# Patient Record
Sex: MALE | Race: WHITE | NOT HISPANIC OR LATINO | Employment: OTHER | URBAN - METROPOLITAN AREA
[De-identification: names, ages, dates, MRNs, and addresses within clinical notes are randomized per-mention and may not be internally consistent; named-entity substitution may affect disease eponyms.]

---

## 2020-08-28 ENCOUNTER — HOSPITAL ENCOUNTER (INPATIENT)
Facility: HOSPITAL | Age: 85
LOS: 1 days | Discharge: HOME | DRG: 917 | End: 2020-08-29
Attending: EMERGENCY MEDICINE | Admitting: INTERNAL MEDICINE
Payer: COMMERCIAL

## 2020-08-28 DIAGNOSIS — T50.901A ACCIDENTAL DRUG INGESTION, INITIAL ENCOUNTER: ICD-10-CM

## 2020-08-28 DIAGNOSIS — I95.9 HYPOTENSION: Primary | ICD-10-CM

## 2020-08-28 PROBLEM — Z86.79 HISTORY OF HYPERTENSION: Status: ACTIVE | Noted: 2020-08-28

## 2020-08-28 PROBLEM — M06.9 RHEUMATOID ARTHRITIS (HCC): Status: ACTIVE | Noted: 2020-08-28

## 2020-08-28 LAB
ALBUMIN SERPL BCP-MCNC: 2.3 G/DL (ref 3.5–5)
ALP SERPL-CCNC: 73 U/L (ref 46–116)
ALT SERPL W P-5'-P-CCNC: 18 U/L (ref 12–78)
ANION GAP SERPL CALCULATED.3IONS-SCNC: 6 MMOL/L (ref 4–13)
AST SERPL W P-5'-P-CCNC: 42 U/L (ref 5–45)
ATRIAL RATE: 80 BPM
BASOPHILS # BLD AUTO: 0.04 THOUSANDS/ΜL (ref 0–0.1)
BASOPHILS NFR BLD AUTO: 0 % (ref 0–1)
BILIRUB SERPL-MCNC: 0.5 MG/DL (ref 0.2–1)
BUN SERPL-MCNC: 13 MG/DL (ref 5–25)
CALCIUM SERPL-MCNC: 7.7 MG/DL (ref 8.3–10.1)
CHLORIDE SERPL-SCNC: 103 MMOL/L (ref 100–108)
CO2 SERPL-SCNC: 30 MMOL/L (ref 21–32)
CREAT SERPL-MCNC: 1.01 MG/DL (ref 0.6–1.3)
EOSINOPHIL # BLD AUTO: 0.67 THOUSAND/ΜL (ref 0–0.61)
EOSINOPHIL NFR BLD AUTO: 7 % (ref 0–6)
ERYTHROCYTE [DISTWIDTH] IN BLOOD BY AUTOMATED COUNT: 14.6 % (ref 11.6–15.1)
GFR SERPL CREATININE-BSD FRML MDRD: 68 ML/MIN/1.73SQ M
GLUCOSE SERPL-MCNC: 146 MG/DL (ref 65–140)
HCT VFR BLD AUTO: 34.8 % (ref 36.5–49.3)
HGB BLD-MCNC: 11.2 G/DL (ref 12–17)
IMM GRANULOCYTES # BLD AUTO: 0.01 THOUSAND/UL (ref 0–0.2)
IMM GRANULOCYTES NFR BLD AUTO: 0 % (ref 0–2)
LYMPHOCYTES # BLD AUTO: 3.03 THOUSANDS/ΜL (ref 0.6–4.47)
LYMPHOCYTES NFR BLD AUTO: 30 % (ref 14–44)
MCH RBC QN AUTO: 28.7 PG (ref 26.8–34.3)
MCHC RBC AUTO-ENTMCNC: 32.2 G/DL (ref 31.4–37.4)
MCV RBC AUTO: 89 FL (ref 82–98)
MONOCYTES # BLD AUTO: 0.97 THOUSAND/ΜL (ref 0.17–1.22)
MONOCYTES NFR BLD AUTO: 10 % (ref 4–12)
NEUTROPHILS # BLD AUTO: 5.49 THOUSANDS/ΜL (ref 1.85–7.62)
NEUTS SEG NFR BLD AUTO: 53 % (ref 43–75)
P AXIS: 70 DEGREES
PLATELET # BLD AUTO: 224 THOUSANDS/UL (ref 149–390)
PMV BLD AUTO: 11.5 FL (ref 8.9–12.7)
POTASSIUM SERPL-SCNC: 4.3 MMOL/L (ref 3.5–5.3)
PR INTERVAL: 158 MS
PROT SERPL-MCNC: 6 G/DL (ref 6.4–8.2)
QRS AXIS: -46 DEGREES
QRSD INTERVAL: 90 MS
QT INTERVAL: 412 MS
QTC INTERVAL: 475 MS
RBC # BLD AUTO: 3.9 MILLION/UL (ref 3.88–5.62)
SODIUM SERPL-SCNC: 139 MMOL/L (ref 136–145)
T WAVE AXIS: 62 DEGREES
TROPONIN I SERPL-MCNC: <0.02 NG/ML
VENTRICULAR RATE: 80 BPM
WBC # BLD AUTO: 10.21 THOUSAND/UL (ref 4.31–10.16)

## 2020-08-28 PROCEDURE — 36415 COLL VENOUS BLD VENIPUNCTURE: CPT | Performed by: EMERGENCY MEDICINE

## 2020-08-28 PROCEDURE — 99291 CRITICAL CARE FIRST HOUR: CPT | Performed by: PHYSICIAN ASSISTANT

## 2020-08-28 PROCEDURE — 99291 CRITICAL CARE FIRST HOUR: CPT | Performed by: EMERGENCY MEDICINE

## 2020-08-28 PROCEDURE — 99285 EMERGENCY DEPT VISIT HI MDM: CPT

## 2020-08-28 PROCEDURE — 85025 COMPLETE CBC W/AUTO DIFF WBC: CPT | Performed by: EMERGENCY MEDICINE

## 2020-08-28 PROCEDURE — 87081 CULTURE SCREEN ONLY: CPT | Performed by: PHYSICIAN ASSISTANT

## 2020-08-28 PROCEDURE — 84484 ASSAY OF TROPONIN QUANT: CPT | Performed by: EMERGENCY MEDICINE

## 2020-08-28 PROCEDURE — 93005 ELECTROCARDIOGRAM TRACING: CPT

## 2020-08-28 PROCEDURE — 80053 COMPREHEN METABOLIC PANEL: CPT | Performed by: EMERGENCY MEDICINE

## 2020-08-28 PROCEDURE — 93010 ELECTROCARDIOGRAM REPORT: CPT | Performed by: INTERNAL MEDICINE

## 2020-08-28 PROCEDURE — 96360 HYDRATION IV INFUSION INIT: CPT

## 2020-08-28 RX ORDER — EZETIMIBE 10 MG/1
10 TABLET ORAL DAILY
COMMUNITY

## 2020-08-28 RX ORDER — GABAPENTIN 100 MG/1
100 CAPSULE ORAL DAILY
Status: DISCONTINUED | OUTPATIENT
Start: 2020-08-28 | End: 2020-08-29 | Stop reason: HOSPADM

## 2020-08-28 RX ORDER — HYDROXYCHLOROQUINE SULFATE 200 MG/1
200 TABLET, FILM COATED ORAL 2 TIMES DAILY WITH MEALS
Status: DISCONTINUED | OUTPATIENT
Start: 2020-08-28 | End: 2020-08-29 | Stop reason: HOSPADM

## 2020-08-28 RX ORDER — GABAPENTIN 100 MG/1
100 CAPSULE ORAL DAILY
COMMUNITY

## 2020-08-28 RX ORDER — CALCIUM CARBONATE 200(500)MG
500 TABLET,CHEWABLE ORAL DAILY PRN
Status: DISCONTINUED | OUTPATIENT
Start: 2020-08-28 | End: 2020-08-29 | Stop reason: HOSPADM

## 2020-08-28 RX ORDER — PANTOPRAZOLE SODIUM 20 MG/1
20 TABLET, DELAYED RELEASE ORAL
Status: DISCONTINUED | OUTPATIENT
Start: 2020-08-29 | End: 2020-08-29 | Stop reason: HOSPADM

## 2020-08-28 RX ORDER — PREDNISONE 1 MG/1
TABLET ORAL DAILY
COMMUNITY

## 2020-08-28 RX ORDER — ASCORBIC ACID 500 MG
500 TABLET ORAL DAILY
COMMUNITY

## 2020-08-28 RX ORDER — HYDROXYCHLOROQUINE SULFATE 200 MG/1
200 TABLET, FILM COATED ORAL 2 TIMES DAILY WITH MEALS
COMMUNITY

## 2020-08-28 RX ORDER — ASPIRIN 81 MG/1
81 TABLET, CHEWABLE ORAL DAILY
COMMUNITY

## 2020-08-28 RX ORDER — OMEPRAZOLE 10 MG/1
10 CAPSULE, DELAYED RELEASE ORAL DAILY
COMMUNITY

## 2020-08-28 RX ORDER — ACETAMINOPHEN 500 MG
500 TABLET ORAL EVERY 6 HOURS PRN
COMMUNITY
End: 2020-08-29 | Stop reason: HOSPADM

## 2020-08-28 RX ORDER — LOPERAMIDE HYDROCHLORIDE 2 MG/1
2 CAPSULE ORAL AS NEEDED
COMMUNITY

## 2020-08-28 RX ORDER — CETIRIZINE HYDROCHLORIDE 10 MG/1
10 TABLET ORAL DAILY
COMMUNITY

## 2020-08-28 RX ORDER — EZETIMIBE 10 MG/1
10 TABLET ORAL DAILY
Status: DISCONTINUED | OUTPATIENT
Start: 2020-08-28 | End: 2020-08-29 | Stop reason: HOSPADM

## 2020-08-28 RX ORDER — ASCORBIC ACID 500 MG
500 TABLET ORAL DAILY
Status: DISCONTINUED | OUTPATIENT
Start: 2020-08-28 | End: 2020-08-29 | Stop reason: HOSPADM

## 2020-08-28 RX ORDER — ACETAMINOPHEN 325 MG/1
975 TABLET ORAL EVERY 8 HOURS PRN
Status: DISCONTINUED | OUTPATIENT
Start: 2020-08-28 | End: 2020-08-29 | Stop reason: HOSPADM

## 2020-08-28 RX ORDER — PREDNISONE 1 MG/1
5 TABLET ORAL DAILY
Status: DISCONTINUED | OUTPATIENT
Start: 2020-08-28 | End: 2020-08-29 | Stop reason: HOSPADM

## 2020-08-28 RX ORDER — ASPIRIN 81 MG/1
81 TABLET, CHEWABLE ORAL DAILY
Status: DISCONTINUED | OUTPATIENT
Start: 2020-08-28 | End: 2020-08-29 | Stop reason: HOSPADM

## 2020-08-28 RX ORDER — MULTIVITAMIN
1 TABLET ORAL DAILY
COMMUNITY

## 2020-08-28 RX ORDER — SOLIFENACIN SUCCINATE 5 MG/1
5 TABLET, FILM COATED ORAL DAILY
COMMUNITY

## 2020-08-28 RX ADMIN — OXYCODONE HYDROCHLORIDE AND ACETAMINOPHEN 500 MG: 500 TABLET ORAL at 13:31

## 2020-08-28 RX ADMIN — ASPIRIN 81 MG 81 MG: 81 TABLET ORAL at 13:30

## 2020-08-28 RX ADMIN — HYDROXYCHLOROQUINE SULFATE 200 MG: 200 TABLET, FILM COATED ORAL at 16:06

## 2020-08-28 RX ADMIN — GABAPENTIN 100 MG: 100 CAPSULE ORAL at 13:31

## 2020-08-28 RX ADMIN — EZETIMIBE 10 MG: 10 TABLET ORAL at 13:31

## 2020-08-28 RX ADMIN — PREDNISONE 5 MG: 5 TABLET ORAL at 13:30

## 2020-08-28 RX ADMIN — SODIUM CHLORIDE 1000 ML: 0.9 INJECTION, SOLUTION INTRAVENOUS at 11:00

## 2020-08-28 RX ADMIN — ENOXAPARIN SODIUM 40 MG: 100 INJECTION SUBCUTANEOUS at 13:31

## 2020-08-28 RX ADMIN — ACETAMINOPHEN 975 MG: 325 TABLET ORAL at 16:41

## 2020-08-28 RX ADMIN — CALCIUM CARBONATE (ANTACID) CHEW TAB 500 MG 500 MG: 500 CHEW TAB at 16:39

## 2020-08-28 RX ADMIN — NOREPINEPHRINE BITARTRATE 1 MCG/MIN: 1 INJECTION, SOLUTION, CONCENTRATE INTRAVENOUS at 12:30

## 2020-08-28 RX ADMIN — ACETAMINOPHEN 975 MG: 325 TABLET ORAL at 12:13

## 2020-08-28 NOTE — ASSESSMENT & PLAN NOTE
· Due to accidental overdose of wife's medications (as listed above)  · Continue Levophed PRN for map >65  · Titrate for maps  · Continue neuro checks Q4H

## 2020-08-28 NOTE — ASSESSMENT & PLAN NOTE
· See above  · Hypotensive in setting of ingesting wifes medications which were accidentally provided to him  · Accidentally took 300mg Labetalol, 10mg Amlodipine, 40mg Lasix, 10mg Lisinopril, and 10mg isosorbide  · Continue to monitor hemodynamics, not noted to be bradycardic  · Maintain goal MAP >65mm/Hg  · 2L IV fluid administered in ED  · Ok to initiate peripheral levophed to maintain goal MAP

## 2020-08-28 NOTE — H&P
H&P- Sridhar Dhaliwal 1934, 80 y o  male MRN: [id number]    Unit/Bed#: -01 Encounter: [id number]    Primary Care Provider: Pernell Rubinstein, MD   Date and time admitted to hospital: 8/28/2020 10:38 AM        * Accidental overdose  Assessment & Plan  · Patient is an 80year old male whom presented from his Assisted Living facility via EMS  · He has an extensive PMH of Hypertension, RA on chronic 5mg prednisone daily, and Gout  · This am having breakfast with his wife he was handed a blister packet of medications and took such as he normally would  It was then discovered that he was administered his wife's blister pack of medications instead    He was not provided any of his normal medications  · In review of Wifes medications he was administered 300mg Labetalol, 10mg Amlodipine, 40mg Lasix, 10mg Lisinopril, and 10mg isosorbide  · He reported a short time after receiving the medications, he became dizzy, feeling as though he was going to pass out  · Staff laid him on the floor and called EMS  · Patient presented hypotensive with systolic BP 16-47'U, in ED he was administered a small amount of push dose epi for which BP/HR was transiently responsive  · Additionally administered 2L bolus of NSS in ED  · Continue to monitor mental status  · Continue to monitor Hemodynamics maintaining goal MAP >65mm/Hg  · Closely monitor  · Initial 50ml/h of Isolyte  · Initiate Levophed if necessary to maintain goal MAP  · Hold patients Lopressor  · Ok to resume most of his other medications including his Prednisone 5mg for his RA    Hypotension  Assessment & Plan  · See above  · Hypotensive in setting of ingesting wifes medications which were accidentally provided to him  · Accidentally took 300mg Labetalol, 10mg Amlodipine, 40mg Lasix, 10mg Lisinopril, and 10mg isosorbide  · Continue to monitor hemodynamics, not noted to be bradycardic  · Maintain goal MAP >65mm/Hg  · 2L IV fluid administered in ED  · Ok to initiate peripheral levophed to maintain goal MAP    History of hypertension  Assessment & Plan  · On home metoprolol  · In setting of accidental ingestion of wrong medications, will hold and continue to monitor    Rheumatoid arthritis ([address] )  Assessment & Plan  · On chronic prednisone 5mg, will resume reports not taking this am        -------------------------------------------------------------------------------------------------------------  Chief Complaint: "I was given the wrong meds"    History of Present Illness   Kiesha Pastor is a 80 y o  male who presented to [address] [place] [address] from his assisted living facility via EMS  Patient has an extensive PMH of HTN, RA on chronic 5mg Prednisone daily, as well as gout  This am having breakfast with his wife he was handed a blister packet of medications and took such as he normally would  It was then discovered that Cyrilla Ivania was provided and took his wife's blister pack of medications instead of his own normal medications  He reported a short time later feeling dizzy and as though he was going to pass out  He was laid on the floor and EMS was summoned  Medications which he took were 300mg Labetalol, 10mg Amlodipine, 40mg Lasix, 10mg Lisinopril, and 10mg isosorbide  Upon arrival in the ER he was noted to be hypotensive with a systolic BP 69H-96  ED administered small push dose epi with transient response to BP and Heart rate  Patient additionally administered 2L bolus of IV fluids  Hereports feeling okay at this time  He denies headache, dizziness, chest pain, shortness of breath or additional complaints at this time  BP however still hypotensive,  Labwork obtained and reviewed which is essentially normal       History obtained from the patient   -------------------------------------------------------------------------------------------------------------  Dispo:  Admit to Critical Care     Code Status: Level 1 - Full Code  --------------------------------------------------------------------------------------------------------------  Review of Systems   Constitutional: Negative for activity change, appetite change, chills, diaphoresis, fatigue and fever  HENT: Negative  Eyes: Negative for visual disturbance  Respiratory: Negative for cough and shortness of breath  Cardiovascular: Negative for chest pain and palpitations  Gastrointestinal: Negative for abdominal pain, nausea and vomiting  Endocrine: Negative  Genitourinary: Negative for dysuria, frequency and urgency  Musculoskeletal: Negative for back pain and myalgias  Skin: Negative  Allergic/Immunologic: Negative  Neurological: Positive for weakness and light-headedness  Negative for dizziness, syncope, facial asymmetry, speech difficulty and headaches  Hematological: Negative  Physical Exam     General:  80year old male appears hard of hearing, awake, alert, and oriented  HEENT: Normocephalic, atraumatic   PERR, Sclera anicteric, conjunctiva pink, oropharynx patent, membranes moist, tolerating secretions  Neck: supple, trachea midline  Heart: RRR without murmur, rub, or gallop  Lungs: clear and equal all fields bilaterally, no wheezing, rales, or rhonchi  Abd: soft, non-tender, no guarding, rebound or peritoneal signs, active BS noted  Ext: FROM of upper and lower extremities, distal pulses intact  Skin: warm and dry  Neuro: GCS 15, conscious, alert, and oriented, non-focal exam  --------------------------------------------------------------------------------------------------------------  Vitals:   Vitals:    08/28/20 1230 08/28/20 1253 08/28/20 1300 08/28/20 1400   BP: (!) 83/51 94/57 90/60 95/54   BP Location:  Right arm     Pulse: 80 80 80 80   Resp: 20 20 20 18   Temp:  97 8 °F (36 6 °C)     TempSrc:  Axillary     SpO2: 96% 98% 94% 94%   Weight:  106 kg (233 lb 0 4 oz)     Height:  5' 8" (1 727 m)       Temp  Min: 97 8 °F (36 6 °C) Max: 98 2 °F (36 8 °C)  IBW: 68 4 kg  Height: 5' 8" (172 7 cm)  Body mass index is 35 43 kg/m²  Laboratory and Diagnostics:  Results from last 7 days   Lab Units 20  1059   WBC Thousand/uL 10 21*   HEMOGLOBIN g/dL 11 2*   HEMATOCRIT % 34 8*   PLATELETS Thousands/uL 224   NEUTROS PCT % 53   MONOS PCT % 10     Results from last 7 days   Lab Units 20  1059   SODIUM mmol/L 139   POTASSIUM mmol/L 4 3   CHLORIDE mmol/L 103   CO2 mmol/L 30   ANION GAP mmol/L 6   BUN mg/dL 13   CREATININE mg/dL 1 01   CALCIUM mg/dL 7 7*   GLUCOSE RANDOM mg/dL 146*   ALT U/L 18   AST U/L 42   ALK PHOS U/L 73   ALBUMIN g/dL 2 3*   TOTAL BILIRUBIN mg/dL 0 50               Results from last 7 days   Lab Units 20  1059   TROPONIN I ng/mL <0 02       Micro:  None    EK Lead EKG obtained and reviewed noting NSR rate 74 no ectopy  Imaging: I have personally reviewed pertinent reports  Historical Information   Past Medical History:   Diagnosis Date    Gout     Hypertension     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[facility] [address] )     Spinal stenosis      History reviewed  No pertinent surgical history  Social History   Social History     Substance and Sexual Activity   Alcohol Use Not Currently     Social History     Substance and Sexual Activity   Drug Use Never     Social History     Tobacco Use   Smoking Status Never Smoker   Smokeless Tobacco Never Used     Exercise History: Walks frequently  Family History: EMR reviewed  History reviewed  No pertinent family history        Medications:  Current Facility-Administered Medications   Medication Dose Route Frequency    acetaminophen (TYLENOL) tablet 975 mg  975 mg Oral Q8H PRN    ascorbic acid (VITAMIN C) tablet 500 mg  500 mg Oral Daily    aspirin chewable tablet 81 mg  81 mg Oral Daily    enoxaparin (LOVENOX) subcutaneous injection 40 mg  40 mg Subcutaneous Daily    ezetimibe (ZETIA) tablet 10 mg  10 mg Oral Daily    gabapentin (NEURONTIN) capsule 100 mg  100 mg Oral Daily    hydroxychloroquine (PLAQUENIL) tablet 200 mg  200 mg Oral BID With Meals    norepinephrine (LEVOPHED) 4 mg (STANDARD CONCENTRATION) IV in sodium chloride 0 9% 250 mL  1-30 mcg/min Intravenous Titrated    [START ON 8/29/2020] pantoprazole (PROTONIX) EC tablet 20 mg  20 mg Oral Early Morning    predniSONE tablet 5 mg  5 mg Oral Daily     Home medications:  Prior to Admission Medications   Prescriptions Last Dose Informant Patient Reported? Taking? Multiple Vitamin (multivitamin) tablet   Yes Yes   Sig: Take 1 tablet by mouth daily   acetaminophen (TYLENOL) 500 mg tablet   Yes Yes   Sig: Take 500 mg by mouth every 6 (six) hours as needed for mild pain   ascorbic acid (VITAMIN C) 500 MG tablet   Yes Yes   Sig: Take 500 mg by mouth daily   aspirin 81 mg chewable tablet   Yes Yes   Sig: Chew 81 mg daily   cetirizine (ZyrTEC) 10 mg tablet   Yes Yes   Sig: Take 10 mg by mouth daily   ezetimibe (ZETIA) 10 mg tablet   Yes Yes   Sig: Take 10 mg by mouth daily   gabapentin (NEURONTIN) 100 mg capsule   Yes Yes   Sig: Take 100 mg by mouth daily At bedtime   hydroxychloroquine (PLAQUENIL) 200 mg tablet   Yes Yes   Sig: Take 200 mg by mouth 2 (two) times a day with meals   loperamide (IMODIUM) 2 mg capsule   Yes Yes   Sig: Take 2 mg by mouth as needed for diarrhea   metoprolol tartrate (LOPRESSOR) 25 mg tablet   Yes Yes   Sig: Take 25 mg by mouth every 12 (twelve) hours   omeprazole (PriLOSEC) 10 mg delayed release capsule   Yes Yes   Sig: Take 10 mg by mouth daily   predniSONE 5 mg tablet   Yes Yes   Sig: Take by mouth daily   solifenacin (VESICARE) 5 mg tablet   Yes Yes   Sig: Take 5 mg by mouth daily      Facility-Administered Medications: None     Allergies:   Allergies   Allergen Reactions    Statins        ------------------------------------------------------------------------------------------------------------  Advance Directive and Living Will:      Power of :    POLST: ------------------------------------------------------------------------------------------------------------  Anticipated Length of Stay is > 2 midnights    Care Time Delivered:   Upon my evaluation, this patient had a high probability of imminent or life-threatening deterioration due to Hypotension secondary to accidental ingestion, which required my direct attention, intervention, and personal management  I have personally provided 30 minutes (1150 to 1220) of critical care time, exclusive of procedures, teaching, family meetings, and any prior time recorded by providers other than myself  Patricia Pizarro PA-C        Portions of the record may have been created with voice recognition software  Occasional wrong word or "sound a like" substitutions may have occurred due to the inherent limitations of voice recognition software    Read the chart carefully and recognize, using context, where substitutions have occurred

## 2020-08-28 NOTE — PLAN OF CARE
Problem: Potential for Falls  Goal: Patient will remain free of falls  Description: INTERVENTIONS:  - Assess patient frequently for physical needs  -  Identify cognitive and physical deficits and behaviors that affect risk of falls    -  [place] fall precautions as indicated by assessment   - Educate patient/family on patient safety including physical limitations  - Instruct patient to call for assistance with activity based on assessment  - Modify environment to reduce risk of injury  - Consider OT/PT consult to assist with strengthening/mobility  Outcome: Progressing     Problem: PAIN - ADULT  Goal: Verbalizes/displays adequate comfort level or baseline comfort level  Description: Interventions:  - Encourage patient to monitor pain and request assistance  - Assess pain using appropriate pain scale  - Administer analgesics based on type and severity of pain and evaluate response  - Implement non-pharmacological measures as appropriate and evaluate response  - Consider cultural and social influences on pain and pain management  - Notify physician/advanced practitioner if interventions unsuccessful or patient reports new pain  Outcome: Progressing     Problem: DISCHARGE PLANNING  Goal: Discharge to home or other facility with appropriate resources  Description: INTERVENTIONS:  - Identify barriers to discharge w/patient and caregiver  - Arrange for needed discharge resources and transportation as appropriate  - Identify discharge learning needs (meds, wound care, etc )  - Arrange for interpretive services to assist at discharge as needed  - Refer to Case Management Department for coordinating discharge planning if the patient needs post-hospital services based on physician/advanced practitioner order or complex needs related to functional status, cognitive ability, or social support system  Outcome: Progressing     Problem: Knowledge Deficit  Goal: Patient/family/caregiver demonstrates understanding of disease process, treatment plan, medications, and discharge instructions  Description: Complete learning assessment and assess knowledge base    Interventions:  - Provide teaching at level of understanding  - Provide teaching via preferred learning methods  Outcome: Progressing     Problem: CARDIOVASCULAR - ADULT  Goal: Maintains optimal cardiac output and hemodynamic stability  Description: INTERVENTIONS:  - Monitor I/O, vital signs and rhythm  - Monitor for S/S and trends of decreased cardiac output  - Administer and titrate ordered vasoactive medications to optimize hemodynamic stability  - Assess quality of pulses, skin color and temperature  - Assess for signs of decreased coronary artery perfusion  - Instruct patient to report change in severity of symptoms  Outcome: Progressing  Goal: Absence of cardiac dysrhythmias or at baseline rhythm  Description: INTERVENTIONS:  - Continuous cardiac monitoring, vital signs, obtain 12 lead EKG if ordered  - Administer antiarrhythmic and heart rate control medications as ordered  - Monitor electrolytes and administer replacement therapy as ordered  Outcome: Progressing     Problem: METABOLIC, FLUID AND ELECTROLYTES - ADULT  Goal: Fluid balance maintained  Description: INTERVENTIONS:  - Monitor labs   - Monitor I/O and WT  - Instruct patient on fluid and nutrition as appropriate  - Assess for signs & symptoms of volume excess or deficit  Outcome: Progressing

## 2020-08-28 NOTE — ASSESSMENT & PLAN NOTE
· On home metoprolol  · In setting of accidental ingestion of wrong medications, will hold and continue to monitor

## 2020-08-28 NOTE — ASSESSMENT & PLAN NOTE
· Pt received wife's medications including 300mg Labetalol, 10mg Amlodipine, 40mg Lasix, 10mg Lisinopril, and 10mg isosorbide  · Pt became dizzy shortly after and laid on floor    Pt presented hypotensive with systolic's of 43-14'H  · Pt received push dose epi in the ER and 2L IVF  · Neuro checks Q4H  · Levophed PRN for map >65  · Continue to hold home lopressor  · Can continue all other home medications including prednisone

## 2020-08-28 NOTE — ED PROVIDER NOTES
History  Chief Complaint   Patient presents with    Hypotension     pt was given wrong medications at his facilty, including mutiple doses of various blood pressure medicine  pt complaint of dizziness and a postive LOC at home  pt was lowered to floor and denies any head strike     59-year-old male presents for evaluation of accidental ingestion  Patient was given his wife's medications this morning by accident which include isosorbide dinitrate, labetalol, amlodipine, furosemide and lisinopril  Patient reports feeling lightheaded but is otherwise asymptomatic  Patient arrives via EMS who state that patient's blood pressure was in the 70s  Patient is on metoprolol 25 mg daily  Prior to Admission Medications   Prescriptions Last Dose Informant Patient Reported? Taking?    Multiple Vitamin (multivitamin) tablet   Yes Yes   Sig: Take 1 tablet by mouth daily   acetaminophen (TYLENOL) 500 mg tablet   Yes Yes   Sig: Take 500 mg by mouth every 6 (six) hours as needed for mild pain   ascorbic acid (VITAMIN C) 500 MG tablet   Yes Yes   Sig: Take 500 mg by mouth daily   aspirin 81 mg chewable tablet   Yes Yes   Sig: Chew 81 mg daily   cetirizine (ZyrTEC) 10 mg tablet   Yes Yes   Sig: Take 10 mg by mouth daily   ezetimibe (ZETIA) 10 mg tablet   Yes Yes   Sig: Take 10 mg by mouth daily   gabapentin (NEURONTIN) 100 mg capsule   Yes Yes   Sig: Take 100 mg by mouth daily At bedtime   hydroxychloroquine (PLAQUENIL) 200 mg tablet   Yes Yes   Sig: Take 200 mg by mouth 2 (two) times a day with meals   loperamide (IMODIUM) 2 mg capsule   Yes Yes   Sig: Take 2 mg by mouth as needed for diarrhea   metoprolol tartrate (LOPRESSOR) 25 mg tablet   Yes Yes   Sig: Take 25 mg by mouth every 12 (twelve) hours   omeprazole (PriLOSEC) 10 mg delayed release capsule   Yes Yes   Sig: Take 10 mg by mouth daily   predniSONE 5 mg tablet   Yes Yes   Sig: Take by mouth daily   solifenacin (VESICARE) 5 mg tablet   Yes Yes   Sig: Take 5 mg by mouth daily      Facility-Administered Medications: None       Past Medical History:   Diagnosis Date    Gout     Hypertension     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[facility] [address] )     Spinal stenosis        History reviewed  No pertinent surgical history  History reviewed  No pertinent family history  I have reviewed and agree with the history as documented  E-Cigarette/Vaping    E-Cigarette Use Never User      E-Cigarette/Vaping Substances     Social History     Tobacco Use    Smoking status: Never Smoker    Smokeless tobacco: Never Used   Substance Use Topics    Alcohol use: Not Currently    Drug use: Never       Review of Systems   Constitutional: Negative for chills, diaphoresis and fever  HENT: Negative for congestion and rhinorrhea  Eyes: Negative for pain and visual disturbance  Respiratory: Negative for cough, shortness of breath and wheezing  Cardiovascular: Negative for chest pain and leg swelling  Gastrointestinal: Negative for abdominal pain, diarrhea, nausea and vomiting  Genitourinary: Negative for difficulty urinating, dysuria, frequency and urgency  Musculoskeletal: Negative for back pain and neck pain  Skin: Negative for color change and rash  Neurological: Positive for light-headedness  Negative for syncope, numbness and headaches  All other systems reviewed and are negative  Physical Exam  Physical Exam  Vitals signs and nursing note reviewed  Constitutional:       Appearance: He is well-developed  HENT:      Head: Normocephalic and atraumatic  Eyes:      Conjunctiva/sclera: Conjunctivae normal    Neck:      Musculoskeletal: Normal range of motion and neck supple  Cardiovascular:      Rate and Rhythm: Normal rate and regular rhythm  Pulmonary:      Effort: Pulmonary effort is normal  No respiratory distress  Abdominal:      Palpations: Abdomen is soft  Tenderness: There is no abdominal tenderness  Musculoskeletal: Normal range of motion           General: No tenderness  Skin:     General: Skin is warm  Findings: No erythema  Neurological:      Mental Status: He is alert and oriented to person, place, and time     Psychiatric:         Behavior: Behavior normal          Vital Signs  ED Triage Vitals   Temperature Pulse Respirations Blood Pressure SpO2   08/28/20 1040 08/28/20 1040 08/28/20 1040 08/28/20 1040 08/28/20 1040   98 2 °F (36 8 °C) 79 18 (!) 76/48 91 %      Temp Source Heart Rate Source Patient Position - Orthostatic VS BP Location FiO2 (%)   08/28/20 1040 08/28/20 1040 08/28/20 1253 08/28/20 1253 --   Temporal Monitor Lying Right arm       Pain Score       08/28/20 1213       8           Vitals:    08/28/20 1253 08/28/20 1300 08/28/20 1400 08/28/20 1439   BP: 94/57 90/60 95/54 117/56   Pulse: 80 80 80 78   Patient Position - Orthostatic VS: Lying            Visual Acuity  Visual Acuity      Most Recent Value   L Pupil Size (mm)  3   R Pupil Size (mm)  3          ED Medications  Medications   norepinephrine (LEVOPHED) 4 mg (STANDARD CONCENTRATION) IV in sodium chloride 0 9% 250 mL (0 mcg/min Intravenous Stopped 8/28/20 1448)   acetaminophen (TYLENOL) tablet 975 mg (975 mg Oral Given 8/28/20 1213)   ascorbic acid (VITAMIN C) tablet 500 mg (500 mg Oral Given 8/28/20 1331)   aspirin chewable tablet 81 mg (81 mg Oral Given 8/28/20 1330)   ezetimibe (ZETIA) tablet 10 mg (10 mg Oral Given 8/28/20 1331)   gabapentin (NEURONTIN) capsule 100 mg (100 mg Oral Given 8/28/20 1331)   hydroxychloroquine (PLAQUENIL) tablet 200 mg (has no administration in time range)   pantoprazole (PROTONIX) EC tablet 20 mg (has no administration in time range)   enoxaparin (LOVENOX) subcutaneous injection 40 mg (40 mg Subcutaneous Given 8/28/20 1331)   predniSONE tablet 5 mg (5 mg Oral Given 8/28/20 1330)   sodium chloride 0 9 % bolus 1,000 mL (0 mL Intravenous Stopped 8/28/20 1147)       Diagnostic Studies  Results Reviewed     Procedure Component Value Units Date/Time    Troponin I [[id number]]  (Normal) Collected:  08/28/20 1059    Lab Status:  Final result Specimen:  Blood from Arm, Right Updated:  08/28/20 1126     Troponin I <0 02 ng/mL     Comprehensive metabolic panel [[id number]]  (Abnormal) Collected:  08/28/20 1059    Lab Status:  Final result Specimen:  Blood from Arm, Right Updated:  08/28/20 1125     Sodium 139 mmol/L      Potassium 4 3 mmol/L      Chloride 103 mmol/L      CO2 30 mmol/L      ANION GAP 6 mmol/L      BUN 13 mg/dL      Creatinine 1 01 mg/dL      Glucose 146 mg/dL      Calcium 7 7 mg/dL      AST 42 U/L      ALT 18 U/L      Alkaline Phosphatase 73 U/L      Total Protein 6 0 g/dL      Albumin 2 3 g/dL      Total Bilirubin 0 50 mg/dL      eGFR 68 ml/min/1 73sq m     Narrative:       [facility] guidelines for Chronic Kidney Disease (CKD):     Stage 1 with normal or high GFR (GFR > 90 mL/min/1 73 square meters)    Stage 2 Mild CKD (GFR = 60-89 mL/min/1 73 square meters)    Stage 3A Moderate CKD (GFR = 45-59 mL/min/1 73 square meters)    Stage 3B Moderate CKD (GFR = 30-44 mL/min/1 73 square meters)    Stage 4 Severe CKD (GFR = 15-29 mL/min/1 73 square meters)    Stage 5 End Stage CKD (GFR <15 mL/min/1 73 square meters)  Note: GFR calculation is accurate only with a steady state creatinine    CBC and differential [[id number]]  (Abnormal) Collected:  08/28/20 1059    Lab Status:  Final result Specimen:  Blood from Arm, Right Updated:  08/28/20 1107     WBC 10 21 Thousand/uL      RBC 3 90 Million/uL      Hemoglobin 11 2 g/dL      Hematocrit 34 8 %      MCV 89 fL      MCH 28 7 pg      MCHC 32 2 g/dL      RDW 14 6 %      MPV 11 5 fL      Platelets 064 Thousands/uL      Neutrophils Relative 53 %      Immat GRANS % 0 %      Lymphocytes Relative 30 %      Monocytes Relative 10 %      Eosinophils Relative 7 %      Basophils Relative 0 %      Neutrophils Absolute 5 49 Thousands/µL      Immature Grans Absolute 0 01 Thousand/uL      Lymphocytes Absolute 3 03 Thousands/µL      Monocytes Absolute 0 97 Thousand/µL      Eosinophils Absolute 0 67 Thousand/µL      Basophils Absolute 0 04 Thousands/µL                  No orders to display              Procedures  CriticalCare Time  Performed by: Carine Solis DO  Authorized by: Carine Solis DO     Critical care provider statement:     Critical care time (minutes):  35    Critical care time was exclusive of:  Separately billable procedures and treating other patients and teaching time    Critical care was necessary to treat or prevent imminent or life-threatening deterioration of the following conditions:  Circulatory failure    Critical care was time spent personally by me on the following activities:  Obtaining history from patient or surrogate, blood draw for specimens, development of treatment plan with patient or surrogate, discussions with consultants, discussions with primary provider, evaluation of patient's response to treatment, examination of patient, interpretation of cardiac output measurements, ordering and performing treatments and interventions, ordering and review of radiographic studies, re-evaluation of patient's condition and review of old charts             ED Course       US AUDIT      Most Recent Value   Initial Alcohol Screen: US AUDIT-C    1  How often do you have a drink containing alcohol?  0 Filed at: 08/28/2020 1041   2  How many drinks containing alcohol do you have on a typical day you are drinking? 0 Filed at: 08/28/2020 1041   3a  Male UNDER 65: How often do you have five or more drinks on one occasion? 0 Filed at: 08/28/2020 1041   3b  FEMALE Any Age, or MALE 65+: How often do you have 4 or more drinks on one occassion? 0 Filed at: 08/28/2020 1041   Audit-C Score  0 Filed at: 08/28/2020 1041                  ARNOLDO/DAST-10      Most Recent Value   How many times in the past year have you    Used an illegal drug or used a prescription medication for non-medical reasons?   Never Filed at: 08/28/2020 1041                                [facility]  Number of Diagnoses or Management Options  Accidental drug ingestion, initial encounter:   Hypotension:   Diagnosis management comments: 49-year-old male presenting with accidental ingestion of multiple antihypertensives  Obtain labs, EKG  Administer 2 L fluid bolus  Patient responding well to fluids and Trendelenburg but continues to become hypotensive  10 mg of epinephrine administered with good response but again continued to become hypotensive  Will admit to critical care for close monitoring and blood pressure support as needed  Disposition  Final diagnoses:   Hypotension   Accidental drug ingestion, initial encounter     Time reflects when diagnosis was documented in both MDM as applicable and the Disposition within this note     Time User Action Codes Description Comment    8/28/2020 11:42 AM Delwyn Half Add [I95 9] Hypotension     8/28/2020 11:42 AM Delwyn Half Add [T50 901A] Accidental drug ingestion, initial encounter       ED Disposition     ED Disposition Condition Date/Time Comment    Admit Stable Fri Aug 28, 2020 11:42 AM Case was discussed with ICU and the patient's admission status was agreed to be Admission Status: inpatient status to the service of Dr Celestino Lobato           Follow-up Information    None         Current Discharge Medication List      CONTINUE these medications which have NOT CHANGED    Details   acetaminophen (TYLENOL) 500 mg tablet Take 500 mg by mouth every 6 (six) hours as needed for mild pain      ascorbic acid (VITAMIN C) 500 MG tablet Take 500 mg by mouth daily      aspirin 81 mg chewable tablet Chew 81 mg daily      cetirizine (ZyrTEC) 10 mg tablet Take 10 mg by mouth daily      ezetimibe (ZETIA) 10 mg tablet Take 10 mg by mouth daily      gabapentin (NEURONTIN) 100 mg capsule Take 100 mg by mouth daily At bedtime      hydroxychloroquine (PLAQUENIL) 200 mg tablet Take 200 mg by mouth 2 (two) times a day with meals loperamide (IMODIUM) 2 mg capsule Take 2 mg by mouth as needed for diarrhea      metoprolol tartrate (LOPRESSOR) 25 mg tablet Take 25 mg by mouth every 12 (twelve) hours      Multiple Vitamin (multivitamin) tablet Take 1 tablet by mouth daily      omeprazole (PriLOSEC) 10 mg delayed release capsule Take 10 mg by mouth daily      predniSONE 5 mg tablet Take by mouth daily      solifenacin (VESICARE) 5 mg tablet Take 5 mg by mouth daily           No discharge procedures on file      PDMP Review     None          ED Provider  Electronically Signed by           Tanisha Alberto DO  08/28/20 2033

## 2020-08-28 NOTE — ASSESSMENT & PLAN NOTE
· Patient is an 80year old male whom presented from his Assisted Living facility via EMS  · He has an extensive PMH of Hypertension, RA on chronic 5mg prednisone daily, and Gout  · This am having breakfast with his wife he was handed a blister packet of medications and took such as he normally would  It was then discovered that he was administered his wife's blister pack of medications instead    He was not provided any of his normal medications  · In review of Wifes medications he was administered 300mg Labetalol, 10mg Amlodipine, 40mg Lasix, 10mg Lisinopril, and 10mg isosorbide  · He reported a short time after receiving the medications, he became dizzy, feeling as though he was going to pass out  · Staff laid him on the floor and called EMS  · Patient presented hypotensive with systolic BP 97-39'J, in ED he was administered a small amount of push dose epi for which BP/HR was transiently responsive  · Additionally administered 2L bolus of NSS in ED  · Continue to monitor mental status  · Continue to monitor Hemodynamics maintaining goal MAP >65mm/Hg  · Closely monitor  · Initial 50ml/h of Isolyte  · Initiate Levophed if necessary to maintain goal MAP  · Hold patients Lopressor  · Ok to resume most of his other medications including his Prednisone 5mg for his RA

## 2020-08-29 VITALS
HEART RATE: 80 BPM | RESPIRATION RATE: 20 BRPM | TEMPERATURE: 97.9 F | BODY MASS INDEX: 32.51 KG/M2 | OXYGEN SATURATION: 94 % | WEIGHT: 214.51 LBS | SYSTOLIC BLOOD PRESSURE: 122 MMHG | HEIGHT: 68 IN | DIASTOLIC BLOOD PRESSURE: 60 MMHG

## 2020-08-29 PROBLEM — I95.9 HYPOTENSION: Status: RESOLVED | Noted: 2020-08-28 | Resolved: 2020-08-29

## 2020-08-29 LAB
ANION GAP SERPL CALCULATED.3IONS-SCNC: 7 MMOL/L (ref 4–13)
BASOPHILS # BLD AUTO: 0.04 THOUSANDS/ΜL (ref 0–0.1)
BASOPHILS NFR BLD AUTO: 1 % (ref 0–1)
BUN SERPL-MCNC: 14 MG/DL (ref 5–25)
CALCIUM SERPL-MCNC: 8.4 MG/DL (ref 8.3–10.1)
CHLORIDE SERPL-SCNC: 103 MMOL/L (ref 100–108)
CO2 SERPL-SCNC: 29 MMOL/L (ref 21–32)
CREAT SERPL-MCNC: 0.94 MG/DL (ref 0.6–1.3)
EOSINOPHIL # BLD AUTO: 0.38 THOUSAND/ΜL (ref 0–0.61)
EOSINOPHIL NFR BLD AUTO: 5 % (ref 0–6)
ERYTHROCYTE [DISTWIDTH] IN BLOOD BY AUTOMATED COUNT: 14.5 % (ref 11.6–15.1)
GFR SERPL CREATININE-BSD FRML MDRD: 74 ML/MIN/1.73SQ M
GLUCOSE SERPL-MCNC: 110 MG/DL (ref 65–140)
HCT VFR BLD AUTO: 37.6 % (ref 36.5–49.3)
HGB BLD-MCNC: 11.9 G/DL (ref 12–17)
IMM GRANULOCYTES # BLD AUTO: 0.03 THOUSAND/UL (ref 0–0.2)
IMM GRANULOCYTES NFR BLD AUTO: 0 % (ref 0–2)
LYMPHOCYTES # BLD AUTO: 1.9 THOUSANDS/ΜL (ref 0.6–4.47)
LYMPHOCYTES NFR BLD AUTO: 24 % (ref 14–44)
MCH RBC QN AUTO: 29.1 PG (ref 26.8–34.3)
MCHC RBC AUTO-ENTMCNC: 31.6 G/DL (ref 31.4–37.4)
MCV RBC AUTO: 92 FL (ref 82–98)
MONOCYTES # BLD AUTO: 0.78 THOUSAND/ΜL (ref 0.17–1.22)
MONOCYTES NFR BLD AUTO: 10 % (ref 4–12)
NEUTROPHILS # BLD AUTO: 4.9 THOUSANDS/ΜL (ref 1.85–7.62)
NEUTS SEG NFR BLD AUTO: 60 % (ref 43–75)
NRBC BLD AUTO-RTO: 0 /100 WBCS
PLATELET # BLD AUTO: 198 THOUSANDS/UL (ref 149–390)
PMV BLD AUTO: 11.6 FL (ref 8.9–12.7)
POTASSIUM SERPL-SCNC: 4.1 MMOL/L (ref 3.5–5.3)
RBC # BLD AUTO: 4.09 MILLION/UL (ref 3.88–5.62)
SODIUM SERPL-SCNC: 139 MMOL/L (ref 136–145)
WBC # BLD AUTO: 8.03 THOUSAND/UL (ref 4.31–10.16)

## 2020-08-29 PROCEDURE — 99291 CRITICAL CARE FIRST HOUR: CPT | Performed by: INTERNAL MEDICINE

## 2020-08-29 PROCEDURE — NC001 PR NO CHARGE: Performed by: PHYSICIAN ASSISTANT

## 2020-08-29 PROCEDURE — 80048 BASIC METABOLIC PNL TOTAL CA: CPT | Performed by: NURSE PRACTITIONER

## 2020-08-29 PROCEDURE — 85025 COMPLETE CBC W/AUTO DIFF WBC: CPT | Performed by: NURSE PRACTITIONER

## 2020-08-29 RX ORDER — MULTIVITAMIN/IRON/FOLIC ACID 18MG-0.4MG
1 TABLET ORAL DAILY
Status: DISCONTINUED | OUTPATIENT
Start: 2020-08-29 | End: 2020-08-29 | Stop reason: HOSPADM

## 2020-08-29 RX ORDER — ALLOPURINOL 100 MG/1
400 TABLET ORAL DAILY
COMMUNITY

## 2020-08-29 RX ADMIN — ENOXAPARIN SODIUM 40 MG: 100 INJECTION SUBCUTANEOUS at 09:09

## 2020-08-29 RX ADMIN — ASPIRIN 81 MG 81 MG: 81 TABLET ORAL at 09:13

## 2020-08-29 RX ADMIN — PREDNISONE 5 MG: 5 TABLET ORAL at 09:09

## 2020-08-29 RX ADMIN — EZETIMIBE 10 MG: 10 TABLET ORAL at 09:09

## 2020-08-29 RX ADMIN — ACETAMINOPHEN 975 MG: 325 TABLET ORAL at 06:41

## 2020-08-29 RX ADMIN — HYDROXYCHLOROQUINE SULFATE 200 MG: 200 TABLET, FILM COATED ORAL at 09:18

## 2020-08-29 RX ADMIN — METOPROLOL TARTRATE 25 MG: 25 TABLET, FILM COATED ORAL at 09:11

## 2020-08-29 RX ADMIN — Medication 1 TABLET: at 09:09

## 2020-08-29 RX ADMIN — PANTOPRAZOLE SODIUM 20 MG: 20 TABLET, DELAYED RELEASE ORAL at 06:41

## 2020-08-29 RX ADMIN — GABAPENTIN 100 MG: 100 CAPSULE ORAL at 09:13

## 2020-08-29 RX ADMIN — OXYCODONE HYDROCHLORIDE AND ACETAMINOPHEN 500 MG: 500 TABLET ORAL at 09:09

## 2020-08-29 NOTE — ASSESSMENT & PLAN NOTE
· Due to accidental overdose of wife's medications (as listed above)  · Required levophed for approximately 8 hours  · 1/2 Life of meds taken reviewed   · Hypotension resolved

## 2020-08-29 NOTE — UTILIZATION REVIEW
Initial Clinical Review    Admission: Date/Time/Statement:   Admission Orders (From admission, onward)     Ordered        08/28/20 1143  Inpatient Admission  Once                   Orders Placed This Encounter   Procedures    Inpatient Admission     Standing Status:   Standing     Number of Occurrences:   1     Order Specific Question:   Admitting Physician     Answer:   Nehemias Ambrosio [[id number]]     Order Specific Question:   Level of Care     Answer:   Critical Care [15]     Order Specific Question:   Estimated length of stay     Answer:   More than 2 Midnights     Order Specific Question:   Certification     Answer:   I certify that inpatient services are medically necessary for this patient for a duration of greater than two midnights  See H&P and MD Progress Notes for additional information about the patient's course of treatment  ED Arrival Information     Expected Arrival Acuity Means of Arrival Escorted By Service Admission Type    - 8/28/2020 10:37 [facility]) General Medicine Emergency    Arrival Complaint    syncope        Chief Complaint   Patient presents with    Hypotension     pt was given wrong medications at his facilty, including mutiple doses of various blood pressure medicine  pt complaint of dizziness and a postive LOC at home  pt was lowered to floor and denies any head strike     Assessment/Plan: 80year old male to the ED from assisted living facility via EMS after being given wrong medications and becoming dizzy, hypotensive  Admitted to critical care ICU for accidental overdose, hypotension  He was accidentally given his wife's medications which included 300mg Labetalol, 10mg Amlodipine, 40mg Lasix, 10mg Lisinopril, and 10mg isosorbide and subsequently became lightheaded  Upon EMS arrival, his SBP was in the 70s  ON arrival to ED, BP 76/48  Given IV bolus fluids with transient improvement of bp    Then he was given a dose of IV epinephrine with transient bp improvement GCS=15   He has some lightheadedness  Goal MAP >65  Closely monitor  Hold lopressor  Gentle IV fluids  Started on LEvophed  8/29 Update: Off LEvophed  HR NSR  Neuro checks every 4 hours  Continue to monitor bp closely     ED Triage Vitals   Temperature Pulse Respirations Blood Pressure SpO2   08/28/20 1040 08/28/20 1040 08/28/20 1040 08/28/20 1040 08/28/20 1040   98 2 °F (36 8 °C) 79 18 (!) 76/48 91 %      Temp Source Heart Rate Source Patient Position - Orthostatic VS BP Location FiO2 (%)   08/28/20 1040 08/28/20 1040 08/28/20 1253 08/28/20 1253 --   Temporal Monitor Lying Right arm       Pain Score       08/28/20 1213       8          Wt Readings from Last 1 Encounters:   08/29/20 97 3 kg (214 lb 8 1 oz)     Additional Vital Signs:   /Time  Temp  Pulse  Resp   BP   MAP (mmHg)   SpO2  Calculated FIO2 (%) - Nasal Cannula   Nasal Cannula O2 Flow Rate (L/min)   O2 Device  Patient Position - Orthostatic VS    08/29/20 0909    79     111/49Abnormal                      08/29/20 0713  97 9 °F (36 6 °C)  83  13   142/75   101   94 %        None (Room air)  Lying    08/29/20 0637         124/65   89   94 %          Standing - Orthostatic VS    08/29/20 0634    89  18   119/68   87   90 %          Sitting - Orthostatic VS    08/29/20 0630  97 7 °F (36 5 °C)  83  18   135/69   95   94 %        Nasal cannula  Lying - Orthostatic VS    08/29/20 0600    80  15   121/64   87   94 %              08/29/20 0530    86  30Abnormal     132/78   99   94 %              08/29/20 0400  98 °F (36 7 °C)  83  17   129/74   94   98 %  28   2 L/min   Nasal cannula      08/29/20 0100    74  19   117/68   86   97 %              08/29/20 0050    75  16   113/68   85   97 %              08/28/20 2340    74  22   111/61   80   96 %              08/28/20 2330    79  31Abnormal     114/56   78   96 %              08/28/20 2320    74  40Abnormal     110/70   86   98 %             08/28/20 2310  97 9 °F (36 6 °C)  75  48Abnormal     108/63   79   98 %        Nasal cannula  Lying    08/28/20 2300    76  25Abnormal     111/65   84   97 %              08/28/20 2250    77  26Abnormal     116/74   90   97 %              08/28/20 2240    76  30Abnormal     112/74   89   97 %              08/28/20 2030    80  38Abnormal     104/64   77   94 %              08/28/20 2020    81  37Abnormal     96/56   70   95 %              08/28/20 1910    82  35Abnormal     106/63   79   95 %              08/28/20 1900  97 4 °F (36 3 °C)Abnormal    79  23Abnormal     102/61   76   95 %        Nasal cannula  Lying    08/28/20 1850    81  22   98/60   73   96 %              08/28/20 1844    79  20   99/62   75   96 %              08/28/20 1751    74  20   90/51   64   94 %              08/28/20 1253  97 8 °F (36 6 °C)  80  20   94/57   71   98 %  28   2 L/min   Nasal cannula  Lying    08/28/20 1230    80  20   83/51Abnormal     59   96 %              08/28/20 1215    80  18   87/51Abnormal     64   95 %              08/28/20 1200    79  20   92/54   66   96 %              08/28/20 1145    79  20   99/54   70   95 %              08/28/20 1130    79  20   72/43Abnormal     53   95 %              08/28/20 1115    78  20   80/46Abnormal     57   96 %  28   2 L/min   Nasal cannula      08/28/20 1100    79  20   90/54   70   94 %              08/28/20 1050         109/61                    08/28/20 1045    81  20   58/37Abnormal     43   91 %              08/28/20 1040  98 2 °F (36 8 °C)  79  18   76/48Abnormal        91 %           Pertinent Labs/Diagnostic Test Results:   8/28 EKG: Sinus rhythm with occasional Premature ventricular complexes  Left axis deviation  Abnormal ECG  No previous ECGs available      Results from last 7 days   Lab Units 08/29/20  0524 08/28/20  1059   WBC Thousand/uL 8 03 10 21*   HEMOGLOBIN g/dL 11 9* 11 2*   HEMATOCRIT % 37 6 34 8*   PLATELETS Thousands/uL 198 224   NEUTROS ABS Thousands/µL 4 90 5 49         Results from last 7 days   Lab Units 08/29/20  0524 08/28/20  1059   SODIUM mmol/L 139 139   POTASSIUM mmol/L 4 1 4 3   CHLORIDE mmol/L 103 103   CO2 mmol/L 29 30   ANION GAP mmol/L 7 6   BUN mg/dL 14 13   CREATININE mg/dL 0 94 1 01   EGFR ml/min/1 73sq m 74 68   CALCIUM mg/dL 8 4 7 7*     Results from last 7 days   Lab Units 08/28/20  1059   AST U/L 42   ALT U/L 18   ALK PHOS U/L 73   TOTAL PROTEIN g/dL 6 0*   ALBUMIN g/dL 2 3*   TOTAL BILIRUBIN mg/dL 0 50         Results from last 7 days   Lab Units 08/29/20  0524 08/28/20  1059   GLUCOSE RANDOM mg/dL 110 146*     Results from last 7 days   Lab Units 08/28/20  1059   TROPONIN I ng/mL <0 02   ED Treatment:   Medication Administration from 08/28/2020 1037 to 08/28/2020 1250       Date/Time Order Dose Route Action     08/28/2020 1100 sodium chloride 0 9 % bolus 1,000 mL 1,000 mL Intravenous New Bag     08/28/2020 1230 norepinephrine (LEVOPHED) 4 mg (STANDARD CONCENTRATION) IV in sodium chloride 0 9% 250 mL 1 mcg/min Intravenous New Bag     08/28/2020 1213 acetaminophen (TYLENOL) tablet 975 mg 975 mg Oral Given        Past Medical History:   Diagnosis Date    Gout     Hypertension     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[facility] [address] )     Spinal stenosis        Admitting Diagnosis: Syncope [R55]  Hypotension [I95 9]  Accidental drug ingestion, initial encounter [T50 901A]  Age/Sex: 80 y o  male  Admission Orders:  Vitals every 4 hours  I/O  Neuro checks every shift  MRSA    Scheduled Medications:  ascorbic acid, 500 mg, Oral, Daily  aspirin, 81 mg, Oral, Daily  enoxaparin, 40 mg, Subcutaneous, Daily  ezetimibe, 10 mg, Oral, Daily  gabapentin, 100 mg, Oral, Daily  hydroxychloroquine, 200 mg, Oral, BID With Meals  metoprolol tartrate, 25 mg, Oral, Q12H [address]  multivitamin-minerals, 1 tablet, Oral, Daily  pantoprazole, 20 mg, Oral, Early Morning  predniSONE, 5 mg, Oral, Daily      Continuous IV Infusions:  norepinephrine, 1-30 mcg/min, Intravenous, Titrated      PRN Meds:  acetaminophen, 975 mg, Oral, Q8H PRN  calcium carbonate, 500 mg, Oral, Daily PRN        IP CONSULT TO CASE MANAGEMENT    Network Utilization Review Department  [email or web address]  ATTENTION: Please call with any questions or concerns to [phone] and carefully listen to the prompts so that you are directed to the right person  All voicemails are confidential   John Elaine all requests for admission clinical reviews, approved or denied determinations and any other requests to dedicated fax number below belonging to the campus where the patient is receiving treatment   List of dedicated fax numbers for the Facilities:  [address] DENIALS (Administrative/Medical Necessity) [phone]   [address] (Maternity/NICU/Pediatrics) [phone]   Kevin Carter [phone]   Brian Lubin [phone]   Marii Velez [phone]   Sharron Clarke [phone]   [facility] [address] [phone]   [facility]  [phone]   [address] [facility] [address]  [address] [facility] [address] [facility] [phone]

## 2020-08-29 NOTE — ASSESSMENT & PLAN NOTE
· Pt received wife's medications including 300mg Labetalol, 10mg Amlodipine, 40mg Lasix, 10mg Lisinopril, and 10mg isosorbide  · Pt became dizzy shortly after and laid on floor    Pt presented hypotensive with systolic's of 13-94'U  · Pt received push dose epi in the ER and 2L IVF  · Mental status remained stable  · Required low dose Levophed for approximately 8 hours to maintain goal MAP  · Resumed home medications this am, ambulated and without complaint or issue requiring additional hospital stay  · Appropriate for d/c 8/29 afternoon

## 2020-08-29 NOTE — PLAN OF CARE
Problem: Potential for Falls  Goal: Patient will remain free of falls  Description: INTERVENTIONS:  - Assess patient frequently for physical needs  -  Identify cognitive and physical deficits and behaviors that affect risk of falls    -  [place] fall precautions as indicated by assessment   - Educate patient/family on patient safety including physical limitations  - Instruct patient to call for assistance with activity based on assessment  - Modify environment to reduce risk of injury  - Consider OT/PT consult to assist with strengthening/mobility  Outcome: Adequate for Discharge     Problem: PAIN - ADULT  Goal: Verbalizes/displays adequate comfort level or baseline comfort level  Description: Interventions:  - Encourage patient to monitor pain and request assistance  - Assess pain using appropriate pain scale  - Administer analgesics based on type and severity of pain and evaluate response  - Implement non-pharmacological measures as appropriate and evaluate response  - Consider cultural and social influences on pain and pain management  - Notify physician/advanced practitioner if interventions unsuccessful or patient reports new pain  Outcome: Adequate for Discharge     Problem: DISCHARGE PLANNING  Goal: Discharge to home or other facility with appropriate resources  Description: INTERVENTIONS:  - Identify barriers to discharge w/patient and caregiver  - Arrange for needed discharge resources and transportation as appropriate  - Identify discharge learning needs (meds, wound care, etc )  - Arrange for interpretive services to assist at discharge as needed  - Refer to Case Management Department for coordinating discharge planning if the patient needs post-hospital services based on physician/advanced practitioner order or complex needs related to functional status, cognitive ability, or social support system  Outcome: Adequate for Discharge     Problem: Knowledge Deficit  Goal: Patient/family/caregiver demonstrates understanding of disease process, treatment plan, medications, and discharge instructions  Description: Complete learning assessment and assess knowledge base    Interventions:  - Provide teaching at level of understanding  - Provide teaching via preferred learning methods  Outcome: Adequate for Discharge     Problem: CARDIOVASCULAR - ADULT  Goal: Maintains optimal cardiac output and hemodynamic stability  Description: INTERVENTIONS:  - Monitor I/O, vital signs and rhythm  - Monitor for S/S and trends of decreased cardiac output  - Administer and titrate ordered vasoactive medications to optimize hemodynamic stability  - Assess quality of pulses, skin color and temperature  - Assess for signs of decreased coronary artery perfusion  - Instruct patient to report change in severity of symptoms  Outcome: Adequate for Discharge  Goal: Absence of cardiac dysrhythmias or at baseline rhythm  Description: INTERVENTIONS:  - Continuous cardiac monitoring, vital signs, obtain 12 lead EKG if ordered  - Administer antiarrhythmic and heart rate control medications as ordered  - Monitor electrolytes and administer replacement therapy as ordered  Outcome: Adequate for Discharge     Problem: METABOLIC, FLUID AND ELECTROLYTES - ADULT  Goal: Fluid balance maintained  Description: INTERVENTIONS:  - Monitor labs   - Monitor I/O and WT  - Instruct patient on fluid and nutrition as appropriate  - Assess for signs & symptoms of volume excess or deficit  Outcome: Adequate for Discharge

## 2020-08-29 NOTE — CASE MANAGEMENT
Patient for discharge to return to his [facility] with [facility] and his  son Adrianna Babinski at [phone] and was informed staff from the [facility] will transport patient  Spoke with Amol Rojas at [facility] at [phone] and she needed time to arrange transport  She will call back with transport time

## 2020-08-29 NOTE — PROGRESS NOTES
Progress Note Brisa Peer 1934, 80 y o  male MRN: [id number]    Unit/Bed#: -01 Encounter: [id number]    Primary Care Provider: Troy De La Cruz MD   Date and time admitted to hospital: 8/28/2020 10:38 AM        * Accidental overdose  Assessment & Plan  · Pt received wife's medications including 300mg Labetalol, 10mg Amlodipine, 40mg Lasix, 10mg Lisinopril, and 10mg isosorbide  · Pt became dizzy shortly after and laid on floor  Pt presented hypotensive with systolic's of 74-57'F  · Pt received push dose epi in the ER and 2L IVF  · Neuro checks Q4H  · Levophed PRN for map >65  · Continue to hold home lopressor  · Can continue all other home medications including prednisone    Hypotension  Assessment & Plan  · Due to accidental overdose of wife's medications (as listed above)  · Continue Levophed PRN for map >65  · Titrate for maps  · Continue neuro checks Q4H    History of hypertension  Assessment & Plan  · On home metoprolol  · Continue to hold home metoprolol in setting of accidental overdose  Rheumatoid arthritis ([address] )  Assessment & Plan  · On chronic prednisone 5mg, continue inpatient       ----------------------------------------------------------------------------------------  HPI/24hr events:  Patient rested well overnight  He denies any chest pain, shortness of breath, N/V, dizziness or headache  He feels much better than when he came      Disposition: Transfer to Med-Surg   Code Status: Level 1 - Full Code  ---------------------------------------------------------------------------------------  SUBJECTIVE  "I feel pretty good"    Review of Systems  Review of systems was reviewed and negative unless stated above in HPI/24-hour events   ---------------------------------------------------------------------------------------  OBJECTIVE    Vitals   Vitals:    08/29/20 0050 08/29/20 0100 08/29/20 0130 08/29/20 0200   BP: 113/68 117/68 118/72 134/69   Pulse: 75 74 74 74   Resp: 16 19 18 17   Temp:       TempSrc:       SpO2: 97% 97% 98% 97%   Weight:       Height:         Temp (24hrs), Av 8 °F (36 6 °C), Min:97 4 °F (36 3 °C), Max:98 2 °F (36 8 °C)  Current: Temperature: 97 9 °F (36 6 °C)          Respiratory:  SpO2: SpO2: 97 %, SpO2 Activity: SpO2 Activity: At Rest  Nasal Cannula O2 Flow Rate (L/min): 2 L/min    Invasive/non-invasive ventilation settings   Respiratory    Lab Data (Last 4 hours)    None         O2/Vent Data (Last 4 hours)    None                Physical Exam  HENT:      Head: Normocephalic  Eyes:      Pupils: Pupils are equal, round, and reactive to light  Neck:      Musculoskeletal: Normal range of motion  Cardiovascular:      Rate and Rhythm: Normal rate and regular rhythm  Pulmonary:      Effort: Pulmonary effort is normal       Breath sounds: Normal breath sounds  Abdominal:      General: Bowel sounds are normal    Musculoskeletal: Normal range of motion  Skin:     General: Skin is warm  Capillary Refill: Capillary refill takes less than 2 seconds  Neurological:      Mental Status: He is alert and oriented to person, place, and time  Psychiatric:         Mood and Affect: Mood normal          Laboratory and Diagnostics:  Results from last 7 days   Lab Units 20  1059   WBC Thousand/uL 10 21*   HEMOGLOBIN g/dL 11 2*   HEMATOCRIT % 34 8*   PLATELETS Thousands/uL 224   NEUTROS PCT % 53   MONOS PCT % 10     Results from last 7 days   Lab Units 20  1059   SODIUM mmol/L 139   POTASSIUM mmol/L 4 3   CHLORIDE mmol/L 103   CO2 mmol/L 30   ANION GAP mmol/L 6   BUN mg/dL 13   CREATININE mg/dL 1 01   CALCIUM mg/dL 7 7*   GLUCOSE RANDOM mg/dL 146*   ALT U/L 18   AST U/L 42   ALK PHOS U/L 73   ALBUMIN g/dL 2 3*   TOTAL BILIRUBIN mg/dL 0 50               Results from last 7 days   Lab Units 20  1059   TROPONIN I ng/mL <0 02         ABG:    VBG:          Micro        EKG: SR on tele  Imaging: I have personally reviewed pertinent reports     and I have personally reviewed pertinent films in PACS    Intake and Output  I/O       08/27 0701 - 08/28 0700 08/28 0701 - 08/29 0700    P  O   210    I V  (mL/kg)  36 4 (0 3)    IV Piggyback  1000    Total Intake(mL/kg)  1246 4 (11 8)    Urine (mL/kg/hr)  950    Total Output  950    Net  +296 4                Height and Weights   Height: 5' 8" (172 7 cm)  IBW: 68 4 kg  Body mass index is 35 43 kg/m²  Weight (last 2 days)     Date/Time   Weight    08/28/20 1253   106 (233 03)    08/28/20 1040   97 5 (215)                Nutrition       Diet Orders   (From admission, onward)             Start     Ordered    08/28/20 1317  Diet Larry/CHO Controlled; Consistent Carbohydrate Diet Level 2 (5 carb servings/75 grams CHO/meal)  Diet effective now     Question Answer Comment   Diet Type Larry/CHO Controlled    Larry/CHO Controlled Consistent Carbohydrate Diet Level 2 (5 carb servings/75 grams CHO/meal)    RD to adjust diet per protocol?  Yes        08/28/20 1316                  Active Medications  Scheduled Meds:  Current Facility-Administered Medications   Medication Dose Route Frequency Provider Last Rate    acetaminophen  975 mg Oral Q8H PRN Anise Lie, PA-C      ascorbic acid  500 mg Oral Daily Anise Lie, PA-C      aspirin  81 mg Oral Daily Anise Lie, PA-C      calcium carbonate  500 mg Oral Daily PRN Anise Lie, PA-C      enoxaparin  40 mg Subcutaneous Daily Anise Lie, PA-C      ezetimibe  10 mg Oral Daily Anise Lie, PA-C      gabapentin  100 mg Oral Daily Anise Lie, PA-C      hydroxychloroquine  200 mg Oral BID With Meals Anise Lie, PA-C      norepinephrine  1-30 mcg/min Intravenous Titrated Anise Lie, PA-C Stopped (08/28/20 2038)    pantoprazole  20 mg Oral Early Morning Anise Lie, PA-C      predniSONE  5 mg Oral Daily Anise Lie, PA-C       Continuous Infusions:  norepinephrine, 1-30 mcg/min, Last Rate: Stopped (08/28/20 2038)      PRN Meds: acetaminophen, 975 mg, Q8H PRN  calcium carbonate, 500 mg, Daily PRN        Invasive Devices Review  Invasive Devices     Peripheral Intravenous Line            Peripheral IV 08/28/20 Right Antecubital less than 1 day                Rationale for remaining devices: none  ---------------------------------------------------------------------------------------  Advance Directive and Living Will:      Power of :    POLST:    ---------------------------------------------------------------------------------------  Care Time Delivered:   No Critical Care time spent       [facility] ESTEFANIA FARIAS      Portions of the record may have been created with voice recognition software  Occasional wrong word or "sound a like" substitutions may have occurred due to the inherent limitations of voice recognition software    Read the chart carefully and recognize, using context, where substitutions have occurred

## 2020-08-29 NOTE — DISCHARGE INSTRUCTIONS
Mr Toni Vyas was admitted on 8/28 after a medication error occurred at your Assisted Living facility  As a result of receiving the wrong medications your blood pressure was quite low and required a blood pressure support medication supporting you for several hours  Over the past 16 hours your blood pressure has normalized and your initial presenting complaint of dizziness has resolved  You were provided your normal medications this am  Continue your normal medication regimen upon discharge  Adult Accidental Medication Overdose / Error  WHAT YOU NEED TO KNOW:   An overdose occurs when you take more medicine than is safe to take  An overdose may be mild, or it may be a life-threatening emergency  You may feel drowsy, dizzy, or nauseated, depending on what medicine you took  No specific harm was found to your body as a result of your overdose  Your symptoms have decreased over the last 6 to 12 hours  DISCHARGE INSTRUCTIONS:   Call 911 if you or someone close to you has any of the following symptoms:   · Your face is very pale and clammy to the touch  · Your body is limp or you are unable to speak  · You cannot be awakened  · Your breathing is slower or faster than usual      · Your heart is beating slower than usual     · You feel confused or more tired than usual, or you are sweating more than normal     · Your speech is slurred  · Your fingernails or lips are blue or purple  Return to the emergency department if:   · You have severe nausea and vomiting  · You cannot have a bowel movement or urinate  · Your skin and the whites of your eyes turn yellow  Contact your healthcare provider if:   · You think your medicine is not working  · You have nausea, vomiting, diarrhea, or abdominal cramps  · You have questions or concerns about your medicine  Take your medicine as directed:  Contact your healthcare provider if you think your medicine is not helping or if you have side effects   Do not take more medicine that is prescribed  Keep your medicines in the original containers  Keep a list of the medicines, vitamins, and herbs you take  Include the amounts, and when and why you take them  Do not share your medicine with others  Prevent another overdose:   · Read labels carefully  Read the labels of all the medicines that you take  Never take more than the label says to take  If you have questions, ask your pharmacist or healthcare provider  · Do not drink alcohol  Alcohol increases your risk for another overdose  Alcohol can also hide important symptoms that you need to call your healthcare provider for  · Do not drive or operate machinery  until your healthcare provider says it is okay  These activities may be dangerous after an overdose  · Use caution if you take more than one medicine at a time  Mixing medicines or taking more than one medicine at a time can be dangerous  · Tell your family or friends what medicines you are taking  Talk with them about what to do if you have an overdose  Follow up with your healthcare provider as directed: You may need to see a counselor or psychiatrist  Write down your questions so you remember to ask them during your visits  © 2017 [address] [employer] Information is for End User's use only and may not be sold, redistributed or otherwise used for commercial purposes  All illustrations and images included in CareNotes® are the copyrighted property of A [employer] M [employer]  or [employer]  The above information is an  only  It is not intended as medical advice for individual conditions or treatments  Talk to your doctor, nurse or pharmacist before following any medical regimen to see if it is safe and effective for you  Hypotension   WHAT YOU NEED TO KNOW:   Hypotension is a condition that causes your blood pressure (BP) to drop lower than it should be   Hypotension may be mild, serious, or life-threatening  DISCHARGE INSTRUCTIONS:   Medicines:   · Alpha-adrenoreceptor agonists: These medicines may increase your BP and decrease your symptoms  Take these medicines as directed  · Steroids: This medicine helps prevent salt loss from your body  Steroids may also help increase the amount of fluid in your body and raise your BP  · Vasopressors: These medicines help constrict (make smaller) your blood vessels and increase your BP  Vasopressor medicines may increase the blood flow to your brain and help decrease your symptoms  · Antidiuretic hormone: This medicine helps control your BP and helps decrease your need to urinate during the night  · Antiparkinson medicine: This medicine may help increase your standing BP and decrease your symptoms  · Take your medicine as directed  Contact your healthcare provider if you think your medicine is not helping or if you have side effects  Tell him of her if you are allergic to any medicine  Keep a list of the medicines, vitamins, and herbs you take  Include the amounts, and when and why you take them  Bring the list or the pill bottles to follow-up visits  Carry your medicine list with you in case of an emergency  Follow up with your healthcare provider or specialist as directed:  Write down your questions so you remember to ask them during your visits  Check your blood pressure: You may need to check your BP at home  Record the results and bring them with you to follow-up visits  Ask when and how often to check your BP  You may need to wear a BP monitor for up to 24 hours  This will record your blood pressure during your normal daily activities  The monitor will take your BP every 15 to 30 minutes  Try to keep still while your BP is taken  Avoid heavy activity, such as exercise, while you are wearing the monitor         Manage your symptoms:   · Change positions slowly:  When you get out of bed, sit up first, then slowly move your legs to the side of the bed  If you are not having any symptoms, slowly stand up  If you have symptoms, sit down right away  · Exercise and do physical counter maneuvers:  Ask your healthcare provider or specialist about the best exercise plan for you  Physical counter maneuvers may help to increase your BP and increase blood flow to your heart  They include crossing your legs, squatting, and bending at the waist  You can also rise up on your toes while you are standing, and tighten your thigh muscles  · Drink liquids as directed:  Ask your healthcare provider or specialist how much liquid to drink each day and which liquids are best for you  Drink 500 milliliters (½ liter) of liquid quickly in the morning or before meals to help increase your BP  Your healthcare provider may tell you to drink 2 cups of coffee with, or after, breakfast and lunch  The caffeine in the coffee can help prevent a drop in your BP  Your healthcare provider may also give you caffeine pills  · Change how you eat meals: If your BP drops after eating large meals, try to eat smaller meals more often  Eat foods low in carbohydrates and cholesterol to help prevent BP drops after you eat  Ask if you need to increase the amount of sodium (salt) you eat each day  · Raise the head of your bed:  Raise the head of your bed 4 to 8 inches  This may help prevent morning BP drops and decrease the need to urinate during the night  Avoid things that make your hypotension worse:   · Do not drink alcohol:  Alcohol can make your symptoms worse  Ask for information if you need help quitting  · Avoid straining:  Activities and movements that cause you to strain can cause a drop in your BP  Activities to avoid include lifting, coughing, and other movements that increase the feeling of pressure in your chest     · Avoid the heat: This can cause a decrease in your BP  Stay inside during very hot days, or limit the amount of time you are outside   Do not take hot baths  Contact your healthcare provider or specialist if:   · You vomit several times or have diarrhea, and you cannot drink liquid  · You have a fever  · You have new or increased symptoms, such as dizziness, weakness, or fainting  · Your legs, ankles, and feet are swollen, or you gain weight for no known reason  · You have questions or concerns about your condition or care  Return to the emergency department if:   · You become confused or cannot speak  · You urinate very little or not at all  · You have a seizure  · You have chest pain or trouble breathing  · You have changes in vision or cannot see  © 2017 [address] [employer]  Information is for End User's use only and may not be sold, redistributed or otherwise used for commercial purposes  All illustrations and images included in CareNotes® are the copyrighted property of A [employer] M [employer] Inc  or [employer]  The above information is an  only  It is not intended as medical advice for individual conditions or treatments  Talk to your doctor, nurse or pharmacist before following any medical regimen to see if it is safe and effective for you

## 2020-08-29 NOTE — CASE MANAGEMENT
Received call from [address] [place] [address] at [facility] and was informed patient's son Eliza Castro will transport patient back to his apartment since he will be dropping off clothes  Notified Lori Ellis patient's nurse  that Eliza Castro will be here 1:30 -2pm for pick

## 2020-08-29 NOTE — ASSESSMENT & PLAN NOTE
· On home metoprolol  · Resumed this am  · According to med rec noted Metoprolol BID; however patient reporting only taking nightly  · Advised he was given dose on the am of 8/29 and need to verify prior to taking additional doses

## 2020-08-29 NOTE — DISCHARGE SUMMARY
Discharge- Minh Perdomo 1934, 80 y o  male MRN: [id number]    Unit/Bed#: -01 Encounter: [id number]    Primary Care Provider: Christian Holm MD   Date and time admitted to hospital: 8/28/2020 10:38 AM        * Accidental overdose  Assessment & Plan  · Pt received wife's medications including 300mg Labetalol, 10mg Amlodipine, 40mg Lasix, 10mg Lisinopril, and 10mg isosorbide  · Pt became dizzy shortly after and laid on floor  Pt presented hypotensive with systolic's of 92-94'O  · Pt received push dose epi in the ER and 2L IVF  · Mental status remained stable  · Required low dose Levophed for approximately 8 hours to maintain goal MAP  · Resumed home medications this am, ambulated and without complaint or issue requiring additional hospital stay  · Appropriate for d/c 8/29 afternoon    Hypotensionresolved as of 8/29/2020  Assessment & Plan  · Due to accidental overdose of wife's medications (as listed above)  · Required levophed for approximately 8 hours  · 1/2 Life of meds taken reviewed   · Hypotension resolved    History of hypertension  Assessment & Plan  · On home metoprolol  · Resumed this am  · According to med rec noted Metoprolol BID; however patient reporting only taking nightly  · Advised he was given dose on the am of 8/29 and need to verify prior to taking additional doses     Rheumatoid arthritis ([facility] [address] )  Assessment & Plan  · On chronic prednisone 5mg, continue          Resolved Problems  Date Reviewed: 8/29/2020          Resolved    Hypotension 8/29/2020     Resolved by  Óscar Lynn PA-C          Admission Date:   Admission Orders (From admission, onward)     Ordered        08/28/20 1143  Inpatient Admission  Once                     Admitting Diagnosis:   Accidental Medication ingestion / Overdose  Hypotension    HPI:   Minh Perdomo is a 80 y o  male who presented to [address] [place] [address] from his assisted living facility via EMS    Patient has an extensive PMH of HTN, RA on chronic 5mg Prednisone daily, as well as gout  This am having breakfast with his wife he was handed a blister packet of medications and took such as he normally would  It was then discovered that Prabhu Pang was provided and took his wife's blister pack of medications instead of his own normal medications  He reported a short time later feeling dizzy and as though he was going to pass out  He was laid on the floor and EMS was summoned  Medications which he took were 300mg Labetalol, 10mg Amlodipine, 40mg Lasix, 10mg Lisinopril, and 10mg isosorbide      Upon arrival in the ER he was noted to be hypotensive with a systolic BP 95Y-22  ED administered small push dose epi with transient response to BP and Heart rate  Patient additionally administered 2L bolus of IV fluids  Hereports feeling okay at this time  He denies headache, dizziness, chest pain, shortness of breath or additional complaints at this time  BP however still hypotensive,  Labwork obtained and reviewed which is essentially normal       Procedures Performed:   Orders Placed This Encounter   Procedures    Critical Care       Summary of Hospital Course:   Patient at time of admission requiring initiation of peripheral low dose Levophed  Patient remained on 1-2 mcg of Levophed for approximately 8 hours at which time his blood pressure improved and we were able to wean off Levophed  Overnight he remained hemodynamically stable and without complaint except that he had interrupted sleep  Home medications resumed this am, provided meals, and was ambulated with walker and assistance without dizziness or additional complaints  Patient appropriate for d/c home, son [place] to  and transport to back to assisted living      Significant Findings, Care, Treatment and Services Provided:   8/28 Low dose peripheral Levophed necessary to maintain goal MAP for approximately 8 hours    Complications:   None    Condition at Discharge: good       Discharge instructions/Information to patient and family:   See after visit summary for information provided to patient and family  Provisions for Follow-Up Care:  See after visit summary for information related to follow-up care and any pertinent home health orders  PCP: Andrew Ricardo MD    Disposition: See After Visit Summary for discharge disposition information  Planned Readmission: No    Discharge Statement   I spent 20 minutes discharging the patient  This time was spent on the day of discharge  I had direct contact with the patient on the day of discharge  Additional documentation is required if more than 30 minutes were spent on discharge  Discharge Medications:  See after visit summary for reconciled discharge medications provided to patient and family

## 2020-08-30 LAB — MRSA NOSE QL CULT: NORMAL

## 2020-08-31 NOTE — UTILIZATION REVIEW
Notification of Discharge  This is a Notification of Discharge from our facility [address]  Please be advised that this patient has been discharge from our facility  Below you will find the admission and discharge date and time including the patients disposition  PRESENTATION DATE: 8/28/2020 10:38 AM  OBS ADMISSION DATE:   IP ADMISSION DATE: 8/28/20 1143   DISCHARGE DATE: 8/29/2020  3:14 PM  DISPOSITION: Home/Self Care Home/Self Care   Admission Orders listed below:  Admission Orders (From admission, onward)     Ordered        08/28/20 1143  Inpatient Admission  Once                   Please contact the UR Department if additional information is required to close this patient's authorization/case  [employer] Utilization Review Department  Main: [phone] x carefully listen to the prompts  All voicemails are confidential   [email or web address]  Send all requests for admission clinical reviews, approved or denied determinations and any other requests to dedicated fax number below belonging to the campus where the patient is receiving treatment   List of dedicated fax numbers:  [address] (Administrative/Medical Necessity) [phone]   [address] (Maternity/NICU/Pediatrics) [phone]   [facility] [phone]   Horacio Hinojosa [phone]   [address] [place] [address] [phone]   145 [facility]  [phone]   [address] [phone]   [facility]  [phone]   [address] [place] [address]  [phone]   [address] [place] [address] [phone]     Initial Clinical Review    Admission: Date/Time/Statement:   Admission Orders (From admission, onward)     Ordered        08/28/20 1143  Inpatient Admission  Once                   Orders Placed This Encounter   Procedures    Inpatient Admission     Standing Status:   Standing     Number of Occurrences:   1     Order Specific Question:   Admitting Physician     Answer:   Alberto Tony     Order Specific Question:   Level of Care     Answer:   Critical Care [15]     Order Specific Question:   Estimated length of stay     Answer:   More than 2 Midnights     Order Specific Question:   Certification     Answer:   I certify that inpatient services are medically necessary for this patient for a duration of greater than two midnights  See H&P and MD Progress Notes for additional information about the patient's course of treatment  ED Arrival Information     Expected Arrival Acuity Means of Arrival Escorted By Service Admission Type    - 8/28/2020 10:37 [facility]) General Medicine Emergency    Arrival Complaint    syncope        Chief Complaint   Patient presents with    Hypotension     pt was given wrong medications at his facilty, including mutiple doses of various blood pressure medicine  pt complaint of dizziness and a postive LOC at home  pt was lowered to floor and denies any head strike     Assessment/Plan: 80year old male to the ED from assisted living facility via EMS after being given wrong medications and becoming dizzy, hypotensive  Admitted to critical care ICU for accidental overdose, hypotension  He was accidentally given his wife's medications which included 300mg Labetalol, 10mg Amlodipine, 40mg Lasix, 10mg Lisinopril, and 10mg isosorbide and subsequently became lightheaded  Upon EMS arrival, his SBP was in the 70s  ON arrival to ED, BP 76/48  Given IV bolus fluids with transient improvement of bp  Then he was given a dose of IV epinephrine with transient bp improvement GCS=15   He has some lightheadedness  Goal MAP >65  Closely monitor  Hold lopressor  Gentle IV fluids  Started on LEvophed  8/29 Update: Off LEvophed  HR NSR    Neuro checks every 4 hours  Continue to monitor bp closely     ED Triage Vitals   Temperature Pulse Respirations Blood Pressure SpO2   08/28/20 1040 08/28/20 1040 08/28/20 1040 08/28/20 1040 08/28/20 1040   98 2 °F (36 8 °C) 79 18 (!) 76/48 91 %      Temp Source Heart Rate Source Patient Position - Orthostatic VS BP Location FiO2 (%)   08/28/20 1040 08/28/20 1040 08/28/20 1253 08/28/20 1253 --   Temporal Monitor Lying Right arm       Pain Score       08/28/20 1213       8          Wt Readings from Last 1 Encounters:   08/29/20 97 3 kg (214 lb 8 1 oz)     Additional Vital Signs:   /Time  Temp  Pulse  Resp   BP   MAP (mmHg)   SpO2  Calculated FIO2 (%) - Nasal Cannula   Nasal Cannula O2 Flow Rate (L/min)   O2 Device  Patient Position - Orthostatic VS    08/29/20 0909    79     111/49Abnormal                      08/29/20 0713  97 9 °F (36 6 °C)  83  13   142/75   101   94 %        None (Room air)  Lying    08/29/20 0637         124/65   89   94 %          Standing - Orthostatic VS    08/29/20 0634    89  18   119/68   87   90 %          Sitting - Orthostatic VS    08/29/20 0630  97 7 °F (36 5 °C)  83  18   135/69   95   94 %        Nasal cannula  Lying - Orthostatic VS    08/29/20 0600    80  15   121/64   87   94 %              08/29/20 0530    86  30Abnormal     132/78   99   94 %              08/29/20 0400  98 °F (36 7 °C)  83  17   129/74   94   98 %  28   2 L/min   Nasal cannula      08/29/20 0100    74  19   117/68   86   97 %              08/29/20 0050    75  16   113/68   85   97 %              08/28/20 2340    74  22   111/61   80   96 %              08/28/20 2330    79  31Abnormal     114/56   78   96 %              08/28/20 2320    74  40Abnormal     110/70   86   98 %              08/28/20 2310  97 9 °F (36 6 °C)  75  48Abnormal     108/63   79   98 %        Nasal cannula  Lying    08/28/20 2300    76  25Abnormal     111/65   84   97 %           08/28/20 2250    77  26Abnormal     116/74   90   97 %              08/28/20 2240    76  30Abnormal     112/74   89   97 %              08/28/20 2030    80  38Abnormal     104/64   77   94 %              08/28/20 2020    81  37Abnormal     96/56   70   95 %              08/28/20 1910    82  35Abnormal     106/63   79   95 %              08/28/20 1900  97 4 °F (36 3 °C)Abnormal    79  23Abnormal     102/61   76   95 %        Nasal cannula  Lying    08/28/20 1850    81  22   98/60   73   96 %              08/28/20 1844    79  20   99/62   75   96 %              08/28/20 1751    74  20   90/51   64   94 %              08/28/20 1253  97 8 °F (36 6 °C)  80  20   94/57   71   98 %  28   2 L/min   Nasal cannula  Lying    08/28/20 1230    80  20   83/51Abnormal     59   96 %              08/28/20 1215    80  18   87/51Abnormal     64   95 %              08/28/20 1200    79  20   92/54   66   96 %              08/28/20 1145    79  20   99/54   70   95 %              08/28/20 1130    79  20   72/43Abnormal     53   95 %              08/28/20 1115    78  20   80/46Abnormal     57   96 %  28   2 L/min   Nasal cannula      08/28/20 1100    79  20   90/54   70   94 %              08/28/20 1050         109/61                    08/28/20 1045    81  20   58/37Abnormal     43   91 %              08/28/20 1040  98 2 °F (36 8 °C)  79  18   76/48Abnormal        91 %           Pertinent Labs/Diagnostic Test Results:   8/28 EKG: Sinus rhythm with occasional Premature ventricular complexes  Left axis deviation  Abnormal ECG  No previous ECGs available      Results from last 7 days   Lab Units 08/29/20  0524 08/28/20  1059   WBC Thousand/uL 8 03 10 21*   HEMOGLOBIN g/dL 11 9* 11 2*   HEMATOCRIT % 37 6 34 8*   PLATELETS Thousands/uL 198 224   NEUTROS ABS Thousands/µL 4 90 5 49         Results from last 7 days   Lab Units 08/29/20  0524 08/28/20  1059 SODIUM mmol/L 139 139   POTASSIUM mmol/L 4 1 4 3   CHLORIDE mmol/L 103 103   CO2 mmol/L 29 30   ANION GAP mmol/L 7 6   BUN mg/dL 14 13   CREATININE mg/dL 0 94 1 01   EGFR ml/min/1 73sq m 74 68   CALCIUM mg/dL 8 4 7 7*     Results from last 7 days   Lab Units 08/28/20  1059   AST U/L 42   ALT U/L 18   ALK PHOS U/L 73   TOTAL PROTEIN g/dL 6 0*   ALBUMIN g/dL 2 3*   TOTAL BILIRUBIN mg/dL 0 50         Results from last 7 days   Lab Units 08/29/20  0524 08/28/20  1059   GLUCOSE RANDOM mg/dL 110 146*     Results from last 7 days   Lab Units 08/28/20  1059   TROPONIN I ng/mL <0 02   ED Treatment:   Medication Administration from 08/28/2020 1037 to 08/28/2020 1250       Date/Time Order Dose Route Action     08/28/2020 1100 sodium chloride 0 9 % bolus 1,000 mL 1,000 mL Intravenous New Bag     08/28/2020 1230 norepinephrine (LEVOPHED) 4 mg (STANDARD CONCENTRATION) IV in sodium chloride 0 9% 250 mL 1 mcg/min Intravenous New Bag     08/28/2020 1213 acetaminophen (TYLENOL) tablet 975 mg 975 mg Oral Given        Past Medical History:   Diagnosis Date    Gout     Hypertension     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[facility] [address] )     Spinal stenosis        Admitting Diagnosis: Syncope [R55]  Hypotension [I95 9]  Accidental drug ingestion, initial encounter [T50 901A]  Age/Sex: 80 y o  male  Admission Orders:  Vitals every 4 hours  I/O  Neuro checks every shift  MRSA    Scheduled Medications:  No current facility-administered medications for this encounter  Continuous IV Infusions:  No current facility-administered medications for this encounter  PRN Meds:  No current facility-administered medications for this encounter  None    Network Utilization Review Department  [email or web address]  ATTENTION: Please call with any questions or concerns to [phone] and carefully listen to the prompts so that you are directed to the right person   All voicemails are confidential   Ngozi Skipper all requests for admission clinical reviews, approved or denied determinations and any other requests to dedicated fax number below belonging to the campus where the patient is receiving treatment   List of dedicated fax numbers for the Facilities:  [address] [place] [address] DENIALS (Administrative/Medical Necessity) [phone]   [address]  (Maternity/NICU/Pediatrics) [phone]   Michael Pretty [phone]   [facility] [phone]   [address] [place] [address] [phone]   [address] [place] [address]  [phone]   [address] [place] [address] [phone]   [facility] [phone]   [address] [facility]  2401 [facility] 1000 [address] [facility] [phone]

## 2020-11-13 PROCEDURE — U0003 INFECTIOUS AGENT DETECTION BY NUCLEIC ACID (DNA OR RNA); SEVERE ACUTE RESPIRATORY SYNDROME CORONAVIRUS 2 (SARS-COV-2) (CORONAVIRUS DISEASE [COVID-19]), AMPLIFIED PROBE TECHNIQUE, MAKING USE OF HIGH THROUGHPUT TECHNOLOGIES AS DESCRIBED BY CMS-2020-01-R: HCPCS | Performed by: INTERNAL MEDICINE

## 2020-11-14 ENCOUNTER — LAB REQUISITION (OUTPATIENT)
Dept: LAB | Facility: HOSPITAL | Age: 85
End: 2020-11-14
Payer: COMMERCIAL

## 2020-11-14 DIAGNOSIS — Z03.818 ENCOUNTER FOR OBSERVATION FOR SUSPECTED EXPOSURE TO OTHER BIOLOGICAL AGENTS RULED OUT: ICD-10-CM

## 2020-11-14 DIAGNOSIS — Z11.59 ENCOUNTER FOR SCREENING FOR OTHER VIRAL DISEASES: ICD-10-CM

## 2020-11-15 ENCOUNTER — TELEPHONE (OUTPATIENT)
Dept: UROLOGY | Facility: CLINIC | Age: 85
End: 2020-11-15

## 2020-11-15 LAB — SARS-COV-2 RNA SPEC QL NAA+PROBE: NOT DETECTED

## 2020-11-18 PROCEDURE — U0003 INFECTIOUS AGENT DETECTION BY NUCLEIC ACID (DNA OR RNA); SEVERE ACUTE RESPIRATORY SYNDROME CORONAVIRUS 2 (SARS-COV-2) (CORONAVIRUS DISEASE [COVID-19]), AMPLIFIED PROBE TECHNIQUE, MAKING USE OF HIGH THROUGHPUT TECHNOLOGIES AS DESCRIBED BY CMS-2020-01-R: HCPCS | Performed by: INTERNAL MEDICINE

## 2020-11-19 ENCOUNTER — LAB REQUISITION (OUTPATIENT)
Dept: LAB | Facility: HOSPITAL | Age: 85
End: 2020-11-19
Payer: COMMERCIAL

## 2020-11-19 DIAGNOSIS — Z20.828 CONTACT WITH AND (SUSPECTED) EXPOSURE TO OTHER VIRAL COMMUNICABLE DISEASES: ICD-10-CM

## 2020-11-20 LAB — SARS-COV-2 RNA SPEC QL NAA+PROBE: NOT DETECTED

## 2020-11-25 PROCEDURE — U0003 INFECTIOUS AGENT DETECTION BY NUCLEIC ACID (DNA OR RNA); SEVERE ACUTE RESPIRATORY SYNDROME CORONAVIRUS 2 (SARS-COV-2) (CORONAVIRUS DISEASE [COVID-19]), AMPLIFIED PROBE TECHNIQUE, MAKING USE OF HIGH THROUGHPUT TECHNOLOGIES AS DESCRIBED BY CMS-2020-01-R: HCPCS | Performed by: INTERNAL MEDICINE

## 2020-11-27 ENCOUNTER — LAB REQUISITION (OUTPATIENT)
Dept: LAB | Facility: HOSPITAL | Age: 85
End: 2020-11-27
Payer: COMMERCIAL

## 2020-11-27 DIAGNOSIS — Z20.828 CONTACT WITH AND (SUSPECTED) EXPOSURE TO OTHER VIRAL COMMUNICABLE DISEASES: ICD-10-CM

## 2020-11-28 LAB — SARS-COV-2 RNA SPEC QL NAA+PROBE: NOT DETECTED

## 2020-12-15 ENCOUNTER — LAB REQUISITION (OUTPATIENT)
Dept: LAB | Facility: HOSPITAL | Age: 85
End: 2020-12-15
Payer: COMMERCIAL

## 2020-12-15 DIAGNOSIS — Z20.828 CONTACT WITH AND (SUSPECTED) EXPOSURE TO OTHER VIRAL COMMUNICABLE DISEASES: ICD-10-CM

## 2020-12-15 PROCEDURE — U0003 INFECTIOUS AGENT DETECTION BY NUCLEIC ACID (DNA OR RNA); SEVERE ACUTE RESPIRATORY SYNDROME CORONAVIRUS 2 (SARS-COV-2) (CORONAVIRUS DISEASE [COVID-19]), AMPLIFIED PROBE TECHNIQUE, MAKING USE OF HIGH THROUGHPUT TECHNOLOGIES AS DESCRIBED BY CMS-2020-01-R: HCPCS | Performed by: INTERNAL MEDICINE

## 2020-12-17 LAB — SARS-COV-2 RNA SPEC QL NAA+PROBE: NOT DETECTED

## 2020-12-21 PROCEDURE — U0003 INFECTIOUS AGENT DETECTION BY NUCLEIC ACID (DNA OR RNA); SEVERE ACUTE RESPIRATORY SYNDROME CORONAVIRUS 2 (SARS-COV-2) (CORONAVIRUS DISEASE [COVID-19]), AMPLIFIED PROBE TECHNIQUE, MAKING USE OF HIGH THROUGHPUT TECHNOLOGIES AS DESCRIBED BY CMS-2020-01-R: HCPCS | Performed by: INTERNAL MEDICINE

## 2020-12-22 ENCOUNTER — LAB REQUISITION (OUTPATIENT)
Dept: LAB | Facility: HOSPITAL | Age: 85
End: 2020-12-22
Payer: COMMERCIAL

## 2020-12-22 DIAGNOSIS — Z20.828 CONTACT WITH AND (SUSPECTED) EXPOSURE TO OTHER VIRAL COMMUNICABLE DISEASES: ICD-10-CM

## 2020-12-23 LAB — SARS-COV-2 RNA SPEC QL NAA+PROBE: NOT DETECTED

## 2021-01-25 PROCEDURE — 87635 SARS-COV-2 COVID-19 AMP PRB: CPT | Performed by: NURSE PRACTITIONER

## 2021-01-26 ENCOUNTER — LAB REQUISITION (OUTPATIENT)
Dept: LAB | Facility: HOSPITAL | Age: 86
End: 2021-01-26
Payer: COMMERCIAL

## 2021-01-26 DIAGNOSIS — Z20.828 CONTACT WITH AND (SUSPECTED) EXPOSURE TO OTHER VIRAL COMMUNICABLE DISEASES: ICD-10-CM

## 2021-01-27 LAB — SARS-COV-2 RNA RESP QL NAA+PROBE: NEGATIVE

## 2021-02-12 DIAGNOSIS — Z23 ENCOUNTER FOR IMMUNIZATION: ICD-10-CM

## 2021-08-04 PROCEDURE — U0005 INFEC AGEN DETEC AMPLI PROBE: HCPCS | Performed by: INTERNAL MEDICINE

## 2021-08-04 PROCEDURE — U0003 INFECTIOUS AGENT DETECTION BY NUCLEIC ACID (DNA OR RNA); SEVERE ACUTE RESPIRATORY SYNDROME CORONAVIRUS 2 (SARS-COV-2) (CORONAVIRUS DISEASE [COVID-19]), AMPLIFIED PROBE TECHNIQUE, MAKING USE OF HIGH THROUGHPUT TECHNOLOGIES AS DESCRIBED BY CMS-2020-01-R: HCPCS | Performed by: INTERNAL MEDICINE

## 2021-08-05 ENCOUNTER — LAB REQUISITION (OUTPATIENT)
Dept: LAB | Facility: HOSPITAL | Age: 86
End: 2021-08-05
Payer: COMMERCIAL

## 2021-08-05 DIAGNOSIS — Z20.828 CONTACT WITH AND (SUSPECTED) EXPOSURE TO OTHER VIRAL COMMUNICABLE DISEASES: ICD-10-CM

## 2021-08-05 LAB — SARS-COV-2 RNA RESP QL NAA+PROBE: NEGATIVE

## 2021-08-11 PROCEDURE — U0005 INFEC AGEN DETEC AMPLI PROBE: HCPCS | Performed by: INTERNAL MEDICINE

## 2021-08-11 PROCEDURE — U0003 INFECTIOUS AGENT DETECTION BY NUCLEIC ACID (DNA OR RNA); SEVERE ACUTE RESPIRATORY SYNDROME CORONAVIRUS 2 (SARS-COV-2) (CORONAVIRUS DISEASE [COVID-19]), AMPLIFIED PROBE TECHNIQUE, MAKING USE OF HIGH THROUGHPUT TECHNOLOGIES AS DESCRIBED BY CMS-2020-01-R: HCPCS | Performed by: INTERNAL MEDICINE

## 2021-08-12 ENCOUNTER — LAB REQUISITION (OUTPATIENT)
Dept: LAB | Facility: HOSPITAL | Age: 86
End: 2021-08-12
Payer: COMMERCIAL

## 2021-08-12 DIAGNOSIS — Z20.828 CONTACT WITH AND (SUSPECTED) EXPOSURE TO OTHER VIRAL COMMUNICABLE DISEASES: ICD-10-CM

## 2021-08-12 LAB — SARS-COV-2 RNA RESP QL NAA+PROBE: NEGATIVE

## 2021-10-21 ENCOUNTER — NEW PATIENT (OUTPATIENT)
Dept: URBAN - METROPOLITAN AREA CLINIC 79 | Facility: CLINIC | Age: 86
End: 2021-10-21

## 2021-10-21 DIAGNOSIS — H52.13: ICD-10-CM

## 2021-10-21 DIAGNOSIS — Z96.1: ICD-10-CM

## 2021-10-21 PROCEDURE — 92015 DETERMINE REFRACTIVE STATE: CPT | Mod: GY

## 2021-10-21 PROCEDURE — 99204 OFFICE O/P NEW MOD 45 MIN: CPT

## 2021-10-21 ASSESSMENT — VISUAL ACUITY
OS_SC: 20/20-1
OS_CC: J1+
OD_SC: 20/20-1
OS_CC: 20/20
OD_CC: J1+
OD_CC: 20/20

## 2021-10-21 ASSESSMENT — TONOMETRY
OD_IOP_MMHG: 14
OS_IOP_MMHG: 12

## 2021-12-13 PROCEDURE — U0003 INFECTIOUS AGENT DETECTION BY NUCLEIC ACID (DNA OR RNA); SEVERE ACUTE RESPIRATORY SYNDROME CORONAVIRUS 2 (SARS-COV-2) (CORONAVIRUS DISEASE [COVID-19]), AMPLIFIED PROBE TECHNIQUE, MAKING USE OF HIGH THROUGHPUT TECHNOLOGIES AS DESCRIBED BY CMS-2020-01-R: HCPCS | Performed by: INTERNAL MEDICINE

## 2021-12-13 PROCEDURE — U0005 INFEC AGEN DETEC AMPLI PROBE: HCPCS | Performed by: INTERNAL MEDICINE

## 2021-12-14 ENCOUNTER — LAB REQUISITION (OUTPATIENT)
Dept: LAB | Facility: HOSPITAL | Age: 86
End: 2021-12-14
Payer: COMMERCIAL

## 2021-12-14 DIAGNOSIS — Z11.59 ENCOUNTER FOR SCREENING FOR OTHER VIRAL DISEASES: ICD-10-CM

## 2021-12-14 DIAGNOSIS — Z03.818 ENCOUNTER FOR OBSERVATION FOR SUSPECTED EXPOSURE TO OTHER BIOLOGICAL AGENTS RULED OUT: ICD-10-CM

## 2021-12-14 DIAGNOSIS — Z20.828 CONTACT WITH AND (SUSPECTED) EXPOSURE TO OTHER VIRAL COMMUNICABLE DISEASES: ICD-10-CM

## 2021-12-14 LAB — SARS-COV-2 RNA RESP QL NAA+PROBE: NEGATIVE

## 2021-12-21 ENCOUNTER — OFFICE VISIT (OUTPATIENT)
Dept: GASTROENTEROLOGY | Facility: CLINIC | Age: 86
End: 2021-12-21
Payer: COMMERCIAL

## 2021-12-21 VITALS
BODY MASS INDEX: 30.78 KG/M2 | WEIGHT: 215 LBS | HEIGHT: 70 IN | DIASTOLIC BLOOD PRESSURE: 72 MMHG | SYSTOLIC BLOOD PRESSURE: 118 MMHG

## 2021-12-21 DIAGNOSIS — R19.8 ALTERNATING CONSTIPATION AND DIARRHEA: Primary | ICD-10-CM

## 2021-12-21 DIAGNOSIS — K21.9 GASTROESOPHAGEAL REFLUX DISEASE, UNSPECIFIED WHETHER ESOPHAGITIS PRESENT: ICD-10-CM

## 2021-12-21 PROCEDURE — 99203 OFFICE O/P NEW LOW 30 MIN: CPT | Performed by: REGISTERED NURSE

## 2021-12-21 RX ORDER — POLYETHYLENE GLYCOL 3350 17 G/17G
17 POWDER, FOR SOLUTION ORAL EVERY OTHER DAY
Qty: 30 EACH | Refills: 2 | Status: SHIPPED | OUTPATIENT
Start: 2021-12-21

## 2021-12-22 PROCEDURE — U0005 INFEC AGEN DETEC AMPLI PROBE: HCPCS | Performed by: INTERNAL MEDICINE

## 2021-12-22 PROCEDURE — U0003 INFECTIOUS AGENT DETECTION BY NUCLEIC ACID (DNA OR RNA); SEVERE ACUTE RESPIRATORY SYNDROME CORONAVIRUS 2 (SARS-COV-2) (CORONAVIRUS DISEASE [COVID-19]), AMPLIFIED PROBE TECHNIQUE, MAKING USE OF HIGH THROUGHPUT TECHNOLOGIES AS DESCRIBED BY CMS-2020-01-R: HCPCS | Performed by: INTERNAL MEDICINE

## 2021-12-23 ENCOUNTER — LAB REQUISITION (OUTPATIENT)
Dept: LAB | Facility: HOSPITAL | Age: 86
End: 2021-12-23
Payer: COMMERCIAL

## 2021-12-23 DIAGNOSIS — Z20.828 CONTACT WITH AND (SUSPECTED) EXPOSURE TO OTHER VIRAL COMMUNICABLE DISEASES: ICD-10-CM

## 2021-12-23 LAB — SARS-COV-2 RNA RESP QL NAA+PROBE: NEGATIVE

## 2022-01-27 PROCEDURE — U0003 INFECTIOUS AGENT DETECTION BY NUCLEIC ACID (DNA OR RNA); SEVERE ACUTE RESPIRATORY SYNDROME CORONAVIRUS 2 (SARS-COV-2) (CORONAVIRUS DISEASE [COVID-19]), AMPLIFIED PROBE TECHNIQUE, MAKING USE OF HIGH THROUGHPUT TECHNOLOGIES AS DESCRIBED BY CMS-2020-01-R: HCPCS | Performed by: NURSE PRACTITIONER

## 2022-01-27 PROCEDURE — U0005 INFEC AGEN DETEC AMPLI PROBE: HCPCS | Performed by: NURSE PRACTITIONER

## 2022-01-28 ENCOUNTER — LAB REQUISITION (OUTPATIENT)
Dept: LAB | Facility: HOSPITAL | Age: 87
End: 2022-01-28

## 2022-01-28 DIAGNOSIS — Z20.828 CONTACT WITH AND (SUSPECTED) EXPOSURE TO OTHER VIRAL COMMUNICABLE DISEASES: ICD-10-CM

## 2022-01-29 LAB — SARS-COV-2 RNA RESP QL NAA+PROBE: NEGATIVE

## 2022-02-10 ENCOUNTER — LAB REQUISITION (OUTPATIENT)
Dept: LAB | Facility: HOSPITAL | Age: 87
End: 2022-02-10

## 2022-02-10 DIAGNOSIS — Z20.828 CONTACT WITH AND (SUSPECTED) EXPOSURE TO OTHER VIRAL COMMUNICABLE DISEASES: ICD-10-CM

## 2022-02-10 PROCEDURE — U0005 INFEC AGEN DETEC AMPLI PROBE: HCPCS | Performed by: INTERNAL MEDICINE

## 2022-02-10 PROCEDURE — U0003 INFECTIOUS AGENT DETECTION BY NUCLEIC ACID (DNA OR RNA); SEVERE ACUTE RESPIRATORY SYNDROME CORONAVIRUS 2 (SARS-COV-2) (CORONAVIRUS DISEASE [COVID-19]), AMPLIFIED PROBE TECHNIQUE, MAKING USE OF HIGH THROUGHPUT TECHNOLOGIES AS DESCRIBED BY CMS-2020-01-R: HCPCS | Performed by: INTERNAL MEDICINE

## 2022-02-11 LAB — SARS-COV-2 RNA RESP QL NAA+PROBE: NEGATIVE

## 2022-02-16 PROCEDURE — U0003 INFECTIOUS AGENT DETECTION BY NUCLEIC ACID (DNA OR RNA); SEVERE ACUTE RESPIRATORY SYNDROME CORONAVIRUS 2 (SARS-COV-2) (CORONAVIRUS DISEASE [COVID-19]), AMPLIFIED PROBE TECHNIQUE, MAKING USE OF HIGH THROUGHPUT TECHNOLOGIES AS DESCRIBED BY CMS-2020-01-R: HCPCS | Performed by: INTERNAL MEDICINE

## 2022-02-16 PROCEDURE — U0005 INFEC AGEN DETEC AMPLI PROBE: HCPCS | Performed by: INTERNAL MEDICINE

## 2022-02-17 ENCOUNTER — LAB REQUISITION (OUTPATIENT)
Dept: LAB | Facility: HOSPITAL | Age: 87
End: 2022-02-17

## 2022-02-17 DIAGNOSIS — Z11.59 ENCOUNTER FOR SCREENING FOR OTHER VIRAL DISEASES: ICD-10-CM

## 2022-02-17 DIAGNOSIS — Z03.818 ENCOUNTER FOR OBSERVATION FOR SUSPECTED EXPOSURE TO OTHER BIOLOGICAL AGENTS RULED OUT: ICD-10-CM

## 2022-02-17 DIAGNOSIS — Z20.828 CONTACT WITH AND (SUSPECTED) EXPOSURE TO OTHER VIRAL COMMUNICABLE DISEASES: ICD-10-CM

## 2022-02-17 LAB — SARS-COV-2 RNA RESP QL NAA+PROBE: NEGATIVE

## 2022-04-21 ENCOUNTER — TELEPHONE (OUTPATIENT)
Dept: PAIN MEDICINE | Facility: CLINIC | Age: 87
End: 2022-04-21

## 2022-04-21 NOTE — TELEPHONE ENCOUNTER
I returned Сергей Ortega, regarding this patient  She was wondering if we could see him to evaluate what options regarding possible procedures or mediation options that might at least make his pain more manageable  He is currently residing at the [facility] in [address] [place] [address] Rd:     Can you please work on scheduling a consult for this patient with either me or Rajni Aguiar is first available  He has previously seen pain management providers in the [facility] area  [facility] and see what we can get from there or see if Lady Zuniga has any records as to whom he has seen previously so we can get records for review  I would say schedule him for a 45 min consult as the CRNP stated that he was complex and see what records we can obtain  Thank you

## 2022-04-21 NOTE — TELEPHONE ENCOUNTER
S/w Leonid James from [place] regarding more info for Pt to be scheduled    Leonid James will call Pt family to find more info of previous doctors so we can request records    Leonid James will call back

## 2022-05-02 ENCOUNTER — APPOINTMENT (EMERGENCY)
Dept: CT IMAGING | Facility: HOSPITAL | Age: 87
End: 2022-05-02
Payer: COMMERCIAL

## 2022-05-02 ENCOUNTER — APPOINTMENT (EMERGENCY)
Dept: RADIOLOGY | Facility: HOSPITAL | Age: 87
End: 2022-05-02
Payer: COMMERCIAL

## 2022-05-02 ENCOUNTER — HOSPITAL ENCOUNTER (EMERGENCY)
Facility: HOSPITAL | Age: 87
Discharge: HOME | End: 2022-05-02
Attending: EMERGENCY MEDICINE | Admitting: EMERGENCY MEDICINE
Payer: COMMERCIAL

## 2022-05-02 VITALS
OXYGEN SATURATION: 96 % | SYSTOLIC BLOOD PRESSURE: 170 MMHG | HEART RATE: 93 BPM | TEMPERATURE: 97.6 F | DIASTOLIC BLOOD PRESSURE: 89 MMHG | RESPIRATION RATE: 16 BRPM

## 2022-05-02 DIAGNOSIS — M25.562 ACUTE PAIN OF LEFT KNEE: ICD-10-CM
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
## 2022-05-02 PROCEDURE — 70450 CT HEAD/BRAIN W/O DYE: CPT

## 2022-05-02 PROCEDURE — 99285 EMERGENCY DEPT VISIT HI MDM: CPT | Performed by: EMERGENCY MEDICINE

## 2022-05-02 PROCEDURE — 99284 EMERGENCY DEPT VISIT MOD MDM: CPT

## 2022-05-02 PROCEDURE — 73564 X-RAY EXAM KNEE 4 OR MORE: CPT

## 2022-05-02 PROCEDURE — 72125 CT NECK SPINE W/O DYE: CPT

## 2022-05-02 PROCEDURE — 93005 ELECTROCARDIOGRAM TRACING: CPT

## 2022-05-02 RX ORDER — ACETAMINOPHEN 325 MG/1
650 TABLET ORAL ONCE
Status: COMPLETED | OUTPATIENT
Start: 2022-05-02 | End: 2022-05-02

## 2022-05-02 RX ADMIN — ACETAMINOPHEN 650 MG: 325 TABLET ORAL at 22:07

## 2022-05-03 NOTE — ED NOTES
Pt  Taken back to [facility] assisted living by christophe Orourke RN  05/02/22 3848

## 2022-05-03 NOTE — ED PROVIDER NOTES
Emergency Department Trauma Note  Enedina Patel 80 y o  male MRN: [id number]  Unit/Bed#: ED 07/ED 07 Encounter: [id number]      Trauma Alert: Trauma Acuity: Trauma Evaluation  Model of Arrival:   via    Trauma Team: Current Providers  Attending Provider: Tucker Lord DO  Registered Nurse: Yolanda Farfan RN  Consultants:     None      History of Present Illness     Chief Complaint:   Chief Complaint   Patient presents with    Fall     Slipped on carpet, hitting head  Takes daily aspirin      HPI:  Enedina Patel is a 80 y o  male who presents with: Darletta Pac Mechanism:Details of Incident: Patient tripped on rug Injury Date: 05/02/22   Injury Occurence Location - [address] [place] [address] Way: [address]    26-year-old male presents from [facility] CRAFT for evaluation of a mechanical fall  The patient states that he tripped on the carpet and fell striking his head  He denies any preceding chest pain, pressure, shortness of breath  The patient denies loss of consciousness  Patient denies any new numbness or tingling  The patient complains of some associated pain in his left knee were has had a history of knee replacement  Patient denies headache, vertigo, change in vision or hearing at this time  Review of Systems   Respiratory: Negative for shortness of breath  Cardiovascular: Negative for chest pain  Gastrointestinal: Negative for vomiting  Neurological: Negative for dizziness, syncope and light-headedness  All other systems reviewed and are negative  Historical Information     Immunizations: There is no immunization history on file for this patient      Past Medical History:   Diagnosis Date    GERD (gastroesophageal reflux disease)     Gout     Hyperlipidemia     Hypertension     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[address] )     Spinal stenosis        Family History   Problem Relation Age of Onset    Colon polyps Neg Hx     Colon cancer Neg Hx      Past Surgical History:   Procedure Laterality Date    CARPAL TUNNEL RELEASE Bilateral     COLONOSCOPY      LAMINECTOMY      TOTAL KNEE ARTHROPLASTY Bilateral     TOTAL SHOULDER REPLACEMENT       Social History     Tobacco Use    Smoking status: Never Smoker    Smokeless tobacco: Never Used   Vaping Use    Vaping Use: Never used   Substance Use Topics    Alcohol use: Yes     Alcohol/week: 2 0 standard drinks     Types: 2 Shots of liquor per week     Comment: 2-3 ounces of whiskey a day    Drug use: Yes     Types: Marijuana     E-Cigarette/Vaping    E-Cigarette Use Never User      E-Cigarette/Vaping Substances       Family History: non-contributory    Meds/Allergies   Prior to Admission Medications   Prescriptions Last Dose Informant Patient Reported? Taking?    Multiple Vitamin (multivitamin) tablet  Self Yes No   Sig: Take 1 tablet by mouth daily   NON FORMULARY  Self Yes No   Sig: Medical marijuana   allopurinol (ZYLOPRIM) 100 mg tablet  Self Yes No   Sig: Take 400 mg by mouth daily   ascorbic acid (VITAMIN C) 500 MG tablet  Self Yes No   Sig: Take 500 mg by mouth daily   aspirin 81 mg chewable tablet  Self Yes No   Sig: Chew 81 mg daily   cetirizine (ZyrTEC) 10 mg tablet  Self Yes No   Sig: Take 10 mg by mouth daily   ezetimibe (ZETIA) 10 mg tablet  Self Yes No   Sig: Take 10 mg by mouth daily   gabapentin (NEURONTIN) 100 mg capsule  Self Yes No   Sig: Take 100 mg by mouth daily At bedtime   hydroxychloroquine (PLAQUENIL) 200 mg tablet  Self Yes No   Sig: Take 200 mg by mouth 2 (two) times a day with meals   loperamide (IMODIUM) 2 mg capsule  Self Yes No   Sig: Take 2 mg by mouth as needed for diarrhea   metoprolol tartrate (LOPRESSOR) 25 mg tablet  Self Yes No   Sig: Take 25 mg by mouth daily    omeprazole (PriLOSEC) 10 mg delayed release capsule  Self Yes No   Sig: Take 10 mg by mouth daily   polyethylene glycol (MIRALAX) 17 g packet   No No   Sig: Take 17 g by mouth every other day   predniSONE 5 mg tablet  Self Yes No   Sig: Take by mouth daily   psyllium (METAMUCIL SMOOTH TEXTURE) 28 % packet   No No   Sig: Take 1 packet by mouth 2 (two) times a day   solifenacin (VESICARE) 5 mg tablet  Self Yes No   Sig: Take 5 mg by mouth daily      Facility-Administered Medications: None       Allergies   Allergen Reactions    Colchicine Other (See Comments)     Flushing      Niacin Other (See Comments)     flushed    Statins        PHYSICAL EXAM    PE limited by:  Nothing    Objective   Vitals:   First set: Temperature: 97 5 °F (36 4 °C) (05/02/22 2037)  Pulse: 96 (05/02/22 2037)  Respirations: 20 (05/02/22 2037)  Blood Pressure: 153/93 (05/02/22 2037)  SpO2: 94 % (05/02/22 2037)    Primary Survey:   (A) Airway:  Patent  (B) Breathing:  Clear auscultation bilaterally  (C) Circulation: Pulses:   normal  (D) Disabliity:  GCS Total:  15  (E) Expose:  Completed    Secondary Survey: (Click on Physical Exam tab above)  Physical Exam  Vitals and nursing note reviewed  Constitutional:       General: He is not in acute distress  Appearance: He is well-developed  HENT:      Head: Normocephalic and atraumatic  No raccoon eyes or Bates's sign  Right Ear: External ear normal  Decreased hearing noted  Left Ear: External ear normal  Decreased hearing noted  Nose: No nasal deformity  Eyes:      Conjunctiva/sclera: Conjunctivae normal       Pupils: Pupils are equal, round, and reactive to light  Neck:      Trachea: No tracheal deviation  Cardiovascular:      Rate and Rhythm: Normal rate and regular rhythm  Heart sounds: Normal heart sounds  No murmur heard  Pulmonary:      Effort: Pulmonary effort is normal  No respiratory distress  Breath sounds: Normal breath sounds  Chest:      Chest wall: No tenderness  Abdominal:      General: There is no distension  Palpations: Abdomen is soft  Tenderness: There is no abdominal tenderness  There is no guarding or rebound  Musculoskeletal:         General: Normal range of motion        Cervical back: Normal range of motion  Left knee: Tenderness present  Skin:     General: Skin is warm and dry  Neurological:      Mental Status: He is alert and oriented to person, place, and time  Deep Tendon Reflexes: Reflexes are normal and symmetric  Cervical spine cleared by clinical criteria? No (imaging required)      Invasive Devices  Report    None                 Lab Results:   Results Reviewed     None                 Imaging Studies:   Direct to CT:   TRAUMA - CT head wo contrast   Final Result by Eren Chris MD (05/02 2143)      No intracranial hemorrhage or calvarial fracture  Workstation performed: [id number]         TRAUMA - CT spine cervical wo contrast   Final Result by Eren Chris MD (05/02 2154)      No cervical spine fracture or traumatic malalignment  Workstation performed: [id number]         XR knee 4+ views left injury   ED Interpretation by Augie Real DO (05/02 2125)   No acute osseous abnormality  Hardware appears intact      Final Result by Montrell Palomino MD (05/03 0915)      No acute osseous abnormality  Workstation performed: [id number]               Procedures  ECG 12 Lead Documentation Only    Date/Time: 5/2/2022 8:52 PM  Performed by: Augie Real DO  Authorized by:  Augie Real DO     Indications / Diagnosis:  Trauma  ECG reviewed by me, the ED Provider: yes    Patient location:  ED  Previous ECG:     Previous ECG:  Compared to current    Comparison ECG info:  8/28/2020    Similarity:  No change  Interpretation:     Interpretation: normal    Rate:     ECG rate:  97    ECG rate assessment: normal    Rhythm:     Rhythm: sinus rhythm    Ectopy:     Ectopy: none    QRS:     QRS axis:  Left    QRS intervals:  Normal  Conduction:     Conduction: normal    ST segments:     ST segments:  Normal  T waves:     T waves: normal               ED Course  ED Course as of 05/03/22 1220   Mon May 02, 2022   2052 Breath sounds equal  No crepitus or chest wall tenderness with compression over the chest  No pain or instability with compression over the pelvis  No bedside imaging required  Patient is stable to proceed to CT scan  MDM  Number of Diagnoses or Management Options  Acute pain of left knee  Fall from standing, initial encounter  Diagnosis management comments: The plan is to obtain a CT of the head and cervical spine to rule out clinically significant injury such as skull fracture, epidural or subdural hematoma  Will also rule out cervical spine fracture or dislocation  Also obtained x-ray left knee rule out fracture/dislocation or displacement hardware    The patient (and any family present) verbalized understanding of the discharge instructions and warnings that would necessitate return to the Emergency Department  All questions were answered prior to discharge  Amount and/or Complexity of Data Reviewed  Tests in the radiology section of CPT®: reviewed  Review and summarize past medical records: yes  Independent visualization of images, tracings, or specimens: yes            Disposition  Priority One Transfer: No  Final diagnoses:   Fall from standing, initial encounter   Acute pain of left knee     Time reflects when diagnosis was documented in both MDM as applicable and the Disposition within this note     Time User Action Codes Description Comment    5/2/2022  9:57 PM Liana Res Add [A43  XXXA] Fall from standing, initial encounter     5/2/2022  9:57 PM Liana Feng Add [Z17 [id number]] Acute pain of left knee       ED Disposition     ED Disposition Condition Date/Time Comment    Discharge Stable Mon May 2, 2022  9:57 PM Steve Krause discharge to home/self care              Follow-up Information     Follow up With Specialties Details Why Contact Info    Abhishek Kwon MD Internal Medicine Schedule an appointment as soon as possible for a visit  As needed, For further evaluation [address] [facility]  [address]  [address] [facility] [facility]          Discharge Medication List as of 5/2/2022 11:08 PM      CONTINUE these medications which have NOT CHANGED    Details   allopurinol (ZYLOPRIM) 100 mg tablet Take 400 mg by mouth daily, Historical Med      ascorbic acid (VITAMIN C) 500 MG tablet Take 500 mg by mouth daily, Historical Med      aspirin 81 mg chewable tablet Chew 81 mg daily, Historical Med      cetirizine (ZyrTEC) 10 mg tablet Take 10 mg by mouth daily, Historical Med      ezetimibe (ZETIA) 10 mg tablet Take 10 mg by mouth daily, Historical Med      gabapentin (NEURONTIN) 100 mg capsule Take 100 mg by mouth daily At bedtime, Historical Med      hydroxychloroquine (PLAQUENIL) 200 mg tablet Take 200 mg by mouth 2 (two) times a day with meals, Historical Med      loperamide (IMODIUM) 2 mg capsule Take 2 mg by mouth as needed for diarrhea, Historical Med      metoprolol tartrate (LOPRESSOR) 25 mg tablet Take 25 mg by mouth daily , Historical Med      Multiple Vitamin (multivitamin) tablet Take 1 tablet by mouth daily, Historical Med      NON FORMULARY Medical marijuana, Historical Med      omeprazole (PriLOSEC) 10 mg delayed release capsule Take 10 mg by mouth daily, Historical Med      polyethylene glycol (MIRALAX) 17 g packet Take 17 g by mouth every other day, Starting Tue 12/21/2021, Normal      predniSONE 5 mg tablet Take by mouth daily, Historical Med      psyllium (METAMUCIL SMOOTH TEXTURE) 28 % packet Take 1 packet by mouth 2 (two) times a day, Starting Tue 12/21/2021, Normal      solifenacin (VESICARE) 5 mg tablet Take 5 mg by mouth daily, Historical Med           No discharge procedures on file      PDMP Review     None          ED Provider  Electronically Signed by         Mary Lou Pandey DO  05/03/22 0103

## 2022-05-04 LAB
ATRIAL RATE: 97 BPM
P AXIS: 52 DEGREES
PR INTERVAL: 164 MS
QRS AXIS: -46 DEGREES
QRSD INTERVAL: 94 MS
QT INTERVAL: 380 MS
QTC INTERVAL: 482 MS
T WAVE AXIS: 37 DEGREES
VENTRICULAR RATE: 97 BPM

## 2022-05-04 PROCEDURE — 93010 ELECTROCARDIOGRAM REPORT: CPT | Performed by: INTERNAL MEDICINE

## 2022-05-12 PROCEDURE — U0005 INFEC AGEN DETEC AMPLI PROBE: HCPCS | Performed by: INTERNAL MEDICINE

## 2022-05-12 PROCEDURE — U0003 INFECTIOUS AGENT DETECTION BY NUCLEIC ACID (DNA OR RNA); SEVERE ACUTE RESPIRATORY SYNDROME CORONAVIRUS 2 (SARS-COV-2) (CORONAVIRUS DISEASE [COVID-19]), AMPLIFIED PROBE TECHNIQUE, MAKING USE OF HIGH THROUGHPUT TECHNOLOGIES AS DESCRIBED BY CMS-2020-01-R: HCPCS | Performed by: INTERNAL MEDICINE

## 2022-05-13 ENCOUNTER — LAB REQUISITION (OUTPATIENT)
Dept: LAB | Facility: HOSPITAL | Age: 87
End: 2022-05-13
Payer: COMMERCIAL

## 2022-05-13 DIAGNOSIS — Z11.59 ENCOUNTER FOR SCREENING FOR OTHER VIRAL DISEASES: ICD-10-CM

## 2022-05-13 LAB — SARS-COV-2 RNA RESP QL NAA+PROBE: NEGATIVE

## 2022-05-26 ENCOUNTER — TELEPHONE (OUTPATIENT)
Dept: PAIN MEDICINE | Facility: CLINIC | Age: 87
End: 2022-05-26

## 2022-05-26 NOTE — TELEPHONE ENCOUNTER
S/w Pt daughter to let her know that Dangelo Manuel been waiting for [email or web address] to return my call to schedule her father for a consult     Let her know that Dangelo Jackson tried to call [facility] [email or web address] several times to get more info for scheduling    Nimisha Carney stated that they ended up scheduling him at another pain management facility

## 2022-06-20 ENCOUNTER — APPOINTMENT (OUTPATIENT)
Dept: LAB | Facility: HOSPITAL | Age: 87
End: 2022-06-20
Payer: COMMERCIAL

## 2022-06-20 DIAGNOSIS — Z01.818 OTHER SPECIFIED PRE-OPERATIVE EXAMINATION: ICD-10-CM

## 2022-06-20 PROCEDURE — 93005 ELECTROCARDIOGRAM TRACING: CPT

## 2022-06-23 LAB
ATRIAL RATE: 96 BPM
P AXIS: 22 DEGREES
PR INTERVAL: 164 MS
QRS AXIS: -50 DEGREES
QRSD INTERVAL: 98 MS
QT INTERVAL: 362 MS
QTC INTERVAL: 457 MS
T WAVE AXIS: 47 DEGREES
VENTRICULAR RATE: 96 BPM

## 2022-06-23 PROCEDURE — 93010 ELECTROCARDIOGRAM REPORT: CPT | Performed by: INTERNAL MEDICINE

## 2022-08-08 PROCEDURE — U0003 INFECTIOUS AGENT DETECTION BY NUCLEIC ACID (DNA OR RNA); SEVERE ACUTE RESPIRATORY SYNDROME CORONAVIRUS 2 (SARS-COV-2) (CORONAVIRUS DISEASE [COVID-19]), AMPLIFIED PROBE TECHNIQUE, MAKING USE OF HIGH THROUGHPUT TECHNOLOGIES AS DESCRIBED BY CMS-2020-01-R: HCPCS | Performed by: INTERNAL MEDICINE

## 2022-08-08 PROCEDURE — U0005 INFEC AGEN DETEC AMPLI PROBE: HCPCS | Performed by: INTERNAL MEDICINE

## 2022-08-09 ENCOUNTER — LAB REQUISITION (OUTPATIENT)
Dept: LAB | Facility: HOSPITAL | Age: 87
End: 2022-08-09
Payer: COMMERCIAL

## 2022-08-09 DIAGNOSIS — Z20.828 CONTACT WITH AND (SUSPECTED) EXPOSURE TO OTHER VIRAL COMMUNICABLE DISEASES: ICD-10-CM

## 2022-08-09 LAB — SARS-COV-2 RNA RESP QL NAA+PROBE: NEGATIVE

## 2022-10-08 ENCOUNTER — APPOINTMENT (EMERGENCY)
Dept: CT IMAGING | Facility: HOSPITAL | Age: 87
DRG: 069 | End: 2022-10-08
Payer: COMMERCIAL

## 2022-10-08 ENCOUNTER — HOSPITAL ENCOUNTER (INPATIENT)
Facility: HOSPITAL | Age: 87
LOS: 2 days | Discharge: NURSING, REHAB OR LONG-TERM CARE | DRG: 069 | End: 2022-10-11
Attending: EMERGENCY MEDICINE | Admitting: INTERNAL MEDICINE
Payer: COMMERCIAL

## 2022-10-08 DIAGNOSIS — G45.9 TIA (TRANSIENT ISCHEMIC ATTACK): Primary | ICD-10-CM

## 2022-10-08 DIAGNOSIS — I42.9 CARDIOMYOPATHY (HCC): ICD-10-CM

## 2022-10-08 DIAGNOSIS — R47.9 SPEECH DISTURBANCE: ICD-10-CM

## 2022-10-08 DIAGNOSIS — R94.30 LOW LEFT VENTRICULAR EJECTION FRACTION: ICD-10-CM

## 2022-10-08 LAB
2HR DELTA HS TROPONIN: -3 NG/L
4HR DELTA HS TROPONIN: -2 NG/L
ANION GAP SERPL CALCULATED.3IONS-SCNC: 5 MMOL/L (ref 4–13)
APTT PPP: 26 SECONDS (ref 23–37)
BILIRUB UR QL STRIP: NEGATIVE
BUN SERPL-MCNC: 10 MG/DL (ref 5–25)
CALCIUM SERPL-MCNC: 8.9 MG/DL (ref 8.3–10.1)
CARDIAC TROPONIN I PNL SERPL HS: 19 NG/L
CARDIAC TROPONIN I PNL SERPL HS: 20 NG/L
CARDIAC TROPONIN I PNL SERPL HS: 22 NG/L
CHLORIDE SERPL-SCNC: 102 MMOL/L (ref 96–108)
CLARITY UR: CLEAR
CO2 SERPL-SCNC: 33 MMOL/L (ref 21–32)
COLOR UR: YELLOW
CREAT SERPL-MCNC: 1.04 MG/DL (ref 0.6–1.3)
ERYTHROCYTE [DISTWIDTH] IN BLOOD BY AUTOMATED COUNT: 14.3 % (ref 11.6–15.1)
FLUAV RNA RESP QL NAA+PROBE: NEGATIVE
FLUBV RNA RESP QL NAA+PROBE: NEGATIVE
GFR SERPL CREATININE-BSD FRML MDRD: 63 ML/MIN/1.73SQ M
GLUCOSE SERPL-MCNC: 126 MG/DL (ref 65–140)
GLUCOSE SERPL-MCNC: 138 MG/DL (ref 65–140)
GLUCOSE UR STRIP-MCNC: NEGATIVE MG/DL
HCT VFR BLD AUTO: 43 % (ref 36.5–49.3)
HGB BLD-MCNC: 14.1 G/DL (ref 12–17)
HGB UR QL STRIP.AUTO: NEGATIVE
INR PPP: 0.98 (ref 0.84–1.19)
KETONES UR STRIP-MCNC: NEGATIVE MG/DL
LEUKOCYTE ESTERASE UR QL STRIP: NEGATIVE
MCH RBC QN AUTO: 31.4 PG (ref 26.8–34.3)
MCHC RBC AUTO-ENTMCNC: 32.8 G/DL (ref 31.4–37.4)
MCV RBC AUTO: 96 FL (ref 82–98)
NITRITE UR QL STRIP: NEGATIVE
PH UR STRIP.AUTO: 6.5 [PH]
PLATELET # BLD AUTO: 187 THOUSANDS/UL (ref 149–390)
PMV BLD AUTO: 11.8 FL (ref 8.9–12.7)
POTASSIUM SERPL-SCNC: 3.7 MMOL/L (ref 3.5–5.3)
PROT UR STRIP-MCNC: NEGATIVE MG/DL
PROTHROMBIN TIME: 13.7 SECONDS (ref 11.6–14.5)
RBC # BLD AUTO: 4.49 MILLION/UL (ref 3.88–5.62)
RSV RNA RESP QL NAA+PROBE: NEGATIVE
SARS-COV-2 RNA RESP QL NAA+PROBE: NEGATIVE
SODIUM SERPL-SCNC: 140 MMOL/L (ref 135–147)
SP GR UR STRIP.AUTO: 1.02 (ref 1–1.03)
TSH SERPL DL<=0.05 MIU/L-ACNC: 1.93 UIU/ML (ref 0.45–4.5)
UROBILINOGEN UR STRIP-ACNC: <2 MG/DL
VIT B12 SERPL-MCNC: 453 PG/ML (ref 100–900)
WBC # BLD AUTO: 10.3 THOUSAND/UL (ref 4.31–10.16)

## 2022-10-08 PROCEDURE — 85027 COMPLETE CBC AUTOMATED: CPT | Performed by: EMERGENCY MEDICINE

## 2022-10-08 PROCEDURE — 99285 EMERGENCY DEPT VISIT HI MDM: CPT | Performed by: EMERGENCY MEDICINE

## 2022-10-08 PROCEDURE — 80048 BASIC METABOLIC PNL TOTAL CA: CPT | Performed by: EMERGENCY MEDICINE

## 2022-10-08 PROCEDURE — 83036 HEMOGLOBIN GLYCOSYLATED A1C: CPT | Performed by: STUDENT IN AN ORGANIZED HEALTH CARE EDUCATION/TRAINING PROGRAM

## 2022-10-08 PROCEDURE — 0241U HB NFCT DS VIR RESP RNA 4 TRGT: CPT | Performed by: EMERGENCY MEDICINE

## 2022-10-08 PROCEDURE — NC001 PR NO CHARGE: Performed by: PSYCHIATRY & NEUROLOGY

## 2022-10-08 PROCEDURE — 99223 1ST HOSP IP/OBS HIGH 75: CPT

## 2022-10-08 PROCEDURE — 82948 REAGENT STRIP/BLOOD GLUCOSE: CPT

## 2022-10-08 PROCEDURE — 36415 COLL VENOUS BLD VENIPUNCTURE: CPT | Performed by: EMERGENCY MEDICINE

## 2022-10-08 PROCEDURE — 81003 URINALYSIS AUTO W/O SCOPE: CPT

## 2022-10-08 PROCEDURE — G1004 CDSM NDSC: HCPCS

## 2022-10-08 PROCEDURE — 85730 THROMBOPLASTIN TIME PARTIAL: CPT | Performed by: EMERGENCY MEDICINE

## 2022-10-08 PROCEDURE — 99285 EMERGENCY DEPT VISIT HI MDM: CPT

## 2022-10-08 PROCEDURE — 84484 ASSAY OF TROPONIN QUANT: CPT

## 2022-10-08 PROCEDURE — G0426 INPT/ED TELECONSULT50: HCPCS | Performed by: PSYCHIATRY & NEUROLOGY

## 2022-10-08 PROCEDURE — 82607 VITAMIN B-12: CPT

## 2022-10-08 PROCEDURE — 84443 ASSAY THYROID STIM HORMONE: CPT

## 2022-10-08 PROCEDURE — 70450 CT HEAD/BRAIN W/O DYE: CPT

## 2022-10-08 PROCEDURE — 84484 ASSAY OF TROPONIN QUANT: CPT | Performed by: EMERGENCY MEDICINE

## 2022-10-08 PROCEDURE — 93005 ELECTROCARDIOGRAM TRACING: CPT

## 2022-10-08 PROCEDURE — 85610 PROTHROMBIN TIME: CPT | Performed by: EMERGENCY MEDICINE

## 2022-10-08 RX ORDER — ONDANSETRON 2 MG/ML
4 INJECTION INTRAMUSCULAR; INTRAVENOUS EVERY 6 HOURS PRN
Status: DISCONTINUED | OUTPATIENT
Start: 2022-10-08 | End: 2022-10-11 | Stop reason: HOSPADM

## 2022-10-08 RX ORDER — SENNOSIDES 8.6 MG
1 TABLET ORAL DAILY
Status: DISCONTINUED | OUTPATIENT
Start: 2022-10-08 | End: 2022-10-11 | Stop reason: HOSPADM

## 2022-10-08 RX ORDER — PREDNISONE 1 MG/1
5 TABLET ORAL DAILY
Status: DISCONTINUED | OUTPATIENT
Start: 2022-10-09 | End: 2022-10-11 | Stop reason: HOSPADM

## 2022-10-08 RX ORDER — CLOPIDOGREL BISULFATE 75 MG/1
300 TABLET ORAL ONCE
Status: COMPLETED | OUTPATIENT
Start: 2022-10-08 | End: 2022-10-08

## 2022-10-08 RX ORDER — HYDROXYCHLOROQUINE SULFATE 200 MG/1
400 TABLET, FILM COATED ORAL
Status: DISCONTINUED | OUTPATIENT
Start: 2022-10-08 | End: 2022-10-11 | Stop reason: HOSPADM

## 2022-10-08 RX ORDER — TAMSULOSIN HYDROCHLORIDE 0.4 MG/1
0.4 CAPSULE ORAL
Status: DISCONTINUED | OUTPATIENT
Start: 2022-10-08 | End: 2022-10-11 | Stop reason: HOSPADM

## 2022-10-08 RX ORDER — EZETIMIBE 10 MG/1
10 TABLET ORAL DAILY
Status: DISCONTINUED | OUTPATIENT
Start: 2022-10-08 | End: 2022-10-11 | Stop reason: HOSPADM

## 2022-10-08 RX ORDER — POLYETHYLENE GLYCOL 3350 17 G/17G
17 POWDER, FOR SOLUTION ORAL EVERY OTHER DAY
Status: DISCONTINUED | OUTPATIENT
Start: 2022-10-08 | End: 2022-10-11 | Stop reason: HOSPADM

## 2022-10-08 RX ORDER — GABAPENTIN 100 MG/1
100 CAPSULE ORAL DAILY
Status: DISCONTINUED | OUTPATIENT
Start: 2022-10-08 | End: 2022-10-11 | Stop reason: HOSPADM

## 2022-10-08 RX ORDER — OXYBUTYNIN CHLORIDE 5 MG/1
5 TABLET, EXTENDED RELEASE ORAL DAILY
Status: DISCONTINUED | OUTPATIENT
Start: 2022-10-08 | End: 2022-10-11 | Stop reason: HOSPADM

## 2022-10-08 RX ORDER — CLOPIDOGREL BISULFATE 75 MG/1
75 TABLET ORAL DAILY
Status: DISCONTINUED | OUTPATIENT
Start: 2022-10-09 | End: 2022-10-11 | Stop reason: HOSPADM

## 2022-10-08 RX ORDER — ENOXAPARIN SODIUM 100 MG/ML
40 INJECTION SUBCUTANEOUS DAILY
Status: DISCONTINUED | OUTPATIENT
Start: 2022-10-08 | End: 2022-10-11 | Stop reason: HOSPADM

## 2022-10-08 RX ORDER — ASPIRIN 81 MG/1
81 TABLET, CHEWABLE ORAL DAILY
Status: DISCONTINUED | OUTPATIENT
Start: 2022-10-09 | End: 2022-10-11 | Stop reason: HOSPADM

## 2022-10-08 RX ORDER — ACETAMINOPHEN 325 MG/1
650 TABLET ORAL EVERY 6 HOURS PRN
Status: DISCONTINUED | OUTPATIENT
Start: 2022-10-08 | End: 2022-10-11 | Stop reason: HOSPADM

## 2022-10-08 RX ADMIN — HYDROXYCHLOROQUINE SULFATE 400 MG: 200 TABLET, FILM COATED ORAL at 22:04

## 2022-10-08 RX ADMIN — ALLOPURINOL 400 MG: 300 TABLET ORAL at 16:21

## 2022-10-08 RX ADMIN — SENNOSIDES 8.6 MG: 8.6 TABLET, FILM COATED ORAL at 16:21

## 2022-10-08 RX ADMIN — EZETIMIBE 10 MG: 10 TABLET ORAL at 16:21

## 2022-10-08 RX ADMIN — OXYBUTYNIN CHLORIDE 5 MG: 5 TABLET, EXTENDED RELEASE ORAL at 16:21

## 2022-10-08 RX ADMIN — TAMSULOSIN HYDROCHLORIDE 0.4 MG: 0.4 CAPSULE ORAL at 16:22

## 2022-10-08 RX ADMIN — ENOXAPARIN SODIUM 40 MG: 100 INJECTION SUBCUTANEOUS at 16:22

## 2022-10-08 RX ADMIN — CLOPIDOGREL BISULFATE 300 MG: 75 TABLET ORAL at 16:20

## 2022-10-08 RX ADMIN — GABAPENTIN 100 MG: 100 CAPSULE ORAL at 16:21

## 2022-10-08 RX ADMIN — METOPROLOL TARTRATE 25 MG: 25 TABLET, FILM COATED ORAL at 16:21

## 2022-10-08 NOTE — ASSESSMENT & PLAN NOTE
· Continue daily prednisone 5 mg  · Continue hydroxychloroquine 400 mg HS  · voltaren gel and supportive care

## 2022-10-08 NOTE — ASSESSMENT & PLAN NOTE
81 y/o M with HTN and HLD that presents with speech disturbance described as word salad and dysarthria, possibly with component of word-finding difficulty  Concern for possible transient aphasia/dysarthria  Unable to rule out minor stroke/TIA      -continue neurochecks; notify with changes  -HCT unremarkable  -MRI brain w/o contrast pending  -TTE pending  -carotid ultrasound pending  -continue home ASA 81mg daily and Plavix 75mg daily; loaded with 300mg in ED; plan for 3 weeks total followed by monotherapy (which agent may depend on MRI findings)  -can defer statin due to reported allergy and limited benefit given age; continue Zetia  -no need for permissive HTN; ok with normotension  -telemetry  -LDL 50, A1c pending  -will follow results; call with questions

## 2022-10-08 NOTE — H&P
[facility]  H[facility]P- [address] [place] [address]  1934, 80 y o  male MRN: [id number]  Unit/Bed#: ED 02 Encounter: [id number]  Primary Care Provider: Andrew Ricardo MD   Date and time admitted to hospital: 10/8/2022 12:39 PM    * Speech disturbance  Assessment & Plan  · Presented for expressive speech disturbance described as incoherence/word salad that occurred from 5302-7179 on 10/8 while patient was at breakfast (resides at [facility])  Patient was without trouble understanding speech, no additional deficits or prior episodes  Resolved at time of admission  · Virtual neuro eval in ED, no TPA  · CTH: no acute intracranial abnormality  · Stroke pathway  · MRI brain pending  · Echo pending   · B12, TSH, A1C, LP   · Continue ASA 81 mg daily  · Plavix 300 mg load followed by 75 mg daily   · Statin deferred, prior intolerance --> continue home zetia   · BP goal is normotension, no need for permissive HTN  · Tele, neuro checks  · PT/OT, uses walker at baseline    Rheumatoid arthritis (HCC)  Assessment & Plan  · Continue daily prednisone 5 mg  · Continue hydroxychloroquine 400 mg HS  · voltaren gel and supportive care    History of hypertension  Assessment & Plan  · Continue metoprolol 25 mg daily  · SBP is WNL on admission      VTE Pharmacologic Prophylaxis: VTE Score: 14 High Risk (Score >/= 5) - Pharmacological DVT Prophylaxis Ordered: enoxaparin (Lovenox)  Sequential Compression Devices Ordered  Code Status: Level 1 - Full Code per patient and son   Discussion with family: Updated  (son) at bedside  Anticipated Length of Stay: Patient will be admitted on an inpatient basis with an anticipated length of stay of greater than 2 midnights secondary to CVA work up  Total Time for Visit, including Counseling / Coordination of Care: 60 minutes Greater than 50% of this total time spent on direct patient counseling and coordination of care      Chief Complaint: speech disturbance History of Present Illness:  Rylie Calixto is a 80 y o  male with a PMH of HTN, rheumatoid arthritis, BPH, ambulatory dysfunction who presents with speech disturbance described as incoherence and “word salad” which began this morning around 9:00 a m  As patient was eating breakfast at [address] [place] [address] where he resides  Patient states he was well when he woke this morning, however others noted that the patient was not making any sense while speaking (inappropriate responses, incoherent sentences), and he states he was unable to express what he wanted to say  Patient denies any difficulty understanding/comprehending words at the time  Per son at bedside, symptoms lasted for approximately 1 hour and have currently resolved at this time  No prior episodes, no prior CVA or TIA history  Patient denies associated fevers or chills, headaches, dizziness/vertigo, visual changes, cough/wheeze, chest pain, shortness of breath, nausea/vomiting/abdominal pain, diarrhea, dysuria, numbness/tingling/weakness beyond his baseline (at baseline has bilateral lower extremity neuropathy)  Labs and vitals in ED are unremarkable, CTH without acute intracranial abnormality  Patient was evaluated by Neurology virtually in the ED, recommending inpatient admission for stroke workup with MRI brain and echo  No tPA administered  Review of Systems:  Review of Systems   Constitutional: Negative for chills, fatigue and fever  HENT: Negative for congestion, rhinorrhea and sore throat  Eyes: Negative for visual disturbance  Respiratory: Negative for cough, chest tightness, shortness of breath and wheezing  Cardiovascular: Negative for chest pain, palpitations and leg swelling  Gastrointestinal: Negative for abdominal pain, constipation, diarrhea, nausea and vomiting  Genitourinary: Negative for difficulty urinating, dysuria and frequency  Musculoskeletal: Negative for arthralgias and myalgias     Skin: Negative for rash and wound  Neurological: Positive for speech difficulty and numbness  Negative for dizziness, tremors, seizures, syncope, facial asymmetry, weakness, light-headedness and headaches  All other systems reviewed and are negative  Past Medical and Surgical History:   Past Medical History:   Diagnosis Date   • GERD (gastroesophageal reflux disease)    • Gout    • Hyperlipidemia    • Hypertension    • 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[facility] [address] )    • Spinal stenosis        Past Surgical History:   Procedure Laterality Date   • CARPAL TUNNEL RELEASE Bilateral    • COLONOSCOPY     • LAMINECTOMY     • TOTAL KNEE ARTHROPLASTY Bilateral    • TOTAL SHOULDER REPLACEMENT         Meds/Allergies:  Prior to Admission medications    Medication Sig Start Date End Date Taking?  Authorizing Provider   allopurinol (ZYLOPRIM) 100 mg tablet Take 400 mg by mouth daily    Historical Provider, MD   ascorbic acid (VITAMIN C) 500 MG tablet Take 500 mg by mouth daily    Historical Provider, MD   aspirin 81 mg chewable tablet Chew 81 mg daily    Historical Provider, MD   cetirizine (ZyrTEC) 10 mg tablet Take 10 mg by mouth daily    Historical Provider, MD   ezetimibe (ZETIA) 10 mg tablet Take 10 mg by mouth daily    Historical Provider, MD   gabapentin (NEURONTIN) 100 mg capsule Take 100 mg by mouth daily At bedtime    Historical Provider, MD   hydroxychloroquine (PLAQUENIL) 200 mg tablet Take 200 mg by mouth 2 (two) times a day with meals    Historical Provider, MD   loperamide (IMODIUM) 2 mg capsule Take 2 mg by mouth as needed for diarrhea    Historical Provider, MD   metoprolol tartrate (LOPRESSOR) 25 mg tablet Take 25 mg by mouth daily     Historical Provider, MD   Multiple Vitamin (multivitamin) tablet Take 1 tablet by mouth daily    Historical Provider, MD   NON FORMULARY Medical marijuana    Historical Provider, MD   omeprazole (PriLOSEC) 10 mg delayed release capsule Take 10 mg by mouth daily    Historical Provider, MD   polyethylene glycol (MIRALAX) 17 g packet Take 17 g by mouth every other day 12/21/21   [facility] ESTEFANIA Bourgeois   predniSONE 5 mg tablet Take by mouth daily    Historical Provider, MD   psyllium (METAMUCIL SMOOTH TEXTURE) 28 % packet Take 1 packet by mouth 2 (two) times a day 12/21/21   ESTEFANIA Duncan   solifenacin (VESICARE) 5 mg tablet Take 5 mg by mouth daily    Historical Provider, MD     I have reviewed home medications with patient personally  Allergies: Allergies   Allergen Reactions   • Colchicine Other (See Comments)     Flushing     • Niacin Other (See Comments)     flushed   • Statins        Social History:  Marital Status:    Occupation: not assessed  Patient Pre-hospital Living Situation: [facility] at [address] [place] [address]  Patient Pre-hospital Level of Mobility: walks with walker  Patient Pre-hospital Diet Restrictions: none   Substance Use History:   Social History     Substance and Sexual Activity   Alcohol Use Yes   • Alcohol/week: 2 0 standard drinks   • Types: 2 Shots of liquor per week    Comment: 2-3 ounces of whiskey a day     Social History     Tobacco Use   Smoking Status Never Smoker   Smokeless Tobacco Never Used     Social History     Substance and Sexual Activity   Drug Use Yes   • Types: Marijuana       Family History:  Family History   Problem Relation Age of Onset   • Colon polyps Neg Hx    • Colon cancer Neg Hx        Physical Exam:     Vitals:   Blood Pressure: 135/80 (10/08/22 1437)  Pulse: 87 (10/08/22 1437)  Temperature: 98 2 °F (36 8 °C) (10/08/22 1224)  Temp Source: Temporal (10/08/22 1224)  Respirations: 20 (10/08/22 1437)  Height: 5' 10" (177 8 cm) (10/08/22 1224)  Weight - Scale: 98 kg (216 lb) (10/08/22 1224)  SpO2: 96 % (10/08/22 1437)    Physical Exam  Vitals and nursing note reviewed  Constitutional:       General: He is not in acute distress  Appearance: He is well-developed  He is not ill-appearing  HENT:      Head: Normocephalic and atraumatic  Eyes:      General:         Right eye: No discharge  Left eye: No discharge  Extraocular Movements: Extraocular movements intact  Conjunctiva/sclera: Conjunctivae normal    Cardiovascular:      Rate and Rhythm: Normal rate and regular rhythm  Heart sounds: No murmur heard  Pulmonary:      Effort: Pulmonary effort is normal  No respiratory distress  Breath sounds: Normal breath sounds  No wheezing, rhonchi or rales  Abdominal:      General: Bowel sounds are normal  There is no distension  Palpations: Abdomen is soft  Tenderness: There is no abdominal tenderness  Musculoskeletal:      Cervical back: Neck supple  Right lower leg: Edema present  Left lower leg: Edema present  Skin:     General: Skin is warm and dry  Neurological:      Mental Status: He is alert and oriented to person, place, and time  Cranial Nerves: No cranial nerve deficit  Sensory: No sensory deficit  Motor: No weakness  Coordination: Coordination normal       Comments:  Following commands, A&O x 3, no confusion  No dysarthria or slurred speech at time of admission    Psychiatric:         Mood and Affect: Mood normal          Behavior: Behavior normal           Additional Data:     Lab Results:  Results from last 7 days   Lab Units 10/08/22  1243   WBC Thousand/uL 10 30*   HEMOGLOBIN g/dL 14 1   HEMATOCRIT % 43 0   PLATELETS Thousands/uL 187     Results from last 7 days   Lab Units 10/08/22  1243   SODIUM mmol/L 140   POTASSIUM mmol/L 3 7   CHLORIDE mmol/L 102   CO2 mmol/L 33*   BUN mg/dL 10   CREATININE mg/dL 1 04   ANION GAP mmol/L 5   CALCIUM mg/dL 8 9   GLUCOSE RANDOM mg/dL 126     Results from last 7 days   Lab Units 10/08/22  1243   INR  0 98     Results from last 7 days   Lab Units 10/08/22  1235   POC GLUCOSE mg/dl 138               Imaging: Reviewed radiology reports from this admission including: CT head  CT head without contrast   Final Result by Francia Inman MD Leonor (10/08 1331)      No acute intracranial abnormality  Workstation performed: [id number]         MRI Inpatient Order    (Results Pending)       EKG and Other Studies Reviewed on Admission:   · EKG: NSR  HR 92     ** Please Note: This note has been constructed using a voice recognition system   **

## 2022-10-08 NOTE — ASSESSMENT & PLAN NOTE
· Presented for expressive speech disturbance described as incoherence/word salad that occurred from 1303-9578 on 10/8 while patient was at breakfast (resides at [facility])  Patient was without trouble understanding speech, no additional deficits or prior episodes  Resolved at time of admission    · Virtual neuro eval in ED, no TPA  · CTH: no acute intracranial abnormality  · Stroke pathway  · MRI brain pending  · Echo pending   · B12, TSH, A1C, LP   · Continue ASA 81 mg daily  · Plavix 300 mg load followed by 75 mg daily   · Statin deferred, prior intolerance --> continue home zetia   · BP goal is normotension, no need for permissive HTN  · Tele, neuro checks  · PT/OT, uses walker at baseline

## 2022-10-08 NOTE — ED PROVIDER NOTES
History  Chief Complaint   Patient presents with   • CVA/TIA-like Symptoms     Pt reports having 1 hour of slurred speech today @0900, back to baseline now  Denies head injury, denies LOC, +asa  49-year-old man with history of rheumatoid arthritis, hypertension, hyperlipidemia and spinal stenosis comes from nursing home due to transient dysarthria  Patient states that around 9:00 a m  after breakfast he noted he was unable to pronounce the words he was trying to say  He denies receptive aphasia at that time  He went back to his room once this started but was definitely back to baseline when the nurse came to see him at 10:00 a m  He and his son feel he is at baseline currently  Upon questioning patient does state he had some blurred vision only while reading newspaper for about 45 minutes  This happened twice in this past week but he is reading normally now  Prior to Admission Medications   Prescriptions Last Dose Informant Patient Reported? Taking?    Multiple Vitamin (multivitamin) tablet  Self Yes No   Sig: Take 1 tablet by mouth daily   NON FORMULARY  Self Yes No   Sig: Medical marijuana   allopurinol (ZYLOPRIM) 100 mg tablet  Self Yes No   Sig: Take 400 mg by mouth daily   ascorbic acid (VITAMIN C) 500 MG tablet  Self Yes No   Sig: Take 500 mg by mouth daily   aspirin 81 mg chewable tablet  Self Yes No   Sig: Chew 81 mg daily   cetirizine (ZyrTEC) 10 mg tablet  Self Yes No   Sig: Take 10 mg by mouth daily   ezetimibe (ZETIA) 10 mg tablet  Self Yes No   Sig: Take 10 mg by mouth daily   gabapentin (NEURONTIN) 100 mg capsule  Self Yes No   Sig: Take 100 mg by mouth daily At bedtime   hydroxychloroquine (PLAQUENIL) 200 mg tablet  Self Yes No   Sig: Take 200 mg by mouth 2 (two) times a day with meals   loperamide (IMODIUM) 2 mg capsule  Self Yes No   Sig: Take 2 mg by mouth as needed for diarrhea   metoprolol tartrate (LOPRESSOR) 25 mg tablet  Self Yes No   Sig: Take 25 mg by mouth daily omeprazole (PriLOSEC) 10 mg delayed release capsule  Self Yes No   Sig: Take 10 mg by mouth daily   polyethylene glycol (MIRALAX) 17 g packet   No No   Sig: Take 17 g by mouth every other day   predniSONE 5 mg tablet  Self Yes No   Sig: Take by mouth daily   psyllium (METAMUCIL SMOOTH TEXTURE) 28 % packet   No No   Sig: Take 1 packet by mouth 2 (two) times a day   solifenacin (VESICARE) 5 mg tablet  Self Yes No   Sig: Take 5 mg by mouth daily      Facility-Administered Medications: None       Past Medical History:   Diagnosis Date   • GERD (gastroesophageal reflux disease)    • Gout    • Hyperlipidemia    • Hypertension    • 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HCC)    • Spinal stenosis        Past Surgical History:   Procedure Laterality Date   • CARPAL TUNNEL RELEASE Bilateral    • COLONOSCOPY     • LAMINECTOMY     • TOTAL KNEE ARTHROPLASTY Bilateral    • TOTAL SHOULDER REPLACEMENT         Family History   Problem Relation Age of Onset   • Colon polyps Neg Hx    • Colon cancer Neg Hx      I have reviewed and agree with the history as documented  E-Cigarette/Vaping   • E-Cigarette Use Never User      E-Cigarette/Vaping Substances     Social History     Tobacco Use   • Smoking status: Never Smoker   • Smokeless tobacco: Never Used   Vaping Use   • Vaping Use: Never used   Substance Use Topics   • Alcohol use: Yes     Alcohol/week: 2 0 standard drinks     Types: 2 Shots of liquor per week     Comment: 2-3 ounces of whiskey a day   • Drug use: Yes     Types: Marijuana       Review of Systems   Constitutional: Negative for activity change, appetite change, fatigue and fever  HENT: Negative for congestion and rhinorrhea  Eyes: Positive for visual disturbance  Respiratory: Negative for cough and shortness of breath  Cardiovascular: Negative for chest pain, palpitations and leg swelling  Gastrointestinal: Negative for abdominal pain, diarrhea and vomiting  Genitourinary: Negative for dysuria and flank pain   Enuresis: chronic lower extremity neuropathy  Musculoskeletal: Positive for arthralgias, back pain and gait problem  Skin: Negative for rash  Neurological: Positive for speech difficulty (transiently today) and numbness  Negative for dizziness, tremors, seizures, syncope, facial asymmetry, weakness, light-headedness and headaches  Hematological: Does not bruise/bleed easily  All other systems reviewed and are negative  Physical Exam  Physical Exam  Vitals and nursing note reviewed  Constitutional:       General: He is not in acute distress  Appearance: Normal appearance  He is well-developed and normal weight  He is not ill-appearing or diaphoretic  HENT:      Head: Normocephalic and atraumatic  Right Ear: External ear normal       Left Ear: External ear normal       Nose: Nose normal    Eyes:      General: No scleral icterus  Conjunctiva/sclera: Conjunctivae normal       Pupils: Pupils are equal, round, and reactive to light  Neck:      Vascular: No JVD  Cardiovascular:      Rate and Rhythm: Normal rate and regular rhythm  Pulses: Normal pulses  Heart sounds: Normal heart sounds  No murmur heard  Pulmonary:      Effort: Pulmonary effort is normal       Breath sounds: Normal breath sounds  Abdominal:      General: Bowel sounds are normal       Palpations: Abdomen is soft  There is no mass  Tenderness: There is no abdominal tenderness  There is no guarding or rebound  Musculoskeletal:         General: No tenderness or signs of injury  Normal range of motion  Cervical back: Normal range of motion and neck supple  Right lower leg: No edema  Left lower leg: No edema  Lymphadenopathy:      Cervical: No cervical adenopathy  Skin:     General: Skin is warm and dry  Capillary Refill: Capillary refill takes less than 2 seconds  Findings: No rash  Neurological:      General: No focal deficit present        Mental Status: He is alert and oriented to person, place, and time  Mental status is at baseline  Cranial Nerves: No cranial nerve deficit  Sensory: Sensory deficit (  Decreased sensation of both legs) present  Motor: No weakness  Coordination: Coordination normal       Gait: Gait abnormal       Deep Tendon Reflexes: Reflexes are normal and symmetric     Psychiatric:         Mood and Affect: Mood normal          Behavior: Behavior normal          Vital Signs  ED Triage Vitals [10/08/22 1224]   Temperature Pulse Respirations Blood Pressure SpO2   98 2 °F (36 8 °C) 96 20 130/85 97 %      Temp Source Heart Rate Source Patient Position - Orthostatic VS BP Location FiO2 (%)   Temporal Monitor Sitting Left arm --      Pain Score       No Pain           Vitals:    10/08/22 1730 10/08/22 1800 10/08/22 1830 10/08/22 1932   BP: (!) 169/109 (!) 175/82 137/91 (!) 161/101   Pulse: 92 87 90 90   Patient Position - Orthostatic VS:   Sitting          Visual Acuity      ED Medications  Medications   allopurinol (ZYLOPRIM) tablet 400 mg (400 mg Oral Given 10/8/22 1621)   aspirin chewable tablet 81 mg (has no administration in time range)   ezetimibe (ZETIA) tablet 10 mg (10 mg Oral Given 10/8/22 1621)   gabapentin (NEURONTIN) capsule 100 mg (100 mg Oral Given 10/8/22 1621)   hydroxychloroquine (PLAQUENIL) tablet 400 mg (has no administration in time range)   metoprolol tartrate (LOPRESSOR) tablet 25 mg (25 mg Oral Given 10/8/22 1621)   polyethylene glycol (MIRALAX) packet 17 g (17 g Oral Not Given 10/8/22 1541)   predniSONE tablet 5 mg (has no administration in time range)   oxybutynin (DITROPAN-XL) 24 hr tablet 5 mg (5 mg Oral Given 10/8/22 1621)   tamsulosin (FLOMAX) capsule 0 4 mg (0 4 mg Oral Given 10/8/22 1622)   clopidogrel (PLAVIX) tablet 75 mg (has no administration in time range)   enoxaparin (LOVENOX) subcutaneous injection 40 mg (40 mg Subcutaneous Given 10/8/22 1622)   ondansetron (ZOFRAN) injection 4 mg (has no administration in time range) acetaminophen (TYLENOL) tablet 650 mg (has no administration in time range)   senna (SENOKOT) tablet 8 6 mg (8 6 mg Oral Given 10/8/22 1621)   Diclofenac Sodium (VOLTAREN) 1 % topical gel 2 g (2 g Topical Not Given 10/8/22 1953)   clopidogrel (PLAVIX) tablet 300 mg (300 mg Oral Given 10/8/22 1620)       Diagnostic Studies  Results Reviewed     Procedure Component Value Units Date/Time    HS Troponin I 4hr [[id number]]  (Normal) Collected: 10/08/22 1630    Lab Status: Final result Specimen: Blood from Arm, Right Updated: 10/08/22 1700     hs TnI 4hr 20 ng/L      Delta 4hr hsTnI -2 ng/L     UA w Reflex to Microscopic w Reflex to Culture [[id number]] Collected: 10/08/22 1627    Lab Status: Final result Specimen: Urine, Clean Catch Updated: 10/08/22 1647     Color, UA Yellow     Clarity, UA Clear     Specific Elizabeth, UA 1 020     pH, UA 6 5     Leukocytes, UA Negative     Nitrite, UA Negative     Protein, UA Negative mg/dl      Glucose, UA Negative mg/dl      Ketones, UA Negative mg/dl      Urobilinogen, UA <2 0 mg/dl      Bilirubin, UA Negative     Occult Blood, UA Negative    TSH, 3rd generation [[id number]]  (Normal) Collected: 10/08/22 1545    Lab Status: Final result Specimen: Blood from Arm, Right Updated: 10/08/22 1620     TSH 3RD GENERATON 1 926 uIU/mL     Narrative:      Patients undergoing fluorescein dye angiography may retain small amounts of fluorescein in the body for 48-72 hours post procedure  Samples containing fluorescein can produce falsely depressed TSH values  If the patient had this procedure,a specimen should be resubmitted post fluorescein clearance  Vitamin B12 [[id number]] Collected: 10/08/22 1243    Lab Status:  In process Specimen: Blood from Arm, Right Updated: 10/08/22 1531    HS Troponin I 2hr [[id number]]  (Normal) Collected: 10/08/22 1436    Lab Status: Final result Specimen: Blood from Arm, Right Updated: 10/08/22 1507     hs TnI 2hr 19 ng/L      Delta 2hr hsTnI -3 ng/L FLU/RSV/COVID - if FLU/RSV clinically relevant [[id number]]  (Normal) Collected: 10/08/22 1243    Lab Status: Final result Specimen: Nares from Nose Updated: 10/08/22 1349     SARS-CoV-2 Negative     INFLUENZA A PCR Negative     INFLUENZA B PCR Negative     RSV PCR Negative    Narrative:      FOR PEDIATRIC PATIENTS - copy/paste COVID Guidelines URL to browser: [email or web address]  [email or web address]    SARS-CoV-2 assay is a Nucleic Acid Amplification assay intended for the  qualitative detection of nucleic acid from SARS-CoV-2 in nasopharyngeal  swabs  Results are for the presumptive identification of SARS-CoV-2 RNA  Positive results are indicative of infection with SARS-CoV-2, the virus  causing COVID-19, but do not rule out bacterial infection or co-infection  with other viruses  Laboratories within the [place] and its  territories are required to report all positive results to the appropriate  public health authorities  Negative results do not preclude SARS-CoV-2  infection and should not be used as the sole basis for treatment or other  patient management decisions  Negative results must be combined with  clinical observations, patient history, and epidemiological information  This test has not been FDA cleared or approved  This test has been authorized by FDA under an Emergency Use Authorization  (EUA)  This test is only authorized for the duration of time the  declaration that circumstances exist justifying the authorization of the  emergency use of an in vitro diagnostic tests for detection of SARS-CoV-2  virus and/or diagnosis of COVID-19 infection under section 564(b)(1) of  the Act, 21 U  S C  219CSE-7(D)(6), unless the authorization is terminated  or revoked sooner  The test has been validated but independent review by FDA  and CLIA is pending  Test performed using [employer]: This RT-PCR assay targets N2,  a region unique to SARS-CoV-2   A conserved region in the E-gene was chosen  for pan-Sarbecovirus detection which includes SARS-CoV-2  According to CMS-2020-01-R, this platform meets the definition of high-throughput technology      HS Troponin 0hr (reflex protocol) [[id number]]  (Normal) Collected: 10/08/22 1243    Lab Status: Final result Specimen: Blood from Arm, Right Updated: 10/08/22 1336     hs TnI 0hr 22 ng/L     Protime-INR [[id number]]  (Normal) Collected: 10/08/22 1243    Lab Status: Final result Specimen: Blood from Arm, Right Updated: 10/08/22 1330     Protime 13 7 seconds      INR 0 98    APTT [[id number]]  (Normal) Collected: 10/08/22 1243    Lab Status: Final result Specimen: Blood from Arm, Right Updated: 10/08/22 1330     PTT 26 seconds     Basic metabolic panel [[id number]]  (Abnormal) Collected: 10/08/22 1243    Lab Status: Final result Specimen: Blood from Arm, Right Updated: 10/08/22 1323     Sodium 140 mmol/L      Potassium 3 7 mmol/L      Chloride 102 mmol/L      CO2 33 mmol/L      ANION GAP 5 mmol/L      BUN 10 mg/dL      Creatinine 1 04 mg/dL      Glucose 126 mg/dL      Calcium 8 9 mg/dL      eGFR 63 ml/min/1 73sq m     Narrative:      [place] guidelines for Chronic Kidney Disease (CKD):   •  Stage 1 with normal or high GFR (GFR > 90 mL/min/1 73 square meters)  •  Stage 2 Mild CKD (GFR = 60-89 mL/min/1 73 square meters)  •  Stage 3A Moderate CKD (GFR = 45-59 mL/min/1 73 square meters)  •  Stage 3B Moderate CKD (GFR = 30-44 mL/min/1 73 square meters)  •  Stage 4 Severe CKD (GFR = 15-29 mL/min/1 73 square meters)  •  Stage 5 End Stage CKD (GFR <15 mL/min/1 73 square meters)  Note: GFR calculation is accurate only with a steady state creatinine    CBC and Platelet [[id number]]  (Abnormal) Collected: 10/08/22 1243    Lab Status: Final result Specimen: Blood from Arm, Right Updated: 10/08/22 1312     WBC 10 30 Thousand/uL      RBC 4 49 Million/uL      Hemoglobin 14 1 g/dL      Hematocrit 43 0 %      MCV 96 fL MCH 31 4 pg      MCHC 32 8 g/dL      RDW 14 3 %      Platelets 554 Thousands/uL      MPV 11 8 fL     Fingerstick Glucose (POCT) [[id number]]  (Normal) Collected: 10/08/22 1235    Lab Status: Final result Updated: 10/08/22 1243     POC Glucose 138 mg/dl                  CT head without contrast   Final Result by Anju Jansen MD (10/08 1331)      No acute intracranial abnormality  Workstation performed: [id number]         MRI Inpatient Order    (Results Pending)              Procedures  ECG 12 Lead Documentation Only    Date/Time: 10/8/2022 1:07 PM  Performed by: Chanell Maddox DO  Authorized by: Chanell Maddox DO     ECG reviewed by me, the ED Provider: yes    Patient location:  ED  Previous ECG:     Previous ECG:  Compared to current    Similarity:  No change    Comparison to cardiac monitor: Yes    Rate:     ECG rate assessment: normal    Rhythm:     Rhythm: sinus rhythm    Ectopy:     Ectopy: none    QRS:     QRS axis:  Left    QRS intervals:  Normal  Conduction:     Conduction: normal    ST segments:     ST segments:  Normal  T waves:     T waves: normal    Other findings:     Other findings comment:  Possible anterolateral infarct, age undetermined  ED Course                  Stroke Assessment     Row Name 10/08/22 1317             NIH Stroke Scale    Interval Baseline      Level of Consciousness (1a ) 0      LOC Questions (1b ) 0      LOC Commands (1c ) 0      Best Gaze (2 ) 0      Visual (3 ) 0      Facial Palsy (4 ) 0      Motor Arm, Left (5a ) 0      Motor Arm, Right (5b ) 0      Motor Leg, Left (6a ) 0      Motor Leg, Right (6b ) 0      Limb Ataxia (7 ) 0      Sensory (8 ) 0      Best Language (9 ) 0      Dysarthria (10 ) 0      Extinction and Inattention (11 ) (Formerly Neglect) 0      Total 0              Flowsheet Row Most Recent Value   Thrombolytic Decision Options    Thrombolytic Decision Patient not a candidate     Patient is not a candidate options Symptoms resolved/clearly non disabling  SBIRT 22yo+    Flowsheet Row Most Recent Value   SBIRT (23 yo +)    In order to provide better care to our patients, we are screening all of our patients for alcohol and drug use  Would it be okay to ask you these screening questions? Yes Filed at: 10/08/2022 1237   Initial Alcohol Screen: US AUDIT-C     1  How often do you have a drink containing alcohol? 0 Filed at: 10/08/2022 1237   2  How many drinks containing alcohol do you have on a typical day you are drinking? 0 Filed at: 10/08/2022 1237   3a  Male UNDER 65: How often do you have five or more drinks on one occasion? 0 Filed at: 10/08/2022 1237   3b  FEMALE Any Age, or MALE 65+: How often do you have 4 or more drinks on one occassion? 0 Filed at: 10/08/2022 1237   Audit-C Score 0 Filed at: 10/08/2022 1237   ARNOLDO: How many times in the past year have you    Used an illegal drug or used a prescription medication for non-medical reasons? Never Filed at: 10/08/2022 1237                    MDM  Number of Diagnoses or Management Options  TIA (transient ischemic attack): new and requires workup  Diagnosis management comments:   Very elderly male with transient expressive aphasia / dysarthria  Back to baseline now with a normal NIH stroke score  CT of head shows no acute changes  Discussed with Neurology  Recommend admission,   Not a candidate for thrombolysis         Amount and/or Complexity of Data Reviewed  Clinical lab tests: ordered and reviewed  Tests in the radiology section of CPT®: ordered and reviewed  Review and summarize past medical records: yes  Discuss the patient with other providers: yes  Independent visualization of images, tracings, or specimens: yes        Disposition  Final diagnoses:   TIA (transient ischemic attack)     Time reflects when diagnosis was documented in both MDM as applicable and the Disposition within this note     Time User Action Codes Description Comment    10/8/2022 1:19 PM Lovedenilson Donohuejenni Add [G45 9] TIA (transient ischemic attack)     10/8/2022  3:08 PM Jimmy Castanon Add [R47 9] Speech disturbance       ED Disposition     ED Disposition   Admit    Condition   Stable    Date/Time   Sat Oct 8, 2022  2:36 PM    Comment   Case was discussed with Dr Talya Mullins and the patient's admission status was agreed to be Admission Status: observation status to the service of Dr Talya Mullins   Follow-up Information    None         Current Discharge Medication List      CONTINUE these medications which have NOT CHANGED    Details   allopurinol (ZYLOPRIM) 100 mg tablet Take 400 mg by mouth daily      ascorbic acid (VITAMIN C) 500 MG tablet Take 500 mg by mouth daily      aspirin 81 mg chewable tablet Chew 81 mg daily      cetirizine (ZyrTEC) 10 mg tablet Take 10 mg by mouth daily      ezetimibe (ZETIA) 10 mg tablet Take 10 mg by mouth daily      gabapentin (NEURONTIN) 100 mg capsule Take 100 mg by mouth daily At bedtime      hydroxychloroquine (PLAQUENIL) 200 mg tablet Take 200 mg by mouth 2 (two) times a day with meals      loperamide (IMODIUM) 2 mg capsule Take 2 mg by mouth as needed for diarrhea      metoprolol tartrate (LOPRESSOR) 25 mg tablet Take 25 mg by mouth daily       Multiple Vitamin (multivitamin) tablet Take 1 tablet by mouth daily      NON FORMULARY Medical marijuana      omeprazole (PriLOSEC) 10 mg delayed release capsule Take 10 mg by mouth daily      polyethylene glycol (MIRALAX) 17 g packet Take 17 g by mouth every other day  Qty: 30 each, Refills: 2    Associated Diagnoses: Alternating constipation and diarrhea      predniSONE 5 mg tablet Take by mouth daily      psyllium (METAMUCIL SMOOTH TEXTURE) 28 % packet Take 1 packet by mouth 2 (two) times a day  Qty: 30 packet, Refills: 2    Associated Diagnoses: Alternating constipation and diarrhea      solifenacin (VESICARE) 5 mg tablet Take 5 mg by mouth daily             No discharge procedures on file      PDMP Review     None          ED Provider  Electronically Signed by           Prabha Quiroz DO  10/08/22 7249

## 2022-10-08 NOTE — TELEMEDICINE
TeleConsultation - Neurology   Ruthine Councilman 80 y o  male MRN: [id number]  Unit/Bed#: ED 02 Encounter: [id number]    REQUIRED DOCUMENTATION:     1  This service was provided via Telemedicine  2  Provider located at [address]  3  TeleMed provider: Ki Steinberg DO   4  Identify all parties in room with patient during tele consult:  Son  5  Patient was then informed that this was a Telemedicine visit and that the exam was being conducted confidentially over secure lines  My office door was closed  No one else was in the room  Patient acknowledged consent and understanding of privacy and security of the Telemedicine visit, and gave us permission to have the assistant stay in the room in order to assist with the history and to conduct the exam   I informed the patient that I have reviewed their record in Epic and presented the opportunity for them to ask any questions regarding the visit today  The patient agreed to participate  Assessment/Plan     * Speech disturbance  Assessment & Plan  79 y/o M with HTN and HLD that presents with speech disturbance described as word salad and dysarthria, possibly with component of word-finding difficulty  Concern for possible transient aphasia/dysarthria  Unable to rule out minor stroke/TIA  -continue neurochecks; notify with changes  -HCT unremarkable  -obtain MRI brain w/o contrast  -obtain TTE  -obtain carotid ultrasound  -continue home ASA 81mg daily; load with 300mg Plavix now then continue on 75mg daily as well  -can defer statin due to reported allergy and limited benefit given age  -no need for permissive HTN given pt is now back to baseline; ok with normotension  -telemetry  -check lipid panel/A1c  -will follow results; call with questions    Recommendations for outpatient neurological follow up have yet to be determined        Reason for Consult / Principal Problem: speech disturbance  Hx and PE limited by: N/A    HPI: Ruthine Councilman is a 80 y o  male with HTN and HLD who presents with transient speech disturbance  Pt was in his usual state of health until this morning at 9am when he noticed that he was having difficulty with his speech  He describes this as saying the wrong words when he meant to say something else and those words sounding slurred  This lasted for less than an hour before resolving on its own  It was not associated with any other symptoms or deficits including weakness, numbness, dizziness/vertigo, or visual loss/diplopia  He has never had this before  Has been feeling well otherwise  In the ED, he received a head CT that was unremarkable  Consult to Neurology  Consult performed by: Angelo Crespo DO  Consult ordered by: Leeanne Slade DO         Review of Systems   Neurological: Positive for speech difficulty  All other systems reviewed and are negative  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[facility] [address] )    • Spinal stenosis      Past Surgical History:   Procedure Laterality Date   • CARPAL TUNNEL RELEASE Bilateral    • COLONOSCOPY     • LAMINECTOMY     • TOTAL KNEE ARTHROPLASTY Bilateral    • TOTAL SHOULDER REPLACEMENT       Social History   Social History     Substance and Sexual Activity   Alcohol Use Yes   • Alcohol/week: 2 0 standard drinks   • Types: 2 Shots of liquor per week    Comment: 2-3 ounces of whiskey a day     Social History     Substance and Sexual Activity   Drug Use Yes   • Types: Marijuana     E-Cigarette/Vaping   • E-Cigarette Use Never User      E-Cigarette/Vaping Substances     Social History     Tobacco Use   Smoking Status Never Smoker   Smokeless Tobacco Never Used     Family History:   Family History   Problem Relation Age of Onset   • Colon polyps Neg Hx    • Colon cancer Neg Hx        Review of previous medical records was completed      Meds/Allergies   all current active meds have been reviewed, current meds:   No current facility-administered medications for this encounter  and PTA meds:   Prior to Admission Medications   Prescriptions Last Dose Informant Patient Reported? Taking? Multiple Vitamin (multivitamin) tablet  Self Yes No   Sig: Take 1 tablet by mouth daily   NON FORMULARY  Self Yes No   Sig: Medical marijuana   allopurinol (ZYLOPRIM) 100 mg tablet  Self Yes No   Sig: Take 400 mg by mouth daily   ascorbic acid (VITAMIN C) 500 MG tablet  Self Yes No   Sig: Take 500 mg by mouth daily   aspirin 81 mg chewable tablet  Self Yes No   Sig: Chew 81 mg daily   cetirizine (ZyrTEC) 10 mg tablet  Self Yes No   Sig: Take 10 mg by mouth daily   ezetimibe (ZETIA) 10 mg tablet  Self Yes No   Sig: Take 10 mg by mouth daily   gabapentin (NEURONTIN) 100 mg capsule  Self Yes No   Sig: Take 100 mg by mouth daily At bedtime   hydroxychloroquine (PLAQUENIL) 200 mg tablet  Self Yes No   Sig: Take 200 mg by mouth 2 (two) times a day with meals   loperamide (IMODIUM) 2 mg capsule  Self Yes No   Sig: Take 2 mg by mouth as needed for diarrhea   metoprolol tartrate (LOPRESSOR) 25 mg tablet  Self Yes No   Sig: Take 25 mg by mouth daily    omeprazole (PriLOSEC) 10 mg delayed release capsule  Self Yes No   Sig: Take 10 mg by mouth daily   polyethylene glycol (MIRALAX) 17 g packet   No No   Sig: Take 17 g by mouth every other day   predniSONE 5 mg tablet  Self Yes No   Sig: Take by mouth daily   psyllium (METAMUCIL SMOOTH TEXTURE) 28 % packet   No No   Sig: Take 1 packet by mouth 2 (two) times a day   solifenacin (VESICARE) 5 mg tablet  Self Yes No   Sig: Take 5 mg by mouth daily      Facility-Administered Medications: None       Allergies   Allergen Reactions   • Colchicine Other (See Comments)     Flushing     • Niacin Other (See Comments)     flushed   • Statins        Objective   Vitals:Blood pressure 130/85, pulse 96, temperature 98 2 °F (36 8 °C), temperature source Temporal, resp   rate 20, height 5' 10" (1 778 m), weight 98 kg (216 lb), SpO2 97 %  ,Body mass index is 30 99 kg/m²  No intake or output data in the 24 hours ending 10/08/22 1413    Invasive Devices: Invasive Devices  Report    Peripheral Intravenous Line  Duration           Peripheral IV 10/08/22 Right Antecubital <1 day                Physical Exam  Constitutional:       Appearance: He is well-developed  HENT:      Head: Normocephalic and atraumatic  Eyes:      Extraocular Movements: EOM normal       Pupils: Pupils are equal, round, and reactive to light  Cardiovascular:      Rate and Rhythm: Normal rate and regular rhythm  Heart sounds: Normal heart sounds  Pulmonary:      Effort: Pulmonary effort is normal       Breath sounds: Normal breath sounds  Abdominal:      General: Bowel sounds are normal       Palpations: Abdomen is soft  Musculoskeletal:      Cervical back: Normal range of motion and neck supple  Neurological:      Mental Status: He is alert and oriented to person, place, and time  Coordination: Finger-Nose-Finger Test normal    Psychiatric:         Speech: Speech normal        Neurologic Exam     Mental Status   Oriented to person, place, and time  Attention: normal  Concentration: normal    Speech: speech is normal   Level of consciousness: alert  Knowledge: good  Able to name object  Able to repeat  Normal comprehension  Cranial Nerves     CN III, IV, VI   Pupils are equal, round, and reactive to light  Extraocular motions are normal      CN V   Facial sensation intact  CN VII   Facial expression full, symmetric  CN VIII   Hearing: impaired    CN IX, X   Palate: symmetric    CN XI   Right sternocleidomastoid strength: normal  Left sternocleidomastoid strength: normal  Right trapezius strength: normal  Left trapezius strength: normal    CN XII   Tongue deviation: none    Motor Exam     Strength   Strength 5/5 except as noted   Some drift in both lowers, no asymmetry     Sensory Exam   Reports baseline diminished sensation to LT from knees down in both lowers     Gait, Coordination, and Reflexes     Coordination   Finger to nose coordination: normal    Reflexes   Reflexes 2+ except as noted  Lab Results:   CBC:   Results from last 7 days   Lab Units 10/08/22  1243   WBC Thousand/uL 10 30*   RBC Million/uL 4 49   HEMOGLOBIN g/dL 14 1   HEMATOCRIT % 43 0   MCV fL 96   PLATELETS Thousands/uL 187   , BMP/CMP:   Results from last 7 days   Lab Units 10/08/22  1243   SODIUM mmol/L 140   POTASSIUM mmol/L 3 7   CHLORIDE mmol/L 102   CO2 mmol/L 33*   BUN mg/dL 10   CREATININE mg/dL 1 04   CALCIUM mg/dL 8 9   EGFR ml/min/1 73sq m 63   , HgBA1C:   , Coagulation:   Results from last 7 days   Lab Units 10/08/22  1243   INR  0 98   , Lipid Profile:     Imaging Studies: I have personally reviewed pertinent films in PACS  EKG, Pathology, and Other Studies: I have personally reviewed pertinent reports      VTE Prophylaxis: Sequential compression device (Venodyne)

## 2022-10-09 ENCOUNTER — APPOINTMENT (OUTPATIENT)
Dept: MRI IMAGING | Facility: HOSPITAL | Age: 87
DRG: 069 | End: 2022-10-09
Payer: COMMERCIAL

## 2022-10-09 LAB
ALBUMIN SERPL BCP-MCNC: 2.8 G/DL (ref 3.5–5)
ALP SERPL-CCNC: 78 U/L (ref 46–116)
ALT SERPL W P-5'-P-CCNC: 24 U/L (ref 12–78)
ANION GAP SERPL CALCULATED.3IONS-SCNC: 6 MMOL/L (ref 4–13)
AST SERPL W P-5'-P-CCNC: 22 U/L (ref 5–45)
ATRIAL RATE: 92 BPM
BASOPHILS # BLD AUTO: 0.04 THOUSANDS/ΜL (ref 0–0.1)
BASOPHILS NFR BLD AUTO: 1 % (ref 0–1)
BILIRUB SERPL-MCNC: 0.7 MG/DL (ref 0.2–1)
BUN SERPL-MCNC: 8 MG/DL (ref 5–25)
CALCIUM ALBUM COR SERPL-MCNC: 10 MG/DL (ref 8.3–10.1)
CALCIUM SERPL-MCNC: 9 MG/DL (ref 8.3–10.1)
CHLORIDE SERPL-SCNC: 102 MMOL/L (ref 96–108)
CHOLEST SERPL-MCNC: 148 MG/DL
CO2 SERPL-SCNC: 31 MMOL/L (ref 21–32)
CREAT SERPL-MCNC: 0.9 MG/DL (ref 0.6–1.3)
EOSINOPHIL # BLD AUTO: 0.36 THOUSAND/ΜL (ref 0–0.61)
EOSINOPHIL NFR BLD AUTO: 5 % (ref 0–6)
ERYTHROCYTE [DISTWIDTH] IN BLOOD BY AUTOMATED COUNT: 14 % (ref 11.6–15.1)
EST. AVERAGE GLUCOSE BLD GHB EST-MCNC: 120 MG/DL
GFR SERPL CREATININE-BSD FRML MDRD: 75 ML/MIN/1.73SQ M
GLUCOSE P FAST SERPL-MCNC: 118 MG/DL (ref 65–99)
GLUCOSE SERPL-MCNC: 118 MG/DL (ref 65–140)
HBA1C MFR BLD: 5.8 %
HCT VFR BLD AUTO: 41.3 % (ref 36.5–49.3)
HDLC SERPL-MCNC: 70 MG/DL
HGB BLD-MCNC: 13.7 G/DL (ref 12–17)
IMM GRANULOCYTES # BLD AUTO: 0.01 THOUSAND/UL (ref 0–0.2)
IMM GRANULOCYTES NFR BLD AUTO: 0 % (ref 0–2)
LDLC SERPL CALC-MCNC: 50 MG/DL (ref 0–100)
LYMPHOCYTES # BLD AUTO: 2.59 THOUSANDS/ΜL (ref 0.6–4.47)
LYMPHOCYTES NFR BLD AUTO: 36 % (ref 14–44)
MCH RBC QN AUTO: 31.4 PG (ref 26.8–34.3)
MCHC RBC AUTO-ENTMCNC: 33.2 G/DL (ref 31.4–37.4)
MCV RBC AUTO: 95 FL (ref 82–98)
MONOCYTES # BLD AUTO: 0.59 THOUSAND/ΜL (ref 0.17–1.22)
MONOCYTES NFR BLD AUTO: 8 % (ref 4–12)
NEUTROPHILS # BLD AUTO: 3.61 THOUSANDS/ΜL (ref 1.85–7.62)
NEUTS SEG NFR BLD AUTO: 50 % (ref 43–75)
NRBC BLD AUTO-RTO: 0 /100 WBCS
P AXIS: 44 DEGREES
PLATELET # BLD AUTO: 150 THOUSANDS/UL (ref 149–390)
PMV BLD AUTO: 11.7 FL (ref 8.9–12.7)
POTASSIUM SERPL-SCNC: 3.6 MMOL/L (ref 3.5–5.3)
PR INTERVAL: 160 MS
PROT SERPL-MCNC: 6.1 G/DL (ref 6.4–8.4)
QRS AXIS: -56 DEGREES
QRSD INTERVAL: 96 MS
QT INTERVAL: 382 MS
QTC INTERVAL: 472 MS
RBC # BLD AUTO: 4.36 MILLION/UL (ref 3.88–5.62)
SODIUM SERPL-SCNC: 139 MMOL/L (ref 135–147)
T WAVE AXIS: 44 DEGREES
TRIGL SERPL-MCNC: 138 MG/DL
VENTRICULAR RATE: 92 BPM
WBC # BLD AUTO: 7.2 THOUSAND/UL (ref 4.31–10.16)

## 2022-10-09 PROCEDURE — 99232 SBSQ HOSP IP/OBS MODERATE 35: CPT | Performed by: INTERNAL MEDICINE

## 2022-10-09 PROCEDURE — 80061 LIPID PANEL: CPT | Performed by: STUDENT IN AN ORGANIZED HEALTH CARE EDUCATION/TRAINING PROGRAM

## 2022-10-09 PROCEDURE — 85025 COMPLETE CBC W/AUTO DIFF WBC: CPT | Performed by: STUDENT IN AN ORGANIZED HEALTH CARE EDUCATION/TRAINING PROGRAM

## 2022-10-09 PROCEDURE — 70551 MRI BRAIN STEM W/O DYE: CPT

## 2022-10-09 PROCEDURE — G1004 CDSM NDSC: HCPCS

## 2022-10-09 PROCEDURE — 93010 ELECTROCARDIOGRAM REPORT: CPT | Performed by: INTERNAL MEDICINE

## 2022-10-09 PROCEDURE — 80053 COMPREHEN METABOLIC PANEL: CPT | Performed by: STUDENT IN AN ORGANIZED HEALTH CARE EDUCATION/TRAINING PROGRAM

## 2022-10-09 PROCEDURE — 99213 OFFICE O/P EST LOW 20 MIN: CPT | Performed by: PSYCHIATRY & NEUROLOGY

## 2022-10-09 RX ORDER — LANOLIN ALCOHOL/MO/W.PET/CERES
6 CREAM (GRAM) TOPICAL
Status: DISCONTINUED | OUTPATIENT
Start: 2022-10-09 | End: 2022-10-11 | Stop reason: HOSPADM

## 2022-10-09 RX ADMIN — DICLOFENAC SODIUM 2 G: 10 GEL TOPICAL at 11:08

## 2022-10-09 RX ADMIN — HYDROXYCHLOROQUINE SULFATE 400 MG: 200 TABLET, FILM COATED ORAL at 23:01

## 2022-10-09 RX ADMIN — SENNOSIDES 8.6 MG: 8.6 TABLET, FILM COATED ORAL at 09:31

## 2022-10-09 RX ADMIN — TAMSULOSIN HYDROCHLORIDE 0.4 MG: 0.4 CAPSULE ORAL at 17:25

## 2022-10-09 RX ADMIN — GABAPENTIN 100 MG: 100 CAPSULE ORAL at 09:31

## 2022-10-09 RX ADMIN — OXYBUTYNIN CHLORIDE 5 MG: 5 TABLET, EXTENDED RELEASE ORAL at 09:31

## 2022-10-09 RX ADMIN — ENOXAPARIN SODIUM 40 MG: 100 INJECTION SUBCUTANEOUS at 09:31

## 2022-10-09 RX ADMIN — CLOPIDOGREL BISULFATE 75 MG: 75 TABLET ORAL at 09:30

## 2022-10-09 RX ADMIN — Medication 6 MG: at 23:49

## 2022-10-09 RX ADMIN — EZETIMIBE 10 MG: 10 TABLET ORAL at 09:31

## 2022-10-09 RX ADMIN — ASPIRIN 81 MG CHEWABLE TABLET 81 MG: 81 TABLET CHEWABLE at 09:31

## 2022-10-09 RX ADMIN — PREDNISONE 5 MG: 5 TABLET ORAL at 09:31

## 2022-10-09 RX ADMIN — METOPROLOL TARTRATE 25 MG: 25 TABLET, FILM COATED ORAL at 09:31

## 2022-10-09 RX ADMIN — DICLOFENAC SODIUM 2 G: 10 GEL TOPICAL at 17:24

## 2022-10-09 RX ADMIN — ALLOPURINOL 400 MG: 300 TABLET ORAL at 09:31

## 2022-10-09 NOTE — PROGRESS NOTES
Progress Note - Neurology   Sridhar Dhaliwal 80 y o  male MRN: [id number]  Unit/Bed#: -Jamie Encounter: [id number]      Assessment/Plan     * Speech disturbance  Assessment & Plan  81 y/o M with HTN and HLD that presents with speech disturbance described as word salad and dysarthria, possibly with component of word-finding difficulty  Concern for possible transient aphasia/dysarthria  Unable to rule out minor stroke/TIA  -continue neurochecks; notify with changes  -HCT unremarkable  -MRI brain w/o contrast pending  -TTE pending  -carotid ultrasound pending  -continue home ASA 81mg daily and Plavix 75mg daily; loaded with 300mg in ED; plan for 3 weeks total followed by monotherapy (which agent may depend on MRI findings)  -can defer statin due to reported allergy and limited benefit given age; continue Zetia  -no need for permissive HTN; ok with normotension  -telemetry  -LDL 50, A1c pending  -will follow results; call with questions    Recommendations for outpatient neurological follow up have yet to be determined  Subjective:   No new complaints this morning  No recurrent of speech disturbances and no new symptoms/deficits  Feels well  Awaiting studies  ROS:  Negative per review of all systems  Vitals: Blood pressure 153/99, pulse 87, temperature (!) 97 2 °F (36 2 °C), resp  rate 16, height 5' 10" (1 778 m), weight 97 1 kg (214 lb 1 6 oz), SpO2 96 %  ,Body mass index is 30 72 kg/m²  Physical Exam: General appearance: alert and oriented, in no acute distress  Head: Normocephalic, without obvious abnormality, atraumatic  Eyes: conjunctivae/corneas clear  PERRL, EOM's intact  Fundi benign    Neck: no adenopathy, no carotid bruit, no JVD, supple, symmetrical, trachea midline and thyroid not enlarged, symmetric, no tenderness/mass/nodules  Lungs: clear to auscultation bilaterally  Heart: regular rate and rhythm, S1, S2 normal, no murmur, click, rub or gallop  Abdomen: soft, non-tender; bowel sounds normal; no masses,  no organomegaly  Extremities: extremities normal, warm and well-perfused; no cyanosis, clubbing, or edema    Neurologic exam:  Awake and alert with appropriate mental status and language function  No visual, EOM, or facial deficit  Motor testing reveals 5/5 strength in the bilateral upper and lower extremities with exception for 4+/5 deltoid in LUE  Sensation is intact to light touch in the bilateral upper and lower extremities with exception of below knees bilaterally (chronic)  Coordination testing is unremarkable      Lab, Imaging and other studies:   CBC:   Results from last 7 days   Lab Units 10/09/22  0409 10/08/22  1243   WBC Thousand/uL 7 20 10 30*   RBC Million/uL 4 36 4 49   HEMOGLOBIN g/dL 13 7 14 1   HEMATOCRIT % 41 3 43 0   MCV fL 95 96   PLATELETS Thousands/uL 150 187   , BMP/CMP:   Results from last 7 days   Lab Units 10/09/22  0409 10/08/22  1243   SODIUM mmol/L 139 140   POTASSIUM mmol/L 3 6 3 7   CHLORIDE mmol/L 102 102   CO2 mmol/L 31 33*   BUN mg/dL 8 10   CREATININE mg/dL 0 90 1 04   CALCIUM mg/dL 9 0 8 9   AST U/L 22  --    ALT U/L 24  --    ALK PHOS U/L 78  --    EGFR ml/min/1 73sq m 75 63   , HgBA1C:   , Coagulation:   Results from last 7 days   Lab Units 10/08/22  1243   INR  0 98   , Lipid Profile:   Results from last 7 days   Lab Units 10/09/22  0409   HDL mg/dL 70   LDL CALC mg/dL 50   TRIGLYCERIDES mg/dL 138     VTE Prophylaxis: Enoxaparin (Lovenox)

## 2022-10-09 NOTE — PLAN OF CARE
Problem: Potential for Falls  Goal: Patient will remain free of falls  Description: INTERVENTIONS:  - Educate patient/family on patient safety including physical limitations  - Instruct patient to call for assistance with activity   - Consult OT/PT to assist with strengthening/mobility   - Keep Call bell within reach  - Keep bed low and locked with side rails adjusted as appropriate  - Keep care items and personal belongings within reach  - Initiate and maintain comfort rounds  - Make Fall Risk Sign visible to staff  - Offer Toileting every 3 Hours, in advance of need  - Initiate/Maintain bedalarm  - Obtain necessary fall risk management equipment:   - Apply yellow socks and bracelet for high fall risk patients  - Consider moving patient to room near nurses station  Outcome: Progressing     Problem: MOBILITY - ADULT  Goal: Maintain or return to baseline ADL function  Description: INTERVENTIONS:  -  Assess patient's ability to carry out ADLs; assess patient's baseline for ADL function and identify physical deficits which impact ability to perform ADLs (bathing, care of mouth/teeth, toileting, grooming, dressing, etc )  - Assess/evaluate cause of self-care deficits   - Assess range of motion  - Assess patient's mobility; develop plan if impaired  - Assess patient's need for assistive devices and provide as appropriate  - Encourage maximum independence but intervene and supervise when necessary  - Involve family in performance of ADLs  - Assess for home care needs following discharge   - Consider OT consult to assist with ADL evaluation and planning for discharge  - Provide patient education as appropriate  Outcome: Progressing  Goal: Maintains/Returns to pre admission functional level  Description: INTERVENTIONS:  - Perform BMAT or MOVE assessment daily    - Set and communicate daily mobility goal to care team and patient/family/caregiver     - Collaborate with rehabilitation services on mobility goals if consulted  - Perform Range of Motion 3 times a day  - Reposition patient every 3 hours    - Dangle patient 3 times a day  - Stand patient 3 times a day  - Ambulate patient 3 times a day  - Out of bed to chair 3 times a day   - Out of bed for meals 3 times a day  - Out of bed for toileting  - Record patient progress and toleration of activity level   Outcome: Progressing

## 2022-10-09 NOTE — SPEECH THERAPY NOTE
Speech Language/Pathology  Speech-Language Pathology Screen      Patient Name: Karey KATHLEEN Date: 10/9/2022     Problem List  Principal Problem:    Speech disturbance  Active Problems:    History of hypertension    Rheumatoid arthritis ([facility] [address] )      Past Medical History  Past Medical History:   Diagnosis Date   • GERD (gastroesophageal reflux disease)    • Gout    • Hyperlipidemia    • Hypertension    • 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[facility] [address] )    • Spinal stenosis        Past Surgical History  Past Surgical History:   Procedure Laterality Date   • CARPAL TUNNEL RELEASE Bilateral    • COLONOSCOPY     • LAMINECTOMY     • TOTAL KNEE ARTHROPLASTY Bilateral    • TOTAL SHOULDER REPLACEMENT         Summary   Order received chart reviewed  Pt passed nsg screen, NIH zero & MRI (-)  Speech is clear though he does appear mildly SOB  Able to tolerate all meals today & meds w/o any dysphagia   No formal assessment at this time  Re consult if needed during stay  Current Medical Status  Pt is a 80 y o  male who presented to [facility] with transient dysarthria  Special Studies:  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

## 2022-10-09 NOTE — UTILIZATION REVIEW
Initial Clinical Review    Admission: Date/Time/Statement:   Admission Orders (From admission, onward)     Ordered        10/08/22 1437  Place in Observation  Once                      10/09/22 1713  Inpatient Admission  Once        Transfer Service: Hospitalist       Question Answer Comment   Level of Care Med Surg    Estimated length of stay More than 2 Midnights    Certification I certify that inpatient services are medically necessary for this patient for a duration of greater than two midnights  See H&P and MD Progress Notes for additional information about the patient's course of treatment  10/09/22 1712   OBSERVATION  10/8 @   1437 CHANGED TO IP ADMISSION  10/9 @  1713  The patient will continue to require additional inpatient hospital stay due to Echo pending    ED Arrival Information     Expected   -    Arrival   10/8/2022 12:24    Acuity   Urgent            Means of arrival   Wheelchair    Escorted by   Family Member    Service   Hospitalist    Admission type   Emergency            Arrival complaint   slurred speech            Chief Complaint   Patient presents with   • CVA/TIA-like Symptoms     Pt reports having 1 hour of slurred speech today @0900, back to baseline now  Denies head injury, denies LOC, +asa  Initial Presentation: 80 y o  male [facility] ED from [facility]  With speech disturbance which started the morning of admission  Patient states he was well when he woke this morning, however others noted that the patient was not making any sense while speaking (inappropriate responses, incoherent sentences), and he states he was unable to express what he wanted to say  Patient denies any difficulty understanding/comprehending words at the time  Per son at bedside, symptoms lasted for approximately 1 hour and have currently resolved at this time  No prior episodes, no prior CVA or TIA history  Labs  Unremarkable  Ct head  Normal   No indication for  TPA   PMH  Is  HTN, RA and ambulatory  Dysfunction  Admit  Observation with Speech distrubance and plan is  Neuro checks, PT/OT, tele, 2 DE, neuro consult, plavix and MRI  Brain  Neuro consult ( 10/8   Tele med)   79 y/o M with HTN and HLD that presents with speech disturbance described as word salad and dysarthria, possibly with component of word-finding difficulty  Concern for possible transient aphasia/dysarthria  Unable to rule out minor stroke/TIA  Wait additional diagnostics  10/9     IP admission  MRI  Brain unremarkable  Continue neuro checks, tele  Needs PT/OT  Denies  Pain  No respiratory distress noted        ED Triage Vitals [10/08/22 1224]   Temperature Pulse Respirations Blood Pressure SpO2   98 2 °F (36 8 °C) 96 20 130/85 97 %      Temp Source Heart Rate Source Patient Position - Orthostatic VS BP Location FiO2 (%)   Temporal Monitor Sitting Left arm --      Pain Score       No Pain          Wt Readings from Last 1 Encounters:   10/09/22 97 1 kg (214 lb 1 6 oz)     Additional Vital Signs:   97 2 °F (36 2 °C) Abnormal  87 16 153/99 117 96 % -- --    10/09/22 02:21:51 -- 90 -- 155/102 Abnormal  120 94 % -- --   10/08/22 23:55:05 -- 92 -- 157/103 Abnormal  121 94 % -- --   10/08/22 22:02:07 97 4 °F (36 3 °C) Abnormal  89 -- 159/101 Abnormal  120 93 % -- --   10/08/22 21:26:12 -- 88 -- 160/100 120 95 % -- --   10/08/22 19:32:26 97 2 °F (36 2 °C) Abnormal  90 -- 161/101 Abnormal  121 95 % -- --   10/08/22 1830 97 3 °F (36 3 °C) Abnormal  90 18 137/91 -- 95 % None (Room air) Sitting   10/08/22 1800 -- 87 22 175/82 Abnormal  118 95 % -- --   10/08/22 1730 -- 92 22 169/109 Abnormal  130 95 % -- --   10/08/22 1630 -- 89 22 168/103 Abnormal  130 96 % -- --   10/08/22 1600 -- 89 22 159/100 125 95 % -- --   10/08/22 1530 -- 90 22 153/91 117 95 % -- --   10/08/22 1437 -- 87 20 135/80 -- 96 % None (Room air) --   10/08/22 1224 98 2 °F (36 8 °C) 96 20 130/85 -- 97 % None (Room air) Sitting       Pertinent Labs/Diagnostic Test Results:   EKG     NSR      Possible anterolateral infarct, age undetermined    MRI  Brain  ( 10/9)    White matter changes suggestive of chronic microangiopathy   No acute intracranial pathology  CT head without contrast   Final Result by Lorne Swan MD (10/08 1331)      No acute intracranial abnormality                    Workstation performed: [id number]         MRI brain wo contrast    (Results Pending)     Results from last 7 days   Lab Units 10/08/22  1243   SARS-COV-2  Negative     Results from last 7 days   Lab Units 10/09/22  0409 10/08/22  1243   WBC Thousand/uL 7 20 10 30*   HEMOGLOBIN g/dL 13 7 14 1   HEMATOCRIT % 41 3 43 0   PLATELETS Thousands/uL 150 187   NEUTROS ABS Thousands/µL 3 61  --          Results from last 7 days   Lab Units 10/09/22  0409 10/08/22  1243   SODIUM mmol/L 139 140   POTASSIUM mmol/L 3 6 3 7   CHLORIDE mmol/L 102 102   CO2 mmol/L 31 33*   ANION GAP mmol/L 6 5   BUN mg/dL 8 10   CREATININE mg/dL 0 90 1 04   EGFR ml/min/1 73sq m 75 63   CALCIUM mg/dL 9 0 8 9     Results from last 7 days   Lab Units 10/09/22  0409   AST U/L 22   ALT U/L 24   ALK PHOS U/L 78   TOTAL PROTEIN g/dL 6 1*   ALBUMIN g/dL 2 8*   TOTAL BILIRUBIN mg/dL 0 70     Results from last 7 days   Lab Units 10/08/22  1235   POC GLUCOSE mg/dl 138     Results from last 7 days   Lab Units 10/09/22  0409 10/08/22  1243   GLUCOSE RANDOM mg/dL 118 126           Results from last 7 days   Lab Units 10/08/22  1630 10/08/22  1436 10/08/22  1243   HS TNI 0HR ng/L  --   --  22   HS TNI 2HR ng/L  --  19  --    HSTNI D2 ng/L  --  -3  --    HS TNI 4HR ng/L 20  --   --    HSTNI D4 ng/L -2  --   --          Results from last 7 days   Lab Units 10/08/22  1243   PROTIME seconds 13 7   INR  0 98   PTT seconds 26     Results from last 7 days   Lab Units 10/08/22  1545   TSH 3RD GENERATON uIU/mL 1 926               Results from last 7 days   Lab Units 10/08/22  1627   CLARITY UA  Clear   COLOR UA  Yellow   SPEC GRAV UA  1 020   PH UA  6 5   GLUCOSE UA mg/dl Negative   KETONES UA mg/dl Negative   BLOOD UA  Negative   PROTEIN UA mg/dl Negative   NITRITE UA  Negative   BILIRUBIN UA  Negative   UROBILINOGEN UA (BE) mg/dl <2 0   LEUKOCYTES UA  Negative     Results from last 7 days   Lab Units 10/08/22  1243   INFLUENZA A PCR  Negative   INFLUENZA B PCR  Negative   RSV PCR  Negative         ED Treatment:   Medication Administration from 10/08/2022 1223 to 10/08/2022 1829       Date/Time Order Dose Route Action Comments     10/08/2022 1621 allopurinol (ZYLOPRIM) tablet 400 mg 400 mg Oral Given      10/08/2022 1621 ezetimibe (ZETIA) tablet 10 mg 10 mg Oral Given      10/08/2022 1621 gabapentin (NEURONTIN) capsule 100 mg 100 mg Oral Given      10/08/2022 1621 metoprolol tartrate (LOPRESSOR) tablet 25 mg 25 mg Oral Given      10/08/2022 1541 polyethylene glycol (MIRALAX) packet 17 g 17 g Oral Not Given      10/08/2022 1621 oxybutynin (DITROPAN-XL) 24 hr tablet 5 mg 5 mg Oral Given      10/08/2022 1622 tamsulosin (FLOMAX) capsule 0 4 mg 0 4 mg Oral Given      10/08/2022 1622 enoxaparin (LOVENOX) subcutaneous injection 40 mg 40 mg Subcutaneous Given      10/08/2022 1621 senna (SENOKOT) tablet 8 6 mg 8 6 mg Oral Given      10/08/2022 1620 clopidogrel (PLAVIX) tablet 300 mg 300 mg Oral Given         Present on Admission:  • Speech disturbance  • Rheumatoid arthritis ([facility] [[email or web address] 75 )      Admitting Diagnosis: TIA (transient ischemic attack) [G45 9]  Speech disturbance [R47 9]  CVA (cerebral vascular accident) ([facility] [[email or web address]] 75 ) [I63 9]  Age/Sex: 80 y o  male  Admission Orders:  Scheduled Medications:  allopurinol, 400 mg, Oral, Daily  aspirin, 81 mg, Oral, Daily  clopidogrel, 75 mg, Oral, Daily  Diclofenac Sodium, 2 g, Topical, 4x Daily  enoxaparin, 40 mg, Subcutaneous, Daily  ezetimibe, 10 mg, Oral, Daily  gabapentin, 100 mg, Oral, Daily  hydroxychloroquine, 400 mg, Oral, HS  metoprolol tartrate, 25 mg, Oral, Daily  oxybutynin, 5 mg, Oral, Daily  polyethylene glycol, 17 g, Oral, Every Other Day  predniSONE, 5 mg, Oral, Daily  senna, 1 tablet, Oral, Daily  tamsulosin, 0 4 mg, Oral, Daily With Dinner      Continuous IV Infusions:     PRN Meds:  acetaminophen, 650 mg, Oral, Q6H PRN  ondansetron, 4 mg, Intravenous, Q6H PRN    Neuro checks  Q 15 min  X 3,  Q 30 min  X 2, Q 1 hr  X 4  Then Q 4 hrs  Aspiration/fall precautions  48 hr tele  Dysphagia eval  Stroke teaching    IP CONSULT TO NEUROLOGY  IP CONSULT TO CASE MANAGEMENT  IP CONSULT TO NUTRITION SERVICES  IP CONSULT TO NEUROLOGY    Network Utilization Review Department  ATTENTION: Please call with any questions or concerns to [phone] and carefully listen to the prompts so that you are directed to the right person  All voicemails are confidential   Rupal Anthony all requests for admission clinical reviews, approved or denied determinations and any other requests to dedicated fax number below belonging to the campus where the patient is receiving treatment   List of dedicated fax numbers for the Facilities:  [address] DENIALS (Administrative/Medical Necessity) [phone]   [address] (Maternity/NICU/Pediatrics) [phone]   918 Yulissa [address]ton [phone]   [facility] [address] [phone]   1306 [facility] [address] [place] [address] [address] [address] [place] 28 [phone]   [address] [place] [facility]UN[facility] [place] [address] 815 [facility] [phone]

## 2022-10-09 NOTE — CONSULTS
Pt reports good meal completion/appetite/po intake  Pt states the food in hospital is very good, an improvement to meals served at [facility]  No further dietary issues at this time       MRI pending to r/o TIA/CVA per neurology note of 10/09

## 2022-10-09 NOTE — PLAN OF CARE
Problem: Potential for Falls  Goal: Patient will remain free of falls  Description: INTERVENTIONS:  - Educate patient/family on patient safety including physical limitations  - Instruct patient to call for assistance with activity   - Consult OT/PT to assist with strengthening/mobility   - Keep Call bell within reach  - Keep bed low and locked with side rails adjusted as appropriate  - Keep care items and personal belongings within reach  - Initiate and maintain comfort rounds  - Make Fall Risk Sign visible to staff  - Offer Toileting every 2 Hours, in advance of need  - Initiate/Maintain alarm  - Obtain necessary fall risk management equipment  - Apply yellow socks and bracelet for high fall risk patients  - Consider moving patient to room near nurses station  Outcome: Progressing     Problem: MOBILITY - ADULT  Goal: Maintain or return to baseline ADL function  Description: INTERVENTIONS:  -  Assess patient's ability to carry out ADLs; assess patient's baseline for ADL function and identify physical deficits which impact ability to perform ADLs (bathing, care of mouth/teeth, toileting, grooming, dressing, etc )  - Assess/evaluate cause of self-care deficits   - Assess range of motion  - Assess patient's mobility; develop plan if impaired  - Assess patient's need for assistive devices and provide as appropriate  - Encourage maximum independence but intervene and supervise when necessary  - Involve family in performance of ADLs  - Assess for home care needs following discharge   - Consider OT consult to assist with ADL evaluation and planning for discharge  - Provide patient education as appropriate  Outcome: Progressing  Goal: Maintains/Returns to pre admission functional level  Description: INTERVENTIONS:  - Perform BMAT or MOVE assessment daily    - Set and communicate daily mobility goal to care team and patient/family/caregiver     - Collaborate with rehabilitation services on mobility goals if consulted  - Perform Range of Motion 3 times a day  - Reposition patient every 3 hours  - Dangle patient 3 times a day  - Stand patient 3 times a day  - Ambulate patient 3 times a day  - Out of bed to chair 3 times a day   - Out of bed for meals 3 times a day  - Out of bed for toileting  - Record patient progress and toleration of activity level   Outcome: Progressing     Problem: Neurological Deficit  Goal: Neurological status is stable or improving  Description: Interventions:  - Monitor and assess patient's level of consciousness, motor function, sensory function, and level of assistance needed for ADLs  - Monitor and report changes from baseline  Collaborate with interdisciplinary team to initiate plan and implement interventions as ordered  - Provide and maintain a safe environment  - Consider seizure precautions  - Consider fall precautions  - Consider aspiration precautions  - Consider bleeding precautions  Outcome: Progressing     Problem: Activity Intolerance/Impaired Mobility  Goal: Mobility/activity is maintained at optimum level for patient  Description: Interventions:  - Assess and monitor patient  barriers to mobility and need for assistive/adaptive devices  - Assess patient's emotional response to limitations  - Collaborate with interdisciplinary team and initiate plans and interventions as ordered  - Encourage independent activity per ability   - Maintain proper body alignment  - Perform active/passive rom as tolerated/ordered  - Plan activities to conserve energy   - Turn patient as appropriate  Outcome: Progressing     Problem: Communication Impairment  Goal: Ability to express needs and understand communication  Description: Assess patient's communication skills and ability to understand information  Patient will demonstrate use of effective communication techniques, alternative methods of communication and understanding even if not able to speak       - Encourage communication and provide alternate methods of communication as needed  - Collaborate with case management/ for discharge needs  - Include patient/family/caregiver in decisions related to communication  Outcome: Progressing     Problem: Potential for Aspiration  Goal: Non-ventilated patient's risk of aspiration is minimized  Description: Assess and monitor vital signs, respiratory status, and labs (WBC)  Monitor for signs of aspiration (tachypnea, cough, rales, wheezing, cyanosis, fever)  - Assess and monitor patient's ability to swallow  - Place patient up in chair to eat if possible  - HOB up at 90 degrees to eat if unable to get patient up into chair   - Supervise patient during oral intake  - Instruct patient/ family to take small bites  - Instruct patient/ family to take small single sips when taking liquids  - Follow patient-specific strategies generated by speech pathologist   Outcome: Progressing     Problem: Nutrition  Goal: Nutrition/Hydration status is improving  Description: Monitor and assess patient's nutrition/hydration status for malnutrition (ex- brittle hair, bruises, dry skin, pale skin and conjunctiva, muscle wasting, smooth red tongue, and disorientation)  Collaborate with interdisciplinary team and initiate plan and interventions as ordered  Monitor patient's weight and dietary intake as ordered or per policy  Utilize nutrition screening tool and intervene per policy  Determine patient's food preferences and provide high-protein, high-caloric foods as appropriate  - Assist patient with eating   - Allow adequate time for meals   - Encourage patient to take dietary supplement as ordered  - Collaborate with clinical nutritionist   - Include patient/family/caregiver in decisions related to nutrition    Outcome: Progressing     Problem: NEUROSENSORY - ADULT  Goal: Achieves stable or improved neurological status  Description: INTERVENTIONS  - Monitor and report changes in neurological status  - Monitor vital signs such as temperature, blood pressure, glucose, and any other labs ordered   - Initiate measures to prevent increased intracranial pressure  - Monitor for seizure activity and implement precautions if appropriate      Outcome: Progressing  Goal: Achieves maximal functionality and self care  Description: INTERVENTIONS  - Monitor swallowing and airway patency with patient fatigue and changes in neurological status  - Encourage and assist patient to increase activity and self care     - Encourage visually impaired, hearing impaired and aphasic patients to use assistive/communication devices  Outcome: Progressing     Problem: HEMATOLOGIC - ADULT  Goal: Maintains hematologic stability  Description: INTERVENTIONS  - Assess for signs and symptoms of bleeding or hemorrhage  - Monitor labs  - Administer supportive blood products/factors as ordered and appropriate  Outcome: Progressing     Problem: MUSCULOSKELETAL - ADULT  Goal: Maintain or return mobility to safest level of function  Description: INTERVENTIONS:  - Assess patient's ability to carry out ADLs; assess patient's baseline for ADL function and identify physical deficits which impact ability to perform ADLs (bathing, care of mouth/teeth, toileting, grooming, dressing, etc )  - Assess/evaluate cause of self-care deficits   - Assess range of motion  - Assess patient's mobility  - Assess patient's need for assistive devices and provide as appropriate  - Encourage maximum independence but intervene and supervise when necessary  - Involve family in performance of ADLs  - Assess for home care needs following discharge   - Consider OT consult to assist with ADL evaluation and planning for discharge  - Provide patient education as appropriate  Outcome: Progressing  Goal: Maintain proper alignment of affected body part  Description: INTERVENTIONS:  - Support, maintain and protect limb and body alignment  - Provide patient/ family with appropriate education  Outcome: Progressing     Problem: PAIN - ADULT  Goal: Verbalizes/displays adequate comfort level or baseline comfort level  Description: Interventions:  - Encourage patient to monitor pain and request assistance  - Assess pain using appropriate pain scale  - Administer analgesics based on type and severity of pain and evaluate response  - Implement non-pharmacological measures as appropriate and evaluate response  - Consider cultural and social influences on pain and pain management  - Notify physician/advanced practitioner if interventions unsuccessful or patient reports new pain  Outcome: Progressing     Problem: INFECTION - ADULT  Goal: Absence or prevention of progression during hospitalization  Description: INTERVENTIONS:  - Assess and monitor for signs and symptoms of infection  - Monitor lab/diagnostic results  - Monitor all insertion sites, i e  indwelling lines, tubes, and drains  - Monitor endotracheal if appropriate and nasal secretions for changes in amount and color  - [place] appropriate cooling/warming therapies per order  - Administer medications as ordered  - Instruct and encourage patient and family to use good hand hygiene technique  - Identify and instruct in appropriate isolation precautions for identified infection/condition  Outcome: Progressing     Problem: DISCHARGE PLANNING  Goal: Discharge to home or other facility with appropriate resources  Description: INTERVENTIONS:  - Identify barriers to discharge w/patient and caregiver  - Arrange for needed discharge resources and transportation as appropriate  - Identify discharge learning needs (meds, wound care, etc )  - Arrange for interpretive services to assist at discharge as needed  - Refer to Case Management Department for coordinating discharge planning if the patient needs post-hospital services based on physician/advanced practitioner order or complex needs related to functional status, cognitive ability, or social support system  Outcome: Progressing     Problem: Knowledge Deficit  Goal: Patient/family/caregiver demonstrates understanding of disease process, treatment plan, medications, and discharge instructions  Description: Complete learning assessment and assess knowledge base    Interventions:  - Provide teaching at level of understanding  - Provide teaching via preferred learning methods  Outcome: Progressing

## 2022-10-09 NOTE — PROGRESS NOTES
[place]  Progress Note - [address] [place] [address]  1934, 80 y o  male MRN: [id number]  Unit/Bed#: -Jamie Encounter: [id number]  Primary Care Provider: Katey Waters MD   Date and time admitted to hospital: 10/8/2022 12:39 PM    * Speech disturbance  Assessment & Plan  · Presented for expressive speech disturbance described as incoherence/word salad that occurred from 0143-9265 on 10/8 while patient was at breakfast (resides at [facility])  Patient was without trouble understanding speech, no additional deficits or prior episodes  Resolved at time of admission  · Virtual neuro eval in ED, no TPA  · CTH: no acute intracranial abnormality  · Stroke pathway  · MRI brain done 10/9/22 showed white matter changes suggestive of chronic microangiopathy  No acute intracranial pathology  · Echo pending   · B12, TSH, A1C, LP   · Continue ASA 81 mg daily  · Plavix 300 mg load followed by 75 mg daily   · Statin deferred, prior intolerance --> continue home zetia   · BP goal is normotension, no need for permissive HTN  · Tele, neuro checks  · PT/OT, uses walker at baseline  · Neurology on board    Rheumatoid arthritis ([facility] [address] )  Assessment & Plan  · Continue daily prednisone 5 mg  · Continue hydroxychloroquine 400 mg HS  · voltaren gel and supportive care    History of hypertension  Assessment & Plan  · Continue metoprolol 25 mg daily  · SBP is WNL on admission      VTE Pharmacologic Prophylaxis: VTE Score: 14 Moderate Risk (Score 3-4) - Pharmacological DVT Prophylaxis Ordered: enoxaparin (Lovenox)  Patient Centered Rounds: Done  Discussions with Specialists or Other Care Team Provider: Neurology    Time Spent for Care: 45 minutes  More than 50% of total time spent on counseling and coordination of care as described above      Current Length of Stay: 0 day(s)  Current Patient Status: Inpatient   Certification Statement: The patient will continue to require additional inpatient hospital stay due to Echo pending  Discharge Plan: Anticipate discharge tomorrow to home  Code Status: Level 1 - Full Code    Subjective:   Patient was seen at the bedside  He was complaining of shortness of breath  He denied any chest pain  He feels tired  Objective:   Patient seen at the bedside  He is not in obvious painful or respiratory distress  MRI done did not show any acute infarct just chronic changes  Echo still pending  To continue aspirin and plavix  Vitals:   Temp (24hrs), Av 3 °F (36 3 °C), Min:97 2 °F (36 2 °C), Max:97 4 °F (36 3 °C)    Temp:  [97 2 °F (36 2 °C)-97 4 °F (36 3 °C)] 97 4 °F (36 3 °C)  HR:  [87-96] 96  Resp:  [16-22] 16  BP: (137-175)/() 143/99  SpO2:  [92 %-96 %] 92 %  Body mass index is 30 72 kg/m²  Input and Output Summary (last 24 hours): Intake/Output Summary (Last 24 hours) at 10/9/2022 1751  Last data filed at 10/9/2022 1409  Gross per 24 hour   Intake 180 ml   Output 1025 ml   Net -845 ml       Physical Exam:    Constitutional:       General: He is not in acute distress  Appearance: He is well-developed  He is not ill-appearing  HENT:      Head: Normocephalic and atraumatic  Eyes:      General:         Right eye: No discharge  Left eye: No discharge  Extraocular Movements: Extraocular movements intact  Conjunctiva/sclera: Conjunctivae normal    Cardiovascular:      Rate and Rhythm: Normal rate and regular rhythm  Heart sounds: No murmur heard  Pulmonary:      Effort: Pulmonary effort is normal  No respiratory distress  Breath sounds: Normal breath sounds  No wheezing, rhonchi or rales  Abdominal:      General: Bowel sounds are normal  There is no distension  Palpations: Abdomen is soft  Tenderness: There is no abdominal tenderness  Musculoskeletal:      Cervical back: Neck supple  Right lower leg: Edema present  Left lower leg: Edema present  Skin:     General: Skin is warm and dry     Neurological:      Mental Status: He is alert and oriented to person, place, and time  Cranial Nerves: No cranial nerve deficit  Sensory: No sensory deficit  Motor: No weakness  Coordination: Coordination normal       Comments: Following commands and answering questions appropriately  There is no slurring of speech  Psychiatric:         Mood and Affect: Mood normal          Behavior: Behavior normal                 Additional Data:     Labs:  Results from last 7 days   Lab Units 10/09/22  0409   WBC Thousand/uL 7 20   HEMOGLOBIN g/dL 13 7   HEMATOCRIT % 41 3   PLATELETS Thousands/uL 150   NEUTROS PCT % 50   LYMPHS PCT % 36   MONOS PCT % 8   EOS PCT % 5     Results from last 7 days   Lab Units 10/09/22  0409   SODIUM mmol/L 139   POTASSIUM mmol/L 3 6   CHLORIDE mmol/L 102   CO2 mmol/L 31   BUN mg/dL 8   CREATININE mg/dL 0 90   ANION GAP mmol/L 6   CALCIUM mg/dL 9 0   ALBUMIN g/dL 2 8*   TOTAL BILIRUBIN mg/dL 0 70   ALK PHOS U/L 78   ALT U/L 24   AST U/L 22   GLUCOSE RANDOM mg/dL 118     Results from last 7 days   Lab Units 10/08/22  1243   INR  0 98     Results from last 7 days   Lab Units 10/08/22  1235   POC GLUCOSE mg/dl 138     Results from last 7 days   Lab Units 10/08/22  1243   HEMOGLOBIN A1C % 5 8*           Lines/Drains:  Invasive Devices  Report    Peripheral Intravenous Line  Duration           Peripheral IV 10/08/22 Right Antecubital 1 day                  Telemetry:  Telemetry Orders (From admission, onward)             48 Hour Telemetry Monitoring  (ED Bridging Orders Panel)  Continuous x 48 hours        References:    Telemetry Guidelines   Question:  Reason for 48 Hour Telemetry  Answer:  Acute CVA (<24 hrs old, hemispheric strokes, selected brainstem strokes, cardiac arrhythmias)                          Imaging: No pertinent imaging reviewed      Recent Cultures (last 7 days):         Last 24 Hours Medication List:   Current Facility-Administered Medications   Medication Dose Route Frequency Provider Last Rate   • acetaminophen  650 mg Oral Q6H PRN Igor Varner PA-C     • allopurinol  400 mg Oral Daily Igor Bautista [place], [place]     • aspirin  81 mg Oral Daily Igorcris Bautista [place], [place]     • clopidogrel  75 mg Oral Daily [facility] [place]     • Diclofenac Sodium  2 g Topical 4x Daily Igor Tubbs PA-C     • enoxaparin  40 mg Subcutaneous Daily Igorcris Bautista [place], [place]     • ezetimibe  10 mg Oral Daily Igor Bautista [place], [place]     • gabapentin  100 mg Oral Daily Igorcris Bautista [place], [place]     • hydroxychloroquine  400 mg Oral HS Igor Tubbs PA-C     • metoprolol tartrate  25 mg Oral Daily Igor Tubbs PA-C     • ondansetron  4 mg Intravenous Q6H PRN Igor Varner PA-C     • oxybutynin  5 mg Oral Daily Igor Tubbs PA-C     • polyethylene glycol  17 g Oral Every Other Day Igor Varner PA-C     • predniSONE  5 mg Oral Daily Igor Tubbs PA-C     • senna  1 tablet Oral Daily Igor Bautista [place], [place]     • tamsulosin  0 4 mg Oral Daily With Dinner Ralph Molina PA-C          Today, Patient Was Seen By: Adrianna Fontana    Please Note: This note may have been constructed using a voice recognition system

## 2022-10-09 NOTE — ASSESSMENT & PLAN NOTE
· Presented for expressive speech disturbance described as incoherence/word salad that occurred from 8519-8487 on 10/8 while patient was at breakfast (resides at [facility])  Patient was without trouble understanding speech, no additional deficits or prior episodes  Resolved at time of admission  · Virtual neuro eval in ED, no TPA  · CTH: no acute intracranial abnormality  · Stroke pathway  · MRI brain done 10/9/22 showed white matter changes suggestive of chronic microangiopathy  No acute intracranial pathology    · Echo pending   · B12, TSH, A1C, LP   · Continue ASA 81 mg daily  · Plavix 300 mg load followed by 75 mg daily   · Statin deferred, prior intolerance --> continue home zetia   · BP goal is normotension, no need for permissive HTN  · Tele, neuro checks  · PT/OT, uses walker at baseline  · Neurology on board

## 2022-10-10 ENCOUNTER — APPOINTMENT (INPATIENT)
Dept: NON INVASIVE DIAGNOSTICS | Facility: HOSPITAL | Age: 87
DRG: 069 | End: 2022-10-10
Payer: COMMERCIAL

## 2022-10-10 PROBLEM — G45.9 TIA (TRANSIENT ISCHEMIC ATTACK): Status: ACTIVE | Noted: 2022-10-10

## 2022-10-10 LAB
AORTIC ROOT: 3.3 CM
APICAL FOUR CHAMBER EJECTION FRACTION: 23 %
ASCENDING AORTA: 4.1 CM
FRACTIONAL SHORTENING: 22 % (ref 28–44)
INTERVENTRICULAR SEPTUM IN DIASTOLE (PARASTERNAL SHORT AXIS VIEW): 0.8 CM
INTERVENTRICULAR SEPTUM: 0.8 CM (ref 0.6–1.1)
LAAS-AP2: 17.9 CM2
LAAS-AP4: 16.1 CM2
LEFT ATRIUM AREA SYSTOLE SINGLE PLANE A4C: 16.5 CM2
LEFT ATRIUM SIZE: 4.6 CM
LEFT INTERNAL DIMENSION IN SYSTOLE: 4.9 CM (ref 2.1–4)
LEFT VENTRICLE DIASTOLIC VOLUME (MOD BIPLANE): 207 ML
LEFT VENTRICLE SYSTOLIC VOLUME (MOD BIPLANE): 140 ML
LEFT VENTRICULAR INTERNAL DIMENSION IN DIASTOLE: 6.3 CM (ref 3.5–6)
LEFT VENTRICULAR POSTERIOR WALL IN END DIASTOLE: 0.9 CM
LEFT VENTRICULAR STROKE VOLUME: 85 ML
LV EF: 32 %
LVSV (TEICH): 85 ML
RA PRESSURE ESTIMATED: 8 MMHG
RIGHT ATRIUM AREA SYSTOLE A4C: 16 CM2
RIGHT VENTRICLE ID DIMENSION: 3.4 CM
RV PSP: 35 MMHG
SARS-COV-2 AG UPPER RESP QL IA: NORMAL
SL CV LEFT ATRIUM LENGTH A2C: 4.8 CM
SL CV LV EF: 20
SL CV PED ECHO LEFT VENTRICLE DIASTOLIC VOLUME (MOD BIPLANE) 2D: 199 ML
SL CV PED ECHO LEFT VENTRICLE SYSTOLIC VOLUME (MOD BIPLANE) 2D: 114 ML
TR MAX PG: 27 MMHG
TR PEAK VELOCITY: 2.6 M/S
TRICUSPID VALVE PEAK REGURGITATION VELOCITY: 2.61 M/S

## 2022-10-10 PROCEDURE — 93880 EXTRACRANIAL BILAT STUDY: CPT | Performed by: SURGERY

## 2022-10-10 PROCEDURE — 93880 EXTRACRANIAL BILAT STUDY: CPT

## 2022-10-10 PROCEDURE — 93306 TTE W/DOPPLER COMPLETE: CPT

## 2022-10-10 PROCEDURE — 99232 SBSQ HOSP IP/OBS MODERATE 35: CPT | Performed by: PSYCHIATRY & NEUROLOGY

## 2022-10-10 PROCEDURE — 97116 GAIT TRAINING THERAPY: CPT

## 2022-10-10 PROCEDURE — 93306 TTE W/DOPPLER COMPLETE: CPT | Performed by: INTERNAL MEDICINE

## 2022-10-10 PROCEDURE — 97167 OT EVAL HIGH COMPLEX 60 MIN: CPT

## 2022-10-10 PROCEDURE — 87636 SARSCOV2 & INF A&B AMP PRB: CPT | Performed by: INTERNAL MEDICINE

## 2022-10-10 PROCEDURE — 87811 SARS-COV-2 COVID19 W/OPTIC: CPT | Performed by: INTERNAL MEDICINE

## 2022-10-10 PROCEDURE — 97163 PT EVAL HIGH COMPLEX 45 MIN: CPT

## 2022-10-10 RX ORDER — CLOPIDOGREL BISULFATE 75 MG/1
75 TABLET ORAL DAILY
Qty: 30 TABLET | Refills: 0 | Status: SHIPPED | OUTPATIENT
Start: 2022-10-11 | End: 2022-11-10

## 2022-10-10 RX ADMIN — METOPROLOL TARTRATE 25 MG: 25 TABLET, FILM COATED ORAL at 09:59

## 2022-10-10 RX ADMIN — PREDNISONE 5 MG: 5 TABLET ORAL at 09:59

## 2022-10-10 RX ADMIN — OXYBUTYNIN CHLORIDE 5 MG: 5 TABLET, EXTENDED RELEASE ORAL at 09:59

## 2022-10-10 RX ADMIN — SENNOSIDES 8.6 MG: 8.6 TABLET, FILM COATED ORAL at 09:59

## 2022-10-10 RX ADMIN — GABAPENTIN 100 MG: 100 CAPSULE ORAL at 09:59

## 2022-10-10 RX ADMIN — DICLOFENAC SODIUM 2 G: 10 GEL TOPICAL at 17:05

## 2022-10-10 RX ADMIN — EZETIMIBE 10 MG: 10 TABLET ORAL at 09:59

## 2022-10-10 RX ADMIN — CLOPIDOGREL BISULFATE 75 MG: 75 TABLET ORAL at 09:59

## 2022-10-10 RX ADMIN — ASPIRIN 81 MG CHEWABLE TABLET 81 MG: 81 TABLET CHEWABLE at 09:58

## 2022-10-10 RX ADMIN — POLYETHYLENE GLYCOL 3350 17 G: 17 POWDER, FOR SOLUTION ORAL at 09:58

## 2022-10-10 RX ADMIN — DICLOFENAC SODIUM 2 G: 10 GEL TOPICAL at 12:45

## 2022-10-10 RX ADMIN — ENOXAPARIN SODIUM 40 MG: 100 INJECTION SUBCUTANEOUS at 09:58

## 2022-10-10 RX ADMIN — TAMSULOSIN HYDROCHLORIDE 0.4 MG: 0.4 CAPSULE ORAL at 17:04

## 2022-10-10 RX ADMIN — DICLOFENAC SODIUM 2 G: 10 GEL TOPICAL at 21:07

## 2022-10-10 RX ADMIN — Medication 6 MG: at 21:07

## 2022-10-10 RX ADMIN — HYDROXYCHLOROQUINE SULFATE 400 MG: 200 TABLET, FILM COATED ORAL at 22:49

## 2022-10-10 RX ADMIN — ALLOPURINOL 400 MG: 300 TABLET ORAL at 09:59

## 2022-10-10 RX ADMIN — DICLOFENAC SODIUM 2 G: 10 GEL TOPICAL at 09:59

## 2022-10-10 NOTE — PROGRESS NOTES
Progress Note - Neurology   Mariola Yu 80 y o  male MRN: [id number]  Unit/Bed#: -01 Encounter: [id number]      Assessment/Plan   * Speech disturbance  Assessment & Plan  79 y/o male with history of HTN and HLD that presents with speech disturbance described as word salad and dysarthria, possibly with component of word-finding difficulty  Concern for possible transient aphasia/dysarthria  MRI brain yesterday negative for acute intracranial patology/infarct; will treat as TIA  Plan:  -stroke workup completed:  -MRI brain negative for acute infarct  -carotid dopplers performed, negative for any significant carotid stenosis  -2D echo pending; no need to obtain from neurology standpoint if it is holding up discharge  -continue DAPT for 3 weeks; after transition to plavix 75 mg daily   -can defer statin due to reported allergy and limited benefit given age; continue Zetia  -no need for permissive HTN; ok with normotension  -discussed with attending; would recommend outpatient cardiac referral for arrhythmia monitoring discussion (loop monitor or Ziopatch) as outapt  -telemetry monitoring  -LDL 50, A1c of 5 8  -neuro cecks  -stroke education provided  -therapies following    No further inpatient neurologic workup, ok from neurologic standpoint for discharge  Discussed plan of care with attending neurologist      Mariola Yu will need follow up in in 6 weeks with neurovascular attending or advance practitioner  He will not require outpatient neurologic testing  Subjective:   Patient resting in bedside chair, family at bedside  Doing "ok" today, notes not fully back to feeling 100%, speech overall has remain improved  No new/interim neurologic symptoms nor deficits otherwise  Vitals: Blood pressure 132/88, pulse 93, temperature 97 8 °F (36 6 °C), resp  rate 17, height 5' 10" (1 778 m), weight 97 1 kg (214 lb 1 6 oz), SpO2 93 %  ,Body mass index is 30 72 kg/m²      Physical Exam:  Physical Exam  Constitutional:       Appearance: Normal appearance  HENT:      Head: Normocephalic and atraumatic  Eyes:      Extraocular Movements: Extraocular movements intact and EOM normal       Conjunctiva/sclera: Conjunctivae normal       Pupils: Pupils are equal, round, and reactive to light  Cardiovascular:      Rate and Rhythm: Normal rate  Pulmonary:      Effort: Pulmonary effort is normal    Abdominal:      General: There is no distension  Musculoskeletal:         General: Normal range of motion  Skin:     General: Skin is warm and dry  Neurological:      Mental Status: He is alert and oriented to person, place, and time  Coordination: Finger-Nose-Finger Test normal       Deep Tendon Reflexes: Strength normal    Psychiatric:         Speech: Speech normal         Neurologic Exam     Mental Status   Oriented to person, place, and time  Attention: normal  Concentration: normal    Speech: speech is normal   Normal comprehension  No aphasia/dysarthria with conversation, naming objects, repeating phrases  Following all commands  Cranial Nerves     CN II   Visual fields full to confrontation  CN III, IV, VI   Pupils are equal, round, and reactive to light  Extraocular motions are normal      CN VII   Facial expression full, symmetric  CN VIII   CN VIII normal      CN IX, X   CN IX normal    CN X normal      CN XI   CN XI normal      CN XII   CN XII normal      Motor Exam   Muscle bulk: normal  Overall muscle tone: normal  Right arm pronator drift: absent  Left arm pronator drift: absent    Strength   Strength 5/5 throughout  Sensory Exam   Light touch normal      Chronic diminished sensation in the distal LE bilaterally       Gait, Coordination, and Reflexes     Coordination   Finger to nose coordination: normal    Tremor   Resting tremor: absent  Intention tremor: absent  Gait deferred as patient just walked with PT       Lab, Imaging and other studies:   VAS carotid complete study   Final Result by Jhoana Torres MD (10/10 [phone])      MRI brain wo contrast   Final Result by Servando Cramer DO (10/09 1304)      White matter changes suggestive of chronic microangiopathy  No acute intracranial pathology  Workstation performed: [id number]         CT head without contrast   Final Result by Selma Blake MD (10/08 1331)      No acute intracranial abnormality  Workstation performed: [id number]             CBC:   Results from last 7 days   Lab Units 10/09/22  0409 10/08/22  1243   WBC Thousand/uL 7 20 10 30*   RBC Million/uL 4 36 4 49   HEMOGLOBIN g/dL 13 7 14 1   HEMATOCRIT % 41 3 43 0   MCV fL 95 96   PLATELETS Thousands/uL 150 187   , BMP/CMP:   Results from last 7 days   Lab Units 10/09/22  0409 10/08/22  1243   SODIUM mmol/L 139 140   POTASSIUM mmol/L 3 6 3 7   CHLORIDE mmol/L 102 102   CO2 mmol/L 31 33*   BUN mg/dL 8 10   CREATININE mg/dL 0 90 1 04   CALCIUM mg/dL 9 0 8 9   AST U/L 22  --    ALT U/L 24  --    ALK PHOS U/L 78  --    EGFR ml/min/1 73sq m 75 63   , Vitamin B12:   Results from last 7 days   Lab Units 10/08/22  1243   VITAMIN B 12 pg/mL 453   , HgBA1C:   Results from last 7 days   Lab Units 10/08/22  1243   HEMOGLOBIN A1C % 5 8*   , TSH:   Results from last 7 days   Lab Units 10/08/22  1545   TSH 3RD GENERATON uIU/mL 1 926   , Coagulation:   Results from last 7 days   Lab Units 10/08/22  1243   INR  0 98   , Lipid Profile:   Results from last 7 days   Lab Units 10/09/22  0409   HDL mg/dL 70   LDL CALC mg/dL 50   TRIGLYCERIDES mg/dL 138     VTE Prophylaxis: Sequential compression device (Venodyne)  and Enoxaparin (Lovenox)    Total time spent today 15 minutes  Discussed plan of care with patient/family and primary team: reviewed negative MRI brain, will treat as TIA, DAPT for 3 weeks, stroke clinic follow up

## 2022-10-10 NOTE — PLAN OF CARE
Problem: Potential for Falls  Goal: Patient will remain free of falls  Description: INTERVENTIONS:  - Educate patient/family on patient safety including physical limitations  - Instruct patient to call for assistance with activity   - Consult OT/PT to assist with strengthening/mobility   - Keep Call bell within reach  - Keep bed low and locked with side rails adjusted as appropriate  - Keep care items and personal belongings within reach  - Initiate and maintain comfort rounds  - Make Fall Risk Sign visible to staff  - Offer Toileting every 2 Hours, in advance of need  - Initiate/Maintain bedalarm  - Obtain necessary fall risk management equipment:   - Apply yellow socks and bracelet for high fall risk patients  - Consider moving patient to room near nurses station  Outcome: Progressing     Problem: MOBILITY - ADULT  Goal: Maintain or return to baseline ADL function  Description: INTERVENTIONS:  -  Assess patient's ability to carry out ADLs; assess patient's baseline for ADL function and identify physical deficits which impact ability to perform ADLs (bathing, care of mouth/teeth, toileting, grooming, dressing, etc )  - Assess/evaluate cause of self-care deficits   - Assess range of motion  - Assess patient's mobility; develop plan if impaired  - Assess patient's need for assistive devices and provide as appropriate  - Encourage maximum independence but intervene and supervise when necessary  - Involve family in performance of ADLs  - Assess for home care needs following discharge   - Consider OT consult to assist with ADL evaluation and planning for discharge  - Provide patient education as appropriate  Outcome: Progressing  Goal: Maintains/Returns to pre admission functional level  Description: INTERVENTIONS:  - Perform BMAT or MOVE assessment daily    - Set and communicate daily mobility goal to care team and patient/family/caregiver     - Collaborate with rehabilitation services on mobility goals if consulted  - Perform Range of Motion 3 times a day  - Reposition patient every 3 hours  - Dangle patient 3 times a day  - Stand patient 3 times a day  - Ambulate patient 3 times a day  - Out of bed to chair 3 times a day   - Out of bed for meals 3 times a day  - Out of bed for toileting  - Record patient progress and toleration of activity level   Outcome: Progressing     Problem: Neurological Deficit  Goal: Neurological status is stable or improving  Description: Interventions:  - Monitor and assess patient's level of consciousness, motor function, sensory function, and level of assistance needed for ADLs  - Monitor and report changes from baseline  Collaborate with interdisciplinary team to initiate plan and implement interventions as ordered  - Provide and maintain a safe environment  - Consider seizure precautions  - Consider fall precautions  - Consider aspiration precautions  - Consider bleeding precautions  Outcome: Progressing     Problem: Activity Intolerance/Impaired Mobility  Goal: Mobility/activity is maintained at optimum level for patient  Description: Interventions:  - Assess and monitor patient  barriers to mobility and need for assistive/adaptive devices  - Assess patient's emotional response to limitations  - Collaborate with interdisciplinary team and initiate plans and interventions as ordered  - Encourage independent activity per ability   - Maintain proper body alignment  - Perform active/passive rom as tolerated/ordered    - Plan activities to conserve energy   - Turn patient as appropriate  Outcome: Progressing     Problem: NEUROSENSORY - ADULT  Goal: Achieves stable or improved neurological status  Description: INTERVENTIONS  - Monitor and report changes in neurological status  - Monitor vital signs such as temperature, blood pressure, glucose, and any other labs ordered   - Initiate measures to prevent increased intracranial pressure  - Monitor for seizure activity and implement precautions if appropriate      Outcome: Progressing  Goal: Achieves maximal functionality and self care  Description: INTERVENTIONS  - Monitor swallowing and airway patency with patient fatigue and changes in neurological status  - Encourage and assist patient to increase activity and self care     - Encourage visually impaired, hearing impaired and aphasic patients to use assistive/communication devices  Outcome: Progressing     Problem: MUSCULOSKELETAL - ADULT  Goal: Maintain or return mobility to safest level of function  Description: INTERVENTIONS:  - Assess patient's ability to carry out ADLs; assess patient's baseline for ADL function and identify physical deficits which impact ability to perform ADLs (bathing, care of mouth/teeth, toileting, grooming, dressing, etc )  - Assess/evaluate cause of self-care deficits   - Assess range of motion  - Assess patient's mobility  - Assess patient's need for assistive devices and provide as appropriate  - Encourage maximum independence but intervene and supervise when necessary  - Involve family in performance of ADLs  - Assess for home care needs following discharge   - Consider OT consult to assist with ADL evaluation and planning for discharge  - Provide patient education as appropriate  Outcome: Progressing  Goal: Maintain proper alignment of affected body part  Description: INTERVENTIONS:  - Support, maintain and protect limb and body alignment  - Provide patient/ family with appropriate education  Outcome: Progressing

## 2022-10-10 NOTE — TREATMENT PLAN
Patient has been recommended by Neurology to undergo loop recorder implantation  I sent a message to our scheduling department to help facilitate this as an outpatient  They will reach out to   Jimi for scheduling  If loop recorder is denied by insurance I will order Zio monitor 1st with follow-up in the office thereafter  If Zio monitor is unremarkable and Neurology would still like loop recorder can then arrange at that point

## 2022-10-10 NOTE — DISCHARGE INSTR - AVS FIRST PAGE
Dear Mr Aleks Villafuerte came to the hospital due to concern for stroke  Fortunately from all the testing done you did not have stroke at this time  You had possibly a TIA which is called a mini-stroke because your symptoms resolved within 24 hours  You had a CT scan and MRI done which did not show any signs of stroke  You also had an ultrasound of the vessels in your neck which was negative  You were seen by the neurologist who recommended that you should be discharged home on aspirin and Plavix  You will take the aspirin and Plavix every day for three weeks and then continue subsequently with only Plavix  Since your echocardiogram(ultrasound of your heart) done showed that your heart function was at 20%, you were seen by Cardiology and they recommended a stress test as outpatient  Please reach out to your primary care physician within one week of discharge to discuss your recent hospitalization  Please also follow-up with your primary care physician to ensure that you have a loop recorder ordered  This is being arranged by cardiology  Please ensure you follow-up with the neurologist as outpatient  New medication:  - Stat taking Plavix 75 mg daily for 3 weeks  This will be used in addition to aspirin 81 mg daily ( which you were on before) for 3 weeks, and then subsequently continue with only Plavix 75 mg daily   - Start taking lisinopril 5 mg daily     Continue taking your medications as prescribed  It was a pleasure taking care of you   We wish you the very best

## 2022-10-10 NOTE — PLAN OF CARE
Problem: PHYSICAL THERAPY ADULT  Goal: Performs mobility at highest level of function for planned discharge setting  See evaluation for individualized goals  Description: Treatment/Interventions: Functional transfer training, LE strengthening/ROM, Therapeutic exercise, Endurance training, Cognitive reorientation, Equipment eval/education, Bed mobility, Gait training, Spoke to nursing, OT          See flowsheet documentation for full assessment, interventions and recommendations  Note: Prognosis: Good  Problem List: Decreased strength, Decreased endurance, Impaired balance, Decreased mobility, Decreased cognition, Impaired hearing, Obesity, Pain  Assessment: Patient is an 87y/o M who presented to the ED with speech disturbance  CT and MRI negative  Patient resides at the [facility] at [address] [place] [address]  At baseline, he can perform bed mobility, transfers at a mod I level  He has mostly been using a w/c for mobility  Current medical status includes bed/chair alarm, decreased cognition, pain, fall risk, obesity, decreased sensation, dizziness, decreased strength, balance, endurance and mobility  Patient required assistance for bed mobility, transfers and ambulation  He only tolerated a short ambulation distance before becoming fatigued  Extremely flexed posture in stance  Initially, rehab was recommended but family and patient prefer to go back to the [facility]  They report that he can have assistance with mobility at all times  Recommending he return with assist and home P T  Use of RW or w/c at all times  The patient's AM-PAC Basic Mobility Inpatient Short Form Raw Score is 16  A Raw score of less than or equal to 17 suggests the patient may benefit from discharge to post-acute rehabilitation services  Please also refer to the recommendation of the Physical Therapist for safe discharge planning  Barriers to Discharge: None  Barriers to Discharge Comments: Patient  can have supervision/assist for mobility if needed    PT Discharge Recommendation: Home with home health rehabilitation    See flowsheet documentation for full assessment

## 2022-10-10 NOTE — OCCUPATIONAL THERAPY NOTE
Occupational Therapy Evaluation      Bandar Hicks    10/10/2022    Principal Problem:    Speech disturbance  Active Problems:    History of hypertension    Rheumatoid arthritis ([facility] [address] )      Past Medical History:   Diagnosis Date    GERD (gastroesophageal reflux disease)     Gout     Hyperlipidemia     Hypertension     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[facility] [address] )     Spinal stenosis        Past Surgical History:   Procedure Laterality Date    CARPAL TUNNEL RELEASE Bilateral     COLONOSCOPY      LAMINECTOMY      TOTAL KNEE ARTHROPLASTY Bilateral     TOTAL SHOULDER REPLACEMENT          10/10/22 0946   OT Last Visit   OT Visit Date 10/10/22   Note Type   Note type Evaluation   Pain Assessment   Pain Assessment Tool 0-10   Pain Score No Pain  (No pain at rest but reports R thigh pain with ambulation )   Restrictions/Precautions   Other Precautions Hard of hearing; Fall Risk;Bed Alarm; Chair Alarm;Cognitive   Home Living   Type of Home Assisted living  ([facility])   Home Layout One level   Bathroom Shower/Tub Walk-in shower   [address] bars in shower;Built-in [address]; Wheelchair-manual  (recliner lift chair, adjustable bed, life alert)   Additional Comments Primarily using w/c  Propels with LE's and UE's  Can use life alert for supervision for mobility and ADL's   Prior Function   Level of [place] Independent with ADLs; Needs assistance with ADLs   Lives With Facility staff   IADLs   (facility does IADLs)   Falls in the last 6 months 1 to 4   ADL   Eating Assistance 7  Independent   Grooming Assistance 5  Supervision/Setup   UB Bathing Assistance 5  Supervision/Setup   LB Bathing Assistance 4  Minimal Assistance   UB Dressing Assistance 5  Supervision/Setup   LB Dressing Assistance 4  Minimal Assistance   Toileting Assistance  4  Minimal Assistance   Bed Mobility   Supine to Sit 4  Minimal assistance   Additional items Assist x 1;HOB elevated; Bedrails; Increased time required;Verbal cues Additional Comments Sat edge of bed supervision level  Slight lateral lean towards the L  BP stable in seated position  Transfers   Sit to Stand 4  Minimal assistance   Additional items Assist x 1   Stand to Sit 4  Minimal assistance   Additional items Assist x 1   Stand pivot 4  Minimal assistance   Additional items Assist x 1  (RW)   Functional Mobility   Functional Mobility 4  Minimal assistance   Additional Comments x1   Additional items Rolling walker   Activity Tolerance   Activity Tolerance Patient limited by fatigue;Patient limited by pain   Medical Staff Made Aware [place] with PT, Johanna   Nurse Made Aware SOMMER Daugherty   RUE Assessment   RUE Assessment WFL   LUE Assessment   LUE Assessment WFL   Hand Function   Gross Motor Coordination Functional   Fine Motor Coordination Functional   Sensation   Light Touch No apparent deficits   Vision-Basic Assessment   Current Vision Wears glasses all the time   Psychosocial   Psychosocial (WDL) WDL   Cognition   Overall Cognitive Status WFL   Arousal/Participation Alert; Cooperative   Attention Within functional limits   Orientation Level Oriented to person;Oriented to time;Oriented to place   Memory Within functional limits   Following Commands Follows one step commands without difficulty   Comments [place] (donned B/L hearing aides during session); has been having nighttime delirium in hospital which is not baseline   Assessment   Limitation Decreased ADL status; Decreased cognition;Decreased endurance;Decreased self-care trans   Prognosis Good   Assessment Pt is a 80 y o  male seen for OT evaluation at [facility], admitted 10/8/2022 w/ Speech disturbance  OT completed extensive review of pt's medical and social history  Comorbidities affecting pt's functional performance at time of assessment include: HTN, RA  Personal factors affecting pt at time of IE include:difficulty performing ADLS, limited insight into deficits and health management    Prior to admission, pt was living at [facility] and was independent with ADL, assisted with IADL  Upon evaluation, pt presents to OT below baseline due to the following performance deficits: weakness, decreased strength, decreased balance and decreased tolerance  Pt to benefit from continued skilled OT tx while in the hospital to address deficits as defined above and maximize level of functional independence w ADL's and functional mobility  Occupational Performance areas to address include: grooming, bathing/shower, toilet hygiene, dressing, functional mobility and functional transfers, bed mobility  The patient's raw score on the AM-PAC Daily Activity inpatient short form is 18, standardized score is 38 66, less than 39 4  Patients at this level are likely to benefit from DC to post-acute rehabilitation services  Family present and states they prefer pt to not go to rehab facility due to prior poor experiences  States they want pt to go back to his apartment with increased staff support and home OT/PT  Based on findings, pt is of high complexity, due to medical complexity  At this time, OT recommendations at time of discharge are STR or home with increased support and home OT (see above)  Plan   Treatment Interventions ADL retraining;Functional transfer training;UE strengthening/ROM; Endurance training;Patient/family training;Cognitive reorientation;Equipment evaluation/education; Compensatory technique education;Continued evaluation; Energy conservation   Goal Expiration Date 10/20/22   OT Frequency 3-5x/wk   Recommendation   OT Discharge Recommendation Post acute rehabilitation services  (Family present and appears to be resistant to STR due to past experience   Pt and family report he can have increased supervision/assist at facility with the in-house PT )   AM-PAC Daily Activity Inpatient   Lower Body Dressing 2   Bathing 3   Toileting 3   Upper Body Dressing 3   Grooming 3   Eating 4   Daily Activity Raw Score 18   Daily Activity Standardized Score (Calc for Raw Score >=11) 38 66   AM-PAC Applied Cognition Inpatient   Following a Speech/Presentation 4   Understanding Ordinary Conversation 4   Taking Medications 4   Remembering Where Things Are Placed or Put Away 4   Remembering List of 4-5 Errands 4   Taking Care of Complicated Tasks 4   Applied Cognition Raw Score 24   Applied Cognition Standardized Score 62 21     Pt will achieve the following goals within 10 days  *Pt will complete grooming with independence  *Pt will complete UB bathing and dressing with independence  *Pt will complete LB bathing and dressing with  independence  *Pt will complete toileting (hygiene and clothing management) with independence    *Pt will complete bed mobility with independence, with bed flat and no side rail to prep for purposeful tasks    *Pt will perform functional transfers with independence in order to complete ADL routine  *Pt will increase standing tolerance to 2-3 minutes in order to complete ADL routine  *Pt will complete item retrieval with modified independence while demonstrating good safety  *Pt will demonstrate increased activity tolerance in order to complete ADL routine  *Pt will participate in cognitive assessment to determine level of safety for returning home    *Pt will participate in UE therapeutic exercise in order to maximize strength for ADL transfers  *Pt will sit on EOB for 10+ minutes for increased safety with seated activity tolerance during ADL tasks  *Pt will identify 3-5 fall risks to ensure safety upon discharge      [employer], MS, OTR/L

## 2022-10-10 NOTE — ASSESSMENT & PLAN NOTE
· Presented for expressive speech disturbance described as incoherence/word salad that occurred from 5270-7752 on 10/8 while patient was at breakfast (resides at [facility])  Patient was without trouble understanding speech, no additional deficits or prior episodes  Resolved at time of admission  · Virtual neuro eval in ED, no TPA  · CTH: no acute intracranial abnormality  · Patient was put on the Stroke pathway  · MRI brain done 10/9/22 showed white matter changes suggestive of chronic microangiopathy  No acute intracranial pathology  · Echo was done revealing EF of 20%  Cardiology consult placed  · Continue ASA 81 mg daily  · Patient was loaded with Plavix 300 mg load followed by 75 mg daily   · Statin deferred, prior intolerance --> continue home zetia   · BP goal is normotension, no need for permissive HTN  · Neurology reviewed the patient and recommended him to be discharged home on aspirin and Plavix as dual antiplatelet therapy for 3 weeks and subsequently continue with aspirin    To follow up with Neurology as outpatient  · A loop recorder was also advised

## 2022-10-10 NOTE — CASE MANAGEMENT
Case Management Progress Note    Patient name Kevin Greco  Location /-90 MRN [id number]  : 1934 Date 10/10/2022       LOS (days): 1  Geometric Mean LOS (GMLOS) (days):   Days to GMLOS:        OBJECTIVE:    Current admission status: Inpatient  Preferred Pharmacy:   [facility], [address] [place] [address] [address] [address]  Phone: [phone] Fax: [phone]    Primary Care Provider: Michael Pressley MD    Primary Insurance: [facility]  Secondary Insurance:     PROGRESS NOTE:  Call placed to [facility] [place] [employer], spoke with Denice([phone]), informed Shadia Gunn of Pt's tentative discharge for today back to facility with home care services  Shadia Gunn informed CM that can have their therapist work with Pt at [facility]  Shadia Gunn requested COVID test  Faxed discharge instructions to [phone]  Met with Pt's son and dtr-in-law at bedside  Pt's son reports that he will transport Pt back to [facility] [employer] once COVID test results

## 2022-10-10 NOTE — PLAN OF CARE
Problem: OCCUPATIONAL THERAPY ADULT  Goal: Performs self-care activities at highest level of function for planned discharge setting  See evaluation for individualized goals  Description:   Outcome: Progressing  Note: Limitation: Decreased ADL status, Decreased cognition, Decreased endurance, Decreased self-care trans  Prognosis: Good  Assessment: Pt is a 80 y o  male seen for OT evaluation at [facility], admitted 10/8/2022 w/ Speech disturbance  OT completed extensive review of pt's medical and social history  Comorbidities affecting pt's functional performance at time of assessment include: HTN, RA  Personal factors affecting pt at time of IE include:difficulty performing ADLS, limited insight into deficits and health management   Prior to admission, pt was living at [facility] and was independent with ADL, assisted with IADL  Upon evaluation, pt presents to OT below baseline due to the following performance deficits: weakness, decreased strength, decreased balance and decreased tolerance  Pt to benefit from continued skilled OT tx while in the hospital to address deficits as defined above and maximize level of functional independence w ADL's and functional mobility  Occupational Performance areas to address include: grooming, bathing/shower, toilet hygiene, dressing, functional mobility and functional transfers, bed mobility  The patient's raw score on the AM-PAC Daily Activity inpatient short form is 18, standardized score is 38 66, less than 39 4  Patients at this level are likely to benefit from DC to post-acute rehabilitation services  Family present and states they prefer pt to not go to rehab facility due to prior poor experiences  States they want pt to go back to his apartment with increased staff support and home OT/PT  Based on findings, pt is of high complexity, due to medical complexity   At this time, OT recommendations at time of discharge are STR or home with increased support and home OT (see above)  OT Discharge Recommendation: Post acute rehabilitation services (Family present and appears to be resistant to STR due to past experience   Pt and family report he can have increased supervision/assist at facility with the in-house PT )

## 2022-10-10 NOTE — PROGRESS NOTES
[place]  Progress Note - [address] [place] [address]  1934, 80 y o  male MRN: [id number]  Unit/Bed#: -Jamie Encounter: [id number]  Primary Care Provider: Rodolfo Pathak MD   Date and time admitted to hospital: 10/8/2022 12:39 PM    * Speech disturbance  Assessment & Plan  · Presented for expressive speech disturbance described as incoherence/word salad that occurred from 7881-8710 on 10/8 while patient was at breakfast (resides at [facility])  Patient was without trouble understanding speech, no additional deficits or prior episodes  Resolved at time of admission  · Virtual neuro eval in ED, no TPA  · CTH: no acute intracranial abnormality  · Patient was put on the Stroke pathway  · MRI brain done 10/9/22 showed white matter changes suggestive of chronic microangiopathy  No acute intracranial pathology  · Echo was done revealing EF of 20%  Cardiology consult placed  · Continue ASA 81 mg daily  · Patient was loaded with Plavix 300 mg load followed by 75 mg daily   · Statin deferred, prior intolerance --> continue home zetia   · BP goal is normotension, no need for permissive HTN  · Neurology reviewed the patient and recommended him to be discharged home on aspirin and Plavix as dual antiplatelet therapy for 3 weeks and subsequently continue with aspirin  To follow up with Neurology as outpatient  · A loop recorder was also advised    Rheumatoid arthritis ([facility] [address] )  Assessment & Plan  · Continue daily prednisone 5 mg  · Continue hydroxychloroquine 400 mg HS  · voltaren gel and supportive care    History of hypertension  Assessment & Plan  · Continue metoprolol 25 mg daily  · SBP is WNL on admission        VTE Pharmacologic Prophylaxis: VTE Score: 14 Moderate Risk (Score 3-4) - Pharmacological DVT Prophylaxis Ordered: enoxaparin (Lovenox)  Time Spent for Care: 45 minutes  More than 50% of total time spent on counseling and coordination of care as described above      Current Length of Stay: 1 day(s)  Current Patient Status: Inpatient   Certification Statement: The patient will continue to require additional inpatient hospital stay due to Cardiomyopathy  Discharge Plan: Anticipate discharge in 24-48 hrs to home  Code Status: Level 1 - Full Code    Subjective:   No fresh complaints    Objective:   Patient looks well  Vitals:   Temp (24hrs), Av 6 °F (36 4 °C), Min:97 3 °F (36 3 °C), Max:97 8 °F (36 6 °C)    Temp:  [97 3 °F (36 3 °C)-97 8 °F (36 6 °C)] 97 7 °F (36 5 °C)  HR:  [] 85  Resp:  [17] 17  BP: (130-134)/(80-92) 130/80  SpO2:  [93 %-96 %] 94 %  Body mass index is 30 71 kg/m²  Input and Output Summary (last 24 hours): Intake/Output Summary (Last 24 hours) at 10/10/2022 1636  Last data filed at 10/10/2022 1201  Gross per 24 hour   Intake 210 ml   Output 875 ml   Net -665 ml       Physical Exam:   Constitutional:       General: He is not in acute distress      Appearance: He is well-developed  He is not ill-appearing  HENT:      Head: Normocephalic and atraumatic  Eyes:      General:         Right eye: No discharge          Left eye: No discharge       Extraocular Movements: Extraocular movements intact       Conjunctiva/sclera: Conjunctivae normal    Cardiovascular:      Rate and Rhythm: Normal rate and regular rhythm       Heart sounds: No murmur heard  Pulmonary:      Effort: Pulmonary effort is normal  No respiratory distress       Breath sounds: Normal breath sounds  No wheezing, rhonchi or rales  Abdominal:      General: Bowel sounds are normal  There is no distension       Palpations: Abdomen is soft       Tenderness: There is no abdominal tenderness  Musculoskeletal:      Cervical back: Neck supple       Right lower leg: Edema present       Left lower leg: Edema present  Skin:     General: Skin is warm and dry  Neurological:      Mental Status: He is alert and oriented to person, place, and time       Cranial Nerves: No cranial nerve deficit     Sensory: No sensory deficit       Motor: No weakness       Coordination: Coordination normal       Comments: Following commands and answering questions appropriately  There is no slurring of speech    Psychiatric:         Mood and Affect: Mood normal          Behavior: Behavior normal                  Additional Data:     Labs:  Results from last 7 days   Lab Units 10/09/22  0409   WBC Thousand/uL 7 20   HEMOGLOBIN g/dL 13 7   HEMATOCRIT % 41 3   PLATELETS Thousands/uL 150   NEUTROS PCT % 50   LYMPHS PCT % 36   MONOS PCT % 8   EOS PCT % 5     Results from last 7 days   Lab Units 10/09/22  0409   SODIUM mmol/L 139   POTASSIUM mmol/L 3 6   CHLORIDE mmol/L 102   CO2 mmol/L 31   BUN mg/dL 8   CREATININE mg/dL 0 90   ANION GAP mmol/L 6   CALCIUM mg/dL 9 0   ALBUMIN g/dL 2 8*   TOTAL BILIRUBIN mg/dL 0 70   ALK PHOS U/L 78   ALT U/L 24   AST U/L 22   GLUCOSE RANDOM mg/dL 118     Results from last 7 days   Lab Units 10/08/22  1243   INR  0 98     Results from last 7 days   Lab Units 10/08/22  1235   POC GLUCOSE mg/dl 138     Results from last 7 days   Lab Units 10/08/22  1243   HEMOGLOBIN A1C % 5 8*           Lines/Drains:  Invasive Devices  Report    None                   Telemetry:  Telemetry Orders (From admission, onward)             48 Hour Telemetry Monitoring  (ED Bridging Orders Panel)  Continuous x 48 hours        Expiring   References:    Telemetry Guidelines   Question:  Reason for 48 Hour Telemetry  Answer:  Acute CVA (<24 hrs old, hemispheric strokes, selected brainstem strokes, cardiac arrhythmias)                            Recent Cultures (last 7 days):         Last 24 Hours Medication List:   Current Facility-Administered Medications   Medication Dose Route Frequency Provider Last Rate   • acetaminophen  650 mg Oral Q6H PRN Malorie Varner PA-C     • allopurinol  400 mg Oral Daily Malorie Tubbs PA-C     • aspirin  81 mg Oral Daily Malorie Tubbs PA-C     • clopidogrel  75 mg Oral Daily Ta Tubbs PA-C     • Diclofenac Sodium  2 g Topical 4x Daily Ta Tubbs PA-C     • enoxaparin  40 mg Subcutaneous Daily Ta Tubbs, [place]     • ezetimibe  10 mg Oral Daily [place] Tubbs, [place]     • gabapentin  100 mg Oral Daily Ta Tubbs, [place]     • hydroxychloroquine  400 mg Oral HS Ta Tubbs PA-C     • melatonin  6 mg Oral HS Sravani Quintana PA-C     • metoprolol tartrate  25 mg Oral Daily Ta Tubbs PA-C     • ondansetron  4 mg Intravenous Q6H PRN Ta Varner PA-C     • oxybutynin  5 mg Oral Daily Ta Tubbs PA-C     • polyethylene glycol  17 g Oral Every Other Day Ta Varner PA-C     • predniSONE  5 mg Oral Daily Ta Tubbs PA-C     • senna  1 tablet Oral Daily Ta Tubbs, [place]     • tamsulosin  0 4 mg Oral Daily With Dinner Fredis Zhong PA-C          Today, Patient Was Seen By: Marcel Burch

## 2022-10-10 NOTE — DISCHARGE SUMMARY
[place]  Discharge- [address] [place] [address]  1934, 80 y o  male MRN: [id number]  Unit/Bed#: -01 Encounter: [id number]  Primary Care Provider: Kyle Crowe MD   Date and time admitted to hospital: 10/8/2022 12:39 PM    No new Assessment & Plan notes have been filed under this hospital service since the last note was generated  Service: Hospitalist    Medical Problems             Resolved Problems  Date Reviewed: 10/8/2022   None               Discharging Physician / Practitioner: Stacy Campos  PCP: Kyle Crowe MD  Admission Date:   Admission Orders (From admission, onward)     Ordered        10/09/22 1712  Inpatient Admission  Once            10/08/22 1437  Place in Observation  Once                      Discharge Date: 10/11/22    Consultations During Hospital Stay:  · Cardiology, Neurology    Procedures Performed  None    Significant Findings / Test Results:   · Echo done revealed EF was 20%    Incidental Findings:   · None    Test Results Pending at Discharge (will require follow up): · None     Outpatient Tests Requested:  · Possible stress test per cardiology    Complications:  None    Reason for Admission:  Patient was admitted for speech disturbance with concern for TIA    Hospital Course:     [address] [place] [address] Dr is a 80 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  male patient with past medical history of hypertension, rheumatoid arthritis, BPH, ambulatory dysfunction, who originally presented to the hospital on 10/8/2022 due to speech disturbance described as incoherece with word salad  Patient was evaluated by Neurology  CT scan and MRI done were negative for any acute infarct or hemorrhage  Neurology recommended dual antiplatelet therapy with Plavix and aspirin for 3 weeks and then to continue with the Plavix subsequently  Patient could not be started on statin because she has allergy to statins  Cardiology also recommended patient needs to have a loop recorder   These will be arranged by Cardiology  However, echo done revealed the patient had a low EF of 20%  Cardiology was consulted and recommended patient may need outpatient stress test  Patient will need to follow up as outpatient with cardiology  Please see above list of diagnoses and related plan for additional information  Condition at Discharge: stable    Discharge Day Visit / Exam:    Subjective:  Patient had no fresh complaints    Vitals: Blood Pressure: 144/80 (10/11/22 0739)  Pulse: 97 (10/11/22 0739)  Temperature: 97 5 °F (36 4 °C) (10/11/22 0732)  Temp Source: Oral (10/08/22 1830)  Respirations: 18 (10/11/22 0400)  Height: 5' 10" (177 8 cm) (10/10/22 1105)  Weight - Scale: 96 5 kg (212 lb 12 8 oz) (standing scale) (10/11/22 0619)  SpO2: 95 % (10/11/22 0739)     Exam:   Physical Exam   Constitutional:       General: He is not in acute distress      Appearance: He is well-developed  He is not ill-appearing  HENT:      Head: Normocephalic and atraumatic  Eyes:      General:         Right eye: No discharge          Left eye: No discharge       Extraocular Movements: Extraocular movements intact       Conjunctiva/sclera: Conjunctivae normal    Cardiovascular:      Rate and Rhythm: Normal rate and regular rhythm       Heart sounds: No murmur heard  Pulmonary:      Effort: Pulmonary effort is normal  No respiratory distress       Breath sounds: Normal breath sounds  No wheezing, rhonchi or rales  Abdominal:      General: Bowel sounds are normal  There is no distension       Palpations: Abdomen is soft       Tenderness: There is no abdominal tenderness  Musculoskeletal:      Cervical back: Neck supple       Right lower leg: Edema present       Left lower leg: Edema present  Skin:     General: Skin is warm and dry  Neurological:      Mental Status: He is alert and oriented to person, place, and time       Cranial Nerves: No cranial nerve deficit       Sensory: No sensory deficit       Motor: No weakness     Coordination: Coordination normal       Comments: Following commands and answering questions appropriately  Verta Diego is no slurring of speech  Psychiatric: Larence Hamming and Affect: Mood normal          Behavior: Behavior normal      Discussion with Family: Updated  (son) at bedside  Discharge instructions/Information to patient and family:   See after visit summary for information provided to patient and family  Provisions for Follow-Up Care:  See after visit summary for information related to follow-up care and any pertinent home health orders  Disposition:   Assisted Living Facility at [facility]     Discharge Statement:  I spent 40 minutes discharging the patient  This time was spent on the day of discharge  I had direct contact with the patient on the day of discharge  Greater than 50% of the total time was spent examining patient, answering all patient questions, arranging and discussing plan of care with patient as well as directly providing post-discharge instructions  Additional time then spent on discharge activities  Discharge Medications:  See after visit summary for reconciled discharge medications provided to patient and/or family        Please Note: This note may have been constructed using a voice recognition system

## 2022-10-10 NOTE — PHYSICAL THERAPY NOTE
PHYSICAL THERAPY EVAL/TX  Physical Therapy Evaluation    Performed at least 2 patient identifiers during session:  Patient Active Problem List   Diagnosis    Accidental overdose    History of hypertension    Rheumatoid arthritis ([facility] [address] )    Speech disturbance       Past Medical History:   Diagnosis Date    GERD (gastroesophageal reflux disease)     Gout     Hyperlipidemia     Hypertension     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[address] )     Spinal stenosis        Past Surgical History:   Procedure Laterality Date    CARPAL TUNNEL RELEASE Bilateral     COLONOSCOPY      LAMINECTOMY      TOTAL KNEE ARTHROPLASTY Bilateral     TOTAL SHOULDER REPLACEMENT              10/10/22 6327   PT Last Visit   PT Visit Date 10/10/22   Note Type   Note type Evaluation   Pain Assessment   Pain Assessment Tool 0-10   Pain Score No Pain  (No pain at rest but reports R thigh pain with ambulation )   Restrictions/Precautions   Other Precautions Hard of hearing; Fall Risk;Bed Alarm; Chair Alarm;Cognitive   Home Living   Type of Home Assisted living  ([facility])   Home Layout One level   Bathroom Shower/Tub Walk-in shower   [address] shower seat;Grab bars in [facility] [address]; Wheelchair-manual  (recliner cair lift  Adjustable bed)   Additional Comments Primarily using w/c  Propels wit LE's and UE's   Prior Function   Level of [place] Independent with ADLs and functional mobility; Needs assistance with IADLs   Lives With Facility staff   Falls in the last 6 months 1 to 4   Comments life alert- can call for supervision for mobility and ADL's/   General   Additional Pertinent History Per chart and RN, patient has had hospital delirium at night   Family/Caregiver Present Yes   Cognition   Overall Cognitive Status WFL   Arousal/Participation Alert   Orientation Level Oriented to person;Oriented to time;Oriented to place   Memory Within functional limits   Following Commands Follows one step commands without difficulty   Comments hard of hearing  Has been confused at night   Subjective   Subjective "My hips feel tight "   RLE Assessment   RLE Assessment WFL   LLE Assessment   LLE Assessment WFL   Coordination   Movements are Fluid and Coordinated 1   Sensation WFL   Heel to Shin Intact   Light Touch   RLE Light Touch Grossly intact  (reports that he cannot feel pin pricks)   LLE Light Touch Grossly intact  (Reports that he cannot feel pinpricks)   Bed Mobility   Supine to Sit 4  Minimal assistance   Additional items Assist x 1;HOB elevated; Bedrails; Increased time required;Verbal cues   Additional Comments Sat edge of bed supervision level  Slight lateral lean towards the L  BP stable in seated position  Patient with complaint of initial dizziness  Transfers   Sit to Stand 4  Minimal assistance   Additional items Assist x 1; Armrests; Increased time required;Verbal cues   Stand to Sit 4  Minimal assistance   Additional items Assist x 1; Armrests; Increased time required;Verbal cues   Ambulation/Elevation   Gait pattern Forward Flexion;Decreased foot clearance; Wide SRAVAN   Gait Assistance 4  Minimal assist   Additional items Assist x 1;Verbal cues; Tactile cues   Assistive Device Rolling walker   Distance 5ft   Ambulation/Elevation Additional Comments Refer to treatment session for additional ambulation   Balance   Static Sitting Fair +   Dynamic Sitting Fair   Static Standing Fair   Dynamic Standing Fair   Ambulatory Fair   Endurance Deficit   Endurance Deficit Yes   Endurance Deficit Description Easily fatigued   Activity Tolerance   Activity Tolerance Patient limited by fatigue;Patient limited by pain   Medical Staff Made Aware Co-treat with Marlen UNDERWOOD due to medical complexity   Nurse Made Aware Foster NOVOA   Assessment   Prognosis Good   Problem List Decreased strength;Decreased endurance; Impaired balance;Decreased mobility; Decreased cognition; Impaired hearing;Obesity;Pain   Assessment Patient is an 87y/o M who presented to the ED with speech disturbance  CT and MRI negative  Patient resides at the [facility] at [address] [place] [address]  At baseline, he can perform bed mobility, transfers at a mod I level  He has mostly been using a w/c for mobility  Current medical status includes bed/chair alarm, decreased cognition, pain, fall risk, obesity, decreased sensation, dizziness, decreased strength, balance, endurance and mobility  Patient required assistance for bed mobility, transfers and ambulation  He only tolerated a short ambulation distance before becoming fatigued  Extremely flexed posture in stance  Initially, rehab was recommended but family and patient prefer to go back to the [facility]  They report that he can have assistance with mobility at all times  Recommending he return with assist and home P T  Use of RW or w/c at all times  The patient's AM-PAC Basic Mobility Inpatient Short Form Raw Score is 16  A Raw score of less than or equal to 17 suggests the patient may benefit from discharge to post-acute rehabilitation services  Please also refer to the recommendation of the Physical Therapist for safe discharge planning   Barriers to Discharge None   Barriers to Discharge Comments Patient  can have supervision/assist for mobility if needed  Goals   Patient Goals To get better and go home   STG Expiration Date 10/24/22   Short Term Goal #1 1  Perform supine<>sit with HOB elevated at a ind level 2  Perform sit<>stand transfers mod I 3  Ambulate 15ft with RW at a mod I level 4  W/C mobility 200ft with use of UE's and LE's mod I level   PT Treatment Day 1   Plan   Treatment/Interventions Functional transfer training;LE strengthening/ROM; Therapeutic exercise; Endurance training;Cognitive reorientation;Equipment eval/education; Bed mobility;Gait training;Spoke to nursing;OT   PT Frequency 3-5x/wk   Recommendation   PT Discharge Recommendation Home with home health rehabilitation Additional Comments Initially recommended rehab  Family and patient decline stating that patient can have increased assistance for mobility at [facility]  He has a lifealert that he can use whenever he is going to get up  AM-PAC Basic Mobility Inpatient   Turning in Bed Without Bedrails 3   Lying on Back to Sitting on Edge of Flat Bed 3   Moving Bed to Chair 3   Standing Up From Chair 3   Walk in Room 3   Climb 3-5 Stairs 1   Basic Mobility Inpatient Raw Score 16   Basic Mobility Standardized Score 38 32   Highest Level Of Mobility   -[facility] Goal 5: Stand one or more mins   -HL Achieved 6: Walk 10 steps or more   Additional Treatment Session   Start Time 0935   End Time 0945   Treatment Assessment Ambulated additional 10ft with RW and min A x 1 with w/c follow  Flexed posture, decreased step length and a wide base of support  Equipment Use RW   End of Consult   Patient Position at End of Consult Bedside chair;Bed/Chair alarm activated; All needs within reach     Amy Bradshaw             Patient Name: Toy Llanosjabier PATTERSON Date: 10/10/2022

## 2022-10-10 NOTE — PLAN OF CARE
Problem: Potential for Falls  Goal: Patient will remain free of falls  Description: INTERVENTIONS:  - Educate patient/family on patient safety including physical limitations  - Instruct patient to call for assistance with activity   - Consult OT/PT to assist with strengthening/mobility   - Keep Call bell within reach  - Keep bed low and locked with side rails adjusted as appropriate  - Keep care items and personal belongings within reach  - Initiate and maintain comfort rounds  - Make Fall Risk Sign visible to staff  - Offer Toileting every 2 Hours, in advance of need  - Initiate/Maintain alarm  - Obtain necessary fall risk management equipment  - Apply yellow socks and bracelet for high fall risk patients  - Consider moving patient to room near nurses station  Outcome: Progressing     Problem: MOBILITY - ADULT  Goal: Maintain or return to baseline ADL function  Description: INTERVENTIONS:  -  Assess patient's ability to carry out ADLs; assess patient's baseline for ADL function and identify physical deficits which impact ability to perform ADLs (bathing, care of mouth/teeth, toileting, grooming, dressing, etc )  - Assess/evaluate cause of self-care deficits   - Assess range of motion  - Assess patient's mobility; develop plan if impaired  - Assess patient's need for assistive devices and provide as appropriate  - Encourage maximum independence but intervene and supervise when necessary  - Involve family in performance of ADLs  - Assess for home care needs following discharge   - Consider OT consult to assist with ADL evaluation and planning for discharge  - Provide patient education as appropriate  Outcome: Progressing  Goal: Maintains/Returns to pre admission functional level  Description: INTERVENTIONS:  - Perform BMAT or MOVE assessment daily    - Set and communicate daily mobility goal to care team and patient/family/caregiver     - Collaborate with rehabilitation services on mobility goals if consulted  - Perform Range of Motion 3 times a day  - Reposition patient every 3 hours  - Dangle patient 3 times a day  - Stand patient 3 times a day  - Ambulate patient 3 times a day  - Out of bed to chair 3 times a day   - Out of bed for meals 3 times a day  - Out of bed for toileting  - Record patient progress and toleration of activity level   Outcome: Progressing     Problem: Neurological Deficit  Goal: Neurological status is stable or improving  Description: Interventions:  - Monitor and assess patient's level of consciousness, motor function, sensory function, and level of assistance needed for ADLs  - Monitor and report changes from baseline  Collaborate with interdisciplinary team to initiate plan and implement interventions as ordered  - Provide and maintain a safe environment  - Consider seizure precautions  - Consider fall precautions  - Consider aspiration precautions  - Consider bleeding precautions  Outcome: Progressing     Problem: Activity Intolerance/Impaired Mobility  Goal: Mobility/activity is maintained at optimum level for patient  Description: Interventions:  - Assess and monitor patient  barriers to mobility and need for assistive/adaptive devices  - Assess patient's emotional response to limitations  - Collaborate with interdisciplinary team and initiate plans and interventions as ordered  - Encourage independent activity per ability   - Maintain proper body alignment  - Perform active/passive rom as tolerated/ordered  - Plan activities to conserve energy   - Turn patient as appropriate  Outcome: Progressing     Problem: Communication Impairment  Goal: Ability to express needs and understand communication  Description: Assess patient's communication skills and ability to understand information  Patient will demonstrate use of effective communication techniques, alternative methods of communication and understanding even if not able to speak       - Encourage communication and provide alternate methods of communication as needed  - Collaborate with case management/ for discharge needs  - Include patient/family/caregiver in decisions related to communication  Outcome: Progressing     Problem: Potential for Aspiration  Goal: Non-ventilated patient's risk of aspiration is minimized  Description: Assess and monitor vital signs, respiratory status, and labs (WBC)  Monitor for signs of aspiration (tachypnea, cough, rales, wheezing, cyanosis, fever)  - Assess and monitor patient's ability to swallow  - Place patient up in chair to eat if possible  - HOB up at 90 degrees to eat if unable to get patient up into chair   - Supervise patient during oral intake  - Instruct patient/ family to take small bites  - Instruct patient/ family to take small single sips when taking liquids  - Follow patient-specific strategies generated by speech pathologist   Outcome: Progressing     Problem: Nutrition  Goal: Nutrition/Hydration status is improving  Description: Monitor and assess patient's nutrition/hydration status for malnutrition (ex- brittle hair, bruises, dry skin, pale skin and conjunctiva, muscle wasting, smooth red tongue, and disorientation)  Collaborate with interdisciplinary team and initiate plan and interventions as ordered  Monitor patient's weight and dietary intake as ordered or per policy  Utilize nutrition screening tool and intervene per policy  Determine patient's food preferences and provide high-protein, high-caloric foods as appropriate  - Assist patient with eating   - Allow adequate time for meals   - Encourage patient to take dietary supplement as ordered  - Collaborate with clinical nutritionist   - Include patient/family/caregiver in decisions related to nutrition    Outcome: Progressing     Problem: NEUROSENSORY - ADULT  Goal: Achieves stable or improved neurological status  Description: INTERVENTIONS  - Monitor and report changes in neurological status  - Monitor vital signs such as temperature, blood pressure, glucose, and any other labs ordered   - Initiate measures to prevent increased intracranial pressure  - Monitor for seizure activity and implement precautions if appropriate      Outcome: Progressing  Goal: Achieves maximal functionality and self care  Description: INTERVENTIONS  - Monitor swallowing and airway patency with patient fatigue and changes in neurological status  - Encourage and assist patient to increase activity and self care     - Encourage visually impaired, hearing impaired and aphasic patients to use assistive/communication devices  Outcome: Progressing     Problem: HEMATOLOGIC - ADULT  Goal: Maintains hematologic stability  Description: INTERVENTIONS  - Assess for signs and symptoms of bleeding or hemorrhage  - Monitor labs  - Administer supportive blood products/factors as ordered and appropriate  Outcome: Progressing     Problem: MUSCULOSKELETAL - ADULT  Goal: Maintain or return mobility to safest level of function  Description: INTERVENTIONS:  - Assess patient's ability to carry out ADLs; assess patient's baseline for ADL function and identify physical deficits which impact ability to perform ADLs (bathing, care of mouth/teeth, toileting, grooming, dressing, etc )  - Assess/evaluate cause of self-care deficits   - Assess range of motion  - Assess patient's mobility  - Assess patient's need for assistive devices and provide as appropriate  - Encourage maximum independence but intervene and supervise when necessary  - Involve family in performance of ADLs  - Assess for home care needs following discharge   - Consider OT consult to assist with ADL evaluation and planning for discharge  - Provide patient education as appropriate  Outcome: Progressing  Goal: Maintain proper alignment of affected body part  Description: INTERVENTIONS:  - Support, maintain and protect limb and body alignment  - Provide patient/ family with appropriate education  Outcome: Progressing     Problem: PAIN - ADULT  Goal: Verbalizes/displays adequate comfort level or baseline comfort level  Description: Interventions:  - Encourage patient to monitor pain and request assistance  - Assess pain using appropriate pain scale  - Administer analgesics based on type and severity of pain and evaluate response  - Implement non-pharmacological measures as appropriate and evaluate response  - Consider cultural and social influences on pain and pain management  - Notify physician/advanced practitioner if interventions unsuccessful or patient reports new pain  Outcome: Progressing     Problem: INFECTION - ADULT  Goal: Absence or prevention of progression during hospitalization  Description: INTERVENTIONS:  - Assess and monitor for signs and symptoms of infection  - Monitor lab/diagnostic results  - Monitor all insertion sites, i e  indwelling lines, tubes, and drains  - Monitor endotracheal if appropriate and nasal secretions for changes in amount and color  - Center appropriate cooling/warming therapies per order  - Administer medications as ordered  - Instruct and encourage patient and family to use good hand hygiene technique  - Identify and instruct in appropriate isolation precautions for identified infection/condition  Outcome: Progressing     Problem: DISCHARGE PLANNING  Goal: Discharge to home or other facility with appropriate resources  Description: INTERVENTIONS:  - Identify barriers to discharge w/patient and caregiver  - Arrange for needed discharge resources and transportation as appropriate  - Identify discharge learning needs (meds, wound care, etc )  - Arrange for interpretive services to assist at discharge as needed  - Refer to Case Management Department for coordinating discharge planning if the patient needs post-hospital services based on physician/advanced practitioner order or complex needs related to functional status, cognitive ability, or social support system  Outcome: Progressing     Problem: Knowledge Deficit  Goal: Patient/family/caregiver demonstrates understanding of disease process, treatment plan, medications, and discharge instructions  Description: Complete learning assessment and assess knowledge base    Interventions:  - Provide teaching at level of understanding  - Provide teaching via preferred learning methods  Outcome: Progressing

## 2022-10-11 VITALS
RESPIRATION RATE: 18 BRPM | DIASTOLIC BLOOD PRESSURE: 80 MMHG | WEIGHT: 212.8 LBS | SYSTOLIC BLOOD PRESSURE: 144 MMHG | HEART RATE: 97 BPM | BODY MASS INDEX: 30.46 KG/M2 | OXYGEN SATURATION: 95 % | TEMPERATURE: 97.5 F | HEIGHT: 70 IN

## 2022-10-11 PROBLEM — R94.30 LOW LEFT VENTRICULAR EJECTION FRACTION: Status: ACTIVE | Noted: 2022-10-11

## 2022-10-11 LAB
ALBUMIN SERPL BCP-MCNC: 3 G/DL (ref 3.5–5)
ALP SERPL-CCNC: 81 U/L (ref 46–116)
ALT SERPL W P-5'-P-CCNC: 29 U/L (ref 12–78)
ANION GAP SERPL CALCULATED.3IONS-SCNC: 6 MMOL/L (ref 4–13)
AST SERPL W P-5'-P-CCNC: 44 U/L (ref 5–45)
BASOPHILS # BLD AUTO: 0.04 THOUSANDS/ΜL (ref 0–0.1)
BASOPHILS NFR BLD AUTO: 0 % (ref 0–1)
BILIRUB SERPL-MCNC: 1 MG/DL (ref 0.2–1)
BUN SERPL-MCNC: 13 MG/DL (ref 5–25)
CALCIUM ALBUM COR SERPL-MCNC: 10 MG/DL (ref 8.3–10.1)
CALCIUM SERPL-MCNC: 9.2 MG/DL (ref 8.3–10.1)
CHLORIDE SERPL-SCNC: 101 MMOL/L (ref 96–108)
CO2 SERPL-SCNC: 29 MMOL/L (ref 21–32)
CREAT SERPL-MCNC: 0.98 MG/DL (ref 0.6–1.3)
EOSINOPHIL # BLD AUTO: 0.54 THOUSAND/ΜL (ref 0–0.61)
EOSINOPHIL NFR BLD AUTO: 6 % (ref 0–6)
ERYTHROCYTE [DISTWIDTH] IN BLOOD BY AUTOMATED COUNT: 14.3 % (ref 11.6–15.1)
FLUAV RNA RESP QL NAA+PROBE: NEGATIVE
FLUBV RNA RESP QL NAA+PROBE: NEGATIVE
GFR SERPL CREATININE-BSD FRML MDRD: 68 ML/MIN/1.73SQ M
GLUCOSE SERPL-MCNC: 133 MG/DL (ref 65–140)
HCT VFR BLD AUTO: 43.9 % (ref 36.5–49.3)
HGB BLD-MCNC: 14.5 G/DL (ref 12–17)
IMM GRANULOCYTES # BLD AUTO: 0.02 THOUSAND/UL (ref 0–0.2)
IMM GRANULOCYTES NFR BLD AUTO: 0 % (ref 0–2)
LYMPHOCYTES # BLD AUTO: 1.83 THOUSANDS/ΜL (ref 0.6–4.47)
LYMPHOCYTES NFR BLD AUTO: 20 % (ref 14–44)
MCH RBC QN AUTO: 31.7 PG (ref 26.8–34.3)
MCHC RBC AUTO-ENTMCNC: 33 G/DL (ref 31.4–37.4)
MCV RBC AUTO: 96 FL (ref 82–98)
MONOCYTES # BLD AUTO: 0.7 THOUSAND/ΜL (ref 0.17–1.22)
MONOCYTES NFR BLD AUTO: 8 % (ref 4–12)
NEUTROPHILS # BLD AUTO: 5.95 THOUSANDS/ΜL (ref 1.85–7.62)
NEUTS SEG NFR BLD AUTO: 66 % (ref 43–75)
NRBC BLD AUTO-RTO: 0 /100 WBCS
PLATELET # BLD AUTO: 185 THOUSANDS/UL (ref 149–390)
PMV BLD AUTO: 12.1 FL (ref 8.9–12.7)
POTASSIUM SERPL-SCNC: 4.5 MMOL/L (ref 3.5–5.3)
PROT SERPL-MCNC: 6.8 G/DL (ref 6.4–8.4)
RBC # BLD AUTO: 4.58 MILLION/UL (ref 3.88–5.62)
SARS-COV-2 RNA RESP QL NAA+PROBE: NEGATIVE
SODIUM SERPL-SCNC: 136 MMOL/L (ref 135–147)
WBC # BLD AUTO: 9.08 THOUSAND/UL (ref 4.31–10.16)

## 2022-10-11 PROCEDURE — 85025 COMPLETE CBC W/AUTO DIFF WBC: CPT | Performed by: INTERNAL MEDICINE

## 2022-10-11 PROCEDURE — 99232 SBSQ HOSP IP/OBS MODERATE 35: CPT | Performed by: INTERNAL MEDICINE

## 2022-10-11 PROCEDURE — 99222 1ST HOSP IP/OBS MODERATE 55: CPT | Performed by: PHYSICIAN ASSISTANT

## 2022-10-11 PROCEDURE — 80053 COMPREHEN METABOLIC PANEL: CPT | Performed by: INTERNAL MEDICINE

## 2022-10-11 PROCEDURE — 99239 HOSP IP/OBS DSCHRG MGMT >30: CPT | Performed by: INTERNAL MEDICINE

## 2022-10-11 RX ORDER — LISINOPRIL 5 MG/1
5 TABLET ORAL DAILY
Qty: 30 TABLET | Refills: 0 | Status: SHIPPED | OUTPATIENT
Start: 2022-10-12 | End: 2022-11-11

## 2022-10-11 RX ORDER — METOPROLOL SUCCINATE 25 MG/1
25 TABLET, EXTENDED RELEASE ORAL DAILY
Qty: 30 TABLET | Refills: 0 | Status: SHIPPED | OUTPATIENT
Start: 2022-10-12 | End: 2022-11-11

## 2022-10-11 RX ORDER — METOPROLOL SUCCINATE 25 MG/1
25 TABLET, EXTENDED RELEASE ORAL DAILY
Status: DISCONTINUED | OUTPATIENT
Start: 2022-10-12 | End: 2022-10-11 | Stop reason: HOSPADM

## 2022-10-11 RX ORDER — LOPERAMIDE HYDROCHLORIDE 2 MG/1
2 CAPSULE ORAL ONCE
Status: COMPLETED | OUTPATIENT
Start: 2022-10-11 | End: 2022-10-11

## 2022-10-11 RX ORDER — LISINOPRIL 5 MG/1
5 TABLET ORAL DAILY
Status: DISCONTINUED | OUTPATIENT
Start: 2022-10-12 | End: 2022-10-11 | Stop reason: HOSPADM

## 2022-10-11 RX ADMIN — OXYBUTYNIN CHLORIDE 5 MG: 5 TABLET, EXTENDED RELEASE ORAL at 08:37

## 2022-10-11 RX ADMIN — ALLOPURINOL 400 MG: 300 TABLET ORAL at 08:36

## 2022-10-11 RX ADMIN — METOPROLOL TARTRATE 25 MG: 25 TABLET, FILM COATED ORAL at 08:36

## 2022-10-11 RX ADMIN — LOPERAMIDE HYDROCHLORIDE 2 MG: 2 CAPSULE ORAL at 15:28

## 2022-10-11 RX ADMIN — DICLOFENAC SODIUM 2 G: 10 GEL TOPICAL at 08:39

## 2022-10-11 RX ADMIN — GABAPENTIN 100 MG: 100 CAPSULE ORAL at 08:37

## 2022-10-11 RX ADMIN — DICLOFENAC SODIUM 2 G: 10 GEL TOPICAL at 12:21

## 2022-10-11 RX ADMIN — ENOXAPARIN SODIUM 40 MG: 100 INJECTION SUBCUTANEOUS at 08:37

## 2022-10-11 RX ADMIN — CLOPIDOGREL BISULFATE 75 MG: 75 TABLET ORAL at 08:36

## 2022-10-11 RX ADMIN — EZETIMIBE 10 MG: 10 TABLET ORAL at 08:36

## 2022-10-11 RX ADMIN — ACETAMINOPHEN 650 MG: 325 TABLET ORAL at 06:20

## 2022-10-11 RX ADMIN — ASPIRIN 81 MG CHEWABLE TABLET 81 MG: 81 TABLET CHEWABLE at 08:36

## 2022-10-11 RX ADMIN — PREDNISONE 5 MG: 5 TABLET ORAL at 08:37

## 2022-10-11 NOTE — ASSESSMENT & PLAN NOTE
· Presented for expressive speech disturbance described as incoherence/word salad that occurred from 3750-4531 on 10/8 while patient was at breakfast (resides at [address] [facility])  Patient was without trouble understanding speech, no additional deficits or prior episodes  Resolved at time of admission  · Virtual neuro eval in ED, no TPA  · CTH: no acute intracranial abnormality  · Patient was put on the Stroke pathway  · MRI brain done 10/9/22 showed white matter changes suggestive of chronic microangiopathy  No acute intracranial pathology  · Echo was done revealing EF of 20%  Cardiology reviewed and recommended goal-directed medical therapy and follow-up as outpatient  Patient will benefit from stress test   · Continue ASA 81 mg daily  · Patient was loaded with Plavix 300 mg load followed by 75 mg daily   · Statin deferred, prior intolerance --> continue home zetia   · BP goal is normotension, no need for permissive HTN  · Neurology reviewed the patient and recommended him to be discharged home on aspirin and Plavix as dual antiplatelet therapy for 3 weeks and subsequently continue with aspirin  To follow up with Neurology as outpatient  · A loop recorder was also advised    Cardiology will help to have this set up

## 2022-10-11 NOTE — PLAN OF CARE
Problem: Potential for Falls  Goal: Patient will remain free of falls  Description: INTERVENTIONS:  - Educate patient/family on patient safety including physical limitations  - Instruct patient to call for assistance with activity   - Consult OT/PT to assist with strengthening/mobility   - Keep Call bell within reach  - Keep bed low and locked with side rails adjusted as appropriate  - Keep care items and personal belongings within reach  - Initiate and maintain comfort rounds  - Make Fall Risk Sign visible to staff  - Offer Toileting every x Hours, in advance of need  - Initiate/Maintain xalarm  - Obtain necessary fall risk management equipment: x  - Apply yellow socks and bracelet for high fall risk patients  - Consider moving patient to room near nurses station  Outcome: Progressing     Problem: MOBILITY - ADULT  Goal: Maintain or return to baseline ADL function  Description: INTERVENTIONS:  -  Assess patient's ability to carry out ADLs; assess patient's baseline for ADL function and identify physical deficits which impact ability to perform ADLs (bathing, care of mouth/teeth, toileting, grooming, dressing, etc )  - Assess/evaluate cause of self-care deficits   - Assess range of motion  - Assess patient's mobility; develop plan if impaired  - Assess patient's need for assistive devices and provide as appropriate  - Encourage maximum independence but intervene and supervise when necessary  - Involve family in performance of ADLs  - Assess for home care needs following discharge   - Consider OT consult to assist with ADL evaluation and planning for discharge  - Provide patient education as appropriate  Outcome: Progressing  Goal: Maintains/Returns to pre admission functional level  Description: INTERVENTIONS:  - Perform BMAT or MOVE assessment daily    - Set and communicate daily mobility goal to care team and patient/family/caregiver     - Collaborate with rehabilitation services on mobility goals if consulted  - Perform Range of Motion x times a day  - Reposition patient every x hours  - Dangle patient x times a day  - Stand patient x times a day  - Ambulate patient x times a day  - Out of bed to chair x times a day   - Out of bed for meals x times a day  - Out of bed for toileting  - Record patient progress and toleration of activity level   Outcome: Progressing     Problem: Neurological Deficit  Goal: Neurological status is stable or improving  Description: Interventions:  - Monitor and assess patient's level of consciousness, motor function, sensory function, and level of assistance needed for ADLs  - Monitor and report changes from baseline  Collaborate with interdisciplinary team to initiate plan and implement interventions as ordered  - Provide and maintain a safe environment  - Consider seizure precautions  - Consider fall precautions  - Consider aspiration precautions  - Consider bleeding precautions  Outcome: Progressing     Problem: Activity Intolerance/Impaired Mobility  Goal: Mobility/activity is maintained at optimum level for patient  Description: Interventions:  - Assess and monitor patient  barriers to mobility and need for assistive/adaptive devices  - Assess patient's emotional response to limitations  - Collaborate with interdisciplinary team and initiate plans and interventions as ordered  - Encourage independent activity per ability   - Maintain proper body alignment  - Perform active/passive rom as tolerated/ordered  - Plan activities to conserve energy   - Turn patient as appropriate  Outcome: Progressing     Problem: Communication Impairment  Goal: Ability to express needs and understand communication  Description: Assess patient's communication skills and ability to understand information  Patient will demonstrate use of effective communication techniques, alternative methods of communication and understanding even if not able to speak       - Encourage communication and provide alternate methods of communication as needed  - Collaborate with case management/ for discharge needs  - Include patient/family/caregiver in decisions related to communication  Outcome: Progressing     Problem: Potential for Aspiration  Goal: Non-ventilated patient's risk of aspiration is minimized  Description: Assess and monitor vital signs, respiratory status, and labs (WBC)  Monitor for signs of aspiration (tachypnea, cough, rales, wheezing, cyanosis, fever)  - Assess and monitor patient's ability to swallow  - Place patient up in chair to eat if possible  - HOB up at 90 degrees to eat if unable to get patient up into chair   - Supervise patient during oral intake  - Instruct patient/ family to take small bites  - Instruct patient/ family to take small single sips when taking liquids  - Follow patient-specific strategies generated by speech pathologist   Outcome: Progressing     Problem: Nutrition  Goal: Nutrition/Hydration status is improving  Description: Monitor and assess patient's nutrition/hydration status for malnutrition (ex- brittle hair, bruises, dry skin, pale skin and conjunctiva, muscle wasting, smooth red tongue, and disorientation)  Collaborate with interdisciplinary team and initiate plan and interventions as ordered  Monitor patient's weight and dietary intake as ordered or per policy  Utilize nutrition screening tool and intervene per policy  Determine patient's food preferences and provide high-protein, high-caloric foods as appropriate  - Assist patient with eating   - Allow adequate time for meals   - Encourage patient to take dietary supplement as ordered  - Collaborate with clinical nutritionist   - Include patient/family/caregiver in decisions related to nutrition    Outcome: Progressing     Problem: NEUROSENSORY - ADULT  Goal: Achieves stable or improved neurological status  Description: INTERVENTIONS  - Monitor and report changes in neurological status  - Monitor vital signs such as temperature, blood pressure, glucose, and any other labs ordered   - Initiate measures to prevent increased intracranial pressure  - Monitor for seizure activity and implement precautions if appropriate      Outcome: Progressing  Goal: Achieves maximal functionality and self care  Description: INTERVENTIONS  - Monitor swallowing and airway patency with patient fatigue and changes in neurological status  - Encourage and assist patient to increase activity and self care     - Encourage visually impaired, hearing impaired and aphasic patients to use assistive/communication devices  Outcome: Progressing     Problem: HEMATOLOGIC - ADULT  Goal: Maintains hematologic stability  Description: INTERVENTIONS  - Assess for signs and symptoms of bleeding or hemorrhage  - Monitor labs  - Administer supportive blood products/factors as ordered and appropriate  Outcome: Progressing     Problem: MUSCULOSKELETAL - ADULT  Goal: Maintain or return mobility to safest level of function  Description: INTERVENTIONS:  - Assess patient's ability to carry out ADLs; assess patient's baseline for ADL function and identify physical deficits which impact ability to perform ADLs (bathing, care of mouth/teeth, toileting, grooming, dressing, etc )  - Assess/evaluate cause of self-care deficits   - Assess range of motion  - Assess patient's mobility  - Assess patient's need for assistive devices and provide as appropriate  - Encourage maximum independence but intervene and supervise when necessary  - Involve family in performance of ADLs  - Assess for home care needs following discharge   - Consider OT consult to assist with ADL evaluation and planning for discharge  - Provide patient education as appropriate  Outcome: Progressing  Goal: Maintain proper alignment of affected body part  Description: INTERVENTIONS:  - Support, maintain and protect limb and body alignment  - Provide patient/ family with appropriate education  Outcome: Progressing     Problem: PAIN - ADULT  Goal: Verbalizes/displays adequate comfort level or baseline comfort level  Description: Interventions:  - Encourage patient to monitor pain and request assistance  - Assess pain using appropriate pain scale  - Administer analgesics based on type and severity of pain and evaluate response  - Implement non-pharmacological measures as appropriate and evaluate response  - Consider cultural and social influences on pain and pain management  - Notify physician/advanced practitioner if interventions unsuccessful or patient reports new pain  Outcome: Progressing     Problem: INFECTION - ADULT  Goal: Absence or prevention of progression during hospitalization  Description: INTERVENTIONS:  - Assess and monitor for signs and symptoms of infection  - Monitor lab/diagnostic results  - Monitor all insertion sites, i e  indwelling lines, tubes, and drains  - Monitor endotracheal if appropriate and nasal secretions for changes in amount and color  - [place] appropriate cooling/warming therapies per order  - Administer medications as ordered  - Instruct and encourage patient and family to use good hand hygiene technique  - Identify and instruct in appropriate isolation precautions for identified infection/condition  Outcome: Progressing     Problem: SAFETY ADULT  Goal: Patient will remain free of falls  Description: INTERVENTIONS:  - Educate patient/family on patient safety including physical limitations  - Instruct patient to call for assistance with activity   - Consult OT/PT to assist with strengthening/mobility   - Keep Call bell within reach  - Keep bed low and locked with side rails adjusted as appropriate  - Keep care items and personal belongings within reach  - Initiate and maintain comfort rounds  - Make Fall Risk Sign visible to staff  - Offer Toileting every x Hours, in advance of need  - Initiate/Maintain xalarm  - Obtain necessary fall risk management equipment: x  - Apply yellow socks and bracelet for high fall risk patients  - Consider moving patient to room near nurses station  Outcome: Progressing     Problem: DISCHARGE PLANNING  Goal: Discharge to home or other facility with appropriate resources  Description: INTERVENTIONS:  - Identify barriers to discharge w/patient and caregiver  - Arrange for needed discharge resources and transportation as appropriate  - Identify discharge learning needs (meds, wound care, etc )  - Arrange for interpretive services to assist at discharge as needed  - Refer to Case Management Department for coordinating discharge planning if the patient needs post-hospital services based on physician/advanced practitioner order or complex needs related to functional status, cognitive ability, or social support system  Outcome: Progressing     Problem: Knowledge Deficit  Goal: Patient/family/caregiver demonstrates understanding of disease process, treatment plan, medications, and discharge instructions  Description: Complete learning assessment and assess knowledge base    Interventions:  - Provide teaching at level of understanding  - Provide teaching via preferred learning methods  Outcome: Progressing     Problem: Prexisting or High Potential for Compromised Skin Integrity  Goal: Skin integrity is maintained or improved  Description: INTERVENTIONS:  - Identify patients at risk for skin breakdown  - Assess and monitor skin integrity  - Assess and monitor nutrition and hydration status  - Monitor labs   - Assess for incontinence   - Turn and reposition patient  - Assist with mobility/ambulation  - Relieve pressure over bony prominences  - Avoid friction and shearing  - Provide appropriate hygiene as needed including keeping skin clean and dry  - Evaluate need for skin moisturizer/barrier cream  - Collaborate with interdisciplinary team   - Patient/family teaching  - Consider wound care consult   Outcome: Progressing     Problem: Nutrition/Hydration-ADULT  Goal: Nutrient/Hydration intake appropriate for improving, restoring or maintaining nutritional needs  Description: Monitor and assess patient's nutrition/hydration status for malnutrition  Collaborate with interdisciplinary team and initiate plan and interventions as ordered  Monitor patient's weight and dietary intake as ordered or per policy  Utilize nutrition screening tool and intervene as necessary  Determine patient's food preferences and provide high-protein, high-caloric foods as appropriate       INTERVENTIONS:  - Monitor oral intake, urinary output, labs, and treatment plans  - Assess nutrition and hydration status and recommend course of action  - Evaluate amount of meals eaten  - Assist patient with eating if necessary   - Allow adequate time for meals  - Recommend/ encourage appropriate diets, oral nutritional supplements, and vitamin/mineral supplements  - Order, calculate, and assess calorie counts as needed  - Recommend, monitor, and adjust tube feedings and TPN/PPN based on assessed needs  - Assess need for intravenous fluids  - Provide specific nutrition/hydration education as appropriate  - Include patient/family/caregiver in decisions related to nutrition  Outcome: Progressing     Problem: CARDIOVASCULAR - ADULT  Goal: Maintains optimal cardiac output and hemodynamic stability  Description: INTERVENTIONS:  - Monitor I/O, vital signs and rhythm  - Monitor for S/S and trends of decreased cardiac output  - Administer and titrate ordered vasoactive medications to optimize hemodynamic stability  - Assess quality of pulses, skin color and temperature  - Assess for signs of decreased coronary artery perfusion  - Instruct patient to report change in severity of symptoms  Outcome: Progressing     Problem: METABOLIC, FLUID AND ELECTROLYTES - ADULT  Goal: Electrolytes maintained within normal limits  Description: INTERVENTIONS:  - Monitor labs and assess patient for signs and symptoms of electrolyte imbalances  - Administer electrolyte replacement as ordered  - Monitor response to electrolyte replacements, including repeat lab results as appropriate  - Instruct patient on fluid and nutrition as appropriate  Outcome: Progressing  Goal: Fluid balance maintained  Description: INTERVENTIONS:  - Monitor labs   - Monitor I/O and WT  - Instruct patient on fluid and nutrition as appropriate  - Assess for signs & symptoms of volume excess or deficit  Outcome: Progressing     Problem: SKIN/TISSUE INTEGRITY - ADULT  Goal: Skin Integrity remains intact(Skin Breakdown Prevention)  Description: Assess:  -Perform Micha assessment every x  -Clean and moisturize skin every x  -Inspect skin when repositioning, toileting, and assisting with ADLS  -Assess under medical devices such as x every x  -Assess extremities for adequate circulation and sensation     Bed Management:  -Have minimal linens on bed & keep smooth, unwrinkled  -Change linens as needed when moist or perspiring  -Avoid sitting or lying in one position for more than x hours while in bed  -Keep HOB at [facility]     Toileting:  -Offer bedside commode  -Assess for incontinence every x  -Use incontinent care products after each incontinent episode such as x    Activity:  -Mobilize patient x times a day  -Encourage activity and walks on unit  -Encourage or provide ROM exercises   -Turn and reposition patient every x Hours  -Use appropriate equipment to lift or move patient in bed  -Instruct/ Assist with weight shifting every xxxxxx when out of bed in chair  -Consider limitation of chair time x hour intervals    Skin Care:  -Avoid use of baby powder, tape, friction and shearing, hot water or constrictive clothing  -Relieve pressure over bony prominences using x  -Do not massage red bony areas    Next Steps:  -Teach patient strategies to minimize risks such as x   -Consider consults to  interdisciplinary teams such as x  Outcome: Progressing

## 2022-10-11 NOTE — DISCHARGE INSTRUCTIONS
Dear Mr Maci Preciado came to the hospital due to concern for stroke  Fortunately from all the testing done you did not have stroke at this time  You had possibly a TIA which is called a mini-stroke because your symptoms resolved within 24 hours  You had a CT scan and MRI done which did not show any signs of stroke  You also had an ultrasound of the vessels in your neck which was negative  You were seen by the neurologist who recommended that you should be discharged home on aspirin and Plavix  You will take the aspirin and Plavix every day for three weeks and then continue subsequently with only Plavix  Since your echocardiogram(ultrasound of your heart) done showed that your heart function was at 20%, you were seen by Cardiology and they recommended a stress test as outpatient  Please reach out to your primary care physician within one week of discharge to discuss your recent hospitalization  Please also follow-up with your primary care physician to ensure that you have a loop recorder ordered  This is being arranged by cardiology  Please ensure you follow-up with the neurologist as outpatient  New medication:  - Stat taking Plavix 75 mg daily for 3 weeks  This will be used in addition to aspirin 81 mg daily ( which you were on before) for 3 weeks, and then subsequently continue with only Plavix 75 mg daily   - Start taking lisinopril 5 mg daily     Continue taking your medications as prescribed  It was a pleasure taking care of you   We wish you the very best

## 2022-10-11 NOTE — CONSULTS
Consult - Cardiology   Joelyn Duane 80 y o  male MRN: [id number]  Unit/Bed#: -01 Encounter: [id number]        Reason For Consult:   Cardiomyopathy  Outpatient Cardiologist: No routine prior               ASSESSMENT:  1  Unspecified cardiomyopathy  a  TTE 10/10/2022:  LVEF 20%, global hypokinesis with regional variations, mild AR moderate MR  2  Dysarthria, acute TIA (reason for hospitalization)  3  Hypertension  4  Rheumatoid arthritis    PLAN/ DISCUSSION:     • Etiology of reduced LVEF is unclear  He has no symptoms of angina, though exertion is limited by spinal stenosis  His troponin levels have been negative  His EKG has no obvious ischemia  • Decreased LVEF may be related to acute TIA  • Will begin low-dose ACE-inhibitor and continue Toprol-XL  • Will discuss modality and timing of ischemic testing with attending later today  • Currently patient examines euvolemic and is not on diuretic therapy  • Patient recommended outpatient extended monitor for his TIA  Will try to arrange loop recorder but if this is denied by insurance he will need Zio patch first and if unremarkable then can get a loop recorder  Message sent yesterday to scheduling department     History Of Present Illness:  Pleasant 61-year-old gentleman without any routine care from a cardiologist   He tells me that he had previously seen a cardiologist in [place] but has been several years and this was for a preoperative evaluation for spine surgery  This gentleman has past medical history of hypertension and rheumatoid arthritis  He resides at a local nursing home however is relatively independent  His mobility is limited by spinal stenosis and back pain  Presently he is hospitalized for dysarthria  He came to the hospital on 10/08/2022 with a chief complaint of slurred speech for approximately 1 hour  He was a stroke alert on arrival   He did not require thrombolytics therapy    He was admitted to the hospital and seen by Neurology  MRI did not reveal any acute stroke  He was diagnosed with a TIA  As part of his workup an echocardiogram was performed which showed an EF of 20% for which reason we have been asked see in consultation  This morning he relates to me that he feels well  He has no physical complaints  He is eager to get discharged hopefully later today but understands that he may need to stay for further testing on his heart  Past Medical History:        Past Medical History:   Diagnosis Date   • GERD (gastroesophageal reflux disease)    • Gout    • Hyperlipidemia    • Hypertension    • 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[facility] [address] )    • Spinal stenosis       Past Surgical History:   Procedure Laterality Date   • CARPAL TUNNEL RELEASE Bilateral    • COLONOSCOPY     • LAMINECTOMY     • TOTAL KNEE ARTHROPLASTY Bilateral    • TOTAL SHOULDER REPLACEMENT          Allergy:        Allergies   Allergen Reactions   • Colchicine Other (See Comments)     Flushing     • Niacin Other (See Comments)     flushed   • Statins        Medications:       Prior to Admission medications    Medication Sig Start Date End Date Taking?  Authorizing Provider   clopidogrel (PLAVIX) 75 mg tablet Take 1 tablet (75 mg total) by mouth daily Do not start before October 11, 2022  10/11/22 11/10/22 Yes Aure Fuchs   allopurinol (ZYLOPRIM) 100 mg tablet Take 400 mg by mouth daily    Historical Provider, MD   ascorbic acid (VITAMIN C) 500 MG tablet Take 500 mg by mouth daily    Historical Provider, MD   aspirin 81 mg chewable tablet Chew 81 mg daily    Historical Provider, MD   cetirizine (ZyrTEC) 10 mg tablet Take 10 mg by mouth daily    Historical Provider, MD   ezetimibe (ZETIA) 10 mg tablet Take 10 mg by mouth daily    Historical Provider, MD   gabapentin (NEURONTIN) 100 mg capsule Take 100 mg by mouth daily At bedtime    Historical Provider, MD   hydroxychloroquine (PLAQUENIL) 200 mg tablet Take 200 mg by mouth 2 (two) times a day with meals Historical Provider, MD   loperamide (IMODIUM) 2 mg capsule Take 2 mg by mouth as needed for diarrhea    Historical Provider, MD   metoprolol tartrate (LOPRESSOR) 25 mg tablet Take 25 mg by mouth daily     Historical Provider, MD   Multiple Vitamin (multivitamin) tablet Take 1 tablet by mouth daily    Historical Provider, MD   NON FORMULARY Medical marijuana    Historical Provider, MD   omeprazole (PriLOSEC) 10 mg delayed release capsule Take 10 mg by mouth daily    Historical Provider, MD   polyethylene glycol (MIRALAX) 17 g packet Take 17 g by mouth every other day 12/21/21   ESTEFANIA Warren   predniSONE 5 mg tablet Take by mouth daily    Historical Provider, MD   psyllium (METAMUCIL SMOOTH TEXTURE) 28 % packet Take 1 packet by mouth 2 (two) times a day 12/21/21   ESTEFANIA Duncan   solifenacin (VESICARE) 5 mg tablet Take 5 mg by mouth daily    Historical Provider, MD       Family History:     Family History   Problem Relation Age of Onset   • Colon polyps Neg Hx    • Colon cancer Neg Hx         Social History:       Social History     Socioeconomic History   • Marital status:      Spouse name: None   • Number of children: None   • Years of education: None   • Highest education level: None   Occupational History   • None   Tobacco Use   • Smoking status: Never Smoker   • Smokeless tobacco: Never Used   Vaping Use   • Vaping Use: Never used   Substance and Sexual Activity   • Alcohol use:  Yes     Alcohol/week: 2 0 standard drinks     Types: 2 Shots of liquor per week     Comment: 2-3 ounces of whiskey a day   • Drug use: Yes     Types: Marijuana   • Sexual activity: Not Currently   Other Topics Concern   • None   Social History Narrative   • None     Social Determinants of Health     Financial Resource Strain: Not on file   Food Insecurity: Not on file   Transportation Needs: Not on file   Physical Activity: Not on file   Stress: Not on file   Social Connections: Not on file   Intimate Partner Violence: Not on file   Housing Stability: Not on file       ROS:  14 point ROS negative except as outlined above  Remainder review of systems is negative    Exam:  General:  alert, oriented and in no distress, cooperative  Head: Normocephalic, atraumatic  Eyes:  EOMI  Pupils - equal, round, reactive to accomodation  No icterus  Normal Conjunctiva  Oropharynx: moist and normal-appearing mucosa  Neck: supple, symmetrical, trachea midline and no JVD  Heart:  RRR, No: murmer, rub or gallop, S1 & S2 normal   Respiratory effort / Chest Inspection: unlabored  Lungs:  normal air entry, lungs clear to auscultation and no rales, rhonchi or wheezing   Abdomen: flat, normal findings: bowel sounds normal and soft, non-tender  Lower Limbs:  no pitting edema  Pulses[de-identified]  RLE - DP: present 2+                 LLE - DP: present 2+  Musculoskeletal: ROM grossly normal        DATA:      ECG:                     Telemetry: Not on tele           Echocardiogram:           Ischemic Testing:         Weights: Wt Readings from Last 3 Encounters:   10/11/22 96 5 kg (212 lb 12 8 oz)   12/21/21 97 5 kg (215 lb)   08/29/20 97 3 kg (214 lb 8 1 oz)   , Body mass index is 30 53 kg/m²           Lab Studies:           Results from last 7 days   Lab Units 10/09/22  0409   TRIGLYCERIDES mg/dL 138   HDL mg/dL 70     Results from last 7 days   Lab Units 10/09/22  0409 10/08/22  1243   WBC Thousand/uL 7 20 10 30*   HEMOGLOBIN g/dL 13 7 14 1   HEMATOCRIT % 41 3 43 0   PLATELETS Thousands/uL 150 187   ,   Results from last 7 days   Lab Units 10/09/22  0409 10/08/22  1243   POTASSIUM mmol/L 3 6 3 7   CHLORIDE mmol/L 102 102   CO2 mmol/L 31 33*   BUN mg/dL 8 10   CREATININE mg/dL 0 90 1 04   CALCIUM mg/dL 9 0 8 9   ALK PHOS U/L 78  --    ALT U/L 24  --    AST U/L 22  --

## 2022-10-11 NOTE — CASE MANAGEMENT
Case Management Progress Note    Patient name Sridhar Dhaliwal  Location /-28 MRN [id number]  : 1934 Date 10/11/2022       LOS (days): 2  Geometric Mean LOS (GMLOS) (days): 2 00  Days to GMLOS:0 1        OBJECTIVE:        Current admission status: Inpatient  Preferred Pharmacy:   [address] [place] [address], [address] [place] [address] [address]  [address] [address]  Phone: [phone] Fax: [phone]    Primary Care Provider: Pernell Rubinstein, MD    Primary Insurance: [facility]  Secondary Insurance:     PROGRESS NOTE:  Call placed to [facility] at [address]), spoke with Blake Marrero  Informed Blake Marrero of Pt's discharge today back to assisted living   Faxed discharge instructions to Blake Marrero at  [phone] Pt's son to transport Pt back to [facility] [place]

## 2022-10-11 NOTE — PLAN OF CARE
Problem: Potential for Falls  Goal: Patient will remain free of falls  Description: INTERVENTIONS:  - Educate patient/family on patient safety including physical limitations  - Instruct patient to call for assistance with activity   - Consult OT/PT to assist with strengthening/mobility   - Keep Call bell within reach  - Keep bed low and locked with side rails adjusted as appropriate  - Keep care items and personal belongings within reach  - Initiate and maintain comfort rounds  - Make Fall Risk Sign visible to staff  - Offer Toileting every x Hours, in advance of need  - Initiate/Maintain xalarm  - Obtain necessary fall risk management equipment: x  - Apply yellow socks and bracelet for high fall risk patients  - Consider moving patient to room near nurses station  10/11/2022 1546 by Lanie Rendon RN  Outcome: Adequate for Discharge  10/11/2022 1239 by Lanie Rendon RN  Outcome: Progressing     Problem: MOBILITY - ADULT  Goal: Maintain or return to baseline ADL function  Description: INTERVENTIONS:  -  Assess patient's ability to carry out ADLs; assess patient's baseline for ADL function and identify physical deficits which impact ability to perform ADLs (bathing, care of mouth/teeth, toileting, grooming, dressing, etc )  - Assess/evaluate cause of self-care deficits   - Assess range of motion  - Assess patient's mobility; develop plan if impaired  - Assess patient's need for assistive devices and provide as appropriate  - Encourage maximum independence but intervene and supervise when necessary  - Involve family in performance of ADLs  - Assess for home care needs following discharge   - Consider OT consult to assist with ADL evaluation and planning for discharge  - Provide patient education as appropriate  10/11/2022 1546 by Lanie Rendon RN  Outcome: Adequate for Discharge  10/11/2022 1239 by Lanie Rendon RN  Outcome: Progressing  Goal: Maintains/Returns to pre admission functional level  Description: INTERVENTIONS:  - Perform BMAT or MOVE assessment daily    - Set and communicate daily mobility goal to care team and patient/family/caregiver  - Collaborate with rehabilitation services on mobility goals if consulted  - Perform Range of Motion x times a day  - Reposition patient every x hours  - Dangle patient x times a day  - Stand patient x times a day  - Ambulate patient x times a day  - Out of bed to chair x times a day   - Out of bed for meals x times a day  - Out of bed for toileting  - Record patient progress and toleration of activity level   10/11/2022 1546 by Susan Thompson RN  Outcome: Adequate for Discharge  10/11/2022 1239 by Susan Thompson RN  Outcome: Progressing     Problem: Neurological Deficit  Goal: Neurological status is stable or improving  Description: Interventions:  - Monitor and assess patient's level of consciousness, motor function, sensory function, and level of assistance needed for ADLs  - Monitor and report changes from baseline  Collaborate with interdisciplinary team to initiate plan and implement interventions as ordered  - Provide and maintain a safe environment  - Consider seizure precautions  - Consider fall precautions  - Consider aspiration precautions  - Consider bleeding precautions  10/11/2022 1546 by Susan Thompson RN  Outcome: Adequate for Discharge  10/11/2022 1239 by Susan Thompson RN  Outcome: Progressing     Problem: Activity Intolerance/Impaired Mobility  Goal: Mobility/activity is maintained at optimum level for patient  Description: Interventions:  - Assess and monitor patient  barriers to mobility and need for assistive/adaptive devices  - Assess patient's emotional response to limitations  - Collaborate with interdisciplinary team and initiate plans and interventions as ordered  - Encourage independent activity per ability   - Maintain proper body alignment  - Perform active/passive rom as tolerated/ordered    - Plan activities to conserve energy   - Turn patient as appropriate  10/11/2022 1546 by Caron L[place] RN  Outcome: Adequate for Discharge  10/11/2022 1239 by aCron Lima RN  Outcome: Progressing     Problem: Communication Impairment  Goal: Ability to express needs and understand communication  Description: Assess patient's communication skills and ability to understand information  Patient will demonstrate use of effective communication techniques, alternative methods of communication and understanding even if not able to speak  - Encourage communication and provide alternate methods of communication as needed  - Collaborate with case management/ for discharge needs  - Include patient/family/caregiver in decisions related to communication  10/11/2022 1546 by Caron L[place] RN  Outcome: Adequate for Discharge  10/11/2022 1239 by Caron [place] RN  Outcome: Progressing     Problem: Potential for Aspiration  Goal: Non-ventilated patient's risk of aspiration is minimized  Description: Assess and monitor vital signs, respiratory status, and labs (WBC)  Monitor for signs of aspiration (tachypnea, cough, rales, wheezing, cyanosis, fever)  - Assess and monitor patient's ability to swallow  - Place patient up in chair to eat if possible  - HOB up at 90 degrees to eat if unable to get patient up into chair   - Supervise patient during oral intake  - Instruct patient/ family to take small bites  - Instruct patient/ family to take small single sips when taking liquids    - Follow patient-specific strategies generated by speech pathologist   10/11/2022 1546 by Caron Lima RN  Outcome: Adequate for Discharge  10/11/2022 1239 by Caron [place] RN  Outcome: Progressing     Problem: Nutrition  Goal: Nutrition/Hydration status is improving  Description: Monitor and assess patient's nutrition/hydration status for malnutrition (ex- brittle hair, bruises, dry skin, pale skin and conjunctiva, muscle wasting, smooth red tongue, and disorientation)  Collaborate with interdisciplinary team and initiate plan and interventions as ordered  Monitor patient's weight and dietary intake as ordered or per policy  Utilize nutrition screening tool and intervene per policy  Determine patient's food preferences and provide high-protein, high-caloric foods as appropriate  - Assist patient with eating   - Allow adequate time for meals   - Encourage patient to take dietary supplement as ordered  - Collaborate with clinical nutritionist   - Include patient/family/caregiver in decisions related to nutrition  10/11/2022 1546 by Julio Cornejo RN  Outcome: Adequate for Discharge  10/11/2022 1239 by Julio Cornejo RN  Outcome: Progressing     Problem: NEUROSENSORY - ADULT  Goal: Achieves stable or improved neurological status  Description: INTERVENTIONS  - Monitor and report changes in neurological status  - Monitor vital signs such as temperature, blood pressure, glucose, and any other labs ordered   - Initiate measures to prevent increased intracranial pressure  - Monitor for seizure activity and implement precautions if appropriate      10/11/2022 1546 by Julio Cornejo RN  Outcome: Adequate for Discharge  10/11/2022 1239 by Julio Cornejo RN  Outcome: Progressing  Goal: Achieves maximal functionality and self care  Description: INTERVENTIONS  - Monitor swallowing and airway patency with patient fatigue and changes in neurological status  - Encourage and assist patient to increase activity and self care     - Encourage visually impaired, hearing impaired and aphasic patients to use assistive/communication devices  10/11/2022 1546 by Julio Cornejo RN  Outcome: Adequate for Discharge  10/11/2022 1239 by Julio Cornejo RN  Outcome: Progressing     Problem: HEMATOLOGIC - ADULT  Goal: Maintains hematologic stability  Description: INTERVENTIONS  - Assess for signs and symptoms of bleeding or hemorrhage  - Monitor labs  - Administer supportive blood products/factors as ordered and appropriate  10/11/2022 1546 by Julio Cornejo RN  Outcome: Adequate for Discharge  10/11/2022 1239 by Julio Cornejo RN  Outcome: Progressing     Problem: MUSCULOSKELETAL - ADULT  Goal: Maintain or return mobility to safest level of function  Description: INTERVENTIONS:  - Assess patient's ability to carry out ADLs; assess patient's baseline for ADL function and identify physical deficits which impact ability to perform ADLs (bathing, care of mouth/teeth, toileting, grooming, dressing, etc )  - Assess/evaluate cause of self-care deficits   - Assess range of motion  - Assess patient's mobility  - Assess patient's need for assistive devices and provide as appropriate  - Encourage maximum independence but intervene and supervise when necessary  - Involve family in performance of ADLs  - Assess for home care needs following discharge   - Consider OT consult to assist with ADL evaluation and planning for discharge  - Provide patient education as appropriate  10/11/2022 1546 by Julio Cornejo RN  Outcome: Adequate for Discharge  10/11/2022 1239 by Julio Cornejo RN  Outcome: Progressing  Goal: Maintain proper alignment of affected body part  Description: INTERVENTIONS:  - Support, maintain and protect limb and body alignment  - Provide patient/ family with appropriate education  10/11/2022 1546 by Julio Cornejo RN  Outcome: Adequate for Discharge  10/11/2022 1239 by Julio Cornejo RN  Outcome: Progressing     Problem: PAIN - ADULT  Goal: Verbalizes/displays adequate comfort level or baseline comfort level  Description: Interventions:  - Encourage patient to monitor pain and request assistance  - Assess pain using appropriate pain scale  - Administer analgesics based on type and severity of pain and evaluate response  - Implement non-pharmacological measures as appropriate and evaluate response  - Consider cultural and social influences on pain and pain management  - Notify physician/advanced practitioner if interventions unsuccessful or patient reports new pain  10/11/2022 1546 by Nicolle Phipps RN  Outcome: Adequate for Discharge  10/11/2022 1239 by Nicolle Phipps RN  Outcome: Progressing     Problem: INFECTION - ADULT  Goal: Absence or prevention of progression during hospitalization  Description: INTERVENTIONS:  - Assess and monitor for signs and symptoms of infection  - Monitor lab/diagnostic results  - Monitor all insertion sites, i e  indwelling lines, tubes, and drains  - Monitor endotracheal if appropriate and nasal secretions for changes in amount and color  - [place] appropriate cooling/warming therapies per order  - Administer medications as ordered  - Instruct and encourage patient and family to use good hand hygiene technique  - Identify and instruct in appropriate isolation precautions for identified infection/condition  10/11/2022 1546 by Nicolle Phipps RN  Outcome: Adequate for Discharge  10/11/2022 1239 by Nicolle Phipps RN  Outcome: Progressing     Problem: SAFETY ADULT  Goal: Patient will remain free of falls  Description: INTERVENTIONS:  - Educate patient/family on patient safety including physical limitations  - Instruct patient to call for assistance with activity   - Consult OT/PT to assist with strengthening/mobility   - Keep Call bell within reach  - Keep bed low and locked with side rails adjusted as appropriate  - Keep care items and personal belongings within reach  - Initiate and maintain comfort rounds  - Make Fall Risk Sign visible to staff  - Offer Toileting every x Hours, in advance of need  - Initiate/Maintain xalarm  - Obtain necessary fall risk management equipment: x  - Apply yellow socks and bracelet for high fall risk patients  - Consider moving patient to room near nurses station  10/11/2022 1546 by Nicolle Phipps RN  Outcome: Adequate for Discharge  10/11/2022 1239 by Ronald Thompson RN  Outcome: Progressing     Problem: DISCHARGE PLANNING  Goal: Discharge to home or other facility with appropriate resources  Description: INTERVENTIONS:  - Identify barriers to discharge w/patient and caregiver  - Arrange for needed discharge resources and transportation as appropriate  - Identify discharge learning needs (meds, wound care, etc )  - Arrange for interpretive services to assist at discharge as needed  - Refer to Case Management Department for coordinating discharge planning if the patient needs post-hospital services based on physician/advanced practitioner order or complex needs related to functional status, cognitive ability, or social support system  10/11/2022 1546 by Ronald Thompson RN  Outcome: Adequate for Discharge  10/11/2022 1239 by Ronald Thompson RN  Outcome: Progressing     Problem: Knowledge Deficit  Goal: Patient/family/caregiver demonstrates understanding of disease process, treatment plan, medications, and discharge instructions  Description: Complete learning assessment and assess knowledge base    Interventions:  - Provide teaching at level of understanding  - Provide teaching via preferred learning methods  10/11/2022 1546 by Ronald Thompson RN  Outcome: Adequate for Discharge  10/11/2022 1239 by Ronald Thompson RN  Outcome: Progressing     Problem: Prexisting or High Potential for Compromised Skin Integrity  Goal: Skin integrity is maintained or improved  Description: INTERVENTIONS:  - Identify patients at risk for skin breakdown  - Assess and monitor skin integrity  - Assess and monitor nutrition and hydration status  - Monitor labs   - Assess for incontinence   - Turn and reposition patient  - Assist with mobility/ambulation  - Relieve pressure over bony prominences  - Avoid friction and shearing  - Provide appropriate hygiene as needed including keeping skin clean and dry  - Evaluate need for skin moisturizer/barrier cream  - Collaborate with interdisciplinary team   - Patient/family teaching  - Consider wound care consult   10/11/2022 1546 by Yashira Mendoza RN  Outcome: Adequate for Discharge  10/11/2022 1239 by Yashira Mendoza RN  Outcome: Progressing     Problem: Nutrition/Hydration-ADULT  Goal: Nutrient/Hydration intake appropriate for improving, restoring or maintaining nutritional needs  Description: Monitor and assess patient's nutrition/hydration status for malnutrition  Collaborate with interdisciplinary team and initiate plan and interventions as ordered  Monitor patient's weight and dietary intake as ordered or per policy  Utilize nutrition screening tool and intervene as necessary  Determine patient's food preferences and provide high-protein, high-caloric foods as appropriate       INTERVENTIONS:  - Monitor oral intake, urinary output, labs, and treatment plans  - Assess nutrition and hydration status and recommend course of action  - Evaluate amount of meals eaten  - Assist patient with eating if necessary   - Allow adequate time for meals  - Recommend/ encourage appropriate diets, oral nutritional supplements, and vitamin/mineral supplements  - Order, calculate, and assess calorie counts as needed  - Recommend, monitor, and adjust tube feedings and TPN/PPN based on assessed needs  - Assess need for intravenous fluids  - Provide specific nutrition/hydration education as appropriate  - Include patient/family/caregiver in decisions related to nutrition  10/11/2022 1546 by Yashira Mendoza RN  Outcome: Adequate for Discharge  10/11/2022 1239 by Yashira Mendoza RN  Outcome: Progressing     Problem: CARDIOVASCULAR - ADULT  Goal: Maintains optimal cardiac output and hemodynamic stability  Description: INTERVENTIONS:  - Monitor I/O, vital signs and rhythm  - Monitor for S/S and trends of decreased cardiac output  - Administer and titrate ordered vasoactive medications to optimize hemodynamic stability  - Assess quality of pulses, skin color and temperature  - Assess for signs of decreased coronary artery perfusion  - Instruct patient to report change in severity of symptoms  10/11/2022 1546 by Yashira Mendoza RN  Outcome: Adequate for Discharge  10/11/2022 1239 by Yashira Mendoza RN  Outcome: Progressing     Problem: METABOLIC, FLUID AND ELECTROLYTES - ADULT  Goal: Electrolytes maintained within normal limits  Description: INTERVENTIONS:  - Monitor labs and assess patient for signs and symptoms of electrolyte imbalances  - Administer electrolyte replacement as ordered  - Monitor response to electrolyte replacements, including repeat lab results as appropriate  - Instruct patient on fluid and nutrition as appropriate  10/11/2022 1546 by Yashira Mendoza RN  Outcome: Adequate for Discharge  10/11/2022 1239 by Yashira Mendoza RN  Outcome: Progressing  Goal: Fluid balance maintained  Description: INTERVENTIONS:  - Monitor labs   - Monitor I/O and WT  - Instruct patient on fluid and nutrition as appropriate  - Assess for signs & symptoms of volume excess or deficit  10/11/2022 1546 by Yashira Mendoza RN  Outcome: Adequate for Discharge  10/11/2022 1239 by Yashira Mendoza RN  Outcome: Progressing     Problem: SKIN/TISSUE INTEGRITY - ADULT  Goal: Skin Integrity remains intact(Skin Breakdown Prevention)  Description: Assess:  -Perform Micha assessment every x  -Clean and moisturize skin every x  -Inspect skin when repositioning, toileting, and assisting with ADLS  -Assess under medical devices such as x every x  -Assess extremities for adequate circulation and sensation     Bed Management:  -Have minimal linens on bed & keep smooth, unwrinkled  -Change linens as needed when moist or perspiring  -Avoid sitting or lying in one position for more than x hours while in bed  -Keep HOB at [facility]     Toileting:  -Offer bedside commode  -Assess for incontinence every x  -Use incontinent care products after each incontinent episode such as x    Activity:  -Mobilize patient x times a day  -Encourage activity and walks on unit  -Encourage or provide ROM exercises   -Turn and reposition patient every x Hours  -Use appropriate equipment to lift or move patient in bed  -Instruct/ Assist with weight shifting every x when out of bed in chair  -Consider limitation of chair time x hour intervals    Skin Care:  -Avoid use of baby powder, tape, friction and shearing, hot water or constrictive clothing  -Relieve pressure over bony prominences using xxxxx  -Do not massage red bony areas    Next Steps:  -Teach patient strategies to minimize risks such as x   -Consider consults to  interdisciplinary teams such as x  10/11/2022 1546 by Jason Rodríguez, RN  Outcome: Adequate for Discharge  10/11/2022 1239 by Jason Rodríguez, RN  Outcome: Progressing

## 2022-10-11 NOTE — NURSING NOTE
Pt slept during approx half of hrly rounds overnight between assessments; woke with a mild headache this am

## 2022-10-11 NOTE — ASSESSMENT & PLAN NOTE
Patient's neurological symptoms resolved within 24 hours and is likely TIA    Patient will follow-up in Neurology as outpatient

## 2022-10-11 NOTE — PROGRESS NOTES
[place]  Progress Note - [address] [place] [address]  1934, 80 y o  male MRN: [id number]  Unit/Bed#: -Jamie Encounter: [id number]  Primary Care Provider: Maria Teresa Rosas MD   Date and time admitted to hospital: 10/8/2022 12:39 PM    * Speech disturbance  Assessment & Plan  · Presented for expressive speech disturbance described as incoherence/word salad that occurred from 2251-4417 on 10/8 while patient was at breakfast (resides at [facility])  Patient was without trouble understanding speech, no additional deficits or prior episodes  Resolved at time of admission  · Virtual neuro eval in ED, no TPA  · CTH: no acute intracranial abnormality  · Patient was put on the Stroke pathway  · MRI brain done 10/9/22 showed white matter changes suggestive of chronic microangiopathy  No acute intracranial pathology  · Echo was done revealing EF of 20%  Cardiology reviewed and recommended goal-directed medical therapy and follow-up as outpatient  Patient will benefit from stress test   · Continue ASA 81 mg daily  · Patient was loaded with Plavix 300 mg load followed by 75 mg daily   · Statin deferred, prior intolerance --> continue home zetia   · BP goal is normotension, no need for permissive HTN  · Neurology reviewed the patient and recommended him to be discharged home on aspirin and Plavix as dual antiplatelet therapy for 3 weeks and subsequently continue with aspirin  To follow up with Neurology as outpatient  · A loop recorder was also advised  Cardiology will help to have this set up    TIA (transient ischemic attack)  Assessment & Plan  Patient's neurological symptoms resolved within 24 hours and is likely TIA    Patient will follow-up in Neurology as outpatient    Rheumatoid arthritis ([facility] [address] )  Assessment & Plan  · Continue daily prednisone 5 mg  · Continue hydroxychloroquine 400 mg HS  · voltaren gel and supportive care    History of hypertension  Assessment & Plan  · Continue metoprolol 25 mg daily  · SBP is WNL on admission        VTE Pharmacologic Prophylaxis: VTE Score: 14 Moderate Risk (Score 3-4) - Pharmacological DVT Prophylaxis Ordered: enoxaparin (Lovenox)  Patient Centered Rounds: Done  Discussions with Specialists or Other Care Team Provider: Cardiology, Neurology    Education and Discussions with Family / Patient: Updated  (son) at bedside  Time Spent for Care: 45 minutes  More than 50% of total time spent on counseling and coordination of care as described above  Current Length of Stay: 2 day(s)  Current Patient Status: Inpatient   Certification Statement: The patient will continue to require additional inpatient hospital stay due to Stress test  Discharge Plan: Anticipate discharge later today or tomorrow to Assisted living    Code Status: Level 1 - Full Code    Subjective:   Patient had no fresh complaints    Objective:   Not in any painful distress    Vitals:   Temp (24hrs), Av 7 °F (36 5 °C), Min:97 5 °F (36 4 °C), Max:98 °F (36 7 °C)    Temp:  [97 5 °F (36 4 °C)-98 °F (36 7 °C)] 97 5 °F (36 4 °C)  HR:  [85-98] 97  Resp:  [18] 18  BP: (127-145)/() 144/80  SpO2:  [91 %-99 %] 95 %  Body mass index is 30 53 kg/m²  Input and Output Summary (last 24 hours): Intake/Output Summary (Last 24 hours) at 10/11/2022 1242  Last data filed at 10/11/2022 0550  Gross per 24 hour   Intake 120 ml   Output 700 ml   Net -580 ml       Physical Exam:   Physical Exam  Vitals and nursing note reviewed  Constitutional:       Appearance: He is well-developed  HENT:      Head: Normocephalic and atraumatic  Eyes:      Conjunctiva/sclera: Conjunctivae normal    Cardiovascular:      Rate and Rhythm: Normal rate and regular rhythm  Heart sounds: No murmur heard  Pulmonary:      Effort: Pulmonary effort is normal  No respiratory distress  Breath sounds: Normal breath sounds  Abdominal:      Palpations: Abdomen is soft  Tenderness:  There is no abdominal tenderness  Musculoskeletal:      Cervical back: Neck supple  Right lower leg: Edema present  Left lower leg: Edema present  Skin:     General: Skin is warm and dry  Findings: Bruising (Noted on upper extremity) present  Neurological:      Mental Status: He is alert  Additional Data:     Labs:  Results from last 7 days   Lab Units 10/11/22  1122   WBC Thousand/uL 9 08   HEMOGLOBIN g/dL 14 5   HEMATOCRIT % 43 9   PLATELETS Thousands/uL 185   NEUTROS PCT % 66   LYMPHS PCT % 20   MONOS PCT % 8   EOS PCT % 6     Results from last 7 days   Lab Units 10/11/22  1044   SODIUM mmol/L 136   POTASSIUM mmol/L 4 5   CHLORIDE mmol/L 101   CO2 mmol/L 29   BUN mg/dL 13   CREATININE mg/dL 0 98   ANION GAP mmol/L 6   CALCIUM mg/dL 9 2   ALBUMIN g/dL 3 0*   TOTAL BILIRUBIN mg/dL 1 00   ALK PHOS U/L 81   ALT U/L 29   AST U/L 44   GLUCOSE RANDOM mg/dL 133     Results from last 7 days   Lab Units 10/08/22  1243   INR  0 98     Results from last 7 days   Lab Units 10/08/22  1235   POC GLUCOSE mg/dl 138     Results from last 7 days   Lab Units 10/08/22  1243   HEMOGLOBIN A1C % 5 8*           Lines/Drains:  Invasive Devices  Report    Peripheral Intravenous Line  Duration           Peripheral IV 10/10/22 Distal;Left;Upper;Ventral (anterior) Arm <1 day                      Imaging: No pertinent imaging reviewed      Recent Cultures (last 7 days):         Last 24 Hours Medication List:   Current Facility-Administered Medications   Medication Dose Route Frequency Provider Last Rate   • acetaminophen  650 mg Oral Q6H PRN Tasha Varner PA-C     • allopurinol  400 mg Oral Daily Tasha [place], [place]     • aspirin  81 mg Oral Daily Tasha [place], [place]     • clopidogrel  75 mg Oral Daily Tashawillian Kellogg [place], [place]     • Diclofenac Sodium  2 g Topical 4x Daily Tasha Tubbs PA-C     • enoxaparin  40 mg Subcutaneous Daily Tasha [place], [place] • ezetimibe  10 mg Oral Daily Janeane Prkathy Tubbs PA-C     • gabapentin  100 mg Oral Daily Janeane Jay Jay Tubbs [place]     • hydroxychloroquine  400 mg Oral HS [facility] [place] Jay [place]     • [START ON 10/12/2022] lisinopril  5 mg Oral Daily Suhail Leggett PA-C     • melatonin  6 mg Oral HS Mateo Moreno PA-C     • [START ON 10/12/2022] metoprolol succinate  25 mg Oral Daily Suhail Leggett PA-C     • ondansetron  4 mg Intravenous Q6H PRN Jaxon Varner PA-C     • oxybutynin  5 mg Oral Daily [place] Jay Jay Tubbs PA-C     • polyethylene glycol  17 g Oral Every Other Day [place] Jay Jay Varner PA-C     • predniSONE  5 mg Oral Daily Janeane Jay Jay Tubbs PA-C     • senna  1 tablet Oral Daily [place] Jay Jay Tubbs [place]     • tamsulosin  0 4 mg Oral Daily With Dinner Dionne Moran PA-C          Today, Patient Was Seen By: Brayan Myrick    Please Note: This note may have been constructed using a voice recognition system

## 2022-10-11 NOTE — NURSING NOTE
Reviewed d/c instructions, new rx scripts and f/u appts  With pt  Pt  Was d/c'd back to [facility]  CM faxed over copy of d/c instructions to [facility] earlier  Pt  Being transported by his family and was taken to lobby via WC by PCA

## 2022-10-12 ENCOUNTER — TELEPHONE (OUTPATIENT)
Dept: CARDIOLOGY CLINIC | Facility: CLINIC | Age: 87
End: 2022-10-12

## 2022-10-12 NOTE — UTILIZATION REVIEW
Notification of Discharge   This is a Notification of Discharge from our facility [address]  Please be advised that this patient has been discharge from our facility  Below you will find the admission and discharge date and time including the patient’s disposition  UTILIZATION REVIEW CONTACT:  Lizzy Alejandro  Utilization   Network Utilization Review Department  Phone: [phone] carefully listen to the prompts  All voicemails are confidential   Email: [email or web address]     PHYSICIAN ADVISORY SERVICES:  FOR HXNL-VB-IHML REVIEW - MEDICAL NECESSITY DENIAL  Phone: [phone]  Fax: [phone]  Email: [email or web address]     PRESENTATION DATE: 10/8/2022 12:39 PM  OBERVATION ADMISSION DATE:   INPATIENT ADMISSION DATE: 10/9/22  5:12 PM   DISCHARGE DATE: 10/11/2022  4:02 PM  DISPOSITION: Non [facility] Non [facility]      IMPORTANT INFORMATION:  Send all requests for admission clinical reviews, approved or denied determinations and any other requests to dedicated fax number below belonging to the campus where the patient is receiving treatment   List of dedicated fax numbers:  [address] [place] [address] DENIALS (Administrative/Medical Necessity) [phone]   [address] [place] [address] (Maternity/NICU/Pediatrics) [phone]   [facility] [phone]   [facility] [phone]   Donia Ar[address] [phone]   220 [facility] [phone]   90 [facility] [phone]   [address] [place] [address] [phone]   [facility]  [phone]   4053 [facility] [phone]   [address] [place] [address] [address] [facility] [phone]

## 2022-10-12 NOTE — TELEPHONE ENCOUNTER
Pt's family member called -     States provider at [facility] was concerned w/ pt's low BP today however pt just returned from [facility] last evening and did not want to make medication changes without consulting Cardio  BP this as was 98/60  Nurse is increasing hydration  Pt takes:  Toprol XL 25mg daily and Lisinopril 5mg daily  They would like parameters for BP meds - hold if Sys is < or HR <     Pt has f/u appt 10/18 w/ Lilly Mora  Please advise, thank you

## 2022-10-14 ENCOUNTER — TELEPHONE (OUTPATIENT)
Dept: NEUROLOGY | Facility: CLINIC | Age: 87
End: 2022-10-14

## 2022-10-17 NOTE — TELEPHONE ENCOUNTER
SLUB/SPEECH DISTURBANCE        NOTES: Alba De Paz will need follow up in in 6 weeks with neurovascular attending or advance practitioner  He will not require outpatient neurologic testing  SCHEDULED: 1/13/23 @ 9:30AM W/SARKIS MADRIGAL IN [place]  ADDED TO THE WAIT LIST  PROVIDED ALL APPOINTMENT DETAILS/OFFICE ADDRESS/PHONE NUMBER  NATE WILL SEE IF PATIENT'S FAMILY CAN BRING HIM TO [place]  SHE DOES NOT THINK THE FACILITY WILL TRANSPORT  WILL CALLBACK IF THEY NEED TO RESCHEDULE

## 2022-10-17 NOTE — PROGRESS NOTES
Cardiology Office Follow Up  Latasha Reynolds  1934  [id number]      ASSESSMENT:  1  Unspecified cardiomyopathy  HFrEF  a  TTE 10/10/2022:  LVEF 20%, global hypokinesis with regional variations, mild AR moderate MR  b  Given advanced age and patient relatively wheelchair-bound medical management was pursued  2  History of TA October 2022  3  Hypertension  4  Rheumatoid arthritis    PLAN:  • Appears somewhat volume overloaded today, currently not on diuretic therapy   • Weight recorded as 216 lb   • Start Lasix 20 mg daily and if no improvement after 5 days have requested an update from family at which point we can increase to 40 mg daily  • Check BMP 7-10 days after starting Lasix   • 30 day Zio ordered and if this is unremarkable will proceed with loop recorder at the request of Neurology  • Continue Toprol-XL  • Continue lisinopril  • Follow-up in 3 months, or sooner should any questions or concerns arise    Interval History/ HPI:   49-year-old gentleman recently hospitalized for dysarthria  He was diagnosed with a TIA  MRI was negative for stroke  An echo performed as part of stroke protocol which showed an EF of 20% which is a new diagnosis  With some in consultation in after extensive discussion both he and his family opted for medical management  He is here today for post hospital follow-up  Since his discharge he has been feeling somewhat short of breath  Has low feels his legs are slightly swollen  According to his son he appears if he may be retaining some fluid  He feels probably more tired than normal but is unsure if this is attributed to age or recent hospitalization  He has no pain or pressure in his chest   He has no palpitations or dizziness  He has had no recurrence of his dysarthria  His shortness of breath is made worse with exertion and better with rest, though he notes that he is not extremely active in activities limited by back pain       Vitals:  /68 (BP Location: Right arm, Patient Position: Sitting, Cuff Size: Large)   Pulse (!) 116   Ht 5' 10" (1 778 m)   Wt 98 kg (216 lb)   SpO2 96%   BMI 30 99 kg/m²      Past Medical History:   Diagnosis Date   • GERD (gastroesophageal reflux disease)    • Gout    • Hyperlipidemia    • Hypertension    • 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    • Spinal stenosis      Social History     Socioeconomic History   • Marital status:      Spouse name: Not on file   • Number of children: Not on file   • Years of education: Not on file   • Highest education level: Not on file   Occupational History   • Not on file   Tobacco Use   • Smoking status: Never Smoker   • Smokeless tobacco: Never Used   Vaping Use   • Vaping Use: Never used   Substance and Sexual Activity   • Alcohol use:  Yes     Alcohol/week: 2 0 standard drinks     Types: 2 Shots of liquor per week     Comment: 2-3 ounces of whiskey a day   • Drug use: Yes     Types: Marijuana   • Sexual activity: Not Currently   Other Topics Concern   • Not on file   Social History Narrative   • Not on file     Social Determinants of Health     Financial Resource Strain: Not on file   Food Insecurity: Not on file   Transportation Needs: Not on file   Physical Activity: Not on file   Stress: Not on file   Social Connections: Not on file   Intimate Partner Violence: Not on file   Housing Stability: Not on file      Family History   Problem Relation Age of Onset   • Colon polyps Neg Hx    • Colon cancer Neg Hx      Past Surgical History:   Procedure Laterality Date   • CARPAL TUNNEL RELEASE Bilateral    • COLONOSCOPY     • LAMINECTOMY     • TOTAL KNEE ARTHROPLASTY Bilateral    • TOTAL SHOULDER REPLACEMENT         Current Outpatient Medications:   •  allopurinol (ZYLOPRIM) 100 mg tablet, Take 400 mg by mouth daily, Disp: , Rfl:   •  ascorbic acid (VITAMIN C) 500 MG tablet, Take 500 mg by mouth daily, Disp: , Rfl:   •  aspirin 81 mg chewable tablet, Chew 81 mg daily, Disp: , Rfl:   •  cetirizine (ZyrTEC) 10 mg tablet, Take 10 mg by mouth daily, Disp: , Rfl:   •  clopidogrel (PLAVIX) 75 mg tablet, Take 1 tablet (75 mg total) by mouth daily Do not start before October 11, 2022 , Disp: 30 tablet, Rfl: 0  •  ezetimibe (ZETIA) 10 mg tablet, Take 10 mg by mouth daily, Disp: , Rfl:   •  gabapentin (NEURONTIN) 100 mg capsule, Take 100 mg by mouth daily At bedtime, Disp: , Rfl:   •  hydroxychloroquine (PLAQUENIL) 200 mg tablet, Take 200 mg by mouth 2 (two) times a day with meals, Disp: , Rfl:   •  lisinopril (ZESTRIL) 5 mg tablet, Take 1 tablet (5 mg total) by mouth daily Do not start before October 12, 2022 , Disp: 30 tablet, Rfl: 0  •  loperamide (IMODIUM) 2 mg capsule, Take 2 mg by mouth as needed for diarrhea, Disp: , Rfl:   •  metoprolol succinate (TOPROL-XL) 25 mg 24 hr tablet, Take 1 tablet (25 mg total) by mouth daily Do not start before October 12, 2022 , Disp: 30 tablet, Rfl: 0  •  Multiple Vitamin (multivitamin) tablet, Take 1 tablet by mouth daily, Disp: , Rfl:   •  NON FORMULARY, Medical marijuana, Disp: , Rfl:   •  omeprazole (PriLOSEC) 10 mg delayed release capsule, Take 10 mg by mouth daily, Disp: , Rfl:   •  polyethylene glycol (MIRALAX) 17 g packet, Take 17 g by mouth every other day, Disp: 30 each, Rfl: 2  •  predniSONE 5 mg tablet, Take by mouth daily, Disp: , Rfl:   •  psyllium (METAMUCIL SMOOTH TEXTURE) 28 % packet, Take 1 packet by mouth 2 (two) times a day, Disp: 30 packet, Rfl: 2  •  solifenacin (VESICARE) 5 mg tablet, Take 5 mg by mouth daily, Disp: , Rfl:       Review of Systems:  Review of Systems   Constitutional: Positive for fatigue  Negative for chills and fever  HENT: Negative for ear pain and sore throat  Eyes: Negative for pain and visual disturbance  Respiratory: Positive for shortness of breath  Negative for cough  Cardiovascular: Positive for leg swelling  Negative for chest pain and palpitations  Gastrointestinal: Negative for abdominal pain and vomiting     Genitourinary: Negative for dysuria and hematuria  Musculoskeletal: Negative for arthralgias and back pain  Skin: Negative for color change and rash  Neurological: Negative for seizures and syncope  All other systems reviewed and are negative  Physical Exam:  Physical Exam  Constitutional:       Appearance: Normal appearance  He is well-developed  HENT:      Head: Normocephalic and atraumatic  Eyes:      Pupils: Pupils are equal, round, and reactive to light  Cardiovascular:      Rate and Rhythm: Regular rhythm  Tachycardia present  Pulses: Normal pulses  Heart sounds: No murmur heard  No friction rub  No gallop  Pulmonary:      Effort: Pulmonary effort is normal  No respiratory distress  Breath sounds: Normal breath sounds  No wheezing or rales  Abdominal:      General: Bowel sounds are normal  There is no distension  Palpations: Abdomen is soft  Tenderness: There is no abdominal tenderness  Musculoskeletal:         General: Swelling present  No deformity  Normal range of motion  Cervical back: Normal range of motion and neck supple  Comments: 1-2 +pitting edema bilaterally   Skin:     General: Skin is warm and dry  Neurological:      Mental Status: He is alert and oriented to person, place, and time  Psychiatric:         Behavior: Behavior normal          This note was completed in part utilizing [employer] Flu[employer] Direct Software  Grammatical errors, random word insertions, spelling mistakes, and incomplete sentences can be an occasional consequence of this system secondary to software limitations, ambient noise, and hardware issues  If you have any questions or concerns about the content, text, or information contained within the body of this dictation, please contact the provider for clarification

## 2022-10-18 ENCOUNTER — OFFICE VISIT (OUTPATIENT)
Dept: CARDIOLOGY CLINIC | Facility: CLINIC | Age: 87
End: 2022-10-18
Payer: COMMERCIAL

## 2022-10-18 VITALS
DIASTOLIC BLOOD PRESSURE: 68 MMHG | BODY MASS INDEX: 30.92 KG/M2 | HEART RATE: 116 BPM | HEIGHT: 70 IN | OXYGEN SATURATION: 96 % | SYSTOLIC BLOOD PRESSURE: 124 MMHG | WEIGHT: 216 LBS

## 2022-10-18 DIAGNOSIS — I10 PRIMARY HYPERTENSION: ICD-10-CM

## 2022-10-18 DIAGNOSIS — G45.9 TIA (TRANSIENT ISCHEMIC ATTACK): ICD-10-CM

## 2022-10-18 DIAGNOSIS — I42.0 DILATED CARDIOMYOPATHY (HCC): Primary | ICD-10-CM

## 2022-10-18 PROCEDURE — 99214 OFFICE O/P EST MOD 30 MIN: CPT | Performed by: PHYSICIAN ASSISTANT

## 2022-10-18 RX ORDER — MULTIVIT-MIN/IRON/FOLIC ACID/K 18-600-40
CAPSULE ORAL DAILY
COMMUNITY

## 2022-10-18 RX ORDER — TAMSULOSIN HYDROCHLORIDE 0.4 MG/1
0.4 CAPSULE ORAL
COMMUNITY

## 2022-10-18 RX ORDER — FLUTICASONE PROPIONATE 50 MCG
SPRAY, SUSPENSION (ML) NASAL
COMMUNITY
Start: 2022-09-02

## 2022-10-18 RX ORDER — SENNOSIDES 8.6 MG
650 CAPSULE ORAL 2 TIMES DAILY
COMMUNITY

## 2022-10-18 RX ORDER — ACETAMINOPHEN 325 MG/1
650 TABLET ORAL EVERY 6 HOURS PRN
COMMUNITY

## 2022-10-18 RX ORDER — NITROGLYCERIN 0.4 MG/1
0.4 TABLET SUBLINGUAL
COMMUNITY

## 2022-10-18 RX ORDER — FEXOFENADINE HCL 180 MG/1
180 TABLET ORAL AS NEEDED
COMMUNITY

## 2022-10-18 RX ORDER — DOXYCYCLINE 100 MG/1
100 CAPSULE ORAL 2 TIMES DAILY
COMMUNITY
End: 2022-10-18

## 2022-10-18 RX ORDER — LEVOTHYROXINE SODIUM 0.03 MG/1
TABLET ORAL
COMMUNITY
Start: 2022-08-24

## 2022-10-18 RX ORDER — DOXYCYCLINE 100 MG/1
TABLET ORAL
COMMUNITY
Start: 2022-01-10

## 2022-10-18 RX ORDER — FUROSEMIDE 20 MG/1
20 TABLET ORAL DAILY
Qty: 30 TABLET | Refills: 1 | Status: SHIPPED | OUTPATIENT
Start: 2022-10-18

## 2022-10-18 RX ORDER — OXYCODONE HYDROCHLORIDE AND ACETAMINOPHEN 5; 325 MG/1; MG/1
1 TABLET ORAL DAILY
COMMUNITY

## 2022-10-18 NOTE — PATIENT INSTRUCTIONS
Start furosemide 20 mg daily  Check lab work 7-10 days after starting furosemide  Please call the office if you swelling and breathing does not improve within the next 5 days  We will arrange for a 30 day heart Monitor, and if this is normal proceed with a loop recorder

## 2022-10-19 PROCEDURE — 93000 ELECTROCARDIOGRAM COMPLETE: CPT | Performed by: PHYSICIAN ASSISTANT

## 2022-11-08 ENCOUNTER — TELEPHONE (OUTPATIENT)
Dept: CARDIOLOGY CLINIC | Facility: CLINIC | Age: 87
End: 2022-11-08

## 2022-11-08 DIAGNOSIS — G45.9 TIA (TRANSIENT ISCHEMIC ATTACK): Primary | ICD-10-CM

## 2022-11-08 NOTE — TELEPHONE ENCOUNTER
I called patient regarding scheduling his LOOP Recorder Implant and informed patient that we will wait until we have the results from his 30-day ZIO before scheduling the LOOP Recorder  Patient agreeable with this plan  Gilda Failing:   Please let me know when you get the results and if you still want to proceed with scheduling the LOOP Recorder         Thank you,   Andreea Carter

## 2022-11-08 NOTE — TELEPHONE ENCOUNTER
TANNER Oates RN 48 minutes ago (3:21 PM)            Hayden Hardy,     Thank you for the update  Spoke with Manisha Romero  I have ordered a second Zio to cover the 28 days  Manisha Richardsoncris is going to pick it up from the office this week and help place it for his dad       Orders placed  If anything else is needed just let me know      Thank you  Rodrigo Rockwell RN routed conversation to You; Joe Guzman PA-C 2 hours ago (1:11 PM)     Danette Sy RN routed conversation to Joe Guzman PA-C 3 hours ago (12:58 PM)     Danette Sy RN 3 hours ago (12:58 PM)            Son called and said he has a neurology appt with a doctor at [facility] on 11/30/22  Could not get sooner than January at [facility]      Pt has been wearing zio for about 2 weeks  He has not gotten a second ZIO to cover 28 days  Spoke with [facility] and only a 14 day was ordered  They received that on 10/26/22        Not sure if the second is being ordered or do you want him to come in to [address] [place] [address] office to have the second zio placed on?     Please call the son to let him know what is occurring with the ZIO    Kerwin's # is [phone]      Thank you

## 2022-11-08 NOTE — TELEPHONE ENCOUNTER
Millicent Apple,    Thank you for the update  Spoke with Terry Medeiros  I have ordered a second Zio to cover the 28 days  Terry Medeiros is going to pick it up from the office this week and help place it for his dad  Orders placed  If anything else is needed just let me know      Thank you  Beryle Couch

## 2022-11-08 NOTE — TELEPHONE ENCOUNTER
Son called and said he has a neurology appt with a doctor at [facility] on 11/30/22  Could not get sooner than January at [facility]  Pt has been wearing zio for about 2 weeks  He has not gotten a second ZIO to cover 28 days  Spoke with [employer] and only a 14 day was ordered  They received that on 10/26/22  Not sure if the second is being ordered or do you want him to come in to [address] [place] [address] office to have the second zio placed on? Please call the son to let him know what is occurring with the ZIO  Kerwin's # is [phone]      Thank you

## 2022-11-08 NOTE — TELEPHONE ENCOUNTER
----- Message from Leora Hinojosa PA-C sent at 2022  3:17 PM EST -----  Regardin week Zio  Hello all,    The attached patient wore a 2 week Zio however is recommended Loop recorder from neurology so needs a total of 1 month before insurance will cover  I ordered a 2nd monitor (2 more weeks) this afternoon and spoke with his son who would like to stop by the office on 11/10/22 to pick it up  The patient lives close to the office so his sons said it's easier for him to just stop by rather than get the monitor in the mail  If you need anything from my end, please let me know      Thank you,  Yaw Allison

## 2022-11-08 NOTE — TELEPHONE ENCOUNTER
Progress Notes by DUDLEY Gorman-C10/18/2022  Attested  Check BMP 7-10 days after starting Lasix   • 30 day Zio ordered and if this is unremarkable will proceed with loop recorder at the request of Neurology  • Continue Toprol-XL  • Continue lisinopril

## 2022-11-08 NOTE — TELEPHONE ENCOUNTER
Spoke to pt's son earlier and notified him that a 2nd monitor was ordered with the 1st and will be automatically mailed to his (the son) house and to call if not received within the next day or so

## 2022-11-08 NOTE — TELEPHONE ENCOUNTER
----- Message from Adore Jones PA-C sent at 10/10/2022  3:15 PM EDT -----  Regarding: Loop recorder  Hayden Hill,    For the attached patient could we get him set up for an outpatient loop? Request by neurology  If loop gets denied by insurance I can order Zio with follow up appt in the office with me       Thank you  Bearl Lat

## 2022-11-18 ENCOUNTER — CLINICAL SUPPORT (OUTPATIENT)
Dept: CARDIOLOGY CLINIC | Facility: CLINIC | Age: 87
End: 2022-11-18

## 2022-11-18 DIAGNOSIS — G45.9 TIA (TRANSIENT ISCHEMIC ATTACK): ICD-10-CM

## 2022-11-21 ENCOUNTER — TELEPHONE (OUTPATIENT)
Dept: NON INVASIVE DIAGNOSTICS | Facility: HOSPITAL | Age: 87
End: 2022-11-21

## 2022-11-21 NOTE — TELEPHONE ENCOUNTER
Ricky Hogue with patient's son  No atrial fibrillation was found  Rare SVT and ventricular ectopy  Overall burden < 1%  Currently patient is wearing the 2nd Zio  Will follow up on results  He is to see neurology in [facility]  If loop recorder still desired can arrange at that point

## 2022-11-28 ENCOUNTER — TELEPHONE (OUTPATIENT)
Dept: CARDIOLOGY CLINIC | Facility: CLINIC | Age: 87
End: 2022-11-28

## 2022-11-28 NOTE — TELEPHONE ENCOUNTER
Patient is seeing Neurology at [facility] on 11/30/2022  Patient's son requested that we fax cardiology records to that office - fax #[phone] in time for his appt

## 2022-12-01 ENCOUNTER — CLINICAL SUPPORT (OUTPATIENT)
Dept: CARDIOLOGY CLINIC | Facility: CLINIC | Age: 87
End: 2022-12-01

## 2022-12-01 DIAGNOSIS — G45.9 TIA (TRANSIENT ISCHEMIC ATTACK): ICD-10-CM

## 2022-12-08 ENCOUNTER — PREP FOR PROCEDURE (OUTPATIENT)
Dept: CARDIOLOGY CLINIC | Facility: CLINIC | Age: 87
End: 2022-12-08

## 2022-12-08 ENCOUNTER — TELEPHONE (OUTPATIENT)
Dept: CARDIOLOGY CLINIC | Facility: CLINIC | Age: 87
End: 2022-12-08

## 2022-12-08 DIAGNOSIS — I42.8 OTHER CARDIOMYOPATHY (HCC): Primary | ICD-10-CM

## 2022-12-08 NOTE — TELEPHONE ENCOUNTER
I called patient back and answered his questions about if Johnnie Redo was obtained for the procedure  I informed Brittani Puri that I just received the notification from the preaut dept saying that no auth needed for procedure  [facility]               Berto Amaro MD        [address] [phone] Fax [phone]           Patient Information  Date:9/25/2019  Name : Mary Grace Cleary 80 y.o.     YOB: 1923         Referred by: Marija Almonte MD       Chief Complaint   Patient presents with    Osteoporosis       History of Present Illness: Saba Eden is a 80 y.o. female here for follow-up of osteoporosis. She was diagnosed with osteoporosis 10 years ago, was on Prolia since 2014, initially got it from [address] [facility]. She has underlying GERD, no peptic ulcer disease. Left hip fracture after a fall in 2017  She is on calcium and vitamin D supplements  No severe backache  No dental issues, no falls  Here with a caregiver    Prior therapy :Prolia    Dietary calcium Intake: Moderate amount of dairy in her diet. On calcium and vitamin D supplements. No history of excess alcohol or tobacco abuse. No known history of hypercalcemia,  Kidney stones, family history of kidney stones. Past Medical History:   Diagnosis Date    Hypertension     Osteoporosis     Type 2 diabetes mellitus ([address]      Past Surgical History:   Procedure Laterality Date    HX HIP REPLACEMENT      HX HYSTERECTOMY      HX KNEE ARTHROSCOPY       Current Outpatient Medications   Medication Sig    JANUVIA 25 mg tablet     atorvastatin (LIPITOR) 20 mg tablet Take 1 Tab by mouth daily.  amLODIPine (NORVASC) 10 mg tablet Take 1 Tab by mouth daily.  esomeprazole (NEXIUM) 40 mg capsule Take 1 Cap by mouth daily.  losartan-hydroCHLOROthiazide (HYZAAR) 50-12.5 mg per tablet Take 1 Tab by mouth daily.  LYRICA 100 mg capsule Take 1 Tab by mouth two (2) times a day.  metFORMIN (GLUCOPHAGE) 500 mg tablet Take 1 Tab by mouth two (2) times a day.     estradiol (ESTRACE) 0.01 % (0.1 mg/gram) vaginal cream Twice a week    clotrimazole-betamethasone (LOTRISONE) topical cream two (2) times a day.  acetaminophen (TYLENOL EXTRA STRENGTH) 500 mg tablet Take 500 mg by mouth every six (6) hours as needed for Pain.  calcium carbonate-vitamin D3 (CALTRATE 600 + D) 600 mg (1,500 mg)-800 unit chew Take 1 Tab by mouth daily.  docusate sodium (COLACE) 100 mg capsule Take 100 mg by mouth as needed for Constipation.  psyllium seed, with dextrose, (FIBER PO) Take 1 Tab by mouth two (2) times a day.  sodium chloride (AYR SALINE) 0.65 % nasal squeeze bottle 1 [place] by Both Nostrils route as needed for Congestion.  medical supply, miscellaneous (OCUSOFT LID SCRUB) by Does Not Apply route daily. No current facility-administered medications for this visit. No Known Allergies      Review of Systems:  All 10 systems  reviewed and are negative other than mentioned in HPI    Physical Examination:  Blood pressure 123/52, pulse 68, temperature 96.3 °F (35.7 °C), temperature source Oral, resp. rate 14, height 5' 0.5\" (1.537 m), weight 123 lb (55.8 kg), SpO2 96 %. Body mass index is 23.63 kg/m². - General: pleasant, no distress, good eye contact  - HEENT: no exopthalmos, no periorbital edema, no scleral/conjunctival injection, EOMI, no lid lag or stare  - Neck: supple,  - Cardiovascular: regular, normal rate, normal S1 and S2,   - Respiratory: clear to auscultation bilaterally  - Gastrointestinal: soft, nontender, nondistended, BS +  - Musculoskeletal: no proximal muscle weakness in upper or lower extremities  - Integumentary:  no rashes, no edema  - Neurological: Alert and oriented x3  - Psychiatric: normal mood and affect    Data Reviewed:         Assessment/Plan:     1.  Age related osteoporosis, unspecified pathological fracture presence        Osteoporosis: Age-related    She has normal renal parameters  She is on calcium and vitamin D, euthyroid  Prolia September 2019  Also discussed about IV Reclast again  Fall precautions  Discussed the plan with the caregiver    Hypertension    Hyperlipidemia: Could discontinue statin,      Thank you for allowing me to participate in the care of this patient. Althea Osorio MD    Patient Abrahan Boo verbalized understanding      There are no Patient Instructions on file for this visit. Voice-recognition software was used to generate this report, which may result in some phonetic-based errors in the grammar and contents. Even though attempts were made to correct all the mistakes, some may have been missed and remained in the body of the report.

## 2022-12-08 NOTE — TELEPHONE ENCOUNTER
Son is calling back has questions about scheduling loop recorder, he missed the call       C/b [phone]

## 2022-12-17 ENCOUNTER — HOSPITAL ENCOUNTER (OUTPATIENT)
Facility: HOSPITAL | Age: 87
Setting detail: OUTPATIENT SURGERY
Discharge: HOME | End: 2022-12-17
Attending: INTERNAL MEDICINE | Admitting: INTERNAL MEDICINE

## 2022-12-17 VITALS
BODY MASS INDEX: 31.99 KG/M2 | SYSTOLIC BLOOD PRESSURE: 142 MMHG | DIASTOLIC BLOOD PRESSURE: 87 MMHG | WEIGHT: 216 LBS | RESPIRATION RATE: 20 BRPM | OXYGEN SATURATION: 95 % | TEMPERATURE: 97.4 F | HEIGHT: 69 IN | HEART RATE: 90 BPM

## 2022-12-17 DIAGNOSIS — I42.8 OTHER CARDIOMYOPATHY (HCC): ICD-10-CM

## 2022-12-17 DEVICE — IMPLANTABLE DEVICE: Type: IMPLANTABLE DEVICE | Site: CHEST | Status: FUNCTIONAL

## 2022-12-17 RX ORDER — LIDOCAINE HYDROCHLORIDE 10 MG/ML
INJECTION, SOLUTION EPIDURAL; INFILTRATION; INTRACAUDAL; PERINEURAL CODE/TRAUMA/SEDATION MEDICATION
Status: DISCONTINUED | OUTPATIENT
Start: 2022-12-17 | End: 2022-12-17 | Stop reason: HOSPADM

## 2022-12-17 RX ORDER — LIDOCAINE HYDROCHLORIDE 10 MG/ML
INJECTION, SOLUTION EPIDURAL; INFILTRATION; INTRACAUDAL; PERINEURAL
Status: DISCONTINUED
Start: 2022-12-17 | End: 2022-12-17 | Stop reason: HOSPADM

## 2022-12-17 NOTE — DISCHARGE INSTR - AVS FIRST PAGE
OK to shower with with the glue, glue will fall off in 1 week on its own, do not scrub the area or swim during the next 14 days  not use lotions/powders/creams on incision  Remove outer bandage on for 24 hours after procedure  Please call the office [phone] if you notice redness, swelling, bleeding, or drainage from incision or if you develop fevers  Cardiac Loop Recorder Insertion      WHAT YOU SHOULD KNOW:    A cardiac loop recorder is a device used to diagnose heart rhythm problems, such as a fast or irregular heartbeat  It is implanted in your left chest, just under the skin  The device records a pattern of your heart's rhythm, called an EKG  Your device records automatic EKGs, depending on how your caregiver programs it  You may also receive a handheld controller  You press a button on the controller when you have symptoms, such as dizziness, lightheadedness, or palpitations  The device will record an EKG at that moment  The recording can help your caregiver see if your symptoms may be caused by heart rhythm problems  Your caregiver will remove the device after it has collected enough data  You may need the device for up to 5 years  The procedure to remove the device is similar to the procedure used to implant it  AFTER YOU LEAVE:    Follow up with our loop recorder clinic: You will need to return in 1 to 2 weeks to meet the staff in our loop recorder clinic  At this appointment they will check your incision and remove your stitches  We will discuss how we retrieve data from your loop recorder at this appointment  You will be able to transmit data from your device from home as well, this will also be explained by our loop recorder clinic staff  Ask for information about this process  Write down your questions so you remember to ask them during your visits        Wound care: the glue over your loop recorder is water proof, you can shower as your normally wound, please do not pick the glue off the wound  Do not use lotions/powders/creams on incision  Remove outer bandage 24 hours after procedure, you will notice a few stitches which will be removed at your two week follow up appointment  Please call the office if you notice redness, swelling, bleeding, or drainage from incision or if you develop fevers  Keep the loop recorder area clean until it heals  Return to activity: If you received anesthesia, you will not be able to drive for 24 hours  Otherwise, most people can return to normal activities soon after the procedure  Your cardiologist may want to know if your work involves electrical current or high-voltage equipment  Ask about other electrical items that could interfere with your cardiac loop recorder  Contact your cardiologist if:   You have a fever or chills  Your wound is red, swollen, or draining pus  You have questions or concerns about your condition or care  Seek care immediately or call 911 if: You feel weak, dizzy, or faint  You lose consciousness  © 2014 [address] [employer] is for End User's use only and may not be sold, redistributed or otherwise used for commercial purposes  All illustrations and images included in CareNotes® are the copyrighted property of [employer]  or Rodolfo Ibarra  The above information is an  only  It is not intended as medical advice for individual conditions or treatments  Talk to your doctor, nurse or pharmacist before following any medical regimen to see if it is safe and effective for you

## 2022-12-19 ENCOUNTER — LAB REQUISITION (OUTPATIENT)
Dept: LAB | Facility: HOSPITAL | Age: 87
End: 2022-12-19

## 2022-12-19 DIAGNOSIS — Z20.828 CONTACT WITH AND (SUSPECTED) EXPOSURE TO OTHER VIRAL COMMUNICABLE DISEASES: ICD-10-CM

## 2022-12-19 LAB — SARS-COV-2 RNA RESP QL NAA+PROBE: NEGATIVE

## 2023-01-04 ENCOUNTER — IN-CLINIC DEVICE VISIT (OUTPATIENT)
Dept: CARDIOLOGY CLINIC | Facility: CLINIC | Age: 88
End: 2023-01-04

## 2023-01-04 DIAGNOSIS — Z95.818 PRESENCE OF OTHER CARDIAC IMPLANTS AND GRAFTS: Primary | ICD-10-CM

## 2023-01-04 NOTE — PROGRESS NOTES
Results for orders placed or performed in visit on 01/04/23   Cardiac EP device report    Narrative    [address]  INTERROGATED IN THE [place] OFFICE: LOOP: WOUND CHECK: INCISION CLEAN AND DRY WITH EDGES APPROXIMATED; GLUE AND SUTURES REMOVED; WOUND CARE AND RESTRICTIONS REVIEWED WITH PATIENT  BATTERY VOLTAGE "OK"  PRESENTING RHYTHM: NSR @ 88 BPM  R-WAVES: 0 18mV  4 NEW AF EPISODES DETECTED W/ EGMS HSOWING NSR W/ PVC'S, PAC'S  NO AF DETECTED  AF BURDEN: 1%  PT TAKES METOPROLOL SUCC, PLAVIX, ASA 81MG  EF: 20% (ECHO 10/10/20)  NO PROGRAMMING CHANGES MADE TO DEVICE PARAMETERS  NORMAL DEVICE FUNCTION    [address] [place] [address]

## 2023-01-10 ENCOUNTER — OFFICE VISIT (OUTPATIENT)
Dept: CARDIOLOGY CLINIC | Facility: CLINIC | Age: 88
End: 2023-01-10

## 2023-01-10 VITALS
OXYGEN SATURATION: 94 % | WEIGHT: 224.2 LBS | SYSTOLIC BLOOD PRESSURE: 114 MMHG | HEART RATE: 106 BPM | DIASTOLIC BLOOD PRESSURE: 72 MMHG | HEIGHT: 69 IN | BODY MASS INDEX: 33.21 KG/M2

## 2023-01-10 DIAGNOSIS — I50.20 HFREF (HEART FAILURE WITH REDUCED EJECTION FRACTION) (HCC): ICD-10-CM

## 2023-01-10 DIAGNOSIS — G45.9 TIA (TRANSIENT ISCHEMIC ATTACK): Primary | ICD-10-CM

## 2023-01-10 DIAGNOSIS — I48.0 PAF (PAROXYSMAL ATRIAL FIBRILLATION) (HCC): ICD-10-CM

## 2023-01-10 DIAGNOSIS — R94.30 LOW LEFT VENTRICULAR EJECTION FRACTION: ICD-10-CM

## 2023-01-10 DIAGNOSIS — I42.8 OTHER CARDIOMYOPATHY (HCC): ICD-10-CM

## 2023-01-10 RX ORDER — METOPROLOL SUCCINATE 25 MG/1
25 TABLET, EXTENDED RELEASE ORAL 2 TIMES DAILY
Qty: 180 TABLET | Refills: 3 | Status: SHIPPED | OUTPATIENT
Start: 2023-01-10 | End: 2023-02-09

## 2023-01-10 RX ORDER — TORSEMIDE 20 MG/1
20 TABLET ORAL DAILY
Qty: 90 TABLET | Refills: 3 | Status: SHIPPED | OUTPATIENT
Start: 2023-01-10

## 2023-01-10 NOTE — PROGRESS NOTES
Cardiology Follow Up    Parish Seth  1934  [id number]  [address] [facility] [address] [address] [phone]    1  TIA (transient ischemic attack)  apixaban (Eliquis) 5 mg      2  PAF (paroxysmal atrial fibrillation) (HCC)  apixaban (Eliquis) 5 mg      3  Other cardiomyopathy ([facility] )        4  Low left ventricular ejection fraction  metoprolol succinate (TOPROL-XL) 25 mg 24 hr tablet      5  HFrEF (heart failure with reduced ejection fraction) (HCC)  torsemide (DEMADEX) 20 mg tablet    Basic metabolic panel          Interval History: Followup CHF CAD    Increase in LE edema  Weight up approximately ten pounds per family  No chest pain  Loop reviewed showed some PAF on January 5  Medical Problems     Problem List     Accidental overdose    History of hypertension    Rheumatoid arthritis ([facility]    Speech disturbance    TIA (transient ischemic attack)    Low left ventricular ejection fraction    Overview Signed 10/11/2022 12:47 PM by Mali Dodson MD     Echo done 10/10/22 showed low EF of 20 %         Dilated cardiomyopathy ([facility] )    Primary hypertension        Past Medical History:   Diagnosis Date   • GERD (gastroesophageal reflux disease)    • Gout    • Hyperlipidemia    • Hypertension    • 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[facility] )    • Spinal stenosis      Social History     Socioeconomic History   • Marital status:      Spouse name: Not on file   • Number of children: Not on file   • Years of education: Not on file   • Highest education level: Not on file   Occupational History   • Not on file   Tobacco Use   • Smoking status: Never   • Smokeless tobacco: Never   Vaping Use   • Vaping Use: Never used   Substance and Sexual Activity   • Alcohol use: Yes     Alcohol/week: 2 0 standard drinks     Types: 2 Shots of liquor per week     Comment: 2-3 ounces of whiskey a day   • Drug use:  Yes Types: Marijuana   • Sexual activity: Not Currently   Other Topics Concern   • Not on file   Social History Narrative   • Not on file     Social Determinants of Health     Financial Resource Strain: Not on file   Food Insecurity: Not on file   Transportation Needs: Not on file   Physical Activity: Not on file   Stress: Not on file   Social Connections: Not on file   Intimate Partner Violence: Not on file   Housing Stability: Not on file      Family History   Problem Relation Age of Onset   • Colon polyps Neg Hx    • Colon cancer Neg Hx      Past Surgical History:   Procedure Laterality Date   • CARDIAC ELECTROPHYSIOLOGY PROCEDURE N/A 12/17/2022    Procedure: Cardiac loop recorder implant;  Surgeon: Hiren Garcia MD;  [facility];   Service: Cardiology   • CARPAL TUNNEL RELEASE Bilateral    • COLONOSCOPY     • LAMINECTOMY     • TOTAL KNEE ARTHROPLASTY Bilateral    • TOTAL SHOULDER REPLACEMENT         Current Outpatient Medications:   •  acetaminophen (TYLENOL) 325 mg tablet, Take 650 mg by mouth every 6 (six) hours as needed for mild pain, Disp: , Rfl:   •  acetaminophen (TYLENOL) 650 mg CR tablet, Take 650 mg by mouth 2 (two) times a day, Disp: , Rfl:   •  allopurinol (ZYLOPRIM) 100 mg tablet, Take 400 mg by mouth daily, Disp: , Rfl:   •  apixaban (Eliquis) 5 mg, Take 1 tablet (5 mg total) by mouth 2 (two) times a day, Disp: 180 tablet, Rfl: 3  •  ascorbic acid (VITAMIN C) 500 MG tablet, Take 500 mg by mouth daily, Disp: , Rfl:   •  cetirizine (ZyrTEC) 10 mg tablet, Take 10 mg by mouth daily, Disp: , Rfl:   •  Diclofenac Sodium (VOLTAREN) 1 %, Apply 2 g topically 4 (four) times a day, Disp: , Rfl:   •  doxycycline (ADOXA) 100 MG tablet, , Disp: , Rfl:   •  ezetimibe (ZETIA) 10 mg tablet, Take 10 mg by mouth daily, Disp: , Rfl:   •  fexofenadine (ALLEGRA) 180 MG tablet, Take 180 mg by mouth if needed, Disp: , Rfl:   •  fluticasone (FLONASE) 50 mcg/act nasal spray, , Disp: , Rfl:   •  gabapentin (NEURONTIN) 100 mg capsule, Take 100 mg by mouth daily At bedtime, Disp: , Rfl:   •  hydroxychloroquine (PLAQUENIL) 200 mg tablet, Take 200 mg by mouth 2 (two) times a day with meals, Disp: , Rfl:   •  levothyroxine 25 mcg tablet, , Disp: , Rfl:   •  lisinopril (ZESTRIL) 5 mg tablet, Take 1 tablet (5 mg total) by mouth daily Do not start before October 12, 2022 , Disp: 30 tablet, Rfl: 0  •  loperamide (IMODIUM) 2 mg capsule, Take 2 mg by mouth as needed for diarrhea, Disp: , Rfl:   •  Loratadine-Pseudoephedrine (ALAVERT D-12 HOUR ALLERGY/MEGHAN PO), Take by mouth in the morning, Disp: , Rfl:   •  metoprolol succinate (TOPROL-XL) 25 mg 24 hr tablet, Take 1 tablet (25 mg total) by mouth 2 (two) times a day, Disp: 180 tablet, Rfl: 3  •  Multiple Vitamin (multivitamin) tablet, Take 1 tablet by mouth daily, Disp: , Rfl:   •  nitroglycerin (NITROSTAT) 0 4 mg SL tablet, Place 0 4 mg under the tongue every 5 (five) minutes as needed for chest pain, Disp: , Rfl:   •  NON FORMULARY, Medical marijuana, Disp: , Rfl:   •  omeprazole (PriLOSEC) 10 mg delayed release capsule, Take 10 mg by mouth daily, Disp: , Rfl:   •  oxyCODONE-acetaminophen (PERCOCET) 5-325 mg per tablet, Take 1 tablet by mouth in the morning, Disp: , Rfl:   •  polyethylene glycol (MIRALAX) 17 g packet, Take 17 g by mouth every other day, Disp: 30 each, Rfl: 2  •  predniSONE 5 mg tablet, Take by mouth daily, Disp: , Rfl:   •  psyllium (METAMUCIL SMOOTH TEXTURE) 28 % packet, Take 1 packet by mouth 2 (two) times a day, Disp: 30 packet, Rfl: 2  •  solifenacin (VESICARE) 5 mg tablet, Take 5 mg by mouth daily, Disp: , Rfl:   •  tamsulosin (FLOMAX) 0 4 mg, Take 0 4 mg by mouth daily at bedtime, Disp: , Rfl:   •  torsemide (DEMADEX) 20 mg tablet, Take 1 tablet (20 mg total) by mouth daily, Disp: 90 tablet, Rfl: 3  •  Vitamin D, Cholecalciferol, 25 MCG (1000 UT) TABS, Take by mouth in the morning, Disp: , Rfl:   Allergies   Allergen Reactions   • Colchicine Other (See Comments)     Flushing     • Niacin Other (See Comments)     flushed   • Statins        Labs:     Chemistry        Component Value Date/Time    K 4 5 10/11/2022 1044     10/11/2022 1044    CO2 29 10/11/2022 1044    BUN 13 10/11/2022 1044    CREATININE 0 98 10/11/2022 1044        Component Value Date/Time    CALCIUM 9 2 10/11/2022 1044    ALKPHOS 81 10/11/2022 1044    AST 44 10/11/2022 1044    ALT 29 10/11/2022 1044            No results found for: CHOL  Lab Results   Component Value Date    HDL 70 10/09/2022     Lab Results   Component Value Date    LDLCALC 50 10/09/2022     Lab Results   Component Value Date    TRIG 138 10/09/2022     No results found for: [place], [place]    Imaging: Cardiac ep lab eps/ablations    Result Date: 12/17/2022  Narrative: Images from the original result were not included  I did not see the patient myself Signature is for administrative purposes Placement of cardiac event recorder History and physical were reviewed Patient was examined and history was reviewed No change in patient's condition Since history and physical has been completed The pre- operative diagnosis: Cryptogenic CVA Postoperative diagnosis: Status post loop recorder implant Procedure: placement of cardiac event recorder Surgeon: Nahomy Jimenez PA-C Assistants -none Specimens - none Estimated blood loss- none Findings-none Complications none Anesthesia-  local lidocaine 15cc by myself Description of procedure: The patient was seen before the procedure  The details of the procedure was explained and patient agreed to the same  Appropriate consent was signed  Proper time out was done  Sterile dressing and draping was done  Local lidocaine was infiltrated, in the left third intercostal space, about 2 cm lateral to the sternal edge  Using the stab knife an incision was made  Thereafter the  was used, moved around to form a pocket, along the long axis of the heart, in the intercostal space region   Then plunger was used to deploy the device  The plunger was disengaged and removed  The  was pulled back  Pressure was held and hemostasis was obtained  Pocket closed with interrupted 4-0 ethilon Dressing was done with water resistant band aid Summary of the procedure: jot implanted successfully and patient tolerated the procedure well  Cardiac EP device report    Result Date: 1/4/2023  Narrative: SJM ILR - ACTIVE SYSTEM IS MRI CONDITIONAL DEVICE INTERROGATED IN THE [facility] OFFICE: LOOP: WOUND CHECK: INCISION CLEAN AND DRY WITH EDGES APPROXIMATED; GLUE AND SUTURES REMOVED; WOUND CARE AND RESTRICTIONS REVIEWED WITH PATIENT  BATTERY VOLTAGE "OK"  PRESENTING RHYTHM: NSR @ 88 BPM  R-WAVES: 0 18mV  4 NEW AF EPISODES DETECTED W/ EGMS HSOWING NSR W/ PVC'S, PAC'S  NO AF DETECTED  AF BURDEN: 1%  PT TAKES METOPROLOL SUCC, PLAVIX, ASA 81MG  EF: 20% (ECHO 10/10/20)  NO PROGRAMMING CHANGES MADE TO DEVICE PARAMETERS  NORMAL DEVICE FUNCTION  [address] [place] [address]     Cardiac EP device report    Result Date: 12/19/2022  Narrative: SJM ILR - ACTIVE SYSTEM IS MRI CONDITIONAL NB MERLIN TRANSMISSION:  BATTERY VOLTAGE ADEQUATE   1 DEVICE CLASSIFIED AF EPISODE, DURATION 4M 19S  AVAILABLE EGM DEMONSTRATES NO ATRIAL FIBRILLATION  INSTEAD EGM SHOWS PROBABLE SR WITH PACS/PVCS  AF BURDEN IS 0%  1 TACHY EPISODE WITH EGM SHOWING PAT WITH QRS SIMILAR TO INTRINSIC (21 @ 177 BPM)  NORMAL DEVICE FUNCTION  RG           Review of Systems   Constitutional: Positive for malaise/fatigue  HENT: Negative  Eyes: Negative  Cardiovascular: Positive for leg swelling  Respiratory: Positive for shortness of breath  Endocrine: Negative  Hematologic/Lymphatic: Negative  Skin: Negative  Musculoskeletal: Negative  Gastrointestinal: Negative  Genitourinary: Negative  Neurological: Negative  Psychiatric/Behavioral: Negative  Allergic/Immunologic: Negative          Vitals:    01/10/23 1329   BP: 114/72   Pulse: (!) 106   SpO2: 94%           Physical Exam  Vitals reviewed  Constitutional:       Appearance: Normal appearance  HENT:      Head: Normocephalic  Nose: Nose normal       Mouth/Throat:      Mouth: Mucous membranes are moist       Pharynx: Oropharynx is clear  Eyes:      General: No scleral icterus  Conjunctiva/sclera: Conjunctivae normal    Cardiovascular:      Rate and Rhythm: Regular rhythm  Tachycardia present  Heart sounds: No murmur heard  No friction rub  No gallop  Pulmonary:      Effort: Pulmonary effort is normal  No respiratory distress  Breath sounds: Normal breath sounds  No wheezing or rales  Abdominal:      General: Abdomen is flat  Bowel sounds are normal  There is no distension  Palpations: Abdomen is soft  Tenderness: There is no abdominal tenderness  There is no guarding  Musculoskeletal:      Cervical back: Normal range of motion and neck supple  Right lower leg: Edema present  Left lower leg: Edema present  Skin:     General: Skin is warm and dry  Neurological:      General: No focal deficit present  Mental Status: He is alert and oriented to person, place, and time  Psychiatric:         Mood and Affect: Mood normal          Behavior: Behavior normal          Discussion/Summary:    Heart Failure with reduced LVEF  He has bilateral LE edema  Change lasix to torsemide 20 mg po daily  Check basic metabolic panel in a week  Continue remainder of medical therapy  Increase metoprolol to bid dosing  Hx of TIA  PAF: Detected on his loop implant  DC clopidogrel and start eliquis 5 mg twice a day  The patient was counseled regarding diagnostic results, instructions for management, risk factor reductions, impressions  total time of encounter was 25 minutes and 15 minutes was spent counseling

## 2023-01-10 NOTE — PATIENT INSTRUCTIONS
Discontinue clopidogrel  Start eliquis 5 mg twice a day  Increase metoprolol 25 mg twice a day  Discontinue furosemide  Start Torsemide 20 mg daily     Check lab work in one week ( basic metabolic panel)

## 2023-01-17 ENCOUNTER — TELEPHONE (OUTPATIENT)
Dept: CARDIOLOGY CLINIC | Facility: CLINIC | Age: 88
End: 2023-01-17

## 2023-01-18 NOTE — TELEPHONE ENCOUNTER
Son called back  I relayed the message from Dr Rodolfo Hernandez  He will get [employer] to fax the lab results to [phone] for review

## 2023-01-23 NOTE — TELEPHONE ENCOUNTER
Jack Cueto the son called asking for BW results? He also stated his father needs to hold Eliquis for 3 days for an epidural injection?  Please advise

## 2023-01-24 ENCOUNTER — TELEPHONE (OUTPATIENT)
Dept: CARDIOLOGY CLINIC | Facility: CLINIC | Age: 88
End: 2023-01-24

## 2023-01-24 NOTE — TELEPHONE ENCOUNTER
Pt has had increase in confusion after starting Eliquis  Pts son is asking if this is a normal reaction to have occur, and if he would be better on another med  If not then he would choose to have pt continue on medication and deal with the confusion      Please advise

## 2023-01-25 ENCOUNTER — TELEPHONE (OUTPATIENT)
Dept: OTHER | Facility: OTHER | Age: 88
End: 2023-01-25

## 2023-01-26 ENCOUNTER — APPOINTMENT (OUTPATIENT)
Dept: NON INVASIVE DIAGNOSTICS | Facility: HOSPITAL | Age: 88
End: 2023-01-26

## 2023-01-26 ENCOUNTER — APPOINTMENT (EMERGENCY)
Dept: CT IMAGING | Facility: HOSPITAL | Age: 88
End: 2023-01-26

## 2023-01-26 ENCOUNTER — APPOINTMENT (EMERGENCY)
Dept: RADIOLOGY | Facility: HOSPITAL | Age: 88
End: 2023-01-26

## 2023-01-26 ENCOUNTER — APPOINTMENT (OUTPATIENT)
Dept: MRI IMAGING | Facility: HOSPITAL | Age: 88
End: 2023-01-26

## 2023-01-26 ENCOUNTER — HOSPITAL ENCOUNTER (INPATIENT)
Facility: HOSPITAL | Age: 88
LOS: 1 days | Discharge: HOME | End: 2023-01-28
Attending: EMERGENCY MEDICINE | Admitting: STUDENT IN AN ORGANIZED HEALTH CARE EDUCATION/TRAINING PROGRAM

## 2023-01-26 DIAGNOSIS — I10 HYPERTENSION: ICD-10-CM

## 2023-01-26 DIAGNOSIS — G47.9 SLEEP DISTURBANCE: ICD-10-CM

## 2023-01-26 DIAGNOSIS — R47.01 EXPRESSIVE APHASIA: Primary | ICD-10-CM

## 2023-01-26 DIAGNOSIS — R21 RASH: ICD-10-CM

## 2023-01-26 DIAGNOSIS — R47.9 TRANSIENT SPEECH DISTURBANCE: ICD-10-CM

## 2023-01-26 DIAGNOSIS — I50.42 CHRONIC COMBINED SYSTOLIC AND DIASTOLIC CONGESTIVE HEART FAILURE (HCC): ICD-10-CM

## 2023-01-26 DIAGNOSIS — J90 BILATERAL PLEURAL EFFUSION: ICD-10-CM

## 2023-01-26 DIAGNOSIS — I42.0 DILATED CARDIOMYOPATHY (HCC): ICD-10-CM

## 2023-01-26 DIAGNOSIS — I42.9 CARDIOMYOPATHY (HCC): ICD-10-CM

## 2023-01-26 DIAGNOSIS — R47.89 OTHER SPEECH DISTURBANCE: ICD-10-CM

## 2023-01-26 DIAGNOSIS — I50.9 ACUTE CONGESTIVE HEART FAILURE, UNSPECIFIED HEART FAILURE TYPE (HCC): ICD-10-CM

## 2023-01-26 PROBLEM — N17.9 AKI (ACUTE KIDNEY INJURY) (HCC): Status: ACTIVE | Noted: 2023-01-26

## 2023-01-26 PROBLEM — R29.90 STROKE-LIKE SYMPTOM: Status: ACTIVE | Noted: 2023-01-26

## 2023-01-26 PROBLEM — I48.20 CHRONIC ATRIAL FIBRILLATION (HCC): Status: ACTIVE | Noted: 2023-01-26

## 2023-01-26 LAB
2HR DELTA HS TROPONIN: -2 NG/L
4HR DELTA HS TROPONIN: -2 NG/L
AMPHETAMINES SERPL QL SCN: NEGATIVE
ANION GAP SERPL CALCULATED.3IONS-SCNC: 7 MMOL/L (ref 4–13)
AORTIC ROOT: 4.1 CM
APICAL FOUR CHAMBER EJECTION FRACTION: 30 %
APTT PPP: 29 SECONDS (ref 23–37)
ASCENDING AORTA: 4.4 CM
ATRIAL RATE: 96 BPM
BACTERIA UR QL AUTO: ABNORMAL /HPF
BARBITURATES UR QL: NEGATIVE
BENZODIAZ UR QL: NEGATIVE
BILIRUB UR QL STRIP: NEGATIVE
BUN SERPL-MCNC: 27 MG/DL (ref 5–25)
CALCIUM SERPL-MCNC: 7.9 MG/DL (ref 8.3–10.1)
CARDIAC TROPONIN I PNL SERPL HS: 13 NG/L
CARDIAC TROPONIN I PNL SERPL HS: 13 NG/L
CARDIAC TROPONIN I PNL SERPL HS: 15 NG/L
CHLORIDE SERPL-SCNC: 99 MMOL/L (ref 96–108)
CLARITY UR: CLEAR
CO2 SERPL-SCNC: 34 MMOL/L (ref 21–32)
COCAINE UR QL: NEGATIVE
COLOR UR: YELLOW
CREAT SERPL-MCNC: 1.58 MG/DL (ref 0.6–1.3)
ERYTHROCYTE [DISTWIDTH] IN BLOOD BY AUTOMATED COUNT: 13.7 % (ref 11.6–15.1)
EST. AVERAGE GLUCOSE BLD GHB EST-MCNC: 123 MG/DL
ETHANOL SERPL-MCNC: <3 MG/DL (ref 0–3)
FLUAV RNA RESP QL NAA+PROBE: NEGATIVE
FLUBV RNA RESP QL NAA+PROBE: NEGATIVE
FOLATE SERPL-MCNC: >20 NG/ML (ref 3.1–17.5)
GFR SERPL CREATININE-BSD FRML MDRD: 38 ML/MIN/1.73SQ M
GLUCOSE SERPL-MCNC: 126 MG/DL (ref 65–140)
GLUCOSE SERPL-MCNC: 139 MG/DL (ref 65–140)
GLUCOSE UR STRIP-MCNC: NEGATIVE MG/DL
HBA1C MFR BLD: 5.9 %
HCT VFR BLD AUTO: 42.8 % (ref 36.5–49.3)
HGB BLD-MCNC: 13.8 G/DL (ref 12–17)
HGB UR QL STRIP.AUTO: NEGATIVE
HYALINE CASTS #/AREA URNS LPF: ABNORMAL /LPF
INR PPP: 1.3 (ref 0.84–1.19)
KETONES UR STRIP-MCNC: NEGATIVE MG/DL
LEFT VENTRICLE DIASTOLIC VOLUME (MOD BIPLANE): 145 ML
LEFT VENTRICLE SYSTOLIC VOLUME (MOD BIPLANE): 100 ML
LEUKOCYTE ESTERASE UR QL STRIP: NEGATIVE
LV EF: 31 %
MCH RBC QN AUTO: 31.2 PG (ref 26.8–34.3)
MCHC RBC AUTO-ENTMCNC: 32.2 G/DL (ref 31.4–37.4)
MCV RBC AUTO: 97 FL (ref 82–98)
METHADONE UR QL: NEGATIVE
MUCOUS THREADS UR QL AUTO: ABNORMAL
NITRITE UR QL STRIP: NEGATIVE
NON-SQ EPI CELLS URNS QL MICRO: ABNORMAL /HPF
OPIATES UR QL SCN: NEGATIVE
OXYCODONE+OXYMORPHONE UR QL SCN: NEGATIVE
P AXIS: 45 DEGREES
PCP UR QL: NEGATIVE
PH UR STRIP.AUTO: 6 [PH]
PLATELET # BLD AUTO: 171 THOUSANDS/UL (ref 149–390)
PMV BLD AUTO: 13 FL (ref 8.9–12.7)
POTASSIUM SERPL-SCNC: 3.2 MMOL/L (ref 3.5–5.3)
PR INTERVAL: 154 MS
PROT UR STRIP-MCNC: ABNORMAL MG/DL
PROTHROMBIN TIME: 17 SECONDS (ref 11.6–14.5)
QRS AXIS: -47 DEGREES
QRSD INTERVAL: 98 MS
QT INTERVAL: 406 MS
QTC INTERVAL: 512 MS
RBC # BLD AUTO: 4.42 MILLION/UL (ref 3.88–5.62)
RBC #/AREA URNS AUTO: ABNORMAL /HPF
RSV RNA RESP QL NAA+PROBE: NEGATIVE
SARS-COV-2 RNA RESP QL NAA+PROBE: NEGATIVE
SL CV LV EF: 30
SODIUM SERPL-SCNC: 140 MMOL/L (ref 135–147)
SP GR UR STRIP.AUTO: 1.01 (ref 1–1.03)
T WAVE AXIS: 41 DEGREES
T4 FREE SERPL-MCNC: 0.97 NG/DL (ref 0.76–1.46)
THC UR QL: NEGATIVE
TSH SERPL DL<=0.05 MIU/L-ACNC: 5.26 UIU/ML (ref 0.45–4.5)
UROBILINOGEN UR STRIP-ACNC: <2 MG/DL
VENTRICULAR RATE: 96 BPM
VIT B12 SERPL-MCNC: 646 PG/ML (ref 100–900)
WBC # BLD AUTO: 11.09 THOUSAND/UL (ref 4.31–10.16)
WBC #/AREA URNS AUTO: ABNORMAL /HPF

## 2023-01-26 RX ORDER — OXYCODONE HYDROCHLORIDE AND ACETAMINOPHEN 5; 325 MG/1; MG/1
1 TABLET ORAL DAILY
Status: DISCONTINUED | OUTPATIENT
Start: 2023-01-26 | End: 2023-01-26 | Stop reason: CLARIF

## 2023-01-26 RX ORDER — PANTOPRAZOLE SODIUM 40 MG/1
40 TABLET, DELAYED RELEASE ORAL
Status: DISCONTINUED | OUTPATIENT
Start: 2023-01-26 | End: 2023-01-28 | Stop reason: HOSPADM

## 2023-01-26 RX ORDER — TAMSULOSIN HYDROCHLORIDE 0.4 MG/1
0.4 CAPSULE ORAL
Status: DISCONTINUED | OUTPATIENT
Start: 2023-01-26 | End: 2023-01-28 | Stop reason: HOSPADM

## 2023-01-26 RX ORDER — SODIUM CHLORIDE, SODIUM GLUCONATE, SODIUM ACETATE, POTASSIUM CHLORIDE, MAGNESIUM CHLORIDE, SODIUM PHOSPHATE, DIBASIC, AND POTASSIUM PHOSPHATE .53; .5; .37; .037; .03; .012; .00082 G/100ML; G/100ML; G/100ML; G/100ML; G/100ML; G/100ML; G/100ML
25 INJECTION, SOLUTION INTRAVENOUS CONTINUOUS
Status: DISCONTINUED | OUTPATIENT
Start: 2023-01-26 | End: 2023-01-27

## 2023-01-26 RX ORDER — POLYETHYLENE GLYCOL 3350 17 G/17G
17 POWDER, FOR SOLUTION ORAL DAILY PRN
Status: DISCONTINUED | OUTPATIENT
Start: 2023-01-26 | End: 2023-01-28 | Stop reason: HOSPADM

## 2023-01-26 RX ORDER — POTASSIUM CHLORIDE 20 MEQ/1
40 TABLET, EXTENDED RELEASE ORAL 2 TIMES DAILY
Status: COMPLETED | OUTPATIENT
Start: 2023-01-26 | End: 2023-01-26

## 2023-01-26 RX ORDER — MAGNESIUM HYDROXIDE/ALUMINUM HYDROXICE/SIMETHICONE 120; 1200; 1200 MG/30ML; MG/30ML; MG/30ML
30 SUSPENSION ORAL EVERY 6 HOURS PRN
Status: DISCONTINUED | OUTPATIENT
Start: 2023-01-26 | End: 2023-01-28 | Stop reason: HOSPADM

## 2023-01-26 RX ORDER — ATORVASTATIN CALCIUM 40 MG/1
40 TABLET, FILM COATED ORAL
Status: DISCONTINUED | OUTPATIENT
Start: 2023-01-26 | End: 2023-01-27

## 2023-01-26 RX ORDER — PREDNISONE 1 MG/1
5 TABLET ORAL DAILY
Status: DISCONTINUED | OUTPATIENT
Start: 2023-01-26 | End: 2023-01-28 | Stop reason: HOSPADM

## 2023-01-26 RX ORDER — METOPROLOL SUCCINATE 25 MG/1
25 TABLET, EXTENDED RELEASE ORAL 2 TIMES DAILY
Status: DISCONTINUED | OUTPATIENT
Start: 2023-01-26 | End: 2023-01-28 | Stop reason: HOSPADM

## 2023-01-26 RX ORDER — LEVOTHYROXINE SODIUM 0.03 MG/1
25 TABLET ORAL
Status: DISCONTINUED | OUTPATIENT
Start: 2023-01-26 | End: 2023-01-28 | Stop reason: HOSPADM

## 2023-01-26 RX ORDER — NYSTATIN 100000 [USP'U]/G
POWDER TOPICAL 2 TIMES DAILY
Status: DISCONTINUED | OUTPATIENT
Start: 2023-01-26 | End: 2023-01-28 | Stop reason: HOSPADM

## 2023-01-26 RX ORDER — ONDANSETRON 2 MG/ML
4 INJECTION INTRAMUSCULAR; INTRAVENOUS EVERY 4 HOURS PRN
Status: DISCONTINUED | OUTPATIENT
Start: 2023-01-26 | End: 2023-01-26 | Stop reason: ALTCHOICE

## 2023-01-26 RX ORDER — HYDROXYCHLOROQUINE SULFATE 200 MG/1
200 TABLET, FILM COATED ORAL 2 TIMES DAILY WITH MEALS
Status: DISCONTINUED | OUTPATIENT
Start: 2023-01-26 | End: 2023-01-28 | Stop reason: HOSPADM

## 2023-01-26 RX ORDER — ACETAMINOPHEN 325 MG/1
650 TABLET ORAL EVERY 6 HOURS PRN
Status: DISCONTINUED | OUTPATIENT
Start: 2023-01-26 | End: 2023-01-28 | Stop reason: HOSPADM

## 2023-01-26 RX ORDER — ALLOPURINOL 100 MG/1
400 TABLET ORAL DAILY
Status: DISCONTINUED | OUTPATIENT
Start: 2023-01-26 | End: 2023-01-28 | Stop reason: HOSPADM

## 2023-01-26 RX ORDER — OXYBUTYNIN CHLORIDE 5 MG/1
5 TABLET, EXTENDED RELEASE ORAL DAILY
Status: DISCONTINUED | OUTPATIENT
Start: 2023-01-26 | End: 2023-01-28 | Stop reason: HOSPADM

## 2023-01-26 RX ORDER — LANOLIN ALCOHOL/MO/W.PET/CERES
6 CREAM (GRAM) TOPICAL
Status: DISCONTINUED | OUTPATIENT
Start: 2023-01-26 | End: 2023-01-27

## 2023-01-26 RX ORDER — EZETIMIBE 10 MG/1
10 TABLET ORAL DAILY
Status: DISCONTINUED | OUTPATIENT
Start: 2023-01-26 | End: 2023-01-28 | Stop reason: HOSPADM

## 2023-01-26 RX ADMIN — METOPROLOL SUCCINATE 25 MG: 25 TABLET, FILM COATED, EXTENDED RELEASE ORAL at 21:03

## 2023-01-26 RX ADMIN — Medication 6 MG: at 21:00

## 2023-01-26 RX ADMIN — OXYBUTYNIN CHLORIDE 5 MG: 5 TABLET, EXTENDED RELEASE ORAL at 14:06

## 2023-01-26 RX ADMIN — GADOBUTROL 7.5 ML: 604.72 INJECTION INTRAVENOUS at 18:35

## 2023-01-26 RX ADMIN — APIXABAN 2.5 MG: 2.5 TABLET, FILM COATED ORAL at 21:00

## 2023-01-26 RX ADMIN — DICLOFENAC SODIUM 2 G: 10 GEL TOPICAL at 17:20

## 2023-01-26 RX ADMIN — HYDROXYCHLOROQUINE SULFATE 200 MG: 200 TABLET, FILM COATED ORAL at 17:21

## 2023-01-26 RX ADMIN — MUPIROCIN: 20 OINTMENT TOPICAL at 21:01

## 2023-01-26 RX ADMIN — DICLOFENAC SODIUM 2 G: 10 GEL TOPICAL at 21:01

## 2023-01-26 RX ADMIN — ALLOPURINOL 400 MG: 100 TABLET ORAL at 14:05

## 2023-01-26 RX ADMIN — POTASSIUM CHLORIDE 40 MEQ: 1500 TABLET, EXTENDED RELEASE ORAL at 17:21

## 2023-01-26 RX ADMIN — IOHEXOL 85 ML: 350 INJECTION, SOLUTION INTRAVENOUS at 09:47

## 2023-01-26 RX ADMIN — METOPROLOL SUCCINATE 25 MG: 25 TABLET, FILM COATED, EXTENDED RELEASE ORAL at 14:06

## 2023-01-26 RX ADMIN — PREDNISONE 5 MG: 5 TABLET ORAL at 14:06

## 2023-01-26 RX ADMIN — TAMSULOSIN HYDROCHLORIDE 0.4 MG: 0.4 CAPSULE ORAL at 21:00

## 2023-01-26 RX ADMIN — PERFLUTREN 0.8 ML/MIN: 6.52 INJECTION, SUSPENSION INTRAVENOUS at 16:33

## 2023-01-26 RX ADMIN — SODIUM CHLORIDE, SODIUM GLUCONATE, SODIUM ACETATE, POTASSIUM CHLORIDE, MAGNESIUM CHLORIDE, SODIUM PHOSPHATE, DIBASIC, AND POTASSIUM PHOSPHATE 25 ML/HR: .53; .5; .37; .037; .03; .012; .00082 INJECTION, SOLUTION INTRAVENOUS at 11:10

## 2023-01-26 RX ADMIN — PANTOPRAZOLE SODIUM 40 MG: 40 TABLET, DELAYED RELEASE ORAL at 14:06

## 2023-01-26 RX ADMIN — NYSTATIN: 100000 POWDER TOPICAL at 17:22

## 2023-01-26 RX ADMIN — ACETAMINOPHEN 650 MG: 325 TABLET, FILM COATED ORAL at 14:04

## 2023-01-26 RX ADMIN — ATORVASTATIN CALCIUM 40 MG: 40 TABLET, FILM COATED ORAL at 17:21

## 2023-01-26 RX ADMIN — APIXABAN 2.5 MG: 2.5 TABLET, FILM COATED ORAL at 14:06

## 2023-01-26 RX ADMIN — POTASSIUM CHLORIDE 40 MEQ: 1500 TABLET, EXTENDED RELEASE ORAL at 14:05

## 2023-01-26 RX ADMIN — EZETIMIBE 10 MG: 10 TABLET ORAL at 14:06

## 2023-01-26 RX ADMIN — LEVOTHYROXINE SODIUM 25 MCG: 25 TABLET ORAL at 14:07

## 2023-01-26 NOTE — PLAN OF CARE
Problem: OCCUPATIONAL THERAPY ADULT  Goal: Performs self-care activities at highest level of function for planned discharge setting  See evaluation for individualized goals  Description: Treatment Interventions: ADL retraining, Functional transfer training, UE strengthening/ROM, Endurance training, Patient/family training, Equipment evaluation/education, Compensatory technique education, Continued evaluation          See flowsheet documentation for full assessment, interventions and recommendations  Note: Limitation: Decreased ADL status, Decreased endurance, Decreased self-care trans, Decreased high-level ADLs  Prognosis: Good  Assessment: Pt is a 80 y o  male seen for OT evaluation at [facility], admitted 1/26/2023 w/ Transient speech disturbance  OT completed extensive review of pt's medical and social history  Comorbidities affecting pt's functional performance at time of assessment include: HTN, CHF, A-fib, KAMLESH, rheumatoid arthritis, speech disturbance, TIA, dilated cardiomyopathy  Personal factors affecting pt at time of IE include: difficulty performing ADLS, difficulty performing IADLS , health management  and environment  Prior to admission, pt was living alone at the [facility]  Pt was I w/  ADLS and A w/ IADLS, (-) drove, & required use of wheelchair  PTA  Upon evaluation: Pt requires Min Ax2 for functional mobility/transfers, S for UB ADLs and Min Ax2 for LB ADLS 2* the following deficits impacting occupational performance: weakness, decreased strength, decreased balance, decreased tolerance, decreased safety awareness and increased pain  Full objective findings from OT assessment regarding body systems outlined above  Pt to benefit from continued skilled OT tx while in the hospital to address deficits as defined above and maximize level of functional independence w/ ADL's and functional mobility   Occupational Performance areas to address include: bathing/shower, toilet hygiene, dressing, health maintenance, functional mobility, community mobility and clothing management  Based on findings, pt is of high complexity  The patient's raw score on the AM-PAC Daily Activity inpatient short form is 21, standardized score is 44 27, greater than 39 4  Patients at this level are likely to benefit from DC to home, which does coincide with current above OT recommendations  However, please refer to therapist recommendation for discharge planning given other factors that may influence destination  At this time, OT recommendations at time of discharge are home with home OT       OT Discharge Recommendation: Home with home health rehabilitation

## 2023-01-26 NOTE — ASSESSMENT & PLAN NOTE
Cr on admission 1 58  Baseline 0 9-1 0  UA negative  Urinary retention protocol  Holding lisinopril and gabapentin d/t KAMLESH-> will not resume lisinopril on discharge, pt has not been complaining of pain may not resume gabapentin on discharge  Resumed torsemide 20 mg qd  I's and O's  Avoid nephrotoxic agents, hypotension and further contrast  dc'd IVF  resolved

## 2023-01-26 NOTE — PHYSICAL THERAPY NOTE
PHYSICAL THERAPY EVAL  Physical Therapy Evaluation    Performed at least 2 patient identifiers during session:  Patient Active Problem List   Diagnosis    Accidental overdose    History of hypertension    [facility]    Speech disturbance    TIA (transient ischemic attack)    Low left ventricular ejection fraction    Dilated cardiomyopathy (HCC)    Primary hypertension    Chronic combined systolic and diastolic congestive heart failure (HCC)    Chronic atrial fibrillation (HCC)    Transient speech disturbance    KAMLESH (acute kidney injury) ([facility] )       Past Medical History:   Diagnosis Date    GERD (gastroesophageal reflux disease)     Gout     Hyperlipidemia     Hypertension     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[facility] )     Spinal stenosis        Past Surgical History:   Procedure Laterality Date    CARDIAC ELECTROPHYSIOLOGY PROCEDURE N/A 12/17/2022    Procedure: Cardiac loop recorder implant;  Surgeon: Kai Dietrich MD;  Location: [facility]; Service: Cardiology    CARPAL TUNNEL RELEASE Bilateral     COLONOSCOPY      LAMINECTOMY      TOTAL KNEE ARTHROPLASTY Bilateral     TOTAL SHOULDER REPLACEMENT          01/26/23 1400   PT Last Visit   PT Visit Date 01/26/23   Note Type   Note type Evaluation   Pain Assessment   Pain Assessment Tool Michael-Baker FACES   Michael-Baker FACES Pain Rating 2   Pain Location/Orientation Location: Generalized   Hospital Pain Intervention(s) Repositioned   Restrictions/Precautions   Other Precautions Pain; Fall Risk;Telemetry;Multiple lines;Cognitive; Chair Alarm   Home Living   Type of Home Other (Comment)  ([facility])   Home Layout One level   [address]; Wheelchair-manual   Additional Comments Only uses w/c   Prior Function   Level of [place] Independent with functional mobility; Needs assistance with IADLS;Needs assistance with ADLs   Lives With Facility staff   Receives Help From Personal care attendant   IADLs Family/Friend/Other provides transportation; Family/Friend/Other provides meals; Family/Friend/Other provides medication management   Comments Per patient and family, patient performs transfers only  He is independent with transfers and w/c mobility  He has supervision for showers  General   Family/Caregiver Present Yes   Cognition   Arousal/Participation Alert   Following Commands Follows one step commands without difficulty   Subjective   Subjective "Am I staying tonight?"   RLE Assessment   RLE Assessment WFL   LLE Assessment   LLE Assessment WFL   Light Touch   RLE Light Touch Grossly intact   LLE Light Touch Grossly intact   Bed Mobility   Additional Comments Received OOB in chair   Transfers   Sit to Stand 4  Minimal assistance   Additional items Assist x 2;Armrests; Increased time required;Verbal cues   Stand to Sit 4  Minimal assistance   Additional items Assist x 2;Armrests; Increased time required;Verbal cues   Stand pivot 4  Minimal assistance   Additional items Assist x 2;Armrests; Increased time required;Verbal cues   Additional Comments 2 transfers performed  Balance   Static Sitting Good   Dynamic Sitting Fair +   Static Standing Fair -   Dynamic Standing Fair -   Ambulatory Fair -   Activity Tolerance   Activity Tolerance Patient tolerated treatment well   Medical Staff Made Aware Co-treat with Sheila UNDERWOOD due to medical complexity   Nurse Made Aware RNElma   Assessment   Prognosis Good   Problem List Decreased strength; Impaired balance;Decreased mobility; Decreased cognition; Impaired hearing;Obesity   Assessment Patient is an 87y/o M who presented word finding difficulty/nonfluent speech  Patient resides at [address] [place] [address] where he performs transfers independently  He uses a w/c for all other mobility  Current medical status includes ICU stay, telemetry, mutliple lines, fall risk, obesity, decreased strength, balance, endurance and mobility   Patient required min A x 2 for transfers  Transfer may have been more difficulty than usual due to setup and multiple lines  Patient also reporting pain  No strength, sensation or coordination deficits observed  Anticipate that patient will progress during hospital stay  Recommending that he return home with home P T  The patient's AM-PAC Basic Mobility Inpatient Short Form Raw Score is 16  A Raw score of less than or equal to 16 suggests the patient may benefit from discharge to post-acute rehabilitation services  Please also refer to the recommendation of the Physical Therapist for safe discharge planning  Barriers to Discharge None   Barriers to Discharge Comments Anticipate progression during hospital stay   Goals   Patient Goals To go home   STG Expiration Date 02/02/23   Short Term Goal #1 1  Perform supine<>sit with HOB flat without the use of bedrails ind 2  Perform sit<>stand transfers mod I 3  Perform stand pivot transfers mod I   PT Treatment Day 0   Plan   Treatment/Interventions Functional transfer training;LE strengthening/ROM; Therapeutic exercise;Cognitive reorientation;Equipment eval/education; Bed mobility;Spoke to nursing;OT   PT Frequency 3-5x/wk   Recommendation   PT Discharge Recommendation Home with home health rehabilitation   AM-PAC Basic Mobility Inpatient   Turning in Flat Bed Without Bedrails 4   Lying on Back to Sitting on Edge of Flat Bed Without Bedrails 4   Moving Bed to Chair 3   Standing Up From Chair Using Arms 3   Walk in Room 1   Climb 3-5 Stairs With Railing 1   Basic Mobility Inpatient Raw Score 16   Basic Mobility Standardized Score 38 32   Highest Level Of Mobility   JH-HLM Goal 5: Stand one or more mins   JH-HLM Achieved 4: Move to chair/commode   End of Consult   Patient Position at End of Consult Bedside chair;Bed/Chair alarm activated; All needs within reach     Amy Farooq, PT           Patient Name: Omayra Sandhu  STEPHANIEQTDeborahMANUEL Date: 1/26/2023

## 2023-01-26 NOTE — OCCUPATIONAL THERAPY NOTE
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[facility] )     Spinal stenosis      Past Surgical History  Past Surgical History:   Procedure Laterality Date    CARDIAC ELECTROPHYSIOLOGY PROCEDURE N/A 12/17/2022    Procedure: Cardiac loop recorder implant;  Surgeon: Sharif Ribeiro MD;  Location:  CARDIAC CATH LAB; Service: Cardiology    CARPAL TUNNEL RELEASE Bilateral     COLONOSCOPY      LAMINECTOMY      TOTAL KNEE ARTHROPLASTY Bilateral     TOTAL SHOULDER REPLACEMENT             01/26/23 1401   OT Last Visit   OT Visit Date 01/26/23   Note Type   Note type Evaluation   Pain Assessment   Pain Assessment Tool Michael-Baker FACES   Michael-Baker FACES Pain Rating 2   Pain Location/Orientation Location: [facility]   Hospital Pain Intervention(s) Ambulation/increased activity   Restrictions/Precautions   Weight Bearing Precautions Per Order No   Other Precautions Chair Alarm;Cognitive; Fall Risk;Multiple lines;Telemetry   Home Living   Type of Home Assisted living  (Independent living at the [facility])   [facility] to live on main level with bedroom/bathroom; Performs ADLs on one level   Bathroom Shower/Tub Walk-in shower   Bathroom Equipment Shower chair   Home Equipment Wheelchair-manual   Additional Comments Pt baseline stand pivots <> WC  Self propels   Prior Function   Level of [place] Independent with ADLs; Independent with functional mobility; Needs assistance with [address] staff   Receives Help From Family;Personal care attendant   IADLs Family/Friend/Other provides transportation; Family/Friend/Other provides meals   Falls in the last 6 months 1 to 4   Vocational Retired   Comments Supervision for bathing   [address] with ADLs   Reciprocal Relationships Supportive children   General   Family/Caregiver Present Yes   Additional General Comments Son & DIL   Subjective   Subjective "I have pain everywhere"   ADL   Eating Assistance 7  Independent   Grooming Assistance 5  [address]  4  Minimal Assistance   Bed Mobility   Additional Comments Received OOB to recliner   Transfers   Sit to Stand 4  Minimal assistance   Additional items Assist x 2; Increased time required;Verbal cues;Armrests   Stand to Sit 4  Minimal assistance   Additional items Assist x 2; Increased time required;Verbal cues;Armrests   Stand pivot 4  Minimal assistance   Additional items Assist x 2; Increased time required;Verbal cues;Armrests   Additional Comments 2 stand pivots performed   Pt required Min Ax2, pt reaching around this clinicians waist for support   Balance   Static Sitting Fair +   Dynamic Sitting Fair +   Static Standing Fair -   Dynamic Standing Fair -   Activity Tolerance   Activity Tolerance Patient tolerated treatment well   Medical Staff Made Aware Co-treat with PT Johanna 2* medical complexity   Nurse Made Aware [place]   RUE Assessment   RUE Assessment WFL   LUE Assessment   LUE Assessment   ([place] weakness anterior delts)   Sensation   Additional Comments Chronic neuropathy BL feet   Cognition   Overall Cognitive Status WFL   Arousal/Participation Alert   Attention Within functional limits   Orientation Level Oriented X4   Memory Within functional limits   Following Commands Follows one step commands with increased time or repetition   Comments Questionable aphasia, minimal difficulty getting words out but eventually able to state what he means   Assessment   Limitation Decreased ADL status; Decreased endurance;Decreased self-care trans;Decreased high-level ADLs   Prognosis Good   Assessment Pt is a 80 y o  male seen for OT evaluation at [facility], admitted 1/26/2023 w/ Transient speech disturbance  OT completed extensive review of pt's medical and social history  Comorbidities affecting pt's functional performance at time of assessment include: HTN, CHF, A-fib, KAMLESH, rheumatoid arthritis, speech disturbance, TIA, dilated cardiomyopathy  Personal factors affecting pt at time of IE include: difficulty performing ADLS, difficulty performing IADLS , health management  and environment  Prior to admission, pt was living alone at the [facility]  Pt was I w/  ADLS and A w/ IADLS, (-) drove, & required use of wheelchair  PTA  Upon evaluation: Pt requires Min Ax2 for functional mobility/transfers, S for UB ADLs and Min Ax2 for LB ADLS 2* the following deficits impacting occupational performance: weakness, decreased strength, decreased balance, decreased tolerance, decreased safety awareness and increased pain  Full objective findings from OT assessment regarding body systems outlined above  Pt to benefit from continued skilled OT tx while in the hospital to address deficits as defined above and maximize level of functional independence w/ ADL's and functional mobility  Occupational Performance areas to address include: bathing/shower, toilet hygiene, dressing, health maintenance, functional mobility, community mobility and clothing management  Based on findings, pt is of high complexity  The patient's raw score on the AM-PAC Daily Activity inpatient short form is 21, standardized score is 44 27, greater than 39 4   Patients at this level are likely to benefit from DC to home, which does coincide with current above OT recommendations  However, please refer to therapist recommendation for discharge planning given other factors that may influence destination  At this time, OT recommendations at time of discharge are home with home OT  Goals   Patient Goals Pt wants to go home today   Plan   Treatment Interventions ADL retraining;Functional transfer training;UE strengthening/ROM; Endurance training;Patient/family training;Equipment evaluation/education; Compensatory technique education;Continued evaluation   Goal Expiration Date 02/05/23   OT Treatment Day 0   OT Frequency 2-3x/wk   Recommendation   OT Discharge Recommendation Home with home health rehabilitation   AM-PAC Daily Activity Inpatient   Lower Body Dressing 3   Bathing 3   Toileting 3   Upper Body Dressing 4   Grooming 4   Eating 4   Daily Activity Raw Score 21   Daily Activity Standardized Score (Calc for Raw Score >=11) 44 27   AM-PAC Applied Cognition Inpatient   Following a Speech/Presentation 3   Understanding Ordinary Conversation 4   Taking Medications 4   Remembering Where Things Are Placed or Put Away 4   Remembering List of 4-5 Errands 3   Taking Care of Complicated Tasks 3   Applied Cognition Raw Score 21   Applied Cognition Standardized Score 44 3   End of Consult   Education Provided Yes;Family or social support of family present for education by provider   Patient Position at End of Consult Bedside chair;Bed/Chair alarm activated; All needs within reach   Nurse Communication Nurse aware of consult     Pt will achieve the following goals within 10 days  *Pt will complete LB bathing and dressing with Mod I & DME PRN  *Pt will complete toileting w/ Mod I w/ G hygiene/thoroughness using DME PRN    *Pt will perform functional transfers with on/off all surfaces with Mod I using DME as needed w/ G balance/safety       *Pt will demonstrate G carryover of pt/caregiver education and training as appropriate w/ Mod I w/o cues w/ good tolerance      Parish Brewer OTR/L

## 2023-01-26 NOTE — ASSESSMENT & PLAN NOTE
Wt Readings from Last 3 Encounters:   01/26/23 102 kg (224 lb 13 9 oz)   01/10/23 102 kg (224 lb 3 2 oz)   12/17/22 98 kg (216 lb)     Not in exacerbation  Most recent echo done on 10/10/2022 showed an EF of 20% and diastolic dysfunction    Moderate mitral regurg  Repeat echo pending  I's and O's  Daily weights  Cardiac low-salt diet once cleared by speech

## 2023-01-26 NOTE — TELEPHONE ENCOUNTER
Pts son [place] called, asking for any update as he hadn't heard anything last night from the oncall provider  Kya asked if the pts confusion is severe or minimal and to send pt to the ER if it is severe or to go to PCP for minimal confusion  Son relayed that he is having difficulty speaking  I advised to go to ER  [place] has advised us that the pt began experiencing CP this morning and that 911 was called and he is enroute to the ER currently

## 2023-01-26 NOTE — CONSULTS
Consultation - Stroke   Sheila Cueto 80 y o  male MRN: [id number]  Unit/Bed#: -01 SDU Encounter: [id number]      Assessment/Plan     * Transient speech disturbance  Assessment & Plan  80year-old male with PAF on Eliquis, history of TIA, hypertension, dyslipidemia, rheumatoid arthritis, peripheral neuropathy, spinal stenosis, gout, presents with transient, recurrent word finding difficulty/nonfluent speech  NIHSS 1 for diminished pinprick appreciation of the bilateral lower extremities (chronic)  CT head and presentation demonstrated no acute changes  CTA head/neck demonstrated no flow consequential stenosis or LVO  Blood pressure on presentation 147/80  Patient has now had 3 separate episodes of transient aphasia, with negative MRI in October 2022  Suspect alternative etiology of patient's symptoms given recurrent, stereotyped events (not cerebrovascular in etiology)  Plan:  -MRI brain without contrast   -routine EEG  -check orthostatics   -Infectious work-up, including UA as per primary service   -Check hemoglobin A1c, lipid panel, TSH   -Continue Eliquis 5mg bid  -atorvastatin 40 mg   -PT/OT/speech therapy  -Frequent neuro checks   -Continue to monitor and notify with changes  Thrombolytic Decision: Patient not a candidate  Symptoms resolved/clearly non disabling  and patient on Eliquis  Recommendations for outpatient neurological follow up have yet to be determined  History of Present Illness     Reason for Consult / Principal Problem: word-finding difficulties  Hx and PE limited by: n/a  Patient last known well: 0900 on 1/26/23  Stroke alert called: [facility]  Neurology time of arrival: 0928  HPI: Sheila Cueto is a 80 y o  right handed male with PAF on Eliquis, history of TIA, hypertension, dyslipidemia, rheumatoid arthritis, peripheral neuropathy, spinal stenosis, gout, presents with transient, recurrent word finding difficulty/nonfluent speech      Per record review, patient was recently found to have PAF on loop recorder and was started on Eliquis 5 mg twice daily on 1/10/2023  He was previously on clopidogrel (on DAPT following presumed TIA in October 2022, followed by clopidogrel monotherapy)  Per review of telephone notes, patient's son has called in several times since 1/17/2023 stating patient's confusion has worsened since his "many medication changes"  However, per patient's son at bedside, patient has not had specific difficulty with speech  Last night, patient seemed to have difficulty getting his words out and this was apparently attributed to him participating in "happy hour" (consuming alcohol) at his facility  This apparently resolved  When his son spoke to him on the phone early this morning, his speech sounded normal   Around 0900 this morning, his son spoke to him again and felt that the patient seemed to have difficulty getting his words out  He was brought to the emergency department and during early evaluation was noted to have some speech fluency difficulty, but this seemed to completely resolve over the course of approximately 1 hour  CT head and presentation demonstrated no acute changes  CTA head/neck demonstrated no flow consequential stenosis or LVO  Blood pressure on presentation 147/80  Patient is on Eliquis at baseline as noted above, which he has been taking as scheduled  Per his son, he is to hold Eliquis for 3 days prior to epidural steroid injection, but has not started this hold as of yet  NIH stroke scale 1 for diminished pinprick appreciation of the bilateral lower extremities  Consult to Neurology  Consult performed by: Jeff Hayes PA-C  Consult ordered by: Pk Bhatt MD          Review of Systems   Constitutional: Negative  HENT: Negative  Eyes: Negative  Respiratory: Negative  Cardiovascular: Negative  Gastrointestinal: Negative  Endocrine: Negative  Genitourinary: Negative  Musculoskeletal: Negative  Skin: Negative for rash  Allergic/Immunologic: Negative  Neurological: Positive for speech difficulty  As above  Hematological: Negative  Psychiatric/Behavioral: Negative  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[facility] [address] )    • Spinal stenosis      Past Surgical History:   Procedure Laterality Date   • CARDIAC ELECTROPHYSIOLOGY PROCEDURE N/A 12/17/2022    Procedure: Cardiac loop recorder implant;  Surgeon: Juan Jones MD;  Location: [facility]; Service: Cardiology   • CARPAL TUNNEL RELEASE Bilateral    • COLONOSCOPY     • LAMINECTOMY     • TOTAL KNEE ARTHROPLASTY Bilateral    • TOTAL SHOULDER REPLACEMENT       Social History   Social History     Substance and Sexual Activity   Alcohol Use Yes   • Alcohol/week: 2 0 standard drinks   • Types: 2 Shots of liquor per week    Comment: 2-3 ounces of whiskey a day     Social History     Substance and Sexual Activity   Drug Use Yes   • Types: Marijuana     E-Cigarette/Vaping   • E-Cigarette Use Never User      E-Cigarette/Vaping Substances   • Nicotine No    • Flavoring No      Social History     Tobacco Use   Smoking Status Never   Smokeless Tobacco Never     Family History:   Family History   Problem Relation Age of Onset   • Colon polyps Neg Hx    • Colon cancer Neg Hx        Review of previous medical records was completed  Meds/Allergies   PTA meds:   Prior to Admission Medications   Prescriptions Last Dose Informant Patient Reported? Taking?    Diclofenac Sodium (VOLTAREN) 1 %   Yes No   Sig: Apply 2 g topically 4 (four) times a day   Loratadine-Pseudoephedrine (ALAVERT D-12 HOUR ALLERGY/MEGHAN PO)   Yes No   Sig: Take by mouth in the morning   Multiple Vitamin (multivitamin) tablet   Yes No   Sig: Take 1 tablet by mouth daily   NON FORMULARY   Yes No   Sig: Medical marijuana   Vitamin D, Cholecalciferol, 25 MCG (1000 UT) TABS   Yes No   Sig: Take by mouth in the morning   acetaminophen (TYLENOL) 325 mg tablet   Yes No   Sig: Take 650 mg by mouth every 6 (six) hours as needed for mild pain   acetaminophen (TYLENOL) 650 mg CR tablet   Yes No   Sig: Take 650 mg by mouth 2 (two) times a day   allopurinol (ZYLOPRIM) 100 mg tablet   Yes No   Sig: Take 400 mg by mouth daily   apixaban (Eliquis) 5 mg   No No   Sig: Take 1 tablet (5 mg total) by mouth 2 (two) times a day   ascorbic acid (VITAMIN C) 500 MG tablet   Yes No   Sig: Take 500 mg by mouth daily   cetirizine (ZyrTEC) 10 mg tablet   Yes No   Sig: Take 10 mg by mouth daily   doxycycline (ADOXA) 100 MG tablet   Yes No   ezetimibe (ZETIA) 10 mg tablet   Yes No   Sig: Take 10 mg by mouth daily   fexofenadine (ALLEGRA) 180 MG tablet   Yes No   Sig: Take 180 mg by mouth if needed   fluticasone (FLONASE) 50 mcg/act nasal spray   Yes No   gabapentin (NEURONTIN) 100 mg capsule   Yes No   Sig: Take 100 mg by mouth daily At bedtime   hydroxychloroquine (PLAQUENIL) 200 mg tablet   Yes No   Sig: Take 200 mg by mouth 2 (two) times a day with meals   levothyroxine 25 mcg tablet   Yes No   lisinopril (ZESTRIL) 5 mg tablet   No No   Sig: Take 1 tablet (5 mg total) by mouth daily Do not start before October 12, 2022     loperamide (IMODIUM) 2 mg capsule   Yes No   Sig: Take 2 mg by mouth as needed for diarrhea   metoprolol succinate (TOPROL-XL) 25 mg 24 hr tablet   No No   Sig: Take 1 tablet (25 mg total) by mouth 2 (two) times a day   nitroglycerin (NITROSTAT) 0 4 mg SL tablet   Yes No   Sig: Place 0 4 mg under the tongue every 5 (five) minutes as needed for chest pain   omeprazole (PriLOSEC) 10 mg delayed release capsule   Yes No   Sig: Take 10 mg by mouth daily   oxyCODONE-acetaminophen (PERCOCET) 5-325 mg per tablet   Yes No   Sig: Take 1 tablet by mouth in the morning   polyethylene glycol (MIRALAX) 17 g packet   No No   Sig: Take 17 g by mouth every other day   predniSONE 5 mg tablet   Yes No   Sig: Take by mouth daily   psyllium (METAMUCIL SMOOTH TEXTURE) 28 % packet   No No   Sig: Take 1 packet by mouth 2 (two) times a day   solifenacin (VESICARE) 5 mg tablet   Yes No   Sig: Take 5 mg by mouth daily   tamsulosin (FLOMAX) 0 4 mg   Yes No   Sig: Take 0 4 mg by mouth daily at bedtime   torsemide (DEMADEX) 20 mg tablet   No No   Sig: Take 1 tablet (20 mg total) by mouth daily      Facility-Administered Medications: None       Allergies   Allergen Reactions   • Colchicine Other (See Comments)     Flushing     • Niacin Other (See Comments)     flushed   • Statins        Objective   Vitals:Blood pressure 111/72, pulse 92, temperature 97 6 °F (36 4 °C), temperature source Oral, resp  rate 22, height 5' 9" (1 753 m), weight 99 9 kg (220 lb 3 8 oz), SpO2 95 %  ,Body mass index is 32 52 kg/m²  No intake or output data in the 24 hours ending 23 1228    Invasive Devices: Invasive Devices     Peripheral Intravenous Line  Duration           Peripheral IV 23 Distal;Left;Upper;Ventral (anterior) Arm <1 day                Physical Exam  Neurologic Exam    NIHSS:  1a Level of Consciousness: 0 = Alert   1b  LOC Questions: 0 = Answers both correctly   1c  LOC Commands: 0 = Obeys both correctly   2  Best Gaze: 0 = Normal   3  Visual: 0 = No visual field loss   4  Facial Palsy: 0=Normal symmetric movement   5a  Motor Right Arm: 0=No drift, limb holds 90 (or 45) degrees for full 10 seconds   5b  Motor Left Arm: 0=No drift, limb holds 90 (or 45) degrees for full 10 seconds   6a  Motor Right Le=No drift, limb holds 90 (or 45) degrees for full 10 seconds   6b  Motor Left Le=No drift, limb holds 90 (or 45) degrees for full 10 seconds   7  Limb Ataxia:  0=Absent   8   Sensory: 1=Mild to moderate sensory loss; patient feels pinprick is less sharp or is dull on the affected side; there is a loss of superficial pain with pinprick but patient is aware He is being touched   9  Best Language:  0=No aphasia, normal   10  Dysarthria: 0=Normal articulation   11  Extinction and Inattention (formerly Neglect): 0=No abnormality   Total Score: 1     Time NIHSS was completed: 0945    Modified Rosario Score:  3 (Moderate disability; requiring some help, but able to walk without assistance)    Lab Results:   CBC:   Results from last 7 days   Lab Units 01/26/23  0940   WBC Thousand/uL 11 09*   RBC Million/uL 4 42   HEMOGLOBIN g/dL 13 8   HEMATOCRIT % 42 8   MCV fL 97   PLATELETS Thousands/uL 171   , BMP/CMP:   Results from last 7 days   Lab Units 01/26/23  0940   SODIUM mmol/L 140   POTASSIUM mmol/L 3 2*   CHLORIDE mmol/L 99   CO2 mmol/L 34*   BUN mg/dL 27*   CREATININE mg/dL 1 58*   CALCIUM mg/dL 7 9*   EGFR ml/min/1 73sq m 38     Imaging Studies: I have personally reviewed pertinent reports  and I have personally reviewed pertinent films in PACS [address] [place] [address], [facility] h/n  EKG, Pathology, and Other Studies: I have personally reviewed pertinent reports  VTE Prophylaxis: Eliquis    Code Status: Level 1 - Full Code  Advance Directive and Living Will: Yes    Power of :    POLST:      Counseling / Coordination of Care  Total Critical Care time spent 50 minutes excluding procedures, teaching and family updates

## 2023-01-26 NOTE — ASSESSMENT & PLAN NOTE
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

## 2023-01-26 NOTE — ASSESSMENT & PLAN NOTE
Known to have A  fib on loop recorder  Initiated on Eliquis  Continue Eliquis  Continue metoprolol 25 mg twice daily

## 2023-01-26 NOTE — ED PROVIDER NOTES
History  Chief Complaint   Patient presents with   • STROKE Alert     To ED for evaluation of speech difficulties started 30 minutes ago  Patient has difficulty forming words  Do other deficits noted  Reports that patient had similar episode last night  Patient is an 66-year-old male with a history of hypertension, hyperlipidemia, TIA who presents for evaluation of expressive aphasia  Patient had an episode starting about 45 minutes prior to ED arrival where he was having difficulty with word finding  EMS was called at that time and they confirmed that he was having some word finding difficulty  It improved by the time that he had gotten to the emergency department but still was having difficulty finding certain words including "rotator cuff"  He otherwise denies vision changes weakness numbness or paresthesias  He had an episode like this similar to this around 10 PM last night  He recently was started on Eliquis which she has been compliant with  He denies any headache nausea vomiting recent head strike, chest pain dyspnea or abdominal pain  Prior to Admission Medications   Prescriptions Last Dose Informant Patient Reported? Taking?    Diclofenac Sodium (VOLTAREN) 1 %   Yes No   Sig: Apply 2 g topically 4 (four) times a day   Loratadine-Pseudoephedrine (ALAVERT D-12 HOUR ALLERGY/MEGHAN PO)   Yes No   Sig: Take by mouth in the morning   Multiple Vitamin (multivitamin) tablet   Yes No   Sig: Take 1 tablet by mouth daily   NON FORMULARY   Yes No   Sig: Medical marijuana   Vitamin D, Cholecalciferol, 25 MCG (1000 UT) TABS   Yes No   Sig: Take by mouth in the morning   acetaminophen (TYLENOL) 325 mg tablet   Yes No   Sig: Take 650 mg by mouth every 6 (six) hours as needed for mild pain   acetaminophen (TYLENOL) 650 mg CR tablet   Yes No   Sig: Take 650 mg by mouth 2 (two) times a day   allopurinol (ZYLOPRIM) 100 mg tablet   Yes No   Sig: Take 400 mg by mouth daily   apixaban (Eliquis) 5 mg   No No Sig: Take 1 tablet (5 mg total) by mouth 2 (two) times a day   ascorbic acid (VITAMIN C) 500 MG tablet   Yes No   Sig: Take 500 mg by mouth daily   cetirizine (ZyrTEC) 10 mg tablet   Yes No   Sig: Take 10 mg by mouth daily   doxycycline (ADOXA) 100 MG tablet   Yes No   ezetimibe (ZETIA) 10 mg tablet   Yes No   Sig: Take 10 mg by mouth daily   fexofenadine (ALLEGRA) 180 MG tablet   Yes No   Sig: Take 180 mg by mouth if needed   fluticasone (FLONASE) 50 mcg/act nasal spray   Yes No   gabapentin (NEURONTIN) 100 mg capsule   Yes No   Sig: Take 100 mg by mouth daily At bedtime   hydroxychloroquine (PLAQUENIL) 200 mg tablet   Yes No   Sig: Take 200 mg by mouth 2 (two) times a day with meals   levothyroxine 25 mcg tablet   Yes No   lisinopril (ZESTRIL) 5 mg tablet   No No   Sig: Take 1 tablet (5 mg total) by mouth daily Do not start before October 12, 2022     loperamide (IMODIUM) 2 mg capsule   Yes No   Sig: Take 2 mg by mouth as needed for diarrhea   metoprolol succinate (TOPROL-XL) 25 mg 24 hr tablet   No No   Sig: Take 1 tablet (25 mg total) by mouth 2 (two) times a day   nitroglycerin (NITROSTAT) 0 4 mg SL tablet   Yes No   Sig: Place 0 4 mg under the tongue every 5 (five) minutes as needed for chest pain   omeprazole (PriLOSEC) 10 mg delayed release capsule   Yes No   Sig: Take 10 mg by mouth daily   oxyCODONE-acetaminophen (PERCOCET) 5-325 mg per tablet   Yes No   Sig: Take 1 tablet by mouth in the morning   polyethylene glycol (MIRALAX) 17 g packet   No No   Sig: Take 17 g by mouth every other day   predniSONE 5 mg tablet   Yes No   Sig: Take by mouth daily   psyllium (METAMUCIL SMOOTH TEXTURE) 28 % packet   No No   Sig: Take 1 packet by mouth 2 (two) times a day   solifenacin (VESICARE) 5 mg tablet   Yes No   Sig: Take 5 mg by mouth daily   tamsulosin (FLOMAX) 0 4 mg   Yes No   Sig: Take 0 4 mg by mouth daily at bedtime   torsemide (DEMADEX) 20 mg tablet   No No   Sig: Take 1 tablet (20 mg total) by mouth daily Facility-Administered Medications: None       Past Medical History:   Diagnosis Date   • GERD (gastroesophageal reflux disease)    • Gout    • Hyperlipidemia    • Hypertension    • 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[address] )    • Spinal stenosis        Past Surgical History:   Procedure Laterality Date   • CARDIAC ELECTROPHYSIOLOGY PROCEDURE N/A 12/17/2022    Procedure: Cardiac loop recorder implant;  Surgeon: Lisa Cary MD;  Location: [facility]; Service: Cardiology   • CARPAL TUNNEL RELEASE Bilateral    • COLONOSCOPY     • LAMINECTOMY     • TOTAL KNEE ARTHROPLASTY Bilateral    • TOTAL SHOULDER REPLACEMENT         Family History   Problem Relation Age of Onset   • Colon polyps Neg Hx    • Colon cancer Neg Hx      I have reviewed and agree with the history as documented  E-Cigarette/Vaping   • E-Cigarette Use Never User      E-Cigarette/Vaping Substances   • Nicotine No    • Flavoring No      Social History     Tobacco Use   • Smoking status: Never   • Smokeless tobacco: Never   Vaping Use   • Vaping Use: Never used   Substance Use Topics   • Alcohol use: Yes     Alcohol/week: 2 0 standard drinks     Types: 2 Shots of liquor per week     Comment: 2-3 ounces of whiskey a day   • Drug use: Yes     Types: Marijuana       Review of Systems   Constitutional: Negative for fever  HENT: Negative for sore throat  Eyes: Negative for photophobia  Respiratory: Negative for shortness of breath  Cardiovascular: Negative for chest pain  Gastrointestinal: Negative for abdominal pain  Genitourinary: Negative for dysuria  Musculoskeletal: Negative for back pain  Skin: Negative for rash  Neurological: Positive for speech difficulty  Negative for light-headedness  Hematological: Negative for adenopathy  Psychiatric/Behavioral: Negative for agitation  All other systems reviewed and are negative  Physical Exam  Physical Exam  Vitals reviewed     Constitutional:       General: He is not in acute distress  Appearance: He is well-developed  HENT:      Head: Normocephalic  Eyes:      Pupils: Pupils are equal, round, and reactive to light  Cardiovascular:      Rate and Rhythm: Normal rate and regular rhythm  Heart sounds: Normal heart sounds  No murmur heard  No friction rub  No gallop  Pulmonary:      Effort: Pulmonary effort is normal       Breath sounds: Normal breath sounds  Abdominal:      General: Bowel sounds are normal  There is no distension  Palpations: Abdomen is soft  Tenderness: There is no abdominal tenderness  There is no guarding  Musculoskeletal:         General: Normal range of motion  Cervical back: Normal range of motion and neck supple  Skin:     Capillary Refill: Capillary refill takes less than 2 seconds  Neurological:      Mental Status: He is alert and oriented to person, place, and time  Cranial Nerves: No cranial nerve deficit  Sensory: No sensory deficit  Motor: No abnormal muscle tone  Comments: Alert oriented x3  GCS 15  Cranial nerves 2-12 intact  Strength 5/5 in all 4 extremities  Sensation intact throughout  Mild word finding difficulty noted  Psychiatric:         Behavior: Behavior normal          Thought Content:  Thought content normal          Judgment: Judgment normal          Vital Signs  ED Triage Vitals   Temperature Pulse Respirations Blood Pressure SpO2   01/26/23 0945 01/26/23 0936 01/26/23 0936 01/26/23 0936 01/26/23 0936   98 3 °F (36 8 °C) 77 20 147/80 100 %      Temp Source Heart Rate Source Patient Position - Orthostatic VS BP Location FiO2 (%)   01/26/23 0945 01/26/23 0936 01/26/23 0936 01/26/23 0936 --   Oral Monitor Lying Right arm       Pain Score       01/26/23 0936       No Pain           Vitals:    01/26/23 1201 01/26/23 1300 01/26/23 1400 01/26/23 1404   BP: 111/72 152/73 111/70 111/70   Pulse: 92 92 98 97   Patient Position - Orthostatic VS: Lying            Visual Acuity  Visual Acuity Flowsheet Row Most Recent Value   L Pupil Size (mm) 3   R Pupil Size (mm) 3          ED Medications  Medications   sodium chloride 0 9 % bolus 500 mL (500 mL Intravenous Not Given 1/26/23 1114)   multi-electrolyte (PLASMALYTE-A/ISOLYTE-S PH 7 4) IV solution (25 mL/hr Intravenous New Bag 1/26/23 1110)   atorvastatin (LIPITOR) tablet 40 mg (has no administration in time range)   acetaminophen (TYLENOL) tablet 650 mg (650 mg Oral Given 1/26/23 1404)   polyethylene glycol (MIRALAX) packet 17 g (has no administration in time range)   allopurinol (ZYLOPRIM) tablet 400 mg (400 mg Oral Given 1/26/23 1405)   apixaban (ELIQUIS) tablet 2 5 mg (2 5 mg Oral Given 1/26/23 1406)   Diclofenac Sodium (VOLTAREN) 1 % topical gel 2 g (has no administration in time range)   ezetimibe (ZETIA) tablet 10 mg (10 mg Oral Given 1/26/23 1406)   hydroxychloroquine (PLAQUENIL) tablet 200 mg (has no administration in time range)   levothyroxine tablet 25 mcg (25 mcg Oral Given 1/26/23 1407)   metoprolol succinate (TOPROL-XL) 24 hr tablet 25 mg (25 mg Oral Given 1/26/23 1406)   pantoprazole (PROTONIX) EC tablet 40 mg (40 mg Oral Given 1/26/23 1406)   predniSONE tablet 5 mg (5 mg Oral Given 1/26/23 1406)   tamsulosin (FLOMAX) capsule 0 4 mg (has no administration in time range)   oxybutynin (DITROPAN-XL) 24 hr tablet 5 mg (5 mg Oral Given 1/26/23 1406)   potassium chloride (K-DUR,KLOR-CON) CR tablet 40 mEq (40 mEq Oral Given 1/26/23 1405)   aluminum-magnesium hydroxide-simethicone (MYLANTA) oral suspension 30 mL (has no administration in time range)   trimethobenzamide (TIGAN) IM injection 100 mg (has no administration in time range)   nystatin (MYCOSTATIN) powder (has no administration in time range)   iohexol (OMNIPAQUE) 350 MG/ML injection (SINGLE-DOSE) 85 mL (85 mL Intravenous Given 1/26/23 9344)       Diagnostic Studies  Results Reviewed     Procedure Component Value Units Date/Time    HS Troponin I 4hr [[id number]] Collected: 01/26/23 1344 Lab Status: In process Specimen: Blood from Arm, Left Updated: 01/26/23 1443    Vitamin B12 [[id number]] Collected: 01/26/23 0940    Lab Status: In process Specimen: Blood from Arm, Left Updated: 01/26/23 1250    Folate [[id number]] Collected: 01/26/23 0940    Lab Status: In process Specimen: Blood from Arm, Left Updated: 01/26/23 1250    HS Troponin I 2hr [[id number]]  (Normal) Collected: 01/26/23 1146    Lab Status: Final result Specimen: Blood from Hand, Left Updated: 01/26/23 1219     hs TnI 2hr 13 ng/L      Delta 2hr hsTnI -2 ng/L     TSH, 3rd generation with Free T4 reflex [[id number]]  (Abnormal) Collected: 01/26/23 0940    Lab Status: Final result Specimen: Blood from Arm, Left Updated: 01/26/23 1158     TSH 3RD GENERATON 5 259 uIU/mL     Narrative:      Patients undergoing fluorescein dye angiography may retain small amounts of fluorescein in the body for 48-72 hours post procedure  Samples containing fluorescein can produce falsely depressed TSH values  If the patient had this procedure,a specimen should be resubmitted post fluorescein clearance  T4, free [[id number]] Collected: 01/26/23 0940    Lab Status: In process Specimen: Blood from Arm, Left Updated: 01/26/23 1158    Protime-INR [[id number]]  (Abnormal) Collected: 01/26/23 0940    Lab Status: Final result Specimen: Blood from Arm, Left Updated: 01/26/23 1133     Protime 17 0 seconds      INR 1 30    APTT [[id number]]  (Normal) Collected: 01/26/23 0940    Lab Status: Final result Specimen: Blood from Arm, Left Updated: 01/26/23 1133     PTT 29 seconds     Hemoglobin A1C w/ EAG Estimation [[id number]] Collected: 01/26/23 0940    Lab Status:  In process Specimen: Blood from Arm, Left Updated: 01/26/23 1103    FLU/RSV/COVID - if FLU/RSV clinically relevant [[id number]]  (Normal) Collected: 01/26/23 1010    Lab Status: Final result Specimen: Nares from Nose Updated: 01/26/23 1100     SARS-CoV-2 Negative     INFLUENZA A PCR Negative     INFLUENZA B PCR Negative     RSV PCR Negative    Narrative:      FOR PEDIATRIC PATIENTS - copy/paste COVID Guidelines URL to browser: [email or web address]    SARS-CoV-2 assay is a Nucleic Acid Amplification assay intended for the  qualitative detection of nucleic acid from SARS-CoV-2 in nasopharyngeal  swabs  Results are for the presumptive identification of SARS-CoV-2 RNA  Positive results are indicative of infection with SARS-CoV-2, the virus  causing COVID-19, but do not rule out bacterial infection or co-infection  with other viruses  Laboratories within the [place] and its  territories are required to report all positive results to the appropriate  public health authorities  Negative results do not preclude SARS-CoV-2  infection and should not be used as the sole basis for treatment or other  patient management decisions  Negative results must be combined with  clinical observations, patient history, and epidemiological information  This test has not been FDA cleared or approved  This test has been authorized by FDA under an Emergency Use Authorization  (EUA)  This test is only authorized for the duration of time the  declaration that circumstances exist justifying the authorization of the  emergency use of an in vitro diagnostic tests for detection of SARS-CoV-2  virus and/or diagnosis of COVID-19 infection under section 564(b)(1) of  the Act, 21 U  S C  981ZUB-5(D)(6), unless the authorization is terminated  or revoked sooner  The test has been validated but independent review by FDA  and CLIA is pending  Test performed using [employer] GeneXpert: This RT-PCR assay targets N2,  a region unique to SARS-CoV-2  A conserved region in the E-gene was chosen  for pan-Sarbecovirus detection which includes SARS-CoV-2  According to CMS-2020-01-R, this platform meets the definition of high-throughput technology      HS Troponin 0hr (reflex protocol) [[id number]] (Normal) Collected: 01/26/23 0940    Lab Status: Final result Specimen: Blood from Arm, Left Updated: 01/26/23 1025     hs TnI 0hr 15 ng/L     Fingerstick Glucose (POCT) [[id number]]  (Normal) Collected: 01/26/23 1013    Lab Status: Final result Updated: 01/26/23 1015     POC Glucose 126 mg/dl     Basic metabolic panel [[id number]]  (Abnormal) Collected: 01/26/23 0940    Lab Status: Final result Specimen: Blood from Arm, Left Updated: 01/26/23 1009     Sodium 140 mmol/L      Potassium 3 2 mmol/L      Chloride 99 mmol/L      CO2 34 mmol/L      ANION GAP 7 mmol/L      BUN 27 mg/dL      Creatinine 1 58 mg/dL      Glucose 139 mg/dL      Calcium 7 9 mg/dL      eGFR 38 ml/min/1 73sq m     Narrative:      [place] guidelines for Chronic Kidney Disease (CKD):   •  Stage 1 with normal or high GFR (GFR > 90 mL/min/1 73 square meters)  •  Stage 2 Mild CKD (GFR = 60-89 mL/min/1 73 square meters)  •  Stage 3A Moderate CKD (GFR = 45-59 mL/min/1 73 square meters)  •  Stage 3B Moderate CKD (GFR = 30-44 mL/min/1 73 square meters)  •  Stage 4 Severe CKD (GFR = 15-29 mL/min/1 73 square meters)  •  Stage 5 End Stage CKD (GFR <15 mL/min/1 73 square meters)  Note: GFR calculation is accurate only with a steady state creatinine    CBC and Platelet [[id number]]  (Abnormal) Collected: 01/26/23 0940    Lab Status: Final result Specimen: Blood from Arm, Left Updated: 01/26/23 0951     WBC 11 09 Thousand/uL      RBC 4 42 Million/uL      Hemoglobin 13 8 g/dL      Hematocrit 42 8 %      MCV 97 fL      MCH 31 2 pg      MCHC 32 2 g/dL      RDW 13 7 %      Platelets 843 Thousands/uL      MPV 13 0 fL                  XR chest 1 view portable   Final Result by Ruby Green MD (01/26 1115)      No acute cardiopulmonary disease  Workstation performed: [id number]         CTA stroke alert (head/neck)   Final Result by Jairo Guzman MD (01/26 1011)   1   CTA head: Negative for large vessel intracranial occlusion   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  2  CTA neck:  No extracranial carotid stenosis  The cervical vertebral arteries are patent  Findings were directly discussed with Dr Megan Mckeon at 9:52 AM                            Workstation performed: [id number]         CT stroke alert brain   Final Result by Kenji Lemons MD (01/26 0954)      1  No acute intracranial abnormality  2   Chronic bilateral basal ganglia and right thalamic lacunar infarcts  Chronic microangiopathic ischemic changes  Findings were directly discussed with Dr Megan Mckeon at 9:52 AM       Workstation performed: [id number]         MRI inpatient order    (Results Pending)              Procedures  ECG 12 Lead Documentation Only    Date/Time: 1/26/2023 3:07 PM  Performed by: Antonella Bateman MD  Authorized by: Antonella Bateman MD     ECG reviewed by me, the ED Provider: yes    Patient location:  ED  Previous ECG:     Previous ECG:  Compared to current    Similarity:  Changes noted    Comparison to cardiac monitor: Yes    Interpretation:     Interpretation: non-specific    Rate:     ECG rate assessment: normal    Rhythm:     Rhythm: sinus rhythm    Ectopy:     Ectopy: PAC    QRS:     QRS axis:  Left    QRS intervals:  Normal  Conduction:     Conduction: normal    ST segments:     ST segments:  Normal  T waves:     T waves: normal               ED Course                                             Medical Decision Making  Patient is an 69-year-old male with a history of hypertension hyperlipidemia and TIA the presents for evaluation of expressive aphasia  Upon reassessment, patient's expressive aphasia has returned to baseline  He is no longer has aphasia at the time of my reevaluation    I discussed the case with neurology for the stroke alert and they do not feel strongly about starting new antiplatelet medications and feel that he should be admitted for observation  Otherwise his blood work reveals that he has a mild acute kidney injury  Troponin initially negative and CBC is unremarkable  Patient is not a tPA candidate secondary to resolving symptoms and also because he is on a blood thinner  Patient admitted in hemodynamically stable status  Cardiomyopathy [facility]): complicated acute illness or injury  Expressive aphasia: complicated acute illness or injury  Hypertension: complicated acute illness or injury  Amount and/or Complexity of Data Reviewed  Labs: ordered  Radiology: ordered  Discussion of management or test interpretation with external provider(s): Nicole with neurology regarding management    Risk  Prescription drug management  Decision regarding hospitalization  Disposition  Final diagnoses:   Expressive aphasia   Hypertension   Cardiomyopathy ([address] )     Time reflects when diagnosis was documented in both MDM as applicable and the Disposition within this note     Time User Action Codes Description Comment    1/26/2023  9:33 AM Lydia Jernigan Add [R47 01] Expressive aphasia     1/26/2023 10:43 AM Jarek Salmeron [I10] Hypertension     1/26/2023 10:43 AM Jarek Salmeron [I42 9] Cardiomyopathy [facility])       ED Disposition     ED Disposition   Admit    Condition   Stable    Date/Time   Thu Jan 26, 2023 10:42 AM    Comment   Case was discussed with ISAK and the patient's admission status was agreed to be Admission Status: observation status to the service of Dr Juanita Angelucci              Follow-up Information    None         Current Discharge Medication List      CONTINUE these medications which have NOT CHANGED    Details   acetaminophen (TYLENOL) 325 mg tablet Take 650 mg by mouth every 6 (six) hours as needed for mild pain      acetaminophen (TYLENOL) 650 mg CR tablet Take 650 mg by mouth 2 (two) times a day      allopurinol (ZYLOPRIM) 100 mg tablet Take 400 mg by mouth daily      apixaban (Eliquis) 5 mg Take 1 tablet (5 mg total) by mouth 2 (two) times a day  Qty: 180 tablet, Refills: 3    Associated Diagnoses: TIA (transient ischemic attack); PAF (paroxysmal atrial fibrillation) ([facility])      ascorbic acid (VITAMIN C) 500 MG tablet Take 500 mg by mouth daily      cetirizine (ZyrTEC) 10 mg tablet Take 10 mg by mouth daily      Diclofenac Sodium (VOLTAREN) 1 % Apply 2 g topically 4 (four) times a day      doxycycline (ADOXA) 100 MG tablet       ezetimibe (ZETIA) 10 mg tablet Take 10 mg by mouth daily      fexofenadine (ALLEGRA) 180 MG tablet Take 180 mg by mouth if needed      fluticasone (FLONASE) 50 mcg/act nasal spray       gabapentin (NEURONTIN) 100 mg capsule Take 100 mg by mouth daily At bedtime      hydroxychloroquine (PLAQUENIL) 200 mg tablet Take 200 mg by mouth 2 (two) times a day with meals      levothyroxine 25 mcg tablet       lisinopril (ZESTRIL) 5 mg tablet Take 1 tablet (5 mg total) by mouth daily Do not start before October 12, 2022    Qty: 30 tablet, Refills: 0    Associated Diagnoses: Low left ventricular ejection fraction      loperamide (IMODIUM) 2 mg capsule Take 2 mg by mouth as needed for diarrhea      Loratadine-Pseudoephedrine (ALAVERT D-12 HOUR ALLERGY/MEGHAN PO) Take by mouth in the morning      metoprolol succinate (TOPROL-XL) 25 mg 24 hr tablet Take 1 tablet (25 mg total) by mouth 2 (two) times a day  Qty: 180 tablet, Refills: 3    Associated Diagnoses: Low left ventricular ejection fraction      Multiple Vitamin (multivitamin) tablet Take 1 tablet by mouth daily      nitroglycerin (NITROSTAT) 0 4 mg SL tablet Place 0 4 mg under the tongue every 5 (five) minutes as needed for chest pain      NON FORMULARY Medical marijuana      omeprazole (PriLOSEC) 10 mg delayed release capsule Take 10 mg by mouth daily      oxyCODONE-acetaminophen (PERCOCET) 5-325 mg per tablet Take 1 tablet by mouth in the morning      polyethylene glycol (MIRALAX) 17 g packet Take 17 g by mouth every other day  Qty: 30 each, Refills: 2 Associated Diagnoses: Alternating constipation and diarrhea      predniSONE 5 mg tablet Take by mouth daily      psyllium (METAMUCIL SMOOTH TEXTURE) 28 % packet Take 1 packet by mouth 2 (two) times a day  Qty: 30 packet, Refills: 2    Associated Diagnoses: Alternating constipation and diarrhea      solifenacin (VESICARE) 5 mg tablet Take 5 mg by mouth daily      tamsulosin (FLOMAX) 0 4 mg Take 0 4 mg by mouth daily at bedtime      torsemide (DEMADEX) 20 mg tablet Take 1 tablet (20 mg total) by mouth daily  Qty: 90 tablet, Refills: 3    Associated Diagnoses: HFrEF (heart failure with reduced ejection fraction) ([facility])      Vitamin D, Cholecalciferol, 25 MCG (1000 UT) TABS Take by mouth in the morning             No discharge procedures on file      PDMP Review       Value Time User    PDMP Reviewed  Yes 1/26/2023 11:13 AM Laya Pardo MD          ED Provider  Electronically Signed by           Nisreen Reece MD  01/26/23 0786

## 2023-01-26 NOTE — ASSESSMENT & PLAN NOTE
Cr on admission 1 58  Baseline 0 9-1 0  UA pending  Urinary retention protocol  I's and O's  Avoid nephrotoxic agents, hypotension and further contrast  Will give gentle IV fluids given considerable systolic dysfunction  Continue to trend

## 2023-01-26 NOTE — PROGRESS NOTES
[place]  Progress Note - [address]  1934, 80 y o  male MRN: [id number]  Unit/Bed#: ED 07 Encounter: [id number]  Primary Care Provider: Anjelica Rae MD   Date and time admitted to hospital: 2023  9:26 AM    * Stroke-like symptom  Assessment & Plan  Patient presenting with expressive aphasia had similar episodes last night    Will initiate stroke pathway  CTH negative  CTA showin  CTA head: Negative for large vessel intracranial occlusion    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  (f/u with neurosurgery outpt) 2  CTA neck:  No extracranial carotid stenosis   The cervical vertebral arteries are patent  MRI pending  Echo pending  EEG pending  CXR pending  LP pending  A1c pending  Folate pending  B12 pending  COVID pending  Orthostatic BP ordered  Statin   Neuro checks  neuro consult rec apprecaited  F/u with neuro outpt    KAMLESH (acute kidney injury) ([facility] [address] )  Assessment & Plan  Cr on admission 1 58  Baseline 0 9-1 0  UA pending  Urinary retention protocol  Holding lisinopril, torsemide and gabapentin d/t KAMLESH  I's and O's  Avoid nephrotoxic agents, hypotension and further contrast  Will give gentle IV fluids given considerable systolic dysfunction  Continue to trend    Chronic atrial fibrillation (HCC)  Assessment & Plan  Known to have A  fib on loop recorder  Initiated on Eliquis  Continue Eliquis  Continue metoprolol 25 mg twice daily    Chronic combined systolic and diastolic congestive heart failure (HCC)  Assessment & Plan  Wt Readings from Last 3 Encounters:   23 102 kg (224 lb 13 9 oz)   01/10/23 102 kg (224 lb 3 2 oz)   22 98 kg (216 lb)     Not in exacerbation  Most recent echo done on 10/10/2022 showed an EF of 24% and diastolic dysfunction    Moderate mitral regurg  Repeat echo pending  I's and O's  Daily weights  Cardiac low-salt diet once cleared by speech          Primary hypertension  Assessment & Plan  Continue metoprolol  Holding lisinopril and torsemide due to KAMLESH    VTE Pharmacologic Prophylaxis: VTE Score: 9 Moderate Risk (Score 3-4) - Pharmacological DVT Prophylaxis Ordered: apixaban (Eliquis)  Code Status: Prior full code      Anticipated Length of Stay: Patient will be admitted on an observation basis with an anticipated length of stay of less than 2 midnights secondary to [place]  Total Time for Visit, including Counseling / Coordination of Care: 60 minutes Greater than 50% of this total time spent on direct patient counseling and coordination of care  Chief Complaint: Difficulty speaking    History of Present Illness:  Claudell Genet is a 80 y o  male with a PMH of CHF, HTN, Afib, who presents with expressive aphasia that started last night however resolved  Had another episode this morning for which she was brought in  He had some increased confusion after starting Eliquis a couple of days prior to expressive aphasia symptoms  Patient was recently diagnosed with A  fib on loop recorder and was initiated on Eliquis  His most recent admission was on 10/8/2022 for TIA and similar speech disturbance patterns  All other symptoms on review of systems was negative  Review of Systems:  Review of Systems    Past Medical and Surgical History:   Past Medical History:   Diagnosis Date   • GERD (gastroesophageal reflux disease)    • Gout    • Hyperlipidemia    • Hypertension    • 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[facility] [address] )    • Spinal stenosis        Past Surgical History:   Procedure Laterality Date   • CARDIAC ELECTROPHYSIOLOGY PROCEDURE N/A 12/17/2022    Procedure: Cardiac loop recorder implant;  Surgeon: Radha Vasquez MD;  Location: [facility];   Service: Cardiology   • CARPAL TUNNEL RELEASE Bilateral    • COLONOSCOPY     • LAMINECTOMY     • TOTAL KNEE ARTHROPLASTY Bilateral    • TOTAL SHOULDER REPLACEMENT         Meds/Allergies:  Prior to Admission medications    Medication Sig Start Date End Date Taking?  Authorizing Provider   acetaminophen (TYLENOL) 325 mg tablet Take 650 mg by mouth every 6 (six) hours as needed for mild pain    Historical Provider, MD   acetaminophen (TYLENOL) 650 mg CR tablet Take 650 mg by mouth 2 (two) times a day    Historical Provider, MD   allopurinol (ZYLOPRIM) 100 mg tablet Take 400 mg by mouth daily    Historical Provider, MD   apixaban (Eliquis) 5 mg Take 1 tablet (5 mg total) by mouth 2 (two) times a day 1/10/23   Antonio Muñoz MD   ascorbic acid (VITAMIN C) 500 MG tablet Take 500 mg by mouth daily    Historical Provider, MD   cetirizine (ZyrTEC) 10 mg tablet Take 10 mg by mouth daily    Historical Provider, MD   Diclofenac Sodium (VOLTAREN) 1 % Apply 2 g topically 4 (four) times a day    Historical Provider, MD   doxycycline (ADOXA) 100 MG tablet  1/10/22   Historical Provider, MD   ezetimibe (ZETIA) 10 mg tablet Take 10 mg by mouth daily    Historical Provider, MD   fexofenadine (ALLEGRA) 180 MG tablet Take 180 mg by mouth if needed    Historical Provider, MD   fluticasone Sanjeev Oms) 50 mcg/act nasal spray  9/2/22   Historical Provider, MD   gabapentin (NEURONTIN) 100 mg capsule Take 100 mg by mouth daily At bedtime    Historical Provider, MD   hydroxychloroquine (PLAQUENIL) 200 mg tablet Take 200 mg by mouth 2 (two) times a day with meals    Historical Provider, MD   levothyroxine 25 mcg tablet  8/24/22   Historical Provider, MD   lisinopril (ZESTRIL) 5 mg tablet Take 1 tablet (5 mg total) by mouth daily Do not start before October 12, 2022  10/12/22 1/10/23  Marlena White MD   loperamide (IMODIUM) 2 mg capsule Take 2 mg by mouth as needed for diarrhea    Historical Provider, MD   Loratadine-Pseudoephedrine (ALAVERT D-12 HOUR ALLERGY/MEGHAN PO) Take by mouth in the morning    Historical Provider, MD   metoprolol succinate (TOPROL-XL) 25 mg 24 hr tablet Take 1 tablet (25 mg total) by mouth 2 (two) times a day 1/10/23 2/9/23  Antonio Muñoz MD Multiple Vitamin (multivitamin) tablet Take 1 tablet by mouth daily    Historical Provider, MD   nitroglycerin (NITROSTAT) 0 4 mg SL tablet Place 0 4 mg under the tongue every 5 (five) minutes as needed for chest pain    Historical Provider, MD   NON FORMULARY Medical marijuana    Historical Provider, MD   omeprazole (PriLOSEC) 10 mg delayed release capsule Take 10 mg by mouth daily    Historical Provider, MD   oxyCODONE-acetaminophen (PERCOCET) 5-325 mg per tablet Take 1 tablet by mouth in the morning    Historical Provider, MD   polyethylene glycol (MIRALAX) 17 g packet Take 17 g by mouth every other day 12/21/21   Alpheus Habermann Welsheimer, CRNP   predniSONE 5 mg tablet Take by mouth daily    Historical Provider, MD   psyllium (METAMUCIL SMOOTH TEXTURE) 28 % packet Take 1 packet by mouth 2 (two) times a day 12/21/21   ESTEFANIA Duncan   solifenacin (VESICARE) 5 mg tablet Take 5 mg by mouth daily    Historical Provider, MD   tamsulosin (FLOMAX) 0 4 mg Take 0 4 mg by mouth daily at bedtime    Historical Provider, MD   torsemide (DEMADEX) 20 mg tablet Take 1 tablet (20 mg total) by mouth daily 1/10/23   Ron Del Catsillo MD   Vitamin D, Cholecalciferol, 25 MCG (1000 UT) TABS Take by mouth in the morning    Historical Provider, MD     I have reviewed home medications using recent Epic encounter  Allergies: Allergies   Allergen Reactions   • Colchicine Other (See Comments)     Flushing     • Niacin Other (See Comments)     flushed   • Statins        Social History:  Marital Status:     Patient Pre-hospital Living Situation: [facility]  Patient Pre-hospital Level of Mobility: walks with walker  Patient Pre-hospital Diet Restrictions: none  Substance Use History:   Social History     Substance and Sexual Activity   Alcohol Use Yes   • Alcohol/week: 2 0 standard drinks   • Types: 2 Shots of liquor per week    Comment: 2-3 ounces of whiskey a day     Social History     Tobacco Use   Smoking Status Never   Smokeless Tobacco Never     Social History     Substance and Sexual Activity   Drug Use Yes   • Types: Marijuana       Family History:  Family History   Problem Relation Age of Onset   • Colon polyps Neg Hx    • Colon cancer Neg Hx        Physical Exam:     Vitals:   Blood Pressure: 122/78 (01/26/23 1000)  Pulse: 93 (01/26/23 1000)  Temperature: 98 3 °F (36 8 °C) (01/26/23 0945)  Temp Source: Oral (01/26/23 0945)  Respirations: 18 (01/26/23 1000)  Height: 5' 9" (175 3 cm) (01/26/23 0936)  Weight - Scale: 102 kg (224 lb 13 9 oz) (01/26/23 0936)  SpO2: 98 % (01/26/23 1000)    Physical Exam  Vitals and nursing note reviewed  Constitutional:       Appearance: He is obese  He is not ill-appearing  HENT:      Head: Normocephalic and atraumatic  Cardiovascular:      Rate and Rhythm: Normal rate and regular rhythm  Pulses: Normal pulses  Heart sounds: Normal heart sounds  Pulmonary:      Effort: Pulmonary effort is normal       Breath sounds: Normal breath sounds  Abdominal:      General: Abdomen is flat  Bowel sounds are normal       Palpations: Abdomen is soft  Musculoskeletal:      Right lower leg: No edema  Skin:     General: Skin is warm  Neurological:      General: No focal deficit present  Mental Status: He is alert and oriented to person, place, and time            Additional Data:     Lab Results:  Results from last 7 days   Lab Units 01/26/23  0940   WBC Thousand/uL 11 09*   HEMOGLOBIN g/dL 13 8   HEMATOCRIT % 42 8   PLATELETS Thousands/uL 171     Results from last 7 days   Lab Units 01/26/23  0940   SODIUM mmol/L 140   POTASSIUM mmol/L 3 2*   CHLORIDE mmol/L 99   CO2 mmol/L 34*   BUN mg/dL 27*   CREATININE mg/dL 1 58*   ANION GAP mmol/L 7   CALCIUM mg/dL 7 9*   GLUCOSE RANDOM mg/dL 139         Results from last 7 days   Lab Units 01/26/23  1013   POC GLUCOSE mg/dl 126               Lines/Drains:  Invasive Devices     Peripheral Intravenous Line  Duration           Peripheral IV 01/26/23 Distal;Left;Upper;Ventral (anterior) Arm <1 day                    Imaging:   XR chest 1 view portable   Final Result by Trey Rosen MD (01/26 1115)      No acute cardiopulmonary disease  Workstation performed: [id number]         CTA stroke alert (head/neck)   Final Result by Roseanne Darling MD (01/26 1011)   1  CTA head: Negative for large vessel intracranial occlusion   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  2  CTA neck:  No extracranial carotid stenosis  The cervical vertebral arteries are patent  Findings were directly discussed with Dr Ilda Mcqueen at 9:52 AM                            Workstation performed: [id number]         CT stroke alert brain   Final Result by Roseanne Darling MD (01/26 7956)      1  No acute intracranial abnormality  2   Chronic bilateral basal ganglia and right thalamic lacunar infarcts  Chronic microangiopathic ischemic changes  Findings were directly discussed with Dr Ilda Mcqueen at 9:52 AM       Workstation performed: [id number]         MRI inpatient order    (Results Pending)       EKG and Other Studies Reviewed on Admission:   · EKG: NSR  HR 96     ** Please Note: This note has been constructed using a voice recognition system   **

## 2023-01-26 NOTE — CASE MANAGEMENT
Case Management Assessment & Discharge Planning Note    Patient name Robbie Laughlin  Location  SDU/-01 S* MRN [id number]  : 1934 Date 2023       Current Admission Date: 2023  Current Admission Diagnosis:Transient speech disturbance   Patient Active Problem List    Diagnosis Date Noted   • Chronic combined systolic and diastolic congestive heart failure ([facility] [address] ) 2023   • Chronic atrial fibrillation ([facility] [address] ) 2023   • Transient speech disturbance 2023   • KAMLESH (acute kidney injury) ([facility] [address] ) 2023   • Dilated cardiomyopathy ([facility] [address] ) 10/18/2022   • Primary hypertension 10/18/2022   • Low left ventricular ejection fraction 10/11/2022   • TIA (transient ischemic attack) 10/10/2022   • Speech disturbance 10/08/2022   • Accidental overdose 2020   • History of hypertension 2020   • Rheumatoid arthritis ([facility] [address] ) 2020      LOS (days): 0  Geometric Mean LOS (GMLOS) (days):   Days to GMLOS:     OBJECTIVE:              Current admission status: Observation  Referral Reason: Stroke    Preferred Pharmacy:   [address] [place] [address] N  [address],[facility] [address]  Phone: [phone] Fax: [phone]    Primary Care Provider: Merlin Cruz, MD    Primary Insurance: [facility]  Secondary Insurance:     ASSESSMENT:  [id number] [place] [address], [address] [facility]   Primary Phone: [phone] (Mobile)               Advance Directives  Does patient have a [address]?: Yes  Does patient have Advance Directives?: Yes  Advance Directives: Power of  for health care  Primary Contact: Kerwin Altmeier         Readmission Root Cause  30 Day Readmission: No    Patient Information  Admitted from[de-identified] Facility  Mental Status: Alert  During Assessment patient was accompanied by:  Son  Assessment information provided by[de-identified] Patient  Primary Caregiver: Self  Support Systems: Son, Self, [address] of Residence: [address] [place] [address] do you live in?: [address] [facility] entry access options  Select all that apply : No steps to enter home  Type of Current Residence: Other (Comment) (Asssited Living)  In the last 12 months, was there a time when you were not able to pay the mortgage or rent on time?: No  In the last 12 months, how many places have you lived?: 1  In the last 12 months, was there a time when you did not have a steady place to sleep or slept in a shelter (including now)?: No  Homeless/housing insecurity resource given?: N/A  Living Arrangements: Lives Alone  Is patient a ?: No    Activities of Daily Living Prior to Admission  Functional Status: Independent  Completes ADLs independently?: Yes  Ambulates independently?: Yes  Does patient use assisted devices?: Yes  Assisted Devices (DME) used:  Wheelchair, Bart Brooten  Does patient currently own DME?: Yes  What DME does the patient currently own?: Bart Corrie, Wheelchair  Does patient have a history of Outpatient Therapy (PT/OT)?: No  Does the patient have a history of Short-Term Rehab?: No  Does patient have a history of HHC?: No  Does patient currently have [address]?: No         Patient Information Continued  Income Source: Pension/long-term  Does patient have prescription coverage?: Yes  Within the past 12 months, you worried that your food would run out before you got the money to buy more : Never true  Within the past 12 months, the food you bought just didn't last and you didn't have money to get more : Never true  Food insecurity resource given?: N/A  Does patient receive dialysis treatments?: No  Does patient have a history of substance abuse?: No  Does patient have a history of Mental Health Diagnosis?: No         Means of Transportation  Means of Transport to Appts[de-identified] Family transport  In the past 12 months, has lack of transportation kept you from medical appointments or from getting medications?: No  In the past 12 months, has lack of transportation kept you from meetings, work, or from getting things needed for daily living?: No  Was application for public transport provided?: N/A        DISCHARGE DETAILS:    Discharge planning discussed with[de-identified] Pt and son at bedside  Freedom of Choice: Yes  Comments - Freedom of Choice: discussed dc planning returnt o VLQ as long as pt is able to return to same LOC  CM contacted family/caregiver?: Yes  Were Treatment Team discharge recommendations reviewed with patient/caregiver?: Yes  Did patient/caregiver verbalize understanding of patient care needs?: Yes       Contacts  Patient Contacts: Kerwin Krause  Relationship to Patient[de-identified] Family  Contact Method: In Person  Reason/Outcome: Discharge [address]         Is the patient interested in [address] at discharge?: No    DME Referral Provided  Referral made for DME?: No    Other Referral/Resources/Interventions Provided:  Interventions: Facility Return, Short Term Rehab  Referral Comments: Pending pts evaluations  Lives at [facility] and gets PT there onsite  Treatment Team Recommendation: Facility Return  Discharge Destination Plan[de-identified] Facility Return  Transport at Discharge : Family                                      Additional Comments: Pt is OBS  Cm met with pt and his son at bedside in the ED  Pt reports that he lives at the [address] [place] [address]  He reports he has lived there for the past 2 5 years  Pt states that he is indpt in his apartment  He can feed and dress himself  Pt uses a walker for short distances and self propels in wc to dinning room  Pt does get assistance with showering  Pt is also receiving PT at [facility]  Pt reports that he has no hx of HHC/STR/MH/DA  Pts family provides transportation to him when he needs to leave VL for an appoinment etc  Pts son Conchita Washington is his medical POA  Pt plans to return to his apartment at [facility] once medically cleared   Cm following

## 2023-01-26 NOTE — TELEPHONE ENCOUNTER
Pt son Ricco Barrios called stating he is concerned about pt as patient was trying to talk but could not make a complete sentence  On call provider notified via TC

## 2023-01-26 NOTE — PLAN OF CARE
Problem: Potential for Falls  Goal: Patient will remain free of falls  Description: INTERVENTIONS:  - Educate patient/family on patient safety including physical limitations  - Instruct patient to call for assistance with activity   - Consult OT/PT to assist with strengthening/mobility   - Keep Call bell within reach  - Keep bed low and locked with side rails adjusted as appropriate  - Keep care items and personal belongings within reach  - Initiate and maintain comfort rounds  - Make Fall Risk Sign visible to staff  - Offer Toileting every 2 Hours, in advance of need  - Initiate/Maintain bed alarm  - Obtain necessary fall risk management equipment  - Apply yellow socks and bracelet for high fall risk patients  - Consider moving patient to room near nurses station  Outcome: Progressing     Problem: MOBILITY - ADULT  Goal: Maintain or return to baseline ADL function  Description: INTERVENTIONS:  -  Assess patient's ability to carry out ADLs; assess patient's baseline for ADL function and identify physical deficits which impact ability to perform ADLs (bathing, care of mouth/teeth, toileting, grooming, dressing, etc )  - Assess/evaluate cause of self-care deficits   - Assess range of motion  - Assess patient's mobility; develop plan if impaired  - Assess patient's need for assistive devices and provide as appropriate  - Encourage maximum independence but intervene and supervise when necessary  - Involve family in performance of ADLs  - Assess for home care needs following discharge   - Consider OT consult to assist with ADL evaluation and planning for discharge  - Provide patient education as appropriate  Outcome: Progressing  Goal: Maintains/Returns to pre admission functional level  Description: INTERVENTIONS:  - Perform BMAT or MOVE assessment daily    - Set and communicate daily mobility goal to care team and patient/family/caregiver     - Collaborate with rehabilitation services on mobility goals if consulted  - Perform Range of Motion 4 times a day  - Reposition patient every 2 hours    - Dangle patient 3 times a day  - Stand patient 3 times a day  - Ambulate patient 3 times a day  - Out of bed to chair 3 times a day   - Out of bed for meals 3 times a day  - Out of bed for toileting  - Record patient progress and toleration of activity level   Outcome: Progressing     Problem: PAIN - ADULT  Goal: Verbalizes/displays adequate comfort level or baseline comfort level  Description: Interventions:  - Encourage patient to monitor pain and request assistance  - Assess pain using appropriate pain scale  - Administer analgesics based on type and severity of pain and evaluate response  - Implement non-pharmacological measures as appropriate and evaluate response  - Consider cultural and social influences on pain and pain management  - Notify physician/advanced practitioner if interventions unsuccessful or patient reports new pain  Outcome: Progressing     Problem: INFECTION - ADULT  Goal: Absence or prevention of progression during hospitalization  Description: INTERVENTIONS:  - Assess and monitor for signs and symptoms of infection  - Monitor lab/diagnostic results  - Monitor all insertion sites, i e  indwelling lines, tubes, and drains  - Monitor endotracheal if appropriate and nasal secretions for changes in amount and color  - [place] appropriate cooling/warming therapies per order  - Administer medications as ordered  - Instruct and encourage patient and family to use good hand hygiene technique  - Identify and instruct in appropriate isolation precautions for identified infection/condition  Outcome: Progressing  Goal: Absence of fever/infection during neutropenic period  Description: INTERVENTIONS:  - Monitor WBC    Outcome: Progressing     Problem: SAFETY ADULT  Goal: Patient will remain free of falls  Description: INTERVENTIONS:  - Educate patient/family on patient safety including physical limitations  - Instruct patient to call for assistance with activity   - Consult OT/PT to assist with strengthening/mobility   - Keep Call bell within reach  - Keep bed low and locked with side rails adjusted as appropriate  - Keep care items and personal belongings within reach  - Initiate and maintain comfort rounds  - Make Fall Risk Sign visible to staff  - Offer Toileting every 2 Hours, in advance of need  - Initiate/Maintain bed alarm  - Obtain necessary fall risk management equipment  - Apply yellow socks and bracelet for high fall risk patients  - Consider moving patient to room near nurses station  Outcome: Progressing  Goal: Maintain or return to baseline ADL function  Description: INTERVENTIONS:  -  Assess patient's ability to carry out ADLs; assess patient's baseline for ADL function and identify physical deficits which impact ability to perform ADLs (bathing, care of mouth/teeth, toileting, grooming, dressing, etc )  - Assess/evaluate cause of self-care deficits   - Assess range of motion  - Assess patient's mobility; develop plan if impaired  - Assess patient's need for assistive devices and provide as appropriate  - Encourage maximum independence but intervene and supervise when necessary  - Involve family in performance of ADLs  - Assess for home care needs following discharge   - Consider OT consult to assist with ADL evaluation and planning for discharge  - Provide patient education as appropriate  Outcome: Progressing  Goal: Maintains/Returns to pre admission functional level  Description: INTERVENTIONS:  - Perform BMAT or MOVE assessment daily    - Set and communicate daily mobility goal to care team and patient/family/caregiver  - Collaborate with rehabilitation services on mobility goals if consulted  - Perform Range of Motion 4 times a day  - Reposition patient every 2 hours    - Dangle patient 3 times a day  - Stand patient 3 times a day  - Ambulate patient 3 times a day  - Out of bed to chair 3 times a day   - Out of bed for meals 3 times a day  - Out of bed for toileting  - Record patient progress and toleration of activity level   Outcome: Progressing     Problem: DISCHARGE PLANNING  Goal: Discharge to home or other facility with appropriate resources  Description: INTERVENTIONS:  - Identify barriers to discharge w/patient and caregiver  - Arrange for needed discharge resources and transportation as appropriate  - Identify discharge learning needs (meds, wound care, etc )  - Arrange for interpretive services to assist at discharge as needed  - Refer to Case Management Department for coordinating discharge planning if the patient needs post-hospital services based on physician/advanced practitioner order or complex needs related to functional status, cognitive ability, or social support system  Outcome: Progressing     Problem: Knowledge Deficit  Goal: Patient/family/caregiver demonstrates understanding of disease process, treatment plan, medications, and discharge instructions  Description: Complete learning assessment and assess knowledge base    Interventions:  - Provide teaching at level of understanding  - Provide teaching via preferred learning methods  Outcome: Progressing     Problem: NEUROSENSORY - ADULT  Goal: Achieves stable or improved neurological status  Description: INTERVENTIONS  - Monitor and report changes in neurological status  - Monitor vital signs such as temperature, blood pressure, glucose, and any other labs ordered   - Initiate measures to prevent increased intracranial pressure  - Monitor for seizure activity and implement precautions if appropriate      Outcome: Progressing     Problem: CARDIOVASCULAR - ADULT  Goal: Maintains optimal cardiac output and hemodynamic stability  Description: INTERVENTIONS:  - Monitor I/O, vital signs and rhythm  - Monitor for S/S and trends of decreased cardiac output  - Administer and titrate ordered vasoactive medications to optimize hemodynamic stability  - Assess quality of pulses, skin color and temperature  - Assess for signs of decreased coronary artery perfusion  - Instruct patient to report change in severity of symptoms  Outcome: Progressing

## 2023-01-26 NOTE — ASSESSMENT & PLAN NOTE
Cr on admission 1 58  Baseline 0 9-1 0  UA pending  Urinary retention protocol  Holding lisinopril, torsemide and gabapentin d/t KAMLESH  I's and O's  Avoid nephrotoxic agents, hypotension and further contrast  Will give gentle IV fluids given considerable systolic dysfunction  Continue to trend

## 2023-01-26 NOTE — ASSESSMENT & PLAN NOTE
51-year-old male with PAF on Eliquis, history of TIA, hypertension, dyslipidemia, rheumatoid arthritis, peripheral neuropathy, spinal stenosis, gout, presents with transient, recurrent word finding difficulty/nonfluent speech  NIHSS 1 for diminished pinprick appreciation of the bilateral lower extremities (chronic)  CT head and presentation demonstrated no acute changes  CTA head/neck demonstrated no flow consequential stenosis or LVO  Blood pressure on presentation 147/80  Patient has now had 3 separate episodes of transient aphasia, with negative MRI in October 2022  Suspect alternative etiology of patient's symptoms given recurrent, stereotyped events (not cerebrovascular in etiology)  Plan:  -MRI brain without contrast   -routine EEG  -check orthostatics   -Infectious work-up, including UA as per primary service   -Check hemoglobin A1c, lipid panel, TSH   -Continue Eliquis 5mg bid  -atorvastatin 40 mg   -PT/OT/speech therapy  -Frequent neuro checks   -Continue to monitor and notify with changes

## 2023-01-26 NOTE — PROGRESS NOTES
Received on-call message that patient is having difficulty speaking  Called placed to son that re-iterated concern  During, conversation it was noted that patient does not follow with palliative care and it appears they were looking to speak to cardiology  Nonetheless, symptoms were concerning and re-assured patient that I would speak with cardiology but also, felt it was important to seek ED evaluation for word finding difficulties as this is concerning for an acute CVA  I spoke with Dr Rashmi Malone on-call for [facility] cardiology and he will follow up with patient, as well       Rocío Holloway, DO  Palliative and Supportive Care  [phone]

## 2023-01-26 NOTE — ASSESSMENT & PLAN NOTE
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

## 2023-01-26 NOTE — H&P
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  (f/u with neurosurgery outpt) 2  CTA neck:  No extracranial carotid stenosis   The cervical vertebral arteries are patent  MRI pending  Echo pending  EEG pending  CXR pending  LP pending  A1c pending  Folate pending  B12 pending  COVID pending  Orthostatic BP ordered  Statin   Neuro checks  neuro consult rec apprecaited  F/u with neuro outpt    KAMLESH (acute kidney injury) ([facility] [address] )  Assessment & Plan  Cr on admission 1 58  Baseline 0 9-1 0  UA pending  Urinary retention protocol  Holding lisinopril, torsemide and gabapentin d/t KAMLESH  I's and O's  Avoid nephrotoxic agents, hypotension and further contrast  Will give gentle IV fluids given considerable systolic dysfunction  Continue to trend    Chronic atrial fibrillation (HCC)  Assessment & Plan  Known to have A  fib on loop recorder  Initiated on Eliquis  Continue Eliquis  Continue metoprolol 25 mg twice daily    Chronic combined systolic and diastolic congestive heart failure (HCC)  Assessment & Plan  Wt Readings from Last 3 Encounters:   23 102 kg (224 lb 13 9 oz)   01/10/23 102 kg (224 lb 3 2 oz)   22 98 kg (216 lb)     Not in exacerbation  Most recent echo done on 10/10/2022 showed an EF of 10% and diastolic dysfunction    Moderate mitral regurg  Repeat echo pending  I's and O's  Daily weights  Cardiac low-salt diet once cleared by speech          Primary hypertension  Assessment & Plan  Continue metoprolol  Holding lisinopril and torsemide due to KAMLESH      VTE Pharmacologic Prophylaxis: VTE Score: 9 Moderate Risk (Score 3-4) - Pharmacological DVT Prophylaxis Ordered: apixaban (Eliquis)  Code Status: Prior full code        Anticipated Length of Stay: Patient will be admitted on an observation basis with an anticipated length of stay of less than 2 midnights secondary to tia      Total Time for Visit, including Counseling / Coordination of Care: 60 minutes Greater than 50% of this total time spent on direct patient counseling and coordination of care      Chief Complaint: Difficulty speaking     History of Present Illness:  Giovanna Dorado is a 80 y o  male with a PMH of CHF, HTN, Afib, who presents with expressive aphasia that started last night however resolved  Had another episode this morning for which she was brought in  He had some increased confusion after starting Eliquis a couple of days prior to expressive aphasia symptoms  Patient was recently diagnosed with A  fib on loop recorder and was initiated on Eliquis  His most recent admission was on 10/8/2022 for TIA and similar speech disturbance patterns  All other symptoms on review of systems was negative      Review of Systems:  Review of Systems     Past Medical and Surgical History:   Medical History        Past Medical History:   Diagnosis Date   • GERD (gastroesophageal reflux disease)     • Gout     • Hyperlipidemia     • Hypertension     • 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[facility] [address] )     • Spinal stenosis              Surgical History         Past Surgical History:   Procedure Laterality Date   • CARDIAC ELECTROPHYSIOLOGY PROCEDURE N/A 12/17/2022     Procedure: Cardiac loop recorder implant;  Surgeon: Juan Jones MD;  Location: [facility];   Service: Cardiology   • CARPAL TUNNEL RELEASE Bilateral     • COLONOSCOPY       • LAMINECTOMY       • TOTAL KNEE ARTHROPLASTY Bilateral     • TOTAL SHOULDER REPLACEMENT                Meds/Allergies:          Prior to Admission medications    Medication Sig Start Date End Date Taking?  Authorizing Provider   acetaminophen (TYLENOL) 325 mg tablet Take 650 mg by mouth every 6 (six) hours as needed for mild pain       Historical Provider, MD   acetaminophen (TYLENOL) 650 mg CR tablet Take 650 mg by mouth 2 (two) times a day       Historical Provider, MD   allopurinol (ZYLOPRIM) 100 mg tablet Take 400 mg by mouth daily       Historical Provider, MD   apixaban (Eliquis) 5 mg Take 1 tablet (5 mg total) by mouth 2 (two) times a day 1/10/23     Jasbir Maldonado MD   ascorbic acid (VITAMIN C) 500 MG tablet Take 500 mg by mouth daily       Historical Provider, MD   cetirizine (ZyrTEC) 10 mg tablet Take 10 mg by mouth daily       Historical Provider, MD   Diclofenac Sodium (VOLTAREN) 1 % Apply 2 g topically 4 (four) times a day       Historical Provider, MD   doxycycline (ADOXA) 100 MG tablet   1/10/22     Historical Provider, MD   ezetimibe (ZETIA) 10 mg tablet Take 10 mg by mouth daily       Historical Provider, MD   fexofenadine (ALLEGRA) 180 MG tablet Take 180 mg by mouth if needed       Historical Provider, MD   fluticasone (FLONASE) 50 mcg/act nasal spray   9/2/22     Historical Provider, MD   gabapentin (NEURONTIN) 100 mg capsule Take 100 mg by mouth daily At bedtime       Historical Provider, MD   hydroxychloroquine (PLAQUENIL) 200 mg tablet Take 200 mg by mouth 2 (two) times a day with meals       Historical Provider, MD   levothyroxine 25 mcg tablet   8/24/22     Historical Provider, MD   lisinopril (ZESTRIL) 5 mg tablet Take 1 tablet (5 mg total) by mouth daily Do not start before October 12, 2022  10/12/22 1/10/23   Melanie Beckford MD   loperamide (IMODIUM) 2 mg capsule Take 2 mg by mouth as needed for diarrhea       Historical Provider, MD   Loratadine-Pseudoephedrine (ALAVERT D-12 HOUR ALLERGY/MEGHAN PO) Take by mouth in the morning       Historical Provider, MD   metoprolol succinate (TOPROL-XL) 25 mg 24 hr tablet Take 1 tablet (25 mg total) by mouth 2 (two) times a day 1/10/23 2/9/23   Flakita Ybarra MD   Multiple Vitamin (multivitamin) tablet Take 1 tablet by mouth daily       Historical Provider, MD   nitroglycerin (NITROSTAT) 0 4 mg SL tablet Place 0 4 mg under the tongue every 5 (five) minutes as needed for chest pain       Historical Provider, MD   NON FORMULARY Medical marijuana       Historical Provider, MD   omeprazole (PriLOSEC) 10 mg delayed release capsule Take 10 mg by mouth daily       Historical Provider, MD   oxyCODONE-acetaminophen (PERCOCET) 5-325 mg per tablet Take 1 tablet by mouth in the morning       Historical Provider, MD   polyethylene glycol (MIRALAX) 17 g packet Take 17 g by mouth every other day 12/21/21     ESTEFANIA Duncan   predniSONE 5 mg tablet Take by mouth daily       Historical Provider, MD   psyllium (METAMUCIL SMOOTH TEXTURE) 28 % packet Take 1 packet by mouth 2 (two) times a day 12/21/21     ESTEFANIA Duncan   solifenacin (VESICARE) 5 mg tablet Take 5 mg by mouth daily       Historical Provider, MD   tamsulosin (FLOMAX) 0 4 mg Take 0 4 mg by mouth daily at bedtime       Historical Provider, MD   torsemide (DEMADEX) 20 mg tablet Take 1 tablet (20 mg total) by mouth daily 1/10/23     Flakita Ybarra MD   Vitamin D, Cholecalciferol, 25 MCG (1000 UT) TABS Take by mouth in the morning       Historical Provider, MD      I have reviewed home medications using recent Epic encounter      Allergies: Allergies   Allergen Reactions   • Colchicine Other (See Comments)       Flushing      • Niacin Other (See Comments)       flushed   • Statins           Social History:  Marital Status:     Patient Pre-hospital Living Situation: [facility]  Patient Pre-hospital Level of Mobility: walks with walker  Patient Pre-hospital Diet Restrictions: none  Substance Use History:   Social History     Substance and Sexual Activity   Alcohol Use Yes   • Alcohol/week: 2 0 standard drinks   • Types: 2 Shots of liquor per week     Comment: 2-3 ounces of whiskey a day      Social History          Tobacco Use   Smoking Status Never   Smokeless Tobacco Never      Social History           Substance and Sexual Activity   Drug Use Yes   • Types: Marijuana         Family History:  Family History         Family History   Problem Relation Age of Onset   • Colon polyps Neg Hx     • Colon cancer Neg Hx              Physical Exam:      Vitals:   Blood Pressure: 122/78 (01/26/23 1000)  Pulse: 93 (01/26/23 1000)  Temperature: 98 3 °F (36 8 °C) (01/26/23 0945)  Temp Source: Oral (01/26/23 0945)  Respirations: 18 (01/26/23 1000)  Height: 5' 9" (175 3 cm) (01/26/23 0936)  Weight - Scale: 102 kg (224 lb 13 9 oz) (01/26/23 0936)  SpO2: 98 % (01/26/23 1000)     Physical Exam  Vitals and nursing note reviewed  Constitutional:       Appearance: He is obese  He is not ill-appearing  HENT:      Head: Normocephalic and atraumatic  Cardiovascular:      Rate and Rhythm: Normal rate and regular rhythm  Pulses: Normal pulses  Heart sounds: Normal heart sounds  Pulmonary:      Effort: Pulmonary effort is normal       Breath sounds: Normal breath sounds  Abdominal:      General: Abdomen is flat  Bowel sounds are normal       Palpations: Abdomen is soft  Musculoskeletal:      Right lower leg: No edema  Skin:     General: Skin is warm  Neurological:      General: No focal deficit present        Mental Status: He is alert and oriented to person, place, and time           Additional Data:      Lab Results:       Results from last 7 days   Lab Units 01/26/23  0940   WBC Thousand/uL 11 09*   HEMOGLOBIN g/dL 13 8   HEMATOCRIT % 42 8   PLATELETS Thousands/uL 171           Results from last 7 days   Lab Units 01/26/23  0940   SODIUM mmol/L 140   POTASSIUM mmol/L 3 2*   CHLORIDE mmol/L 99   CO2 mmol/L 34*   BUN mg/dL 27* CREATININE mg/dL 1 58*   ANION GAP mmol/L 7   CALCIUM mg/dL 7 9*   GLUCOSE RANDOM mg/dL 139               Results from last 7 days   Lab Units 01/26/23  1013   POC GLUCOSE mg/dl 126                 Lines/Drains:      Invasive Devices            Peripheral Intravenous Line  Duration             Peripheral IV 01/26/23 Distal;Left;Upper;Ventral (anterior) Arm <1 day                          Imaging:   XR chest 1 view portable   Final Result by Elle Rutledge MD (01/26 1115)       No acute cardiopulmonary disease                        Workstation performed: [id number]           CTA stroke alert (head/neck)   Final Result by Layton Harada, MD (01/26 1011)   1  CTA head: Negative for large vessel intracranial occlusion   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        2  CTA neck:  No extracranial carotid stenosis  The cervical vertebral arteries are patent         Findings were directly discussed with Dr Edi Cardozo at 9:52 AM                                    Workstation performed: [id number]           CT stroke alert brain   Final Result by Layton Harada, MD (01/26 7717)       1  No acute intracranial abnormality  2   Chronic bilateral basal ganglia and right thalamic lacunar infarcts  Chronic microangiopathic ischemic changes        Findings were directly discussed with Dr Edi Cardozo at 9:52 AM        Workstation performed: [id number]           MRI inpatient order    (Results Pending)         EKG and Other Studies Reviewed on Admission:   • EKG: NSR  HR 96      ** Please Note: This note has been constructed using a voice recognition system   **

## 2023-01-26 NOTE — SPEECH THERAPY NOTE
Speech Language/Pathology    Order received, chart reviewed  S/w [facility] physician, pt passed dysphagia screen and placed on regular diet  ST evaluation for dysphagia/language to be completed within 24-48 hours as able/approrpiate

## 2023-01-26 NOTE — ASSESSMENT & PLAN NOTE
Wt Readings from Last 3 Encounters:   01/26/23 102 kg (224 lb 13 9 oz)   01/10/23 102 kg (224 lb 3 2 oz)   12/17/22 98 kg (216 lb)     Not in exacerbation  Most recent echo done on 10/10/2022 showed an EF of 13% and diastolic dysfunction    Moderate mitral regurg  Repeat echo pending  I's and O's  Daily weights  Cardiac low-salt diet once cleared by speech

## 2023-01-26 NOTE — ASSESSMENT & PLAN NOTE
Wt Readings from Last 3 Encounters:   01/26/23 99 9 kg (220 lb 3 8 oz)   01/10/23 102 kg (224 lb 3 2 oz)   12/17/22 98 kg (216 lb)     Not in exacerbation  Echo shows an EF of 89% systolic function severely reduced and diastolic dysfunction    Moderate mitral regurg  I's and O's  Daily weights  Cardiac low-salt diet  Cardio consulted  F/u with cardio outpt  Added Jardiance and Entresto    Resumed torsemide  Will not resume lisinopril on discharge

## 2023-01-27 ENCOUNTER — TELEPHONE (OUTPATIENT)
Dept: CARDIOLOGY CLINIC | Facility: CLINIC | Age: 88
End: 2023-01-27

## 2023-01-27 PROBLEM — R79.89 ELEVATED TSH: Status: ACTIVE | Noted: 2023-01-27

## 2023-01-27 PROBLEM — N17.9 AKI (ACUTE KIDNEY INJURY) (HCC): Status: RESOLVED | Noted: 2023-01-26 | Resolved: 2023-01-27

## 2023-01-27 LAB
ANION GAP SERPL CALCULATED.3IONS-SCNC: 7 MMOL/L (ref 4–13)
BACTERIA UR CULT: NORMAL
BUN SERPL-MCNC: 19 MG/DL (ref 5–25)
CALCIUM SERPL-MCNC: 7.7 MG/DL (ref 8.3–10.1)
CHLORIDE SERPL-SCNC: 104 MMOL/L (ref 96–108)
CHOLEST SERPL-MCNC: 132 MG/DL
CO2 SERPL-SCNC: 31 MMOL/L (ref 21–32)
CREAT SERPL-MCNC: 1.13 MG/DL (ref 0.6–1.3)
ERYTHROCYTE [DISTWIDTH] IN BLOOD BY AUTOMATED COUNT: 13.4 % (ref 11.6–15.1)
GFR SERPL CREATININE-BSD FRML MDRD: 57 ML/MIN/1.73SQ M
GLUCOSE SERPL-MCNC: 119 MG/DL (ref 65–140)
HCT VFR BLD AUTO: 38.6 % (ref 36.5–49.3)
HDLC SERPL-MCNC: 66 MG/DL
HGB BLD-MCNC: 12.7 G/DL (ref 12–17)
LDLC SERPL CALC-MCNC: 52 MG/DL (ref 0–100)
MAGNESIUM SERPL-MCNC: 0.7 MG/DL (ref 1.6–2.6)
MAGNESIUM SERPL-MCNC: 2 MG/DL (ref 1.6–2.6)
MCH RBC QN AUTO: 32.1 PG (ref 26.8–34.3)
MCHC RBC AUTO-ENTMCNC: 32.9 G/DL (ref 31.4–37.4)
MCV RBC AUTO: 98 FL (ref 82–98)
MRSA NOSE QL CULT: NORMAL
PLATELET # BLD AUTO: 141 THOUSANDS/UL (ref 149–390)
PMV BLD AUTO: 12.4 FL (ref 8.9–12.7)
POTASSIUM SERPL-SCNC: 4.4 MMOL/L (ref 3.5–5.3)
RBC # BLD AUTO: 3.96 MILLION/UL (ref 3.88–5.62)
SODIUM SERPL-SCNC: 142 MMOL/L (ref 135–147)
TRIGL SERPL-MCNC: 69 MG/DL
WBC # BLD AUTO: 7.62 THOUSAND/UL (ref 4.31–10.16)

## 2023-01-27 RX ORDER — LANOLIN ALCOHOL/MO/W.PET/CERES
9 CREAM (GRAM) TOPICAL
Status: DISCONTINUED | OUTPATIENT
Start: 2023-01-27 | End: 2023-01-28 | Stop reason: HOSPADM

## 2023-01-27 RX ORDER — LANOLIN ALCOHOL/MO/W.PET/CERES
800 CREAM (GRAM) TOPICAL 2 TIMES DAILY
Status: COMPLETED | OUTPATIENT
Start: 2023-01-27 | End: 2023-01-27

## 2023-01-27 RX ORDER — MAGNESIUM SULFATE HEPTAHYDRATE 40 MG/ML
2 INJECTION, SOLUTION INTRAVENOUS
Status: DISCONTINUED | OUTPATIENT
Start: 2023-01-27 | End: 2023-01-27

## 2023-01-27 RX ORDER — LISINOPRIL 5 MG/1
5 TABLET ORAL DAILY
Status: DISCONTINUED | OUTPATIENT
Start: 2023-01-27 | End: 2023-01-27

## 2023-01-27 RX ORDER — TORSEMIDE 20 MG/1
20 TABLET ORAL DAILY
Status: DISCONTINUED | OUTPATIENT
Start: 2023-01-27 | End: 2023-01-28 | Stop reason: HOSPADM

## 2023-01-27 RX ADMIN — PREDNISONE 5 MG: 5 TABLET ORAL at 08:13

## 2023-01-27 RX ADMIN — DICLOFENAC SODIUM 2 G: 10 GEL TOPICAL at 08:21

## 2023-01-27 RX ADMIN — EZETIMIBE 10 MG: 10 TABLET ORAL at 08:13

## 2023-01-27 RX ADMIN — SACUBITRIL AND VALSARTAN 1 TABLET: 24; 26 TABLET, FILM COATED ORAL at 11:28

## 2023-01-27 RX ADMIN — MAGNESIUM SULFATE HEPTAHYDRATE 2 G: 2 INJECTION, SOLUTION INTRAVENOUS at 08:13

## 2023-01-27 RX ADMIN — MAGNESIUM SULFATE HEPTAHYDRATE 2 G: 2 INJECTION, SOLUTION INTRAVENOUS at 05:55

## 2023-01-27 RX ADMIN — MAGNESIUM SULFATE HEPTAHYDRATE 2 G: 2 INJECTION, SOLUTION INTRAVENOUS at 10:42

## 2023-01-27 RX ADMIN — MAGNESIUM OXIDE TAB 400 MG (241.3 MG ELEMENTAL MG) 800 MG: 400 (241.3 MG) TAB at 09:47

## 2023-01-27 RX ADMIN — OXYBUTYNIN CHLORIDE 5 MG: 5 TABLET, EXTENDED RELEASE ORAL at 08:13

## 2023-01-27 RX ADMIN — LEVOTHYROXINE SODIUM 25 MCG: 25 TABLET ORAL at 05:54

## 2023-01-27 RX ADMIN — DICLOFENAC SODIUM 2 G: 10 GEL TOPICAL at 11:24

## 2023-01-27 RX ADMIN — METOPROLOL SUCCINATE 25 MG: 25 TABLET, FILM COATED, EXTENDED RELEASE ORAL at 21:03

## 2023-01-27 RX ADMIN — HYDROXYCHLOROQUINE SULFATE 200 MG: 200 TABLET, FILM COATED ORAL at 17:31

## 2023-01-27 RX ADMIN — APIXABAN 5 MG: 5 TABLET, FILM COATED ORAL at 17:31

## 2023-01-27 RX ADMIN — NYSTATIN 1 APPLICATION: 100000 POWDER TOPICAL at 08:21

## 2023-01-27 RX ADMIN — TAMSULOSIN HYDROCHLORIDE 0.4 MG: 0.4 CAPSULE ORAL at 21:03

## 2023-01-27 RX ADMIN — ALLOPURINOL 400 MG: 100 TABLET ORAL at 08:13

## 2023-01-27 RX ADMIN — TORSEMIDE 20 MG: 20 TABLET ORAL at 11:28

## 2023-01-27 RX ADMIN — APIXABAN 5 MG: 5 TABLET, FILM COATED ORAL at 08:13

## 2023-01-27 RX ADMIN — PANTOPRAZOLE SODIUM 40 MG: 40 TABLET, DELAYED RELEASE ORAL at 05:54

## 2023-01-27 RX ADMIN — Medication 9 MG: at 21:03

## 2023-01-27 RX ADMIN — EMPAGLIFLOZIN 10 MG: 10 TABLET, FILM COATED ORAL at 11:28

## 2023-01-27 RX ADMIN — MAGNESIUM OXIDE TAB 400 MG (241.3 MG ELEMENTAL MG) 800 MG: 400 (241.3 MG) TAB at 17:31

## 2023-01-27 RX ADMIN — HYDROXYCHLOROQUINE SULFATE 200 MG: 200 TABLET, FILM COATED ORAL at 08:13

## 2023-01-27 RX ADMIN — MUPIROCIN 1 APPLICATION: 20 OINTMENT TOPICAL at 08:21

## 2023-01-27 RX ADMIN — NYSTATIN: 100000 POWDER TOPICAL at 17:31

## 2023-01-27 RX ADMIN — METOPROLOL SUCCINATE 25 MG: 25 TABLET, FILM COATED, EXTENDED RELEASE ORAL at 08:13

## 2023-01-27 RX ADMIN — DICLOFENAC SODIUM 2 G: 10 GEL TOPICAL at 17:31

## 2023-01-27 NOTE — CASE MANAGEMENT
Case Management Progress Note    Patient name Dajuan Marc  Location  SDU/-01 Beverly Walls MRN [id number]  : 1934 Date 2023       LOS (days): 0  Geometric Mean LOS (GMLOS) (days):   Days to GMLOS:        OBJECTIVE:        Current admission status: Observation  Preferred Pharmacy:   [address] [place] [address] [address] [address]  Phone: [phone] Fax: [phone]    Primary Care Provider: Sushma Rios MD    Primary Insurance: [facility]  Secondary Insurance:     PROGRESS NOTE:  Call placed to [facility] at [address] [place] [address]) re: tentative discharge for   Spoke with [facility]  Fernanda Muñoz requested PT/OT notes to be faxed to them for review  Faxed PT/OT notes to [facility]  Await determination

## 2023-01-27 NOTE — CASE MANAGEMENT
Case Management Progress Note    Patient name Alberta Sands  Location  SDU/-01 Kath Luevano MRN [id number]  : 1934 Date 2023       LOS (days): 0  Geometric Mean LOS (GMLOS) (days):   Days to GMLOS:        OBJECTIVE:        Current admission status: Observation  Preferred Pharmacy:   [address] [place] [address], [address] [place] [address]  [place] [address] [address]  Phone: [phone] Fax: [phone]    Primary Care Provider: Fransisco Frazier MD    Primary Insurance: [employer]  Secondary Insurance:     PROGRESS NOTE:  Call received from Lakesha Mccollum at [facility], facility can accept Pt back tomorrow  Call placed to Pt's son(Kerwin: [phone]), informed of tentative discharge tomorrow  Pt's son(Kerwin) informed CM that he can transport Pt back to [facility] [place] tomorrow at 12:30pm  Pt's nurse(Purnima) and DANYAIM made aware  Per Lakesha Mccollum call nurse supervisor([phone]) in AM to confirm discharge and transport time

## 2023-01-27 NOTE — UTILIZATION REVIEW
Initial Clinical Review    Admission: Date/Time/Statement:  1/26/23 1043 Observation AND CHANGED 1/27/23 1642 INPATIENT RE: PATIENT NEEDS > 2 MIDNIGHT STAY DUE TO SPEECH DISTURBANCE WITH ONGOING NEURO WORK UP AS  NEURO CHECKS CONTINUED AND EEG PENDING  Admission Orders (From admission, onward)     Ordered        01/27/23 1642  Inpatient Admission  Once                      Orders Placed This Encounter   Procedures   • Inpatient Admission     Standing Status:   Standing     Number of Occurrences:   1     Order Specific Question:   Level of Care     Answer:   Med Surg [16]     Order Specific Question:   Estimated length of stay     Answer:   More than 2 Midnights     Order Specific Question:   Certification     Answer:   I certify that inpatient services are medically necessary for this patient for a duration of greater than two midnights  See H&P and MD Progress Notes for additional information about the patient's course of treatment  ED Arrival Information     Expected   -    Arrival   1/26/2023 09:25    Acuity   Emergent            Means of arrival   Ambulance    [facility]   Hospitalist    Admission type   Emergency            Arrival complaint   stroke alert           Chief Complaint   Patient presents with   • STROKE Alert     To ED for evaluation of speech difficulties started 30 minutes ago  Patient has difficulty forming words  Do other deficits noted  Reports that patient had similar episode last night  Initial Presentation: 80 y o  male from assisted living to ED via ems admitted to observation [address]  due to stroke like symptoms/KAMLESH  PMH of CHF, HTN, Afib on Eliquis  Presented due to expressive aphasia starting about 45 minutes prior to arrival   Ems confirmed  Similar episode the night prior  On exam mild word finding difficulty  INR 1 30   K 3 2  Bun 27, creatinine 1 58 with baseline of 0 9 -1  Wbc 11 09    In the ED given IVF bolus   Plan is neuro checks, MRI, echo, eeg  orthostatic BP, consult neurology  Continue Eliquis  Hold home lisinopril, torsemide and gabapentin  Trend BMP      1/26/23 per neurology - Patient with recurrent transient expressive aphasia vs transient encephalopathy and differential diagnosis for recurrent transient stereotyped symptoms is usual migraine, seizure or hypoperfusion of intracranial stenosis  Not a candidate for tpa or thrombectomy  Recommend neuro checks, telemetry, MRI brain, routine eeg, check orthostatics, continue Eliquis, statin  Check urine culture, UDS, folate, thiamine and etoh level  PT/OT/SP    Date: 1/27/23    Day 2:CHANGED TO INPATIENT:  Episode of expressive aphasia last night  On exam: obese  No focal neuro deficits  Bun 19, creatinine 1  15   Cta showed 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  Continue Lipitor and Eliquis  Monitor neuro exam    Continue to hold lisinopril and gabapentin  Resume torsemide  Dc IVF and trend BMP  Continue telemetry, metoprolol and Eliquis     1/27/23 per Cardiology - patient has chronic CHF, EF of 30%, history of PAF, TIA, hpt and RA  Today appears euvolemic  Plan is switch lisinopril (already held for 48 hours) with Entresto and add SGLT2 inhibitor with Jardiance  Check affordability  Continue Eliquis and Zetia       ED Triage Vitals   Temperature Pulse Respirations Blood Pressure SpO2   01/26/23 0945 01/26/23 0936 01/26/23 0936 01/26/23 0936 01/26/23 0936   98 3 °F (36 8 °C) 77 20 147/80 100 %      Temp Source Heart Rate Source Patient Position - Orthostatic VS BP Location FiO2 (%)   01/26/23 0945 01/26/23 0936 01/26/23 0936 01/26/23 0936 --   Oral Monitor Lying Right arm       Pain Score       01/26/23 0936       No Pain          Wt Readings from Last 1 Encounters:   01/27/23 96 9 kg (213 lb 10 oz)     Additional Vital Signs:   01/27/23 0721 97 6 °F (36 4 °C) 80 19 140/83 104 95 % None (Room air) Lying   01/27/23 0412 98 °F (36 7 °C) 87 27 Abnormal  114/64 84 -- -- Lying   01/26/23 2314 -- 91 49 Abnormal  124/71 91 96 % -- --   01/26/23 2103 -- 90 -- 132/62 -- -- -- --   01/26/23 1918 97 4 °F (36 3 °C) Abnormal  94 20 145/80 105 96 % None (Room air) Lying   01/26/23 1900 -- 98 -- 145/80 105 95 % -- --   01/26/23 1700 -- 96 32 Abnormal  116/64 82 92 % -- --   01/26/23 1520 -- 95 -- 111/72 -- -- -- --   01/26/23 1400 -- 98 24 Abnormal  111/70 88 93 % -- --   01/26/23 1300 -- 92 24 Abnormal  152/73 101 98 % -- --   01/26/23 1201 97 6 °F (36 4 °C) 92 22 111/72 87 95 % None (Room air) Lying   01/26/23 1141 -- 98 19 127/72 -- -- -- Standing - Orthostatic VS   01/26/23 1138 -- 99 21 121/68 -- -- -- Sitting - Orthostatic VS   01/26/23 1135 -- 96 21 120/67 -- -- -- Lying - Orthostatic VS   01/26/23 1100 -- 97 18 127/75 -- 96 % None (Room air) Lying   01/26/23 1000 -- 93 18 122/78 -- 98 % None (Room air)      Date and Time Eye Opening Best Verbal Response Best Motor Response User   01/27/23 2000 4 5 6 AT   01/27/23 0800 4 5 6 MA   01/27/23 0412 4 5 6 MC   01/26/23 2315 4 5 6    01/26/23 2100 4 5 6    01/26/23 1900 4 5 6    01/26/23 1700 4 5 6 [facility]   01/26/23 1500 4 5 6 [facility]   01/26/23 1400 4 5 6 [facility]   01/26/23 1300 4 5 6 [facility]   01/26/23 1201 4 5 6 [facility]   01/26/23 1100 4 5 6 [facility]   01/26/23 1030 4 5 6 [facility]   01/26/23 1015 4 5 6 [facility]   01/26/23 1000 4 5 6 [facility]   01/26/23 0945 4 5 6      Pertinent Labs/Diagnostic Test Results:   MRI brain w wo contrast   Final Result by Nancy Bauer MD (01/26 4401)         1  No MR evidence of acute ischemia  2  No enhancing lesions  3  Volume loss/atrophy and microangiopathy  Workstation performed: [id number]         XR chest 1 view portable   Final Result by Merlinda Lambert, MD (01/26 6331)      No acute cardiopulmonary disease                    Workstation performed: [id number]         CTA stroke alert (head/neck)   Final Result by Harper Santos MD (01/26 1011)   1  CTA head: Negative for large vessel intracranial occlusion   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  2  CTA neck:  No extracranial carotid stenosis  The cervical vertebral arteries are patent  Findings were directly discussed with Dr Bess Chance at 9:52 AM                            Workstation performed: [id number]         CT stroke alert brain   Final Result by Christoph Saldaña MD (01/26 0954)      1  No acute intracranial abnormality  2   Chronic bilateral basal ganglia and right thalamic lacunar infarcts  Chronic microangiopathic ischemic changes  Findings were directly discussed with Dr Bess Chance at 9:52 AM       Workstation performed: [id number]           1/26/23 Sinus rhythm with occasional Premature ventricular complexes and Premature atrial complexes  Left anterior fascicular block  Minimal voltage criteria for LVH, may be normal variant  Possible Anterolateral infarct (cited on or before 08-OCT-2022)  Prolonged QT  Abnormal ECG    1/26/23 echo Left Ventricle: Left ventricular cavity size is dilated  The left ventricular ejection fraction is 30% by visual estimation  Systolic function is severely reduced  There is severe global hypokinesis  There is no thrombus  •  Aortic Valve: There is mild regurgitation  •  Mitral Valve: There is moderate regurgitation  •  Aorta: The aorta was not well visualized  The aortic root is normal in size  The ascending aorta is mildly dilated  The aortic root is 4 10 cm  The ascending aorta is 4 4 cm      Results from last 7 days   Lab Units 01/26/23  1010   SARS-COV-2  Negative     Results from last 7 days   Lab Units 01/27/23  0424 01/26/23  0940   WBC Thousand/uL 7 62 11 09*   HEMOGLOBIN g/dL 12 7 13 8   HEMATOCRIT % 38 6 42 8   PLATELETS Thousands/uL 141* 171     Results from last 7 days   Lab Units 01/27/23  1101 01/27/23  0424 01/26/23  0940   SODIUM mmol/L  --  142 140 POTASSIUM mmol/L  --  4 4 3 2*   CHLORIDE mmol/L  --  104 99   CO2 mmol/L  --  31 34*   ANION GAP mmol/L  --  7 7   BUN mg/dL  --  19 27*   CREATININE mg/dL  --  1 13 1 58*   EGFR ml/min/1 73sq m  --  57 38   CALCIUM mg/dL  --  7 7* 7 9*   MAGNESIUM mg/dL 2 0 0 7*  --      Results from last 7 days   Lab Units 01/26/23  1013   POC GLUCOSE mg/dl 126     Results from last 7 days   Lab Units 01/27/23  0424 01/26/23  0940   GLUCOSE RANDOM mg/dL 119 139     Results from last 7 days   Lab Units 01/26/23  0940   HEMOGLOBIN A1C % 5 9*   EAG mg/dl 123     Results from last 7 days   Lab Units 01/26/23  1434 01/26/23  1146 01/26/23  0940   HS TNI 0HR ng/L  --   --  15   HS TNI 2HR ng/L  --  13  --    HSTNI D2 ng/L  --  -2  --    HS TNI 4HR ng/L 13  --   --    HSTNI D4 ng/L -2  --   --      Results from last 7 days   Lab Units 01/26/23  0940   PROTIME seconds 17 0*   INR  1 30*   PTT seconds 29     Results from last 7 days   Lab Units 01/26/23  0940   TSH 3RD GENERATON uIU/mL 5 259*     Results from last 7 days   Lab Units 01/26/23  1317   CLARITY UA  Clear   COLOR UA  Yellow   SPEC GRAV UA  1 015   PH UA  6 0   GLUCOSE UA mg/dl Negative   KETONES UA mg/dl Negative   BLOOD UA  Negative   PROTEIN UA mg/dl Trace*   NITRITE UA  Negative   BILIRUBIN UA  Negative   UROBILINOGEN UA (BE) mg/dl <2 0   LEUKOCYTES UA  Negative   WBC UA /hpf None Seen   RBC UA /hpf None Seen   BACTERIA UA /hpf None Seen   EPITHELIAL CELLS WET PREP /hpf None Seen   MUCUS THREADS  None Seen     Results from last 7 days   Lab Units 01/26/23  1010   INFLUENZA A PCR  Negative   INFLUENZA B PCR  Negative   RSV PCR  Negative     Results from last 7 days   Lab Units 01/26/23  1317   AMPH/METH  Negative   BARBITURATE UR  Negative   BENZODIAZEPINE UR  Negative   COCAINE UR  Negative   METHADONE URINE  Negative   OPIATE UR  Negative   PCP UR  Negative   THC UR  Negative     Results from last 7 days   Lab Units 01/26/23  1434   ETHANOL LVL mg/dL <3       ED Treatment:   Medication Administration from 01/26/2023 0924 to 01/26/2023 1201       Date/Time Order Dose Route Action Comments     01/26/2023 1114 EST sodium chloride 0 9 % bolus 500 mL 500 mL Intravenous Not Given --     01/26/2023 1110 EST multi-electrolyte (PLASMALYTE-A/ISOLYTE-S PH 7 4) IV solution 25 mL/hr Intravenous New Bag --     01/26/2023 1200 EST Diclofenac Sodium (VOLTAREN) 1 % topical gel 2 g 2 g Topical Not Given --        Past Medical History:   Diagnosis Date   • GERD (gastroesophageal reflux disease)    • Gout    • Hyperlipidemia    • Hypertension    • 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[facility] )    • Spinal stenosis      Present on Admission:  • Primary hypertension      Admitting Diagnosis: Cardiomyopathy ([facility] ) [I42 9]  Hypertension [I10]  Expressive aphasia [R47 01]  Age/Sex: 80 y o  male  Admission Orders:  Scheduled Medications:  allopurinol, 400 mg, Oral, Daily  apixaban, 5 mg, Oral, BID  atorvastatin, 40 mg, Oral, Daily With Dinner dc 1/27  Diclofenac Sodium, 2 g, Topical, 4x Daily  ezetimibe, 10 mg, Oral, Daily  hydroxychloroquine, 200 mg, Oral, BID With Meals  levothyroxine, 25 mcg, Oral, Early Morning  magnesium sulfate, 2 g, Intravenous, Q2H 1/27 at 0555, 0813, 1042  melatonin, 6 mg, Oral, HS  metoprolol succinate, 25 mg, Oral, BID  mupirocin, , Topical, Daily  nystatin, , Topical, BID  oxybutynin, 5 mg, Oral, Daily  pantoprazole, 40 mg, Oral, Early Morning  predniSONE, 5 mg, Oral, Daily  sodium chloride, 500 mL, Intravenous, Once  tamsulosin, 0 4 mg, Oral, HS    Empagliflozin (JARDIANCE) tablet 10 mg  Dose: 10 mg  Freq: Daily Route: PO  Start: 01/27/23 1045    potassium chloride (K-DUR,KLOR-CON) CR tablet 40 mEq  Dose: 40 mEq  Freq: 2 times daily Route: PO  Start: 01/26/23 1230 End: 01/26/23 1721    sacubitril-valsartan (ENTRESTO) 24-26 MG per tablet 1 tablet  Dose: 1 tablet  Freq: 2 times daily Route: PO  Start: 01/27/23 1045    torsemide (DEMADEX) tablet 20 mg  Dose: 20 mg  Freq: Daily Route: PO  Start: 01/27/23 1030    Continuous IV Infusions:  multi-electrolyte (PLASMALYTE-A/ISOLYTE-S PH 7 4) IV solution  Rate: 25 mL/hr Dose: 25 mL/hr  Freq: Continuous Route: IV  Last Dose: Stopped (01/27/23 0800)  Start: 01/26/23 1100 End: 01/27/23 0714     PRN Meds:  acetaminophen, 650 mg, Oral, Q6H PRN x 1 1/26  aluminum-magnesium hydroxide-simethicone, 30 mL, Oral, Q6H PRN  polyethylene glycol, 17 g, Oral, Daily PRN  trimethobenzamide, 100 mg, Intramuscular, Q6H PRN    Telemetry   Neuro checks Every 1 hour x 4 hours, then every 2 hours x 8 hours, then every 4 hours x 72 hours      IP CONSULT TO NEUROLOGY  IP CONSULT TO CARDIOLOGY    Network Utilization Review Department  ATTENTION: Please call with any questions or concerns to [phone] and carefully listen to the prompts so that you are directed to the right person  All voicemails are confidential   [facility] all requests for admission clinical reviews, approved or denied determinations and any other requests to dedicated fax number below belonging to the campus where the patient is receiving treatment   List of dedicated fax numbers for the Facilities:  [address] DENIALS (Administrative/Medical Necessity) [phone]   [address] (Maternity/NICU/Pediatrics) [phone]   917 Yulissa Charlton [phone]   [facility] [phone]   [facility] [address] [place] [address] Zuleima Nowak 28 [phone]   [address] Madhu Cervantes [facility] 134 815 [facility] [phone]

## 2023-01-27 NOTE — DISCHARGE INSTR - AVS FIRST PAGE
You were seen by cardiology during your hospitalization  Your heart function has improved somewhat on your latest ultrasound of your heart  We are planning to adjust your medications to hopefully further improve your heart function  We are recommending a few medication adjustments such as:     Stop taking Lisinopril 5mg   Start taking Entresto 24-26mg twice daily and Jardiance 10 daily after discharge  Continue torsemide 20mg daily     Please call our office with any questions or concerns

## 2023-01-27 NOTE — PLAN OF CARE
Problem: Potential for Falls  Goal: Patient will remain free of falls  Description: INTERVENTIONS:  - Educate patient/family on patient safety including physical limitations  - Instruct patient to call for assistance with activity   - Consult OT/PT to assist with strengthening/mobility   - Keep Call bell within reach  - Keep bed low and locked with side rails adjusted as appropriate  - Keep care items and personal belongings within reach  - Initiate and maintain comfort rounds  - Make Fall Risk Sign visible to staff  - Offer Toileting every 6 Hours, in advance of need  - Initiate/Maintain bed alarm  - Obtain necessary fall risk management equipment:   Problem: PAIN - ADULT  Goal: Verbalizes/displays adequate comfort level or baseline comfort level  Description: Interventions:  - Encourage patient to monitor pain and request assistance  - Assess pain using appropriate pain scale  - Administer analgesics based on type and severity of pain and evaluate response  - Implement non-pharmacological measures as appropriate and evaluate response  - Consider cultural and social influences on pain and pain management  - Notify physician/advanced practitioner if interventions unsuccessful or patient reports new pain  Outcome: Progressing     Problem: INFECTION - ADULT  Goal: Absence or prevention of progression during hospitalization  Description: INTERVENTIONS:  - Assess and monitor for signs and symptoms of infection  - Monitor lab/diagnostic results  - Monitor all insertion sites, i e  indwelling lines, tubes, and drains  - Monitor endotracheal if appropriate and nasal secretions for changes in amount and color  - Raymond appropriate cooling/warming therapies per order  - Administer medications as ordered  - Instruct and encourage patient and family to use good hand hygiene technique  - Identify and instruct in appropriate isolation precautions for identified infection/condition  Outcome: Progressing  Goal: Absence of fever/infection during neutropenic period  Description: INTERVENTIONS:  - Monitor WBC    Outcome: Progressing     Problem: NEUROSENSORY - ADULT  Goal: Achieves stable or improved neurological status  Description: INTERVENTIONS  - Monitor and report changes in neurological status  - Monitor vital signs such as temperature, blood pressure, glucose, and any other labs ordered   - Initiate measures to prevent increased intracranial pressure  - Monitor for seizure activity and implement precautions if appropriate      Outcome: Progressing     - Apply yellow socks and bracelet for high fall risk patients  - Consider moving patient to room near nurses station  Outcome: Progressing

## 2023-01-27 NOTE — PROGRESS NOTES
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  (f/u with neurosurgery outpt) 2  CTA neck:  No extracranial carotid stenosis   The cervical vertebral arteries are patent     MRI negative for acute ischemia  EEG pending-> may be done as an outpt if not done during admission  CXR no acute cardiopulmonary disease  LP within normal limits  A1c 5 9  Folate within normal limits  B12 within normal limits  COVID/flu/RSV negative  Orthostatic BP negative  Statin   Neuro checks  neuro consult rec apprecaited  Continue Lipitor 40 and Eliquis  F/u with neuro outpt    KAMLESH (acute kidney injury) (HCC)-resolved as of 2023  Assessment & Plan  Cr on admission 1 58  Baseline 0 9-1 0  UA negative  Urinary retention protocol  Holding lisinopril and gabapentin d/t KAMLESH-> will not resume lisinopril on discharge, pt has not been complaining of pain may not resume gabapentin on discharge  Resumed torsemide 20 mg qd  I's and O's  Avoid nephrotoxic agents, hypotension and further contrast  dc'd IVF  resolved    Elevated TSH  Assessment & Plan  TSH elevated 5 259  T4 WNL  Repeat TSH in 4-6 weeks    Chronic atrial fibrillation (HCC)  Assessment & Plan  Known to have A  fib on loop recorder  Initiated on Eliquis  Continue Eliquis  Continue metoprolol 25 mg twice daily    Chronic combined systolic and diastolic congestive heart failure (HCC)  Assessment & Plan  Wt Readings from Last 3 Encounters:   23 99 9 kg (220 lb 3 8 oz)   01/10/23 102 kg (224 lb 3 2 oz)   22 98 kg (216 lb)     Not in exacerbation  Echo shows an EF of 84% systolic function severely reduced and diastolic dysfunction  Moderate mitral regurg  I's and O's  Daily weights  Cardiac low-salt diet  Cardio consulted  F/u with cardio outpt  Added Jardiance and Entresto    Resumed torsemide  Will not resume lisinopril on discharge        Primary hypertension  Assessment & Plan  Continue metoprolol  Holding lisinopril and torsemide due to KAMLESH        VTE Pharmacologic Prophylaxis: VTE Score: 9 High Risk (Score >/= 5) - Pharmacological DVT Prophylaxis Ordered: apixaban (Eliquis)  Sequential Compression Devices Ordered  Patient Centered Rounds: I performed bedside rounds with nursing staff today  Discussions with Specialists or Other Care Team Provider: Neuro, Cardio, CM, PT, OT, ST    Education and Discussions with Family / Patient: Updated  (son) via phone  Time Spent for Care: 30 minutes  More than 50% of total time spent on counseling and coordination of care as described above  Current Length of Stay: 0 day(s)  Current Patient Status: Observation   Certification Statement: The patient will continue to require additional inpatient hospital stay due to hypomagnesimia  Discharge Plan: Anticipate discharge tomorrow to prior assisted or independent living facility  Code Status: Level 1 - Full Code    Subjective:   Zahida Ferrari is seen and examined at bedside  No acute events overnight  Discussed plan of care  All questions and concerns were answered and addressed  Objective:     Vitals:   Temp (24hrs), Av 8 °F (36 6 °C), Min:97 4 °F (36 3 °C), Max:98 3 °F (36 8 °C)    Temp:  [97 4 °F (36 3 °C)-98 3 °F (36 8 °C)] 98 3 °F (36 8 °C)  HR:  [77-98] 77  Resp:  [19-49] 22  BP: (108-152)/(62-83) 118/62  SpO2:  [92 %-98 %] 92 %  Body mass index is 31 55 kg/m²       Input and Output Summary (last 24 hours): Intake/Output Summary (Last 24 hours) at 1/27/2023 1259  Last data filed at 1/27/2023 1038  Gross per 24 hour   Intake 1280 83 ml   Output 1530 ml   Net -249 17 ml       Physical Exam:   Physical Exam   Vitals and nursing note reviewed  Constitutional:       Appearance: He is obese  He is not ill-appearing  HENT:      Head: Normocephalic and atraumatic  Cardiovascular:      Rate and Rhythm: Normal rate and regular rhythm  Pulses: Normal pulses  Heart sounds: Normal heart sounds  Pulmonary:      Effort: Pulmonary effort is normal       Breath sounds: Normal breath sounds  Abdominal:      General: Abdomen is flat  Bowel sounds are normal       Palpations: Abdomen is soft  Musculoskeletal:      Right lower leg: No edema  Skin:     General: Skin is warm  Neurological:      General: No focal deficit present  Mental Status: He is alert and oriented to person, place, and time       Additional Data:     Labs:  Results from last 7 days   Lab Units 01/27/23  0424   WBC Thousand/uL 7 62   HEMOGLOBIN g/dL 12 7   HEMATOCRIT % 38 6   PLATELETS Thousands/uL 141*     Results from last 7 days   Lab Units 01/27/23  0424   SODIUM mmol/L 142   POTASSIUM mmol/L 4 4   CHLORIDE mmol/L 104   CO2 mmol/L 31   BUN mg/dL 19   CREATININE mg/dL 1 13   ANION GAP mmol/L 7   CALCIUM mg/dL 7 7*   GLUCOSE RANDOM mg/dL 119     Results from last 7 days   Lab Units 01/26/23  0940   INR  1 30*     Results from last 7 days   Lab Units 01/26/23  1013   POC GLUCOSE mg/dl 126     Results from last 7 days   Lab Units 01/26/23  0940   HEMOGLOBIN A1C % 5 9*           Lines/Drains:  Invasive Devices     Peripheral Intravenous Line  Duration           Peripheral IV 01/26/23 Left;Ventral (anterior) Forearm <1 day                  Telemetry:  Telemetry Orders (From admission, onward)             48 Hour Telemetry Monitoring  Continuous x 48 hours        References:    Telemetry Guidelines   Question:  Reason for 48 Hour Telemetry Answer:  Arrhythmias Requiring Medical Therapy (eg  SVT, Vtach/fib, Bradycardia, Uncontrolled A-fib)                            Imaging: Reviewed radiology reports from this admission including: CT head, MRI brain and ECHO    Recent Cultures (last 7 days):         Last 24 Hours Medication List:   Current Facility-Administered Medications   Medication Dose Route Frequency Provider Last Rate   • acetaminophen  650 mg Oral Q6H PRN Tadeo Colbert MD     • allopurinol  400 mg Oral Daily Tadeo Colbert MD     • aluminum-magnesium hydroxide-simethicone  30 mL Oral Q6H PRN Tadeo Colbert MD     • apixaban  5 mg Oral BID Sonia Flannery PA-C     • Diclofenac Sodium  2 g Topical 4x Daily Tadeo Colbert MD     • Empagliflozin  10 mg Oral Daily Casimiro Diallo PA-C     • ezetimibe  10 mg Oral Daily Tadeo Colbert MD     • hydroxychloroquine  200 mg Oral BID With Meals Tadeo Colbert MD     • levothyroxine  25 mcg Oral Early Morning Tadeo Colbert MD     • magnesium Oxide  800 mg Oral BID Tadeo Colbert MD     • melatonin  6 mg Oral HS ESTEFANIA Hahn     • metoprolol succinate  25 mg Oral BID Tadeo Colbert MD     • mupirocin   Topical Daily Tadeo Colbert MD     • nystatin   Topical BID Tadeo Colbert MD     • oxybutynin  5 mg Oral Daily Tadeo Colbert MD     • pantoprazole  40 mg Oral Early Morning Tadeo Colbert MD     • polyethylene glycol  17 g Oral Daily PRN Tadeo Colbert MD     • predniSONE  5 mg Oral Daily Tadeo Colbert MD     • sacubitril-valsartan  1 tablet Oral BID Casimiro Diallo PA-C     • sodium chloride  500 mL Intravenous Once Tadeo Colbert MD     • tamsulosin  0 4 mg Oral HS Tadeo Colbert MD     • torsemide  20 mg Oral Daily Casimiro Diallo PA-C     • trimethobenzamide  100 mg Intramuscular Q6H PRN Tadeo Colbert MD          Today, Patient Was Seen By: Tadeo Colbert MD    **Please Note: This note may have been constructed using a voice recognition system  **

## 2023-01-27 NOTE — ASSESSMENT & PLAN NOTE
Wt Readings from Last 3 Encounters:   01/27/23 96 9 kg (213 lb 10 oz)   01/10/23 102 kg (224 lb 3 2 oz)   12/17/22 98 kg (216 lb)     Not in exacerbation  Echo shows an EF of 20% systolic function severely reduced and diastolic dysfunction    Moderate mitral regurg  I's and O's  Daily weights  Cardiac low-salt diet  Cardio consulted  F/u with cardio outpt  Added Jardiance and Entresto    Resumed torsemide  Will not resume lisinopril on discharge

## 2023-01-27 NOTE — CONSULTS
Consult - Cardiology   Clifton Márquez 80 y o  male MRN: [id number]  Unit/Bed#: -01 SDU Encounter: [id number]    Reason For Consult: Cardiomyopathy/ Chronic systolic CHF   Outpatient Cardiologist: Dr Odalis Long:  1  Chronic HFrEF, EF 30%  a  TTE 10/10/2022: EF 20%, global hypokinesis, abnormal diastolic function, mild AR, moderate MR (possibly underestimated), mild NV, mildly dilated 4 1cm    b  TTE 1/26/2022: EF 30%, severe global hypokinesis, mild AR, moderate MR, ascending aortic root 4 4cm    c  Pre-hospital regimen: Toprol XL 25mg and Lisinopril 5mg daily  d  OP diuretic: Torsemide 20mg daily  e  Baseline weight: 216lbs (OV from 10/2022)  2  Paroxysmal atrial fibrillation   a  ILR interrogation 1/4/2023: New AF detected  b  OV cardiology visit: Switched Plavix > Eliquis 5mg BID  c  HR control: Toprol XL as above   3  Hx of TIA s/p ILR placement (12/17/2022)  a  Admitted 10/2022 and 1/2023 with similiar symptoms  b  IP neuro w/u have not yet demonstrated acute CVA   c  Was on Zetia (statin intolerant) and Eliquis prior to admission   4  Hypertension   5  Rheumatoid arthritis    PLAN/ DISCUSSION:     • Cardiology consulted for optimization of his severely reduced EF  Fortunately, he is not in acute CHF exacerbation at this time --euvolemic on exam today --weights have been stable 213lbs per bed scale today  • His home ACEi and diuretic was help temporarily due to KAMLESH on admission which is now resolved  • When comparing TTE's patient has had some recovery of his LVEF 20>30% on Toprol XL and lisinopril  • Would further optimize medications at this time if possible, would switch lisinopril (already held for 48 hours) with Entresto and add SGLT2 inhibitor with Jardiance     • Patient reports he has very good insurance -- will consult CM to assist with affordability and approval    • Patient presented with TIA symptoms which have resolved -- patient was discovered with atrial fibrillation on ILR placement  He has been complaint on his home Eliquis since starting 2 weeks ago  • Continue Zetia given statin intolerance  • Cardio-embolic etiology of TIA episodes now less likely as patient was on Eliquis during episode  Other etiology include possible Eliquis failure  DIAGNOSTIC DATA:      ECG: SR with occasional premature ventricular complexes and premature atrial complexes, left anterior fascicular block, minimal voltage criteria for LVH, possible anterior lateral infarct, prolonged QT  Telemetry: SR with PACs and PVCs         History of Present Illness:  Angela Chadwick is a 80y o  year old male with PMH as above who presents to [facility] with expressive dysphagia at home last ing 45 minutes  Symptoms lasted and therefore EMS was called, on arrival they confirmed his symptoms  Prior to arrival to the ED his symptoms had resolved without re-occurance during this admission  Patient had admission fall last year with similar TIA symptoms  IP TTE showed reduced EF 20%, he was seen by in-house team and optimized  Post-discharge was scheduled for OP cardiology follow up  He underwent ILR placement for long term rhythm monitoring given TIA symptoms  On recent EP device interrogation, he was discovered with episodes of Afib and started on Eliquis 2 weeks ago  Has been complaint with this  He underwent CT head and MRI brain which were negative for acute CVA  Past Medical History:        Past Medical History:   Diagnosis Date   • GERD (gastroesophageal reflux disease)    • Gout    • Hyperlipidemia    • Hypertension    • 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[address] )    • Spinal stenosis       Past Surgical History:   Procedure Laterality Date   • CARDIAC ELECTROPHYSIOLOGY PROCEDURE N/A 12/17/2022    Procedure: Cardiac loop recorder implant;  Surgeon: Nino Guaman MD;  Location: [facility];   Service: Cardiology   • CARPAL TUNNEL RELEASE Bilateral    • COLONOSCOPY     • LAMINECTOMY • TOTAL KNEE ARTHROPLASTY Bilateral    • TOTAL SHOULDER REPLACEMENT          Allergy:        Allergies   Allergen Reactions   • Colchicine Other (See Comments)     Flushing     • Niacin Other (See Comments)     flushed   • Statins        Medications:       Prior to Admission medications    Medication Sig Start Date End Date Taking?  Authorizing Provider   acetaminophen (TYLENOL) 325 mg tablet Take 650 mg by mouth every 6 (six) hours as needed for mild pain   Yes Historical Provider, MD   acetaminophen (TYLENOL) 650 mg CR tablet Take 650 mg by mouth 2 (two) times a day   Yes Historical Provider, MD   allopurinol (ZYLOPRIM) 100 mg tablet Take 400 mg by mouth daily   Yes Historical Provider, MD   apixaban (Eliquis) 5 mg Take 1 tablet (5 mg total) by mouth 2 (two) times a day 1/10/23  Yes Gage Vivar MD   ascorbic acid (VITAMIN C) 500 MG tablet Take 500 mg by mouth daily   Yes Historical Provider, MD   Diclofenac Sodium (VOLTAREN) 1 % Apply 4 g topically 4 (four) times a day   Yes Historical Provider, MD   doxycycline (ADOXA) 100 MG tablet  1/10/22  Yes Historical Provider, MD   ezetimibe (ZETIA) 10 mg tablet Take 10 mg by mouth daily   Yes Historical Provider, MD   fexofenadine (ALLEGRA) 180 MG tablet Take 180 mg by mouth if needed   Yes Historical Provider, MD   fluticasone Marissa Griffes) 50 mcg/act nasal spray  9/2/22  Yes Historical Provider, MD   gabapentin (NEURONTIN) 100 mg capsule Take 100 mg by mouth daily At bedtime   Yes Historical Provider, MD   hydroxychloroquine (PLAQUENIL) 200 mg tablet Take 200 mg by mouth 2 (two) times a day with meals   Yes Historical Provider, MD   levothyroxine 25 mcg tablet  8/24/22  Yes Historical Provider, MD   lisinopril (ZESTRIL) 5 mg tablet Take 1 tablet (5 mg total) by mouth daily Do not start before October 12, 2022  10/12/22 1/26/23 Yes Bk Covington MD   loperamide (IMODIUM) 2 mg capsule Take 2 mg by mouth as needed for diarrhea   Yes Historical Provider, MD Loratadine-Pseudoephedrine (ALAVERT D-12 HOUR ALLERGY/MEGHAN PO) Take by mouth in the morning   Yes Historical Provider, MD   metoprolol succinate (TOPROL-XL) 25 mg 24 hr tablet Take 1 tablet (25 mg total) by mouth 2 (two) times a day 1/10/23 2/9/23 Yes Wendy Spencer MD   Multiple Vitamin (multivitamin) tablet Take 1 tablet by mouth daily   Yes Historical Provider, MD   mupirocin (BACTROBAN) 2 % ointment Apply 1 application topically daily   Yes Historical Provider, MD   omeprazole (PriLOSEC) 10 mg delayed release capsule Take 10 mg by mouth daily   Yes Historical Provider, MD   polyethylene glycol (MIRALAX) 17 g packet Take 17 g by mouth every other day 12/21/21  Yes ESTEFANIA Duncan   predniSONE 5 mg tablet Take by mouth daily   Yes Historical Provider, MD   psyllium (METAMUCIL SMOOTH TEXTURE) 28 % packet Take 1 packet by mouth 2 (two) times a day 12/21/21  Yes ESTEFANIA Duncan   solifenacin (VESICARE) 5 mg tablet Take 5 mg by mouth daily   Yes Historical Provider, MD   tamsulosin (FLOMAX) 0 4 mg Take 0 4 mg by mouth daily at bedtime   Yes Historical Provider, MD   torsemide (DEMADEX) 20 mg tablet Take 1 tablet (20 mg total) by mouth daily 1/10/23  Yes Wendy Spencer MD   Vitamin D, Cholecalciferol, 25 MCG (1000 UT) TABS Take by mouth in the morning   Yes Historical Provider, MD   cetirizine (ZyrTEC) 10 mg tablet Take 10 mg by mouth daily    Historical Provider, MD   nitroglycerin (NITROSTAT) 0 4 mg SL tablet Place 0 4 mg under the tongue every 5 (five) minutes as needed for chest pain    Historical Provider, MD   NON FORMULARY Medical marijuana    Historical Provider, MD   oxyCODONE-acetaminophen (PERCOCET) 5-325 mg per tablet Take 1 tablet by mouth in the morning  Patient not taking: Reported on 1/26/2023    Historical Provider, MD       Family History:     Family History   Problem Relation Age of Onset   • Colon polyps Neg Hx    • Colon cancer Neg Hx         Social History:       Social History Socioeconomic History   • Marital status:      Spouse name: None   • Number of children: None   • Years of education: None   • Highest education level: None   Occupational History   • None   Tobacco Use   • Smoking status: Never   • Smokeless tobacco: Never   Vaping Use   • Vaping Use: Never used   Substance and Sexual Activity   • Alcohol use: Yes     Alcohol/week: 2 0 standard drinks     Types: 2 Shots of liquor per week     Comment: 2-3 ounces of whiskey a day   • Drug use: Yes     Types: Marijuana   • Sexual activity: Not Currently   Other Topics Concern   • None   Social History Narrative   • None     Social Determinants of Health     Financial Resource Strain: Not on file   Food Insecurity: No Food Insecurity   • Worried About Running Out of Food in the Last Year: Never true   • Ran Out of Food in the Last Year: Never true   Transportation Needs: No Transportation Needs   • Lack of Transportation (Medical): No   • Lack of Transportation (Non-Medical): No   Physical Activity: Not on file   Stress: Not on file   Social Connections: Not on file   Intimate Partner Violence: Not on file   Housing Stability: Low Risk    • Unable to Pay for Housing in the Last Year: No   • Number of Places Lived in the Last Year: 1   • Unstable Housing in the Last Year: No       Weights/BMI:    Wt Readings from Last 3 Encounters:   01/27/23 96 9 kg (213 lb 10 oz)   01/10/23 102 kg (224 lb 3 2 oz)   12/17/22 98 kg (216 lb)   , Body mass index is 31 55 kg/m²  ROS:  14 point ROS negative except as outlined above  Remainder review of systems is negative    Exam:  General: Alert, oriented and in no acute distress, cooperative  Head: Normocephalic, atraumatic  Eyes: PERRLA  No icterus  Normal Conjunctiva  Oropharynx: Moist and normal-appearing mucosa  Neck: Supple, symmetrical, trachea midline, JVD not appreciated     Heart: RRR, no murmur, rub or gallop, S1 & S2 normal   Lungs: Normal air entry, lungs clear to auscultation and no rales, rhonchi or wheezing   Abdomen: Flat, normal findings: bowel sounds normal and soft, non-tender  Lower Limbs:  No pitting edema, 2+ peripheral pulses, capillary refill within normal limits  Musculoskeletal: ROM grossly normal

## 2023-01-27 NOTE — DISCHARGE SUMMARY
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  (f/u with neurosurgery outpt) 2  CTA neck:  No extracranial carotid stenosis   The cervical vertebral arteries are patent     MRI negative for acute ischemia  EEG to be done outpt  CXR no acute cardiopulmonary disease  LP within normal limits  A1c 5 9  Folate within normal limits  B12 within normal limits  COVID/flu/RSV negative  Orthostatic BP negative  Statin   Neuro checks  neuro consult rec apprecaited  Continue Eliquis  F/u with neuro outpt    Dispo: lifequest with [facility]    KAMLESH (acute kidney injury) (HCC)-resolved as of 2023  Assessment & Plan  Cr on admission 1 58  Baseline 0 9-1 0  UA negative  Urinary retention protocol  lisinopril and gabapentin were held d/t KAMLESH-> will not resume lisinopril on discharge as he will be on Entresto, pt has not been complaining of pain and will not resume gabapentin on discharge  Resumed torsemide 20 mg qd  I's and O's  Avoid nephrotoxic agents, hypotension and further contrast  dc'd IVF  resolved    Hypomagnesemia  Assessment & Plan  Mag 0 7  Replete as necessary with goal greater than 2  Resolved  Will be discharged with magnesium supplementation    Elevated TSH  Assessment & Plan  TSH elevated 5 259  T4 WNL  Repeat TSH in 4-6 weeks    Chronic atrial fibrillation (HCC)  Assessment & Plan  Known to have A  fib on loop recorder  Initiated on Eliquis  Continue Eliquis  Continue metoprolol 25 mg twice daily    Chronic combined systolic and diastolic congestive heart failure (HCC)  Assessment & Plan  Wt Readings from Last 3 Encounters:   01/27/23 96 9 kg (213 lb 10 oz)   01/10/23 102 kg (224 lb 3 2 oz)   12/17/22 98 kg (216 lb)     Not in exacerbation  Echo shows an EF of 64% systolic function severely reduced and diastolic dysfunction  Moderate mitral regurg  I's and O's  Daily weights  Cardiac low-salt diet  Cardio consulted  F/u with cardio outpt  Added Jardiance and Entresto    Resumed torsemide  Will not resume lisinopril on discharge        Primary hypertension  Assessment & Plan  Continue metoprolol    Medical Problems     Resolved Problems  Date Reviewed: 10/8/2022          Resolved    KAMLESH (acute kidney injury) ([facility] [address] ) 1/27/2023     Resolved by  Momo Garg MD        Discharging Physician / Practitioner: Momo Garg MD  PCP: Andreea Martin MD  Admission Date:   Admission Orders (From admission, onward)     Ordered        01/27/23 1642  Inpatient Admission  Once            01/26/23 1043  Place in Observation  Once                      Discharge Date: 01/28/23    Consultations During Hospital Stay:  · Neuro  · Cardio  · CM  · PT  · OT  · ST    Procedures Performed:   MRI brain w wo contrast   Final Result         1  No MR evidence of acute ischemia  2  No enhancing lesions  3  Volume loss/atrophy and microangiopathy  Workstation performed: [id number]         XR chest 1 view portable   Final Result      No acute cardiopulmonary disease  Workstation performed: [id number]         CTA stroke alert (head/neck)   Final Result   1  CTA head: Negative for large vessel intracranial occlusion   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  2  CTA neck:  No extracranial carotid stenosis  The cervical vertebral arteries are patent         Findings were directly discussed with Dr Danielle Bauman at 9:52 AM  Workstation performed: [id number]         CT stroke alert brain   Final Result      1  No acute intracranial abnormality  2   Chronic bilateral basal ganglia and right thalamic lacunar infarcts  Chronic microangiopathic ischemic changes  Findings were directly discussed with Dr Dawood Feliciano at 9:52 AM       Workstation performed: [id number]           ·     Significant Findings / Test Results:   · none    Incidental Findings:   ·  see above  · I reviewed the above mentioned incidental findings with the patient and/or family and they expressed understanding  Test Results Pending at Discharge (will require follow up):   · none     Outpatient Tests Requested:  · none    Complications:  None known at this time    Reason for Admission: Transient speech disturbance    Hospital Course:   Reji Barnett is a 80 y o  male patient who originally presented to the hospital on 1/26/2023 due to presenting with expressive aphasia that were waxing and waning since the previous night  Neurology was consulted and initiated stroke pathway imaging was negative for any acute pathology  Lab work was also negative  Echo showed improvement in EF however remains significantly low and cardiology was consulted to maximize medical treatment and optimization for further recovery  Day before discharge patient had hypomagnesemia and was repleted as necessary  All other electrolytes were within normal limits  He is to follow-up with neurology and cardiology outpatient  He is otherwise remained hemodynamically stable, afebrile and in no acute respiratory distress  He will be discharged with magnesium, potassium, Jardiance and Entresto  Lisinopril and gabapentin will not be continued on discharge  He is to follow-up with his PCP in 1 to 2 weeks and repeat a TSH in 4 to 6 weeks as TSH was elevated on admission  Please see above list of diagnoses and related plan for additional information  Condition at Discharge: stable    Discharge Day Visit / Exam:   Subjective:  Alejandro Rios is seen and examined at bedside  No acute events overnight  Discussed plan of care  All questions and concerns were answered and addressed      Vitals: Blood Pressure: 131/76 (01/28/23 0600)  Pulse: 85 (01/28/23 0600)  Temperature: 97 6 °F (36 4 °C) (01/28/23 0600)  Temp Source: Oral (01/28/23 0600)  Respirations: 18 (01/28/23 0600)  Height: 5' 9" (175 3 cm) (01/26/23 1520)  Weight - Scale: 94 1 kg (207 lb 7 3 oz) (01/28/23 0551)  SpO2: 96 % (01/28/23 0600)  Exam:   Physical Exam   Vitals and nursing note reviewed  Constitutional:       Appearance: He is obese  He is not ill-appearing  HENT:      Head: Normocephalic and atraumatic  Cardiovascular:      Rate and Rhythm: Normal rate and regular rhythm       Pulses: Normal pulses       Heart sounds: Normal heart sounds  Pulmonary:      Effort: Pulmonary effort is normal       Breath sounds: Normal breath sounds  Abdominal:      General: Abdomen is flat  Bowel sounds are normal       Palpations: Abdomen is soft  Musculoskeletal:      Right lower leg: No edema  Skin:     General: Skin is warm  Neurological:      General: No focal deficit present       Mental Status: He is alert and oriented to person, place, and time  Discussion with Family: Updated  (son) via phone  Discharge instructions/Information to patient and family:   See after visit summary for information provided to patient and family  Provisions for Follow-Up Care:  See after visit summary for information related to follow-up care and any pertinent home health orders  Disposition:   Assisted Living Facility at [address] [place] [address],[address] with [facility] AT [facility]    Planned Readmission: no     Discharge Statement:  I spent 35 minutes discharging the patient  This time was spent on the day of discharge  I had direct contact with the patient on the day of discharge   Greater than 50% of the total time was spent examining patient, answering all patient questions, arranging and discussing plan of care with patient as well as directly providing post-discharge instructions  Additional time then spent on discharge activities  Discharge Medications:  See after visit summary for reconciled discharge medications provided to patient and/or family        **Please Note: This note may have been constructed using a voice recognition system**

## 2023-01-27 NOTE — ASSESSMENT & PLAN NOTE
Cr on admission 1 58  Baseline 0 9-1 0  UA negative  Urinary retention protocol  lisinopril and gabapentin were held d/t KAMLESH-> will not resume lisinopril on discharge as he will be on Entresto, pt has not been complaining of pain and will not resume gabapentin on discharge  Resumed torsemide 20 mg qd  I's and O's  Avoid nephrotoxic agents, hypotension and further contrast  dc'd IVF  resolved

## 2023-01-27 NOTE — PLAN OF CARE
Problem: Potential for Falls  Goal: Patient will remain free of falls  Description: INTERVENTIONS:  - Educate patient/family on patient safety including physical limitations  - Instruct patient to call for assistance with activity   - Consult OT/PT to assist with strengthening/mobility   - Keep Call bell within reach  - Keep bed low and locked with side rails adjusted as appropriate  - Keep care items and personal belongings within reach  - Initiate and maintain comfort rounds  - Make Fall Risk Sign visible to staff  - Apply yellow socks and bracelet for high fall risk patients  - Consider moving patient to room near nurses station  Outcome: Progressing     Problem: MOBILITY - ADULT  Goal: Maintain or return to baseline ADL function  Description: INTERVENTIONS:  -  Assess patient's ability to carry out ADLs; assess patient's baseline for ADL function and identify physical deficits which impact ability to perform ADLs (bathing, care of mouth/teeth, toileting, grooming, dressing, etc )  - Assess/evaluate cause of self-care deficits   - Assess range of motion  - Assess patient's mobility; develop plan if impaired  - Assess patient's need for assistive devices and provide as appropriate  - Encourage maximum independence but intervene and supervise when necessary  - Involve family in performance of ADLs  - Assess for home care needs following discharge   - Consider OT consult to assist with ADL evaluation and planning for discharge  - Provide patient education as appropriate  Outcome: Progressing  Goal: Maintains/Returns to pre admission functional level  Description: INTERVENTIONS:  - Perform BMAT or MOVE assessment daily    - Set and communicate daily mobility goal to care team and patient/family/caregiver     - Collaborate with rehabilitation services on mobility goals if consulted  - Out of bed for toileting  - Record patient progress and toleration of activity level   Outcome: Progressing     Problem: PAIN - ADULT  Goal: Verbalizes/displays adequate comfort level or baseline comfort level  Description: Interventions:  - Encourage patient to monitor pain and request assistance  - Assess pain using appropriate pain scale  - Administer analgesics based on type and severity of pain and evaluate response  - Implement non-pharmacological measures as appropriate and evaluate response  - Consider cultural and social influences on pain and pain management  - Notify physician/advanced practitioner if interventions unsuccessful or patient reports new pain  Outcome: Progressing     Problem: INFECTION - ADULT  Goal: Absence or prevention of progression during hospitalization  Description: INTERVENTIONS:  - Assess and monitor for signs and symptoms of infection  - Monitor lab/diagnostic results  - Monitor all insertion sites, i e  indwelling lines, tubes, and drains  - Monitor endotracheal if appropriate and nasal secretions for changes in amount and color  - Dodson appropriate cooling/warming therapies per order  - Administer medications as ordered  - Instruct and encourage patient and family to use good hand hygiene technique  - Identify and instruct in appropriate isolation precautions for identified infection/condition  Outcome: Progressing  Goal: Absence of fever/infection during neutropenic period  Description: INTERVENTIONS:  - Monitor WBC    Outcome: Progressing     Problem: SAFETY ADULT  Goal: Patient will remain free of falls  Description: INTERVENTIONS:  - Educate patient/family on patient safety including physical limitations  - Instruct patient to call for assistance with activity   - Consult OT/PT to assist with strengthening/mobility   - Keep Call bell within reach  - Keep bed low and locked with side rails adjusted as appropriate  - Keep care items and personal belongings within reach  - Initiate and maintain comfort rounds  - Make Fall Risk Sign visible to staff  - Apply yellow socks and bracelet for high fall risk patients  - Consider moving patient to room near nurses station  Outcome: Progressing  Goal: Maintain or return to baseline ADL function  Description: INTERVENTIONS:  -  Assess patient's ability to carry out ADLs; assess patient's baseline for ADL function and identify physical deficits which impact ability to perform ADLs (bathing, care of mouth/teeth, toileting, grooming, dressing, etc )  - Assess/evaluate cause of self-care deficits   - Assess range of motion  - Assess patient's mobility; develop plan if impaired  - Assess patient's need for assistive devices and provide as appropriate  - Encourage maximum independence but intervene and supervise when necessary  - Involve family in performance of ADLs  - Assess for home care needs following discharge   - Consider OT consult to assist with ADL evaluation and planning for discharge  - Provide patient education as appropriate  Outcome: Progressing  Goal: Maintains/Returns to pre admission functional level  Description: INTERVENTIONS:  - Perform BMAT or MOVE assessment daily    - Set and communicate daily mobility goal to care team and patient/family/caregiver     - Collaborate with rehabilitation services on mobility goals if consulted  - Out of bed for toileting  - Record patient progress and toleration of activity level   Outcome: Progressing     Problem: DISCHARGE PLANNING  Goal: Discharge to home or other facility with appropriate resources  Description: INTERVENTIONS:  - Identify barriers to discharge w/patient and caregiver  - Arrange for needed discharge resources and transportation as appropriate  - Identify discharge learning needs (meds, wound care, etc )  - Arrange for interpretive services to assist at discharge as needed  - Refer to Case Management Department for coordinating discharge planning if the patient needs post-hospital services based on physician/advanced practitioner order or complex needs related to functional status, cognitive ability, or social support system  Outcome: Progressing     Problem: Knowledge Deficit  Goal: Patient/family/caregiver demonstrates understanding of disease process, treatment plan, medications, and discharge instructions  Description: Complete learning assessment and assess knowledge base    Interventions:  - Provide teaching at level of understanding  - Provide teaching via preferred learning methods  Outcome: Progressing     Problem: NEUROSENSORY - ADULT  Goal: Achieves stable or improved neurological status  Description: INTERVENTIONS  - Monitor and report changes in neurological status  - Monitor vital signs such as temperature, blood pressure, glucose, and any other labs ordered   - Initiate measures to prevent increased intracranial pressure  - Monitor for seizure activity and implement precautions if appropriate      Outcome: Progressing     Problem: CARDIOVASCULAR - ADULT  Goal: Maintains optimal cardiac output and hemodynamic stability  Description: INTERVENTIONS:  - Monitor I/O, vital signs and rhythm  - Monitor for S/S and trends of decreased cardiac output  - Administer and titrate ordered vasoactive medications to optimize hemodynamic stability  - Assess quality of pulses, skin color and temperature  - Assess for signs of decreased coronary artery perfusion  - Instruct patient to report change in severity of symptoms  Outcome: Progressing     Problem: Prexisting or High Potential for Compromised Skin Integrity  Goal: Skin integrity is maintained or improved  Description: INTERVENTIONS:  - Identify patients at risk for skin breakdown  - Assess and monitor skin integrity  - Assess and monitor nutrition and hydration status  - Monitor labs   - Assess for incontinence   - Turn and reposition patient  - Assist with mobility/ambulation  - Relieve pressure over bony prominences  - Avoid friction and shearing  - Provide appropriate hygiene as needed including keeping skin clean and dry  - Evaluate need for skin moisturizer/barrier cream  - Collaborate with interdisciplinary team   - Patient/family teaching  - Consider wound care consult   Outcome: Progressing     Problem: Nutrition/Hydration-ADULT  Goal: Nutrient/Hydration intake appropriate for improving, restoring or maintaining nutritional needs  Description: Monitor and assess patient's nutrition/hydration status for malnutrition  Collaborate with interdisciplinary team and initiate plan and interventions as ordered  Monitor patient's weight and dietary intake as ordered or per policy  Utilize nutrition screening tool and intervene as necessary  Determine patient's food preferences and provide high-protein, high-caloric foods as appropriate       INTERVENTIONS:  - Monitor oral intake, urinary output, labs, and treatment plans  - Assess nutrition and hydration status and recommend course of action  - Evaluate amount of meals eaten  - Assist patient with eating if necessary   - Allow adequate time for meals  - Recommend/ encourage appropriate diets, oral nutritional supplements, and vitamin/mineral supplements  - Order, calculate, and assess calorie counts as needed  - Recommend, monitor, and adjust tube feedings and TPN/PPN based on assessed needs  - Assess need for intravenous fluids  - Provide specific nutrition/hydration education as appropriate  - Include patient/family/caregiver in decisions related to nutrition  Outcome: Progressing

## 2023-01-27 NOTE — SPEECH THERAPY NOTE
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[address] )    • Spinal stenosis        Past Surgical History  Past Surgical History:   Procedure Laterality Date   • CARDIAC ELECTROPHYSIOLOGY PROCEDURE N/A 12/17/2022    Procedure: Cardiac loop recorder implant;  Surgeon: Cecilia Hoffman MD;  Location: [facility]; Service: Cardiology   • CARPAL TUNNEL RELEASE Bilateral    • COLONOSCOPY     • LAMINECTOMY     • TOTAL KNEE ARTHROPLASTY Bilateral    • TOTAL SHOULDER REPLACEMENT         Summary   Pt presented with functional appearing oral and pharyngeal stage swallowing skills with materials administered today  Mastication and oral organization is timely and effective  Swallows suspected fairly prompt  No overt s/s aspiration across consistencies administered today  Education initiated on strategies to optimize swallow safety and s/s aspiration to notify medical team of they arise  Pt demonstrated understanding and denied questions at this time       Risk/s for Aspiration: Mild     Recommended Diet: regular diet and thin liquids   Recommended Form of Meds: whole with liquid   Aspiration precautions and swallowing strategies: upright posture, only feed when fully alert and slow rate of feeding  Other Recommendations: Continue frequent oral care        Current Medical Status  Pt is a 80 y o  male who presented to [address] [place] [address] from assisted living to ED via ems admitted to observation due to stroke like symptoms/KAMLESH    PMH of CHF, HTN, Afib on Eliquis  Presented due to expressive aphasia starting about 45 minutes prior to arrival   Ems confirmed  Similar episode the night prior  On exam mild word finding difficulty  INR 1 30   K 3 2  Bun 27, creatinine 1 58 with baseline of 0 9 -1  Wbc 11 09  In the ED given IVF bolus  Plan is neuro checks, MRI, echo, eeg  orthostatic BP, consult neurology  Continue Eliquis  Hold home lisinopril, torsemide and gabapentin  Trend BMP       1/26/23 per neurology - Patient with recurrent transient expressive aphasia vs transient encephalopathy and differential diagnosis for recurrent transient stereotyped symptoms is usual migraine, seizure or hypoperfusion of intracranial stenosis  Not a candidate for tpa or thrombectomy  Recommend neuro checks, telemetry, MRI brain, routine eeg, check orthostatics, continue Eliquis, statin  Check urine culture, UDS, folate, thiamine and etoh level  PT/OT/SP    Current Precautions: Allergies:  No known food allergies    Past medical history:  Please see H&P for details    Special Studies:  MRI brain 1/26: 1  No MR evidence of acute ischemia  2  No enhancing lesions  3  Volume loss/atrophy and microangiopathy  CXR 1/26: No acute cardiopulmonary disease    CTA stroke alert 1/26: 1  CTA head: Negative for large vessel intracranial occlusion   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      2  CTA neck:  No extracranial carotid stenosis  The cervical vertebral arteries are patent  CT stroke alert 1/26: 1  No acute intracranial abnormality  2   Chronic bilateral basal ganglia and right thalamic lacunar infarcts  Chronic microangiopathic ischemic changes        Social/Education/Vocational Hx:  Pt lives in assisted living facility    Swallow Information   Current Risks for Dysphagia & Aspiration: AMS  Current Symptoms/Concerns: AMS  Current Diet: regular diet and thin liquids Baseline Diet: regular diet and thin liquids      Baseline Assessment   Behavior/Cognition: alert  Speech/Language Status: able to participate in conversation and able to follow commands  Patient Positioning: upright in bed  Pain Status/Interventions/Response to Interventions:   No report of or nonverbal indications of pain  Swallow Mechanism Exam  Facial: symmetrical  Labial: WFL  Lingual: WFL  Velum: symmetrical  Mandible: adequate ROM  Dentition: adequate  Vocal quality:clear/adequate   Volitional Cough: strong/productive   Respiratory Status: on RA      Consistencies Assessed and Performance   Consistencies Administered: thin liquids and regular texture breakfast tray (omelette, toasted muffin)    Oral Stage: WFL  Mastication was adequate with the materials administered today  Bolus formation and transfer were functional with no significant oral residue noted  No overt s/s reduced oral control  Pharyngeal Stage: WFL  Swallow Mechanics:  Swallowing initiation appeared prompt  Laryngeal rise was palpated and judged to be within functional limits  No coughing, throat clearing, change in vocal quality or respiratory status noted today  Esophageal Concerns: none reported    Strategies and Efficacy: -    Summary and Recommendations (see above)    Results Reviewed with: patient and RN     Treatment Recommended: Yes     Frequency of treatment: Brief f/u x1 as able and appropriate     Patient Stated Goal: none stated     Dysphagia LTG  -Patient will demonstrate safe and effective oral intake (without overt s/s significant oral/pharyngeal dysphagia including s/s penetration or aspiration) for the highest appropriate diet level         Speech Therapy Prognosis   Prognosis: good    Prognosis Considerations: age, medical status and prior medical history

## 2023-01-27 NOTE — ASSESSMENT & PLAN NOTE
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

## 2023-01-27 NOTE — TELEPHONE ENCOUNTER
----- Message from Britt Russell MD sent at 1/20/2023 11:06 AM EST -----  Cr  Stable 1  2  will monitor    ----- Message -----  From: Interface, Transcription Incoming  Sent: 1/19/2023  12:08 PM EST  To: Britt Russell MD

## 2023-01-28 ENCOUNTER — TELEPHONE (OUTPATIENT)
Dept: OTHER | Facility: OTHER | Age: 88
End: 2023-01-28

## 2023-01-28 VITALS
RESPIRATION RATE: 18 BRPM | HEIGHT: 69 IN | HEART RATE: 83 BPM | BODY MASS INDEX: 30.73 KG/M2 | WEIGHT: 207.45 LBS | TEMPERATURE: 97.9 F | SYSTOLIC BLOOD PRESSURE: 125 MMHG | OXYGEN SATURATION: 95 % | DIASTOLIC BLOOD PRESSURE: 72 MMHG

## 2023-01-28 PROBLEM — E83.42 HYPOMAGNESEMIA: Status: ACTIVE | Noted: 2023-01-28

## 2023-01-28 LAB
ANION GAP SERPL CALCULATED.3IONS-SCNC: 5 MMOL/L (ref 4–13)
BUN SERPL-MCNC: 16 MG/DL (ref 5–25)
CALCIUM SERPL-MCNC: 8.4 MG/DL (ref 8.3–10.1)
CHLORIDE SERPL-SCNC: 105 MMOL/L (ref 96–108)
CO2 SERPL-SCNC: 32 MMOL/L (ref 21–32)
CREAT SERPL-MCNC: 1.18 MG/DL (ref 0.6–1.3)
GFR SERPL CREATININE-BSD FRML MDRD: 54 ML/MIN/1.73SQ M
GLUCOSE SERPL-MCNC: 133 MG/DL (ref 65–140)
GLUCOSE SERPL-MCNC: 173 MG/DL (ref 65–140)
MAGNESIUM SERPL-MCNC: 2.2 MG/DL (ref 1.6–2.6)
POTASSIUM SERPL-SCNC: 3.4 MMOL/L (ref 3.5–5.3)
SODIUM SERPL-SCNC: 142 MMOL/L (ref 135–147)

## 2023-01-28 RX ORDER — POTASSIUM CHLORIDE 20 MEQ/1
40 TABLET, EXTENDED RELEASE ORAL DAILY
Status: DISCONTINUED | OUTPATIENT
Start: 2023-01-28 | End: 2023-01-28

## 2023-01-28 RX ORDER — POTASSIUM CHLORIDE 20 MEQ/1
40 TABLET, EXTENDED RELEASE ORAL ONCE
Status: COMPLETED | OUTPATIENT
Start: 2023-01-28 | End: 2023-01-28

## 2023-01-28 RX ORDER — NYSTATIN 100000 [USP'U]/G
POWDER TOPICAL 2 TIMES DAILY
Qty: 15 G | Refills: 0 | Status: SHIPPED | OUTPATIENT
Start: 2023-01-28

## 2023-01-28 RX ORDER — LANOLIN ALCOHOL/MO/W.PET/CERES
400 CREAM (GRAM) TOPICAL 2 TIMES DAILY
Status: DISCONTINUED | OUTPATIENT
Start: 2023-01-28 | End: 2023-01-28 | Stop reason: HOSPADM

## 2023-01-28 RX ORDER — LANOLIN ALCOHOL/MO/W.PET/CERES
400 CREAM (GRAM) TOPICAL 2 TIMES DAILY
Qty: 30 TABLET | Refills: 0 | Status: SHIPPED | OUTPATIENT
Start: 2023-01-28 | End: 2023-02-12

## 2023-01-28 RX ORDER — INSULIN LISPRO 100 [IU]/ML
1-6 INJECTION, SOLUTION INTRAVENOUS; SUBCUTANEOUS
Status: DISCONTINUED | OUTPATIENT
Start: 2023-01-28 | End: 2023-01-28

## 2023-01-28 RX ORDER — LANOLIN ALCOHOL/MO/W.PET/CERES
9 CREAM (GRAM) TOPICAL
Qty: 30 TABLET | Refills: 0 | Status: SHIPPED | OUTPATIENT
Start: 2023-01-28

## 2023-01-28 RX ORDER — POTASSIUM CHLORIDE 20 MEQ/1
40 TABLET, EXTENDED RELEASE ORAL DAILY
Qty: 30 TABLET | Refills: 0 | Status: SHIPPED | OUTPATIENT
Start: 2023-01-29

## 2023-01-28 RX ORDER — POTASSIUM CHLORIDE 20 MEQ/1
40 TABLET, EXTENDED RELEASE ORAL DAILY
Status: DISCONTINUED | OUTPATIENT
Start: 2023-01-29 | End: 2023-01-28 | Stop reason: HOSPADM

## 2023-01-28 RX ADMIN — APIXABAN 5 MG: 5 TABLET, FILM COATED ORAL at 08:55

## 2023-01-28 RX ADMIN — MAGNESIUM OXIDE TAB 400 MG (241.3 MG ELEMENTAL MG) 400 MG: 400 (241.3 MG) TAB at 08:54

## 2023-01-28 RX ADMIN — POTASSIUM CHLORIDE 40 MEQ: 1500 TABLET, EXTENDED RELEASE ORAL at 05:58

## 2023-01-28 RX ADMIN — PANTOPRAZOLE SODIUM 40 MG: 40 TABLET, DELAYED RELEASE ORAL at 05:58

## 2023-01-28 RX ADMIN — METOPROLOL SUCCINATE 25 MG: 25 TABLET, FILM COATED, EXTENDED RELEASE ORAL at 08:54

## 2023-01-28 RX ADMIN — PREDNISONE 5 MG: 5 TABLET ORAL at 08:54

## 2023-01-28 RX ADMIN — EZETIMIBE 10 MG: 10 TABLET ORAL at 08:54

## 2023-01-28 RX ADMIN — HYDROXYCHLOROQUINE SULFATE 200 MG: 200 TABLET, FILM COATED ORAL at 07:27

## 2023-01-28 RX ADMIN — TORSEMIDE 20 MG: 20 TABLET ORAL at 08:54

## 2023-01-28 RX ADMIN — SACUBITRIL AND VALSARTAN 1 TABLET: 24; 26 TABLET, FILM COATED ORAL at 08:55

## 2023-01-28 RX ADMIN — OXYBUTYNIN CHLORIDE 5 MG: 5 TABLET, EXTENDED RELEASE ORAL at 08:55

## 2023-01-28 RX ADMIN — EMPAGLIFLOZIN 10 MG: 10 TABLET, FILM COATED ORAL at 08:54

## 2023-01-28 RX ADMIN — LEVOTHYROXINE SODIUM 25 MCG: 25 TABLET ORAL at 05:58

## 2023-01-28 RX ADMIN — ALLOPURINOL 400 MG: 100 TABLET ORAL at 08:54

## 2023-01-28 NOTE — NURSING NOTE
Patient discharged with his son  Son driving patient to the [place] at [facility]  Son given all discharge paperwork and aware to give this to nursing staff at the [facility]

## 2023-01-28 NOTE — PLAN OF CARE
Problem: Potential for Falls  Goal: Patient will remain free of falls  Description: INTERVENTIONS:  - Educate patient/family on patient safety including physical limitations  - Instruct patient to call for assistance with activity   - Consult OT/PT to assist with strengthening/mobility   - Keep Call bell within reach  - Keep bed low and locked with side rails adjusted as appropriate  - Keep care items and personal belongings within reach  - Initiate and maintain comfort rounds  - Make Fall Risk Sign visible to staff  - Offer Toileting every 2 Hours, in advance of need  - Initiate/Maintain bed and chair alarm  - Apply yellow socks and bracelet for high fall risk patients  - Consider moving patient to room near nurses station  Outcome: Progressing     Problem: MOBILITY - ADULT  Goal: Maintain or return to baseline ADL function  Description: INTERVENTIONS:  -  Assess patient's ability to carry out ADLs; assess patient's baseline for ADL function and identify physical deficits which impact ability to perform ADLs (bathing, care of mouth/teeth, toileting, grooming, dressing, etc )  - Assess/evaluate cause of self-care deficits   - Assess range of motion  - Assess patient's mobility; develop plan if impaired  - Assess patient's need for assistive devices and provide as appropriate  - Encourage maximum independence but intervene and supervise when necessary  - Involve family in performance of ADLs  - Assess for home care needs following discharge   - Consider OT consult to assist with ADL evaluation and planning for discharge  - Provide patient education as appropriate  Outcome: Progressing  Goal: Maintains/Returns to pre admission functional level  Description: INTERVENTIONS:  - Perform BMAT or MOVE assessment daily    - Set and communicate daily mobility goal to care team and patient/family/caregiver     - Collaborate with rehabilitation services on mobility goals if consulted  - Perform Range of Motion 3 times a day   - Reposition patient every 2 hours    - Dangle patient 2 times a day  - Stand patient 2 times a day  - Ambulate patient 2 times a day  - Out of bed to chair 2 times a day   - Out of bed for meals 2 times a day  - Out of bed for toileting  - Record patient progress and toleration of activity level   Outcome: Progressing     Problem: PAIN - ADULT  Goal: Verbalizes/displays adequate comfort level or baseline comfort level  Description: Interventions:  - Encourage patient to monitor pain and request assistance  - Assess pain using appropriate pain scale  - Administer analgesics based on type and severity of pain and evaluate response  - Implement non-pharmacological measures as appropriate and evaluate response  - Consider cultural and social influences on pain and pain management  - Notify physician/advanced practitioner if interventions unsuccessful or patient reports new pain  Outcome: Progressing     Problem: INFECTION - ADULT  Goal: Absence or prevention of progression during hospitalization  Description: INTERVENTIONS:  - Assess and monitor for signs and symptoms of infection  - Monitor lab/diagnostic results  - Monitor all insertion sites, i e  indwelling lines, tubes, and drains  - Monitor endotracheal if appropriate and nasal secretions for changes in amount and color  - [place] appropriate cooling/warming therapies per order  - Administer medications as ordered  - Instruct and encourage patient and family to use good hand hygiene technique  - Identify and instruct in appropriate isolation precautions for identified infection/condition  Outcome: Progressing  Goal: Absence of fever/infection during neutropenic period  Description: INTERVENTIONS:  - Monitor WBC    Outcome: Progressing     Problem: SAFETY ADULT  Goal: Patient will remain free of falls  Description: INTERVENTIONS:  - Educate patient/family on patient safety including physical limitations  - Instruct patient to call for assistance with activity   - Consult OT/PT to assist with strengthening/mobility   - Keep Call bell within reach  - Keep bed low and locked with side rails adjusted as appropriate  - Keep care items and personal belongings within reach  - Initiate and maintain comfort rounds  - Make Fall Risk Sign visible to staff  - Offer Toileting every 2 Hours, in advance of need  - Initiate/Maintain bed and chair alarm  - Apply yellow socks and bracelet for high fall risk patients  - Consider moving patient to room near nurses station  Outcome: Progressing  Goal: Maintain or return to baseline ADL function  Description: INTERVENTIONS:  -  Assess patient's ability to carry out ADLs; assess patient's baseline for ADL function and identify physical deficits which impact ability to perform ADLs (bathing, care of mouth/teeth, toileting, grooming, dressing, etc )  - Assess/evaluate cause of self-care deficits   - Assess range of motion  - Assess patient's mobility; develop plan if impaired  - Assess patient's need for assistive devices and provide as appropriate  - Encourage maximum independence but intervene and supervise when necessary  - Involve family in performance of ADLs  - Assess for home care needs following discharge   - Consider OT consult to assist with ADL evaluation and planning for discharge  - Provide patient education as appropriate  Outcome: Progressing  Goal: Maintains/Returns to pre admission functional level  Description: INTERVENTIONS:  - Perform BMAT or MOVE assessment daily    - Set and communicate daily mobility goal to care team and patient/family/caregiver  - Collaborate with rehabilitation services on mobility goals if consulted  - Perform Range of Motion 3 times a day  - Reposition patient every 2 hours    - Dangle patient 2 times a day  - Stand patient 2 times a day  - Ambulate patient 2 times a day  - Out of bed to chair 2 times a day   - Out of bed for meals 2 times a day  - Out of bed for toileting  - Record patient progress and toleration of activity level   Outcome: Progressing     Problem: DISCHARGE PLANNING  Goal: Discharge to home or other facility with appropriate resources  Description: INTERVENTIONS:  - Identify barriers to discharge w/patient and caregiver  - Arrange for needed discharge resources and transportation as appropriate  - Identify discharge learning needs (meds, wound care, etc )  - Arrange for interpretive services to assist at discharge as needed  - Refer to Case Management Department for coordinating discharge planning if the patient needs post-hospital services based on physician/advanced practitioner order or complex needs related to functional status, cognitive ability, or social support system  Outcome: Progressing     Problem: Knowledge Deficit  Goal: Patient/family/caregiver demonstrates understanding of disease process, treatment plan, medications, and discharge instructions  Description: Complete learning assessment and assess knowledge base    Interventions:  - Provide teaching at level of understanding  - Provide teaching via preferred learning methods  Outcome: Progressing     Problem: NEUROSENSORY - ADULT  Goal: Achieves stable or improved neurological status  Description: INTERVENTIONS  - Monitor and report changes in neurological status  - Monitor vital signs such as temperature, blood pressure, glucose, and any other labs ordered   - Initiate measures to prevent increased intracranial pressure  - Monitor for seizure activity and implement precautions if appropriate      Outcome: Progressing     Problem: CARDIOVASCULAR - ADULT  Goal: Maintains optimal cardiac output and hemodynamic stability  Description: INTERVENTIONS:  - Monitor I/O, vital signs and rhythm  - Monitor for S/S and trends of decreased cardiac output  - Administer and titrate ordered vasoactive medications to optimize hemodynamic stability  - Assess quality of pulses, skin color and temperature  - Assess for signs of decreased coronary artery perfusion  - Instruct patient to report change in severity of symptoms  Outcome: Progressing     Problem: Prexisting or High Potential for Compromised Skin Integrity  Goal: Skin integrity is maintained or improved  Description: INTERVENTIONS:  - Identify patients at risk for skin breakdown  - Assess and monitor skin integrity  - Assess and monitor nutrition and hydration status  - Monitor labs   - Assess for incontinence   - Turn and reposition patient  - Assist with mobility/ambulation  - Relieve pressure over bony prominences  - Avoid friction and shearing  - Provide appropriate hygiene as needed including keeping skin clean and dry  - Evaluate need for skin moisturizer/barrier cream  - Collaborate with interdisciplinary team   - Patient/family teaching  - Consider wound care consult   Outcome: Progressing     Problem: Nutrition/Hydration-ADULT  Goal: Nutrient/Hydration intake appropriate for improving, restoring or maintaining nutritional needs  Description: Monitor and assess patient's nutrition/hydration status for malnutrition  Collaborate with interdisciplinary team and initiate plan and interventions as ordered  Monitor patient's weight and dietary intake as ordered or per policy  Utilize nutrition screening tool and intervene as necessary  Determine patient's food preferences and provide high-protein, high-caloric foods as appropriate       INTERVENTIONS:  - Monitor oral intake, urinary output, labs, and treatment plans  - Assess nutrition and hydration status and recommend course of action  - Evaluate amount of meals eaten  - Assist patient with eating if necessary   - Allow adequate time for meals  - Recommend/ encourage appropriate diets, oral nutritional supplements, and vitamin/mineral supplements  - Order, calculate, and assess calorie counts as needed  - Recommend, monitor, and adjust tube feedings and TPN/PPN based on assessed needs  - Assess need for intravenous fluids  - Provide specific nutrition/hydration education as appropriate  - Include patient/family/caregiver in decisions related to nutrition  Outcome: Progressing

## 2023-01-28 NOTE — PLAN OF CARE
Problem: Potential for Falls  Goal: Patient will remain free of falls  Description: INTERVENTIONS:  - Educate patient/family on patient safety including physical limitations  - Instruct patient to call for assistance with activity   - Consult OT/PT to assist with strengthening/mobility   - Keep Call bell within reach  - Keep bed low and locked with side rails adjusted as appropriate  - Keep care items and personal belongings within reach  - Initiate and maintain comfort rounds  - Make Fall Risk Sign visible to staff  - Offer Toileting every 2 Hours, in advance of need  - Initiate/Maintain bed/chair alarm  -- Apply yellow socks and bracelet for high fall risk patients  - Consider moving patient to room near nurses station  Outcome: Adequate for Discharge     Problem: MOBILITY - ADULT  Goal: Maintain or return to baseline ADL function  Description: INTERVENTIONS:  -  Assess patient's ability to carry out ADLs; assess patient's baseline for ADL function and identify physical deficits which impact ability to perform ADLs (bathing, care of mouth/teeth, toileting, grooming, dressing, etc )  - Assess/evaluate cause of self-care deficits   - Assess range of motion  - Assess patient's mobility; develop plan if impaired  - Assess patient's need for assistive devices and provide as appropriate  - Encourage maximum independence but intervene and supervise when necessary  - Involve family in performance of ADLs  - Assess for home care needs following discharge   - Consider OT consult to assist with ADL evaluation and planning for discharge  - Provide patient education as appropriate  Outcome: Adequate for Discharge  Goal: Maintains/Returns to pre admission functional level  Description: INTERVENTIONS:  - Perform BMAT or MOVE assessment daily    - Set and communicate daily mobility goal to care team and patient/family/caregiver     - Collaborate with rehabilitation services on mobility goals if consulted  - Perform Range of Motion 2 times a day  - Reposition patient every 2 hours    - Dangle patient 2 times a day  - Stand patient 2 times a day  -- Out of bed to chair 2 times a day   - Out of bed for meals 2 times a day  - Out of bed for toileting  - Record patient progress and toleration of activity level   Outcome: Adequate for Discharge     Problem: PAIN - ADULT  Goal: Verbalizes/displays adequate comfort level or baseline comfort level  Description: Interventions:  - Encourage patient to monitor pain and request assistance  - Assess pain using appropriate pain scale  - Administer analgesics based on type and severity of pain and evaluate response  - Implement non-pharmacological measures as appropriate and evaluate response  - Consider cultural and social influences on pain and pain management  - Notify physician/advanced practitioner if interventions unsuccessful or patient reports new pain  Outcome: Adequate for Discharge     Problem: INFECTION - ADULT  Goal: Absence or prevention of progression during hospitalization  Description: INTERVENTIONS:  - Assess and monitor for signs and symptoms of infection  - Monitor lab/diagnostic results  - Monitor all insertion sites, i e  indwelling lines, tubes, and drains  - Monitor endotracheal if appropriate and nasal secretions for changes in amount and color  - [place] appropriate cooling/warming therapies per order  - Administer medications as ordered  - Instruct and encourage patient and family to use good hand hygiene technique  - Identify and instruct in appropriate isolation precautions for identified infection/condition  Outcome: Adequate for Discharge  Goal: Absence of fever/infection during neutropenic period  Description: INTERVENTIONS:  - Monitor WBC    Outcome: Adequate for Discharge     Problem: SAFETY ADULT  Goal: Patient will remain free of falls  Description: INTERVENTIONS:  - Educate patient/family on patient safety including physical limitations  - Instruct patient to call for assistance with activity   - Consult OT/PT to assist with strengthening/mobility   - Keep Call bell within reach  - Keep bed low and locked with side rails adjusted as appropriate  - Keep care items and personal belongings within reach  - Initiate and maintain comfort rounds  - Make Fall Risk Sign visible to staff  - Offer Toileting every 2 Hours, in advance of need  - Initiate/Maintain bed/chair alarm  -- Apply yellow socks and bracelet for high fall risk patients  - Consider moving patient to room near nurses station  Outcome: Adequate for Discharge  Goal: Maintain or return to baseline ADL function  Description: INTERVENTIONS:  -  Assess patient's ability to carry out ADLs; assess patient's baseline for ADL function and identify physical deficits which impact ability to perform ADLs (bathing, care of mouth/teeth, toileting, grooming, dressing, etc )  - Assess/evaluate cause of self-care deficits   - Assess range of motion  - Assess patient's mobility; develop plan if impaired  - Assess patient's need for assistive devices and provide as appropriate  - Encourage maximum independence but intervene and supervise when necessary  - Involve family in performance of ADLs  - Assess for home care needs following discharge   - Consider OT consult to assist with ADL evaluation and planning for discharge  - Provide patient education as appropriate  Outcome: Adequate for Discharge  Goal: Maintains/Returns to pre admission functional level  Description: INTERVENTIONS:  - Perform BMAT or MOVE assessment daily    - Set and communicate daily mobility goal to care team and patient/family/caregiver  - Collaborate with rehabilitation services on mobility goals if consulted  - Perform Range of Motion 2 times a day  - Reposition patient every 2 hours    - Dangle patient 2 times a day  - Stand patient 2 times a day  - Out of bed to chair 2 times a day   - Out of bed for meals 2 times a day  - Out of bed for toileting  - Record patient progress and toleration of activity level   Outcome: Adequate for Discharge     Problem: DISCHARGE PLANNING  Goal: Discharge to home or other facility with appropriate resources  Description: INTERVENTIONS:  - Identify barriers to discharge w/patient and caregiver  - Arrange for needed discharge resources and transportation as appropriate  - Identify discharge learning needs (meds, wound care, etc )  - Arrange for interpretive services to assist at discharge as needed  - Refer to Case Management Department for coordinating discharge planning if the patient needs post-hospital services based on physician/advanced practitioner order or complex needs related to functional status, cognitive ability, or social support system  Outcome: Adequate for Discharge     Problem: Knowledge Deficit  Goal: Patient/family/caregiver demonstrates understanding of disease process, treatment plan, medications, and discharge instructions  Description: Complete learning assessment and assess knowledge base    Interventions:  - Provide teaching at level of understanding  - Provide teaching via preferred learning methods  Outcome: Adequate for Discharge     Problem: NEUROSENSORY - ADULT  Goal: Achieves stable or improved neurological status  Description: INTERVENTIONS  - Monitor and report changes in neurological status  - Monitor vital signs such as temperature, blood pressure, glucose, and any other labs ordered   - Initiate measures to prevent increased intracranial pressure  - Monitor for seizure activity and implement precautions if appropriate      Outcome: Adequate for Discharge     Problem: CARDIOVASCULAR - ADULT  Goal: Maintains optimal cardiac output and hemodynamic stability  Description: INTERVENTIONS:  - Monitor I/O, vital signs and rhythm  - Monitor for S/S and trends of decreased cardiac output  - Administer and titrate ordered vasoactive medications to optimize hemodynamic stability  - Assess quality of pulses, skin color and temperature  - Assess for signs of decreased coronary artery perfusion  - Instruct patient to report change in severity of symptoms  Outcome: Adequate for Discharge     Problem: Prexisting or High Potential for Compromised Skin Integrity  Goal: Skin integrity is maintained or improved  Description: INTERVENTIONS:  - Identify patients at risk for skin breakdown  - Assess and monitor skin integrity  - Assess and monitor nutrition and hydration status  - Monitor labs   - Assess for incontinence   - Turn and reposition patient  - Assist with mobility/ambulation  - Relieve pressure over bony prominences  - Avoid friction and shearing  - Provide appropriate hygiene as needed including keeping skin clean and dry  - Evaluate need for skin moisturizer/barrier cream  - Collaborate with interdisciplinary team   - Patient/family teaching  - Consider wound care consult   Outcome: Adequate for Discharge     Problem: Nutrition/Hydration-ADULT  Goal: Nutrient/Hydration intake appropriate for improving, restoring or maintaining nutritional needs  Description: Monitor and assess patient's nutrition/hydration status for malnutrition  Collaborate with interdisciplinary team and initiate plan and interventions as ordered  Monitor patient's weight and dietary intake as ordered or per policy  Utilize nutrition screening tool and intervene as necessary  Determine patient's food preferences and provide high-protein, high-caloric foods as appropriate       INTERVENTIONS:  - Monitor oral intake, urinary output, labs, and treatment plans  - Assess nutrition and hydration status and recommend course of action  - Evaluate amount of meals eaten  - Assist patient with eating if necessary   - Allow adequate time for meals  - Recommend/ encourage appropriate diets, oral nutritional supplements, and vitamin/mineral supplements  - Order, calculate, and assess calorie counts as needed  - Recommend, monitor, and adjust tube feedings and TPN/PPN based on assessed needs  - Assess need for intravenous fluids  - Provide specific nutrition/hydration education as appropriate  - Include patient/family/caregiver in decisions related to nutrition  Outcome: Adequate for Discharge

## 2023-01-28 NOTE — UTILIZATION REVIEW
Continued Stay Review    Date: 1/28  Day 3: Has surpassed a 2nd midnight with active treatments and services  Date: 1/28/2023                      Current Patient Class: IP  Current Level of Care: Gi     HPI:88 y o  male initially admitted on 1/26 to [facility];  IP 1/27    Assessment/Plan:  Written for Discharge 1/28  Hypomag 1/27 -Rec'd IV Mag 1/27 for mag level 0 7  To F/U with Neuro & Cardio as OP  EEG as OP  He will be discharged with magnesium, potassium, Jardiance and Entresto  Lisinopril and gabapentin will not be continued on discharge    He is to follow-up with his PCP in 1 to 2 weeks and repeat a TSH in 4 to 6 weeks as TSH was elevated on adm      Vital Signs:   01/28/23 0723 97 9 °F (36 6 °C) 83 18 125/72 93 95 % None (Room air) Lying   01/28/23 0600 97 6 °F (36 4 °C) 85 18 131/76 98 96 % None (Room air) Lying   01/27/23 2103 -- 81 -- 119/66 -- -- -- --   01/27/23 1858 98 1 °F (36 7 °C) 91 18 97/57 69 97 % None (Room air) Sitting   01/27/23 1624 97 4 °F (36 3 °C)   -- 20 96/63 75 -- -- --         Pertinent Labs/Diagnostic Results:   Results from last 7 days   Lab Units 01/26/23  1010   SARS-COV-2  Negative     Results from last 7 days   Lab Units 01/27/23  0424 01/26/23  0940   WBC Thousand/uL 7 62 11 09*   HEMOGLOBIN g/dL 12 7 13 8   HEMATOCRIT % 38 6 42 8   PLATELETS Thousands/uL 141* 171     Results from last 7 days   Lab Units 01/28/23  0430 01/27/23  1101 01/27/23  0424 01/26/23  0940   SODIUM mmol/L 142  --  142 140   POTASSIUM mmol/L 3 4*  --  4 4 3 2*   CHLORIDE mmol/L 105  --  104 99   CO2 mmol/L 32  --  31 34*   ANION GAP mmol/L 5  --  7 7   BUN mg/dL 16  --  19 27*   CREATININE mg/dL 1 18  --  1 13 1 58*   EGFR ml/min/1 73sq m 54  --  57 38   CALCIUM mg/dL 8 4  --  7 7* 7 9*   MAGNESIUM mg/dL 2 2 2 0 0 7*  --      Results from last 7 days   Lab Units 01/26/23  1013   POC GLUCOSE mg/dl 126     Results from last 7 days   Lab Units 01/28/23  0430 01/27/23  0424 01/26/23  0940   GLUCOSE RANDOM mg/dL 133 119 139     Results from last 7 days   Lab Units 01/26/23  0940   HEMOGLOBIN A1C % 5 9*   EAG mg/dl 123     Results from last 7 days   Lab Units 01/26/23  1434 01/26/23  1146 01/26/23  0940   HS TNI 0HR ng/L  --   --  15   HS TNI 2HR ng/L  --  13  --    HSTNI D2 ng/L  --  -2  --    HS TNI 4HR ng/L 13  --   --    HSTNI D4 ng/L -2  --   --      Results from last 7 days   Lab Units 01/26/23  0940   PROTIME seconds 17 0*   INR  1 30*   PTT seconds 29     Results from last 7 days   Lab Units 01/26/23  0940   TSH 3RD GENERATON uIU/mL 5 259*     Results from last 7 days   Lab Units 01/26/23  1317   CLARITY UA  Clear   COLOR UA  Yellow   SPEC GRAV UA  1 015   PH UA  6 0   GLUCOSE UA mg/dl Negative   KETONES UA mg/dl Negative   BLOOD UA  Negative   PROTEIN UA mg/dl Trace*   NITRITE UA  Negative   BILIRUBIN UA  Negative   UROBILINOGEN UA (BE) mg/dl <2 0   LEUKOCYTES UA  Negative   WBC UA /hpf None Seen   RBC UA /hpf None Seen   BACTERIA UA /hpf None Seen   EPITHELIAL CELLS WET PREP /hpf None Seen   MUCUS THREADS  None Seen     Results from last 7 days   Lab Units 01/26/23  1010   INFLUENZA A PCR  Negative   INFLUENZA B PCR  Negative   RSV PCR  Negative         Results from last 7 days   Lab Units 01/26/23  1317   AMPH/METH  Negative   BARBITURATE UR  Negative   BENZODIAZEPINE UR  Negative   COCAINE UR  Negative   METHADONE URINE  Negative   OPIATE UR  Negative   PCP UR  Negative   THC UR  Negative     Results from last 7 days   Lab Units 01/26/23  1434   ETHANOL LVL mg/dL <3     Results from last 7 days   Lab Units 01/26/23  1316   URINE CULTURE  No Growth <1000 cfu/mL     Medications:   Scheduled Medications:  allopurinol, 400 mg, Oral, Daily  apixaban, 5 mg, Oral, BID  Diclofenac Sodium, 2 g, Topical, 4x Daily  Empagliflozin, 10 mg, Oral, Daily  ezetimibe, 10 mg, Oral, Daily  hydroxychloroquine, 200 mg, Oral, BID With Meals  levothyroxine, 25 mcg, Oral, Early Morning  magnesium Oxide, 400 mg, Oral, BID  melatonin, 9 mg, Oral, HS  metoprolol succinate, 25 mg, Oral, BID  mupirocin, , Topical, Daily  nystatin, , Topical, BID  oxybutynin, 5 mg, Oral, Daily  pantoprazole, 40 mg, Oral, Early Morning   potassium chloride, 40 mEq, Oral, Daily  predniSONE, 5 mg, Oral, Daily  sacubitril-valsartan, 1 tablet, Oral, BID  tamsulosin, 0 4 mg, Oral, HS  torsemide, 20 mg, Oral, Daily  Mag Sulfate 2G IV x 3 1/27    Continuous IV Infusions:     PRN Meds:  acetaminophen, 650 mg, Oral, Q6H PRN  aluminum-magnesium hydroxide-simethicone, 30 mL, Oral, Q6H PRN  polyethylene glycol, 17 g, Oral, Daily PRN  trimethobenzamide, 100 mg, Intramuscular, Q6H PRN    Discharge Plan: Return to [facility] at [place] [address]: Please call with any questions or concerns to [phone] and carefully listen to the prompts so that you are directed to the right person  All voicemails are confidential   Gera Chandni all requests for admission clinical reviews, approved or denied determinations and any other requests to dedicated fax number below belonging to the campus where the patient is receiving treatment   List of dedicated fax numbers for the Facilities:  [address] DENIALS (Administrative/Medical Necessity) [phone]   [address] (Maternity/NICU/Pediatrics) [phone]   [address] [phone]   [facility] [phone]   [address] [place] [address] Medical [address] [place] [address] [facility] [phone]   [address] [place] [facility] 134 815 [facility] [phone]

## 2023-01-30 ENCOUNTER — HOSPITAL ENCOUNTER (INPATIENT)
Facility: HOSPITAL | Age: 88
LOS: 1 days | Discharge: HOME | End: 2023-02-01
Attending: EMERGENCY MEDICINE | Admitting: STUDENT IN AN ORGANIZED HEALTH CARE EDUCATION/TRAINING PROGRAM

## 2023-01-30 ENCOUNTER — APPOINTMENT (OUTPATIENT)
Dept: RADIOLOGY | Facility: HOSPITAL | Age: 88
End: 2023-01-30

## 2023-01-30 DIAGNOSIS — R77.8 ELEVATED TROPONIN I LEVEL: ICD-10-CM

## 2023-01-30 DIAGNOSIS — N17.9 AKI (ACUTE KIDNEY INJURY) (HCC): ICD-10-CM

## 2023-01-30 DIAGNOSIS — G45.9 TIA (TRANSIENT ISCHEMIC ATTACK): ICD-10-CM

## 2023-01-30 DIAGNOSIS — I95.9 HYPOTENSION: Primary | ICD-10-CM

## 2023-01-30 PROBLEM — T14.8XXA SUTURE OF SKIN WOUND: Status: ACTIVE | Noted: 2023-01-30

## 2023-01-30 PROBLEM — M00.9 SEPTIC ARTHRITIS OF SHOULDER, RIGHT (HCC): Status: ACTIVE | Noted: 2023-01-30

## 2023-01-30 LAB
2HR DELTA HS TROPONIN: -3 NG/L
4HR DELTA HS TROPONIN: -4 NG/L
ALBUMIN SERPL BCP-MCNC: 2.9 G/DL (ref 3.5–5)
ALP SERPL-CCNC: 72 U/L (ref 46–116)
ALT SERPL W P-5'-P-CCNC: 22 U/L (ref 12–78)
ANION GAP SERPL CALCULATED.3IONS-SCNC: 10 MMOL/L (ref 4–13)
AST SERPL W P-5'-P-CCNC: 26 U/L (ref 5–45)
ATRIAL RATE: 89 BPM
BASOPHILS # BLD AUTO: 0.07 THOUSANDS/ÂΜL (ref 0–0.1)
BASOPHILS NFR BLD AUTO: 1 % (ref 0–1)
BILIRUB SERPL-MCNC: 0.9 MG/DL (ref 0.2–1)
BILIRUB UR QL STRIP: NEGATIVE
BUN SERPL-MCNC: 23 MG/DL (ref 5–25)
CALCIUM ALBUM COR SERPL-MCNC: 9.8 MG/DL (ref 8.3–10.1)
CALCIUM SERPL-MCNC: 8.9 MG/DL (ref 8.3–10.1)
CARDIAC TROPONIN I PNL SERPL HS: 10 NG/L
CARDIAC TROPONIN I PNL SERPL HS: 13 NG/L
CARDIAC TROPONIN I PNL SERPL HS: 9 NG/L
CHLORIDE SERPL-SCNC: 102 MMOL/L (ref 96–108)
CLARITY UR: CLEAR
CO2 SERPL-SCNC: 28 MMOL/L (ref 21–32)
COLOR UR: ABNORMAL
CREAT SERPL-MCNC: 2.06 MG/DL (ref 0.6–1.3)
EOSINOPHIL # BLD AUTO: 0.62 THOUSAND/ÂΜL (ref 0–0.61)
EOSINOPHIL NFR BLD AUTO: 6 % (ref 0–6)
ERYTHROCYTE [DISTWIDTH] IN BLOOD BY AUTOMATED COUNT: 13.7 % (ref 11.6–15.1)
FLUAV RNA RESP QL NAA+PROBE: NEGATIVE
FLUBV RNA RESP QL NAA+PROBE: NEGATIVE
GFR SERPL CREATININE-BSD FRML MDRD: 27 ML/MIN/1.73SQ M
GLUCOSE SERPL-MCNC: 115 MG/DL (ref 65–140)
GLUCOSE SERPL-MCNC: 138 MG/DL (ref 65–140)
GLUCOSE SERPL-MCNC: 184 MG/DL (ref 65–140)
GLUCOSE UR STRIP-MCNC: ABNORMAL MG/DL
HCT VFR BLD AUTO: 40.6 % (ref 36.5–49.3)
HGB BLD-MCNC: 13.1 G/DL (ref 12–17)
HGB UR QL STRIP.AUTO: NEGATIVE
IMM GRANULOCYTES # BLD AUTO: 0.02 THOUSAND/UL (ref 0–0.2)
IMM GRANULOCYTES NFR BLD AUTO: 0 % (ref 0–2)
KETONES UR STRIP-MCNC: NEGATIVE MG/DL
LEUKOCYTE ESTERASE UR QL STRIP: NEGATIVE
LYMPHOCYTES # BLD AUTO: 3.72 THOUSANDS/ÂΜL (ref 0.6–4.47)
LYMPHOCYTES NFR BLD AUTO: 36 % (ref 14–44)
MAGNESIUM SERPL-MCNC: 1.9 MG/DL (ref 1.6–2.6)
MCH RBC QN AUTO: 31.6 PG (ref 26.8–34.3)
MCHC RBC AUTO-ENTMCNC: 32.3 G/DL (ref 31.4–37.4)
MCV RBC AUTO: 98 FL (ref 82–98)
MONOCYTES # BLD AUTO: 0.7 THOUSAND/ÂΜL (ref 0.17–1.22)
MONOCYTES NFR BLD AUTO: 7 % (ref 4–12)
NEUTROPHILS # BLD AUTO: 5.29 THOUSANDS/ÂΜL (ref 1.85–7.62)
NEUTS SEG NFR BLD AUTO: 50 % (ref 43–75)
NITRITE UR QL STRIP: NEGATIVE
NRBC BLD AUTO-RTO: 0 /100 WBCS
P AXIS: 41 DEGREES
PH UR STRIP.AUTO: 6.5 [PH]
PLATELET # BLD AUTO: 197 THOUSANDS/UL (ref 149–390)
PMV BLD AUTO: 13.8 FL (ref 8.9–12.7)
POTASSIUM SERPL-SCNC: 4.5 MMOL/L (ref 3.5–5.3)
PR INTERVAL: 162 MS
PROT SERPL-MCNC: 6.5 G/DL (ref 6.4–8.4)
PROT UR STRIP-MCNC: NEGATIVE MG/DL
QRS AXIS: -44 DEGREES
QRSD INTERVAL: 94 MS
QT INTERVAL: 374 MS
QTC INTERVAL: 455 MS
RBC # BLD AUTO: 4.14 MILLION/UL (ref 3.88–5.62)
RSV RNA RESP QL NAA+PROBE: NEGATIVE
SARS-COV-2 RNA RESP QL NAA+PROBE: NEGATIVE
SODIUM SERPL-SCNC: 140 MMOL/L (ref 135–147)
SP GR UR STRIP.AUTO: <1.005 (ref 1–1.03)
T WAVE AXIS: 42 DEGREES
UROBILINOGEN UR STRIP-ACNC: <2 MG/DL
VENTRICULAR RATE: 89 BPM
WBC # BLD AUTO: 10.42 THOUSAND/UL (ref 4.31–10.16)

## 2023-01-30 RX ORDER — SODIUM CHLORIDE, SODIUM GLUCONATE, SODIUM ACETATE, POTASSIUM CHLORIDE, MAGNESIUM CHLORIDE, SODIUM PHOSPHATE, DIBASIC, AND POTASSIUM PHOSPHATE .53; .5; .37; .037; .03; .012; .00082 G/100ML; G/100ML; G/100ML; G/100ML; G/100ML; G/100ML; G/100ML
500 INJECTION, SOLUTION INTRAVENOUS ONCE
Status: DISCONTINUED | OUTPATIENT
Start: 2023-01-30 | End: 2023-02-01 | Stop reason: HOSPADM

## 2023-01-30 RX ORDER — OXYBUTYNIN CHLORIDE 5 MG/1
5 TABLET, EXTENDED RELEASE ORAL DAILY
Status: DISCONTINUED | OUTPATIENT
Start: 2023-01-30 | End: 2023-02-01 | Stop reason: HOSPADM

## 2023-01-30 RX ORDER — PREDNISONE 5 MG/1
5 TABLET ORAL DAILY
Status: DISCONTINUED | OUTPATIENT
Start: 2023-01-31 | End: 2023-02-01 | Stop reason: HOSPADM

## 2023-01-30 RX ORDER — DOXYCYCLINE HYCLATE 100 MG/1
100 CAPSULE ORAL ONCE
Status: COMPLETED | OUTPATIENT
Start: 2023-01-30 | End: 2023-01-30

## 2023-01-30 RX ORDER — SODIUM CHLORIDE, SODIUM GLUCONATE, SODIUM ACETATE, POTASSIUM CHLORIDE, MAGNESIUM CHLORIDE, SODIUM PHOSPHATE, DIBASIC, AND POTASSIUM PHOSPHATE .53; .5; .37; .037; .03; .012; .00082 G/100ML; G/100ML; G/100ML; G/100ML; G/100ML; G/100ML; G/100ML
500 INJECTION, SOLUTION INTRAVENOUS ONCE
Status: COMPLETED | OUTPATIENT
Start: 2023-01-30 | End: 2023-01-30

## 2023-01-30 RX ORDER — MAGNESIUM SULFATE HEPTAHYDRATE 40 MG/ML
2 INJECTION, SOLUTION INTRAVENOUS ONCE
Status: COMPLETED | OUTPATIENT
Start: 2023-01-30 | End: 2023-01-30

## 2023-01-30 RX ORDER — ACETAMINOPHEN 325 MG/1
650 TABLET ORAL EVERY 6 HOURS PRN
Status: DISCONTINUED | OUTPATIENT
Start: 2023-01-30 | End: 2023-02-01 | Stop reason: HOSPADM

## 2023-01-30 RX ORDER — EZETIMIBE 10 MG/1
10 TABLET ORAL DAILY
Status: DISCONTINUED | OUTPATIENT
Start: 2023-01-31 | End: 2023-02-01 | Stop reason: HOSPADM

## 2023-01-30 RX ORDER — DOXYCYCLINE HYCLATE 100 MG/1
50 CAPSULE ORAL EVERY 12 HOURS SCHEDULED
COMMUNITY

## 2023-01-30 RX ORDER — HYDROXYCHLOROQUINE SULFATE 200 MG/1
200 TABLET, FILM COATED ORAL 2 TIMES DAILY
Status: DISCONTINUED | OUTPATIENT
Start: 2023-01-30 | End: 2023-02-01 | Stop reason: HOSPADM

## 2023-01-30 RX ORDER — INSULIN LISPRO 100 [IU]/ML
2-12 INJECTION, SOLUTION INTRAVENOUS; SUBCUTANEOUS
Status: DISCONTINUED | OUTPATIENT
Start: 2023-01-30 | End: 2023-02-01 | Stop reason: HOSPADM

## 2023-01-30 RX ORDER — DOXYCYCLINE HYCLATE 50 MG/1
50 CAPSULE ORAL EVERY 12 HOURS SCHEDULED
Status: DISCONTINUED | OUTPATIENT
Start: 2023-01-30 | End: 2023-01-31

## 2023-01-30 RX ORDER — ALBUMIN, HUMAN INJ 5% 5 %
25 SOLUTION INTRAVENOUS ONCE
Status: COMPLETED | OUTPATIENT
Start: 2023-01-30 | End: 2023-01-30

## 2023-01-30 RX ORDER — ONDANSETRON 2 MG/ML
4 INJECTION INTRAMUSCULAR; INTRAVENOUS EVERY 6 HOURS PRN
Status: DISCONTINUED | OUTPATIENT
Start: 2023-01-30 | End: 2023-02-01 | Stop reason: HOSPADM

## 2023-01-30 RX ORDER — LEVOTHYROXINE SODIUM 0.03 MG/1
25 TABLET ORAL
Status: DISCONTINUED | OUTPATIENT
Start: 2023-01-31 | End: 2023-02-01 | Stop reason: HOSPADM

## 2023-01-30 RX ORDER — EZETIMIBE 10 MG/1
10 TABLET ORAL DAILY
Status: DISCONTINUED | OUTPATIENT
Start: 2023-01-30 | End: 2023-01-30

## 2023-01-30 RX ORDER — PANTOPRAZOLE SODIUM 20 MG/1
20 TABLET, DELAYED RELEASE ORAL
Status: DISCONTINUED | OUTPATIENT
Start: 2023-01-30 | End: 2023-02-01 | Stop reason: HOSPADM

## 2023-01-30 RX ADMIN — ALBUMIN (HUMAN) 25 G: 12.5 INJECTION, SOLUTION INTRAVENOUS at 20:48

## 2023-01-30 RX ADMIN — SODIUM CHLORIDE, SODIUM GLUCONATE, SODIUM ACETATE, POTASSIUM CHLORIDE, MAGNESIUM CHLORIDE, SODIUM PHOSPHATE, DIBASIC, AND POTASSIUM PHOSPHATE 500 ML: .53; .5; .37; .037; .03; .012; .00082 INJECTION, SOLUTION INTRAVENOUS at 21:15

## 2023-01-30 RX ADMIN — APIXABAN 2.5 MG: 2.5 TABLET, FILM COATED ORAL at 17:33

## 2023-01-30 RX ADMIN — MAGNESIUM SULFATE HEPTAHYDRATE 2 G: 2 INJECTION, SOLUTION INTRAVENOUS at 17:33

## 2023-01-30 RX ADMIN — OXYBUTYNIN CHLORIDE 5 MG: 5 TABLET, EXTENDED RELEASE ORAL at 17:33

## 2023-01-30 RX ADMIN — PANTOPRAZOLE SODIUM 20 MG: 20 TABLET, DELAYED RELEASE ORAL at 17:33

## 2023-01-30 RX ADMIN — DOXYCYCLINE 100 MG: 100 CAPSULE ORAL at 21:50

## 2023-01-30 RX ADMIN — HYDROXYCHLOROQUINE SULFATE 200 MG: 200 TABLET, FILM COATED ORAL at 17:33

## 2023-01-30 NOTE — ASSESSMENT & PLAN NOTE
· Patient with recent left sided facial biopsy with suture placement, scheduled appt for removal on 1/31  · Re-eval wound and remove sutures as needed on 1/31

## 2023-01-30 NOTE — ED PROVIDER NOTES
History  Chief Complaint   Patient presents with   • Hypotension     Per EMS, patient was hypotensive (60/40) during vitals check before med pass at assisted living ([address] [place] [address])  Per staff, patient had some expressive aphagia at the time  Resolved now  51-year-old man with history of chronic combined systolic and diastolic heart failure, CM, hypertension, lumbar spinal stenosis and chronic atrial fibrillation on Eliquis was noted to have blood pressure 60 systolic during a med pass blood pressure check at [address] [place] [address],[address] assisted living today  He is asymptomatic  Currently complains of his chronic low back pain radiating to the legs  Patient was admitted here January 26 to 28 for transient aphasia associated with hypotension  At that time he was noted to have 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 but MRI was negative for acute ischemic insult  There was no large vessel disease  He had acute kidney injury  He was seen by cardiology for his hypotension  The lisinopril was discontinued  His gabapentin also discontinued  He was started on Entresto and is also on Jardiance, torsemide and metoprolol  Note: patient has had nearly a liter of isolyte infused prehospital   Blood pressure 100/56  I will hold off on giving more fluids due to his CHF status  Presently he does not look volume overloaded  Lungs are clear  History provided by:  Relative, medical records, patient and EMS personnel      Prior to Admission Medications   Prescriptions Last Dose Informant Patient Reported? Taking?    Diclofenac Sodium (VOLTAREN) 1 %   Yes No   Sig: Apply 4 g topically 4 (four) times a day   Empagliflozin (JARDIANCE) 10 MG TABS tablet   No No   Sig: Take 1 tablet (10 mg total) by mouth daily   Multiple Vitamin (multivitamin) tablet   Yes No   Sig: Take 1 tablet by mouth daily   NON FORMULARY   Yes No   Sig: Medical marijuana   Vitamin D, Cholecalciferol, 25 MCG (1000 UT) TABS   Yes No   Sig: Take by mouth in the morning   acetaminophen (TYLENOL) 650 mg CR tablet   Yes No   Sig: Take 650 mg by mouth 2 (two) times a day   allopurinol (ZYLOPRIM) 100 mg tablet   Yes No   Sig: Take 400 mg by mouth daily   apixaban (Eliquis) 5 mg   No No   Sig: Take 1 tablet (5 mg total) by mouth 2 (two) times a day   ascorbic acid (VITAMIN C) 500 MG tablet   Yes No   Sig: Take 500 mg by mouth daily   cetirizine (ZyrTEC) 10 mg tablet   Yes No   Sig: Take 10 mg by mouth daily   doxycycline hyclate (VIBRAMYCIN) 100 mg capsule   Yes Yes   Sig: Take 50 mg by mouth every 12 (twelve) hours   ezetimibe (ZETIA) 10 mg tablet   Yes No   Sig: Take 10 mg by mouth daily   fluticasone (FLONASE) 50 mcg/act nasal spray   Yes No   hydroxychloroquine (PLAQUENIL) 200 mg tablet   Yes No   Sig: Take 200 mg by mouth 2 (two) times a day with meals   levothyroxine 25 mcg tablet   Yes No   loperamide (IMODIUM) 2 mg capsule   Yes No   Sig: Take 2 mg by mouth as needed for diarrhea   magnesium Oxide (MAG-OX) 400 mg TABS   No No   Sig: Take 1 tablet (400 mg total) by mouth 2 (two) times a day for 15 days   melatonin 3 mg   No No   Sig: Take 3 tablets (9 mg total) by mouth daily at bedtime   metoprolol succinate (TOPROL-XL) 25 mg 24 hr tablet   No No   Sig: Take 1 tablet (25 mg total) by mouth 2 (two) times a day   mupirocin (BACTROBAN) 2 % ointment   Yes No   Sig: Apply 1 application topically daily   nitroglycerin (NITROSTAT) 0 4 mg SL tablet   Yes No   Sig: Place 0 4 mg under the tongue every 5 (five) minutes as needed for chest pain   nystatin (MYCOSTATIN) powder   No No   Sig: Apply topically 2 (two) times a day   omeprazole (PriLOSEC) 10 mg delayed release capsule   Yes No   Sig: Take 10 mg by mouth daily   polyethylene glycol (MIRALAX) 17 g packet   No No   Sig: Take 17 g by mouth every other day   potassium chloride (K-DUR,KLOR-CON) 20 mEq tablet   No No   Sig: Take 2 tablets (40 mEq total) by mouth daily Do not start before January 29, 2023  predniSONE 5 mg tablet   Yes No   Sig: Take by mouth daily   psyllium (METAMUCIL SMOOTH TEXTURE) 28 % packet   No No   Sig: Take 1 packet by mouth 2 (two) times a day   sacubitril-valsartan (ENTRESTO) 24-26 MG TABS   No No   Sig: Take 1 tablet by mouth 2 (two) times a day   solifenacin (VESICARE) 5 mg tablet   Yes No   Sig: Take 5 mg by mouth daily   tamsulosin (FLOMAX) 0 4 mg   Yes No   Sig: Take 0 4 mg by mouth daily at bedtime   torsemide (DEMADEX) 20 mg tablet   No No   Sig: Take 1 tablet (20 mg total) by mouth daily      Facility-Administered Medications: None       Past Medical History:   Diagnosis Date   • GERD (gastroesophageal reflux disease)    • Gout    • Hyperlipidemia    • Hypertension    • 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[address] )    • Spinal stenosis        Past Surgical History:   Procedure Laterality Date   • CARDIAC ELECTROPHYSIOLOGY PROCEDURE N/A 12/17/2022    Procedure: Cardiac loop recorder implant;  Surgeon: Davis Colbert MD;  Location: [facility]; Service: Cardiology   • CARPAL TUNNEL RELEASE Bilateral    • COLONOSCOPY     • LAMINECTOMY     • TOTAL KNEE ARTHROPLASTY Bilateral    • TOTAL SHOULDER REPLACEMENT         Family History   Problem Relation Age of Onset   • Colon polyps Neg Hx    • Colon cancer Neg Hx      I have reviewed and agree with the history as documented  E-Cigarette/Vaping   • E-Cigarette Use Never User      E-Cigarette/Vaping Substances   • Nicotine No    • Flavoring No      Social History     Tobacco Use   • Smoking status: Never   • Smokeless tobacco: Never   Vaping Use   • Vaping Use: Never used   Substance Use Topics   • Alcohol use: Yes     Alcohol/week: 2 0 standard drinks     Types: 2 Shots of liquor per week     Comment: 2-3 ounces of whiskey a day   • Drug use: Yes     Types: Marijuana       Review of Systems   Constitutional: Negative for appetite change, diaphoresis, fatigue and fever  Respiratory: Negative for cough and shortness of breath      Cardiovascular: Negative for chest pain, palpitations and leg swelling  Gastrointestinal: Negative for abdominal pain, blood in stool and vomiting  Musculoskeletal: Positive for back pain ( chronic)  Skin: Negative for rash  Neurological: Negative for syncope  Physical Exam  Physical Exam  Vitals and nursing note reviewed  Constitutional:       General: He is not in acute distress  Appearance: He is well-developed and normal weight  He is not ill-appearing or diaphoretic  HENT:      Head: Normocephalic and atraumatic  Right Ear: External ear normal       Left Ear: External ear normal       Mouth/Throat:      Mouth: Mucous membranes are moist       Pharynx: Oropharynx is clear  Eyes:      General: No scleral icterus  Conjunctiva/sclera: Conjunctivae normal       Pupils: Pupils are equal, round, and reactive to light  Neck:      Vascular: No JVD  Cardiovascular:      Rate and Rhythm: Normal rate and regular rhythm  Pulses: Normal pulses  Heart sounds: Normal heart sounds  No murmur heard  Pulmonary:      Effort: Pulmonary effort is normal       Breath sounds: Normal breath sounds  Abdominal:      General: Bowel sounds are normal       Palpations: Abdomen is soft  There is no mass  Tenderness: There is no guarding or rebound  Genitourinary:     Rectum: Normal  Guaiac result negative  Musculoskeletal:         General: Tenderness ( mild lumbar paraspinal muscles) present  Normal range of motion  Cervical back: Normal range of motion and neck supple  Lymphadenopathy:      Cervical: No cervical adenopathy  Skin:     General: Skin is warm and dry  Capillary Refill: Capillary refill takes less than 2 seconds  Findings: No bruising, lesion or rash  Neurological:      General: No focal deficit present  Mental Status: He is alert and oriented to person, place, and time  Mental status is at baseline  Cranial Nerves: No cranial nerve deficit  Coordination: Coordination normal       Deep Tendon Reflexes: Reflexes are normal and symmetric     Psychiatric:         Mood and Affect: Mood normal          Behavior: Behavior normal          Vital Signs  ED Triage Vitals   Temperature Pulse Respirations Blood Pressure SpO2   01/30/23 1220 01/30/23 1220 01/30/23 1220 01/30/23 1220 01/30/23 1220   97 9 °F (36 6 °C) 89 18 100/56 92 %      Temp Source Heart Rate Source Patient Position - Orthostatic VS BP Location FiO2 (%)   01/30/23 1220 01/30/23 1220 01/30/23 1500 01/30/23 1500 --   Oral Monitor Sitting Right arm       Pain Score       01/30/23 1700       No Pain           Vitals:    01/31/23 0706 01/31/23 1119 01/31/23 1122 01/31/23 1124   BP: 114/69 115/69 124/93 126/92   Pulse: 96 96 103 (!) 106   Patient Position - Orthostatic VS:             Visual Acuity  Visual Acuity    Flowsheet Row Most Recent Value   L Pupil Size (mm) 2   R Pupil Size (mm) 2          ED Medications  Medications   apixaban (ELIQUIS) tablet 2 5 mg (2 5 mg Oral Given 1/31/23 1756)   levothyroxine tablet 25 mcg (25 mcg Oral Given 1/31/23 0635)   pantoprazole (PROTONIX) EC tablet 20 mg (20 mg Oral Given 1/31/23 0902)   oxybutynin (DITROPAN-XL) 24 hr tablet 5 mg (5 mg Oral Given 1/31/23 0902)   acetaminophen (TYLENOL) tablet 650 mg (has no administration in time range)   ondansetron (ZOFRAN) injection 4 mg (has no administration in time range)   predniSONE tablet 5 mg (5 mg Oral Given 1/31/23 0902)   ezetimibe (ZETIA) tablet 10 mg (10 mg Oral Given 1/31/23 0902)   insulin lispro (HumaLOG) 100 units/mL subcutaneous injection 2-12 Units (2 Units Subcutaneous Not Given 1/31/23 1615)   hydroxychloroquine (PLAQUENIL) tablet 200 mg (200 mg Oral Given 1/31/23 1756)   multi-electrolyte (PLASMALYTE-A/ISOLYTE-S PH 7 4) IV solution 500 mL ( Intravenous Restarted 1/30/23 1930)   doxycycline hyclate (VIBRAMYCIN) capsule 50 mg (50 mg Oral Not Given 1/31/23 1201)   metoprolol succinate (TOPROL-XL) 24 hr tablet 25 mg (25 mg Oral Given 1/31/23 1756)   magnesium sulfate 2 g/50 mL IVPB (premix) 2 g (2 g Intravenous New Bag 1/30/23 1733)   albumin human (FLEXBUMIN) 5 % injection 25 g (25 g Intravenous New Bag 1/30/23 2048)   doxycycline hyclate (VIBRAMYCIN) capsule 100 mg (100 mg Oral Given 1/30/23 2150)   multi-electrolyte (PLASMALYTE-A/ISOLYTE-S PH 7 4) IV solution 500 mL (500 mL Intravenous New Bag 1/30/23 2115)       Diagnostic Studies  Results Reviewed     Procedure Component Value Units Date/Time    Comprehensive metabolic panel [[id number]]  (Abnormal) Collected: 01/31/23 0517    Lab Status: Final result Specimen: Blood from Hand, Right Updated: 01/31/23 0729     Sodium 140 mmol/L      Potassium 4 2 mmol/L      Chloride 103 mmol/L      CO2 28 mmol/L      ANION GAP 9 mmol/L      BUN 21 mg/dL      Creatinine 1 59 mg/dL      Glucose 107 mg/dL      Glucose, Fasting 107 mg/dL      Calcium 8 6 mg/dL      Corrected Calcium 9 3 mg/dL      AST 21 U/L      ALT 19 U/L      Alkaline Phosphatase 66 U/L      Total Protein 6 3 g/dL      Albumin 3 1 g/dL      Total Bilirubin 0 70 mg/dL      eGFR 38 ml/min/1 73sq m     Narrative:      [place] guidelines for Chronic Kidney Disease (CKD):   •  Stage 1 with normal or high GFR (GFR > 90 mL/min/1 73 square meters)  •  Stage 2 Mild CKD (GFR = 60-89 mL/min/1 73 square meters)  •  Stage 3A Moderate CKD (GFR = 45-59 mL/min/1 73 square meters)  •  Stage 3B Moderate CKD (GFR = 30-44 mL/min/1 73 square meters)  •  Stage 4 Severe CKD (GFR = 15-29 mL/min/1 73 square meters)  •  Stage 5 End Stage CKD (GFR <15 mL/min/1 73 square meters)  Note: GFR calculation is accurate only with a steady state creatinine    CBC and differential [[id number]]  (Abnormal) Collected: 01/31/23 0517    Lab Status: Final result Specimen: Blood from Hand, Right Updated: 01/31/23 0545     WBC 7 17 Thousand/uL      RBC 3 83 Million/uL      Hemoglobin 12 1 g/dL      Hematocrit 37 1 %      MCV 97 fL      MCH 31 6 pg      MCHC 32 6 g/dL      RDW 13 5 %      MPV 12 8 fL      Platelets 794 Thousands/uL      nRBC 0 /100 WBCs      Neutrophils Relative 49 %      Immat GRANS % 0 %      Lymphocytes Relative 37 %      Monocytes Relative 8 %      Eosinophils Relative 5 %      Basophils Relative 1 %      Neutrophils Absolute 3 51 Thousands/µL      Immature Grans Absolute 0 02 Thousand/uL      Lymphocytes Absolute 2 63 Thousands/µL      Monocytes Absolute 0 58 Thousand/µL      Eosinophils Absolute 0 39 Thousand/µL      Basophils Absolute 0 04 Thousands/µL     HS Troponin I 4hr [[id number]]  (Normal) Collected: 01/30/23 1753    Lab Status: Final result Specimen: Blood from Arm, Right Updated: 01/30/23 1832     hs TnI 4hr 9 ng/L      Delta 4hr hsTnI -4 ng/L     HS Troponin I 2hr [[id number]]  (Normal) Collected: 01/30/23 1449    Lab Status: Final result Specimen: Blood from Arm, Left Updated: 01/30/23 1540     hs TnI 2hr 10 ng/L      Delta 2hr hsTnI -3 ng/L     Magnesium [[id number]]  (Normal) Collected: 01/30/23 1235    Lab Status: Final result Specimen: Blood from Arm, Left Updated: 01/30/23 1526     Magnesium 1 9 mg/dL     Comprehensive metabolic panel [[id number]]  (Abnormal) Collected: 01/30/23 1235    Lab Status: Final result Specimen: Blood from Arm, Left Updated: 01/30/23 1400     Sodium 140 mmol/L      Potassium 4 5 mmol/L      Chloride 102 mmol/L      CO2 28 mmol/L      ANION GAP 10 mmol/L      BUN 23 mg/dL      Creatinine 2 06 mg/dL      Glucose 138 mg/dL      Calcium 8 9 mg/dL      Corrected Calcium 9 8 mg/dL      AST 26 U/L      ALT 22 U/L      Alkaline Phosphatase 72 U/L      Total Protein 6 5 g/dL      Albumin 2 9 g/dL      Total Bilirubin 0 90 mg/dL      eGFR 27 ml/min/1 73sq m     Narrative:      [place] guidelines for Chronic Kidney Disease (CKD):   •  Stage 1 with normal or high GFR (GFR > 90 mL/min/1 73 square meters)  •  Stage 2 Mild CKD (GFR = 60-89 mL/min/1 73 square meters)  •  Stage 3A Moderate CKD (GFR = 45-59 mL/min/1 73 square meters)  •  Stage 3B Moderate CKD (GFR = 30-44 mL/min/1 73 square meters)  •  Stage 4 Severe CKD (GFR = 15-29 mL/min/1 73 square meters)  •  Stage 5 End Stage CKD (GFR <15 mL/min/1 73 square meters)  Note: GFR calculation is accurate only with a steady state creatinine    HS Troponin 0hr (reflex protocol) [[id number]]  (Normal) Collected: 01/30/23 1235    Lab Status: Final result Specimen: Blood from Arm, Left Updated: 01/30/23 1345     hs TnI 0hr 13 ng/L     CBC and differential [[id number]]  (Abnormal) Collected: 01/30/23 1235    Lab Status: Final result Specimen: Blood from Arm, Left Updated: 01/30/23 1322     WBC 10 42 Thousand/uL      RBC 4 14 Million/uL      Hemoglobin 13 1 g/dL      Hematocrit 40 6 %      MCV 98 fL      MCH 31 6 pg      MCHC 32 3 g/dL      RDW 13 7 %      MPV 13 8 fL      Platelets 704 Thousands/uL      nRBC 0 /100 WBCs      Neutrophils Relative 50 %      Immat GRANS % 0 %      Lymphocytes Relative 36 %      Monocytes Relative 7 %      Eosinophils Relative 6 %      Basophils Relative 1 %      Neutrophils Absolute 5 29 Thousands/µL      Immature Grans Absolute 0 02 Thousand/uL      Lymphocytes Absolute 3 72 Thousands/µL      Monocytes Absolute 0 70 Thousand/µL      Eosinophils Absolute 0 62 Thousand/µL      Basophils Absolute 0 07 Thousands/µL                  XR chest portable   Final Result by Sergei Zamudio MD (01/31 1250)      No acute cardiopulmonary disease                    Workstation performed: [id number]                    Procedures  ECG 12 Lead Documentation Only    Date/Time: 1/30/2023 12:19 PM  Performed by: Haven Castillo DO  Authorized by: Haven Castillo DO     ECG reviewed by me, the ED Provider: yes    Patient location:  ED  Previous ECG:     Previous ECG:  Compared to current    Comparison ECG info:  PVCs and PACs no longer present    Similarity:  Changes noted    Comparison to cardiac monitor: Yes Rate:     ECG rate assessment: normal    Rhythm:     Rhythm: sinus rhythm    Ectopy:     Ectopy: none    QRS:     QRS axis:  Left    QRS intervals:  Normal  Conduction:     Conduction: normal    ST segments:     ST segments:  Normal  T waves:     T waves: normal    Other findings:     Other findings: LVH               ED Course  ED Course as of 01/31/23 1814   Mon Jan 30, 2023   1411 TT SLIM for admission                                             Medical Decision Making  79 yo M with hypotension  Recently admitted for hypotension and transient problems with speech  Exam unremarkable  DDx: dehydration, electrolyte abnormality, worsening cardiomyopathy,  KAMLESH, hemorrhage, ACS  No evidence for infection, dysrhythmia  Blood pressure normalized with IV Isolyte bolus  Labs reviewed  Recurrent hypotension possibly d/t polypharmacy, cardiomyopathy, dehydration  Normal rectal exam, no drop in Hb  No focal neurologic changes  Will admit to SLIM  KAMLESH (acute kidney injury) [facility]): acute illness or injury  Elevated troponin I level: acute illness or injury  Hypotension: acute illness or injury  Amount and/or Complexity of Data Reviewed  External Data Reviewed: labs, ECG and notes  Labs: ordered  Decision-making details documented in ED Course  Radiology: ordered and independent interpretation performed  Decision-making details documented in ED Course  ECG/medicine tests: ordered and independent interpretation performed  Decision-making details documented in ED Course  Risk  Decision regarding hospitalization            Disposition  Final diagnoses:   Hypotension   KAMLESH (acute kidney injury) ([facility] [address] )   Elevated troponin I level     Time reflects when diagnosis was documented in both MDM as applicable and the Disposition within this note     Time User Action Codes Description Comment    1/30/2023  2:39 PM Jessica Lalnos Add [I95 9] Hypotension     1/30/2023  2:39 PM Jessica Llanos Add [N17 9] KAMLESH (acute kidney injury) ([facility] [address] )     1/30/2023  2:39 PM Ganga Zaragoza Add [R77 8] Elevated troponin I level       ED Disposition     ED Disposition   Admit    Condition   Stable    Date/Time   Mon Jan 30, 2023  2:39 PM    Comment   Case was discussed with Dr Mckenna Polanco and the patient's admission status was agreed to be Admission Status: observation status to the service of Dr Mckenna Polanco              Follow-up Information    None         Current Discharge Medication List      CONTINUE these medications which have NOT CHANGED    Details   doxycycline hyclate (VIBRAMYCIN) 100 mg capsule Take 50 mg by mouth every 12 (twelve) hours      acetaminophen (TYLENOL) 650 mg CR tablet Take 650 mg by mouth 2 (two) times a day      allopurinol (ZYLOPRIM) 100 mg tablet Take 400 mg by mouth daily      apixaban (Eliquis) 5 mg Take 1 tablet (5 mg total) by mouth 2 (two) times a day  Qty: 180 tablet, Refills: 3    Associated Diagnoses: TIA (transient ischemic attack); PAF (paroxysmal atrial fibrillation) ([facility])      ascorbic acid (VITAMIN C) 500 MG tablet Take 500 mg by mouth daily      cetirizine (ZyrTEC) 10 mg tablet Take 10 mg by mouth daily      Diclofenac Sodium (VOLTAREN) 1 % Apply 4 g topically 4 (four) times a day      Empagliflozin (JARDIANCE) 10 MG TABS tablet Take 1 tablet (10 mg total) by mouth daily  Qty: 30 tablet, Refills: 0    Associated Diagnoses: Chronic combined systolic and diastolic congestive heart failure (HCC)      ezetimibe (ZETIA) 10 mg tablet Take 10 mg by mouth daily      fluticasone (FLONASE) 50 mcg/act nasal spray       hydroxychloroquine (PLAQUENIL) 200 mg tablet Take 200 mg by mouth 2 (two) times a day with meals      levothyroxine 25 mcg tablet       loperamide (IMODIUM) 2 mg capsule Take 2 mg by mouth as needed for diarrhea      magnesium Oxide (MAG-OX) 400 mg TABS Take 1 tablet (400 mg total) by mouth 2 (two) times a day for 15 days  Qty: 30 tablet, Refills: 0    Associated Diagnoses: Chronic combined systolic and diastolic congestive heart failure (HCC)      melatonin 3 mg Take 3 tablets (9 mg total) by mouth daily at bedtime  Qty: 30 tablet, Refills: 0    Associated Diagnoses: Sleep disturbance      metoprolol succinate (TOPROL-XL) 25 mg 24 hr tablet Take 1 tablet (25 mg total) by mouth 2 (two) times a day  Qty: 180 tablet, Refills: 3    Associated Diagnoses: Low left ventricular ejection fraction      Multiple Vitamin (multivitamin) tablet Take 1 tablet by mouth daily      mupirocin (BACTROBAN) 2 % ointment Apply 1 application topically daily      nitroglycerin (NITROSTAT) 0 4 mg SL tablet Place 0 4 mg under the tongue every 5 (five) minutes as needed for chest pain      NON FORMULARY Medical marijuana      nystatin (MYCOSTATIN) powder Apply topically 2 (two) times a day  Qty: 15 g, Refills: 0    Associated Diagnoses: Rash      omeprazole (PriLOSEC) 10 mg delayed release capsule Take 10 mg by mouth daily      polyethylene glycol (MIRALAX) 17 g packet Take 17 g by mouth every other day  Qty: 30 each, Refills: 2    Associated Diagnoses: Alternating constipation and diarrhea      potassium chloride (K-DUR,KLOR-CON) 20 mEq tablet Take 2 tablets (40 mEq total) by mouth daily Do not start before January 29, 2023  Qty: 30 tablet, Refills: 0    Associated Diagnoses: Chronic combined systolic and diastolic congestive heart failure (HCC)      predniSONE 5 mg tablet Take by mouth daily      psyllium (METAMUCIL SMOOTH TEXTURE) 28 % packet Take 1 packet by mouth 2 (two) times a day  Qty: 30 packet, Refills: 2    Associated Diagnoses:  Alternating constipation and diarrhea      sacubitril-valsartan (ENTRESTO) 24-26 MG TABS Take 1 tablet by mouth 2 (two) times a day  Qty: 60 tablet, Refills: 0    Associated Diagnoses: Chronic combined systolic and diastolic congestive heart failure (HCC)      solifenacin (VESICARE) 5 mg tablet Take 5 mg by mouth daily      tamsulosin (FLOMAX) 0 4 mg Take 0 4 mg by mouth daily at bedtime      torsemide (DEMADEX) 20 mg tablet Take 1 tablet (20 mg total) by mouth daily  Qty: 90 tablet, Refills: 3    Associated Diagnoses: HFrEF (heart failure with reduced ejection fraction) ([facility])      Vitamin D, Cholecalciferol, 25 MCG (1000 UT) TABS Take by mouth in the morning             No discharge procedures on file      PDMP Review       Value Time User    PDMP Reviewed  Yes 1/28/2023  6:41 AM Tawana Painting MD          ED Provider  Electronically Signed by           Jeni Alas DO  01/31/23 3630

## 2023-01-30 NOTE — ASSESSMENT & PLAN NOTE
· Hx of R shoulder septic arthritis  · Patient is on prophylactic doxycycline 50 mg BID indefinitely, continue

## 2023-01-30 NOTE — UTILIZATION REVIEW
Initial Clinical Review    Admission: Date/Time/Statement:  1/26/23 1043 Observation AND CHANGED 1/27/23 1642 INPATIENT RE: PATIENT NEEDS > 2 MIDNIGHT STAY DUE TO SPEECH DISTURBANCE WITH ONGOING NEURO WORK UP AS  NEURO CHECKS CONTINUED AND EEG PENDING  Admission Orders (From admission, onward)     Ordered        01/27/23 1642  Inpatient Admission  Once                      Orders Placed This Encounter   Procedures   • Inpatient Admission     Standing Status:   Standing     Number of Occurrences:   1     Order Specific Question:   Level of Care     Answer:   Med Surg [16]     Order Specific Question:   Estimated length of stay     Answer:   More than 2 Midnights     Order Specific Question:   Certification     Answer:   I certify that inpatient services are medically necessary for this patient for a duration of greater than two midnights  See H&P and MD Progress Notes for additional information about the patient's course of treatment  ED Arrival Information     Expected   -    Arrival   1/26/2023 09:25    Acuity   Emergent            Means of arrival   Ambulance    Escorted by   Thaddeus Singletary)    Service   Hospitalist    Admission type   Emergency            Arrival complaint   stroke alert           Chief Complaint   Patient presents with   • STROKE Alert     To ED for evaluation of speech difficulties started 30 minutes ago  Patient has difficulty forming words  Do other deficits noted  Reports that patient had similar episode last night  Initial Presentation: 80 y o  male from assisted living to ED via ems admitted to observation [address]  due to stroke like symptoms/KAMLESH  PMH of CHF, HTN, Afib on Eliquis  Presented due to expressive aphasia starting about 45 minutes prior to arrival   Ems confirmed  Similar episode the night prior  On exam mild word finding difficulty  INR 1 30   K 3 2  Bun 27, creatinine 1 58 with baseline of 0 9 -1  Wbc 11 09    In the ED given IVF bolus   Plan is neuro checks, MRI, echo, eeg  orthostatic BP, consult neurology  Continue Eliquis  Hold home lisinopril, torsemide and gabapentin  Trend BMP      1/26/23 per neurology - Patient with recurrent transient expressive aphasia vs transient encephalopathy and differential diagnosis for recurrent transient stereotyped symptoms is usual migraine, seizure or hypoperfusion of intracranial stenosis  Not a candidate for tpa or thrombectomy  Recommend neuro checks, telemetry, MRI brain, routine eeg, check orthostatics, continue Eliquis, statin  Check urine culture, UDS, folate, thiamine and etoh level  PT/OT/SP    Date: 1/27/23    Day 2:CHANGED TO INPATIENT:  Episode of expressive aphasia last night  On exam: obese  No focal neuro deficits  Bun 19, creatinine 1  15   Cta showed 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  Continue Lipitor and Eliquis  Monitor neuro exam    Continue to hold lisinopril and gabapentin  Resume torsemide  Dc IVF and trend BMP  Continue telemetry, metoprolol and Eliquis     1/27/23 per Cardiology - patient has chronic CHF, EF of 30%, history of PAF, TIA, hpt and RA  Today appears euvolemic  Plan is switch lisinopril (already held for 48 hours) with Entresto and add SGLT2 inhibitor with Jardiance  Check affordability  Continue Eliquis and Zetia       ED Triage Vitals   Temperature Pulse Respirations Blood Pressure SpO2   01/26/23 0945 01/26/23 0936 01/26/23 0936 01/26/23 0936 01/26/23 0936   98 3 °F (36 8 °C) 77 20 147/80 100 %      Temp Source Heart Rate Source Patient Position - Orthostatic VS BP Location FiO2 (%)   01/26/23 0945 01/26/23 0936 01/26/23 0936 01/26/23 0936 --   Oral Monitor Lying Right arm       Pain Score       01/26/23 0936       No Pain          Wt Readings from Last 1 Encounters:   01/28/23 94 1 kg (207 lb 7 3 oz)     Additional Vital Signs:   01/27/23 0721 97 6 °F (36 4 °C) 80 19 140/83 104 95 % None (Room air) Lying   01/27/23 0412 98 °F (36 7 °C) 87 27 Abnormal  114/64 84 -- -- Lying   01/26/23 2314 -- 91 49 Abnormal  124/71 91 96 % -- --   01/26/23 2103 -- 90 -- 132/62 -- -- -- --   01/26/23 1918 97 4 °F (36 3 °C) Abnormal  94 20 145/80 105 96 % None (Room air) Lying   01/26/23 1900 -- 98 -- 145/80 105 95 % -- --   01/26/23 1700 -- 96 32 Abnormal  116/64 82 92 % -- --   01/26/23 1520 -- 95 -- 111/72 -- -- -- --   01/26/23 1400 -- 98 24 Abnormal  111/70 88 93 % -- --   01/26/23 1300 -- 92 24 Abnormal  152/73 101 98 % -- --   01/26/23 1201 97 6 °F (36 4 °C) 92 22 111/72 87 95 % None (Room air) Lying   01/26/23 1141 -- 98 19 127/72 -- -- -- Standing - Orthostatic VS   01/26/23 1138 -- 99 21 121/68 -- -- -- Sitting - Orthostatic VS   01/26/23 1135 -- 96 21 120/67 -- -- -- Lying - Orthostatic VS   01/26/23 1100 -- 97 18 127/75 -- 96 % None (Room air) Lying   01/26/23 1000 -- 93 18 122/78 -- 98 % None (Room air)      Date and Time Eye Opening Best Verbal Response Best Motor Response User   01/27/23 2000 4 5 6 AT   01/27/23 0800 4 5 6 MA   01/27/23 0412 4 5 6 MC   01/26/23 2315 4 5 6    01/26/23 2100 4 5 6    01/26/23 1900 4 5 6    01/26/23 1700 4 5 6 [facility]   01/26/23 1500 4 5 6 [facility]   01/26/23 1400 4 5 6 [facility]   01/26/23 1300 4 5 6 [facility]   01/26/23 1201 4 5 6 [facility]   01/26/23 1100 4 5 6 [facility]   01/26/23 1030 4 5 6 [facility]   01/26/23 1015 4 5 6 [facility]   01/26/23 1000 4 5 6 [facility]   01/26/23 0945 4 5 6      Pertinent Labs/Diagnostic Test Results:   MRI brain w wo contrast   Final Result by Nat Whitman MD (01/26 1214)         1  No MR evidence of acute ischemia  2  No enhancing lesions  3  Volume loss/atrophy and microangiopathy  Workstation performed: [id number]         XR chest 1 view portable   Final Result by Sonya Concepcion MD (01/26 2269)      No acute cardiopulmonary disease                    Workstation performed: [id number]         CTA stroke alert (head/neck)   Final Result by Lainey Carcamo MD (01/26 1011)   1  CTA head: Negative for large vessel intracranial occlusion   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  2  CTA neck:  No extracranial carotid stenosis  The cervical vertebral arteries are patent  Findings were directly discussed with Dr Edi Cardozo at 9:52 AM                            Workstation performed: [id number]         CT stroke alert brain   Final Result by Layton Harada, MD (01/26 0954)      1  No acute intracranial abnormality  2   Chronic bilateral basal ganglia and right thalamic lacunar infarcts  Chronic microangiopathic ischemic changes  Findings were directly discussed with Dr Edi Cradozo at 9:52 AM       Workstation performed: [id number]           1/26/23 Sinus rhythm with occasional Premature ventricular complexes and Premature atrial complexes  Left anterior fascicular block  Minimal voltage criteria for LVH, may be normal variant  Possible Anterolateral infarct (cited on or before 08-OCT-2022)  Prolonged QT  Abnormal ECG    1/26/23 echo Left Ventricle: Left ventricular cavity size is dilated  The left ventricular ejection fraction is 30% by visual estimation  Systolic function is severely reduced  There is severe global hypokinesis  There is no thrombus  •  Aortic Valve: There is mild regurgitation  •  Mitral Valve: There is moderate regurgitation  •  Aorta: The aorta was not well visualized  The aortic root is normal in size  The ascending aorta is mildly dilated  The aortic root is 4 10 cm  The ascending aorta is 4 4 cm      Results from last 7 days   Lab Units 01/26/23  1010   SARS-COV-2  Negative     Results from last 7 days   Lab Units 01/27/23  0424 01/26/23  0940   WBC Thousand/uL 7 62 11 09*   HEMOGLOBIN g/dL 12 7 13 8   HEMATOCRIT % 38 6 42 8   PLATELETS Thousands/uL 141* 171     Results from last 7 days   Lab Units 01/28/23  0430 01/27/23  1101 01/27/23  0424 01/26/23  0940   SODIUM mmol/L 142 --  142 140   POTASSIUM mmol/L 3 4*  --  4 4 3 2*   CHLORIDE mmol/L 105  --  104 99   CO2 mmol/L 32  --  31 34*   ANION GAP mmol/L 5  --  7 7   BUN mg/dL 16  --  19 27*   CREATININE mg/dL 1 18  --  1 13 1 58*   EGFR ml/min/1 73sq m 54  --  57 38   CALCIUM mg/dL 8 4  --  7 7* 7 9*   MAGNESIUM mg/dL 2 2 2 0 0 7*  --      Results from last 7 days   Lab Units 01/28/23  1123 01/26/23  1013   POC GLUCOSE mg/dl 173* 126     Results from last 7 days   Lab Units 01/28/23  0430 01/27/23  0424 01/26/23  0940   GLUCOSE RANDOM mg/dL 133 119 139     Results from last 7 days   Lab Units 01/26/23  0940   HEMOGLOBIN A1C % 5 9*   EAG mg/dl 123     Results from last 7 days   Lab Units 01/26/23  1434 01/26/23  1146 01/26/23  0940   HS TNI 0HR ng/L  --   --  15   HS TNI 2HR ng/L  --  13  --    HSTNI D2 ng/L  --  -2  --    HS TNI 4HR ng/L 13  --   --    HSTNI D4 ng/L -2  --   --      Results from last 7 days   Lab Units 01/26/23  0940   PROTIME seconds 17 0*   INR  1 30*   PTT seconds 29     Results from last 7 days   Lab Units 01/26/23  0940   TSH 3RD GENERATON uIU/mL 5 259*     Results from last 7 days   Lab Units 01/26/23  1317   CLARITY UA  Clear   COLOR UA  Yellow   SPEC GRAV UA  1 015   PH UA  6 0   GLUCOSE UA mg/dl Negative   KETONES UA mg/dl Negative   BLOOD UA  Negative   PROTEIN UA mg/dl Trace*   NITRITE UA  Negative   BILIRUBIN UA  Negative   UROBILINOGEN UA (BE) mg/dl <2 0   LEUKOCYTES UA  Negative   WBC UA /hpf None Seen   RBC UA /hpf None Seen   BACTERIA UA /hpf None Seen   EPITHELIAL CELLS WET PREP /hpf None Seen   MUCUS THREADS  None Seen     Results from last 7 days   Lab Units 01/26/23  1010   INFLUENZA A PCR  Negative   INFLUENZA B PCR  Negative   RSV PCR  Negative     Results from last 7 days   Lab Units 01/26/23  1317   AMPH/METH  Negative   BARBITURATE UR  Negative   BENZODIAZEPINE UR  Negative   COCAINE UR  Negative   METHADONE URINE  Negative   OPIATE UR  Negative   PCP UR  Negative   THC UR  Negative     Results from last 7 days   Lab Units 01/26/23  1434   ETHANOL LVL mg/dL <3       ED Treatment:   Medication Administration from 01/26/2023 0924 to 01/26/2023 1201       Date/Time Order Dose Route Action Comments     01/26/2023 1114 EST sodium chloride 0 9 % bolus 500 mL 500 mL Intravenous Not Given --     01/26/2023 1110 EST multi-electrolyte (PLASMALYTE-A/ISOLYTE-S PH 7 4) IV solution 25 mL/hr Intravenous New Bag --     01/26/2023 1200 EST Diclofenac Sodium (VOLTAREN) 1 % topical gel 2 g 2 g Topical Not Given --        Past Medical History:   Diagnosis Date   • GERD (gastroesophageal reflux disease)    • Gout    • Hyperlipidemia    • Hypertension    • 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[facility] )    • Spinal stenosis      Present on Admission:  • Primary hypertension      Admitting Diagnosis: Cardiomyopathy ([facility] ) [I42 9]  Hypertension [I10]  Expressive aphasia [R47 01]  Age/Sex: 80 y o  male  Admission Orders:  Scheduled Medications:  allopurinol, 400 mg, Oral, Daily  apixaban, 5 mg, Oral, BID  atorvastatin, 40 mg, Oral, Daily With Dinner dc 1/27  Diclofenac Sodium, 2 g, Topical, 4x Daily  ezetimibe, 10 mg, Oral, Daily  hydroxychloroquine, 200 mg, Oral, BID With Meals  levothyroxine, 25 mcg, Oral, Early Morning  magnesium sulfate, 2 g, Intravenous, Q2H 1/27 at 0555, 0813, 1042  melatonin, 6 mg, Oral, HS  metoprolol succinate, 25 mg, Oral, BID  mupirocin, , Topical, Daily  nystatin, , Topical, BID  oxybutynin, 5 mg, Oral, Daily  pantoprazole, 40 mg, Oral, Early Morning  predniSONE, 5 mg, Oral, Daily  sodium chloride, 500 mL, Intravenous, Once  tamsulosin, 0 4 mg, Oral, HS    Empagliflozin (JARDIANCE) tablet 10 mg  Dose: 10 mg  Freq: Daily Route: PO  Start: 01/27/23 1045    potassium chloride (K-DUR,KLOR-CON) CR tablet 40 mEq  Dose: 40 mEq  Freq: 2 times daily Route: PO  Start: 01/26/23 1230 End: 01/26/23 1721    sacubitril-valsartan (ENTRESTO) 24-26 MG per tablet 1 tablet  Dose: 1 tablet  Freq: 2 times daily Route: PO  Start: 01/27/23 1045    torsemide (DEMADEX) tablet 20 mg  Dose: 20 mg  Freq: Daily Route: PO  Start: 01/27/23 1030    Continuous IV Infusions:  multi-electrolyte (PLASMALYTE-A/ISOLYTE-S PH 7 4) IV solution  Rate: 25 mL/hr Dose: 25 mL/hr  Freq: Continuous Route: IV  Last Dose: Stopped (01/27/23 0800)  Start: 01/26/23 1100 End: 01/27/23 0714  No current facility-administered medications for this encounter  PRN Meds:  acetaminophen, 650 mg, Oral, Q6H PRN x 1 1/26  aluminum-magnesium hydroxide-simethicone, 30 mL, Oral, Q6H PRN  polyethylene glycol, 17 g, Oral, Daily PRN  trimethobenzamide, 100 mg, Intramuscular, Q6H PRN    Telemetry   Neuro checks Every 1 hour x 4 hours, then every 2 hours x 8 hours, then every 4 hours x 72 hours      IP CONSULT TO NEUROLOGY  IP CONSULT TO CARDIOLOGY    Network Utilization Review Department  ATTENTION: Please call with any questions or concerns to [phone] and carefully listen to the prompts so that you are directed to the right person  All voicemails are confidential   Sneha Payor all requests for admission clinical reviews, approved or denied determinations and any other requests to dedicated fax number below belonging to the campus where the patient is receiving treatment   List of dedicated fax numbers for the Facilities:  [address] DENIALS (Administrative/Medical Necessity) [phone]   [address] (Maternity/NICU/Pediatrics) [phone]   [address] Yulissa Krishnae [phone]   [facility] [address] [phone]   [address] [place] [address] Medical [place] [address] [place] [address] [place] [address] [place] [address] [address]   1550 [facility] [phone]   [facility] [address] [place] [address] [phone]       Continued Stay Review    Date: 1/28  Day 3: Has surpassed a 2nd midnight with active treatments and services  Date: 1/28/2023                      Current Patient Class: IP  Current Level of Care: Medsurg     HPI:88 y o  male initially admitted on 1/26 to OBS;  IP 1/27    Assessment/Plan:  Written for Discharge 1/28  Hypomag 1/27 -Rec'd IV Mag 1/27 for mag level 0 7  To F/U with Neuro & Cardio as OP  EEG as OP  He will be discharged with magnesium, potassium, Jardiance and Entresto  Lisinopril and gabapentin will not be continued on discharge    He is to follow-up with his PCP in 1 to 2 weeks and repeat a TSH in 4 to 6 weeks as TSH was elevated on adm      Vital Signs:   01/28/23 0723 97 9 °F (36 6 °C) 83 18 125/72 93 95 % None (Room air) Lying   01/28/23 0600 97 6 °F (36 4 °C) 85 18 131/76 98 96 % None (Room air) Lying   01/27/23 2103 -- 81 -- 119/66 -- -- -- --   01/27/23 1858 98 1 °F (36 7 °C) 91 18 97/57 69 97 % None (Room air) Sitting   01/27/23 1624 97 4 °F (36 3 °C)   -- 20 96/63 75 -- -- --         Pertinent Labs/Diagnostic Results:   Results from last 7 days   Lab Units 01/26/23  1010   SARS-COV-2  Negative     Results from last 7 days   Lab Units 01/27/23  0424 01/26/23  0940   WBC Thousand/uL 7 62 11 09*   HEMOGLOBIN g/dL 12 7 13 8   HEMATOCRIT % 38 6 42 8   PLATELETS Thousands/uL 141* 171     Results from last 7 days   Lab Units 01/28/23  0430 01/27/23  1101 01/27/23  0424 01/26/23  0940   SODIUM mmol/L 142  --  142 140   POTASSIUM mmol/L 3 4*  --  4 4 3 2*   CHLORIDE mmol/L 105  --  104 99   CO2 mmol/L 32  --  31 34*   ANION GAP mmol/L 5  --  7 7   BUN mg/dL 16  --  19 27*   CREATININE mg/dL 1 18  --  1 13 1 58*   EGFR ml/min/1 73sq m 54  --  57 38   CALCIUM mg/dL 8 4  --  7 7* 7 9*   MAGNESIUM mg/dL 2 2 2 0 0 7*  --      Results from last 7 days   Lab Units 01/28/23  1123 01/26/23  1013   POC GLUCOSE mg/dl 173* 126     Results from last 7 days   Lab Units 01/28/23  0430 01/27/23  0424 01/26/23  0940   GLUCOSE RANDOM mg/dL 133 119 139     Results from last 7 days   Lab Units 01/26/23  0940   HEMOGLOBIN A1C % 5 9*   EAG mg/dl 123     Results from last 7 days   Lab Units 01/26/23  1434 01/26/23  1146 01/26/23  0940   HS TNI 0HR ng/L  --   --  15   HS TNI 2HR ng/L  --  13  --    HSTNI D2 ng/L  --  -2  --    HS TNI 4HR ng/L 13  --   --    HSTNI D4 ng/L -2  --   --      Results from last 7 days   Lab Units 01/26/23  0940   PROTIME seconds 17 0*   INR  1 30*   PTT seconds 29     Results from last 7 days   Lab Units 01/26/23  0940   TSH 3RD GENERATON uIU/mL 5 259*     Results from last 7 days   Lab Units 01/26/23  1317   CLARITY UA  Clear   COLOR UA  Yellow   SPEC GRAV UA  1 015   PH UA  6 0   GLUCOSE UA mg/dl Negative   KETONES UA mg/dl Negative   BLOOD UA  Negative   PROTEIN UA mg/dl Trace*   NITRITE UA  Negative   BILIRUBIN UA  Negative   UROBILINOGEN UA (BE) mg/dl <2 0   LEUKOCYTES UA  Negative   WBC UA /hpf None Seen   RBC UA /hpf None Seen   BACTERIA UA /hpf None Seen   EPITHELIAL CELLS WET PREP /hpf None Seen   MUCUS THREADS  None Seen     Results from last 7 days   Lab Units 01/26/23  1010   INFLUENZA A PCR  Negative   INFLUENZA B PCR  Negative   RSV PCR  Negative         Results from last 7 days   Lab Units 01/26/23  1317   AMPH/METH  Negative   BARBITURATE UR  Negative   BENZODIAZEPINE UR  Negative   COCAINE UR  Negative   METHADONE URINE  Negative   OPIATE UR  Negative   PCP UR  Negative   THC UR  Negative     Results from last 7 days   Lab Units 01/26/23  1434   ETHANOL LVL mg/dL <3     Results from last 7 days   Lab Units 01/26/23  1316   URINE CULTURE  No Growth <1000 cfu/mL     Medications:   Scheduled Medications:  allopurinol, 400 mg, Oral, Daily  apixaban, 5 mg, Oral, BID  Diclofenac Sodium, 2 g, Topical, 4x Daily  Empagliflozin, 10 mg, Oral, Daily  ezetimibe, 10 mg, Oral, Daily  hydroxychloroquine, 200 mg, Oral, BID With Meals  levothyroxine, 25 mcg, Oral, Early Morning  magnesium Oxide, 400 mg, Oral, BID  melatonin, 9 mg, Oral, HS  metoprolol succinate, 25 mg, Oral, BID  mupirocin, , Topical, Daily  nystatin, , Topical, BID  oxybutynin, 5 mg, Oral, Daily  pantoprazole, 40 mg, Oral, Early Morning   potassium chloride, 40 mEq, Oral, Daily  predniSONE, 5 mg, Oral, Daily  sacubitril-valsartan, 1 tablet, Oral, BID  tamsulosin, 0 4 mg, Oral, HS  torsemide, 20 mg, Oral, Daily  Mag Sulfate 2G IV x 3 1/27    Continuous IV Infusions:  No current facility-administered medications for this encounter  PRN Meds:  acetaminophen, 650 mg, Oral, Q6H PRN  aluminum-magnesium hydroxide-simethicone, 30 mL, Oral, Q6H PRN  polyethylene glycol, 17 g, Oral, Daily PRN  trimethobenzamide, 100 mg, Intramuscular, Q6H PRN    Discharge Plan: Return to [facility] [place] [address]: Please call with any questions or concerns to [phone] and carefully listen to the prompts so that you are directed to the right person  All voicemails are confidential   Rashmi Almendarez all requests for admission clinical reviews, approved or denied determinations and any other requests to dedicated fax number below belonging to the campus where the patient is receiving treatment   List of dedicated fax numbers for the Facilities:  [address] DENIALS (Administrative/Medical Necessity) [phone]   [address] (Maternity/NICU/Pediatrics) [phone]   0 Yulissa Charlton [phone]   [facility] [phone]   [facility] [phone]   [address] [place] [address] [place] [address] [address] [phone]

## 2023-01-30 NOTE — ASSESSMENT & PLAN NOTE
Recently admitted 1/26-1/26 for transient expressive aphasia, work up was negative for CVA, symptoms were thought possibly due to hypotension     · Cardiology had been consulted for med adjustments as follows:  · Initiated on Jardiance and entresto   · Metoprolol dose recently increased  · Lasix recently changed to torsemide  · Lisinopril was discontinued   · Presents today for BP 60/40, improved to  currently, s/p 1L IVF with EMS   · Hgb stable, no evidence of active bleeding   · WBC 10 (on chronic steroids), no additional SIRS  · Check CXR, UA, COVID/FLU/RSV  · Will consult cardiology for assistance with medication adjustment   · Hold entresto, metoprolol, torsemide currently   · Monitor BP/orthostatics, caution with fluid resuscitation

## 2023-01-30 NOTE — ASSESSMENT & PLAN NOTE
Wt Readings from Last 3 Encounters:   01/28/23 94 1 kg (207 lb 7 3 oz)   01/10/23 102 kg (224 lb 3 2 oz)   12/17/22 98 kg (216 lb)     · Volume status on admission: does not appear in acute exacerbation   · Echo 5/80/73: EF 14%, systolic function severely reduced  Mild AV regurg, moderate MV regurg     · Consult cardiology for assistance with medications in setting of hypotension   · Hold entresto, metoprolol, torsemide, Jardiance   · S/P 1 L IVF in ED  · Monitor I/O, daily weights, volume status

## 2023-01-30 NOTE — ASSESSMENT & PLAN NOTE
· RC: metoprolol 25 mg BID - will hold for now due to hypotension   · AC: eliquis 5 mg BID (will reduce to 2 5 mg BID due to renal function)  · Consult cardiology for assistance with medication ajustment

## 2023-01-30 NOTE — ASSESSMENT & PLAN NOTE
Wt Readings from Last 3 Encounters:   01/28/23 94 1 kg (207 lb 7 3 oz)   01/10/23 102 kg (224 lb 3 2 oz)   12/17/22 98 kg (216 lb)     · Volume status on admission: does not appear in acute exacerbation   · Echo 3/94/08: EF 81%, systolic function severely reduced  Mild AV regurg, moderate MV regurg     · Consult cardiology for assistance with medications in setting of hypotension   · Hold entresto, metoprolol, torsemide  · Continue Jardiance   · S/P 1 L IVF in ED  · Monitor I/O, daily weights, volume status

## 2023-01-30 NOTE — ASSESSMENT & PLAN NOTE
· Baseline Cr is 0 9-1  · Cr on admission is 2 06 (was 1 18 on DC 2 days ago)  · Suspect 2/2 hypotension/pre-renal dehydration   · Hold antihypertensives currently   · S/P 1L IVF  · Will give additional 500 cc bolus as needed for hypotension  · Trend BMP, retention protocol, avoid nephrotoxins and hypotension   · Renal dosing

## 2023-01-30 NOTE — ASSESSMENT & PLAN NOTE
· RC: metoprolol 25 mg BID - will hold for now due to hypotension   · AC: eliquis 5 mg BID (will reduce to 2 5 mg BID due to renal function)  · Appreciate cardiology input

## 2023-01-30 NOTE — ASSESSMENT & PLAN NOTE
· Recently admitted for transient expressive aphasia  · MRI 1/26 was without evidence of acute ischemia  · Patient with recurrence of expressive speech difficulty this am during acute episode of hypotension, no additional neurologic symptoms   · Symptoms resolved with correction of BP  · Given recent MRI and no current deficits will hold off on repeat imaging  · Recommended to follow up with OP neurology for OP EEG

## 2023-01-30 NOTE — H&P
[place]  H&P- [address] [place] [address]  1934, 80 y o  male MRN: [id number]  Unit/Bed#: -01 Encounter: [id number]  Primary Care Provider: Josie Jimenez MD   Date and time admitted to hospital: 1/30/2023 12:18 PM    * Hypotension  Assessment & Plan  Recently admitted 1/26-1/26 for transient expressive aphasia, work up was negative for CVA, symptoms were thought possibly due to hypotension  · Cardiology had been consulted for med adjustments as follows:  · Initiated on Jardiance and entresto   · Metoprolol dose recently increased  · Lasix recently changed to torsemide  · Lisinopril was discontinued   · Presents today for BP 60/40, improved to  currently, s/p 1L IVF with EMS   · Hgb stable, no evidence of active bleeding   · WBC 10 (on chronic steroids), no additional SIRS  · Check CXR, UA, COVID/FLU/RSV  · Will consult cardiology for assistance with medication adjustment   · Hold entresto, metoprolol, jardiance, torsemide currently   · Hold home flomax  · Monitor BP/orthostatics, caution with fluid resuscitation     Chronic combined systolic and diastolic congestive heart failure ([facility] )  Assessment & Plan  Wt Readings from Last 3 Encounters:   01/28/23 94 1 kg (207 lb 7 3 oz)   01/10/23 102 kg (224 lb 3 2 oz)   12/17/22 98 kg (216 lb)     · Volume status on admission: does not appear in acute exacerbation   · Echo 0/37/54: EF 12%, systolic function severely reduced  Mild AV regurg, moderate MV regurg     · Consult cardiology for assistance with medications in setting of hypotension   · Hold entresto, metoprolol, torsemide, Jardiance   · S/P 1 L IVF in ED  · Monitor I/O, daily weights, volume status     KAMLESH (acute kidney injury) ([facility] )  Assessment & Plan  · Baseline Cr is 0 9-1  · Cr on admission is 2 06 (was 1 18 on DC 2 days ago)  · Suspect 2/2 hypotension/pre-renal dehydration   · Hold antihypertensives currently   · S/P 1L IVF  · Will give additional 500 cc bolus as needed for hypotension  · Trend BMP, retention protocol, avoid nephrotoxins and hypotension   · Renal dosing     Chronic atrial fibrillation (HCC)  Assessment & Plan  · RC: metoprolol 25 mg BID - will hold for now due to hypotension   · AC: eliquis 5 mg BID (will reduce to 2 5 mg BID due to renal function)  · Consult cardiology for assistance with medication ajustment    TIA (transient ischemic attack)  Assessment & Plan  · Recently admitted for transient expressive aphasia  · MRI 1/26 was without evidence of acute ischemia  · Patient with recurrence of expressive speech difficulty this am during acute episode of hypotension, no additional neurologic symptoms   · Symptoms resolved with correction of BP  · Given recent MRI and no current deficits will hold off on repeat imaging  · Recommended to follow up with OP neurology for OP EEG     Rheumatoid arthritis ([facility] [address] )  Assessment & Plan  · Continue prednisone 5 mg daily   · Continue plaquenil BID     VTE Pharmacologic Prophylaxis: VTE Score: 9 High Risk (Score >/= 5) - Pharmacological DVT Prophylaxis Ordered: apixaban (Eliquis)  Sequential Compression Devices Ordered  Code Status: Level 1 - Full Code per patient and family   Discussion with family: Updated  (son) at bedside  Anticipated Length of Stay: Patient will be admitted on an observation basis with an anticipated length of stay of less than 2 midnights secondary to cardiology eval, IVF resusitation   Total Time for Visit, including Counseling / Coordination of Care: 30 minutes Greater than 50% of this total time spent on direct patient counseling and coordination of care      Chief Complaint: hypotension    History of Present Illness:  Lauryn William is a 80 y o  male with a PMH of chronic systolic CHF with reduced EF, hypertension, dilated cardiomyopathy, RA on chronic steroids, chronic atrial fibrillation on eliquis, recent admission from 1/26-1/28 for TIA/transient speech disturbance without evidence of CVA on MRI, who presents to ED from [address] [facility] due to hypotension with BP 60/40 per staff report  The patient received 1L IVF bous with EMS resulting in improvement in SBP to 107 on admission  Notably the patient was recently evaluated by inpatient cardiology for medical management/optimization of severe CHF  He was initiated on entresto/jardiance prior to discharge and lisinopril was discontinued  The patient reports he woke this morning and was feeling well, he ate breakfast and arrived at nurses station for morning medications  He took all am medications with exception of metoprolol  BP recorded at nurses station was 60/40  Patient was reported to display recurrence of expressive aphasia for 30-60 minutes however this resolved prior to admission and patient denies associated aphasia, headache/dizziness, visual changes, numbness/tingling/weakness, fevers, chills, chest pain, SOB, cough/wheeze, palpitations, N/V/D, abdominal pain, urinary symptoms or recent bleeding  He does report poor PO intake and hydration recently  He has no additional complaints at this time  Review of Systems:  Review of Systems   Constitutional: Negative for appetite change, chills, diaphoresis, fatigue and fever  HENT: Negative for congestion, rhinorrhea and sore throat  Eyes: Negative for visual disturbance  Respiratory: Negative for cough, chest tightness, shortness of breath and wheezing  Cardiovascular: Negative for chest pain, palpitations and leg swelling  Gastrointestinal: Negative for abdominal pain, constipation, diarrhea, nausea and vomiting  Genitourinary: Negative for difficulty urinating, dysuria and frequency  Musculoskeletal: Negative for arthralgias and myalgias  Skin: Negative for rash and wound  Neurological: Positive for speech difficulty  Negative for dizziness, tremors, seizures, syncope, facial asymmetry, weakness, light-headedness, numbness and headaches     All other systems reviewed and are negative  Past Medical and Surgical History:   Past Medical History:   Diagnosis Date   • GERD (gastroesophageal reflux disease)    • Gout    • Hyperlipidemia    • Hypertension    • 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[facility] [address] )    • Spinal stenosis        Past Surgical History:   Procedure Laterality Date   • CARDIAC ELECTROPHYSIOLOGY PROCEDURE N/A 12/17/2022    Procedure: Cardiac loop recorder implant;  Surgeon: Arin De Jesus MD;  Location: [facility]; Service: Cardiology   • CARPAL TUNNEL RELEASE Bilateral    • COLONOSCOPY     • LAMINECTOMY     • TOTAL KNEE ARTHROPLASTY Bilateral    • TOTAL SHOULDER REPLACEMENT         Meds/Allergies:  Prior to Admission medications    Medication Sig Start Date End Date Taking?  Authorizing Provider   acetaminophen (TYLENOL) 650 mg CR tablet Take 650 mg by mouth 2 (two) times a day    Historical Provider, MD   allopurinol (ZYLOPRIM) 100 mg tablet Take 400 mg by mouth daily    Historical Provider, MD   apixaban (Eliquis) 5 mg Take 1 tablet (5 mg total) by mouth 2 (two) times a day 1/10/23   Maude Wood MD   ascorbic acid (VITAMIN C) 500 MG tablet Take 500 mg by mouth daily    Historical Provider, MD   cetirizine (ZyrTEC) 10 mg tablet Take 10 mg by mouth daily    Historical Provider, MD   Diclofenac Sodium (VOLTAREN) 1 % Apply 4 g topically 4 (four) times a day    Historical Provider, MD   Empagliflozin (JARDIANCE) 10 MG TABS tablet Take 1 tablet (10 mg total) by mouth daily 1/28/23   Denis Cheema MD   ezetimibe (ZETIA) 10 mg tablet Take 10 mg by mouth daily    Historical Provider, MD   fluticasone Marko Kudo) 50 mcg/act nasal spray  9/2/22   Historical Provider, MD   hydroxychloroquine (PLAQUENIL) 200 mg tablet Take 200 mg by mouth 2 (two) times a day with meals    Historical Provider, MD   levothyroxine 25 mcg tablet  8/24/22   Historical Provider, MD   loperamide (IMODIUM) 2 mg capsule Take 2 mg by mouth as needed for diarrhea    Historical Provider, MD   magnesium Oxide (MAG-OX) 400 mg TABS Take 1 tablet (400 mg total) by mouth 2 (two) times a day for 15 days 1/28/23 2/12/23  Angeles Barahona MD   melatonin 3 mg Take 3 tablets (9 mg total) by mouth daily at bedtime 1/28/23   Angeles Barahona MD   metoprolol succinate (TOPROL-XL) 25 mg 24 hr tablet Take 1 tablet (25 mg total) by mouth 2 (two) times a day 1/10/23 2/9/23  Raul Bernheim, MD   Multiple Vitamin (multivitamin) tablet Take 1 tablet by mouth daily    Historical Provider, MD   mupirocin (BACTROBAN) 2 % ointment Apply 1 application topically daily    Historical Provider, MD   nitroglycerin (NITROSTAT) 0 4 mg SL tablet Place 0 4 mg under the tongue every 5 (five) minutes as needed for chest pain    Historical Provider, MD   [address] [place], [place] [address] marijuana    Historical Provider, MD   nystatin (MYCOSTATIN) powder Apply topically 2 (two) times a day 1/28/23   Angeles Barahona MD   omeprazole (PriLOSEC) 10 mg delayed release capsule Take 10 mg by mouth daily    Historical Provider, MD   polyethylene glycol (MIRALAX) 17 g packet Take 17 g by mouth every other day 12/21/21   ESTEFANIA Lutz   potassium chloride (K-DUR,KLOR-CON) 20 mEq tablet Take 2 tablets (40 mEq total) by mouth daily Do not start before January 29, 2023 1/29/23   Angeles Barahona MD   predniSONE 5 mg tablet Take by mouth daily    Historical Provider, MD   psyllium (METAMUCIL SMOOTH TEXTURE) 28 % packet Take 1 packet by mouth 2 (two) times a day 12/21/21   ESTEFANIA Duncan   sacubitril-valsartan (ENTRESTO) 24-26 MG TABS Take 1 tablet by mouth 2 (two) times a day 1/28/23 2/27/23  Angeles Barahona MD   solifenacin (VESICARE) 5 mg tablet Take 5 mg by mouth daily    Historical Provider, MD   tamsulosin (FLOMAX) 0 4 mg Take 0 4 mg by mouth daily at bedtime    Historical Provider, MD   torsemide (DEMADEX) 20 mg tablet Take 1 tablet (20 mg total) by mouth daily 1/10/23   Raul Bernheim, MD   Vitamin D, Cholecalciferol, 25 MCG (1000 UT) TABS Take by mouth in the morning    Historical Provider, MD PATEL have reviewed home medications with patient personally  Allergies: Allergies   Allergen Reactions   • Colchicine Other (See Comments)     Flushing     • Niacin Other (See Comments)     flushed   • Statins        Social History:  Marital Status:    Occupation: none   Patient Pre-hospital Living Situation: Assisted Living  Patient Pre-hospital Level of Mobility: wheelchair and walker  Patient Pre-hospital Diet Restrictions: cardiac diet   Substance Use History:   Social History     Substance and Sexual Activity   Alcohol Use Yes   • Alcohol/week: 2 0 standard drinks   • Types: 2 Shots of liquor per week    Comment: 2-3 ounces of whiskey a day     Social History     Tobacco Use   Smoking Status Never   Smokeless Tobacco Never     Social History     Substance and Sexual Activity   Drug Use Yes   • Types: Marijuana       Family History:  Family History   Problem Relation Age of Onset   • Colon polyps Neg Hx    • Colon cancer Neg Hx        Physical Exam:     Vitals:   Blood Pressure: 94/67 (01/30/23 1613)  Pulse: 92 (01/30/23 1613)  Temperature: 97 9 °F (36 6 °C) (01/30/23 1613)  Temp Source: Oral (01/30/23 1220)  Respirations: 18 (01/30/23 1613)  SpO2: 94 % (01/30/23 1613)    Physical Exam  Vitals and nursing note reviewed  Constitutional:       General: He is not in acute distress  Appearance: He is well-developed  He is not ill-appearing or diaphoretic  HENT:      Head: Normocephalic and atraumatic  Eyes:      General:         Right eye: No discharge  Left eye: No discharge  Extraocular Movements: Extraocular movements intact  Conjunctiva/sclera: Conjunctivae normal    Cardiovascular:      Rate and Rhythm: Normal rate and regular rhythm  Heart sounds: No murmur heard  Pulmonary:      Effort: Pulmonary effort is normal  No respiratory distress  Breath sounds: Normal breath sounds  No wheezing, rhonchi or rales  Abdominal:      General: Bowel sounds are normal  There is no distension  Palpations: Abdomen is soft  Tenderness: There is no abdominal tenderness  Musculoskeletal:      Cervical back: Neck supple  Right lower leg: No edema  Left lower leg: No edema  Skin:     General: Skin is warm and dry  Capillary Refill: Capillary refill takes less than 2 seconds  Neurological:      Mental Status: He is alert and oriented to person, place, and time  Cranial Nerves: No cranial nerve deficit  Sensory: No sensory deficit  Motor: No weakness  Psychiatric:         Mood and Affect: Mood normal          Behavior: Behavior normal           Additional Data:     Lab Results:  Results from last 7 days   Lab Units 01/30/23  1235   WBC Thousand/uL 10 42*   HEMOGLOBIN g/dL 13 1   HEMATOCRIT % 40 6   PLATELETS Thousands/uL 197   NEUTROS PCT % 50   LYMPHS PCT % 36   MONOS PCT % 7   EOS PCT % 6     Results from last 7 days   Lab Units 01/30/23  1235   SODIUM mmol/L 140   POTASSIUM mmol/L 4 5   CHLORIDE mmol/L 102   CO2 mmol/L 28   BUN mg/dL 23   CREATININE mg/dL 2 06*   ANION GAP mmol/L 10   CALCIUM mg/dL 8 9   ALBUMIN g/dL 2 9*   TOTAL BILIRUBIN mg/dL 0 90   ALK PHOS U/L 72   ALT U/L 22   AST U/L 26   GLUCOSE RANDOM mg/dL 138     Results from last 7 days   Lab Units 01/26/23  0940   INR  1 30*     Results from last 7 days   Lab Units 01/28/23  1123 01/26/23  1013   POC GLUCOSE mg/dl 173* 126     Results from last 7 days   Lab Units 01/26/23  0940   HEMOGLOBIN A1C % 5 9*           Lines/Drains:  Invasive Devices     Peripheral Intravenous Line  Duration           Peripheral IV 01/30/23 Left;Upper;Ventral (anterior) Arm <1 day                    Imaging: No pertinent imaging reviewed  XR chest portable    (Results Pending)       EKG and Other Studies Reviewed on Admission:   · EKG: NSR  HR 89     ** Please Note: This note has been constructed using a voice recognition system   **

## 2023-01-30 NOTE — CASE MANAGEMENT
Case Management Assessment & Discharge Planning Note    Patient name Gino Enriquez  Location [facility] MRN [id number]  : 1934 Date 2023       Current Admission Date: 2023  Current Admission Diagnosis:Hypotension   Patient Active Problem List    Diagnosis Date Noted   • Hypomagnesemia 2023   • Elevated TSH 2023   • Chronic combined systolic and diastolic congestive heart failure ([address] ) 2023   • Chronic atrial fibrillation ([address] ) 2023   • Transient speech disturbance 2023   • KAMLESH (acute kidney injury) ([facility] [address] ) 2023   • Dilated cardiomyopathy ([facility] ) 10/18/2022   • Primary hypertension 10/18/2022   • Low left ventricular ejection fraction 10/11/2022   • TIA (transient ischemic attack) 10/10/2022   • Speech disturbance 10/08/2022   • Accidental overdose 2020   • History of hypertension 2020   • Hypotension 2020   • Rheumatoid arthritis ([facility] ) 2020      LOS (days): 0  Geometric Mean LOS (GMLOS) (days):   Days to GMLOS:     OBJECTIVE:              Current admission status: Observation       Preferred Pharmacy:   [address] [place] [address] [address]  [address] [place] [address],[address] [id number]  Phone: [phone] Fax: [phone]    Primary Care Provider: Richmond Walsh MD    Primary Insurance: Gregory Romero [facility]  Secondary Insurance:     ASSESSMENT:  [address] [place] [address], [address] - [facility]   Primary Phone: [phone] (Mobile)               Advance Directives  Does patient have a [address]?: Yes  Does patient have Advance Directives?: Yes  Advance Directives: Living will, Power of  for health care  Primary Contact: Kerwin Parker Notice Signed: 23    Readmission Root Cause  30 Day Readmission: Yes  Who directed you to return to the hospital?: Other (comment) ([address] [place] [address])  Did you understand whom to contact if you had questions or problems?: Yes  Did you get your prescriptions before you left the hospital?: No  Reason[de-identified] Preference for own pharmacy  Were you able to get your prescriptions filled when you left the hospital?: Yes  Did you take your medications as prescribed?: Yes  Were you able to get to your follow-up appointments?: Yes  During previous admission, was a post-acute recommendation made?: Yes  What post-acute resources were offered?: [facility]  Patient was readmitted due to: hypotension  Pt was dc'd back to [facility] on 1/28/23 with rehab services to be provided on site  Action Plan: Evaluate and treat    Patient Information  Admitted from[de-identified] Facility  Mental Status: Alert  During Assessment patient was accompanied by: Son  Primary Caregiver: Self  Support Systems: Son, Self, Family members  [place] of Residence: [address] [place] [address] do you live in?: [address] [place] [address] entry access options  Select all that apply : No steps to enter home  Type of Current Residence: Other (Comment)  In the last 12 months, was there a time when you were not able to pay the mortgage or rent on time?: No  In the last 12 months, how many places have you lived?: 1  In the last 12 months, was there a time when you did not have a steady place to sleep or slept in a shelter (including now)?: No  Homeless/housing insecurity resource given?: N/A  Living Arrangements: Lives Alone  Is patient a ?: No    Activities of Daily Living Prior to Admission  Functional Status: Independent  Completes ADLs independently?: Yes  Ambulates independently?: Yes  Does patient use assisted devices?: Yes  Assisted Devices (DME) used:  Wheelchair, [place]  Does patient currently own DME?: Yes  What DME does the patient currently own?: [employer] [place], Wheelchair  Does patient have a history of Outpatient Therapy (PT/OT)?: Yes  Does the patient have a history of Short-Term Rehab?: No  Does patient have a history of HHC?: No  Does patient currently have [facility]?: No         Patient Information Continued  Income Source: Pension/halfway  Does patient have prescription coverage?: Yes  Within the past 12 months, you worried that your food would run out before you got the money to buy more : Never true  Within the past 12 months, the food you bought just didn't last and you didn't have money to get more : Never true  Food insecurity resource given?: N/A  Does patient receive dialysis treatments?: No  Does patient have a history of substance abuse?: No  Does patient have a history of Mental Health Diagnosis?: No         Means of Transportation  Means of Transport to Appts[de-identified] Family transport  In the past 12 months, has lack of transportation kept you from medical appointments or from getting medications?: No  In the past 12 months, has lack of transportation kept you from meetings, work, or from getting things needed for daily living?: No  Was application for public transport provided?: N/A        DISCHARGE DETAILS:    Discharge planning discussed with[de-identified] patient and sons at bed side  Freedom of Choice: Yes  Comments - Freedom of Choice: discussed dc planning and role of CM  CM contacted family/caregiver?: Yes  Were Treatment Team discharge recommendations reviewed with patient/caregiver?: Yes  Did patient/caregiver verbalize understanding of patient care needs?: Yes       Contacts  Patient Contacts: Kerwin Krause  Relationship to Patient[de-identified] Family  Contact Method: In Person  Reason/Outcome: Discharge [address]         Is the patient interested in [facility] at discharge?: No    DME Referral Provided  Referral made for DME?: No    Other Referral/Resources/Interventions Provided:  Interventions: Facility Return  Referral Comments: Pt plans to return to [facility] [place] with onsite PT            Discharge Destination Plan[de-identified] Facility Return  Transport at Discharge : Family                                      Additional Comments: Pt is obs  Cm met with pt ion the ED  Pt is a readmission dc'd on 1/28   Pt lives at [facility]  He is indpt at baseline  He ambualtes with a walker short distances adn self propels himself in [facility] to the dinning room  Pt does get assistance with showering  Pt is not a 2 person assist  Pt returned to [facility] Saturday and was to have PT on site  Pts son Shellie Holland is his medical POA  He will provide transportation back to the facility

## 2023-01-30 NOTE — CONSULTS
Consult - Cardiology   Dmitry Krause 80 y o  male MRN: [id number]  Unit/Bed#: ED 07 Encounter: [id number]        Reason For Consult: Hypotension  Outpatient Cardiologist: Dr Gabrielle Castillo:  1  Hypotension  1  Pt reportedly hypotensive pre-hospital at assisted living (60/40)  2  Received 1 L IVF pre-hospital with /56 on arrival  2  Acute kidney injury  1  Creatinine 2 06 on arrival with baseline around 1 1  2  Recent admission for TIA symptoms and had ACEi changed to ARNi and Jardiance added   3  Admission 1/26/2023 until 1/28/2023 at which time did also have mild acute kidney injury with creatinine of 1 5  3  Chronic HFrEF, EF 30%  1  TTE 10/10/2022: EF 20%, global hypokinesis, abnormal diastolic function, mild AR, moderate MR (possibly underestimated), mild [place], mildly dilated 4 1cm    2  TTE 1/26/2022: EF 30%, severe global hypokinesis, mild AR, moderate MR, ascending aortic root 4 4cm  3  Pre-hospital regimen: Toprol XL 25mg & Entresto 24-26 BID & Jardiance 10 mg daily  4  OP diuretic: Torsemide 20mg daily  5  Baseline weight: 216lbs (OV from 10/2022)  4  Paroxysmal atrial fibrillation   1  ILR interrogation 1/4/2023: New AF detected  2  Thromboembolix ppx: Eliquis 5mg BID  3  HR control: Toprol XL as above   5  Hx of TIA s/p ILR placement (12/17/2022)  6   Rheumatoid arthritis    PLAN/ DISCUSSION:     • Suspect hypotension and acute kidney injury secondary to possible overdiuresis and recent medication changes (Toprol increased to twice daily, lisinopril changed to Entresto, Lasix changed to torsemide)  • Agree with holding beta-blocker, Entresto, and diuretic  • Gentle IV fluids overnight 500 cc  • Repeat metabolic panel in the morning  • Plan to gradually reintroduce cardiac medications likely at lower doses  • If blood pressure stable will start with beta-blocker tomorrow and thereafter evaluate for timing of low-dose diuretic  • Eliquis 2 5 twice daily until his creatinine improves at which time he should be on 5 mg twice daily    History Of Present Illness:  77-year-old gentleman known to our practice with history of unspecified cardiomyopathy as well as history of TIA last year  Given his age and poor functional status medical management has been pursued for his cardiomyopathy  After his TIA he eventually got a loop recorder which recently showed some atrial fibrillation  When seen in the clinic earlier this month he was placed on Eliquis  His metoprolol was increased to twice daily  He was noted to have some edema so his Lasix was changed to torsemide  He was admitted last week for TIA symptoms  We saw him in consultation  He did have a slight KAMLESH with creatinine 1 5  His lisinopril was changed to [place] and Lauraine Nan was added  He was discharged in stable condition  He arrives back in the hospital today having been hypotensive at the assisted living facility  According to his son at bedside staff called him and also said that he was acting strangely  911 was called  He was brought to the hospital for evaluation  His blood pressure has been around 100/55 since arrival   He got 1 L of IV fluids  His creatinine is 2  We have been asked see him in consultation for hypertension and to help adjust his cardiac medications  Past Medical History:        Past Medical History:   Diagnosis Date   • GERD (gastroesophageal reflux disease)    • Gout    • Hyperlipidemia    • Hypertension    • 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[facility] [address] )    • Spinal stenosis       Past Surgical History:   Procedure Laterality Date   • CARDIAC ELECTROPHYSIOLOGY PROCEDURE N/A 12/17/2022    Procedure: Cardiac loop recorder implant;  Surgeon: Flory Gilbert MD;  Location[facility];   Service: Cardiology   • CARPAL TUNNEL RELEASE Bilateral    • COLONOSCOPY     • LAMINECTOMY     • TOTAL KNEE ARTHROPLASTY Bilateral    • TOTAL SHOULDER REPLACEMENT          Allergy:        Allergies   Allergen Reactions   • Colchicine Other (See Comments)     Flushing     • Niacin Other (See Comments)     flushed   • Statins        Medications:       Prior to Admission medications    Medication Sig Start Date End Date Taking?  Authorizing Provider   acetaminophen (TYLENOL) 650 mg CR tablet Take 650 mg by mouth 2 (two) times a day    Historical Provider, MD   allopurinol (ZYLOPRIM) 100 mg tablet Take 400 mg by mouth daily    Historical Provider, MD   apixaban (Eliquis) 5 mg Take 1 tablet (5 mg total) by mouth 2 (two) times a day 1/10/23   Jose Cheney MD   ascorbic acid (VITAMIN C) 500 MG tablet Take 500 mg by mouth daily    Historical Provider, MD   cetirizine (ZyrTEC) 10 mg tablet Take 10 mg by mouth daily    Historical Provider, MD   Diclofenac Sodium (VOLTAREN) 1 % Apply 4 g topically 4 (four) times a day    Historical Provider, MD   Empagliflozin (JARDIANCE) 10 MG TABS tablet Take 1 tablet (10 mg total) by mouth daily 1/28/23   Gloria Ellis MD   ezetimibe (ZETIA) 10 mg tablet Take 10 mg by mouth daily    Historical Provider, MD   fluticasone Joel Benes) 50 mcg/act nasal spray  9/2/22   Historical Provider, MD   hydroxychloroquine (PLAQUENIL) 200 mg tablet Take 200 mg by mouth 2 (two) times a day with meals    Historical Provider, MD   levothyroxine 25 mcg tablet  8/24/22   Historical Provider, MD   loperamide (IMODIUM) 2 mg capsule Take 2 mg by mouth as needed for diarrhea    Historical Provider, MD   magnesium Oxide (MAG-OX) 400 mg TABS Take 1 tablet (400 mg total) by mouth 2 (two) times a day for 15 days 1/28/23 2/12/23  Gloria Ellis MD   melatonin 3 mg Take 3 tablets (9 mg total) by mouth daily at bedtime 1/28/23   Gloria Ellis MD   metoprolol succinate (TOPROL-XL) 25 mg 24 hr tablet Take 1 tablet (25 mg total) by mouth 2 (two) times a day 1/10/23 2/9/23  Jose Cheney MD   Multiple Vitamin (multivitamin) tablet Take 1 tablet by mouth daily    Historical Provider, MD   mupirocin (BACTROBAN) 2 % ointment Apply 1 application topically daily    Historical Provider, MD   nitroglycerin (NITROSTAT) 0 4 mg SL tablet Place 0 4 mg under the tongue every 5 (five) minutes as needed for chest pain    Historical Provider, MD   [address] marijuana    Historical Provider, MD   nystatin (MYCOSTATIN) powder Apply topically 2 (two) times a day 1/28/23   Bronson Bales MD   omeprazole (PriLOSEC) 10 mg delayed release capsule Take 10 mg by mouth daily    Historical Provider, MD   polyethylene glycol (MIRALAX) 17 g packet Take 17 g by mouth every other day 12/21/21   ESTEFANIA Mcintyre   potassium chloride (K-DUR,KLOR-CON) 20 mEq tablet Take 2 tablets (40 mEq total) by mouth daily Do not start before January 29, 2023 1/29/23   Bronson Bales MD   predniSONE 5 mg tablet Take by mouth daily    Historical Provider, MD   psyllium (METAMUCIL SMOOTH TEXTURE) 28 % packet Take 1 packet by mouth 2 (two) times a day 12/21/21   ESTEFANIA Duncan   sacubitril-valsartan (ENTRESTO) 24-26 MG TABS Take 1 tablet by mouth 2 (two) times a day 1/28/23 2/27/23  Bronson Bales MD   solifenacin (VESICARE) 5 mg tablet Take 5 mg by mouth daily    Historical Provider, MD   tamsulosin (FLOMAX) 0 4 mg Take 0 4 mg by mouth daily at bedtime    Historical Provider, MD   torsemide (DEMADEX) 20 mg tablet Take 1 tablet (20 mg total) by mouth daily 1/10/23   Amy Hernandez MD   Vitamin D, Cholecalciferol, 25 MCG (1000 UT) TABS Take by mouth in the morning    Historical Provider, MD       Family History:     Family History   Problem Relation Age of Onset   • Colon polyps Neg Hx    • Colon cancer Neg Hx         Social History:       Social History     Socioeconomic History   • Marital status:       Spouse name: None   • Number of children: None   • Years of education: None   • Highest education level: None   Occupational History   • None   Tobacco Use   • Smoking status: Never   • Smokeless tobacco: Never   Vaping Use   • Vaping Use: Never used Substance and Sexual Activity   • Alcohol use: Yes     Alcohol/week: 2 0 standard drinks     Types: 2 Shots of liquor per week     Comment: 2-3 ounces of whiskey a day   • Drug use: Yes     Types: Marijuana   • Sexual activity: Not Currently   Other Topics Concern   • None   Social History Narrative   • None     Social Determinants of Health     Financial Resource Strain: Not on file   Food Insecurity: No Food Insecurity   • Worried About Running Out of Food in the Last Year: Never true   • Ran Out of Food in the Last Year: Never true   Transportation Needs: No Transportation Needs   • Lack of Transportation (Medical): No   • Lack of Transportation (Non-Medical): No   Physical Activity: Not on file   Stress: Not on file   Social Connections: Not on file   Intimate Partner Violence: Not on file   Housing Stability: Low Risk    • Unable to Pay for Housing in the Last Year: No   • Number of Places Lived in the Last Year: 1   • Unstable Housing in the Last Year: No       ROS:  14 point ROS negative except as outlined above  Remainder review of systems is negative    Exam:  General:  alert, oriented and in no distress, cooperative  Head: Normocephalic, atraumatic  Eyes:  EOMI  Pupils - equal, round, reactive to accomodation  No icterus  Normal Conjunctiva  Oropharynx: moist and normal-appearing mucosa  Neck: supple, symmetrical, trachea midline and no JVD  Heart:  RRR, No: murmer, rub or gallop, S1 & S2 normal   Respiratory effort / Chest Inspection: unlabored  Lungs:  normal air entry, lungs clear to auscultation and no rales, rhonchi or wheezing   Abdomen: flat, normal findings: bowel sounds normal and soft, non-tender  Lower Limbs:  no pitting edema  Pulses[de-identified]  RLE - DP: present 2+                 LLE - DP: present 2+  Musculoskeletal: ROM grossly normal        DATA:      ECG:                     Telemetry: Normal sinus rhythm,   HR 90s          Echocardiogram:           Ischemic Testing:         Weights:     Wt Readings from Last 3 Encounters:   01/28/23 94 1 kg (207 lb 7 3 oz)   01/10/23 102 kg (224 lb 3 2 oz)   12/17/22 98 kg (216 lb)   , There is no height or weight on file to calculate BMI           Lab Studies:           Results from last 7 days   Lab Units 01/27/23  0424   TRIGLYCERIDES mg/dL 69   HDL mg/dL 66     Results from last 7 days   Lab Units 01/30/23  1235 01/27/23  0424 01/26/23  0940   WBC Thousand/uL 10 42* 7 62 11 09*   HEMOGLOBIN g/dL 13 1 12 7 13 8   HEMATOCRIT % 40 6 38 6 42 8   PLATELETS Thousands/uL 197 141* 171   ,   Results from last 7 days   Lab Units 01/30/23  1235 01/28/23  0430 01/27/23  0424   POTASSIUM mmol/L 4 5 3 4* 4 4   CHLORIDE mmol/L 102 105 104   CO2 mmol/L 28 32 31   BUN mg/dL 23 16 19   CREATININE mg/dL 2 06* 1 18 1 13   CALCIUM mg/dL 8 9 8 4 7 7*   ALK PHOS U/L 72  --   --    ALT U/L 22  --   --    AST U/L 26  --   --    Hypotension

## 2023-01-30 NOTE — ASSESSMENT & PLAN NOTE
Recently admitted 1/26 for transient expressive aphasia, work up was negative for CVA, symptoms were thought possibly due to hypotension  · Suspect likely due to recent adjustments of BP meds and diuretics  · Blood pressure, renal functions continue to improve  · Continue to hold Entresto, Jardiance and torsemide and Toprol  · Repeat BMP in a m

## 2023-01-31 LAB
ALBUMIN SERPL BCP-MCNC: 3.1 G/DL (ref 3.5–5)
ALP SERPL-CCNC: 66 U/L (ref 46–116)
ALT SERPL W P-5'-P-CCNC: 19 U/L (ref 12–78)
ANION GAP SERPL CALCULATED.3IONS-SCNC: 9 MMOL/L (ref 4–13)
AST SERPL W P-5'-P-CCNC: 21 U/L (ref 5–45)
BASOPHILS # BLD AUTO: 0.04 THOUSANDS/ÂΜL (ref 0–0.1)
BASOPHILS NFR BLD AUTO: 1 % (ref 0–1)
BILIRUB SERPL-MCNC: 0.7 MG/DL (ref 0.2–1)
BUN SERPL-MCNC: 21 MG/DL (ref 5–25)
CALCIUM ALBUM COR SERPL-MCNC: 9.3 MG/DL (ref 8.3–10.1)
CALCIUM SERPL-MCNC: 8.6 MG/DL (ref 8.3–10.1)
CHLORIDE SERPL-SCNC: 103 MMOL/L (ref 96–108)
CO2 SERPL-SCNC: 28 MMOL/L (ref 21–32)
CREAT SERPL-MCNC: 1.59 MG/DL (ref 0.6–1.3)
EOSINOPHIL # BLD AUTO: 0.39 THOUSAND/ÂΜL (ref 0–0.61)
EOSINOPHIL NFR BLD AUTO: 5 % (ref 0–6)
ERYTHROCYTE [DISTWIDTH] IN BLOOD BY AUTOMATED COUNT: 13.5 % (ref 11.6–15.1)
GFR SERPL CREATININE-BSD FRML MDRD: 38 ML/MIN/1.73SQ M
GLUCOSE P FAST SERPL-MCNC: 107 MG/DL (ref 65–99)
GLUCOSE SERPL-MCNC: 104 MG/DL (ref 65–140)
GLUCOSE SERPL-MCNC: 107 MG/DL (ref 65–140)
GLUCOSE SERPL-MCNC: 146 MG/DL (ref 65–140)
GLUCOSE SERPL-MCNC: 148 MG/DL (ref 65–140)
GLUCOSE SERPL-MCNC: 181 MG/DL (ref 65–140)
HCT VFR BLD AUTO: 37.1 % (ref 36.5–49.3)
HGB BLD-MCNC: 12.1 G/DL (ref 12–17)
IMM GRANULOCYTES # BLD AUTO: 0.02 THOUSAND/UL (ref 0–0.2)
IMM GRANULOCYTES NFR BLD AUTO: 0 % (ref 0–2)
LYMPHOCYTES # BLD AUTO: 2.63 THOUSANDS/ÂΜL (ref 0.6–4.47)
LYMPHOCYTES NFR BLD AUTO: 37 % (ref 14–44)
MAGNESIUM SERPL-MCNC: 2.3 MG/DL (ref 1.6–2.6)
MCH RBC QN AUTO: 31.6 PG (ref 26.8–34.3)
MCHC RBC AUTO-ENTMCNC: 32.6 G/DL (ref 31.4–37.4)
MCV RBC AUTO: 97 FL (ref 82–98)
MONOCYTES # BLD AUTO: 0.58 THOUSAND/ÂΜL (ref 0.17–1.22)
MONOCYTES NFR BLD AUTO: 8 % (ref 4–12)
NEUTROPHILS # BLD AUTO: 3.51 THOUSANDS/ÂΜL (ref 1.85–7.62)
NEUTS SEG NFR BLD AUTO: 49 % (ref 43–75)
NRBC BLD AUTO-RTO: 0 /100 WBCS
PHOSPHATE SERPL-MCNC: 3.4 MG/DL (ref 2.3–4.1)
PLATELET # BLD AUTO: 149 THOUSANDS/UL (ref 149–390)
PMV BLD AUTO: 12.8 FL (ref 8.9–12.7)
POTASSIUM SERPL-SCNC: 4.2 MMOL/L (ref 3.5–5.3)
PROT SERPL-MCNC: 6.3 G/DL (ref 6.4–8.4)
RBC # BLD AUTO: 3.83 MILLION/UL (ref 3.88–5.62)
SODIUM SERPL-SCNC: 140 MMOL/L (ref 135–147)
VIT B1 BLD-SCNC: 138.9 NMOL/L (ref 66.5–200)
WBC # BLD AUTO: 7.17 THOUSAND/UL (ref 4.31–10.16)

## 2023-01-31 PROCEDURE — 4A02XFZ MEASUREMENT OF CARDIAC RHYTHM, EXTERNAL APPROACH: ICD-10-PCS | Performed by: EMERGENCY MEDICINE

## 2023-01-31 RX ORDER — METOPROLOL SUCCINATE 25 MG/1
25 TABLET, EXTENDED RELEASE ORAL DAILY
Status: DISCONTINUED | OUTPATIENT
Start: 2023-01-31 | End: 2023-02-01 | Stop reason: HOSPADM

## 2023-01-31 RX ORDER — DOXYCYCLINE HYCLATE 100 MG/1
100 CAPSULE ORAL EVERY 12 HOURS SCHEDULED
Status: DISCONTINUED | OUTPATIENT
Start: 2023-01-31 | End: 2023-02-01 | Stop reason: HOSPADM

## 2023-01-31 RX ADMIN — APIXABAN 2.5 MG: 2.5 TABLET, FILM COATED ORAL at 17:56

## 2023-01-31 RX ADMIN — OXYBUTYNIN CHLORIDE 5 MG: 5 TABLET, EXTENDED RELEASE ORAL at 09:02

## 2023-01-31 RX ADMIN — DOXYCYCLINE 100 MG: 100 CAPSULE ORAL at 21:50

## 2023-01-31 RX ADMIN — APIXABAN 2.5 MG: 2.5 TABLET, FILM COATED ORAL at 09:02

## 2023-01-31 RX ADMIN — EZETIMIBE 10 MG: 10 TABLET ORAL at 09:02

## 2023-01-31 RX ADMIN — PREDNISONE 5 MG: 5 TABLET ORAL at 09:02

## 2023-01-31 RX ADMIN — PANTOPRAZOLE SODIUM 20 MG: 20 TABLET, DELAYED RELEASE ORAL at 09:02

## 2023-01-31 RX ADMIN — METOPROLOL SUCCINATE 25 MG: 25 TABLET, FILM COATED, EXTENDED RELEASE ORAL at 17:56

## 2023-01-31 RX ADMIN — INSULIN LISPRO 2 UNITS: 100 INJECTION, SOLUTION INTRAVENOUS; SUBCUTANEOUS at 12:34

## 2023-01-31 RX ADMIN — LEVOTHYROXINE SODIUM 25 MCG: 25 TABLET ORAL at 06:35

## 2023-01-31 RX ADMIN — HYDROXYCHLOROQUINE SULFATE 200 MG: 200 TABLET, FILM COATED ORAL at 17:56

## 2023-01-31 RX ADMIN — HYDROXYCHLOROQUINE SULFATE 200 MG: 200 TABLET, FILM COATED ORAL at 11:07

## 2023-01-31 NOTE — PROGRESS NOTES
Progress Note - Cardiology   [address] [place] [address] Dr 80 y o  male MRN: [id number]  Unit/Bed#: -01 Encounter: [id number]        Problem List:  Principal Problem:    Hypotension  Active Problems:    Rheumatoid arthritis ([facility] [address] )    TIA (transient ischemic attack)    Chronic combined systolic and diastolic congestive heart failure (HCC)    Chronic atrial fibrillation (HCC)    KAMLESH (acute kidney injury) ([facility] [address] )    Septic arthritis of shoulder, right (HCC)    Suture of skin wound      Assessment:  1  Hypotension  1  Pt reportedly hypotensive pre-hospital at assisted living (60/40)  2  Received 1 L IVF pre-hospital with /56 on arrival  3  Blood pressure today slightly improved low 100s/60s although was briefly hypotensive overnight 70/45  2  Acute kidney injury  1  Creatinine 2 06 on arrival with baseline around 1 1  2  Recent admission for TIA symptoms and had ACEi changed to ARNi and Jardiance added   3  Admission 1/26/2023 until 1/28/2023 at which time did also have mild acute kidney injury with creatinine of 1 5  4  Creatinine today improved to 1 5  3  Chronic HFrEF, EF 30%  1  TTE 10/10/2022: EF 20%, global hypokinesis, abnormal diastolic function, mild AR, moderate MR (possibly underestimated), mild [place], mildly dilated 4 1cm    2  TTE 1/26/2022: EF 30%, severe global hypokinesis, mild AR, moderate MR, ascending aortic root 4 4cm  3  Pre-hospital regimen: Toprol XL 25mg & Entresto 24-26 BID & Jardiance 10 mg daily  4  OP diuretic: Torsemide 20mg daily  5  Baseline weight: 216lbs (OV from 10/2022)  4  Paroxysmal atrial fibrillation   1  ILR interrogation 1/4/2023: New AF detected  2  Thromboembolix ppx: Eliquis 5mg BID  3  HR control: Toprol XL as above   5  Hx of TIA s/p ILR placement (12/17/2022)  6   Rheumatoid arthritis    Plan/ Discussion:  • His blood pressure and renal function are improving but not yet back to baseline  • Continue to hold Toprol, Entresto, Jardiance, torsemide  • Repeat metabolic panel tomorrow  • Hopefully can reinitiate beta-blocker tomorrow and low-dose Entresto in coming days  • Continue to trend renal function, I's/O, electrolytes   Encourage PO intake   • Continue Eliquis but once creatinine <1 5 change back to 5 mg twice daily    Subjective:  Feeling okay, no specific complaints    Vitals:  Vitals:    01/31/23 0500 01/31/23 0600   Weight: 98 2 kg (216 lb 7 9 oz) 98 2 kg (216 lb 7 9 oz)   ,  Vitals:    01/31/23 0500 01/31/23 0600 01/31/23 0706 01/31/23 0706   BP:   114/69 114/69   BP Location:       Pulse:   96 96   Resp:   18 18   Temp:       TempSrc:       SpO2:   96% 99%   Weight: 98 2 kg (216 lb 7 9 oz) 98 2 kg (216 lb 7 9 oz)         Exam:  General: Alert awake and oriented, no acute distress  Heart:  Regular rate and rhythm, no murmurs, Normal S1, no edema    Respiratory effort/ Lungs:  Breathing comfortably on room air, clear bilaterally without wheezing, rales, crackles   Abdominal: Non-tender to palpation, + bowel sounds, soft, no masses or distension  Lower Limbs:  No edema            Telemetry:       Not on telemetry    Medications:    Current Facility-Administered Medications:   •  acetaminophen (TYLENOL) tablet 650 mg, 650 mg, Oral, Q6H PRN, DUDLEY Crowder-C  •  apixaban (ELIQUIS) tablet 2 5 mg, 2 5 mg, Oral, BID, TimDUDLEY Lamar-C, 2 5 mg at 01/30/23 1733  •  doxycycline hyclate (VIBRAMYCIN) capsule 50 mg, 50 mg, Oral, Q12H [address], Timnicci Varner PA-C  •  ezetimibe (ZETIA) tablet 10 mg, 10 mg, Oral, Daily, PARI CrowderC  •  hydroxychloroquine (PLAQUENIL) tablet 200 mg, 200 mg, Oral, BID, Timnicci Varner PA-C, 200 mg at 01/30/23 1733  •  insulin lispro (HumaLOG) 100 units/mL subcutaneous injection 2-12 Units, 2-12 Units, Subcutaneous, TID AC **AND** Fingerstick Glucose (POCT), , , TID AC, Kyaw Pelaez MD  •  levothyroxine tablet 25 mcg, 25 mcg, Oral, Early Morning, Tim Varner PA-C, 25 mcg at 01/31/23 0635  •  multi-electrolyte (PLASMALYTE-A/ISOLYTE-S PH 7 4) IV solution 500 mL, 500 mL, Intravenous, Once, Fredis Zhong PA-C, Last Rate: 500 mL/hr at 01/30/23 1930, Restarted at 01/30/23 1930  •  ondansetron [facility]) injection 4 mg, 4 mg, Intravenous, Q6H PRN, John Varner PA-C  •  oxybutynin (DITROPAN-XL) 24 hr tablet 5 mg, 5 mg, Oral, Daily, John Varner PA-C, 5 mg at 01/30/23 1733  •  pantoprazole (PROTONIX) EC tablet 20 mg, 20 mg, Oral, Early Morning, John Varner PA-C, 20 mg at 01/30/23 1733  •  predniSONE tablet 5 mg, 5 mg, Oral, Daily, John Tubbs PA-C      Labs/Data:        Results from last 7 days   Lab Units 01/31/23  0517 01/30/23  1235 01/27/23  0424   WBC Thousand/uL 7 17 10 42* 7 62   HEMOGLOBIN g/dL 12 1 13 1 12 7   HEMATOCRIT % 37 1 40 6 38 6   PLATELETS Thousands/uL 149 197 141*     Results from last 7 days   Lab Units 01/31/23  0517 01/30/23  1235 01/28/23  0430   POTASSIUM mmol/L 4 2 4 5 3 4*   CHLORIDE mmol/L 103 102 105   CO2 mmol/L 28 28 32   BUN mg/dL 21 23 16

## 2023-01-31 NOTE — OCCUPATIONAL THERAPY NOTE
Occupational Therapy Evaluation     Patient Name: [address] [place] [address]   BNPQO'D Date: 1/31/2023  Problem List  Principal Problem:    Hypotension  Active Problems:    Rheumatoid arthritis ([facility] )    TIA (transient ischemic attack)    Chronic combined systolic and diastolic congestive heart failure (HCC)    Chronic atrial fibrillation (HCC)    KAMLESH (acute kidney injury) ([facility] )    Septic arthritis of shoulder, right (HCC)    Suture of skin wound    Past Medical History  Past Medical History:   Diagnosis Date    GERD (gastroesophageal reflux disease)     Gout     Hyperlipidemia     Hypertension     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[facility] )     Spinal stenosis      Past Surgical History  Past Surgical History:   Procedure Laterality Date    CARDIAC ELECTROPHYSIOLOGY PROCEDURE N/A 12/17/2022    Procedure: Cardiac loop recorder implant;  Surgeon: Alina Frankel MD;  Location: [facility]; Service: Cardiology    CARPAL TUNNEL RELEASE Bilateral     COLONOSCOPY      LAMINECTOMY      TOTAL KNEE ARTHROPLASTY Bilateral     TOTAL SHOULDER REPLACEMENT               01/31/23 1129   OT Last Visit   OT Visit Date 01/31/23   Note Type   Note type Evaluation   Pain Assessment   Pain Assessment Tool 0-10   Pain Score No Pain   Restrictions/Precautions   Weight Bearing Precautions Per Order No   Other Precautions Fall Risk; Chair Alarm; Bed Alarm;Cognitive   Home Living   Type of Home Assisted living  [facility])   Home Layout One level   Bathroom Shower/Tub Walk-in shower   [place]; Wheelchair-manual   Additional Comments W/C only  Stand pivots at baseline  Self-propels   Prior Function   Level of [place] Independent with ADLs; Independent with functional mobility; Needs assistance with [address] staff   Receives Help From Family   IADLs Family/Friend/Other provides transportation; Family/Friend/Other provides meals; Family/Friend/Other provides medication management   Falls in the last 6 months 1 to 4   Vocational Retired   Comments Supervision for bathing   Lifestyle   [place] Independent with ADLs, Mod I for txfers & mobility   Reciprocal Relationships Supportive children   Service to Others Retired   General   Additional Pertinent History Pt recently admitted 1/26/23 with plan for home OT/PT to begin   Family/Caregiver Present No   Subjective   Subjective "Snow's coming down"   ADL   Eating Assistance 7  [address] 5  Supervision/Setup   LB Bathing Assistance 4  Minimal [address] 5  Supervision/Setup    [place] [id number]  4  Minimal Assistance   Bed Mobility   Supine to Sit 5  Supervision   Additional items HOB elevated; Bedrails; Increased time required   Transfers   Sit to Stand 5  Supervision   Additional items Armrests   Stand to Sit 5  Supervision   Additional items Armrests   Stand pivot 5  Supervision   Additional items Armrests   Functional Mobility   Additional Comments Pt self-propels in WC at baseline   Balance   Static Sitting Good   Dynamic Sitting Good   Static Standing Fair +   Dynamic Standing Fair   Ambulatory Fair   Activity Tolerance   Activity Tolerance Patient tolerated treatment well   Medical Staff Made Aware Co-treat with PT Johanna d/t medical complexity   Nurse Made Aware SOMMER [place]   RUE Assessment   RUE Assessment WFL   LUE Assessment   LUE Assessment   (Chronic shoulder pain)   Hand Function   Gross Motor Coordination Functional   Fine Motor Coordination Functional   Vision-Basic Assessment   Current Vision Wears glasses all the time   Cognition   Overall Cognitive Status WFL   Arousal/Participation Alert   Attention Within functional limits   Orientation Level Oriented X4   Memory Within functional limits   Following Commands Follows one step commands without difficulty   Assessment   Limitation Decreased ADL status; Decreased endurance;Decreased self-care trans;Decreased high-level ADLs   Prognosis Good   Assessment Pt is a 80 y o  male seen for OT evaluation at [facility], admitted 1/30/2023 w/ Hypotension  OT completed extensive review of pt's medical and social history  Comorbidities affecting pt's functional performance at time of assessment include: RA, hx of TIA, CHF, Chronic A-fib, KAMLESH, septic arthritis of R shoulder, suture of skin wound on face, hx of transient speech disturbances, dilated cardiomyopathy, elevated TSH, recent hypomagnesemia  Personal factors affecting pt at time of IE include: difficulty performing ADLS, difficulty performing IADLS  and environment  Prior to admission, pt was living at [facility]  Pt was S w/  ADLS and A w/ IADLS, (-) drove, & required use of wheelchair  PTA  Upon evaluation: Pt requires S for bed mobility, S for functional mobility/transfers, S for UB ADLs and Min A for LB ADLS 2* the following deficits impacting occupational performance: weakness, decreased balance and decreased tolerance  Full objective findings from OT assessment regarding body systems outlined above  Pt to benefit from continued skilled OT tx while in the hospital to address deficits as defined above and maximize level of functional independence w/ ADL's and functional mobility  Occupational Performance areas to address include: bathing/shower, toilet hygiene, dressing, functional mobility and clothing management  Based on findings, pt is of high complexity  The patient's raw score on the AM-PAC Daily Activity inpatient short form is 21, standardized score is 44 27, greater than 39 4  Patients at this level are likely to benefit from DC to home, which does coincide with current above OT recommendations  However, please refer to therapist recommendation for discharge planning given other factors that may influence destination  At this time, OT recommendations at time of discharge are home OT     Goals   Patient Goals Pt wants to go home   Plan   Treatment Interventions ADL retraining;Functional transfer training; Endurance training;Patient/family training;Equipment evaluation/education; Compensatory technique education   Goal Expiration Date 02/10/23   OT Treatment Day 0   OT Frequency 2-3x/wk   Recommendation   OT Discharge Recommendation Home with home health rehabilitation   AM-PAC Daily Activity Inpatient   Lower Body Dressing 3   Bathing 3   Toileting 3   Upper Body Dressing 4   Grooming 4   Eating 4   Daily Activity Raw Score 21   Daily Activity Standardized Score (Calc for Raw Score >=11) 44 27   AM-PAC Applied Cognition Inpatient   Following a Speech/Presentation 4   Understanding Ordinary Conversation 4   Taking Medications 4   Remembering Where Things Are Placed or Put Away 3   Remembering List of 4-5 Errands 3   Taking Care of Complicated Tasks 3   Applied Cognition Raw Score 21   Applied Cognition Standardized Score 44 3   End of Consult   Education Provided Yes   Patient Position at End of Consult Bed/Chair alarm activated; Bedside chair; All needs within reach     Pt will achieve the following goals within 10 days  *Pt will complete LB bathing and dressing with Mod I & DME PRN  *Pt will complete toileting w/ Mod I w/ G hygiene/thoroughness using DME PRN    *Pt will complete bed mobility independently, with bed flat and no side rail to prep for purposeful tasks    *Pt will perform functional transfers with on/off all surfaces with Mod I using DME as needed w/ G balance/safety      *Pt will increase standing tolerance to 2 minutes in order to complete joana-care with S     *Assess DME needs     Jadiel Good OTR/L

## 2023-01-31 NOTE — UTILIZATION REVIEW
Initial Clinical Review    Admission: Date/Time/Statement:   Admission Orders (From admission, onward)     Ordered        01/31/23 1221  Inpatient Admission  Once            01/30/23 1440  Place in Observation  Once                      • Inpatient Admission     Standing Status:   Standing     Number of Occurrences:   1     Order Specific Question:   Level of Care     Answer:   Med Surg [16]     Order Specific Question:   Estimated length of stay     Answer:   More than 2 Midnights     Order Specific Question:   Certification     Answer:   I certify that inpatient services are medically necessary for this patient for a duration of greater than two midnights  See H&P and MD Progress Notes for additional information about the patient's course of treatment  ED Arrival Information     Expected   -    Arrival   1/30/2023 12:18    Acuity   Urgent            Means of arrival   Ambulance    Escorted by   Panchito Rod [address] [place] [address]    Service   Hospitalist    Admission type   Emergency            Arrival complaint   low bp           Chief Complaint   Patient presents with   • Hypotension     Per EMS, patient was hypotensive (60/40) during vitals check before med pass at assisted living ([facility])  Per staff, patient had some expressive aphagia at the time  Resolved now  Initial Presentation: 80 y o  male   W/h/o chronic combined systolic and diastolic heart failure, chronic A  Fib,  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  Recently  hospitalized at [address]  1/26 - 1/28/2023  For r/o CVA  (presented w/c/o transient  Expressive aphasia)   And KAMLESH   (baseline crt 0 9-1 0)    Pt was to follow-up with his PCP in 1 to 2 weeks and repeat a TSH in 4 to 6 weeks as TSH was elevated    ON 1/30  :  Sent to ER from facility due to  Lo bp's (60/40 per staff)  And reported recurrence of expressive aphasia for 30-60 minutes  (resolved PTA)    IN ER,  bp  100/56,  Given 500 cc IVF  No c/o in ER  Neuro-alert, awake oriented x3  No focal deficits  Examines Euvolemic   crt 2 06  (was 1 18 2 days ago)   Given recent MRI and no current deficits will hold off on repeat imaging    Admit  OBS   Status,  MS Level of care for  Workup and Management of  Hypotension, acute kidney injury  [facility] with Humalog sliding scale;  Trend BMP  Cont infectious workup - trend wbc and fever curve,  order UA - cxr - covid  consult cardiology for assistance with medication adjustment  Hold Home  entresto, metoprolol, jardiance, torsemide, flomax   Monitor BP/orthostatics, caution with fluid resuscitation   Order PT/OT evals  Replete Mg  W/2gm IV x1    Date: 1/31      Day 2:  CHANGED To INPATIENT STATUS,  MS  Level of care   For ongoing lab monitoring - med adjustments/ electrolyte repletion  No events overnight, no complaints  Tolerating diet  Suspect  Hypotension 2/2  recent adjustments of BP meds and diuretics  Today,  Blood pressure, renal functions continue to improve  Continue to hold [place], Jardiance and torsemide and Toprol  Repeat BMP in a m        crt improved from 2 06 to 1 59    2/01    Recently admitted for transient expressive aphasia  MRI 1/26 was without evidence of acute ischemia - Patient with recurrence of expressive speech difficulty this am during acute episode of hypotension, no additional neurologic symptoms  - Symptoms resolved with correction of BP  Given recent MRI and no current deficits will hold off on repeat imaging  Continue to hold Entresto and Jardiance on discharge;   Resumed torsemide and Toprol        2/01  DC SUMMARY  Adi Amaya is a 80 y o  male patient with a past medical history of chronic systolic CHF with reduced EF, hypertension, dilated cardiomyopathy, RA on chronic steroids, chronic atrial fibrillation on Eliquis, recent admission 1/26-1/28 for TIA/transient speech disturbance without evidence of CVA on MRI who originally presented to the hospital on 1/30/2023 due to hypotension with a blood pressure of 60/40 per staff report at [facility]               Received fluid bolus by EMS resulting in improvement in SBP to 107 on admission  Patient was recently evaluated by inpatient cardiology for medical management/optimization of severe CHF, initiated on Entresto/Jardiance prior to discharge and lisinopril was discontinued  He did have an episode of expressive aphasia for 30 to 60 minutes which resolved prior to admission as well  Urology was consulted, hypotension and KAMLESH associated with dehydration from switching diuretic therapy and uptitrating goal-directed medical therapy  He hypertensives were initially held to allow for recovery of blood pressure  Resumed  Entresto on hold for now, to be resumed in the outpatient setting if possible   hemodynamically stable at time of discharge and appropriate for return to [facility] with VNA services          ED Triage Vitals   Temperature Pulse Respirations Blood Pressure SpO2   01/30/23 1220 01/30/23 1220 01/30/23 1220 01/30/23 1220 01/30/23 1220   97 9 °F (36 6 °C) 89 18 100/56 92 %      Temp Source Heart Rate Source Patient Position - Orthostatic VS BP Location FiO2 (%)   01/30/23 1220 01/30/23 1220 01/30/23 1500 01/30/23 1500 --   Oral Monitor Sitting Right arm       Pain Score       01/30/23 1700       No Pain          Wt Readings from Last 1 Encounters:   02/01/23 96 8 kg (213 lb 6 5 oz)     Additional Vital Signs:    01/31/23 11:24:21 -- 106  -- 126/92 103  Map  95 % --   01/31/23 11:22:13 -- 103 -- 124/93 103 96 % --   01/31/23 11:19:13 -- 96 -- 115/69 84 98 % --   01/31/23 07:06:53 -- 96 18 114/69 84 99 % --   01/31/23 07:06:30 -- 96 18 114/69 84 96 % --   01/31/23 00:34:57 -- 97 -- 105/64 78 94 % --   01/30/23 22:13:15 -- 91 -- 106/64 78 95 % --   01/30/23 21:50:09 -- 94 -- 105/61 76 94 % --   01/30/23 21:13:33 -- 95 -- 69/41 Abnormal  50 Abnormal  99 % --   01/30/23 19:34:33 -- 102 -- 77/45 Abnormal  56 Abnormal  94 % --   01/30/23 1700 -- -- -- -- -- 97 % None (Room air)   01/30/23 16:13:34 97 9 °F (36 6 °C) 92 18 94/67 76 94 % --   01/30/23 1500 -- 95 18 107/66 -- 94 % None (Room air)   01/30/23 1400 -- 92 16 107/55 75 93 % --   01/30/23 1300 -- 91 18 99/55 71 94 % --     Pertinent Labs/Diagnostic Test Results:     Echo 4/19/75: EF 52%, systolic function severely reduced  Mild AV regurg, moderate MV regurg  XR chest portable   Final Result by Shauna Parks MD (01/31 1250)   No acute cardiopulmonary disease             Results from last 7 days   Lab Units 01/30/23  1753 01/26/23  1010   SARS-COV-2  Negative Negative     Results from last 7 days   Lab Units 02/01/23  0435 01/31/23  0517 01/30/23  1235 01/27/23  0424 01/26/23  0940   WBC Thousand/uL 6 93 7 17 10 42* 7 62 11 09*   HEMOGLOBIN g/dL 12 8 12 1 13 1 12 7 13 8   HEMATOCRIT % 39 3 37 1 40 6 38 6 42 8   PLATELETS Thousands/uL 161 149 197 141* 171   NEUTROS ABS Thousands/µL 3 28 3 51 5 29  --   --          Results from last 7 days   Lab Units 02/01/23  0435 01/31/23  0517 01/30/23  1235 01/28/23  0430 01/27/23  1101 01/27/23  0424   SODIUM mmol/L 140 140 140 142  --  142   POTASSIUM mmol/L 4 0 4 2 4 5 3 4*  --  4 4   CHLORIDE mmol/L 104 103 102 105  --  104   CO2 mmol/L 26 28 28 32  --  31   ANION GAP mmol/L 10 9 10 5  --  7   BUN mg/dL 18 21 23 16  --  19   CREATININE mg/dL 1 23 1 59* 2 06* 1 18  --  1 13   EGFR ml/min/1 73sq m 52 38 27 54  --  57   CALCIUM mg/dL 8 8 8 6 8 9 8 4  --  7 7*   MAGNESIUM mg/dL  --  2 3 1 9 2 2 2 0 0 7*   PHOSPHORUS mg/dL  --  3 4  --   --   --   --      Results from last 7 days   Lab Units 01/31/23  0517 01/30/23  1235   AST U/L 21 26   ALT U/L 19 22   ALK PHOS U/L 66 72   TOTAL PROTEIN g/dL 6 3* 6 5   ALBUMIN g/dL 3 1* 2 9*   TOTAL BILIRUBIN mg/dL 0 70 0 90     Results from last 7 days   Lab Units 02/01/23  1049 02/01/23  0734 01/31/23  2125 01/31/23  1613 01/31/23  1105 01/31/23  0739 01/30/23  2107 01/30/23  1701 01/28/23  1123 01/26/23  1013   POC GLUCOSE mg/dl 148* 98 148* 146* 181* 104 184* 115 173* 126     Results from last 7 days   Lab Units 02/01/23  0435 01/31/23  0517 01/30/23  1235 01/28/23  0430 01/27/23  0424 01/26/23  0940   GLUCOSE RANDOM mg/dL 109 107 138 133 119 139         Results from last 7 days   Lab Units 01/26/23  0940   HEMOGLOBIN A1C % 5 9*   EAG mg/dl 123       Results from last 7 days   Lab Units 01/30/23  1753 01/30/23  1449 01/30/23  1235 01/26/23  1434 01/26/23  1146 01/26/23  0940   HS TNI 0HR ng/L  --   --  13  --   --  15   HS TNI 2HR ng/L  --  10  --   --  13  --    HSTNI D2 ng/L  --  -3  --   --  -2  --    HS TNI 4HR ng/L 9  --   --  13  --   --    HSTNI D4 ng/L -4  --   --  -2  --   --        Results from last 7 days   Lab Units 01/30/23  1657 01/26/23  1317   CLARITY UA  Clear Clear   COLOR UA  Light Yellow Yellow   SPEC GRAV UA  <1 005* 1 015   PH UA  6 5 6 0   GLUCOSE UA mg/dl 300 (3/10%)* Negative   KETONES UA mg/dl Negative Negative   BLOOD UA  Negative Negative   PROTEIN UA mg/dl Negative Trace*   NITRITE UA  Negative Negative   BILIRUBIN UA  Negative Negative   UROBILINOGEN UA (BE) mg/dl <2 0 <2 0   LEUKOCYTES UA  Negative Negative   WBC UA /hpf  --  None Seen   RBC UA /hpf  --  None Seen   BACTERIA UA /hpf  --  None Seen   EPITHELIAL CELLS WET PREP /hpf  --  None Seen   MUCUS THREADS   --  None Seen     Results from last 7 days   Lab Units 01/30/23  1753 01/26/23  1010   INFLUENZA A PCR  Negative Negative   INFLUENZA B PCR  Negative Negative   RSV PCR  Negative Negative     ED Treatment:   Medication Administration from 01/30/2023 1217 to 01/30/2023 1528       Date/Time Order Dose Route Action Action by Comments     01/30/2023 1251 EST sodium chloride 0 9 % bolus 500 mL 500 mL Intravenous Not Given Cydne Mikaela RN --        Past Medical History:   Diagnosis Date   • GERD (gastroesophageal reflux disease)    • Gout    • Hyperlipidemia    • Hypertension    • 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HCC)    • Spinal stenosis      Present on Admission:  • Chronic atrial fibrillation (HCC)  • Chronic combined systolic and diastolic congestive heart failure (HCC)  • TIA (transient ischemic attack)  • Rheumatoid arthritis ([facility] [address] )  • Septic arthritis of shoulder, right (HCC)  • Suture of skin wound      Admitting Diagnosis: Hypotension [I95 9]  KAMLESH (acute kidney injury) ([facility] [address] ) [N17 9]  Elevated troponin I level [R77 8]  Age/Sex: 80 y o  male     Admission Orders:  See above note    Scheduled Medications:  apixaban, 2 5 mg, Oral, BID  doxycycline hyclate, 50 mg, Oral, Q12H ODALYS  ezetimibe, 10 mg, Oral, Daily  hydroxychloroquine, 200 mg, Oral, BID  insulin lispro, 2-12 Units, Subcutaneous, TID AC  levothyroxine, 25 mcg, Oral, Early Morning  multi-electrolyte, 500 mL, Intravenous, Once   Shaina Garre   Given  1/30  @  1930 and repeated at  2115   oxybutynin, 5 mg, Oral, Daily  pantoprazole, 20 mg, Oral, Early Morning  predniSONE, 5 mg, Oral, Daily      Continuous IV Infusions:     PRN Meds:  acetaminophen, 650 mg, Oral, Q6H PRN  ondansetron, 4 mg, Intravenous, Q6H PRN        IP CONSULT TO CARDIOLOGY    Network Utilization Review Department  ATTENTION: Please call with any questions or concerns to [phone] and carefully listen to the prompts so that you are directed to the right person  All voicemails are confidential   Ellen Men all requests for admission clinical reviews, approved or denied determinations and any other requests to dedicated fax number below belonging to the campus where the patient is receiving treatment   List of dedicated fax numbers for the Facilities:  [address] DENIALS (Administrative/Medical Necessity) [phone]   [address] [place] [address] (Maternity/NICU/Pediatrics) [phone]   [address] 0810 [facility] [address] [place] [address] Zuleima Nowak [address] [facility] 134 796 [facility] [phone]

## 2023-01-31 NOTE — PLAN OF CARE
Problem: OCCUPATIONAL THERAPY ADULT  Goal: Performs self-care activities at highest level of function for planned discharge setting  See evaluation for individualized goals  Description: Treatment Interventions: ADL retraining, Functional transfer training, Endurance training, Patient/family training, Equipment evaluation/education, Compensatory technique education          See flowsheet documentation for full assessment, interventions and recommendations  Note: Limitation: Decreased ADL status, Decreased endurance, Decreased self-care trans, Decreased high-level ADLs  Prognosis: Good  Assessment: Pt is a 80 y o  male seen for OT evaluation at [facility], admitted 1/30/2023 w/ Hypotension  OT completed extensive review of pt's medical and social history  Comorbidities affecting pt's functional performance at time of assessment include: RA, hx of TIA, CHF, Chronic A-fib, KAMLESH, septic arthritis of R shoulder, suture of skin wound on face, hx of transient speech disturbances, dilated cardiomyopathy, elevated TSH, recent hypomagnesemia  Personal factors affecting pt at time of IE include: difficulty performing ADLS, difficulty performing IADLS  and environment  Prior to admission, pt was living at [facility]  Pt was S w/  ADLS and A w/ IADLS, (-) drove, & required use of wheelchair  PTA  Upon evaluation: Pt requires S for bed mobility, S for functional mobility/transfers, S for UB ADLs and Min A for LB ADLS 2* the following deficits impacting occupational performance: weakness, decreased balance and decreased tolerance  Full objective findings from OT assessment regarding body systems outlined above  Pt to benefit from continued skilled OT tx while in the hospital to address deficits as defined above and maximize level of functional independence w/ ADL's and functional mobility   Occupational Performance areas to address include: bathing/shower, toilet hygiene, dressing, functional mobility and clothing management  Based on findings, pt is of high complexity  The patient's raw score on the AM-PAC Daily Activity inpatient short form is 21, standardized score is 44 27, greater than 39 4  Patients at this level are likely to benefit from DC to home, which does coincide with current above OT recommendations  However, please refer to therapist recommendation for discharge planning given other factors that may influence destination  At this time, OT recommendations at time of discharge are home OT       OT Discharge Recommendation: Home with home health rehabilitation

## 2023-01-31 NOTE — UTILIZATION REVIEW
NOTIFICATION OF ADMISSION DISCHARGE   This is a Notification of Discharge from [address]  Please be advised that this patient has been discharge from our facility  Below you will find the admission and discharge date and time including the patient’s disposition  UTILIZATION REVIEW CONTACT:  Tiesha iWse  Utilization   Network Utilization Review Department  Phone: [phone] carefully listen to the prompts  All voicemails are confidential   Email: [email or web address]     ADMISSION INFORMATION  PRESENTATION DATE: 1/26/2023  9:26 AM  OBERVATION ADMISSION DATE:   INPATIENT ADMISSION DATE: 1/27/23  4:42 PM   DISCHARGE DATE: 1/28/2023 12:12 PM   DISPOSITION:Home/Self Care    IMPORTANT INFORMATION:  Send all requests for admission clinical reviews, approved or denied determinations and any other requests to dedicated fax number below belonging to the campus where the patient is receiving treatment   List of dedicated fax numbers:  [address] DENIALS (Administrative/Medical Necessity) [phone]   [address] (Maternity/NICU/Pediatrics) [phone]   [facility] [phone]   [place] [address] [phone]   [address] [phone]   220 [facility] [phone]   90 [facility] [phone]   [address] [place] [address] 119 [phone]   [facility]  [phone]   405 [facility] [phone]   412 [facility] 850 [address] [facility] [phone]

## 2023-01-31 NOTE — PLAN OF CARE
Problem: PHYSICAL THERAPY ADULT  Goal: Performs mobility at highest level of function for planned discharge setting  See evaluation for individualized goals  Description: Treatment/Interventions: Functional transfer training, LE strengthening/ROM, Therapeutic exercise, Endurance training, Equipment eval/education, Bed mobility, Spoke to nursing, OT          See flowsheet documentation for full assessment, interventions and recommendations  Note: Prognosis: Good  Problem List: Decreased endurance, Impaired balance, Decreased mobility  Assessment: Patient is an 89y/o M who presents with hypotension  Recent admission 1/26 for transient expressive aphasia  Patient resides at [address] [place] [address] where he performs bed mobility and transfers at a mod I level  He is at a mod I level for w/c mobility  Current medical status includes chair/bed alarm, fall risk, obesity, decreased strength, balance, endurance and mobility  BP taken is supine, sitting and standing  BP stable  Patient tolerated session well and performed all mobility at a supervision level  He is mildly deconditioned and will benefit from ongoing inpatient P T  Advised patient to continue to be OOB for all meals  Recommending home P T  at discharge  The patient's AM-PAC Basic Mobility Inpatient Short Form Raw Score is 14  A Raw score of less than or equal to 17suggests the patient may benefit from discharge to post-acute rehabilitation services  Please also refer to the recommendation of the Physical Therapist for safe discharge planning  Barriers to Discharge: None     PT Discharge Recommendation: Home with home health rehabilitation    See flowsheet documentation for full assessment

## 2023-01-31 NOTE — PLAN OF CARE
Problem: MOBILITY - ADULT  Goal: Maintain or return to baseline ADL function  Description: INTERVENTIONS:  -  Assess patient's ability to carry out ADLs; assess patient's baseline for ADL function and identify physical deficits which impact ability to perform ADLs (bathing, care of mouth/teeth, toileting, grooming, dressing, etc )  - Assess/evaluate cause of self-care deficits   - Assess range of motion  - Assess patient's mobility; develop plan if impaired  - Assess patient's need for assistive devices and provide as appropriate  - Encourage maximum independence but intervene and supervise when necessary  - Involve family in performance of ADLs  - Assess for home care needs following discharge   - Consider OT consult to assist with ADL evaluation and planning for discharge  - Provide patient education as appropriate  Outcome: Progressing  Goal: Maintains/Returns to pre admission functional level  Description: INTERVENTIONS:  - Perform BMAT or MOVE assessment daily    - Set and communicate daily mobility goal to care team and patient/family/caregiver  - Collaborate with rehabilitation services on mobility goals if consulted  - Perform Range of Motion 3 times a day  - Reposition patient every 3 hours    - Dangle patient 3 times a day  - Stand patient 3 times a day  - Ambulate patient 3 times a day  - Out of bed to chair 3 times a day   - Out of bed for meals 3 times a day  - Out of bed for toileting  - Record patient progress and toleration of activity level   Outcome: Progressing     Problem: Prexisting or High Potential for Compromised Skin Integrity  Goal: Skin integrity is maintained or improved  Description: INTERVENTIONS:  - Identify patients at risk for skin breakdown  - Assess and monitor skin integrity  - Assess and monitor nutrition and hydration status  - Monitor labs   - Assess for incontinence   - Turn and reposition patient  - Assist with mobility/ambulation  - Relieve pressure over bony prominences  - Avoid friction and shearing  - Provide appropriate hygiene as needed including keeping skin clean and dry  - Evaluate need for skin moisturizer/barrier cream  - Collaborate with interdisciplinary team   - Patient/family teaching  - Consider wound care consult   Outcome: Progressing     Problem: PAIN - ADULT  Goal: Verbalizes/displays adequate comfort level or baseline comfort level  Description: Interventions:  - Encourage patient to monitor pain and request assistance  - Assess pain using appropriate pain scale  - Administer analgesics based on type and severity of pain and evaluate response  - Implement non-pharmacological measures as appropriate and evaluate response  - Consider cultural and social influences on pain and pain management  - Notify physician/advanced practitioner if interventions unsuccessful or patient reports new pain  Outcome: Progressing     Problem: INFECTION - ADULT  Goal: Absence or prevention of progression during hospitalization  Description: INTERVENTIONS:  - Assess and monitor for signs and symptoms of infection  - Monitor lab/diagnostic results  - Monitor all insertion sites, i e  indwelling lines, tubes, and drains  - Monitor endotracheal if appropriate and nasal secretions for changes in amount and color  - [place] appropriate cooling/warming therapies per order  - Administer medications as ordered  - Instruct and encourage patient and family to use good hand hygiene technique  - Identify and instruct in appropriate isolation precautions for identified infection/condition  Outcome: Progressing     Problem: SAFETY ADULT  Goal: Patient will remain free of falls  Description: INTERVENTIONS:  - Educate patient/family on patient safety including physical limitations  - Instruct patient to call for assistance with activity   - Consult OT/PT to assist with strengthening/mobility   - Keep Call bell within reach  - Keep bed low and locked with side rails adjusted as appropriate  - Keep care items and personal belongings within reach  - Initiate and maintain comfort rounds  - Make Fall Risk Sign visible to staff  - Offer Toileting every 2 Hours, in advance of need  - Initiate/Maintain alarm  - Obtain necessary fall risk management equipment  - Apply yellow socks and bracelet for high fall risk patients  - Consider moving patient to room near nurses station  Outcome: Progressing     Problem: DISCHARGE PLANNING  Goal: Discharge to home or other facility with appropriate resources  Description: INTERVENTIONS:  - Identify barriers to discharge w/patient and caregiver  - Arrange for needed discharge resources and transportation as appropriate  - Identify discharge learning needs (meds, wound care, etc )  - Arrange for interpretive services to assist at discharge as needed  - Refer to Case Management Department for coordinating discharge planning if the patient needs post-hospital services based on physician/advanced practitioner order or complex needs related to functional status, cognitive ability, or social support system  Outcome: Progressing     Problem: Knowledge Deficit  Goal: Patient/family/caregiver demonstrates understanding of disease process, treatment plan, medications, and discharge instructions  Description: Complete learning assessment and assess knowledge base    Interventions:  - Provide teaching at level of understanding  - Provide teaching via preferred learning methods  Outcome: Progressing

## 2023-01-31 NOTE — PHYSICAL THERAPY NOTE
PHYSICAL THERAPY EVAL  Physical Therapy Evaluation    Performed at least 2 patient identifiers during session:  Patient Active Problem List   Diagnosis    Accidental overdose    History of hypertension    Hypotension    Rheumatoid arthritis ([facility] [address] )    Speech disturbance    TIA (transient ischemic attack)    Low left ventricular ejection fraction    Dilated cardiomyopathy (HCC)    Primary hypertension    Chronic combined systolic and diastolic congestive heart failure (HCC)    Chronic atrial fibrillation (HCC)    Transient speech disturbance    KAMLESH (acute kidney injury) ([facility] [address] )    Elevated TSH    Hypomagnesemia    Septic arthritis of shoulder, right (HCC)    Suture of skin wound       Past Medical History:   Diagnosis Date    GERD (gastroesophageal reflux disease)     Gout     Hyperlipidemia     Hypertension     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[facility] [address] )     Spinal stenosis        Past Surgical History:   Procedure Laterality Date    CARDIAC ELECTROPHYSIOLOGY PROCEDURE N/A 12/17/2022    Procedure: Cardiac loop recorder implant;  Surgeon: Gilda Pride MD;  Location: [facility]; Service: Cardiology    CARPAL TUNNEL RELEASE Bilateral     COLONOSCOPY      LAMINECTOMY      TOTAL KNEE ARTHROPLASTY Bilateral     TOTAL SHOULDER REPLACEMENT            01/31/23 1130   PT Last Visit   PT Visit Date 01/31/23   Note Type   Note type Evaluation   Pain Assessment   Pain Assessment Tool 0-10   Pain Score No Pain   Restrictions/Precautions   Other Precautions Fall Risk; Chair Alarm; Bed Alarm;Cognitive   Home Living   Type of Home Assisted living  ([facility])   Home Layout One level   [address] [facility],[address]; Wheelchair-manual   Additional Comments Uses w/c only  Prior Function   Level of [place] Independent with functional mobility; Needs assistance with [facility] staff   IADLs Family/Friend/Other provides transportation; Family/Friend/Other provides medication management; Family/Friend/Other provides meals   Comments Patient performs transfers only  He is at a [place] level for w/c mobility  General   Additional Pertinent History Recent hospital stay  Cognition   Overall Cognitive Status WFL   Arousal/Participation Alert   Attention Within functional limits   Orientation Level Oriented X4   Memory Within functional limits   Following Commands Follows one step commands without difficulty   Subjective   Subjective "I was just here "   RLE Assessment   RLE Assessment WFL   LLE Assessment   LLE Assessment WFL   Light Touch   RLE Light Touch Grossly intact   LLE Light Touch Grossly intact   Bed Mobility   Supine to Sit 5  Supervision   Additional items HOB elevated; Bedrails   Transfers   Sit to Stand 5  Supervision   Additional items Armrests   Stand to Sit 5  Supervision   Additional items Armrests   Stand pivot 5  Supervision   Additional items Armrests   Balance   Static Sitting Normal   Dynamic Sitting Good   Static Standing Fair +   Dynamic Standing Fair +   Ambulatory Fair +   Endurance Deficit   Endurance Deficit Yes   Endurance Deficit Description Easily fatigued   Activity Tolerance   Activity Tolerance Patient tolerated treatment well   Medical Staff Made Aware Co-treat with Sheila UNDERWOOD   Nurse Made Aware RN, Josie Allison   Assessment   Prognosis Good   Problem List Decreased endurance; Impaired balance;Decreased mobility   Assessment Patient is an 87y/o M who presents with hypotension  Recent admission 1/26 for transient expressive aphasia  Patient resides at [address] [place] [address] where he performs bed mobility and transfers at a mod I level  He is at a mod I level for w/c mobility  Current medical status includes chair/bed alarm, fall risk, obesity, decreased strength, balance, endurance and mobility  BP taken is supine, sitting and standing  BP stable  Patient tolerated session well and performed all mobility at a supervision level   He is mildly deconditioned and will benefit from ongoing inpatient P T  Advised patient to continue to be OOB for all meals  Recommending home P T  at discharge  The patient's AM-PAC Basic Mobility Inpatient Short Form Raw Score is 14  A Raw score of less than or equal to 17suggests the patient may benefit from discharge to post-acute rehabilitation services  Please also refer to the recommendation of the Physical Therapist for safe discharge planning  Barriers to Discharge None   Goals   Patient Goals To go home   STG Expiration Date 02/14/23   Short Term Goal #1 1  Perform supine<>sit with HOB flat without the use of bedrails ind 2  Perform sit<>stand transfers mod I level 3  Perform stand pivot transfers mod I   PT Treatment Day 0   Plan   Treatment/Interventions Functional transfer training;LE strengthening/ROM; Therapeutic exercise; Endurance training;Equipment eval/education; Bed mobility;Spoke to nursing;OT   PT Frequency 2-3x/wk   Recommendation   PT Discharge Recommendation Home with home health rehabilitation   [address] [place] [address] Mobility Inpatient   Turning in Flat Bed Without Bedrails 3   Lying on Back to Sitting on Edge of Flat Bed Without Bedrails 3   Moving Bed to Chair 3   Standing Up From Chair Using Arms 3   Walk in Room 1   Climb 3-5 Stairs With Railing 1   Basic Mobility Inpatient Raw Score 14   Basic Mobility Standardized Score 35 55   Highest Level Of Mobility   JH-HLM Goal 4: Move to chair/commode   JH-HLM Achieved 4: Move to chair/commode   End of Consult   Patient Position at End of Consult Bedside chair;Bed/Chair alarm activated; All needs within reach     Amy Mayberry Pulse, PT             Patient Name: Cheikh Gunter  IFAPT'A Date: 1/31/2023

## 2023-01-31 NOTE — PROGRESS NOTES
[place]  Progress Note - [address] [place] [address]  1934, 80 y o  male MRN: [id number]  Unit/Bed#: -Jamie Encounter: [id number]  Primary Care Provider: Maximo Mccoy MD   Date and time admitted to hospital: 1/30/2023 12:18 PM    Suture of skin wound  Assessment & Plan  · Patient with recent left sided facial biopsy, sutures removed    Septic arthritis of shoulder, right (HCC)  Assessment & Plan  · Hx of R shoulder septic arthritis  · Patient is on prophylactic doxycycline 50 mg BID indefinitely, continue     KAMLESH (acute kidney injury) ([facility] [address] )  Assessment & Plan  · Baseline Cr is 0 9-1  · Improving with IV fluids  · Repeat BMP in a m    Chronic atrial fibrillation (HCC)  Assessment & Plan  · RC: metoprolol 25 mg BID - will hold for now due to hypotension   · AC: eliquis 5 mg BID (will reduce to 2 5 mg BID due to renal function)  · Appreciate cardiology input    Chronic combined systolic and diastolic congestive heart failure (HCC)  Assessment & Plan  Wt Readings from Last 3 Encounters:   01/28/23 94 1 kg (207 lb 7 3 oz)   01/10/23 102 kg (224 lb 3 2 oz)   12/17/22 98 kg (216 lb)     · Volume status on admission: does not appear in acute exacerbation   · Echo 5/50/13: EF 08%, systolic function severely reduced  Mild AV regurg, moderate MV regurg     · Consult cardiology for assistance with medications in setting of hypotension   · Hold entresto, metoprolol, torsemide, Jardiance   · S/P 1 L IVF in ED  · Monitor I/O, daily weights, volume status     TIA (transient ischemic attack)  Assessment & Plan  · Recently admitted for transient expressive aphasia  · MRI 1/26 was without evidence of acute ischemia  · Patient with recurrence of expressive speech difficulty this am during acute episode of hypotension, no additional neurologic symptoms   · Symptoms resolved with correction of BP  · Given recent MRI and no current deficits will hold off on repeat imaging  · Recommended to follow up with OP neurology for OP EEG     Rheumatoid arthritis ([facility] [address] )  Assessment & Plan  · Continue prednisone 5 mg daily   · Continue plaquenil BID     * Hypotension  Assessment & Plan  Recently admitted  for transient expressive aphasia, work up was negative for CVA, symptoms were thought possibly due to hypotension  · Suspect likely due to recent adjustments of BP meds and diuretics  · Blood pressure, renal functions continue to improve  · Continue to hold Entresto, Jardiance and torsemide and Toprol  · Repeat BMP in a m  VTE Pharmacologic Prophylaxis:   Pharmacologic: Apixaban (Eliquis)  Mechanical VTE Prophylaxis in Place: Yes    Patient Centered Rounds: I have performed bedside rounds with nursing staff today  Discussions with Specialists or Other Care Team Provider: Cardiology    Current Length of Stay: 0 day(s)    Current Patient Status: Inpatient   Certification Statement: The patient will continue to require additional inpatient hospital stay due to monitor renal functions    Discharge Plan: 24-48 hours    Code Status: Level 1 - Full Code      Subjective:   No events overnight, no complaints  Tolerating diet  Voiding well without issue  Denies any CP, SOB or palpitations  Objective:     Vitals:   Temp (24hrs), Av 9 °F (36 6 °C), Min:97 9 °F (36 6 °C), Max:97 9 °F (36 6 °C)    Temp:  [97 9 °F (36 6 °C)] 97 9 °F (36 6 °C)  HR:  [] 106  Resp:  [16-18] 18  BP: ()/(41-93) 126/92  SpO2:  [93 %-99 %] 95 %  Body mass index is 31 97 kg/m²  Input and Output Summary (last 24 hours): Intake/Output Summary (Last 24 hours) at 2023 1224  Last data filed at 2023 0940  Gross per 24 hour   Intake 480 ml   Output 275 ml   Net 205 ml       Physical Exam:     Physical Exam  Vitals and nursing note reviewed  Constitutional:       General: He is not in acute distress  Appearance: He is well-developed  HENT:      Head: Normocephalic  Eyes:      General: No scleral icterus  Conjunctiva/sclera: Conjunctivae normal    Cardiovascular:      Rate and Rhythm: Normal rate  Pulmonary:      Effort: Pulmonary effort is normal  No respiratory distress  Abdominal:      General: Bowel sounds are normal  There is no distension  Palpations: Abdomen is soft  Musculoskeletal:      Right lower leg: No edema  Left lower leg: No edema  Skin:     General: Skin is warm and dry  Neurological:      General: No focal deficit present  Mental Status: He is alert  Mental status is at baseline  Psychiatric:         Mood and Affect: Mood normal          Additional Data:     Labs:    Results from last 7 days   Lab Units 01/31/23  0517   WBC Thousand/uL 7 17   HEMOGLOBIN g/dL 12 1   HEMATOCRIT % 37 1   PLATELETS Thousands/uL 149   NEUTROS PCT % 49   LYMPHS PCT % 37   MONOS PCT % 8   EOS PCT % 5     Results from last 7 days   Lab Units 01/31/23  0517   SODIUM mmol/L 140   POTASSIUM mmol/L 4 2   CHLORIDE mmol/L 103   CO2 mmol/L 28   BUN mg/dL 21   CREATININE mg/dL 1 59*   ANION GAP mmol/L 9   CALCIUM mg/dL 8 6   ALBUMIN g/dL 3 1*   TOTAL BILIRUBIN mg/dL 0 70   ALK PHOS U/L 66   ALT U/L 19   AST U/L 21   GLUCOSE RANDOM mg/dL 107     Results from last 7 days   Lab Units 01/26/23  0940   INR  1 30*     Results from last 7 days   Lab Units 01/31/23  1105 01/31/23  0739 01/30/23  2107 01/30/23  1701 01/28/23  1123 01/26/23  1013   POC GLUCOSE mg/dl 181* 104 184* 115 173* 126     Results from last 7 days   Lab Units 01/26/23  0940   HEMOGLOBIN A1C % 5 9*               * I Have Reviewed All Lab Data Listed Above  * Additional Pertinent Lab Tests Reviewed: [facility]    Imaging:    No results found        Recent Cultures (last 7 days):     Results from last 7 days   Lab Units 01/26/23  1316   URINE CULTURE  No Growth <1000 cfu/mL       Last 24 Hours Medication List:   Current Facility-Administered Medications   Medication Dose Route Frequency Provider Last Rate • acetaminophen  650 mg Oral Q6H PRN Casper Varner PA-C     • apixaban  2 5 mg Oral BID Casper Tubbs PA-C     • doxycycline hyclate  50 mg Oral Q12H [address] [place], [place]     • ezetimibe  10 mg Oral Daily [facility] [place], [place]     • hydroxychloroquine  200 mg Oral BID Casper Varner PA-C     • insulin lispro  2-12 Units Subcutaneous TID AC Orville Julien MD     • levothyroxine  25 mcg Oral Early Morning [facility] [place], [place]     • multi-electrolyte  500 mL Intravenous Once Casper Tubbs PA-C 500 mL/hr at 01/30/23 1930   • ondansetron  4 mg Intravenous Q6H PRN Casper Varner PA-C     • oxybutynin  5 mg Oral Daily [place], [place]     • pantoprazole  20 mg Oral Early Morning Casper Tubbs PA-C     • predniSONE  5 mg Oral Daily Jagdish Griffith PA-C          Today, Patient Was Seen By: Madhu Anderson MD    ** Please Note: Dictation voice to text software may have been used in the creation of this document   **

## 2023-02-01 VITALS
OXYGEN SATURATION: 97 % | DIASTOLIC BLOOD PRESSURE: 70 MMHG | RESPIRATION RATE: 16 BRPM | BODY MASS INDEX: 31.51 KG/M2 | SYSTOLIC BLOOD PRESSURE: 105 MMHG | HEART RATE: 90 BPM | TEMPERATURE: 95.6 F | WEIGHT: 213.41 LBS

## 2023-02-01 PROBLEM — T50.1X5D: Status: ACTIVE | Noted: 2023-02-01

## 2023-02-01 PROBLEM — G92.8 TOXIC METABOLIC ENCEPHALOPATHY: Status: ACTIVE | Noted: 2023-02-01

## 2023-02-01 LAB
ANION GAP SERPL CALCULATED.3IONS-SCNC: 10 MMOL/L (ref 4–13)
ATRIAL RATE: 97 BPM
BASOPHILS # BLD AUTO: 0.04 THOUSANDS/ÂΜL (ref 0–0.1)
BASOPHILS NFR BLD AUTO: 1 % (ref 0–1)
BUN SERPL-MCNC: 18 MG/DL (ref 5–25)
CALCIUM SERPL-MCNC: 8.8 MG/DL (ref 8.3–10.1)
CHLORIDE SERPL-SCNC: 104 MMOL/L (ref 96–108)
CO2 SERPL-SCNC: 26 MMOL/L (ref 21–32)
CREAT SERPL-MCNC: 1.23 MG/DL (ref 0.6–1.3)
EOSINOPHIL # BLD AUTO: 0.47 THOUSAND/ÂΜL (ref 0–0.61)
EOSINOPHIL NFR BLD AUTO: 7 % (ref 0–6)
ERYTHROCYTE [DISTWIDTH] IN BLOOD BY AUTOMATED COUNT: 13.4 % (ref 11.6–15.1)
GFR SERPL CREATININE-BSD FRML MDRD: 52 ML/MIN/1.73SQ M
GLUCOSE SERPL-MCNC: 109 MG/DL (ref 65–140)
GLUCOSE SERPL-MCNC: 148 MG/DL (ref 65–140)
GLUCOSE SERPL-MCNC: 98 MG/DL (ref 65–140)
HCT VFR BLD AUTO: 39.3 % (ref 36.5–49.3)
HGB BLD-MCNC: 12.8 G/DL (ref 12–17)
IMM GRANULOCYTES # BLD AUTO: 0.01 THOUSAND/UL (ref 0–0.2)
IMM GRANULOCYTES NFR BLD AUTO: 0 % (ref 0–2)
LYMPHOCYTES # BLD AUTO: 2.57 THOUSANDS/ÂΜL (ref 0.6–4.47)
LYMPHOCYTES NFR BLD AUTO: 37 % (ref 14–44)
MCH RBC QN AUTO: 31.6 PG (ref 26.8–34.3)
MCHC RBC AUTO-ENTMCNC: 32.6 G/DL (ref 31.4–37.4)
MCV RBC AUTO: 97 FL (ref 82–98)
MONOCYTES # BLD AUTO: 0.56 THOUSAND/ÂΜL (ref 0.17–1.22)
MONOCYTES NFR BLD AUTO: 8 % (ref 4–12)
MRSA NOSE QL CULT: NORMAL
NEUTROPHILS # BLD AUTO: 3.28 THOUSANDS/ÂΜL (ref 1.85–7.62)
NEUTS SEG NFR BLD AUTO: 47 % (ref 43–75)
NRBC BLD AUTO-RTO: 0 /100 WBCS
P AXIS: 33 DEGREES
PLATELET # BLD AUTO: 161 THOUSANDS/UL (ref 149–390)
PMV BLD AUTO: 13.1 FL (ref 8.9–12.7)
POTASSIUM SERPL-SCNC: 4 MMOL/L (ref 3.5–5.3)
PR INTERVAL: 170 MS
QRS AXIS: -43 DEGREES
QRSD INTERVAL: 102 MS
QT INTERVAL: 380 MS
QTC INTERVAL: 482 MS
RBC # BLD AUTO: 4.05 MILLION/UL (ref 3.88–5.62)
SODIUM SERPL-SCNC: 140 MMOL/L (ref 135–147)
T WAVE AXIS: 52 DEGREES
VENTRICULAR RATE: 97 BPM
WBC # BLD AUTO: 6.93 THOUSAND/UL (ref 4.31–10.16)

## 2023-02-01 RX ORDER — TORSEMIDE 20 MG/1
20 TABLET ORAL DAILY
Status: DISCONTINUED | OUTPATIENT
Start: 2023-02-02 | End: 2023-02-01 | Stop reason: HOSPADM

## 2023-02-01 RX ADMIN — HYDROXYCHLOROQUINE SULFATE 200 MG: 200 TABLET, FILM COATED ORAL at 08:31

## 2023-02-01 RX ADMIN — DOXYCYCLINE 100 MG: 100 CAPSULE ORAL at 08:20

## 2023-02-01 RX ADMIN — PREDNISONE 5 MG: 5 TABLET ORAL at 08:20

## 2023-02-01 RX ADMIN — APIXABAN 2.5 MG: 2.5 TABLET, FILM COATED ORAL at 08:20

## 2023-02-01 RX ADMIN — EZETIMIBE 10 MG: 10 TABLET ORAL at 08:20

## 2023-02-01 RX ADMIN — PANTOPRAZOLE SODIUM 20 MG: 20 TABLET, DELAYED RELEASE ORAL at 08:20

## 2023-02-01 RX ADMIN — OXYBUTYNIN CHLORIDE 5 MG: 5 TABLET, EXTENDED RELEASE ORAL at 08:20

## 2023-02-01 RX ADMIN — METOPROLOL SUCCINATE 25 MG: 25 TABLET, FILM COATED, EXTENDED RELEASE ORAL at 08:20

## 2023-02-01 RX ADMIN — LEVOTHYROXINE SODIUM 25 MCG: 25 TABLET ORAL at 05:37

## 2023-02-01 NOTE — PLAN OF CARE
Problem: MOBILITY - ADULT  Goal: Maintain or return to baseline ADL function  Description: INTERVENTIONS:  -  Assess patient's ability to carry out ADLs; assess patient's baseline for ADL function and identify physical deficits which impact ability to perform ADLs (bathing, care of mouth/teeth, toileting, grooming, dressing, etc )  - Assess/evaluate cause of self-care deficits   - Assess range of motion  - Assess patient's mobility; develop plan if impaired  - Assess patient's need for assistive devices and provide as appropriate  - Encourage maximum independence but intervene and supervise when necessary  - Involve family in performance of ADLs  - Assess for home care needs following discharge   - Consider OT consult to assist with ADL evaluation and planning for discharge  - Provide patient education as appropriate  Outcome: Adequate for Discharge  Goal: Maintains/Returns to pre admission functional level  Description: INTERVENTIONS:  - Perform BMAT or MOVE assessment daily    - Set and communicate daily mobility goal to care team and patient/family/caregiver  - Collaborate with rehabilitation services on mobility goals if consulted  - Perform Range of Motion 3 times a day  - Reposition patient every 2 hours    - Dangle patient 3 times a day  - Stand patient 3 times a day  - Ambulate patient 3 times a day  - Out of bed to chair 3 times a day   - Out of bed for meals 3 times a day  - Out of bed for toileting  - Record patient progress and toleration of activity level   Outcome: Adequate for Discharge     Problem: Prexisting or High Potential for Compromised Skin Integrity  Goal: Skin integrity is maintained or improved  Description: INTERVENTIONS:  - Identify patients at risk for skin breakdown  - Assess and monitor skin integrity  - Assess and monitor nutrition and hydration status  - Monitor labs   - Assess for incontinence   - Turn and reposition patient  - Assist with mobility/ambulation  - Relieve pressure over bony prominences  - Avoid friction and shearing  - Provide appropriate hygiene as needed including keeping skin clean and dry  - Evaluate need for skin moisturizer/barrier cream  - Collaborate with interdisciplinary team   - Patient/family teaching  - Consider wound care consult   Outcome: Adequate for Discharge     Problem: PAIN - ADULT  Goal: Verbalizes/displays adequate comfort level or baseline comfort level  Description: Interventions:  - Encourage patient to monitor pain and request assistance  - Assess pain using appropriate pain scale  - Administer analgesics based on type and severity of pain and evaluate response  - Implement non-pharmacological measures as appropriate and evaluate response  - Consider cultural and social influences on pain and pain management  - Notify physician/advanced practitioner if interventions unsuccessful or patient reports new pain  Outcome: Adequate for Discharge     Problem: INFECTION - ADULT  Goal: Absence or prevention of progression during hospitalization  Description: INTERVENTIONS:  - Assess and monitor for signs and symptoms of infection  - Monitor lab/diagnostic results  - Monitor all insertion sites, i e  indwelling lines, tubes, and drains  - Monitor endotracheal if appropriate and nasal secretions for changes in amount and color  - [place] appropriate cooling/warming therapies per order  - Administer medications as ordered  - Instruct and encourage patient and family to use good hand hygiene technique  - Identify and instruct in appropriate isolation precautions for identified infection/condition  Outcome: Adequate for Discharge     Problem: SAFETY ADULT  Goal: Patient will remain free of falls  Description: INTERVENTIONS:  - Educate patient/family on patient safety including physical limitations  - Instruct patient to call for assistance with activity   - Consult OT/PT to assist with strengthening/mobility   - Keep Call bell within reach  - Keep bed low and locked with side rails adjusted as appropriate  - Keep care items and personal belongings within reach  - Initiate and maintain comfort rounds  - Make Fall Risk Sign visible to staff  - Offer Toileting every 2 Hours, in advance of need  - Initiate/Maintain bed alarm  - Obtain necessary fall risk management equipment: bed alarm, walker  - Apply yellow socks and bracelet for high fall risk patients  - Consider moving patient to room near nurses station  Outcome: Adequate for Discharge     Problem: DISCHARGE PLANNING  Goal: Discharge to home or other facility with appropriate resources  Description: INTERVENTIONS:  - Identify barriers to discharge w/patient and caregiver  - Arrange for needed discharge resources and transportation as appropriate  - Identify discharge learning needs (meds, wound care, etc )  - Arrange for interpretive services to assist at discharge as needed  - Refer to Case Management Department for coordinating discharge planning if the patient needs post-hospital services based on physician/advanced practitioner order or complex needs related to functional status, cognitive ability, or social support system  Outcome: Adequate for Discharge     Problem: Knowledge Deficit  Goal: Patient/family/caregiver demonstrates understanding of disease process, treatment plan, medications, and discharge instructions  Description: Complete learning assessment and assess knowledge base    Interventions:  - Provide teaching at level of understanding  - Provide teaching via preferred learning methods  Outcome: Adequate for Discharge

## 2023-02-01 NOTE — ASSESSMENT & PLAN NOTE
Recently admitted 1/26 for transient expressive aphasia, work up was negative for CVA, symptoms were thought possibly due to hypotension  · Suspect likely due to recent adjustments of BP meds and diuretics  · Blood pressure, renal functions continue to improve  · Continue to hold Entresto and Jardiance on discharge  · Resumed torsemide and Toprol  · Repeat BMP in a m

## 2023-02-01 NOTE — NURSING NOTE
Patient d/c to [address] [place] [address],[address], no new needs  Discharge paperwork reviewed with Delma Ontiveros at [address] [facility] as well and patient and son  All questions/concerns addressed prior to d/c

## 2023-02-01 NOTE — DISCHARGE SUMMARY
[facility]  Discharge- [address] [place] [address]  1934, 80 y o  male MRN: [id number]  Unit/Bed#: -01 Encounter: [id number]  Primary Care Provider: Justa Metcalf MD   Date and time admitted to hospital: 1/30/2023 12:18 PM    Toxic metabolic encephalopathy  Assessment & Plan  · Possible, due to dehydration in the setting of diuresis  · Negative for TIA, see further management under TIA    Adverse effect of loop (high-ceiling) diuretics, subsequent encounter  Assessment & Plan  · Pretension and acute kidney injury associated with dehydration from switching diuretic therapy and up titration    Suture of skin wound  Assessment & Plan  · Patient with recent left sided facial biopsy  · Sutures removed on 01/31    Septic arthritis of shoulder, right (HCC)  Assessment & Plan  · Hx of R shoulder septic arthritis  · Patient is on prophylactic doxycycline 50 mg BID indefinitely, continue     KAMLESH (acute kidney injury) ([facility] [address] )  Assessment & Plan  · Baseline Cr is 0 9-1  · Improving with IV fluids  · Repeat BMP in a m    Chronic atrial fibrillation (HCC)  Assessment & Plan  · RC: metoprolol 25 mg BID - will hold for now due to hypotension   · AC: eliquis 5 mg BID (will reduce to 2 5 mg BID due to renal function)  · Appreciate cardiology input    Chronic combined systolic and diastolic congestive heart failure (HCC)  Assessment & Plan  Wt Readings from Last 3 Encounters:   02/01/23 96 8 kg (213 lb 6 5 oz)   01/28/23 94 1 kg (207 lb 7 3 oz)   01/10/23 102 kg (224 lb 3 2 oz)     · Volume status on admission: does not appear in acute exacerbation   · Echo 0/44/30: EF 57%, systolic function severely reduced  Mild AV regurg, moderate MV regurg  · Consult cardiology for assistance with medications in setting of hypotension   · Hold entresto, metoprolol, torsemide, Jardiance  · Resume metoprolol and torsemide, anticipate resuming Entresto and Jardiance in the outpatient setting    · S/P 1 L IVF in ED  · Monitor I/O, daily weights, volume status     TIA (transient ischemic attack)  Assessment & Plan  · Recently admitted for transient expressive aphasia  · MRI 1/26 was without evidence of acute ischemia  · Patient with recurrence of expressive speech difficulty this am during acute episode of hypotension, no additional neurologic symptoms   · Symptoms resolved with correction of BP  · Given recent MRI and no current deficits will hold off on repeat imaging  · Recommended to follow up with OP neurology for OP EEG     Rheumatoid arthritis ([facility] [address] )  Assessment & Plan  · Continue prednisone 5 mg daily   · Continue plaquenil BID     * Hypotension  Assessment & Plan  Recently admitted 1/26 for transient expressive aphasia, work up was negative for CVA, symptoms were thought possibly due to hypotension  · Suspect likely due to recent adjustments of BP meds and diuretics  · Blood pressure, renal functions continue to improve  · Continue to hold Entresto and Jardiance on discharge  · Resumed torsemide and Toprol  · Repeat BMP in a m  Medical Problems     Resolved Problems  Date Reviewed: 2/1/2023   None       Discharging Physician / Practitioner: Zunilda Abreu PA-C  PCP: Chris Moran MD  Admission Date:   Admission Orders (From admission, onward)     Ordered        01/31/23 1221  Inpatient Admission  Once            01/30/23 1440  Place in Observation  Once                      Discharge Date: 02/01/23    Consultations During Hospital Stay:  · Cardiology    Procedures Performed:   · None    Significant Findings / Test Results:   · CXR 1/30: No acute cardiopulmonary disease  · COVID/flu/RSV: Negative    Incidental Findings:   · None    Test Results Pending at Discharge (will require follow up):    · None     Outpatient Tests Requested:  · BMP on Friday  · Outpatient blood pressure monitoring    Complications: None    Reason for Admission: Hypotension    Hospital Course:   Neeta Aguiar is a 80 y o  male patient with a past medical history of chronic systolic CHF with reduced EF, hypertension, dilated cardiomyopathy, RA on chronic steroids, chronic atrial fibrillation on Eliquis, recent admission 1/26-1/28 for TIA/transient speech disturbance without evidence of CVA on MRI who originally presented to the hospital on 1/30/2023 due to hypotension with a blood pressure of 60/40 per staff report at [facility]    Received fluid bolus by EMS resulting in improvement in SBP to 107 on admission  Patient was recently evaluated by inpatient cardiology for medical management/optimization of severe CHF, initiated on Entresto/Jardiance prior to discharge and lisinopril was discontinued  He did have an episode of expressive aphasia for 30 to 60 minutes which resolved prior to admission as well  Urology was consulted, hypotension and KAMLESH associated with dehydration from switching diuretic therapy and uptitrating goal-directed medical therapy  He hypertensives were initially held to allow for recovery of blood pressure  Resumed  Entresto on hold for now, to be resumed in the outpatient setting if possible   hemodynamically stable at time of discharge and appropriate for return to [facility] with VNA services  Please see above list of diagnoses and related plan for additional information  Condition at Discharge: stable    Discharge Day Visit / Exam:   Subjective: Reports he feels well today, he questions which of his medications will be resumed on discharge  Agreeable to outpatient follow-up with cardiology  Vitals: Blood Pressure: 105/70 (02/01/23 0823)  Pulse: 90 (02/01/23 0823)  Temperature: (!) 95 6 °F (35 3 °C) (02/01/23 0737)  Temp Source: Oral (01/30/23 1220)  Respirations: 16 (02/01/23 0737)  Weight - Scale: 96 8 kg (213 lb 6 5 oz) (pt did not want to stand) (02/01/23 0537)  SpO2: 97 % (02/01/23 0081)  Exam:   Physical Exam  Vitals and nursing note reviewed     Constitutional:       General: He is not in acute distress  Appearance: Normal appearance  He is well-developed  HENT:      Head: Normocephalic and atraumatic  Eyes:      General: No scleral icterus  Conjunctiva/sclera: Conjunctivae normal    Cardiovascular:      Rate and Rhythm: Normal rate and regular rhythm  Heart sounds: No murmur heard  Pulmonary:      Effort: Pulmonary effort is normal       Breath sounds: No wheezing, rhonchi or rales  Abdominal:      General: There is no distension  Palpations: Abdomen is soft  Skin:     General: Skin is warm and dry  Neurological:      General: No focal deficit present  Mental Status: He is alert  Psychiatric:         Mood and Affect: Mood normal         Discussion with Family: Patient declined call to   reports he has updated his family members himself, encouraged him to reach out with any additional questions    Discharge instructions/Information to patient and family:   See after visit summary for information provided to patient and family  Provisions for Follow-Up Care:  See after visit summary for information related to follow-up care and any pertinent home health orders  Disposition:   Home with VNA Services (Reminder: Complete face to face encounter) [address]    Planned Readmission: None     Discharge Statement:  I spent 65 minutes discharging the patient  This time was spent on the day of discharge  I had direct contact with the patient on the day of discharge  Greater than 50% of the total time was spent examining patient, answering all patient questions, arranging and discussing plan of care with patient as well as directly providing post-discharge instructions  Additional time then spent on discharge activities  Discharge Medications:  See after visit summary for reconciled discharge medications provided to patient and/or family        **Please Note: This note may have been constructed using a voice recognition system**

## 2023-02-01 NOTE — ASSESSMENT & PLAN NOTE
Wt Readings from Last 3 Encounters:   02/01/23 96 8 kg (213 lb 6 5 oz)   01/28/23 94 1 kg (207 lb 7 3 oz)   01/10/23 102 kg (224 lb 3 2 oz)     · Volume status on admission: does not appear in acute exacerbation   · Echo 3/12/33: EF 98%, systolic function severely reduced  Mild AV regurg, moderate MV regurg     · Consult cardiology for assistance with medications in setting of hypotension   · Hold entresto, metoprolol, torsemide, Jardiance   · S/P 1 L IVF in ED  · Monitor I/O, daily weights, volume status

## 2023-02-01 NOTE — PLAN OF CARE
Problem: MOBILITY - ADULT  Goal: Maintain or return to baseline ADL function  Description: INTERVENTIONS:  -  Assess patient's ability to carry out ADLs; assess patient's baseline for ADL function and identify physical deficits which impact ability to perform ADLs (bathing, care of mouth/teeth, toileting, grooming, dressing, etc )  - Assess/evaluate cause of self-care deficits   - Assess range of motion  - Assess patient's mobility; develop plan if impaired  - Assess patient's need for assistive devices and provide as appropriate  - Encourage maximum independence but intervene and supervise when necessary  - Involve family in performance of ADLs  - Assess for home care needs following discharge   - Consider OT consult to assist with ADL evaluation and planning for discharge  - Provide patient education as appropriate  Outcome: Progressing  Goal: Maintains/Returns to pre admission functional level  Description: INTERVENTIONS:  - Perform BMAT or MOVE assessment daily    - Set and communicate daily mobility goal to care team and patient/family/caregiver  - Collaborate with rehabilitation services on mobility goals if consulted  - Perform Range of Motion 3 times a day  - Reposition patient every 2 hours    - Dangle patient 3 times a day  - Stand patient 3 times a day  - Ambulate patient 3 times a day  - Out of bed to chair 3 times a day   - Out of bed for meals 3 times a day  - Out of bed for toileting  - Record patient progress and toleration of activity level   Outcome: Progressing     Problem: Prexisting or High Potential for Compromised Skin Integrity  Goal: Skin integrity is maintained or improved  Description: INTERVENTIONS:  - Identify patients at risk for skin breakdown  - Assess and monitor skin integrity  - Assess and monitor nutrition and hydration status  - Monitor labs   - Assess for incontinence   - Turn and reposition patient  - Assist with mobility/ambulation  - Relieve pressure over bony prominences  - Avoid friction and shearing  - Provide appropriate hygiene as needed including keeping skin clean and dry  - Evaluate need for skin moisturizer/barrier cream  - Collaborate with interdisciplinary team   - Patient/family teaching  - Consider wound care consult   Outcome: Progressing     Problem: PAIN - ADULT  Goal: Verbalizes/displays adequate comfort level or baseline comfort level  Description: Interventions:  - Encourage patient to monitor pain and request assistance  - Assess pain using appropriate pain scale  - Administer analgesics based on type and severity of pain and evaluate response  - Implement non-pharmacological measures as appropriate and evaluate response  - Consider cultural and social influences on pain and pain management  - Notify physician/advanced practitioner if interventions unsuccessful or patient reports new pain  Outcome: Progressing     Problem: INFECTION - ADULT  Goal: Absence or prevention of progression during hospitalization  Description: INTERVENTIONS:  - Assess and monitor for signs and symptoms of infection  - Monitor lab/diagnostic results  - Monitor all insertion sites, i e  indwelling lines, tubes, and drains  - Monitor endotracheal if appropriate and nasal secretions for changes in amount and color  - [place] appropriate cooling/warming therapies per order  - Administer medications as ordered  - Instruct and encourage patient and family to use good hand hygiene technique  - Identify and instruct in appropriate isolation precautions for identified infection/condition  Outcome: Progressing     Problem: SAFETY ADULT  Goal: Patient will remain free of falls  Description: INTERVENTIONS:  - Educate patient/family on patient safety including physical limitations  - Instruct patient to call for assistance with activity   - Consult OT/PT to assist with strengthening/mobility   - Keep Call bell within reach  - Keep bed low and locked with side rails adjusted as appropriate  - Keep care items and personal belongings within reach  - Initiate and maintain comfort rounds  - Make Fall Risk Sign visible to staff  - Offer Toileting every 2 Hours, in advance of need  - Initiate/Maintain alarm  - Obtain necessary fall risk management equipment:   - Apply yellow socks and bracelet for high fall risk patients  - Consider moving patient to room near nurses station  Outcome: Progressing     Problem: DISCHARGE PLANNING  Goal: Discharge to home or other facility with appropriate resources  Description: INTERVENTIONS:  - Identify barriers to discharge w/patient and caregiver  - Arrange for needed discharge resources and transportation as appropriate  - Identify discharge learning needs (meds, wound care, etc )  - Arrange for interpretive services to assist at discharge as needed  - Refer to Case Management Department for coordinating discharge planning if the patient needs post-hospital services based on physician/advanced practitioner order or complex needs related to functional status, cognitive ability, or social support system  Outcome: Progressing     Problem: Knowledge Deficit  Goal: Patient/family/caregiver demonstrates understanding of disease process, treatment plan, medications, and discharge instructions  Description: Complete learning assessment and assess knowledge base    Interventions:  - Provide teaching at level of understanding  - Provide teaching via preferred learning methods  Outcome: Progressing

## 2023-02-01 NOTE — ASSESSMENT & PLAN NOTE
Wt Readings from Last 3 Encounters:   02/01/23 96 8 kg (213 lb 6 5 oz)   01/28/23 94 1 kg (207 lb 7 3 oz)   01/10/23 102 kg (224 lb 3 2 oz)     · Volume status on admission: does not appear in acute exacerbation   · Echo 2/79/82: EF 42%, systolic function severely reduced  Mild AV regurg, moderate MV regurg  · Consult cardiology for assistance with medications in setting of hypotension   · Hold entresto, metoprolol, torsemide, Jardiance  · Resume metoprolol and torsemide, anticipate resuming Entresto and Jardiance in the outpatient setting    · S/P 1 L IVF in ED  · Monitor I/O, daily weights, volume status

## 2023-02-01 NOTE — ASSESSMENT & PLAN NOTE
· Possible, due to dehydration in the setting of diuresis  · Negative for TIA, see further management under TIA

## 2023-02-01 NOTE — ASSESSMENT & PLAN NOTE
· Pretension and acute kidney injury associated with dehydration from switching diuretic therapy and up titration

## 2023-02-01 NOTE — QUICK NOTE
Discussed with pharmacy, patient's 50 mg doxycycline BID is not on formulary  Patient family will need to bring in from home  Until then will cover with doxy 100 mg BID

## 2023-02-01 NOTE — PROGRESS NOTES
Progress Note - Cardiology   Fabricio Sotelo 80 y o  male MRN: [id number]  Unit/Bed#: -01 Encounter: [id number]        Problem List:  Principal Problem:    Hypotension  Active Problems:    Rheumatoid arthritis ([facility] [address] )    TIA (transient ischemic attack)    Chronic combined systolic and diastolic congestive heart failure (HCC)    Chronic atrial fibrillation (HCC)    KAMLESH (acute kidney injury) ([facility] [address] )    Septic arthritis of shoulder, right (HCC)    Suture of skin wound      Assessment:  1  Hypotension  1  Pt reportedly hypotensive pre-hospital at assisted living (60/40)  2  Received 1 L IVF pre-hospital with /56 on arrival  3  BP has recovered though is somewhat soft trending low 100's/60's  2  Acute kidney injury  1  Creatinine 2 06 on arrival with baseline around 1 1  2  Recent admission for TIA symptoms and had ACEi changed to ARNi and Jardiance added   3  Admission 1/26/2023 until 1/28/2023 at which time did also have mild acute kidney injury with creatinine of 1 5  4  Creatinine today improved to 1 2  3  Chronic HFrEF, EF 30%  1  TTE 10/10/2022: EF 20%, global hypokinesis, abnormal diastolic function, mild AR, moderate MR (possibly underestimated), mild WI, mildly dilated 4 1cm    2  TTE 1/26/2022: EF 30%, severe global hypokinesis, mild AR, moderate MR, ascending aortic root 4 4cm  3  Pre-hospital regimen: Toprol XL 25mg & Entresto 24-26 BID & Jardiance 10 mg daily  4  OP diuretic: Torsemide 20mg daily  5  Baseline weight: 216lbs (OV from 10/2022)  4  Paroxysmal atrial fibrillation   1  ILR interrogation 1/4/2023: New AF detected  2  Thromboembolix ppx: Eliquis 5mg BID  3  HR control: Toprol XL as above   5  Hx of TIA s/p ILR placement (12/17/2022)  6   Rheumatoid arthritis    Plan/ Discussion:  • EKG performed yesterday shows normal sinus rhythm  • Creatinine improved today down to 1 2 which is just about his baseline  • Continue Toprol-XL 25 mg  • Continue Eliquis 5 mg twice daily  • Restart low-dose diuretic torsemide 20 mg tomorrow  • For now given hypotension and KAMLESH we will hold off on ACE/ARB/Arni  • Message sent to office for close follow-up  • Will need repeat BMP as outpatient    Subjective:  Feeling fine    No physical complaints    Vitals:  Vitals:    02/01/23 0300 02/01/23 0537   Weight: 96 8 kg (213 lb 6 5 oz) 96 8 kg (213 lb 6 5 oz)   ,  Vitals:    02/01/23 0300 02/01/23 0537 02/01/23 0737 02/01/23 0823   BP:    105/70   Pulse:   82 90   Resp:   16    Temp:   (!) 95 6 °F (35 3 °C)    TempSrc:       SpO2:   96% 97%   Weight: 96 8 kg (213 lb 6 5 oz) 96 8 kg (213 lb 6 5 oz)         Exam:  General: Alert awake and oriented, no acute distress  Heart:  Regular rate and rhythm, no murmurs, Normal S1, no edema    Respiratory effort/ Lungs:  Breathing comfortably on room air, clear bilaterally without wheezing, rales, crackles   Abdominal: Non-tender to palpation, + bowel sounds, soft, no masses or distension  Lower Limbs:  No edema            Telemetry:       Not on telemetry    Medications:    Current Facility-Administered Medications:   •  acetaminophen (TYLENOL) tablet 650 mg, 650 mg, Oral, Q6H PRN, Atlee Kugel Fountaine, PA-C  •  apixaban (ELIQUIS) tablet 2 5 mg, 2 5 mg, Oral, BID, Atlee Kugel Fountaine, PA-C, 2 5 mg at 02/01/23 0820  •  doxycycline hyclate (VIBRAMYCIN) capsule 100 mg, 100 mg, Oral, Q12H [address], Atlee Kugel Fountaine, PA-C, 100 mg at 02/01/23 0820  •  ezetimibe (ZETIA) tablet 10 mg, 10 mg, Oral, Daily, Atlee Kugel Fountaine, PA-C, 10 mg at 02/01/23 0820  •  hydroxychloroquine (PLAQUENIL) tablet 200 mg, 200 mg, Oral, BID, Atlee Kugel Fountaine, PA-C, 200 mg at 02/01/23 0831  •  insulin lispro (HumaLOG) 100 units/mL subcutaneous injection 2-12 Units, 2-12 Units, Subcutaneous, TID AC, 2 Units at 01/31/23 1234 **AND** Fingerstick Glucose (POCT), , , TID AC, Jame Meade MD  •  levothyroxine tablet 25 mcg, 25 mcg, Oral, Early Morning, Fletcher Tubbs PA-C, 25 mcg at 02/01/23 0848  •  metoprolol succinate (TOPROL-XL) 24 hr tablet 25 mg, 25 mg, Oral, Daily, Max Romero MD, 25 mg at 02/01/23 0820  •  multi-electrolyte (PLASMALYTE-A/ISOLYTE-S PH 7 4) IV solution 500 mL, 500 mL, Intravenous, Once, Fredis Zhong PA-C, Last Rate: 500 mL/hr at 01/30/23 1930, Restarted at 01/30/23 1930  •  ondansetron [facility]) injection 4 mg, 4 mg, Intravenous, Q6H PRN, Jerry Varner PA-C  •  oxybutynin (DITROPAN-XL) 24 hr tablet 5 mg, 5 mg, Oral, Daily, Jerry Varner PA-C, 5 mg at 02/01/23 0820  •  pantoprazole (PROTONIX) EC tablet 20 mg, 20 mg, Oral, Early Morning, Jerry Varner PA-C, 20 mg at 02/01/23 0820  •  predniSONE tablet 5 mg, 5 mg, Oral, Daily, Jerry Varner, PA-C, 5 mg at 02/01/23 0820      Labs/Data:        Results from last 7 days   Lab Units 02/01/23  0435 01/31/23  0517 01/30/23  1235   WBC Thousand/uL 6 93 7 17 10 42*   HEMOGLOBIN g/dL 12 8 12 1 13 1   HEMATOCRIT % 39 3 37 1 40 6   PLATELETS Thousands/uL 161 149 197     Results from last 7 days   Lab Units 02/01/23  0435 01/31/23  0517 01/30/23  1235   POTASSIUM mmol/L 4 0 4 2 4 5   CHLORIDE mmol/L 104 103 102   CO2 mmol/L 26 28 28   BUN mg/dL 18 21 23

## 2023-02-02 ENCOUNTER — OFFICE VISIT (OUTPATIENT)
Dept: CARDIOLOGY CLINIC | Facility: CLINIC | Age: 88
End: 2023-02-02

## 2023-02-02 VITALS
SYSTOLIC BLOOD PRESSURE: 98 MMHG | OXYGEN SATURATION: 97 % | BODY MASS INDEX: 31.55 KG/M2 | HEART RATE: 88 BPM | WEIGHT: 213 LBS | DIASTOLIC BLOOD PRESSURE: 66 MMHG | HEIGHT: 69 IN

## 2023-02-02 DIAGNOSIS — R94.30 LOW LEFT VENTRICULAR EJECTION FRACTION: ICD-10-CM

## 2023-02-02 RX ORDER — METOPROLOL SUCCINATE 25 MG/1
25 TABLET, EXTENDED RELEASE ORAL DAILY
Qty: 180 TABLET | Refills: 3
Start: 2023-02-02 | End: 2023-03-04

## 2023-02-02 NOTE — UTILIZATION REVIEW
NOTIFICATION OF ADMISSION DISCHARGE   This is a Notification of Discharge from [address]  Please be advised that this patient has been discharge from our facility  Below you will find the admission and discharge date and time including the patient’s disposition  UTILIZATION REVIEW CONTACT:  Yeny Chadwick  Utilization   Network Utilization Review Department  Phone: [phone] carefully listen to the prompts  All voicemails are confidential   Email: [email or web address]     ADMISSION INFORMATION  PRESENTATION DATE: 1/30/2023 12:18 PM  OBERVATION ADMISSION DATE:   INPATIENT ADMISSION DATE: 1/31/23 12:21 PM   DISCHARGE DATE: 2/1/2023  3:21 PM   DISPOSITION:Home with Home Health Care    IMPORTANT INFORMATION:  Send all requests for admission clinical reviews, approved or denied determinations and any other requests to dedicated fax number below belonging to the campus where the patient is receiving treatment   List of dedicated fax numbers:  [address] [place] [address] DENIALS (Administrative/Medical Necessity) [phone]   [address] (Maternity/NICU/Pediatrics) [phone]   [facility] [phone]   [facility] 87 [phone]   [address] [phone]   [address] [facility] [phone]   [address] [place] [address] [phone]   [address] [place] [address] 119 [phone]   [facility]  [phone]   [address] [facility] [phone]   [address] [facility] [address] [facility] [phone]

## 2023-02-02 NOTE — PROGRESS NOTES
Cardiology Office Follow Up  Sofiya Flores  1934  [id number]      ASSESSMENT:  1  Hypotension  1  Recent admission to [facility] with reported pre-hospital BP of 60/40  2  Discontinued from Entresto/ Jardiance   3  Resumed on lower dose Toprol XL 25mg QD  4  BP today 98/66  2  Acute kidney injury  1  Creatinine 2 06 on arrival with baseline around 1 1  2  Recent admission for TIA symptoms and had ACEi changed to ARNi and Jardiance added   3  Admission 1/26/2023 until 1/28/2023 at which time did also have mild acute kidney injury with creatinine of 1 5  4  Creatinine today improved to 1 2  3  Chronic HFrEF, 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%  1  TTE 10/10/2022: EF 20%, global hypokinesis, abnormal diastolic function, mild AR, moderate MR (possibly underestimated), mild OH, mildly dilated 4 1cm    2  TTE 1/26/2022: EF 30%, severe global hypokinesis, mild AR, moderate MR, ascending aortic root 4 4cm  3  Pre-hospital regimen: Toprol XL 25mg & Entresto 24-26 BID & Jardiance 10 mg daily  4  OP diuretic: Torsemide 20mg daily  5  Baseline weight: 216lbs (OV from 10/2022)  4  Paroxysmal atrial fibrillation   1  ILR interrogation 1/4/2023: New AF detected  2  Thromboembolix ppx: Eliquis 5mg BID  3  HR control: Toprol XL as above   5  Hx of TIA s/p ILR placement (12/17/2022)  6  Rheumatoid arthritis    PLAN:  · Continue Toprol-XL 25 mg daily  · Euvolemic on torsemide 20 mg daily (goal/dry weight now 213lbs)   · BMP ordered on discharge in 1 week to assess lytes and kidney function  · Will avoid adding Entresto or Jardiance at this time given hypotension  · Ischemic evaluation was deferred by patient and family to exclude underlying CAD as cause for his cardiomyopathy  · Continue Eliquis 5 mg twice daily for CVA prophylaxis  · Continue follow-up with EP device clinic    Interval History/ HPI:   Sofiya Flores is an 80-year-old male with PMH as above who presents for hospital follow-up visit for dizziness/lightheadedness and hypotension    Patient is known to cardiologist Dr Lucero Souza  Patient has had 2 hospitalizations over the past 1 month  First admission was on 1/26/2023 with episode of dysphagia lasting 45 minutes  Upon arrival to the ED his symptoms have resolved  TTE showed EF of 30% (recently 20% noted from a prior admission)  Cardiology was consulted at that time from medication optimization  He was added for Jardiance and Entresto in addition to his home Toprol-XL  He was discharged on torsemide 20 mg daily on 1/28/2023  He represented to the ED on 1/30/2023 due to dizziness/lightheadedness and low blood pressures noted in his nursing facility at 60/40  On arrival to the ED he received 1 L NS bolus with good BP response  His home antihypertensives have been on hold  Lab work was notable for KAMLESH which improved after discontinuing these medications  He was resumed on Toprol-XL 25 mg daily with torsemide 20 mg daily he was discharged yesterday from the hospital for follow-up today  Patient reports he is feeling much better and denies any dizziness/lightheadedness, syncope or presyncope  He is mostly wheelchair-bound but denies any issues with exertion  Vitals:  98/66  88  97%  213lbs    Past Medical History:   Diagnosis Date   • GERD (gastroesophageal reflux disease)    • Gout    • Hyperlipidemia    • Hypertension    • 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[facility] [address] )    • Spinal stenosis      Social History     Socioeconomic History   • Marital status:      Spouse name: Not on file   • Number of children: Not on file   • Years of education: Not on file   • Highest education level: Not on file   Occupational History   • Not on file   Tobacco Use   • Smoking status: Never   • Smokeless tobacco: Never   Vaping Use   • Vaping Use: Never used   Substance and Sexual Activity   • Alcohol use:  Yes     Alcohol/week: 2 0 standard drinks     Types: 2 Shots of liquor per week     Comment: 2-3 ounces of whiskey a day   • Drug use: Yes     Types: Marijuana   • Sexual activity: Not Currently   Other Topics Concern   • Not on file   Social History Narrative   • Not on file     Social Determinants of Health     Financial Resource Strain: Not on file   Food Insecurity: No Food Insecurity   • Worried About Running Out of Food in the Last Year: Never true   • Ran Out of Food in the Last Year: Never true   Transportation Needs: No Transportation Needs   • Lack of Transportation (Medical): No   • Lack of Transportation (Non-Medical): No   Physical Activity: Not on file   Stress: Not on file   Social Connections: Not on file   Intimate Partner Violence: Not on file   Housing Stability: Low Risk    • Unable to Pay for Housing in the Last Year: No   • Number of Places Lived in the Last Year: 1   • Unstable Housing in the Last Year: No      Family History   Problem Relation Age of Onset   • Colon polyps Neg Hx    • Colon cancer Neg Hx      Past Surgical History:   Procedure Laterality Date   • CARDIAC ELECTROPHYSIOLOGY PROCEDURE N/A 12/17/2022    Procedure: Cardiac loop recorder implant;  Surgeon: Anjali Saenz MD;  Location: [facility];   Service: Cardiology   • CARPAL TUNNEL RELEASE Bilateral    • COLONOSCOPY     • LAMINECTOMY     • TOTAL KNEE ARTHROPLASTY Bilateral    • TOTAL SHOULDER REPLACEMENT         Current Outpatient Medications:   •  acetaminophen (TYLENOL) 650 mg CR tablet, Take 650 mg by mouth 2 (two) times a day, Disp: , Rfl:   •  allopurinol (ZYLOPRIM) 100 mg tablet, Take 400 mg by mouth daily, Disp: , Rfl:   •  apixaban (Eliquis) 5 mg, Take 1 tablet (5 mg total) by mouth 2 (two) times a day, Disp: 180 tablet, Rfl: 3  •  ascorbic acid (VITAMIN C) 500 MG tablet, Take 500 mg by mouth daily, Disp: , Rfl:   •  cetirizine (ZyrTEC) 10 mg tablet, Take 10 mg by mouth daily, Disp: , Rfl:   •  Diclofenac Sodium (VOLTAREN) 1 %, Apply 4 g topically 4 (four) times a day, Disp: , Rfl:   •  doxycycline hyclate (VIBRAMYCIN) 100 mg capsule, Take 50 mg by mouth every 12 (twelve) hours, Disp: , Rfl:   •  ezetimibe (ZETIA) 10 mg tablet, Take 10 mg by mouth daily, Disp: , Rfl:   •  fluticasone (FLONASE) 50 mcg/act nasal spray, , Disp: , Rfl:   •  hydroxychloroquine (PLAQUENIL) 200 mg tablet, Take 200 mg by mouth 2 (two) times a day with meals, Disp: , Rfl:   •  levothyroxine 25 mcg tablet, , Disp: , Rfl:   •  loperamide (IMODIUM) 2 mg capsule, Take 2 mg by mouth as needed for diarrhea, Disp: , Rfl:   •  magnesium Oxide (MAG-OX) 400 mg TABS, Take 1 tablet (400 mg total) by mouth 2 (two) times a day for 15 days, Disp: 30 tablet, Rfl: 0  •  melatonin 3 mg, Take 3 tablets (9 mg total) by mouth daily at bedtime, Disp: 30 tablet, Rfl: 0  •  metoprolol succinate (TOPROL-XL) 25 mg 24 hr tablet, Take 1 tablet (25 mg total) by mouth 2 (two) times a day, Disp: 180 tablet, Rfl: 3  •  Multiple Vitamin (multivitamin) tablet, Take 1 tablet by mouth daily, Disp: , Rfl:   •  mupirocin (BACTROBAN) 2 % ointment, Apply 1 application topically daily, Disp: , Rfl:   •  nitroglycerin (NITROSTAT) 0 4 mg SL tablet, Place 0 4 mg under the tongue every 5 (five) minutes as needed for chest pain, Disp: , Rfl:   •  NON FORMULARY, Medical marijuana, Disp: , Rfl:   •  nystatin (MYCOSTATIN) powder, Apply topically 2 (two) times a day, Disp: 15 g, Rfl: 0  •  omeprazole (PriLOSEC) 10 mg delayed release capsule, Take 10 mg by mouth daily, Disp: , Rfl:   •  polyethylene glycol (MIRALAX) 17 g packet, Take 17 g by mouth every other day, Disp: 30 each, Rfl: 2  •  potassium chloride (K-DUR,KLOR-CON) 20 mEq tablet, Take 2 tablets (40 mEq total) by mouth daily Do not start before January 29, 2023 , Disp: 30 tablet, Rfl: 0  •  predniSONE 5 mg tablet, Take by mouth daily, Disp: , Rfl:   •  psyllium (METAMUCIL SMOOTH TEXTURE) 28 % packet, Take 1 packet by mouth 2 (two) times a day, Disp: 30 packet, Rfl: 2  •  solifenacin (VESICARE) 5 mg tablet, Take 5 mg by mouth daily, Disp: , Rfl:   •  tamsulosin (FLOMAX) 0 4 mg, Take 0 4 mg by mouth daily at bedtime, Disp: , Rfl:   •  torsemide (DEMADEX) 20 mg tablet, Take 1 tablet (20 mg total) by mouth daily, Disp: 90 tablet, Rfl: 3  •  Vitamin D, Cholecalciferol, 25 MCG (1000 UT) TABS, Take by mouth in the morning, Disp: , Rfl:   No current facility-administered medications for this visit  Review of Systems:  Review of Systems   Constitutional: Negative for appetite change, chills, diaphoresis, fatigue and fever  Respiratory: Negative for cough, chest tightness and shortness of breath  Cardiovascular: Negative for chest pain, palpitations and leg swelling  Gastrointestinal: Negative for diarrhea, nausea and vomiting  Endocrine: Negative for cold intolerance and heat intolerance  Genitourinary: Negative for difficulty urinating, dysuria and enuresis  Musculoskeletal: Negative for arthralgias, back pain and gait problem  Allergic/Immunologic: Negative for environmental allergies and food allergies  Neurological: Negative for dizziness, facial asymmetry and headaches  Hematological: Negative for adenopathy  Does not bruise/bleed easily  Psychiatric/Behavioral: Negative for agitation, behavioral problems and confusion  Physical Exam:  Physical Exam  Constitutional:       Appearance: He is well-developed  HENT:      Right Ear: External ear normal       Left Ear: External ear normal    Eyes:      Pupils: Pupils are equal, round, and reactive to light  Cardiovascular:      Rate and Rhythm: Normal rate and regular rhythm  Heart sounds: Normal heart sounds  No murmur heard  No friction rub  No gallop  Pulmonary:      Effort: Pulmonary effort is normal       Breath sounds: Normal breath sounds  Abdominal:      Palpations: Abdomen is soft  Musculoskeletal:         General: Normal range of motion  Cervical back: Normal range of motion  Right lower leg: Edema present  Left lower leg: Edema present  Skin:     General: Skin is warm and dry  Neurological:      Mental Status: He is alert and oriented to person, place, and time  Deep Tendon Reflexes: Reflexes are normal and symmetric  Psychiatric:         Behavior: Behavior normal          Thought Content: Thought content normal          Judgment: Judgment normal        This note was completed in part utilizing M-Modal Fluency Direct Software  Grammatical errors, random word insertions, spelling mistakes, and incomplete sentences can be an occasional consequence of this system secondary to software limitations, ambient noise, and hardware issues  If you have any questions or concerns about the content, text, or information contained within the body of this dictation, please contact the provider for clarification

## 2023-02-03 ENCOUNTER — TELEPHONE (OUTPATIENT)
Dept: NEUROLOGY | Facility: CLINIC | Age: 88
End: 2023-02-03

## 2023-02-03 NOTE — TELEPHONE ENCOUNTER
Reaching Alyssa Martinez for instruction clarification  Awaiting her response  HFU/SLUB/TRANSIENT SPEECH DISTURBANCE        NOTES: BRITTANY

## 2023-02-06 NOTE — TELEPHONE ENCOUNTER
Called patient's primary number and spoke with son Ricco Barrios  Advised him of our office locations  Working on appt in [place] due to travel, will call Kerwin back

## 2023-02-06 NOTE — TELEPHONE ENCOUNTER
TANNER Cohen  Cc: Gladys Mensah  Recommend follow-up with general neurology [facility], resident or attending neurologist in 6-8 weeks

## 2023-02-08 ENCOUNTER — TELEPHONE (OUTPATIENT)
Dept: CARDIOLOGY CLINIC | Facility: CLINIC | Age: 88
End: 2023-02-08

## 2023-02-08 NOTE — TELEPHONE ENCOUNTER
Pt's son called requesting an Eliquis hold for a spinal injection on Monday  Requested they have provider fax request to [facility] office for Dr Tres Sanchez to address tomorrow as we cannot take medication hold requests from pt/family  Fax for [facility] given   Will forward to pool - please contact pt's son if fax not received by tomorrow am

## 2023-03-22 ENCOUNTER — TELEPHONE (OUTPATIENT)
Dept: CARDIOLOGY CLINIC | Facility: CLINIC | Age: 88
End: 2023-03-22

## 2023-03-22 NOTE — TELEPHONE ENCOUNTER
Hi, this is Omaira Castro  I am calling from my dad, Camilla Ross  His birthday is September 30th, 1934  I'm probably questioning about Jardiance  I thought you guys had put them on  Ry Saenz and I just received the Med list from the place where he resides at [facility] and he has a Med list from them and it's not listed  So I was wondering if you could give me a call back to explain  Again, my name is Omaira Castro, [phone]   Thank you    Above message transcribed by a vm, called son back and advised Ry Saenz looks like d/c at hospital visit in [place]    Verbally understood

## 2023-05-04 ENCOUNTER — TELEPHONE (OUTPATIENT)
Dept: CARDIOLOGY CLINIC | Facility: CLINIC | Age: 88
End: 2023-05-04

## 2023-05-04 NOTE — TELEPHONE ENCOUNTER
Farshad Jaeger called about his father,   C/o of frequent urinating, this is a change hasn't has that issue prior, he is actually having accident cause he can't get to the bathroom  St stable no change, no additional sob, denies LL edema  He is asking if they can cut back on diuretic?     Torsemide 20 mg     Last ov 4/11/2023  Last bw 3/2/2023    Please advise

## 2023-05-05 ENCOUNTER — HOSPITAL ENCOUNTER (OUTPATIENT)
Dept: CT IMAGING | Facility: HOSPITAL | Age: 88
Discharge: HOME | End: 2023-05-05

## 2023-05-05 DIAGNOSIS — M54.16 LUMBAR RADICULOPATHY: ICD-10-CM

## 2023-05-05 DIAGNOSIS — I50.20 HFREF (HEART FAILURE WITH REDUCED EJECTION FRACTION) (HCC): ICD-10-CM

## 2023-05-05 DIAGNOSIS — M48.061 FORAMINAL STENOSIS OF LUMBAR REGION: ICD-10-CM

## 2023-05-05 NOTE — TELEPHONE ENCOUNTER
Requested medication(s) are due for refill today: yes  Patient has already received a courtesy refill: no  Other reason request has been forwarded to provider: failed protocol

## 2023-05-05 NOTE — TELEPHONE ENCOUNTER
Called and spoke to Quinton Solorzano the son and advised, he asked to sent to pharmacy      Verbally understood

## 2023-05-09 RX ORDER — TORSEMIDE 10 MG/1
10 TABLET ORAL DAILY
Qty: 30 TABLET | Refills: 3 | Status: SHIPPED | OUTPATIENT
Start: 2023-05-09

## 2023-05-10 ENCOUNTER — TELEPHONE (OUTPATIENT)
Dept: RADIOLOGY | Facility: CLINIC | Age: 88
End: 2023-05-10

## 2023-05-10 NOTE — TELEPHONE ENCOUNTER
S/w pt and son  Agreeable to trying epidural  Per NM below, please schedule a right L4 & L5 TFESI  Pt states symptoms are still right sided   Pt and son state he is no longer on eliquis

## 2023-05-10 NOTE — TELEPHONE ENCOUNTER
----- Message from Feng Calabrese, [address] sent at 5/10/2023  1:51 PM EDT -----  Please let patient know CT scan showed scar tissue at L3-L4 and L4 -L5 which is a resulted after his surgery  There is severe right sided narrowing at L4-L5, which is what is causing his right leg pain  There is also severe arthritis at L5-S1  I know he had DB at the previous pain management practice he was going to  Would he want to try one here?   If yes, please send to Jarek Bentley to schedule a right L4 & L5 TFESI  (Please confirm symptome are still right sided) thanks

## 2023-05-10 NOTE — TELEPHONE ENCOUNTER
Caller: pt    Doctor: Saul Carrillo    Reason for call: Please reach back out to patients christophe Conn      Call back#: [phone]

## 2023-05-11 DIAGNOSIS — M48.061 LUMBAR FORAMINAL STENOSIS: ICD-10-CM

## 2023-05-11 DIAGNOSIS — M54.16 LUMBAR RADICULOPATHY: Primary | ICD-10-CM

## 2023-05-11 NOTE — TELEPHONE ENCOUNTER
anti coag:   Faxed anti coag hold form to Dr Lisa Jackson MD   Requested Medication to be held: Eliquis  Phone:  [phone]   Fax:  [phone]          Procedure to be scheduled: Right L4 and L5 TFESI   ?? *please keep this task in clinical pool until we get response back   (if forwarding this task to another office or physician, please keep clinical pool on the recipient list for tracking purposes)

## 2023-05-15 ENCOUNTER — TELEPHONE (OUTPATIENT)
Dept: CARDIOLOGY CLINIC | Facility: CLINIC | Age: 88
End: 2023-05-15

## 2023-05-15 NOTE — TELEPHONE ENCOUNTER
[facility] are requesting cardiac clearance  For his pain management of their pain, this pt is being considered for neuraxial injections  Also requesting a 3 day eliquis hold  Pt last seen in our office 04/2023        Can I prep a letter?

## 2023-05-15 NOTE — TELEPHONE ENCOUNTER
Hi, my name is Patel Small  I'm calling for my dad, Laura Hart  His birthday is September 30th, 1934  He was supposed to have supposed to have sent over a request to stop his Eliquis from [facility] payment spine  Doctor Samra was supposed to request because he's going to have a an [place] done on his back  So I just wanted to know if they if Doctor Tay Josue got chance to check that out and send it back over to [facility] painting spine  Because my dad is in a lot of pain in his back  So the faster I get this done, the faster he could get comfort  I appreciate it  Yeah  My name's Patel Small  [phone] if you could give me a call back  I appreciate it  Thank you    Called son back and advised that we are waiting for Dr Stephie Dennis to response   Son verbally understood

## 2023-05-16 ENCOUNTER — TELEPHONE (OUTPATIENT)
Dept: CARDIOLOGY CLINIC | Facility: CLINIC | Age: 88
End: 2023-05-16

## 2023-05-16 NOTE — TELEPHONE ENCOUNTER
Faxed telephone call from 5/4/23 to director of Rosita Cross at [facility] at [employer] [phone]  She is requesting the order to decrease torsemide from 20mg to 10mg daily

## 2023-05-17 NOTE — TELEPHONE ENCOUNTER
Med hold request forwarded to [facility], per Dr Lin Flejose elias ok to hold 3 days   No clearance necessary

## 2023-05-17 NOTE — TELEPHONE ENCOUNTER
[facility] contacted Nusrat Lorenzana (Fax)    Nusrat Lorenzana        9:39 AM  Note   [facility] are requesting cardiac clearance   For his pain management of their pain, this pt is being considered for neuraxial injections      Also requesting a 3 day eliquis hold         Pt last seen in our office 04/2023           Can I prep a letter?               9:39 AM  Nusrat Lorenzana routed this conversation to Emilee Pace MD   May 17, 2023  Emilee Pace MD  to Cardiology [facility]

## 2023-05-17 NOTE — TELEPHONE ENCOUNTER
Pt's Son Tyler Hare in wanting to know, if we received the Eliquis Hold from his Doctor yet? He said that he just called him to let us know that he faxed it over  I let him know that this office didn't receive it yet  He said ok      Thank You

## 2023-05-18 NOTE — TELEPHONE ENCOUNTER
Spoke with Otilia Nicholson who picked date of 6/1/2023 asked if instructions could be emailed to him for procedure and advised that we have to call nursing home and advised them of procedure and hold instruction for patients procedure  Number to call he did not give a name of nurse just that we needed to advise them  [phone]     PRE OP INSTRUCTIONS: Right L5 and S1 TFESI     Hold Eliquis x 3  full days prior, last dose on 05/28/23 for procedure 6/1/2023  Patient advised to hold medication from Date till their appointment at that time instructions to restart will be given  Patient stated verbal understanding  Aware that nursing will call to review hold dates as well  -If you are on prescription blood thinners, you may have to hold the medication for several days before the procedure  Please call the office to discuss medication holds at [phone]   -Do not eat or drink ONE HOUR prior to your procedure  If you are diabetic, may follow regular breakfast/lunch schedule and take usual    diabetic medications   -Lumbar( low back) procedure, please wear comfortable slacks/pants   -Cervical (neck) procedure, please wear a shirt/blouse that is easy to remove   -A  is required to take you home form your procedure   -Continue all to take prescribed medication the day of your procedure, including blood pressure medications   -If you are prescribe antibiotics, have an active infection or have an open wound, please contact the office at [phone]   -Please refrain from any vaccinations two weeks before and two weeks after injection   -Insurance authorization received in not a guarantee of payment per your insurance company's authorization disclaimer and it is    your responsibility to verify your benefits            -If you have any questions about the instructions, please call me at [phone]

## 2023-05-18 NOTE — TELEPHONE ENCOUNTER
S/w RN at the [facility], advised the pt has been scheduled for an vanessa at [address] which include anticoag hold instructions  Questioning how the pre procedure instructions should be relayed? Per RN, please fax a signed nurses' note  Faxed Fluoroscopy instructions including eliquis hold instructions - No eliquis 5/29 - 6/1 post procedure included  Faxed to [phone] as requested  Confirmation received

## 2023-05-30 ENCOUNTER — TELEPHONE (OUTPATIENT)
Dept: PAIN MEDICINE | Facility: MEDICAL CENTER | Age: 88
End: 2023-05-30

## 2023-05-30 NOTE — TELEPHONE ENCOUNTER
Caller: Shana Akers    Doctor/Office: Dr Darryn Hinkle    Call regarding :      Call was transferred to: DR ARTI REYES [facility]

## 2023-05-31 ENCOUNTER — TELEPHONE (OUTPATIENT)
Dept: PAIN MEDICINE | Facility: CLINIC | Age: 88
End: 2023-05-31

## 2023-05-31 NOTE — TELEPHONE ENCOUNTER
Kerwin called to cancel his dads procedure  for tomorrow   Comfort Schmitte has a work trip so he will not be able to bring his dad

## 2023-06-09 ENCOUNTER — HOSPITAL ENCOUNTER (EMERGENCY)
Facility: HOSPITAL | Age: 88
Discharge: HOME | End: 2023-06-09
Attending: EMERGENCY MEDICINE
Payer: COMMERCIAL

## 2023-06-09 ENCOUNTER — APPOINTMENT (EMERGENCY)
Dept: CT IMAGING | Facility: HOSPITAL | Age: 88
End: 2023-06-09
Payer: COMMERCIAL

## 2023-06-09 VITALS
DIASTOLIC BLOOD PRESSURE: 88 MMHG | WEIGHT: 220.46 LBS | OXYGEN SATURATION: 96 % | RESPIRATION RATE: 18 BRPM | SYSTOLIC BLOOD PRESSURE: 138 MMHG | TEMPERATURE: 97.8 F | BODY MASS INDEX: 32.56 KG/M2 | HEART RATE: 99 BPM

## 2023-06-09 DIAGNOSIS — T07.XXXA MULTIPLE CONTUSIONS: ICD-10-CM

## 2023-06-09 DIAGNOSIS — S09.90XA INJURY OF HEAD, INITIAL ENCOUNTER: ICD-10-CM

## 2023-06-09 DIAGNOSIS — R04.0 LEFT-SIDED NOSEBLEED: ICD-10-CM
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
## 2023-06-09 LAB
ABO GROUP BLD: NORMAL
ABO GROUP BLD: NORMAL
ALBUMIN SERPL BCP-MCNC: 3.3 G/DL (ref 3.5–5)
ALP SERPL-CCNC: 67 U/L (ref 34–104)
ALT SERPL W P-5'-P-CCNC: 14 U/L (ref 7–52)
ANION GAP SERPL CALCULATED.3IONS-SCNC: 6 MMOL/L (ref 4–13)
APTT PPP: 29 SECONDS (ref 23–37)
AST SERPL W P-5'-P-CCNC: 20 U/L (ref 13–39)
BASOPHILS # BLD AUTO: 0.04 THOUSANDS/ÂΜL (ref 0–0.1)
BASOPHILS NFR BLD AUTO: 1 % (ref 0–1)
BILIRUB SERPL-MCNC: 0.46 MG/DL (ref 0.2–1)
BLD GP AB SCN SERPL QL: NEGATIVE
BUN SERPL-MCNC: 17 MG/DL (ref 5–25)
CALCIUM ALBUM COR SERPL-MCNC: 8.7 MG/DL (ref 8.3–10.1)
CALCIUM SERPL-MCNC: 8.1 MG/DL (ref 8.4–10.2)
CHLORIDE SERPL-SCNC: 104 MMOL/L (ref 96–108)
CO2 SERPL-SCNC: 32 MMOL/L (ref 21–32)
CREAT SERPL-MCNC: 1.23 MG/DL (ref 0.6–1.3)
EOSINOPHIL # BLD AUTO: 0.77 THOUSAND/ÂΜL (ref 0–0.61)
EOSINOPHIL NFR BLD AUTO: 10 % (ref 0–6)
ERYTHROCYTE [DISTWIDTH] IN BLOOD BY AUTOMATED COUNT: 14.5 % (ref 11.6–15.1)
GFR SERPL CREATININE-BSD FRML MDRD: 52 ML/MIN/1.73SQ M
GLUCOSE SERPL-MCNC: 99 MG/DL (ref 65–140)
HCT VFR BLD AUTO: 39.9 % (ref 36.5–49.3)
HGB BLD-MCNC: 12.5 G/DL (ref 12–17)
IMM GRANULOCYTES # BLD AUTO: 0.02 THOUSAND/UL (ref 0–0.2)
IMM GRANULOCYTES NFR BLD AUTO: 0 % (ref 0–2)
INR PPP: 1.17 (ref 0.84–1.19)
LYMPHOCYTES # BLD AUTO: 3.03 THOUSANDS/ÂΜL (ref 0.6–4.47)
LYMPHOCYTES NFR BLD AUTO: 39 % (ref 14–44)
MCH RBC QN AUTO: 31 PG (ref 26.8–34.3)
MCHC RBC AUTO-ENTMCNC: 31.3 G/DL (ref 31.4–37.4)
MCV RBC AUTO: 99 FL (ref 82–98)
MONOCYTES # BLD AUTO: 0.57 THOUSAND/ÂΜL (ref 0.17–1.22)
MONOCYTES NFR BLD AUTO: 7 % (ref 4–12)
NEUTROPHILS # BLD AUTO: 3.32 THOUSANDS/ÂΜL (ref 1.85–7.62)
NEUTS SEG NFR BLD AUTO: 43 % (ref 43–75)
NRBC BLD AUTO-RTO: 0 /100 WBCS
PLATELET # BLD AUTO: 148 THOUSANDS/UL (ref 149–390)
PMV BLD AUTO: 12.5 FL (ref 8.9–12.7)
POTASSIUM SERPL-SCNC: 3.8 MMOL/L (ref 3.5–5.3)
PROT SERPL-MCNC: 6.3 G/DL (ref 6.4–8.4)
PROTHROMBIN TIME: 15.7 SECONDS (ref 11.6–14.5)
RBC # BLD AUTO: 4.03 MILLION/UL (ref 3.88–5.62)
RH BLD: POSITIVE
RH BLD: POSITIVE
SODIUM SERPL-SCNC: 142 MMOL/L (ref 135–147)
SPECIMEN EXPIRATION DATE: NORMAL
WBC # BLD AUTO: 7.75 THOUSAND/UL (ref 4.31–10.16)

## 2023-06-09 PROCEDURE — 80053 COMPREHEN METABOLIC PANEL: CPT | Performed by: EMERGENCY MEDICINE

## 2023-06-09 PROCEDURE — 99285 EMERGENCY DEPT VISIT HI MDM: CPT

## 2023-06-09 PROCEDURE — 74177 CT ABD & PELVIS W/CONTRAST: CPT

## 2023-06-09 PROCEDURE — 86901 BLOOD TYPING SEROLOGIC RH(D): CPT | Performed by: EMERGENCY MEDICINE

## 2023-06-09 PROCEDURE — 86900 BLOOD TYPING SEROLOGIC ABO: CPT | Performed by: EMERGENCY MEDICINE

## 2023-06-09 PROCEDURE — 71260 CT THORAX DX C+: CPT

## 2023-06-09 PROCEDURE — 70450 CT HEAD/BRAIN W/O DYE: CPT

## 2023-06-09 PROCEDURE — 96360 HYDRATION IV INFUSION INIT: CPT

## 2023-06-09 PROCEDURE — 85610 PROTHROMBIN TIME: CPT | Performed by: EMERGENCY MEDICINE

## 2023-06-09 PROCEDURE — 85025 COMPLETE CBC W/AUTO DIFF WBC: CPT | Performed by: EMERGENCY MEDICINE

## 2023-06-09 PROCEDURE — 85730 THROMBOPLASTIN TIME PARTIAL: CPT | Performed by: EMERGENCY MEDICINE

## 2023-06-09 PROCEDURE — 86850 RBC ANTIBODY SCREEN: CPT | Performed by: EMERGENCY MEDICINE

## 2023-06-09 PROCEDURE — 36415 COLL VENOUS BLD VENIPUNCTURE: CPT | Performed by: EMERGENCY MEDICINE

## 2023-06-09 RX ORDER — DOXYCYCLINE HYCLATE 100 MG/1
100 CAPSULE ORAL ONCE
Status: COMPLETED | OUTPATIENT
Start: 2023-06-09 | End: 2023-06-09

## 2023-06-09 RX ORDER — OXYMETAZOLINE HYDROCHLORIDE 0.05 G/100ML
2 SPRAY NASAL ONCE
Status: COMPLETED | OUTPATIENT
Start: 2023-06-09 | End: 2023-06-09

## 2023-06-09 RX ORDER — TRANEXAMIC ACID 100 MG/ML
1000 INJECTION, SOLUTION INTRAVENOUS ONCE
Status: COMPLETED | OUTPATIENT
Start: 2023-06-09 | End: 2023-06-09

## 2023-06-09 RX ADMIN — IOHEXOL 85 ML: 350 INJECTION, SOLUTION INTRAVENOUS at 08:15

## 2023-06-09 RX ADMIN — TRANEXAMIC ACID 1000 MG: 100 INJECTION, SOLUTION INTRAVENOUS at 08:36

## 2023-06-09 RX ADMIN — OXYMETAZOLINE HCL 2 SPRAY: 0.05 SPRAY NASAL at 08:36

## 2023-06-09 RX ADMIN — SODIUM CHLORIDE 500 ML: 0.9 INJECTION, SOLUTION INTRAVENOUS at 08:15

## 2023-06-09 RX ADMIN — DOXYCYCLINE 100 MG: 100 CAPSULE ORAL at 12:45

## 2023-06-09 RX ADMIN — SILVER NITRATE APPLICATORS 1 APPLICATOR: 25; 75 STICK TOPICAL at 08:36

## 2023-06-09 NOTE — Clinical Note
Tara Schneider was seen and treated in our emergency department on 6/9/2023  DO NOT TAKE ELIQUIS UNTIL AFTER UNTIL AFTER BACK PROCEDURE NEXT THURSDAY VEE 15, 2023    Diagnosis:     Will Elizabeth    He may return on this date: If you have any questions or concerns, please don't hesitate to call        Rogelia Apley, DO    ______________________________           _______________          _______________  Hospital Representative                              Date                                Time

## 2023-06-09 NOTE — DISCHARGE INSTRUCTIONS
Ct shows small amount of swelling and stenosis (hardening) of aorta - review with primary care in follow-up    DO NOT [address]

## 2023-06-09 NOTE — ED PROVIDER NOTES
Emergency Department Trauma Note  Anna Esposito 80 y o  male MRN: [id number]  Unit/Bed#: ED 09/ED 09 Encounter: [id number]      Trauma Alert: Trauma Acuity: Trauma Evaluation  Model of Arrival: Mode of Arrival: BLS via Trauma Squad Name and Number: [address] [facility]  Trauma Team: Current Providers  Attending Provider: Kaitlyn Ruiz DO  Registered Nurse: Juan Farah RN  Consultants:     Other: {ENT Dr Pepe Kong - STAT consult; notified at 10 am via text; History of Present Illness     Chief Complaint:   Chief Complaint   Patient presents with   • Fall     To ED with c/o hitting back of head on the wall after attempting to sit down  Patient had a nose bleed left nares and was packing nostril when he fell  Denies any LOC  HPI:  Anna Esposito is a 80 y o  male who presents with nosebleed then fall this morning     Mechanism:Details of Incident: Hugo Nailer backwards trying to sit and hit head on the wall Injury Date: 06/09/23 Injury Time: [address] 28 Injury Occurence Location - [address] [place] [address] Way: [employer]    History from patient and paramedics  He was brought in by ambulance from [address] [place] [address] home  He developed a nosebleed about 1/2-hour ago spontaneously he had some packing in it he was in his wheelchair and then he tried to get back into his bed and fell backwards hitting his head on the wall  He does not have a headache did not pass out GCS 15  He does not recall swallowing a lot of blood and the bleeding is controlled now with packing  He does take Eliquis  Breath sounds equal  No crepitus or chest wall tenderness with compression over the chest  No pain or instability with compression over the pelvis  No bedside imaging required  Patient is stable to proceed to CT scan  Collar placed by paramedics    ROS patient admits to intermittent diarrhea  Review of Systems   Constitutional: Negative for chills and fever  HENT: Negative for rhinorrhea and sore throat      Respiratory: Negative for shortness of breath  Cardiovascular: Negative for chest pain  Gastrointestinal: Positive for diarrhea  Negative for constipation, nausea and vomiting  Genitourinary: Negative for dysuria and frequency  Skin: Negative for rash  All other systems reviewed and are negative  Historical Information     Immunizations: There is no immunization history on file for this patient  Past Medical History:   Diagnosis Date   • GERD (gastroesophageal reflux disease)    • Gout    • Hyperlipidemia    • Hypertension    • 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[address] )    • Spinal stenosis        Family History   Problem Relation Age of Onset   • Colon polyps Neg Hx    • Colon cancer Neg Hx      Past Surgical History:   Procedure Laterality Date   • CARDIAC ELECTROPHYSIOLOGY PROCEDURE N/A 12/17/2022    Procedure: Cardiac loop recorder implant;  Surgeon: Tuan Robert MD;  Location: [facility]; Service: Cardiology   • CARPAL TUNNEL RELEASE Bilateral    • COLONOSCOPY     • LAMINECTOMY     • TOTAL KNEE ARTHROPLASTY Bilateral    • TOTAL SHOULDER REPLACEMENT       Social History     Tobacco Use   • Smoking status: Never   • Smokeless tobacco: Never   Vaping Use   • Vaping Use: Never used   Substance Use Topics   • Alcohol use: Yes     Alcohol/week: 2 0 standard drinks of alcohol     Types: 2 Shots of liquor per week     Comment: 2-3 ounces of whiskey a day   • Drug use: Yes     Types: Marijuana     E-Cigarette/Vaping   • E-Cigarette Use Never User      E-Cigarette/Vaping Substances   • Nicotine No    • Flavoring No        Family History: non-contributory    Meds/Allergies   Prior to Admission Medications   Prescriptions Last Dose Informant Patient Reported? Taking?    Diclofenac Sodium (VOLTAREN) 1 %  Outside Facility (Specify) Yes No   Sig: Apply 4 g topically 4 (four) times a day   Multiple Vitamin (multivitamin) tablet  Outside Facility (Specify) Yes No   Sig: Take 1 tablet by mouth daily   NON FORMULARY  Outside Facility (Specify) Yes No Sig: Medical marijuana   Saccharomyces boulardii (Probiotic) 250 MG CAPS   Yes No   Vitamin D, Cholecalciferol, 25 MCG (1000 UT) TABS  Outside Facility (Specify) Yes No   Sig: Take by mouth in the morning   acetaminophen (TYLENOL) 650 mg CR tablet  Outside Facility (Specify) Yes No   Sig: Take 650 mg by mouth 2 (two) times a day   allopurinol (ZYLOPRIM) 100 mg tablet  Outside Facility (Specify) Yes No   Sig: Take 400 mg by mouth daily   apixaban (Eliquis) 5 mg  Outside Facility (Specify) No No   Sig: Take 1 tablet (5 mg total) by mouth 2 (two) times a day   ascorbic acid (VITAMIN C) 500 MG tablet  Outside Facility (Specify) Yes No   Sig: Take 500 mg by mouth daily   cetirizine (ZyrTEC) 10 mg tablet  Outside Facility (Specify) Yes No   Sig: Take 10 mg by mouth daily   doxycycline hyclate (VIBRAMYCIN) 100 mg capsule  Outside Facility (Specify) Yes No   Sig: Take 50 mg by mouth every 12 (twelve) hours   ezetimibe (ZETIA) 10 mg tablet  Outside Facility (Specify) Yes No   Sig: Take 10 mg by mouth daily   fluticasone (FLONASE) 50 mcg/act nasal spray  Outside Facility (Specify) Yes No   hydroxychloroquine (PLAQUENIL) 200 mg tablet  Outside Facility (Specify) Yes No   Sig: Take 200 mg by mouth 2 (two) times a day with meals   levothyroxine 25 mcg tablet  Outside Facility (Specify) Yes No   loperamide (IMODIUM) 2 mg capsule  Outside Facility (Specify) Yes No   Sig: Take 2 mg by mouth as needed for diarrhea   magnesium Oxide (MAG-OX) 400 mg TABS  Outside Facility (Specify) No No   Sig: Take 1 tablet (400 mg total) by mouth 2 (two) times a day for 15 days   Patient not taking: Reported on 4/11/2023   melatonin 3 mg  Outside Facility (Specify) No No   Sig: Take 3 tablets (9 mg total) by mouth daily at bedtime   metoprolol succinate (TOPROL-XL) 25 mg 24 hr tablet   No No   Sig: Take 1 tablet (25 mg total) by mouth 2 (two) times a day   mupirocin (BACTROBAN) 2 % ointment  Outside Facility (Specify) Yes No   Sig: Apply 1 application topically daily   nitroglycerin (NITROSTAT) 0 4 mg SL tablet  Outside Facility (Specify) Yes No   Sig: Place 0 4 mg under the tongue every 5 (five) minutes as needed for chest pain   nystatin (MYCOSTATIN) powder  Outside Facility (Specify) No No   Sig: Apply topically 2 (two) times a day   omeprazole (PriLOSEC) 10 mg delayed release capsule  Outside Facility (Specify) Yes No   Sig: Take 10 mg by mouth daily   polyethylene glycol (MIRALAX) 17 g packet  Outside Facility (Specify) No No   Sig: Take 17 g by mouth every other day   potassium chloride (K-DUR,KLOR-CON) 20 mEq tablet  Outside Facility (Specify) No No   Sig: Take 2 tablets (40 mEq total) by mouth daily Do not start before January 29, 2023     predniSONE 5 mg tablet  Outside Facility (Specify) Yes No   Sig: Take by mouth daily   pregabalin (LYRICA) 25 mg capsule   No No   Sig: Take 1 tab PO at night for 7 days then increase to 1 tab PO bid   psyllium (METAMUCIL SMOOTH TEXTURE) 28 % packet  Outside Facility (Specify) No No   Sig: Take 1 packet by mouth 2 (two) times a day   solifenacin (VESICARE) 5 mg tablet  Outside Facility (Specify) Yes No   Sig: Take 5 mg by mouth daily   tamsulosin (FLOMAX) 0 4 mg  Outside Facility (Specify) Yes No   Sig: Take 0 4 mg by mouth daily at bedtime   torsemide (DEMADEX) 10 mg tablet   No No   Sig: Take 1 tablet (10 mg total) by mouth daily   traMADol (ULTRAM) 50 mg tablet   Yes No      Facility-Administered Medications: None       Allergies   Allergen Reactions   • Colchicine Other (See Comments)     Flushing     • Niacin Other (See Comments)     flushed   • Statins        PHYSICAL EXAM    PE limited by: nothing    Objective   Vitals:   First set: Temperature: 97 8 °F (36 6 °C) (06/09/23 0758)  Pulse: 82 (06/09/23 0758)  Respirations: 20 (06/09/23 0758)  Blood Pressure: 164/79 (06/09/23 0758)  SpO2: 97 % (06/09/23 0758)    Primary Survey:   (A) Airway: patent  (B) Breathing: clear  (C) Circulation: Pulses: normal  (D) Disabliity:  GCS Total:  15  (E) Expose:  Completed    Secondary Survey: (Click on Physical Exam tab above)  Physical Exam  Vitals and nursing note reviewed  Constitutional:       Appearance: He is well-developed  HENT:      Head: Normocephalic  Right Ear: External ear normal       Left Ear: External ear normal       Nose:      Right Nostril: No septal hematoma  Left Nostril: Epistaxis present  Eyes:      Conjunctiva/sclera: Conjunctivae normal       Pupils: Pupils are equal, round, and reactive to light  Cardiovascular:      Rate and Rhythm: Normal rate and regular rhythm  Heart sounds: Normal heart sounds  Pulmonary:      Effort: Pulmonary effort is normal  No respiratory distress  Breath sounds: Normal breath sounds  No wheezing  Abdominal:      General: Bowel sounds are normal  There is no distension  Palpations: Abdomen is soft  Tenderness: There is no abdominal tenderness  There is left CVA tenderness  Musculoskeletal:         General: No deformity  Normal range of motion  Cervical back: Normal range of motion and neck supple  No tenderness or bony tenderness  No spinous process tenderness  Thoracic back: No bony tenderness  Lumbar back: No bony tenderness  Skin:     General: Skin is warm and dry  Findings: No rash  Neurological:      General: No focal deficit present  Mental Status: He is alert and oriented to person, place, and time  GCS: GCS eye subscore is 4  GCS verbal subscore is 5  GCS motor subscore is 6  Sensory: No sensory deficit  Psychiatric:         Mood and Affect: Mood normal          Cervical spine cleared by clinical criteria?  Yes     Invasive Devices     None                 Lab Results:   Results Reviewed     Procedure Component Value Units Date/Time    APTT [[id number]]  (Normal) Collected: 06/09/23 0809    Lab Status: Final result Specimen: Blood from Arm, Right Updated: 06/09/23 5139 PTT 29 seconds     Protime-INR [[id number]]  (Abnormal) Collected: 06/09/23 0809    Lab Status: Final result Specimen: Blood from Arm, Right Updated: 06/09/23 0841     Protime 15 7 seconds      INR 1 17    Comprehensive metabolic panel [[id number]]  (Abnormal) Collected: 06/09/23 0809    Lab Status: Final result Specimen: Blood from Arm, Right Updated: 06/09/23 0835     Sodium 142 mmol/L      Potassium 3 8 mmol/L      Chloride 104 mmol/L      CO2 32 mmol/L      ANION GAP 6 mmol/L      BUN 17 mg/dL      Creatinine 1 23 mg/dL      Glucose 99 mg/dL      Calcium 8 1 mg/dL      Corrected Calcium 8 7 mg/dL      AST 20 U/L      ALT 14 U/L      Alkaline Phosphatase 67 U/L      Total Protein 6 3 g/dL      Albumin 3 3 g/dL      Total Bilirubin 0 46 mg/dL      eGFR 52 ml/min/1 73sq m     Narrative:      [place] guidelines for Chronic Kidney Disease (CKD):   •  Stage 1 with normal or high GFR (GFR > 90 mL/min/1 73 square meters)  •  Stage 2 Mild CKD (GFR = 60-89 mL/min/1 73 square meters)  •  Stage 3A Moderate CKD (GFR = 45-59 mL/min/1 73 square meters)  •  Stage 3B Moderate CKD (GFR = 30-44 mL/min/1 73 square meters)  •  Stage 4 Severe CKD (GFR = 15-29 mL/min/1 73 square meters)  •  Stage 5 End Stage CKD (GFR <15 mL/min/1 73 square meters)  Note: GFR calculation is accurate only with a steady state creatinine    CBC and differential [[id number]]  (Abnormal) Collected: 06/09/23 0809    Lab Status: Final result Specimen: Blood from Arm, Right Updated: 06/09/23 0821     WBC 7 75 Thousand/uL      RBC 4 03 Million/uL      Hemoglobin 12 5 g/dL      Hematocrit 39 9 %      MCV 99 fL      MCH 31 0 pg      MCHC 31 3 g/dL      RDW 14 5 %      MPV 12 5 fL      Platelets 941 Thousands/uL      nRBC 0 /100 WBCs      Neutrophils Relative 43 %      Immat GRANS % 0 %      Lymphocytes Relative 39 %      Monocytes Relative 7 %      Eosinophils Relative 10 %      Basophils Relative 1 %      Neutrophils Absolute 3 32 Thousands/µL      Immature Grans Absolute 0 02 Thousand/uL      Lymphocytes Absolute 3 03 Thousands/µL      Monocytes Absolute 0 57 Thousand/µL      Eosinophils Absolute 0 77 Thousand/µL      Basophils Absolute 0 04 Thousands/µL                  Imaging Studies:   Direct to CT: Yes  TRAUMA - CT chest abdomen pelvis w contrast   Final Result by Syed Barba MD (06/09 9082)      No acute thoracic, abdominal or pelvic trauma  Multiple incidental findings as above  The study was marked in [facility] for immediate notification given trauma designation  Workstation performed: [id number]         TRAUMA - CT head wo contrast   Final Result by Syed Barba MD (06/09 7209)      No acute intracranial pathology  In particular, no intracranial hemorrhage or calvarial fracture  Chronic microvascular ischemic changes  The study was marked in [facility] for immediate notification given trauma designation  Workstation performed: [id number]               Procedures  Epistaxis management    Date/Time: 6/9/2023 9:48 AM    Performed by: Kwesi Richard DO  Authorized by: Kwesi Richard DO  Universal Protocol:  Consent: Verbal consent obtained  Risks and benefits: risks, benefits and alternatives were discussed  Consent given by: patient  Patient understanding: patient states understanding of the procedure being performed      Patient location:  ED  Anesthesia (see MAR for exact dosages): Anesthesia method:  Topical application    Local Therapeutics:  Oxymetazoline (Afrin)  Procedure details:     Treatment site:  L anterior    Hemostasis method:  Merocel sponge and rhino rocket    Treatment complexity:  Extensive    Treatment episode: initial    Post-procedure details:     Assessment:  Bleeding decreased    Patient tolerance of procedure:   Tolerated with difficulty    CriticalCare Time    Date/Time: 6/9/2023 10:23 AM    Performed by: Kwesi Richard DO  Authorized by: Kwesi Richard DO    Critical care provider statement:     Critical care time (minutes):  56    Critical care time was exclusive of:  Separately billable procedures and treating other patients and teaching time    Critical care was necessary to treat or prevent imminent or life-threatening deterioration of the following conditions:  Trauma    Critical care was time spent personally by me on the following activities:  Obtaining history from patient or surrogate, development of treatment plan with patient or surrogate, discussions with consultants, evaluation of patient's response to treatment, examination of patient, review of old charts, re-evaluation of patient's condition, ordering and review of radiographic studies, ordering and review of laboratory studies and ordering and performing treatments and interventions  Comments:      Patient required frequent reassessment in ER for his epistaxis but also for left flank contusion, abd contusions, and he developed right upper arm contusion as he was in ER - no severe expansion noted on assessments  ED Course  ED Course as of 06/09/23 1725   Fri Jun 09, 2023   1929 Difficulty getting bleeding under control - moderate clot at medial nare anteriorly - had to pack with large merocel and rhinorocket but quickly soaked with blood - texted ENT to review  1018 Packing is pink-tinged and dripping - patient dabs with tissue  Dr Cameron Nava ENT will come to eval, but can't get here till noon  Surgiflo at bedside  Dr Arjun Davis ENT evaluated patient in ER around noon - by that time, no further bleeding  We reviewed risks and benefits of removing packing and leaving it in - patient would like to leave it in  He is to follow-up with ENT on Monday  He is already on abx  Cervical collar removed by me - no tenderness at c-spine, no significant pain with active ROM against resistance        Medical Decision Making  Differential includes intracranial bleed from fall and head injury, less likely concussion without confusion or vomiting  Epistaxis spontaneous exacerbated from anticoagulants  Amount and/or Complexity of Data Reviewed  Labs: ordered  Radiology: ordered  Risk  OTC drugs  Prescription drug management  Disposition  [facility] Transfer: No  Final diagnoses:   Fall, initial encounter   Injury of head, initial encounter   Left-sided nosebleed   Multiple contusions     Time reflects when diagnosis was documented in both MDM as applicable and the Disposition within this note     Time User Action Codes Description Comment    6/9/2023  9:07 AM Kadi Mealing Add [X68  NBYJ] Fall, initial encounter     6/9/2023  9:07 AM Kadi Mealing Add [R05 96SS] Injury of head, initial encounter     6/9/2023  9:08 AM Kadi Mealing Add [R04 0] Left-sided nosebleed     6/9/2023 10:47 AM Kadi Mealing Add [T07  XXXA] Multiple contusions       ED Disposition     ED Disposition   Discharge    Condition   Stable    Date/Time   Fri Jun 9, 2023 12:28 PM    Comment   Cleone Schlatter Altmeier discharge to home/self care                 Follow-up Information     Follow up With Specialties Details Why Contact Info    Krishna Menendez MD Internal Medicine Schedule an appointment as soon as possible for a visit   [address] Otolaryngology Go in 3 days  [address] [address]  [phone]          Discharge Medication List as of 6/9/2023 12:38 PM      CONTINUE these medications which have NOT CHANGED    Details   acetaminophen (TYLENOL) 650 mg CR tablet Take 650 mg by mouth 2 (two) times a day, Historical Med      allopurinol (ZYLOPRIM) 100 mg tablet Take 400 mg by mouth daily, Historical Med      apixaban (Eliquis) 5 mg Take 1 tablet (5 mg total) by mouth 2 (two) times a day, Starting Tue 1/10/2023, Normal      ascorbic acid (VITAMIN C) 500 MG tablet Take 500 mg by mouth daily, Historical Med      cetirizine (ZyrTEC) 10 mg tablet Take 10 mg by mouth daily, Historical Med      Diclofenac Sodium (VOLTAREN) 1 % Apply 4 g topically 4 (four) times a day, Historical Med      doxycycline hyclate (VIBRAMYCIN) 100 mg capsule Take 50 mg by mouth every 12 (twelve) hours, Historical Med      ezetimibe (ZETIA) 10 mg tablet Take 10 mg by mouth daily, Historical Med      fluticasone (FLONASE) 50 mcg/act nasal spray Starting Fri 9/2/2022, Historical Med      hydroxychloroquine (PLAQUENIL) 200 mg tablet Take 200 mg by mouth 2 (two) times a day with meals, Historical Med      levothyroxine 25 mcg tablet Starting Wed 8/24/2022, Historical Med      loperamide (IMODIUM) 2 mg capsule Take 2 mg by mouth as needed for diarrhea, Historical Med      magnesium Oxide (MAG-OX) 400 mg TABS Take 1 tablet (400 mg total) by mouth 2 (two) times a day for 15 days, Starting Sat 1/28/2023, Until Sun 2/12/2023, Normal      melatonin 3 mg Take 3 tablets (9 mg total) by mouth daily at bedtime, Starting Sat 1/28/2023, Normal      metoprolol succinate (TOPROL-XL) 25 mg 24 hr tablet Take 1 tablet (25 mg total) by mouth 2 (two) times a day, Starting Tue 4/11/2023, Until Thu 5/11/2023, Normal      Multiple Vitamin (multivitamin) tablet Take 1 tablet by mouth daily, Historical Med      mupirocin (BACTROBAN) 2 % ointment Apply 1 application topically daily, Historical Med      nitroglycerin (NITROSTAT) 0 4 mg SL tablet Place 0 4 mg under the tongue every 5 (five) minutes as needed for chest pain, Historical Med      NON FORMULARY Medical marijuana, Historical Med      nystatin (MYCOSTATIN) powder Apply topically 2 (two) times a day, Starting Sat 1/28/2023, Normal      omeprazole (PriLOSEC) 10 mg delayed release capsule Take 10 mg by mouth daily, Historical Med      polyethylene glycol (MIRALAX) 17 g packet Take 17 g by mouth every other day, Starting Tue 12/21/2021, Normal      potassium chloride (K-DUR,KLOR-CON) 20 mEq tablet Take 2 tablets (40 mEq total) by mouth daily Do not start before January 29, 2023 , Starting Sun 1/29/2023, Normal      predniSONE 5 mg tablet Take by mouth daily, Historical Med      pregabalin (LYRICA) 25 mg capsule Take 1 tab PO at night for 7 days then increase to 1 tab PO bid, Normal      psyllium (METAMUCIL SMOOTH TEXTURE) 28 % packet Take 1 packet by mouth 2 (two) times a day, Starting Tue 12/21/2021, Normal      Saccharomyces boulardii (Probiotic) 250 MG CAPS Starting Fri 3/24/2023, Historical Med      solifenacin (VESICARE) 5 mg tablet Take 5 mg by mouth daily, Historical Med      tamsulosin (FLOMAX) 0 4 mg Take 0 4 mg by mouth daily at bedtime, Historical Med      torsemide (DEMADEX) 10 mg tablet Take 1 tablet (10 mg total) by mouth daily, Starting Tue 5/9/2023, Normal      traMADol (ULTRAM) 50 mg tablet Starting Fri 2/3/2023, Historical Med      Vitamin D, Cholecalciferol, 25 MCG (1000 UT) TABS Take by mouth in the morning, Historical Med           No discharge procedures on file      PDMP Review       Value Time User    PDMP Reviewed  Yes 1/28/2023  6:41 AM Arabella Carrion MD          ED Provider  Electronically Signed by     Effie Carvajal DO  06/09/23 8562

## 2023-07-10 ENCOUNTER — REMOTE DEVICE CLINIC VISIT (OUTPATIENT)
Dept: CARDIOLOGY CLINIC | Facility: CLINIC | Age: 88
End: 2023-07-10
Payer: COMMERCIAL

## 2023-07-10 DIAGNOSIS — Z95.818 PRESENCE OF OTHER CARDIAC IMPLANTS AND GRAFTS: Primary | ICD-10-CM

## 2023-07-10 PROCEDURE — G2066 INTER DEVC REMOTE 30D: HCPCS | Performed by: INTERNAL MEDICINE

## 2023-07-10 PROCEDURE — 93298 REM INTERROG DEV EVAL SCRMS: CPT | Performed by: INTERNAL MEDICINE

## 2023-07-10 NOTE — PROGRESS NOTES
Results for orders placed or performed in visit on 07/10/23   Cardiac EP device report    Narrative    [facility] IL[facility] - ACTIVE SYSTEM IS MRI CONDITIONAL  MERLIN TRANSMISSION: BATTERY STATUS "OK." NO PATIENT OR DEVICE ACTIVATED EPISODES. NORMAL DEVICE FUNCTION.  NC

## 2023-07-31 ENCOUNTER — TELEPHONE (OUTPATIENT)
Dept: CARDIOLOGY CLINIC | Facility: CLINIC | Age: 88
End: 2023-07-31

## 2023-07-31 NOTE — TELEPHONE ENCOUNTER
Patient's son aMciel Sidney called asking if patient needs his weight monitored, given diagnosis of CHF. If so, how often does he need to be weighed? Specific perimeters/    Maciel Little stated patient is a resident at an assisted living facility. They will not weigh patient without a doctor's order. Please advise.

## 2023-08-01 NOTE — TELEPHONE ENCOUNTER
Returned call to patient's son Faheem Baron communicating Dr Tomas's orders. Also informed Faheem Baron that Dr Ramon Vyas request will be Humaira arteaga to,[facility], the facility where patient is a resident, Yaima Busch. Humaira Alexis' d to [phone], yaima Busch.

## 2023-08-17 ENCOUNTER — OFFICE VISIT (OUTPATIENT)
Dept: PODIATRY | Facility: CLINIC | Age: 88
End: 2023-08-17
Payer: COMMERCIAL

## 2023-08-17 VITALS
BODY MASS INDEX: 33.33 KG/M2 | WEIGHT: 225 LBS | DIASTOLIC BLOOD PRESSURE: 82 MMHG | SYSTOLIC BLOOD PRESSURE: 132 MMHG | HEIGHT: 69 IN

## 2023-08-17 DIAGNOSIS — I73.9 PERIPHERAL VASCULAR DISEASE (HCC): ICD-10-CM

## 2023-08-17 DIAGNOSIS — S91.201A OPEN WOUND OF RIGHT GREAT TOE WITH DAMAGE TO NAIL, INITIAL ENCOUNTER: Primary | ICD-10-CM

## 2023-08-17 PROCEDURE — 99203 OFFICE O/P NEW LOW 30 MIN: CPT | Performed by: PODIATRIST

## 2023-08-22 ENCOUNTER — TELEPHONE (OUTPATIENT)
Dept: CARDIOLOGY CLINIC | Facility: CLINIC | Age: 88
End: 2023-08-22

## 2023-08-22 NOTE — TELEPHONE ENCOUNTER
Called son and advised, verbally understood, will have dental office call once appt is scheduled.     Verbally understood

## 2023-08-22 NOTE — TELEPHONE ENCOUNTER
Son called [facility] needs to have dental work and dentist is asking how long can he hold Eliquis, he has a cavity under gum line. Dental appt tba    Please advise how long to hold prior to dental procedure?

## 2023-08-23 ENCOUNTER — TELEPHONE (OUTPATIENT)
Dept: CARDIOLOGY CLINIC | Facility: CLINIC | Age: 88
End: 2023-08-23

## 2023-08-23 NOTE — TELEPHONE ENCOUNTER
Patient's son Austyn Aguirre) was transferred from [facility] to advise Dr Saint Hunter that his Father Juju Asher has gained 15 pounds since July and is very tired. He is inquiring if his Father should be seen sooner than scheduled 9/7/2023 appointment with Flash Scott and/or if he should be doing something regarding his symptoms. There is an opening tomorrow with Flash Scott at 3:40pm.  Is the patient ok to wait until tomorrow to be seen?   Please Advise

## 2023-08-24 ENCOUNTER — OFFICE VISIT (OUTPATIENT)
Dept: CARDIOLOGY CLINIC | Facility: CLINIC | Age: 88
End: 2023-08-24
Payer: COMMERCIAL

## 2023-08-24 VITALS
DIASTOLIC BLOOD PRESSURE: 66 MMHG | OXYGEN SATURATION: 94 % | BODY MASS INDEX: 34.36 KG/M2 | WEIGHT: 232 LBS | HEART RATE: 77 BPM | HEIGHT: 69 IN | SYSTOLIC BLOOD PRESSURE: 120 MMHG

## 2023-08-24 DIAGNOSIS — I50.20 HFREF (HEART FAILURE WITH REDUCED EJECTION FRACTION) (HCC): ICD-10-CM

## 2023-08-24 DIAGNOSIS — I50.42 CHRONIC COMBINED SYSTOLIC AND DIASTOLIC CONGESTIVE HEART FAILURE (HCC): Primary | ICD-10-CM

## 2023-08-24 PROCEDURE — 99214 OFFICE O/P EST MOD 30 MIN: CPT | Performed by: PHYSICIAN ASSISTANT

## 2023-08-24 RX ORDER — ONDANSETRON 4 MG/1
TABLET, FILM COATED ORAL
Status: ON HOLD | COMMUNITY
Start: 2023-06-20

## 2023-08-24 RX ORDER — GABAPENTIN 100 MG/1
100 CAPSULE ORAL 3 TIMES DAILY
Status: ON HOLD | COMMUNITY

## 2023-08-24 RX ORDER — CHLORHEXIDINE GLUCONATE 213 G/1000ML
SOLUTION TOPICAL
Status: ON HOLD | COMMUNITY
Start: 2023-08-18

## 2023-08-24 RX ORDER — OXYBUTYNIN CHLORIDE 5 MG/1
TABLET ORAL
Status: ON HOLD | COMMUNITY
Start: 2023-08-21

## 2023-08-24 RX ORDER — TORSEMIDE 10 MG/1
10 TABLET ORAL
Qty: 30 TABLET | Refills: 3 | Status: ON HOLD | OUTPATIENT
Start: 2023-08-24

## 2023-08-24 RX ORDER — CHOLECALCIFEROL (VITAMIN D3) 125 MCG
CAPSULE ORAL
Status: ON HOLD | COMMUNITY
Start: 2023-08-21

## 2023-08-24 RX ORDER — TORSEMIDE 20 MG/1
20 TABLET ORAL 3 TIMES WEEKLY
Qty: 30 TABLET | Refills: 3 | Status: ON HOLD | OUTPATIENT
Start: 2023-08-25

## 2023-08-24 RX ORDER — POVIDONE-IODINE 10 MG/ML
SOLUTION TOPICAL
Status: ON HOLD | COMMUNITY
Start: 2023-08-18

## 2023-08-24 NOTE — PATIENT INSTRUCTIONS
Please increase Torsemide to 10 mg Tuesday, Thursday, Saturday, Sunday and 20 mg on Monday, Wednesday, and Friday  Please check labs work a few days before next appointments   For the nursing department, please fax over updates on standing weights

## 2023-08-24 NOTE — PROGRESS NOTES
2Cardiology Office Follow Up  Jesus Krause  1934  [id number]      ASSESSMENT:  1. Chronic HFrEF, EF 30%  1. TTE 10/10/2022: EF 20%, global hypokinesis, abnormal diastolic function, mild AR, moderate MR (possibly underestimated), mild MT, mildly dilated 4.1cm.   2. TTE 1/26/2022: EF 30%, severe global hypokinesis, mild AR, moderate MR, ascending aortic root 4.4cm. 3. Current diuretic torsemide 10 daily  4. Weight 210 in the cardiology clinic April 11, 2023  2. Paroxysmal atrial fibrillation   1. ILR interrogation 1/4/2023: New AF detected. 2. Thromboembolix ppx: Eliquis 5mg BID  3. HR control: Toprol XL as above   3. Hx of TIA s/p ILR placement (12/17/2022)  4. Rheumatoid arthritis  5. CKD creatinine trending 1.1-1.4 since January 2023    PLAN/ DISCUSSION:  • Overloaded on exam today with 2+ bilateral lower extremity pitting edema. Torsemide recently decreased at the assisted living facility due to frequent urination. Previously was 20 mg daily now down to 10 mg daily. We will increase back to 20 mg daily on MWF and continue 10 mg daily TTSS. Hopefully this placed in volume can be removed and he can avoid issues with frequent urination. He is essentially bed or wheelchair bound which certainly does make things difficult when he is on higher dose of diuretics  • Check BMP prior to next visit which is in about 2 weeks  • Requested standing weights to be faxed over from assisted living facility  • Otherwise no changes to medications today  • Most recent standing weight 223 pounds at the facility    Interval History/ HPI:   Since last visit his dose of torsemide was cut in half due to frequent urination. He has noted gradually increasing swelling in his legs since that time. He is still urinating frequently however not quite as much. He has no pain or pressure in his chest.  He has had no palpitations or tightness. Does note some fatigue.      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[address])    • Spinal stenosis      Social History     Socioeconomic History   • Marital status:      Spouse name: Not on file   • Number of children: Not on file   • Years of education: Not on file   • Highest education level: Not on file   Occupational History   • Not on file   Tobacco Use   • Smoking status: Never   • Smokeless tobacco: Never   Vaping Use   • Vaping Use: Never used   Substance and Sexual Activity   • Alcohol use: Yes     Alcohol/week: 2.0 standard drinks of alcohol     Types: 2 Shots of liquor per week     Comment: 2-3 ounces of whiskey a day   • Drug use: Yes     Types: Marijuana   • Sexual activity: Not Currently   Other Topics Concern   • Not on file   Social History Narrative   • Not on file     Social Determinants of Health     Financial Resource Strain: Not on file   Food Insecurity: No Food Insecurity (1/30/2023)    Hunger Vital Sign    • Worried About Running Out of Food in the Last Year: Never true    • Ran Out of Food in the Last Year: Never true   Transportation Needs: No Transportation Needs (1/30/2023)    PRAPARE - Transportation    • Lack of Transportation (Medical): No    • Lack of Transportation (Non-Medical):  No   Physical Activity: Not on file   Stress: Not on file   Social Connections: Not on file   Intimate Partner Violence: Not on file   Housing Stability: Low Risk  (1/30/2023)    Housing Stability Vital Sign    • Unable to Pay for Housing in the Last Year: No    • Number of Places Lived in the Last Year: 1    • Unstable Housing in the Last Year: No      Family History   Problem Relation Age of Onset   • Colon polyps Neg Hx    • Colon cancer Neg Hx      Past Surgical History:   Procedure Laterality Date   • CARDIAC ELECTROPHYSIOLOGY PROCEDURE N/A 12/17/2022    Procedure: Cardiac loop recorder implant;  Surgeon: Mali Antoine MD;  Location: [facility];   Service: Cardiology   • CARPAL TUNNEL RELEASE Bilateral    • COLONOSCOPY     • LAMINECTOMY     • TOTAL KNEE ARTHROPLASTY Bilateral    • TOTAL SHOULDER REPLACEMENT         Current Outpatient Medications:   •  acetaminophen (TYLENOL) 650 mg CR tablet, Take 650 mg by mouth 2 (two) times a day, Disp: , Rfl:   •  allopurinol (ZYLOPRIM) 100 mg tablet, Take 400 mg by mouth daily, Disp: , Rfl:   •  apixaban (Eliquis) 5 mg, Take 1 tablet (5 mg total) by mouth 2 (two) times a day, Disp: 180 tablet, Rfl: 3  •  ascorbic acid (VITAMIN C) 500 MG tablet, Take 500 mg by mouth daily, Disp: , Rfl:   •  cetirizine (ZyrTEC) 10 mg tablet, Take 10 mg by mouth daily, Disp: , Rfl:   •  Diclofenac Sodium (VOLTAREN) 1 %, Apply 4 g topically 4 (four) times a day, Disp: , Rfl:   •  doxycycline hyclate (VIBRAMYCIN) 100 mg capsule, Take 50 mg by mouth every 12 (twelve) hours, Disp: , Rfl:   •  ezetimibe (ZETIA) 10 mg tablet, Take 10 mg by mouth daily, Disp: , Rfl:   •  fluticasone (FLONASE) 50 mcg/act nasal spray, , Disp: , Rfl:   •  hydroxychloroquine (PLAQUENIL) 200 mg tablet, Take 200 mg by mouth 2 (two) times a day with meals, Disp: , Rfl:   •  levothyroxine 25 mcg tablet, , Disp: , Rfl:   •  loperamide (IMODIUM) 2 mg capsule, Take 2 mg by mouth as needed for diarrhea, Disp: , Rfl:   •  magnesium Oxide (MAG-OX) 400 mg TABS, Take 1 tablet (400 mg total) by mouth 2 (two) times a day for 15 days (Patient not taking: Reported on 4/11/2023), Disp: 30 tablet, Rfl: 0  •  melatonin 3 mg, Take 3 tablets (9 mg total) by mouth daily at bedtime, Disp: 30 tablet, Rfl: 0  •  metoprolol succinate (TOPROL-XL) 25 mg 24 hr tablet, Take 1 tablet (25 mg total) by mouth 2 (two) times a day, Disp: 180 tablet, Rfl: 3  •  Multiple Vitamin (multivitamin) tablet, Take 1 tablet by mouth daily, Disp: , Rfl:   •  mupirocin (BACTROBAN) 2 % ointment, Apply 1 application topically daily, Disp: , Rfl:   •  nitroglycerin (NITROSTAT) 0.4 mg SL tablet, Place 0.4 mg under the tongue every 5 (five) minutes as needed for chest pain, Disp: , Rfl:   •  NON FORMULARY, Medical marijuana, Disp: , Rfl:   •  nystatin (MYCOSTATIN) powder, Apply topically 2 (two) times a day, Disp: 15 g, Rfl: 0  •  omeprazole (PriLOSEC) 10 mg delayed release capsule, Take 10 mg by mouth daily, Disp: , Rfl:   •  polyethylene glycol (MIRALAX) 17 g packet, Take 17 g by mouth every other day, Disp: 30 each, Rfl: 2  •  potassium chloride (K-DUR,KLOR-CON) 20 mEq tablet, Take 2 tablets (40 mEq total) by mouth daily Do not start before January 29, 2023., Disp: 30 tablet, Rfl: 0  •  predniSONE 5 mg tablet, Take by mouth daily, Disp: , Rfl:   •  pregabalin (LYRICA) 25 mg capsule, Take 1 tab PO at night for 7 days then increase to 1 tab PO bid, Disp: 60 capsule, Rfl: 3  •  psyllium (METAMUCIL SMOOTH TEXTURE) 28 % packet, Take 1 packet by mouth 2 (two) times a day, Disp: 30 packet, Rfl: 2  •  Saccharomyces boulardii (Probiotic) 250 MG CAPS, , Disp: , Rfl:   •  solifenacin (VESICARE) 5 mg tablet, Take 5 mg by mouth daily, Disp: , Rfl:   •  tamsulosin (FLOMAX) 0.4 mg, Take 0.4 mg by mouth daily at bedtime, Disp: , Rfl:   •  torsemide (DEMADEX) 10 mg tablet, Take 1 tablet (10 mg total) by mouth daily, Disp: 30 tablet, Rfl: 3  •  traMADol (ULTRAM) 50 mg tablet, , Disp: , Rfl:   •  Vitamin D, Cholecalciferol, 25 MCG (1000 UT) TABS, Take by mouth in the morning, Disp: , Rfl:       Review of Systems:  Review of Systems   Constitutional: Positive for fatigue. Negative for chills and fever. HENT: Negative for ear pain and sore throat. Eyes: Negative for pain and visual disturbance. Respiratory: Negative for cough and shortness of breath. Cardiovascular: Positive for leg swelling. Negative for chest pain and palpitations.    Gastrointestinal: Negative for abdominal pain and vomiting. Genitourinary: Negative for dysuria and hematuria. Musculoskeletal: Negative for arthralgias and back pain. Skin: Negative for color change and rash. Neurological: Negative for seizures and syncope. All other systems reviewed and are negative. Physical Exam:  Physical Exam  Constitutional:       Appearance: He is well-developed. HENT:      Head: Normocephalic and atraumatic. Eyes:      Pupils: Pupils are equal, round, and reactive to light. Cardiovascular:      Rate and Rhythm: Normal rate and regular rhythm. Heart sounds: No murmur heard. No friction rub. No gallop. Pulmonary:      Effort: Pulmonary effort is normal. No respiratory distress. Breath sounds: Normal breath sounds. No wheezing or rales. Abdominal:      General: Bowel sounds are normal. There is no distension. Palpations: Abdomen is soft. Tenderness: There is no abdominal tenderness. Musculoskeletal:         General: No deformity. Normal range of motion. Cervical back: Normal range of motion and neck supple. Comments: Bilateral 2+ pitting edema in lower extremities   Skin:     General: Skin is warm and dry. Neurological:      Mental Status: He is alert and oriented to person, place, and time. Psychiatric:         Behavior: Behavior normal.         This note was completed in part utilizing [employer] Software. Grammatical errors, random word insertions, spelling mistakes, and incomplete sentences can be an occasional consequence of this system secondary to software limitations, ambient noise, and hardware issues. If you have any questions or concerns about the content, text, or information contained within the body of this dictation, please contact the provider for clarification.

## 2023-08-28 ENCOUNTER — OFFICE VISIT (OUTPATIENT)
Dept: WOUND CARE | Facility: HOSPITAL | Age: 88
End: 2023-08-28
Payer: COMMERCIAL

## 2023-08-28 VITALS
BODY MASS INDEX: 32.58 KG/M2 | RESPIRATION RATE: 18 BRPM | SYSTOLIC BLOOD PRESSURE: 146 MMHG | HEART RATE: 70 BPM | HEIGHT: 69 IN | WEIGHT: 220 LBS | TEMPERATURE: 97.1 F | DIASTOLIC BLOOD PRESSURE: 78 MMHG

## 2023-08-28 DIAGNOSIS — S91.201D OPEN WOUND OF RIGHT GREAT TOE WITH DAMAGE TO NAIL, SUBSEQUENT ENCOUNTER: Primary | ICD-10-CM

## 2023-08-28 DIAGNOSIS — I73.9 PERIPHERAL VASCULAR DISEASE (HCC): ICD-10-CM

## 2023-08-28 PROCEDURE — 99213 OFFICE O/P EST LOW 20 MIN: CPT | Performed by: PODIATRIST

## 2023-08-28 NOTE — PROGRESS NOTES
Patient ID: Naomi Ellis is a 80 y.o. male Date of Birth 1934       No chief complaint on file. Allergies:  Colchicine, Niacin, and Statins    Diagnosis:  1. Open wound of right great toe with damage to nail, initial encounter  -     VAS lower limb arterial duplex, complete bilateral; Future; Expected date: 08/17/2023  -     Ambulatory Referral to Wound Care; Future  -     Post Op Shoe    2. Peripheral vascular disease (HCC)  -     VAS lower limb arterial duplex, complete bilateral; Future; Expected date: 08/17/2023  -     Ambulatory Referral to Wound Care; Future       Diagnosis ICD-10-CM Associated Orders   1. Open wound of right great toe with damage to nail, initial encounter  S91.201A VAS lower limb arterial duplex, complete bilateral     Ambulatory Referral to Wound Care     Post Op Shoe      2. Peripheral vascular disease (HCC)  I73.9 VAS lower limb arterial duplex, complete bilateral     Ambulatory Referral to Wound Care           Assessment & Plan:  • Dry sterile dressing with Betadine, Adaptic, and alginate applied. To be changed every other day. • Rx arterial duplex to evaluate arterial perfusion  • Dispensed postop shoe  • Recommend follow-up at wound care center in 2 weeks. • Previous x-rays reviewed show no evidence of fracture. Subjective:   8/17/2023: 80year-old type II diabetic presents today with chief complaint of wound right great toe secondary to him injuring it after falling in the bathroom at [address] on 8/6/2023. Denies any fever or shortness of breath.       The following portions of the patient's history were reviewed and updated as appropriate:   Patient Active Problem List   Diagnosis   • Accidental overdose   • History of hypertension   • Hypotension   • RA (rheumatoid arthritis) ([address])   • Speech disturbance   • TIA (transient ischemic attack)   • Low left ventricular ejection fraction   • Dilated cardiomyopathy (HCC)   • HTN (hypertension)   • Chronic systolic heart failure (HCC)   • Transient speech disturbance   • KAMLESH (acute kidney injury) ([address])   • Elevated TSH   • Hypomagnesemia   • Septic arthritis of shoulder, right (HCC)   • Suture of skin wound   • Adverse effect of loop (high-ceiling) diuretics, subsequent encounter   • Toxic metabolic encephalopathy   • Open wound of right great toe with damage to nail   • Cellulitis of left upper extremity   • HLD (hyperlipidemia)   • GERD (gastroesophageal reflux disease)   • Gout without acute exacerbation    • Spinal stenosis   • Hypothyroidism   • Peripheral vascular disease (HCC)   • Bacteremia due to group B Streptococcus   • Respiratory insufficiency     Past Medical History:   Diagnosis Date   • GERD (gastroesophageal reflux disease)    • Gout    • Hyperlipidemia    • Hypertension    • 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[address])    • Spinal stenosis      Past Surgical History:   Procedure Laterality Date   • CARDIAC ELECTROPHYSIOLOGY PROCEDURE N/A 12/17/2022    Procedure: Cardiac loop recorder implant;  Surgeon: Kim Powers MD;  Location:  C[facility]; Service: Cardiology   • CARPAL TUNNEL RELEASE Bilateral    • COLONOSCOPY     • LAMINECTOMY     • TOTAL KNEE ARTHROPLASTY Bilateral    • TOTAL SHOULDER REPLACEMENT       Social History     Socioeconomic History   • Marital status:      Spouse name: None   • Number of children: None   • Years of education: None   • Highest education level: None   Occupational History   • None   Tobacco Use   • Smoking status: Never   • Smokeless tobacco: Never   Vaping Use   • Vaping Use: Never used   Substance and Sexual Activity   • Alcohol use:  Yes     Alcohol/week: 2.0 standard drinks of alcohol     Types: 2 Shots of liquor per week     Comment: 2-3 ounces of whiskey a day   • Drug use: Yes     Types: Marijuana   • Sexual activity: Not Currently   Other Topics Concern   • None   Social History Narrative   • None     Social Determinants of Health     Financial Resource Strain: Not on file Food Insecurity: No Food Insecurity (8/30/2023)    Hunger Vital Sign    • Worried About Running Out of Food in the Last Year: Never true    • Ran Out of Food in the Last Year: Never true   Transportation Needs: No Transportation Needs (8/30/2023)    PRAPARE - Transportation    • Lack of Transportation (Medical): No    • Lack of Transportation (Non-Medical):  No   Physical Activity: Not on file   Stress: Not on file   Social Connections: Not on file   Intimate Partner Violence: Not on file   Housing Stability: Low Risk  (8/30/2023)    Housing Stability Vital Sign    • Unable to Pay for Housing in the Last Year: No    • Number of Places Lived in the Last Year: 1    • Unstable Housing in the Last Year: No        Current Outpatient Medications:   •  acetaminophen (TYLENOL) 650 mg CR tablet, Take 650 mg by mouth 2 (two) times a day, Disp: , Rfl:   •  allopurinol (ZYLOPRIM) 100 mg tablet, Take 400 mg by mouth daily, Disp: , Rfl:   •  apixaban (Eliquis) 5 mg, Take 1 tablet (5 mg total) by mouth 2 (two) times a day, Disp: 180 tablet, Rfl: 3  •  Betadine 10 % external solution, , Disp: , Rfl:   •  Cholecalciferol (Vitamin D3) 50 MCG (2000 UT) TABS, , Disp: , Rfl:   •  Diclofenac Sodium (VOLTAREN) 1 %, Apply 4 g topically 4 (four) times a day, Disp: , Rfl:   •  diphenhydrAMINE-zinc acetate (BENADRYL) cream, Apply topically 3 (three) times a day as needed for itching, Disp: , Rfl: 0  •  ezetimibe (ZETIA) 10 mg tablet, Take 10 mg by mouth daily, Disp: , Rfl:   •  gabapentin (NEURONTIN) 100 mg capsule, Take 100 mg by mouth 3 (three) times a day, Disp: , Rfl:   •  hydroxychloroquine (PLAQUENIL) 200 mg tablet, Take 200 mg by mouth 2 (two) times a day with meals, Disp: , Rfl:   •  levothyroxine 25 mcg tablet, , Disp: , Rfl:   •  loperamide (IMODIUM) 2 mg capsule, Take 2 mg by mouth as needed for diarrhea, Disp: , Rfl:   •  melatonin 3 mg, Take 3 tablets (9 mg total) by mouth daily at bedtime (Patient not taking: Reported on 8/24/2023), Disp: 30 tablet, Rfl: 0  •  metoprolol succinate (TOPROL-XL) 25 mg 24 hr tablet, Take 1 tablet (25 mg total) by mouth 2 (two) times a day, Disp: 180 tablet, Rfl: 3  •  Multiple Vitamin (multivitamin) tablet, Take 1 tablet by mouth daily, Disp: , Rfl:   •  nitroglycerin (NITROSTAT) 0.4 mg SL tablet, Place 0.4 mg under the tongue every 5 (five) minutes as needed for chest pain, Disp: , Rfl:   •  nystatin (MYCOSTATIN) powder, Apply topically 2 (two) times a day (Patient not taking: Reported on 8/24/2023), Disp: 15 g, Rfl: 0  •  omeprazole (PriLOSEC) 10 mg delayed release capsule, Take 10 mg by mouth daily, Disp: , Rfl:   •  polyethylene glycol (MIRALAX) 17 g packet, Take 17 g by mouth every other day, Disp: 30 each, Rfl: 2  •  potassium chloride (K-DUR,KLOR-CON) 20 mEq tablet, Take 2 tablets (40 mEq total) by mouth daily Do not start before January 29, 2023., Disp: 30 tablet, Rfl: 0  •  predniSONE 5 mg tablet, Take by mouth daily, Disp: , Rfl:   •  Saccharomyces boulardii (Probiotic) 250 MG CAPS, , Disp: , Rfl:   •  tamsulosin (FLOMAX) 0.4 mg, Take 0.4 mg by mouth daily at bedtime, Disp: , Rfl:   •  torsemide (DEMADEX) 10 mg tablet, Take 1 tablet (10 mg total) by mouth 4 (four) times a week Take 10 mg on Tuesday, Thursday, Saturday, and Sunday, Disp: 30 tablet, Rfl: 3  •  torsemide (DEMADEX) 20 mg tablet, Take 1 tablet (20 mg total) by mouth 3 (three) times a week Take 20 mg on Monday, Wednesday, and Friday, Disp: 30 tablet, Rfl: 3  •  traMADol (ULTRAM) 50 mg tablet, Take 1 tablet (50 mg total) by mouth every 6 (six) hours as needed for moderate pain or severe pain, Disp: 15 tablet, Rfl: 0  •  Vitamin D, Cholecalciferol, 25 MCG (1000 UT) TABS, Take by mouth in the morning, Disp: , Rfl:   Family History   Problem Relation Age of Onset   • Colon polyps Neg Hx    • Colon cancer Neg Hx       Review of Systems   Constitutional: Negative. HENT: Negative. Eyes: Negative. Respiratory: Negative. Cardiovascular: Negative. Gastrointestinal: Negative. Endocrine: Negative. Genitourinary: Negative. Skin: Positive for color change and wound (Right great toe injury). Allergic/Immunologic: Negative. Neurological: Negative. Hematological: Negative. Psychiatric/Behavioral: Negative. Objective:  /82   Ht 5' 9" (1.753 m)   Wt 102 kg (225 lb)   BMI 33.23 kg/m²     Physical Exam  Constitutional:       Appearance: Normal appearance. HENT:      Head: Normocephalic. Right Ear: Tympanic membrane normal.      Left Ear: Tympanic membrane normal.      Nose: No congestion. Mouth/Throat:      Pharynx: No oropharyngeal exudate or posterior oropharyngeal erythema. Eyes:      Conjunctiva/sclera: Conjunctivae normal.      Pupils: Pupils are equal, round, and reactive to light. Cardiovascular:      Rate and Rhythm: Normal rate and regular rhythm. Pulses:           Dorsalis pedis pulses are 0 on the right side and 0 on the left side. Posterior tibial pulses are 0 on the right side and 0 on the left side. Pulmonary:      Effort: Pulmonary effort is normal.   Abdominal:      Tenderness: There is no right CVA tenderness. Feet:      Right foot:      Protective Sensation: 10 sites tested. 9 sites sensed. Toenail Condition: Right toenails are normal.      Left foot:      Protective Sensation: 10 sites tested. 9 sites sensed. Toenail Condition: Left toenails are normal.   Skin:     General: Skin is warm and dry. Capillary Refill: Capillary refill takes 2 to 3 seconds. Coloration: Skin is not pale. Findings: No bruising, erythema, lesion or rash. Comments: Traumatic wound dorsal lateral aspect of the right great toe involving the nailbed which resulted in onycholysis and detachment. Some superficial slough the wound bed is grossly intact. Minimal serosanguineous drainage. No signs of infection noted.   Delayed healing with skin slough apparent. Texture tone and turgor are diminished with moderate atrophic changes present no digital hair noted. Toes are extremely cool to touch. Neurological:      General: No focal deficit present. Mental Status: He is alert. Cranial Nerves: No cranial nerve deficit. Sensory: No sensory deficit. Motor: No weakness. Gait: Gait normal.      Deep Tendon Reflexes: Reflexes normal.   Psychiatric:         Mood and Affect: Mood normal.         Behavior: Behavior normal.         Judgment: Judgment normal.         Wound 12/17/22 Incision Chest Lateral;Left;Upper (Active)       Wound 01/26/23 Surgical Face Left (Active)       Wound 08/28/23 Traumatic Toe (Comment  which one) Right (Active)       Procedures             Wound Instructions:  Orders Placed This Encounter   Procedures   • Post Op Shoe   • Ambulatory Referral to Wound Care     Standing Status:   Future     Standing Expiration Date:   8/17/2024     Referral Priority:   Routine     Referral Type:   Consult - AMB     Referral Reason:   Specialty Services Required     Requested Specialty:   Wound Care     Number of Visits Requested:   1     Expiration Date:   8/17/2024         Tramaine Millan, DPM      Portions of the record may have been created with voice recognition software. Occasional wrong word or "sound a like" substitutions may have occurred due to the inherent limitations of voice recognition software. Read the chart carefully and recognize, using context, where substitutions have occurred.

## 2023-08-28 NOTE — PATIENT INSTRUCTIONS
Orders Placed This Encounter   Procedures    Wound cleansing and dressings     Wash your hands with soap and water. Remove old dressing, discard into plastic bag and place in trash. Cleanse the wound with soap and water prior to applying a clean dressing. Do not use tissue or cotton balls. Do not scrub the wound. Pat dry using gauze. Shower YES WITH ASSISTANCE  Apply BETADINE to right first toe nail bed  wound   Apply to the RIGHT FIRST TOE wound. Cover with GAUZE  Secure with TAPE.  AND STRETCH Netting  Change dressing EVERY OTHER DAY    DONE TODAY   RETURN IN 2 WEEKS     Standing Status:   Future     Standing Expiration Date:   8/28/2024

## 2023-08-29 ENCOUNTER — APPOINTMENT (INPATIENT)
Dept: NON INVASIVE DIAGNOSTICS | Facility: HOSPITAL | Age: 88
DRG: 871 | End: 2023-08-29
Payer: COMMERCIAL

## 2023-08-29 ENCOUNTER — APPOINTMENT (INPATIENT)
Dept: CT IMAGING | Facility: HOSPITAL | Age: 88
DRG: 871 | End: 2023-08-29
Payer: COMMERCIAL

## 2023-08-29 ENCOUNTER — APPOINTMENT (EMERGENCY)
Dept: RADIOLOGY | Facility: HOSPITAL | Age: 88
DRG: 871 | End: 2023-08-29
Payer: COMMERCIAL

## 2023-08-29 ENCOUNTER — HOSPITAL ENCOUNTER (INPATIENT)
Facility: HOSPITAL | Age: 88
LOS: 11 days | Discharge: NURSING, REHAB OR LONG-TERM CARE | DRG: 871 | End: 2023-09-09
Attending: EMERGENCY MEDICINE | Admitting: INTERNAL MEDICINE
Payer: COMMERCIAL

## 2023-08-29 DIAGNOSIS — R21 RASH: ICD-10-CM

## 2023-08-29 DIAGNOSIS — A41.9 SEPSIS (HCC): ICD-10-CM

## 2023-08-29 DIAGNOSIS — R00.0 SINUS TACHYCARDIA: ICD-10-CM

## 2023-08-29 DIAGNOSIS — I73.9 PAD (PERIPHERAL ARTERY DISEASE) (HCC): ICD-10-CM

## 2023-08-29 DIAGNOSIS — L03.114 CELLULITIS OF LEFT ARM: Primary | ICD-10-CM

## 2023-08-29 PROBLEM — K21.9 GERD (GASTROESOPHAGEAL REFLUX DISEASE): Status: ACTIVE | Noted: 2023-08-29

## 2023-08-29 PROBLEM — E03.9 HYPOTHYROIDISM: Status: ACTIVE | Noted: 2023-08-29

## 2023-08-29 PROBLEM — I50.22 CHRONIC SYSTOLIC HEART FAILURE (HCC): Status: ACTIVE | Noted: 2023-01-26

## 2023-08-29 PROBLEM — I48.91 A-FIB (HCC): Status: ACTIVE | Noted: 2023-01-26

## 2023-08-29 PROBLEM — R65.20 SEVERE SEPSIS (HCC): Status: ACTIVE | Noted: 2023-08-29

## 2023-08-29 PROBLEM — M10.9 GOUT: Status: ACTIVE | Noted: 2023-08-29

## 2023-08-29 PROBLEM — M48.00 SPINAL STENOSIS: Status: ACTIVE | Noted: 2023-08-29

## 2023-08-29 PROBLEM — E78.5 HLD (HYPERLIPIDEMIA): Status: ACTIVE | Noted: 2023-08-29

## 2023-08-29 LAB
2HR DELTA HS TROPONIN: 13 NG/L
4HR DELTA HS TROPONIN: 29 NG/L
ALBUMIN SERPL BCP-MCNC: 3.6 G/DL (ref 3.5–5)
ALP SERPL-CCNC: 63 U/L (ref 34–104)
ALT SERPL W P-5'-P-CCNC: 17 U/L (ref 7–52)
ANA SER QL IA: NEGATIVE
ANION GAP SERPL CALCULATED.3IONS-SCNC: 10 MMOL/L
ANION GAP SERPL CALCULATED.3IONS-SCNC: 11 MMOL/L
APTT PPP: 29 SECONDS (ref 23–37)
APTT PPP: 42 SECONDS (ref 23–37)
APTT PPP: 47 SECONDS (ref 23–37)
AST SERPL W P-5'-P-CCNC: 23 U/L (ref 13–39)
BACTERIA UR QL AUTO: NORMAL /HPF
BASOPHILS # BLD AUTO: 0.02 THOUSANDS/ÂΜL (ref 0–0.1)
BASOPHILS NFR BLD AUTO: 0 % (ref 0–1)
BILIRUB SERPL-MCNC: 0.94 MG/DL (ref 0.2–1)
BILIRUB UR QL STRIP: NEGATIVE
BNP SERPL-MCNC: 167 PG/ML (ref 0–100)
BUN SERPL-MCNC: 19 MG/DL (ref 5–25)
BUN SERPL-MCNC: 19 MG/DL (ref 5–25)
CALCIUM SERPL-MCNC: 7.1 MG/DL (ref 8.4–10.2)
CALCIUM SERPL-MCNC: 8.2 MG/DL (ref 8.4–10.2)
CARDIAC TROPONIN I PNL SERPL HS: 17 NG/L
CARDIAC TROPONIN I PNL SERPL HS: 30 NG/L
CARDIAC TROPONIN I PNL SERPL HS: 46 NG/L
CHLORIDE SERPL-SCNC: 103 MMOL/L (ref 96–108)
CHLORIDE SERPL-SCNC: 105 MMOL/L (ref 96–108)
CLARITY UR: CLEAR
CO2 SERPL-SCNC: 26 MMOL/L (ref 21–32)
CO2 SERPL-SCNC: 28 MMOL/L (ref 21–32)
COLOR UR: YELLOW
CORTIS SERPL-MCNC: 10 UG/DL
CREAT SERPL-MCNC: 1.1 MG/DL (ref 0.6–1.3)
CREAT SERPL-MCNC: 1.13 MG/DL (ref 0.6–1.3)
EOSINOPHIL # BLD AUTO: 0.18 THOUSAND/ÂΜL (ref 0–0.61)
EOSINOPHIL NFR BLD AUTO: 2 % (ref 0–6)
ERYTHROCYTE [DISTWIDTH] IN BLOOD BY AUTOMATED COUNT: 15.5 % (ref 11.6–15.1)
FLUAV RNA RESP QL NAA+PROBE: NEGATIVE
FLUBV RNA RESP QL NAA+PROBE: NEGATIVE
GFR SERPL CREATININE-BSD FRML MDRD: 57 ML/MIN/1.73SQ M
GFR SERPL CREATININE-BSD FRML MDRD: 59 ML/MIN/1.73SQ M
GLUCOSE SERPL-MCNC: 125 MG/DL (ref 65–140)
GLUCOSE SERPL-MCNC: 160 MG/DL (ref 65–140)
GLUCOSE UR STRIP-MCNC: NEGATIVE MG/DL
HCT VFR BLD AUTO: 40.1 % (ref 36.5–49.3)
HGB BLD-MCNC: 12.8 G/DL (ref 12–17)
HGB UR QL STRIP.AUTO: NEGATIVE
IMM GRANULOCYTES # BLD AUTO: 0.04 THOUSAND/UL (ref 0–0.2)
IMM GRANULOCYTES NFR BLD AUTO: 0 % (ref 0–2)
INR PPP: 1.31 (ref 0.84–1.19)
INR PPP: 1.41 (ref 0.84–1.19)
KETONES UR STRIP-MCNC: NEGATIVE MG/DL
LACTATE SERPL-SCNC: 1.3 MMOL/L (ref 0.5–2)
LACTATE SERPL-SCNC: 1.9 MMOL/L (ref 0.5–2)
LACTATE SERPL-SCNC: 2.1 MMOL/L (ref 0.5–2)
LEUKOCYTE ESTERASE UR QL STRIP: NEGATIVE
LYMPHOCYTES # BLD AUTO: 1.56 THOUSANDS/ÂΜL (ref 0.6–4.47)
LYMPHOCYTES NFR BLD AUTO: 17 % (ref 14–44)
MCH RBC QN AUTO: 31.7 PG (ref 26.8–34.3)
MCHC RBC AUTO-ENTMCNC: 31.9 G/DL (ref 31.4–37.4)
MCV RBC AUTO: 99 FL (ref 82–98)
MONOCYTES # BLD AUTO: 0.17 THOUSAND/ÂΜL (ref 0.17–1.22)
MONOCYTES NFR BLD AUTO: 2 % (ref 4–12)
NEUTROPHILS # BLD AUTO: 7.12 THOUSANDS/ÂΜL (ref 1.85–7.62)
NEUTS SEG NFR BLD AUTO: 79 % (ref 43–75)
NITRITE UR QL STRIP: NEGATIVE
NON-SQ EPI CELLS URNS QL MICRO: NORMAL /HPF
NRBC BLD AUTO-RTO: 0 /100 WBCS
PH UR STRIP.AUTO: 5.5 [PH]
PLATELET # BLD AUTO: 136 THOUSANDS/UL (ref 149–390)
PMV BLD AUTO: 12.3 FL (ref 8.9–12.7)
POTASSIUM SERPL-SCNC: 3.5 MMOL/L (ref 3.5–5.3)
POTASSIUM SERPL-SCNC: 3.9 MMOL/L (ref 3.5–5.3)
PROCALCITONIN SERPL-MCNC: 0.16 NG/ML
PROT SERPL-MCNC: 7.1 G/DL (ref 6.4–8.4)
PROT UR STRIP-MCNC: ABNORMAL MG/DL
PROTHROMBIN TIME: 17.1 SECONDS (ref 11.6–14.5)
PROTHROMBIN TIME: 18.2 SECONDS (ref 11.6–14.5)
RBC # BLD AUTO: 4.04 MILLION/UL (ref 3.88–5.62)
RBC #/AREA URNS AUTO: NORMAL /HPF
RSV RNA RESP QL NAA+PROBE: NEGATIVE
SARS-COV-2 RNA RESP QL NAA+PROBE: NEGATIVE
SODIUM SERPL-SCNC: 141 MMOL/L (ref 135–147)
SODIUM SERPL-SCNC: 142 MMOL/L (ref 135–147)
SP GR UR STRIP.AUTO: 1.02 (ref 1–1.03)
UROBILINOGEN UR STRIP-ACNC: <2 MG/DL
WBC # BLD AUTO: 9.09 THOUSAND/UL (ref 4.31–10.16)
WBC #/AREA URNS AUTO: NORMAL /HPF

## 2023-08-29 PROCEDURE — 93005 ELECTROCARDIOGRAM TRACING: CPT

## 2023-08-29 PROCEDURE — 99292 CRITICAL CARE ADDL 30 MIN: CPT

## 2023-08-29 PROCEDURE — 84484 ASSAY OF TROPONIN QUANT: CPT

## 2023-08-29 PROCEDURE — 93970 EXTREMITY STUDY: CPT

## 2023-08-29 PROCEDURE — 99291 CRITICAL CARE FIRST HOUR: CPT | Performed by: EMERGENCY MEDICINE

## 2023-08-29 PROCEDURE — 96365 THER/PROPH/DIAG IV INF INIT: CPT

## 2023-08-29 PROCEDURE — 80048 BASIC METABOLIC PNL TOTAL CA: CPT

## 2023-08-29 PROCEDURE — 83605 ASSAY OF LACTIC ACID: CPT | Performed by: EMERGENCY MEDICINE

## 2023-08-29 PROCEDURE — 85730 THROMBOPLASTIN TIME PARTIAL: CPT | Performed by: PHYSICIAN ASSISTANT

## 2023-08-29 PROCEDURE — 0241U HB NFCT DS VIR RESP RNA 4 TRGT: CPT | Performed by: EMERGENCY MEDICINE

## 2023-08-29 PROCEDURE — 73201 CT UPPER EXTREMITY W/DYE: CPT

## 2023-08-29 PROCEDURE — 83520 IMMUNOASSAY QUANT NOS NONAB: CPT

## 2023-08-29 PROCEDURE — 85730 THROMBOPLASTIN TIME PARTIAL: CPT | Performed by: EMERGENCY MEDICINE

## 2023-08-29 PROCEDURE — 80053 COMPREHEN METABOLIC PANEL: CPT | Performed by: EMERGENCY MEDICINE

## 2023-08-29 PROCEDURE — G1004 CDSM NDSC: HCPCS

## 2023-08-29 PROCEDURE — 87040 BLOOD CULTURE FOR BACTERIA: CPT | Performed by: EMERGENCY MEDICINE

## 2023-08-29 PROCEDURE — 96368 THER/DIAG CONCURRENT INF: CPT

## 2023-08-29 PROCEDURE — 83605 ASSAY OF LACTIC ACID: CPT

## 2023-08-29 PROCEDURE — 81001 URINALYSIS AUTO W/SCOPE: CPT | Performed by: EMERGENCY MEDICINE

## 2023-08-29 PROCEDURE — 86038 ANTINUCLEAR ANTIBODIES: CPT

## 2023-08-29 PROCEDURE — 36415 COLL VENOUS BLD VENIPUNCTURE: CPT | Performed by: EMERGENCY MEDICINE

## 2023-08-29 PROCEDURE — 85730 THROMBOPLASTIN TIME PARTIAL: CPT

## 2023-08-29 PROCEDURE — 94760 N-INVAS EAR/PLS OXIMETRY 1: CPT

## 2023-08-29 PROCEDURE — 84484 ASSAY OF TROPONIN QUANT: CPT | Performed by: EMERGENCY MEDICINE

## 2023-08-29 PROCEDURE — 82785 ASSAY OF IGE: CPT

## 2023-08-29 PROCEDURE — 85025 COMPLETE CBC W/AUTO DIFF WBC: CPT | Performed by: EMERGENCY MEDICINE

## 2023-08-29 PROCEDURE — 85610 PROTHROMBIN TIME: CPT

## 2023-08-29 PROCEDURE — 87081 CULTURE SCREEN ONLY: CPT | Performed by: INTERNAL MEDICINE

## 2023-08-29 PROCEDURE — 85610 PROTHROMBIN TIME: CPT | Performed by: EMERGENCY MEDICINE

## 2023-08-29 PROCEDURE — 87154 CUL TYP ID BLD PTHGN 6+ TRGT: CPT | Performed by: EMERGENCY MEDICINE

## 2023-08-29 PROCEDURE — 71045 X-RAY EXAM CHEST 1 VIEW: CPT

## 2023-08-29 PROCEDURE — 99285 EMERGENCY DEPT VISIT HI MDM: CPT

## 2023-08-29 PROCEDURE — 99291 CRITICAL CARE FIRST HOUR: CPT

## 2023-08-29 PROCEDURE — 84145 PROCALCITONIN (PCT): CPT | Performed by: EMERGENCY MEDICINE

## 2023-08-29 PROCEDURE — 87181 SC STD AGAR DILUTION PER AGT: CPT | Performed by: EMERGENCY MEDICINE

## 2023-08-29 PROCEDURE — 82533 TOTAL CORTISOL: CPT

## 2023-08-29 PROCEDURE — 83880 ASSAY OF NATRIURETIC PEPTIDE: CPT | Performed by: EMERGENCY MEDICINE

## 2023-08-29 PROCEDURE — 94640 AIRWAY INHALATION TREATMENT: CPT

## 2023-08-29 PROCEDURE — 86037 ANCA TITER EACH ANTIBODY: CPT

## 2023-08-29 RX ORDER — SODIUM CHLORIDE, SODIUM GLUCONATE, SODIUM ACETATE, POTASSIUM CHLORIDE, MAGNESIUM CHLORIDE, SODIUM PHOSPHATE, DIBASIC, AND POTASSIUM PHOSPHATE .53; .5; .37; .037; .03; .012; .00082 G/100ML; G/100ML; G/100ML; G/100ML; G/100ML; G/100ML; G/100ML
250 INJECTION, SOLUTION INTRAVENOUS ONCE
Status: COMPLETED | OUTPATIENT
Start: 2023-08-29 | End: 2023-08-29

## 2023-08-29 RX ORDER — TRAMADOL HYDROCHLORIDE 50 MG/1
50 TABLET ORAL EVERY 6 HOURS PRN
Status: DISCONTINUED | OUTPATIENT
Start: 2023-08-29 | End: 2023-09-09 | Stop reason: HOSPADM

## 2023-08-29 RX ORDER — ALBUMIN, HUMAN INJ 5% 5 %
25 SOLUTION INTRAVENOUS ONCE
Status: COMPLETED | OUTPATIENT
Start: 2023-08-29 | End: 2023-08-29

## 2023-08-29 RX ORDER — LANOLIN ALCOHOL/MO/W.PET/CERES
9 CREAM (GRAM) TOPICAL
Status: DISCONTINUED | OUTPATIENT
Start: 2023-08-29 | End: 2023-09-09 | Stop reason: HOSPADM

## 2023-08-29 RX ORDER — HEPARIN SODIUM 10000 [USP'U]/100ML
3-20 INJECTION, SOLUTION INTRAVENOUS
Status: DISCONTINUED | OUTPATIENT
Start: 2023-08-29 | End: 2023-09-02

## 2023-08-29 RX ORDER — EZETIMIBE 10 MG/1
10 TABLET ORAL DAILY
Status: DISCONTINUED | OUTPATIENT
Start: 2023-08-29 | End: 2023-09-09 | Stop reason: HOSPADM

## 2023-08-29 RX ORDER — SODIUM CHLORIDE, SODIUM GLUCONATE, SODIUM ACETATE, POTASSIUM CHLORIDE, MAGNESIUM CHLORIDE, SODIUM PHOSPHATE, DIBASIC, AND POTASSIUM PHOSPHATE .53; .5; .37; .037; .03; .012; .00082 G/100ML; G/100ML; G/100ML; G/100ML; G/100ML; G/100ML; G/100ML
1000 INJECTION, SOLUTION INTRAVENOUS ONCE
Status: COMPLETED | OUTPATIENT
Start: 2023-08-29 | End: 2023-08-29

## 2023-08-29 RX ORDER — HEPARIN SODIUM 1000 [USP'U]/ML
2000 INJECTION, SOLUTION INTRAVENOUS; SUBCUTANEOUS EVERY 6 HOURS PRN
Status: DISCONTINUED | OUTPATIENT
Start: 2023-08-29 | End: 2023-09-02

## 2023-08-29 RX ORDER — LEVALBUTEROL INHALATION SOLUTION 1.25 MG/3ML
1.25 SOLUTION RESPIRATORY (INHALATION)
Status: DISCONTINUED | OUTPATIENT
Start: 2023-08-29 | End: 2023-08-30

## 2023-08-29 RX ORDER — POLYETHYLENE GLYCOL 3350 17 G/17G
17 POWDER, FOR SOLUTION ORAL EVERY OTHER DAY
Status: DISCONTINUED | OUTPATIENT
Start: 2023-08-29 | End: 2023-09-09 | Stop reason: HOSPADM

## 2023-08-29 RX ORDER — HYDROXYCHLOROQUINE SULFATE 200 MG/1
200 TABLET, FILM COATED ORAL 2 TIMES DAILY WITH MEALS
Status: DISCONTINUED | OUTPATIENT
Start: 2023-08-29 | End: 2023-09-09 | Stop reason: HOSPADM

## 2023-08-29 RX ORDER — PREDNISONE 5 MG/1
5 TABLET ORAL DAILY
Status: DISCONTINUED | OUTPATIENT
Start: 2023-08-29 | End: 2023-09-09 | Stop reason: HOSPADM

## 2023-08-29 RX ORDER — CHLORHEXIDINE GLUCONATE ORAL RINSE 1.2 MG/ML
15 SOLUTION DENTAL EVERY 12 HOURS SCHEDULED
Status: DISCONTINUED | OUTPATIENT
Start: 2023-08-29 | End: 2023-09-09 | Stop reason: HOSPADM

## 2023-08-29 RX ORDER — CEFEPIME HYDROCHLORIDE 2 G/1
2000 INJECTION, POWDER, FOR SOLUTION INTRAVENOUS EVERY 12 HOURS
Status: DISCONTINUED | OUTPATIENT
Start: 2023-08-29 | End: 2023-08-29 | Stop reason: SDUPTHER

## 2023-08-29 RX ORDER — ENOXAPARIN SODIUM 100 MG/ML
40 INJECTION SUBCUTANEOUS 2 TIMES DAILY
Status: DISCONTINUED | OUTPATIENT
Start: 2023-08-29 | End: 2023-08-29

## 2023-08-29 RX ORDER — LIDOCAINE 50 MG/G
2 PATCH TOPICAL DAILY
Status: DISCONTINUED | OUTPATIENT
Start: 2023-08-29 | End: 2023-09-09 | Stop reason: HOSPADM

## 2023-08-29 RX ORDER — CEFTRIAXONE 2 G/50ML
2000 INJECTION, SOLUTION INTRAVENOUS ONCE
Status: COMPLETED | OUTPATIENT
Start: 2023-08-29 | End: 2023-08-29

## 2023-08-29 RX ORDER — GABAPENTIN 100 MG/1
100 CAPSULE ORAL 3 TIMES DAILY
Status: DISCONTINUED | OUTPATIENT
Start: 2023-08-29 | End: 2023-08-30

## 2023-08-29 RX ORDER — ALBUMIN (HUMAN) 12.5 G/50ML
25 SOLUTION INTRAVENOUS ONCE
Status: DISCONTINUED | OUTPATIENT
Start: 2023-08-29 | End: 2023-08-29

## 2023-08-29 RX ORDER — HEPARIN SODIUM 1000 [USP'U]/ML
4000 INJECTION, SOLUTION INTRAVENOUS; SUBCUTANEOUS EVERY 6 HOURS PRN
Status: DISCONTINUED | OUTPATIENT
Start: 2023-08-29 | End: 2023-09-02

## 2023-08-29 RX ORDER — LEVOTHYROXINE SODIUM 0.03 MG/1
25 TABLET ORAL
Status: DISCONTINUED | OUTPATIENT
Start: 2023-08-30 | End: 2023-09-09 | Stop reason: HOSPADM

## 2023-08-29 RX ORDER — PANTOPRAZOLE SODIUM 20 MG/1
20 TABLET, DELAYED RELEASE ORAL
Status: DISCONTINUED | OUTPATIENT
Start: 2023-08-30 | End: 2023-09-09 | Stop reason: HOSPADM

## 2023-08-29 RX ORDER — METOPROLOL TARTRATE 5 MG/5ML
5 INJECTION INTRAVENOUS ONCE
Status: COMPLETED | OUTPATIENT
Start: 2023-08-29 | End: 2023-08-29

## 2023-08-29 RX ORDER — DIGOXIN 0.25 MG/ML
250 INJECTION INTRAMUSCULAR; INTRAVENOUS EVERY 6 HOURS
Status: DISCONTINUED | OUTPATIENT
Start: 2023-08-29 | End: 2023-08-30

## 2023-08-29 RX ORDER — CEFEPIME HYDROCHLORIDE 2 G/50ML
2000 INJECTION, SOLUTION INTRAVENOUS EVERY 12 HOURS
Status: DISCONTINUED | OUTPATIENT
Start: 2023-08-29 | End: 2023-08-30

## 2023-08-29 RX ADMIN — EZETIMIBE 10 MG: 10 TABLET ORAL at 16:11

## 2023-08-29 RX ADMIN — DIGOXIN 250 MCG: 0.25 INJECTION INTRAMUSCULAR; INTRAVENOUS at 19:17

## 2023-08-29 RX ADMIN — SODIUM CHLORIDE, SODIUM GLUCONATE, SODIUM ACETATE, POTASSIUM CHLORIDE, MAGNESIUM CHLORIDE, SODIUM PHOSPHATE, DIBASIC, AND POTASSIUM PHOSPHATE 250 ML: .53; .5; .37; .037; .03; .012; .00082 INJECTION, SOLUTION INTRAVENOUS at 19:19

## 2023-08-29 RX ADMIN — IOHEXOL 100 ML: 350 INJECTION, SOLUTION INTRAVENOUS at 20:26

## 2023-08-29 RX ADMIN — SODIUM CHLORIDE, SODIUM GLUCONATE, SODIUM ACETATE, POTASSIUM CHLORIDE, MAGNESIUM CHLORIDE, SODIUM PHOSPHATE, DIBASIC, AND POTASSIUM PHOSPHATE 1000 ML: .53; .5; .37; .037; .03; .012; .00082 INJECTION, SOLUTION INTRAVENOUS at 12:05

## 2023-08-29 RX ADMIN — HEPARIN SODIUM 11.1 UNITS/KG/HR: 10000 INJECTION, SOLUTION INTRAVENOUS at 16:37

## 2023-08-29 RX ADMIN — TRAMADOL HYDROCHLORIDE 50 MG: 50 TABLET, COATED ORAL at 17:22

## 2023-08-29 RX ADMIN — GABAPENTIN 100 MG: 100 CAPSULE ORAL at 16:11

## 2023-08-29 RX ADMIN — CHLORHEXIDINE GLUCONATE 15 ML: 1.2 RINSE ORAL at 21:42

## 2023-08-29 RX ADMIN — Medication 2 G: at 21:43

## 2023-08-29 RX ADMIN — ALBUMIN (HUMAN) 25 G: 12.5 INJECTION, SOLUTION INTRAVENOUS at 13:01

## 2023-08-29 RX ADMIN — HYDROXYCHLOROQUINE SULFATE 200 MG: 200 TABLET, FILM COATED ORAL at 16:22

## 2023-08-29 RX ADMIN — GABAPENTIN 100 MG: 100 CAPSULE ORAL at 21:43

## 2023-08-29 RX ADMIN — LIDOCAINE 2 PATCH: 50 PATCH TOPICAL at 16:10

## 2023-08-29 RX ADMIN — Medication 9 MG: at 21:43

## 2023-08-29 RX ADMIN — CEFEPIME HYDROCHLORIDE 2000 MG: 2 INJECTION, SOLUTION INTRAVENOUS at 16:21

## 2023-08-29 RX ADMIN — LEVALBUTEROL HYDROCHLORIDE 1.25 MG: 1.25 SOLUTION RESPIRATORY (INHALATION) at 21:12

## 2023-08-29 RX ADMIN — VANCOMYCIN HYDROCHLORIDE 1750 MG: 1 INJECTION, POWDER, LYOPHILIZED, FOR SOLUTION INTRAVENOUS at 14:00

## 2023-08-29 RX ADMIN — NOREPINEPHRINE BITARTRATE 1 MCG/MIN: 1 SOLUTION INTRAVENOUS at 17:47

## 2023-08-29 RX ADMIN — DIGOXIN 250 MCG: 0.25 INJECTION INTRAMUSCULAR; INTRAVENOUS at 13:40

## 2023-08-29 RX ADMIN — Medication 2 G: at 13:08

## 2023-08-29 RX ADMIN — CHLORHEXIDINE GLUCONATE 15 ML: 1.2 RINSE ORAL at 16:11

## 2023-08-29 RX ADMIN — ALLOPURINOL 400 MG: 100 TABLET ORAL at 16:11

## 2023-08-29 RX ADMIN — Medication 2 G: at 17:13

## 2023-08-29 RX ADMIN — HEPARIN SODIUM 2000 UNITS: 1000 INJECTION INTRAVENOUS; SUBCUTANEOUS at 23:19

## 2023-08-29 RX ADMIN — METOPROLOL TARTRATE 5 MG: 1 INJECTION, SOLUTION INTRAVENOUS at 11:30

## 2023-08-29 RX ADMIN — SODIUM CHLORIDE, SODIUM GLUCONATE, SODIUM ACETATE, POTASSIUM CHLORIDE, MAGNESIUM CHLORIDE, SODIUM PHOSPHATE, DIBASIC, AND POTASSIUM PHOSPHATE 1000 ML: .53; .5; .37; .037; .03; .012; .00082 INJECTION, SOLUTION INTRAVENOUS at 10:04

## 2023-08-29 RX ADMIN — IPRATROPIUM BROMIDE 0.5 MG: 0.5 SOLUTION RESPIRATORY (INHALATION) at 21:12

## 2023-08-29 RX ADMIN — ALBUMIN (HUMAN) 25 G: 12.5 INJECTION, SOLUTION INTRAVENOUS at 14:02

## 2023-08-29 RX ADMIN — CEFTRIAXONE 2000 MG: 2 INJECTION, SOLUTION INTRAVENOUS at 10:48

## 2023-08-29 RX ADMIN — PREDNISONE 5 MG: 5 TABLET ORAL at 16:22

## 2023-08-29 RX ADMIN — POLYETHYLENE GLYCOL 3350 17 G: 17 POWDER, FOR SOLUTION ORAL at 16:22

## 2023-08-29 RX ADMIN — ALBUMIN (HUMAN) 25 G: 12.5 INJECTION, SOLUTION INTRAVENOUS at 17:16

## 2023-08-29 NOTE — SEPSIS NOTE
Sepsis Note   Zaid Wren 80 y.o. male MRN: [id number]  Unit/Bed#: -01 SDU Encounter: [id number]       Initial Sepsis Screening     [address] Name 08/29/23 1336 08/29/23 1039             Is the patient's history suggestive of a new or worsening infection? Yes (Proceed)  -[address] [facility] Yes (Proceed)  -ML       Suspected source of infection soft tissue  -[address] [facility] soft tissue  -ML       Indicate SIRS criteria Tachycardia > 90 bpm;Tachypnea > 20 resp per min  -MK Tachycardia > 90 bpm;Tachypnea > 20 resp per min  -ML       Are two or more of the above signs & symptoms of infection both present and new to the patient? Yes (Proceed)  -[address] [facility] Yes (Proceed)  -ML       Assess for evidence of organ dysfunction: Are any of the below criteria present within 6 hours of suspected infection and SIRS criteria that are NOT considered to be chronic conditions? SBP < 90;MAP < 65  -MK --       Date of presentation of septic shock 08/29/23  -[address] [facility] --       Time of presentation of septic shock 1337  -[address] [facility] --       Fluid Resuscitation: 30 ml/kg IV fluid bolus will be given based on actual body weight  -MK --       Is the patient is persistently hypotensive in the hour after fluid bolus administration? If yes, patient meets criteria for vasopressor use. -- --       Sepsis Note: Click "NEXT" below (NOT "close") to generate sepsis note based on above information. YES (proceed by clicking "NEXT")  -[facility] [address] [facility] --             User Key  (r) = Recorded By, (t) = Taken By, (c) = Cosigned By    [facility] Name Provider Type     Jose Cruz DO Physician    [facility] [address] [place] [address] Nurse Practitioner                    There is no height or weight on file to calculate BMI.   Wt Readings from Last 1 Encounters:   08/28/23 99.8 kg (220 lb)     IBW (Ideal Body Weight): 70.7 kg    Ideal body weight: 70.7 kg (155 lb 13.8 oz)  Adjusted ideal body weight: 82.3 kg (181 lb 8.3 oz)

## 2023-08-29 NOTE — ASSESSMENT & PLAN NOTE
Wt Readings from Last 3 Encounters:   08/29/23 109 kg (240 lb 4.8 oz)   08/28/23 99.8 kg (220 lb)   08/24/23 105 kg (232 lb)       Assessment:  · Previous echo from 01/2023 showed EF of 30% with severe systolic dysfunction and global hypokinesis, moderate MVR  · Home regimen:  Torsemide 20 mg TID, Toprol XL 25 mg BID    Plan:  · Check echo  · Hold home dose medications in the setting of shock  · Careful volume resuscitation  · Daily weights  · Strict I/O

## 2023-08-29 NOTE — ED PROVIDER NOTES
History  Chief Complaint   Patient presents with   • Fever     Pt to er via ems from [facility] at [address] [place] [address] with reports from staff that patient woke up this am with a fever of 103. Also reports of left arm redness swelling and warmth, and more blue in color to lower ext. Tylenol given at facility 1hr PTA of EMS     80-year-old male presents via EMS from [facility] nursing [facility] for the evaluation of fever with red arm and swelling. Patient denies any nausea vomiting or diarrhea. Patient denies chest pain. Patient denies shortness of breath          Prior to Admission Medications   Prescriptions Last Dose Informant Patient Reported? Taking?    Betadine 10 % external solution Unknown Outside Facility (Specify) Yes No   Cholecalciferol (Vitamin D3) 50 MCG (2000 UT) TABS  Outside Facility (Specify) Yes No   Diclofenac Sodium (VOLTAREN) 1 % Unknown Outside Facility (Specify) Yes No   Sig: Apply 4 g topically 4 (four) times a day   Hibiclens 4 % external liquid 8/28/2023 Outside Facility (Specify) Yes Yes   Multiple Vitamin (multivitamin) tablet Unknown Outside Facility (Specify) Yes No   Sig: Take 1 tablet by mouth daily   NON FORMULARY Not Taking Outside Facility (Specify) Yes No   Sig: Medical marijuana   Patient not taking: Reported on 8/24/2023   Saccharomyces boulardii (Probiotic) 250 MG CAPS Unknown Outside Facility (Specify) Yes No   Vitamin D, Cholecalciferol, 25 MCG (1000 UT) TABS 8/28/2023 Outside Facility (Specify) Yes Yes   Sig: Take by mouth in the morning   acetaminophen (TYLENOL) 650 mg CR tablet 8/28/2023 Outside Facility (Specify) Yes Yes   Sig: Take 650 mg by mouth 2 (two) times a day   allopurinol (ZYLOPRIM) 100 mg tablet 8/28/2023 Outside Facility (Specify) Yes Yes   Sig: Take 400 mg by mouth daily   apixaban (Eliquis) 5 mg 8/28/2023 Outside Facility (Specify) No Yes   Sig: Take 1 tablet (5 mg total) by mouth 2 (two) times a day   ascorbic acid (VITAMIN C) 500 MG tablet 8/28/2023 Outside Facility (Specify) Yes Yes   Sig: Take 500 mg by mouth daily   cetirizine (ZyrTEC) 10 mg tablet 8/28/2023 Outside Facility ([facility]) Yes Yes   Sig: Take 10 mg by mouth daily   doxycycline hyclate (VIBRAMYCIN) 100 mg capsule Unknown Outside Facility (Specify) Yes No   Sig: Take 50 mg by mouth every 12 (twelve) hours   ezetimibe (ZETIA) 10 mg tablet 8/28/2023 Outside Facility (Specify) Yes Yes   Sig: Take 10 mg by mouth daily   fluticasone (FLONASE) 50 mcg/act nasal spray Unknown Outside Facility (Specify) Yes No   gabapentin (NEURONTIN) 100 mg capsule 8/28/2023 Outside Facility (Specify) Yes Yes   Sig: Take 100 mg by mouth 3 (three) times a day   hydroxychloroquine (PLAQUENIL) 200 mg tablet 8/28/2023 Outside Facility ([address] [place] [address]) Yes Yes   Sig: Take 200 mg by mouth 2 (two) times a day with meals   levothyroxine 25 mcg tablet 8/28/2023 Outside Facility (Specify) Yes Yes   loperamide (IMODIUM) 2 mg capsule Unknown Outside Facility (Specify) Yes No   Sig: Take 2 mg by mouth as needed for diarrhea   melatonin 3 mg Not Taking Outside Facility (Specify) No No   Sig: Take 3 tablets (9 mg total) by mouth daily at bedtime   Patient not taking: Reported on 8/24/2023   metoprolol succinate (TOPROL-XL) 25 mg 24 hr tablet 8/28/2023 Outside Facility (Specify) No Yes   Sig: Take 1 tablet (25 mg total) by mouth 2 (two) times a day   mupirocin (BACTROBAN) 2 % ointment Unknown Outside Facility (Specify) Yes No   Sig: Apply 1 application topically daily   nitroglycerin (NITROSTAT) 0.4 mg SL tablet Unknown Outside Facility (Specify) Yes No   Sig: Place 0.4 mg under the tongue every 5 (five) minutes as needed for chest pain   nystatin (MYCOSTATIN) powder Not Taking Outside Facility (Specify) No No   Sig: Apply topically 2 (two) times a day   Patient not taking: Reported on 8/24/2023   omeprazole (PriLOSEC) 10 mg delayed release capsule 8/28/2023 Outside Facility (Specify) Yes Yes   Sig: Take 10 mg by mouth daily   ondansetron (ZOFRAN) 4 mg tablet Unknown Outside Facility (Specify) Yes No   oxybutynin (DITROPAN) 5 mg tablet 8/28/2023 Outside Facility (Specify) Yes Yes   polyethylene glycol (MIRALAX) 17 g packet Unknown Outside Facility (Specify) No No   Sig: Take 17 g by mouth every other day   potassium chloride (K-DUR,KLOR-CON) 20 mEq tablet 8/28/2023 Outside Facility (Specify) No Yes   Sig: Take 2 tablets (40 mEq total) by mouth daily Do not start before January 29, 2023.    predniSONE 5 mg tablet 8/28/2023 Outside Facility (Specify) Yes Yes   Sig: Take by mouth daily   pregabalin (LYRICA) 25 mg capsule Not Taking Outside Facility (Specify) No No   Sig: Take 1 tab PO at night for 7 days then increase to 1 tab PO bid   Patient not taking: Reported on 8/24/2023   psyllium (METAMUCIL SMOOTH TEXTURE) 28 % packet Unknown Outside Facility (Specify) No No   Sig: Take 1 packet by mouth 2 (two) times a day   solifenacin (VESICARE) 5 mg tablet 8/28/2023 Outside Facility (Specify) Yes Yes   Sig: Take 5 mg by mouth daily   tamsulosin (FLOMAX) 0.4 mg 8/28/2023 Outside Facility (Specify) Yes Yes   Sig: Take 0.4 mg by mouth daily at bedtime   torsemide (DEMADEX) 10 mg tablet 8/28/2023  No Yes   Sig: Take 1 tablet (10 mg total) by mouth 4 (four) times a week Take 10 mg on Tuesday, Thursday, Saturday, and Sunday   torsemide (DEMADEX) 20 mg tablet 8/28/2023  No Yes   Sig: Take 1 tablet (20 mg total) by mouth 3 (three) times a week Take 20 mg on Monday, Wednesday, and Friday   traMADol (ULTRAM) 50 mg tablet Unknown Outside Facility (Specify) Yes No      Facility-Administered Medications: None       Past Medical History:   Diagnosis Date   • GERD (gastroesophageal reflux disease)    • Gout    • Hyperlipidemia    • Hypertension    • 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 E-Cigarette/Vaping   • E-Cigarette Use Never User      E-Cigarette/Vaping Substances   • Nicotine No    • Flavoring No      Social History     Tobacco Use   • Smoking status: Never   • Smokeless tobacco: Never   Vaping Use   • Vaping Use: Never used   Substance Use Topics   • Alcohol use: Yes     Alcohol/week: 2.0 standard drinks of alcohol     Types: 2 Shots of liquor per week     Comment: 2-3 ounces of whiskey a day   • Drug use: Yes     Types: Marijuana       Review of Systems   Constitutional: Positive for fatigue and fever. Cardiovascular: Positive for leg swelling. Skin: Positive for color change. Physical Exam  Physical Exam  Constitutional:       Appearance: He is ill-appearing. HENT:      Mouth/Throat:      Mouth: Mucous membranes are dry. Eyes:      Pupils: Pupils are equal, round, and reactive to light. Cardiovascular:      Rate and Rhythm: Tachycardia present. Frequent Extrasystoles are present. Pulmonary:      Breath sounds: Examination of the right-lower field reveals rales. Examination of the left-lower field reveals rales. Rales present. Abdominal:      General: Bowel sounds are normal.      Tenderness: There is no abdominal tenderness. Musculoskeletal:         General: Swelling ( b/l hands) present. Right lower leg: Edema present. Left lower leg: Edema present. Skin:     Capillary Refill: Capillary refill takes less than 2 seconds. Findings: Erythema ( Left arm) present. Neurological:      General: No focal deficit present. Mental Status: He is alert.          Vital Signs  ED Triage Vitals   Temperature Pulse Respirations Blood Pressure SpO2   08/29/23 0905 08/29/23 0905 08/29/23 0905 08/29/23 0907 08/29/23 0905   97.9 °F (36.6 °C) (!) 136 (!) 24 (!) 174/84 (!) 87 %      Temp Source Heart Rate Source Patient Position - Orthostatic VS BP Location FiO2 (%)   08/29/23 0905 08/29/23 0905 -- -- --   Oral Monitor         Pain Score       --                  Vitals:    08/29/23 1150 08/29/23 1230 08/29/23 1300 08/29/23 1330   BP: (!) 78/45 (!) 78/58 (!) 70/50 (!) 87/53   Pulse:  (!) 119 (!) 123 (!) 165         Visual Acuity      ED Medications  Medications   lidocaine (LIDODERM) 5 % patch 2 patch (has no administration in time range)   Diclofenac Sodium (VOLTAREN) 1 % topical gel 2 g (2 g Topical Given 8/29/23 1308)   traMADol (ULTRAM) tablet 50 mg (has no administration in time range)   digoxin (LANOXIN) injection 250 mcg (250 mcg Intravenous Given 8/29/23 1340)   vancomycin (VANCOCIN) 1,250 mg in sodium chloride 0.9 % 250 mL IVPB (has no administration in time range)   vancomycin (VANCOCIN) 1,750 mg in sodium chloride 0.9 % 500 mL IVPB (has no administration in time range)   cefepime (MAXIPIME) IVPB (premix in dextrose) 2,000 mg 50 mL (has no administration in time range)   albumin human (FLEXBUMIN) 5 % injection 25 g (has no administration in time range)   norepinephrine (LEVOPHED) 4 mg (STANDARD CONCENTRATION) IV in sodium chloride 0.9% 250 mL (has no administration in time range)   multi-electrolyte (PLASMALYTE-A/ISOLYTE-S PH 7.4) IV solution 1,000 mL (0 mL Intravenous Stopped 8/29/23 1133)   cefTRIAXone (ROCEPHIN) IVPB (premix in dextrose) 2,000 mg 50 mL (0 mg Intravenous Stopped 8/29/23 1118)   metoprolol (LOPRESSOR) injection 5 mg (5 mg Intravenous Given 8/29/23 1130)   multi-electrolyte (PLASMALYTE-A/ISOLYTE-S PH 7.4) IV solution 1,000 mL (0 mL Intravenous Stopped 8/29/23 1308)   albumin human (FLEXBUMIN) 5 % injection 25 g (25 g Intravenous New Bag 8/29/23 1301)       Diagnostic Studies  Results Reviewed     Procedure Component Value Units Date/Time    HS Troponin I 2hr [[id number]]  (Normal) Collected: 08/29/23 1133    Lab Status: Final result Specimen: Blood from Arm, Left Updated: 08/29/23 1200     hs TnI 2hr 30 ng/L      Delta 2hr hsTnI 13 ng/L     HS Troponin I 4hr [[id number]]     Lab Status: No result Specimen: Blood     Urine Microscopic [[id number]]  (Normal) Collected: 08/29/23 1031    Lab Status: Final result Specimen: Urine, Straight Cath Updated: 08/29/23 1047     RBC, UA None Seen /hpf      WBC, UA None Seen /hpf      Epithelial Cells Occasional /hpf      Bacteria, UA None Seen /hpf     UA w Reflex to Microscopic w Reflex to Culture [[id number]]  (Abnormal) Collected: 08/29/23 1031    Lab Status: Final result Specimen: Urine, Straight Cath Updated: 08/29/23 1046     Color, UA Yellow     Clarity, UA Clear     Specific Gravity, UA 1.020     pH, UA 5.5     Leukocytes, UA Negative     Nitrite, UA Negative     Protein, UA Trace mg/dl      Glucose, UA Negative mg/dl      Ketones, UA Negative mg/dl      Urobilinogen, UA <2.0 mg/dl      Bilirubin, UA Negative     Occult Blood, UA Negative    Lactic acid [[id number]]  (Normal) Collected: 08/29/23 0906    Lab Status: Final result Specimen: Blood from Arm, Right Updated: 08/29/23 1031     LACTIC ACID 1.9 mmol/L     Narrative:      Result may be elevated if tourniquet was used during collection. Blood culture #1 [[id number]] Collected: 08/29/23 1012    Lab Status:  In process Specimen: Blood from Arm, Left Updated: 08/29/23 1016    Protime-INR [[id number]]  (Abnormal) Collected: 08/29/23 0906    Lab Status: Final result Specimen: Blood from Arm, Right Updated: 08/29/23 0955     Protime 17.1 seconds      INR 1.31    APTT [[id number]]  (Normal) Collected: 08/29/23 0906    Lab Status: Final result Specimen: Blood from Arm, Right Updated: 08/29/23 0955     PTT 29 seconds     FLU/RSV/COVID - if FLU/RSV clinically relevant [[id number]]  (Normal) Collected: 08/29/23 0906    Lab Status: Final result Specimen: Nares from Nose Updated: 08/29/23 0954     SARS-CoV-2 Negative     INFLUENZA A PCR Negative     INFLUENZA B PCR Negative     RSV PCR Negative    Narrative:      FOR PEDIATRIC PATIENTS - copy/paste COVID Guidelines URL to browser: [email or web address]    SARS-CoV-2 assay is a Nucleic Acid Amplification assay intended for the  qualitative detection of nucleic acid from SARS-CoV-2 in nasopharyngeal  swabs. Results are for the presumptive identification of SARS-CoV-2 RNA. Positive results are indicative of infection with SARS-CoV-2, the virus  causing COVID-19, but do not rule out bacterial infection or co-infection  with other viruses. Laboratories within the [facility] and its  territories are required to report all positive results to the appropriate  public health authorities. Negative results do not preclude SARS-CoV-2  infection and should not be used as the sole basis for treatment or other  patient management decisions. Negative results must be combined with  clinical observations, patient history, and epidemiological information. This test has not been FDA cleared or approved. This test has been authorized by FDA under an Emergency Use Authorization  (EUA). This test is only authorized for the duration of time the  declaration that circumstances exist justifying the authorization of the  emergency use of an in vitro diagnostic tests for detection of SARS-CoV-2  virus and/or diagnosis of COVID-19 infection under section 564(b)(1) of  the Act, 21 U. S.C. 576FPE-3(P)(0), unless the authorization is terminated  or revoked sooner. The test has been validated but independent review by FDA  and CLIA is pending. Test performed using [employer]: This RT-PCR assay targets N2,  a region unique to SARS-CoV-2. A conserved region in the E-gene was chosen  for pan-Sarbecovirus detection which includes SARS-CoV-2.     According to CMS-2020-01-R, this platform meets the definition of high-throughput technology.     B-Type Natriuretic Peptide(BNP) [[id number]]  (Abnormal) Collected: 08/29/23 0906    Lab Status: Final result Specimen: Blood from Arm, Right Updated: 08/29/23 0944      pg/mL     Procalcitonin [[id number]]  (Normal) Collected: 08/29/23 0906    Lab Status: Final result Specimen: Blood from Arm, Right Updated: 08/29/23 0943     Procalcitonin 0.16 ng/ml     HS Troponin 0hr (reflex protocol) [[id number]]  (Normal) Collected: 08/29/23 0906    Lab Status: Final result Specimen: Blood from Arm, Right Updated: 08/29/23 0942     hs TnI 0hr 17 ng/L     Comprehensive metabolic panel [[id number]]  (Abnormal) Collected: 08/29/23 0906    Lab Status: Final result Specimen: Blood from Arm, Right Updated: 08/29/23 0936     Sodium 141 mmol/L      Potassium 3.5 mmol/L      Chloride 103 mmol/L      CO2 28 mmol/L      ANION GAP 10 mmol/L      BUN 19 mg/dL      Creatinine 1.13 mg/dL      Glucose 160 mg/dL      Calcium 8.2 mg/dL      AST 23 U/L      ALT 17 U/L      Alkaline Phosphatase 63 U/L      Total Protein 7.1 g/dL      Albumin 3.6 g/dL      Total Bilirubin 0.94 mg/dL      eGFR 57 ml/min/1.73sq m     Narrative:      [place] guidelines for Chronic Kidney Disease (CKD):   •  Stage 1 with normal or high GFR (GFR > 90 mL/min/1.73 square meters)  •  Stage 2 Mild CKD (GFR = 60-89 mL/min/1.73 square meters)  •  Stage 3A Moderate CKD (GFR = 45-59 mL/min/1.73 square meters)  •  Stage 3B Moderate CKD (GFR = 30-44 mL/min/1.73 square meters)  •  Stage 4 Severe CKD (GFR = 15-29 mL/min/1.73 square meters)  •  Stage 5 End Stage CKD (GFR <15 mL/min/1.73 square meters)  Note: GFR calculation is accurate only with a steady state creatinine    CBC and differential [[id number]]  (Abnormal) Collected: 08/29/23 0906    Lab Status: Final result Specimen: Blood from Arm, Right Updated: 08/29/23 0917     WBC 9.09 Thousand/uL      RBC 4.04 Million/uL      Hemoglobin 12.8 g/dL      Hematocrit 40.1 %      MCV 99 fL      MCH 31.7 pg      MCHC 31.9 g/dL      RDW 15.5 %      MPV 12.3 fL      Platelets 708 Thousands/uL      nRBC 0 /100 WBCs      Neutrophils Relative 79 %      Immat GRANS % 0 %      Lymphocytes Relative 17 %      Monocytes Relative 2 %      Eosinophils Relative 2 %      Basophils Relative 0 %      Neutrophils Absolute 7.12 Thousands/µL      Immature Grans Absolute 0.04 Thousand/uL      Lymphocytes Absolute 1.56 Thousands/µL      Monocytes Absolute 0.17 Thousand/µL      Eosinophils Absolute 0.18 Thousand/µL      Basophils Absolute 0.02 Thousands/µL     Blood culture #2 [[id number]] Collected: 08/29/23 0906    Lab Status: In process Specimen: Blood from Arm, Right Updated: 08/29/23 0914                 XR chest portable   Final Result by Pham Malik MD (08/29 0930)      No acute cardiopulmonary disease. Workstation performed: [id number]                    Procedures  ECG 12 Lead Documentation Only    Date/Time: 8/29/2023 9:17 AM    Performed by: Boogie Garcia DO  Authorized by: Boogie Garcai DO    Indications / Diagnosis:  Fever, sob  ECG reviewed by me, the ED Provider: yes    Patient location:  ED  Previous ECG:     Previous ECG:  Compared to current    Comparison ECG info: Tachycardia with PACs when compared to previous EKG from January 31, 2023    Similarity:  Changes noted  Interpretation:     Interpretation: abnormal    Rate:     ECG rate:  137    ECG rate assessment: tachycardic    Rhythm:     Rhythm: sinus tachycardia    Ectopy:     Ectopy: PAC    QRS:     QRS axis:  Left    QRS intervals:  Normal  Conduction:     Conduction: normal    ST segments:     ST segments:  Normal  T waves:     T waves: normal      CriticalCare Time    Date/Time: 8/29/2023 11:18 AM    Performed by: Boogie Garcia DO  Authorized by:  Boogie Garcia DO    Critical care provider statement:     Critical care time (minutes):  35    Critical care time was exclusive of:  Separately billable procedures and treating other patients and teaching time    Critical care was necessary to treat or prevent imminent or life-threatening deterioration of the following conditions:  Sepsis    Critical care was time spent personally by me on the following activities:  Blood draw for specimens, obtaining history from patient or surrogate, development of treatment plan with patient or surrogate, discussions with consultants, evaluation of patient's response to treatment, examination of patient, ordering and performing treatments and interventions, ordering and review of laboratory studies, ordering and review of radiographic studies, re-evaluation of patient's condition, review of old charts and interpretation of cardiac output measurements    I assumed direction of critical care for this patient from another provider in my specialty: no               ED Course  ED Course as of 08/29/23 1400   Tue Aug 29, 2023   0926 My interpretation of the chest x-ray demonstrates cardiomegaly with poor inspiratory effort. Appears unchanged when compared to previous   1109 Discussed with the family who was present at the bedside. They state that the patient was just recently at the cardiologist office. He has been doing well. They state at his baseline mobility he gets around in a wheelchair. They have noticed that over the past couple days he has had swelling in his hands but the right arm redness is new. [facility] [place] paged for admit   [phone] I discussed the case with the hospitalist. We reviewed the HPI, pertinent PMH, ED course and workup. Hospitalist agreed with plan  with addition of metoprolol and will admit the patient to the hospital.                            Initial Sepsis Screening     [address] Name 08/29/23 1336 08/29/23 1039             Is the patient's history suggestive of a new or worsening infection?  Yes (Proceed)  -[address] [place] Yes (Proceed)  -ML Suspected source of infection soft tissue  -[address] [facility] soft tissue  -ML       Indicate SIRS criteria Tachycardia > 90 bpm;Tachypnea > 20 resp per min  -MK Tachycardia > 90 bpm;Tachypnea > 20 resp per min  -ML       Are two or more of the above signs & symptoms of infection both present and new to the patient? Yes (Proceed)  -[address], [address] [facility] Yes (Proceed)  -ML       Assess for evidence of organ dysfunction: Are any of the below criteria present within 6 hours of suspected infection and SIRS criteria that are NOT considered to be chronic conditions? SBP < 90;MAP < 65  -MK --       Date of presentation of septic shock 08/29/23  -[facility] [address] [facility] --       Time of presentation of septic shock 1337  -[address] [place], [place] [address]x , 3 [facility] --       Fluid Resuscitation: 30 ml/kg IV fluid bolus will be given based on actual body weight  - --       Is the patient is persistently hypotensive in the hour after fluid bolus administration? If yes, patient meets criteria for vasopressor use. -- --       Sepsis Note: Click "NEXT" below (NOT "close") to generate sepsis note based on above information. YES (proceed by clicking "NEXT")  -[address] [facility] --             User Key  (r) = Recorded By, (t) = Taken By, (c) = Cosigned By    [facility] [facility] Name Provider Type    SONU Beaulieu DO Physician    [facility], [facility] Nurse Practitioner                SBIRT 20yo+    Flowsheet Row Most Recent Value   Initial Alcohol Screen: US AUDIT-C     1. How often do you have a drink containing alcohol? 0 Filed at: 08/29/2023 0906   2. How many drinks containing alcohol do you have on a typical day you are drinking? 0 Filed at: 08/29/2023 0906   3a. Male UNDER 65: How often do you have five or more drinks on one occasion? 0 Filed at: 08/29/2023 0906   3b. FEMALE Any Age, or MALE 65+: How often do you have 4 or more drinks on one occassion? 0 Filed at: 08/29/2023 0906   Audit-C Score 0 Filed at: 08/29/2023 0592   ARNOLDO: How many times in the past year have you. ..     Used an illegal drug or used a prescription medication for non-medical reasons? Never Filed at: 08/29/2023 0906                    Medical Decision Making  Differential diagnosis: Sepsis, cellulitis, edema, pneumonia, urinary tract infection, dehydration, renal failure, arrhythmia    Plan For sepsis work-up, 1 L of Isolyte and IV antibiotic coverage with Rocephin plan for admission    Amount and/or Complexity of Data Reviewed  Labs: ordered. Radiology: ordered. Risk  Prescription drug management. Decision regarding hospitalization. Disposition  Final diagnoses:   Cellulitis of left arm   Sepsis ([address])   Sinus tachycardia     Time reflects when diagnosis was documented in both MDM as applicable and the Disposition within this note     Time User Action Codes Description Comment    8/29/2023 11:14 AM Joel Art Add [O26.648] Cellulitis of left arm     8/29/2023 11:14 AM Joel Art Add [A41.9] Sepsis ([address])     8/29/2023 11:23 AM Berta George B Add [R00.0] Sinus tachycardia       ED Disposition     ED Disposition   Admit    Condition   Stable    Date/Time   Tue Aug 29, 2023 11:22 AM    Comment   Case was discussed with Ching and the patient's admission status was agreed to be Admission Status: inpatient status to the service of Dr. Diane Ledesma .            Follow-up Information    None         Current Discharge Medication List      CONTINUE these medications which have NOT CHANGED    Details   acetaminophen (TYLENOL) 650 mg CR tablet Take 650 mg by mouth 2 (two) times a day      allopurinol (ZYLOPRIM) 100 mg tablet Take 400 mg by mouth daily      apixaban (Eliquis) 5 mg Take 1 tablet (5 mg total) by mouth 2 (two) times a day  Qty: 180 tablet, Refills: 3    Associated Diagnoses: TIA (transient ischemic attack); PAF (paroxysmal atrial fibrillation) ([facility])      ascorbic acid (VITAMIN C) 500 MG tablet Take 500 mg by mouth daily      cetirizine (ZyrTEC) 10 mg tablet Take 10 mg by mouth daily      ezetimibe (ZETIA) 10 mg tablet Take 10 mg by mouth daily gabapentin (NEURONTIN) 100 mg capsule Take 100 mg by mouth 3 (three) times a day      Hibiclens 4 % external liquid       hydroxychloroquine (PLAQUENIL) 200 mg tablet Take 200 mg by mouth 2 (two) times a day with meals      levothyroxine 25 mcg tablet       metoprolol succinate (TOPROL-XL) 25 mg 24 hr tablet Take 1 tablet (25 mg total) by mouth 2 (two) times a day  Qty: 180 tablet, Refills: 3    Associated Diagnoses: Low left ventricular ejection fraction      omeprazole (PriLOSEC) 10 mg delayed release capsule Take 10 mg by mouth daily      oxybutynin (DITROPAN) 5 mg tablet       potassium chloride (K-DUR,KLOR-CON) 20 mEq tablet Take 2 tablets (40 mEq total) by mouth daily Do not start before January 29, 2023. Qty: 30 tablet, Refills: 0    Associated Diagnoses: Chronic combined systolic and diastolic congestive heart failure (HCC)      predniSONE 5 mg tablet Take by mouth daily      solifenacin (VESICARE) 5 mg tablet Take 5 mg by mouth daily      tamsulosin (FLOMAX) 0.4 mg Take 0.4 mg by mouth daily at bedtime      !! torsemide (DEMADEX) 10 mg tablet Take 1 tablet (10 mg total) by mouth 4 (four) times a week Take 10 mg on Tuesday, Thursday, Saturday, and Sunday  Qty: 30 tablet, Refills: 3    Associated Diagnoses: HFrEF (heart failure with reduced ejection fraction) ([address])      ! ! torsemide (DEMADEX) 20 mg tablet Take 1 tablet (20 mg total) by mouth 3 (three) times a week Take 20 mg on Monday, Wednesday, and Friday  Qty: 30 tablet, Refills: 3    Associated Diagnoses: Chronic combined systolic and diastolic congestive heart failure ([address]); HFrEF (heart failure with reduced ejection fraction) ([address])      ! ! Vitamin D, Cholecalciferol, 25 MCG (1000 UT) TABS Take by mouth in the morning      Betadine 10 % external solution       !!  Cholecalciferol (Vitamin D3) 50 MCG (2000 UT) TABS       Diclofenac Sodium (VOLTAREN) 1 % Apply 4 g topically 4 (four) times a day      doxycycline hyclate (VIBRAMYCIN) 100 mg capsule Take 50 mg by mouth every 12 (twelve) hours      fluticasone (FLONASE) 50 mcg/act nasal spray       loperamide (IMODIUM) 2 mg capsule Take 2 mg by mouth as needed for diarrhea      melatonin 3 mg Take 3 tablets (9 mg total) by mouth daily at bedtime  Qty: 30 tablet, Refills: 0    Associated Diagnoses: Sleep disturbance      Multiple Vitamin (multivitamin) tablet Take 1 tablet by mouth daily      mupirocin (BACTROBAN) 2 % ointment Apply 1 application topically daily      nitroglycerin (NITROSTAT) 0.4 mg SL tablet Place 0.4 mg under the tongue every 5 (five) minutes as needed for chest pain      NON FORMULARY Medical marijuana      nystatin (MYCOSTATIN) powder Apply topically 2 (two) times a day  Qty: 15 g, Refills: 0    Associated Diagnoses: Rash      ondansetron (ZOFRAN) 4 mg tablet       polyethylene glycol (MIRALAX) 17 g packet Take 17 g by mouth every other day  Qty: 30 each, Refills: 2    Associated Diagnoses: Alternating constipation and diarrhea      pregabalin (LYRICA) 25 mg capsule Take 1 tab PO at night for 7 days then increase to 1 tab PO bid  Qty: 60 capsule, Refills: 3    Associated Diagnoses: Lumbar radiculopathy      psyllium (METAMUCIL SMOOTH TEXTURE) 28 % packet Take 1 packet by mouth 2 (two) times a day  Qty: 30 packet, Refills: 2    Associated Diagnoses: Alternating constipation and diarrhea      Saccharomyces boulardii (Probiotic) 250 MG CAPS       traMADol (ULTRAM) 50 mg tablet        !! - Potential duplicate medications found. Please discuss with provider. No discharge procedures on file.     PDMP Review       Value Time User    PDMP Reviewed  Yes 1/28/2023  6:41 AM Adriana Reilly MD          ED Provider  Electronically Signed by           Codi Wren DO  08/29/23 1400

## 2023-08-29 NOTE — ASSESSMENT & PLAN NOTE
Assessment:  · By history  · Home regimen: Levothyroxine 25 mcg   · TSH checked in 06/2023-4.39    Plan:  · Continue home dose Levothyroxine

## 2023-08-29 NOTE — ASSESSMENT & PLAN NOTE
Assessment:  · Last lipid panel 06/2023: triglycerides-163, HDL-82, LDL-49  · Home regiment: Ezetimibe    Plan:  · Continue home dose Ezetimibe

## 2023-08-29 NOTE — ASSESSMENT & PLAN NOTE
Assessment:   Presents with erythremia of the left upper extremity, and fever    Plan:  See sepsis above

## 2023-08-29 NOTE — ASSESSMENT & PLAN NOTE
Assessment:  · Has been seeing Dr. 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 · On physical exam pulses are palpable but light and thready. Cap refill greater than 2 seconds.   Lower extremities are cool to touch    Plan:  · Bilateral arterial duplex of the lower extremities  · Consult podiatry- appreciate recommendations  · Consult wound care for local wound care of the toe  · Neurovascular checks every 4 hours

## 2023-08-29 NOTE — ASSESSMENT & PLAN NOTE
Assessment:  · By history. Complaint of sever back pain on physical exam  · Home Regimen: Gabapentin, tramadol-PRN    Plan:  · Continue with PRN tramadol.  Standing Gabapentin  · Lidocaine patch  · PRN Tylenol

## 2023-08-29 NOTE — ASSESSMENT & PLAN NOTE
Assessment:  · Presented from [facility] assisted [facility] with complaints of fever of 103 and erythema of the left arm- suspect cellulitis  · Patient met SIRS criteria in the ED with tachycardia with heart rates in the 170s, tachypnea with respiratory rate in the 20s  · Negative lactate, negative Pro-Larry, no leukocytosis  · Initially received 1 L of crystalloid in the ED, shortly being brought up to the ICU the patient dropped his blood pressure with systolics in the 84B. Patient received additional 1 L crystalloid, 500 albumin-with borderline blood pressures. Patient still has ongoing fluid resuscitation per sepsis guidelines  · In the ED patient was given IV ceftriaxone  · UA not indicative of UTI  · Blood cultures x2 drawn in the ED    Plan:  · MAP goal greater than 65 mmHg- patient to receive an additional 500 mL of albumin for a total of 30 mL/KG fluid resuscitation per sepsis guidelines. In the event that patient still has low blood pressures we will have to start vasopressors  · Broaden antibiotic coverage- start IV cefepime and vancomycin.   DC ceftriaxone  · Pharmacy consult for vancomycin management  · Follow-up culture results  · Check MRSA  · Repeat CBC in the morning monitor and trend white count  · Monitor and trend fever curve  · As needed Tylenol for fever greater than 100.4 Fahrenheit  · Hold home dose antihypertensives in the setting of hypotension

## 2023-08-29 NOTE — ASSESSMENT & PLAN NOTE
Assessment:  · Presents with A-fib RVR, heart rate in the 170s.   Likely mediated by ongoing infection-cellulitis/sepsis, relative hypovolemia, and pain  · Home regimen includes: Metoprolol, Eliquis  · Patient was given 5 mg IV metoprolol in the ED and had episode of hypotension    Plan:  · Give 250 mcgs IV digoxin every 6 hours x 6 doses- as patient is hypotensive  · Continue IV fluid resuscitation  · Treatment of sepsis  · Patient may require vasopressors and insertion of central line we will hold Eliquis now- we will start heparin drip for anticoagulation  · If patient's heart rate not well controlled with above treatment will consider starting amiodarone drip  · Hold home dose metoprolol in the setting of shock and sepsis

## 2023-08-29 NOTE — ASSESSMENT & PLAN NOTE
Assessment:  · Presented from [facility] with complaints of fever of 103 and erythema of the left arm- suspect cellulitis  · SIRS: tachycardia and tachypnea  · Source: Cellulitis LUE  · UA and CXR unremarkable  · Negative lactate, negative Pro-Larry, no leukocytosis  · Blood cultures x2 pending  · Hypotensive upon arrival to the ICU- not given 30cc/kg IVF due to concern for volume overload  · 2L crystalloid, 1L albumin, started on levophed  · Additional 250 cc isolyte with improvement in HR and weaned off vasopressors  · Of note, patient is on daily prednisone- check AM cortisol    Plan:  · Check CT LUE to rule out NSTI of LUE given rapid progression and septic shock   · Check AM cortisol and consider stress-dose steroids  · Continue cefepime and vancomycin  · Follow-up blood cultures  · MRSA pending  · Monitor fever curve and WBC count

## 2023-08-29 NOTE — ASSESSMENT & PLAN NOTE
Assessment:   · Presents with erythremia of the left upper extremity, and fever  · Bilateral venous dopplers negative for clot    Plan:  · See sepsis above

## 2023-08-29 NOTE — ED NOTES
Vital signs changed upon beginning transport to ICU. Advanced practitioner notified. Orders carried out for transport with Harper Vasquez. This RN notified Marlen NOVOA in ICU of status change.      Chela Navarrete RN  08/29/23 809 SOMMER Han  08/29/23 809 SOMMER Han  08/29/23 [id number]

## 2023-08-29 NOTE — PROGRESS NOTES
Helen Bell is a 80 y.o. male who is currently ordered Vancomycin IV with management by the Pharmacy Consult service. Relevant clinical data and objective / subjective history reviewed. Vancomycin Assessment:  Indication and Goal AUC/Trough: Soft tissue (goal -600, trough >10), -600, trough >10  Clinical Status:  New start   Micro:     Renal Function:  SCr: 1.13 mg/dL  CrCl: 51.7 mL/min  Renal replacement: Not on dialysis  Days of Therapy: 1  Current Dose: 1500mg IV Q12H  Vancomycin Plan:  New Dosinmg IV once then 1250mg IV Q24H  Estimated AUC: 452 mcg*hr/mL  Estimated Trough: 14 mcg/mL  Next Level: With AM labs at 0600 on 23  Renal Function Monitoring: Daily BMP and [place] will continue to follow closely for s/sx of nephrotoxicity, infusion reactions and appropriateness of therapy. BMP and CBC will be ordered per protocol. We will continue to follow the patient’s culture results and clinical progress daily.     Juan Lagos, Pharmacist

## 2023-08-29 NOTE — ASSESSMENT & PLAN NOTE
· Home regimen: Prednisone, Hydroxychloroquine  · Has had remote episodes of eosinophilia    Plan:  · Continue home dose prednisone/hydroxychloroquine  · PRN Tramadol for pain  · Continue with Volteran gel to joints- Monitor renal function and D/c if worsening renal function   · Check ANTHONY, ANCA, IgE

## 2023-08-29 NOTE — ASSESSMENT & PLAN NOTE
Assessment:  · By history  · Home regimen: Prednisone, Hydroxychloroquine    Plan:  · Continue home dose prednisone/hydroxychloroquine  · PRN Tramadol for pain  · Continue with Volteran gel to joints- Monitor renal function and D/c if worsening renal function

## 2023-08-29 NOTE — ASSESSMENT & PLAN NOTE
Assessment:  · By history  · Home regimen: Levothyroxine 25 mcg   · TSH checked in 06/2023-4.39    Plan:  · Continue home dose Levothyroxine  · Check TSH in the AM

## 2023-08-29 NOTE — ED NOTES
Primary RN called report to [facility] in ICU.      Carmelo Slater RN  08/29/23 [id number]

## 2023-08-29 NOTE — H&P
[address] [facility]  H&P  Name: Eri Martinez 80 y.o. male I MRN: [id number]  Unit/Bed#: -01 SDU I Date of Admission: 8/29/2023   Date of Service: 8/29/2023 I Hospital Day: 0      Assessment/Plan   * Severe sepsis [facility])  Assessment & Plan  Assessment:  · [facility] with complaints of fever of 103 and erythema of the left arm- suspect cellulitis  · Patient met SIRS criteria in the ED with tachycardia with heart rates in the 170s, tachypnea with respiratory rate in the 20s  · Negative lactate, negative Pro-Larry, no leukocytosis  · Initially received 1 L of crystalloid in the ED, shortly being brought up to the ICU the patient dropped his blood pressure with systolics in the 69I. Patient received additional 1 L crystalloid, 500 albumin-with borderline blood pressures. Patient still has ongoing fluid resuscitation per sepsis guidelines  · In the ED patient was given IV ceftriaxone  · UA not indicative of UTI  · Blood cultures x2 drawn in the ED    Plan:  · MAP goal greater than 65 mmHg- patient to receive an additional 500 mL of albumin for a total of 30 mL/KG fluid resuscitation per sepsis guidelines. In the event that patient still has low blood pressures we will have to start vasopressors  · Broaden antibiotic coverage- start IV cefepime and vancomycin. DC ceftriaxone  · Pharmacy consult for vancomycin management  · Follow-up culture results  · Check MRSA  · Repeat CBC in the morning monitor and trend white count  · Monitor and trend fever curve  · As needed Tylenol for fever greater than 100.4 Fahrenheit  · Hold home dose antihypertensives in the setting of hypotension    Cellulitis of left upper extremity  Assessment & Plan  Assessment:   Presents with erythremia of the left upper extremity, and fever    Plan:  See sepsis above     A-fib RVR ([facility])  Assessment & Plan  Assessment:  · Presents with A-fib RVR, heart rate in the 170s.   Likely mediated by ongoing infection-cellulitis/sepsis, relative hypovolemia, and pain  · Home regimen includes: Metoprolol, Eliquis  · Patient was given 5 mg IV metoprolol in the ED and had episode of hypotension    Plan:  · Give 250 mcgs IV digoxin every 6 hours x 6 doses- as patient is hypotensive  · Continue IV fluid resuscitation  · Treatment of sepsis  · Patient may require vasopressors and insertion of central line we will hold Eliquis now- we will start heparin drip for anticoagulation  · If patient's heart rate not well controlled with above treatment will consider starting amiodarone drip  · Hold home dose metoprolol in the setting of shock and sepsis    RA (rheumatoid arthritis) ([address])  Assessment & Plan  Assessment:  · By history  · Home regimen: Prednisone, Hydroxychloroquine    Plan:  · Continue home dose prednisone/hydroxychloroquine  · PRN Tramadol for pain  · Continue with Volteran gel to joints- Monitor renal function and D/c if worsening renal function       Hypothyroidism  Assessment & Plan  Assessment:  · By history  · Home regimen: Levothyroxine 25 mcg   · TSH checked in 06/2023-4.39    Plan:  · Continue home dose Levothyroxine    Spinal stenosis  Assessment & Plan  Assessment:  · By history. Complaint of sever back pain on physical exam  · Home Regimen: Gabapentin, tramadol-PRN    Plan:  · Continue with PRN tramadol.  Standing Gabapentin  · Lidocaine patch  · PRN Tylenol     Gout without acute exacerbation   Assessment & Plan  Assessment:   · By history  · Home regimen: Allopurinol     Plan:  · Continue home dose Allopurinol     GERD (gastroesophageal reflux disease)  Assessment & Plan  Assessment:  · By history  · Home Regimen: Omeprazole    Plan:  · Start Protonix as alternative  · Aspiration precautions     HLD (hyperlipidemia)  Assessment & Plan  Assessment:  · Last lipid panel 06/2023: triglycerides-163, HDL-82, LDL-49  · Home regiment: Ezetimibe    Plan:  · Continue home dose Ezetimibe    HTN (hypertension)  Assessment & Plan  Assessment:  · By history  · Home regimen: Metoprolol, Torsemide    Plan:  · Hold home dose anti hypertensives in the setting of shock            History of Present Illness     HPI: Irene Michele is a 80 y.o. male with a past medical history of GERD, gout, hyperlipidemia, hypertension, RA, spinal stenosis, proximal A-fib, hypothyroidism. Presented to the ED 8/29/2023 from [facility] assisted [facility] with a chief complaint of fever and left arm redness and heat. Patient's son is at bedside and reports patient was in normal state of health the previous day. At that time he noticed that his father's right arm seemed a little more swollen than usual but that his father was in his normal state of health. This morning the patient awoke felt like he had a fever, staff checked his temperature and it was 103 °F.  He was also noted that the patient had new sudden left arm redness and swelling. In the ED the patient was also found to have A-fib RVR with heart rates up into the 170s. On lab evaluation the patient did not have an elevated lactate, Pro-Larry, or leukocytosis. UA was not indicative of infection. Chest x-ray was negative for acute findings. Patient denied shortness of breath, chest pain, nausea vomiting or diarrhea. In the ED patient was given IV ceftriaxone and 1 L of crystalloid and was initially going to be admitted to the [facility] service under stepdown 2. Prior to transit, it was noted that the patient's systolic blood pressures were down in the 80s. Critical care was consulted for higher level of care for severe sepsis versus septic shock. History obtained from child, chart review and the patient. Review of Systems   Constitutional: Positive for fever. HENT: Negative. Eyes: Negative. Respiratory: Negative for cough, chest tightness, shortness of breath and wheezing. Cardiovascular: Negative for chest pain and palpitations.    Gastrointestinal: Negative for blood in stool, constipation, diarrhea, nausea and vomiting. Endocrine: Negative. Genitourinary: Negative for difficulty urinating, dysuria, frequency and urgency. Musculoskeletal: Positive for arthralgias and back pain. Skin: Negative. Allergic/Immunologic: Negative. Neurological: Negative for dizziness and headaches. Hematological: Bruises/bleeds easily. Psychiatric/Behavioral: Negative. 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[address])  No date: Spinal stenosis Past Surgical History:  12/17/2022: CARDIAC ELECTROPHYSIOLOGY PROCEDURE; N/A      Comment:  Procedure: Cardiac loop recorder implant;  Surgeon:                Ferny Lanier MD;  Location: [facility]; Service: Cardiology  No date: CARPAL TUNNEL RELEASE; Bilateral  No date: COLONOSCOPY  No date: LAMINECTOMY  No date: TOTAL KNEE ARTHROPLASTY; Bilateral  No date: TOTAL SHOULDER REPLACEMENT   Current Outpatient Medications   Medication Instructions   • acetaminophen (TYLENOL) 650 mg, Oral, 2 times daily   • allopurinol (ZYLOPRIM) 400 mg, Oral, Daily   • apixaban (ELIQUIS) 5 mg, Oral, 2 times daily   • ascorbic acid (VITAMIN C) 500 mg, Oral, Daily   • Betadine 10 % external solution No dose, route, or frequency recorded. • cetirizine (ZYRTEC) 10 mg, Oral, Daily   • Cholecalciferol (Vitamin D3) 50 MCG (2000 UT) TABS No dose, route, or frequency recorded. • Diclofenac Sodium (VOLTAREN) 4 g, Topical, 4 times daily   • doxycycline hyclate (VIBRAMYCIN) 50 mg, Oral, Every 12 hours scheduled   • ezetimibe (ZETIA) 10 mg, Oral, Daily   • fluticasone (FLONASE) 50 mcg/act nasal spray No dose, route, or frequency recorded. • gabapentin (NEURONTIN) 100 mg, Oral, 3 times daily   • Hibiclens 4 % external liquid No dose, route, or frequency recorded.    • hydroxychloroquine (PLAQUENIL) 200 mg, Oral, 2 times daily with meals   • levothyroxine 25 mcg tablet No dose, route, or frequency recorded. • loperamide (IMODIUM) 2 mg, Oral, As needed   • melatonin 9 mg, Oral, Daily at bedtime   • metoprolol succinate (TOPROL-XL) 25 mg, Oral, 2 times daily   • Multiple Vitamin (multivitamin) tablet 1 tablet, Oral, Daily   • mupirocin (BACTROBAN) 2 % ointment 1 application. , Topical, Daily   • nitroglycerin (NITROSTAT) 0.4 mg, Sublingual, Every 5 minutes PRN   • NON FORMULARY Medical marijuana   • nystatin (MYCOSTATIN) powder Topical, 2 times daily   • omeprazole (PRILOSEC) 10 mg, Oral, Daily   • ondansetron (ZOFRAN) 4 mg tablet No dose, route, or frequency recorded. • oxybutynin (DITROPAN) 5 mg tablet No dose, route, or frequency recorded. • polyethylene glycol (MIRALAX) 17 g, Oral, Every other day   • potassium chloride (K-DUR,KLOR-CON) 20 mEq tablet 40 mEq, Oral, Daily   • predniSONE 5 mg tablet Oral, Daily   • pregabalin (LYRICA) 25 mg capsule Take 1 tab PO at night for 7 days then increase to 1 tab PO bid   • psyllium (METAMUCIL SMOOTH TEXTURE) 28 % packet 1 packet, Oral, 2 times daily   • Saccharomyces boulardii (Probiotic) 250 MG CAPS No dose, route, or frequency recorded. • solifenacin (VESICARE) 5 mg, Oral, Daily   • tamsulosin (FLOMAX) 0.4 mg, Oral, Daily at bedtime   • torsemide (DEMADEX) 10 mg, Oral, 4 times weekly, Take 10 mg on Tuesday, Thursday, Saturday, and Sunday   • torsemide (DEMADEX) 20 mg, Oral, 3 times weekly, Take 20 mg on Monday, Wednesday, and Friday   • traMADol (ULTRAM) 50 mg tablet No dose, route, or frequency recorded.    • Vitamin D, Cholecalciferol, 25 MCG (1000 UT) TABS Oral, Daily    Allergies   Allergen Reactions   • Colchicine Other (See Comments)     Flushing     • Niacin Other (See Comments)     flushed   • Statins       Social History     Tobacco Use   • Smoking status: Never   • Smokeless tobacco: Never   Vaping Use   • Vaping Use: Never used   Substance Use Topics   • Alcohol use: Yes     Alcohol/week: 2.0 standard drinks of alcohol     Types: 2 Shots of liquor per week     Comment: 2-3 ounces of whiskey a day   • Drug use: Yes     Types: Marijuana    Family History   Problem Relation Age of Onset   • Colon polyps Neg Hx    • Colon cancer Neg Hx           Objective                            Vitals I/O      Most Recent Min/Max in 24hrs   Temp 98 °F (36.7 °C) Temp  Min: 97.9 °F (36.6 °C)  Max: 98 °F (36.7 °C)   Pulse (!) 165 Pulse  Min: 119  Max: 165   Resp (!) 26 Resp  Min: 20  Max: 28   BP (!) 87/53 BP  Min: 70/50  Max: 174/84   O2 Sat 95 % SpO2  Min: 87 %  Max: 96 %      Intake/Output Summary (Last 24 hours) at 8/29/2023 1436  Last data filed at 8/29/2023 1301  Gross per 24 hour   Intake 1000 ml   Output --   Net 1000 ml         Diet Regular; Regular House     Invasive Monitoring Physical exam    Physical Exam  Eyes:      General: Vision grossly intact. No scleral icterus. Extraocular Movements: Extraocular movements intact. Conjunctiva/sclera: Conjunctivae normal.      Pupils: Pupils are equal, round, and reactive to light. Skin:     General: Skin is warm. Capillary Refill: Capillary refill takes less than 2 seconds. Findings: Wound present. Comments: Lower extremities are cool to touch   HENT:      Head: Normocephalic and atraumatic. Mouth/Throat:      Mouth: Mucous membranes are dry. Cardiovascular:      Rate and Rhythm: Tachycardia present. Rhythm irregular. Pulses: Normal pulses. Heart sounds: Normal heart sounds. Musculoskeletal:         General: Normal range of motion. Abdominal:      General: Bowel sounds are normal. There is no distension. Palpations: Abdomen is soft. Tenderness: There is no abdominal tenderness. There is no guarding. Constitutional:       Appearance: He is well-developed. He is ill-appearing. Pulmonary:      Effort: Tachypnea present. No respiratory distress.       Breath sounds: Normal breath sounds. No wheezing, rhonchi or rales. Neurological:      General: No focal deficit present. Mental Status: He is alert and oriented to person, place and time. Mental status is at baseline. He is CAM ICU negative. Sensory: Sensation is intact. Motor: gross motor function is at baseline for patient. Corneal reflex present, cough reflex and gag reflex intact. Diagnostic Studies      EKG: Afib RVR  Imaging:  I have personally reviewed pertinent reports.    and I have personally reviewed pertinent films in PACS     Medications:  Scheduled PRN   allopurinol, 400 mg, Daily  cefepime, 2,000 mg, Q12H  chlorhexidine, 15 mL, Q12H ODALYS  Diclofenac Sodium, 2 g, 4x Daily  digoxin, 250 mcg, Q6H  ezetimibe, 10 mg, Daily  gabapentin, 100 mg, TID  hydroxychloroquine, 200 mg, BID With Meals  [START ON 8/30/2023] levothyroxine, 25 mcg, Early Morning  lidocaine, 2 patch, Daily  melatonin, 9 mg, HS  [START ON 8/30/2023] pantoprazole, 20 mg, Daily Before Breakfast  polyethylene glycol, 17 g, Every Other Day  predniSONE, 5 mg, Daily  [START ON 8/30/2023] vancomycin, 1,250 mg, Q24H  vancomycin, 1,750 mg, Once      traMADol, 50 mg, Q6H PRN       Continuous    norepinephrine, 1-30 mcg/min         Labs:    CBC    Recent Labs     08/29/23  0906   WBC 9.09   HGB 12.8   HCT 40.1   *     BMP    Recent Labs     08/29/23  0906   SODIUM 141   K 3.5      CO2 28   AGAP 10   BUN 19   CREATININE 1.13   CALCIUM 8.2*       Coags    Recent Labs     08/29/23  0906   INR 1.31*   PTT 29        Additional Electrolytes  No recent results       Blood Gas    No recent results  No recent results LFTs  Recent Labs     08/29/23  0906   ALT 17   AST 23   ALKPHOS 63   ALB 3.6   TBILI 0.94       Infectious  Recent Labs     08/29/23  0906   PROCALCITONI 0.16     Glucose  Recent Labs     08/29/23  0906   GLUC 160*             Critical Care Time Delivered: Upon my evaluation, this patient had a high probability of imminent or life-threatening deterioration due to Worsening sepsis, afib with RVR, which required my direct attention, intervention, and personal management. I have personally provided 60 minutes of critical care time, exclusive of procedures, teaching, family meetings, and any prior time recorded by providers other than myself.    Anticipated Length of Stay is > 2 midnights  ESTEFANIA Turner

## 2023-08-29 NOTE — ASSESSMENT & PLAN NOTE
Assessment:  · By history  · Home Regimen: Omeprazole    Plan:  · Start Protonix as alternative  · Aspiration precautions

## 2023-08-29 NOTE — ASSESSMENT & PLAN NOTE
Assessment:  · By history  · Home regimen: Metoprolol, Torsemide    Plan:  · Hold home dose anti hypertensives in the setting of shock

## 2023-08-29 NOTE — SEPSIS NOTE
Sepsis Note   Hershal Leak 80 y.o. male MRN: [id number]  Unit/Bed#: -01 SDU Encounter: [id number]       Initial Sepsis Screening     [address] Name 08/29/23 1336 08/29/23 1039             Is the patient's history suggestive of a new or worsening infection? Yes (Proceed)  -[address] [facility] Yes (Proceed)  -ML       Suspected source of infection soft tissue  -[address] [place] soft tissue  -ML       Indicate SIRS criteria Tachycardia > 90 bpm;Tachypnea > 20 resp per min  -MK Tachycardia > 90 bpm;Tachypnea > 20 resp per min  -ML       Are two or more of the above signs & symptoms of infection both present and new to the patient? Yes (Proceed)  -[address] [facility] Yes (Proceed)  -ML       Assess for evidence of organ dysfunction: Are any of the below criteria present within 6 hours of suspected infection and SIRS criteria that are NOT considered to be chronic conditions? SBP < 90;MAP < 65  -MK --       Date of presentation of septic shock 08/29/23  -[address] [facility] --       Time of presentation of septic shock 1337  -[address] [facility] --       Fluid Resuscitation: 30 ml/kg IV fluid bolus will be given based on actual body weight  -MK --       Is the patient is persistently hypotensive in the hour after fluid bolus administration? If yes, patient meets criteria for vasopressor use. -- --       Sepsis Note: Click "NEXT" below (NOT "close") to generate sepsis note based on above information. YES (proceed by clicking "NEXT")  -[address] [facility] --             User Key  (r) = Recorded By, (t) = Taken By, (c) = Cosigned By    [facility] Name Provider Type    SONU Machado DO Physician    Charlottemovijaya, [address] [place] [address] Nurse Practitioner                Default Flowsheet Data (last 720 hours)     Sepsis Reassess     [address] Name 08/29/23 1602                   Repeat Volume Status and Tissue Perfusion Assessment Performed    Date of Reassessment: 08/29/23  -[address] [facility]        Time of Reassessment: 1400  -[address] [facility]        Sepsis Reassessment Note: Click "NEXT" below (NOT "close") to generate sepsis reassessment note.  YES (proceed by clicking "NEXT")  -[address] [place]        Repeat Volume Status and Tissue Perfusion Assessment Performed --              User Key  (r) = Recorded By, (t) = Taken By, (c) = Cosigned By    [address] [place] [address] Name Provider Type    ESTEFANIA Sommers Nurse Practitioner                Body mass index is 35.49 kg/m².   Wt Readings from Last 1 Encounters:   08/29/23 109 kg (240 lb 4.8 oz)     IBW (Ideal Body Weight): 70.7 kg    Ideal body weight: 70.7 kg (155 lb 13.8 oz)  Adjusted ideal body weight: 86 kg (189 lb 10.2 oz)

## 2023-08-29 NOTE — PROGRESS NOTES
Patient ID: Zay Brooks is a 80 y.o. male Date of Birth 1934       Chief Complaint   Patient presents with   • New Patient Visit     Wound care       Allergies:  Colchicine, Niacin, and Statins    Diagnosis:  1. Open wound of right great toe with damage to nail, subsequent encounter  -     Wound cleansing and dressings; Future    2. Peripheral vascular disease ([address])       Diagnosis ICD-10-CM Associated Orders   1. Open wound of right great toe with damage to nail, subsequent encounter  S91.201D Wound cleansing and dressings      2. Peripheral vascular disease ([address])  I73.9            Assessment & Plan:  Records reviewed, emphasized patient the importance of completing the arterial duplex to assess the amount of vascular disease present. Overall good progress with satisfactory healing  Betadine, Adaptic, gauze dressing every other day. Follow-up in 2 weeks. Subjective:   8/28/2023: 24-year-old male reporting his toe is doing better but the one edge of his nail is now loose but otherwise the wound has been healing well. 8/17/2023: 80year-old type II diabetic presents today with chief complaint of wound right great toe secondary to him injuring it after falling in the bathroom at [address] on 8/6/2023. Denies any fever or shortness of breath.       The following portions of the patient's history were reviewed and updated as appropriate:   Patient Active Problem List   Diagnosis   • Accidental overdose   • History of hypertension   • Hypotension   • RA (rheumatoid arthritis) ([address])   • Speech disturbance   • TIA (transient ischemic attack)   • Low left ventricular ejection fraction   • Dilated cardiomyopathy ([facility])   • HTN (hypertension)   • Chronic systolic heart failure ([facility])   • A-fib RVR ([facility])   • Transient speech disturbance   • KAMLESH (acute kidney injury) ([facility])   • Elevated TSH   • Hypomagnesemia   • Septic arthritis of shoulder, right ([facility])   • Suture of skin wound   • Adverse effect of loop (high-ceiling) diuretics, subsequent encounter   • Toxic metabolic encephalopathy   • Open wound of right great toe with damage to nail   • Severe sepsis ([facility])   • Cellulitis of left upper extremity   • HLD (hyperlipidemia)   • GERD (gastroesophageal reflux disease)   • Gout without acute exacerbation    • Spinal stenosis   • Hypothyroidism   • Peripheral vascular disease ([facility])   • Bacteremia due to group B Streptococcus   • Respiratory insufficiency     Past Medical History:   Diagnosis Date   • GERD (gastroesophageal reflux disease)    • Gout    • Hyperlipidemia    • Hypertension    • 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[address])    • Spinal stenosis      Past Surgical History:   Procedure Laterality Date   • CARDIAC ELECTROPHYSIOLOGY PROCEDURE N/A 12/17/2022    Procedure: Cardiac loop recorder implant;  Surgeon: Rajiv Rivera MD;  Location: [facility]; Service: Cardiology   • CARPAL TUNNEL RELEASE Bilateral    • COLONOSCOPY     • LAMINECTOMY     • TOTAL KNEE ARTHROPLASTY Bilateral    • TOTAL SHOULDER REPLACEMENT       Social History     Socioeconomic History   • Marital status:      Spouse name: Not on file   • Number of children: Not on file   • Years of education: Not on file   • Highest education level: Not on file   Occupational History   • Not on file   Tobacco Use   • Smoking status: Never   • Smokeless tobacco: Never   Vaping Use   • Vaping Use: Never used   Substance and Sexual Activity   • Alcohol use:  Yes     Alcohol/week: 2.0 standard drinks of alcohol     Types: 2 Shots of liquor per week     Comment: 2-3 ounces of whiskey a day   • Drug use: Yes     Types: Marijuana   • Sexual activity: Not Currently   Other Topics Concern   • Not on file   Social History Narrative   • Not on file     Social Determinants of Health     Financial Resource Strain: Not on file   Food Insecurity: No Food Insecurity (8/30/2023)    Hunger Vital Sign    • Worried About Running Out of Food in the Last Year: Never true    • Ran Out of Food in the Last Year: Never true   Transportation Needs: No Transportation Needs (8/30/2023)    PRAPARE - Transportation    • Lack of Transportation (Medical): No    • Lack of Transportation (Non-Medical): No   Physical Activity: Not on file   Stress: Not on file   Social Connections: Not on file   Intimate Partner Violence: Not on file   Housing Stability: Low Risk  (8/30/2023)    Housing Stability Vital Sign    • Unable to Pay for Housing in the Last Year: No    • Number of Places Lived in the Last Year: 1    • Unstable Housing in the Last Year: No      No current facility-administered medications for this visit. No current outpatient medications on file.     Facility-Administered Medications Ordered in Other Visits:   •  allopurinol (ZYLOPRIM) tablet 400 mg, 400 mg, Oral, Daily, ESTEFANIA Chavez, 400 mg at 08/30/23 0841  •  chlorhexidine (PERIDEX) 0.12 % oral rinse 15 mL, 15 mL, Mouth/Throat, Q12H [address], ESTEFANIA Chavez, 15 mL at 08/30/23 2033  •  clindamycin in dextrose 5% (Cleocin) IVPB (premix) 600 mg 50 mL, 600 mg, Intravenous, Q8H, Jovanni Lemus PA-C, Stopped at 08/30/23 2026  •  Diclofenac Sodium (VOLTAREN) 1 % topical gel 2 g, 2 g, Topical, 4x Daily, ESTEFANIA Chavez, 2 g at 08/30/23 2136  •  ezetimibe (ZETIA) tablet 10 mg, 10 mg, Oral, Daily, ESTEFANIA Chavez, 10 mg at 08/30/23 1031  •  gabapentin (NEURONTIN) capsule 300 mg, 300 mg, Oral, TID, Jovanni Lemus PA-C, 300 mg at 08/30/23 2034  •  heparin (porcine) 25,000 units in 0.45% NaCl 250 mL infusion (premix), 3-20 Units/kg/hr (Order-Specific), Intravenous, Titrated, ESTEFANIA Chavez, Last Rate: 11.8 mL/hr at 08/30/23 1538, 13.1 Units/kg/hr at 08/30/23 1538  •  heparin (porcine) injection 2,000 Units, 2,000 Units, Intravenous, Q6H PRN, ESTEFANIA Chavez, 2,000 Units at 08/29/23 2319  •  heparin (porcine) injection 4,000 Units, 4,000 Units, Intravenous, Q6H PRN, ESTEFANIA Chavez  •  HYDROmorphone HCl (DILAUDID) injection 0.2 mg, 0.2 mg, Intravenous, Q4H PRN, Wes Shaw PA-C  •  hydroxychloroquine (PLAQUENIL) tablet 200 mg, 200 mg, Oral, BID With Meals, ESTEFANIA Gabriel, 200 mg at 08/30/23 1551  •  ipratropium (ATROVENT) 0.02 % inhalation solution 0.5 mg, 0.5 mg, Nebulization, TID, Elizabeth Radha, DO, 0.5 mg at 08/30/23 2003  •  levalbuterol [facility]) inhalation solution 1.25 mg, 1.25 mg, Nebulization, TID, Elizabeth Laxmis, DO, 1.25 mg at 08/30/23 2003  •  levothyroxine tablet 25 mcg, 25 mcg, Oral, Early Morning, ESTEFANIA Gabriel, 25 mcg at 08/30/23 0503  •  lidocaine (LIDODERM) 5 % patch 2 patch, 2 patch, Topical, Daily, ESTEFANIA Gabriel, 2 patch at 08/30/23 3927  •  magnesium sulfate 2 g/50 mL IVPB (premix) 2 g, 2 g, Intravenous, Once, Wes Shaw PA-C, Last Rate: 25 mL/hr at 08/30/23 2359, 2 g at 08/30/23 2359  •  melatonin tablet 9 mg, 9 mg, Oral, HS, ESETFANIA Gabriel, 9 mg at 08/30/23 2132  •  metoprolol tartrate (LOPRESSOR) partial tablet 12.5 mg, 12.5 mg, Oral, Q12H [address], Wes Shaw PA-C, 12.5 mg at 08/30/23 2132  •  pantoprazole (PROTONIX) EC tablet 20 mg, 20 mg, Oral, Daily Before Breakfast, ESTEFANIA Gabriel, 20 mg at 08/30/23 0845  •  penicillin G (PFIZERPEN-G) 4 Million Units in sodium chloride 0.9 % 50 mL IVPB, 4 Million Units, Intravenous, Q4H, Wes Shaw PA-C, Stopped at 08/30/23 2359  •  polyethylene glycol (MIRALAX) packet 17 g, 17 g, Oral, Every Other Day, ESTEFANIA Gabriel, 17 g at 08/29/23 1622  •  predniSONE tablet 5 mg, 5 mg, Oral, Daily, ESTEFANIA Gabriel, 5 mg at 08/30/23 3531  •  traMADol (ULTRAM) tablet 50 mg, 50 mg, Oral, Q6H PRN, ESTEFANIA Gabriel, 50 mg at 08/30/23 1127  •  vancomycin (VANCOCIN) IVPB (premix in dextrose) 750 mg 150 mL, 750 mg, Intravenous, Q12H, Elizabeth Garcia DO, Stopped at 08/30/23 2303  Family History   Problem Relation Age of Onset   • Colon polyps Neg Hx    • Colon cancer Neg Hx       Review of Systems   Constitutional: Negative. HENT: Negative. Eyes: Negative. Respiratory: Negative. Cardiovascular: Negative. Gastrointestinal: Negative. Endocrine: Negative. Genitourinary: Negative. Musculoskeletal: Negative. Skin: Positive for wound (Right great toe nailbed). Allergic/Immunologic: Negative. Neurological: Positive for numbness. Hematological: Negative. Psychiatric/Behavioral: Negative. Objective:  /78   Pulse 70   Temp (!) 97.1 °F (36.2 °C)   Resp 18   Ht 5' 9" (1.753 m)   Wt 99.8 kg (220 lb)   BMI 32.49 kg/m²     Physical Exam  Constitutional:       Appearance: Normal appearance. HENT:      Head: Normocephalic. Right Ear: Tympanic membrane normal.      Left Ear: Tympanic membrane normal.      Nose: No congestion. Mouth/Throat:      Pharynx: No oropharyngeal exudate or posterior oropharyngeal erythema. Eyes:      Conjunctiva/sclera: Conjunctivae normal.      Pupils: Pupils are equal, round, and reactive to light. Cardiovascular:      Rate and Rhythm: Normal rate and regular rhythm. Pulses:           Dorsalis pedis pulses are 0 on the right side and 0 on the left side. Posterior tibial pulses are 0 on the right side and 0 on the left side. Pulmonary:      Effort: Pulmonary effort is normal.   Feet:      Right foot:      Protective Sensation: 10 sites tested. Left foot:      Protective Sensation: 10 sites tested. Skin:     General: Skin is warm and dry. Capillary Refill: Capillary refill takes 2 to 3 seconds. Coloration: Skin is not pale. Findings: No bruising, erythema, lesion or rash. Comments: Right great toe loose nail remnant trimmed which  since last visit. No signs of infection. Satisfactory wound healing though slow, 0.25 x 0.25 x 0.1 cm, predominantly granular healthy tissue, no need for debridement but atrophic changes and vascular disease are of concern. Neurological:      Mental Status: He is alert. Cranial Nerves: No cranial nerve deficit. Sensory: No sensory deficit. Motor: No weakness. Gait: Gait normal.      Deep Tendon Reflexes: Reflexes normal.   Psychiatric:         Mood and Affect: Mood normal.         Behavior: Behavior normal.         Judgment: Judgment normal.         Wound 12/17/22 Incision Chest Lateral;Left;Upper (Active)       Wound 01/26/23 Surgical Face Left (Active)       Wound 08/28/23 Traumatic Toe (Comment  which one) Right (Active)   Wound Image   08/28/23 1513   Wound Description Epithelialization;Granulation tissue 08/28/23 1435   Mylene-wound Assessment Intact 08/28/23 1435   Wound Length (cm) 1 cm 08/28/23 1435   Wound Width (cm) 0.2 cm 08/28/23 1435   Wound Depth (cm) 0.1 cm 08/28/23 1435   Wound Surface Area (cm^2) 0.2 cm^2 08/28/23 1435   Wound Volume (cm^3) 0.02 cm^3 08/28/23 1435   Calculated Wound Volume (cm^3) 0.02 cm^3 08/28/23 1435   Drainage Amount None 08/28/23 1435   Treatments Cleansed 08/28/23 1435   Wound packed? No 08/28/23 1435   Dressing Changed Changed 08/28/23 1435   Patient Tolerance Tolerated well 08/28/23 1435   Dressing Status Intact 08/28/23 1435       Procedures             Wound Instructions:  Orders Placed This Encounter   Procedures   • Wound cleansing and dressings     Wash your hands with soap and water. Remove old dressing, discard into plastic bag and place in trash. Cleanse the wound with soap and water prior to applying a clean dressing. Do not use tissue or cotton balls. Do not scrub the wound. Pat dry using gauze. Shower YES WITH ASSISTANCE  Apply BETADINE to right first toe nail bed  wound   Apply to the RIGHT FIRST TOE wound. Cover with GAUZE  Secure with TAPE.  AND STRETCH Netting  Change dressing EVERY OTHER DAY    DONE TODAY   RETURN IN 2 WEEKS     Standing Status:   Future     Standing Expiration Date:   8/28/2024         Anita Mcgregor DPM      Portions of the record may have been created with voice recognition software. Occasional wrong word or "sound a like" substitutions may have occurred due to the inherent limitations of voice recognition software. Read the chart carefully and recognize, using context, where substitutions have occurred.

## 2023-08-30 ENCOUNTER — APPOINTMENT (INPATIENT)
Dept: NON INVASIVE DIAGNOSTICS | Facility: HOSPITAL | Age: 88
DRG: 871 | End: 2023-08-30
Payer: COMMERCIAL

## 2023-08-30 PROBLEM — R06.89 RESPIRATORY INSUFFICIENCY: Status: ACTIVE | Noted: 2023-08-30

## 2023-08-30 PROBLEM — B95.1 BACTEREMIA DUE TO GROUP B STREPTOCOCCUS: Status: ACTIVE | Noted: 2023-08-30

## 2023-08-30 PROBLEM — R78.81 BACTEREMIA DUE TO GROUP B STREPTOCOCCUS: Status: ACTIVE | Noted: 2023-08-30

## 2023-08-30 LAB
ANION GAP SERPL CALCULATED.3IONS-SCNC: 9 MMOL/L
AORTIC ROOT: 3.4 CM
APTT PPP: 74 SECONDS (ref 23–37)
APTT PPP: 81 SECONDS (ref 23–37)
ASCENDING AORTA: 4.3 CM
ATRIAL RATE: 137 BPM
ATRIAL RATE: 166 BPM
BASOPHILS # BLD AUTO: 0.03 THOUSANDS/ÂΜL (ref 0–0.1)
BASOPHILS NFR BLD AUTO: 0 % (ref 0–1)
BUN SERPL-MCNC: 17 MG/DL (ref 5–25)
CALCIUM SERPL-MCNC: 7.1 MG/DL (ref 8.4–10.2)
CHLORIDE SERPL-SCNC: 105 MMOL/L (ref 96–108)
CK SERPL-CCNC: 273 U/L (ref 39–308)
CO2 SERPL-SCNC: 25 MMOL/L (ref 21–32)
CORTIS SERPL-MCNC: 7.4 UG/DL
CREAT SERPL-MCNC: 0.96 MG/DL (ref 0.6–1.3)
DIGOXIN SERPL-MCNC: 2 NG/ML (ref 0.8–2)
E WAVE DECELERATION TIME: 142 MS
EOSINOPHIL # BLD AUTO: 0.07 THOUSAND/ÂΜL (ref 0–0.61)
EOSINOPHIL NFR BLD AUTO: 0 % (ref 0–6)
ERYTHROCYTE [DISTWIDTH] IN BLOOD BY AUTOMATED COUNT: 15.9 % (ref 11.6–15.1)
ERYTHROCYTE [DISTWIDTH] IN BLOOD BY AUTOMATED COUNT: 15.9 % (ref 11.6–15.1)
FERRITIN SERPL-MCNC: 101 NG/ML (ref 24–336)
FOLATE SERPL-MCNC: >22.3 NG/ML
FRACTIONAL SHORTENING: 35 % (ref 28–44)
GFR SERPL CREATININE-BSD FRML MDRD: 70 ML/MIN/1.73SQ M
GLUCOSE SERPL-MCNC: 90 MG/DL (ref 65–140)
HCT VFR BLD AUTO: 31.6 % (ref 36.5–49.3)
HCT VFR BLD AUTO: 33.2 % (ref 36.5–49.3)
HGB BLD-MCNC: 10.3 G/DL (ref 12–17)
HGB BLD-MCNC: 9.9 G/DL (ref 12–17)
IMM GRANULOCYTES # BLD AUTO: 0.3 THOUSAND/UL (ref 0–0.2)
IMM GRANULOCYTES NFR BLD AUTO: 2 % (ref 0–2)
INR PPP: 1.84 (ref 0.84–1.19)
INTERVENTRICULAR SEPTUM IN DIASTOLE (PARASTERNAL SHORT AXIS VIEW): 0.8 CM
INTERVENTRICULAR SEPTUM: 0.8 CM (ref 0.6–1.1)
IRON SERPL-MCNC: <10 UG/DL (ref 50–212)
LEFT ATRIUM SIZE: 4.2 CM
LEFT INTERNAL DIMENSION IN SYSTOLE: 4.2 CM (ref 2.1–4)
LEFT VENTRICULAR INTERNAL DIMENSION IN DIASTOLE: 6.5 CM (ref 3.5–6)
LEFT VENTRICULAR POSTERIOR WALL IN END DIASTOLE: 0.9 CM
LEFT VENTRICULAR STROKE VOLUME: 140 ML
LVSV (TEICH): 140 ML
LYMPHOCYTES # BLD AUTO: 1.3 THOUSANDS/ÂΜL (ref 0.6–4.47)
LYMPHOCYTES NFR BLD AUTO: 8 % (ref 14–44)
MAGNESIUM SERPL-MCNC: 0.7 MG/DL (ref 1.9–2.7)
MAGNESIUM SERPL-MCNC: 1.6 MG/DL (ref 1.9–2.7)
MCH RBC QN AUTO: 31.4 PG (ref 26.8–34.3)
MCH RBC QN AUTO: 31.9 PG (ref 26.8–34.3)
MCHC RBC AUTO-ENTMCNC: 31 G/DL (ref 31.4–37.4)
MCHC RBC AUTO-ENTMCNC: 31.3 G/DL (ref 31.4–37.4)
MCV RBC AUTO: 101 FL (ref 82–98)
MCV RBC AUTO: 102 FL (ref 82–98)
MONOCYTES # BLD AUTO: 0.57 THOUSAND/ÂΜL (ref 0.17–1.22)
MONOCYTES NFR BLD AUTO: 4 % (ref 4–12)
MRSA NOSE QL CULT: NORMAL
MV E'TISSUE VEL-SEP: 8 CM/S
MV PEAK A VEL: 0.87 M/S
MV PEAK E VEL: 56 CM/S
MV STENOSIS PRESSURE HALF TIME: 41 MS
MV VALVE AREA P 1/2 METHOD: 5.37 CM2
NEUTROPHILS # BLD AUTO: 13.61 THOUSANDS/ÂΜL (ref 1.85–7.62)
NEUTS SEG NFR BLD AUTO: 86 % (ref 43–75)
NRBC BLD AUTO-RTO: 0 /100 WBCS
P AXIS: 32 DEGREES
PHOSPHATE SERPL-MCNC: 3.1 MG/DL (ref 2.3–4.1)
PLATELET # BLD AUTO: 118 THOUSANDS/UL (ref 149–390)
PLATELET # BLD AUTO: 125 THOUSANDS/UL (ref 149–390)
PMV BLD AUTO: 12.2 FL (ref 8.9–12.7)
PMV BLD AUTO: 12.6 FL (ref 8.9–12.7)
POTASSIUM SERPL-SCNC: 3.9 MMOL/L (ref 3.5–5.3)
PR INTERVAL: 136 MS
PROCALCITONIN SERPL-MCNC: 10.15 NG/ML
PROCALCITONIN SERPL-MCNC: 14.19 NG/ML
PROTHROMBIN TIME: 22.3 SECONDS (ref 11.6–14.5)
QRS AXIS: -46 DEGREES
QRS AXIS: -46 DEGREES
QRSD INTERVAL: 90 MS
QRSD INTERVAL: 90 MS
QT INTERVAL: 290 MS
QT INTERVAL: 322 MS
QTC INTERVAL: 437 MS
QTC INTERVAL: 530 MS
RBC # BLD AUTO: 3.1 MILLION/UL (ref 3.88–5.62)
RBC # BLD AUTO: 3.28 MILLION/UL (ref 3.88–5.62)
RETICS # AUTO: ABNORMAL 10*3/UL (ref 14356–105094)
RETICS # CALC: 2.92 % (ref 0.37–1.87)
SL CV LV EF: 45
SL CV PED ECHO LEFT VENTRICLE DIASTOLIC VOLUME (MOD BIPLANE) 2D: 219 ML
SL CV PED ECHO LEFT VENTRICLE SYSTOLIC VOLUME (MOD BIPLANE) 2D: 79 ML
SODIUM SERPL-SCNC: 139 MMOL/L (ref 135–147)
T WAVE AXIS: 114 DEGREES
T WAVE AXIS: 115 DEGREES
TIBC SERPL-MCNC: <147 UG/DL (ref 250–450)
TR MAX PG: 24 MMHG
TR PEAK VELOCITY: 2.5 M/S
TRICUSPID ANNULAR PLANE SYSTOLIC EXCURSION: 3.2 CM
TRICUSPID VALVE PEAK REGURGITATION VELOCITY: 2.47 M/S
TSH SERPL DL<=0.05 MIU/L-ACNC: 1.33 UIU/ML (ref 0.45–4.5)
UIBC SERPL-MCNC: 137 UG/DL (ref 155–355)
VENTRICULAR RATE: 137 BPM
VENTRICULAR RATE: 163 BPM
VIT B12 SERPL-MCNC: 469 PG/ML (ref 180–914)
WBC # BLD AUTO: 13.93 THOUSAND/UL (ref 4.31–10.16)
WBC # BLD AUTO: 15.88 THOUSAND/UL (ref 4.31–10.16)

## 2023-08-30 PROCEDURE — 84145 PROCALCITONIN (PCT): CPT

## 2023-08-30 PROCEDURE — 82746 ASSAY OF FOLIC ACID SERUM: CPT

## 2023-08-30 PROCEDURE — 82728 ASSAY OF FERRITIN: CPT

## 2023-08-30 PROCEDURE — 83735 ASSAY OF MAGNESIUM: CPT | Performed by: PHYSICIAN ASSISTANT

## 2023-08-30 PROCEDURE — 85027 COMPLETE CBC AUTOMATED: CPT | Performed by: PHYSICIAN ASSISTANT

## 2023-08-30 PROCEDURE — C8929 TTE W OR WO FOL WCON,DOPPLER: HCPCS

## 2023-08-30 PROCEDURE — 99233 SBSQ HOSP IP/OBS HIGH 50: CPT | Performed by: INTERNAL MEDICINE

## 2023-08-30 PROCEDURE — 93010 ELECTROCARDIOGRAM REPORT: CPT | Performed by: INTERNAL MEDICINE

## 2023-08-30 PROCEDURE — 80162 ASSAY OF DIGOXIN TOTAL: CPT | Performed by: PHYSICIAN ASSISTANT

## 2023-08-30 PROCEDURE — 84443 ASSAY THYROID STIM HORMONE: CPT

## 2023-08-30 PROCEDURE — 85610 PROTHROMBIN TIME: CPT

## 2023-08-30 PROCEDURE — 82550 ASSAY OF CK (CPK): CPT | Performed by: PHYSICIAN ASSISTANT

## 2023-08-30 PROCEDURE — 84145 PROCALCITONIN (PCT): CPT | Performed by: PHYSICIAN ASSISTANT

## 2023-08-30 PROCEDURE — 83735 ASSAY OF MAGNESIUM: CPT

## 2023-08-30 PROCEDURE — 93970 EXTREMITY STUDY: CPT | Performed by: SURGERY

## 2023-08-30 PROCEDURE — 93923 UPR/LXTR ART STDY 3+ LVLS: CPT | Performed by: SURGERY

## 2023-08-30 PROCEDURE — 93925 LOWER EXTREMITY STUDY: CPT | Performed by: SURGERY

## 2023-08-30 PROCEDURE — 85025 COMPLETE CBC W/AUTO DIFF WBC: CPT

## 2023-08-30 PROCEDURE — 94760 N-INVAS EAR/PLS OXIMETRY 1: CPT

## 2023-08-30 PROCEDURE — 94640 AIRWAY INHALATION TREATMENT: CPT

## 2023-08-30 PROCEDURE — 85730 THROMBOPLASTIN TIME PARTIAL: CPT

## 2023-08-30 PROCEDURE — 82533 TOTAL CORTISOL: CPT | Performed by: PHYSICIAN ASSISTANT

## 2023-08-30 PROCEDURE — 93306 TTE W/DOPPLER COMPLETE: CPT | Performed by: INTERNAL MEDICINE

## 2023-08-30 PROCEDURE — 83550 IRON BINDING TEST: CPT

## 2023-08-30 PROCEDURE — 85730 THROMBOPLASTIN TIME PARTIAL: CPT | Performed by: PHYSICIAN ASSISTANT

## 2023-08-30 PROCEDURE — 99222 1ST HOSP IP/OBS MODERATE 55: CPT | Performed by: PHYSICIAN ASSISTANT

## 2023-08-30 PROCEDURE — 84100 ASSAY OF PHOSPHORUS: CPT

## 2023-08-30 PROCEDURE — 83540 ASSAY OF IRON: CPT

## 2023-08-30 PROCEDURE — 82607 VITAMIN B-12: CPT

## 2023-08-30 PROCEDURE — NC001 PR NO CHARGE: Performed by: PHYSICIAN ASSISTANT

## 2023-08-30 PROCEDURE — 85045 AUTOMATED RETICULOCYTE COUNT: CPT

## 2023-08-30 PROCEDURE — 93925 LOWER EXTREMITY STUDY: CPT

## 2023-08-30 PROCEDURE — 99221 1ST HOSP IP/OBS SF/LOW 40: CPT | Performed by: PODIATRIST

## 2023-08-30 PROCEDURE — 80048 BASIC METABOLIC PNL TOTAL CA: CPT

## 2023-08-30 PROCEDURE — 93923 UPR/LXTR ART STDY 3+ LVLS: CPT

## 2023-08-30 RX ORDER — LEVALBUTEROL INHALATION SOLUTION 1.25 MG/3ML
1.25 SOLUTION RESPIRATORY (INHALATION)
Status: DISCONTINUED | OUTPATIENT
Start: 2023-08-30 | End: 2023-09-06

## 2023-08-30 RX ORDER — GABAPENTIN 300 MG/1
300 CAPSULE ORAL 3 TIMES DAILY
Status: DISCONTINUED | OUTPATIENT
Start: 2023-08-30 | End: 2023-09-02

## 2023-08-30 RX ORDER — GABAPENTIN 100 MG/1
200 CAPSULE ORAL 3 TIMES DAILY
Status: DISCONTINUED | OUTPATIENT
Start: 2023-08-30 | End: 2023-08-30

## 2023-08-30 RX ORDER — CLINDAMYCIN PHOSPHATE 600 MG/50ML
600 INJECTION INTRAVENOUS EVERY 8 HOURS
Status: DISCONTINUED | OUTPATIENT
Start: 2023-08-30 | End: 2023-08-31

## 2023-08-30 RX ORDER — HYDROMORPHONE HCL IN WATER/PF 6 MG/30 ML
0.2 PATIENT CONTROLLED ANALGESIA SYRINGE INTRAVENOUS ONCE
Status: DISCONTINUED | OUTPATIENT
Start: 2023-08-30 | End: 2023-08-30

## 2023-08-30 RX ORDER — FUROSEMIDE 10 MG/ML
40 INJECTION INTRAMUSCULAR; INTRAVENOUS ONCE
Status: COMPLETED | OUTPATIENT
Start: 2023-08-30 | End: 2023-08-30

## 2023-08-30 RX ORDER — POTASSIUM CHLORIDE 20 MEQ/1
20 TABLET, EXTENDED RELEASE ORAL ONCE
Status: COMPLETED | OUTPATIENT
Start: 2023-08-30 | End: 2023-08-30

## 2023-08-30 RX ORDER — CEFEPIME HYDROCHLORIDE 2 G/50ML
2000 INJECTION, SOLUTION INTRAVENOUS EVERY 8 HOURS
Status: DISCONTINUED | OUTPATIENT
Start: 2023-08-30 | End: 2023-08-30

## 2023-08-30 RX ORDER — HYDROMORPHONE HCL IN WATER/PF 6 MG/30 ML
0.2 PATIENT CONTROLLED ANALGESIA SYRINGE INTRAVENOUS EVERY 4 HOURS PRN
Status: DISCONTINUED | OUTPATIENT
Start: 2023-08-30 | End: 2023-09-09 | Stop reason: HOSPADM

## 2023-08-30 RX ORDER — MAGNESIUM SULFATE HEPTAHYDRATE 40 MG/ML
2 INJECTION, SOLUTION INTRAVENOUS ONCE
Status: COMPLETED | OUTPATIENT
Start: 2023-08-30 | End: 2023-08-31

## 2023-08-30 RX ORDER — MAGNESIUM SULFATE HEPTAHYDRATE 40 MG/ML
2 INJECTION, SOLUTION INTRAVENOUS
Status: COMPLETED | OUTPATIENT
Start: 2023-08-30 | End: 2023-08-30

## 2023-08-30 RX ADMIN — MAGNESIUM SULFATE HEPTAHYDRATE 2 G: 2 INJECTION, SOLUTION INTRAVENOUS at 06:24

## 2023-08-30 RX ADMIN — PENICILLIN G POTASSIUM 4 MILLION UNITS: 5000000 INJECTION, POWDER, FOR SOLUTION INTRAMUSCULAR; INTRAVENOUS at 20:26

## 2023-08-30 RX ADMIN — Medication 9 MG: at 21:32

## 2023-08-30 RX ADMIN — EZETIMIBE 10 MG: 10 TABLET ORAL at 08:41

## 2023-08-30 RX ADMIN — IPRATROPIUM BROMIDE 0.5 MG: 0.5 SOLUTION RESPIRATORY (INHALATION) at 07:12

## 2023-08-30 RX ADMIN — GABAPENTIN 200 MG: 100 CAPSULE ORAL at 08:41

## 2023-08-30 RX ADMIN — PANTOPRAZOLE SODIUM 20 MG: 20 TABLET, DELAYED RELEASE ORAL at 08:45

## 2023-08-30 RX ADMIN — MAGNESIUM SULFATE HEPTAHYDRATE 2 G: 2 INJECTION, SOLUTION INTRAVENOUS at 23:59

## 2023-08-30 RX ADMIN — ALLOPURINOL 400 MG: 100 TABLET ORAL at 08:41

## 2023-08-30 RX ADMIN — PERFLUTREN 0.8 ML/MIN: 6.52 INJECTION, SUSPENSION INTRAVENOUS at 12:26

## 2023-08-30 RX ADMIN — PREDNISONE 5 MG: 5 TABLET ORAL at 08:41

## 2023-08-30 RX ADMIN — CEFEPIME HYDROCHLORIDE 2000 MG: 2 INJECTION, SOLUTION INTRAVENOUS at 12:16

## 2023-08-30 RX ADMIN — IPRATROPIUM BROMIDE 0.5 MG: 0.5 SOLUTION RESPIRATORY (INHALATION) at 20:03

## 2023-08-30 RX ADMIN — LEVOTHYROXINE SODIUM 25 MCG: 25 TABLET ORAL at 05:03

## 2023-08-30 RX ADMIN — Medication 2 G: at 11:23

## 2023-08-30 RX ADMIN — GABAPENTIN 200 MG: 100 CAPSULE ORAL at 15:38

## 2023-08-30 RX ADMIN — CLINDAMYCIN PHOSPHATE 600 MG: 600 INJECTION, SOLUTION INTRAVENOUS at 19:50

## 2023-08-30 RX ADMIN — PENICILLIN G POTASSIUM 4 MILLION UNITS: 5000000 INJECTION, POWDER, FOR SOLUTION INTRAMUSCULAR; INTRAVENOUS at 23:03

## 2023-08-30 RX ADMIN — IPRATROPIUM BROMIDE 0.5 MG: 0.5 SOLUTION RESPIRATORY (INHALATION) at 13:35

## 2023-08-30 RX ADMIN — CHLORHEXIDINE GLUCONATE 15 ML: 1.2 RINSE ORAL at 08:41

## 2023-08-30 RX ADMIN — Medication 2 G: at 08:45

## 2023-08-30 RX ADMIN — LEVALBUTEROL HYDROCHLORIDE 1.25 MG: 1.25 SOLUTION RESPIRATORY (INHALATION) at 07:12

## 2023-08-30 RX ADMIN — Medication 2 G: at 21:36

## 2023-08-30 RX ADMIN — Medication 2 G: at 18:30

## 2023-08-30 RX ADMIN — LEVALBUTEROL HYDROCHLORIDE 1.25 MG: 1.25 SOLUTION RESPIRATORY (INHALATION) at 20:03

## 2023-08-30 RX ADMIN — DIGOXIN 250 MCG: 0.25 INJECTION INTRAMUSCULAR; INTRAVENOUS at 08:42

## 2023-08-30 RX ADMIN — DIGOXIN 250 MCG: 0.25 INJECTION INTRAMUSCULAR; INTRAVENOUS at 03:18

## 2023-08-30 RX ADMIN — LEVALBUTEROL HYDROCHLORIDE 1.25 MG: 1.25 SOLUTION RESPIRATORY (INHALATION) at 13:35

## 2023-08-30 RX ADMIN — LIDOCAINE 2 PATCH: 50 PATCH TOPICAL at 08:41

## 2023-08-30 RX ADMIN — TRAMADOL HYDROCHLORIDE 50 MG: 50 TABLET, COATED ORAL at 04:45

## 2023-08-30 RX ADMIN — VANCOMYCIN HYDROCHLORIDE 750 MG: 750 INJECTION, SOLUTION INTRAVENOUS at 08:45

## 2023-08-30 RX ADMIN — GABAPENTIN 300 MG: 300 CAPSULE ORAL at 20:34

## 2023-08-30 RX ADMIN — VANCOMYCIN HYDROCHLORIDE 750 MG: 750 INJECTION, SOLUTION INTRAVENOUS at 21:35

## 2023-08-30 RX ADMIN — TRAMADOL HYDROCHLORIDE 50 MG: 50 TABLET, COATED ORAL at 11:27

## 2023-08-30 RX ADMIN — HYDROXYCHLOROQUINE SULFATE 200 MG: 200 TABLET, FILM COATED ORAL at 15:51

## 2023-08-30 RX ADMIN — CEFEPIME HYDROCHLORIDE 2000 MG: 2 INJECTION, SOLUTION INTRAVENOUS at 03:18

## 2023-08-30 RX ADMIN — MAGNESIUM SULFATE HEPTAHYDRATE 2 G: 2 INJECTION, SOLUTION INTRAVENOUS at 08:37

## 2023-08-30 RX ADMIN — Medication 12.5 MG: at 21:32

## 2023-08-30 RX ADMIN — HYDROXYCHLOROQUINE SULFATE 200 MG: 200 TABLET, FILM COATED ORAL at 08:45

## 2023-08-30 RX ADMIN — FUROSEMIDE 40 MG: 10 INJECTION, SOLUTION INTRAMUSCULAR; INTRAVENOUS at 10:05

## 2023-08-30 RX ADMIN — POTASSIUM CHLORIDE 20 MEQ: 1500 TABLET, EXTENDED RELEASE ORAL at 08:41

## 2023-08-30 RX ADMIN — HEPARIN SODIUM 13.1 UNITS/KG/HR: 10000 INJECTION, SOLUTION INTRAVENOUS at 15:38

## 2023-08-30 RX ADMIN — CHLORHEXIDINE GLUCONATE 15 ML: 1.2 RINSE ORAL at 20:33

## 2023-08-30 RX ADMIN — FUROSEMIDE 40 MG: 10 INJECTION, SOLUTION INTRAMUSCULAR; INTRAVENOUS at 20:33

## 2023-08-30 NOTE — PROGRESS NOTES
Linda Villaseñor is a 80 y.o. male who is currently ordered Vancomycin IV with management by the Pharmacy Consult service. Relevant clinical data and objective / subjective history reviewed. Vancomycin Assessment:  Indication and Goal AUC/Trough: Soft tissue (goal -600, trough >10), -600, trough >10  Clinical Status: stable  Micro:     Renal Function:  SCr: 0.96 mg/dL  CrCl: 64.7 mL/min  Renal replacement: Not on dialysis  Days of Therapy: 2  Current Dose: 1250mg IV Q24H  Vancomycin Plan:  New Dosinmg IV Q12H  Estimated AUC: 448 mcg*hr/mL  Estimated Trough: 15.6 mcg/mL  Next Level: With AM labs at 0600 on 23  Renal Function Monitoring: Daily BMP and [place] will continue to follow closely for s/sx of nephrotoxicity, infusion reactions and appropriateness of therapy. BMP and CBC will be ordered per protocol. We will continue to follow the patient’s culture results and clinical progress daily.     Corrie Carson, Pharmacist

## 2023-08-30 NOTE — PLAN OF CARE
Problem: MOBILITY - ADULT  Goal: Maintain or return to baseline ADL function  Description: INTERVENTIONS:  -  Assess patient's ability to carry out ADLs; assess patient's baseline for ADL function and identify physical deficits which impact ability to perform ADLs (bathing, care of mouth/teeth, toileting, grooming, dressing, etc.)  - Assess/evaluate cause of self-care deficits   - Assess range of motion  - Assess patient's mobility; develop plan if impaired  - Assess patient's need for assistive devices and provide as appropriate  - Encourage maximum independence but intervene and supervise when necessary  - Involve family in performance of ADLs  - Assess for home care needs following discharge   - Consider OT consult to assist with ADL evaluation and planning for discharge  - Provide patient education as appropriate  Outcome: Progressing  Goal: Maintains/Returns to pre admission functional level  Description: INTERVENTIONS:  - Perform BMAT or MOVE assessment daily.   - Set and communicate daily mobility goal to care team and patient/family/caregiver. - Collaborate with rehabilitation services on mobility goals if consulted  - Perform Range of Motion 3 times a day. - Reposition patient every 2 hours.   - Dangle patient 3 times a day  - Stand patient 3 times a day  - Ambulate patient 3 times a day  - Out of bed to chair 3 times a day   - Out of bed for meals 3 times a day  - Out of bed for toileting  - Record patient progress and toleration of activity level   Outcome: Progressing     Problem: Prexisting or High Potential for Compromised Skin Integrity  Goal: Skin integrity is maintained or improved  Description: INTERVENTIONS:  - Identify patients at risk for skin breakdown  - Assess and monitor skin integrity  - Assess and monitor nutrition and hydration status  - Monitor labs   - Assess for incontinence   - Turn and reposition patient  - Assist with mobility/ambulation  - Relieve pressure over bony prominences  - Avoid friction and shearing  - Provide appropriate hygiene as needed including keeping skin clean and dry  - Evaluate need for skin moisturizer/barrier cream  - Collaborate with interdisciplinary team   - Patient/family teaching  - Consider wound care consult   Outcome: Progressing     Problem: PAIN - ADULT  Goal: Verbalizes/displays adequate comfort level or baseline comfort level  Description: Interventions:  - Encourage patient to monitor pain and request assistance  - Assess pain using appropriate pain scale  - Administer analgesics based on type and severity of pain and evaluate response  - Implement non-pharmacological measures as appropriate and evaluate response  - Consider cultural and social influences on pain and pain management  - Notify physician/advanced practitioner if interventions unsuccessful or patient reports new pain  Outcome: Progressing     Problem: INFECTION - ADULT  Goal: Absence or prevention of progression during hospitalization  Description: INTERVENTIONS:  - Assess and monitor for signs and symptoms of infection  - Monitor lab/diagnostic results  - Monitor all insertion sites, i.e. indwelling lines, tubes, and drains  - Monitor endotracheal if appropriate and nasal secretions for changes in amount and color  - [place] appropriate cooling/warming therapies per order  - Administer medications as ordered  - Instruct and encourage patient and family to use good hand hygiene technique  - Identify and instruct in appropriate isolation precautions for identified infection/condition  Outcome: Progressing  Goal: Absence of fever/infection during neutropenic period  Description: INTERVENTIONS:  - Monitor WBC    Outcome: Progressing     Problem: SAFETY ADULT  Goal: Maintain or return to baseline ADL function  Description: INTERVENTIONS:  -  Assess patient's ability to carry out ADLs; assess patient's baseline for ADL function and identify physical deficits which impact ability to perform ADLs (bathing, care of mouth/teeth, toileting, grooming, dressing, etc.)  - Assess/evaluate cause of self-care deficits   - Assess range of motion  - Assess patient's mobility; develop plan if impaired  - Assess patient's need for assistive devices and provide as appropriate  - Encourage maximum independence but intervene and supervise when necessary  - Involve family in performance of ADLs  - Assess for home care needs following discharge   - Consider OT consult to assist with ADL evaluation and planning for discharge  - Provide patient education as appropriate  Outcome: Progressing  Goal: Maintains/Returns to pre admission functional level  Description: INTERVENTIONS:  - Perform BMAT or MOVE assessment daily.   - Set and communicate daily mobility goal to care team and patient/family/caregiver. - Collaborate with rehabilitation services on mobility goals if consulted  - Perform Range of Motion 3 times a day. - Reposition patient every 2 hours.   - Dangle patient 3 times a day  - Stand patient 3 times a day  - Ambulate patient 3 times a day  - Out of bed to chair 3 times a day   - Out of bed for meals 3 times a day  - Out of bed for toileting  - Record patient progress and toleration of activity level   Outcome: Progressing  Goal: Patient will remain free of falls  Description: INTERVENTIONS:  - Educate patient/family on patient safety including physical limitations  - Instruct patient to call for assistance with activity   - Consult OT/PT to assist with strengthening/mobility   - Keep Call bell within reach  - Keep bed low and locked with side rails adjusted as appropriate  - Keep care items and personal belongings within reach  - Initiate and maintain comfort rounds  - Make Fall Risk Sign visible to staff  - Offer Toileting every 2 Hours, in advance of need  - Initiate/Maintain bed alarm  - Obtain necessary fall risk management equipment:   - Apply yellow socks and bracelet for high fall risk patients  - Consider moving patient to room near nurses station  Outcome: Progressing     Problem: DISCHARGE PLANNING  Goal: Discharge to home or other facility with appropriate resources  Description: INTERVENTIONS:  - Identify barriers to discharge w/patient and caregiver  - Arrange for needed discharge resources and transportation as appropriate  - Identify discharge learning needs (meds, wound care, etc.)  - Arrange for interpretive services to assist at discharge as needed  - Refer to Case Management Department for coordinating discharge planning if the patient needs post-hospital services based on physician/advanced practitioner order or complex needs related to functional status, cognitive ability, or social support system  Outcome: Progressing     Problem: Knowledge Deficit  Goal: Patient/family/caregiver demonstrates understanding of disease process, treatment plan, medications, and discharge instructions  Description: Complete learning assessment and assess knowledge base.   Interventions:  - Provide teaching at level of understanding  - Provide teaching via preferred learning methods  Outcome: Progressing

## 2023-08-30 NOTE — PROGRESS NOTES
[address] [facility]  Progress Note  Name: Roberta Lu  MRN: [id number]  Unit/Bed#: -01 SDU I Date of Admission: 8/29/2023   Date of Service: 8/30/2023 I Hospital Day: 1    Assessment/Plan   * Severe sepsis [facility])  Assessment & Plan  Assessment:  · [facility] from [facility] with complaints of fever of 103 and erythema of the left arm- suspect cellulitis  · SIRS: tachycardia and tachypnea  · Source: Cellulitis LUE  · UA and CXR unremarkable  · Negative lactate, negative Pro-Larry, no leukocytosis  · Blood cultures x2 pending  · Hypotensive upon arrival to the ICU- not given 30cc/kg IVF due to concern for volume overload  · 2L crystalloid, 1L albumin, started on levophed  · Additional 250 cc isolyte with improvement in HR and weaned off vasopressors  · Of note, patient is on daily prednisone- check AM cortisol    Plan:  · Check CT LUE to rule out NSTI of LUE given rapid progression and septic shock   · Check AM cortisol and consider stress-dose steroids  · Continue cefepime and vancomycin  · Follow-up blood cultures  · MRSA pending  · Monitor fever curve and WBC count    Cellulitis of left upper extremity  Assessment & Plan  Assessment:   · Presents with erythremia of the left upper extremity, and fever  · Bilateral venous dopplers negative for clot    Plan:  · See sepsis above       A-fib RVR ([facility])  Assessment & Plan  · Presented with afib RVR- hypotensive after 1 dose IV metoprolol  · Home regimen includes: Metoprolol, Eliquis  · Started on 250 mcg IV digoxin every 6 hours x 6 doses  · Continue IV fluid resuscitation  · Hold home dose metoprolol in the setting of shock and sepsis  · Monitor on telemetry    RA (rheumatoid arthritis) (Former[facility])  Assessment & Plan  · Home regimen: Prednisone, Hydroxychloroquine  · Has had remote episodes of eosinophilia    Plan:  · Continue home dose prednisone/hydroxychloroquine  · PRN Tramadol for pain  · Continue with Volteran gel to joints- Monitor renal function and D/c if worsening renal function   · Check ANTHONY, ANCA, IgE      Peripheral vascular disease (HCC)  Assessment & Plan  Assessment:  · Has been seeing Dr. 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 · On physical exam pulses are palpable but light and thready. Cap refill greater than 2 seconds. Lower extremities are cool to touch    Plan:  · Bilateral arterial duplex of the lower extremities  · Consult podiatry- appreciate recommendations  · Consult wound care for local wound care of the toe  · Neurovascular checks every 4 hours    Hypothyroidism  Assessment & Plan  Assessment:  · By history  · Home regimen: Levothyroxine 25 mcg   · TSH checked in 06/2023-4.39    Plan:  · Continue home dose Levothyroxine  · Check TSH in the AM    Spinal stenosis  Assessment & Plan  · Home Regimen: Gabapentin, tramadol-PRN  · Continue with PRN tramadol. Standing Gabapentin  · Lidocaine patch  · PRN Tylenol     Gout without acute exacerbation   Assessment & Plan  · Continue home Allopurinol     GERD (gastroesophageal reflux disease)  Assessment & Plan  · Continue PPI    HLD (hyperlipidemia)  Assessment & Plan  · Continue home Ezetimibe    Chronic systolic heart failure (HCC)  Assessment & Plan  Wt Readings from Last 3 Encounters:   08/29/23 109 kg (240 lb 4.8 oz)   08/28/23 99.8 kg (220 lb)   08/24/23 105 kg (232 lb)       Assessment:  · Previous echo from 01/2023 showed EF of 30% with severe systolic dysfunction and global hypokinesis, moderate MVR  · Home regimen: Torsemide 20 mg TID, Toprol XL 25 mg BID    Plan:  · Check echo  · Hold home dose medications in the setting of shock  · Careful volume resuscitation  · Daily weights  · Strict I/O      HTN (hypertension)  Assessment & Plan  · Hold home dose metoprolol in the setting of hypotension             ICU Core Measures     A: Assess, Prevent, and Manage Pain · Has pain been assessed?  Yes  · Need for changes to pain regimen? No   B: Both SAT/SAT  · N/A   C: Choice of Sedation · RASS Goal: 0 Alert and Calm or N/A patient not on sedation  · Need for changes to sedation or analgesia regimen? NA   D: Delirium · CAM-ICU: Negative   E: Early Mobility  · Plan for early mobility? Yes   F: Family Engagement · Plan for family engagement today? Yes       Antibiotic Review: Awaiting culture results. Prophylaxis:  VTE VTE covered by:  heparin (porcine), Intravenous, 13.1 Units/kg/hr at 08/29/23 2317  heparin (porcine), Intravenous, 2,000 Units at 08/29/23 2319  heparin (porcine), Intravenous       Stress Ulcer  covered bypantoprazole (PROTONIX) EC tablet 20 mg [[id number]]       Subjective   HPI: 40-year-old male with past medical history significant for heart failure with reduced EF, moderate mitral valve regurgitation, A-fib on Eliquis, hypertension, hyperlipidemia, RA on chronic prednisone, hypothyroidism who presented on 8/29 with septic shock secondary to left upper extremity cellulitis. 24-hour events:  · Started on Levophed yesterday afternoon  · Given 250 cc Isolyte overnight and weaned off of Levophed  · CT left upper extremity to rule out NSTI-consistent with cellulitis with no evidence of abscess or soft tissue emphysema  · No acute events overnight      Review of Systems   Constitutional: Negative for appetite change, chills and fever. HENT: Negative for congestion. Respiratory: Positive for shortness of breath and wheezing. Negative for cough. Gastrointestinal: Negative for abdominal distention, abdominal pain, diarrhea, nausea and vomiting. Neurological: Negative for dizziness, tremors, weakness, light-headedness and headaches. All other systems reviewed and are negative.            Objective                             Vitals I/O      Most Recent Min/Max in 24hrs   Temp 98.6 °F (37 °C) Temp  Min: 97.9 °F (36.6 °C)  Max: 99.7 °F (37.6 °C)   Pulse 96 Pulse  Min: 96  Max: 165   Resp (!) 29 Resp  Min: 20  Max: 51   /66 BP  Min: 69/44  Max: 174/84   O2 Sat 93 % SpO2  Min: 87 %  Max: 97 %      Intake/Output Summary (Last 24 hours) at 8/30/2023 0237  Last data filed at 8/30/2023 0200  Gross per 24 hour   Intake 3952.99 ml   Output 250 ml   Net 3702.99 ml         Diet Regular; Regular House     Invasive Monitoring Physical exam    Physical Exam  Vitals reviewed. Constitutional:       General: He is not in acute distress. Appearance: He is obese. He is not ill-appearing, toxic-appearing or diaphoretic. HENT:      Head: Normocephalic and atraumatic. Nose: Nose normal.      Mouth/Throat:      Mouth: Mucous membranes are moist.   Eyes:      Extraocular Movements: Extraocular movements intact. Pupils: Pupils are equal, round, and reactive to light. Cardiovascular:      Rate and Rhythm: Normal rate and regular rhythm. Heart sounds: Murmur heard. No friction rub. No gallop. Pulmonary:      Breath sounds: Wheezing present. No rhonchi or rales. Comments: Tachypneic  Abdominal:      General: There is no distension. Palpations: Abdomen is soft. Tenderness: There is no abdominal tenderness. There is no guarding or rebound. Musculoskeletal:      Right lower leg: No edema. Left lower leg: No edema. Feet:      Comments: Bilateral dp pulses with dopplerable signals  Skin:     General: Skin is warm. Capillary Refill: Capillary refill takes less than 2 seconds. Comments: LUE edema and erythema not extending beyond previously drawn outline. No fluctuance or crepitus on exam, mild tenderness to palpation   Neurological:      General: No focal deficit present. Mental Status: He is alert and oriented to person, place, and time. Diagnostic Studies      EKG: NSR  Imaging:  I have personally reviewed pertinent reports.        Medications:  Scheduled PRN   allopurinol, 400 mg, Daily  cefepime, 2,000 mg, Q12H  chlorhexidine, 15 mL, Q12H ODALYS  Diclofenac Sodium, 2 g, 4x Daily  digoxin, 250 mcg, Q6H  ezetimibe, 10 mg, Daily  gabapentin, 100 mg, TID  hydroxychloroquine, 200 mg, BID With Meals  ipratropium, 0.5 mg, Q6H  levalbuterol, 1.25 mg, Q6H  levothyroxine, 25 mcg, Early Morning  lidocaine, 2 patch, Daily  melatonin, 9 mg, HS  pantoprazole, 20 mg, Daily Before Breakfast  polyethylene glycol, 17 g, Every Other Day  predniSONE, 5 mg, Daily  vancomycin, 1,250 mg, Q24H      heparin (porcine), 2,000 Units, Q6H PRN  heparin (porcine), 4,000 Units, Q6H PRN  traMADol, 50 mg, Q6H PRN       Continuous    heparin (porcine), 3-20 Units/kg/hr (Order-Specific), Last Rate: 13.1 Units/kg/hr (08/29/23 2317)  norepinephrine, 1-30 mcg/min, Last Rate: Stopped (08/29/23 2348)         Labs:    CBC    Recent Labs     08/29/23  0906   WBC 9.09   HGB 12.8   HCT 40.1   *     BMP    Recent Labs     08/29/23  0906 08/29/23  1735   SODIUM 141 142   K 3.5 3.9    105   CO2 28 26   AGAP 10 11   BUN 19 19   CREATININE 1.13 1.10   CALCIUM 8.2* 7.1*       Coags    Recent Labs     08/29/23  0906 08/29/23  1701 08/29/23  2239   INR 1.31* 1.41*  --    PTT 29 42* 47*        Additional Electrolytes  No recent results       Blood Gas    No recent results  No recent results LFTs  Recent Labs     08/29/23  0906   ALT 17   AST 23   ALKPHOS 63   ALB 3.6   TBILI 0.94       Infectious  Recent Labs     08/29/23  0906   PROCALCITONI 0.16     Glucose  Recent Labs     08/29/23  0906 08/29/23  1735   GLUC 160* 125          Capo Hong PA-C

## 2023-08-30 NOTE — PROGRESS NOTES
[address] [facility]  Interval Progress Note: Critical Care  Name: Malika Saab  MRN: [id number]  Unit/Bed#: -01 SDU I Date of Admission: 8/29/2023   Date of Service: 8/30/2023 I Hospital Day: 1    Interval Events:  One of two blood cultures positive for group b streptococcus. Patient's L arm with worsening erythema and induration and R arm now with erythema/ecchymosis. Shock state resolved and no fevers in the past 24 hours, but procalcitonin and WBC noted from this AM. UE venous duplex without DVT and CT LUE from 8/29 without subcutaneous gas. Discussed with Dr. Radhames Porter, will transition cefepime to high-dose PCN and add clindamycin. Surgery team consulted for assessment. New pictures of upper extremities added to media. ID consulted. Pertinent New Data:   Vitals:    08/30/23 1914   BP:    Pulse:    Resp:    Temp: 98.3 °F (36.8 °C)   SpO2:            Assessment and Plan  · Diagnosis: LUE cellulitis, severe sepsis, Group B strep bacteremia  · Plan: Abx transitioned to high-dose PCN, Vancomycin, and Clindamycin  · ID and Surgery consulted  · Concern for NSTI given rapid development of symptoms and severity of presentation however HD stable and no recent fevers  · Check repeat CBC and procalcitonin  · All compartments soft and NVI- continue q4h neurovascular checks  · Elevate arms, local wound care      Billing Level:  During this visit, Critical care services were medically necessary as this patient had a high probability of imminent or life-threatening deterioration due to sepsis, required my direct attention, intervention, and personal management to implement the following: bedside management, test review, case discussed with consultants  and documentation of findings. I have personally provided 20 minutes on 08/30/23 of critical care time, exclusive of procedures, teaching, family meetings, and any prior time recorded by providers other than myself.     SIGNATURE: Zachariah Menon Nola Navarro PA-C

## 2023-08-30 NOTE — UTILIZATION REVIEW
Initial Clinical Review    Admission: Date/Time/Statement:   Admission Orders (From admission, onward)     Ordered        08/29/23 1126  INPATIENT ADMISSION  Once                      Orders Placed This Encounter   Procedures   • INPATIENT ADMISSION     Standing Status:   Standing     Number of Occurrences:   1     Order Specific Question:   Level of Care     Answer:   Level 2 Stepdown / HOT [14]     Order Specific Question:   Estimated length of stay     Answer:   More than 2 Midnights     Order Specific Question:   Certification     Answer:   I certify that inpatient services are medically necessary for this patient for a duration of greater than two midnights. See H&P and MD Progress Notes for additional information about the patient's course of treatment. ED Arrival Information     Expected   -    Arrival   8/29/2023 08:53    Acuity   Emergent            Means of arrival   Ambulance    Escorted by   Sabrina Mcgowan)    Service   Critical Care/ICU    Admission type   Emergency            Arrival complaint   fever           Chief Complaint   Patient presents with   • Fever     Pt to er via ems from [facility] at [address] [place] [address] with reports from staff that patient woke up this am with a fever of 103. Also reports of left arm redness swelling and warmth, and more blue in color to lower ext. Tylenol given at facility 1hr PTA of EMS       Initial Presentation: 80 y.o. male from assisted living to ED via ems admitted inpatient due to Sepsis/Shock/cellulitis of LUE/afib with RVR. PMH of GERD, gout, hyperlipidemia, hypertension, RA, spinal stenosis, proximal A-fib, hypothyroidism. Presented due to fever and left arm redness and swelling starting on morning of arrival.   On exam mucous membranes dry. Tachycardia. Frequent extrasystoles. Lungs rales. Swelling bilateral hands and LE. Erythema left arm. Hypoxic. . Glucose 160. Wbc 9.09.   In the ED placed on oxygen given 1 liter IVF, started on ceftriaxone and given Metoprolol. On arrival to Unit BP to 26J systolic. Plan is additional 1 liter crystalloid, IV albumin. Goal Map > 65. Start IV cefepime and vancomycin. Dc ceftriaxone. CBC in am.  Hold home antihypertensives. IVF resuscitation. telemetry - Give 250 mcgs IV digoxin every 6 hours x 6 doses. Continue home medications. Date: 8/30/23   Day 2:  Yesterday required addition of Levophed, overnight given IVF and Levophed weaned. Ct LUE showed cellulitis. Today, + shortness of breath and wheezing. On exam: obese. Murmur. Tachypnea. Wheezing. LUE edema and erythema not extending beyond previously drawn outline. Bilateral venous dopplers negative for clot. Bilateral dp pulses with dopplerable signals. Plan is dose IV lasix. Hold Dig and restart metoprolol. Oxygen. Continue antibiotics. Continue Heparin gtt. Consult podiatry, check arterial doppler LE.     8/30/23 per Podiatry- patient with open wound of right great toe with damage to nail/PVD/Sepsis/LUE cellulitis. Plan is local wound care with Dry sterile dressing with Betadine/Telfa dressing to be changed every other day.   Arterial duplex     ED Triage Vitals   Temperature Pulse Respirations Blood Pressure SpO2   08/29/23 0905 08/29/23 0905 08/29/23 0905 08/29/23 0907 08/29/23 0905   97.9 °F (36.6 °C) (!) 136 (!) 24 (!) 174/84 (!) 87 %      Temp Source Heart Rate Source Patient Position - Orthostatic VS BP Location FiO2 (%)   08/29/23 0905 08/29/23 0905 -- -- --   Oral Monitor         Pain Score       08/29/23 1230       10 - Worst Possible Pain          Wt Readings from Last 1 Encounters:   08/30/23 109 kg (240 lb)     Additional Vital Signs:   08/30/23 1240 -- 104 24 Abnormal  123/70 91 94 % -- -- --   08/30/23 1127 98.4 °F (36.9 °C) -- -- -- -- -- -- -- --   08/30/23 1000 -- 96 25 Abnormal  97/54 70 96 % -- -- --   08/30/23 0900 -- 97 25 Abnormal  99/65 77 96 % -- -- --   08/30/23 0853 -- 97 24 Abnormal  113/55 76 95 % 36 4 L/min Nasal cannula   08/30/23 0746 97.8 °F (36.6 °C) -- -- -- -- -- -- -- --   08/30/23 0712 -- 97 20 -- -- 94 % 32 3 L/min Nasal cannula   08/30/23 0500 98.5 °F (36.9 °C) 96 19 124/59 85 91 % -- -- --   08/30/23 0400 99.1 °F (37.3 °C) 93 24 Abnormal  118/58 83 -- -- -- --   08/30/23 0300 -- 102 38 Abnormal  130/69 93 -- -- -- --   08/30/23 0200 -- 96 29 Abnormal  115/66 82 93 % -- -- --   08/30/23 0100 -- 100 40 Abnormal  104/64 80 92 % -- -- --   08/30/23 0030 -- 98 28 Abnormal  103/61 75 91 % -- -- --   08/30/23 0000 -- 102 27 Abnormal  112/59 71 94 % -- -- --   08/29/23 2302 -- 98 26 Abnormal  83/47 Abnormal  60 Abnormal  96 % -- -- --   08/29/23 2257 98.6 °F (37 °C) 101 25 Abnormal  83/47 Abnormal  60 Abnormal  96 % -- -- --   08/29/23 2205 -- -- -- 107/56 74 -- -- -- --   08/29/23 2130 -- 100 35 Abnormal  118/65 81 97 % -- -- --   08/29/23 2030 98.6 °F (37 °C) 100 29 Abnormal  95/58 69 96 % -- -- --   08/29/23 1900 -- 109 Abnormal  43 Abnormal  88/56 Abnormal  67 93 % -- -- --   08/29/23 1800 99.7 °F (37.6 °C) 108 Abnormal  31 Abnormal  71/50 Abnormal  57 Abnormal  95 % -- -- --   08/29/23 1700 -- 118 Abnormal  31 Abnormal  75/47 Abnormal  57 Abnormal  96 % -- -- --   08/29/23 1600 -- 119 Abnormal  37 Abnormal  93/50 68 88 % Abnormal  -- -- --   08/29/23 1500 99.2 °F (37.3 °C) 123 Abnormal  32 Abnormal  89/53 Abnormal  66 96 % 28 2 L/min Nasal cannula   08/29/23 1400 -- 125 Abnormal  26 Abnormal  111/58 80 95 % 28 2 L/min Nasal cannula   08/29/23 1300 -- 123 Abnormal  25 Abnormal  70/50 Abnormal  56 Abnormal  96 % 28 2 L/min Nasal cannula   08/29/23 1230 98 °F (36.7 °C) 119 Abnormal  28 Abnormal  78/58 Abnormal  64 Abnormal  92 % -- -- --   08/29/23 1205 98 °F (36.7 °C) -- -- -- -- -- -- -- --   08/29/23 1150 -- -- -- 78/45 Abnormal  -- -- -- -- --   08/29/23 1100 -- 130 Abnormal  20 147/79 104 94 % -- -- --   08/29/23 1000 -- 137 Abnormal  20 97/59 75 93 % -- -- Nasal cannula   08/29/23 0930 -- 137 Abnormal  20 119/71 86 93 % -- -- --   08/29/23 0907 -- -- -- 174/84 Abnormal  -- 92 % 28 2 L/min Nasal cannula     Pertinent Labs/Diagnostic Test Results:   VAS lower limb arterial duplex, complete bilateral   Final Result by Romulo Blanchard MD (08/30 1741)   RIGHT LOWER LIMB:  No evidence of significant lower extremity arterial occlusive disease. Ankle/Brachial index: noncompressible  PVR/ PPG tracings are normal.  Metatarsal pressure of 134mmHg  Great toe pressure of 97mmHg, within the healing range     LEFT LOWER LIMB:  No evidence of significant lower extremity arterial occlusive disease. Ankle/Brachial index: noncompressible  PVR/ PPG tracings are normal.  Metatarsal pressure of 134mmHg  Great toe pressure of 113mmHg, within the healing range     No prior to compare   CT upper extremity w contrast left   Final Result by Guera Enamorado MD (08/30 1978)   Left upper extremity subcutaneous edema indicating cellulitis without drainable collection, soft tissue emphysema or evidence of osteomyelitis. Concordant virtual radiologic report was provided at 2303 hours on 8/29/2020         Workstation performed: [id number]         VAS upper limb venous duplex scan, complete, bilateral   Final Result by Romulo Blanchard MD (08/30 1740)   RIGHT UPPER LIMB:  No evidence of acute or chronic deep vein thrombosis. No evidence of superficial thrombophlebitis noted. Doppler evaluation shows a normal response to augmentation maneuvers. LEFT UPPER LIMB:  No evidence of acute or chronic deep vein thrombosis. No evidence of superficial thrombophlebitis noted. Doppler evaluation shows a normal response to augmentation maneuvers   XR chest portable   Final Result by Byron Thomas MD (08/29 0930)      No acute cardiopulmonary disease.                   Workstation performed: [id number]             8/29/23 ecg - Sinus tachycardia with Premature atrial complexes   Left axis deviation   Poor R wave progression   Abnormal ECG When compared with ECG of 31-JAN-2023 21:33,   Premature atrial complexes are now Present     Results from last 7 days   Lab Units 08/29/23  0906   SARS-COV-2  Negative     Results from last 7 days   Lab Units 08/30/23  0511 08/29/23  0906   WBC Thousand/uL 15.88* 9.09   HEMOGLOBIN g/dL 9.9* 12.8   HEMATOCRIT % 31.6* 40.1   PLATELETS Thousands/uL 118* 136*   NEUTROS ABS Thousands/µL 13.61* 7.12     Results from last 7 days   Lab Units 08/30/23  0511   RETIC CT ABS  90,200   RETIC CT PCT % 2.92*     Results from last 7 days   Lab Units 08/30/23  0511 08/29/23  1735 08/29/23  0906   SODIUM mmol/L 139 142 141   POTASSIUM mmol/L 3.9 3.9 3.5   CHLORIDE mmol/L 105 105 103   CO2 mmol/L 25 26 28   ANION GAP mmol/L 9 11 10   BUN mg/dL 17 19 19   CREATININE mg/dL 0.96 1.10 1.13   EGFR ml/min/1.73sq m 70 59 57   CALCIUM mg/dL 7.1* 7.1* 8.2*   MAGNESIUM mg/dL 0.7*  --   --    PHOSPHORUS mg/dL 3.1  --   --      Results from last 7 days   Lab Units 08/29/23  0906   AST U/L 23   ALT U/L 17   ALK PHOS U/L 63   TOTAL PROTEIN g/dL 7.1   ALBUMIN g/dL 3.6   TOTAL BILIRUBIN mg/dL 0.94     Results from last 7 days   Lab Units 08/30/23  0511 08/29/23  1735 08/29/23  0906   GLUCOSE RANDOM mg/dL 90 125 160*     Results from last 7 days   Lab Units 08/30/23  0511   CK TOTAL U/L 273     Results from last 7 days   Lab Units 08/29/23  1712 08/29/23  1133 08/29/23  0906   HS TNI 0HR ng/L  --   --  17   HS TNI 2HR ng/L  --  30  --    HSTNI D2 ng/L  --  13  --    HS TNI 4HR ng/L 46  --   --    HSTNI D4 ng/L 29*  --   --      Results from last 7 days   Lab Units 08/30/23  1238 08/30/23  0511 08/29/23  2239 08/29/23  1701 08/29/23  0906   PROTIME seconds  --  22.3*  --  18.2* 17.1*   INR   --  1.84*  --  1.41* 1.31*   PTT seconds 74* 81* 47* 42* 29     Results from last 7 days   Lab Units 08/30/23  0511   TSH 3RD GENERATON uIU/mL 1.333     Results from last 7 days   Lab Units 08/30/23  0511 08/29/23  0906   PROCALCITONIN ng/ml 14.19* 0.16 Results from last 7 days   Lab Units 08/29/23  1944 08/29/23  1735 08/29/23  0906   LACTIC ACID mmol/L 1.3 2.1* 1.9     Results from last 7 days   Lab Units 08/30/23  0511   DIGOXIN LVL ng/mL 2.0     Results from last 7 days   Lab Units 08/29/23  0906   BNP pg/mL 167*     Results from last 7 days   Lab Units 08/29/23  1031   CLARITY UA  Clear   COLOR UA  Yellow   SPEC GRAV UA  1.020   PH UA  5.5   GLUCOSE UA mg/dl Negative   KETONES UA mg/dl Negative   BLOOD UA  Negative   PROTEIN UA mg/dl Trace*   NITRITE UA  Negative   BILIRUBIN UA  Negative   UROBILINOGEN UA (BE) mg/dl <2.0   LEUKOCYTES UA  Negative   WBC UA /hpf None Seen   RBC UA /hpf None Seen   BACTERIA UA /hpf None Seen   EPITHELIAL CELLS WET PREP /hpf Occasional     Results from last 7 days   Lab Units 08/29/23  0906   INFLUENZA A PCR  Negative   INFLUENZA B PCR  Negative   RSV PCR  Negative     Results from last 7 days   Lab Units 08/29/23  1012 08/29/23  0906   BLOOD CULTURE  No Growth at 24 hrs.  --    GRAM STAIN RESULT   --  Gram positive cocci in pairs and chains*       ED Treatment:   Medication Administration from 08/29/2023 0852 to 08/29/2023 1158       Date/Time Order Dose Route Action Comments     08/29/2023 1004 EDT multi-electrolyte (PLASMALYTE-A/ISOLYTE-S PH 7.4) IV solution 1,000 mL 1,000 mL Intravenous New Bag --     08/29/2023 1048 EDT cefTRIAXone (ROCEPHIN) IVPB (premix in dextrose) 2,000 mg 50 mL 2,000 mg Intravenous New Bag --     08/29/2023 1130 EDT metoprolol (LOPRESSOR) injection 5 mg 5 mg Intravenous Given --        Past Medical History:   Diagnosis Date   • GERD (gastroesophageal reflux disease)    • Gout    • Hyperlipidemia    • Hypertension    • 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HCC)    • Spinal stenosis      Present on Admission:  **None**      Admitting Diagnosis: Sinus tachycardia [R00.0]  Fever [R50.9]  Cellulitis of left arm [L03.114]  Sepsis (HCC) [A41.9]  Age/Sex: 80 y.o. male  Admission Orders:  08/29/23 1126 inpatient   Scheduled Medications:  allopurinol, 400 mg, Oral, Daily  cefepime, 2,000 mg, Intravenous, Q8H  chlorhexidine, 15 mL, Mouth/Throat, Q12H ODALYS  Diclofenac Sodium, 2 g, Topical, 4x Daily  ezetimibe, 10 mg, Oral, Daily  gabapentin, 200 mg, Oral, TID  hydroxychloroquine, 200 mg, Oral, BID With Meals  ipratropium, 0.5 mg, Nebulization, Q6H  levalbuterol, 1.25 mg, Nebulization, Q6H  levothyroxine, 25 mcg, Oral, Early Morning  lidocaine, 2 patch, Topical, Daily  melatonin, 9 mg, Oral, HS  pantoprazole, 20 mg, Oral, Daily Before Breakfast  polyethylene glycol, 17 g, Oral, Every Other Day  predniSONE, 5 mg, Oral, Daily  vancomycin, 750 mg, Intravenous, Q12H    albumin human (FLEXBUMIN) 5 % injection 25 g  Dose: 25 g  Freq: Once Route: IV  Last Dose: Stopped (08/29/23 1805)  Start: 08/29/23 1715 End: 08/29/23 1805  albumin human (FLEXBUMIN) 5 % injection 25 g  Dose: 25 g  Freq: Once Route: IV  Last Dose: Stopped (08/29/23 1501)  Start: 08/29/23 1400 End: 08/29/23 1501  albumin human (FLEXBUMIN) 5 % injection 25 g  Dose: 25 g  Freq: Once Route: IV  Last Dose: Stopped (08/29/23 1501)  Start: 08/29/23 1245 End: 08/29/23 1501    cefepime (MAXIPIME) IVPB (premix in dextrose) 2,000 mg 50 mL  Dose: 2,000 mg  Freq: Every 12 hours Route: IV  Last Dose: Stopped (08/30/23 0400)  Start: 08/29/23 1400 End: 08/30/23 0921    digoxin (LANOXIN) injection 250 mcg  Dose: 250 mcg  Freq: Every 6 hours Route: IV  Start: 08/29/23 1345 End: 08/30/23 0916    furosemide (LASIX) injection 40 mg  Dose: 40 mg  Freq: Once Route: IV  Start: 08/30/23 0930 End: 08/30/23 1005    gabapentin (NEURONTIN) capsule 100 mg  Dose: 100 mg  Freq: 3 times daily Route: PO  Start: 08/29/23 1600 End: 08/30/23 0440    magnesium sulfate 2 g/50 mL IVPB (premix) 2 g  Dose: 2 g  Freq: Every 2 hours Route: IV  Last Dose: Stopped (08/30/23 1043)  Start: 08/30/23 0630 End: 08/30/23 1043    multi-electrolyte (PLASMALYTE-A/ISOLYTE-S PH 7.4) IV solution 250 mL  Dose: 250 mL  Freq:  Once Route: IV  Last Dose: Stopped (08/29/23 2136)  Start: 08/29/23 1845 End: 08/29/23 2136    potassium chloride (K-DUR,KLOR-CON) CR tablet 20 mEq  Dose: 20 mEq  Freq: Once Route: PO  Start: 08/30/23 0745 End: 08/30/23 0841      Continuous IV Infusions:  heparin (porcine), 3-20 Units/kg/hr (Order-Specific), Intravenous, Titrated    norepinephrine (LEVOPHED) 4 mg (STANDARD CONCENTRATION) IV in sodium chloride 0.9% 250 mL  Rate: 3.8-112.5 mL/hr Dose: 1-30 mcg/min  Freq: Titrated Route: IV  Last Dose: Stopped (08/29/23 2348)  Start: 08/29/23 1400 End: 08/30/23 0741      PRN Meds:  heparin (porcine), 2,000 Units, Intravenous, Q6H PRN x 1 8/29  heparin (porcine), 4,000 Units, Intravenous, Q6H PRN  traMADol, 50 mg, Oral, Q6H PRN x 1 8/29, x 2 8/30    Neuro check every 4 hours  Cardio pulmonary monitoring   Oxygen     IP CONSULT TO PODIATRY    Network Utilization Review Department  ATTENTION: Please call with any questions or concerns to [phone] and carefully listen to the prompts so that you are directed to the right person. All voicemails are confidential.  Leelee Huggins all requests for admission clinical reviews, approved or denied determinations and any other requests to dedicated fax number below belonging to the campus where the patient is receiving treatment.  List of dedicated fax numbers for the Facilities:  [place] DENIALS (Administrative/Medical Necessity) [phone] 230[address] [facility] (Maternity/NICU/Pediatrics) [phone]   [address] [phone]   [facility] 1000 [facility] [phone] 1505 [facility] 207 [facility] [address] [facility]  [address]  [phone]

## 2023-08-30 NOTE — CASE MANAGEMENT
Case Management Assessment & Discharge Planning Note    Patient name Vevelyn Holter  Location  SDU/-01 S* MRN [id number]  : 1934 Date 2023       Current Admission Date: 2023  Current Admission Diagnosis:Severe sepsis [facility])   Patient Active Problem List    Diagnosis Date Noted   • Severe sepsis ([address]) 2023   • Cellulitis of left upper extremity 2023   • HLD (hyperlipidemia) 2023   • GERD (gastroesophageal reflux disease) 2023   • Gout without acute exacerbation  2023   • Spinal stenosis 2023   • Hypothyroidism 2023   • Peripheral vascular disease ([address]) 2023   • Open wound of right great toe with damage to nail 2023   • Adverse effect of loop (high-ceiling) diuretics, subsequent encounter 2023   • Toxic metabolic encephalopathy    • Septic arthritis of shoulder, right ([address]) 2023   • Suture of skin wound 2023   • Hypomagnesemia 2023   • Elevated TSH 2023   • Chronic systolic heart failure ([address]) 2023   • A-fib RVR ([address]) 2023   • Transient speech disturbance 2023   • KAMLESH (acute kidney injury) ([address]) 2023   • Dilated cardiomyopathy ([address]) 10/18/2022   • HTN (hypertension) 10/18/2022   • Low left ventricular ejection fraction 10/11/2022   • TIA (transient ischemic attack) 10/10/2022   • Speech disturbance 10/08/2022   • Accidental overdose 2020   • History of hypertension 2020   • Hypotension 2020   • RA (rheumatoid arthritis) ([address]) 2020      LOS (days): 1  Geometric Mean LOS (GMLOS) (days): 3.50  Days to GMLOS:2.3     OBJECTIVE:    Risk of Unplanned Readmission Score: 30.13         Current admission status: Inpatient       Preferred Pharmacy:   [address] [place] [address].  [address]  [address] [place] [address]  Phone: [phone] Fax: [phone]    Primary Care Provider: Ardine Cabot, MD    Primary Insurance: [facility] REP  Secondary Insurance:     ASSESSMENT:  [address] [facility], [address]   Primary Phone: [phone] (Mobile)               Advance Directives  Does patient have a [address]?: Yes  Does patient have Advance Directives?: Yes  Advance Directives: Living will, Power of  for health care  Primary Contact: Sandra Woods Altmeier         Readmission Root Cause  30 Day Readmission: No    Patient Information  Admitted from[de-identified] Facility  Mental Status: Alert  During Assessment patient was accompanied by: Not accompanied during assessment  Assessment information provided by[de-identified] Patient  Primary Caregiver: Other (Comment)  Caregiver's Name[de-identified] staff at [facility]: Oralia Lashaun of Residence: [address] do you live in?: [address] [facility] entry access options. Select all that apply.: No steps to enter home  Type of Current Residence: Facility  Upon entering residence, is there a bedroom on the main floor (no further steps)?: Yes  Upon entering residence, is there a bathroom on the main floor (no further steps)?: Yes  In the last 12 months, was there a time when you were not able to pay the mortgage or rent on time?: No  In the last 12 months, how many places have you lived?: 1  In the last 12 months, was there a time when you did not have a steady place to sleep or slept in a shelter (including now)?: No  Living Arrangements: Other (Comment) (Resides at [facility])  Is patient a ?: No    Activities of Daily Living Prior to Admission  Functional Status: Assistance  Completes ADLs independently?: No  Level of ADL dependence: Assistance  Ambulates independently?: No  Level of ambulatory dependence: Total Dependent  Does patient use assisted devices?: Yes  Assisted Devices (DME) used:  Wheelchair (electric WC)  Does patient currently own DME?: Yes  What DME does the patient currently own?: Dania Lee, Wheelchair, Other (Comment) (electric WC)  Does patient have a history of Outpatient Therapy (PT/OT)?: Yes  Does the patient have a history of Short-Term Rehab?: No  Does patient have a history of HHC?: No  Does patient currently have [address]?: No         Patient Information Continued  Income Source: Pension/residential  Does patient have prescription coverage?: Yes  Within the past 12 months, you worried that your food would run out before you got the money to buy more.: Never true  Within the past 12 months, the food you bought just didn't last and you didn't have money to get more.: Never true  Does patient receive dialysis treatments?: No  Does patient have a history of substance abuse?: No  Does patient have a history of Mental Health Diagnosis?: No         Means of Transportation  Means of Transport to Appts[de-identified] Family transport  In the past 12 months, has lack of transportation kept you from medical appointments or from getting medications?: No  In the past 12 months, has lack of transportation kept you from meetings, work, or from getting things needed for daily living?: No        DISCHARGE DETAILS:    Discharge planning discussed with[de-identified] patient        CM contacted family/caregiver?: Yes (patient alert and in contact with her sons)         Met with patient to review the role of CM and discuss any needs patient may have prior to discharge. Patient reports residing alone in an apartment at [facility]. He reports using an electric WC to go to the dining room for meals and being independent with transfer. He denies HHC. SNF. BHU and D&A rehab admission. He pleas to [facility] to [facility] at discharge. Family usually transport.

## 2023-08-30 NOTE — ASSESSMENT & PLAN NOTE
· Home Regimen: Gabapentin, tramadol-PRN  · Continue with PRN tramadol.  Standing Gabapentin  · Lidocaine patch  · PRN Tylenol

## 2023-08-30 NOTE — PLAN OF CARE
Problem: MOBILITY - ADULT  Goal: Maintain or return to baseline ADL function  Description: INTERVENTIONS:  -  Assess patient's ability to carry out ADLs; assess patient's baseline for ADL function and identify physical deficits which impact ability to perform ADLs (bathing, care of mouth/teeth, toileting, grooming, dressing, etc.)  - Assess/evaluate cause of self-care deficits   - Assess range of motion  - Assess patient's mobility; develop plan if impaired  - Assess patient's need for assistive devices and provide as appropriate  - Encourage maximum independence but intervene and supervise when necessary  - Involve family in performance of ADLs  - Assess for home care needs following discharge   - Consider OT consult to assist with ADL evaluation and planning for discharge  - Provide patient education as appropriate  Outcome: Progressing  Goal: Maintains/Returns to pre admission functional level  Description: INTERVENTIONS:  - Perform BMAT or MOVE assessment daily.   - Set and communicate daily mobility goal to care team and patient/family/caregiver.    - Collaborate with rehabilitation services on mobility goals if consulted  - Out of bed for toileting  - Record patient progress and toleration of activity level   Outcome: Progressing     Problem: Prexisting or High Potential for Compromised Skin Integrity  Goal: Skin integrity is maintained or improved  Description: INTERVENTIONS:  - Identify patients at risk for skin breakdown  - Assess and monitor skin integrity  - Assess and monitor nutrition and hydration status  - Monitor labs   - Assess for incontinence   - Turn and reposition patient  - Assist with mobility/ambulation  - Relieve pressure over bony prominences  - Avoid friction and shearing  - Provide appropriate hygiene as needed including keeping skin clean and dry  - Evaluate need for skin moisturizer/barrier cream  - Collaborate with interdisciplinary team   - Patient/family teaching  - Consider wound care consult   Outcome: Progressing     Problem: PAIN - ADULT  Goal: Verbalizes/displays adequate comfort level or baseline comfort level  Description: Interventions:  - Encourage patient to monitor pain and request assistance  - Assess pain using appropriate pain scale  - Administer analgesics based on type and severity of pain and evaluate response  - Implement non-pharmacological measures as appropriate and evaluate response  - Consider cultural and social influences on pain and pain management  - Notify physician/advanced practitioner if interventions unsuccessful or patient reports new pain  Outcome: Progressing     Problem: INFECTION - ADULT  Goal: Absence or prevention of progression during hospitalization  Description: INTERVENTIONS:  - Assess and monitor for signs and symptoms of infection  - Monitor lab/diagnostic results  - Monitor all insertion sites, i.e. indwelling lines, tubes, and drains  - Monitor endotracheal if appropriate and nasal secretions for changes in amount and color  - [place] appropriate cooling/warming therapies per order  - Administer medications as ordered  - Instruct and encourage patient and family to use good hand hygiene technique  - Identify and instruct in appropriate isolation precautions for identified infection/condition  Outcome: Progressing  Goal: Absence of fever/infection during neutropenic period  Description: INTERVENTIONS:  - Monitor WBC    Outcome: Progressing     Problem: SAFETY ADULT  Goal: Maintain or return to baseline ADL function  Description: INTERVENTIONS:  -  Assess patient's ability to carry out ADLs; assess patient's baseline for ADL function and identify physical deficits which impact ability to perform ADLs (bathing, care of mouth/teeth, toileting, grooming, dressing, etc.)  - Assess/evaluate cause of self-care deficits   - Assess range of motion  - Assess patient's mobility; develop plan if impaired  - Assess patient's need for assistive devices and provide as appropriate  - Encourage maximum independence but intervene and supervise when necessary  - Involve family in performance of ADLs  - Assess for home care needs following discharge   - Consider OT consult to assist with ADL evaluation and planning for discharge  - Provide patient education as appropriate  Outcome: Progressing  Goal: Maintains/Returns to pre admission functional level  Description: INTERVENTIONS:  - Perform BMAT or MOVE assessment daily.   - Set and communicate daily mobility goal to care team and patient/family/caregiver.    - Collaborate with rehabilitation services on mobility goals if consulted  - Out of bed for toileting  - Record patient progress and toleration of activity level   Outcome: Progressing  Goal: Patient will remain free of falls  Description: INTERVENTIONS:  - Educate patient/family on patient safety including physical limitations  - Instruct patient to call for assistance with activity   - Consult OT/PT to assist with strengthening/mobility   - Keep Call bell within reach  - Keep bed low and locked with side rails adjusted as appropriate  - Keep care items and personal belongings within reach  - Initiate and maintain comfort rounds  - Make Fall Risk Sign visible to staff  - Apply yellow socks and bracelet for high fall risk patients  - Consider moving patient to room near nurses station  Outcome: Progressing     Problem: DISCHARGE PLANNING  Goal: Discharge to home or other facility with appropriate resources  Description: INTERVENTIONS:  - Identify barriers to discharge w/patient and caregiver  - Arrange for needed discharge resources and transportation as appropriate  - Identify discharge learning needs (meds, wound care, etc.)  - Arrange for interpretive services to assist at discharge as needed  - Refer to Case Management Department for coordinating discharge planning if the patient needs post-hospital services based on physician/advanced practitioner order or complex needs related to functional status, cognitive ability, or social support system  Outcome: Progressing     Problem: Knowledge Deficit  Goal: Patient/family/caregiver demonstrates understanding of disease process, treatment plan, medications, and discharge instructions  Description: Complete learning assessment and assess knowledge base.   Interventions:  - Provide teaching at level of understanding  - Provide teaching via preferred learning methods  Outcome: Progressing

## 2023-08-30 NOTE — ASSESSMENT & PLAN NOTE
· Presented with afib RVR- hypotensive after 1 dose IV metoprolol  · Home regimen includes: Metoprolol, Eliquis  · Started on 250 mcg IV digoxin every 6 hours x 6 doses  · Continue IV fluid resuscitation  · Hold home dose metoprolol in the setting of shock and sepsis  · Monitor on telemetry

## 2023-08-30 NOTE — CONSULTS
Consult - Podiatry   Juju Mail Jimi 80 y.o. male MRN: [id number]  Unit/Bed#: -01 SDU Encounter: [id number]    Assessment/Plan     Open wound of right great toe with damage to nail, initial encounter  · Dry sterile dressing with Betadine/Telfa dressing to be changed every other day. · Once nailbed is dry with no drainage noted may discontinue bandaging which anticipate will happen within the next few days. · Please reconsult should any issues arise as I do not anticipate this giving him much trouble. Peripheral vascular disease  · Arterial duplex ordered as an outpatient to evaluate lower extremity perfusion    Sepsis, left upper extremity cellulitis, A-fib, rheumatoid arthritis, hypothyroidism, spinal stenosis, gout, GERD, hyperlipidemia, hypertension, and limited ambulation  · Managed per internal medicine    History of Present Illness    Michael Curtis is a 80 y.o. male who presents with Severe sepsis secondary to cellulitis of the left upper extremity. .  Patient was initially seen on 8/17/2023 for traumatic partial avulsion of right great toenail. Nailbed/wound was debrided with subsequent follow-up on 8/28/2023 where he was noted to be making satisfactory progress with his foot nearly being healed. Traumatic wound was sustained after a fall in his bathroom on 8/6/2023 at [facility].Podiatry consult requested to evaluate right foot wounds. Patient's chart reviewed noting all pertinent clinical laboratory data. Inpatient consult to Podiatry  Consult performed by: Tutu Davidson DPM  Consult ordered by: ESTEFANIA Anna        Review of Systems   Constitutional: Negative. HENT: Negative. Eyes: Negative. Respiratory: Negative. Cardiovascular: History of A-fib  Gastrointestinal: Negative.     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[address])    • Spinal stenosis      Past Surgical History:   Procedure Laterality Date   • CARDIAC ELECTROPHYSIOLOGY PROCEDURE N/A 12/17/2022    Procedure: Cardiac loop recorder implant;  Surgeon: Rajiv Rivera MD;  Location:  CARDIAC CATH LAB;   Service: Cardiology   • CARPAL TUNNEL RELEASE Bilateral    • COLONOSCOPY     • LAMINECTOMY     • TOTAL KNEE ARTHROPLASTY Bilateral    • TOTAL SHOULDER REPLACEMENT       Social History   Social History     Substance and Sexual Activity   Alcohol Use Yes   • Alcohol/week: 2.0 standard drinks of alcohol   • Types: 2 Shots of liquor per week    Comment: 2-3 ounces of whiskey a day     Social History     Substance and Sexual Activity   Drug Use Yes   • Types: Marijuana     Social History     Tobacco Use   Smoking Status Never   Smokeless Tobacco Never     Family History:   Family History   Problem Relation Age of Onset   • Colon polyps Neg Hx    • Colon cancer Neg Hx        Meds/Allergies   Medications Prior to Admission   Medication   • acetaminophen (TYLENOL) 650 mg CR tablet   • allopurinol (ZYLOPRIM) 100 mg tablet   • apixaban (Eliquis) 5 mg   • ascorbic acid (VITAMIN C) 500 MG tablet   • cetirizine (ZyrTEC) 10 mg tablet   • ezetimibe (ZETIA) 10 mg tablet   • gabapentin (NEURONTIN) 100 mg capsule   • Hibiclens 4 % external liquid   • hydroxychloroquine (PLAQUENIL) 200 mg tablet   • levothyroxine 25 mcg tablet   • metoprolol succinate (TOPROL-XL) 25 mg 24 hr tablet   • omeprazole (PriLOSEC) 10 mg delayed release capsule   • oxybutynin (DITROPAN) 5 mg tablet   • potassium chloride (K-DUR,KLOR-CON) 20 mEq tablet   • predniSONE 5 mg tablet   • solifenacin (VESICARE) 5 mg tablet   • tamsulosin (FLOMAX) 0.4 mg   • torsemide (DEMADEX) 10 mg tablet   • torsemide (DEMADEX) 20 mg tablet   • Vitamin D, Cholecalciferol, 25 MCG (1000 UT) TABS   • Betadine 10 % external solution   • Cholecalciferol (Vitamin D3) 50 MCG (2000 UT) TABS   • Diclofenac Sodium (VOLTAREN) 1 %   • doxycycline hyclate (VIBRAMYCIN) 100 mg capsule   • fluticasone (FLONASE) 50 mcg/act nasal spray   • loperamide (IMODIUM) 2 mg capsule   • melatonin 3 mg   • Multiple Vitamin (multivitamin) tablet   • mupirocin (BACTROBAN) 2 % ointment   • nitroglycerin (NITROSTAT) 0.4 mg SL tablet   • NON FORMULARY   • nystatin (MYCOSTATIN) powder   • ondansetron (ZOFRAN) 4 mg tablet   • polyethylene glycol (MIRALAX) 17 g packet   • pregabalin (LYRICA) 25 mg capsule   • psyllium (METAMUCIL SMOOTH TEXTURE) 28 % packet   • Saccharomyces boulardii (Probiotic) 250 MG CAPS   • traMADol (ULTRAM) 50 mg tablet     Allergies   Allergen Reactions   • Colchicine Other (See Comments)     Flushing     • Niacin Other (See Comments)     flushed   • Statins        Objective   First Vitals:   Blood Pressure: (!) 174/84 (08/29/23 0907)  Pulse: (!) 136 (08/29/23 0905)  Temperature: 97.9 °F (36.6 °C) (08/29/23 0905)  Temp Source: Oral (08/29/23 0905)  Respirations: (!) 24 (08/29/23 0905)  Height: 5' 9" (175.3 cm) (08/29/23 1230)  Weight - Scale: 109 kg (240 lb 4.8 oz) (08/29/23 1230)  SpO2: (!) 87 % (08/29/23 0905)    Current Vitals:   Blood Pressure: 120/71 (08/30/23 1600)  Pulse: 104 (08/30/23 1600)  Temperature: 98.4 °F (36.9 °C) (08/30/23 1127)  Temp Source: Oral (08/30/23 1127)  Respirations: 19 (08/30/23 1600)  Height: 5' 9" (175.3 cm) (08/30/23 1020)  Weight - Scale: 109 kg (240 lb) (08/30/23 1020)  SpO2: 96 % (08/30/23 1600)        /71   Pulse 104   Temp 98.4 °F (36.9 °C) (Oral)   Resp 19   Ht 5' 9" (1.753 m)   Wt 109 kg (240 lb)   SpO2 96%   BMI 35.44 kg/m²     General Appearance:    Alert, cooperative, no distress, resting comfortably in bed   Head:    Normocephalic, without obvious abnormality, atraumatic   Eyes:    PERRL, conjunctiva/corneas clear, EOM's intact            Nose:   Moist mucous membranes, no drainage or sinus tenderness   Throat:   No tenderness, no exudates   Neck:   Supple, symmetrical, trachea midline, no JVD   Back:     Symmetric, no CVA tenderness   Lungs:     Respirations unlabored   Chest wall:    No tenderness or deformity   Heart:    Rate and rhythm noted on last vitals. Abdomen:     Soft, non-tender, bowel sounds active all four quadrants,     no masses, no organomegaly       Lower Ext/Ortho:  No gross deformities of the lower extremity noted. Neuro/Vasc: CNII-XII intact. Normal strength  Sensation and reflexes: Grossly intact  Pulses: Right: DP 0/4, PT 0/4, Left: DP 0/4, PT 0/4  Capillary Filling: 3 sec, Edema: None     Skin: Texture, Tone and Turgor: Diminished, Digital Hair: None  Atrophic Changes: Moderate  Lesions: None  Nail Pathology: Multiple dystrophic nails consistent with mycosis  Ulcers/Wounds: Traumatic wound right great toe involving nail bed which has been healing satisfactorily for the past few weeks. Was last seen on [phone] in wound care with good progress noted. No signs of infection or recurring breakdown noted. Nailbed is clinically stable with prior debridement of remnant nail. No erythema or edema noted.                  Lab Results:   CBC w/diff  Results from last 7 days   Lab Units 08/30/23  0511   WBC Thousand/uL 15.88*   HEMOGLOBIN g/dL 9.9*   HEMATOCRIT % 31.6*   PLATELETS Thousands/uL 118*   NEUTROS PCT % 86*   LYMPHS PCT % 8*   MONOS PCT % 4   EOS PCT % 0     BMP  Results from last 7 days   Lab Units 08/30/23  0511   POTASSIUM mmol/L 3.9   CHLORIDE mmol/L 105   CO2 mmol/L 25   BUN mg/dL 17   CREATININE mg/dL 0.96   CALCIUM mg/dL 7.1*     CMP  Results from last 7 days   Lab Units 08/30/23  0511 08/29/23  1735 08/29/23  0906   POTASSIUM mmol/L 3.9   < > 3.5   CHLORIDE mmol/L 105   < > 103   CO2 mmol/L 25   < > 28   BUN mg/dL 17   < > 19   CREATININE mg/dL 0.96   < > 1.13   CALCIUM mg/dL 7.1*   < > 8.2*   ALK PHOS U/L  --   --  63   ALT U/L  --   --  17   AST U/L  --   --  23    < > = values in this interval not displayed. @Culture@  Lab Results   Component Value Date    BLOODCX No Growth at 24 hrs. 08/29/2023    URINECX No Growth <1000 cfu/mL 01/26/2023     No results found for: "WOUNDCULT"      Imaging: I have personally reviewed pertinent films in PACS      EKG, Pathology, and Other Studies: I have personally reviewed pertinent reports. Code Status: Level 1 - Full Code  Advance Directive and Living Will: Yes    Power of :      Please Note: Voice to text dictation software was used in the creation of this document.        Manas Peña DPM

## 2023-08-31 ENCOUNTER — APPOINTMENT (INPATIENT)
Dept: RADIOLOGY | Facility: HOSPITAL | Age: 88
DRG: 871 | End: 2023-08-31
Payer: COMMERCIAL

## 2023-08-31 LAB
ANION GAP SERPL CALCULATED.3IONS-SCNC: 7 MMOL/L
APTT PPP: 69 SECONDS (ref 23–37)
ARTERIAL PATENCY WRIST A: YES
BASE EXCESS BLDA CALC-SCNC: 1 MMOL/L
BASOPHILS # BLD AUTO: 0.01 THOUSANDS/ÂΜL (ref 0–0.1)
BASOPHILS NFR BLD AUTO: 0 % (ref 0–1)
BLD SMEAR INTERP: NORMAL
BUN SERPL-MCNC: 16 MG/DL (ref 5–25)
C-ANCA TITR SER IF: NORMAL TITER
CALCIUM SERPL-MCNC: 7.7 MG/DL (ref 8.4–10.2)
CHLORIDE SERPL-SCNC: 102 MMOL/L (ref 96–108)
CO2 SERPL-SCNC: 30 MMOL/L (ref 21–32)
CREAT SERPL-MCNC: 1.08 MG/DL (ref 0.6–1.3)
EOSINOPHIL # BLD AUTO: 0.17 THOUSAND/ÂΜL (ref 0–0.61)
EOSINOPHIL NFR BLD AUTO: 1 % (ref 0–6)
ERYTHROCYTE [DISTWIDTH] IN BLOOD BY AUTOMATED COUNT: 15.8 % (ref 11.6–15.1)
FERRITIN SERPL-MCNC: 141 NG/ML (ref 24–336)
GFR SERPL CREATININE-BSD FRML MDRD: 60 ML/MIN/1.73SQ M
GLUCOSE SERPL-MCNC: 119 MG/DL (ref 65–140)
HCO3 BLDA-SCNC: 27.1 MMOL/L (ref 22–28)
HCT VFR BLD AUTO: 32.1 % (ref 36.5–49.3)
HGB BLD-MCNC: 9.9 G/DL (ref 12–17)
IMM GRANULOCYTES # BLD AUTO: 0.15 THOUSAND/UL (ref 0–0.2)
IMM GRANULOCYTES NFR BLD AUTO: 1 % (ref 0–2)
IRON SATN MFR SERPL: 12 % (ref 15–50)
IRON SERPL-MCNC: 16 UG/DL (ref 50–212)
LYMPHOCYTES # BLD AUTO: 1.01 THOUSANDS/ÂΜL (ref 0.6–4.47)
LYMPHOCYTES NFR BLD AUTO: 8 % (ref 14–44)
MAGNESIUM SERPL-MCNC: 1.9 MG/DL (ref 1.9–2.7)
MCH RBC QN AUTO: 31.3 PG (ref 26.8–34.3)
MCHC RBC AUTO-ENTMCNC: 30.8 G/DL (ref 31.4–37.4)
MCV RBC AUTO: 102 FL (ref 82–98)
MONOCYTES # BLD AUTO: 0.53 THOUSAND/ÂΜL (ref 0.17–1.22)
MONOCYTES NFR BLD AUTO: 4 % (ref 4–12)
MYELOPEROXIDASE AB SER IA-ACNC: <0.2 UNITS (ref 0–0.9)
NASAL CANNULA: 2
NEUTROPHILS # BLD AUTO: 10.42 THOUSANDS/ÂΜL (ref 1.85–7.62)
NEUTS SEG NFR BLD AUTO: 86 % (ref 43–75)
NRBC BLD AUTO-RTO: 0 /100 WBCS
O2 CT BLDA-SCNC: 16.3 ML/DL (ref 16–23)
OXYHGB MFR BLDA: 93.5 % (ref 94–97)
P-ANCA ATYPICAL TITR SER IF: NORMAL TITER
P-ANCA TITR SER IF: NORMAL TITER
PCO2 BLDA: 49.5 MM HG (ref 36–44)
PH BLDA: 7.36 [PH] (ref 7.35–7.45)
PHOSPHATE SERPL-MCNC: 3 MG/DL (ref 2.3–4.1)
PLATELET # BLD AUTO: 124 THOUSANDS/UL (ref 149–390)
PMV BLD AUTO: 12.2 FL (ref 8.9–12.7)
PO2 BLDA: 73 MM HG (ref 75–129)
POTASSIUM SERPL-SCNC: 3.7 MMOL/L (ref 3.5–5.3)
PROCALCITONIN SERPL-MCNC: 9.15 NG/ML
PROTEINASE3 AB SER IA-ACNC: <0.2 UNITS (ref 0–0.9)
RBC # BLD AUTO: 3.16 MILLION/UL (ref 3.88–5.62)
SODIUM SERPL-SCNC: 139 MMOL/L (ref 135–147)
SPECIMEN SOURCE: ABNORMAL
TIBC SERPL-MCNC: 139 UG/DL (ref 250–450)
UIBC SERPL-MCNC: 123 UG/DL (ref 155–355)
VANCOMYCIN SERPL-MCNC: 14.7 UG/ML (ref 10–20)
WBC # BLD AUTO: 12.29 THOUSAND/UL (ref 4.31–10.16)

## 2023-08-31 PROCEDURE — 94640 AIRWAY INHALATION TREATMENT: CPT

## 2023-08-31 PROCEDURE — 83550 IRON BINDING TEST: CPT

## 2023-08-31 PROCEDURE — 83735 ASSAY OF MAGNESIUM: CPT

## 2023-08-31 PROCEDURE — 99232 SBSQ HOSP IP/OBS MODERATE 35: CPT | Performed by: SURGERY

## 2023-08-31 PROCEDURE — 99232 SBSQ HOSP IP/OBS MODERATE 35: CPT | Performed by: INTERNAL MEDICINE

## 2023-08-31 PROCEDURE — 94760 N-INVAS EAR/PLS OXIMETRY 1: CPT

## 2023-08-31 PROCEDURE — 84100 ASSAY OF PHOSPHORUS: CPT

## 2023-08-31 PROCEDURE — 80202 ASSAY OF VANCOMYCIN: CPT

## 2023-08-31 PROCEDURE — 87040 BLOOD CULTURE FOR BACTERIA: CPT | Performed by: PHYSICIAN ASSISTANT

## 2023-08-31 PROCEDURE — 80048 BASIC METABOLIC PNL TOTAL CA: CPT

## 2023-08-31 PROCEDURE — 82728 ASSAY OF FERRITIN: CPT

## 2023-08-31 PROCEDURE — 85730 THROMBOPLASTIN TIME PARTIAL: CPT

## 2023-08-31 PROCEDURE — 36600 WITHDRAWAL OF ARTERIAL BLOOD: CPT

## 2023-08-31 PROCEDURE — 71045 X-RAY EXAM CHEST 1 VIEW: CPT

## 2023-08-31 PROCEDURE — 82805 BLOOD GASES W/O2 SATURATION: CPT

## 2023-08-31 PROCEDURE — 83540 ASSAY OF IRON: CPT

## 2023-08-31 PROCEDURE — 85025 COMPLETE CBC W/AUTO DIFF WBC: CPT

## 2023-08-31 PROCEDURE — NC001 PR NO CHARGE

## 2023-08-31 PROCEDURE — 84145 PROCALCITONIN (PCT): CPT

## 2023-08-31 RX ORDER — FLUTICASONE FUROATE AND VILANTEROL 100; 25 UG/1; UG/1
1 POWDER RESPIRATORY (INHALATION) DAILY
Status: DISCONTINUED | OUTPATIENT
Start: 2023-08-31 | End: 2023-09-09 | Stop reason: HOSPADM

## 2023-08-31 RX ORDER — ALBUTEROL SULFATE 2.5 MG/3ML
2.5 SOLUTION RESPIRATORY (INHALATION) EVERY 4 HOURS PRN
Status: DISCONTINUED | OUTPATIENT
Start: 2023-08-31 | End: 2023-09-09 | Stop reason: HOSPADM

## 2023-08-31 RX ORDER — ALBUMIN, HUMAN INJ 5% 5 %
25 SOLUTION INTRAVENOUS ONCE
Status: COMPLETED | OUTPATIENT
Start: 2023-08-31 | End: 2023-08-31

## 2023-08-31 RX ORDER — MAGNESIUM SULFATE HEPTAHYDRATE 40 MG/ML
2 INJECTION, SOLUTION INTRAVENOUS ONCE
Status: COMPLETED | OUTPATIENT
Start: 2023-08-31 | End: 2023-08-31

## 2023-08-31 RX ORDER — POTASSIUM CHLORIDE 20 MEQ/1
40 TABLET, EXTENDED RELEASE ORAL ONCE
Status: COMPLETED | OUTPATIENT
Start: 2023-08-31 | End: 2023-08-31

## 2023-08-31 RX ADMIN — Medication 12.5 MG: at 21:09

## 2023-08-31 RX ADMIN — IPRATROPIUM BROMIDE 0.5 MG: 0.5 SOLUTION RESPIRATORY (INHALATION) at 20:11

## 2023-08-31 RX ADMIN — Medication 2 G: at 21:11

## 2023-08-31 RX ADMIN — IPRATROPIUM BROMIDE 0.5 MG: 0.5 SOLUTION RESPIRATORY (INHALATION) at 13:18

## 2023-08-31 RX ADMIN — LEVALBUTEROL HYDROCHLORIDE 1.25 MG: 1.25 SOLUTION RESPIRATORY (INHALATION) at 13:18

## 2023-08-31 RX ADMIN — HEPARIN SODIUM 13.1 UNITS/KG/HR: 10000 INJECTION, SOLUTION INTRAVENOUS at 13:40

## 2023-08-31 RX ADMIN — Medication 9 MG: at 21:11

## 2023-08-31 RX ADMIN — CLINDAMYCIN PHOSPHATE 600 MG: 600 INJECTION, SOLUTION INTRAVENOUS at 11:44

## 2023-08-31 RX ADMIN — PENICILLIN G POTASSIUM 4 MILLION UNITS: 5000000 INJECTION, POWDER, FOR SOLUTION INTRAMUSCULAR; INTRAVENOUS at 15:03

## 2023-08-31 RX ADMIN — CHLORHEXIDINE GLUCONATE 15 ML: 1.2 RINSE ORAL at 08:10

## 2023-08-31 RX ADMIN — HYDROMORPHONE HYDROCHLORIDE 0.2 MG: 0.2 INJECTION, SOLUTION INTRAMUSCULAR; INTRAVENOUS; SUBCUTANEOUS at 15:02

## 2023-08-31 RX ADMIN — POLYETHYLENE GLYCOL 3350 17 G: 17 POWDER, FOR SOLUTION ORAL at 08:04

## 2023-08-31 RX ADMIN — PENICILLIN G POTASSIUM 4 MILLION UNITS: 5000000 INJECTION, POWDER, FOR SOLUTION INTRAMUSCULAR; INTRAVENOUS at 22:24

## 2023-08-31 RX ADMIN — GABAPENTIN 300 MG: 300 CAPSULE ORAL at 21:09

## 2023-08-31 RX ADMIN — IPRATROPIUM BROMIDE 0.5 MG: 0.5 SOLUTION RESPIRATORY (INHALATION) at 07:17

## 2023-08-31 RX ADMIN — HYDROXYCHLOROQUINE SULFATE 200 MG: 200 TABLET, FILM COATED ORAL at 08:29

## 2023-08-31 RX ADMIN — TRAMADOL HYDROCHLORIDE 50 MG: 50 TABLET, COATED ORAL at 06:08

## 2023-08-31 RX ADMIN — POTASSIUM CHLORIDE 40 MEQ: 1500 TABLET, EXTENDED RELEASE ORAL at 08:02

## 2023-08-31 RX ADMIN — PANTOPRAZOLE SODIUM 20 MG: 20 TABLET, DELAYED RELEASE ORAL at 06:09

## 2023-08-31 RX ADMIN — VANCOMYCIN HYDROCHLORIDE 750 MG: 750 INJECTION, SOLUTION INTRAVENOUS at 07:56

## 2023-08-31 RX ADMIN — HYDROXYCHLOROQUINE SULFATE 200 MG: 200 TABLET, FILM COATED ORAL at 15:33

## 2023-08-31 RX ADMIN — GABAPENTIN 300 MG: 300 CAPSULE ORAL at 08:04

## 2023-08-31 RX ADMIN — ALBUTEROL SULFATE 2.5 MG: 2.5 SOLUTION RESPIRATORY (INHALATION) at 05:08

## 2023-08-31 RX ADMIN — Medication 12.5 MG: at 08:03

## 2023-08-31 RX ADMIN — Medication 2 G: at 17:57

## 2023-08-31 RX ADMIN — PENICILLIN G POTASSIUM 4 MILLION UNITS: 5000000 INJECTION, POWDER, FOR SOLUTION INTRAMUSCULAR; INTRAVENOUS at 11:42

## 2023-08-31 RX ADMIN — LEVALBUTEROL HYDROCHLORIDE 1.25 MG: 1.25 SOLUTION RESPIRATORY (INHALATION) at 20:11

## 2023-08-31 RX ADMIN — ALBUTEROL SULFATE 2.5 MG: 2.5 SOLUTION RESPIRATORY (INHALATION) at 10:39

## 2023-08-31 RX ADMIN — EZETIMIBE 10 MG: 10 TABLET ORAL at 08:02

## 2023-08-31 RX ADMIN — LEVOTHYROXINE SODIUM 25 MCG: 25 TABLET ORAL at 06:09

## 2023-08-31 RX ADMIN — GABAPENTIN 300 MG: 300 CAPSULE ORAL at 15:03

## 2023-08-31 RX ADMIN — LEVALBUTEROL HYDROCHLORIDE 1.25 MG: 1.25 SOLUTION RESPIRATORY (INHALATION) at 07:17

## 2023-08-31 RX ADMIN — VANCOMYCIN HYDROCHLORIDE 750 MG: 750 INJECTION, SOLUTION INTRAVENOUS at 18:58

## 2023-08-31 RX ADMIN — CLINDAMYCIN PHOSPHATE 600 MG: 600 INJECTION, SOLUTION INTRAVENOUS at 04:00

## 2023-08-31 RX ADMIN — PREDNISONE 5 MG: 5 TABLET ORAL at 08:05

## 2023-08-31 RX ADMIN — PENICILLIN G POTASSIUM 4 MILLION UNITS: 5000000 INJECTION, POWDER, FOR SOLUTION INTRAMUSCULAR; INTRAVENOUS at 18:00

## 2023-08-31 RX ADMIN — Medication 2 G: at 11:44

## 2023-08-31 RX ADMIN — ALLOPURINOL 400 MG: 100 TABLET ORAL at 08:04

## 2023-08-31 RX ADMIN — Medication 2 G: at 08:10

## 2023-08-31 RX ADMIN — CHLORHEXIDINE GLUCONATE 15 ML: 1.2 RINSE ORAL at 21:11

## 2023-08-31 RX ADMIN — PENICILLIN G POTASSIUM 4 MILLION UNITS: 5000000 INJECTION, POWDER, FOR SOLUTION INTRAMUSCULAR; INTRAVENOUS at 06:12

## 2023-08-31 RX ADMIN — FLUTICASONE FUROATE AND VILANTEROL TRIFENATATE 1 PUFF: 100; 25 POWDER RESPIRATORY (INHALATION) at 13:40

## 2023-08-31 RX ADMIN — PENICILLIN G POTASSIUM 4 MILLION UNITS: 5000000 INJECTION, POWDER, FOR SOLUTION INTRAMUSCULAR; INTRAVENOUS at 03:05

## 2023-08-31 RX ADMIN — LIDOCAINE 2 PATCH: 50 PATCH TOPICAL at 08:05

## 2023-08-31 RX ADMIN — MAGNESIUM SULFATE HEPTAHYDRATE 2 G: 2 INJECTION, SOLUTION INTRAVENOUS at 08:00

## 2023-08-31 RX ADMIN — ALBUMIN (HUMAN) 25 G: 12.5 INJECTION, SOLUTION INTRAVENOUS at 16:00

## 2023-08-31 NOTE — DISCHARGE INSTR - OTHER ORDERS
Wound Care Plan:   1-Hydraguard lotion to bilateral heels, buttocks, and sacrum three times daily and as needed with incontinence care. 2-Elevate lower extremities for edema management. Ensure heels float off of bed/chair surface to offload pressure. 3-Offloading air cushion in chair if/when out of bed. 4-Moisturize skin daily with skin nourishing lotion. 5-Turn/reposition every 2 hours while in bed using positioning wedges; and weight shift frequently while in chair for pressure re-distribution on skin. 6-Right hand, right neck, left arm--cleanse with normal saline, pat dry. Apply Xeroform and cover with dry dressing or Allevyn Life foam.  Change dressing every other day. 7-Right great toe per podiatry.

## 2023-08-31 NOTE — PLAN OF CARE
Problem: Prexisting or High Potential for Compromised Skin Integrity  Goal: Skin integrity is maintained or improved  Description: INTERVENTIONS:  - Identify patients at risk for skin breakdown  - Assess and monitor skin integrity  - Assess and monitor nutrition and hydration status  - Monitor labs   - Assess for incontinence   - Turn and reposition patient  - Assist with mobility/ambulation  - Relieve pressure over bony prominences  - Avoid friction and shearing  - Provide appropriate hygiene as needed including keeping skin clean and dry  - Evaluate need for skin moisturizer/barrier cream  - Collaborate with interdisciplinary team   - Patient/family teaching  - Consider wound care consult   Outcome: Progressing     Problem: MOBILITY - ADULT  Goal: Maintain or return to baseline ADL function  Description: INTERVENTIONS:  -  Assess patient's ability to carry out ADLs; assess patient's baseline for ADL function and identify physical deficits which impact ability to perform ADLs (bathing, care of mouth/teeth, toileting, grooming, dressing, etc.)  - Assess/evaluate cause of self-care deficits   - Assess range of motion  - Assess patient's mobility; develop plan if impaired  - Assess patient's need for assistive devices and provide as appropriate  - Encourage maximum independence but intervene and supervise when necessary  - Involve family in performance of ADLs  - Assess for home care needs following discharge   - Consider OT consult to assist with ADL evaluation and planning for discharge  - Provide patient education as appropriate  Outcome: Progressing  Goal: Maintains/Returns to pre admission functional level  Description: INTERVENTIONS:  - Perform BMAT or MOVE assessment daily.   - Set and communicate daily mobility goal to care team and patient/family/caregiver. - Collaborate with rehabilitation services on mobility goals if consulted  - Perform Range of Motion 3 times a day.   - Reposition patient every 2 hours.  - Dangle patient 2 times a day  - Stand patient 2 times a day  - Ambulate patient 2 times a day  - Out of bed to chair 2 times a day   - Out of bed for meals 2 times a day  - Out of bed for toileting  - Record patient progress and toleration of activity level   Outcome: Progressing     Problem: PAIN - ADULT  Goal: Verbalizes/displays adequate comfort level or baseline comfort level  Description: Interventions:  - Encourage patient to monitor pain and request assistance  - Assess pain using appropriate pain scale  - Administer analgesics based on type and severity of pain and evaluate response  - Implement non-pharmacological measures as appropriate and evaluate response  - Consider cultural and social influences on pain and pain management  - Notify physician/advanced practitioner if interventions unsuccessful or patient reports new pain  Outcome: Progressing     Problem: SAFETY ADULT  Goal: Maintain or return to baseline ADL function  Description: INTERVENTIONS:  -  Assess patient's ability to carry out ADLs; assess patient's baseline for ADL function and identify physical deficits which impact ability to perform ADLs (bathing, care of mouth/teeth, toileting, grooming, dressing, etc.)  - Assess/evaluate cause of self-care deficits   - Assess range of motion  - Assess patient's mobility; develop plan if impaired  - Assess patient's need for assistive devices and provide as appropriate  - Encourage maximum independence but intervene and supervise when necessary  - Involve family in performance of ADLs  - Assess for home care needs following discharge   - Consider OT consult to assist with ADL evaluation and planning for discharge  - Provide patient education as appropriate  Outcome: Progressing  Goal: Maintains/Returns to pre admission functional level  Description: INTERVENTIONS:  - Perform BMAT or MOVE assessment daily.   - Set and communicate daily mobility goal to care team and patient/family/caregiver. - Collaborate with rehabilitation services on mobility goals if consulted  - Perform Range of Motion 3 times a day. - Reposition patient every 2 hours.   - Dangle patient 2 times a day  - Stand patient 2 times a day  - Ambulate patient 2 times a day  - Out of bed to chair 2 times a day   - Out of bed for meals 2 times a day  - Out of bed for toileting  - Record patient progress and toleration of activity level   Outcome: Progressing  Goal: Patient will remain free of falls  Description: INTERVENTIONS:  - Educate patient/family on patient safety including physical limitations  - Instruct patient to call for assistance with activity   - Consult OT/PT to assist with strengthening/mobility   - Keep Call bell within reach  - Keep bed low and locked with side rails adjusted as appropriate  - Keep care items and personal belongings within reach  - Initiate and maintain comfort rounds  - Make Fall Risk Sign visible to staff  - Offer Toileting every 2 Hours, in advance of need  - Initiate/Maintain bed alarm  - Apply yellow socks and bracelet for high fall risk patients  - Consider moving patient to room near nurses station  Outcome: Progressing     Problem: DISCHARGE PLANNING  Goal: Discharge to home or other facility with appropriate resources  Description: INTERVENTIONS:  - Identify barriers to discharge w/patient and caregiver  - Arrange for needed discharge resources and transportation as appropriate  - Identify discharge learning needs (meds, wound care, etc.)  - Arrange for interpretive services to assist at discharge as needed  - Refer to Case Management Department for coordinating discharge planning if the patient needs post-hospital services based on physician/advanced practitioner order or complex needs related to functional status, cognitive ability, or social support system  Outcome: Progressing     Problem: Knowledge Deficit  Goal: Patient/family/caregiver demonstrates understanding of disease process, treatment plan, medications, and discharge instructions  Description: Complete learning assessment and assess knowledge base. Interventions:  - Provide teaching at level of understanding  - Provide teaching via preferred learning methods  Outcome: Progressing     Problem: Knowledge Deficit  Goal: Patient/family/caregiver demonstrates understanding of disease process, treatment plan, medications, and discharge instructions  Description: Complete learning assessment and assess knowledge base.   Interventions:  - Provide teaching at level of understanding  - Provide teaching via preferred learning methods  Outcome: Progressing

## 2023-08-31 NOTE — ASSESSMENT & PLAN NOTE
Assessment:  · Presented from [facility] assisted living with complaints of fever of 103 and erythema of the left arm  · SIRS: tachycardia and tachypnea  · Source: Cellulitis LUE  · 8/29 CT LUE: Left upper extremity subcutaneous edema indicating cellulitis without drainable collection, soft tissue emphysema or evidence of osteomyelitis  · UA and CXR unremarkable  · Procal: 0.16 --> 14.19 --> 10.15  · WBC: 9.0 --> 15.8 --> 13.9  · 8/29 blood cultures: 1/2 blood cultures with group B streptococcus  · Briefly required levophed, now off  · 8/30 erythema expanding upon previously defined borders, worsening induration/blistering.  Development of RUE erythema and ecchymosis    Plan:  · Surgery consulted due to concern of possible NSTI; No plans for surgical debridement at this time  · Should symptoms/exam worsen and surgical debridement be indicated, [facility] conversation should be held given extent of infection  · ID consulted, appreciate recommendations  · Antibiotics (D3)  · Cefepime -->  PCN G (D2)  · Vancomycin (D3)  · Clindamycin (D2)  · Check repeat blood cultures  · MRSA negative, could consider discontinuing vancomycin  · Trend fever curve and WBC count

## 2023-08-31 NOTE — ASSESSMENT & PLAN NOTE
· 8/29 Blood cultures: 1 of 2 positive for group B streptococcus  · Transitioned to high-dose PCN, Vancomycin, and Clindamycin  · Echo without evidence of vegetation  · Repeat blood cultures in AM  · ID consulted, appreciate recommendations

## 2023-08-31 NOTE — CONSULTS
Consultation - General Surgery   Samaria Fox 80 y.o. male MRN: [id number]  Unit/Bed#: -01 SDU Encounter: [id number]    Assessment/Plan     Cellulitis bilateral upper extremities  -Admitted with significant erythema and edema of LUE and fever x 1 day, spreading today proximally and distally involving most of arm. Now with erythema of RUE. -CT LUE 8/29: "Left upper extremity subcutaneous edema indicating cellulitis without drainable collection, soft tissue emphysema or evidence of osteomyelitis"  -Upper limb venous duplex 8/29: negative for DVT  -No crepitus. Pulses/sensation/motor intact. -Met severe sepsis criteria  -Started on cefepime/vancomycin  -Today cefepime switched to Pen G and clindamycin added after GBS growth in 1 of 2 blood cultures  -WBC worsened to 15.88 this morning, now 13.93   -Procal 14.19 this morning, now 10.15  -Shock resolved and fever-free x 24 hours  -ID consulted  -Discussed with Dr Henri Gutierrez: Patient's labs improved slightly, afebrile, and shock resolved.  If worsens hemodynamically or concern for NSTI, recommends transfer to [facility] for surgical intervention.  -continue to trend labs, vitals, upper extremity exam/neurovascular checks    Severe sepsis  -met SIRS criteria with tachycardia, tachypnea, fever  -Source LUE cellulitis  -Procal 14, now 10.15  -Blood cultures: 1 with growth of GBS  -Did require levophed for hypotension, weaned off and no longer requiring pressors  -Antibiotics: pen G, clindamycin, vancomycin  -Appreciate ID recs  -Continue to trend labs, vitals    A fib with RVR  -Presented with RVR, now with improved rate control  -Home meds: metoprolol, eliquis  -hypotensive after metoprolol  -Started on IV digoxin  -Monitor on telemetry    Other medical problems: PVD, RA, hypothyroidism, spinal stenosis, GERD, gout, HLD, CHF, HTN    History of Present Illness     HPI:  Samaria Fox is a 80 y.o. male with history of RA on prednisone, HTN, CHF, HLD, PVD, atrial fib with RVR on home eliquis, who was admitted with left upper extremity cellulitis and severe sepsis. Patient lives at assisted living facility [facility] and they noted he had fever and erythema of the left upper extremity yesterday, no issues the day prior. Patient and family state he bruises easily and had some ecchymosis on posterior arm prior, but no specific trauma to extremity. He developed erythema of the right upper extremity today. Per critical care septic shock has resolved today, but WBC and procalcitonin along with erythema worsened today and 1 of 2 blood cultures resulted with group B strep. He has been fever free for 24 hours. Patient denies increased fatigue, poor appetite, malaise, or other symptoms prior to erythema starting. Inpatient consult to Acute Care Surgery  Consult performed by: Frantz Mooney PA-C  Consult ordered by: Caridad Spurling, PA-C          Review of Systems   Constitutional:  Positive for fever (resolved today). Respiratory:  Positive for shortness of breath. Cardiovascular:  Negative for chest pain. Musculoskeletal:  Negative for myalgias. Skin:  Positive for color change and wound (foot). Neurological:  Negative for weakness. Hematological:  Bruises/bleeds easily. All other systems reviewed and are negative. 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[address])    • Spinal stenosis      Past Surgical History:   Procedure Laterality Date   • CARDIAC ELECTROPHYSIOLOGY PROCEDURE N/A 12/17/2022    Procedure: Cardiac loop recorder implant;  Surgeon: Rajiv Rivera MD;  [facility];   Service: Cardiology   • CARPAL TUNNEL RELEASE Bilateral    • COLONOSCOPY     • LAMINECTOMY     • TOTAL KNEE ARTHROPLASTY Bilateral    • TOTAL SHOULDER REPLACEMENT       Social History   Social History     Substance and Sexual Activity Alcohol Use Yes   • Alcohol/week: 2.0 standard drinks of alcohol   • Types: 2 Shots of liquor per week    Comment: 2-3 ounces of whiskey a day     Social History     Substance and Sexual Activity   Drug Use Yes   • Types: Marijuana     E-Cigarette/Vaping   • E-Cigarette Use Never User      E-Cigarette/Vaping Substances   • Nicotine No    • Flavoring No      Social History     Tobacco Use   Smoking Status Never   Smokeless Tobacco Never     Family History: non-contributory    Meds/Allergies   PTA meds:   Prior to Admission Medications   Prescriptions Last Dose Informant Patient Reported? Taking?    Betadine 10 % external solution Unknown Outside Facility (Specify) Yes No   Cholecalciferol (Vitamin D3) 50 MCG (2000 UT) TABS  Outside Facility (Specify) Yes No   Diclofenac Sodium (VOLTAREN) 1 % Unknown Outside Facility (Specify) Yes No   Sig: Apply 4 g topically 4 (four) times a day   Hibiclens 4 % external liquid 8/28/2023 Outside Facility (Specify) Yes Yes   Multiple Vitamin (multivitamin) tablet Unknown Outside Facility (Specify) Yes No   Sig: Take 1 tablet by mouth daily   NON FORMULARY Not Taking Outside Facility (Specify) Yes No   Sig: Medical marijuana   Patient not taking: Reported on 8/24/2023   Saccharomyces boulardii (Probiotic) 250 MG CAPS Unknown Outside Facility (Specify) Yes No   Vitamin D, Cholecalciferol, 25 MCG (1000 UT) TABS 8/28/2023 Outside Facility (Specify) Yes Yes   Sig: Take by mouth in the morning   acetaminophen (TYLENOL) 650 mg CR tablet 8/28/2023 Outside Facility (Specify) Yes Yes   Sig: Take 650 mg by mouth 2 (two) times a day   allopurinol (ZYLOPRIM) 100 mg tablet 8/28/2023 Outside Facility (Specify) Yes Yes   Sig: Take 400 mg by mouth daily   apixaban (Eliquis) 5 mg 8/28/2023 Outside Facility (Specify) No Yes   Sig: Take 1 tablet (5 mg total) by mouth 2 (two) times a day   ascorbic acid (VITAMIN C) 500 MG tablet 8/28/2023 Outside Facility (Specify) Yes Yes   Sig: Take 500 mg by mouth daily   cetirizine (ZyrTEC) 10 mg tablet 8/28/2023 Outside Facility ([address] [place] [address]) Yes Yes   Sig: Take 10 mg by mouth daily   doxycycline hyclate (VIBRAMYCIN) 100 mg capsule Unknown Outside Facility (Specify) Yes No   Sig: Take 50 mg by mouth every 12 (twelve) hours   ezetimibe (ZETIA) 10 mg tablet 8/28/2023 Outside Facility (Specify) Yes Yes   Sig: Take 10 mg by mouth daily   fluticasone (FLONASE) 50 mcg/act nasal spray Unknown Outside Facility (Specify) Yes No   gabapentin (NEURONTIN) 100 mg capsule 8/28/2023 Outside Facility (Specify) Yes Yes   Sig: Take 100 mg by mouth 3 (three) times a day   hydroxychloroquine (PLAQUENIL) 200 mg tablet 8/28/2023 Outside Facility ([address] [place] [address]) Yes Yes   Sig: Take 200 mg by mouth 2 (two) times a day with meals   levothyroxine 25 mcg tablet 8/28/2023 Outside Facility (Specify) Yes Yes   loperamide (IMODIUM) 2 mg capsule Unknown Outside Facility (Specify) Yes No   Sig: Take 2 mg by mouth as needed for diarrhea   melatonin 3 mg Not Taking Outside Facility (Specify) No No   Sig: Take 3 tablets (9 mg total) by mouth daily at bedtime   Patient not taking: Reported on 8/24/2023   metoprolol succinate (TOPROL-XL) 25 mg 24 hr tablet 8/28/2023 Outside Facility (Specify) No Yes   Sig: Take 1 tablet (25 mg total) by mouth 2 (two) times a day   mupirocin (BACTROBAN) 2 % ointment Unknown Outside Facility (Specify) Yes No   Sig: Apply 1 application topically daily   nitroglycerin (NITROSTAT) 0.4 mg SL tablet Unknown Outside Facility (Specify) Yes No   Sig: Place 0.4 mg under the tongue every 5 (five) minutes as needed for chest pain   nystatin (MYCOSTATIN) powder Not Taking Outside Facility (Specify) No No   Sig: Apply topically 2 (two) times a day   Patient not taking: Reported on 8/24/2023   omeprazole (PriLOSEC) 10 mg delayed release capsule 8/28/2023 Outside Facility (Specify) Yes Yes   Sig: Take 10 mg by mouth daily   ondansetron (ZOFRAN) 4 mg tablet Unknown Outside Facility (Specify) Yes No oxybutynin (DITROPAN) 5 mg tablet 8/28/2023 Outside Facility (Specify) Yes Yes   polyethylene glycol (MIRALAX) 17 g packet Unknown Outside Facility (Specify) No No   Sig: Take 17 g by mouth every other day   potassium chloride (K-DUR,KLOR-CON) 20 mEq tablet 8/28/2023 Outside Facility (Specify) No Yes   Sig: Take 2 tablets (40 mEq total) by mouth daily Do not start before January 29, 2023.    predniSONE 5 mg tablet 8/28/2023 Outside Facility (Specify) Yes Yes   Sig: Take by mouth daily   pregabalin (LYRICA) 25 mg capsule Not Taking Outside Facility (Specify) No No   Sig: Take 1 tab PO at night for 7 days then increase to 1 tab PO bid   Patient not taking: Reported on 8/24/2023   psyllium (METAMUCIL SMOOTH TEXTURE) 28 % packet Unknown Outside Facility (Specify) No No   Sig: Take 1 packet by mouth 2 (two) times a day   solifenacin (VESICARE) 5 mg tablet 8/28/2023 Outside Facility (Specify) Yes Yes   Sig: Take 5 mg by mouth daily   tamsulosin (FLOMAX) 0.4 mg 8/28/2023 Outside Facility (Specify) Yes Yes   Sig: Take 0.4 mg by mouth daily at bedtime   torsemide (DEMADEX) 10 mg tablet 8/28/2023  No Yes   Sig: Take 1 tablet (10 mg total) by mouth 4 (four) times a week Take 10 mg on Tuesday, Thursday, Saturday, and Sunday   torsemide (DEMADEX) 20 mg tablet 8/28/2023  No Yes   Sig: Take 1 tablet (20 mg total) by mouth 3 (three) times a week Take 20 mg on Monday, Wednesday, and Friday   traMADol (ULTRAM) 50 mg tablet Unknown Outside Facility (Specify) Yes No      Facility-Administered Medications: None     Allergies   Allergen Reactions   • Colchicine Other (See Comments)     Flushing     • Niacin Other (See Comments)     flushed   • Statins        Objective   First Vitals:   Blood Pressure: (!) 174/84 (08/29/23 0907)  Pulse: (!) 136 (08/29/23 0905)  Temperature: 97.9 °F (36.6 °C) (08/29/23 0905)  Temp Source: Oral (08/29/23 0905)  Respirations: (!) 24 (08/29/23 0905)  Height: 5' 9" (175.3 cm) (08/29/23 1230)  Weight - Scale: 109 kg (240 lb 4.8 oz) (08/29/23 1230)  SpO2: (!) 87 % (08/29/23 0905)    Current Vitals:   Blood Pressure: 131/64 (08/30/23 2006)  Pulse: 105 (08/30/23 2006)  Temperature: 98.3 °F (36.8 °C) (08/30/23 1914)  Temp Source: Oral (08/30/23 1914)  Respirations: 22 (08/30/23 2006)  Height: 5' 9" (175.3 cm) (08/30/23 1020)  Weight - Scale: 109 kg (240 lb) (08/30/23 1020)  SpO2: 97 % (08/30/23 2006)      Intake/Output Summary (Last 24 hours) at 8/30/2023 2041  Last data filed at 8/30/2023 1850  Gross per 24 hour   Intake 736.57 ml   Output 2050 ml   Net -1313.43 ml       Invasive Devices       Peripheral Intravenous Line  Duration             Peripheral IV 08/29/23 Proximal;Right;Upper;Ventral (anterior) Arm 1 day    Peripheral IV 08/29/23 Right Antecubital 1 day                    Physical Exam  Constitutional:       General: He is not in acute distress. Appearance: He is obese. He is not toxic-appearing. HENT:      Head: Normocephalic. Mouth/Throat:      Mouth: Mucous membranes are moist.   Cardiovascular:      Rate and Rhythm: Regular rhythm. Tachycardia present. Pulses:           Radial pulses are 2+ on the right side and 2+ on the left side. Heart sounds: Normal heart sounds. No murmur heard. Pulmonary:      Effort: Pulmonary effort is normal. No respiratory distress. Breath sounds: Wheezing present. Musculoskeletal:         General: Swelling present. No tenderness. Comments: Significant erythema, edema of LUE from hand to proximal upper arm extending past brandi made proximally and distally. Weeping of skin of hand/wrist. Able to flex/extend fingers and wrist but limited due to swelling. Nontender. No crepitus or palpable fluid collection. RUE erythema of medial elbow extending proximally to mid-upper arm and distally to mid-forearm. Nontender. No induration, crepitus, or palpable fluid collection. Motor and sensation intact. Compartments soft.         Skin:     General: Skin is warm.   Neurological:      General: No focal deficit present. Mental Status: He is alert and oriented to person, place, and time. Psychiatric:         Mood and Affect: Mood normal.         Thought Content: Thought content normal.         Lab Results: I have personally reviewed pertinent lab results. , CBC:   Lab Results   Component Value Date    WBC 13.93 (H) 08/30/2023    HGB 10.3 (L) 08/30/2023    HCT 33.2 (L) 08/30/2023     (H) 08/30/2023     (L) 08/30/2023    RBC 3.28 (L) 08/30/2023    MCH 31.4 08/30/2023    MCHC 31.0 (L) 08/30/2023    RDW 15.9 (H) 08/30/2023    MPV 12.6 08/30/2023    NRBC 0 08/30/2023   , CMP:   Lab Results   Component Value Date    SODIUM 139 08/30/2023    K 3.9 08/30/2023     08/30/2023    CO2 25 08/30/2023    BUN 17 08/30/2023    CREATININE 0.96 08/30/2023    CALCIUM 7.1 (L) 08/30/2023    EGFR 70 08/30/2023   , Coagulation:   Lab Results   Component Value Date    INR 1.84 (H) 08/30/2023   , Urinalysis: No results found for: "COLORU", "CLARITYU", "SPECGRAV", "PHUR", "LEUKOCYTESUR", "NITRITE", "PROTEINUA", "GLUCOSEU", "Sim [place]", "BILIRUBINUR", "BLOODU"  Imaging: I have personally reviewed pertinent reports. EKG, Pathology, and Other Studies: I have personally reviewed pertinent reports.

## 2023-08-31 NOTE — CONSULTS
Consultation - Infectious Disease   Brinda Kimble 80 y.o. male MRN: [id number]  Unit/Bed#: -01 SDU Encounter: [id number]      Assessment/Plan     Assessment:  69-year-old man with skin and soft tissue infection due to GBS and complicated by bacteremia, leukocytosis, rheumatoid arthritis    Plan:  1) SSTI, bacteremia - Pt. with significant improvement since admission and beginning antibiotics. Given rapid spread of cellulitis, recovery of GBS is unsurprising given rapidly progressing and aggressive nature of this organism and causing skin and soft tissue infections. We will continue to monitor repeat blood cultures to ensure resolution of bacteremia. Can also begin to narrow antibiotic therapy while antimicrobial susceptibility testing is pending.    ===> Will DISCONTINUE CLINDAMYCIN, CONTINUE VANCOMYCIN, PENICILLIN, and follow patient closely for toxicity while on these agents.  ===> Await results of antimicrobial susceptibility testing as discussed above, will narrow antibiotic treatment based on same. 2) Leukocytosis - 2/2 #1, will follow as marker of clinical improvement. 3) RA - Pt. with underlying altered immunity, will consider prolonging treatment if patient is slow to respond clinically.      ===> Discussed w/ 1' team   ===> Will follow       History of Present Illness   Physician Requesting Consult: Patti Winston MD    HPI:   Briefly, this 69-year-old man with hypertension, hyperlipidemia, rheumatoid arthritis, spinal stenosis, and hypothyroidism presented to the [facility] emergency department on 8/29 from the facility where he resides with complaints 1 to 2 days history of left arm swelling, and fever to 103 °F. At the time of admission he was noted to be hypotensive and so was admitted to the intensive care unit, however now is hemodynamically stable. Though initially without leukocytosis, white blood cell count was noted to be elevated on hospital day 0 (12,880, now 12,298).  Creatinine is normal at 1.13, and urinalysis is unremarkable. CT scan of the left upper extremity showed findings consistent with cellulitis. Patient was started on broad antibiotics (vancomycin, cefepime), and this was changed to penicillin, clindamycin, and vancomycin when both sets of admission blood cultures grew group B Streptococcus spp. Presently, patient is resting comfortably without complaint (no fever, chills, nausea, vomiting). Physical exam is notable for marked erythema and edema on the left upper extremity which has receded from a line of demarcation placed earlier this hospitalization. Review of available records within the [facility] EMR shows no history of extensively drug-resistant organisms. Inpatient consult to Infectious Diseases  Consult performed by: Junie Sanchez MD  Consult ordered by: Lissette Mckay PA-C          Review of Systems  A complete review of systems was done and is negative except as per the HPI. 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[address])    • Spinal stenosis      Past Surgical History:   Procedure Laterality Date   • CARDIAC ELECTROPHYSIOLOGY PROCEDURE N/A 12/17/2022    Procedure: Cardiac loop recorder implant;  Surgeon: Juan Luis Moya MD;  Location: [facility];   Service: Cardiology   • CARPAL TUNNEL RELEASE Bilateral    • COLONOSCOPY     • LAMINECTOMY     • TOTAL KNEE ARTHROPLASTY Bilateral    • TOTAL SHOULDER REPLACEMENT       Social History   Social History     Substance and Sexual Activity   Alcohol Use Yes   • Alcohol/week: 2.0 standard drinks of alcohol   • Types: 2 Shots of liquor per week    Comment: 2-3 ounces of whiskey a day     Social History     Substance and Sexual Activity   Drug Use Yes   • Types: Marijuana     E-Cigarette/Vaping   • E-Cigarette Use Never User      E-Cigarette/Vaping Substances   • Nicotine No • Flavoring No      Social History     Tobacco Use   Smoking Status Never   Smokeless Tobacco Never     Family History: non-contributory    Meds/Allergies   all current active meds have been reviewed    Allergies   Allergen Reactions   • Colchicine Other (See Comments)     Flushing     • Niacin Other (See Comments)     flushed   • Statins        Objective       Intake/Output Summary (Last 24 hours) at 8/31/2023 1426  Last data filed at 8/31/2023 1051  Gross per 24 hour   Intake 1194.78 ml   Output 1800 ml   Net -605.22 ml       Invasive Devices:   Peripheral IV 08/29/23 Right Antecubital (Active)   Site Assessment WDL 08/31/23 0800   Dressing Type Transparent 08/31/23 0800   Line Status Infusing 08/31/23 0800   Dressing Status Clean;Dry; Intact 08/31/23 0800   Dressing Change Due 09/02/23 08/31/23 0800   Reason Not Rotated Not due 08/31/23 0800       Peripheral IV 08/29/23 Proximal;Right;Upper;Ventral (anterior) Arm (Active)   Site Assessment [facility] 08/31/23 0800   Dressing Type Transparent 08/31/23 0800   Line Status Flushed; Infusing 08/31/23 0800   Dressing Status Clean;Dry; Intact 08/31/23 0800   Dressing Change Due 09/02/23 08/31/23 0800   Reason Not Rotated Not due 08/31/23 0800       Physical Exam  Gen: AA, NAD, VS reviewed  ENT: MMM  CV: No m/r/g, S1, S2  Pulm: Lungs CTAB, no respiratory distress  ABD: S/NT/ND  Skin: No rash on exposed skin  Psych: Oriented, nl. affect    Lab Results: I have personally reviewed pertinent labs. Imaging Studies: I have personally reviewed pertinent reports. and I have personally reviewed pertinent films in PACS  EKG, Pathology, and Other Studies: I have personally reviewed pertinent reports.

## 2023-08-31 NOTE — ASSESSMENT & PLAN NOTE
Wt Readings from Last 3 Encounters:   08/30/23 109 kg (240 lb)   08/28/23 99.8 kg (220 lb)   08/24/23 105 kg (232 lb)       Assessment:  · 01/2023 Echo: EF 30% with severe systolic dysfunction and global hypokinesis, moderate MVR  · 8/30/23 Echo: EF 45%, G1DD, moderate MVR  · Home regimen:  Torsemide 20 mg TID, Toprol XL 25 mg BID    Plan:  · Now receiving PRN diuresis; received lasix 40 mg IV BID   · Restarted on low-dose BB  · Daily weights  · Strict I/O

## 2023-08-31 NOTE — ASSESSMENT & PLAN NOTE
• Wheezing on exam, initially requiring 4L NC O2  • Xopenex/atrovent nebs  • PRN diuresis with IV Lasix  • Respiratory protocol, encourage IS  • Wean supplemental O2 to maintain spO2 > 92%  • Improving, currently requiring 2L NC O2

## 2023-08-31 NOTE — PROGRESS NOTES
Critical Care Interval Transfer Note:    Please refer to progress note from earlier today for full details. Barriers to discharge:   · IV antibiotic therapy for cellulitis and Group B Strep Bacterimia  · ID consulted  · IV PCN G, Clindamycin and Vancomycin  · Worsening redness and swelling of LUE  · Surgery following  · ID following  · IV antibiotics     Consults: IP CONSULT TO PHARMACY  IP CONSULT TO CASE MANAGEMENT  IP CONSULT TO PODIATRY  IP CONSULT TO ACUTE CARE SURGERY  IP CONSULT TO INFECTIOUS DISEASES    Recommended to review admission imaging for incidental findings and document in discharge navigator: Chart reviewed, no known incidental findings noted at this time. Discharge Plan: Anticipate discharge in >72 hrs to rehab facility. PT Recommendations: Home with home health rehabilitation  OT Recommendations: Home with home health rehabilitation    Patient seen and evaluated by Critical Care today and deemed to be appropriate for transfer to Stepdown Level 2. Spoke to Dr. Elijah Read from [facility] to accept transfer. Critical care can be contacted via [phone] with any questions or concerns.

## 2023-08-31 NOTE — WOUND OSTOMY CARE
Consult Note - Wound   Jb Leak Edgardomefabián 80 y.o. male MRN: [id number]  Unit/Bed#: -01 SDU Encounter: [id number]      History and Present Illness:  80year old male presented to the hospital with fever and left arm swelling from [address] [place] [address] at [facility].  Patient's history significant for rheumatoid arthritis, hypothyroidism, spinal stenosis, gout, HTN. Assessment Findings:   Patient agreeable to assessment, family at bedside. Patient is on low air-loss mattress in ICU, positioned on his right side with left arm elevated. He is occasionally incontinent of bowel and bladder. Bilateral heels intact, pink, blanchable--preventative foam dressings applied. Skin folds intact. Skin generally very fragile with loss of subcutaneous tissue. Right arm with outlined area of ecchymosis/erythema. Nutrition team following. Buttocks/sacrum not assessed at this time--intact per primary RN. 1.  Left arm cellulitis--entire arm erythematous, warm to touch, with severe pitting edema. Serous weeping from left hand (no open wound to this area). Pink, partial thickness open area to left antecubital with serous drainage. Redness/swelling extending across previously outlined area--new outline with date placed. Patient receiving IV antibiotics for group B strep. ID consultation pending. 2.  Right hand skin tear--pale pink, partial thickness, scant serous drainage. Mylene-wound ecchymotic, fragile with mild edema. 3.  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 Follows with dermatology outpatient. [place], moist, growth. Mylene-wound skin intact without redness. No drainage. 4.  Right great toe--not assessed at this time. Podiatry team following. Dressing dry and intact. See flowsheet for wound details. Wound Care Plan:   1-Apply Allevyn Life foam dressing to bilateral heels for prevention.   Blayne with BRAYAN Peel back at least daily for skin assessment and re-apply. Change dressing every 3 days and PRN. 2-Elevate lower extremities for edema management. Ensure heels float off of bed/chair surface to offload pressure. 3-Offloading air cushion in chair if/when out of bed. 4-Moisturize skin daily with skin nourishing lotion. 5-Turn/reposition every 2 hours while in bed using positioning wedges; and weight shift frequently while in chair for pressure re-distribution on skin. 6-Left arm--cleanse with normal saline, pat dry. Apply Maxorb to open/weeping areas. Cover with ABDs and wrap with rolled gauze. Change dressing daily and as needed with soilage/dislodgement. Elevate left upper extremity. 7-Right great toe per podiatry. 8-Right hand and right neck--cleanse with normal saline, pat dry. Apply Xeroform and cover with dry dressing or Allevyn Life foam.  Change dressing every other day. 9-Hydraguard lotion to bilateral buttocks and sacrum three times daily and as needed with incontinence care. Wound care team to follow. Plan of care reviewed with primary RN. Wound 08/29/23 Skin Tear Skin tear Hand Anterior;Right (Active)   Wound Image   08/31/23 1034   Wound Description Pink;Pale 08/31/23 1034   Mylene-wound Assessment Purple;Fragile;Edema 08/31/23 1034   Wound Length (cm) 1.3 cm 08/31/23 1034   Wound Width (cm) 1.5 cm 08/31/23 1034   Wound Depth (cm) 0.1 cm 08/31/23 1034   Wound Surface Area (cm^2) 1.95 cm^2 08/31/23 1034   Wound Volume (cm^3) 0.195 cm^3 08/31/23 1034   Calculated Wound Volume (cm^3) 0.2 cm^3 08/31/23 1034   Drainage Amount Scant 08/31/23 1034   Drainage Description Serous 08/31/23 1034   Non-staged Wound Description Partial thickness 08/31/23 1034   Treatments Cleansed 08/31/23 1034   Dressing Xeroform; Foam, Silicon (eg. Allevyn, etc) 08/31/23 1034   Patient Tolerance Tolerated well 08/31/23 1034   Dressing Status Dry;Clean; Intact 08/31/23 1034       Wound 08/29/23  Cellulitis Arm Left (Active)   Wound Image   08/31/23 1025   Wound Description Light purple;Edema 08/31/23 1025   Dressing Open to air 08/31/23 1025       Wound 08/29/23 Incision  Neck Right (Active)   Wound Description Pink 08/31/23 1034   Mylene-wound Assessment Intact 08/31/23 1034   Drainage Amount None 08/31/23 1034   Treatments Cleansed 08/31/23 1034   Dressing Xeroform;Dry dressing 08/31/23 1034   Dressing Changed Changed 08/31/23 1034   Patient Tolerance Tolerated well 08/31/23 1034   Dressing Status Clean; Intact;Dry 08/31/23 1034       Wound 08/30/23 Arm Left;Mid (Active)   Wound Image   08/31/23 1027   Wound Description Beefy red 08/31/23 1027   Mylene-wound Assessment Edema; Erythema 08/31/23 1027   Wound Length (cm) 2 cm 08/31/23 1027   Wound Width (cm) 1.5 cm 08/31/23 1027   Wound Depth (cm) 0.1 cm 08/31/23 1027   Wound Surface Area (cm^2) 3 cm^2 08/31/23 1027   Wound Volume (cm^3) 0.3 cm^3 08/31/23 1027   Calculated Wound Volume (cm^3) 0.3 cm^3 08/31/23 1027   Drainage Amount Scant 08/31/23 1027   Drainage Description Serous 08/31/23 1027   Non-staged Wound Description Partial thickness 08/31/23 1027   Treatments Cleansed;Site care;Elevated 08/31/23 1027   Dressing Calcium Alginate;Dry dressing 08/31/23 1027   Dressing Changed Changed 08/31/23 1027   Patient Tolerance Tolerated well 08/31/23 1027   Dressing Status Clean;Dry; Intact 08/31/23 [facility] BSN, RN, [employer]

## 2023-08-31 NOTE — ASSESSMENT & PLAN NOTE
· Home Regimen: Gabapentin 100 mg TID, tramadol-PRN  · Continue with PRN tramadol  · Gabapentin increased to 300 mg TID  · Lidocaine patch  · PRN Tylenol

## 2023-08-31 NOTE — ASSESSMENT & PLAN NOTE
• BC 1/2 (8/29): + Streptococcus agalactiae (Group B), susceptible to penicillin. • Repeat BC x2 (8/31): Pending.    • Transitioned to high-dose PCN, Vancomycin, and Clindamycin  • Echo without evidence of vegetation  • ID consulted, recommendations appreciated

## 2023-08-31 NOTE — UTILIZATION REVIEW
Date: 8/31        Day Has surpassed a 2nd midnight with active treatments and services: Today (8/31)  Abx  Changed  2/2     GBS growth in 1 of 2 blood cultures. WBC 12.29 this am, on steroids. porocal improved to 9.15. Shock resolved and fever-free x 24 hours. UE venous duplex without DVT and CT LUE from 8/29 without subcutaneous gas. Initially required IV Levophed for hypotension: weaned off  8/30 AM.    HR improved  (cont digoxin/telemetry)   Remains on heparin gtt.    Continuous cardiopulmonary monitoring @   Level 2 stepdown      08/31/23 0717 98.9 °F (37.2 °C) -- -- -- -- 96 % 28 2 L/min Nasal cannula --   08/31/23 0511 -- 91 18 -- -- 97 % 36 4 L/min Nasal cannula                   allopurinol, 400 mg, Oral, Daily  chlorhexidine, 15 mL, Mouth/Throat, Q12H ODALYS  clindamycin, 600 mg, Intravenous, Q8H  Diclofenac Sodium, 2 g, Topical, 4x Daily  ezetimibe, 10 mg, Oral, Daily  gabapentin, 300 mg, Oral, TID  hydroxychloroquine, 200 mg, Oral, BID With Meals  ipratropium, 0.5 mg, Nebulization, TID  levalbuterol, 1.25 mg, Nebulization, TID  levothyroxine, 25 mcg, Oral, Early Morning  lidocaine, 2 patch, Topical, Daily  magnesium sulfate, 2 g, Intravenous, Once  melatonin, 9 mg, Oral, HS  metoprolol tartrate, 12.5 mg, Oral, Q12H ODALYS  pantoprazole, 20 mg, Oral, Daily Before Breakfast  penicillin G, 4 Million Units, Intravenous, Q4H  polyethylene glycol, 17 g, Oral, Every Other Day  predniSONE, 5 mg, Oral, Daily  vancomycin, 750 mg, Intravenous, Q12H

## 2023-08-31 NOTE — ASSESSMENT & PLAN NOTE
· Presented with afib RVR  · Home regimen includes: Metoprolol, Eliquis  · Digoxin started then discontinued  · Restarted on low-dose BB  · Consider transitioning heparin gtt back to home Eliquis  · Monitor on telemetry

## 2023-08-31 NOTE — ASSESSMENT & PLAN NOTE
• Home Regimen: Gabapentin 100 mg TID, tramadol-PRN  • Continue with PRN tramadol  • Gabapentin increased to 300 mg TID  • Lidocaine patch  • PRN Tylenol

## 2023-08-31 NOTE — ASSESSMENT & PLAN NOTE
Wt Readings from Last 3 Encounters:   08/31/23 113 kg (248 lb 14.4 oz)   08/28/23 99.8 kg (220 lb)   08/24/23 105 kg (232 lb)     • Not in acute exacerbation. Edema of upper extremities likely more in setting of cellulitis. • Echo (1/2023): EF 30% with severe systolic dysfunction and global hypokinesis, moderate MVR. • Home regimen: Torsemide 20 mg TID, Toprol XL 25 mg BID  • Echo (8/30/23): EF 45%, G1DD, moderate MVR.   • Receiving PRN diuresis; received lasix 40 mg IV BID   • Restarted on low-dose BB  • Daily weights, strict I/Os

## 2023-08-31 NOTE — ASSESSMENT & PLAN NOTE
Assessment:   · Presents with erythremia of the left upper extremity, and fever  · Bilateral venous dopplers negative for clot  · 8/29 CT LUE: Left upper extremity subcutaneous edema indicating cellulitis without drainable collection, soft tissue emphysema or evidence of osteomyelitis  · Now with erythema and ecchymosis of RUE  · Images available under media    Plan:  · See plan above  · q4h neurovascular checks  · Elevate arms  · Local wound care

## 2023-08-31 NOTE — OCCUPATIONAL THERAPY NOTE
Occupational Therapy Cx Note     Patient Name: Gayle Toscano  NBGCN'T Date: 8/31/2023  Problem List  Principal Problem:    Severe sepsis ([address])  Active Problems:    RA (rheumatoid arthritis) ([address])    HTN (hypertension)    Chronic systolic heart failure (HCC)    A-fib RVR (HCC)    Hypomagnesemia    Cellulitis of left upper extremity    HLD (hyperlipidemia)    GERD (gastroesophageal reflux disease)    Gout without acute exacerbation     Spinal stenosis    Hypothyroidism    Peripheral vascular disease ([address])    Bacteremia due to group B Streptococcus    Respiratory insufficiency            08/31/23 1525   OT Last Visit   OT Visit Date 08/31/23   Note Type   Note type Cancelled Session   Additional Comments OT orders received, chart review performed. Spoke to [employer], pt just returned back to bed from recliner chair and is in significant pain. Pt agreeable to mobilize OOB w/ therapy tomorrow. Will hold and follow at later date.            Ralf Lopes, OTR/L

## 2023-08-31 NOTE — ASSESSMENT & PLAN NOTE
Assessment:  · Has been seeing Dr. 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   · Lower extremities are cool to touch  · Bilateral arterial duplex of the lower extremities unremarkable  · Consult podiatry- appreciate recommendations  · Consult wound care for local wound care of the toe  · Neurovascular checks every 4 hours

## 2023-08-31 NOTE — PROGRESS NOTES
Progress Note - Maya Delgado 80 y.o. male MRN: [id number]    Unit/Bed#: -01 SDU Encounter: [id number]      Assessment/Plan:  Cellulitis bilateral upper extremities  -Admitted with significant erythema and edema of LUE and fever x 1 day, spreading today proximally and distally involving most of arm. Now with erythema of RUE. -CT LUE 8/29: "Left upper extremity subcutaneous edema indicating cellulitis without drainable collection, soft tissue emphysema or evidence of osteomyelitis"  -Upper limb venous duplex 8/29: negative for DVT  -No crepitus. Pulses/sensation/motor intact. -Met severe sepsis criteria  -Started on cefepime/vancomycin  -Today cefepime switched to Pen G and clindamycin added after GBS growth in 1 of 2 blood cultures  -WBC 12.29 this am, on steroids  -Procal improved to 9.15  -Shock resolved and fever-free x 24 hours  -ID consulted  -Patient's labs improved slightly, afebrile, and shock resolved. If worsens hemodynamically or concern for NSTI, recommends transfer to [facility] for surgical intervention.  -continue to trend labs, vitals, upper extremity exam/neurovascular checks     Severe sepsis  -met SIRS criteria with tachycardia, tachypnea, fever  -Source LUE cellulitis  -Procal improved to 9.15  -Blood cultures: 1 with growth of GBS  -Did require levophed for hypotension, weaned off and no longer requiring pressors  -Antibiotics: pen G, clindamycin, vancomycin  -Appreciate ID recs  -Continue to trend labs, vitals     A fib with RVR  -Presented with RVR, now with improved rate control  -Home meds: metoprolol, eliquis  -hypotensive after metoprolol  -Started on IV digoxin  -Monitor on telemetry     Other medical problems: PVD, RA, hypothyroidism, spinal stenosis, GERD, gout, HLD, CHF, HTN    Subjective: It hurts if you push on it. Objective:     Vitals: Blood pressure 101/58, pulse 99, temperature 98.9 °F (37.2 °C), temperature source Axillary, resp.  rate 18, height 5' 9" (1.753 m), weight 113 kg (248 lb 14.4 oz), SpO2 96 %. ,Body mass index is 36.76 kg/m². Intake/Output Summary (Last 24 hours) at 8/31/2023 0908  Last data filed at 8/31/2023 0600  Gross per 24 hour   Intake 1126.81 ml   Output 2950 ml   Net -1823.19 ml       Physical Exam: General appearance: alert and oriented, in no acute distress  Lungs: clear to auscultation bilaterally  Heart: regular rate and rhythm, S1, S2 normal, no murmur, click, rub or gallop  Left UE with cellulitis and skin changes noted. Edema noted. No crepitus. Motor intact, sensation intact. Invasive Devices     Peripheral Intravenous Line  Duration           Peripheral IV 08/29/23 Right Antecubital 2 days    Peripheral IV 08/29/23 Proximal;Right;Upper;Ventral (anterior) Arm 1 day                Lab, Imaging and other studies: I have personally reviewed pertinent reports.     VTE Pharmacologic Prophylaxis: Heparin  VTE Mechanical Prophylaxis: sequential compression device

## 2023-08-31 NOTE — ASSESSMENT & PLAN NOTE
Sepsis criteria AEB fever, tachycardia, tachypnea POA. End organ damage/shock AEB hypotension briefly requiring Levophed, admitted under critical care service. Source: LUE cellulitis, group B strep bacteremia. Patient initially presented from [address] [place] [address] [facility] w/ fever 103 & erythema of left arm. • No DVT on venous duplex bilateral UEs, UA and CXR unremarkable  • Lactic acid: 1.9->2.1->1.3 / Procal: 0.16->14->10->9->4  • S/p 2250 mL IVF bolus + albumin in ED. • Off Levophed since 8/30, transition to [facility] service 9/1. • IV Rocephin -> IV cefepime -> IV clindamycin -> IV penicillin G  + vancomycin. • Worsening erythema LUE, development of RUE erythema/ecchymosis (8/30). • General surgery following:  • No acute surgical interventions at this time, presentation not suspicious for necrotizing infection. • If worsens hemodynamically or concern for NSTI, recommend transfer to [facility] for surgical interventions.   • ID following:  • C/w IV vancomycin (D4)/penicillin G(D3), await final blood culture susceptibilities  • Follow culture results

## 2023-08-31 NOTE — ASSESSMENT & PLAN NOTE
• Initially presented in A-fib with RVR, likely in setting of sepsis  • Home regimen: Toprol 25 mg BID, AC on Eliquis.   • Rate since controlled/improved  • Digoxin started then discontinued  • Restarted on low-dose BB w/ Lopressor  • C/w heparin gtt, transition to home Eliquis once more stable & no further procedures/interventions anticipated

## 2023-08-31 NOTE — ASSESSMENT & PLAN NOTE
• History of RA, remote episodes of eosinophilia   • Continue home prednisone, hydroxychloroquine  • PRN Tramadol, Voltaren gel to joints for pain  • F/U ANTHONY, ANCA, IgE results, still pending

## 2023-08-31 NOTE — PHYSICAL THERAPY NOTE
PHYSICAL THERAPY CANCELLATION NOTE    Patient Name: Naomi [place]  ZWGKW'D Date: 8/31/2023 08/31/23 6142   Note Type   Note type Cancelled Session   Additional Comments PT orders received, chart review performed. Spoke to [employer], pt just returned back to bed from recliner chair and is in significant pain. Pt agreeable to mobilize OOB w/ therapy tomorrow.        Gala Aguiar, PT, DPT

## 2023-08-31 NOTE — ASSESSMENT & PLAN NOTE
· Currently requiring 4L NC  · Wheezing on exam and started on xopenex/atrovent  · PRN diuresis with lasix  · Respiratory protocol  · Encourage IS  · Wean supplemental O2 to maintain spO2 > 92%

## 2023-08-31 NOTE — ASSESSMENT & PLAN NOTE
• Presented with LUE erythema, fever consistent with cellulitis  • CT LUE (8/29): Left upper extremity subcutaneous edema indicating cellulitis without drainable collection, soft tissue emphysema or evidence of osteomyelitis.   • See additional plan above for severe sepsis  • Neurovascular checks  • Elevate arms, local wound care Yes

## 2023-08-31 NOTE — ASSESSMENT & PLAN NOTE
• Has been seeing Dr. 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

## 2023-08-31 NOTE — PROGRESS NOTES
[facility]  Progress Note  Name: Tez Arenas  MRN: [id number]  Unit/Bed#: -01 SDU I Date of Admission: 8/29/2023   Date of Service: 8/31/2023 I Hospital Day: 2    Assessment/Plan   * Severe sepsis [facility])  Assessment & Plan  Assessment:  · [facility] with complaints of fever of 103 and erythema of the left arm  · SIRS: tachycardia and tachypnea  · Source: Cellulitis LUE  · 8/29 CT LUE: Left upper extremity subcutaneous edema indicating cellulitis without drainable collection, soft tissue emphysema or evidence of osteomyelitis  · UA and CXR unremarkable  · Procal: 0.16 --> 14.19 --> 10.15  · WBC: 9.0 --> 15.8 --> 13.9  · 8/29 blood cultures: 1/2 blood cultures with group B streptococcus  · Briefly required levophed, now off  · 8/30 erythema expanding upon previously defined borders, worsening induration/blistering.  Development of RUE erythema and ecchymosis    Plan:  · Surgery consulted due to concern of possible NSTI; No plans for surgical debridement at this time  · Should symptoms/exam worsen and surgical debridement be indicated, [address] [place] conversation should be held given extent of infection  · ID consulted, appreciate recommendations  · Antibiotics (D3)  · Cefepime -->  PCN G (D2)  · Vancomycin (D3)  · Clindamycin (D2)  · Check repeat blood cultures  · MRSA negative, could consider discontinuing vancomycin  · Trend fever curve and WBC count    Cellulitis of left upper extremity  Assessment & Plan  Assessment:   · Presents with erythremia of the left upper extremity, and fever  · Bilateral venous dopplers negative for clot  · 8/29 CT LUE: Left upper extremity subcutaneous edema indicating cellulitis without drainable collection, soft tissue emphysema or evidence of osteomyelitis  · Now with erythema and ecchymosis of RUE  · Images available under media    Plan:  · See plan above  · q4h neurovascular checks  · Elevate arms  · Local wound care      Bacteremia due to group B Streptococcus  Assessment & Plan  · 8/29 Blood cultures: 1 of 2 positive for group B streptococcus  · Transitioned to high-dose PCN, Vancomycin, and Clindamycin  · Echo without evidence of vegetation  · Repeat blood cultures in AM  · ID consulted, appreciate recommendations    A-fib RVR ([facility])  Assessment & Plan  · Presented with afib RVR  · Home regimen includes: Metoprolol, Eliquis  · Digoxin started then discontinued  · Restarted on low-dose BB  · Consider transitioning heparin gtt back to home Eliquis  · Monitor on telemetry    Chronic systolic heart failure [facility])  Assessment & Plan  Wt Readings from Last 3 Encounters:   08/30/23 109 kg (240 lb)   08/28/23 99.8 kg (220 lb)   08/24/23 105 kg (232 lb)       Assessment:  · 01/2023 Echo: EF 30% with severe systolic dysfunction and global hypokinesis, moderate MVR  · 8/30/23 Echo: EF 45%, G1DD, moderate MVR  · Home regimen: Torsemide 20 mg TID, Toprol XL 25 mg BID    Plan:  · Now receiving PRN diuresis; received lasix 40 mg IV BID   · Restarted on low-dose BB  · Daily weights  · Strict I/O    Respiratory insufficiency  Assessment & Plan  · Currently requiring 4L NC  · Wheezing on exam and started on xopenex/atrovent  · PRN diuresis with lasix  · Respiratory protocol  · Encourage IS  · Wean supplemental O2 to maintain spO2 > 92%    Peripheral vascular disease ([facility])  Assessment & Plan  Assessment:  · Has been seeing Dr. Kim Schmid with podiatry outpatient for chronic toe wound of the left great toe.   · Lower extremities are cool to touch  · Bilateral arterial duplex of the lower extremities unremarkable  · Consult podiatry- appreciate recommendations  · Consult wound care for local wound care of the toe  · Neurovascular checks every 4 hours    Hypomagnesemia  Assessment & Plan  · Magnesium 0.7 on 8/30  · Monitor and replete to maintain Mag > 2    RA (rheumatoid arthritis) ([facility])  Assessment & Plan  · Home regimen: Prednisone, Hydroxychloroquine  · Has had remote episodes of eosinophilia    Plan:  · Continue home dose prednisone/hydroxychloroquine  · PRN Tramadol for pain  · Continue with Volteran gel to joints- Monitor renal function and D/c if worsening renal function   · Check ANTHONY, ANCA, IgE      HTN (hypertension)  Assessment & Plan  · Restarted on low-dose BB    Hypothyroidism  Assessment & Plan  · Continue home dose Levothyroxine    Spinal stenosis  Assessment & Plan  · Home Regimen: Gabapentin 100 mg TID, tramadol-PRN  · Continue with PRN tramadol  · Gabapentin increased to 300 mg TID  · Lidocaine patch  · PRN Tylenol     Gout without acute exacerbation   Assessment & Plan  · Continue home Allopurinol     GERD (gastroesophageal reflux disease)  Assessment & Plan  · Continue PPI    HLD (hyperlipidemia)  Assessment & Plan  · Continue home Ezetimibe             ICU Core Measures     A: Assess, Prevent, and Manage Pain · Has pain been assessed? Yes  · Need for changes to pain regimen? No   B: Both SAT/SAT  · N/A   C: Choice of Sedation · RASS Goal: 0 Alert and Calm or N/A patient not on sedation  · Need for changes to sedation or analgesia regimen? NA   D: Delirium · CAM-ICU: Negative   E: Early Mobility  · Plan for early mobility? Yes   F: Family Engagement · Plan for family engagement today?  Yes       Antibiotic Review: Infectious disease consulted      Prophylaxis:  VTE VTE covered by:  heparin (porcine), Intravenous, 13.1 Units/kg/hr at 08/30/23 1538  heparin (porcine), Intravenous, 2,000 Units at 08/29/23 2319  heparin (porcine), Intravenous       Stress Ulcer  covered bypantoprazole (PROTONIX) EC tablet 20 mg [[id number]]       Subjective   HPI: 22-year-old male with past medical history significant for heart failure with reduced EF, moderate mitral valve regurgitation, A-fib on Eliquis, hypertension, hyperlipidemia, RA on chronic prednisone, hypothyroidism who presented on 8/29 with septic shock secondary to left upper extremity cellulitis and afib RVR.    24-hour events:   · Remained off of vasopressors  · Diuresed with lasix 40 mg IV x2 due to increased wob and wheezing  · Episode of afib RVR; restarted on low-dose metoprolol  · LUE with worsening induration and erythema spreading beyond previous outline  · RUE developed erythema and ecchymosis  · 1/2 blood cultures + for group B strep  · ID consulted; cefepime transitioned to PCN G, Clindamycin added, continues on vanc  · Surgery consulted due to concern for NSTI; no plan for surgical debridement    Review of Systems   Constitutional: Positive for appetite change. Negative for fatigue and fever. HENT: Negative for congestion. Respiratory: Positive for shortness of breath and wheezing. Negative for cough. Cardiovascular: Negative for chest pain, palpitations and leg swelling. Gastrointestinal: Negative for abdominal distention, abdominal pain, constipation, diarrhea, nausea and vomiting. Musculoskeletal:        LUE pain   Neurological: Negative for dizziness, light-headedness and headaches. All other systems reviewed and are negative. Objective                            Vitals I/O      Most Recent Min/Max in 24hrs   Temp 98.1 °F (36.7 °C) Temp  Min: 97.8 °F (36.6 °C)  Max: 99.1 °F (37.3 °C)   Pulse 103 Pulse  Min: 58  Max: 110   Resp 20 Resp  Min: 14  Max: 40   /76 BP  Min: 95/53  Max: 154/69   O2 Sat 95 % SpO2  Min: 91 %  Max: 97 %      Intake/Output Summary (Last 24 hours) at 8/31/2023 0005  Last data filed at 8/30/2023 2303  Gross per 24 hour   Intake 1000.34 ml   Output 2550 ml   Net -1549.66 ml         Diet Regular; Regular House     Invasive Monitoring Physical exam    Physical Exam  Eyes:      Pupils: Pupils are equal, round, and reactive to light. Skin:     General: Skin is warm and dry. HENT:      Head: Normocephalic and atraumatic. Nose: No congestion.       Mouth/Throat:      Mouth: Mucous membranes are moist.   Cardiovascular: Rate and Rhythm: Regular rhythm. Tachycardia present. Heart sounds: No murmur heard. No friction rub. No gallop. Musculoskeletal:      Right lower leg: No edema. Left lower leg: No edema. Abdominal:      General: There is no distension. Palpations: Abdomen is soft. Tenderness: There is no abdominal tenderness. There is no guarding. Constitutional:       General: He is not in acute distress. Appearance: He is well-developed and well-nourished. He is not toxic-appearing. Pulmonary:      Effort: Tachypnea present. Breath sounds: No stridor. Wheezing present. No rhonchi or rales. Neurological:      General: No focal deficit present. Mental Status: He is alert and oriented to person, place and time. Mental status is at baseline. Motor: Strength full and intact in all extremities. Vitals reviewed. Diagnostic Studies      EKG: Sinus tachycardia  Imaging:  I have personally reviewed pertinent reports.        Medications:  Scheduled PRN   allopurinol, 400 mg, Daily  chlorhexidine, 15 mL, Q12H ODALYS  clindamycin, 600 mg, Q8H  Diclofenac Sodium, 2 g, 4x Daily  ezetimibe, 10 mg, Daily  gabapentin, 300 mg, TID  hydroxychloroquine, 200 mg, BID With Meals  ipratropium, 0.5 mg, TID  levalbuterol, 1.25 mg, TID  levothyroxine, 25 mcg, Early Morning  lidocaine, 2 patch, Daily  magnesium sulfate, 2 g, Once  melatonin, 9 mg, HS  metoprolol tartrate, 12.5 mg, Q12H ODALYS  pantoprazole, 20 mg, Daily Before Breakfast  penicillin G, 4 Million Units, Q4H  polyethylene glycol, 17 g, Every Other Day  predniSONE, 5 mg, Daily  vancomycin, 750 mg, Q12H      heparin (porcine), 2,000 Units, Q6H PRN  heparin (porcine), 4,000 Units, Q6H PRN  HYDROmorphone, 0.2 mg, Q4H PRN  traMADol, 50 mg, Q6H PRN       Continuous    heparin (porcine), 3-20 Units/kg/hr (Order-Specific), Last Rate: 13.1 Units/kg/hr (08/30/23 1538)         Labs:    CBC    Recent Labs     08/30/23  0511 08/30/23 2002   WBC 15.88* 13.93*   HGB 9.9* 10.3*   HCT 31.6* 33.2*   * 125*     BMP    Recent Labs     08/29/23  1735 08/30/23  0511   SODIUM 142 139   K 3.9 3.9    105   CO2 26 25   AGAP 11 9   BUN 19 17   CREATININE 1.10 0.96   CALCIUM 7.1* 7.1*       Coags    Recent Labs     08/29/23  1701 08/29/23  2239 08/30/23  0511 08/30/23  1238   INR 1.41*  --  1.84*  --    PTT 42*   < > 81* 74*    < > = values in this interval not displayed.         Additional Electrolytes  Recent Labs     08/30/23  0511 08/30/23 2002   MG 0.7* 1.6*   PHOS 3.1  --           Blood Gas    No recent results  No recent results LFTs  Recent Labs     08/29/23  0906   ALT 17   AST 23   ALKPHOS 63   ALB 3.6   TBILI 0.94       Infectious  Recent Labs     08/30/23  0511 08/30/23 2002   PROCALCITONI 14.19* 10.15*     Glucose  Recent Labs     08/29/23  0906 08/29/23  1735 08/30/23  0511   GLUC 160* 125 [address] Karma Roberts PA-C

## 2023-08-31 NOTE — PROGRESS NOTES
Arya Campbell is a 80 y.o. male who is currently ordered Vancomycin IV with management by the Pharmacy Consult service. Relevant clinical data and objective / subjective history reviewed. Vancomycin Assessment:  Indication and Goal AUC/Trough: Soft tissue (goal -600, trough >10), -600, trough >10  Clinical Status: stable  Micro:     Renal Function:  SCr: 1.08 mg/dL  CrCl: 57.6 mL/min  Renal replacement: Not on dialysis  Days of Therapy: 3  Current Dose: 750mg IV Q12H  Vancomycin Plan: Patient is currently on vancomycin 750mg IV Q12H and most recent vancomycin level drawn with morning labs today on 8/31/23 is 14.7 ug/ml (estimated AUC/trough = 497/17.6). Based on today's assessment will continue same dose   New Dosing: No change  Estimated AUC: 497 mcg*hr/mL  Estimated Trough: 17.6 mcg/mL  Next Level: With AM labs at 0600 on 9/7/23  Renal Function Monitoring: Daily BMP and [place] will continue to follow closely for s/sx of nephrotoxicity, infusion reactions and appropriateness of therapy. BMP and CBC will be ordered per protocol. We will continue to follow the patient’s culture results and clinical progress daily.     Maritza Campos, Pharmacist

## 2023-08-31 NOTE — ASSESSMENT & PLAN NOTE
• BP reviewed, slightly hypertensive but overall stable  • Restarted BB with Lopressor 12.5 mg BID  • Continue holding home torsemide

## 2023-09-01 LAB
ANION GAP SERPL CALCULATED.3IONS-SCNC: 5 MMOL/L
APTT PPP: 68 SECONDS (ref 23–37)
BACTERIA BLD CULT: ABNORMAL
BASOPHILS # BLD AUTO: 0.01 THOUSANDS/ÂΜL (ref 0–0.1)
BASOPHILS NFR BLD AUTO: 0 % (ref 0–1)
BUN SERPL-MCNC: 13 MG/DL (ref 5–25)
CALCIUM SERPL-MCNC: 8.4 MG/DL (ref 8.4–10.2)
CHLORIDE SERPL-SCNC: 103 MMOL/L (ref 96–108)
CO2 SERPL-SCNC: 28 MMOL/L (ref 21–32)
CREAT SERPL-MCNC: 0.8 MG/DL (ref 0.6–1.3)
EOSINOPHIL # BLD AUTO: 0.23 THOUSAND/ÂΜL (ref 0–0.61)
EOSINOPHIL NFR BLD AUTO: 3 % (ref 0–6)
ERYTHROCYTE [DISTWIDTH] IN BLOOD BY AUTOMATED COUNT: 15.5 % (ref 11.6–15.1)
GFR SERPL CREATININE-BSD FRML MDRD: 79 ML/MIN/1.73SQ M
GLUCOSE SERPL-MCNC: 119 MG/DL (ref 65–140)
GP B STREP DNA BLD POS QL NAA+NON-PROBE: DETECTED
GRAM STN SPEC: ABNORMAL
HCT VFR BLD AUTO: 32.1 % (ref 36.5–49.3)
HGB BLD-MCNC: 9.7 G/DL (ref 12–17)
IMM GRANULOCYTES # BLD AUTO: 0.09 THOUSAND/UL (ref 0–0.2)
IMM GRANULOCYTES NFR BLD AUTO: 1 % (ref 0–2)
LYMPHOCYTES # BLD AUTO: 0.9 THOUSANDS/ÂΜL (ref 0.6–4.47)
LYMPHOCYTES NFR BLD AUTO: 11 % (ref 14–44)
MAGNESIUM SERPL-MCNC: 2.3 MG/DL (ref 1.9–2.7)
MCH RBC QN AUTO: 31.2 PG (ref 26.8–34.3)
MCHC RBC AUTO-ENTMCNC: 30.2 G/DL (ref 31.4–37.4)
MCV RBC AUTO: 103 FL (ref 82–98)
MONOCYTES # BLD AUTO: 0.39 THOUSAND/ÂΜL (ref 0.17–1.22)
MONOCYTES NFR BLD AUTO: 5 % (ref 4–12)
NEUTROPHILS # BLD AUTO: 6.93 THOUSANDS/ÂΜL (ref 1.85–7.62)
NEUTS SEG NFR BLD AUTO: 80 % (ref 43–75)
NRBC BLD AUTO-RTO: 0 /100 WBCS
PHOSPHATE SERPL-MCNC: 2.3 MG/DL (ref 2.3–4.1)
PLATELET # BLD AUTO: 121 THOUSANDS/UL (ref 149–390)
PMV BLD AUTO: 12.2 FL (ref 8.9–12.7)
POTASSIUM SERPL-SCNC: 4.2 MMOL/L (ref 3.5–5.3)
PROCALCITONIN SERPL-MCNC: 4.02 NG/ML
RBC # BLD AUTO: 3.11 MILLION/UL (ref 3.88–5.62)
SODIUM SERPL-SCNC: 136 MMOL/L (ref 135–147)
WBC # BLD AUTO: 8.55 THOUSAND/UL (ref 4.31–10.16)

## 2023-09-01 PROCEDURE — 84100 ASSAY OF PHOSPHORUS: CPT

## 2023-09-01 PROCEDURE — 84145 PROCALCITONIN (PCT): CPT

## 2023-09-01 PROCEDURE — 94760 N-INVAS EAR/PLS OXIMETRY 1: CPT

## 2023-09-01 PROCEDURE — 80048 BASIC METABOLIC PNL TOTAL CA: CPT

## 2023-09-01 PROCEDURE — 94640 AIRWAY INHALATION TREATMENT: CPT

## 2023-09-01 PROCEDURE — 99232 SBSQ HOSP IP/OBS MODERATE 35: CPT | Performed by: PHYSICIAN ASSISTANT

## 2023-09-01 PROCEDURE — 83735 ASSAY OF MAGNESIUM: CPT

## 2023-09-01 PROCEDURE — 85730 THROMBOPLASTIN TIME PARTIAL: CPT

## 2023-09-01 PROCEDURE — 97530 THERAPEUTIC ACTIVITIES: CPT

## 2023-09-01 PROCEDURE — 97167 OT EVAL HIGH COMPLEX 60 MIN: CPT

## 2023-09-01 PROCEDURE — 92610 EVALUATE SWALLOWING FUNCTION: CPT

## 2023-09-01 PROCEDURE — 85025 COMPLETE CBC W/AUTO DIFF WBC: CPT

## 2023-09-01 PROCEDURE — 97163 PT EVAL HIGH COMPLEX 45 MIN: CPT

## 2023-09-01 RX ADMIN — Medication 2 G: at 21:04

## 2023-09-01 RX ADMIN — IPRATROPIUM BROMIDE 0.5 MG: 0.5 SOLUTION RESPIRATORY (INHALATION) at 20:07

## 2023-09-01 RX ADMIN — PENICILLIN G POTASSIUM 4 MILLION UNITS: 5000000 INJECTION, POWDER, FOR SOLUTION INTRAMUSCULAR; INTRAVENOUS at 19:44

## 2023-09-01 RX ADMIN — HYDROXYCHLOROQUINE SULFATE 200 MG: 200 TABLET, FILM COATED ORAL at 16:27

## 2023-09-01 RX ADMIN — CHLORHEXIDINE GLUCONATE 15 ML: 1.2 RINSE ORAL at 20:58

## 2023-09-01 RX ADMIN — GABAPENTIN 300 MG: 300 CAPSULE ORAL at 16:27

## 2023-09-01 RX ADMIN — PENICILLIN G POTASSIUM 4 MILLION UNITS: 5000000 INJECTION, POWDER, FOR SOLUTION INTRAMUSCULAR; INTRAVENOUS at 22:54

## 2023-09-01 RX ADMIN — GABAPENTIN 300 MG: 300 CAPSULE ORAL at 08:23

## 2023-09-01 RX ADMIN — Medication 9 MG: at 21:00

## 2023-09-01 RX ADMIN — EZETIMIBE 10 MG: 10 TABLET ORAL at 08:23

## 2023-09-01 RX ADMIN — PREDNISONE 5 MG: 5 TABLET ORAL at 08:23

## 2023-09-01 RX ADMIN — IPRATROPIUM BROMIDE 0.5 MG: 0.5 SOLUTION RESPIRATORY (INHALATION) at 07:50

## 2023-09-01 RX ADMIN — PENICILLIN G POTASSIUM 4 MILLION UNITS: 5000000 INJECTION, POWDER, FOR SOLUTION INTRAMUSCULAR; INTRAVENOUS at 11:23

## 2023-09-01 RX ADMIN — HYDROXYCHLOROQUINE SULFATE 200 MG: 200 TABLET, FILM COATED ORAL at 08:23

## 2023-09-01 RX ADMIN — Medication 12.5 MG: at 20:54

## 2023-09-01 RX ADMIN — LEVOTHYROXINE SODIUM 25 MCG: 25 TABLET ORAL at 06:05

## 2023-09-01 RX ADMIN — LEVALBUTEROL HYDROCHLORIDE 1.25 MG: 1.25 SOLUTION RESPIRATORY (INHALATION) at 07:50

## 2023-09-01 RX ADMIN — ALBUTEROL SULFATE 2.5 MG: 2.5 SOLUTION RESPIRATORY (INHALATION) at 02:45

## 2023-09-01 RX ADMIN — HEPARIN SODIUM 13.1 UNITS/KG/HR: 10000 INJECTION, SOLUTION INTRAVENOUS at 12:55

## 2023-09-01 RX ADMIN — PENICILLIN G POTASSIUM 4 MILLION UNITS: 5000000 INJECTION, POWDER, FOR SOLUTION INTRAMUSCULAR; INTRAVENOUS at 06:05

## 2023-09-01 RX ADMIN — LIDOCAINE 2 PATCH: 50 PATCH TOPICAL at 08:23

## 2023-09-01 RX ADMIN — Medication 2 G: at 08:24

## 2023-09-01 RX ADMIN — GABAPENTIN 300 MG: 300 CAPSULE ORAL at 20:54

## 2023-09-01 RX ADMIN — LEVALBUTEROL HYDROCHLORIDE 1.25 MG: 1.25 SOLUTION RESPIRATORY (INHALATION) at 20:07

## 2023-09-01 RX ADMIN — PENICILLIN G POTASSIUM 4 MILLION UNITS: 5000000 INJECTION, POWDER, FOR SOLUTION INTRAMUSCULAR; INTRAVENOUS at 16:27

## 2023-09-01 RX ADMIN — IPRATROPIUM BROMIDE 0.5 MG: 0.5 SOLUTION RESPIRATORY (INHALATION) at 13:30

## 2023-09-01 RX ADMIN — Medication 2 G: at 11:24

## 2023-09-01 RX ADMIN — CHLORHEXIDINE GLUCONATE 15 ML: 1.2 RINSE ORAL at 08:23

## 2023-09-01 RX ADMIN — Medication 12.5 MG: at 08:23

## 2023-09-01 RX ADMIN — ALLOPURINOL 400 MG: 100 TABLET ORAL at 08:23

## 2023-09-01 RX ADMIN — FLUTICASONE FUROATE AND VILANTEROL TRIFENATATE 1 PUFF: 100; 25 POWDER RESPIRATORY (INHALATION) at 08:24

## 2023-09-01 RX ADMIN — PENICILLIN G POTASSIUM 4 MILLION UNITS: 5000000 INJECTION, POWDER, FOR SOLUTION INTRAMUSCULAR; INTRAVENOUS at 03:52

## 2023-09-01 RX ADMIN — PANTOPRAZOLE SODIUM 20 MG: 20 TABLET, DELAYED RELEASE ORAL at 06:05

## 2023-09-01 RX ADMIN — VANCOMYCIN HYDROCHLORIDE 750 MG: 750 INJECTION, SOLUTION INTRAVENOUS at 08:22

## 2023-09-01 RX ADMIN — LEVALBUTEROL HYDROCHLORIDE 1.25 MG: 1.25 SOLUTION RESPIRATORY (INHALATION) at 13:30

## 2023-09-01 NOTE — OCCUPATIONAL THERAPY NOTE
Occupational Therapy Evaluation & Treat     Patient Name: Marcelino LOPEZ Date: 9/1/2023  Problem List  Principal Problem:    Severe sepsis ([address])  Active Problems:    RA (rheumatoid arthritis) ([address])    HTN (hypertension)    Chronic systolic heart failure (HCC)    A-fib RVR (H[facility])    Hypomagnesemia    Cellulitis of left upper extremity    HLD (hyperlipidemia)    GERD (gastroesophageal reflux disease)    Gout without acute exacerbation     Spinal stenosis    Hypothyroidism    Peripheral vascular disease ([address])    Bacteremia due to group B Streptococcus    Respiratory insufficiency    Past Medical History  Past Medical History:   Diagnosis Date    GERD (gastroesophageal reflux disease)     Gout     Hyperlipidemia     Hypertension     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[address])     Spinal stenosis      Past Surgical History  Past Surgical History:   Procedure Laterality Date    CARDIAC ELECTROPHYSIOLOGY PROCEDURE N/A 12/17/2022    Procedure: Cardiac loop recorder implant;  Surgeon: Kishan Zhu MD;  Location: [facility]; Service: Cardiology    CARPAL TUNNEL RELEASE Bilateral     COLONOSCOPY      LAMINECTOMY      TOTAL KNEE ARTHROPLASTY Bilateral     TOTAL SHOULDER REPLACEMENT             09/01/23 1425   OT Last Visit   OT Visit Date 09/01/23   Note Type   Note type Evaluation  (& Treat)   Pain Assessment   Pain Assessment Tool 0-10   Pain Score No Pain   Restrictions/Precautions   Weight Bearing Precautions Per Order No   Other Precautions Fall Risk;Cognitive; Chair Alarm;O2;Telemetry;Multiple lines   Home Living   Type of Home Assisted living   Home Layout One level   Home Equipment Wheelchair-manual;Wheelchair-electric   Prior Function   Level of [place] Independent with functional mobility; Needs assistance with ADLs; Needs assistance with [address] [place] [address] Rd staff   Receives Help From [facility] in the last 6 months 0   Vocational Retired   Comments At baseline, pt able to pivot to wheelchair Subjective   Subjective Pt received in supine position. Pt pleasantly confused. ADL   Eating Assistance 3  Moderate Assistance   Grooming Assistance 3  Moderate Assistance   UB Bathing Assistance 2  Maximal Assistance   LB Bathing Assistance 1  Total Assistance   UB Dressing Assistance 2  Maximal Assistance   LB Dressing Assistance 1  Total Assistance   Toileting Assistance  1  Total Assistance   Bed Mobility   Supine to Sit 3  Moderate assistance   Additional items Assist x 2;Verbal cues; Increased time required   Transfers   Sit to Stand 2  Maximal assistance   Additional items Assist x 2;Verbal cues   Stand to Sit 2  Maximal assistance   Additional items Assist x 2;Verbal cues   Balance   Static Sitting Fair   Dynamic Sitting Fair -   Static Standing Poor -   Activity Tolerance   Activity Tolerance Patient limited by fatigue   Medical Staff Made Aware PT Abigail   Nurse Made Aware SOMMER [place]   RUE Assessment   RUE Assessment   (PROM WFL, strength 3-/5 t/o)   LUE Assessment   LUE Assessment   (ROM greatly limited by edema. Strength 2-/5 to 3-/5)   Hand Function   Fine Motor Coordination Impaired  (L>R)   Cognition   Overall Cognitive Status Impaired   Arousal/Participation Arousable   Attention Difficulty attending to directions   Orientation Level Oriented to person;Disoriented to place; Disoriented to time;Disoriented to situation   Memory Decreased recall of precautions;Decreased recall of recent events   Following Commands Follows one step commands with increased time or repetition   Assessment   Limitation Decreased ADL status; Decreased self-care trans;Decreased high-level ADLs; Decreased cognition;Decreased sensation   Prognosis Fair   Assessment Pt is a 80 y.o. male seen for OT evaluation at [address] [place] [address], admitted 8/29/2023 w/ Severe sepsis ([address]) and arm cellulitis.   Comorbidities affecting pt's functional performance at time of assessment include:  GERD, gout, hyperlipidemia, hypertension, RA, spinal stenosis, proximal A-fib, hypothyroidism. . Personal factors affecting pt at time of IE include:difficulty performing ADLS, difficulty performing IADLS  and decreased functional mobility. . Prior to admission, pt was living at [facility]. Pt required assist with I w/  ADLS and IADLs, however able to independently transfer to wheelchair PTA. Upon evaluation: Pt requires mod Ax 1-2 for bed mobility, max Ax 2 for functional mobility/transfers, mod-max A for UB ADLs and total A for LB ADLS 2* the following deficits impacting occupational performance: weakness, decreased strength, decreased balance, decreased tolerance, impaired arousal, impaired memory, impaired problem solving and increased pain. Full objective findings from OT assessment regarding body systems outlined above. These impairments, as well as risk for falls  limit pt's ability to safely engage in all baseline areas of occupation and mobility. Pt to benefit from continued skilled OT tx while in the hospital to address deficits as defined above and maximize level of functional independence w ADL's and functional mobility. Occupational Performance areas to address include: bathing/shower, toilet hygiene, dressing and functional mobility. This evaluation required an extensive review of medical and/or therapy records and additional review of physical, cognitive and psychosocial history related to functional performance. Based upon functional performance deficits and assessments, this evaluation has been identified as a high complexity evaluation. The patient's raw score on the AM-PAC Daily Activity inpatient short form is 9, standardized score is  , less than 39.4. Patients at this level are likely to benefit from DC to post-acute rehabilitation services. However please refer to therapist recommendation for discharge planning given other factors that may influence destination.  At this time, OT recommendations at time of discharge are level 2 mod intensity resources. Goals   Patient Goals Pt wishes to get home   Plan   Treatment Interventions ADL retraining;Functional transfer training;Patient/family training; Compensatory technique education;UE strengthening/ROM; Cognitive reorientation; Activityengagement; Fine motor coordination activities   Goal Expiration Date 09/11/23   OT Treatment Day 0   OT Frequency 3-5x/wk   Recommendation   UB Rehab Discharge Recommendation (PT/OT) Level 2   AM-PAC Daily Activity Inpatient   Lower Body Dressing 1   Bathing 1   Toileting 1   Upper Body Dressing 2   Grooming 2   Eating 2   Daily Activity Raw Score 9   Turning Head Towards Sound 3   Follow Simple Instructions 2   Low Function Daily Activity Raw Score 14   Low Function Daily Activity Standardized Score  24.79   AM-PAC Applied Cognition Inpatient   Following a Speech/Presentation 1   Understanding Ordinary Conversation 2   Taking Medications 1   Remembering Where Things Are Placed or Put Away 1   Remembering List of 4-5 Errands 1   Taking Care of Complicated Tasks 1   Applied Cognition Raw Score 7   Applied Cognition Standardized Score 15.17   Additional Treatment Session   Start Time 1415   End Time 1425   Treatment Assessment Pt seated EOB. Pt performed 2 sit<>stand tranfers with max Ax 2. Pt noted to BLE buckling at this time. Once seated EOB. Pt able to performe lateral scooting with supervision/CGA x 1. Pt returned to supine with mod Ax 1. End of Consult   Education Provided Yes;Family or social support of family present for education by provider   Patient Position at End of Consult Supine;Bed/Chair alarm activated; All needs within reach   Nurse Communication Nurse aware of consult     Pt will achieve the following goals within 10 days. *Pt will complete grooming with sup.     *Pt will complete UB bathing and dressing with min A.    *Pt will complete LB bathing and dressing with mod A .    * Pt will complete toileting w/ mod A w/ G hygiene/thoroughness using DME PRN    *Pt will complete bed mobility with min Ax 1, with bed flat and no side rail to prep for purposeful tasks    *Pt will perform functional transfers with on/off all surfaces with min Ax 1 using DME as needed w/ G balance/safety. *Pt will increase standing tolerance x 5  minutes for inc'd tolerance with standing purposeful tasks. *Pt will participate in UE therapeutic exercise in order to maximize strength for ADL transfers.     Byron Ortiz OTR/L

## 2023-09-01 NOTE — PLAN OF CARE
Problem: OCCUPATIONAL THERAPY ADULT  Goal: Performs self-care activities at highest level of function for planned discharge setting. See evaluation for individualized goals. Description: Treatment Interventions: ADL retraining, Functional transfer training, Patient/family training, Compensatory technique education, UE strengthening/ROM, Cognitive reorientation, Activityengagement, Fine motor coordination activities          See flowsheet documentation for full assessment, interventions and recommendations. Note: Limitation: Decreased ADL status, Decreased self-care trans, Decreased high-level ADLs, Decreased cognition, Decreased sensation  Prognosis: Fair  Assessment: Pt is a 80 y.o. male seen for OT evaluation at [facility], admitted 8/29/2023 w/ Severe sepsis ([address]) and arm cellulitis. Comorbidities affecting pt's functional performance at time of assessment include:  GERD, gout, hyperlipidemia, hypertension, RA, spinal stenosis, proximal A-fib, hypothyroidism. . Personal factors affecting pt at time of IE include:difficulty performing ADLS, difficulty performing IADLS  and decreased functional mobility. . Prior to admission, pt was living at [facility]. Pt required assist with I w/  ADLS and IADLs, however able to independently transfer to wheelchair PTA. Upon evaluation: Pt requires mod Ax 1-2 for bed mobility, max Ax 2 for functional mobility/transfers, mod-max A for UB ADLs and total A for LB ADLS 2* the following deficits impacting occupational performance: weakness, decreased strength, decreased balance, decreased tolerance, impaired arousal, impaired memory, impaired problem solving and increased pain. Full objective findings from OT assessment regarding body systems outlined above. These impairments, as well as risk for falls  limit pt's ability to safely engage in all baseline areas of occupation and mobility.  Pt to benefit from continued skilled OT tx while in the hospital to address deficits as defined above and maximize level of functional independence w ADL's and functional mobility. Occupational Performance areas to address include: bathing/shower, toilet hygiene, dressing and functional mobility. This evaluation required an extensive review of medical and/or therapy records and additional review of physical, cognitive and psychosocial history related to functional performance. Based upon functional performance deficits and assessments, this evaluation has been identified as a high complexity evaluation. The patient's raw score on the AM-PAC Daily Activity inpatient short form is 9, standardized score is  , less than 39.4. Patients at this level are likely to benefit from DC to post-acute rehabilitation services. However please refer to therapist recommendation for discharge planning given other factors that may influence destination. At this time, OT recommendations at time of discharge are level 2 mod intensity resources.

## 2023-09-01 NOTE — PROGRESS NOTES
Progress Note - General Surgery   Liat Krause 80 y.o. male MRN: [id number]  Unit/Bed#: -01 SDU Encounter: [id number]    Assessment/Plan:  Cellulitis of bilateral upper extremities  -Areas with improving erythema, still with edema and small blisters, clear serous drainage, no purulence  -No drainable collections or gas in underlying tissues on CT evaluation LUE  -Motor sensation and pulses intact  -No indication for surgical intervention  -Continue IV antibiotics per ID  -We will sign off    Subjective/Objective   Chief Complaint:     Subjective: Patient confused. Does not answer questions appropriately    Objective:     Blood pressure 143/82, pulse 91, temperature (!) 97.2 °F (36.2 °C), temperature source Axillary, resp. rate 19, height 5' 9" (1.753 m), weight 112 kg (247 lb 2.2 oz), SpO2 97 %. ,Body mass index is 36.5 kg/m². Intake/Output Summary (Last 24 hours) at 9/1/2023 1330  Last data filed at 9/1/2023 1123  Gross per 24 hour   Intake 1177.97 ml   Output 1208 ml   Net -30.03 ml       Invasive Devices     Peripheral Intravenous Line  Duration           Peripheral IV 08/29/23 Proximal;Right;Upper;Ventral (anterior) Arm 2 days                Physical Exam:   General appearance: Patient confused, does not answer questions appropriately  Head: Normocephalic, without obvious abnormality, atraumatic, sclerae anicteric, mucous membranes moist  Neck: no JVD and supple, symmetrical, trachea midline  Lungs: clear to auscultation, no wheezes or rales  Heart:   Regular rate and rhythm, S1-S2 normal, no murmur  Abdomen:   Obbese, soft, benign  Extremities: Right upper extremity erythematous   Left upper extremity erythematous and edematous with small blisters and few areas of serous drainage. No crepitus. Motor, sensation intact. 2+ distal pulses   skin: Warm, dry; LUE as above  Nursing notes and vital signs reviewed      Lab, Imaging and other studies:  I have personally reviewed pertinent lab results. , CBC:   Lab Results   Component Value Date    WBC 8.55 09/01/2023    HGB 9.7 (L) 09/01/2023    HCT 32.1 (L) 09/01/2023     (H) 09/01/2023     (L) 09/01/2023    RBC 3.11 (L) 09/01/2023    MCH 31.2 09/01/2023    MCHC 30.2 (L) 09/01/2023    RDW 15.5 (H) 09/01/2023    MPV 12.2 09/01/2023    NRBC 0 09/01/2023   , CMP:   Lab Results   Component Value Date    SODIUM 136 09/01/2023    K 4.2 09/01/2023     09/01/2023    CO2 28 09/01/2023    BUN 13 09/01/2023    CREATININE 0.80 09/01/2023    CALCIUM 8.4 09/01/2023    EGFR 79 09/01/2023     VTE Pharmacologic Prophylaxis: Heparin  VTE Mechanical Prophylaxis: sequential compression device     Tere Baumann

## 2023-09-01 NOTE — PHYSICAL THERAPY NOTE
PHYSICAL THERAPY EVALUATION NOTE      Patient Name: Young Miner  CCJII'Y Date: 2023    AGE:   80 y.o. Mrn:   [id number]  ADMIT DX:  Sinus tachycardia [R00.0]  Fever [R50.9]  Cellulitis of left arm [L03.114]  Sepsis ([address]) [A41.9]    Past Medical History:   Diagnosis Date    GERD (gastroesophageal reflux disease)     Gout     Hyperlipidemia     Hypertension     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[address])     Spinal stenosis      Length Of Stay: 3  PHYSICAL THERAPY EVALUATION :   Patient's identity confirmed via 2 patient identifiers (full name and ) at start of session       23 1358   PT Last Visit   PT Visit Date 23   Note Type   Note type Evaluation   Pain Assessment   Pain Assessment Tool 0-10   Pain Score No Pain   Restrictions/Precautions   Weight Bearing Precautions Per Order No   Other Precautions Cognitive; Chair Alarm; Bed Alarm;Multiple lines;Telemetry;O2;Fall Risk;Hard of hearing  (2L NC O2)   Home Living   Type of Home Assisted living  [facility] @ [address] [place] [address])   Home Layout One level   Home Equipment Wheelchair-manual;Wheelchair-electric   Additional Comments Pt is able to independently steppage transfer to his WC (uses WC due to significant spinal stenosis). Either electric WC or self-propels in manual chair   Prior Function   Level of [place] Independent with functional mobility;Modified independent with wheelchair   Lives With Facility staff   Receives Help [facility] in the last 6 months 0   Vocational Retired   General   Family/Caregiver Present Yes  (Son, Imelda Castle, and DIL)   Cognition   Overall Cognitive Status Impaired   Arousal/Participation Alert   Orientation Level Oriented to person;Disoriented to place; Disoriented to time;Disoriented to situation   Memory Decreased recall of recent events   Following Commands Follows one step commands with increased time or repetition   Comments Pt alert, confused, but able to identify son and DIL at baseline. Cracking jokes throughout session   RLE Assessment   RLE Assessment WFL   Strength RLE   RLE Overall Strength 4-/5   LLE Assessment   LLE Assessment WFL   Strength LLE   LLE Overall Strength 4-/5   Bed Mobility   Supine to Sit 3  Moderate assistance   Additional items Assist x 2; Increased time required;Verbal cues  (A for trunk management)   Additional Comments Pt is able to sit EOB x 8 min w/ supervision   Transfers   Sit to Stand 2  Maximal assistance   Additional items Assist x 2; Increased time required;Verbal cues   Stand to Sit 2  Maximal assistance   Additional items Assist x 2;Impulsive   Additional Comments Performed STS w/ B HHA and B knee block. Requires 2 attempts to achieve. Despite B knee block, once standing pt w/ buckling of knees and unable to maintain stand x 10s. Deemed unsafe to attempt SPT to recliner chair at this time   Balance   Static Sitting Fair   Static Standing Zero  (Ax2)   Activity Tolerance   Activity Tolerance Patient limited by fatigue  (impaired cognition)   Medical Staff Made Aware OT [address] Aware ICU RN Pepper   Assessment   Problem List Decreased strength;Decreased endurance; Impaired balance;Decreased mobility; Decreased cognition;Obesity; Decreased skin integrity   Assessment Galina Isbell is a 80 y.o. Male who presents to [address] [place] [address] on 8/29/2023 from [facility] at [address] [place] [address] w/ c/o fever, L arm redness and swelling and diagnosis of severe sepsis, LUE cellulitis. Orders for PT eval and treat received. Pt presents w/ comorbidities of RA, HTN, Afib, CHF, GERD, PVD, . At baseline, pt mobilizes independenty for steppage transfers to his , and reports 0 falls in the last 6 months. Upon evaluation, pt presents w/ the following deficits: weakness, impaired balance, decreased endurance and impaired cognition. Upon eval, pt requires mod A x 2 for bed mobility, max A x 2 for transfers.  Based on this PT evaluation today, patient's discharge recommendation is for Level II. During this admission, pt would benefit from continued skilled inpatient PT in the acute care setting in order to address the abovementioned deficits to maximize function and mobility before DC from acute care. Goals   Patient Goals to go home   STG Expiration Date 09/11/23   Short Term Goal #1 Patient will: Perform all bed mobility tasks w/ modx1 to improve pt's independence w/ repositioning for decrease risk of skin breakdown, Perform all transfers w/ maxx1 consistently from various height surfaces in order to improve I w/ engagement w/ real-world environments/situations, Increase all balance 1/2 grade to decrease risk for falls and Tolerate 3 hr OOB to faciliate upright tolerance   PT Treatment Day 0   Plan   Treatment/Interventions Functional transfer training;LE strengthening/ROM; Therapeutic exercise; Endurance training;Cognitive reorientation;Patient/family training;Equipment eval/education; Bed mobility   PT Frequency 2-3x/wk   Recommendation   UB Rehab Discharge Recommendation (PT/OT) Level 2   Additional Comments Pt may benefit from trial of Florin Cuevas w/ RN staff for [facility] Mobility Inpatient   Turning in Flat Bed Without Bedrails 1   Lying on Back to Sitting on Edge of Flat Bed Without Bedrails 1   Moving Bed to Chair 1   Standing Up From Chair Using Arms 1   Walk in Room 1   Climb 3-5 Stairs With Railing 1   Basic Mobility Inpatient Raw Score 6   Turning Head Towards Sound 3   Follow Simple Instructions 3   Low Function Basic Mobility Raw Score  12   Low Function Basic Mobility Standardized Score  18.33   Highest Level Of Mobility   JH-HLM Goal 2: Bed activities/Dependent transfer   JH-HLM Achieved 3: Sit at edge of bed   Additional Treatment Session   Start Time 1415   End Time 1425   Treatment Assessment Pt seated EOB, able to maintain another 5 min w/ supervision. Pt is able to scoot using BUE/LEs towards [facility] w/ supervision after education.  PT requires mod A x 1 for LE support to lay back down in bed. Elevated LUE on pillows to assist w/swelling   Additional Treatment Day 1   End of Consult   Patient Position at End of Consult Supine;Bed/Chair alarm activated; All needs within reach         The patient's AM-PAC Basic Mobility Inpatient Short Form Raw Score is 6, Standardized Score is  . A standardized score less than 38.32 (raw score of 16) suggests the patient may benefit from discharge to post-acute rehabilitation services which may not coincide with above PT recommendations. However please refer to therapist recommendation for discharge planning given other factors that may influence destination.     Pt would benefit from skilled inpatient PT during this admission in order to facilitate progress towards goals to maximize functional independence    Sosa Webber, PT, DPT

## 2023-09-01 NOTE — SPEECH THERAPY NOTE
Speech Language/Pathology    Speech-Language Pathology Bedside Swallow Evaluation      Patient Name: Angelika Rivera    PXZCH'I Date: 9/1/2023     Problem List  Principal Problem:    Severe sepsis ([address])  Active Problems:    RA (rheumatoid arthritis) ([address])    HTN (hypertension)    Chronic systolic heart failure (HCC)    A-fib RVR (HCC)    Hypomagnesemia    Cellulitis of left upper extremity    HLD (hyperlipidemia)    GERD (gastroesophageal reflux disease)    Gout without acute exacerbation     Spinal stenosis    Hypothyroidism    Peripheral vascular disease ([address])    Bacteremia due to group B Streptococcus    Respiratory insufficiency      Past Medical History  Past Medical History:   Diagnosis Date   • GERD (gastroesophageal reflux disease)    • Gout    • Hyperlipidemia    • Hypertension    • 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 Pt is lethargic w/, min-mild impact on efficiency of bolus break down and manipulation. Adequate transfers without significant oral residue. Swallows suspected fairly prompt. No overt s/s aspiration.      Risk/s for Aspiration: Low risk: Age, AMS      Recommended Diet: regular diet and thin liquids   Recommended Form of Meds: whole with liquid   Aspiration precautions and swallowing strategies: upright posture, only feed when fully alert and slow rate of feeding  Other Recommendations: Continue frequent oral care         Current Medical Status  Pt is a 80 y.o. male who presented to [address] [place] [address] with Sepsis/Shock/cellulitis of LUE/afib with RVR. PMH of GERD, gout, hyperlipidemia, hypertension, RA, spinal stenosis, proximal A-fib, hypothyroidism. Presented due to fever and left arm redness and swelling starting on morning of arrival.   On exam mucous membranes dry. Tachycardia. Frequent extrasystoles. Lungs rales. Swelling bilateral hands and LE. Erythema left arm. Hypoxic. . Glucose 160. Wbc 9.09. In the ED placed on oxygen given 1 liter IVF, started on ceftriaxone and given Metoprolol. On arrival to Unit BP to 23K systolic. Plan is additional 1 liter crystalloid, IV albumin. Goal Map > 65. Start IV cefepime and vancomycin. Dc ceftriaxone. CBC in am.  Hold home antihypertensives. IVF resuscitation. telemetry - Give 250 mcgs IV digoxin every 6 hours x 6 doses. Continue home medications.       Date: 8/30/23   Day 2:  Yesterday required addition of Levophed, overnight given IVF and Levophed weaned. Ct LUE showed cellulitis. Today, + shortness of breath and wheezing. On exam: obese. Murmur. Tachypnea. Wheezing. LUE edema and erythema not extending beyond previously drawn outline. Bilateral venous dopplers negative for clot. Bilateral dp pulses with dopplerable signals. Plan is dose IV lasix. Hold Dig and restart metoprolol. Oxygen. Continue antibiotics. Continue Heparin gtt. Consult podiatry, check arterial doppler LE.      8/30/23 per Podiatry- patient with open wound of right great toe with damage to nail/PVD/Sepsis/LUE cellulitis. Plan is local wound care with Dry sterile dressing with Betadine/Telfa dressing to be changed every other day. Arterial duplex     Current Precautions:  Fall  Aspiration      Allergies:  No known food allergies    Past medical history:  Please see H&P for details    Special Studies:  CXR 8/31: No acute cardiopulmonary disease.       Social/Education/Vocational Hx:  Pt lives in SNF/ECF    Swallow Information   Current Risks for Dysphagia & Aspiration: prolonged intubation  Current Symptoms/Concerns: change in mental status   Current Diet: regular diet and thin liquids   Baseline Diet: regular diet and thin liquids      Baseline Assessment   Behavior/Cognition: lethargic  Speech/Language Status: able to participate in basic conversation and able to follow commands  Patient Positioning: upright in bed  Pain Status/Interventions/Response to Interventions:  No report of or nonverbal indications of pain. Swallow Mechanism Exam  Facial: symmetrical  Labial: WFL  Lingual: WFL  Velum: symmetrical  Mandible: adequate ROM  Dentition: adequate  Vocal quality:clear/adequate   Volitional Cough: strong/productive   Respiratory Status: on RA     Consistencies Assessed and Performance   Consistencies Administered: thin liquids, puree, soft solids and hard solids      Oral Stage:   Mastication was adequate with the materials administered today. Bolus formation and transfer were functional with no significant oral residue noted. No overt s/s reduced oral control. Pharyngeal Stage:  Swallow Mechanics:  Swallowing initiation appeared prompt. Laryngeal rise was palpated and judged to be within functional limits. No coughing, throat clearing, change in vocal quality or respiratory status noted today. Esophageal Concerns: none reported    Strategies and Efficacy: -     Summary and Recommendations (see above)    Results Reviewed with: patient     Treatment Recommended: Yes     Frequency of treatment: Brief f/u across larger sample as able/appropriate     Patient Stated Goal: "just a little"     Dysphagia LTG  -Patient will demonstrate safe and effective oral intake (without overt s/s significant oral/pharyngeal dysphagia including s/s penetration or aspiration) for the highest appropriate diet level.        Speech Therapy Prognosis   Prognosis: good    Prognosis Considerations: age, medical status, prior medical history and cognitive status

## 2023-09-01 NOTE — PROGRESS NOTES
[address] [facility]  Progress Note  Name: Chinedu Mcmahan  MRN: [id number]  Unit/Bed#: -01 SDU I Date of Admission: 8/29/2023   Date of Service: 9/1/2023 I Hospital Day: 3    Assessment/Plan   * Severe sepsis [facility])  Assessment & Plan  Sepsis criteria AEB fever, tachycardia, tachypnea POA. End organ damage/shock AEB hypotension briefly requiring Levophed, admitted under critical care service. Source: LUE cellulitis, group B strep bacteremia. Patient initially presented from [address] [place] [address] w/ fever 103 & erythema of left arm. • No DVT on venous duplex bilateral UEs, UA and CXR unremarkable  • Lactic acid: 1.9->2.1->1.3 / Procal: 0.16->14->10->9->4  • S/p 2250 mL IVF bolus + albumin in ED. • Off Levophed since 8/30, transition to [facility] service 9/1. • IV Rocephin -> IV cefepime -> IV clindamycin -> IV penicillin G  + vancomycin. • Worsening erythema LUE, development of RUE erythema/ecchymosis (8/30). • General surgery following:  • No acute surgical interventions at this time, presentation not suspicious for necrotizing infection. • If worsens hemodynamically or concern for NSTI, recommend transfer to [facility] for surgical interventions. • ID following:  • C/w IV vancomycin (D4)/penicillin G(D3), await final blood culture susceptibilities  • Follow culture results    Cellulitis of left upper extremity  Assessment & Plan  • Presented with LUE erythema, fever consistent with cellulitis  • CT LUE (8/29): Left upper extremity subcutaneous edema indicating cellulitis without drainable collection, soft tissue emphysema or evidence of osteomyelitis. • See additional plan above for severe sepsis  • Neurovascular checks  • Elevate arms, local wound care    Bacteremia due to group B Streptococcus  Assessment & Plan  • BC 1/2 (8/29): + Streptococcus agalactiae (Group B), susceptible to penicillin. • Repeat BC x2 (8/31): Pending.    • Transitioned to high-dose PCN, Vancomycin, and Clindamycin  • Echo without evidence of vegetation  • ID consulted, recommendations appreciated    A-fib RVR ([address])  Assessment & Plan  • Initially presented in A-fib with RVR, likely in setting of sepsis  • Home regimen: Toprol 25 mg BID, AC on Eliquis.   • Rate since controlled/improved  • Digoxin started then discontinued  • Restarted on low-dose BB w/ Lopressor  • C/w heparin gtt, transition to home Eliquis once more stable & no further procedures/interventions anticipated    Respiratory insufficiency  Assessment & Plan  • Wheezing on exam, initially requiring 4L NC O2  • Xopenex/atrovent nebs  • PRN diuresis with IV Lasix  • Respiratory protocol, encourage IS  • Wean supplemental O2 to maintain spO2 > 92%  • Improving, currently requiring 2L NC O2    Peripheral vascular disease ([address])  Assessment & Plan  • Has been seeing Dr. Pk Hart with podiatry outpatient for chronic toe wound of the left great toe  • Lower extremities were cool to the touch on initial evaluation  • No evidence of significant arterial occlusive disease on arterial duplex bilateral LEs  • Podiatry following, appreciate ongoing recommendations  • Consult wound care for local wound care of the toe  • Neurovascular checks    Hypothyroidism  Assessment & Plan  • Continue home dose Levothyroxine    Spinal stenosis  Assessment & Plan  • Home Regimen: Gabapentin 100 mg TID, tramadol-PRN  • Continue with PRN tramadol  • Gabapentin increased to 300 mg TID  • Lidocaine patch  • PRN Tylenol     Gout without acute exacerbation   Assessment & Plan  • Continue home Allopurinol     GERD (gastroesophageal reflux disease)  Assessment & Plan  • Continue PPI    HLD (hyperlipidemia)  Assessment & Plan  • Continue home Ezetimibe    Hypomagnesemia  Assessment & Plan  • Magnesium 0.7 on 8/30  • Monitor and replete to maintain Mag > 2    Chronic systolic heart failure (HCC)  Assessment & Plan  Wt Readings from Last 3 Encounters:   08/31/23 113 kg (248 lb 14.4 oz) 08/28/23 99.8 kg (220 lb)   08/24/23 105 kg (232 lb)     • Not in acute exacerbation. Edema of upper extremities likely more in setting of cellulitis. • Echo (1/2023): EF 30% with severe systolic dysfunction and global hypokinesis, moderate MVR. • Home regimen: Torsemide 20 mg TID, Toprol XL 25 mg BID  • Echo (8/30/23): EF 45%, G1DD, moderate MVR. • Receiving PRN diuresis; received lasix 40 mg IV BID   • Restarted on low-dose BB  • Daily weights, strict I/Os    HTN (hypertension)  Assessment & Plan  • BP reviewed, slightly hypertensive but overall stable  • Restarted BB with Lopressor 12.5 mg BID  • Continue holding home torsemide    RA (rheumatoid arthritis) (HCC)  Assessment & Plan  • History of RA, remote episodes of eosinophilia   • Continue home prednisone, hydroxychloroquine  • PRN Tramadol, Voltaren gel to joints for pain  • F/U ANTHONY, ANCA, IgE results, still pending           VTE Pharmacologic Prophylaxis: VTE Score: 9 High Risk (Score >/= 5) - Pharmacological DVT Prophylaxis Ordered: heparin drip. Sequential Compression Devices Ordered. Patient Centered Rounds: I performed bedside rounds with nursing staff today. Discussions with Specialists or Other Care Team Provider: Case management    Education and Discussions with Family / Patient: Updated  (son) via phone. Total Time Spent on Date of Encounter in care of patient: 45 minutes This time was spent on one or more of the following: performing physical exam; counseling and coordination of care; obtaining or reviewing history; documenting in the medical record; reviewing/ordering tests, medications or procedures; communicating with other healthcare professionals and discussing with patient's family/caregivers.     Current Length of Stay: 3 day(s)  Current Patient Status: Inpatient   Certification Statement: The patient will continue to require additional inpatient hospital stay due to IV antibiotics, clinical improvement  Discharge Plan: Anticipate discharge in >72 hrs to discharge location to be determined pending rehab evaluations. Code Status: Level 1 - Full Code    Subjective:   Patient is seen at bedside this a.m., reports ongoing pain and swelling in left arm. Denies nausea/vomiting, diarrhea, SOB, chest pain. Objective:     Vitals:   Temp (24hrs), Av.8 °F (36.6 °C), Min:97.2 °F (36.2 °C), Max:98.4 °F (36.9 °C)    Temp:  [97.2 °F (36.2 °C)-98.4 °F (36.9 °C)] 98.4 °F (36.9 °C)  HR:  [90-96] 92  Resp:  [15-23] 16  BP: (142-162)/(70-85) 145/70  SpO2:  [91 %-98 %] 91 %  Body mass index is 36.5 kg/m². Input and Output Summary (last 24 hours): Intake/Output Summary (Last 24 hours) at 2023 1727  Last data filed at 2023 1615  Gross per 24 hour   Intake 1889.5 ml   Output 1208 ml   Net 681.5 ml       Physical Exam:   Physical Exam  Constitutional:       General: He is not in acute distress. Appearance: He is not ill-appearing, toxic-appearing or diaphoretic. Cardiovascular:      Rate and Rhythm: Normal rate and regular rhythm. Pulses: Normal pulses. Heart sounds: Normal heart sounds. Pulmonary:      Effort: Pulmonary effort is normal. No respiratory distress. Abdominal:      General: Bowel sounds are normal. There is no distension. Palpations: Abdomen is soft. Tenderness: There is no abdominal tenderness. Musculoskeletal:         General: Swelling and tenderness present. Comments: LUE swelling, erythema and tenderness to palpation. No RUE pitting edema. Skin:     General: Skin is warm. Findings: Erythema present. Neurological:      General: No focal deficit present. Mental Status: He is alert.    Psychiatric:         Mood and Affect: Mood normal.         Behavior: Behavior normal.          Additional Data:     Labs:  Results from last 7 days   Lab Units 23  0429   WBC Thousand/uL 8.55   HEMOGLOBIN g/dL 9.7*   HEMATOCRIT % 32.1*   PLATELETS Thousands/uL 121* NEUTROS PCT % 80*   LYMPHS PCT % 11*   MONOS PCT % 5   EOS PCT % 3     Results from last 7 days   Lab Units 09/01/23  0429 08/29/23  1735 08/29/23  0906   SODIUM mmol/L 136   < > 141   POTASSIUM mmol/L 4.2   < > 3.5   CHLORIDE mmol/L 103   < > 103   CO2 mmol/L 28   < > 28   BUN mg/dL 13   < > 19   CREATININE mg/dL 0.80   < > 1.13   ANION GAP mmol/L 5   < > 10   CALCIUM mg/dL 8.4   < > 8.2*   ALBUMIN g/dL  --   --  3.6   TOTAL BILIRUBIN mg/dL  --   --  0.94   ALK PHOS U/L  --   --  63   ALT U/L  --   --  17   AST U/L  --   --  23   GLUCOSE RANDOM mg/dL 119   < > 160*    < > = values in this interval not displayed. Results from last 7 days   Lab Units 08/30/23  0511   INR  1.84*             Results from last 7 days   Lab Units 09/01/23  0429 08/31/23  0459 08/30/23 2002 08/30/23  0511 08/29/23  1944 08/29/23 1735 08/29/23  0906   LACTIC ACID mmol/L  --   --   --   --  1.3 2.1* 1.9   PROCALCITONIN ng/ml 4.02* 9.15* 10.15* 14.19*  --   --  0.16       Lines/Drains:  Invasive Devices     Peripheral Intravenous Line  Duration           Peripheral IV 08/29/23 Proximal;Right;Upper;Ventral (anterior) Arm 2 days                    Imaging: Reviewed radiology reports from this admission including: xray(s) and ultrasound(s)    Recent Cultures (last 7 days):   Results from last 7 days   Lab Units 08/31/23  0603 08/29/23  1012 08/29/23  0906   BLOOD CULTURE  No Growth at 24 hrs. No Growth at 24 hrs. No Growth at 72 hrs.  Streptococcus agalactiae (Group B)*   GRAM STAIN RESULT   --   --  Gram positive cocci in pairs and chains*       Last 24 Hours Medication List:   Current Facility-Administered Medications   Medication Dose Route Frequency Provider Last Rate   • albuterol  2.5 mg Nebulization Q4H PRN ESTEFANIA Latham     • allopurinol  400 mg Oral Daily Ad [place], CRNP     • chlorhexidine  15 mL Mouth[facility]Throat [facility]12H [facility]     • Diclofenac Sodium  2 g Topical 4x Daily Jarocho Naqvi Alize Fernandez     • ezetimibe  10 mg Oral Daily ESTEFANIA Maldonado     • Fluticasone Furoate-Vilanterol  1 puff Inhalation Daily ESTEFANIA Maldonado     • gabapentin  300 mg Oral TID ESTEFANIA Maldonado     • heparin (porcine)  3-20 Units/kg/hr (Order-Specific) Intravenous Titrated ESTEFANIA Maldonado 13.1 Units/kg/hr (09/01/23 1255)   • heparin (porcine)  2,000 Units Intravenous Q6H PRN ESTEFANIA Maldonado     • heparin (porcine)  4,000 Units Intravenous Q6H PRN ESTEFANIA Maldonado     • HYDROmorphone  0.2 mg Intravenous Q4H PRN ESTEFANIA Maldonado     • hydroxychloroquine  200 mg Oral BID With Meals ESTEFANIA Maldonado     • ipratropium  0.5 mg Nebulization TID ESTEFANIA Maldonado     • levalbuterol  1.25 mg Nebulization TID ESTEFANIA Maldonado     • levothyroxine  25 mcg Oral Early Morning ESTEFANIA Maldonado     • lidocaine  2 patch Topical Daily ESTEFANIA Maldonado     • melatonin  9 mg Oral HS ESTEFANIA Maldonado     • metoprolol tartrate  12.5 mg Oral Q12H ODALYS ESTEFANIA Maldonado     • pantoprazole  20 mg Oral Daily Before [address], CRNP     • penicillin G  4 Million Units Intravenous Q4H ESTEFANIA Maldonado 4 Million Units (09/01/23 1627)   • polyethylene glycol  17 g Oral Every Other Day ESTEFANIA Maldonado     • predniSONE  5 mg Oral Daily ESTEFANIA Maldonado     • traMADol  50 mg Oral Q6H PRN ESTEFANIA Maldonado          Today, Patient Was Seen By: Nathaniel Kay PA-C    **Please Note: This note may have been constructed using a voice recognition system. **

## 2023-09-01 NOTE — PROGRESS NOTES
Progress Note - Infectious Disease   Marcelino Cordero 80 y.o. male MRN: [id number]  Unit/Bed#: -01 SDU Encounter: [id number]    Assessment:  80-year-old man with skin and soft tissue infection due to GBS and complicated by bacteremia, leukocytosis, rheumatoid arthritis     Plan:  1) SSTI, bacteremia - Pt. with significant improvement since admission and beginning antibiotics. Given rapid spread of cellulitis, recovery of GBS is unsurprising given rapidly progressing and aggressive nature of this organism and causing skin and soft tissue infections. Fortunately, antimicrobial susceptibility testing shows sensitivity to penicillin. We will continue same while patient remains in house, and will discontinue vancomycin. Patient can transition to oral therapy to complete 14 days or so of treatment provided that follow-up blood cultures remain negative -- which should be the case given continued clinical improvement.     ===> Will DISCONTINUE VANCOMYCIN, CONTINUE PENICILLIN. Please follow patient closely for toxicity while on these agents.    ===> ON DISCHARGE, please prescribe AMOXICILLIN 500mg PO TID to complete about a fortnight of treatment (i.e. until 13 September 2023). 2) Leukocytosis - 2/2 #1, will follow as marker of clinical improvement. Resolving.    ===> Discussed w/ 1' team   ===> Will sign off, but please do not hesitate to contact us with further questions, if a change in the patient's clinical status warrants, or if follow-up blood cultures drawn on 8/31 become positive.    ===> Patient does NOT need to schedule f/u with ID on d/c UNLESS a change in clinical status or a recrudescence of symptoms warrants, but was given our contact information should any issues with prescribed post-d/c treatment arise.       Subjective/Objective   Overnight, no acute events. Patient without fever, chills, nausea, vomiting. Follow-up blood cultures remain negative to date.  Antimicrobial susceptibility testing on Streptococcus agalactiae shows sensitivity to penicillin, ceftriaxone, and vancomycin. Leukocytosis resolving. Left upper extremity in clean, dry, intact surgical dressing, per review of chart still with some lymphatic drainage. Temp:  [97.2 °F (36.2 °C)-98.1 °F (36.7 °C)] 97.2 °F (36.2 °C)  HR:  [90-98] 91  Resp:  [15-26] 19  BP: (138-162)/() 143/82  SpO2:  [93 %-98 %] 97 %  Temp (24hrs), Av.8 °F (36.6 °C), Min:97.2 °F (36.2 °C), Max:98.1 °F (36.7 °C)  Current: Temperature: (!) 97.2 °F (36.2 °C)    Physical Exam:   Gen: AA, NAD, VS reviewed  ENT: MMM  CV: No m/r/g, S1, S2  Pulm: Lungs CTAB, no respiratory distress  ABD: S/NT/ND  Skin: No rash on exposed skin  Psych: Oriented, nl. affect    Invasive Devices     Peripheral Intravenous Line  Duration           Peripheral IV 23 Proximal;Right;Upper;Ventral (anterior) Arm 2 days                Lab, Imaging and other studies: I have personally reviewed pertinent reports.

## 2023-09-01 NOTE — PLAN OF CARE
Problem: MOBILITY - ADULT  Goal: Maintain or return to baseline ADL function  Description: INTERVENTIONS:  -  Assess patient's ability to carry out ADLs; assess patient's baseline for ADL function and identify physical deficits which impact ability to perform ADLs (bathing, care of mouth/teeth, toileting, grooming, dressing, etc.)  - Assess/evaluate cause of self-care deficits   - Assess range of motion  - Assess patient's mobility; develop plan if impaired  - Assess patient's need for assistive devices and provide as appropriate  - Encourage maximum independence but intervene and supervise when necessary  - Involve family in performance of ADLs  - Assess for home care needs following discharge   - Consider OT consult to assist with ADL evaluation and planning for discharge  - Provide patient education as appropriate  Outcome: Progressing  Goal: Maintains/Returns to pre admission functional level  Description: INTERVENTIONS:  - Perform BMAT or MOVE assessment daily.   - Set and communicate daily mobility goal to care team and patient/family/caregiver. - Collaborate with rehabilitation services on mobility goals if consulted  - Perform Range of Motion 1 times a day. - Reposition patient every 2 hours.   - Dangle patient 1 times a day  - Stand patient 0 times a day  - Ambulate patient 0 times a day  - Out of bed to chair 1 times a day   - Out of bed for meals 0 times a day  - Out of bed for toileting  - Record patient progress and toleration of activity level   Outcome: Progressing     Problem: Prexisting or High Potential for Compromised Skin Integrity  Goal: Skin integrity is maintained or improved  Description: INTERVENTIONS:  - Identify patients at risk for skin breakdown  - Assess and monitor skin integrity  - Assess and monitor nutrition and hydration status  - Monitor labs   - Assess for incontinence   - Turn and reposition patient  - Assist with mobility/ambulation  - Relieve pressure over bony prominences  - Avoid friction and shearing  - Provide appropriate hygiene as needed including keeping skin clean and dry  - Evaluate need for skin moisturizer/barrier cream  - Collaborate with interdisciplinary team   - Patient/family teaching  - Consider wound care consult   Outcome: Progressing     Problem: PAIN - ADULT  Goal: Verbalizes/displays adequate comfort level or baseline comfort level  Description: Interventions:  - Encourage patient to monitor pain and request assistance  - Assess pain using appropriate pain scale  - Administer analgesics based on type and severity of pain and evaluate response  - Implement non-pharmacological measures as appropriate and evaluate response  - Consider cultural and social influences on pain and pain management  - Notify physician/advanced practitioner if interventions unsuccessful or patient reports new pain  Outcome: Progressing     Problem: INFECTION - ADULT  Goal: Absence or prevention of progression during hospitalization  Description: INTERVENTIONS:  - Assess and monitor for signs and symptoms of infection  - Monitor lab/diagnostic results  - Monitor all insertion sites, i.e. indwelling lines, tubes, and drains  - Monitor endotracheal if appropriate and nasal secretions for changes in amount and color  - [place] appropriate cooling/warming therapies per order  - Administer medications as ordered  - Instruct and encourage patient and family to use good hand hygiene technique  - Identify and instruct in appropriate isolation precautions for identified infection/condition  Outcome: Progressing  Goal: Absence of fever/infection during neutropenic period  Description: INTERVENTIONS:  - Monitor WBC    Outcome: Progressing     Problem: SAFETY ADULT  Goal: Maintain or return to baseline ADL function  Description: INTERVENTIONS:  -  Assess patient's ability to carry out ADLs; assess patient's baseline for ADL function and identify physical deficits which impact ability to perform ADLs (bathing, care of mouth/teeth, toileting, grooming, dressing, etc.)  - Assess/evaluate cause of self-care deficits   - Assess range of motion  - Assess patient's mobility; develop plan if impaired  - Assess patient's need for assistive devices and provide as appropriate  - Encourage maximum independence but intervene and supervise when necessary  - Involve family in performance of ADLs  - Assess for home care needs following discharge   - Consider OT consult to assist with ADL evaluation and planning for discharge  - Provide patient education as appropriate  Outcome: Progressing  Goal: Maintains/Returns to pre admission functional level  Description: INTERVENTIONS:  - Perform BMAT or MOVE assessment daily.   - Set and communicate daily mobility goal to care team and patient/family/caregiver. - Collaborate with rehabilitation services on mobility goals if consulted  - Perform Range of Motion 2 times a day. - Reposition patient every 2 hours.   - Dangle patient 1 times a day  - Stand patient 1 times a day  - Ambulate patient 0 times a day  - Out of bed to chair 1 times a day   - Out of bed for meals 3 times a day  - Out of bed for toileting  - Record patient progress and toleration of activity level   Outcome: Progressing  Goal: Patient will remain free of falls  Description: INTERVENTIONS:  - Educate patient/family on patient safety including physical limitations  - Instruct patient to call for assistance with activity   - Consult OT/PT to assist with strengthening/mobility   - Keep Call bell within reach  - Keep bed low and locked with side rails adjusted as appropriate  - Keep care items and personal belongings within reach  - Initiate and maintain comfort rounds  - Make Fall Risk Sign visible to staff  - Offer Toileting every 2 Hours, in advance of need  - Initiate/Maintain bed alarm  - Obtain necessary fall risk management equipment:   - Apply yellow socks and bracelet for high fall risk patients  - Consider moving patient to room near nurses station  Outcome: Progressing     Problem: DISCHARGE PLANNING  Goal: Discharge to home or other facility with appropriate resources  Description: INTERVENTIONS:  - Identify barriers to discharge w/patient and caregiver  - Arrange for needed discharge resources and transportation as appropriate  - Identify discharge learning needs (meds, wound care, etc.)  - Arrange for interpretive services to assist at discharge as needed  - Refer to Case Management Department for coordinating discharge planning if the patient needs post-hospital services based on physician/advanced practitioner order or complex needs related to functional status, cognitive ability, or social support system  Outcome: Progressing     Problem: Knowledge Deficit  Goal: Patient/family/caregiver demonstrates understanding of disease process, treatment plan, medications, and discharge instructions  Description: Complete learning assessment and assess knowledge base. Interventions:  - Provide teaching at level of understanding  - Provide teaching via preferred learning methods  Outcome: Progressing     Problem: Nutrition/Hydration-ADULT  Goal: Nutrient/Hydration intake appropriate for improving, restoring or maintaining nutritional needs  Description: Monitor and assess patient's nutrition/hydration status for malnutrition. Collaborate with interdisciplinary team and initiate plan and interventions as ordered. Monitor patient's weight and dietary intake as ordered or per policy. Utilize nutrition screening tool and intervene as necessary. Determine patient's food preferences and provide high-protein, high-caloric foods as appropriate.      INTERVENTIONS:  - Monitor oral intake, urinary output, labs, and treatment plans  - Assess nutrition and hydration status and recommend course of action  - Evaluate amount of meals eaten  - Assist patient with eating if necessary   - Allow adequate time for meals  - Recommend/ encourage appropriate diets, oral nutritional supplements, and vitamin/mineral supplements  - Order, calculate, and assess calorie counts as needed  - Recommend, monitor, and adjust tube feedings and TPN/PPN based on assessed needs  - Assess need for intravenous fluids  - Provide specific nutrition/hydration education as appropriate  - Include patient/family/caregiver in decisions related to nutrition  Outcome: Progressing

## 2023-09-01 NOTE — PROGRESS NOTES
Marcelino Cordero is a 80 y.o. male who is currently ordered Vancomycin IV with management by the Pharmacy Consult service. Relevant clinical data and objective / subjective history reviewed. Vancomycin Assessment:  Indication and Goal AUC/Trough: Soft tissue (goal -600, trough >10), -600, trough >10  Clinical Status: stable  Micro:     Renal Function:  SCr: 0.8 mg/dL  CrCl: 77.7 mL/min  Renal replacement: Not on dialysis  Days of Therapy: 4  Current Dose: 750 mg q12h  Vancomycin Plan:  New Dosing: No change  Estimated AUC: 402 mcg*hr/mL  Estimated Trough: 13.6 mcg/mL  Next Level: 0600 on 09/07/23  Renal Function Monitoring: Daily BMP and [place] will continue to follow closely for s/sx of nephrotoxicity, infusion reactions and appropriateness of therapy. BMP and CBC will be ordered per protocol. We will continue to follow the patient’s culture results and clinical progress daily.     Luisito Prince, Pharmacist

## 2023-09-01 NOTE — PLAN OF CARE
Problem: PHYSICAL THERAPY ADULT  Goal: Performs mobility at highest level of function for planned discharge setting. See evaluation for individualized goals. Description: Treatment/Interventions: Functional transfer training, LE strengthening/ROM, Therapeutic exercise, Endurance training, Cognitive reorientation, Patient/family training, Equipment eval/education, Bed mobility          See flowsheet documentation for full assessment, interventions and recommendations. 9/1/2023 1620 by Hannah Burnett, PT  Note:    Problem List: Decreased strength, Decreased endurance, Impaired balance, Decreased mobility, Decreased cognition, Obesity, Decreased skin integrity  Assessment: Vanessa Barton is a 80 y.o. Male who presents to [address] [place] [address] on 8/29/2023 from [facility] w/ c/o fever, L arm redness and swelling and diagnosis of severe sepsis, LUE cellulitis. Orders for PT eval and treat received. Pt presents w/ comorbidities of RA, HTN, Afib, CHF, GERD, PVD, . At baseline, pt mobilizes independenty for steppage transfers to his , and reports 0 falls in the last 6 months. Upon evaluation, pt presents w/ the following deficits: weakness, impaired balance, decreased endurance and impaired cognition. Upon eval, pt requires mod A x 2 for bed mobility, max A x 2 for transfers. Based on this PT evaluation today, patient's discharge recommendation is for Level II. During this admission, pt would benefit from continued skilled inpatient PT in the acute care setting in order to address the abovementioned deficits to maximize function and mobility before DC from acute care. See flowsheet documentation for full assessment.

## 2023-09-01 NOTE — PLAN OF CARE
Problem: MOBILITY - ADULT  Goal: Maintain or return to baseline ADL function  Description: INTERVENTIONS:  -  Assess patient's ability to carry out ADLs; assess patient's baseline for ADL function and identify physical deficits which impact ability to perform ADLs (bathing, care of mouth/teeth, toileting, grooming, dressing, etc.)  - Assess/evaluate cause of self-care deficits   - Assess range of motion  - Assess patient's mobility; develop plan if impaired  - Assess patient's need for assistive devices and provide as appropriate  - Encourage maximum independence but intervene and supervise when necessary  - Involve family in performance of ADLs  - Assess for home care needs following discharge   - Consider OT consult to assist with ADL evaluation and planning for discharge  - Provide patient education as appropriate  Outcome: Progressing  Goal: Maintains/Returns to pre admission functional level  Description: INTERVENTIONS:  - Perform BMAT or MOVE assessment daily.   - Set and communicate daily mobility goal to care team and patient/family/caregiver. - Collaborate with rehabilitation services on mobility goals if consulted  - Perform Range of Motion 3 times a day. - Reposition patient every 2 hours.   - Dangle patient 2 times a day  - Stand patient 2 times a day  - Ambulate patient 2 times a day  - Out of bed to chair 2 times a day   - Out of bed for meals 2 times a day  - Out of bed for toileting  - Record patient progress and toleration of activity level   Outcome: Progressing     Problem: Prexisting or High Potential for Compromised Skin Integrity  Goal: Skin integrity is maintained or improved  Description: INTERVENTIONS:  - Identify patients at risk for skin breakdown  - Assess and monitor skin integrity  - Assess and monitor nutrition and hydration status  - Monitor labs   - Assess for incontinence   - Turn and reposition patient  - Assist with mobility/ambulation  - Relieve pressure over bony prominences  - Avoid friction and shearing  - Provide appropriate hygiene as needed including keeping skin clean and dry  - Evaluate need for skin moisturizer/barrier cream  - Collaborate with interdisciplinary team   - Patient/family teaching  - Consider wound care consult   Outcome: Progressing     Problem: PAIN - ADULT  Goal: Verbalizes/displays adequate comfort level or baseline comfort level  Description: Interventions:  - Encourage patient to monitor pain and request assistance  - Assess pain using appropriate pain scale  - Administer analgesics based on type and severity of pain and evaluate response  - Implement non-pharmacological measures as appropriate and evaluate response  - Consider cultural and social influences on pain and pain management  - Notify physician/advanced practitioner if interventions unsuccessful or patient reports new pain  Outcome: Progressing     Problem: INFECTION - ADULT  Goal: Absence or prevention of progression during hospitalization  Description: INTERVENTIONS:  - Assess and monitor for signs and symptoms of infection  - Monitor lab/diagnostic results  - Monitor all insertion sites, i.e. indwelling lines, tubes, and drains  - Monitor endotracheal if appropriate and nasal secretions for changes in amount and color  - [place] appropriate cooling/warming therapies per order  - Administer medications as ordered  - Instruct and encourage patient and family to use good hand hygiene technique  - Identify and instruct in appropriate isolation precautions for identified infection/condition  Outcome: Progressing  Goal: Absence of fever/infection during neutropenic period  Description: INTERVENTIONS:  - Monitor WBC    Outcome: Progressing     Problem: SAFETY ADULT  Goal: Maintain or return to baseline ADL function  Description: INTERVENTIONS:  -  Assess patient's ability to carry out ADLs; assess patient's baseline for ADL function and identify physical deficits which impact ability to perform ADLs (bathing, care of mouth/teeth, toileting, grooming, dressing, etc.)  - Assess/evaluate cause of self-care deficits   - Assess range of motion  - Assess patient's mobility; develop plan if impaired  - Assess patient's need for assistive devices and provide as appropriate  - Encourage maximum independence but intervene and supervise when necessary  - Involve family in performance of ADLs  - Assess for home care needs following discharge   - Consider OT consult to assist with ADL evaluation and planning for discharge  - Provide patient education as appropriate  Outcome: Progressing  Goal: Maintains/Returns to pre admission functional level  Description: INTERVENTIONS:  - Perform BMAT or MOVE assessment daily.   - Set and communicate daily mobility goal to care team and patient/family/caregiver. - Collaborate with rehabilitation services on mobility goals if consulted  - Perform Range of Motion 3 times a day. - Reposition patient every 2 hours.   - Dangle patient 2 times a day  - Stand patient 2 times a day  - Ambulate patient 2 times a day  - Out of bed to chair 2 times a day   - Out of bed for meals 2 times a day  - Out of bed for toileting  - Record patient progress and toleration of activity level   Outcome: Progressing  Goal: Patient will remain free of falls  Description: INTERVENTIONS:  - Educate patient/family on patient safety including physical limitations  - Instruct patient to call for assistance with activity   - Consult OT/PT to assist with strengthening/mobility   - Keep Call bell within reach  - Keep bed low and locked with side rails adjusted as appropriate  - Keep care items and personal belongings within reach  - Initiate and maintain comfort rounds  - Make Fall Risk Sign visible to staff  - Offer Toileting every 2 Hours, in advance of need  - Initiate/Maintain bed alarm  - Apply yellow socks and bracelet for high fall risk patients  - Consider moving patient to room near nurses station  Outcome: Progressing     Problem: DISCHARGE PLANNING  Goal: Discharge to home or other facility with appropriate resources  Description: INTERVENTIONS:  - Identify barriers to discharge w/patient and caregiver  - Arrange for needed discharge resources and transportation as appropriate  - Identify discharge learning needs (meds, wound care, etc.)  - Arrange for interpretive services to assist at discharge as needed  - Refer to Case Management Department for coordinating discharge planning if the patient needs post-hospital services based on physician/advanced practitioner order or complex needs related to functional status, cognitive ability, or social support system  Outcome: Progressing     Problem: Knowledge Deficit  Goal: Patient/family/caregiver demonstrates understanding of disease process, treatment plan, medications, and discharge instructions  Description: Complete learning assessment and assess knowledge base. Interventions:  - Provide teaching at level of understanding  - Provide teaching via preferred learning methods  Outcome: Progressing     Problem: Nutrition/Hydration-ADULT  Goal: Nutrient/Hydration intake appropriate for improving, restoring or maintaining nutritional needs  Description: Monitor and assess patient's nutrition/hydration status for malnutrition. Collaborate with interdisciplinary team and initiate plan and interventions as ordered. Monitor patient's weight and dietary intake as ordered or per policy. Utilize nutrition screening tool and intervene as necessary. Determine patient's food preferences and provide high-protein, high-caloric foods as appropriate.      INTERVENTIONS:  - Monitor oral intake, urinary output, labs, and treatment plans  - Assess nutrition and hydration status and recommend course of action  - Evaluate amount of meals eaten  - Assist patient with eating if necessary   - Allow adequate time for meals  - Recommend/ encourage appropriate diets, oral nutritional supplements, and vitamin/mineral supplements  - Order, calculate, and assess calorie counts as needed  - Recommend, monitor, and adjust tube feedings and TPN/PPN based on assessed needs  - Assess need for intravenous fluids  - Provide specific nutrition/hydration education as appropriate  - Include patient/family/caregiver in decisions related to nutrition  Outcome: Progressing

## 2023-09-02 ENCOUNTER — APPOINTMENT (INPATIENT)
Dept: RADIOLOGY | Facility: HOSPITAL | Age: 88
DRG: 871 | End: 2023-09-02
Payer: COMMERCIAL

## 2023-09-02 ENCOUNTER — APPOINTMENT (INPATIENT)
Dept: CT IMAGING | Facility: HOSPITAL | Age: 88
DRG: 871 | End: 2023-09-02
Payer: COMMERCIAL

## 2023-09-02 PROBLEM — G93.41 METABOLIC ENCEPHALOPATHY: Status: ACTIVE | Noted: 2023-09-02

## 2023-09-02 LAB
ANION GAP SERPL CALCULATED.3IONS-SCNC: 4 MMOL/L
APTT PPP: 47 SECONDS (ref 23–37)
APTT PPP: 52 SECONDS (ref 23–37)
APTT PPP: 74 SECONDS (ref 23–37)
ATRIAL RATE: 88 BPM
BASE EX.OXY STD BLDV CALC-SCNC: 49.6 % (ref 60–80)
BASE EXCESS BLDV CALC-SCNC: 5.1 MMOL/L
BASOPHILS # BLD AUTO: 0.02 THOUSANDS/ÂΜL (ref 0–0.1)
BASOPHILS NFR BLD AUTO: 0 % (ref 0–1)
BUN SERPL-MCNC: 9 MG/DL (ref 5–25)
CALCIUM SERPL-MCNC: 9.2 MG/DL (ref 8.4–10.2)
CHLORIDE SERPL-SCNC: 104 MMOL/L (ref 96–108)
CO2 SERPL-SCNC: 30 MMOL/L (ref 21–32)
CREAT SERPL-MCNC: 0.79 MG/DL (ref 0.6–1.3)
EOSINOPHIL # BLD AUTO: 0.34 THOUSAND/ÂΜL (ref 0–0.61)
EOSINOPHIL NFR BLD AUTO: 5 % (ref 0–6)
ERYTHROCYTE [DISTWIDTH] IN BLOOD BY AUTOMATED COUNT: 15.6 % (ref 11.6–15.1)
GFR SERPL CREATININE-BSD FRML MDRD: 80 ML/MIN/1.73SQ M
GLUCOSE SERPL-MCNC: 102 MG/DL (ref 65–140)
GLUCOSE SERPL-MCNC: 127 MG/DL (ref 65–140)
GLUCOSE SERPL-MCNC: 129 MG/DL (ref 65–140)
GLUCOSE SERPL-MCNC: 133 MG/DL (ref 65–140)
GLUCOSE SERPL-MCNC: 151 MG/DL (ref 65–140)
GLUCOSE SERPL-MCNC: 99 MG/DL (ref 65–140)
HCO3 BLDV-SCNC: 31.6 MMOL/L (ref 24–30)
HCT VFR BLD AUTO: 33.8 % (ref 36.5–49.3)
HGB BLD-MCNC: 10.5 G/DL (ref 12–17)
IMM GRANULOCYTES # BLD AUTO: 0.04 THOUSAND/UL (ref 0–0.2)
IMM GRANULOCYTES NFR BLD AUTO: 1 % (ref 0–2)
LYMPHOCYTES # BLD AUTO: 1.32 THOUSANDS/ÂΜL (ref 0.6–4.47)
LYMPHOCYTES NFR BLD AUTO: 19 % (ref 14–44)
MAGNESIUM SERPL-MCNC: 2.1 MG/DL (ref 1.9–2.7)
MCH RBC QN AUTO: 31.4 PG (ref 26.8–34.3)
MCHC RBC AUTO-ENTMCNC: 31.1 G/DL (ref 31.4–37.4)
MCV RBC AUTO: 101 FL (ref 82–98)
MONOCYTES # BLD AUTO: 0.52 THOUSAND/ÂΜL (ref 0.17–1.22)
MONOCYTES NFR BLD AUTO: 7 % (ref 4–12)
NEUTROPHILS # BLD AUTO: 4.78 THOUSANDS/ÂΜL (ref 1.85–7.62)
NEUTS SEG NFR BLD AUTO: 68 % (ref 43–75)
NRBC BLD AUTO-RTO: 0 /100 WBCS
O2 CT BLDV-SCNC: 8.9 ML/DL
P AXIS: 53 DEGREES
PCO2 BLDV: 55.1 MM HG (ref 42–50)
PH BLDV: 7.38 [PH] (ref 7.3–7.4)
PHOSPHATE SERPL-MCNC: 1.2 MG/DL (ref 2.3–4.1)
PHOSPHATE SERPL-MCNC: 1.7 MG/DL (ref 2.3–4.1)
PHOSPHATE SERPL-MCNC: 2.9 MG/DL (ref 2.3–4.1)
PLATELET # BLD AUTO: 159 THOUSANDS/UL (ref 149–390)
PMV BLD AUTO: 11.8 FL (ref 8.9–12.7)
PO2 BLDV: 25 MM HG (ref 35–45)
POTASSIUM SERPL-SCNC: 4.6 MMOL/L (ref 3.5–5.3)
PR INTERVAL: 146 MS
PROCALCITONIN SERPL-MCNC: 2.79 NG/ML
QRS AXIS: -44 DEGREES
QRSD INTERVAL: 90 MS
QT INTERVAL: 340 MS
QTC INTERVAL: 411 MS
RBC # BLD AUTO: 3.34 MILLION/UL (ref 3.88–5.62)
SODIUM SERPL-SCNC: 138 MMOL/L (ref 135–147)
T WAVE AXIS: 75 DEGREES
VENTRICULAR RATE: 88 BPM
WBC # BLD AUTO: 7.02 THOUSAND/UL (ref 4.31–10.16)

## 2023-09-02 PROCEDURE — 93005 ELECTROCARDIOGRAM TRACING: CPT

## 2023-09-02 PROCEDURE — 99232 SBSQ HOSP IP/OBS MODERATE 35: CPT | Performed by: PHYSICIAN ASSISTANT

## 2023-09-02 PROCEDURE — 83036 HEMOGLOBIN GLYCOSYLATED A1C: CPT | Performed by: PHYSICIAN ASSISTANT

## 2023-09-02 PROCEDURE — 82948 REAGENT STRIP/BLOOD GLUCOSE: CPT

## 2023-09-02 PROCEDURE — 94640 AIRWAY INHALATION TREATMENT: CPT

## 2023-09-02 PROCEDURE — 84145 PROCALCITONIN (PCT): CPT | Performed by: PHYSICIAN ASSISTANT

## 2023-09-02 PROCEDURE — 82805 BLOOD GASES W/O2 SATURATION: CPT | Performed by: PHYSICIAN ASSISTANT

## 2023-09-02 PROCEDURE — 85730 THROMBOPLASTIN TIME PARTIAL: CPT | Performed by: PHYSICIAN ASSISTANT

## 2023-09-02 PROCEDURE — 94760 N-INVAS EAR/PLS OXIMETRY 1: CPT

## 2023-09-02 PROCEDURE — 83735 ASSAY OF MAGNESIUM: CPT | Performed by: PHYSICIAN ASSISTANT

## 2023-09-02 PROCEDURE — 71045 X-RAY EXAM CHEST 1 VIEW: CPT

## 2023-09-02 PROCEDURE — 85730 THROMBOPLASTIN TIME PARTIAL: CPT

## 2023-09-02 PROCEDURE — 70450 CT HEAD/BRAIN W/O DYE: CPT

## 2023-09-02 PROCEDURE — 84100 ASSAY OF PHOSPHORUS: CPT | Performed by: PHYSICIAN ASSISTANT

## 2023-09-02 PROCEDURE — G1004 CDSM NDSC: HCPCS

## 2023-09-02 PROCEDURE — 93010 ELECTROCARDIOGRAM REPORT: CPT | Performed by: INTERNAL MEDICINE

## 2023-09-02 PROCEDURE — 80048 BASIC METABOLIC PNL TOTAL CA: CPT | Performed by: PHYSICIAN ASSISTANT

## 2023-09-02 PROCEDURE — 85025 COMPLETE CBC W/AUTO DIFF WBC: CPT | Performed by: PHYSICIAN ASSISTANT

## 2023-09-02 RX ORDER — NYSTATIN 100000 [USP'U]/G
POWDER TOPICAL 2 TIMES DAILY
Status: DISCONTINUED | OUTPATIENT
Start: 2023-09-02 | End: 2023-09-09 | Stop reason: HOSPADM

## 2023-09-02 RX ORDER — HEPARIN SODIUM 1000 [USP'U]/ML
2000 INJECTION, SOLUTION INTRAVENOUS; SUBCUTANEOUS EVERY 6 HOURS PRN
Status: DISCONTINUED | OUTPATIENT
Start: 2023-09-02 | End: 2023-09-03

## 2023-09-02 RX ORDER — INSULIN LISPRO 100 [IU]/ML
1-5 INJECTION, SOLUTION INTRAVENOUS; SUBCUTANEOUS
Status: DISCONTINUED | OUTPATIENT
Start: 2023-09-02 | End: 2023-09-09 | Stop reason: HOSPADM

## 2023-09-02 RX ORDER — HEPARIN SODIUM 10000 [USP'U]/100ML
3-20 INJECTION, SOLUTION INTRAVENOUS
Status: DISCONTINUED | OUTPATIENT
Start: 2023-09-02 | End: 2023-09-03

## 2023-09-02 RX ORDER — HEPARIN SODIUM 1000 [USP'U]/ML
4000 INJECTION, SOLUTION INTRAVENOUS; SUBCUTANEOUS EVERY 6 HOURS PRN
Status: DISCONTINUED | OUTPATIENT
Start: 2023-09-02 | End: 2023-09-03

## 2023-09-02 RX ORDER — GABAPENTIN 100 MG/1
100 CAPSULE ORAL 3 TIMES DAILY
Status: DISCONTINUED | OUTPATIENT
Start: 2023-09-02 | End: 2023-09-06

## 2023-09-02 RX ORDER — FUROSEMIDE 10 MG/ML
40 INJECTION INTRAMUSCULAR; INTRAVENOUS DAILY
Status: DISCONTINUED | OUTPATIENT
Start: 2023-09-02 | End: 2023-09-06

## 2023-09-02 RX ORDER — HALOPERIDOL 5 MG/ML
1 INJECTION INTRAMUSCULAR EVERY 6 HOURS PRN
Status: DISCONTINUED | OUTPATIENT
Start: 2023-09-02 | End: 2023-09-09 | Stop reason: HOSPADM

## 2023-09-02 RX ORDER — QUETIAPINE FUMARATE 25 MG/1
25 TABLET, FILM COATED ORAL
Status: DISCONTINUED | OUTPATIENT
Start: 2023-09-02 | End: 2023-09-05

## 2023-09-02 RX ADMIN — PENICILLIN G POTASSIUM 4 MILLION UNITS: 5000000 INJECTION, POWDER, FOR SOLUTION INTRAMUSCULAR; INTRAVENOUS at 10:46

## 2023-09-02 RX ADMIN — PENICILLIN G POTASSIUM 4 MILLION UNITS: 5000000 INJECTION, POWDER, FOR SOLUTION INTRAMUSCULAR; INTRAVENOUS at 06:11

## 2023-09-02 RX ADMIN — LEVALBUTEROL HYDROCHLORIDE 1.25 MG: 1.25 SOLUTION RESPIRATORY (INHALATION) at 07:56

## 2023-09-02 RX ADMIN — Medication 2 G: at 11:03

## 2023-09-02 RX ADMIN — PENICILLIN G POTASSIUM 4 MILLION UNITS: 5000000 INJECTION, POWDER, FOR SOLUTION INTRAMUSCULAR; INTRAVENOUS at 03:04

## 2023-09-02 RX ADMIN — Medication 9 MG: at 21:33

## 2023-09-02 RX ADMIN — CHLORHEXIDINE GLUCONATE 15 ML: 1.2 RINSE ORAL at 21:25

## 2023-09-02 RX ADMIN — QUETIAPINE FUMARATE 25 MG: 25 TABLET ORAL at 21:33

## 2023-09-02 RX ADMIN — HEPARIN SODIUM 2000 UNITS: 1000 INJECTION INTRAVENOUS; SUBCUTANEOUS at 21:26

## 2023-09-02 RX ADMIN — LEVALBUTEROL HYDROCHLORIDE 1.25 MG: 1.25 SOLUTION RESPIRATORY (INHALATION) at 13:57

## 2023-09-02 RX ADMIN — Medication 2 G: at 08:57

## 2023-09-02 RX ADMIN — IPRATROPIUM BROMIDE 0.5 MG: 0.5 SOLUTION RESPIRATORY (INHALATION) at 07:56

## 2023-09-02 RX ADMIN — HEPARIN SODIUM 15.1 UNITS/KG/HR: 10000 INJECTION, SOLUTION INTRAVENOUS at 10:46

## 2023-09-02 RX ADMIN — PENICILLIN G POTASSIUM 4 MILLION UNITS: 5000000 INJECTION, POWDER, FOR SOLUTION INTRAMUSCULAR; INTRAVENOUS at 18:24

## 2023-09-02 RX ADMIN — Medication 12.5 MG: at 12:28

## 2023-09-02 RX ADMIN — PENICILLIN G POTASSIUM 4 MILLION UNITS: 5000000 INJECTION, POWDER, FOR SOLUTION INTRAMUSCULAR; INTRAVENOUS at 22:09

## 2023-09-02 RX ADMIN — PREDNISONE 5 MG: 5 TABLET ORAL at 12:28

## 2023-09-02 RX ADMIN — LIDOCAINE 2 PATCH: 50 PATCH TOPICAL at 08:49

## 2023-09-02 RX ADMIN — PENICILLIN G POTASSIUM 4 MILLION UNITS: 5000000 INJECTION, POWDER, FOR SOLUTION INTRAMUSCULAR; INTRAVENOUS at 14:29

## 2023-09-02 RX ADMIN — Medication 2 G: at 21:45

## 2023-09-02 RX ADMIN — NYSTATIN 1 APPLICATION: 100000 POWDER TOPICAL at 17:27

## 2023-09-02 RX ADMIN — LEVALBUTEROL HYDROCHLORIDE 1.25 MG: 1.25 SOLUTION RESPIRATORY (INHALATION) at 20:22

## 2023-09-02 RX ADMIN — SODIUM PHOSPHATE, MONOBASIC, MONOHYDRATE AND SODIUM PHOSPHATE, DIBASIC, ANHYDROUS 21 MMOL: 142; 276 INJECTION, SOLUTION INTRAVENOUS at 14:51

## 2023-09-02 RX ADMIN — IPRATROPIUM BROMIDE 0.5 MG: 0.5 SOLUTION RESPIRATORY (INHALATION) at 13:57

## 2023-09-02 RX ADMIN — HEPARIN SODIUM 2000 UNITS: 1000 INJECTION INTRAVENOUS; SUBCUTANEOUS at 06:33

## 2023-09-02 RX ADMIN — HALOPERIDOL LACTATE 1 MG: 5 INJECTION, SOLUTION INTRAMUSCULAR at 17:44

## 2023-09-02 RX ADMIN — NYSTATIN: 100000 POWDER TOPICAL at 12:28

## 2023-09-02 RX ADMIN — IPRATROPIUM BROMIDE 0.5 MG: 0.5 SOLUTION RESPIRATORY (INHALATION) at 20:22

## 2023-09-02 RX ADMIN — FUROSEMIDE 40 MG: 10 INJECTION, SOLUTION INTRAMUSCULAR; INTRAVENOUS at 09:29

## 2023-09-02 RX ADMIN — GABAPENTIN 100 MG: 100 CAPSULE ORAL at 21:34

## 2023-09-02 RX ADMIN — Medication 12.5 MG: at 21:34

## 2023-09-02 RX ADMIN — GABAPENTIN 100 MG: 100 CAPSULE ORAL at 16:07

## 2023-09-02 RX ADMIN — Medication 2 G: at 17:26

## 2023-09-02 RX ADMIN — HYDROXYCHLOROQUINE SULFATE 200 MG: 200 TABLET, FILM COATED ORAL at 16:07

## 2023-09-02 NOTE — ASSESSMENT & PLAN NOTE
• BC 1/2 (8/29): + Streptococcus agalactiae (Group B), susceptible to penicillin. • Repeat BC x2 (8/31): NGTD (48 hrs)  • Transitioned to vancomycin, clindamycin, high-dose PCN  • Echo without evidence of vegetation  • ID consulted:  • D/C IV vancomycin, significant clinical improvement w/ recovery GBS.   • Follow culture results

## 2023-09-02 NOTE — PROGRESS NOTES
Vancomycin IV Pharmacy-to-Dose Consultation     Vancomycin has been discontinued. Pharmacy will sign off. Please contact or re-consult with questions.     Juliana Cutler, Pharmacist

## 2023-09-02 NOTE — PLAN OF CARE
Problem: MOBILITY - ADULT  Goal: Maintain or return to baseline ADL function  Description: INTERVENTIONS:  -  Assess patient's ability to carry out ADLs; assess patient's baseline for ADL function and identify physical deficits which impact ability to perform ADLs (bathing, care of mouth/teeth, toileting, grooming, dressing, etc.)  - Assess/evaluate cause of self-care deficits   - Assess range of motion  - Assess patient's mobility; develop plan if impaired  - Assess patient's need for assistive devices and provide as appropriate  - Encourage maximum independence but intervene and supervise when necessary  - Involve family in performance of ADLs  - Assess for home care needs following discharge   - Consider OT consult to assist with ADL evaluation and planning for discharge  - Provide patient education as appropriate  Outcome: Progressing  Goal: Maintains/Returns to pre admission functional level  Description: INTERVENTIONS:  - Perform BMAT or MOVE assessment daily.   - Set and communicate daily mobility goal to care team and patient/family/caregiver. - Collaborate with rehabilitation services on mobility goals if consulted  - Perform Range of Motion 3 times a day. - Reposition patient every 2 hours.   - Dangle patient 2 times a day  - Stand patient 2 times a day  - Ambulate patient 2 times a day  - Out of bed to chair 2 times a day   - Out of bed for meals 2 times a day  - Out of bed for toileting  - Record patient progress and toleration of activity level   Outcome: Progressing     Problem: Prexisting or High Potential for Compromised Skin Integrity  Goal: Skin integrity is maintained or improved  Description: INTERVENTIONS:  - Identify patients at risk for skin breakdown  - Assess and monitor skin integrity  - Assess and monitor nutrition and hydration status  - Monitor labs   - Assess for incontinence   - Turn and reposition patient  - Assist with mobility/ambulation  - Relieve pressure over bony prominences  - Avoid friction and shearing  - Provide appropriate hygiene as needed including keeping skin clean and dry  - Evaluate need for skin moisturizer/barrier cream  - Collaborate with interdisciplinary team   - Patient/family teaching  - Consider wound care consult   Outcome: Progressing     Problem: PAIN - ADULT  Goal: Verbalizes/displays adequate comfort level or baseline comfort level  Description: Interventions:  - Encourage patient to monitor pain and request assistance  - Assess pain using appropriate pain scale  - Administer analgesics based on type and severity of pain and evaluate response  - Implement non-pharmacological measures as appropriate and evaluate response  - Consider cultural and social influences on pain and pain management  - Notify physician/advanced practitioner if interventions unsuccessful or patient reports new pain  Outcome: Progressing     Problem: INFECTION - ADULT  Goal: Absence or prevention of progression during hospitalization  Description: INTERVENTIONS:  - Assess and monitor for signs and symptoms of infection  - Monitor lab/diagnostic results  - Monitor all insertion sites, i.e. indwelling lines, tubes, and drains  - Monitor endotracheal if appropriate and nasal secretions for changes in amount and color  - [place] appropriate cooling/warming therapies per order  - Administer medications as ordered  - Instruct and encourage patient and family to use good hand hygiene technique  - Identify and instruct in appropriate isolation precautions for identified infection/condition  Outcome: Progressing  Goal: Absence of fever/infection during neutropenic period  Description: INTERVENTIONS:  - Monitor WBC    Outcome: Progressing     Problem: DISCHARGE PLANNING  Goal: Discharge to home or other facility with appropriate resources  Description: INTERVENTIONS:  - Identify barriers to discharge w/patient and caregiver  - Arrange for needed discharge resources and transportation as appropriate  - Identify discharge learning needs (meds, wound care, etc.)  - Arrange for interpretive services to assist at discharge as needed  - Refer to Case Management Department for coordinating discharge planning if the patient needs post-hospital services based on physician/advanced practitioner order or complex needs related to functional status, cognitive ability, or social support system  Outcome: Progressing     Problem: Knowledge Deficit  Goal: Patient/family/caregiver demonstrates understanding of disease process, treatment plan, medications, and discharge instructions  Description: Complete learning assessment and assess knowledge base. Interventions:  - Provide teaching at level of understanding  - Provide teaching via preferred learning methods  Outcome: Progressing     Problem: Nutrition/Hydration-ADULT  Goal: Nutrient/Hydration intake appropriate for improving, restoring or maintaining nutritional needs  Description: Monitor and assess patient's nutrition/hydration status for malnutrition. Collaborate with interdisciplinary team and initiate plan and interventions as ordered. Monitor patient's weight and dietary intake as ordered or per policy. Utilize nutrition screening tool and intervene as necessary. Determine patient's food preferences and provide high-protein, high-caloric foods as appropriate.      INTERVENTIONS:  - Monitor oral intake, urinary output, labs, and treatment plans  - Assess nutrition and hydration status and recommend course of action  - Evaluate amount of meals eaten  - Assist patient with eating if necessary   - Allow adequate time for meals  - Recommend/ encourage appropriate diets, oral nutritional supplements, and vitamin/mineral supplements  - Order, calculate, and assess calorie counts as needed  - Recommend, monitor, and adjust tube feedings and TPN/PPN based on assessed needs  - Assess need for intravenous fluids  - Provide specific nutrition/hydration education as appropriate  - Include patient/family/caregiver in decisions related to nutrition  Outcome: Progressing     Problem: SAFETY,RESTRAINT: NV/NON-SELF DESTRUCTIVE BEHAVIOR  Goal: Remains free of harm/injury (restraint for non violent/non self-detsructive behavior)  Description: INTERVENTIONS:  - Instruct patient/family regarding restraint use   - Assess and monitor physiologic and psychological status   - Provide interventions and comfort measures to meet assessed patient needs   - Identify and implement measures to help patient regain control  - Assess readiness for release of restraint   Outcome: Progressing  Goal: Returns to optimal restraint-free functioning  Description: INTERVENTIONS:  - Assess the patient's behavior and symptoms that indicate continued need for restraint  - Identify and implement measures to help patient regain control  - Assess readiness for release of restraint   Outcome: Progressing
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
## 2023-09-02 NOTE — ASSESSMENT & PLAN NOTE
• Presented with LUE erythema, fever consistent with cellulitis  • CT LUE (8/29): Left upper extremity subcutaneous edema indicating cellulitis without drainable collection, soft tissue emphysema or evidence of osteomyelitis.   • Worsening erythema LUE, development of RUE erythema/ecchymosis (8/30) since resolved  • See additional plan below for severe sepsis  • Neurovascular checks  • Elevate arms, local wound care

## 2023-09-02 NOTE — ASSESSMENT & PLAN NOTE
Wt Readings from Last 3 Encounters:   09/02/23 111 kg (245 lb 6 oz)   08/28/23 99.8 kg (220 lb)   08/24/23 105 kg (232 lb)     • Not in acute exacerbation. Edema of upper extremities likely more in setting of cellulitis. • Home regimen: Torsemide alternating 20/10 mg daily, Toprol XL 25 mg BID  • Echo (8/30/23): EF 45%, G1DD, moderate MVR. Improved from EF 30% in 1/2023.   • Restarted on low-dose BB w/ Lopressor  • Start IV Lasix 40 mg daily, concern for volume overload  • Daily weights, strict I/Os

## 2023-09-02 NOTE — PLAN OF CARE
Problem: MOBILITY - ADULT  Goal: Maintain or return to baseline ADL function  Description: INTERVENTIONS:  -  Assess patient's ability to carry out ADLs; assess patient's baseline for ADL function and identify physical deficits which impact ability to perform ADLs (bathing, care of mouth/teeth, toileting, grooming, dressing, etc.)  - Assess/evaluate cause of self-care deficits   - Assess range of motion  - Assess patient's mobility; develop plan if impaired  - Assess patient's need for assistive devices and provide as appropriate  - Encourage maximum independence but intervene and supervise when necessary  - Involve family in performance of ADLs  - Assess for home care needs following discharge   - Consider OT consult to assist with ADL evaluation and planning for discharge  - Provide patient education as appropriate  Outcome: Progressing  Goal: Maintains/Returns to pre admission functional level  Description: INTERVENTIONS:  - Perform BMAT or MOVE assessment daily.   - Set and communicate daily mobility goal to care team and patient/family/caregiver.    - Collaborate with rehabilitation services on mobility goals if consulted  - Out of bed for toileting  - Record patient progress and toleration of activity level   Outcome: Progressing     Problem: Prexisting or High Potential for Compromised Skin Integrity  Goal: Skin integrity is maintained or improved  Description: INTERVENTIONS:  - Identify patients at risk for skin breakdown  - Assess and monitor skin integrity  - Assess and monitor nutrition and hydration status  - Monitor labs   - Assess for incontinence   - Turn and reposition patient  - Assist with mobility/ambulation  - Relieve pressure over bony prominences  - Avoid friction and shearing  - Provide appropriate hygiene as needed including keeping skin clean and dry  - Evaluate need for skin moisturizer/barrier cream  - Collaborate with interdisciplinary team   - Patient/family teaching  - Consider wound care consult   Outcome: Progressing     Problem: PAIN - ADULT  Goal: Verbalizes/displays adequate comfort level or baseline comfort level  Description: Interventions:  - Encourage patient to monitor pain and request assistance  - Assess pain using appropriate pain scale  - Administer analgesics based on type and severity of pain and evaluate response  - Implement non-pharmacological measures as appropriate and evaluate response  - Consider cultural and social influences on pain and pain management  - Notify physician/advanced practitioner if interventions unsuccessful or patient reports new pain  Outcome: Progressing     Problem: INFECTION - ADULT  Goal: Absence or prevention of progression during hospitalization  Description: INTERVENTIONS:  - Assess and monitor for signs and symptoms of infection  - Monitor lab/diagnostic results  - Monitor all insertion sites, i.e. indwelling lines, tubes, and drains  - Monitor endotracheal if appropriate and nasal secretions for changes in amount and color  - [place] appropriate cooling/warming therapies per order  - Administer medications as ordered  - Instruct and encourage patient and family to use good hand hygiene technique  - Identify and instruct in appropriate isolation precautions for identified infection/condition  Outcome: Progressing  Goal: Absence of fever/infection during neutropenic period  Description: INTERVENTIONS:  - Monitor WBC    Outcome: Progressing     Problem: SAFETY ADULT  Goal: Maintain or return to baseline ADL function  Description: INTERVENTIONS:  -  Assess patient's ability to carry out ADLs; assess patient's baseline for ADL function and identify physical deficits which impact ability to perform ADLs (bathing, care of mouth/teeth, toileting, grooming, dressing, etc.)  - Assess/evaluate cause of self-care deficits   - Assess range of motion  - Assess patient's mobility; develop plan if impaired  - Assess patient's need for assistive devices and provide as appropriate  - Encourage maximum independence but intervene and supervise when necessary  - Involve family in performance of ADLs  - Assess for home care needs following discharge   - Consider OT consult to assist with ADL evaluation and planning for discharge  - Provide patient education as appropriate  Outcome: Progressing  Goal: Maintains/Returns to pre admission functional level  Description: INTERVENTIONS:  - Perform BMAT or MOVE assessment daily.   - Set and communicate daily mobility goal to care team and patient/family/caregiver.    - Collaborate with rehabilitation services on mobility goals if consulted  - Out of bed for toileting  - Record patient progress and toleration of activity level   Outcome: Progressing  Goal: Patient will remain free of falls  Description: INTERVENTIONS:  - Educate patient/family on patient safety including physical limitations  - Instruct patient to call for assistance with activity   - Consult OT/PT to assist with strengthening/mobility   - Keep Call bell within reach  - Keep bed low and locked with side rails adjusted as appropriate  - Keep care items and personal belongings within reach  - Initiate and maintain comfort rounds  - Make Fall Risk Sign visible to staff  - Apply yellow socks and bracelet for high fall risk patients  - Consider moving patient to room near nurses station  Outcome: Progressing     Problem: DISCHARGE PLANNING  Goal: Discharge to home or other facility with appropriate resources  Description: INTERVENTIONS:  - Identify barriers to discharge w/patient and caregiver  - Arrange for needed discharge resources and transportation as appropriate  - Identify discharge learning needs (meds, wound care, etc.)  - Arrange for interpretive services to assist at discharge as needed  - Refer to Case Management Department for coordinating discharge planning if the patient needs post-hospital services based on physician/advanced practitioner order or complex needs related to functional status, cognitive ability, or social support system  Outcome: Progressing     Problem: Knowledge Deficit  Goal: Patient/family/caregiver demonstrates understanding of disease process, treatment plan, medications, and discharge instructions  Description: Complete learning assessment and assess knowledge base. Interventions:  - Provide teaching at level of understanding  - Provide teaching via preferred learning methods  Outcome: Progressing     Problem: Nutrition/Hydration-ADULT  Goal: Nutrient/Hydration intake appropriate for improving, restoring or maintaining nutritional needs  Description: Monitor and assess patient's nutrition/hydration status for malnutrition. Collaborate with interdisciplinary team and initiate plan and interventions as ordered. Monitor patient's weight and dietary intake as ordered or per policy. Utilize nutrition screening tool and intervene as necessary. Determine patient's food preferences and provide high-protein, high-caloric foods as appropriate.      INTERVENTIONS:  - Monitor oral intake, urinary output, labs, and treatment plans  - Assess nutrition and hydration status and recommend course of action  - Evaluate amount of meals eaten  - Assist patient with eating if necessary   - Allow adequate time for meals  - Recommend/ encourage appropriate diets, oral nutritional supplements, and vitamin/mineral supplements  - Order, calculate, and assess calorie counts as needed  - Recommend, monitor, and adjust tube feedings and TPN/PPN based on assessed needs  - Assess need for intravenous fluids  - Provide specific nutrition/hydration education as appropriate  - Include patient/family/caregiver in decisions related to nutrition  Outcome: Progressing     Problem: SAFETY,RESTRAINT: NV/NON-SELF DESTRUCTIVE BEHAVIOR  Goal: Remains free of harm/injury (restraint for non violent/non self-detsructive behavior)  Description: INTERVENTIONS:  - Instruct patient/family regarding restraint use   - Assess and monitor physiologic and psychological status   - Provide interventions and comfort measures to meet assessed patient needs   - Identify and implement measures to help patient regain control  - Assess readiness for release of restraint   Outcome: Progressing  Goal: Returns to optimal restraint-free functioning  Description: INTERVENTIONS:  - Assess the patient's behavior and symptoms that indicate continued need for restraint  - Identify and implement measures to help patient regain control  - Assess readiness for release of restraint   Outcome: Progressing

## 2023-09-02 NOTE — ASSESSMENT & PLAN NOTE
Patient with increased confusion, agitation requiring soft wrist restraints since 9/2. Patient's son reports history of delirium during prior hospitalizations, unsure if it was due to opioids or hospital related at the time. · Likely multifactorial 2/2 sepsis, respiratory insufficiency, volume overload, hospital-related  · UA on admission benign, no signs of urinary retention thus far  · Repeat CXR today personally reviewed, no obvious infiltrates or effusions compared to recent on 8/31  · Blood glucose stable, obtain updated A1c & initiate SSI coverage  · VBG today reviewed, pH WNL but CO2 increased at 55 & HCO3 31.6  · Check CT head, F/U repeat phosphorus levels & check ABG in a.m.   · Caution with opioids, sedating medications  · Neurochecks every 4 hours

## 2023-09-02 NOTE — ASSESSMENT & PLAN NOTE
• Wheezing on exam, initially requiring 4L NC O2  • Xopenex/atrovent nebs  • PRN diuresis with IV Lasix  • Respiratory protocol, encourage IS  • Wean supplemental O2 to maintain spO2 > 92%  • Improving, currently requiring 2L NC O2  • May need desat screening prior to discharge pending dispo planning

## 2023-09-02 NOTE — ASSESSMENT & PLAN NOTE
Sepsis criteria AEB fever, tachycardia, tachypnea POA. End organ damage/shock AEB hypotension briefly requiring Levophed, admitted under critical care service. Source: LUE cellulitis, group B strep bacteremia. Patient initially presented from [address] [place] [address] [facility] w/ fever 103 & erythema of left arm. • No DVT on venous duplex bilateral UEs, UA and CXR unremarkable  • S/p 2250 mL IVF bolus + albumin in ED. • Off Levophed since 8/30, transition to [address] [place] [address] service 9/1. • Lactic acid: 1.9->2.1->1.3 / Procal: 0.16->14, since downtrending at 2.79 today  • IV vancomycin + IV Rocephin -> IV cefepime -> IV clindamycin -> IV PCN G  • General surgery signed off, no acute surgical interventions at this time. Presentation not suspicious for necrotizing infection, no drainable abscess or gas in tissue.   • ID following:  • C/w IV PCN G while inpatient, transition to amoxicillin 500 mg PO TID through till 9/13/2023 at discharge  • Follow culture results

## 2023-09-02 NOTE — ASSESSMENT & PLAN NOTE
• Initially presented in A-fib with RVR, likely in setting of sepsis  • Home regimen: Toprol 25 mg BID, AC on Eliquis.   • Rate since controlled/improved  • Digoxin started then discontinued  • Restarted on low-dose BB w/ Lopressor  • C/w heparin gtt, transition to Eliquis once mental status/behavior improves, was refusing to take oral meds

## 2023-09-02 NOTE — ASSESSMENT & PLAN NOTE
• Home Regimen: Gabapentin 100 mg TID, tramadol-PRN  • Lidocaine patch, PRN Tylenol   • Decrease gabapentin back to home dose 300->100mg TID with worsening confusion, agitation  • Tramadol, IV Dilaudid PRN

## 2023-09-02 NOTE — ASSESSMENT & PLAN NOTE
• BP reviewed, slightly hypertensive but likely in setting of agitation  • Restarted BB with Lopressor 12.5 mg BID  • Restart diuretic with IV Lasix 40 mg daily  • Monitor BP closely

## 2023-09-02 NOTE — ASSESSMENT & PLAN NOTE
• Has been seeing Dr. 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

## 2023-09-02 NOTE — PROGRESS NOTES
[facility]  Progress Note  Name: Krishna Azul  MRN: [id number]  Unit/Bed#: -01 SDU I Date of Admission: 8/29/2023   Date of Service: 9/2/2023 I Hospital Day: 4    Assessment/Plan   Cellulitis of left upper extremity  Assessment & Plan  • Presented with LUE erythema, fever consistent with cellulitis  • CT LUE (8/29): Left upper extremity subcutaneous edema indicating cellulitis without drainable collection, soft tissue emphysema or evidence of osteomyelitis. • Worsening erythema LUE, development of RUE erythema/ecchymosis (8/30) since resolved  • See additional plan below for severe sepsis  • Neurovascular checks  • Elevate arms, local wound care    * Severe sepsis [facility])  Assessment & Plan  Sepsis criteria AEB fever, tachycardia, tachypnea POA. End organ damage/shock AEB hypotension briefly requiring Levophed, admitted under critical care service. Source: LUE cellulitis, group B strep bacteremia. Patient initially presented from [address] [place] [address] senior care w/ fever 103 & erythema of left arm. • No DVT on venous duplex bilateral UEs, UA and CXR unremarkable  • S/p 2250 mL IVF bolus + albumin in ED. • Off Levophed since 8/30, transition to [facility] service 9/1. • Lactic acid: 1.9->2.1->1.3 / Procal: 0.16->14, since downtrending at 2.79 today  • IV vancomycin + IV Rocephin -> IV cefepime -> IV clindamycin -> IV PCN G  • General surgery signed off, no acute surgical interventions at this time. Presentation not suspicious for necrotizing infection, no drainable abscess or gas in tissue. • ID following:  • C/w IV PCN G while inpatient, transition to amoxicillin 500 mg PO TID through till 9/13/2023 at discharge  • Follow culture results    Metabolic encephalopathy  Assessment & Plan  Patient with increased confusion, agitation requiring soft wrist restraints since 9/2.  Patient's son reports history of delirium during prior hospitalizations, unsure if it was due to opioids or hospital related at the time. · Likely multifactorial 2/2 sepsis, respiratory insufficiency, volume overload, hospital-related  · UA on admission benign, no signs of urinary retention thus far  · Repeat CXR today personally reviewed, no obvious infiltrates or effusions compared to recent on 8/31  · Blood glucose stable, obtain updated A1c & initiate SSI coverage  · VBG today reviewed, pH WNL but CO2 increased at 55 & HCO3 31.6  · Check CT head, F/U repeat phosphorus levels & check ABG in a.m. · Caution with opioids, sedating medications  · Neurochecks every 4 hours    Bacteremia due to group B Streptococcus  Assessment & Plan  • BC 1/2 (8/29): + Streptococcus agalactiae (Group B), susceptible to penicillin. • Repeat BC x2 (8/31): NGTD (48 hrs)  • Transitioned to vancomycin, clindamycin, high-dose PCN  • Echo without evidence of vegetation  • ID consulted:  • D/C IV vancomycin, significant clinical improvement w/ recovery GBS. • Follow culture results    Chronic systolic heart failure [facility])  Assessment & Plan  Wt Readings from Last 3 Encounters:   09/02/23 111 kg (245 lb 6 oz)   08/28/23 99.8 kg (220 lb)   08/24/23 105 kg (232 lb)     • Not in acute exacerbation. Edema of upper extremities likely more in setting of cellulitis. • Home regimen: Torsemide alternating 20/10 mg daily, Toprol XL 25 mg BID  • Echo (8/30/23): EF 45%, G1DD, moderate MVR. Improved from EF 30% in 1/2023. • Restarted on low-dose BB w/ Lopressor  • Start IV Lasix 40 mg daily, concern for volume overload  • Daily weights, strict I/Os    A-fib RVR ([facility])  Assessment & Plan  • Initially presented in A-fib with RVR, likely in setting of sepsis  • Home regimen: Toprol 25 mg BID, AC on Eliquis.   • Rate since controlled/improved  • Digoxin started then discontinued  • Restarted on low-dose BB w/ Lopressor  • C/w heparin gtt, transition to Eliquis once mental status/behavior improves, was refusing to take oral meds    Respiratory insufficiency  Assessment & Plan  • Wheezing on exam, initially requiring 4L NC O2  • Xopenex/atrovent nebs  • PRN diuresis with IV Lasix  • Respiratory protocol, encourage IS  • Wean supplemental O2 to maintain spO2 > 92%  • Improving, currently requiring 2L NC O2  • May need desat screening prior to discharge pending dispo planning    Peripheral vascular disease ([address])  Assessment & Plan  • Has been seeing Dr. Laurita Treviño with podiatry outpatient for chronic toe wound of the left great toe  • Lower extremities were cool to the touch on initial evaluation  • No evidence of significant arterial occlusive disease on arterial duplex bilateral LEs  • Podiatry following, appreciate ongoing recommendations  • Consult wound care for local wound care of the toe  • Neurovascular checks    Hypothyroidism  Assessment & Plan  • Continue home levothyroxine    Spinal stenosis  Assessment & Plan  • Home Regimen: Gabapentin 100 mg TID, tramadol-PRN  • Lidocaine patch, PRN Tylenol   • Decrease gabapentin back to home dose 300->100mg TID with worsening confusion, agitation  • Tramadol, IV Dilaudid PRN    Gout without acute exacerbation   Assessment & Plan  • Continue home Allopurinol     GERD (gastroesophageal reflux disease)  Assessment & Plan  • Continue PPI    HLD (hyperlipidemia)  Assessment & Plan  • Continue home Ezetimibe    HTN (hypertension)  Assessment & Plan  • BP reviewed, slightly hypertensive but likely in setting of agitation  • Restarted BB with Lopressor 12.5 mg BID  • Restart diuretic with IV Lasix 40 mg daily  • Monitor BP closely    RA (rheumatoid arthritis) ([facility])  Assessment & Plan  • History of RA, remote episodes of eosinophilia   • Continue home prednisone, hydroxychloroquine  • PRN Tramadol, Voltaren gel to joints for pain  • ANCA, ANTHONY negative. Random cortisol: 10.  • IgE still in process, F/U on results           VTE Pharmacologic Prophylaxis: VTE Score: 9 High Risk (Score >/= 5) - Pharmacological DVT Prophylaxis Ordered: heparin drip. Sequential Compression Devices Ordered. Patient Centered Rounds: I performed bedside rounds with nursing staff today. Discussions with Specialists or Other Care Team Provider: None    Education and Discussions with Family / Patient: Updated  (son and daughter in law) at bedside. Total Time Spent on Date of Encounter in care of patient: 45 minutes This time was spent on one or more of the following: performing physical exam; counseling and coordination of care; obtaining or reviewing history; documenting in the medical record; reviewing/ordering tests, medications or procedures; communicating with other healthcare professionals and discussing with patient's family/caregivers. Current Length of Stay: 4 day(s)  Current Patient Status: Inpatient   Certification Statement: The patient will continue to require additional inpatient hospital stay due to Improvement in mental/clincal status  Discharge Plan: Anticipate discharge in 48 hrs to rehab facility. Code Status: Level 1 - Full Code    Subjective:   Patient is seen at bedside this a.m., RN reports overnight patient became increasingly confused and agitated, requiring soft wrist restraints with attempts to pull out IV/lines. RN also noted that patient refused to take oral meds this morning, however was agreeable when family came to visit. Patient denied any acute complaints on exam, although with increased confusion. Objective:     Vitals:   Temp (24hrs), Av.3 °F (36.8 °C), Min:97.6 °F (36.4 °C), Max:99 °F (37.2 °C)    Temp:  [97.6 °F (36.4 °C)-99 °F (37.2 °C)] 98 °F (36.7 °C)  HR:  [] 101  Resp:  [14-28] 22  BP: (147-164)/(76-91) 150/79  SpO2:  [93 %-100 %] 100 %  Body mass index is 36.24 kg/m². Input and Output Summary (last 24 hours):      Intake/Output Summary (Last 24 hours) at 2023 1705  Last data filed at 2023 1538  Gross per 24 hour   Intake 594.79 ml   Output 4177 ml   Net -3582.21 ml       Physical Exam: Physical Exam  Constitutional:       General: He is not in acute distress. Appearance: He is not ill-appearing, toxic-appearing or diaphoretic. Cardiovascular:      Rate and Rhythm: Normal rate and regular rhythm. Pulses: Normal pulses. Heart sounds: Normal heart sounds. Pulmonary:      Effort: Pulmonary effort is normal. No respiratory distress. Breath sounds: Normal breath sounds. Abdominal:      General: Bowel sounds are normal. There is no distension. Palpations: Abdomen is soft. Tenderness: There is no abdominal tenderness. Musculoskeletal:         General: Swelling present. No tenderness. Comments: Significant improvement in erythema, swelling of LUE, nontender to palpation. Skin:     General: Skin is warm. Neurological:      General: No focal deficit present. Mental Status: He is alert. Comments: Patient more confused, only oriented to self. Psychiatric:         Mood and Affect: Mood normal.         Behavior: Behavior normal.          Additional Data:     Labs:  Results from last 7 days   Lab Units 09/02/23  0445   WBC Thousand/uL 7.02   HEMOGLOBIN g/dL 10.5*   HEMATOCRIT % 33.8*   PLATELETS Thousands/uL 159   NEUTROS PCT % 68   LYMPHS PCT % 19   MONOS PCT % 7   EOS PCT % 5     Results from last 7 days   Lab Units 09/02/23  0445 08/29/23  1735 08/29/23  0906   SODIUM mmol/L 138   < > 141   POTASSIUM mmol/L 4.6   < > 3.5   CHLORIDE mmol/L 104   < > 103   CO2 mmol/L 30   < > 28   BUN mg/dL 9   < > 19   CREATININE mg/dL 0.79   < > 1.13   ANION GAP mmol/L 4   < > 10   CALCIUM mg/dL 9.2   < > 8.2*   ALBUMIN g/dL  --   --  3.6   TOTAL BILIRUBIN mg/dL  --   --  0.94   ALK PHOS U/L  --   --  63   ALT U/L  --   --  17   AST U/L  --   --  23   GLUCOSE RANDOM mg/dL 102   < > 160*    < > = values in this interval not displayed.      Results from last 7 days   Lab Units 08/30/23  0511   INR  1.84*     Results from last 7 days   Lab Units 09/02/23  1518 09/02/23  1101 09/02/23  0923   POC GLUCOSE mg/dl 151* 133 99         Results from last 7 days   Lab Units 09/02/23  0445 09/01/23  0429 08/31/23  0459 08/30/23 2002 08/30/23  0511 08/29/23  1944 08/29/23  1735 08/29/23  0906   LACTIC ACID mmol/L  --   --   --   --   --  1.3 2.1* 1.9   PROCALCITONIN ng/ml 2.79* 4.02* 9.15* 10.15* 14.19*  --   --  0.16       Lines/Drains:  Invasive Devices     Peripheral Intravenous Line  Duration           Peripheral IV 09/02/23 Dorsal (posterior); Right Forearm <1 day    Peripheral IV 09/02/23 Right Antecubital <1 day                      Imaging: Reviewed radiology reports from this admission including: CT head    Recent Cultures (last 7 days):   Results from last 7 days   Lab Units 08/31/23  0603 08/29/23  1012 08/29/23  0906   BLOOD CULTURE  No Growth at 48 hrs. No Growth at 48 hrs. No Growth After 4 Days.  Streptococcus agalactiae (Group B)*   GRAM STAIN RESULT   --   --  Gram positive cocci in pairs and chains*       Last 24 Hours Medication List:   Current Facility-Administered Medications   Medication Dose Route Frequency Provider Last Rate   • albuterol  2.5 mg Nebulization Q4H PRN ESTEFANIA Peterson     • allopurinol  400 mg Oral Daily ESTEFANIA Peterson     • chlorhexidine  15 mL Mouth/Throat Q12H [facility] ESTEFANIA Peterson     • Diclofenac Sodium  2 g Topical 4x Daily ESTFEANIA Peterson     • ezetimibe  10 mg Oral Daily ESTEFANIA Petreson     • Fluticasone Furoate-Vilanterol  1 puff Inhalation Daily ESTEFANIA Peterson     • furosemide  40 mg Intravenous Daily Marycruz Goins PA-C     • gabapentin  100 mg Oral TID Marycruz TANNER Goins     • haloperidol lactate  1 mg Intramuscular Q6H PRN ESTEFANIA Helm     • heparin (porcine)  3-20 Units/kg/hr (Order-Specific) Intravenous Titrated Marycruz Briseida PA-C 15.1 Units/kg/hr (09/02/23 1046)   • heparin (porcine)  2,000 Units Intravenous Q6H PRN Marycruz Goins PA-C     • heparin (porcine)  4,000 Units Intravenous Q6H PRN Bulmaro Lemus PA-C     • HYDROmorphone  0.2 mg Intravenous Q4H PRN ESTEFANIA Hinton     • hydroxychloroquine  200 mg Oral BID With Meals ESTEFANIA Hinton     • insulin lispro  1-5 Units Subcutaneous TID AC Nelly Mayer PA-C     • insulin lispro  1-5 Units Subcutaneous HS Bulmaro Lemus PA-C     • ipratropium  0.5 mg Nebulization TID ESTEFANIA Hinton     • levalbuterol  1.25 mg Nebulization TID ESTEFANIA Hinton     • levothyroxine  25 mcg Oral Early Morning ESTEFANIA Hinton     • lidocaine  2 patch Topical Daily ESTEFANIA Hinton     • melatonin  9 mg Oral HS ESTEFANIA Hinton     • metoprolol tartrate  12.5 mg Oral Q12H [address], ESTEFANIA     • nystatin   Topical BID Bulmaro Lemus PA-C     • pantoprazole  20 mg Oral Daily Before Breakfast ESTEFANIA Hinton     • penicillin G  4 Million Units Intravenous Q4H ESTEFANIA Hinton Stopped (09/02/23 1538)   • polyethylene glycol  17 g Oral Every Other Day ESTEFANIA Hinton     • predniSONE  5 mg Oral Daily ESTEFANIA Hinton     • sodium phosphate  21 mmol Intravenous Once Bulmaro Lemus PA-C 21 mmol (09/02/23 1451)   • traMADol  50 mg Oral Q6H PRN ESTEFANIA Hinton          Today, Patient Was Seen By: Bulmaro Lemus PA-C    **Please Note: This note may have been constructed using a voice recognition system. **

## 2023-09-02 NOTE — ASSESSMENT & PLAN NOTE
• History of RA, remote episodes of eosinophilia   • Continue home prednisone, hydroxychloroquine  • PRN Tramadol, Voltaren gel to joints for pain  • ANCA, ANTHONY negative.  Random cortisol: 10.  • IgE still in process, F/U on results

## 2023-09-03 LAB
ALBUMIN SERPL BCP-MCNC: 3.4 G/DL (ref 3.5–5)
ALP SERPL-CCNC: 47 U/L (ref 34–104)
ALT SERPL W P-5'-P-CCNC: 11 U/L (ref 7–52)
ANION GAP SERPL CALCULATED.3IONS-SCNC: 7 MMOL/L
APTT PPP: 110 SECONDS (ref 23–37)
AST SERPL W P-5'-P-CCNC: 17 U/L (ref 13–39)
BACTERIA BLD CULT: NORMAL
BASE EXCESS BLDA CALC-SCNC: 7 MMOL/L (ref -2–3)
BASOPHILS # BLD AUTO: 0.03 THOUSANDS/ÂΜL (ref 0–0.1)
BASOPHILS NFR BLD AUTO: 0 % (ref 0–1)
BILIRUB SERPL-MCNC: 0.67 MG/DL (ref 0.2–1)
BUN SERPL-MCNC: 13 MG/DL (ref 5–25)
CA-I BLD-SCNC: 1.18 MMOL/L (ref 1.12–1.32)
CA-I BLD-SCNC: 1.31 MMOL/L (ref 1.12–1.32)
CALCIUM ALBUM COR SERPL-MCNC: 9.6 MG/DL (ref 8.3–10.1)
CALCIUM SERPL-MCNC: 9.1 MG/DL (ref 8.4–10.2)
CHLORIDE SERPL-SCNC: 101 MMOL/L (ref 96–108)
CO2 SERPL-SCNC: 29 MMOL/L (ref 21–32)
CREAT SERPL-MCNC: 0.81 MG/DL (ref 0.6–1.3)
EOSINOPHIL # BLD AUTO: 0.31 THOUSAND/ÂΜL (ref 0–0.61)
EOSINOPHIL NFR BLD AUTO: 4 % (ref 0–6)
ERYTHROCYTE [DISTWIDTH] IN BLOOD BY AUTOMATED COUNT: 14.8 % (ref 11.6–15.1)
GFR SERPL CREATININE-BSD FRML MDRD: 79 ML/MIN/1.73SQ M
GLUCOSE SERPL-MCNC: 110 MG/DL (ref 65–140)
GLUCOSE SERPL-MCNC: 113 MG/DL (ref 65–140)
GLUCOSE SERPL-MCNC: 120 MG/DL (ref 65–140)
GLUCOSE SERPL-MCNC: 121 MG/DL (ref 65–140)
GLUCOSE SERPL-MCNC: 129 MG/DL (ref 65–140)
GLUCOSE SERPL-MCNC: 129 MG/DL (ref 65–140)
HCO3 BLDA-SCNC: 32.3 MMOL/L (ref 22–28)
HCT VFR BLD AUTO: 35.4 % (ref 36.5–49.3)
HCT VFR BLD CALC: 33 % (ref 36.5–49.3)
HGB BLD-MCNC: 11 G/DL (ref 12–17)
HGB BLDA-MCNC: 11.2 G/DL (ref 12–17)
IMM GRANULOCYTES # BLD AUTO: 0.12 THOUSAND/UL (ref 0–0.2)
IMM GRANULOCYTES NFR BLD AUTO: 2 % (ref 0–2)
LYMPHOCYTES # BLD AUTO: 1.7 THOUSANDS/ÂΜL (ref 0.6–4.47)
LYMPHOCYTES NFR BLD AUTO: 24 % (ref 14–44)
MCH RBC QN AUTO: 31 PG (ref 26.8–34.3)
MCHC RBC AUTO-ENTMCNC: 31.1 G/DL (ref 31.4–37.4)
MCV RBC AUTO: 100 FL (ref 82–98)
MONOCYTES # BLD AUTO: 0.8 THOUSAND/ÂΜL (ref 0.17–1.22)
MONOCYTES NFR BLD AUTO: 11 % (ref 4–12)
NEUTROPHILS # BLD AUTO: 4.19 THOUSANDS/ÂΜL (ref 1.85–7.62)
NEUTS SEG NFR BLD AUTO: 59 % (ref 43–75)
NRBC BLD AUTO-RTO: 0 /100 WBCS
PCO2 BLD: 34 MMOL/L (ref 21–32)
PCO2 BLD: 50.7 MM HG (ref 36–44)
PH BLD: 7.41 [PH] (ref 7.35–7.45)
PHOSPHATE SERPL-MCNC: 3.1 MG/DL (ref 2.3–4.1)
PLATELET # BLD AUTO: 160 THOUSANDS/UL (ref 149–390)
PMV BLD AUTO: 11.6 FL (ref 8.9–12.7)
PO2 BLD: 110 MM HG (ref 75–129)
POTASSIUM BLD-SCNC: 4 MMOL/L (ref 3.5–5.3)
POTASSIUM SERPL-SCNC: 4.2 MMOL/L (ref 3.5–5.3)
PROCALCITONIN SERPL-MCNC: 1.77 NG/ML
PROT SERPL-MCNC: 6.2 G/DL (ref 6.4–8.4)
RBC # BLD AUTO: 3.55 MILLION/UL (ref 3.88–5.62)
SAO2 % BLD FROM PO2: 98 % (ref 60–85)
SODIUM BLD-SCNC: 137 MMOL/L (ref 136–145)
SODIUM SERPL-SCNC: 137 MMOL/L (ref 135–147)
SPECIMEN SOURCE: ABNORMAL
WBC # BLD AUTO: 7.15 THOUSAND/UL (ref 4.31–10.16)

## 2023-09-03 PROCEDURE — 99232 SBSQ HOSP IP/OBS MODERATE 35: CPT | Performed by: PHYSICIAN ASSISTANT

## 2023-09-03 PROCEDURE — 82330 ASSAY OF CALCIUM: CPT | Performed by: PHYSICIAN ASSISTANT

## 2023-09-03 PROCEDURE — 82947 ASSAY GLUCOSE BLOOD QUANT: CPT

## 2023-09-03 PROCEDURE — 85730 THROMBOPLASTIN TIME PARTIAL: CPT | Performed by: NURSE PRACTITIONER

## 2023-09-03 PROCEDURE — 94760 N-INVAS EAR/PLS OXIMETRY 1: CPT

## 2023-09-03 PROCEDURE — 84295 ASSAY OF SERUM SODIUM: CPT

## 2023-09-03 PROCEDURE — 84145 PROCALCITONIN (PCT): CPT | Performed by: PHYSICIAN ASSISTANT

## 2023-09-03 PROCEDURE — 85025 COMPLETE CBC W/AUTO DIFF WBC: CPT | Performed by: PHYSICIAN ASSISTANT

## 2023-09-03 PROCEDURE — 36600 WITHDRAWAL OF ARTERIAL BLOOD: CPT

## 2023-09-03 PROCEDURE — 82330 ASSAY OF CALCIUM: CPT

## 2023-09-03 PROCEDURE — 82803 BLOOD GASES ANY COMBINATION: CPT

## 2023-09-03 PROCEDURE — 80053 COMPREHEN METABOLIC PANEL: CPT | Performed by: PHYSICIAN ASSISTANT

## 2023-09-03 PROCEDURE — 85014 HEMATOCRIT: CPT

## 2023-09-03 PROCEDURE — 82948 REAGENT STRIP/BLOOD GLUCOSE: CPT

## 2023-09-03 PROCEDURE — 84132 ASSAY OF SERUM POTASSIUM: CPT

## 2023-09-03 PROCEDURE — 94640 AIRWAY INHALATION TREATMENT: CPT

## 2023-09-03 PROCEDURE — 84100 ASSAY OF PHOSPHORUS: CPT | Performed by: PHYSICIAN ASSISTANT

## 2023-09-03 RX ADMIN — PENICILLIN G POTASSIUM 4 MILLION UNITS: 5000000 INJECTION, POWDER, FOR SOLUTION INTRAMUSCULAR; INTRAVENOUS at 12:23

## 2023-09-03 RX ADMIN — GABAPENTIN 100 MG: 100 CAPSULE ORAL at 08:59

## 2023-09-03 RX ADMIN — Medication 2 G: at 12:24

## 2023-09-03 RX ADMIN — LEVALBUTEROL HYDROCHLORIDE 1.25 MG: 1.25 SOLUTION RESPIRATORY (INHALATION) at 20:26

## 2023-09-03 RX ADMIN — PENICILLIN G POTASSIUM 4 MILLION UNITS: 5000000 INJECTION, POWDER, FOR SOLUTION INTRAMUSCULAR; INTRAVENOUS at 15:11

## 2023-09-03 RX ADMIN — PENICILLIN G POTASSIUM 4 MILLION UNITS: 5000000 INJECTION, POWDER, FOR SOLUTION INTRAMUSCULAR; INTRAVENOUS at 18:42

## 2023-09-03 RX ADMIN — Medication 9 MG: at 21:01

## 2023-09-03 RX ADMIN — Medication 12.5 MG: at 21:00

## 2023-09-03 RX ADMIN — PENICILLIN G POTASSIUM 4 MILLION UNITS: 5000000 INJECTION, POWDER, FOR SOLUTION INTRAMUSCULAR; INTRAVENOUS at 22:13

## 2023-09-03 RX ADMIN — HEPARIN SODIUM 15.1 UNITS/KG/HR: 10000 INJECTION, SOLUTION INTRAVENOUS at 03:21

## 2023-09-03 RX ADMIN — Medication 2 G: at 17:53

## 2023-09-03 RX ADMIN — PANTOPRAZOLE SODIUM 20 MG: 20 TABLET, DELAYED RELEASE ORAL at 09:07

## 2023-09-03 RX ADMIN — Medication 2 G: at 21:05

## 2023-09-03 RX ADMIN — GABAPENTIN 100 MG: 100 CAPSULE ORAL at 17:54

## 2023-09-03 RX ADMIN — FUROSEMIDE 40 MG: 10 INJECTION, SOLUTION INTRAMUSCULAR; INTRAVENOUS at 08:58

## 2023-09-03 RX ADMIN — PREDNISONE 5 MG: 5 TABLET ORAL at 09:00

## 2023-09-03 RX ADMIN — PENICILLIN G POTASSIUM 4 MILLION UNITS: 5000000 INJECTION, POWDER, FOR SOLUTION INTRAMUSCULAR; INTRAVENOUS at 06:04

## 2023-09-03 RX ADMIN — Medication 2 G: at 09:01

## 2023-09-03 RX ADMIN — ALLOPURINOL 400 MG: 100 TABLET ORAL at 08:59

## 2023-09-03 RX ADMIN — IPRATROPIUM BROMIDE 0.5 MG: 0.5 SOLUTION RESPIRATORY (INHALATION) at 20:26

## 2023-09-03 RX ADMIN — NYSTATIN 1 APPLICATION: 100000 POWDER TOPICAL at 17:53

## 2023-09-03 RX ADMIN — EZETIMIBE 10 MG: 10 TABLET ORAL at 08:58

## 2023-09-03 RX ADMIN — NYSTATIN 1 APPLICATION: 100000 POWDER TOPICAL at 08:58

## 2023-09-03 RX ADMIN — IPRATROPIUM BROMIDE 0.5 MG: 0.5 SOLUTION RESPIRATORY (INHALATION) at 07:38

## 2023-09-03 RX ADMIN — QUETIAPINE FUMARATE 25 MG: 25 TABLET ORAL at 21:01

## 2023-09-03 RX ADMIN — Medication 12.5 MG: at 08:59

## 2023-09-03 RX ADMIN — APIXABAN 5 MG: 5 TABLET, FILM COATED ORAL at 09:07

## 2023-09-03 RX ADMIN — HYDROXYCHLOROQUINE SULFATE 200 MG: 200 TABLET, FILM COATED ORAL at 17:54

## 2023-09-03 RX ADMIN — CHLORHEXIDINE GLUCONATE 15 ML: 1.2 RINSE ORAL at 09:01

## 2023-09-03 RX ADMIN — CHLORHEXIDINE GLUCONATE 15 ML: 1.2 RINSE ORAL at 21:01

## 2023-09-03 RX ADMIN — APIXABAN 5 MG: 5 TABLET, FILM COATED ORAL at 17:54

## 2023-09-03 RX ADMIN — LIDOCAINE 2 PATCH: 50 PATCH TOPICAL at 09:01

## 2023-09-03 RX ADMIN — FLUTICASONE FUROATE AND VILANTEROL TRIFENATATE 1 PUFF: 100; 25 POWDER RESPIRATORY (INHALATION) at 08:58

## 2023-09-03 RX ADMIN — PENICILLIN G POTASSIUM 4 MILLION UNITS: 5000000 INJECTION, POWDER, FOR SOLUTION INTRAMUSCULAR; INTRAVENOUS at 02:11

## 2023-09-03 RX ADMIN — GABAPENTIN 100 MG: 100 CAPSULE ORAL at 21:01

## 2023-09-03 RX ADMIN — LEVALBUTEROL HYDROCHLORIDE 1.25 MG: 1.25 SOLUTION RESPIRATORY (INHALATION) at 07:38

## 2023-09-03 RX ADMIN — HYDROXYCHLOROQUINE SULFATE 200 MG: 200 TABLET, FILM COATED ORAL at 09:00

## 2023-09-03 NOTE — ASSESSMENT & PLAN NOTE
Sepsis criteria AEB fever, tachycardia, tachypnea POA. End organ damage/shock AEB hypotension briefly requiring Levophed, admitted under critical care service. Source: LUE cellulitis, group B strep bacteremia. Patient initially presented from [address] [place] [address] long-term w/ fever 103 & erythema of left arm. • No DVT on venous duplex bilateral UEs, UA and CXR unremarkable  • S/p 2250 mL IVF bolus + albumin in ED. • Off Levophed since 8/30, transition to [facility] service 9/1. • Lactic acid: 1.9->2.1->1.3 / Procal: 0.16->14, since downtrending at 1.77 today  • IV vancomycin + IV Rocephin -> IV cefepime -> IV clindamycin -> IV PCN G  • General surgery signed off, no acute surgical interventions at this time. Presentation not suspicious for necrotizing infection, no drainable abscess or gas in tissue.   • ID following:  • C/w IV PCN G while inpatient, transition to amoxicillin 500 mg PO TID through till 9/13/2023 at discharge  • Follow culture results

## 2023-09-03 NOTE — ASSESSMENT & PLAN NOTE
• BP reviewed, slightly hypertensive but likely in setting of agitation  • Restarted BB with Lopressor 12.5 mg BID  • Restarted diuretic with IV Lasix 40 mg daily  • Monitor BP closely

## 2023-09-03 NOTE — ASSESSMENT & PLAN NOTE
• Home Regimen: Gabapentin 100 mg TID, tramadol-PRN  • Lidocaine patch, PRN Tylenol   • Decreased gabapentin to home dose 300->100mg TID with worsening confusion, agitation  • Tramadol, IV Dilaudid PRN

## 2023-09-03 NOTE — PLAN OF CARE
Problem: MOBILITY - ADULT  Goal: Maintain or return to baseline ADL function  Description: INTERVENTIONS:  -  Assess patient's ability to carry out ADLs; assess patient's baseline for ADL function and identify physical deficits which impact ability to perform ADLs (bathing, care of mouth/teeth, toileting, grooming, dressing, etc.)  - Assess/evaluate cause of self-care deficits   - Assess range of motion  - Assess patient's mobility; develop plan if impaired  - Assess patient's need for assistive devices and provide as appropriate  - Encourage maximum independence but intervene and supervise when necessary  - Involve family in performance of ADLs  - Assess for home care needs following discharge   - Consider OT consult to assist with ADL evaluation and planning for discharge  - Provide patient education as appropriate  Outcome: Progressing  Goal: Maintains/Returns to pre admission functional level  Description: INTERVENTIONS:  - Perform BMAT or MOVE assessment daily.   - Set and communicate daily mobility goal to care team and patient/family/caregiver.    - Collaborate with rehabilitation services on mobility goals if consulted  - Out of bed for toileting  - Record patient progress and toleration of activity level   Outcome: Progressing     Problem: Prexisting or High Potential for Compromised Skin Integrity  Goal: Skin integrity is maintained or improved  Description: INTERVENTIONS:  - Identify patients at risk for skin breakdown  - Assess and monitor skin integrity  - Assess and monitor nutrition and hydration status  - Monitor labs   - Assess for incontinence   - Turn and reposition patient  - Assist with mobility/ambulation  - Relieve pressure over bony prominences  - Avoid friction and shearing  - Provide appropriate hygiene as needed including keeping skin clean and dry  - Evaluate need for skin moisturizer/barrier cream  - Collaborate with interdisciplinary team   - Patient/family teaching  - Consider wound care consult   Outcome: Progressing     Problem: PAIN - ADULT  Goal: Verbalizes/displays adequate comfort level or baseline comfort level  Description: Interventions:  - Encourage patient to monitor pain and request assistance  - Assess pain using appropriate pain scale  - Administer analgesics based on type and severity of pain and evaluate response  - Implement non-pharmacological measures as appropriate and evaluate response  - Consider cultural and social influences on pain and pain management  - Notify physician/advanced practitioner if interventions unsuccessful or patient reports new pain  Outcome: Progressing     Problem: INFECTION - ADULT  Goal: Absence or prevention of progression during hospitalization  Description: INTERVENTIONS:  - Assess and monitor for signs and symptoms of infection  - Monitor lab/diagnostic results  - Monitor all insertion sites, i.e. indwelling lines, tubes, and drains  - Monitor endotracheal if appropriate and nasal secretions for changes in amount and color  - [place] appropriate cooling/warming therapies per order  - Administer medications as ordered  - Instruct and encourage patient and family to use good hand hygiene technique  - Identify and instruct in appropriate isolation precautions for identified infection/condition  Outcome: Progressing  Goal: Absence of fever/infection during neutropenic period  Description: INTERVENTIONS:  - Monitor WBC    Outcome: Progressing     Problem: SAFETY ADULT  Goal: Maintain or return to baseline ADL function  Description: INTERVENTIONS:  -  Assess patient's ability to carry out ADLs; assess patient's baseline for ADL function and identify physical deficits which impact ability to perform ADLs (bathing, care of mouth/teeth, toileting, grooming, dressing, etc.)  - Assess/evaluate cause of self-care deficits   - Assess range of motion  - Assess patient's mobility; develop plan if impaired  - Assess patient's need for assistive devices and provide as appropriate  - Encourage maximum independence but intervene and supervise when necessary  - Involve family in performance of ADLs  - Assess for home care needs following discharge   - Consider OT consult to assist with ADL evaluation and planning for discharge  - Provide patient education as appropriate  Outcome: Progressing  Goal: Maintains/Returns to pre admission functional level  Description: INTERVENTIONS:  - Perform BMAT or MOVE assessment daily.   - Set and communicate daily mobility goal to care team and patient/family/caregiver.    - Collaborate with rehabilitation services on mobility goals if consulted  - Out of bed for toileting  - Record patient progress and toleration of activity level   Outcome: Progressing  Goal: Patient will remain free of falls  Description: INTERVENTIONS:  - Educate patient/family on patient safety including physical limitations  - Instruct patient to call for assistance with activity   - Consult OT/PT to assist with strengthening/mobility   - Keep Call bell within reach  - Keep bed low and locked with side rails adjusted as appropriate  - Keep care items and personal belongings within reach  - Initiate and maintain comfort rounds  - Make Fall Risk Sign visible to staff  - Apply yellow socks and bracelet for high fall risk patients  - Consider moving patient to room near nurses station  Outcome: Progressing     Problem: DISCHARGE PLANNING  Goal: Discharge to home or other facility with appropriate resources  Description: INTERVENTIONS:  - Identify barriers to discharge w/patient and caregiver  - Arrange for needed discharge resources and transportation as appropriate  - Identify discharge learning needs (meds, wound care, etc.)  - Arrange for interpretive services to assist at discharge as needed  - Refer to Case Management Department for coordinating discharge planning if the patient needs post-hospital services based on physician/advanced practitioner order or complex needs related to functional status, cognitive ability, or social support system  Outcome: Progressing     Problem: Knowledge Deficit  Goal: Patient/family/caregiver demonstrates understanding of disease process, treatment plan, medications, and discharge instructions  Description: Complete learning assessment and assess knowledge base. Interventions:  - Provide teaching at level of understanding  - Provide teaching via preferred learning methods  Outcome: Progressing     Problem: Nutrition/Hydration-ADULT  Goal: Nutrient/Hydration intake appropriate for improving, restoring or maintaining nutritional needs  Description: Monitor and assess patient's nutrition/hydration status for malnutrition. Collaborate with interdisciplinary team and initiate plan and interventions as ordered. Monitor patient's weight and dietary intake as ordered or per policy. Utilize nutrition screening tool and intervene as necessary. Determine patient's food preferences and provide high-protein, high-caloric foods as appropriate.      INTERVENTIONS:  - Monitor oral intake, urinary output, labs, and treatment plans  - Assess nutrition and hydration status and recommend course of action  - Evaluate amount of meals eaten  - Assist patient with eating if necessary   - Allow adequate time for meals  - Recommend/ encourage appropriate diets, oral nutritional supplements, and vitamin/mineral supplements  - Order, calculate, and assess calorie counts as needed  - Recommend, monitor, and adjust tube feedings and TPN/PPN based on assessed needs  - Assess need for intravenous fluids  - Provide specific nutrition/hydration education as appropriate  - Include patient/family/caregiver in decisions related to nutrition  Outcome: Progressing     Problem: SAFETY,RESTRAINT: NV/NON-SELF DESTRUCTIVE BEHAVIOR  Goal: Remains free of harm/injury (restraint for non violent/non self-detsructive behavior)  Description: INTERVENTIONS:  - Instruct patient/family regarding restraint use   - Assess and monitor physiologic and psychological status   - Provide interventions and comfort measures to meet assessed patient needs   - Identify and implement measures to help patient regain control  - Assess readiness for release of restraint   Outcome: Progressing  Goal: Returns to optimal restraint-free functioning  Description: INTERVENTIONS:  - Assess the patient's behavior and symptoms that indicate continued need for restraint  - Identify and implement measures to help patient regain control  - Assess readiness for release of restraint   Outcome: Progressing

## 2023-09-03 NOTE — ASSESSMENT & PLAN NOTE
• Has been seeing Dr. Carolann Mensah with podiatry outpatient for chronic toe wound of the left great toe  • Lower extremities were cool to the touch on initial evaluation  • No evidence of significant arterial occlusive disease on arterial duplex bilateral LEs  • Podiatry following, appreciate ongoing recommendations  • Wound care for local wound care of the toe  • Neurovascular checks

## 2023-09-03 NOTE — ASSESSMENT & PLAN NOTE
BC 1/2 (8/29): + Streptococcus agalactiae (Group B), susceptible to penicillin. • Repeat BC x2 (8/31): NGTD (72hrs)  • Transitioned to vancomycin, clindamycin, high-dose PCN  • Echo without evidence of vegetation  • ID consulted:  • D/C IV vancomycin, significant clinical improvement w/ recovery GBS.   • Follow culture results

## 2023-09-03 NOTE — ASSESSMENT & PLAN NOTE
Patient with increased confusion, agitation requiring soft wrist restraints since 9/2. No known history of cognitive impairment or dementia, patient's family states this is far from his baseline.   · Likely multifactorial with sepsis, hospital-related mostly contributing  · CT head w/ no acute intracranial abnormalities, noted moderate chronic microangiopathic changes  · UA on admission benign, no signs of urinary retention thus far  · Repeat CXR personally reviewed, no obvious infiltrates or effusions compared to 8/31  · Blood glucose stable, initiated SSI coverage  · VBG/ABG reviewed, overall acceptable with elevated CO2 similar to prior  · Hypophosphatemia resolved s/p IV sodium phosphate, lab work otherwise unremarkable  · Evening on 9/2, patient agitated/combative requiring Haldol, Seroquel with good response  · Caution with opioids, sedating medications  · Neurochecks every 4 hours  · Continue bedtime Seroquel and PRN Haldol, await updated A1c & remove restraints when behavior improved & no longer safety risk

## 2023-09-03 NOTE — ASSESSMENT & PLAN NOTE
• Wheezing on exam, initially requiring 4L NC O2  • Xopenex/atrovent nebs  • PRN diuresis with IV Lasix  • Respiratory protocol, encourage IS  • Wean supplemental O2 to maintain spO2 > 92%  • Improving, weaned to room air today  • May need desat screening prior to discharge pending dispo planning

## 2023-09-03 NOTE — ASSESSMENT & PLAN NOTE
• Initially presented in A-fib with RVR, likely in setting of sepsis  • Home regimen: Toprol 25 mg BID, AC on Eliquis.   • Rate since controlled/improved  • Digoxin started then discontinued  • Restarted on low-dose BB w/ Lopressor  • Heparin GTT -> Eliquis with improved mentation/behavior

## 2023-09-03 NOTE — PLAN OF CARE
Problem: MOBILITY - ADULT  Goal: Maintain or return to baseline ADL function  Description: INTERVENTIONS:  -  Assess patient's ability to carry out ADLs; assess patient's baseline for ADL function and identify physical deficits which impact ability to perform ADLs (bathing, care of mouth/teeth, toileting, grooming, dressing, etc.)  - Assess/evaluate cause of self-care deficits   - Assess range of motion  - Assess patient's mobility; develop plan if impaired  - Assess patient's need for assistive devices and provide as appropriate  - Encourage maximum independence but intervene and supervise when necessary  - Involve family in performance of ADLs  - Assess for home care needs following discharge   - Consider OT consult to assist with ADL evaluation and planning for discharge  - Provide patient education as appropriate  Outcome: Progressing  Goal: Maintains/Returns to pre admission functional level  Description: INTERVENTIONS:  - Perform BMAT or MOVE assessment daily.   - Set and communicate daily mobility goal to care team and patient/family/caregiver. - Collaborate with rehabilitation services on mobility goals if consulted  - Perform Range of Motion 3 times a day. - Reposition patient every 2 hours.   - Dangle patient 3 times a day  - Stand patient 3 times a day  - Ambulate patient 3 times a day  - Out of bed to chair 3 times a day   - Out of bed for meals 3 times a day  - Out of bed for toileting  - Record patient progress and toleration of activity level   Outcome: Progressing     Problem: Prexisting or High Potential for Compromised Skin Integrity  Goal: Skin integrity is maintained or improved  Description: INTERVENTIONS:  - Identify patients at risk for skin breakdown  - Assess and monitor skin integrity  - Assess and monitor nutrition and hydration status  - Monitor labs   - Assess for incontinence   - Turn and reposition patient  - Assist with mobility/ambulation  - Relieve pressure over bony prominences  - Avoid friction and shearing  - Provide appropriate hygiene as needed including keeping skin clean and dry  - Evaluate need for skin moisturizer/barrier cream  - Collaborate with interdisciplinary team   - Patient/family teaching  - Consider wound care consult   Outcome: Progressing     Problem: PAIN - ADULT  Goal: Verbalizes/displays adequate comfort level or baseline comfort level  Description: Interventions:  - Encourage patient to monitor pain and request assistance  - Assess pain using appropriate pain scale  - Administer analgesics based on type and severity of pain and evaluate response  - Implement non-pharmacological measures as appropriate and evaluate response  - Consider cultural and social influences on pain and pain management  - Notify physician/advanced practitioner if interventions unsuccessful or patient reports new pain  Outcome: Progressing     Problem: INFECTION - ADULT  Goal: Absence or prevention of progression during hospitalization  Description: INTERVENTIONS:  - Assess and monitor for signs and symptoms of infection  - Monitor lab/diagnostic results  - Monitor all insertion sites, i.e. indwelling lines, tubes, and drains  - Monitor endotracheal if appropriate and nasal secretions for changes in amount and color  - [place] appropriate cooling/warming therapies per order  - Administer medications as ordered  - Instruct and encourage patient and family to use good hand hygiene technique  - Identify and instruct in appropriate isolation precautions for identified infection/condition  Outcome: Progressing  Goal: Absence of fever/infection during neutropenic period  Description: INTERVENTIONS:  - Monitor WBC    Outcome: Progressing     Problem: SAFETY ADULT  Goal: Patient will remain free of falls  Description: INTERVENTIONS:  - Educate patient/family on patient safety including physical limitations  - Instruct patient to call for assistance with activity   - Consult OT/PT to assist with strengthening/mobility   - Keep Call bell within reach  - Keep bed low and locked with side rails adjusted as appropriate  - Keep care items and personal belongings within reach  - Initiate and maintain comfort rounds  - Make Fall Risk Sign visible to staff  - Offer Toileting every 2 Hours, in advance of need  - Initiate/Maintain bed alarm  - Obtain necessary fall risk management equipment  - Apply yellow socks and bracelet for high fall risk patients  - Consider moving patient to room near nurses station  Outcome: Progressing     Problem: DISCHARGE PLANNING  Goal: Discharge to home or other facility with appropriate resources  Description: INTERVENTIONS:  - Identify barriers to discharge w/patient and caregiver  - Arrange for needed discharge resources and transportation as appropriate  - Identify discharge learning needs (meds, wound care, etc.)  - Arrange for interpretive services to assist at discharge as needed  - Refer to Case Management Department for coordinating discharge planning if the patient needs post-hospital services based on physician/advanced practitioner order or complex needs related to functional status, cognitive ability, or social support system  Outcome: Progressing     Problem: Knowledge Deficit  Goal: Patient/family/caregiver demonstrates understanding of disease process, treatment plan, medications, and discharge instructions  Description: Complete learning assessment and assess knowledge base.   Interventions:  - Provide teaching at level of understanding  - Provide teaching via preferred learning methods  Outcome: Progressing     Problem: SAFETY,RESTRAINT: NV/NON-SELF DESTRUCTIVE BEHAVIOR  Goal: Remains free of harm/injury (restraint for non violent/non self-detsructive behavior)  Description: INTERVENTIONS:  - Instruct patient/family regarding restraint use   - Assess and monitor physiologic and psychological status   - Provide interventions and comfort measures to meet assessed patient needs   - Identify and implement measures to help patient regain control  - Assess readiness for release of restraint   Outcome: Progressing  Goal: Returns to optimal restraint-free functioning  Description: INTERVENTIONS:  - Assess the patient's behavior and symptoms that indicate continued need for restraint  - Identify and implement measures to help patient regain control  - Assess readiness for release of restraint   Outcome: Progressing

## 2023-09-03 NOTE — NURSING NOTE
@1415 Pt with aggressive behavior and trying to pull out his iv access, yelling at staff and climbing OOB. B/L soft wrist restraints applied. Pt's son Anaya Needs updated. Pt feed his dinner and ate potatoes, pudding and juice. Pt also compliant with his medications this evening but continues to yell for family members and state "I am getting out of here, I'm leaving this place." Pt frequently re-oriented to time, place and situation but continues to deny he is "sick and in the hospital" and "just wants to get out of here".

## 2023-09-03 NOTE — PLAN OF CARE
Problem: MOBILITY - ADULT  Goal: Maintain or return to baseline ADL function  Description: INTERVENTIONS:  -  Assess patient's ability to carry out ADLs; assess patient's baseline for ADL function and identify physical deficits which impact ability to perform ADLs (bathing, care of mouth/teeth, toileting, grooming, dressing, etc.)  - Assess/evaluate cause of self-care deficits   - Assess range of motion  - Assess patient's mobility; develop plan if impaired  - Assess patient's need for assistive devices and provide as appropriate  - Encourage maximum independence but intervene and supervise when necessary  - Involve family in performance of ADLs  - Assess for home care needs following discharge   - Consider OT consult to assist with ADL evaluation and planning for discharge  - Provide patient education as appropriate  Outcome: Progressing  Goal: Maintains/Returns to pre admission functional level  Description: INTERVENTIONS:  - Perform BMAT or MOVE assessment daily.   - Set and communicate daily mobility goal to care team and patient/family/caregiver. - Collaborate with rehabilitation services on mobility goals if consulted  - Perform Range of Motion 3 times a day. - Reposition patient every 2 hours.   - Dangle patient 3 times a day  - Stand patient 3 times a day  - Ambulate patient 3 times a day  - Out of bed to chair 3 times a day   - Out of bed for meals 3 times a day  - Out of bed for toileting  - Record patient progress and toleration of activity level   Outcome: Progressing     Problem: Prexisting or High Potential for Compromised Skin Integrity  Goal: Skin integrity is maintained or improved  Description: INTERVENTIONS:  - Identify patients at risk for skin breakdown  - Assess and monitor skin integrity  - Assess and monitor nutrition and hydration status  - Monitor labs   - Assess for incontinence   - Turn and reposition patient  - Assist with mobility/ambulation  - Relieve pressure over bony prominences  - Avoid friction and shearing  - Provide appropriate hygiene as needed including keeping skin clean and dry  - Evaluate need for skin moisturizer/barrier cream  - Collaborate with interdisciplinary team   - Patient/family teaching  - Consider wound care consult   Outcome: Progressing     Problem: PAIN - ADULT  Goal: Verbalizes/displays adequate comfort level or baseline comfort level  Description: Interventions:  - Encourage patient to monitor pain and request assistance  - Assess pain using appropriate pain scale  - Administer analgesics based on type and severity of pain and evaluate response  - Implement non-pharmacological measures as appropriate and evaluate response  - Consider cultural and social influences on pain and pain management  - Notify physician/advanced practitioner if interventions unsuccessful or patient reports new pain  Outcome: Progressing     Problem: INFECTION - ADULT  Goal: Absence or prevention of progression during hospitalization  Description: INTERVENTIONS:  - Assess and monitor for signs and symptoms of infection  - Monitor lab/diagnostic results  - Monitor all insertion sites, i.e. indwelling lines, tubes, and drains  - Monitor endotracheal if appropriate and nasal secretions for changes in amount and color  - [place] appropriate cooling/warming therapies per order  - Administer medications as ordered  - Instruct and encourage patient and family to use good hand hygiene technique  - Identify and instruct in appropriate isolation precautions for identified infection/condition  Outcome: Progressing  Goal: Absence of fever/infection during neutropenic period  Description: INTERVENTIONS:  - Monitor WBC    Outcome: Progressing     Problem: SAFETY ADULT  Goal: Patient will remain free of falls  Description: INTERVENTIONS:  - Educate patient/family on patient safety including physical limitations  - Instruct patient to call for assistance with activity   - Consult OT/PT to assist with strengthening/mobility   - Keep Call bell within reach  - Keep bed low and locked with side rails adjusted as appropriate  - Keep care items and personal belongings within reach  - Initiate and maintain comfort rounds  - Make Fall Risk Sign visible to staff  - Offer Toileting every 2 Hours, in advance of need  - Initiate/Maintain bed alarm  - Obtain necessary fall risk management equipment  - Apply yellow socks and bracelet for high fall risk patients  - Consider moving patient to room near nurses station  Outcome: Progressing     Problem: DISCHARGE PLANNING  Goal: Discharge to home or other facility with appropriate resources  Description: INTERVENTIONS:  - Identify barriers to discharge w/patient and caregiver  - Arrange for needed discharge resources and transportation as appropriate  - Identify discharge learning needs (meds, wound care, etc.)  - Arrange for interpretive services to assist at discharge as needed  - Refer to Case Management Department for coordinating discharge planning if the patient needs post-hospital services based on physician/advanced practitioner order or complex needs related to functional status, cognitive ability, or social support system  Outcome: Progressing     Problem: Knowledge Deficit  Goal: Patient/family/caregiver demonstrates understanding of disease process, treatment plan, medications, and discharge instructions  Description: Complete learning assessment and assess knowledge base. Interventions:  - Provide teaching at level of understanding  - Provide teaching via preferred learning methods  Outcome: Progressing     Problem: Nutrition/Hydration-ADULT  Goal: Nutrient/Hydration intake appropriate for improving, restoring or maintaining nutritional needs  Description: Monitor and assess patient's nutrition/hydration status for malnutrition. Collaborate with interdisciplinary team and initiate plan and interventions as ordered.   Monitor patient's weight and dietary intake as ordered or per policy. Utilize nutrition screening tool and intervene as necessary. Determine patient's food preferences and provide high-protein, high-caloric foods as appropriate.      INTERVENTIONS:  - Monitor oral intake, urinary output, labs, and treatment plans  - Assess nutrition and hydration status and recommend course of action  - Evaluate amount of meals eaten  - Assist patient with eating if necessary   - Allow adequate time for meals  - Recommend/ encourage appropriate diets, oral nutritional supplements, and vitamin/mineral supplements  - Order, calculate, and assess calorie counts as needed  - Recommend, monitor, and adjust tube feedings and TPN/PPN based on assessed needs  - Assess need for intravenous fluids  - Provide specific nutrition/hydration education as appropriate  - Include patient/family/caregiver in decisions related to nutrition  Outcome: Progressing     Problem: SAFETY,RESTRAINT: NV/NON-SELF DESTRUCTIVE BEHAVIOR  Goal: Remains free of harm/injury (restraint for non violent/non self-detsructive behavior)  Description: INTERVENTIONS:  - Instruct patient/family regarding restraint use   - Assess and monitor physiologic and psychological status   - Provide interventions and comfort measures to meet assessed patient needs   - Identify and implement measures to help patient regain control  - Assess readiness for release of restraint   Outcome: Progressing  Goal: Returns to optimal restraint-free functioning  Description: INTERVENTIONS:  - Assess the patient's behavior and symptoms that indicate continued need for restraint  - Identify and implement measures to help patient regain control  - Assess readiness for release of restraint   Outcome: Progressing     Problem: NEUROSENSORY - ADULT  Goal: Achieves stable or improved neurological status  Description: INTERVENTIONS  - Monitor and report changes in neurological status  - Monitor vital signs such as temperature, blood pressure, glucose, and any other labs ordered   - Initiate measures to prevent increased intracranial pressure  - Monitor for seizure activity and implement precautions if appropriate      Outcome: Progressing  Goal: Achieves maximal functionality and self care  Description: INTERVENTIONS  - Monitor swallowing and airway patency with patient fatigue and changes in neurological status  - Encourage and assist patient to increase activity and self care.    - Encourage visually impaired, hearing impaired and aphasic patients to use assistive/communication devices  Outcome: Progressing     Problem: CARDIOVASCULAR - ADULT  Goal: Maintains optimal cardiac output and hemodynamic stability  Description: INTERVENTIONS:  - Monitor I/O, vital signs and rhythm  - Monitor for S/S and trends of decreased cardiac output  - Administer and titrate ordered vasoactive medications to optimize hemodynamic stability  - Assess quality of pulses, skin color and temperature  - Assess for signs of decreased coronary artery perfusion  - Instruct patient to report change in severity of symptoms  Outcome: Progressing  Goal: Absence of cardiac dysrhythmias or at baseline rhythm  Description: INTERVENTIONS:  - Continuous cardiac monitoring, vital signs, obtain 12 lead EKG if ordered  - Administer antiarrhythmic and heart rate control medications as ordered  - Monitor electrolytes and administer replacement therapy as ordered  Outcome: Progressing     Problem: RESPIRATORY - ADULT  Goal: Achieves optimal ventilation and oxygenation  Description: INTERVENTIONS:  - Assess for changes in respiratory status  - Assess for changes in mentation and behavior  - Position to facilitate oxygenation and minimize respiratory effort  - Oxygen administered by appropriate delivery if ordered  - Initiate smoking cessation education as indicated  - Encourage broncho-pulmonary hygiene including cough, deep breathe, Incentive Spirometry  - Assess the need for suctioning and aspirate as needed  - Assess and instruct to report SOB or any respiratory difficulty  - Respiratory Therapy support as indicated  Outcome: Progressing     Problem: HEMATOLOGIC - ADULT  Goal: Maintains hematologic stability  Description: INTERVENTIONS  - Assess for signs and symptoms of bleeding or hemorrhage  - Monitor labs  - Administer supportive blood products/factors as ordered and appropriate  Outcome: Progressing     Problem: METABOLIC, FLUID AND ELECTROLYTES - ADULT  Goal: Electrolytes maintained within normal limits  Description: INTERVENTIONS:  - Monitor labs and assess patient for signs and symptoms of electrolyte imbalances  - Administer electrolyte replacement as ordered  - Monitor response to electrolyte replacements, including repeat lab results as appropriate  - Instruct patient on fluid and nutrition as appropriate  Outcome: Progressing  Goal: Fluid balance maintained  Description: INTERVENTIONS:  - Monitor labs   - Monitor I/O and WT  - Instruct patient on fluid and nutrition as appropriate  - Assess for signs & symptoms of volume excess or deficit  Outcome: Progressing  Goal: Glucose maintained within target range  Description: INTERVENTIONS:  - Monitor Blood Glucose as ordered  - Assess for signs and symptoms of hyperglycemia and hypoglycemia  - Administer ordered medications to maintain glucose within target range  - Assess nutritional intake and initiate nutrition service referral as needed  Outcome: Progressing     Problem: MUSCULOSKELETAL - ADULT  Goal: Maintain or return mobility to safest level of function  Description: INTERVENTIONS:  - Assess patient's ability to carry out ADLs; assess patient's baseline for ADL function and identify physical deficits which impact ability to perform ADLs (bathing, care of mouth/teeth, toileting, grooming, dressing, etc.)  - Assess/evaluate cause of self-care deficits   - Assess range of motion  - Assess patient's mobility  - Assess patient's need for assistive devices and provide as appropriate  - Encourage maximum independence but intervene and supervise when necessary  - Involve family in performance of ADLs  - Assess for home care needs following discharge   - Consider OT consult to assist with ADL evaluation and planning for discharge  - Provide patient education as appropriate  Outcome: Progressing  Goal: Maintain proper alignment of affected body part  Description: INTERVENTIONS:  - Support, maintain and protect limb and body alignment  - Provide patient/ family with appropriate education  Outcome: Progressing

## 2023-09-03 NOTE — PROGRESS NOTES
[facility]  Progress Note  Name: Kitty Hernandez  MRN: [id number]  Unit/Bed#: -01 SDU I Date of Admission: 8/29/2023   Date of Service: 9/3/2023 I Hospital Day: 5    Assessment/Plan   Cellulitis of left upper extremity  Assessment & Plan  • Presented with LUE erythema, fever consistent with cellulitis  • CT LUE (8/29): Left upper extremity subcutaneous edema indicating cellulitis without drainable collection, soft tissue emphysema or evidence of osteomyelitis. • Worsening erythema LUE, development of RUE erythema/ecchymosis (8/30) since resolved  • See additional plan below for severe sepsis  • Neurovascular checks  • Elevate arms, local wound care    * Severe sepsis [facility])  Assessment & Plan  Sepsis criteria AEB fever, tachycardia, tachypnea POA. End organ damage/shock AEB hypotension briefly requiring Levophed, admitted under critical care service. Source: LUE cellulitis, group B strep bacteremia. Patient initially presented from [address] [place] [address] long-term w/ fever 103 & erythema of left arm. • No DVT on venous duplex bilateral UEs, UA and CXR unremarkable  • S/p 2250 mL IVF bolus + albumin in ED. • Off Levophed since 8/30, transition to [facility] service 9/1. • Lactic acid: 1.9->2.1->1.3 / Procal: 0.16->14, since downtrending at 1.77 today  • IV vancomycin + IV Rocephin -> IV cefepime -> IV clindamycin -> IV PCN G  • General surgery signed off, no acute surgical interventions at this time. Presentation not suspicious for necrotizing infection, no drainable abscess or gas in tissue. • ID following:  • C/w IV PCN G while inpatient, transition to amoxicillin 500 mg PO TID through till 9/13/2023 at discharge  • Follow culture results    Metabolic encephalopathy  Assessment & Plan  Patient with increased confusion, agitation requiring soft wrist restraints since 9/2. No known history of cognitive impairment or dementia, patient's family states this is far from his baseline.   · Likely multifactorial with sepsis, hospital-related mostly contributing  · CT head w/ no acute intracranial abnormalities, noted moderate chronic microangiopathic changes  · UA on admission benign, no signs of urinary retention thus far  · Repeat CXR personally reviewed, no obvious infiltrates or effusions compared to 8/31  · Blood glucose stable, initiated SSI coverage  · VBG/ABG reviewed, overall acceptable with elevated CO2 similar to prior  · Hypophosphatemia resolved s/p IV sodium phosphate, lab work otherwise unremarkable  · Evening on 9/2, patient agitated/combative requiring Haldol, Seroquel with good response  · Caution with opioids, sedating medications  · Neurochecks every 4 hours  · Continue bedtime Seroquel and PRN Haldol, await updated A1c & remove restraints when behavior improved & no longer safety risk    Bacteremia due to group B Streptococcus  Assessment & Plan  BC 1/2 (8/29): + Streptococcus agalactiae (Group B), susceptible to penicillin. • Repeat BC x2 (8/31): NGTD (72hrs)  • Transitioned to vancomycin, clindamycin, high-dose PCN  • Echo without evidence of vegetation  • ID consulted:  • D/C IV vancomycin, significant clinical improvement w/ recovery GBS. • Follow culture results    Chronic systolic heart failure [facility])  Assessment & Plan  Wt Readings from Last 3 Encounters:   09/03/23 106 kg (233 lb 11 oz)   08/28/23 99.8 kg (220 lb)   08/24/23 105 kg (232 lb)     • Not in acute exacerbation. Edema of upper extremities likely more in setting of cellulitis. • Home regimen: Torsemide alternating 20/10 mg daily, Toprol XL 25 mg BID  • Echo (8/30/23): EF 45%, G1DD, moderate MVR. Improved from EF 30% in 1/2023.   • Restarted on low-dose BB w/ Lopressor  • C/w IV Lasix 40 mg daily while inpatient, at discharge can resume home torsemide  • Daily weights, strict I/Os    A-fib RVR ([address])  Assessment & Plan  • Initially presented in A-fib with RVR, likely in setting of sepsis  • Home regimen: Toprol 25 mg BID, AC on Eliquis.   • Rate since controlled/improved  • Digoxin started then discontinued  • Restarted on low-dose BB w/ Lopressor  • Heparin GTT -> Eliquis with improved mentation/behavior    Respiratory insufficiency  Assessment & Plan  • Wheezing on exam, initially requiring 4L NC O2  • Xopenex/atrovent nebs  • PRN diuresis with IV Lasix  • Respiratory protocol, encourage IS  • Wean supplemental O2 to maintain spO2 > 92%  • Improving, weaned to room air today  • May need desat screening prior to discharge pending dispo planning    Peripheral vascular disease ([address])  Assessment & Plan  • Has been seeing Dr. Dahl Ran with podiatry outpatient for chronic toe wound of the left great toe  • Lower extremities were cool to the touch on initial evaluation  • No evidence of significant arterial occlusive disease on arterial duplex bilateral LEs  • Podiatry following, appreciate ongoing recommendations  • Wound care for local wound care of the toe  • Neurovascular checks    Hypothyroidism  Assessment & Plan  • Continue home levothyroxine    Spinal stenosis  Assessment & Plan  • Home Regimen: Gabapentin 100 mg TID, tramadol-PRN  • Lidocaine patch, PRN Tylenol   • Decreased gabapentin to home dose 300->100mg TID with worsening confusion, agitation  • Tramadol, IV Dilaudid PRN    Gout without acute exacerbation   Assessment & Plan  • Continue home Allopurinol     GERD (gastroesophageal reflux disease)  Assessment & Plan  • Continue PPI    HLD (hyperlipidemia)  Assessment & Plan  • Continue home Ezetimibe    HTN (hypertension)  Assessment & Plan  • BP reviewed, slightly hypertensive but likely in setting of agitation  • Restarted BB with Lopressor 12.5 mg BID  • Restarted diuretic with IV Lasix 40 mg daily  • Monitor BP closely    RA (rheumatoid arthritis) ([facility])  Assessment & Plan  • History of RA, remote episodes of eosinophilia   • Continue home prednisone, hydroxychloroquine  • PRN Tramadol, Voltaren gel to joints for pain  • ANCA, ANTHONY negative. Random cortisol: 10.  • IgE still in process, F/U on results           VTE Pharmacologic Prophylaxis: VTE Score: 9 High Risk (Score >/= 5) - Pharmacological DVT Prophylaxis Ordered: apixaban (Eliquis). Sequential Compression Devices Ordered. Patient Centered Rounds: I performed bedside rounds with nursing staff today. Discussions with Specialists or Other Care Team Provider: Critical care    Education and Discussions with Family / Patient: Updated  (son and daughter in law) at bedside. Total Time Spent on Date of Encounter in care of patient: 45 minutes This time was spent on one or more of the following: performing physical exam; counseling and coordination of care; obtaining or reviewing history; documenting in the medical record; reviewing/ordering tests, medications or procedures; communicating with other healthcare professionals and discussing with patient's family/caregivers. Current Length of Stay: 5 day(s)  Current Patient Status: Inpatient   Certification Statement: The patient will continue to require additional inpatient hospital stay due to Behavior/clinical improvement  Discharge Plan: Anticipate discharge in 24-48 hrs to rehab facility. Code Status: Level 1 - Full Code    Subjective:   Patient is seen at bedside this a.m. while nursing was changing bed sheets, RN reports he slept well through the night after Haldol and Seroquel yesterday evening. Patient is still confused, son at bedside states this is far from his usual baseline. Objective:     Vitals:   Temp (24hrs), Av.3 °F (36.8 °C), Min:97.8 °F (36.6 °C), Max:98.7 °F (37.1 °C)    Temp:  [97.8 °F (36.6 °C)-98.7 °F (37.1 °C)] 98.4 °F (36.9 °C)  HR:  [100-115] 106  Resp:  [17-24] 24  BP: (106-163)/(65-94) 117/71  SpO2:  [90 %-100 %] 96 %  Body mass index is 34.51 kg/m². Input and Output Summary (last 24 hours):      Intake/Output Summary (Last 24 hours) at 9/3/2023 1348  Last data filed at 9/3/2023 1313  Gross per 24 hour   Intake 1194.82 ml   Output 2823 ml   Net -1628.18 ml       Physical Exam:   Physical Exam  Constitutional:       General: He is not in acute distress. Appearance: He is obese. He is not ill-appearing, toxic-appearing or diaphoretic. Cardiovascular:      Rate and Rhythm: Normal rate and regular rhythm. Pulses: Normal pulses. Heart sounds: Normal heart sounds. Pulmonary:      Effort: Pulmonary effort is normal. No respiratory distress. Breath sounds: Normal breath sounds. Abdominal:      General: Bowel sounds are normal. There is no distension. Palpations: Abdomen is soft. Tenderness: There is no abdominal tenderness. Musculoskeletal:         General: Swelling present. No tenderness. Comments: Mild LUE swelling improved from yesterday, nontender palpation. Skin:     General: Skin is warm. Findings: Erythema present. Comments: Erythema on LUE improved from yesterday, no increased warmth to palpation. Neurological:      General: No focal deficit present. Mental Status: He is alert. Comments: Lethargic, occasionally closes eyes during exam but able to follow commands, answer questions with few word responses. Oriented to self, time, verbalized he knew he was in the hospital but cannot identify where or why.     Psychiatric:         Mood and Affect: Mood normal.         Behavior: Behavior normal.      Comments: Not acutely agitated, redirectable          Additional Data:     Labs:  Results from last 7 days   Lab Units 09/03/23  0555 09/03/23  0219   WBC Thousand/uL  --  7.15   HEMOGLOBIN g/dL  --  11.0*   I STAT HEMOGLOBIN g/dl 11.2*  --    HEMATOCRIT %  --  35.4*   HEMATOCRIT, ISTAT % 33*  --    PLATELETS Thousands/uL  --  160   NEUTROS PCT %  --  59   LYMPHS PCT %  --  24   MONOS PCT %  --  11   EOS PCT %  --  4     Results from last 7 days   Lab Units 09/03/23  0555 09/03/23  0219   SODIUM mmol/L  --  137 POTASSIUM mmol/L  --  4.2   CHLORIDE mmol/L  --  101   CO2 mmol/L  --  29   CO2, I-STAT mmol/L 34*  --    BUN mg/dL  --  13   CREATININE mg/dL  --  0.81   ANION GAP mmol/L  --  7   CALCIUM mg/dL  --  9.1   ALBUMIN g/dL  --  3.4*   TOTAL BILIRUBIN mg/dL  --  0.67   ALK PHOS U/L  --  47   ALT U/L  --  11   AST U/L  --  17   GLUCOSE RANDOM mg/dL  --  129     Results from last 7 days   Lab Units 08/30/23  0511   INR  1.84*     Results from last 7 days   Lab Units 09/03/23  1112 09/03/23  0606 09/02/23  2130 09/02/23  2100 09/02/23  1518 09/02/23  1101 09/02/23  0923   POC GLUCOSE mg/dl 120 113 127 129 151* 133 99         Results from last 7 days   Lab Units 09/03/23  0219 09/02/23  0445 09/01/23  0429 08/31/23  0459 08/30/23 2002 08/30/23  0511 08/29/23  1944 08/29/23  1735 08/29/23  0906   LACTIC ACID mmol/L  --   --   --   --   --   --  1.3 2.1* 1.9   PROCALCITONIN ng/ml 1.77* 2.79* 4.02* 9.15* 10.15*   < >  --   --  0.16    < > = values in this interval not displayed. Lines/Drains:  Invasive Devices     Peripheral Intravenous Line  Duration           Peripheral IV 09/02/23 Dorsal (posterior); Right Forearm 1 day    Peripheral IV 09/02/23 Right Antecubital <1 day                      Imaging: Reviewed radiology reports from this admission including: chest xray and CT head    Recent Cultures (last 7 days):   Results from last 7 days   Lab Units 08/31/23  0603 08/29/23  1012 08/29/23  0906   BLOOD CULTURE  No Growth at 72 hrs. No Growth at 72 hrs. No Growth After 4 Days.  Streptococcus agalactiae (Group B)*   GRAM STAIN RESULT   --   --  Gram positive cocci in pairs and chains*       Last 24 Hours Medication List:   Current Facility-Administered Medications   Medication Dose Route Frequency Provider Last Rate   • albuterol  2.5 mg Nebulization Q4H PRN ESTEFANIA Turner     • allopurinol  400 mg Oral Daily ESTEFANIA Turner     • apixaban  5 mg Oral BID Zaida Brody PA-C     • chlorhexidine 15 mL Mouth/Throat Q12H [address], CRNP     • Diclofenac Sodium  2 g Topical 4x Daily Ad Creston, CRNP     • ezetimibe  10 mg Oral Daily Ad Creston, CRNP     • Fluticasone Furoate-Vilanterol  1 puff Inhalation Daily Ad Creston, CRNP     • furosemide  40 mg Intravenous Daily Piotr Judge PA-C     • gabapentin  100 mg Oral TID Piotr Judge PA-C     • haloperidol lactate  1 mg Intramuscular Q6H PRN ESTEFANIA Buck     • HYDROmorphone  0.2 mg Intravenous Q4H PRN Ad Creston, CRNP     • hydroxychloroquine  200 mg Oral BID With Meals Ad Creston, CRNP     • insulin lispro  1-5 Units Subcutaneous TID AC Fidel Mayer PA-C     • insulin lispro  1-5 Units Subcutaneous HS Piotr Judge PA-C     • ipratropium  0.5 mg Nebulization TID Ad Creston, CRNP     • levalbuterol  1.25 mg Nebulization TID Ad Creston, CRNP     • levothyroxine  25 mcg Oral Early Morning Ad Creston, CRNP     • lidocaine  2 patch Topical Daily Ad Creston, CRNP     • melatonin  9 mg Oral HS Ad Creston, CRNP     • metoprolol tartrate  12.5 mg Oral Q12H [address] [facility], CRNP     • nystatin   Topical BID Piotr Judge PA-C     • pantoprazole  20 mg Oral Daily Before Breakfast Ad Creston, CRNP     • penicillin G  4 Million Units Intravenous Q4H Ad Creston, CRNP Stopped (09/03/23 1313)   • polyethylene glycol  17 g Oral Every Other Day Ad Creston, CRNP     • predniSONE  5 mg Oral Daily Ad Creston, CRNP     • QUEtiapine  25 mg Oral HS ESTEFANIA Buck     • traMADol  50 mg Oral Q6H PRN Ad Creston, CRNP          Today, Patient Was Seen By: Piotr Judge PA-C    **Please Note: This note may have been constructed using a voice recognition system. **

## 2023-09-03 NOTE — ASSESSMENT & PLAN NOTE
Wt Readings from Last 3 Encounters:   09/03/23 106 kg (233 lb 11 oz)   08/28/23 99.8 kg (220 lb)   08/24/23 105 kg (232 lb)     • Not in acute exacerbation. Edema of upper extremities likely more in setting of cellulitis. • Home regimen: Torsemide alternating 20/10 mg daily, Toprol XL 25 mg BID  • Echo (8/30/23): EF 45%, G1DD, moderate MVR. Improved from EF 30% in 1/2023.   • Restarted on low-dose BB w/ Lopressor  • C/w IV Lasix 40 mg daily while inpatient, at discharge can resume home torsemide  • Daily weights, strict I/Os

## 2023-09-04 LAB
ANION GAP SERPL CALCULATED.3IONS-SCNC: 8 MMOL/L
BASOPHILS # BLD MANUAL: 0 THOUSAND/UL (ref 0–0.1)
BASOPHILS NFR MAR MANUAL: 0 % (ref 0–1)
BUN SERPL-MCNC: 14 MG/DL (ref 5–25)
CALCIUM SERPL-MCNC: 9.9 MG/DL (ref 8.4–10.2)
CHLORIDE SERPL-SCNC: 98 MMOL/L (ref 96–108)
CO2 SERPL-SCNC: 31 MMOL/L (ref 21–32)
CREAT SERPL-MCNC: 0.9 MG/DL (ref 0.6–1.3)
EOSINOPHIL # BLD MANUAL: 0.62 THOUSAND/UL (ref 0–0.4)
EOSINOPHIL NFR BLD MANUAL: 7 % (ref 0–6)
ERYTHROCYTE [DISTWIDTH] IN BLOOD BY AUTOMATED COUNT: 14.8 % (ref 11.6–15.1)
GFR SERPL CREATININE-BSD FRML MDRD: 75 ML/MIN/1.73SQ M
GLUCOSE SERPL-MCNC: 117 MG/DL (ref 65–140)
GLUCOSE SERPL-MCNC: 120 MG/DL (ref 65–140)
GLUCOSE SERPL-MCNC: 132 MG/DL (ref 65–140)
GLUCOSE SERPL-MCNC: 153 MG/DL (ref 65–140)
GLUCOSE SERPL-MCNC: 173 MG/DL (ref 65–140)
HCT VFR BLD AUTO: 38.3 % (ref 36.5–49.3)
HGB BLD-MCNC: 12.2 G/DL (ref 12–17)
LG PLATELETS BLD QL SMEAR: PRESENT
LYMPHOCYTES # BLD AUTO: 2.9 THOUSAND/UL (ref 0.6–4.47)
LYMPHOCYTES # BLD AUTO: 30 % (ref 14–44)
MAGNESIUM SERPL-MCNC: 1.5 MG/DL (ref 1.9–2.7)
MCH RBC QN AUTO: 31.1 PG (ref 26.8–34.3)
MCHC RBC AUTO-ENTMCNC: 31.9 G/DL (ref 31.4–37.4)
MCV RBC AUTO: 98 FL (ref 82–98)
MONOCYTES # BLD AUTO: 0.26 THOUSAND/UL (ref 0–1.22)
MONOCYTES NFR BLD: 3 % (ref 4–12)
NEUTROPHILS # BLD MANUAL: 5.02 THOUSAND/UL (ref 1.85–7.62)
NEUTS SEG NFR BLD AUTO: 57 % (ref 43–75)
NRBC BLD AUTO-RTO: 1 /100 WBC (ref 0–2)
PHOSPHATE SERPL-MCNC: 2.8 MG/DL (ref 2.3–4.1)
PLATELET # BLD AUTO: 187 THOUSANDS/UL (ref 149–390)
PLATELET BLD QL SMEAR: ADEQUATE
PMV BLD AUTO: 11.2 FL (ref 8.9–12.7)
POTASSIUM SERPL-SCNC: 3.8 MMOL/L (ref 3.5–5.3)
PROCALCITONIN SERPL-MCNC: 1.04 NG/ML
RBC # BLD AUTO: 3.92 MILLION/UL (ref 3.88–5.62)
SODIUM SERPL-SCNC: 137 MMOL/L (ref 135–147)
TOTAL IGE SMQN RAST: 147 KU/L (ref 0–113)
VARIANT LYMPHS # BLD AUTO: 3 %
WBC # BLD AUTO: 8.8 THOUSAND/UL (ref 4.31–10.16)

## 2023-09-04 PROCEDURE — 83735 ASSAY OF MAGNESIUM: CPT | Performed by: PHYSICIAN ASSISTANT

## 2023-09-04 PROCEDURE — 84100 ASSAY OF PHOSPHORUS: CPT | Performed by: PHYSICIAN ASSISTANT

## 2023-09-04 PROCEDURE — 82948 REAGENT STRIP/BLOOD GLUCOSE: CPT

## 2023-09-04 PROCEDURE — 85007 BL SMEAR W/DIFF WBC COUNT: CPT | Performed by: PHYSICIAN ASSISTANT

## 2023-09-04 PROCEDURE — 85027 COMPLETE CBC AUTOMATED: CPT | Performed by: PHYSICIAN ASSISTANT

## 2023-09-04 PROCEDURE — 94640 AIRWAY INHALATION TREATMENT: CPT

## 2023-09-04 PROCEDURE — 94760 N-INVAS EAR/PLS OXIMETRY 1: CPT

## 2023-09-04 PROCEDURE — 80048 BASIC METABOLIC PNL TOTAL CA: CPT | Performed by: PHYSICIAN ASSISTANT

## 2023-09-04 PROCEDURE — 84145 PROCALCITONIN (PCT): CPT | Performed by: PHYSICIAN ASSISTANT

## 2023-09-04 PROCEDURE — 99232 SBSQ HOSP IP/OBS MODERATE 35: CPT | Performed by: PHYSICIAN ASSISTANT

## 2023-09-04 RX ORDER — MAGNESIUM SULFATE HEPTAHYDRATE 40 MG/ML
2 INJECTION, SOLUTION INTRAVENOUS ONCE
Status: COMPLETED | OUTPATIENT
Start: 2023-09-04 | End: 2023-09-04

## 2023-09-04 RX ORDER — METOPROLOL SUCCINATE 25 MG/1
25 TABLET, EXTENDED RELEASE ORAL 2 TIMES DAILY
Status: DISCONTINUED | OUTPATIENT
Start: 2023-09-04 | End: 2023-09-09 | Stop reason: HOSPADM

## 2023-09-04 RX ADMIN — PENICILLIN G POTASSIUM 4 MILLION UNITS: 5000000 INJECTION, POWDER, FOR SOLUTION INTRAMUSCULAR; INTRAVENOUS at 22:55

## 2023-09-04 RX ADMIN — PENICILLIN G POTASSIUM 4 MILLION UNITS: 5000000 INJECTION, POWDER, FOR SOLUTION INTRAMUSCULAR; INTRAVENOUS at 06:03

## 2023-09-04 RX ADMIN — PENICILLIN G POTASSIUM 4 MILLION UNITS: 5000000 INJECTION, POWDER, FOR SOLUTION INTRAMUSCULAR; INTRAVENOUS at 19:15

## 2023-09-04 RX ADMIN — MAGNESIUM SULFATE HEPTAHYDRATE 2 G: 2 INJECTION, SOLUTION INTRAVENOUS at 09:43

## 2023-09-04 RX ADMIN — Medication 2 G: at 08:41

## 2023-09-04 RX ADMIN — Medication 2 G: at 19:18

## 2023-09-04 RX ADMIN — PENICILLIN G POTASSIUM 4 MILLION UNITS: 5000000 INJECTION, POWDER, FOR SOLUTION INTRAMUSCULAR; INTRAVENOUS at 03:03

## 2023-09-04 RX ADMIN — LEVALBUTEROL HYDROCHLORIDE 1.25 MG: 1.25 SOLUTION RESPIRATORY (INHALATION) at 07:54

## 2023-09-04 RX ADMIN — LEVOTHYROXINE SODIUM 25 MCG: 25 TABLET ORAL at 08:32

## 2023-09-04 RX ADMIN — LEVALBUTEROL HYDROCHLORIDE 1.25 MG: 1.25 SOLUTION RESPIRATORY (INHALATION) at 20:17

## 2023-09-04 RX ADMIN — ALLOPURINOL 400 MG: 100 TABLET ORAL at 08:39

## 2023-09-04 RX ADMIN — INSULIN LISPRO 1 UNITS: 100 INJECTION, SOLUTION INTRAVENOUS; SUBCUTANEOUS at 21:33

## 2023-09-04 RX ADMIN — IPRATROPIUM BROMIDE 0.5 MG: 0.5 SOLUTION RESPIRATORY (INHALATION) at 20:17

## 2023-09-04 RX ADMIN — PREDNISONE 5 MG: 5 TABLET ORAL at 08:37

## 2023-09-04 RX ADMIN — CHLORHEXIDINE GLUCONATE 15 ML: 1.2 RINSE ORAL at 08:40

## 2023-09-04 RX ADMIN — GABAPENTIN 100 MG: 100 CAPSULE ORAL at 20:42

## 2023-09-04 RX ADMIN — Medication 2 G: at 22:51

## 2023-09-04 RX ADMIN — EZETIMIBE 10 MG: 10 TABLET ORAL at 08:38

## 2023-09-04 RX ADMIN — NYSTATIN: 100000 POWDER TOPICAL at 19:18

## 2023-09-04 RX ADMIN — LIDOCAINE 2 PATCH: 50 PATCH TOPICAL at 09:13

## 2023-09-04 RX ADMIN — PENICILLIN G POTASSIUM 4 MILLION UNITS: 5000000 INJECTION, POWDER, FOR SOLUTION INTRAMUSCULAR; INTRAVENOUS at 12:45

## 2023-09-04 RX ADMIN — HYDROXYCHLOROQUINE SULFATE 200 MG: 200 TABLET, FILM COATED ORAL at 08:31

## 2023-09-04 RX ADMIN — IPRATROPIUM BROMIDE 0.5 MG: 0.5 SOLUTION RESPIRATORY (INHALATION) at 07:54

## 2023-09-04 RX ADMIN — FLUTICASONE FUROATE AND VILANTEROL TRIFENATATE 1 PUFF: 100; 25 POWDER RESPIRATORY (INHALATION) at 08:40

## 2023-09-04 RX ADMIN — PANTOPRAZOLE SODIUM 20 MG: 20 TABLET, DELAYED RELEASE ORAL at 08:31

## 2023-09-04 RX ADMIN — GABAPENTIN 100 MG: 100 CAPSULE ORAL at 15:55

## 2023-09-04 RX ADMIN — APIXABAN 5 MG: 5 TABLET, FILM COATED ORAL at 19:13

## 2023-09-04 RX ADMIN — Medication 12.5 MG: at 08:35

## 2023-09-04 RX ADMIN — PENICILLIN G POTASSIUM 4 MILLION UNITS: 5000000 INJECTION, POWDER, FOR SOLUTION INTRAMUSCULAR; INTRAVENOUS at 15:55

## 2023-09-04 RX ADMIN — APIXABAN 5 MG: 5 TABLET, FILM COATED ORAL at 08:37

## 2023-09-04 RX ADMIN — Medication 9 MG: at 21:44

## 2023-09-04 RX ADMIN — INSULIN LISPRO 1 UNITS: 100 INJECTION, SOLUTION INTRAVENOUS; SUBCUTANEOUS at 16:06

## 2023-09-04 RX ADMIN — CHLORHEXIDINE GLUCONATE 15 ML: 1.2 RINSE ORAL at 20:46

## 2023-09-04 RX ADMIN — HYDROXYCHLOROQUINE SULFATE 200 MG: 200 TABLET, FILM COATED ORAL at 15:58

## 2023-09-04 RX ADMIN — NYSTATIN: 100000 POWDER TOPICAL at 08:42

## 2023-09-04 RX ADMIN — FUROSEMIDE 40 MG: 10 INJECTION, SOLUTION INTRAMUSCULAR; INTRAVENOUS at 08:31

## 2023-09-04 RX ADMIN — QUETIAPINE FUMARATE 25 MG: 25 TABLET ORAL at 21:44

## 2023-09-04 RX ADMIN — GABAPENTIN 100 MG: 100 CAPSULE ORAL at 08:38

## 2023-09-04 RX ADMIN — POLYETHYLENE GLYCOL 3350 17 G: 17 POWDER, FOR SOLUTION ORAL at 08:32

## 2023-09-04 RX ADMIN — METOPROLOL SUCCINATE 25 MG: 25 TABLET, FILM COATED, EXTENDED RELEASE ORAL at 20:42

## 2023-09-04 NOTE — ASSESSMENT & PLAN NOTE
· Patient with increased confusion, agitation requiring soft wrist restraints since 9/2. No known history of cognitive impairment or dementia, patient's family states this is far from his baseline.   · Likely multifactorial with sepsis, hospital-related mostly contributing  · CT head w/ no acute intracranial abnormalities, noted moderate chronic microangiopathic changes  · UA on admission benign, no signs of urinary retention thus far  · Repeat CXR reviewed prior, no obvious infiltrates or effusions compared to 8/31  · Blood glucose stable, initiated SSI coverage  · VBG/ABG reviewed, overall acceptable with elevated CO2 similar to prior  · Hypophosphatemia resolved s/p IV sodium phosphate, lab work otherwise unremarkable  · Evening on 9/2, patient agitated/combative requiring Haldol, Seroquel with good response  · Caution with opioids, sedating medications  · Neurochecks every 4 hours  · Continue bedtime Seroquel and PRN Haldol, await updated A1c & remove restraints when behavior improved & no longer safety risk  · Restraints removed 0700 9/4, order discontinued at the same, last Haldol 9/2  · Continue to avoid physical and chemical restraints as much as possible in order to transition to rehab

## 2023-09-04 NOTE — ASSESSMENT & PLAN NOTE
· History of RA, remote episodes of eosinophilia   · ANCA, ANTHONY negative, Cortisol = 10  · IgE pending   · Continue home prednisone, hydroxychloroquine  · PRN Tramadol, Voltaren gel to joints for pain

## 2023-09-04 NOTE — PROGRESS NOTES
[facility]  Progress Note  Name: Medardo Bryant  MRN: [id number]  Unit/Bed#: -01 SDU I Date of Admission: 8/29/2023   Date of Service: 9/4/2023 I Hospital Day: 6    Assessment/Plan   * Severe sepsis [facility])  Assessment & Plan  · Sepsis criteria evidenced by fever, tachycardia, tachypnea POA. End organ damage/shock evidenced by hypotension briefly requiring Levophed, admitted under critical care service. · Source: LUE cellulitis, group B strep bacteremia. · Patient initially presented from [address] [place] [address] [facility] w/ fever 103 & erythema of left arm. · No DVT on venous duplex bilateral UEs, UA and CXR unremarkable  · S/p 2250 mL IVF bolus + albumin in ED. · Off Levophed since 8/30, transition to [facility] service 9/1. · Lactic normalized, procalcitonin trending down   · IV vancomycin + IV Rocephin -> IV cefepime -> IV clindamycin -> IV PCN G  · General surgery signed off, no acute surgical interventions at this time. Presentation not suspicious for necrotizing infection, no drainable abscess or gas in tissue. · ID following:  · Continue IV PCN G while inpatient, transition to amoxicillin 500 mg PO TID through till 9/13/2023 at discharge  · Follow culture results    Cellulitis of left upper extremity  Assessment & Plan  · Presented with LUE erythema, fever consistent with cellulitis  · CT LUE (8/29): Left upper extremity subcutaneous edema indicating cellulitis without drainable collection, soft tissue emphysema or evidence of osteomyelitis. · Worsening erythema LUE, development of RUE erythema/ecchymosis (8/30) since resolved  · See additional plan above for severe sepsis  · Neurovascular checks  · Elevate arms, local wound care    Bacteremia due to group B Streptococcus  Assessment & Plan  · Blood culture 1/2 (8/29): + Streptococcus agalactiae (Group B), susceptible to penicillin.   · Repeat BC x2 (8/31): NGTD (72hrs)  · Transitioned to vancomycin, clindamycin, high-dose PCN  · Echo without evidence of vegetation  · ID consulted:  · D/C IV vancomycin, significant clinical improvement w/ recovery GBS. · Follow culture results  · Antibiotic plan as above     Metabolic encephalopathy  Assessment & Plan  · Patient with increased confusion, agitation requiring soft wrist restraints since 9/2. No known history of cognitive impairment or dementia, patient's family states this is far from his baseline. · Likely multifactorial with sepsis, hospital-related mostly contributing  · CT head w/ no acute intracranial abnormalities, noted moderate chronic microangiopathic changes  · UA on admission benign, no signs of urinary retention thus far  · Repeat CXR reviewed prior, no obvious infiltrates or effusions compared to 8/31  · Blood glucose stable, initiated SSI coverage  · VBG/ABG reviewed, overall acceptable with elevated CO2 similar to prior  · Hypophosphatemia resolved s/p IV sodium phosphate, lab work otherwise unremarkable  · Evening on 9/2, patient agitated/combative requiring Haldol, Seroquel with good response  · Caution with opioids, sedating medications  · Neurochecks every 4 hours  · Continue bedtime Seroquel and PRN Haldol, await updated A1c & remove restraints when behavior improved & no longer safety risk  · Restraints removed 0700 9/4, order discontinued at the same, last Haldol 9/2  · Continue to avoid physical and chemical restraints as much as possible in order to transition to rehab    A-fib RVR ([address])  Assessment & Plan  · Initially presented in A-fib with RVR, likely in setting of sepsis  · Home regimen: Toprol 25 mg BID, AC on Eliquis.   · Rate since controlled/improved  · Digoxin started then discontinued  · Now back on home dose beta blocker - Toprol 25 mg BID   · Continue Eliquis for anticoagulation     Chronic systolic heart failure (HCC)  Assessment & Plan  Wt Readings from Last 3 Encounters:   09/04/23 104 kg (229 lb 15 oz)   08/28/23 99.8 kg (220 lb)   08/24/23 105 kg (232 lb) · Not in acute exacerbation. Edema of upper extremities likely more in setting of cellulitis. · Home regimen: Torsemide alternating 20/10 mg daily, Toprol XL 25 mg BID  · Echo (8/30/23): EF 45%, G1DD, moderate MVR. Improved from EF 30% in 1/2023. · Restarted on home dose of beta blocker   · Continue with IV Lasix 40 mg daily while inpatient, at discharge can resume home torsemide  · Daily weights, strict I/Os    RA (rheumatoid arthritis) ([address])  Assessment & Plan  · History of RA, remote episodes of eosinophilia   · ANCA, ANTHONY negative, Cortisol = 10  · IgE pending   · Continue home prednisone, hydroxychloroquine  · PRN Tramadol, Voltaren gel to joints for pain      HTN (hypertension)  Assessment & Plan  · BP reviewed, slightly hypertensive    · Restarted home dose Toprol-XL 25 mg BID   · Continue diuretic with IV Lasix 40 mg daily  · Monitor BP closely    Peripheral vascular disease ([address])  Assessment & Plan  · Has been seeing Dr. Radha Hardy with podiatry outpatient for chronic toe wound of the left great toe  · Lower extremities were cool to the touch on initial evaluation  · No evidence of significant arterial occlusive disease on arterial duplex bilateral LEs  · Podiatry following, appreciate ongoing recommendations  · No further inpatient recommendations   · Wound care for local wound care of the toe  · Neurovascular checks             VTE Pharmacologic Prophylaxis: VTE Score: 9 High Risk (Score >/= 5) - Pharmacological DVT Prophylaxis Ordered: apixaban (Eliquis). Sequential Compression Devices Ordered. Patient Centered Rounds: I performed bedside rounds with nursing staff today. Discussions with Specialists or Other Care Team Provider: Discussed with RN, ERIN    Education and Discussions with Family / Patient: Attempted to update  (son) via phone. Left voicemail.      Total Time Spent on Date of Encounter in care of patient: 35 minutes This time was spent on one or more of the following: performing physical exam; counseling and coordination of care; obtaining or reviewing history; documenting in the medical record; reviewing/ordering tests, medications or procedures; communicating with other healthcare professionals and discussing with patient's family/caregivers. Current Length of Stay: 6 day(s)  Current Patient Status: Inpatient   Certification Statement: The patient will continue to require additional inpatient hospital stay due to further safe discharge planning   Discharge Plan: Anticipate discharge in 24-48 hrs to rehab facility. Code Status: Level 1 - Full Code    Subjective:   Patient reports that he is tired today. States that he needs to get cleaned up. Denies pain, fevers, or chills. RN reports that she took his restraints off and he required no Haldol since . Objective:     Vitals:   Temp (24hrs), Av.1 °F (36.7 °C), Min:97.6 °F (36.4 °C), Max:98.4 °F (36.9 °C)    Temp:  [97.6 °F (36.4 °C)-98.4 °F (36.9 °C)] 98.3 °F (36.8 °C)  HR:  [105-118] 108  Resp:  [14-24] 16  BP: (102-158)/(62-94) 137/82  SpO2:  [92 %-99 %] 92 %  Body mass index is 33.96 kg/m². Input and Output Summary (last 24 hours): Intake/Output Summary (Last 24 hours) at 2023 0921  Last data filed at 2023 7606  Gross per 24 hour   Intake 670 ml   Output 2272 ml   Net -1602 ml       Physical Exam:   Physical Exam  Constitutional:       General: He is not in acute distress. Appearance: Normal appearance. He is normal weight. He is not ill-appearing or diaphoretic. HENT:      Head: Normocephalic and atraumatic. Mouth/Throat:      Mouth: Mucous membranes are dry. Eyes:      General: No scleral icterus. Pupils: Pupils are equal, round, and reactive to light. Cardiovascular:      Rate and Rhythm: Tachycardia present. Rhythm irregularly irregular. Pulses: Normal pulses. Heart sounds: Normal heart sounds, S1 normal and S2 normal. No murmur heard.       No systolic murmur is present. No diastolic murmur is present. No gallop. No S3 or S4 sounds. Pulmonary:      Effort: Pulmonary effort is normal. No accessory muscle usage or respiratory distress. Breath sounds: No stridor. Examination of the right-lower field reveals decreased breath sounds. Examination of the left-lower field reveals decreased breath sounds. Decreased breath sounds present. No wheezing, rhonchi or rales. Chest:      Chest wall: No tenderness. Abdominal:      General: Bowel sounds are normal. There is no distension. Palpations: Abdomen is soft. Tenderness: There is no abdominal tenderness. There is no guarding. Musculoskeletal:      Right lower leg: No edema. Left lower leg: No edema. Skin:     General: Skin is warm and dry. Coloration: Skin is not jaundiced. Neurological:      Mental Status: He is alert. Motor: No tremor or seizure activity. Psychiatric:         Behavior: Behavior is cooperative. Additional Data:     Labs:  Results from last 7 days   Lab Units 09/04/23 0427 09/03/23 0555 09/03/23 0219   WBC Thousand/uL 8.80  --  7.15   HEMOGLOBIN g/dL 12.2  --  11.0*   I STAT HEMOGLOBIN   --    < >  --    HEMATOCRIT % 38.3  --  35.4*   HEMATOCRIT, ISTAT   --    < >  --    PLATELETS Thousands/uL 187  --  160   NEUTROS PCT %  --   --  59   LYMPHS PCT %  --   --  24   LYMPHO PCT % 30  --   --    MONOS PCT %  --   --  11   MONO PCT % 3*  --   --    EOS PCT % 7*  --  4    < > = values in this interval not displayed.      Results from last 7 days   Lab Units 09/04/23 0427 09/03/23 0555 09/03/23  0219   SODIUM mmol/L 137  --  137   POTASSIUM mmol/L 3.8  --  4.2   CHLORIDE mmol/L 98  --  101   CO2 mmol/L 31  --  29   CO2, I-STAT   --    < >  --    BUN mg/dL 14  --  13   CREATININE mg/dL 0.90  --  0.81   ANION GAP mmol/L 8  --  7   CALCIUM mg/dL 9.9  --  9.1   ALBUMIN g/dL  --   --  3.4*   TOTAL BILIRUBIN mg/dL  --   --  0.67   ALK PHOS U/L  --   --  47   ALT U/L --   --  11   AST U/L  --   --  17   GLUCOSE RANDOM mg/dL 120  --  129    < > = values in this interval not displayed. Results from last 7 days   Lab Units 08/30/23  0511   INR  1.84*     Results from last 7 days   Lab Units 09/04/23  0606 09/03/23  2116 09/03/23  1612 09/03/23  1112 09/03/23  0606 09/02/23  2130 09/02/23  2100 09/02/23  1518 09/02/23  1101 09/02/23  0923   POC GLUCOSE mg/dl 117 121 129 120 113 127 129 151* 133 99         Results from last 7 days   Lab Units 09/04/23  0427 09/03/23  0219 09/02/23  0445 09/01/23  0429 08/31/23  0459 08/30/23  0511 08/29/23  1944 08/29/23  1735 08/29/23  0906   LACTIC ACID mmol/L  --   --   --   --   --   --  1.3 2.1* 1.9   PROCALCITONIN ng/ml 1.04* 1.77* 2.79* 4.02* 9.15*   < >  --   --  0.16    < > = values in this interval not displayed. Lines/Drains:  Invasive Devices     Peripheral Intravenous Line  Duration           Peripheral IV 09/02/23 Dorsal (posterior); Right Forearm 2 days    Peripheral IV 09/02/23 Right Antecubital 1 day                      Imaging: No pertinent imaging reviewed. Recent Cultures (last 7 days):   Results from last 7 days   Lab Units 08/31/23  0603 08/29/23  1012 08/29/23  0906   BLOOD CULTURE  No Growth at 72 hrs. No Growth at 72 hrs. No Growth After 5 Days.  Streptococcus agalactiae (Group B)*   GRAM STAIN RESULT   --   --  Gram positive cocci in pairs and chains*       Last 24 Hours Medication List:   Current Facility-Administered Medications   Medication Dose Route Frequency Provider Last Rate   • albuterol  2.5 mg Nebulization Q4H PRN ESTEFANIA Turner     • allopurinol  400 mg Oral Daily ESTEFANIA Turner     • apixaban  5 mg Oral BID Zaida Brody PA-C     • chlorhexidine  15 mL Mouth/Throat Q12H [facility] ESTEFANIA Turner     • Diclofenac Sodium  2 g Topical 4x Daily ESTEFANIA Turner     • ezetimibe  10 mg Oral Daily ESTEFANIA Turner     • Fluticasone Furoate-Vilanterol  1 puff Inhalation Daily ESTEFANIA Woodall     • furosemide  40 mg Intravenous Daily Maribel Alcaraz PA-C     • gabapentin  100 mg Oral TID Maribel Alcaraz PA-C     • haloperidol lactate  1 mg Intramuscular Q6H PRN Raenette Nageotte, CRNP     • HYDROmorphone  0.2 mg Intravenous Q4H PRN ESTEFANIA Woodall     • hydroxychloroquine  200 mg Oral BID With Meals ESTEFANIA Woodall     • insulin lispro  1-5 Units Subcutaneous TID AC Rafael Mayer PA-C     • insulin lispro  1-5 Units Subcutaneous HS Maribel Alcaraz PA-C     • ipratropium  0.5 mg Nebulization TID ESTEFANIA Woodall     • levalbuterol  1.25 mg Nebulization TID ESTEFANIA Woodall     • levothyroxine  25 mcg Oral Early Morning ESTEFANIA Woodall     • lidocaine  2 patch Topical Daily ESTEFANIA Woodall     • magnesium sulfate  2 g Intravenous Once Akash Sosa PA-C     • melatonin  9 mg Oral HS ESTEFANIA Woodall     • metoprolol succinate  25 mg Oral BID Marilyn Chang PA-C     • nystatin   Topical BID Maribel Alcaraz PA-C     • pantoprazole  20 mg Oral Daily Before Breakfast ESTEFANIA Woodall     • penicillin G  4 Million Units Intravenous Q4H ESTEFANIA Woodall 4 Million Units (09/04/23 8714)   • polyethylene glycol  17 g Oral Every Other Day ESTEFANIA Woodall     • predniSONE  5 mg Oral Daily ESTEFANIA Woodall     • QUEtiapine  25 mg Oral HS Raenette Nageotte, CRNP     • traMADol  50 mg Oral Q6H PRN ESTEFANIA Woodall          Today, Patient Was Seen By: Marilyn Chang PA-C    **Please Note: This note may have been constructed using a voice recognition system. **

## 2023-09-04 NOTE — ASSESSMENT & PLAN NOTE
· Sepsis criteria evidenced by fever, tachycardia, tachypnea POA. End organ damage/shock evidenced by hypotension briefly requiring Levophed, admitted under critical care service. · Source: LUE cellulitis, group B strep bacteremia. · Patient initially presented from [facility] w/ fever 103 & erythema of left arm. · No DVT on venous duplex bilateral UEs, UA and CXR unremarkable  · S/p 2250 mL IVF bolus + albumin in ED. · Off Levophed since 8/30, transition to [facility] service 9/1. · Lactic normalized, procalcitonin trending down   · IV vancomycin + IV Rocephin -> IV cefepime -> IV clindamycin -> IV PCN G  · General surgery signed off, no acute surgical interventions at this time. Presentation not suspicious for necrotizing infection, no drainable abscess or gas in tissue.   · ID following:  · Continue IV PCN G while inpatient, transition to amoxicillin 500 mg PO TID through till 9/13/2023 at discharge  · Follow culture results

## 2023-09-04 NOTE — ASSESSMENT & PLAN NOTE
· BP reviewed, slightly hypertensive    · Restarted home dose Toprol-XL 25 mg BID   · Continue diuretic with IV Lasix 40 mg daily  · Monitor BP closely

## 2023-09-04 NOTE — ASSESSMENT & PLAN NOTE
· Presented with LUE erythema, fever consistent with cellulitis  · CT LUE (8/29): Left upper extremity subcutaneous edema indicating cellulitis without drainable collection, soft tissue emphysema or evidence of osteomyelitis.   · Worsening erythema LUE, development of RUE erythema/ecchymosis (8/30) since resolved  · See additional plan above for severe sepsis  · Neurovascular checks  · Elevate arms, local wound care

## 2023-09-04 NOTE — ASSESSMENT & PLAN NOTE
Wt Readings from Last 3 Encounters:   09/04/23 104 kg (229 lb 15 oz)   08/28/23 99.8 kg (220 lb)   08/24/23 105 kg (232 lb)     · Not in acute exacerbation. Edema of upper extremities likely more in setting of cellulitis. · Home regimen: Torsemide alternating 20/10 mg daily, Toprol XL 25 mg BID  · Echo (8/30/23): EF 45%, G1DD, moderate MVR. Improved from EF 30% in 1/2023.   · Restarted on home dose of beta blocker   · Continue with IV Lasix 40 mg daily while inpatient, at discharge can resume home torsemide  · Daily weights, strict I/Os

## 2023-09-04 NOTE — ASSESSMENT & PLAN NOTE
· Initially presented in A-fib with RVR, likely in setting of sepsis  · Home regimen: Toprol 25 mg BID, AC on Eliquis.   · Rate since controlled/improved  · Digoxin started then discontinued  · Now back on home dose beta blocker - Toprol 25 mg BID   · Continue Eliquis for anticoagulation

## 2023-09-04 NOTE — ASSESSMENT & PLAN NOTE
· Has been seeing Dr. Tay Fan with podiatry outpatient for chronic toe wound of the left great toe  · Lower extremities were cool to the touch on initial evaluation  · No evidence of significant arterial occlusive disease on arterial duplex bilateral LEs  · Podiatry following, appreciate ongoing recommendations  · No further inpatient recommendations   · Wound care for local wound care of the toe  · Neurovascular checks

## 2023-09-04 NOTE — ASSESSMENT & PLAN NOTE
· Blood culture 1/2 (8/29): + Streptococcus agalactiae (Group B), susceptible to penicillin. · Repeat BC x2 (8/31): NGTD (72hrs)  · Transitioned to vancomycin, clindamycin, high-dose PCN  · Echo without evidence of vegetation  · ID consulted:  · D/C IV vancomycin, significant clinical improvement w/ recovery GBS.   · Follow culture results  · Antibiotic plan as above

## 2023-09-05 LAB
ANION GAP SERPL CALCULATED.3IONS-SCNC: 9 MMOL/L
BACTERIA BLD CULT: NORMAL
BACTERIA BLD CULT: NORMAL
BUN SERPL-MCNC: 17 MG/DL (ref 5–25)
CALCIUM SERPL-MCNC: 10 MG/DL (ref 8.4–10.2)
CHLORIDE SERPL-SCNC: 97 MMOL/L (ref 96–108)
CO2 SERPL-SCNC: 33 MMOL/L (ref 21–32)
CREAT SERPL-MCNC: 1.06 MG/DL (ref 0.6–1.3)
GFR SERPL CREATININE-BSD FRML MDRD: 62 ML/MIN/1.73SQ M
GLUCOSE SERPL-MCNC: 132 MG/DL (ref 65–140)
GLUCOSE SERPL-MCNC: 133 MG/DL (ref 65–140)
GLUCOSE SERPL-MCNC: 151 MG/DL (ref 65–140)
GLUCOSE SERPL-MCNC: 170 MG/DL (ref 65–140)
GLUCOSE SERPL-MCNC: 175 MG/DL (ref 65–140)
MAGNESIUM SERPL-MCNC: 2 MG/DL (ref 1.9–2.7)
POTASSIUM SERPL-SCNC: 4 MMOL/L (ref 3.5–5.3)
SODIUM SERPL-SCNC: 139 MMOL/L (ref 135–147)

## 2023-09-05 PROCEDURE — 92526 ORAL FUNCTION THERAPY: CPT

## 2023-09-05 PROCEDURE — 94760 N-INVAS EAR/PLS OXIMETRY 1: CPT

## 2023-09-05 PROCEDURE — 83735 ASSAY OF MAGNESIUM: CPT | Performed by: PHYSICIAN ASSISTANT

## 2023-09-05 PROCEDURE — 97530 THERAPEUTIC ACTIVITIES: CPT

## 2023-09-05 PROCEDURE — 94640 AIRWAY INHALATION TREATMENT: CPT

## 2023-09-05 PROCEDURE — 80048 BASIC METABOLIC PNL TOTAL CA: CPT | Performed by: PHYSICIAN ASSISTANT

## 2023-09-05 PROCEDURE — 83036 HEMOGLOBIN GLYCOSYLATED A1C: CPT | Performed by: INTERNAL MEDICINE

## 2023-09-05 PROCEDURE — 97535 SELF CARE MNGMENT TRAINING: CPT

## 2023-09-05 PROCEDURE — 82948 REAGENT STRIP/BLOOD GLUCOSE: CPT

## 2023-09-05 PROCEDURE — 99232 SBSQ HOSP IP/OBS MODERATE 35: CPT | Performed by: INTERNAL MEDICINE

## 2023-09-05 RX ORDER — TORSEMIDE 20 MG/1
20 TABLET ORAL DAILY
Status: DISCONTINUED | OUTPATIENT
Start: 2023-09-06 | End: 2023-09-07

## 2023-09-05 RX ORDER — QUETIAPINE FUMARATE 25 MG/1
12.5 TABLET, FILM COATED ORAL
Status: DISCONTINUED | OUTPATIENT
Start: 2023-09-05 | End: 2023-09-06

## 2023-09-05 RX ADMIN — NYSTATIN: 100000 POWDER TOPICAL at 17:13

## 2023-09-05 RX ADMIN — INSULIN LISPRO 1 UNITS: 100 INJECTION, SOLUTION INTRAVENOUS; SUBCUTANEOUS at 16:20

## 2023-09-05 RX ADMIN — GABAPENTIN 100 MG: 100 CAPSULE ORAL at 21:17

## 2023-09-05 RX ADMIN — CHLORHEXIDINE GLUCONATE 15 ML: 1.2 RINSE ORAL at 08:00

## 2023-09-05 RX ADMIN — QUETIAPINE FUMARATE 12.5 MG: 25 TABLET ORAL at 21:17

## 2023-09-05 RX ADMIN — INSULIN LISPRO 1 UNITS: 100 INJECTION, SOLUTION INTRAVENOUS; SUBCUTANEOUS at 21:22

## 2023-09-05 RX ADMIN — NYSTATIN: 100000 POWDER TOPICAL at 08:02

## 2023-09-05 RX ADMIN — APIXABAN 5 MG: 5 TABLET, FILM COATED ORAL at 17:12

## 2023-09-05 RX ADMIN — PENICILLIN G POTASSIUM 4 MILLION UNITS: 5000000 INJECTION, POWDER, FOR SOLUTION INTRAMUSCULAR; INTRAVENOUS at 02:58

## 2023-09-05 RX ADMIN — FLUTICASONE FUROATE AND VILANTEROL TRIFENATATE 1 PUFF: 100; 25 POWDER RESPIRATORY (INHALATION) at 08:02

## 2023-09-05 RX ADMIN — GABAPENTIN 100 MG: 100 CAPSULE ORAL at 08:01

## 2023-09-05 RX ADMIN — METOPROLOL SUCCINATE 25 MG: 25 TABLET, FILM COATED, EXTENDED RELEASE ORAL at 08:01

## 2023-09-05 RX ADMIN — PENICILLIN G POTASSIUM 4 MILLION UNITS: 5000000 INJECTION, POWDER, FOR SOLUTION INTRAMUSCULAR; INTRAVENOUS at 12:34

## 2023-09-05 RX ADMIN — PANTOPRAZOLE SODIUM 20 MG: 20 TABLET, DELAYED RELEASE ORAL at 08:01

## 2023-09-05 RX ADMIN — FUROSEMIDE 40 MG: 10 INJECTION, SOLUTION INTRAMUSCULAR; INTRAVENOUS at 08:15

## 2023-09-05 RX ADMIN — IPRATROPIUM BROMIDE 0.5 MG: 0.5 SOLUTION RESPIRATORY (INHALATION) at 07:47

## 2023-09-05 RX ADMIN — PENICILLIN G POTASSIUM 4 MILLION UNITS: 5000000 INJECTION, POWDER, FOR SOLUTION INTRAMUSCULAR; INTRAVENOUS at 16:21

## 2023-09-05 RX ADMIN — LEVALBUTEROL HYDROCHLORIDE 1.25 MG: 1.25 SOLUTION RESPIRATORY (INHALATION) at 19:47

## 2023-09-05 RX ADMIN — ALLOPURINOL 400 MG: 100 TABLET ORAL at 08:01

## 2023-09-05 RX ADMIN — EZETIMIBE 10 MG: 10 TABLET ORAL at 08:01

## 2023-09-05 RX ADMIN — LIDOCAINE 2 PATCH: 50 PATCH TOPICAL at 08:01

## 2023-09-05 RX ADMIN — PENICILLIN G POTASSIUM 4 MILLION UNITS: 5000000 INJECTION, POWDER, FOR SOLUTION INTRAMUSCULAR; INTRAVENOUS at 22:37

## 2023-09-05 RX ADMIN — PENICILLIN G POTASSIUM 4 MILLION UNITS: 5000000 INJECTION, POWDER, FOR SOLUTION INTRAMUSCULAR; INTRAVENOUS at 18:13

## 2023-09-05 RX ADMIN — Medication 2 G: at 21:20

## 2023-09-05 RX ADMIN — INSULIN LISPRO 1 UNITS: 100 INJECTION, SOLUTION INTRAVENOUS; SUBCUTANEOUS at 08:03

## 2023-09-05 RX ADMIN — HYDROXYCHLOROQUINE SULFATE 200 MG: 200 TABLET, FILM COATED ORAL at 08:20

## 2023-09-05 RX ADMIN — PREDNISONE 5 MG: 5 TABLET ORAL at 08:01

## 2023-09-05 RX ADMIN — HYDROXYCHLOROQUINE SULFATE 200 MG: 200 TABLET, FILM COATED ORAL at 16:20

## 2023-09-05 RX ADMIN — PENICILLIN G POTASSIUM 4 MILLION UNITS: 5000000 INJECTION, POWDER, FOR SOLUTION INTRAMUSCULAR; INTRAVENOUS at 08:15

## 2023-09-05 RX ADMIN — GABAPENTIN 100 MG: 100 CAPSULE ORAL at 16:20

## 2023-09-05 RX ADMIN — Medication 2 G: at 08:02

## 2023-09-05 RX ADMIN — METOPROLOL SUCCINATE 25 MG: 25 TABLET, FILM COATED, EXTENDED RELEASE ORAL at 21:23

## 2023-09-05 RX ADMIN — LEVALBUTEROL HYDROCHLORIDE 1.25 MG: 1.25 SOLUTION RESPIRATORY (INHALATION) at 07:47

## 2023-09-05 RX ADMIN — APIXABAN 5 MG: 5 TABLET, FILM COATED ORAL at 08:01

## 2023-09-05 RX ADMIN — IPRATROPIUM BROMIDE 0.5 MG: 0.5 SOLUTION RESPIRATORY (INHALATION) at 19:47

## 2023-09-05 RX ADMIN — CHLORHEXIDINE GLUCONATE 15 ML: 1.2 RINSE ORAL at 21:17

## 2023-09-05 RX ADMIN — Medication 9 MG: at 21:19

## 2023-09-05 NOTE — QUICK NOTE
Called to see pt as pts family was concern that he was more sleepy today and was having a stroke. Evaluated pt and he awakens easily. Follows all commands although sometimes required repeating command. Moving all extremities. No cn deficits    1. Metabolic encephalopathy- worsened as the pt is more lethargic due to seroquel dose. Will reduce seroquel to 12.5mg and monitor.

## 2023-09-05 NOTE — PLAN OF CARE
Problem: OCCUPATIONAL THERAPY ADULT  Goal: Performs self-care activities at highest level of function for planned discharge setting. See evaluation for individualized goals. Description: Treatment Interventions: ADL retraining, Functional transfer training, Patient/family training, Compensatory technique education, Endurance training, Cognitive reorientation, Fine motor coordination activities, Activityengagement          See flowsheet documentation for full assessment, interventions and recommendations. Outcome: Progressing  Note: Limitation: Decreased ADL status, Decreased self-care trans, Decreased high-level ADLs, Decreased cognition, Decreased sensation  Prognosis: Fair  Assessment: Pt seen for OT tx session with focus on functional balance, functional mobility, ADL status, and transfer safety. Patient agreeable to OT treatment session. Pt received supine in bed. Performed bed mobility with mod-max AX 2. Required min-mod A x 1 to maintain static sitting balance. Required max A x 2 for stand pivot transfer. Patient continues to be functioning below baseline level, occupational performance remains limited secondary to factors listed above, and pt at increased risk for falls and injury. The patient's raw score on the AM-PAC Daily Activity inpatient short form is 9, standardized score is 24.79  , less than 39.4. Patients at this level are likely to benefit from DC to post-acute rehabilitation services. Please refer to the recommendation of the Occupational Therapist for safe DC planning. From OT standpoint, recommendation at time of d/c would be level 2 resources. Patient to benefit from continued Occupational Therapy treatment while in the hospital to address deficits as defined above and maximize level of functional independence with ADLs and functional mobility. Pt left with call bell in reach, tray table in reach, needs met, chair alarm activated. All lines intact.

## 2023-09-05 NOTE — CASE MANAGEMENT
Case Management Discharge Planning Note    Patient name Brinda Kimble  Location  SDU/-01 S* MRN [id number]  : 1934 Date 2023       Current Admission Date: 2023  Current Admission Diagnosis:Severe sepsis [facility])   Patient Active Problem List    Diagnosis Date Noted   • Metabolic encephalopathy    • Bacteremia due to group B Streptococcus 2023   • Respiratory insufficiency 2023   • Severe sepsis ([address]) 2023   • Cellulitis of left upper extremity 2023   • HLD (hyperlipidemia) 2023   • GERD (gastroesophageal reflux disease) 2023   • Gout without acute exacerbation  2023   • Spinal stenosis 2023   • Hypothyroidism 2023   • Peripheral vascular disease ([address]) 2023   • Open wound of right great toe with damage to nail 2023   • Adverse effect of loop (high-ceiling) diuretics, subsequent encounter 2023   • Toxic metabolic encephalopathy    • Septic arthritis of shoulder, right ([address]) 2023   • Suture of skin wound 2023   • Hypomagnesemia 2023   • Elevated TSH 2023   • Chronic systolic heart failure ([address]) 2023   • A-fib RVR ([address]) 2023   • Transient speech disturbance 2023   • KAMLESH (acute kidney injury) ([address]) 2023   • Dilated cardiomyopathy ([address]) 10/18/2022   • HTN (hypertension) 10/18/2022   • Low left ventricular ejection fraction 10/11/2022   • TIA (transient ischemic attack) 10/10/2022   • Speech disturbance 10/08/2022   • Accidental overdose 2020   • History of hypertension 2020   • Hypotension 2020   • RA (rheumatoid arthritis) ([address]) 2020      LOS (days): 7  Geometric Mean LOS (GMLOS) (days): 3.50  Days to GMLOS:-3.5     OBJECTIVE:  Risk of Unplanned Readmission Score: 30.03         Current admission status: Inpatient   Preferred Pharmacy:   [address] [place], [place] [address] [address] [address]  [address] [facility] [address] [place] [id number]  Phone: [phone] Fax: [phone]    Primary Care Provider: Cali Leong MD    Primary Insurance: [facility]  Secondary Insurance:     DISCHARGE DETAILS:    Additional Comments: Met with Pt, Pt's son and dtr-in-law at bedside. Pt sleeping at time of visit. Discussed SNF recommendation. . Pt's son and dtr-in-law considering [facility] and [address] and requested facilities near the [facility]. Referrals sent via [facility]. Will need to obtain insurance auth prior to discharge. CM to follow.

## 2023-09-05 NOTE — ASSESSMENT & PLAN NOTE
· Has been seeing Dr. Ofelia Sandra with podiatry outpatient for chronic toe wound of the left great toe  · Lower extremities were cool to the touch on initial evaluation  · No evidence of significant arterial occlusive disease on arterial duplex bilateral LEs  · Podiatry following, appreciate ongoing recommendations  · No further inpatient recommendations   · Wound care for local wound care of the toe  · Neurovascular checks

## 2023-09-05 NOTE — WOUND OSTOMY CARE
Progress Note - Wound   Moises Moran 80 y.o. male MRN: [id number]  Unit/Bed#: -01 SDU Encounter: [id number]        Assessment:   Patient seen today for wound care follow up assessment. Patient is dependent for all care and incontinent of bowel and bladder. Patient is max assist with turning from side to side. Sacral/buttocks and B/L heels intact and blanching, preventative skin care orders placed. 1. Right neck wound- patient is following with dermatology for wound and son reports that he is to have procedure to remove soon, circular, fragile hypergranulation with scant sanguineous drainage. 2. Right hand skin tear- small, partial thickness skin loss with yellow tissue, periwound skin is fragile and intact, scant serosanguineous drainage. 3. Left arm cellulitis- significant improvement, small area of partial thickness skin loss with pink tissue, scant sanguineous drainage. Right toe- managed by podiatry. No induration, fluctuance, odor, warmth/temperature differences, redness, or purulence noted to the above noted wounds and skin areas assessed. New dressings applied per orders listed below. Patient tolerated well- no s/s of non-verbal pain or discomfort observed during the encounter. Bedside nurse aware of plan of care. See flow sheets for more detailed assessment findings. Wound care will continue to follow. Wound Care Plan:   1-Hydraguard lotion to bilateral heels, buttocks, and sacrum three times daily and as needed with incontinence care. 2-Elevate lower extremities for edema management. Ensure heels float off of bed/chair surface to offload pressure. 3-Offloading air cushion in chair if/when out of bed. 4-Moisturize skin daily with skin nourishing lotion. 5-Turn/reposition every 2 hours while in bed using positioning wedges; and weight shift frequently while in chair for pressure re-distribution on skin.    6-Right hand, right neck, left arm--cleanse with normal saline, pat dry. Apply Xeroform and cover with dry dressing or Allevyn Life foam.  Change dressing every other day. 7-Right great toe per podiatry. Wound 08/29/23 Skin Tear Skin tear Hand Anterior;Right (Active)   Wound Image   09/05/23 1412   Wound Description Yellow 09/05/23 1412   Pressure Injury Stage 2 08/29/23 1300   Mylene-wound Assessment Clean;Dry; Intact 09/05/23 1412   Wound Length (cm) 0.4 cm 09/05/23 1412   Wound Width (cm) 0.4 cm 09/05/23 1412   Wound Depth (cm) 0.1 cm 09/05/23 1412   Wound Surface Area (cm^2) 0.16 cm^2 09/05/23 1412   Wound Volume (cm^3) 0.016 cm^3 09/05/23 1412   Calculated Wound Volume (cm^3) 0.02 cm^3 09/05/23 1412   Change in Wound Size % 90 09/05/23 1412   Tunneling 0 cm 09/05/23 1412   Tunneling in depth located at 0 09/05/23 1412   Undermining 0 09/05/23 1412   Undermining is depth extending from 0 09/05/23 1412   Wound Site Closure NOEL 09/05/23 1412   Drainage Amount Scant 09/05/23 1412   Drainage Description Serosanguineous 09/05/23 1412   Non-staged Wound Description Partial thickness 09/05/23 1412   Treatments Cleansed 09/05/23 1412   Dressing Foam, Silicon (eg. Allevyn, etc); Xeroform 09/05/23 1412   Wound packed? No 09/05/23 1412   Packing- # removed 0 09/05/23 1412   Packing- # inserted 0 09/05/23 1412   Dressing Changed New 09/05/23 1412   Patient Tolerance Tolerated well 09/05/23 1412   Dressing Status Clean;Dry; Intact 09/05/23 1412       Wound 08/29/23 Incision  Neck Right (Active)   Wound Image   09/05/23 1417   Wound Description Hypergranulation;Fragile 09/05/23 1417   Mylene-wound Assessment Clean;Dry; Intact 09/05/23 1417   Wound Length (cm) 1.5 cm 09/05/23 1417   Wound Width (cm) 1.5 cm 09/05/23 1417   Wound Depth (cm) 0.2 cm 09/05/23 1417   Wound Surface Area (cm^2) 2.25 cm^2 09/05/23 1417   Wound Volume (cm^3) 0.45 cm^3 09/05/23 1417   Calculated Wound Volume (cm^3) 0.45 cm^3 09/05/23 1417   Tunneling 0 cm 09/05/23 1417   Tunneling in depth located at 0 09/05/23 1417 Undermining 0 09/05/23 1417   Undermining is depth extending from 0 09/05/23 1417   Wound Site Closure NOEL 09/05/23 1417   Drainage Amount Scant 09/05/23 1417   Drainage Description Sanguineous 09/05/23 1417   Non-staged Wound Description Full thickness 09/05/23 1417   Treatments Cleansed 09/05/23 1417   Dressing Foam, Silicon (eg. Allevyn, etc) 09/05/23 1417   Wound packed? No 09/05/23 1417   Packing- # removed 0 09/05/23 1417   Packing- # inserted 0 09/05/23 1417   Dressing Changed New 09/05/23 1417   Patient Tolerance Tolerated well 09/05/23 1417   Dressing Status Clean;Dry; Intact 09/05/23 1417       Wound 08/30/23 Arm Left;Mid (Active)   Wound Image   09/05/23 1419   Wound Description Granulation tissue 09/05/23 1419   Mylene-wound Assessment Clean;Dry; Intact 09/05/23 1419   Wound Length (cm) 1.5 cm 09/05/23 1419   Wound Width (cm) 1.5 cm 09/05/23 1419   Wound Depth (cm) 0.1 cm 09/05/23 1419   Wound Surface Area (cm^2) 2.25 cm^2 09/05/23 1419   Wound Volume (cm^3) 0.225 cm^3 09/05/23 1419   Calculated Wound Volume (cm^3) 0.23 cm^3 09/05/23 1419   Change in Wound Size % 23.33 09/05/23 1419   Tunneling 0 cm 09/05/23 1419   Tunneling in depth located at 0 09/05/23 1419   Undermining 0 09/05/23 1419   Undermining is depth extending from 0 09/05/23 1419   Wound Site Closure NOEL 09/05/23 1419   Drainage Amount Scant 09/05/23 1419   Drainage Description Sanguineous 09/05/23 1419   Non-staged Wound Description Partial thickness 09/05/23 1419   Treatments Cleansed 09/05/23 1419   Dressing Xeroform;Dry dressing 09/05/23 1419   Wound packed? No 09/05/23 1419   Packing- # removed 0 09/05/23 1419   Packing- # inserted 0 09/05/23 1419   Dressing Changed New 09/05/23 1419   Patient Tolerance Tolerated well 09/05/23 1419   Dressing Status Clean;Dry; Intact 09/05/23 1419         Pat SOMERSN, RN, [facility] [employer]

## 2023-09-05 NOTE — QUICK NOTE
Alerted by nurse that family was asking why pt is on insulin. a1c in 1/23 was 5.9. pt is on prednisone and blood glucose is 150-180. Repeat a1c.

## 2023-09-05 NOTE — ASSESSMENT & PLAN NOTE
· BP reviewed, slightly hypertensive    · Restarted home dose Toprol-XL 25 mg BID   · Continue diuretic with IV Lasix 40 mg daily- weight decreased. Will change to home dosing of diureitic of torsemide 20mg m/w/f and 10mg other days.    · Monitor BP closely

## 2023-09-05 NOTE — ASSESSMENT & PLAN NOTE
· History of RA, remote episodes of eosinophilia   · ANCA, ANTHONY negative, Cortisol = 10  · IgE- 147  · Continue home prednisone, hydroxychloroquine  · PRN Tramadol, Voltaren gel to joints for pain

## 2023-09-05 NOTE — ASSESSMENT & PLAN NOTE
Wt Readings from Last 3 Encounters:   09/05/23 101 kg (221 lb 9 oz)   08/28/23 99.8 kg (220 lb)   08/24/23 105 kg (232 lb)     · Not in acute exacerbation. Edema of upper extremities likely more in setting of cellulitis. · Home regimen: Torsemide alternating 20/10 mg daily, Toprol XL 25 mg BID  · Echo (8/30/23): EF 45%, G1DD, moderate MVR. Improved from EF 30% in 1/2023. · Restarted on home dose of beta blocker   · Continue with IV Lasix 40 mg daily while inpatient- will change to home dose of torsemide.    · Daily weights, strict I/Os

## 2023-09-05 NOTE — CASE MANAGEMENT
Case Management Discharge Planning Note    Patient name Irene Michele  Location  SDU/-01 S* MRN [id number]  : 1934 Date 2023       Current Admission Date: 2023  Current Admission Diagnosis:Severe sepsis [facility])   Patient Active Problem List    Diagnosis Date Noted   • Metabolic encephalopathy    • Bacteremia due to group B Streptococcus 2023   • Respiratory insufficiency 2023   • Severe sepsis ([address]) 2023   • Cellulitis of left upper extremity 2023   • HLD (hyperlipidemia) 2023   • GERD (gastroesophageal reflux disease) 2023   • Gout without acute exacerbation  2023   • Spinal stenosis 2023   • Hypothyroidism 2023   • Peripheral vascular disease ([address]) 2023   • Open wound of right great toe with damage to nail 2023   • Adverse effect of loop (high-ceiling) diuretics, subsequent encounter 2023   • Toxic metabolic encephalopathy    • Septic arthritis of shoulder, right ([address]) 2023   • Suture of skin wound 2023   • Hypomagnesemia 2023   • Elevated TSH 2023   • Chronic systolic heart failure ([address]) 2023   • A-fib RVR ([address]) 2023   • Transient speech disturbance 2023   • KAMLESH (acute kidney injury) ([address]) 2023   • Dilated cardiomyopathy ([address]) 10/18/2022   • HTN (hypertension) 10/18/2022   • Low left ventricular ejection fraction 10/11/2022   • TIA (transient ischemic attack) 10/10/2022   • Speech disturbance 10/08/2022   • Accidental overdose 2020   • History of hypertension 2020   • Hypotension 2020   • RA (rheumatoid arthritis) ([address]) 2020      LOS (days): 7  Geometric Mean LOS (GMLOS) (days): 3.50  Days to GMLOS:-3.6     OBJECTIVE:  Risk of Unplanned Readmission Score: 30.35         Current admission status: Inpatient   Preferred Pharmacy:   [facility], [address] - [address]  [address] [facility] [address] [address]  Phone: [phone] Fax: [phone]    Primary Care Provider: Dian Barragan MD    Primary Insurance: [facility]  Secondary Insurance:     DISCHARGE DETAILS:    Additional Comments: Alva Kuhn Messages were left for [facility] and [facility]. Call placed to [facility] [place] at [facility], spoke with admissions, no bed availability until early next week. [facility] does not accept Pt's insurance. Message received from [facility], no beds available at [facility] or [facility]. Message received from [facility] at [address] [place] [address], no beds available and waiting list for rehab. List of accepting SNF facilities given to Pt's son and dtr-in-law for review. Informed Pt's son and dtr-in-law of messages received from [facility], [address] [place] [address] and [facility] and [employer]. Pt's son to review SNF list with family members and inform CM of SNF decision.

## 2023-09-05 NOTE — PROGRESS NOTES
[facility]  Progress Note  Name: Lorenzo Franz  MRN: [id number]  Unit/Bed#: -01 SDU I Date of Admission: 8/29/2023   Date of Service: 9/5/2023 I Hospital Day: 7    Assessment/Plan   * Severe sepsis [facility])  Assessment & Plan  · Sepsis criteria evidenced by fever, tachycardia, tachypnea POA. End organ damage/shock evidenced by hypotension briefly requiring Levophed, admitted under critical care service. · Source: LUE cellulitis, group B strep bacteremia. · Patient initially presented from [address] [place] [address] [place] w/ fever 103 & erythema of left arm. · No DVT on venous duplex bilateral UEs, UA and CXR unremarkable  · S/p 2250 mL IVF bolus + albumin in ED. · Off Levophed since 8/30, transition to  [employer] service 9/1. · Lactic normalized, procalcitonin trending down   · IV vancomycin + IV Rocephin -> IV cefepime -> IV clindamycin -> IV PCN G  · General surgery signed off, no acute surgical interventions at this time. Presentation not suspicious for necrotizing infection, no drainable abscess or gas in tissue. · ID following:  · Continue IV PCN G while inpatient, transition to amoxicillin 500 mg PO TID through till 9/13/2023 at discharge  · Follow culture results- blood cultures no growth for 4 days     Metabolic encephalopathy  Assessment & Plan  · Patient with increased confusion, agitation requiring soft wrist restraints since 9/2. No known history of cognitive impairment or dementia, patient's family states this is far from his baseline.   · Likely multifactorial with sepsis, hospital-related mostly contributing  · CT head w/ no acute intracranial abnormalities, noted moderate chronic microangiopathic changes  · UA on admission benign, no signs of urinary retention thus far  · Repeat CXR reviewed prior, no obvious infiltrates or effusions compared to 8/31  · Blood glucose stable, initiated SSI coverage  · VBG/ABG reviewed, overall acceptable with elevated CO2 similar to prior  · Hypophosphatemia resolved s/p IV sodium phosphate, lab work otherwise unremarkable  · Evening on 9/2, patient agitated/combative requiring Haldol, Seroquel with good response  · Caution with opioids, sedating medications  · Neurochecks every 4 hours  · Continue bedtime Seroquel and PRN Haldol, await updated A1c & remove restraints when behavior improved & no longer safety risk  · Restraints removed 0700 9/4, order discontinued at the same, last Haldol 9/2  · Continue to avoid physical and chemical restraints as much as possible in order to transition to rehab    Peripheral vascular disease ([address])  Assessment & Plan  · Has been seeing Dr. Arelis Alex with podiatry outpatient for chronic toe wound of the left great toe  · Lower extremities were cool to the touch on initial evaluation  · No evidence of significant arterial occlusive disease on arterial duplex bilateral LEs  · Podiatry following, appreciate ongoing recommendations  · No further inpatient recommendations   · Wound care for local wound care of the toe  · Neurovascular checks    Cellulitis of left upper extremity  Assessment & Plan  · Presented with LUE erythema, fever consistent with cellulitis  · CT LUE (8/29): Left upper extremity subcutaneous edema indicating cellulitis without drainable collection, soft tissue emphysema or evidence of osteomyelitis. · Worsening erythema LUE, development of RUE erythema/ecchymosis (8/30) since resolved  · See additional plan above for severe sepsis  · Neurovascular checks  · Elevate arms, local wound care    A-fib RVR ([facility])  Assessment & Plan  · Initially presented in A-fib with RVR, likely in setting of sepsis  · Home regimen: Toprol 25 mg BID, AC on Eliquis.   · Rate since controlled/improved  · Digoxin started then discontinued  · Now back on home dose beta blocker - Toprol 25 mg BID   · Continue Eliquis for anticoagulation     Chronic systolic heart failure ([facility])  Assessment & Plan  Wt Readings from Last 3 Encounters:   23 101 kg (221 lb 9 oz)   23 99.8 kg (220 lb)   23 105 kg (232 lb)     · Not in acute exacerbation. Edema of upper extremities likely more in setting of cellulitis. · Home regimen: Torsemide alternating 20/10 mg daily, Toprol XL 25 mg BID  · Echo (23): EF 45%, G1DD, moderate MVR. Improved from EF 30% in 2023. · Restarted on home dose of beta blocker   · Continue with IV Lasix 40 mg daily while inpatient- will change to home dose of torsemide. · Daily weights, strict I/Os    HTN (hypertension)  Assessment & Plan  · BP reviewed, slightly hypertensive    · Restarted home dose Toprol-XL 25 mg BID   · Continue diuretic with IV Lasix 40 mg daily- weight decreased. Will change to home dosing of diureitic of torsemide 20mg // and 10mg other days. · Monitor BP closely    RA (rheumatoid arthritis) ([facility])  Assessment & Plan  · History of RA, remote episodes of eosinophilia   · ANCA, ANTHONY negative, Cortisol = 10  · IgE- 147  · Continue home prednisone, hydroxychloroquine  · PRN Tramadol, Voltaren gel to joints for pain             VTE Pharmacologic Prophylaxis: eliquis     Mechanical VTE Prophylaxis in Place: Yes    Education and Discussions with Family / Patient: called son and updated him    Current Length of Stay: 7 day(s)  Current Patient Status: Inpatient     Discharge Plan / Estimated Discharge Date awaiting str. Code Status: Level 1 - Full Code      Subjective:   Pt seen and examined. Spoke with nursing staff. No problems overnight. No restraints needed. Hes been pleasant and redirectable. Objective:     Vitals:   Temp (24hrs), Av.1 °F (36.7 °C), Min:97 °F (36.1 °C), Max:98.6 °F (37 °C)    Temp:  [97 °F (36.1 °C)-98.6 °F (37 °C)] 98.1 °F (36.7 °C)  HR:  [] 105  Resp:  [18-22] 20  BP: (105-137)/(67-82) 108/67  SpO2:  [92 %-96 %] 95 %  Body mass index is 32.72 kg/m². Input and Output Summary (last 24 hours):        Intake/Output Summary (Last 24 hours) at 9/5/2023 0820  Last data filed at 9/5/2023 0343  Gross per 24 hour   Intake 300 ml   Output --   Net 300 ml       Physical Exam:   Physical Exam  Constitutional:       Appearance: Normal appearance. HENT:      Head: Normocephalic and atraumatic. Eyes:      Extraocular Movements: Extraocular movements intact. Pupils: Pupils are equal, round, and reactive to light. Cardiovascular:      Rate and Rhythm: Normal rate. Rhythm irregular. Heart sounds: No murmur heard. No friction rub. No gallop. Pulmonary:      Effort: Pulmonary effort is normal. No respiratory distress. Breath sounds: Normal breath sounds. No wheezing, rhonchi or rales. Abdominal:      General: Bowel sounds are normal. There is no distension. Palpations: Abdomen is soft. Tenderness: There is no abdominal tenderness. There is no guarding or rebound. Musculoskeletal:      Right lower leg: No edema. Left lower leg: No edema. Neurological:      Mental Status: He is alert. Comments: Oriented x1 person. Pleasantly confused. Additional Data:     Labs:  Results from last 7 days   Lab Units 09/04/23  0427 09/03/23  0555 09/03/23 0219   WBC Thousand/uL 8.80  --  7.15   HEMOGLOBIN g/dL 12.2  --  11.0*   I STAT HEMOGLOBIN   --    < >  --    HEMATOCRIT % 38.3  --  35.4*   HEMATOCRIT, ISTAT   --    < >  --    PLATELETS Thousands/uL 187  --  160   NEUTROS PCT %  --   --  59   LYMPHS PCT %  --   --  24   LYMPHO PCT % 30  --   --    MONOS PCT %  --   --  11   MONO PCT % 3*  --   --    EOS PCT % 7*  --  4    < > = values in this interval not displayed.      Results from last 7 days   Lab Units 09/05/23  0530 09/03/23  0555 09/03/23  0219   SODIUM mmol/L 139   < > 137   POTASSIUM mmol/L 4.0   < > 4.2   CHLORIDE mmol/L 97   < > 101   CO2 mmol/L 33*   < > 29   CO2, I-STAT   --    < >  --    BUN mg/dL 17   < > 13   CREATININE mg/dL 1.06   < > 0.81   ANION GAP mmol/L 9   < > 7   CALCIUM mg/dL 10.0   < > 9.1 ALBUMIN g/dL  --   --  3.4*   TOTAL BILIRUBIN mg/dL  --   --  0.67   ALK PHOS U/L  --   --  47   ALT U/L  --   --  11   AST U/L  --   --  17   GLUCOSE RANDOM mg/dL 132   < > 129    < > = values in this interval not displayed. Results from last 7 days   Lab Units 08/30/23  0511   INR  1.84*     Results from last 7 days   Lab Units 09/05/23  0757 09/04/23  2126 09/04/23  1603 09/04/23  1121 09/04/23  0606 09/03/23  2116 09/03/23  1612 09/03/23  1112 09/03/23  0606 09/02/23  2130 09/02/23  2100 09/02/23  1518   POC GLUCOSE mg/dl 151* 153* 173* 132 117 121 129 120 113 127 129 151*         Results from last 7 days   Lab Units 09/04/23  0427 09/03/23  0219 09/02/23  0445 09/01/23  0429 08/31/23  0459 08/30/23  0511 08/29/23  1944 08/29/23  1735 08/29/23  0906   LACTIC ACID mmol/L  --   --   --   --   --   --  1.3 2.1* 1.9   PROCALCITONIN ng/ml 1.04* 1.77* 2.79* 4.02* 9.15*   < >  --   --  0.16    < > = values in this interval not displayed. Recent Cultures (last 7 days):     Results from last 7 days   Lab Units 08/31/23  0603 08/29/23  1012 08/29/23  0906   BLOOD CULTURE  No Growth After 4 Days. No Growth After 4 Days. No Growth After 5 Days. Streptococcus agalactiae (Group B)*   GRAM STAIN RESULT   --   --  Gram positive cocci in pairs and chains*       Lines/Drains:  Invasive Devices     Peripheral Intravenous Line  Duration           Peripheral IV 09/02/23 Dorsal (posterior); Right Forearm 3 days    Peripheral IV 09/02/23 Right Antecubital 2 days              Last 24 Hours Medication List:   Current Facility-Administered Medications   Medication Dose Route Frequency Provider Last Rate   • albuterol  2.5 mg Nebulization Q4H PRN ESTEFANIA Turner     • allopurinol  400 mg Oral Daily ESTEFANIA Turner     • apixaban  5 mg Oral BID Zaida Brody PA-C     • chlorhexidine  15 mL Mouth/Throat Q12H [facility] ESTEFANIA Turner     • Diclofenac Sodium  2 g Topical 4x Daily ESTEFANIA Turner • ezetimibe  10 mg Oral Daily ESTEFANIA Latham     • Fluticasone Furoate-Vilanterol  1 puff Inhalation Daily ESTEFANIA Latham     • furosemide  40 mg Intravenous Daily Piotr Judge PA-C     • gabapentin  100 mg Oral TID Piotr Judge PA-C     • haloperidol lactate  1 mg Intramuscular Q6H PRN ESTEFANIA Buck     • HYDROmorphone  0.2 mg Intravenous Q4H PRN ZEYNEP LathamNP     • hydroxychloroquine  200 mg Oral BID With Meals ZEYNEP LathamNP     • insulin lispro  1-5 Units Subcutaneous TID AC Fidel Mayer PA-C     • insulin lispro  1-5 Units Subcutaneous HS Piotr Judge PA-C     • ipratropium  0.5 mg Nebulization TID ESTEFANIA Latham     • levalbuterol  1.25 mg Nebulization TID ZEYNEP LathamNP     • levothyroxine  25 mcg Oral Early Morning ESTEFANIA Latham     • lidocaine  2 patch Topical Daily ESTEFANIA Latham     • melatonin  9 mg Oral HS ESTEFANIA Latham     • metoprolol succinate  25 mg Oral BID Akash Laurent PA-C     • nystatin   Topical BID Piotr Judge PA-C     • pantoprazole  20 mg Oral Daily Before Breakfast ESTEFANIA Latham     • penicillin G  4 Million Units Intravenous Q4H ESTEFANIA Latham 4 Million Units (09/05/23 0815)   • polyethylene glycol  17 g Oral Every Other Day ESTEFANIA Latham     • predniSONE  5 mg Oral Daily ESTEFANIA Latham     • QUEtiapine  25 mg Oral HS ESTEFANIA Buck     • traMADol  50 mg Oral Q6H PRN ESTEFANIA Latham          Today, Patient Was Seen By: Jessica Verma DO

## 2023-09-05 NOTE — PLAN OF CARE
Problem: PHYSICAL THERAPY ADULT  Goal: Performs mobility at highest level of function for planned discharge setting. See evaluation for individualized goals. Description: Treatment/Interventions: Functional transfer training, LE strengthening/ROM, Therapeutic exercise, Endurance training, Cognitive reorientation, Patient/family training, Equipment eval/education, Bed mobility          See flowsheet documentation for full assessment, interventions and recommendations. Note:    Problem List: Decreased strength, Decreased endurance, Impaired balance, Decreased mobility, Decreased cognition, Obesity, Decreased skin integrity  Assessment: Pt seen for PT intervention. Per RN report, pt w/ significant confusion over the weekend, now much improved. Pt lethargic today, likely secondary to pt getting seroquil overnight. Pt continues to require max A x 2 for bed mobility. Performed STS x 2 from EOB, pt seated OOB in recliner chair at end of session, BUEs propped up on pillows             See flowsheet documentation for full assessment.

## 2023-09-05 NOTE — PHYSICAL THERAPY NOTE
PHYSICAL THERAPY TREATMENT NOTE    Patient Name: Arya Campbell  MSWQP'F Date: 9/5/2023 09/05/23 1021   PT Last Visit   PT Visit Date 09/05/23   Note Type   Note Type Treatment for insurance authorization   Pain Assessment   Pain Assessment Tool 0-10   Pain Score No Pain   Restrictions/Precautions   Weight Bearing Precautions Per Order No   Other Precautions Cognitive; Chair Alarm; Bed Alarm;Telemetry; Fall Risk   General   Chart Reviewed Yes   Family/Caregiver Present No   Cognition   Overall Cognitive Status Impaired   Arousal/Participation Arousable   Attention Difficulty attending to directions   Orientation Level Oriented to person;Disoriented to place; Disoriented to time;Disoriented to situation   Memory Decreased recall of precautions;Decreased recall of recent events;Decreased short term memory   Following Commands Follows one step commands inconsistently   Comments Pt lethargic, cooperative   Bed Mobility   Rolling R 3  Moderate assistance   Additional items Assist x 2; Increased time required   Rolling L 3  Moderate assistance   Additional items Assist x 2; Increased time required   Supine to Sit 2  Maximal assistance   Additional items Assist x 2; Increased time required;Verbal cues   Transfers   Sit to Stand 2  Maximal assistance   Additional items Assist x 2; Increased time required   Stand to Sit 2  Maximal assistance   Additional items Assist x 2; Increased time required;Verbal cues   Stand pivot 2  Maximal assistance   Additional items Assist x 2; Increased time required;Verbal cues   Additional Comments Pt performed STS x 2 from EOB w/ max A x 2 for both   Balance   Static Sitting Fair -   Static Standing Zero  (Ax2)   Activity Tolerance   Activity Tolerance Patient limited by fatigue  (impaired cognition)   Medical Staff Made Aware KJ Bradley, [address]   Nurse Made Aware SOMMER Gee   Assessment   Problem List Decreased strength;Decreased endurance; Impaired balance;Decreased mobility; Decreased cognition;Obesity; Decreased skin integrity   Assessment Pt seen for PT intervention. Per RN report, pt w/ significant confusion over the weekend, now much improved. Pt lethargic today, likely secondary to pt getting seroquil overnight. Pt continues to require max A x 2 for bed mobility. Performed STS x 2 from EOB, pt seated OOB in recliner chair at end of session, BUEs propped up on pillows   Goals   Patient Goals none expressed   STG Expiration Date 09/11/23   Short Term Goal #1 Patient will: Perform all bed mobility tasks w/ modx1 to improve pt's independence w/ repositioning for decrease risk of skin breakdown, Perform all transfers w/ maxx1 consistently from various height surfaces in order to improve I w/ engagement w/ real-world environments/situations, Increase all balance 1/2 grade to decrease risk for falls and Tolerate 3 hr OOB to faciliate upright tolerance   PT Treatment Day 1   Plan   Treatment/Interventions Functional transfer training;LE strengthening/ROM; Therapeutic exercise; Endurance training;Cognitive reorientation;Patient/family training;Equipment eval/education; Bed mobility   PT Frequency 2-3x/wk   Recommendation   UB Rehab Discharge Recommendation (PT/OT) Level 2   AM-PAC Basic Mobility Inpatient   Turning in Flat Bed Without Bedrails 1   Lying on Back to Sitting on Edge of Flat Bed Without Bedrails 1   Moving Bed to Chair 1   Standing Up From Chair Using Arms 1   Walk in Room 1   Climb 3-5 Stairs With Railing 1   Basic Mobility Inpatient Raw Score 6   Turning Head Towards Sound 3   Follow Simple Instructions 3   Low Function Basic Mobility Raw Score  12   Low Function Basic Mobility Standardized Score  18.33   Highest Level Of Mobility   JH-HLM Goal 2: Bed activities/Dependent transfer   JH-HLM Achieved 3: Sit at edge of bed   End of Consult   Patient Position at End of Consult Bedside chair;Bed/Chair alarm activated; All needs within reach       [id number]  Notified by SOMMER Hill pt needs A for OOB, as damien lift not operating correctly. Utilized A-P transfer method w/ sheet, pt dependent of 4 to get back in bed w/ instruction of PT to perform such in a safe manner. Pt returned supine in bed    Patient requires PT/OT co-treat due to signficant assistance with mobility, cognitive-behavioral impairments, and to challenge activity tolerance. Both PT and OT goals were addressed separately during this session. Pt would continue to benefit from skilled PT during this admission in order to progress patient towards goals to decrease risk of falls and maximize independence.      Melanie Persaud, PT, DPT

## 2023-09-05 NOTE — PLAN OF CARE
Problem: MOBILITY - ADULT  Goal: Maintain or return to baseline ADL function  Description: INTERVENTIONS:  -  Assess patient's ability to carry out ADLs; assess patient's baseline for ADL function and identify physical deficits which impact ability to perform ADLs (bathing, care of mouth/teeth, toileting, grooming, dressing, etc.)  - Assess/evaluate cause of self-care deficits   - Assess range of motion  - Assess patient's mobility; develop plan if impaired  - Assess patient's need for assistive devices and provide as appropriate  - Encourage maximum independence but intervene and supervise when necessary  - Involve family in performance of ADLs  - Assess for home care needs following discharge   - Consider OT consult to assist with ADL evaluation and planning for discharge  - Provide patient education as appropriate  Outcome: Progressing  Goal: Maintains/Returns to pre admission functional level  Description: INTERVENTIONS:  - Perform BMAT or MOVE assessment daily.   - Set and communicate daily mobility goal to care team and patient/family/caregiver.    - Collaborate with rehabilitation services on mobility goals if consulted  - Out of bed for toileting  - Record patient progress and toleration of activity level   Outcome: Progressing     Problem: Prexisting or High Potential for Compromised Skin Integrity  Goal: Skin integrity is maintained or improved  Description: INTERVENTIONS:  - Identify patients at risk for skin breakdown  - Assess and monitor skin integrity  - Assess and monitor nutrition and hydration status  - Monitor labs   - Assess for incontinence   - Turn and reposition patient  - Assist with mobility/ambulation  - Relieve pressure over bony prominences  - Avoid friction and shearing  - Provide appropriate hygiene as needed including keeping skin clean and dry  - Evaluate need for skin moisturizer/barrier cream  - Collaborate with interdisciplinary team   - Patient/family teaching  - Consider wound care consult   Outcome: Progressing     Problem: PAIN - ADULT  Goal: Verbalizes/displays adequate comfort level or baseline comfort level  Description: Interventions:  - Encourage patient to monitor pain and request assistance  - Assess pain using appropriate pain scale  - Administer analgesics based on type and severity of pain and evaluate response  - Implement non-pharmacological measures as appropriate and evaluate response  - Consider cultural and social influences on pain and pain management  - Notify physician/advanced practitioner if interventions unsuccessful or patient reports new pain  Outcome: Progressing     Problem: INFECTION - ADULT  Goal: Absence or prevention of progression during hospitalization  Description: INTERVENTIONS:  - Assess and monitor for signs and symptoms of infection  - Monitor lab/diagnostic results  - Monitor all insertion sites, i.e. indwelling lines, tubes, and drains  - Monitor endotracheal if appropriate and nasal secretions for changes in amount and color  - [place] appropriate cooling/warming therapies per order  - Administer medications as ordered  - Instruct and encourage patient and family to use good hand hygiene technique  - Identify and instruct in appropriate isolation precautions for identified infection/condition  Outcome: Progressing  Goal: Absence of fever/infection during neutropenic period  Description: INTERVENTIONS:  - Monitor WBC    Outcome: Progressing     Problem: SAFETY ADULT  Goal: Maintain or return to baseline ADL function  Description: INTERVENTIONS:  -  Assess patient's ability to carry out ADLs; assess patient's baseline for ADL function and identify physical deficits which impact ability to perform ADLs (bathing, care of mouth/teeth, toileting, grooming, dressing, etc.)  - Assess/evaluate cause of self-care deficits   - Assess range of motion  - Assess patient's mobility; develop plan if impaired  - Assess patient's need for assistive devices and provide as appropriate  - Encourage maximum independence but intervene and supervise when necessary  - Involve family in performance of ADLs  - Assess for home care needs following discharge   - Consider OT consult to assist with ADL evaluation and planning for discharge  - Provide patient education as appropriate  Outcome: Progressing  Goal: Maintains/Returns to pre admission functional level  Description: INTERVENTIONS:  - Perform BMAT or MOVE assessment daily.   - Set and communicate daily mobility goal to care team and patient/family/caregiver.    - Collaborate with rehabilitation services on mobility goals if consulted  - Out of bed for toileting  - Record patient progress and toleration of activity level   Outcome: Progressing  Goal: Patient will remain free of falls  Description: INTERVENTIONS:  - Educate patient/family on patient safety including physical limitations  - Instruct patient to call for assistance with activity   - Consult OT/PT to assist with strengthening/mobility   - Keep Call bell within reach  - Keep bed low and locked with side rails adjusted as appropriate  - Keep care items and personal belongings within reach  - Initiate and maintain comfort rounds  - Make Fall Risk Sign visible to staff  - Apply yellow socks and bracelet for high fall risk patients  - Consider moving patient to room near nurses station  Outcome: Progressing     Problem: DISCHARGE PLANNING  Goal: Discharge to home or other facility with appropriate resources  Description: INTERVENTIONS:  - Identify barriers to discharge w/patient and caregiver  - Arrange for needed discharge resources and transportation as appropriate  - Identify discharge learning needs (meds, wound care, etc.)  - Arrange for interpretive services to assist at discharge as needed  - Refer to Case Management Department for coordinating discharge planning if the patient needs post-hospital services based on physician/advanced practitioner order or complex needs related to functional status, cognitive ability, or social support system  Outcome: Progressing     Problem: Knowledge Deficit  Goal: Patient/family/caregiver demonstrates understanding of disease process, treatment plan, medications, and discharge instructions  Description: Complete learning assessment and assess knowledge base. Interventions:  - Provide teaching at level of understanding  - Provide teaching via preferred learning methods  Outcome: Progressing     Problem: Nutrition/Hydration-ADULT  Goal: Nutrient/Hydration intake appropriate for improving, restoring or maintaining nutritional needs  Description: Monitor and assess patient's nutrition/hydration status for malnutrition. Collaborate with interdisciplinary team and initiate plan and interventions as ordered. Monitor patient's weight and dietary intake as ordered or per policy. Utilize nutrition screening tool and intervene as necessary. Determine patient's food preferences and provide high-protein, high-caloric foods as appropriate.      INTERVENTIONS:  - Monitor oral intake, urinary output, labs, and treatment plans  - Assess nutrition and hydration status and recommend course of action  - Evaluate amount of meals eaten  - Assist patient with eating if necessary   - Allow adequate time for meals  - Recommend/ encourage appropriate diets, oral nutritional supplements, and vitamin/mineral supplements  - Order, calculate, and assess calorie counts as needed  - Recommend, monitor, and adjust tube feedings and TPN/PPN based on assessed needs  - Assess need for intravenous fluids  - Provide specific nutrition/hydration education as appropriate  - Include patient/family/caregiver in decisions related to nutrition  Outcome: Progressing     Problem: SAFETY,RESTRAINT: NV/NON-SELF DESTRUCTIVE BEHAVIOR  Goal: Remains free of harm/injury (restraint for non violent/non self-detsructive behavior)  Description: INTERVENTIONS:  - Instruct patient/family regarding restraint use   - Assess and monitor physiologic and psychological status   - Provide interventions and comfort measures to meet assessed patient needs   - Identify and implement measures to help patient regain control  - Assess readiness for release of restraint   Outcome: Progressing  Goal: Returns to optimal restraint-free functioning  Description: INTERVENTIONS:  - Assess the patient's behavior and symptoms that indicate continued need for restraint  - Identify and implement measures to help patient regain control  - Assess readiness for release of restraint   Outcome: Progressing     Problem: NEUROSENSORY - ADULT  Goal: Achieves stable or improved neurological status  Description: INTERVENTIONS  - Monitor and report changes in neurological status  - Monitor vital signs such as temperature, blood pressure, glucose, and any other labs ordered   - Initiate measures to prevent increased intracranial pressure  - Monitor for seizure activity and implement precautions if appropriate      Outcome: Progressing  Goal: Achieves maximal functionality and self care  Description: INTERVENTIONS  - Monitor swallowing and airway patency with patient fatigue and changes in neurological status  - Encourage and assist patient to increase activity and self care.    - Encourage visually impaired, hearing impaired and aphasic patients to use assistive/communication devices  Outcome: Progressing     Problem: CARDIOVASCULAR - ADULT  Goal: Maintains optimal cardiac output and hemodynamic stability  Description: INTERVENTIONS:  - Monitor I/O, vital signs and rhythm  - Monitor for S/S and trends of decreased cardiac output  - Administer and titrate ordered vasoactive medications to optimize hemodynamic stability  - Assess quality of pulses, skin color and temperature  - Assess for signs of decreased coronary artery perfusion  - Instruct patient to report change in severity of symptoms  Outcome: Progressing  Goal: Absence of cardiac dysrhythmias or at baseline rhythm  Description: INTERVENTIONS:  - Continuous cardiac monitoring, vital signs, obtain 12 lead EKG if ordered  - Administer antiarrhythmic and heart rate control medications as ordered  - Monitor electrolytes and administer replacement therapy as ordered  Outcome: Progressing     Problem: RESPIRATORY - ADULT  Goal: Achieves optimal ventilation and oxygenation  Description: INTERVENTIONS:  - Assess for changes in respiratory status  - Assess for changes in mentation and behavior  - Position to facilitate oxygenation and minimize respiratory effort  - Oxygen administered by appropriate delivery if ordered  - Initiate smoking cessation education as indicated  - Encourage broncho-pulmonary hygiene including cough, deep breathe, Incentive Spirometry  - Assess the need for suctioning and aspirate as needed  - Assess and instruct to report SOB or any respiratory difficulty  - Respiratory Therapy support as indicated  Outcome: Progressing     Problem: METABOLIC, FLUID AND ELECTROLYTES - ADULT  Goal: Electrolytes maintained within normal limits  Description: INTERVENTIONS:  - Monitor labs and assess patient for signs and symptoms of electrolyte imbalances  - Administer electrolyte replacement as ordered  - Monitor response to electrolyte replacements, including repeat lab results as appropriate  - Instruct patient on fluid and nutrition as appropriate  Outcome: Progressing  Goal: Fluid balance maintained  Description: INTERVENTIONS:  - Monitor labs   - Monitor I/O and WT  - Instruct patient on fluid and nutrition as appropriate  - Assess for signs & symptoms of volume excess or deficit  Outcome: Progressing  Goal: Glucose maintained within target range  Description: INTERVENTIONS:  - Monitor Blood Glucose as ordered  - Assess for signs and symptoms of hyperglycemia and hypoglycemia  - Administer ordered medications to maintain glucose within target range  - Assess nutritional intake and initiate nutrition service referral as needed  Outcome: Progressing     Problem: HEMATOLOGIC - ADULT  Goal: Maintains hematologic stability  Description: INTERVENTIONS  - Assess for signs and symptoms of bleeding or hemorrhage  - Monitor labs  - Administer supportive blood products/factors as ordered and appropriate  Outcome: Progressing     Problem: MUSCULOSKELETAL - ADULT  Goal: Maintain or return mobility to safest level of function  Description: INTERVENTIONS:  - Assess patient's ability to carry out ADLs; assess patient's baseline for ADL function and identify physical deficits which impact ability to perform ADLs (bathing, care of mouth/teeth, toileting, grooming, dressing, etc.)  - Assess/evaluate cause of self-care deficits   - Assess range of motion  - Assess patient's mobility  - Assess patient's need for assistive devices and provide as appropriate  - Encourage maximum independence but intervene and supervise when necessary  - Involve family in performance of ADLs  - Assess for home care needs following discharge   - Consider OT consult to assist with ADL evaluation and planning for discharge  - Provide patient education as appropriate  Outcome: Progressing  Goal: Maintain proper alignment of affected body part  Description: INTERVENTIONS:  - Support, maintain and protect limb and body alignment  - Provide patient/ family with appropriate education  Outcome: Progressing

## 2023-09-05 NOTE — OCCUPATIONAL THERAPY NOTE
Occupational Therapy Tx Note     Patient Name: Gayle Toscano  OSQNH'P Date: 9/5/2023  Problem List  Principal Problem:    Severe sepsis ([address])  Active Problems:    RA (rheumatoid arthritis) (HCC)    HTN (hypertension)    Chronic systolic heart failure (HCC)    A-fib RVR (HCC)    Cellulitis of left upper extremity    Peripheral vascular disease ([address])    Bacteremia due to group B Streptococcus    Metabolic encephalopathy    Past Medical History  Past Medical History:   Diagnosis Date    GERD (gastroesophageal reflux disease)     Gout     Hyperlipidemia     Hypertension     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[address])     Spinal stenosis      Past Surgical History  Past Surgical History:   Procedure Laterality Date    CARDIAC ELECTROPHYSIOLOGY PROCEDURE N/A 12/17/2022    Procedure: Cardiac loop recorder implant;  Surgeon: Kathy Tello MD;  Location:  CARDIAC CATH LAB; Service: Cardiology    CARPAL TUNNEL RELEASE Bilateral     COLONOSCOPY      LAMINECTOMY      TOTAL KNEE ARTHROPLASTY Bilateral     TOTAL SHOULDER REPLACEMENT             09/05/23 1020   OT Last Visit   OT Visit Date 09/05/23   Note Type   Note Type Treatment   Pain Assessment   Pain Assessment Tool 0-10   Pain Score No Pain   Restrictions/Precautions   Other Precautions Chair Alarm; Bed Alarm; Fall Risk;Telemetry;Cognitive   ADL   Toileting Assistance  1  Total Assistance   Toileting Deficit Verbal cueing;Perineal hygiene   Bed Mobility   Rolling R 3  Moderate assistance   Additional items Assist x 2;Verbal cues; Increased time required   Rolling L 3  Moderate assistance   Additional items Assist x 2;Verbal cues; Increased time required   Supine to Sit 2  Maximal assistance   Additional items Assist x 2;Verbal cues   Additional Comments Pt seated EOB. Pt benefited from frequent stimulus to maintain alertness. Pt occasionally losing sitting balance to left. Required min-mod A x 1 to maintain sitting balance.    Transfers   Sit to Stand 2  Maximal assistance   Additional items Assist x 2;Verbal cues   Stand to Sit 2  Maximal assistance   Additional items Assist x 2;Verbal cues   Stand pivot 2  Maximal assistance   Additional items Assist x 2;Verbal cues   Subjective   Subjective Pt received in supine. Pt sleepy but arousable. Cognition   Overall Cognitive Status Impaired   Arousal/Participation Arousable   Attention Difficulty attending to directions   Orientation Level Oriented to person;Disoriented to place; Disoriented to time;Disoriented to situation   Memory Decreased recall of precautions;Decreased recall of recent events;Decreased short term memory   Following Commands Follows one step commands inconsistently   Activity Tolerance   Activity Tolerance Patient limited by fatigue   Medical Staff Made Aware PT SOMMER Hayes   Assessment   Assessment Pt seen for OT tx session with focus on functional balance, functional mobility, ADL status, and transfer safety. Patient agreeable to OT treatment session. Pt received supine in bed. Performed bed mobility with mod-max AX 2. Required min-mod A x 1 to maintain static sitting balance. Required max A x 2 for stand pivot transfer. Patient continues to be functioning below baseline level, occupational performance remains limited secondary to factors listed above, and pt at increased risk for falls and injury. The patient's raw score on the AM-PAC Daily Activity inpatient short form is 9, standardized score is 24.79  , less than 39.4. Patients at this level are likely to benefit from DC to post-acute rehabilitation services. Please refer to the recommendation of the Occupational Therapist for safe DC planning. From OT standpoint, recommendation at time of d/c would be level 2 resources. Patient to benefit from continued Occupational Therapy treatment while in the hospital to address deficits as defined above and maximize level of functional independence with ADLs and functional mobility.  Pt left with call bell in reach, tray table in reach, needs met, chair alarm activated. All lines intact. Plan   Treatment Interventions ADL retraining;Functional transfer training;Patient/family training; Compensatory technique education; Endurance training;Cognitive reorientation; Fine motor coordination activities; Activityengagement   Goal Expiration Date 09/11/23   OT Treatment Day 1   OT Frequency 3-5x/wk   Recommendation   UB Rehab Discharge Recommendation (PT/OT) Level 2   AM-PAC Daily Activity Inpatient   Lower Body Dressing 1   Bathing 1   Toileting 1   Upper Body Dressing 2   Grooming 2   Eating 2   Daily Activity Raw Score 9   Turning Head Towards Sound 3   Follow Simple Instructions 2   Low Function Daily Activity Raw Score 14   Low Function Daily Activity Standardized Score  24.79   AM-PAC Applied Cognition Inpatient   Following a Speech/Presentation 1   Understanding Ordinary Conversation 2   Taking Medications 1   Remembering Where Things Are Placed or Put Away 1   Remembering List of 4-5 Errands 1   Taking Care of Complicated Tasks 1   Applied Cognition Raw Score 7   Applied Cognition Standardized Score 15.17   End of Consult   Education Provided Yes   Patient Position at End of Consult Bedside chair;Bed/Chair alarm activated; All needs within reach   Nurse Communication Nurse aware of consult           RODNEY Christianson/L

## 2023-09-05 NOTE — SPEECH THERAPY NOTE
Speech Language/Pathology    Speech/Language Pathology Progress Note    Patient Name: Mayra Richards  VNASF'A Date: 9/5/2023     Problem List  Principal Problem:    Severe sepsis ([address])  Active Problems:    RA (rheumatoid arthritis) ([address])    HTN (hypertension)    Chronic systolic heart failure (HCC)    A-fib RVR (HCC)    Cellulitis of left upper extremity    Peripheral vascular disease ([address])    Bacteremia due to group B Streptococcus    Metabolic encephalopathy       Past Medical History  Past Medical History:   Diagnosis Date   • GERD (gastroesophageal reflux disease)    • Gout    • Hyperlipidemia    • Hypertension    • 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 Family also reports pt w/ some difficulty masticating solids at baseline. Current Diet:  Regular/thin     Objective:  Pt seen for dx dysphagia tx f/u. Pt seen w/ regular textures, soft solids, puree, thin liquids, and PO meds taken w/ thin administered by RN. Pt is lethargic throughout, impacting consistent PO intake. Reduced bite strength for hard textures, requires mod assist from ST. Mild-mod prolonged mastication of hard solids, min prolonged for soft solids. Mild oral residue upon transfer w/ hard solids, requires mult transfers and liquid wash to clear. Straw sips of thin taken without difficulty as well as PO meds. No overt s/s aspiration across trials today. S/w pt and pt's family regarding potential dysphagia diet modifications 2/2 AMS/lethargy.  Pt's family w/ preference for trial of modifications at this time to ease oral difficulties/improve intake. Education provided on strategies to optimize swallow safety and s/s aspiration to notify medical team of should they arise. Assessment:  Pt w/ mild oral dysphagia, impacted by lethargy. Oropharyngeal swallow skill appropriate for soft solids and thin liquids.      Plan/Recommendations:  Dysphagia 3 w/ thin   Frequent/thorough oral care  ST to f/u as able/appropriate

## 2023-09-06 LAB
ANION GAP SERPL CALCULATED.3IONS-SCNC: 8 MMOL/L
BUN SERPL-MCNC: 21 MG/DL (ref 5–25)
CALCIUM SERPL-MCNC: 9.7 MG/DL (ref 8.4–10.2)
CHLORIDE SERPL-SCNC: 98 MMOL/L (ref 96–108)
CO2 SERPL-SCNC: 33 MMOL/L (ref 21–32)
CREAT SERPL-MCNC: 1.13 MG/DL (ref 0.6–1.3)
EST. AVERAGE GLUCOSE BLD GHB EST-MCNC: 143 MG/DL
EST. AVERAGE GLUCOSE BLD GHB EST-MCNC: 143 MG/DL
GFR SERPL CREATININE-BSD FRML MDRD: 57 ML/MIN/1.73SQ M
GLUCOSE SERPL-MCNC: 111 MG/DL (ref 65–140)
GLUCOSE SERPL-MCNC: 138 MG/DL (ref 65–140)
GLUCOSE SERPL-MCNC: 146 MG/DL (ref 65–140)
GLUCOSE SERPL-MCNC: 150 MG/DL (ref 65–140)
GLUCOSE SERPL-MCNC: 189 MG/DL (ref 65–140)
HBA1C MFR BLD: 6.6 %
HBA1C MFR BLD: 6.6 %
POTASSIUM SERPL-SCNC: 3.7 MMOL/L (ref 3.5–5.3)
SODIUM SERPL-SCNC: 139 MMOL/L (ref 135–147)

## 2023-09-06 PROCEDURE — 80048 BASIC METABOLIC PNL TOTAL CA: CPT | Performed by: INTERNAL MEDICINE

## 2023-09-06 PROCEDURE — 94760 N-INVAS EAR/PLS OXIMETRY 1: CPT

## 2023-09-06 PROCEDURE — 82948 REAGENT STRIP/BLOOD GLUCOSE: CPT

## 2023-09-06 RX ORDER — GABAPENTIN 100 MG/1
100 CAPSULE ORAL 2 TIMES DAILY
Status: DISCONTINUED | OUTPATIENT
Start: 2023-09-06 | End: 2023-09-09 | Stop reason: HOSPADM

## 2023-09-06 RX ADMIN — NYSTATIN 1 APPLICATION: 100000 POWDER TOPICAL at 17:06

## 2023-09-06 RX ADMIN — GABAPENTIN 100 MG: 100 CAPSULE ORAL at 17:03

## 2023-09-06 RX ADMIN — METOPROLOL SUCCINATE 25 MG: 25 TABLET, FILM COATED, EXTENDED RELEASE ORAL at 21:29

## 2023-09-06 RX ADMIN — PENICILLIN G POTASSIUM 4 MILLION UNITS: 5000000 INJECTION, POWDER, FOR SOLUTION INTRAMUSCULAR; INTRAVENOUS at 02:55

## 2023-09-06 RX ADMIN — PENICILLIN G POTASSIUM 4 MILLION UNITS: 5000000 INJECTION, POWDER, FOR SOLUTION INTRAMUSCULAR; INTRAVENOUS at 10:43

## 2023-09-06 RX ADMIN — PENICILLIN G POTASSIUM 4 MILLION UNITS: 5000000 INJECTION, POWDER, FOR SOLUTION INTRAMUSCULAR; INTRAVENOUS at 06:09

## 2023-09-06 RX ADMIN — INSULIN LISPRO 1 UNITS: 100 INJECTION, SOLUTION INTRAVENOUS; SUBCUTANEOUS at 21:30

## 2023-09-06 RX ADMIN — LIDOCAINE 1 PATCH: 50 PATCH TOPICAL at 08:58

## 2023-09-06 RX ADMIN — EZETIMIBE 10 MG: 10 TABLET ORAL at 08:56

## 2023-09-06 RX ADMIN — PENICILLIN G POTASSIUM 4 MILLION UNITS: 5000000 INJECTION, POWDER, FOR SOLUTION INTRAMUSCULAR; INTRAVENOUS at 23:43

## 2023-09-06 RX ADMIN — LEVOTHYROXINE SODIUM 25 MCG: 25 TABLET ORAL at 05:37

## 2023-09-06 RX ADMIN — PENICILLIN G POTASSIUM 4 MILLION UNITS: 5000000 INJECTION, POWDER, FOR SOLUTION INTRAMUSCULAR; INTRAVENOUS at 19:52

## 2023-09-06 RX ADMIN — HYDROXYCHLOROQUINE SULFATE 200 MG: 200 TABLET, FILM COATED ORAL at 17:03

## 2023-09-06 RX ADMIN — APIXABAN 5 MG: 5 TABLET, FILM COATED ORAL at 17:03

## 2023-09-06 RX ADMIN — FUROSEMIDE 40 MG: 10 INJECTION, SOLUTION INTRAMUSCULAR; INTRAVENOUS at 08:55

## 2023-09-06 RX ADMIN — PREDNISONE 5 MG: 5 TABLET ORAL at 08:56

## 2023-09-06 RX ADMIN — CHLORHEXIDINE GLUCONATE 15 ML: 1.2 RINSE ORAL at 21:29

## 2023-09-06 RX ADMIN — METOPROLOL SUCCINATE 25 MG: 25 TABLET, FILM COATED, EXTENDED RELEASE ORAL at 08:57

## 2023-09-06 RX ADMIN — FLUTICASONE FUROATE AND VILANTEROL TRIFENATATE 1 PUFF: 100; 25 POWDER RESPIRATORY (INHALATION) at 09:00

## 2023-09-06 RX ADMIN — POLYETHYLENE GLYCOL 3350 17 G: 17 POWDER, FOR SOLUTION ORAL at 08:55

## 2023-09-06 RX ADMIN — APIXABAN 5 MG: 5 TABLET, FILM COATED ORAL at 08:57

## 2023-09-06 RX ADMIN — Medication 9 MG: at 21:29

## 2023-09-06 RX ADMIN — NYSTATIN 1 APPLICATION: 100000 POWDER TOPICAL at 09:00

## 2023-09-06 RX ADMIN — TORSEMIDE 20 MG: 20 TABLET ORAL at 08:56

## 2023-09-06 RX ADMIN — Medication 2 G: at 21:30

## 2023-09-06 RX ADMIN — HYDROXYCHLOROQUINE SULFATE 200 MG: 200 TABLET, FILM COATED ORAL at 08:57

## 2023-09-06 RX ADMIN — ALLOPURINOL 400 MG: 100 TABLET ORAL at 08:57

## 2023-09-06 RX ADMIN — PENICILLIN G POTASSIUM 4 MILLION UNITS: 5000000 INJECTION, POWDER, FOR SOLUTION INTRAMUSCULAR; INTRAVENOUS at 15:12

## 2023-09-06 RX ADMIN — GABAPENTIN 100 MG: 100 CAPSULE ORAL at 08:56

## 2023-09-06 NOTE — PROGRESS NOTES
INTERNAL MEDICINE PROGRESS NOTE     Name: Tonia Mckeon   Age & Sex: 80 y.o. male   MRN: [id number]  Unit/Bed#: -01   Encounter: [id number]      PATIENT INFORMATION     Name: Tonia Mckeon   Age & Sex: 80 y.o. male   MRN: [id number]  Hospital Stay Days: 8    ASSESSMENT/PLAN     Principal Problem:    Severe sepsis ([address])  Active Problems:    RA (rheumatoid arthritis) ([address])    HTN (hypertension)    Chronic systolic heart failure (HCC)    A-fib RVR (HCC)    Cellulitis of left upper extremity    Peripheral vascular disease ([address])    Bacteremia due to group B Streptococcus    Metabolic encephalopathy      Metabolic encephalopathy  Assessment & Plan  · Patient with increased confusion, agitation requiring soft wrist restraints since 9/2. No known history of cognitive impairment or dementia, patient's family states this is far from his baseline.   · Likely multifactorial with sepsis, hospital-related mostly contributing  · CT head w/ no acute intracranial abnormalities, noted moderate chronic microangiopathic changes  · UA on admission benign, no signs of urinary retention thus far  · Repeat CXR reviewed prior, no obvious infiltrates or effusions compared to 8/31  · Blood glucose stable, initiated SSI coverage  · VBG/ABG reviewed, overall acceptable with elevated CO2 similar to prior  · Hypophosphatemia resolved s/p IV sodium phosphate, lab work otherwise unremarkable  · Evening on 9/2, patient agitated/combative requiring Haldol, Seroquel with good response  · Caution with opioids, sedating medications  · Neurochecks every 4 hours  · Continue bedtime Seroquel and PRN Haldol, await updated A1c & remove restraints when behavior improved & no longer safety risk  · Restraints removed 0700 9/4, order discontinued at the same, last Haldol 9/2  · Continue to avoid physical and chemical restraints as much as possible in order to transition to rehab    Bacteremia due to group B Streptococcus  Assessment & Plan  · Blood culture 1/2 (8/29): + Streptococcus agalactiae (Group B), susceptible to penicillin. · Repeat BC x2 (8/31): NGTD (72hrs)  · Transitioned to vancomycin, clindamycin, high-dose PCN  · Echo without evidence of vegetation  · ID consulted:  · D/C IV vancomycin, significant clinical improvement w/ recovery GBS. · Follow culture results  · Antibiotic plan as above     Peripheral vascular disease ([address])  Assessment & Plan  · Has been seeing Dr. Becky Sotelo with podiatry outpatient for chronic toe wound of the left great toe  · Lower extremities were cool to the touch on initial evaluation  · No evidence of significant arterial occlusive disease on arterial duplex bilateral LEs  · Podiatry following, appreciate ongoing recommendations  · No further inpatient recommendations   · Wound care for local wound care of the toe  · Neurovascular checks    Cellulitis of left upper extremity  Assessment & Plan  · Presented with LUE erythema, fever consistent with cellulitis  · CT LUE (8/29): Left upper extremity subcutaneous edema indicating cellulitis without drainable collection, soft tissue emphysema or evidence of osteomyelitis. · Worsening erythema LUE, development of RUE erythema/ecchymosis (8/30) since resolved  · See additional plan above for severe sepsis  · Neurovascular checks  · Elevate arms, local wound care    A-fib RVR (HCC)  Assessment & Plan  · Initially presented in A-fib with RVR, likely in setting of sepsis  · Home regimen: Toprol 25 mg BID, AC on Eliquis. · Rate since controlled/improved  · Digoxin started then discontinued  · Now back on home dose beta blocker - Toprol 25 mg BID   · Continue Eliquis for anticoagulation     Chronic systolic heart failure (HCC)  Assessment & Plan  Wt Readings from Last 3 Encounters:   09/06/23 101 kg (222 lb 0.1 oz)   08/28/23 99.8 kg (220 lb)   08/24/23 105 kg (232 lb)     · Not in acute exacerbation.  Edema of upper extremities likely more in setting of cellulitis. · Home regimen: Torsemide alternating 20/10 mg daily, Toprol XL 25 mg BID  · Echo (8/30/23): EF 45%, G1DD, moderate MVR. Improved from EF 30% in 1/2023. · Restarted on home dose of beta blocker   · Continue with IV Lasix 40 mg daily while inpatient- will change to home dose of torsemide. · Daily weights, strict I/Os    HTN (hypertension)  Assessment & Plan  · BP reviewed, slightly hypertensive    · Restarted home dose Toprol-XL 25 mg BID   · Continue diuretic with IV Lasix 40 mg daily- weight decreased. Will change to home dosing of diureitic of torsemide 20mg m/w/f and 10mg other days. · Monitor BP closely    RA (rheumatoid arthritis) ([facility])  Assessment & Plan  · History of RA, remote episodes of eosinophilia   · ANCA, ANTHONY negative, Cortisol = 10  · IgE- 147  · Continue home prednisone, hydroxychloroquine  · PRN Tramadol, Voltaren gel to joints for pain      * Severe sepsis ([facility])  Assessment & Plan  · Sepsis criteria evidenced by fever, tachycardia, tachypnea POA. End organ damage/shock evidenced by hypotension briefly requiring Levophed, admitted under critical care service. · Source: LUE cellulitis, group B strep bacteremia. · Patient initially presented from [address] [place] [address] w/ fever 103 & erythema of left arm. · No DVT on venous duplex bilateral UEs, UA and CXR unremarkable  · S/p 2250 mL IVF bolus + albumin in ED. · Off Levophed since 8/30, transition to [facility] service 9/1. · Lactic normalized, procalcitonin trending down   · IV vancomycin + IV Rocephin -> IV cefepime -> IV clindamycin -> IV PCN G  · General surgery signed off, no acute surgical interventions at this time. Presentation not suspicious for necrotizing infection, no drainable abscess or gas in tissue.   · ID following:  · Continue IV PCN G while inpatient, transition to amoxicillin 500 mg PO TID through till 9/13/2023 at discharge  · Follow culture results- blood cultures no growth for 5 days   · Pending rehab facility acceptance       Disposition: pending placement     SUBJECTIVE     Patient seen and examined. No acute events overnight. Denies any chest pain, SOB, abdominal pain, nausea, vomiting, fever or chills. OBJECTIVE     Vitals:    23 0729 23 0745 23 0919 23 1453   BP: 121/75   118/75   Pulse: 94 93  95   Resp: 16 20  18   Temp: 97.5 °F (36.4 °C)      TempSrc:       SpO2: 96% 96% 98% 92%   Weight:       Height:          Temperature:   Temp (24hrs), Av.6 °F (36.4 °C), Min:96.6 °F (35.9 °C), Max:98.3 °F (36.8 °C)    Temperature: 97.5 °F (36.4 °C)  Intake & Output:  I/O        07 07 07 07 07 07    P. O. 60 180     I.V. (mL/kg) 90 (0.9)      IV Piggyback 150 250     Total Intake(mL/kg) 300 (3) 430 (4.3)     Urine (mL/kg/hr)  200 (0.1)     Stool       Total Output  200     Net +300 +230            Unmeasured Urine Occurrence 3 x 5 x     Unmeasured Stool Occurrence 1 x 1 x         Weights:   IBW (Ideal Body Weight): 70.7 kg    Body mass index is 32.78 kg/m². Weight (last 2 days)     Date/Time Weight    23 0400 101 (222)    23 0532 101 (221.56)    23 0600 104 (229.94)        Physical Exam  Constitutional:       General: He is not in acute distress. Appearance: He is not ill-appearing, toxic-appearing or diaphoretic. Cardiovascular:      Rate and Rhythm: Normal rate and regular rhythm. Pulses: Normal pulses. Heart sounds: Normal heart sounds. No murmur heard. No friction rub. No gallop. Pulmonary:      Effort: Pulmonary effort is normal.      Breath sounds: Normal breath sounds. No rales. Chest:      Chest wall: No tenderness. Abdominal:      General: Bowel sounds are normal. There is distension. Tenderness: There is no right CVA tenderness or left CVA tenderness. Musculoskeletal:      Right lower leg: No edema. Left lower leg: No edema. Neurological:      General: No focal deficit present. Mental Status: He is oriented to person, place, and time. Psychiatric:         Mood and Affect: Mood normal.         Behavior: Behavior normal.       LABORATORY DATA     Labs: I have personally reviewed pertinent reports. Results from last 7 days   Lab Units 09/04/23 0427 09/03/23 0555 09/03/23 0219 09/02/23 0445 09/01/23 0429   WBC Thousand/uL 8.80  --  7.15 7.02 8.55   HEMOGLOBIN g/dL 12.2  --  11.0* 10.5* 9.7*   I STAT HEMOGLOBIN g/dl  --  11.2*  --   --   --    HEMATOCRIT % 38.3  --  35.4* 33.8* 32.1*   HEMATOCRIT, ISTAT %  --  33*  --   --   --    PLATELETS Thousands/uL 187  --  160 159 121*   NEUTROS PCT %  --   --  59 68 80*   MONOS PCT %  --   --  11 7 5   MONO PCT % 3*  --   --   --   --    EOS PCT % 7*  --  4 5 3      Results from last 7 days   Lab Units 09/06/23 0429 09/05/23 0530 09/04/23 0427 09/03/23 0555 09/03/23 0219   POTASSIUM mmol/L 3.7 4.0 3.8  --  4.2   CHLORIDE mmol/L 98 97 98  --  101   CO2 mmol/L 33* 33* 31  --  29   CO2, I-STAT mmol/L  --   --   --  34*  --    BUN mg/dL 21 17 14  --  13   CREATININE mg/dL 1.13 1.06 0.90  --  0.81   CALCIUM mg/dL 9.7 10.0 9.9  --  9.1   ALK PHOS U/L  --   --   --   --  47   ALT U/L  --   --   --   --  11   AST U/L  --   --   --   --  17   GLUCOSE, ISTAT mg/dl  --   --   --  110  --      Results from last 7 days   Lab Units 09/05/23 0530 09/04/23 0427 09/02/23 0445   MAGNESIUM mg/dL 2.0 1.5* 2.1     Results from last 7 days   Lab Units 09/04/23 0427 09/03/23 0219 09/02/23 1949   PHOSPHORUS mg/dL 2.8 3.1 2.9      Results from last 7 days   Lab Units 09/03/23  0219 09/02/23 1949 09/02/23  1244   PTT seconds 110* 47* 74*               IMAGING & DIAGNOSTIC TESTING     Radiology Results: I have personally reviewed pertinent reports. XR chest portable    Result Date: 9/5/2023  Impression: Limited portable study demonstrating no definite acute cardiopulmonary disease.  Workstation performed: [id number]     CT head wo contrast    Result Date: 9/2/2023  Impression: No acute intracranial abnormality. 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 Workstation performed: [id number]     XR chest portable ICU    Result Date: 8/31/2023  Impression: No acute cardiopulmonary disease. Workstation performed: [id number]     CT upper extremity w contrast left    Result Date: 8/30/2023  Impression: Left upper extremity subcutaneous edema indicating cellulitis without drainable collection, soft tissue emphysema or evidence of osteomyelitis. Concordant virtual radiologic report was provided at 2303 hours on 8/29/2020 Workstation performed: [id number]     XR chest portable    Result Date: 8/29/2023  Impression: No acute cardiopulmonary disease. Workstation performed: [id number]     Other Diagnostic Testing: I have personally reviewed pertinent reports.     ACTIVE MEDICATIONS     Current Facility-Administered Medications   Medication Dose Route Frequency   • albuterol inhalation solution 2.5 mg  2.5 mg Nebulization Q4H PRN   • allopurinol (ZYLOPRIM) tablet 400 mg  400 mg Oral Daily   • apixaban (ELIQUIS) tablet 5 mg  5 mg Oral BID   • chlorhexidine (PERIDEX) 0.12 % oral rinse 15 mL  15 mL Mouth/Throat Q12H ODALYS   • Diclofenac Sodium (VOLTAREN) 1 % topical gel 2 g  2 g Topical 4x Daily   • ezetimibe (ZETIA) tablet 10 mg  10 mg Oral Daily   • Fluticasone Furoate-Vilanterol 100-25 mcg/actuation 1 puff  1 puff Inhalation Daily   • furosemide (LASIX) injection 40 mg  40 mg Intravenous Daily   • gabapentin (NEURONTIN) capsule 100 mg  100 mg Oral BID   • haloperidol lactate (HALDOL) injection 1 mg  1 mg Intramuscular Q6H PRN   • HYDROmorphone HCl (DILAUDID) injection 0.2 mg  0.2 mg Intravenous Q4H PRN   • hydroxychloroquine (PLAQUENIL) tablet 200 mg  200 mg Oral BID With Meals   • insulin lispro (HumaLOG) 100 units/mL subcutaneous injection 1-5 Units  1-5 Units Subcutaneous TID AC   • insulin lispro (HumaLOG) 100 units/mL subcutaneous injection 1-5 Units 1-5 Units Subcutaneous HS   • levothyroxine tablet 25 mcg  25 mcg Oral Early Morning   • lidocaine (LIDODERM) 5 % patch 2 patch  2 patch Topical Daily   • melatonin tablet 9 mg  9 mg Oral HS   • metoprolol succinate (TOPROL-XL) 24 hr tablet 25 mg  25 mg Oral BID   • nystatin (MYCOSTATIN) powder   Topical BID   • pantoprazole (PROTONIX) EC tablet 20 mg  20 mg Oral Daily Before Breakfast   • penicillin G (PFIZERPEN-G) 4 Million Units in sodium chloride 0.9 % 50 mL IVPB  4 Million Units Intravenous Q4H   • polyethylene glycol (MIRALAX) packet 17 g  17 g Oral Every Other Day   • predniSONE tablet 5 mg  5 mg Oral Daily   • torsemide (DEMADEX) tablet 20 mg  20 mg Oral Daily   • traMADol (ULTRAM) tablet 50 mg  50 mg Oral Q6H PRN       VTE Pharmacologic Prophylaxis: Reason for no pharmacologic prophylaxis cont eliquis  VTE Mechanical Prophylaxis: sequential compression device    Portions of the record may have been created with voice recognition software. Occasional wrong word or "sound a like" substitutions may have occurred due to the inherent limitations of voice recognition software.   Read the chart carefully and recognize, using context, where substitutions have occurred.  ==  Leander Turner MD  [id number]

## 2023-09-06 NOTE — PLAN OF CARE
Problem: MOBILITY - ADULT  Goal: Maintain or return to baseline ADL function  Description: INTERVENTIONS:  -  Assess patient's ability to carry out ADLs; assess patient's baseline for ADL function and identify physical deficits which impact ability to perform ADLs (bathing, care of mouth/teeth, toileting, grooming, dressing, etc.)  - Assess/evaluate cause of self-care deficits   - Assess range of motion  - Assess patient's mobility; develop plan if impaired  - Assess patient's need for assistive devices and provide as appropriate  - Encourage maximum independence but intervene and supervise when necessary  - Involve family in performance of ADLs  - Assess for home care needs following discharge   - Consider OT consult to assist with ADL evaluation and planning for discharge  - Provide patient education as appropriate  Outcome: Progressing  Goal: Maintains/Returns to pre admission functional level  Description: INTERVENTIONS:  - Perform BMAT or MOVE assessment daily.   - Set and communicate daily mobility goal to care team and patient/family/caregiver. - Collaborate with rehabilitation services on mobility goals if consulted  - Perform Range of Motion 3 times a day. - Reposition patient every 2 hours.   - Dangle patient 3 times a day  - Stand patient 3 times a day  - Ambulate patient 3 times a day  - Out of bed to chair 3 times a day   - Out of bed for meals 3 times a day  - Out of bed for toileting  - Record patient progress and toleration of activity level   Outcome: Progressing     Problem: Prexisting or High Potential for Compromised Skin Integrity  Goal: Skin integrity is maintained or improved  Description: INTERVENTIONS:  - Identify patients at risk for skin breakdown  - Assess and monitor skin integrity  - Assess and monitor nutrition and hydration status  - Monitor labs   - Assess for incontinence   - Turn and reposition patient  - Assist with mobility/ambulation  - Relieve pressure over bony prominences  - Avoid friction and shearing  - Provide appropriate hygiene as needed including keeping skin clean and dry  - Evaluate need for skin moisturizer/barrier cream  - Collaborate with interdisciplinary team   - Patient/family teaching  - Consider wound care consult   Outcome: Progressing     Problem: PAIN - ADULT  Goal: Verbalizes/displays adequate comfort level or baseline comfort level  Description: Interventions:  - Encourage patient to monitor pain and request assistance  - Assess pain using appropriate pain scale  - Administer analgesics based on type and severity of pain and evaluate response  - Implement non-pharmacological measures as appropriate and evaluate response  - Consider cultural and social influences on pain and pain management  - Notify physician/advanced practitioner if interventions unsuccessful or patient reports new pain  Outcome: Progressing     Problem: INFECTION - ADULT  Goal: Absence or prevention of progression during hospitalization  Description: INTERVENTIONS:  - Assess and monitor for signs and symptoms of infection  - Monitor lab/diagnostic results  - Monitor all insertion sites, i.e. indwelling lines, tubes, and drains  - Monitor endotracheal if appropriate and nasal secretions for changes in amount and color  - Young appropriate cooling/warming therapies per order  - Administer medications as ordered  - Instruct and encourage patient and family to use good hand hygiene technique  - Identify and instruct in appropriate isolation precautions for identified infection/condition  Outcome: Progressing  Goal: Absence of fever/infection during neutropenic period  Description: INTERVENTIONS:  - Monitor WBC    Outcome: Progressing     Problem: SAFETY ADULT  Goal: Patient will remain free of falls  Description: INTERVENTIONS:  - Educate patient/family on patient safety including physical limitations  - Instruct patient to call for assistance with activity   - Consult OT/PT to assist with strengthening/mobility   - Keep Call bell within reach  - Keep bed low and locked with side rails adjusted as appropriate  - Keep care items and personal belongings within reach  - Initiate and maintain comfort rounds  - Make Fall Risk Sign visible to staff  - Offer Toileting every 2 Hours, in advance of need  - Initiate/Maintain bed alarm  - Obtain necessary fall risk management equipment  - Apply yellow socks and bracelet for high fall risk patients  - Consider moving patient to room near nurses station  Outcome: Progressing     Problem: DISCHARGE PLANNING  Goal: Discharge to home or other facility with appropriate resources  Description: INTERVENTIONS:  - Identify barriers to discharge w/patient and caregiver  - Arrange for needed discharge resources and transportation as appropriate  - Identify discharge learning needs (meds, wound care, etc.)  - Arrange for interpretive services to assist at discharge as needed  - Refer to Case Management Department for coordinating discharge planning if the patient needs post-hospital services based on physician/advanced practitioner order or complex needs related to functional status, cognitive ability, or social support system  Outcome: Progressing     Problem: Knowledge Deficit  Goal: Patient/family/caregiver demonstrates understanding of disease process, treatment plan, medications, and discharge instructions  Description: Complete learning assessment and assess knowledge base. Interventions:  - Provide teaching at level of understanding  - Provide teaching via preferred learning methods  Outcome: Progressing     Problem: Nutrition/Hydration-ADULT  Goal: Nutrient/Hydration intake appropriate for improving, restoring or maintaining nutritional needs  Description: Monitor and assess patient's nutrition/hydration status for malnutrition. Collaborate with interdisciplinary team and initiate plan and interventions as ordered.   Monitor patient's weight and dietary intake as ordered or per policy. Utilize nutrition screening tool and intervene as necessary. Determine patient's food preferences and provide high-protein, high-caloric foods as appropriate. INTERVENTIONS:  - Monitor oral intake, urinary output, labs, and treatment plans  - Assess nutrition and hydration status and recommend course of action  - Evaluate amount of meals eaten  - Assist patient with eating if necessary   - Allow adequate time for meals  - Recommend/ encourage appropriate diets, oral nutritional supplements, and vitamin/mineral supplements  - Order, calculate, and assess calorie counts as needed  - Recommend, monitor, and adjust tube feedings and TPN/PPN based on assessed needs  - Assess need for intravenous fluids  - Provide specific nutrition/hydration education as appropriate  - Include patient/family/caregiver in decisions related to nutrition  Outcome: Progressing     Problem: NEUROSENSORY - ADULT  Goal: Achieves stable or improved neurological status  Description: INTERVENTIONS  - Monitor and report changes in neurological status  - Monitor vital signs such as temperature, blood pressure, glucose, and any other labs ordered   - Initiate measures to prevent increased intracranial pressure  - Monitor for seizure activity and implement precautions if appropriate      Outcome: Progressing  Goal: Achieves maximal functionality and self care  Description: INTERVENTIONS  - Monitor swallowing and airway patency with patient fatigue and changes in neurological status  - Encourage and assist patient to increase activity and self care.    - Encourage visually impaired, hearing impaired and aphasic patients to use assistive/communication devices  Outcome: Progressing     Problem: CARDIOVASCULAR - ADULT  Goal: Maintains optimal cardiac output and hemodynamic stability  Description: INTERVENTIONS:  - Monitor I/O, vital signs and rhythm  - Monitor for S/S and trends of decreased cardiac output  - Administer and titrate ordered vasoactive medications to optimize hemodynamic stability  - Assess quality of pulses, skin color and temperature  - Assess for signs of decreased coronary artery perfusion  - Instruct patient to report change in severity of symptoms  Outcome: Progressing  Goal: Absence of cardiac dysrhythmias or at baseline rhythm  Description: INTERVENTIONS:  - Continuous cardiac monitoring, vital signs, obtain 12 lead EKG if ordered  - Administer antiarrhythmic and heart rate control medications as ordered  - Monitor electrolytes and administer replacement therapy as ordered  Outcome: Progressing     Problem: RESPIRATORY - ADULT  Goal: Achieves optimal ventilation and oxygenation  Description: INTERVENTIONS:  - Assess for changes in respiratory status  - Assess for changes in mentation and behavior  - Position to facilitate oxygenation and minimize respiratory effort  - Oxygen administered by appropriate delivery if ordered  - Initiate smoking cessation education as indicated  - Encourage broncho-pulmonary hygiene including cough, deep breathe, Incentive Spirometry  - Assess the need for suctioning and aspirate as needed  - Assess and instruct to report SOB or any respiratory difficulty  - Respiratory Therapy support as indicated  Outcome: Progressing     Problem: MUSCULOSKELETAL - ADULT  Goal: Maintain or return mobility to safest level of function  Description: INTERVENTIONS:  - Assess patient's ability to carry out ADLs; assess patient's baseline for ADL function and identify physical deficits which impact ability to perform ADLs (bathing, care of mouth/teeth, toileting, grooming, dressing, etc.)  - Assess/evaluate cause of self-care deficits   - Assess range of motion  - Assess patient's mobility  - Assess patient's need for assistive devices and provide as appropriate  - Encourage maximum independence but intervene and supervise when necessary  - Involve family in performance of ADLs  - Assess for home care needs following discharge   - Consider OT consult to assist with ADL evaluation and planning for discharge  - Provide patient education as appropriate  Outcome: Progressing  Goal: Maintain proper alignment of affected body part  Description: INTERVENTIONS:  - Support, maintain and protect limb and body alignment  - Provide patient/ family with appropriate education  Outcome: Progressing     Problem: HEMATOLOGIC - ADULT  Goal: Maintains hematologic stability  Description: INTERVENTIONS  - Assess for signs and symptoms of bleeding or hemorrhage  - Monitor labs  - Administer supportive blood products/factors as ordered and appropriate  Outcome: Progressing     Problem: METABOLIC, FLUID AND ELECTROLYTES - ADULT  Goal: Electrolytes maintained within normal limits  Description: INTERVENTIONS:  - Monitor labs and assess patient for signs and symptoms of electrolyte imbalances  - Administer electrolyte replacement as ordered  - Monitor response to electrolyte replacements, including repeat lab results as appropriate  - Instruct patient on fluid and nutrition as appropriate  Outcome: Progressing  Goal: Fluid balance maintained  Description: INTERVENTIONS:  - Monitor labs   - Monitor I/O and WT  - Instruct patient on fluid and nutrition as appropriate  - Assess for signs & symptoms of volume excess or deficit  Outcome: Progressing  Goal: Glucose maintained within target range  Description: INTERVENTIONS:  - Monitor Blood Glucose as ordered  - Assess for signs and symptoms of hyperglycemia and hypoglycemia  - Administer ordered medications to maintain glucose within target range  - Assess nutritional intake and initiate nutrition service referral as needed  Outcome: Progressing

## 2023-09-06 NOTE — PLAN OF CARE
Problem: MOBILITY - ADULT  Goal: Maintain or return to baseline ADL function  Description: INTERVENTIONS:  -  Assess patient's ability to carry out ADLs; assess patient's baseline for ADL function and identify physical deficits which impact ability to perform ADLs (bathing, care of mouth/teeth, toileting, grooming, dressing, etc.)  - Assess/evaluate cause of self-care deficits   - Assess range of motion  - Assess patient's mobility; develop plan if impaired  - Assess patient's need for assistive devices and provide as appropriate  - Encourage maximum independence but intervene and supervise when necessary  - Involve family in performance of ADLs  - Assess for home care needs following discharge   - Consider OT consult to assist with ADL evaluation and planning for discharge  - Provide patient education as appropriate  Outcome: Progressing  Goal: Maintains/Returns to pre admission functional level  Description: INTERVENTIONS:  - Perform BMAT or MOVE assessment daily.   - Set and communicate daily mobility goal to care team and patient/family/caregiver. - Collaborate with rehabilitation services on mobility goals if consulted  - Perform Range of Motion 2 times a day. - Reposition patient every 2 hours.   - Dangle patient 2 times a day  - Stand patient 2 times a day  - Ambulate patient 2 times a day  - Out of bed to chair 2 times a day   - Out of bed for meals 2 times a day  - Out of bed for toileting  - Record patient progress and toleration of activity level   Outcome: Progressing     Problem: Prexisting or High Potential for Compromised Skin Integrity  Goal: Skin integrity is maintained or improved  Description: INTERVENTIONS:  - Identify patients at risk for skin breakdown  - Assess and monitor skin integrity  - Assess and monitor nutrition and hydration status  - Monitor labs   - Assess for incontinence   - Turn and reposition patient  - Assist with mobility/ambulation  - Relieve pressure over bony prominences  - Avoid friction and shearing  - Provide appropriate hygiene as needed including keeping skin clean and dry  - Evaluate need for skin moisturizer/barrier cream  - Collaborate with interdisciplinary team   - Patient/family teaching  - Consider wound care consult   Outcome: Progressing     Problem: PAIN - ADULT  Goal: Verbalizes/displays adequate comfort level or baseline comfort level  Description: Interventions:  - Encourage patient to monitor pain and request assistance  - Assess pain using appropriate pain scale  - Administer analgesics based on type and severity of pain and evaluate response  - Implement non-pharmacological measures as appropriate and evaluate response  - Consider cultural and social influences on pain and pain management  - Notify physician/advanced practitioner if interventions unsuccessful or patient reports new pain  Outcome: Progressing     Problem: INFECTION - ADULT  Goal: Absence or prevention of progression during hospitalization  Description: INTERVENTIONS:  - Assess and monitor for signs and symptoms of infection  - Monitor lab/diagnostic results  - Monitor all insertion sites, i.e. indwelling lines, tubes, and drains  - Monitor endotracheal if appropriate and nasal secretions for changes in amount and color  - [place] appropriate cooling/warming therapies per order  - Administer medications as ordered  - Instruct and encourage patient and family to use good hand hygiene technique  - Identify and instruct in appropriate isolation precautions for identified infection/condition  Outcome: Progressing  Goal: Absence of fever/infection during neutropenic period  Description: INTERVENTIONS:  - Monitor WBC    Outcome: Progressing     Problem: SAFETY ADULT  Goal: Maintain or return to baseline ADL function  Description: INTERVENTIONS:  -  Assess patient's ability to carry out ADLs; assess patient's baseline for ADL function and identify physical deficits which impact ability to perform ADLs (bathing, care of mouth/teeth, toileting, grooming, dressing, etc.)  - Assess/evaluate cause of self-care deficits   - Assess range of motion  - Assess patient's mobility; develop plan if impaired  - Assess patient's need for assistive devices and provide as appropriate  - Encourage maximum independence but intervene and supervise when necessary  - Involve family in performance of ADLs  - Assess for home care needs following discharge   - Consider OT consult to assist with ADL evaluation and planning for discharge  - Provide patient education as appropriate  Outcome: Progressing  Goal: Maintains/Returns to pre admission functional level  Description: INTERVENTIONS:  - Perform BMAT or MOVE assessment daily.   - Set and communicate daily mobility goal to care team and patient/family/caregiver. - Collaborate with rehabilitation services on mobility goals if consulted  - Perform Range of Motion 2 times a day. - Reposition patient every 2 hours.   - Dangle patient 2 times a day  - Stand patient 2 times a day  - Ambulate patient 2 times a day  - Out of bed to chair 2 times a day   - Out of bed for meals 2 times a day  - Out of bed for toileting  - Record patient progress and toleration of activity level   Outcome: Progressing  Goal: Patient will remain free of falls  Description: INTERVENTIONS:  - Educate patient/family on patient safety including physical limitations  - Instruct patient to call for assistance with activity   - Consult OT/PT to assist with strengthening/mobility   - Keep Call bell within reach  - Keep bed low and locked with side rails adjusted as appropriate  - Keep care items and personal belongings within reach  - Initiate and maintain comfort rounds  - Make Fall Risk Sign visible to staff  - Offer Toileting every 2 Hours, in advance of need  - Initiate/Maintain bed/chair alarm  - Obtain necessary fall risk management equipment: bed alarm, call bell, yellow bracelet  - Apply yellow socks and bracelet for high fall risk patients  - Consider moving patient to room near nurses station  Outcome: Progressing     Problem: DISCHARGE PLANNING  Goal: Discharge to home or other facility with appropriate resources  Description: INTERVENTIONS:  - Identify barriers to discharge w/patient and caregiver  - Arrange for needed discharge resources and transportation as appropriate  - Identify discharge learning needs (meds, wound care, etc.)  - Arrange for interpretive services to assist at discharge as needed  - Refer to Case Management Department for coordinating discharge planning if the patient needs post-hospital services based on physician/advanced practitioner order or complex needs related to functional status, cognitive ability, or social support system  Outcome: Progressing     Problem: Knowledge Deficit  Goal: Patient/family/caregiver demonstrates understanding of disease process, treatment plan, medications, and discharge instructions  Description: Complete learning assessment and assess knowledge base. Interventions:  - Provide teaching at level of understanding  - Provide teaching via preferred learning methods  Outcome: Progressing     Problem: Nutrition/Hydration-ADULT  Goal: Nutrient/Hydration intake appropriate for improving, restoring or maintaining nutritional needs  Description: Monitor and assess patient's nutrition/hydration status for malnutrition. Collaborate with interdisciplinary team and initiate plan and interventions as ordered. Monitor patient's weight and dietary intake as ordered or per policy. Utilize nutrition screening tool and intervene as necessary. Determine patient's food preferences and provide high-protein, high-caloric foods as appropriate.      INTERVENTIONS:  - Monitor oral intake, urinary output, labs, and treatment plans  - Assess nutrition and hydration status and recommend course of action  - Evaluate amount of meals eaten  - Assist patient with eating if necessary - Allow adequate time for meals  - Recommend/ encourage appropriate diets, oral nutritional supplements, and vitamin/mineral supplements  - Order, calculate, and assess calorie counts as needed  - Recommend, monitor, and adjust tube feedings and TPN/PPN based on assessed needs  - Assess need for intravenous fluids  - Provide specific nutrition/hydration education as appropriate  - Include patient/family/caregiver in decisions related to nutrition  Outcome: Progressing     Problem: SAFETY,RESTRAINT: NV/NON-SELF DESTRUCTIVE BEHAVIOR  Goal: Remains free of harm/injury (restraint for non violent/non self-detsructive behavior)  Description: INTERVENTIONS:  - Instruct patient/family regarding restraint use   - Assess and monitor physiologic and psychological status   - Provide interventions and comfort measures to meet assessed patient needs   - Identify and implement measures to help patient regain control  - Assess readiness for release of restraint   Outcome: Progressing  Goal: Returns to optimal restraint-free functioning  Description: INTERVENTIONS:  - Assess the patient's behavior and symptoms that indicate continued need for restraint  - Identify and implement measures to help patient regain control  - Assess readiness for release of restraint   Outcome: Progressing     Problem: NEUROSENSORY - ADULT  Goal: Achieves stable or improved neurological status  Description: INTERVENTIONS  - Monitor and report changes in neurological status  - Monitor vital signs such as temperature, blood pressure, glucose, and any other labs ordered   - Initiate measures to prevent increased intracranial pressure  - Monitor for seizure activity and implement precautions if appropriate      Outcome: Progressing  Goal: Achieves maximal functionality and self care  Description: INTERVENTIONS  - Monitor swallowing and airway patency with patient fatigue and changes in neurological status  - Encourage and assist patient to increase activity and self care.    - Encourage visually impaired, hearing impaired and aphasic patients to use assistive/communication devices  Outcome: Progressing     Problem: CARDIOVASCULAR - ADULT  Goal: Maintains optimal cardiac output and hemodynamic stability  Description: INTERVENTIONS:  - Monitor I/O, vital signs and rhythm  - Monitor for S/S and trends of decreased cardiac output  - Administer and titrate ordered vasoactive medications to optimize hemodynamic stability  - Assess quality of pulses, skin color and temperature  - Assess for signs of decreased coronary artery perfusion  - Instruct patient to report change in severity of symptoms  Outcome: Progressing  Goal: Absence of cardiac dysrhythmias or at baseline rhythm  Description: INTERVENTIONS:  - Continuous cardiac monitoring, vital signs, obtain 12 lead EKG if ordered  - Administer antiarrhythmic and heart rate control medications as ordered  - Monitor electrolytes and administer replacement therapy as ordered  Outcome: Progressing     Problem: RESPIRATORY - ADULT  Goal: Achieves optimal ventilation and oxygenation  Description: INTERVENTIONS:  - Assess for changes in respiratory status  - Assess for changes in mentation and behavior  - Position to facilitate oxygenation and minimize respiratory effort  - Oxygen administered by appropriate delivery if ordered  - Initiate smoking cessation education as indicated  - Encourage broncho-pulmonary hygiene including cough, deep breathe, Incentive Spirometry  - Assess the need for suctioning and aspirate as needed  - Assess and instruct to report SOB or any respiratory difficulty  - Respiratory Therapy support as indicated  Outcome: Progressing     Problem: METABOLIC, FLUID AND ELECTROLYTES - ADULT  Goal: Electrolytes maintained within normal limits  Description: INTERVENTIONS:  - Monitor labs and assess patient for signs and symptoms of electrolyte imbalances  - Administer electrolyte replacement as ordered  - Monitor response to electrolyte replacements, including repeat lab results as appropriate  - Instruct patient on fluid and nutrition as appropriate  Outcome: Progressing  Goal: Fluid balance maintained  Description: INTERVENTIONS:  - Monitor labs   - Monitor I/O and WT  - Instruct patient on fluid and nutrition as appropriate  - Assess for signs & symptoms of volume excess or deficit  Outcome: Progressing  Goal: Glucose maintained within target range  Description: INTERVENTIONS:  - Monitor Blood Glucose as ordered  - Assess for signs and symptoms of hyperglycemia and hypoglycemia  - Administer ordered medications to maintain glucose within target range  - Assess nutritional intake and initiate nutrition service referral as needed  Outcome: Progressing     Problem: HEMATOLOGIC - ADULT  Goal: Maintains hematologic stability  Description: INTERVENTIONS  - Assess for signs and symptoms of bleeding or hemorrhage  - Monitor labs  - Administer supportive blood products/factors as ordered and appropriate  Outcome: Progressing     Problem: MUSCULOSKELETAL - ADULT  Goal: Maintain or return mobility to safest level of function  Description: INTERVENTIONS:  - Assess patient's ability to carry out ADLs; assess patient's baseline for ADL function and identify physical deficits which impact ability to perform ADLs (bathing, care of mouth/teeth, toileting, grooming, dressing, etc.)  - Assess/evaluate cause of self-care deficits   - Assess range of motion  - Assess patient's mobility  - Assess patient's need for assistive devices and provide as appropriate  - Encourage maximum independence but intervene and supervise when necessary  - Involve family in performance of ADLs  - Assess for home care needs following discharge   - Consider OT consult to assist with ADL evaluation and planning for discharge  - Provide patient education as appropriate  Outcome: Progressing  Goal: Maintain proper alignment of affected body part  Description: INTERVENTIONS:  - Support, maintain and protect limb and body alignment  - Provide patient/ family with appropriate education  Outcome: Progressing     Problem: Potential for Falls  Goal: Patient will remain free of falls  Description: INTERVENTIONS:  - Educate patient/family on patient safety including physical limitations  - Instruct patient to call for assistance with activity   - Consult OT/PT to assist with strengthening/mobility   - Keep Call bell within reach  - Keep bed low and locked with side rails adjusted as appropriate  - Keep care items and personal belongings within reach  - Initiate and maintain comfort rounds  - Make Fall Risk Sign visible to staff  - Offer Toileting every 2 Hours, in advance of need  - Initiate/Maintain x alarm  - Obtain necessary fall risk management equipment: x  - Apply yellow socks and bracelet for high fall risk patients  - Consider moving patient to room near nurses station  Outcome: Progressing     Problem: SKIN/TISSUE INTEGRITY - ADULT  Goal: Skin Integrity remains intact(Skin Breakdown Prevention)  Description: Assess:  -Perform Micha assessment every shift  -Clean and moisturize skin as needed  -Inspect skin when repositioning, toileting, and assisting with ADLS  -Assess under medical devices such as x every x  -Assess extremities for adequate circulation and sensation     Bed Management:  -Have minimal linens on bed & keep smooth, unwrinkled  -Change linens as needed when moist or perspiring  -Avoid sitting or lying in one position for more than x hours while in bed  -Keep HOB at 30 degrees     Toileting:  -Offer bedside commode  -Assess for incontinence every 2 hours  -Use incontinent care products after each incontinent episode such as x    Activity:  -Mobilize patient x times a day  -Encourage activity and walks on unit  -Encourage or provide ROM exercises   -Turn and reposition patient every x Hours  -Use appropriate equipment to lift or move patient in bed  -Instruct/ Assist with weight shifting every x when out of bed in chair  -Consider limitation of chair time x hour intervals    Skin Care:  -Avoid use of baby powder, tape, friction and shearing, hot water or constrictive clothing  -Relieve pressure over bony prominences using x  -Do not massage red bony areas    Next Steps:  -Teach patient strategies to minimize risks such as x   -Consider consults to  interdisciplinary teams such as x  Outcome: Progressing  Goal: Incision(s), wounds(s) or drain site(s) healing without S/S of infection  Description: INTERVENTIONS  - Assess and document dressing, incision, wound bed, drain sites and surrounding tissue  - Provide patient and family education  - Perform skin care/dressing changes every x  Outcome: Progressing

## 2023-09-06 NOTE — ASSESSMENT & PLAN NOTE
· Sepsis criteria evidenced by fever, tachycardia, tachypnea POA. End organ damage/shock evidenced by hypotension briefly requiring Levophed, admitted under critical care service. · Source: LUE cellulitis, group B strep bacteremia. · Patient initially presented from [address] [place] [address] w/ fever 103 & erythema of left arm. · No DVT on venous duplex bilateral UEs, UA and CXR unremarkable  · S/p 2250 mL IVF bolus + albumin in ED. · Off Levophed since 8/30, transition to [facility] service 9/1. · Lactic normalized, procalcitonin trending down   · IV vancomycin + IV Rocephin -> IV cefepime -> IV clindamycin -> IV PCN G  · General surgery signed off, no acute surgical interventions at this time. Presentation not suspicious for necrotizing infection, no drainable abscess or gas in tissue.   · ID following:  · Continue IV PCN G while inpatient, transition to amoxicillin 500 mg PO TID through till 9/13/2023 at discharge  · Final blood cultures remain with no growth for 5 days   · Pending rehab facility acceptance - potentially Friday

## 2023-09-06 NOTE — ASSESSMENT & PLAN NOTE
Wt Readings from Last 3 Encounters:   09/06/23 101 kg (222 lb 0.1 oz)   08/28/23 99.8 kg (220 lb)   08/24/23 105 kg (232 lb)     · Not in acute exacerbation. Edema of upper extremities likely more in setting of cellulitis. · Home regimen: Torsemide alternating 20/10 mg daily, Toprol XL 25 mg BID  · Echo (8/30/23): EF 45%, G1DD, moderate MVR. Improved from EF 30% in 1/2023. · Restarted on home dose of beta blocker   · Continue with IV Lasix 40 mg daily while inpatient- will change to home dose of torsemide.    · Daily weights, strict I/Os

## 2023-09-06 NOTE — CASE MANAGEMENT
Case Management Assessment & Discharge Planning Note    Patient name Abelardo Garrett  Location /-64 MRN [id number]  : 1934 Date 2023       Current Admission Date: 2023  Current Admission Diagnosis:Severe sepsis [facility])   Patient Active Problem List    Diagnosis Date Noted   • Metabolic encephalopathy    • Bacteremia due to group B Streptococcus 2023   • Respiratory insufficiency 2023   • Severe sepsis ([address]) 2023   • Cellulitis of left upper extremity 2023   • HLD (hyperlipidemia) 2023   • GERD (gastroesophageal reflux disease) 2023   • Gout without acute exacerbation  2023   • Spinal stenosis 2023   • Hypothyroidism 2023   • Peripheral vascular disease ([address]) 2023   • Open wound of right great toe with damage to nail 2023   • Adverse effect of loop (high-ceiling) diuretics, subsequent encounter 2023   • Toxic metabolic encephalopathy    • Septic arthritis of shoulder, right ([address]) 2023   • Suture of skin wound 2023   • Hypomagnesemia 2023   • Elevated TSH 2023   • Chronic systolic heart failure ([address]) 2023   • A-fib RVR ([address]) 2023   • Transient speech disturbance 2023   • KAMLESH (acute kidney injury) ([address]) 2023   • Dilated cardiomyopathy ([address]) 10/18/2022   • HTN (hypertension) 10/18/2022   • Low left ventricular ejection fraction 10/11/2022   • TIA (transient ischemic attack) 10/10/2022   • Speech disturbance 10/08/2022   • Accidental overdose 2020   • History of hypertension 2020   • Hypotension 2020   • RA (rheumatoid arthritis) ([address]) 2020      LOS (days): 8  Geometric Mean LOS (GMLOS) (days): 3.50  Days to GMLOS:-4.6     OBJECTIVE:    Risk of Unplanned Readmission Score: 29.93         Current admission status: Inpatient       Preferred Pharmacy:   [address] [place] [address] [address]  [place] [address]  Phone: [phone] Fax: [phone]    Primary Care Provider: Jazmyn Lopes MD    Primary Insurance: [facility]  Secondary Insurance:     ASSESSMENT:  [address] [facility], [address]   Primary Phone: [phone] (Mobile)               DISCHARGE DETAILS:    Additional Comments: Met with Pt's son and dtr-in-law at bedside. Pt's son informed CM family is choosing [facility]note 3[facility]. Will need to obtain insurance auth prior to discharge.

## 2023-09-06 NOTE — ASSESSMENT & PLAN NOTE
· BP reviewed, slightly hypertensive    · Restarted home dose Toprol-XL 25 mg BID   · Continue diuretic with IV Lasix 40 mg daily- weight decreased. Changed to home dosing of diureitic with torsemide 20mg m/w/f and 10mg other days.    · Monitor BP closely

## 2023-09-06 NOTE — ASSESSMENT & PLAN NOTE
· Patient with increased confusion, agitation requiring soft wrist restraints since 9/2. No known history of cognitive impairment or dementia, patient's family states this is far from his baseline.   · Likely multifactorial with sepsis, hospital-related mostly contributing  · CT head w/ no acute intracranial abnormalities, noted moderate chronic microangiopathic changes  · UA on admission benign, no signs of urinary retention thus far  · Repeat CXR reviewed prior, no obvious infiltrates or effusions compared to 8/31  · Blood glucose stable, initiated SSI coverage  · VBG/ABG reviewed, overall acceptable with elevated CO2 similar to prior  · Hypophosphatemia resolved s/p IV sodium phosphate, lab work otherwise unremarkable  · Evening on 9/2, patient agitated/combative requiring Haldol, Seroquel with good response  · Caution with opioids, sedating medications  · Neurochecks every 4 hours  · Seroquel was discontinued 9/5/2023, family is pleased and reports improved mental status and interactiveness from patient  · Restraints removed 0700 9/4, order discontinued at the same, last Haldol 9/2  · Continue to avoid physical and chemical restraints as much as possible in order to transition to rehab -hopefully this Friday

## 2023-09-06 NOTE — ASSESSMENT & PLAN NOTE
· Has been seeing Dr. 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

## 2023-09-07 LAB
ANION GAP SERPL CALCULATED.3IONS-SCNC: 7 MMOL/L
BASOPHILS # BLD AUTO: 0.05 THOUSANDS/ÂΜL (ref 0–0.1)
BASOPHILS NFR BLD AUTO: 1 % (ref 0–1)
BUN SERPL-MCNC: 23 MG/DL (ref 5–25)
CALCIUM SERPL-MCNC: 9.7 MG/DL (ref 8.4–10.2)
CHLORIDE SERPL-SCNC: 97 MMOL/L (ref 96–108)
CO2 SERPL-SCNC: 34 MMOL/L (ref 21–32)
CREAT SERPL-MCNC: 1.2 MG/DL (ref 0.6–1.3)
EOSINOPHIL # BLD AUTO: 0.42 THOUSAND/ÂΜL (ref 0–0.61)
EOSINOPHIL NFR BLD AUTO: 5 % (ref 0–6)
ERYTHROCYTE [DISTWIDTH] IN BLOOD BY AUTOMATED COUNT: 15.3 % (ref 11.6–15.1)
GFR SERPL CREATININE-BSD FRML MDRD: 53 ML/MIN/1.73SQ M
GLUCOSE SERPL-MCNC: 126 MG/DL (ref 65–140)
GLUCOSE SERPL-MCNC: 132 MG/DL (ref 65–140)
GLUCOSE SERPL-MCNC: 139 MG/DL (ref 65–140)
GLUCOSE SERPL-MCNC: 161 MG/DL (ref 65–140)
GLUCOSE SERPL-MCNC: 200 MG/DL (ref 65–140)
HCT VFR BLD AUTO: 43.4 % (ref 36.5–49.3)
HGB BLD-MCNC: 13.5 G/DL (ref 12–17)
IMM GRANULOCYTES # BLD AUTO: 0.12 THOUSAND/UL (ref 0–0.2)
IMM GRANULOCYTES NFR BLD AUTO: 1 % (ref 0–2)
LYMPHOCYTES # BLD AUTO: 2.62 THOUSANDS/ÂΜL (ref 0.6–4.47)
LYMPHOCYTES NFR BLD AUTO: 31 % (ref 14–44)
MCH RBC QN AUTO: 31.2 PG (ref 26.8–34.3)
MCHC RBC AUTO-ENTMCNC: 31.1 G/DL (ref 31.4–37.4)
MCV RBC AUTO: 100 FL (ref 82–98)
MONOCYTES # BLD AUTO: 0.7 THOUSAND/ÂΜL (ref 0.17–1.22)
MONOCYTES NFR BLD AUTO: 8 % (ref 4–12)
NEUTROPHILS # BLD AUTO: 4.47 THOUSANDS/ÂΜL (ref 1.85–7.62)
NEUTS SEG NFR BLD AUTO: 54 % (ref 43–75)
NRBC BLD AUTO-RTO: 0 /100 WBCS
PLATELET # BLD AUTO: 243 THOUSANDS/UL (ref 149–390)
PMV BLD AUTO: 12.2 FL (ref 8.9–12.7)
POTASSIUM SERPL-SCNC: 3.6 MMOL/L (ref 3.5–5.3)
RBC # BLD AUTO: 4.33 MILLION/UL (ref 3.88–5.62)
SODIUM SERPL-SCNC: 138 MMOL/L (ref 135–147)
WBC # BLD AUTO: 8.38 THOUSAND/UL (ref 4.31–10.16)

## 2023-09-07 PROCEDURE — 99232 SBSQ HOSP IP/OBS MODERATE 35: CPT | Performed by: FAMILY MEDICINE

## 2023-09-07 PROCEDURE — 85025 COMPLETE CBC W/AUTO DIFF WBC: CPT | Performed by: STUDENT IN AN ORGANIZED HEALTH CARE EDUCATION/TRAINING PROGRAM

## 2023-09-07 PROCEDURE — 80048 BASIC METABOLIC PNL TOTAL CA: CPT | Performed by: STUDENT IN AN ORGANIZED HEALTH CARE EDUCATION/TRAINING PROGRAM

## 2023-09-07 PROCEDURE — 82948 REAGENT STRIP/BLOOD GLUCOSE: CPT

## 2023-09-07 RX ORDER — TORSEMIDE 10 MG/1
10 TABLET ORAL
Status: DISCONTINUED | OUTPATIENT
Start: 2023-09-07 | End: 2023-09-09 | Stop reason: HOSPADM

## 2023-09-07 RX ORDER — TORSEMIDE 20 MG/1
20 TABLET ORAL 3 TIMES WEEKLY
Status: DISCONTINUED | OUTPATIENT
Start: 2023-09-08 | End: 2023-09-09 | Stop reason: HOSPADM

## 2023-09-07 RX ADMIN — TORSEMIDE 20 MG: 20 TABLET ORAL at 08:21

## 2023-09-07 RX ADMIN — HYDROXYCHLOROQUINE SULFATE 200 MG: 200 TABLET, FILM COATED ORAL at 08:37

## 2023-09-07 RX ADMIN — GABAPENTIN 100 MG: 100 CAPSULE ORAL at 08:21

## 2023-09-07 RX ADMIN — PENICILLIN G POTASSIUM 4 MILLION UNITS: 5000000 INJECTION, POWDER, FOR SOLUTION INTRAMUSCULAR; INTRAVENOUS at 03:33

## 2023-09-07 RX ADMIN — PENICILLIN G POTASSIUM 4 MILLION UNITS: 5000000 INJECTION, POWDER, FOR SOLUTION INTRAMUSCULAR; INTRAVENOUS at 22:12

## 2023-09-07 RX ADMIN — NYSTATIN: 100000 POWDER TOPICAL at 08:25

## 2023-09-07 RX ADMIN — INSULIN LISPRO 1 UNITS: 100 INJECTION, SOLUTION INTRAVENOUS; SUBCUTANEOUS at 16:50

## 2023-09-07 RX ADMIN — HYDROXYCHLOROQUINE SULFATE 200 MG: 200 TABLET, FILM COATED ORAL at 16:50

## 2023-09-07 RX ADMIN — TORSEMIDE 10 MG: 10 TABLET ORAL at 16:52

## 2023-09-07 RX ADMIN — Medication 2 G: at 11:09

## 2023-09-07 RX ADMIN — GABAPENTIN 100 MG: 100 CAPSULE ORAL at 18:03

## 2023-09-07 RX ADMIN — INSULIN LISPRO 1 UNITS: 100 INJECTION, SOLUTION INTRAVENOUS; SUBCUTANEOUS at 22:15

## 2023-09-07 RX ADMIN — LEVOTHYROXINE SODIUM 25 MCG: 25 TABLET ORAL at 05:33

## 2023-09-07 RX ADMIN — LIDOCAINE 2 PATCH: 50 PATCH TOPICAL at 08:21

## 2023-09-07 RX ADMIN — PANTOPRAZOLE SODIUM 20 MG: 20 TABLET, DELAYED RELEASE ORAL at 05:33

## 2023-09-07 RX ADMIN — Medication 9 MG: at 22:09

## 2023-09-07 RX ADMIN — PENICILLIN G POTASSIUM 4 MILLION UNITS: 5000000 INJECTION, POWDER, FOR SOLUTION INTRAMUSCULAR; INTRAVENOUS at 11:46

## 2023-09-07 RX ADMIN — METOPROLOL SUCCINATE 25 MG: 25 TABLET, FILM COATED, EXTENDED RELEASE ORAL at 08:20

## 2023-09-07 RX ADMIN — PREDNISONE 5 MG: 5 TABLET ORAL at 08:20

## 2023-09-07 RX ADMIN — NYSTATIN: 100000 POWDER TOPICAL at 18:04

## 2023-09-07 RX ADMIN — Medication 2 G: at 18:03

## 2023-09-07 RX ADMIN — APIXABAN 5 MG: 5 TABLET, FILM COATED ORAL at 08:37

## 2023-09-07 RX ADMIN — ALLOPURINOL 400 MG: 100 TABLET ORAL at 08:20

## 2023-09-07 RX ADMIN — PENICILLIN G POTASSIUM 4 MILLION UNITS: 5000000 INJECTION, POWDER, FOR SOLUTION INTRAMUSCULAR; INTRAVENOUS at 18:40

## 2023-09-07 RX ADMIN — FLUTICASONE FUROATE AND VILANTEROL TRIFENATATE 1 PUFF: 100; 25 POWDER RESPIRATORY (INHALATION) at 08:29

## 2023-09-07 RX ADMIN — EZETIMIBE 10 MG: 10 TABLET ORAL at 08:21

## 2023-09-07 RX ADMIN — PENICILLIN G POTASSIUM 4 MILLION UNITS: 5000000 INJECTION, POWDER, FOR SOLUTION INTRAMUSCULAR; INTRAVENOUS at 14:56

## 2023-09-07 RX ADMIN — APIXABAN 5 MG: 5 TABLET, FILM COATED ORAL at 18:03

## 2023-09-07 RX ADMIN — PENICILLIN G POTASSIUM 4 MILLION UNITS: 5000000 INJECTION, POWDER, FOR SOLUTION INTRAMUSCULAR; INTRAVENOUS at 08:30

## 2023-09-07 RX ADMIN — Medication 2 G: at 08:24

## 2023-09-07 NOTE — PLAN OF CARE
Problem: MOBILITY - ADULT  Goal: Maintain or return to baseline ADL function  Description: INTERVENTIONS:  -  Assess patient's ability to carry out ADLs; assess patient's baseline for ADL function and identify physical deficits which impact ability to perform ADLs (bathing, care of mouth/teeth, toileting, grooming, dressing, etc.)  - Assess/evaluate cause of self-care deficits   - Assess range of motion  - Assess patient's mobility; develop plan if impaired  - Assess patient's need for assistive devices and provide as appropriate  - Encourage maximum independence but intervene and supervise when necessary  - Involve family in performance of ADLs  - Assess for home care needs following discharge   - Consider OT consult to assist with ADL evaluation and planning for discharge  - Provide patient education as appropriate  Outcome: Progressing     Problem: Prexisting or High Potential for Compromised Skin Integrity  Goal: Skin integrity is maintained or improved  Description: INTERVENTIONS:  - Identify patients at risk for skin breakdown  - Assess and monitor skin integrity  - Assess and monitor nutrition and hydration status  - Monitor labs   - Assess for incontinence   - Turn and reposition patient  - Assist with mobility/ambulation  - Relieve pressure over bony prominences  - Avoid friction and shearing  - Provide appropriate hygiene as needed including keeping skin clean and dry  - Evaluate need for skin moisturizer/barrier cream  - Collaborate with interdisciplinary team   - Patient/family teaching  - Consider wound care consult   Outcome: Progressing     Problem: PAIN - ADULT  Goal: Verbalizes/displays adequate comfort level or baseline comfort level  Description: Interventions:  - Encourage patient to monitor pain and request assistance  - Assess pain using appropriate pain scale  - Administer analgesics based on type and severity of pain and evaluate response  - Implement non-pharmacological measures as appropriate and evaluate response  - Consider cultural and social influences on pain and pain management  - Notify physician/advanced practitioner if interventions unsuccessful or patient reports new pain  Outcome: Progressing     Problem: INFECTION - ADULT  Goal: Absence or prevention of progression during hospitalization  Description: INTERVENTIONS:  - Assess and monitor for signs and symptoms of infection  - Monitor lab/diagnostic results  - Monitor all insertion sites, i.e. indwelling lines, tubes, and drains  - Monitor endotracheal if appropriate and nasal secretions for changes in amount and color  - [place] appropriate cooling/warming therapies per order  - Administer medications as ordered  - Instruct and encourage patient and family to use good hand hygiene technique  - Identify and instruct in appropriate isolation precautions for identified infection/condition  Outcome: Progressing  Goal: Absence of fever/infection during neutropenic period  Description: INTERVENTIONS:  - Monitor WBC    Outcome: Progressing     Problem: SAFETY ADULT  Goal: Maintain or return to baseline ADL function  Description: INTERVENTIONS:  -  Assess patient's ability to carry out ADLs; assess patient's baseline for ADL function and identify physical deficits which impact ability to perform ADLs (bathing, care of mouth/teeth, toileting, grooming, dressing, etc.)  - Assess/evaluate cause of self-care deficits   - Assess range of motion  - Assess patient's mobility; develop plan if impaired  - Assess patient's need for assistive devices and provide as appropriate  - Encourage maximum independence but intervene and supervise when necessary  - Involve family in performance of ADLs  - Assess for home care needs following discharge   - Consider OT consult to assist with ADL evaluation and planning for discharge  - Provide patient education as appropriate  Outcome: Progressing     Problem: DISCHARGE PLANNING  Goal: Discharge to home or other facility with appropriate resources  Description: INTERVENTIONS:  - Identify barriers to discharge w/patient and caregiver  - Arrange for needed discharge resources and transportation as appropriate  - Identify discharge learning needs (meds, wound care, etc.)  - Arrange for interpretive services to assist at discharge as needed  - Refer to Case Management Department for coordinating discharge planning if the patient needs post-hospital services based on physician/advanced practitioner order or complex needs related to functional status, cognitive ability, or social support system  Outcome: Progressing     Problem: Knowledge Deficit  Goal: Patient/family/caregiver demonstrates understanding of disease process, treatment plan, medications, and discharge instructions  Description: Complete learning assessment and assess knowledge base. Interventions:  - Provide teaching at level of understanding  - Provide teaching via preferred learning methods  Outcome: Progressing     Problem: Nutrition/Hydration-ADULT  Goal: Nutrient/Hydration intake appropriate for improving, restoring or maintaining nutritional needs  Description: Monitor and assess patient's nutrition/hydration status for malnutrition. Collaborate with interdisciplinary team and initiate plan and interventions as ordered. Monitor patient's weight and dietary intake as ordered or per policy. Utilize nutrition screening tool and intervene as necessary. Determine patient's food preferences and provide high-protein, high-caloric foods as appropriate.      INTERVENTIONS:  - Monitor oral intake, urinary output, labs, and treatment plans  - Assess nutrition and hydration status and recommend course of action  - Evaluate amount of meals eaten  - Assist patient with eating if necessary   - Allow adequate time for meals  - Recommend/ encourage appropriate diets, oral nutritional supplements, and vitamin/mineral supplements  - Order, calculate, and assess calorie counts as needed  - Recommend, monitor, and adjust tube feedings and TPN/PPN based on assessed needs  - Assess need for intravenous fluids  - Provide specific nutrition/hydration education as appropriate  - Include patient/family/caregiver in decisions related to nutrition  Outcome: Progressing     Problem: SAFETY,RESTRAINT: NV/NON-SELF DESTRUCTIVE BEHAVIOR  Goal: Remains free of harm/injury (restraint for non violent/non self-detsructive behavior)  Description: INTERVENTIONS:  - Instruct patient/family regarding restraint use   - Assess and monitor physiologic and psychological status   - Provide interventions and comfort measures to meet assessed patient needs   - Identify and implement measures to help patient regain control  - Assess readiness for release of restraint   Outcome: Progressing  Goal: Returns to optimal restraint-free functioning  Description: INTERVENTIONS:  - Assess the patient's behavior and symptoms that indicate continued need for restraint  - Identify and implement measures to help patient regain control  - Assess readiness for release of restraint   Outcome: Progressing     Problem: NEUROSENSORY - ADULT  Goal: Achieves stable or improved neurological status  Description: INTERVENTIONS  - Monitor and report changes in neurological status  - Monitor vital signs such as temperature, blood pressure, glucose, and any other labs ordered   - Initiate measures to prevent increased intracranial pressure  - Monitor for seizure activity and implement precautions if appropriate      Outcome: Progressing  Goal: Achieves maximal functionality and self care  Description: INTERVENTIONS  - Monitor swallowing and airway patency with patient fatigue and changes in neurological status  - Encourage and assist patient to increase activity and self care.    - Encourage visually impaired, hearing impaired and aphasic patients to use assistive/communication devices  Outcome: Progressing     Problem: CARDIOVASCULAR - ADULT  Goal: Maintains optimal cardiac output and hemodynamic stability  Description: INTERVENTIONS:  - Monitor I/O, vital signs and rhythm  - Monitor for S/S and trends of decreased cardiac output  - Administer and titrate ordered vasoactive medications to optimize hemodynamic stability  - Assess quality of pulses, skin color and temperature  - Assess for signs of decreased coronary artery perfusion  - Instruct patient to report change in severity of symptoms  Outcome: Progressing  Goal: Absence of cardiac dysrhythmias or at baseline rhythm  Description: INTERVENTIONS:  - Continuous cardiac monitoring, vital signs, obtain 12 lead EKG if ordered  - Administer antiarrhythmic and heart rate control medications as ordered  - Monitor electrolytes and administer replacement therapy as ordered  Outcome: Progressing     Problem: RESPIRATORY - ADULT  Goal: Achieves optimal ventilation and oxygenation  Description: INTERVENTIONS:  - Assess for changes in respiratory status  - Assess for changes in mentation and behavior  - Position to facilitate oxygenation and minimize respiratory effort  - Oxygen administered by appropriate delivery if ordered  - Initiate smoking cessation education as indicated  - Encourage broncho-pulmonary hygiene including cough, deep breathe, Incentive Spirometry  - Assess the need for suctioning and aspirate as needed  - Assess and instruct to report SOB or any respiratory difficulty  - Respiratory Therapy support as indicated  Outcome: Progressing     Problem: METABOLIC, FLUID AND ELECTROLYTES - ADULT  Goal: Electrolytes maintained within normal limits  Description: INTERVENTIONS:  - Monitor labs and assess patient for signs and symptoms of electrolyte imbalances  - Administer electrolyte replacement as ordered  - Monitor response to electrolyte replacements, including repeat lab results as appropriate  - Instruct patient on fluid and nutrition as appropriate  Outcome: Progressing  Goal: Fluid balance maintained  Description: INTERVENTIONS:  - Monitor labs   - Monitor I/O and WT  - Instruct patient on fluid and nutrition as appropriate  - Assess for signs & symptoms of volume excess or deficit  Outcome: Progressing  Goal: Glucose maintained within target range  Description: INTERVENTIONS:  - Monitor Blood Glucose as ordered  - Assess for signs and symptoms of hyperglycemia and hypoglycemia  - Administer ordered medications to maintain glucose within target range  - Assess nutritional intake and initiate nutrition service referral as needed  Outcome: Progressing     Problem: HEMATOLOGIC - ADULT  Goal: Maintains hematologic stability  Description: INTERVENTIONS  - Assess for signs and symptoms of bleeding or hemorrhage  - Monitor labs  - Administer supportive blood products/factors as ordered and appropriate  Outcome: Progressing     Problem: MUSCULOSKELETAL - ADULT  Goal: Maintain or return mobility to safest level of function  Description: INTERVENTIONS:  - Assess patient's ability to carry out ADLs; assess patient's baseline for ADL function and identify physical deficits which impact ability to perform ADLs (bathing, care of mouth/teeth, toileting, grooming, dressing, etc.)  - Assess/evaluate cause of self-care deficits   - Assess range of motion  - Assess patient's mobility  - Assess patient's need for assistive devices and provide as appropriate  - Encourage maximum independence but intervene and supervise when necessary  - Involve family in performance of ADLs  - Assess for home care needs following discharge   - Consider OT consult to assist with ADL evaluation and planning for discharge  - Provide patient education as appropriate  Outcome: Progressing  Goal: Maintain proper alignment of affected body part  Description: INTERVENTIONS:  - Support, maintain and protect limb and body alignment  - Provide patient/ family with appropriate education  Outcome: Progressing

## 2023-09-07 NOTE — PLAN OF CARE
Problem: MOBILITY - ADULT  Goal: Maintain or return to baseline ADL function  Description: INTERVENTIONS:  -  Assess patient's ability to carry out ADLs; assess patient's baseline for ADL function and identify physical deficits which impact ability to perform ADLs (bathing, care of mouth/teeth, toileting, grooming, dressing, etc.)  - Assess/evaluate cause of self-care deficits   - Assess range of motion  - Assess patient's mobility; develop plan if impaired  - Assess patient's need for assistive devices and provide as appropriate  - Encourage maximum independence but intervene and supervise when necessary  - Involve family in performance of ADLs  - Assess for home care needs following discharge   - Consider OT consult to assist with ADL evaluation and planning for discharge  - Provide patient education as appropriate  Outcome: Progressing  Goal: Maintains/Returns to pre admission functional level  Description: INTERVENTIONS:  - Perform BMAT or MOVE assessment daily.   - Set and communicate daily mobility goal to care team and patient/family/caregiver. - Collaborate with rehabilitation services on mobility goals if consulted  - Perform Range of Motion 3 times a day. - Reposition patient every 2 hours.   - Dangle patient 3 times a day  - Stand patient 3 times a day  - Ambulate patient 3 times a day  - Out of bed to chair 3 times a day   - Out of bed for meals 3 times a day  - Out of bed for toileting  - Record patient progress and toleration of activity level   Outcome: Progressing     Problem: Prexisting or High Potential for Compromised Skin Integrity  Goal: Skin integrity is maintained or improved  Description: INTERVENTIONS:  - Identify patients at risk for skin breakdown  - Assess and monitor skin integrity  - Assess and monitor nutrition and hydration status  - Monitor labs   - Assess for incontinence   - Turn and reposition patient  - Assist with mobility/ambulation  - Relieve pressure over bony prominences  - Avoid friction and shearing  - Provide appropriate hygiene as needed including keeping skin clean and dry  - Evaluate need for skin moisturizer/barrier cream  - Collaborate with interdisciplinary team   - Patient/family teaching  - Consider wound care consult   Outcome: Progressing     Problem: PAIN - ADULT  Goal: Verbalizes/displays adequate comfort level or baseline comfort level  Description: Interventions:  - Encourage patient to monitor pain and request assistance  - Assess pain using appropriate pain scale  - Administer analgesics based on type and severity of pain and evaluate response  - Implement non-pharmacological measures as appropriate and evaluate response  - Consider cultural and social influences on pain and pain management  - Notify physician/advanced practitioner if interventions unsuccessful or patient reports new pain  Outcome: Progressing     Problem: INFECTION - ADULT  Goal: Absence or prevention of progression during hospitalization  Description: INTERVENTIONS:  - Assess and monitor for signs and symptoms of infection  - Monitor lab/diagnostic results  - Monitor all insertion sites, i.e. indwelling lines, tubes, and drains  - Monitor endotracheal if appropriate and nasal secretions for changes in amount and color  - [place] appropriate cooling/warming therapies per order  - Administer medications as ordered  - Instruct and encourage patient and family to use good hand hygiene technique  - Identify and instruct in appropriate isolation precautions for identified infection/condition  Outcome: Progressing  Goal: Absence of fever/infection during neutropenic period  Description: INTERVENTIONS:  - Monitor WBC    Outcome: Progressing     Problem: SAFETY ADULT  Goal: Maintain or return to baseline ADL function  Description: INTERVENTIONS:  -  Assess patient's ability to carry out ADLs; assess patient's baseline for ADL function and identify physical deficits which impact ability to perform ADLs (bathing, care of mouth/teeth, toileting, grooming, dressing, etc.)  - Assess/evaluate cause of self-care deficits   - Assess range of motion  - Assess patient's mobility; develop plan if impaired  - Assess patient's need for assistive devices and provide as appropriate  - Encourage maximum independence but intervene and supervise when necessary  - Involve family in performance of ADLs  - Assess for home care needs following discharge   - Consider OT consult to assist with ADL evaluation and planning for discharge  - Provide patient education as appropriate  Outcome: Progressing  Goal: Maintains/Returns to pre admission functional level  Description: INTERVENTIONS:  - Perform BMAT or MOVE assessment daily.   - Set and communicate daily mobility goal to care team and patient/family/caregiver. - Collaborate with rehabilitation services on mobility goals if consulted  - Perform Range of Motion 3 times a day. - Reposition patient every 2 hours.   - Dangle patient 3 times a day  - Stand patient 3 times a day  - Ambulate patient 3 times a day  - Out of bed to chair 3 times a day   - Out of bed for meals 3 times a day  - Out of bed for toileting  - Record patient progress and toleration of activity level   Outcome: Progressing  Goal: Patient will remain free of falls  Description: INTERVENTIONS:  - Educate patient/family on patient safety including physical limitations  - Instruct patient to call for assistance with activity   - Consult OT/PT to assist with strengthening/mobility   - Keep Call bell within reach  - Keep bed low and locked with side rails adjusted as appropriate  - Keep care items and personal belongings within reach  - Initiate and maintain comfort rounds  - Make Fall Risk Sign visible to staff  - Offer Toileting every 2 Hours, in advance of need  - Initiate/Maintain bed/chair alarm  - Obtain necessary fall risk management equipment: yellow bracelet/socks  - Apply yellow socks and bracelet for high fall risk patients  - Consider moving patient to room near nurses station  Outcome: Progressing     Problem: DISCHARGE PLANNING  Goal: Discharge to home or other facility with appropriate resources  Description: INTERVENTIONS:  - Identify barriers to discharge w/patient and caregiver  - Arrange for needed discharge resources and transportation as appropriate  - Identify discharge learning needs (meds, wound care, etc.)  - Arrange for interpretive services to assist at discharge as needed  - Refer to Case Management Department for coordinating discharge planning if the patient needs post-hospital services based on physician/advanced practitioner order or complex needs related to functional status, cognitive ability, or social support system  Outcome: Progressing     Problem: Knowledge Deficit  Goal: Patient/family/caregiver demonstrates understanding of disease process, treatment plan, medications, and discharge instructions  Description: Complete learning assessment and assess knowledge base. Interventions:  - Provide teaching at level of understanding  - Provide teaching via preferred learning methods  Outcome: Progressing     Problem: Nutrition/Hydration-ADULT  Goal: Nutrient/Hydration intake appropriate for improving, restoring or maintaining nutritional needs  Description: Monitor and assess patient's nutrition/hydration status for malnutrition. Collaborate with interdisciplinary team and initiate plan and interventions as ordered. Monitor patient's weight and dietary intake as ordered or per policy. Utilize nutrition screening tool and intervene as necessary. Determine patient's food preferences and provide high-protein, high-caloric foods as appropriate.      INTERVENTIONS:  - Monitor oral intake, urinary output, labs, and treatment plans  - Assess nutrition and hydration status and recommend course of action  - Evaluate amount of meals eaten  - Assist patient with eating if necessary   - Allow adequate time for meals  - Recommend/ encourage appropriate diets, oral nutritional supplements, and vitamin/mineral supplements  - Order, calculate, and assess calorie counts as needed  - Recommend, monitor, and adjust tube feedings and TPN/PPN based on assessed needs  - Assess need for intravenous fluids  - Provide specific nutrition/hydration education as appropriate  - Include patient/family/caregiver in decisions related to nutrition  Outcome: Progressing     Problem: SAFETY,RESTRAINT: NV/NON-SELF DESTRUCTIVE BEHAVIOR  Goal: Remains free of harm/injury (restraint for non violent/non self-detsructive behavior)  Description: INTERVENTIONS:  - Instruct patient/family regarding restraint use   - Assess and monitor physiologic and psychological status   - Provide interventions and comfort measures to meet assessed patient needs   - Identify and implement measures to help patient regain control  - Assess readiness for release of restraint   Outcome: Progressing  Goal: Returns to optimal restraint-free functioning  Description: INTERVENTIONS:  - Assess the patient's behavior and symptoms that indicate continued need for restraint  - Identify and implement measures to help patient regain control  - Assess readiness for release of restraint   Outcome: Progressing     Problem: NEUROSENSORY - ADULT  Goal: Achieves stable or improved neurological status  Description: INTERVENTIONS  - Monitor and report changes in neurological status  - Monitor vital signs such as temperature, blood pressure, glucose, and any other labs ordered   - Initiate measures to prevent increased intracranial pressure  - Monitor for seizure activity and implement precautions if appropriate      Outcome: Progressing  Goal: Achieves maximal functionality and self care  Description: INTERVENTIONS  - Monitor swallowing and airway patency with patient fatigue and changes in neurological status  - Encourage and assist patient to increase activity and self care.   - Encourage visually impaired, hearing impaired and aphasic patients to use assistive/communication devices  Outcome: Progressing     Problem: CARDIOVASCULAR - ADULT  Goal: Maintains optimal cardiac output and hemodynamic stability  Description: INTERVENTIONS:  - Monitor I/O, vital signs and rhythm  - Monitor for S/S and trends of decreased cardiac output  - Administer and titrate ordered vasoactive medications to optimize hemodynamic stability  - Assess quality of pulses, skin color and temperature  - Assess for signs of decreased coronary artery perfusion  - Instruct patient to report change in severity of symptoms  Outcome: Progressing  Goal: Absence of cardiac dysrhythmias or at baseline rhythm  Description: INTERVENTIONS:  - Continuous cardiac monitoring, vital signs, obtain 12 lead EKG if ordered  - Administer antiarrhythmic and heart rate control medications as ordered  - Monitor electrolytes and administer replacement therapy as ordered  Outcome: Progressing     Problem: RESPIRATORY - ADULT  Goal: Achieves optimal ventilation and oxygenation  Description: INTERVENTIONS:  - Assess for changes in respiratory status  - Assess for changes in mentation and behavior  - Position to facilitate oxygenation and minimize respiratory effort  - Oxygen administered by appropriate delivery if ordered  - Initiate smoking cessation education as indicated  - Encourage broncho-pulmonary hygiene including cough, deep breathe, Incentive Spirometry  - Assess the need for suctioning and aspirate as needed  - Assess and instruct to report SOB or any respiratory difficulty  - Respiratory Therapy support as indicated  Outcome: Progressing     Problem: METABOLIC, FLUID AND ELECTROLYTES - ADULT  Goal: Electrolytes maintained within normal limits  Description: INTERVENTIONS:  - Monitor labs and assess patient for signs and symptoms of electrolyte imbalances  - Administer electrolyte replacement as ordered  - Monitor response to electrolyte replacements, including repeat lab results as appropriate  - Instruct patient on fluid and nutrition as appropriate  Outcome: Progressing  Goal: Fluid balance maintained  Description: INTERVENTIONS:  - Monitor labs   - Monitor I/O and WT  - Instruct patient on fluid and nutrition as appropriate  - Assess for signs & symptoms of volume excess or deficit  Outcome: Progressing  Goal: Glucose maintained within target range  Description: INTERVENTIONS:  - Monitor Blood Glucose as ordered  - Assess for signs and symptoms of hyperglycemia and hypoglycemia  - Administer ordered medications to maintain glucose within target range  - Assess nutritional intake and initiate nutrition service referral as needed  Outcome: Progressing     Problem: HEMATOLOGIC - ADULT  Goal: Maintains hematologic stability  Description: INTERVENTIONS  - Assess for signs and symptoms of bleeding or hemorrhage  - Monitor labs  - Administer supportive blood products/factors as ordered and appropriate  Outcome: Progressing     Problem: MUSCULOSKELETAL - ADULT  Goal: Maintain or return mobility to safest level of function  Description: INTERVENTIONS:  - Assess patient's ability to carry out ADLs; assess patient's baseline for ADL function and identify physical deficits which impact ability to perform ADLs (bathing, care of mouth/teeth, toileting, grooming, dressing, etc.)  - Assess/evaluate cause of self-care deficits   - Assess range of motion  - Assess patient's mobility  - Assess patient's need for assistive devices and provide as appropriate  - Encourage maximum independence but intervene and supervise when necessary  - Involve family in performance of ADLs  - Assess for home care needs following discharge   - Consider OT consult to assist with ADL evaluation and planning for discharge  - Provide patient education as appropriate  Outcome: Progressing  Goal: Maintain proper alignment of affected body part  Description: INTERVENTIONS:  - Support, maintain and protect limb and body alignment  - Provide patient/ family with appropriate education  Outcome: Progressing     Problem: SKIN/TISSUE INTEGRITY - ADULT  Goal: Skin Integrity remains intact(Skin Breakdown Prevention)  Description: Assess:  -Perform Micha assessment every shift  -Clean and moisturize skin every shift  -Inspect skin when repositioning, toileting, and assisting with ADLS  -Assess extremities for adequate circulation and sensation     Bed Management:  -Have minimal linens on bed & keep smooth, unwrinkled  -Change linens as needed when moist or perspiring  -Avoid sitting or lying in one position for more than 3 hours while in bed  -Keep HOB at 45degrees     Toileting:  -Offer bedside commode  -Assess for incontinence every 2  -Use incontinent care products after each incontinent episode such as yousif pads, bath wipes, cream    Activity:  -Mobilize patient 3 times a day  -Encourage activity and walks on unit  -Encourage or provide ROM exercises   -Turn and reposition patient every 2 Hours  -Use appropriate equipment to lift or move patient in bed  -Instruct/ Assist with weight shifting every 3 when out of bed in chair  -Consider limitation of chair time 3 hour intervals    Skin Care:  -Avoid use of baby powder, tape, friction and shearing, hot water or constrictive clothing  -Relieve pressure over bony prominences using allevyn, pillows, wedges  -Do not massage red bony areas  Outcome: Progressing  Goal: Incision(s), wounds(s) or drain site(s) healing without S/S of infection  Description: INTERVENTIONS  - Assess and document dressing, incision, wound bed, drain sites and surrounding tissue  - Provide patient and family education  - Perform skin care/dressing changes every shift  Outcome: Progressing     Problem: Potential for Falls  Goal: Patient will remain free of falls  Description: INTERVENTIONS:  - Educate patient/family on patient safety including physical limitations  - Instruct patient to call for assistance with activity   - Consult OT/PT to assist with strengthening/mobility   - Keep Call bell within reach  - Keep bed low and locked with side rails adjusted as appropriate  - Keep care items and personal belongings within reach  - Initiate and maintain comfort rounds  - Make Fall Risk Sign visible to staff  - Offer Toileting every 2 Hours, in advance of need  - Initiate/Maintain bed/chair alarm  - Obtain necessary fall risk management equipment: yellow bracelet/socks  - Apply yellow socks and bracelet for high fall risk patients  - Consider moving patient to room near nurses station  Outcome: Progressing

## 2023-09-07 NOTE — PROGRESS NOTES
[facility]  Progress Note  Name: Lee Ann Crowe  MRN: [id number]  Unit/Bed#: -18 I Date of Admission: 8/29/2023   Date of Service: 9/7/2023 I Hospital Day: 9    Assessment/Plan   * Severe sepsis [facility])  Assessment & Plan  · Sepsis criteria evidenced by fever, tachycardia, tachypnea POA. End organ damage/shock evidenced by hypotension briefly requiring Levophed, admitted under critical care service. · Source: LUE cellulitis, group B strep bacteremia. · Patient initially presented from [address] [place] [address] w/ fever 103 & erythema of left arm. · No DVT on venous duplex bilateral UEs, UA and CXR unremarkable  · S/p 2250 mL IVF bolus + albumin in ED. · Off Levophed since 8/30, transition to [facility] service 9/1. · Lactic normalized, procalcitonin trending down   · IV vancomycin + IV Rocephin -> IV cefepime -> IV clindamycin -> IV PCN G  · General surgery signed off, no acute surgical interventions at this time. Presentation not suspicious for necrotizing infection, no drainable abscess or gas in tissue. · ID following:  · Continue IV PCN G while inpatient, transition to amoxicillin 500 mg PO TID through till 9/13/2023 at discharge  · Final blood cultures remain with no growth for 5 days   · Pending rehab facility acceptance - potentially Friday    Metabolic encephalopathy  Assessment & Plan  · Patient with increased confusion, agitation requiring soft wrist restraints since 9/2. No known history of cognitive impairment or dementia, patient's family states this is far from his baseline.   · Likely multifactorial with sepsis, hospital-related mostly contributing  · CT head w/ no acute intracranial abnormalities, noted moderate chronic microangiopathic changes  · UA on admission benign, no signs of urinary retention thus far  · Repeat CXR reviewed prior, no obvious infiltrates or effusions compared to 8/31  · Blood glucose stable, initiated SSI coverage  · VBG/ABG reviewed, overall acceptable with elevated CO2 similar to prior  · Hypophosphatemia resolved s/p IV sodium phosphate, lab work otherwise unremarkable  · Evening on 9/2, patient agitated/combative requiring Haldol, Seroquel with good response  · Caution with opioids, sedating medications  · Neurochecks every 4 hours  · Seroquel was discontinued 9/5/2023, family is pleased and reports improved mental status and interactiveness from patient  · Restraints removed 0700 9/4, order discontinued at the same, last Haldol 9/2  · Continue to avoid physical and chemical restraints as much as possible in order to transition to rehab -hopefully this Friday    Bacteremia due to group B Streptococcus  Assessment & Plan  · Blood culture 1/2 (8/29): + Streptococcus agalactiae (Group B), susceptible to penicillin. · Repeat BC x2 (8/31): NGTD (72hrs)  · Transitioned to vancomycin, clindamycin, high-dose PCN  · Echo without evidence of vegetation  · ID consulted:  · D/C IV vancomycin, significant clinical improvement w/ recovery GBS. · Follow culture results  · Antibiotic plan as above     Peripheral vascular disease ([address])  Assessment & Plan  · Has been seeing Dr. Brock Mathis with podiatry outpatient for chronic toe wound of the left great toe  · Lower extremities were cool to the touch on initial evaluation  · No evidence of significant arterial occlusive disease on arterial duplex bilateral LEs  · Podiatry following, appreciate ongoing recommendations  · No further inpatient recommendations   · Wound care for local wound care of the toe  · Neurovascular checks    Cellulitis of left upper extremity  Assessment & Plan  · Presented with LUE erythema, fever consistent with cellulitis  · CT LUE (8/29): Left upper extremity subcutaneous edema indicating cellulitis without drainable collection, soft tissue emphysema or evidence of osteomyelitis.   · Worsening erythema LUE, development of RUE erythema/ecchymosis (8/30) since resolved  · See additional plan above for severe sepsis  · Neurovascular checks  · Elevate arms, local wound care    A-fib RVR ([facility])  Assessment & Plan  · Initially presented in A-fib with RVR, likely in setting of sepsis  · Home regimen: Toprol 25 mg BID, AC on Eliquis. · Rate since controlled/improved  · Digoxin started then discontinued  · Now back on home dose beta blocker - Toprol 25 mg BID   · Continue Eliquis for anticoagulation     Chronic systolic heart failure (HCC)  Assessment & Plan  Wt Readings from Last 3 Encounters:   09/06/23 101 kg (222 lb 0.1 oz)   08/28/23 99.8 kg (220 lb)   08/24/23 105 kg (232 lb)     · Not in acute exacerbation. Edema of upper extremities likely more in setting of cellulitis. · Home regimen: Torsemide alternating 20/10 mg daily, Toprol XL 25 mg BID  · Echo (8/30/23): EF 45%, G1DD, moderate MVR. Improved from EF 30% in 1/2023. · Restarted on home dose of beta blocker   · Continue with IV Lasix 40 mg daily while inpatient- will change to home dose of torsemide. · Daily weights, strict I/Os    HTN (hypertension)  Assessment & Plan  · BP reviewed, slightly hypertensive    · Restarted home dose Toprol-XL 25 mg BID   · Continue diuretic with IV Lasix 40 mg daily- weight decreased. Changed to home dosing of diureitic with torsemide 20mg m/w/f and 10mg other days. · Monitor BP closely    RA (rheumatoid arthritis) ([facility])  Assessment & Plan  · History of RA, remote episodes of eosinophilia   · ANCA, ANTHONY negative, Cortisol = 10  · IgE- 147  · Continue home prednisone, hydroxychloroquine  · PRN Tramadol, Voltaren gel to joints for pain             VTE Pharmacologic Prophylaxis: VTE Score: 9 High Risk (Score >/= 5) - Pharmacological DVT Prophylaxis Ordered: apixaban (Eliquis). Sequential Compression Devices Ordered. Patient Centered Rounds: I performed bedside rounds with nursing staff today.   Discussions with Specialists or Other Care Team Provider: Multidisciplinary meeting    Education and Discussions with Family / Patient: Updated  (son and daughter in law) at bedside. Total Time Spent on Date of Encounter in care of patient: 55 minutes This time was spent on one or more of the following: performing physical exam; counseling and coordination of care; obtaining or reviewing history; documenting in the medical record; reviewing/ordering tests, medications or procedures; communicating with other healthcare professionals and discussing with patient's family/caregivers. Current Length of Stay: 9 day(s)  Current Patient Status: Inpatient   Certification Statement: The patient will continue to require additional inpatient hospital stay due to IV treatments, close monitoring, safe discharge planning  Discharge Plan: Anticipate discharge in 48-72 hrs to rehab facility. Code Status: Level 1 - Full Code    Subjective:   Patient sleeping, arousable. Voices no acute complaints. Family reports improved appetite, more interactive although still with generalized weakness. Objective:     Vitals:   Temp (24hrs), Av °F (36.1 °C), Min:96 °F (35.6 °C), Max:98 °F (36.7 °C)    Temp:  [96 °F (35.6 °C)-98 °F (36.7 °C)] 96.9 °F (36.1 °C)  HR:  [88-98] 98  Resp:  [18-19] 18  BP: (113-119)/(76-78) 113/78  SpO2:  [88 %-96 %] 96 %  Body mass index is 31.58 kg/m². Input and Output Summary (last 24 hours): Intake/Output Summary (Last 24 hours) at 2023 1630  Last data filed at 2023 1500  Gross per 24 hour   Intake --   Output 755 ml   Net -755 ml       Physical Exam:   Physical Exam  Constitutional:       General: He is not in acute distress. Appearance: He is ill-appearing. HENT:      Head: Normocephalic and atraumatic. Nose: No congestion. Eyes:      Conjunctiva/sclera: Conjunctivae normal.   Cardiovascular:      Rate and Rhythm: Normal rate and regular rhythm. Heart sounds: No murmur heard. Pulmonary:      Effort: No respiratory distress. Breath sounds: No wheezing or rales. Abdominal:      General: There is no distension. Tenderness: There is no abdominal tenderness. There is no guarding. Musculoskeletal:      Right lower leg: No edema. Left lower leg: No edema. Skin:     General: Skin is warm and dry. Psychiatric:         Behavior: Behavior is slowed. Additional Data:     Labs:  Results from last 7 days   Lab Units 09/07/23  0458   WBC Thousand/uL 8.38   HEMOGLOBIN g/dL 13.5   HEMATOCRIT % 43.4   PLATELETS Thousands/uL 243   NEUTROS PCT % 54   LYMPHS PCT % 31   MONOS PCT % 8   EOS PCT % 5     Results from last 7 days   Lab Units 09/07/23  0458 09/03/23  0555 09/03/23  0219   SODIUM mmol/L 138   < > 137   POTASSIUM mmol/L 3.6   < > 4.2   CHLORIDE mmol/L 97   < > 101   CO2 mmol/L 34*   < > 29   CO2, I-STAT   --    < >  --    BUN mg/dL 23   < > 13   CREATININE mg/dL 1.20   < > 0.81   ANION GAP mmol/L 7   < > 7   CALCIUM mg/dL 9.7   < > 9.1   ALBUMIN g/dL  --   --  3.4*   TOTAL BILIRUBIN mg/dL  --   --  0.67   ALK PHOS U/L  --   --  47   ALT U/L  --   --  11   AST U/L  --   --  17   GLUCOSE RANDOM mg/dL 139   < > 129    < > = values in this interval not displayed.          Results from last 7 days   Lab Units 09/07/23  1614 09/07/23  1103 09/07/23  0743 09/06/23  2021 09/06/23  1704 09/06/23  1121 09/06/23  0727 09/05/23  2121 09/05/23  1620 09/05/23  1233 09/05/23  0757 09/04/23  2126   POC GLUCOSE mg/dl 200* 132 126 189* 138 146* 111 175* 170* 133 151* 153*     Results from last 7 days   Lab Units 09/05/23  1142 09/02/23  0445   HEMOGLOBIN A1C % 6.6* 6.6*     Results from last 7 days   Lab Units 09/04/23  0427 09/03/23  0219 09/02/23  0445 09/01/23  0429   PROCALCITONIN ng/ml 1.04* 1.77* 2.79* 4.02*       Lines/Drains:  Invasive Devices     Peripheral Intravenous Line  Duration           Peripheral IV 09/07/23 Left Antecubital <1 day                  Imaging: no new    Recent Cultures (last 7 days):         Last 24 Hours Medication List:   Current Facility-Administered Medications   Medication Dose Route Frequency Provider Last Rate   • albuterol  2.5 mg Nebulization Q4H PRN Emilia Candelario PA-C     • allopurinol  400 mg Oral Daily Emilia Candelario PA-C     • apixaban  5 mg Oral BID Emilia Candelario PA-C     • chlorhexidine  15 mL Mouth/Throat Q12H [address] Saloni Campo PA-C     • Diclofenac Sodium  2 g Topical 4x Daily Emilia Candelario PA-C     • ezetimibe  10 mg Oral Daily Saloni Campo PA-C     • Fluticasone Furoate-Vilanterol  1 puff Inhalation Daily Saloni Campo PA-C     • gabapentin  100 mg Oral BID Paris Carl MD     • haloperidol lactate  1 mg Intramuscular Q6H PRN Emilia Candelario PA-C     • HYDROmorphone  0.2 mg Intravenous Q4H PRN Emilia Candelario PA-C     • hydroxychloroquine  200 mg Oral BID With Meals Emilia Candelario PA-C     • insulin lispro  1-5 Units Subcutaneous TID AC Saloni Campo PA-C     • insulin lispro  1-5 Units Subcutaneous HS Emilia Candelario PA-C     • levothyroxine  25 mcg Oral Early Morning Emilia Candelario PA-C     • lidocaine  2 patch Topical Daily Saloni Campo PA-C     • melatonin  9 mg Oral HS Saloni Campo PA-C     • metoprolol succinate  25 mg Oral BID Emilia Candelario PA-C     • nystatin   Topical BID Emilia Candelario PA-C     • pantoprazole  20 mg Oral Daily Before Breakfast Emilia Candelario PA-C     • penicillin G  4 Million Units Intravenous Q4H Emilia Candelario PA-C 4 Million Units (09/07/23 1456)   • polyethylene glycol  17 g Oral Every Other Day Emilia Candelario PA-C     • predniSONE  5 mg Oral Daily Emilia Candelario PA-C     • torsemide  10 mg Oral Once per day on Sun Tue Thu Sat Edi Wei MD     • [START ON 9/8/2023] torsemide  20 mg Oral Once per day on Mon Wed Fri Ann Jaime MD     • traMADol  50 mg Oral Q6H PRN Emilia Candelario PA-C          Today, Patient Was Seen By: Edi Wei MD    **Please Note: This note may have been constructed using a voice recognition system. **

## 2023-09-07 NOTE — CASE MANAGEMENT
Case Management Discharge Planning Note    Patient name Maya Delgado  Location /-15 MRN [id number]  : 1934 Date 2023       Current Admission Date: 2023  Current Admission Diagnosis:Severe sepsis [facility])   Patient Active Problem List    Diagnosis Date Noted   • Metabolic encephalopathy 2198   • Bacteremia due to group B Streptococcus 2023   • Respiratory insufficiency 2023   • Severe sepsis ([address]) 2023   • Cellulitis of left upper extremity 2023   • HLD (hyperlipidemia) 2023   • GERD (gastroesophageal reflux disease) 2023   • Gout without acute exacerbation  2023   • Spinal stenosis 2023   • Hypothyroidism 2023   • Peripheral vascular disease ([address]) 2023   • Open wound of right great toe with damage to nail 2023   • Adverse effect of loop (high-ceiling) diuretics, subsequent encounter 2023   • Toxic metabolic encephalopathy    • Septic arthritis of shoulder, right ([address]) 2023   • Suture of skin wound 2023   • Hypomagnesemia 2023   • Elevated TSH 2023   • Chronic systolic heart failure ([address]) 2023   • A-fib RVR ([address]) 2023   • Transient speech disturbance 2023   • KAMLESH (acute kidney injury) ([address]) 2023   • Dilated cardiomyopathy ([address]) 10/18/2022   • HTN (hypertension) 10/18/2022   • Low left ventricular ejection fraction 10/11/2022   • TIA (transient ischemic attack) 10/10/2022   • Speech disturbance 10/08/2022   • Accidental overdose 2020   • History of hypertension 2020   • Hypotension 2020   • RA (rheumatoid arthritis) ([address]) 2020      LOS (days): 9  Geometric Mean LOS (GMLOS) (days): 3.50  Days to GMLOS:-5.5     OBJECTIVE:  Risk of Unplanned Readmission Score: 27.56         Current admission status: Inpatient   Preferred Pharmacy:   [address] [place] [address] [address]  [address] [place] [id number]  Phone: [phone] Fax: [phone]    Primary Care Provider: Nicole Campos MD    Primary Insurance: [facility]  Secondary Insurance:     DISCHARGE DETAILS:    IMM Given (Date):: 09/07/23  IMM Given to[de-identified] Family

## 2023-09-07 NOTE — CASE MANAGEMENT
Case Management Discharge Planning Note    Patient name Mojgan Laser  Location /-78 MRN [id number]  : 1934 Date 2023       Current Admission Date: 2023  Current Admission Diagnosis:Severe sepsis [facility])   Patient Active Problem List    Diagnosis Date Noted   • Metabolic encephalopathy    • Bacteremia due to group B Streptococcus 2023   • Respiratory insufficiency 2023   • Severe sepsis ([address]) 2023   • Cellulitis of left upper extremity 2023   • HLD (hyperlipidemia) 2023   • GERD (gastroesophageal reflux disease) 2023   • Gout without acute exacerbation  2023   • Spinal stenosis 2023   • Hypothyroidism 2023   • Peripheral vascular disease ([address]) 2023   • Open wound of right great toe with damage to nail 2023   • Adverse effect of loop (high-ceiling) diuretics, subsequent encounter 2023   • Toxic metabolic encephalopathy    • Septic arthritis of shoulder, right ([address]) 2023   • Suture of skin wound 2023   • Hypomagnesemia 2023   • Elevated TSH 2023   • Chronic systolic heart failure ([address]) 2023   • A-fib RVR ([address]) 2023   • Transient speech disturbance 2023   • KAMLESH (acute kidney injury) ([address]) 2023   • Dilated cardiomyopathy ([address]) 10/18/2022   • HTN (hypertension) 10/18/2022   • Low left ventricular ejection fraction 10/11/2022   • TIA (transient ischemic attack) 10/10/2022   • Speech disturbance 10/08/2022   • Accidental overdose 2020   • History of hypertension 2020   • Hypotension 2020   • RA (rheumatoid arthritis) ([address]) 2020      LOS (days): 9  Geometric Mean LOS (GMLOS) (days): 3.50  Days to GMLOS:-5.5     OBJECTIVE:  Risk of Unplanned Readmission Score: 27.5         Current admission status: Inpatient   Preferred Pharmacy:   [address] [place] [address] [address]  [address] [place] [id number]  Phone: [phone] Fax: [phone]    Primary Care Provider: Mirtha Olivia MD    Primary Insurance: Kayleigh Piper [facility]  Secondary Insurance:     DISCHARGE DETAILS:    IMM Given (Date):: 09/07/23  IMM Given to[de-identified] Family

## 2023-09-08 PROBLEM — I48.91 A-FIB (HCC): Status: RESOLVED | Noted: 2023-01-26 | Resolved: 2023-09-08

## 2023-09-08 PROBLEM — G93.41 METABOLIC ENCEPHALOPATHY: Status: RESOLVED | Noted: 2023-09-02 | Resolved: 2023-09-08

## 2023-09-08 PROBLEM — R65.20 SEVERE SEPSIS (HCC): Status: RESOLVED | Noted: 2023-08-29 | Resolved: 2023-09-08

## 2023-09-08 PROBLEM — A41.9 SEVERE SEPSIS (HCC): Status: RESOLVED | Noted: 2023-08-29 | Resolved: 2023-09-08

## 2023-09-08 LAB
ANION GAP SERPL CALCULATED.3IONS-SCNC: 7 MMOL/L
BASOPHILS # BLD AUTO: 0.06 THOUSANDS/ÂΜL (ref 0–0.1)
BASOPHILS NFR BLD AUTO: 1 % (ref 0–1)
BUN SERPL-MCNC: 23 MG/DL (ref 5–25)
CALCIUM SERPL-MCNC: 9.3 MG/DL (ref 8.4–10.2)
CHLORIDE SERPL-SCNC: 97 MMOL/L (ref 96–108)
CO2 SERPL-SCNC: 34 MMOL/L (ref 21–32)
CREAT SERPL-MCNC: 1.26 MG/DL (ref 0.6–1.3)
EOSINOPHIL # BLD AUTO: 0.41 THOUSAND/ÂΜL (ref 0–0.61)
EOSINOPHIL NFR BLD AUTO: 4 % (ref 0–6)
ERYTHROCYTE [DISTWIDTH] IN BLOOD BY AUTOMATED COUNT: 15.1 % (ref 11.6–15.1)
GFR SERPL CREATININE-BSD FRML MDRD: 50 ML/MIN/1.73SQ M
GLUCOSE SERPL-MCNC: 149 MG/DL (ref 65–140)
GLUCOSE SERPL-MCNC: 177 MG/DL (ref 65–140)
GLUCOSE SERPL-MCNC: 178 MG/DL (ref 65–140)
GLUCOSE SERPL-MCNC: 183 MG/DL (ref 65–140)
GLUCOSE SERPL-MCNC: 189 MG/DL (ref 65–140)
HCT VFR BLD AUTO: 39.7 % (ref 36.5–49.3)
HGB BLD-MCNC: 11.7 G/DL (ref 12–17)
IMM GRANULOCYTES # BLD AUTO: 0.09 THOUSAND/UL (ref 0–0.2)
IMM GRANULOCYTES NFR BLD AUTO: 1 % (ref 0–2)
LYMPHOCYTES # BLD AUTO: 2.91 THOUSANDS/ÂΜL (ref 0.6–4.47)
LYMPHOCYTES NFR BLD AUTO: 31 % (ref 14–44)
MCH RBC QN AUTO: 31.2 PG (ref 26.8–34.3)
MCHC RBC AUTO-ENTMCNC: 29.5 G/DL (ref 31.4–37.4)
MCV RBC AUTO: 106 FL (ref 82–98)
MONOCYTES # BLD AUTO: 0.6 THOUSAND/ÂΜL (ref 0.17–1.22)
MONOCYTES NFR BLD AUTO: 7 % (ref 4–12)
NEUTROPHILS # BLD AUTO: 5.23 THOUSANDS/ÂΜL (ref 1.85–7.62)
NEUTS SEG NFR BLD AUTO: 56 % (ref 43–75)
NRBC BLD AUTO-RTO: 0 /100 WBCS
PLATELET # BLD AUTO: 223 THOUSANDS/UL (ref 149–390)
PMV BLD AUTO: 12 FL (ref 8.9–12.7)
POTASSIUM SERPL-SCNC: 3.1 MMOL/L (ref 3.5–5.3)
RBC # BLD AUTO: 3.75 MILLION/UL (ref 3.88–5.62)
SODIUM SERPL-SCNC: 138 MMOL/L (ref 135–147)
WBC # BLD AUTO: 9.3 THOUSAND/UL (ref 4.31–10.16)

## 2023-09-08 PROCEDURE — 80048 BASIC METABOLIC PNL TOTAL CA: CPT | Performed by: FAMILY MEDICINE

## 2023-09-08 PROCEDURE — 82948 REAGENT STRIP/BLOOD GLUCOSE: CPT

## 2023-09-08 PROCEDURE — 97530 THERAPEUTIC ACTIVITIES: CPT

## 2023-09-08 PROCEDURE — 92526 ORAL FUNCTION THERAPY: CPT

## 2023-09-08 PROCEDURE — 97116 GAIT TRAINING THERAPY: CPT

## 2023-09-08 PROCEDURE — 85025 COMPLETE CBC W/AUTO DIFF WBC: CPT | Performed by: FAMILY MEDICINE

## 2023-09-08 PROCEDURE — 97535 SELF CARE MNGMENT TRAINING: CPT

## 2023-09-08 PROCEDURE — 99233 SBSQ HOSP IP/OBS HIGH 50: CPT | Performed by: PHYSICIAN ASSISTANT

## 2023-09-08 RX ORDER — AMOXICILLIN 500 MG/1
500 CAPSULE ORAL EVERY 8 HOURS SCHEDULED
Qty: 15 CAPSULE | Refills: 0 | Status: CANCELLED | OUTPATIENT
Start: 2023-09-08 | End: 2023-09-13

## 2023-09-08 RX ORDER — POTASSIUM CHLORIDE 20 MEQ/1
40 TABLET, EXTENDED RELEASE ORAL 2 TIMES DAILY
Status: COMPLETED | OUTPATIENT
Start: 2023-09-08 | End: 2023-09-08

## 2023-09-08 RX ORDER — DIPHENHYDRAMINE HYDROCHLORIDE, ZINC ACETATE 2; .1 G/100G; G/100G
CREAM TOPICAL 3 TIMES DAILY PRN
Status: DISCONTINUED | OUTPATIENT
Start: 2023-09-08 | End: 2023-09-09 | Stop reason: HOSPADM

## 2023-09-08 RX ADMIN — EZETIMIBE 10 MG: 10 TABLET ORAL at 09:18

## 2023-09-08 RX ADMIN — NYSTATIN: 100000 POWDER TOPICAL at 18:04

## 2023-09-08 RX ADMIN — Medication 2 G: at 11:17

## 2023-09-08 RX ADMIN — PENICILLIN G POTASSIUM 4 MILLION UNITS: 5000000 INJECTION, POWDER, FOR SOLUTION INTRAMUSCULAR; INTRAVENOUS at 18:04

## 2023-09-08 RX ADMIN — PANTOPRAZOLE SODIUM 20 MG: 20 TABLET, DELAYED RELEASE ORAL at 05:35

## 2023-09-08 RX ADMIN — GABAPENTIN 100 MG: 100 CAPSULE ORAL at 18:03

## 2023-09-08 RX ADMIN — APIXABAN 5 MG: 5 TABLET, FILM COATED ORAL at 09:18

## 2023-09-08 RX ADMIN — LIDOCAINE 1 PATCH: 50 PATCH TOPICAL at 09:15

## 2023-09-08 RX ADMIN — PREDNISONE 5 MG: 5 TABLET ORAL at 09:17

## 2023-09-08 RX ADMIN — GABAPENTIN 100 MG: 100 CAPSULE ORAL at 09:17

## 2023-09-08 RX ADMIN — PENICILLIN G POTASSIUM 4 MILLION UNITS: 5000000 INJECTION, POWDER, FOR SOLUTION INTRAMUSCULAR; INTRAVENOUS at 07:13

## 2023-09-08 RX ADMIN — CHLORHEXIDINE GLUCONATE 15 ML: 1.2 RINSE ORAL at 21:27

## 2023-09-08 RX ADMIN — METOPROLOL SUCCINATE 25 MG: 25 TABLET, FILM COATED, EXTENDED RELEASE ORAL at 09:18

## 2023-09-08 RX ADMIN — POLYETHYLENE GLYCOL 3350 17 G: 17 POWDER, FOR SOLUTION ORAL at 09:17

## 2023-09-08 RX ADMIN — ALLOPURINOL 400 MG: 100 TABLET ORAL at 09:17

## 2023-09-08 RX ADMIN — INSULIN LISPRO 1 UNITS: 100 INJECTION, SOLUTION INTRAVENOUS; SUBCUTANEOUS at 12:26

## 2023-09-08 RX ADMIN — NYSTATIN: 100000 POWDER TOPICAL at 09:28

## 2023-09-08 RX ADMIN — POTASSIUM CHLORIDE 40 MEQ: 1500 TABLET, EXTENDED RELEASE ORAL at 09:17

## 2023-09-08 RX ADMIN — APIXABAN 5 MG: 5 TABLET, FILM COATED ORAL at 18:03

## 2023-09-08 RX ADMIN — FLUTICASONE FUROATE AND VILANTEROL TRIFENATATE 1 PUFF: 100; 25 POWDER RESPIRATORY (INHALATION) at 09:27

## 2023-09-08 RX ADMIN — POTASSIUM CHLORIDE 40 MEQ: 1500 TABLET, EXTENDED RELEASE ORAL at 18:03

## 2023-09-08 RX ADMIN — Medication 2 G: at 18:04

## 2023-09-08 RX ADMIN — Medication 9 MG: at 21:27

## 2023-09-08 RX ADMIN — PENICILLIN G POTASSIUM 4 MILLION UNITS: 5000000 INJECTION, POWDER, FOR SOLUTION INTRAMUSCULAR; INTRAVENOUS at 15:00

## 2023-09-08 RX ADMIN — HYDROXYCHLOROQUINE SULFATE 200 MG: 200 TABLET, FILM COATED ORAL at 18:03

## 2023-09-08 RX ADMIN — HYDROXYCHLOROQUINE SULFATE 200 MG: 200 TABLET, FILM COATED ORAL at 09:21

## 2023-09-08 RX ADMIN — DIPHENHYDRAMINE HYDROCHLORIDE, ZINC ACETATE: 2; .1 CREAM TOPICAL at 11:17

## 2023-09-08 RX ADMIN — INSULIN LISPRO 1 UNITS: 100 INJECTION, SOLUTION INTRAVENOUS; SUBCUTANEOUS at 21:28

## 2023-09-08 RX ADMIN — INSULIN LISPRO 1 UNITS: 100 INJECTION, SOLUTION INTRAVENOUS; SUBCUTANEOUS at 18:04

## 2023-09-08 RX ADMIN — PENICILLIN G POTASSIUM 4 MILLION UNITS: 5000000 INJECTION, POWDER, FOR SOLUTION INTRAMUSCULAR; INTRAVENOUS at 11:16

## 2023-09-08 RX ADMIN — LEVOTHYROXINE SODIUM 25 MCG: 25 TABLET ORAL at 05:35

## 2023-09-08 RX ADMIN — TORSEMIDE 20 MG: 20 TABLET ORAL at 09:17

## 2023-09-08 RX ADMIN — PENICILLIN G POTASSIUM 4 MILLION UNITS: 5000000 INJECTION, POWDER, FOR SOLUTION INTRAMUSCULAR; INTRAVENOUS at 02:32

## 2023-09-08 RX ADMIN — METOPROLOL SUCCINATE 25 MG: 25 TABLET, FILM COATED, EXTENDED RELEASE ORAL at 21:27

## 2023-09-08 RX ADMIN — Medication 2 G: at 09:28

## 2023-09-08 NOTE — OCCUPATIONAL THERAPY NOTE
Occupational Therapy Tx Note     Patient Name: Irene Michele  KHFWX'T Date: 9/8/2023  Problem List  Active Problems:    RA (rheumatoid arthritis) ([address])    HTN (hypertension)    Chronic systolic heart failure (HCC)    Cellulitis of left upper extremity    Peripheral vascular disease ([address])    Bacteremia due to group B Streptococcus            09/08/23 0940   OT Last Visit   OT Visit Date 09/08/23   Note Type   Note Type Treatment for insurance authorization   Pain Assessment   Pain Assessment Tool 0-10   Pain Score No Pain   Restrictions/Precautions   Other Precautions Fall Risk; Chair Alarm;Cognitive   ADL   Where Assessed Edge of bed   Eating Assistance 7  Independent   Eating Deficit Setup; Beverage management   Grooming Assistance 5  Supervision/Setup   Grooming Deficit Wash/dry face   Bed Mobility   Supine to Sit 2  Maximal assistance   Additional items Assist x 1;Verbal cues; Increased time required; Bedrails   Additional Comments Pt performed grooming activities while seated EOB with supervision for sitting balance. Transfers   Sit to Stand 3  Moderate assistance   Additional items Assist x 2;Verbal cues   Stand to Sit 3  Moderate assistance   Additional items Assist x 2;Verbal cues   Stand pivot 3  Moderate assistance   Additional items Assist x 2;Verbal cues  (RW)   Additional Comments Required VC to maintain appropriate body mechanics. Cognition   Overall Cognitive Status Impaired   Arousal/Participation Alert   Attention Attends with cues to redirect   Orientation Level Oriented to person;Disoriented to place; Disoriented to time;Disoriented to situation   Memory Decreased recall of precautions;Decreased recall of recent events   Following Commands Follows one step commands with increased time or repetition   Activity Tolerance   Activity Tolerance Patient limited by fatigue;Patient tolerated treatment well   Medical Staff Made Aware PT SOMMER Hayes Johnson   Assessment   Assessment Pt seen for OT tx session with focus on functional balance, functional mobility, ADL status, and transfer safety. Patient agreeable to OT treatment session. Pt received supine in bed. Performed bed mobility with max A x 1. Performed transfers and functional mobility with mod Ax 2 with RW. Pt's sitting balance and ability to perform standing activity has greatly improved. BUE edema has also improved. Pt increasingly able to perform grooming and feeding tasks. Patient continues to be functioning below baseline level, occupational performance remains limited secondary to factors listed above, and pt at increased risk for falls and injury. The patient's raw score on the AM-PAC Daily Activity inpatient short form is 15, standardized score is 34.69, less than 39.4. Patients at this level are likely to benefit from DC to post-acute rehabilitation services. Please refer to the recommendation of the Occupational Therapist for safe DC planning. From OT standpoint, recommendation at time of d/c would be level 2 resources. Patient to benefit from continued Occupational Therapy treatment while in the hospital to address deficits as defined above and maximize level of functional independence with ADLs and functional mobility. Pt left with call bell in reach, tray table in reach, needs met, chair alarm activated, SCDs on. Plan   Treatment Interventions ADL retraining;Functional transfer training;Patient/family training; Compensatory technique education;Cognitive reorientation;UE strengthening/ROM   Goal Expiration Date 09/11/23   OT Treatment Day 2   OT Frequency 3-5x/wk   Recommendation   UB Rehab Discharge Recommendation (PT/OT) Level 2   AM-PAC Daily Activity Inpatient   Lower Body Dressing 2   Bathing 2   Toileting 2   Upper Body Dressing 3   Grooming 3   Eating 3   Daily Activity Raw Score 15   Daily Activity Standardized Score (Calc for Raw Score >=11) 34.69   AM-PAC Applied Cognition Inpatient   Following a Speech/Presentation 3   Understanding Ordinary Conversation 4   Taking Medications 2   Remembering Where Things Are Placed or Put Away 2   Remembering List of 4-5 Errands 2   Taking Care of Complicated Tasks 2   Applied Cognition Raw Score 15   Applied Cognition Standardized Score 33.54   End of Consult   Education Provided Yes   Patient Position at End of Consult Bedside chair;Bed/Chair alarm activated; All needs within reach   Nurse Communication Nurse aware of consult             Magali Avila OTR/OFELIA

## 2023-09-08 NOTE — PLAN OF CARE
Problem: MOBILITY - ADULT  Goal: Maintain or return to baseline ADL function  Description: INTERVENTIONS:  -  Assess patient's ability to carry out ADLs; assess patient's baseline for ADL function and identify physical deficits which impact ability to perform ADLs (bathing, care of mouth/teeth, toileting, grooming, dressing, etc.)  - Assess/evaluate cause of self-care deficits   - Assess range of motion  - Assess patient's mobility; develop plan if impaired  - Assess patient's need for assistive devices and provide as appropriate  - Encourage maximum independence but intervene and supervise when necessary  - Involve family in performance of ADLs  - Assess for home care needs following discharge   - Consider OT consult to assist with ADL evaluation and planning for discharge  - Provide patient education as appropriate  Outcome: Progressing  Goal: Maintains/Returns to pre admission functional level  Description: INTERVENTIONS:  - Perform BMAT or MOVE assessment daily.   - Set and communicate daily mobility goal to care team and patient/family/caregiver. - Collaborate with rehabilitation services on mobility goals if consulted  - Perform Range of Motion x times a day. - Reposition patient every x hours.   - Dangle patient xx times a day  - Stand patient x times a day  - Ambulate patient x times a day  - Out of bed to chair x times a day   - Out of bed for meals x times a day  - Out of bed for toileting  - Record patient progress and toleration of activity level   Outcome: Progressing     Problem: Prexisting or High Potential for Compromised Skin Integrity  Goal: Skin integrity is maintained or improved  Description: INTERVENTIONS:  - Identify patients at risk for skin breakdown  - Assess and monitor skin integrity  - Assess and monitor nutrition and hydration status  - Monitor labs   - Assess for incontinence   - Turn and reposition patient  - Assist with mobility/ambulation  - Relieve pressure over bony prominences  - Avoid friction and shearing  - Provide appropriate hygiene as needed including keeping skin clean and dry  - Evaluate need for skin moisturizer/barrier cream  - Collaborate with interdisciplinary team   - Patient/family teaching  - Consider wound care consult   Outcome: Progressing     Problem: PAIN - ADULT  Goal: Verbalizes/displays adequate comfort level or baseline comfort level  Description: Interventions:  - Encourage patient to monitor pain and request assistance  - Assess pain using appropriate pain scale  - Administer analgesics based on type and severity of pain and evaluate response  - Implement non-pharmacological measures as appropriate and evaluate response  - Consider cultural and social influences on pain and pain management  - Notify physician/advanced practitioner if interventions unsuccessful or patient reports new pain  Outcome: Progressing     Problem: INFECTION - ADULT  Goal: Absence or prevention of progression during hospitalization  Description: INTERVENTIONS:  - Assess and monitor for signs and symptoms of infection  - Monitor lab/diagnostic results  - Monitor all insertion sites, i.e. indwelling lines, tubes, and drains  - Monitor endotracheal if appropriate and nasal secretions for changes in amount and color  - [place] appropriate cooling/warming therapies per order  - Administer medications as ordered  - Instruct and encourage patient and family to use good hand hygiene technique  - Identify and instruct in appropriate isolation precautions for identified infection/condition  Outcome: Progressing  Goal: Absence of fever/infection during neutropenic period  Description: INTERVENTIONS:  - Monitor WBC    Outcome: Progressing     Problem: SAFETY ADULT  Goal: Maintain or return to baseline ADL function  Description: INTERVENTIONS:  -  Assess patient's ability to carry out ADLs; assess patient's baseline for ADL function and identify physical deficits which impact ability to perform ADLs (bathing, care of mouth/teeth, toileting, grooming, dressing, etc.)  - Assess/evaluate cause of self-care deficits   - Assess range of motion  - Assess patient's mobility; develop plan if impaired  - Assess patient's need for assistive devices and provide as appropriate  - Encourage maximum independence but intervene and supervise when necessary  - Involve family in performance of ADLs  - Assess for home care needs following discharge   - Consider OT consult to assist with ADL evaluation and planning for discharge  - Provide patient education as appropriate  Outcome: Progressing  Goal: Maintains/Returns to pre admission functional level  Description: INTERVENTIONS:  - Perform BMAT or MOVE assessment daily.   - Set and communicate daily mobility goal to care team and patient/family/caregiver. - Collaborate with rehabilitation services on mobility goals if consulted  - Perform Range of Motion x times a day. - Reposition patient every x hours.   - Dangle patient x times a day  - Stand patient x times a day  - Ambulate patient x times a day  - Out of bed to chair x times a day   - Out of bed for meals xx times a day  - Out of bed for toileting  - Record patient progress and toleration of activity level   Outcome: Progressing  Goal: Patient will remain free of falls  Description: INTERVENTIONS:  - Educate patient/family on patient safety including physical limitations  - Instruct patient to call for assistance with activity   - Consult OT/PT to assist with strengthening/mobility   - Keep Call bell within reach  - Keep bed low and locked with side rails adjusted as appropriate  - Keep care items and personal belongings within reach  - Initiate and maintain comfort rounds  - Make Fall Risk Sign visible to staff  - Offer Toileting every x Hours, in advance of need  - Initiate/Maintain xalarm  - Obtain necessary fall risk management equipment: x  - Apply yellow socks and bracelet for high fall risk patients  - Consider moving patient to room near nurses station  Outcome: Progressing     Problem: DISCHARGE PLANNING  Goal: Discharge to home or other facility with appropriate resources  Description: INTERVENTIONS:  - Identify barriers to discharge w/patient and caregiver  - Arrange for needed discharge resources and transportation as appropriate  - Identify discharge learning needs (meds, wound care, etc.)  - Arrange for interpretive services to assist at discharge as needed  - Refer to Case Management Department for coordinating discharge planning if the patient needs post-hospital services based on physician/advanced practitioner order or complex needs related to functional status, cognitive ability, or social support system  Outcome: Progressing     Problem: Knowledge Deficit  Goal: Patient/family/caregiver demonstrates understanding of disease process, treatment plan, medications, and discharge instructions  Description: Complete learning assessment and assess knowledge base. Interventions:  - Provide teaching at level of understanding  - Provide teaching via preferred learning methods  Outcome: Progressing     Problem: Nutrition/Hydration-ADULT  Goal: Nutrient/Hydration intake appropriate for improving, restoring or maintaining nutritional needs  Description: Monitor and assess patient's nutrition/hydration status for malnutrition. Collaborate with interdisciplinary team and initiate plan and interventions as ordered. Monitor patient's weight and dietary intake as ordered or per policy. Utilize nutrition screening tool and intervene as necessary. Determine patient's food preferences and provide high-protein, high-caloric foods as appropriate.      INTERVENTIONS:  - Monitor oral intake, urinary output, labs, and treatment plans  - Assess nutrition and hydration status and recommend course of action  - Evaluate amount of meals eaten  - Assist patient with eating if necessary   - Allow adequate time for meals  - Recommend/ encourage appropriate diets, oral nutritional supplements, and vitamin/mineral supplements  - Order, calculate, and assess calorie counts as needed  - Assess need for intravenous fluids  - Provide nutrition/hydration education as appropriate  - Include patient/family/caregiver in decisions related to nutrition  Outcome: Progressing     Problem: SAFETY,RESTRAINT: NV/NON-SELF DESTRUCTIVE BEHAVIOR  Goal: Remains free of harm/injury (restraint for non violent/non self-detsructive behavior)  Description: INTERVENTIONS:  - Instruct patient/family regarding restraint use   - Assess and monitor physiologic and psychological status   - Provide interventions and comfort measures to meet assessed patient needs   - Identify and implement measures to help patient regain control  - Assess readiness for release of restraint   Outcome: Progressing  Goal: Returns to optimal restraint-free functioning  Description: INTERVENTIONS:  - Assess the patient's behavior and symptoms that indicate continued need for restraint  - Identify and implement measures to help patient regain control  - Assess readiness for release of restraint   Outcome: Progressing     Problem: NEUROSENSORY - ADULT  Goal: Achieves stable or improved neurological status  Description: INTERVENTIONS  - Monitor and report changes in neurological status  - Monitor vital signs such as temperature, blood pressure, glucose, and any other labs ordered   - Initiate measures to prevent increased intracranial pressure  - Monitor for seizure activity and implement precautions if appropriate      Outcome: Progressing  Goal: Achieves maximal functionality and self care  Description: INTERVENTIONS  - Monitor swallowing and airway patency with patient fatigue and changes in neurological status  - Encourage and assist patient to increase activity and self care.    - Encourage visually impaired, hearing impaired and aphasic patients to use assistive/communication devices  Outcome: Progressing     Problem: CARDIOVASCULAR - ADULT  Goal: Maintains optimal cardiac output and hemodynamic stability  Description: INTERVENTIONS:  - Monitor I/O, vital signs and rhythm  - Monitor for S/S and trends of decreased cardiac output  - Administer and titrate ordered vasoactive medications to optimize hemodynamic stability  - Assess quality of pulses, skin color and temperature  - Assess for signs of decreased coronary artery perfusion  - Instruct patient to report change in severity of symptoms  Outcome: Progressing  Goal: Absence of cardiac dysrhythmias or at baseline rhythm  Description: INTERVENTIONS:  - Continuous cardiac monitoring, vital signs, obtain 12 lead EKG if ordered  - Administer antiarrhythmic and heart rate control medications as ordered  - Monitor electrolytes and administer replacement therapy as ordered  Outcome: Progressing     Problem: RESPIRATORY - ADULT  Goal: Achieves optimal ventilation and oxygenation  Description: INTERVENTIONS:  - Assess for changes in respiratory status  - Assess for changes in mentation and behavior  - Position to facilitate oxygenation and minimize respiratory effort  - Oxygen administered by appropriate delivery if ordered  - Initiate smoking cessation education as indicated  - Encourage broncho-pulmonary hygiene including cough, deep breathe, Incentive Spirometry  - Assess the need for suctioning and aspirate as needed  - Assess and instruct to report SOB or any respiratory difficulty  - Respiratory Therapy support as indicated  Outcome: Progressing     Problem: METABOLIC, FLUID AND ELECTROLYTES - ADULT  Goal: Electrolytes maintained within normal limits  Description: INTERVENTIONS:  - Monitor labs and assess patient for signs and symptoms of electrolyte imbalances  - Administer electrolyte replacement as ordered  - Monitor response to electrolyte replacements, including repeat lab results as appropriate  - Instruct patient on fluid and nutrition as appropriate  Outcome: Progressing  Goal: Fluid balance maintained  Description: INTERVENTIONS:  - Monitor labs   - Monitor I/O and WT  - Instruct patient on fluid and nutrition as appropriate  - Assess for signs & symptoms of volume excess or deficit  Outcome: Progressing  Goal: Glucose maintained within target range  Description: INTERVENTIONS:  - Monitor Blood Glucose as ordered  - Assess for signs and symptoms of hyperglycemia and hypoglycemia  - Administer ordered medications to maintain glucose within target range  - Assess nutritional intake and initiate nutrition service referral as needed  Outcome: Progressing     Problem: HEMATOLOGIC - ADULT  Goal: Maintains hematologic stability  Description: INTERVENTIONS  - Assess for signs and symptoms of bleeding or hemorrhage  - Monitor labs  - Administer supportive blood products/factors as ordered and appropriate  Outcome: Progressing     Problem: MUSCULOSKELETAL - ADULT  Goal: Maintain or return mobility to safest level of function  Description: INTERVENTIONS:  - Assess patient's ability to carry out ADLs; assess patient's baseline for ADL function and identify physical deficits which impact ability to perform ADLs (bathing, care of mouth/teeth, toileting, grooming, dressing, etc.)  - Assess/evaluate cause of self-care deficits   - Assess range of motion  - Assess patient's mobility  - Assess patient's need for assistive devices and provide as appropriate  - Encourage maximum independence but intervene and supervise when necessary  - Involve family in performance of ADLs  - Assess for home care needs following discharge   - Consider OT consult to assist with ADL evaluation and planning for discharge  - Provide patient education as appropriate  Outcome: Progressing  Goal: Maintain proper alignment of affected body part  Description: INTERVENTIONS:  - Support, maintain and protect limb and body alignment  - Provide patient/ family with appropriate education  Outcome: Progressing     Problem: Potential for Falls  Goal: Patient will remain free of falls  Description: INTERVENTIONS:  - Educate patient/family on patient safety including physical limitations  - Instruct patient to call for assistance with activity   - Consult OT/PT to assist with strengthening/mobility   - Keep Call bell within reach  - Keep bed low and locked with side rails adjusted as appropriate  - Keep care items and personal belongings within reach  - Initiate and maintain comfort rounds  - Make Fall Risk Sign visible to staff  - Offer Toileting every x Hours, in advance of need  - Initiate/Maintain xalarm  - Obtain necessary fall risk management equipment: x  - Apply yellow socks and bracelet for high fall risk patients  - Consider moving patient to room near nurses station  Outcome: Progressing     Problem: SKIN/TISSUE INTEGRITY - ADULT  Goal: Skin Integrity remains intact(Skin Breakdown Prevention)  Description: Assess:  -Perform [place] assessment every x  -Clean and moisturize skin every x  -Inspect skin when repositioning, toileting, and assisting with ADLS  -Assess under medical devices such as x every x  -Assess extremities for adequate circulation and sensation     Bed Management:  -Have minimal linens on bed & keep smooth, unwrinkled  -Change linens as needed when moist or perspiring  -Avoid sitting or lying in one position for more than x hours while in bed  -[facility] at [facility]     Toileting:  -Offer bedside commode  -Assess for incontinence every x  -Use incontinent care products after each incontinent episode such as x    Activity:  -Mobilize patient x times a day  -Encourage activity and walks on unit  -Encourage or provide ROM exercises   -Turn and reposition patient every x Hours  -Use appropriate equipment to lift or move patient in bed  -Instruct/ Assist with weight shifting every x when out of bed in chair  -Consider limitation of chair time x hour intervals    Skin Care:  -Avoid use of baby powder, tape, friction and shearing, hot water or constrictive clothing  -Relieve pressure over bony prominences using x  -Do not massage red bony areas    Next Steps:  -Teach patient strategies to minimize risks such as x   -Consider consults to  interdisciplinary teams such as x  Outcome: Progressing  Goal: Incision(s), wounds(s) or drain site(s) healing without S/S of infection  Description: INTERVENTIONS  - Assess and document dressing, incision, wound bed, drain sites and surrounding tissue  - Provide patient and family education  - Perform skin care/dressing changes every x  Outcome: Progressing

## 2023-09-08 NOTE — ASSESSMENT & PLAN NOTE
· Blood culture 1/2 (8/29): + Streptococcus agalactiae (Group B), susceptible to penicillin.   · Repeat BC x2 (8/31): NGTD  · Transitioned to high-dose PCN per ID  · Echo without evidence of vegetation

## 2023-09-08 NOTE — ASSESSMENT & PLAN NOTE
· Cont home dose Toprol-XL 25 mg BID  · Continue home dosing of diuretic with torsemide 20mg m/w/f and 10mg other days.

## 2023-09-08 NOTE — ASSESSMENT & PLAN NOTE
· Initially presented in A-fib with RVR, likely in setting of sepsis  · Home regimen: Toprol 25 mg BID, AC on Eliquis.   · Rate since controlled/improved

## 2023-09-08 NOTE — PHYSICAL THERAPY NOTE
PHYSICAL THERAPY TREATMENT NOTE    Patient Name: Vevelyn Holter  USGPH'K Date: 9/8/2023 09/08/23 4622   PT Last Visit   PT Visit Date 09/08/23   Note Type   Note Type Treatment for insurance authorization   Pain Assessment   Pain Assessment Tool 0-10   Pain Score No Pain   Restrictions/Precautions   Weight Bearing Precautions Per Order No   Other Precautions Chair Alarm; Bed Alarm;Multiple lines; Fall Risk;Hard of hearing   General   Chart Reviewed Yes   Family/Caregiver Present Yes  (Daughter)   Cognition   Overall Cognitive Status   (appears at baseline)   Arousal/Participation Alert   Attention Attends with cues to redirect   Orientation Level Oriented to person;Oriented to place  (generally oriented to place/area)   Memory Decreased recall of recent events   Following Commands Follows multistep commands with increased time or repetition   Comments Pt more alert and engaged, appears near his baseline mentation. Joking around, recognizes family. Remembers eating cookies last night   Subjective   Subjective "Do I have oreos on my face?"   Bed Mobility   Supine to Sit 2  Maximal assistance   Additional items Assist x 1; Increased time required  (to pt's R)   Sit to Supine Unable to assess   Additional Comments Pt is able to sit EOB x ~12 minutes w/ supervision   Transfers   Sit to Stand 3  Moderate assistance   Additional items Assist x 2; Increased time required;Verbal cues   Stand to Sit 3  Moderate assistance   Additional items Assist x 2; Increased time required   Additional Comments Pt benefits from cues to stand fully upright   Ambulation/Elevation   Gait pattern Forward Flexion;Decreased foot clearance   Gait Assistance 3  Moderate assist   Additional items Assist x 2;Verbal cues   Assistive Device Rolling walker   Distance 3 ft  (EOB to recliner chair)   Balance   Static Sitting Fair   Static Standing Zero  (Ax2)   Ambulatory Zero  (Ax2)   Activity Tolerance   Activity Tolerance Patient tolerated treatment well   Medical Staff Made Aware OT Amy Blevins Pump   Nurse Made Aware Spoke to RN Johnson Mccormick Cap   Assessment   Problem List Decreased strength; Impaired balance;Decreased mobility; Decreased cognition;Obesity; Decreased skin integrity; Impaired hearing   Assessment Pt seen for PT intervention. Aleksey presents today w/ significantly improved mentation and engagement, and is able to mobilize much better than previous sessions. He requires only A x 1 to get to EOB (was previously heavy A x 2), and is able to sit EOB for longer and with consistent supervision. He is also able to put his hearing aids in and not lose sitting balance. Pt benefits from VC for setup for STS transfers, but is able to complete w/ mod A x 2 and maintain a stand w/ use of RW. He is able to ambulate a few feet from EOB to recliner chair w/ mod A x 2, which is an achievement today as he was unable to previously ambulate due to weakness and fatigue. Discharge recommendation continues to be for Level 2 resources upon DC   Goals   Patient Goals to go to rehab, get back to [address] [place] [address] for Happy Hour   [facility] Expiration Date 09/11/23   Short Term Goal #1 Patient will: Perform all bed mobility tasks w/ modx1 to improve pt's independence w/ repositioning for decrease risk of skin breakdown, Perform all transfers w/ maxx1 consistently from various height surfaces in order to improve I w/ engagement w/ real-world environments/situations, ambulate 20 ft w/ RW w/ max --> mod A x 1, Increase all balance 1/2 grade to decrease risk for falls and Tolerate 3 hr OOB to faciliate upright tolerance   PT Treatment Day 3   Plan   Treatment/Interventions Functional transfer training;LE strengthening/ROM; Therapeutic exercise; Endurance training;Patient/family training;Cognitive reorientation;Equipment eval/education; Bed mobility;Gait training   PT Frequency 2-3x/wk   Recommendation   UB Rehab Discharge Recommendation (PT/OT) Level 2   AM-PAC Basic Mobility Inpatient   Turning in Flat Bed Without Bedrails 2   Lying on Back to Sitting on Edge of Flat Bed Without Bedrails 2   Moving Bed to Chair 1   Standing Up From Chair Using Arms 1   Walk in Room 1   Climb 3-5 Stairs With Railing 1   Basic Mobility Inpatient Raw Score 8   Turning Head Towards Sound 3   Follow Simple Instructions 3   Low Function Basic Mobility Raw Score  14   Low Function Basic Mobility Standardized Score  22.01   Highest Level Of Mobility   -[facility] Goal 3: Sit at edge of bed   -HL Achieved 4: Move to chair/commode   Education   Education Provided Mobility training;Assistive device   Patient Demonstrates acceptance/verbal understanding   End of Consult   Patient Position at End of Consult Bedside chair;Bed/Chair alarm activated; All needs within reach       Patient requires PT/OT co-treat due to signficant assistance with mobility, cognitive-behavioral impairments, and to challenge activity tolerance. Both PT and OT goals were addressed separately during this session. The patient's AM-[facility] Basic Mobility Inpatient Short Form Raw Score is 8. A Raw score of less than or equal to 16 suggests the patient may benefit from discharge to post-acute rehabilitation services. Please also refer to the recommendation of the Physical Therapist for safe discharge planning. Pt would continue to benefit from skilled PT during this admission in order to progress patient towards goals to decrease risk of falls and maximize independence.      Luis Daniel Dougherty, PT, DPT

## 2023-09-08 NOTE — DISCHARGE SUMMARY
[address] [facility]  Discharge- [address] [facility] 1934, 80 y.o. male MRN: [id number]  Unit/Bed#: -Jamie Encounter: [id number]  Primary Care Provider: Aubrey Cuevas MD   Date and time admitted to hospital: 8/29/2023  9:04 AM    * Severe sepsis (HCC)-resolved as of 9/8/2023  Assessment & Plan  Sepsis criteria evidenced by fever, tachycardia, tachypnea POA. End organ damage/shock evidenced by hypotension briefly requiring Levophed. Off Levophed since 8/30, transition to [facility] service 9/1/2023. · Source: LUE cellulitis, group B strep bacteremia. · General surgery signed off. · ID following: Continue IV PCN G while inpatient, transition to amoxicillin 500 mg PO TID through till 9/13/2023 at discharge. · Final blood cultures remain with no growth for 5 days     Bacteremia due to group B Streptococcus  Assessment & Plan  · Blood culture 1/2 (8/29): + Streptococcus agalactiae (Group B), susceptible to penicillin. · Repeat BC x2 (8/31): NGTD  · Transitioned to high-dose PCN per ID  · Echo without evidence of vegetation    Peripheral vascular disease ([address])  Assessment & Plan  · Has been seeing Dr. 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 · No evidence of significant arterial occlusive disease on arterial duplex bilateral LEs  · Pods signed off. Cont local wound care. Cellulitis of left upper extremity  Assessment & Plan  · Presented with LUE erythema, fever consistent with cellulitis  · CT LUE (8/29): Left upper extremity subcutaneous edema indicating cellulitis without drainable collection, soft tissue emphysema or evidence of osteomyelitis.   · Worsening erythema LUE, development of RUE erythema/ecchymosis (8/30) since resolved  · See additional plan above for severe sepsis  · Elevate arms, local wound care    Chronic systolic heart failure (HCC)  Assessment & Plan  Wt Readings from Last 3 Encounters:   09/08/23 96.3 kg (212 lb 4.9 oz)   08/28/23 99.8 kg (220 lb)   08/24/23 105 kg (232 lb)     · Not in acute exacerbation. Edema of upper extremities likely more in setting of cellulitis. · Home regimen: Torsemide alternating 20/10 mg daily, Toprol XL 25 mg BID    HTN (hypertension)  Assessment & Plan  · Cont home dose Toprol-XL 25 mg BID  · Continue home dosing of diuretic with torsemide 20mg m/w/f and 10mg other days. RA (rheumatoid arthritis) (HCC)  Assessment & Plan  · History of RA, remote episodes of eosinophilia   · Continue home prednisone, hydroxychloroquine  · Minimize PRN Tramadol. Use Voltaren gel to joints for pain      Metabolic encephalopathy-resolved as of 9/8/2023  Assessment & Plan  Patient with increased confusion, agitation requiring soft wrist restraints since 9/2/2023. No known history of cognitive impairment or dementia. · Likely multifactorial with sepsis, hospital-related mostly contributing  · Caution with opioids, sedating medications  · Seroquel was discontinued 9/5/2023, family is pleased and reports improved mental status and interactiveness from patient  · Restraints removed 0700 9/4, order discontinued at the same, last Haldol was 9/2/2023  · Continue to avoid physical and chemical restraints as much as possible in order to transition to rehab    A-fib RVR (HCC)-resolved as of 9/8/2023  Assessment & Plan  · Initially presented in A-fib with RVR, likely in setting of sepsis  · Home regimen: Toprol 25 mg BID, AC on Eliquis.   · Rate since controlled/improved      Medical Problems     Resolved Problems  Date Reviewed: 9/7/2023          Resolved    A-fib RVR ([address]) 9/8/2023     Resolved by  Dian Perez PA-C    * (Principal) Severe sepsis ([address]) 9/8/2023     Resolved by  Dian Perez PA-C    Metabolic encephalopathy 1/2/6800     Resolved by  Dian Perez PA-C        Discharging Physician / Practitioner: Dian Perez PA-C  PCP: Terrence Stephens MD  Admission Date:   Admission Orders (From admission, onward)     Ordered        08/29/23 1122 INPATIENT ADMISSION  Once                      Discharge Date: 09/08/23    Consultations During Hospital Stay:  · Infectious disease, PT, OT, CM, podiatry, gen surg    Procedures Performed:   EKG  Sinus rhythm with marked sinus arrhythmia  Left axis deviation  Low voltage QRS  Possible Lateral infarct (cited on or before 02-SEP-2023)  Abnormal ECG  When compared with ECG of 29-AUG-2023 13:43,  Vent. rate has decreased BY  75 BPM  Confirmed by Dena Johnson ([id number]) on 9/2/2023 2:58:55 PM    ECHO 8/30/2023  •  Left Ventricle: Left ventricular cavity size is mildly dilated. Wall thickness is normal. The left ventricular ejection fraction is 45%. Systolic function is mildly reduced. There is mild global hypokinesis with regional variation. Diastolic function is mildly abnormal, consistent with grade I (abnormal) relaxation. •  Right Ventricle: Right ventricular cavity size is dilated. Systolic function is normal.  •  Left Atrium: The atrium is mildly dilated. •  Right Atrium: The atrium is mildly dilated. •  Aortic Valve: There is mild regurgitation. •  Mitral Valve: There is mild annular calcification. There is moderate regurgitation with an eccentrically directed jet. •  Tricuspid Valve: There is mild regurgitation. •  Pulmonic Valve: There is mild regurgitation. •  Aorta: The aortic root is normal in size. The ascending aorta is mildly dilated. •  Pulmonary Artery: The pulmonary artery systolic pressure is normal.      Significant Findings / Test Results:   RADIOLOGY RESULTS   Final Result by  (09/03 1455)      CT head wo contrast   Final Result by Declan Alicia DO (09/02 1331)      No acute intracranial abnormality. 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 Workstation performed: [id number]         XR chest portable   Final Result by Sosa Montes DO (09/05 1128)      Limited portable study demonstrating no definite acute cardiopulmonary disease. Workstation performed: [id number]         XR chest portable ICU   Final Result by Paulo Rubinstein, MD (08/31 3225)      No acute cardiopulmonary disease. Workstation performed: [id number]         VAS lower limb arterial duplex, complete bilateral   Final Result by Autumn Daugherty MD (08/30 1741)      CT upper extremity w contrast left   Final Result by Paulo Rubinstein, MD (08/30 9092)   Left upper extremity subcutaneous edema indicating cellulitis without drainable collection, soft tissue emphysema or evidence of osteomyelitis. Concordant virtual radiologic report was provided at 2303 hours on 8/29/2020         Workstation performed: [id number]         VAS upper limb venous duplex scan, complete, bilateral   Final Result by Joselyn Leyva MD (08/30 1740)      XR chest portable   Final Result by John Cueva MD (08/29 0930)      No acute cardiopulmonary disease. Workstation performed: [id number]           Results Reviewed     Procedure Component Value Units Date/Time    Blood culture #1 [[id number]] Collected: 08/29/23 1012    Lab Status: Final result Specimen: Blood from Arm, Left Updated: 09/03/23 1501     Blood Culture No Growth After 5 Days.     Blood culture #2 [[id number]]  (Abnormal)  (Susceptibility) Collected: 08/29/23 0906    Lab Status: Final result Specimen: Blood from Arm, Right Updated: 09/01/23 0727     Blood Culture Streptococcus agalactiae (Group B)     Gram Stain Result Gram positive cocci in pairs and chains    Susceptibility     Streptococcus agalactiae (Group B) (1)     Antibiotic Interpretation Microscan   Method Status    Penicillin Susceptible 0.094 ug/ml BHARAT Final    Ceftriaxone ($$) Susceptible 0.09 ug/ml BHARAT Final    Vancomycin ($) Susceptible 0.50 ug/ml BHARAT Final                   Blood Culture Identification Panel [[id number]]  (Abnormal) Collected: 08/29/23 0906    Lab Status: Final result Specimen: Blood from Arm, Right Updated: 09/01/23 0727     Streptococcus agalactiae (Group B) Detected    Narrative:      Film Array panel tests for 11 gram positive organisms, 15 gram negative organisms, 7 yeast species and 10 resistance genes. HS Troponin I 4hr [[id number]]  (Abnormal) Collected: 08/29/23 1712    Lab Status: Final result Specimen: Blood from Arm, Right Updated: 08/29/23 1743     hs TnI 4hr 46 ng/L      Delta 4hr hsTnI 29 ng/L     HS Troponin I 2hr [[id number]]  (Normal) Collected: 08/29/23 1133    Lab Status: Final result Specimen: Blood from Arm, Left Updated: 08/29/23 1200     hs TnI 2hr 30 ng/L      Delta 2hr hsTnI 13 ng/L     Urine Microscopic [[id number]]  (Normal) Collected: 08/29/23 1031    Lab Status: Final result Specimen: Urine, Straight Cath Updated: 08/29/23 1047     RBC, UA None Seen /hpf      WBC, UA None Seen /hpf      Epithelial Cells Occasional /hpf      Bacteria, UA None Seen /hpf     UA w Reflex to Microscopic w Reflex to Culture [[id number]]  (Abnormal) Collected: 08/29/23 1031    Lab Status: Final result Specimen: Urine, Straight Cath Updated: 08/29/23 1046     Color, UA Yellow     Clarity, UA Clear     Specific Gravity, UA 1.020     pH, UA 5.5     Leukocytes, UA Negative     Nitrite, UA Negative     Protein, UA Trace mg/dl      Glucose, UA Negative mg/dl      Ketones, UA Negative mg/dl      Urobilinogen, UA <2.0 mg/dl      Bilirubin, UA Negative     Occult Blood, UA Negative    Lactic acid [[id number]]  (Normal) Collected: 08/29/23 0906    Lab Status: Final result Specimen: Blood from Arm, Right Updated: 08/29/23 1031     LACTIC ACID 1.9 mmol/L     Narrative:      Result may be elevated if tourniquet was used during collection.     Protime-INR [[id number]]  (Abnormal) Collected: 08/29/23 0906    Lab Status: Final result Specimen: Blood from Arm, Right Updated: 08/29/23 0955     Protime 17.1 seconds      INR 1.31    APTT [[id number]]  (Normal) Collected: 08/29/23 0906    Lab Status: Final result Specimen: Blood from Arm, Right Updated: 08/29/23 0955     PTT 29 seconds     FLU/RSV/COVID - if FLU/RSV clinically relevant [[id number]]  (Normal) Collected: 08/29/23 0906    Lab Status: Final result Specimen: Nares from Nose Updated: 08/29/23 0954     SARS-CoV-2 Negative     INFLUENZA A PCR Negative     INFLUENZA B PCR Negative     RSV PCR Negative    Narrative:      FOR PEDIATRIC PATIENTS - copy/paste COVID Guidelines URL to browser: [email or web address] ashx    SARS-CoV-2 assay is a Nucleic Acid Amplification assay intended for the  qualitative detection of nucleic acid from SARS-CoV-2 in nasopharyngeal  swabs. Results are for the presumptive identification of SARS-CoV-2 RNA. Positive results are indicative of infection with SARS-CoV-2, the virus  causing COVID-19, but do not rule out bacterial infection or co-infection  with other viruses. Laboratories within the [facility] and its  territories are required to report all positive results to the appropriate  public health authorities. Negative results do not preclude SARS-CoV-2  infection and should not be used as the sole basis for treatment or other  patient management decisions. Negative results must be combined with  clinical observations, patient history, and epidemiological information. This test has not been FDA cleared or approved. This test has been authorized by FDA under an Emergency Use Authorization  (EUA). This test is only authorized for the duration of time the  declaration that circumstances exist justifying the authorization of the  emergency use of an in vitro diagnostic tests for detection of SARS-CoV-2  virus and/or diagnosis of COVID-19 infection under section 564(b)(1) of  the Act, 21 U. S.C. 111WDJ-8(T)(2), unless the authorization is terminated  or revoked sooner. The test has been validated but independent review by FDA  and CLIA is pending. Test performed using [employer]:  This RT-PCR assay targets N2,  a region unique to SARS-CoV-2. A conserved region in the E-gene was chosen  for pan-Sarbecovirus detection which includes SARS-CoV-2. According to CMS-2020-01-R, this platform meets the definition of high-throughput technology.     B-Type Natriuretic Peptide(BNP) [[id number]]  (Abnormal) Collected: 08/29/23 0906    Lab Status: Final result Specimen: Blood from Arm, Right Updated: 08/29/23 0944      pg/mL     Procalcitonin [[id number]]  (Normal) Collected: 08/29/23 0906    Lab Status: Final result Specimen: Blood from Arm, Right Updated: 08/29/23 0943     Procalcitonin 0.16 ng/ml     HS Troponin 0hr (reflex protocol) [[id number]]  (Normal) Collected: 08/29/23 0906    Lab Status: Final result Specimen: Blood from Arm, Right Updated: 08/29/23 0942     hs TnI 0hr 17 ng/L     Comprehensive metabolic panel [[id number]]  (Abnormal) Collected: 08/29/23 0906    Lab Status: Final result Specimen: Blood from Arm, Right Updated: 08/29/23 0936     Sodium 141 mmol/L      Potassium 3.5 mmol/L      Chloride 103 mmol/L      CO2 28 mmol/L      ANION GAP 10 mmol/L      BUN 19 mg/dL      Creatinine 1.13 mg/dL      Glucose 160 mg/dL      Calcium 8.2 mg/dL      AST 23 U/L      ALT 17 U/L      Alkaline Phosphatase 63 U/L      Total Protein 7.1 g/dL      Albumin 3.6 g/dL      Total Bilirubin 0.94 mg/dL      eGFR 57 ml/min/1.73sq m     Narrative:      [place] guidelines for Chronic Kidney Disease (CKD):   •  Stage 1 with normal or high GFR (GFR > 90 mL/min/1.73 square meters)  •  Stage 2 Mild CKD (GFR = 60-89 mL/min/1.73 square meters)  •  Stage 3A Moderate CKD (GFR = 45-59 mL/min/1.73 square meters)  •  Stage 3B Moderate CKD (GFR = 30-44 mL/min/1.73 square meters)  •  Stage 4 Severe CKD (GFR = 15-29 mL/min/1.73 square meters)  •  Stage 5 End Stage CKD (GFR <15 mL/min/1.73 square meters)  Note: GFR calculation is accurate only with a steady state creatinine    CBC and differential [[id number]] (Abnormal) Collected: 08/29/23 0906    Lab Status: Final result Specimen: Blood from Arm, Right Updated: 08/29/23 0917     WBC 9.09 Thousand/uL      RBC 4.04 Million/uL      Hemoglobin 12.8 g/dL      Hematocrit 40.1 %      MCV 99 fL      MCH 31.7 pg      MCHC 31.9 g/dL      RDW 15.5 %      MPV 12.3 fL      Platelets 268 Thousands/uL      nRBC 0 /100 WBCs      Neutrophils Relative 79 %      Immat GRANS % 0 %      Lymphocytes Relative 17 %      Monocytes Relative 2 %      Eosinophils Relative 2 %      Basophils Relative 0 %      Neutrophils Absolute 7.12 Thousands/µL      Immature Grans Absolute 0.04 Thousand/uL      Lymphocytes Absolute 1.56 Thousands/µL      Monocytes Absolute 0.17 Thousand/µL      Eosinophils Absolute 0.18 Thousand/µL      Basophils Absolute 0.02 Thousands/µL         Incidental Findings:   · None     Test Results Pending at Discharge (will require follow up): · None     Outpatient Tests Requested:  · None    Complications:  Hospital delirium    Reason for Admission: left arm redness    HPI: Galina Isbell is a 80 y.o. male with a past medical history of GERD, gout, hyperlipidemia, hypertension, RA, spinal stenosis, proximal A-fib, hypothyroidism. Presented to the ED 8/29/2023 from [facility] with a chief complaint of fever and left arm redness and heat. Patient's son is at bedside and reports patient was in normal state of health the previous day. At that time he noticed that his father's right arm seemed a little more swollen than usual but that his father was in his normal state of health. This morning the patient awoke felt like he had a fever, staff checked his temperature and it was 103 °F.  He was also noted that the patient had new sudden left arm redness and swelling. In the ED the patient was also found to have A-fib RVR with heart rates up into the 170s. On lab evaluation the patient did not have an elevated lactate, Pro-Larry, or leukocytosis.   UA was not indicative of infection. Chest x-ray was negative for acute findings. Patient denied shortness of breath, chest pain, nausea vomiting or diarrhea. In the ED patient was given IV ceftriaxone and 1 L of crystalloid and was initially going to be admitted to the [facility] service under stepdown 2. Prior to transit, it was noted that the patient's systolic blood pressures were down in the 80s. Critical care was consulted for higher level of care for severe sepsis versus septic shock. Hospital Course:   Mayra Richards is a 80 y.o. male patient who originally presented to the hospital on 8/29/2023 due to sepsis from left arm cellulitis. Treated with IV antibiotics and admission to the ICU for pressors. Initially blood cultures grew group B strep. He did make good progress with his sepsis parameters leukocytosis down trended and blood pressures stabilized. Patient's course was complicated by hospital delirium requiring titration of sedating medications, maintaining sleep-wake cycle. Patient's mentation eventually started improving back towards baseline although not completely resolved at this time. Infectious disease recommends transition from IV antibiotic to p.o. amoxicillin through 9/13/2023 for bacteremia from left arm cellulitis source. Patient and his family have had all their questions answered and they are amenable to his discharge to rehab today. Please see above list of diagnoses and related plan for additional information.      Condition at Discharge: stable    Discharge Day Visit / Exam:   Subjective:  ***  Vitals: Blood Pressure: 101/65 (09/08/23 0743)  Pulse: 91 (09/08/23 0743)  Temperature: (!) 97.3 °F (36.3 °C) (09/08/23 0743)  Temp Source: Axillary (09/07/23 1950)  Respirations: 18 (09/07/23 1950)  Height: 5' 9" (175.3 cm) (08/30/23 1020)  Weight - Scale: 96.3 kg (212 lb 4.9 oz) (09/08/23 0549)  SpO2: 91 % (09/08/23 0743)  Exam:   Physical Exam ***    Discussion with Family: Updated contact person (daughter) at bedside. Discharge instructions/Information to patient and family:   See after visit summary for information provided to patient and family. Provisions for Follow-Up Care:  See after visit summary for information related to follow-up care and any pertinent home health orders. Disposition:   Other: [facility]    Planned Readmission: none     Discharge Statement:  I spent 55 minutes discharging the patient. This time was spent on the day of discharge. I had direct contact with the patient on the day of discharge. Greater than 50% of the total time was spent examining patient, answering all patient questions, arranging and discussing plan of care with patient as well as directly providing post-discharge instructions. Additional time then spent on discharge activities. Discharge Medications:  See after visit summary for reconciled discharge medications provided to patient and/or family.       **Please Note: This note may have been constructed using a voice recognition system**

## 2023-09-08 NOTE — ASSESSMENT & PLAN NOTE
Patient with increased confusion, agitation requiring soft wrist restraints since 9/2/2023. No known history of cognitive impairment or dementia.   · Likely multifactorial with sepsis, hospital-related mostly contributing  · Caution with opioids, sedating medications  · Seroquel was discontinued 9/5/2023, family is pleased and reports improved mental status and interactiveness from patient  · Restraints removed 0700 9/4, order discontinued at the same, last Haldol was 9/2/2023  · Continue to avoid physical and chemical restraints as much as possible in order to transition to rehab

## 2023-09-08 NOTE — PLAN OF CARE
Problem: MOBILITY - ADULT  Goal: Maintain or return to baseline ADL function  Description: INTERVENTIONS:  -  Assess patient's ability to carry out ADLs; assess patient's baseline for ADL function and identify physical deficits which impact ability to perform ADLs (bathing, care of mouth/teeth, toileting, grooming, dressing, etc.)  - Assess/evaluate cause of self-care deficits   - Assess range of motion  - Assess patient's mobility; develop plan if impaired  - Assess patient's need for assistive devices and provide as appropriate  - Encourage maximum independence but intervene and supervise when necessary  - Involve family in performance of ADLs  - Assess for home care needs following discharge   - Consider OT consult to assist with ADL evaluation and planning for discharge  - Provide patient education as appropriate  Outcome: Progressing  Goal: Maintains/Returns to pre admission functional level  Description: INTERVENTIONS:  - Perform BMAT or MOVE assessment daily.   - Set and communicate daily mobility goal to care team and patient/family/caregiver. - Collaborate with rehabilitation services on mobility goals if consulted  - Perform Range of Motion 3 times a day. - Reposition patient every 2 hours.   - Dangle patient 3 times a day  - Stand patient 3 times a day  - Ambulate patient 3 times a day  - Out of bed to chair 3 times a day   - Out of bed for meals 3 times a day  - Out of bed for toileting  - Record patient progress and toleration of activity level   Outcome: Progressing     Problem: Prexisting or High Potential for Compromised Skin Integrity  Goal: Skin integrity is maintained or improved  Description: INTERVENTIONS:  - Identify patients at risk for skin breakdown  - Assess and monitor skin integrity  - Assess and monitor nutrition and hydration status  - Monitor labs   - Assess for incontinence   - Turn and reposition patient  - Assist with mobility/ambulation  - Relieve pressure over bony prominences  - Avoid friction and shearing  - Provide appropriate hygiene as needed including keeping skin clean and dry  - Evaluate need for skin moisturizer/barrier cream  - Collaborate with interdisciplinary team   - Patient/family teaching  - Consider wound care consult   Outcome: Progressing     Problem: PAIN - ADULT  Goal: Verbalizes/displays adequate comfort level or baseline comfort level  Description: Interventions:  - Encourage patient to monitor pain and request assistance  - Assess pain using appropriate pain scale  - Administer analgesics based on type and severity of pain and evaluate response  - Implement non-pharmacological measures as appropriate and evaluate response  - Consider cultural and social influences on pain and pain management  - Notify physician/advanced practitioner if interventions unsuccessful or patient reports new pain  Outcome: Progressing     Problem: INFECTION - ADULT  Goal: Absence or prevention of progression during hospitalization  Description: INTERVENTIONS:  - Assess and monitor for signs and symptoms of infection  - Monitor lab/diagnostic results  - Monitor all insertion sites, i.e. indwelling lines, tubes, and drains  - Monitor endotracheal if appropriate and nasal secretions for changes in amount and color  - [place] appropriate cooling/warming therapies per order  - Administer medications as ordered  - Instruct and encourage patient and family to use good hand hygiene technique  - Identify and instruct in appropriate isolation precautions for identified infection/condition  Outcome: Progressing  Goal: Absence of fever/infection during neutropenic period  Description: INTERVENTIONS:  - Monitor WBC    Outcome: Progressing     Problem: SAFETY ADULT  Goal: Maintain or return to baseline ADL function  Description: INTERVENTIONS:  -  Assess patient's ability to carry out ADLs; assess patient's baseline for ADL function and identify physical deficits which impact ability to perform ADLs (bathing, care of mouth/teeth, toileting, grooming, dressing, etc.)  - Assess/evaluate cause of self-care deficits   - Assess range of motion  - Assess patient's mobility; develop plan if impaired  - Assess patient's need for assistive devices and provide as appropriate  - Encourage maximum independence but intervene and supervise when necessary  - Involve family in performance of ADLs  - Assess for home care needs following discharge   - Consider OT consult to assist with ADL evaluation and planning for discharge  - Provide patient education as appropriate  Outcome: Progressing  Goal: Maintains/Returns to pre admission functional level  Description: INTERVENTIONS:  - Perform BMAT or MOVE assessment daily.   - Set and communicate daily mobility goal to care team and patient/family/caregiver. - Collaborate with rehabilitation services on mobility goals if consulted  - Perform Range of Motion 3 times a day. - Reposition patient every 2 hours.   - Dangle patient 3 times a day  - Stand patient 3 times a day  - Ambulate patient 3 times a day  - Out of bed to chair 3 times a day   - Out of bed for meals 3 times a day  - Out of bed for toileting  - Record patient progress and toleration of activity level   Outcome: Progressing  Goal: Patient will remain free of falls  Description: INTERVENTIONS:  - Educate patient/family on patient safety including physical limitations  - Instruct patient to call for assistance with activity   - Consult OT/PT to assist with strengthening/mobility   - Keep Call bell within reach  - Keep bed low and locked with side rails adjusted as appropriate  - Keep care items and personal belongings within reach  - Initiate and maintain comfort rounds  - Make Fall Risk Sign visible to staff  - Offer Toileting every 2 Hours, in advance of need  - Initiate/Maintain bed/chair alarm  - Obtain necessary fall risk management equipment: yellow bracelet/socks  - Apply yellow socks and bracelet for high fall risk patients  - Consider moving patient to room near nurses station  Outcome: Progressing     Problem: DISCHARGE PLANNING  Goal: Discharge to home or other facility with appropriate resources  Description: INTERVENTIONS:  - Identify barriers to discharge w/patient and caregiver  - Arrange for needed discharge resources and transportation as appropriate  - Identify discharge learning needs (meds, wound care, etc.)  - Arrange for interpretive services to assist at discharge as needed  - Refer to Case Management Department for coordinating discharge planning if the patient needs post-hospital services based on physician/advanced practitioner order or complex needs related to functional status, cognitive ability, or social support system  Outcome: Progressing     Problem: Knowledge Deficit  Goal: Patient/family/caregiver demonstrates understanding of disease process, treatment plan, medications, and discharge instructions  Description: Complete learning assessment and assess knowledge base. Interventions:  - Provide teaching at level of understanding  - Provide teaching via preferred learning methods  Outcome: Progressing     Problem: Nutrition/Hydration-ADULT  Goal: Nutrient/Hydration intake appropriate for improving, restoring or maintaining nutritional needs  Description: Monitor and assess patient's nutrition/hydration status for malnutrition. Collaborate with interdisciplinary team and initiate plan and interventions as ordered. Monitor patient's weight and dietary intake as ordered or per policy. Utilize nutrition screening tool and intervene as necessary. Determine patient's food preferences and provide high-protein, high-caloric foods as appropriate.      INTERVENTIONS:  - Monitor oral intake, urinary output, labs, and treatment plans  - Assess nutrition and hydration status and recommend course of action  - Evaluate amount of meals eaten  - Assist patient with eating if necessary   - Allow adequate time for meals  - Recommend/ encourage appropriate diets, oral nutritional supplements, and vitamin/mineral supplements  - Order, calculate, and assess calorie counts as needed  - Assess need for intravenous fluids  - Provide nutrition/hydration education as appropriate  - Include patient/family/caregiver in decisions related to nutrition  Outcome: Progressing     Problem: SAFETY,RESTRAINT: NV/NON-SELF DESTRUCTIVE BEHAVIOR  Goal: Remains free of harm/injury (restraint for non violent/non self-detsructive behavior)  Description: INTERVENTIONS:  - Instruct patient/family regarding restraint use   - Assess and monitor physiologic and psychological status   - Provide interventions and comfort measures to meet assessed patient needs   - Identify and implement measures to help patient regain control  - Assess readiness for release of restraint   Outcome: Progressing  Goal: Returns to optimal restraint-free functioning  Description: INTERVENTIONS:  - Assess the patient's behavior and symptoms that indicate continued need for restraint  - Identify and implement measures to help patient regain control  - Assess readiness for release of restraint   Outcome: Progressing     Problem: NEUROSENSORY - ADULT  Goal: Achieves stable or improved neurological status  Description: INTERVENTIONS  - Monitor and report changes in neurological status  - Monitor vital signs such as temperature, blood pressure, glucose, and any other labs ordered   - Initiate measures to prevent increased intracranial pressure  - Monitor for seizure activity and implement precautions if appropriate      Outcome: Progressing  Goal: Achieves maximal functionality and self care  Description: INTERVENTIONS  - Monitor swallowing and airway patency with patient fatigue and changes in neurological status  - Encourage and assist patient to increase activity and self care.    - Encourage visually impaired, hearing impaired and aphasic patients to use assistive/communication devices  Outcome: Progressing     Problem: CARDIOVASCULAR - ADULT  Goal: Maintains optimal cardiac output and hemodynamic stability  Description: INTERVENTIONS:  - Monitor I/O, vital signs and rhythm  - Monitor for S/S and trends of decreased cardiac output  - Administer and titrate ordered vasoactive medications to optimize hemodynamic stability  - Assess quality of pulses, skin color and temperature  - Assess for signs of decreased coronary artery perfusion  - Instruct patient to report change in severity of symptoms  Outcome: Progressing  Goal: Absence of cardiac dysrhythmias or at baseline rhythm  Description: INTERVENTIONS:  - Continuous cardiac monitoring, vital signs, obtain 12 lead EKG if ordered  - Administer antiarrhythmic and heart rate control medications as ordered  - Monitor electrolytes and administer replacement therapy as ordered  Outcome: Progressing     Problem: RESPIRATORY - ADULT  Goal: Achieves optimal ventilation and oxygenation  Description: INTERVENTIONS:  - Assess for changes in respiratory status  - Assess for changes in mentation and behavior  - Position to facilitate oxygenation and minimize respiratory effort  - Oxygen administered by appropriate delivery if ordered  - Initiate smoking cessation education as indicated  - Encourage broncho-pulmonary hygiene including cough, deep breathe, Incentive Spirometry  - Assess the need for suctioning and aspirate as needed  - Assess and instruct to report SOB or any respiratory difficulty  - Respiratory Therapy support as indicated  Outcome: Progressing     Problem: METABOLIC, FLUID AND ELECTROLYTES - ADULT  Goal: Electrolytes maintained within normal limits  Description: INTERVENTIONS:  - Monitor labs and assess patient for signs and symptoms of electrolyte imbalances  - Administer electrolyte replacement as ordered  - Monitor response to electrolyte replacements, including repeat lab results as appropriate  - Instruct patient on fluid and nutrition as appropriate  Outcome: Progressing  Goal: Fluid balance maintained  Description: INTERVENTIONS:  - Monitor labs   - Monitor I/O and WT  - Instruct patient on fluid and nutrition as appropriate  - Assess for signs & symptoms of volume excess or deficit  Outcome: Progressing  Goal: Glucose maintained within target range  Description: INTERVENTIONS:  - Monitor Blood Glucose as ordered  - Assess for signs and symptoms of hyperglycemia and hypoglycemia  - Administer ordered medications to maintain glucose within target range  - Assess nutritional intake and initiate nutrition service referral as needed  Outcome: Progressing     Problem: HEMATOLOGIC - ADULT  Goal: Maintains hematologic stability  Description: INTERVENTIONS  - Assess for signs and symptoms of bleeding or hemorrhage  - Monitor labs  - Administer supportive blood products/factors as ordered and appropriate  Outcome: Progressing     Problem: SKIN/TISSUE INTEGRITY - ADULT  Goal: Skin Integrity remains intact(Skin Breakdown Prevention)  Description: Assess:  -Perform [place] assessment every shift  -Clean and moisturize skin every shift  -Inspect skin when repositioning, toileting, and assisting with ADLS  -Assess extremities for adequate circulation and sensation     Bed Management:  -Have minimal linens on bed & keep smooth, unwrinkled  -Change linens as needed when moist or perspiring  -Avoid sitting or lying in one position for more than 2 hours while in bed  -Keep HOB at 45 degrees     Toileting:  -Offer bedside commode  -Assess for incontinence every 2  -Use incontinent care products after each incontinent episode such as wipes, cream, yousif pads    Activity:  -Mobilize patient 3 times a day  -Encourage activity and walks on unit  -Encourage or provide ROM exercises   -Turn and reposition patient every 2 Hours  -Use appropriate equipment to lift or move patient in bed  -Instruct/ Assist with weight shifting every 2 hrs when out of bed in chair  -Consider limitation of chair time 3 hour intervals    Skin Care:  -Avoid use of baby powder, tape, friction and shearing, hot water or constrictive clothing  -Relieve pressure over bony prominences using allevyns, wedges, pillows  -Do not massage red bony areas  Outcome: Progressing  Goal: Incision(s), wounds(s) or drain site(s) healing without S/S of infection  Description: INTERVENTIONS  - Assess and document dressing, incision, wound bed, drain sites and surrounding tissue  - Provide patient and family education  - Perform skin care/dressing changes every shift  Outcome: Progressing     Problem: Potential for Falls  Goal: Patient will remain free of falls  Description: INTERVENTIONS:  - Educate patient/family on patient safety including physical limitations  - Instruct patient to call for assistance with activity   - Consult OT/PT to assist with strengthening/mobility   - Keep Call bell within reach  - Keep bed low and locked with side rails adjusted as appropriate  - Keep care items and personal belongings within reach  - Initiate and maintain comfort rounds  - Make Fall Risk Sign visible to staff  - Offer Toileting every 2 Hours, in advance of need  - Initiate/Maintain bed/chair alarm  - Obtain necessary fall risk management equipment: yellow bracelet/socks  - Apply yellow socks and bracelet for high fall risk patients  - Consider moving patient to room near nurses station  Outcome: Progressing

## 2023-09-08 NOTE — PLAN OF CARE
Problem: OCCUPATIONAL THERAPY ADULT  Goal: Performs self-care activities at highest level of function for planned discharge setting. See evaluation for individualized goals. Description: Treatment Interventions: ADL retraining, Functional transfer training, Patient/family training, Compensatory technique education, Cognitive reorientation, UE strengthening/ROM          See flowsheet documentation for full assessment, interventions and recommendations. Outcome: Progressing  Note: Limitation: Decreased ADL status, Decreased self-care trans, Decreased high-level ADLs, Decreased cognition, Decreased sensation  Prognosis: Fair  Assessment: Pt seen for OT tx session with focus on functional balance, functional mobility, ADL status, and transfer safety. Patient agreeable to OT treatment session. Pt received supine in bed. Performed bed mobility with max A x 1. Performed transfers and functional mobility with mod Ax 2 with RW. Pt's sitting balance and ability to perform standing activity has greatly improved. BUE edema has also improved. Pt increasingly able to perform grooming and feeding tasks. Patient continues to be functioning below baseline level, occupational performance remains limited secondary to factors listed above, and pt at increased risk for falls and injury. The patient's raw score on the AM-PAC Daily Activity inpatient short form is 15, standardized score is 34.69, less than 39.4. Patients at this level are likely to benefit from DC to post-acute rehabilitation services. Please refer to the recommendation of the Occupational Therapist for safe DC planning. From OT standpoint, recommendation at time of d/c would be level 2 resources. Patient to benefit from continued Occupational Therapy treatment while in the hospital to address deficits as defined above and maximize level of functional independence with ADLs and functional mobility.  Pt left with call bell in reach, tray table in reach, needs met, chair alarm activated, SCDs on.

## 2023-09-08 NOTE — PROGRESS NOTES
[address]  Progress Note  Name: Tez Arenas  MRN: [id number]  Unit/Bed#: -58 I Date of Admission: 8/29/2023   Date of Service: 9/8/2023 I Hospital Day: 10    Assessment/Plan   * Severe sepsis (HCC)-resolved as of 9/8/2023  Assessment & Plan  Sepsis criteria evidenced by fever, tachycardia, tachypnea POA. End organ damage/shock evidenced by hypotension briefly requiring Levophed. Off Levophed since 8/30, transition to [employer] service 9/1/2023. · Source: LUE cellulitis, group B strep bacteremia. · General surgery signed off. · ID following: Continue IV PCN G while inpatient, transition to amoxicillin 500 mg PO TID through till 9/13/2023 at discharge. · Final blood cultures remain with no growth for 5 days     Bacteremia due to group B Streptococcus  Assessment & Plan  · Blood culture 1/2 (8/29): + Streptococcus agalactiae (Group B), susceptible to penicillin. · Repeat BC x2 (8/31): NGTD  · Transitioned to high-dose PCN per ID  · Echo without evidence of vegetation    Peripheral vascular disease ([address])  Assessment & Plan  · Has been seeing Dr. Mauricia Holter with podiatry outpatient for chronic toe wound of the left great toe. · No evidence of significant arterial occlusive disease on arterial duplex bilateral LEs  · Pods signed off. Cont local wound care. Cellulitis of left upper extremity  Assessment & Plan  · Presented with LUE erythema, fever consistent with cellulitis  · CT LUE (8/29): Left upper extremity subcutaneous edema indicating cellulitis without drainable collection, soft tissue emphysema or evidence of osteomyelitis.   · Worsening erythema LUE, development of RUE erythema/ecchymosis (8/30) since resolved  · See additional plan above for severe sepsis  · Elevate arms, local wound care    Chronic systolic heart failure [facility])  Assessment & Plan  Wt Readings from Last 3 Encounters:   09/08/23 96.3 kg (212 lb 4.9 oz)   08/28/23 99.8 kg (220 lb)   08/24/23 105 kg (232 lb)     · Not in acute exacerbation. Edema of upper extremities likely more in setting of cellulitis. · Home regimen: Torsemide alternating 20/10 mg daily, Toprol XL 25 mg BID    HTN (hypertension)  Assessment & Plan  · Cont home dose Toprol-XL 25 mg BID  · Continue home dosing of diuretic with torsemide 20mg m/w/f and 10mg other days. RA (rheumatoid arthritis) (HCC)  Assessment & Plan  · History of RA, remote episodes of eosinophilia   · Continue home prednisone, hydroxychloroquine  · Minimize PRN Tramadol. Use Voltaren gel to joints for pain      Metabolic encephalopathy-resolved as of 9/8/2023  Assessment & Plan  Patient with increased confusion, agitation requiring soft wrist restraints since 9/2/2023. No known history of cognitive impairment or dementia. · Likely multifactorial with sepsis, hospital-related mostly contributing  · Caution with opioids, sedating medications  · Seroquel was discontinued 9/5/2023, family is pleased and reports improved mental status and interactiveness from patient  · Restraints removed 0700 9/4, order discontinued at the same, last Haldol was 9/2/2023  · Continue to avoid physical and chemical restraints as much as possible in order to transition to rehab    A-fib RVR (HCC)-resolved as of 9/8/2023  Assessment & Plan  · Initially presented in A-fib with RVR, likely in setting of sepsis  · Home regimen: Toprol 25 mg BID, AC on Eliquis. · Rate since controlled/improved           VTE Pharmacologic Prophylaxis: VTE Score: 9 High Risk (Score >/= 5) - Pharmacological DVT Prophylaxis Ordered: apixaban (Eliquis). Sequential Compression Devices Ordered. Patient Centered Rounds: I performed bedside rounds with nursing staff today. Discussions with Specialists or Other Care Team Provider: salma    Education and Discussions with Family / Patient: Updated  (daughter) at bedside.     Total Time Spent on Date of Encounter in care of patient: 45 minutes This time was spent on one or more of the following: performing physical exam; counseling and coordination of care; obtaining or reviewing history; documenting in the medical record; reviewing/ordering tests, medications or procedures; communicating with other healthcare professionals and discussing with patient's family/caregivers. Current Length of Stay: 10 day(s)  Current Patient Status: Inpatient   Certification Statement: The patient will continue to require additional inpatient hospital stay due to [address] for snf  Discharge Plan: asap once auth is obtained    Code Status: Level 1 - Full Code    Subjective:   Itching on the back    Objective:     Vitals:   Temp (24hrs), Av.2 °F (36.2 °C), Min:96.9 °F (36.1 °C), Max:97.4 °F (36.3 °C)    Temp:  [96.9 °F (36.1 °C)-97.4 °F (36.3 °C)] 97.3 °F (36.3 °C)  HR:  [88-98] 97  Resp:  [17-18] 17  BP: (101-134)/(65-88) 132/88  SpO2:  [91 %-96 %] 95 %  Body mass index is 31.35 kg/m². Input and Output Summary (last 24 hours): Intake/Output Summary (Last 24 hours) at 2023 1143  Last data filed at 2023 0501  Gross per 24 hour   Intake --   Output 925 ml   Net -925 ml       Physical Exam:   Physical Exam  Vitals and nursing note reviewed. Constitutional:       General: He is not in acute distress. HENT:      Head: Normocephalic and atraumatic. Nose: Nose normal.      Mouth/Throat:      Mouth: Mucous membranes are moist.   Eyes:      Conjunctiva/sclera: Conjunctivae normal.   Cardiovascular:      Rate and Rhythm: Normal rate. Rhythm irregular. Pulses: Normal pulses. Pulmonary:      Effort: Pulmonary effort is normal.   Abdominal:      General: Bowel sounds are normal.      Palpations: Abdomen is soft. Musculoskeletal:      Cervical back: Neck supple.       Comments: bl arm swelling is minimal   Skin:     Comments: Very dry skin bl ue and face    Ecchymosis bl arms     Back with scattered macules excoriation from itching    Right great toe looks like healing well non -infected    dermatoporosis   Neurological:      General: No focal deficit present. Mental Status: He is alert. Psychiatric:      Comments: Cognitive changes in the older adult          Additional Data:     Labs:  Results from last 7 days   Lab Units 09/08/23  0518   WBC Thousand/uL 9.30   HEMOGLOBIN g/dL 11.7*   HEMATOCRIT % 39.7   PLATELETS Thousands/uL 223   NEUTROS PCT % 56   LYMPHS PCT % 31   MONOS PCT % 7   EOS PCT % 4     Results from last 7 days   Lab Units 09/08/23  0518 09/03/23  0555 09/03/23  0219   SODIUM mmol/L 138   < > 137   POTASSIUM mmol/L 3.1*   < > 4.2   CHLORIDE mmol/L 97   < > 101   CO2 mmol/L 34*   < > 29   CO2, I-STAT   --    < >  --    BUN mg/dL 23   < > 13   CREATININE mg/dL 1.26   < > 0.81   ANION GAP mmol/L 7   < > 7   CALCIUM mg/dL 9.3   < > 9.1   ALBUMIN g/dL  --   --  3.4*   TOTAL BILIRUBIN mg/dL  --   --  0.67   ALK PHOS U/L  --   --  47   ALT U/L  --   --  11   AST U/L  --   --  17   GLUCOSE RANDOM mg/dL 178*   < > 129    < > = values in this interval not displayed. Results from last 7 days   Lab Units 09/08/23  0740 09/07/23  2214 09/07/23  1614 09/07/23  1103 09/07/23  0743 09/06/23  2021 09/06/23  1704 09/06/23  1121 09/06/23  0727 09/05/23  2121 09/05/23  1620 09/05/23  1233   POC GLUCOSE mg/dl 149* 161* 200* 132 126 189* 138 146* 111 175* 170* 133     Results from last 7 days   Lab Units 09/05/23  1142 09/02/23  0445   HEMOGLOBIN A1C % 6.6* 6.6*     Results from last 7 days   Lab Units 09/04/23  0427 09/03/23  0219 09/02/23  0445   PROCALCITONIN ng/ml 1.04* 1.77* 2.79*       Lines/Drains:  Invasive Devices     Peripheral Intravenous Line  Duration           Peripheral IV 09/08/23 Right;Ventral (anterior) Forearm <1 day                      Imaging: No pertinent imaging reviewed.     Recent Cultures (last 7 days): none        Last 24 Hours Medication List:   Current Facility-Administered Medications   Medication Dose Route Frequency Provider Last Rate   • albuterol  2.5 mg Nebulization Q4H PRN Awanda PrudentTANNER     • allopurinol  400 mg Oral Daily Awanda PrudeTANNER burton     • apixaban  5 mg Oral BID Awanda PrudeTANNER burton     • chlorhexidine  15 mL Mouth/Throat Q12H [facility] Saloni Campo PA-C     • Diclofenac Sodium  2 g Topical 4x Daily Awanda PrudeTANNER burton     • diphenhydrAMINE-zinc acetate   Topical TID PRN Alee Pimentel PA-C     • ezetimibe  10 mg Oral Daily Saloni Campo PA-C     • Fluticasone Furoate-Vilanterol  1 puff Inhalation Daily Saloni Campo PA-C     • gabapentin  100 mg Oral BID Paris Carl MD     • haloperidol lactate  1 mg Intramuscular Q6H PRN Awanda PrudeTANNER burton     • HYDROmorphone  0.2 mg Intravenous Q4H PRN Awanda PrudeTANNER burton     • hydroxychloroquine  200 mg Oral BID With Meals Awanda PrudeTANNER burton     • insulin lispro  1-5 Units Subcutaneous TID AC Saloni Campo PA-C     • insulin lispro  1-5 Units Subcutaneous HS Awanda PrTANNER chavez     • levothyroxine  25 mcg Oral Early Morning Awanda PrudeTANNER burton     • lidocaine  2 patch Topical Daily Saloni Campo PA-C     • melatonin  9 mg Oral HS Saloni Campo PA-C     • metoprolol succinate  25 mg Oral BID Awanda PrudeTANNER burton     • nystatin   Topical BID Awanda PrudeTANNER burton     • pantoprazole  20 mg Oral Daily Before Breakfast Awanda PrudeTANNER burton     • penicillin G  4 Million Units Intravenous Q4H Awanda PrTANNER chavez 4 Million Units (09/08/23 1116)   • polyethylene glycol  17 g Oral Every Other Day Awanda PrudeTANNER burton     • potassium chloride  40 mEq Oral BID Alee Pimentel PA-C     • predniSONE  5 mg Oral Daily Awanda PrudeTANNER burton     • torsemide  10 mg Oral Once per day on Sun Tue Thu Sat Anayeli Shipman MD     • torsemide  20 mg Oral Once per day on Mon Wed Fri Ann Conrad MD     • traMADol  50 mg Oral Q6H PRN Pramod Goyal PA-C          Today, Patient Was Seen By: Jonna Greenwood PA-C    **Please Note: This note may have been constructed using a voice recognition system. **

## 2023-09-08 NOTE — CASE MANAGEMENT
[address] received request for authorization from Care Manager.   Authorization request for: SNF  Facility Name: [facility] - REH[facility]IT[facility] NPI: [id number]  Facility MD: Genoveva Ferreira NPI: [id number]   Authorization initiated by contacting insurance: Imelda Yung: [employer]aility    Pending Reference #: [id number]   Clinicals submitted via: Hero Chavez

## 2023-09-08 NOTE — ASSESSMENT & PLAN NOTE
· Has been seeing Dr. Marla Hernandez with podiatry outpatient for chronic toe wound of the left great toe. · No evidence of significant arterial occlusive disease on arterial duplex bilateral LEs  · Pods signed off. Cont local wound care.

## 2023-09-08 NOTE — ASSESSMENT & PLAN NOTE
Wt Readings from Last 3 Encounters:   09/08/23 96.3 kg (212 lb 4.9 oz)   08/28/23 99.8 kg (220 lb)   08/24/23 105 kg (232 lb)     · Not in acute exacerbation. Edema of upper extremities likely more in setting of cellulitis. · Home regimen:  Torsemide alternating 20/10 mg daily, Toprol XL 25 mg BID

## 2023-09-08 NOTE — ASSESSMENT & PLAN NOTE
· History of RA, remote episodes of eosinophilia   · Continue home prednisone, hydroxychloroquine  · Minimize PRN Tramadol.  Use Voltaren gel to joints for pain

## 2023-09-08 NOTE — ASSESSMENT & PLAN NOTE
Sepsis criteria evidenced by fever, tachycardia, tachypnea POA. End organ damage/shock evidenced by hypotension briefly requiring Levophed. Off Levophed since 8/30, transition to [address] [place] [address] service 9/1/2023. · Source: LUE cellulitis, group B strep bacteremia. · General surgery signed off. · ID following: Continue IV PCN G while inpatient, transition to amoxicillin 500 mg PO TID through till 9/13/2023 at discharge.   · Final blood cultures remain with no growth for 5 days

## 2023-09-08 NOTE — SPEECH THERAPY NOTE
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 "I can't believe the organization let me go"     Current Diet:  Dysphagia 3 w/ thin     Objective:  Pt seen for dx dysphagia tx f/u. Pt is independent for feeding once set up. Pt takes large bites of dysphagia 3 material on tray. Despite large bolus size, effective mastication/break down. Transfers are adequate without significant oral residue. Bite strength is adequate for trials of hard/regular textures. Pt demonstrates timely/effective mastication. Piecemeal transfers clear mild oral residue. Cup and straw sips of thin taken without difficulty. No overt s/s aspiration w/ solids or thin liquids. At this time, pt appears appropriate to resume baseline diet of regular textures. Assessment:  Pt w/ improved alertness and subsequent improved oropharyngeal swallow skill, able to tolerate all material today.      Plan/Recommendations:  Regular/thin   Frequent/thorough oral care  No further IP ST needs, please re-consult if medically necessary.

## 2023-09-08 NOTE — PLAN OF CARE
Problem: PHYSICAL THERAPY ADULT  Goal: Performs mobility at highest level of function for planned discharge setting. See evaluation for individualized goals. Description: Treatment/Interventions: Functional transfer training, LE strengthening/ROM, Therapeutic exercise, Endurance training, Cognitive reorientation, Patient/family training, Equipment eval/education, Bed mobility          See flowsheet documentation for full assessment, interventions and recommendations. Note:    Problem List: Decreased strength, Impaired balance, Decreased mobility, Decreased cognition, Obesity, Decreased skin integrity, Impaired hearing  Assessment: Pt seen for PT intervention. Aleksey presents today w/ significantly improved mentation and engagement, and is able to mobilize much better than previous sessions. He requires only A x 1 to get to EOB (was previously heavy A x 2), and is able to sit EOB for longer and with consistent supervision. He is also able to put his hearing aids in and not lose sitting balance. Pt benefits from VC for setup for STS transfers, but is able to complete w/ mod A x 2 and maintain a stand w/ use of RW. He is able to ambulate a few feet from EOB to recliner chair w/ mod A x 2, which is an achievement today as he was unable to previously ambulate due to weakness and fatigue. Discharge recommendation continues to be for Level 2 resources upon DC             See flowsheet documentation for full assessment.

## 2023-09-08 NOTE — ASSESSMENT & PLAN NOTE
· Presented with LUE erythema, fever consistent with cellulitis  · CT LUE (8/29): Left upper extremity subcutaneous edema indicating cellulitis without drainable collection, soft tissue emphysema or evidence of osteomyelitis.   · Worsening erythema LUE, development of RUE erythema/ecchymosis (8/30) since resolved  · See additional plan above for severe sepsis  · Elevate arms, local wound care

## 2023-09-08 NOTE — CASE MANAGEMENT
Case Management Discharge Planning Note    Patient name Naomi Ho Ho Kus  Location /-92 MRN [id number]  : 1934 Date 2023       Current Admission Date: 2023  Current Admission Diagnosis:Cellulitis of left upper extremity   Patient Active Problem List    Diagnosis Date Noted   • Bacteremia due to group B Streptococcus 2023   • Respiratory insufficiency 2023   • Cellulitis of left upper extremity 2023   • HLD (hyperlipidemia) 2023   • GERD (gastroesophageal reflux disease) 2023   • Gout without acute exacerbation  2023   • Spinal stenosis 2023   • Hypothyroidism 2023   • Peripheral vascular disease ([address]) 2023   • Open wound of right great toe with damage to nail 2023   • Adverse effect of loop (high-ceiling) diuretics, subsequent encounter 2023   • Toxic metabolic encephalopathy    • Septic arthritis of shoulder, right ([address]) 2023   • Suture of skin wound 2023   • Hypomagnesemia 2023   • Elevated TSH 2023   • Chronic systolic heart failure ([address]) 2023   • Transient speech disturbance 2023   • KAMLESH (acute kidney injury) ([address]) 2023   • Dilated cardiomyopathy ([address]) 10/18/2022   • HTN (hypertension) 10/18/2022   • Low left ventricular ejection fraction 10/11/2022   • TIA (transient ischemic attack) 10/10/2022   • Speech disturbance 10/08/2022   • Accidental overdose 2020   • History of hypertension 2020   • Hypotension 2020   • RA (rheumatoid arthritis) ([address]) 2020      LOS (days): 10  Geometric Mean LOS (GMLOS) (days): 3.50  Days to GMLOS:-6.7     OBJECTIVE:  Risk of Unplanned Readmission Score: 30.94      Current admission status: Inpatient   Preferred Pharmacy:   [address] [place] [address] [address]  [address] [address]  Phone: [phone] Fax: [phone]    Primary Care Provider: Sheryle Gros, MD    Primary Insurance: Chris Chawla [facility] REP  Secondary Insurance:     DISCHARGE DETAILS:    Discharge planning discussed with[de-identified] patient  Freedom of Choice: Yes     CM contacted family/caregiver?: Yes  Were Treatment Team discharge recommendations reviewed with patient/caregiver?: Yes  Did patient/caregiver verbalize understanding of patient care needs?: Yes  Were patient/caregiver advised of the risks associated with not following Treatment Team discharge recommendations?: Yes    Contacts  Patient Contacts: Kerwin Krause  Relationship to Patient[de-identified] Family  Contact Method: In Person  Reason/Outcome: Discharge [address]         Is the patient interested in [address] [facility] at discharge?: No    DME Referral Provided  Referral made for DME?: No    Treatment Team Recommendation: Short Term Rehab  Discharge Destination Plan[de-identified] Short Term Rehab  Transport at Discharge : S Ambulance  Dispatcher Contacted: Yes  Number/Name of Dispatcher: Ade Xiong by Callum and Unit #): TBD  ETA of Transport (Date): 09/09/23  ETA of Transport (Time): 1330     Transfer Mode: Stretcher  Accompanied by: EMS personnel  Transfer Equipment: BLS devices  IMM Given (Date):: 09/08/23  IMM Given to[de-identified] Family  Family notified[de-identified] Reviewed IMM with pt's family. They are in agreement with d/c. Additional Comments: Met with Pt's son and dtr-in-law at bedside. [facility] authorization is pending. Cm requested potential transport BLS for 1:30 pm if auth is obtained before then. [address] [place] [address] was made aware and family was as well. Requested the transport on [employer]. Medical Necessity is in the chart. Pt is level 1 full code thus no OOH DNR needed. Report can be called to Phone: [phone]  Fax: [phone].

## 2023-09-09 VITALS
RESPIRATION RATE: 16 BRPM | SYSTOLIC BLOOD PRESSURE: 146 MMHG | BODY MASS INDEX: 31.44 KG/M2 | OXYGEN SATURATION: 97 % | TEMPERATURE: 97.2 F | WEIGHT: 212.3 LBS | HEART RATE: 80 BPM | DIASTOLIC BLOOD PRESSURE: 90 MMHG | HEIGHT: 69 IN

## 2023-09-09 LAB
GLUCOSE SERPL-MCNC: 134 MG/DL (ref 65–140)
GLUCOSE SERPL-MCNC: 158 MG/DL (ref 65–140)
POTASSIUM SERPL-SCNC: 4.4 MMOL/L (ref 3.5–5.3)

## 2023-09-09 PROCEDURE — 99239 HOSP IP/OBS DSCHRG MGMT >30: CPT | Performed by: INTERNAL MEDICINE

## 2023-09-09 PROCEDURE — 82948 REAGENT STRIP/BLOOD GLUCOSE: CPT

## 2023-09-09 PROCEDURE — 84132 ASSAY OF SERUM POTASSIUM: CPT | Performed by: INTERNAL MEDICINE

## 2023-09-09 RX ORDER — AMOXICILLIN 500 MG/1
500 TABLET, FILM COATED ORAL 3 TIMES DAILY
Qty: 13 TABLET | Refills: 0 | Status: SHIPPED | OUTPATIENT
Start: 2023-09-09 | End: 2023-09-13

## 2023-09-09 RX ORDER — TRAMADOL HYDROCHLORIDE 50 MG/1
50 TABLET ORAL EVERY 6 HOURS PRN
Qty: 15 TABLET | Refills: 0 | Status: SHIPPED | OUTPATIENT
Start: 2023-09-09

## 2023-09-09 RX ORDER — DIPHENHYDRAMINE HYDROCHLORIDE, ZINC ACETATE 2; .1 G/100G; G/100G
CREAM TOPICAL 3 TIMES DAILY PRN
Refills: 0
Start: 2023-09-09

## 2023-09-09 RX ADMIN — TORSEMIDE 10 MG: 10 TABLET ORAL at 08:05

## 2023-09-09 RX ADMIN — Medication 2 G: at 11:39

## 2023-09-09 RX ADMIN — DIPHENHYDRAMINE HYDROCHLORIDE, ZINC ACETATE 1 APPLICATION: 2; .1 CREAM TOPICAL at 09:53

## 2023-09-09 RX ADMIN — NYSTATIN: 100000 POWDER TOPICAL at 08:14

## 2023-09-09 RX ADMIN — Medication 2 G: at 08:14

## 2023-09-09 RX ADMIN — EZETIMIBE 10 MG: 10 TABLET ORAL at 08:05

## 2023-09-09 RX ADMIN — PENICILLIN G POTASSIUM 4 MILLION UNITS: 5000000 INJECTION, POWDER, FOR SOLUTION INTRAMUSCULAR; INTRAVENOUS at 03:31

## 2023-09-09 RX ADMIN — PENICILLIN G POTASSIUM 4 MILLION UNITS: 5000000 INJECTION, POWDER, FOR SOLUTION INTRAMUSCULAR; INTRAVENOUS at 06:28

## 2023-09-09 RX ADMIN — GABAPENTIN 100 MG: 100 CAPSULE ORAL at 08:05

## 2023-09-09 RX ADMIN — INSULIN LISPRO 1 UNITS: 100 INJECTION, SOLUTION INTRAVENOUS; SUBCUTANEOUS at 11:39

## 2023-09-09 RX ADMIN — PENICILLIN G POTASSIUM 4 MILLION UNITS: 5000000 INJECTION, POWDER, FOR SOLUTION INTRAMUSCULAR; INTRAVENOUS at 00:00

## 2023-09-09 RX ADMIN — APIXABAN 5 MG: 5 TABLET, FILM COATED ORAL at 08:05

## 2023-09-09 RX ADMIN — LIDOCAINE 2 PATCH: 50 PATCH TOPICAL at 08:06

## 2023-09-09 RX ADMIN — METOPROLOL SUCCINATE 25 MG: 25 TABLET, FILM COATED, EXTENDED RELEASE ORAL at 08:05

## 2023-09-09 RX ADMIN — LEVOTHYROXINE SODIUM 25 MCG: 25 TABLET ORAL at 06:28

## 2023-09-09 RX ADMIN — ALLOPURINOL 400 MG: 100 TABLET ORAL at 08:05

## 2023-09-09 RX ADMIN — PANTOPRAZOLE SODIUM 20 MG: 20 TABLET, DELAYED RELEASE ORAL at 06:28

## 2023-09-09 RX ADMIN — FLUTICASONE FUROATE AND VILANTEROL TRIFENATATE 1 PUFF: 100; 25 POWDER RESPIRATORY (INHALATION) at 08:14

## 2023-09-09 RX ADMIN — HYDROXYCHLOROQUINE SULFATE 200 MG: 200 TABLET, FILM COATED ORAL at 08:05

## 2023-09-09 RX ADMIN — CHLORHEXIDINE GLUCONATE 15 ML: 1.2 RINSE ORAL at 08:13

## 2023-09-09 RX ADMIN — PREDNISONE 5 MG: 5 TABLET ORAL at 08:06

## 2023-09-09 NOTE — CASE MANAGEMENT
[address] [facility] [address] has received approved authorization from insurance: Gila Leal in by Rep: N/A - Verified on Availity   Authorization received for: [facility]  Facility: [facility] #:[id number]  Start of Care:9/8/23  Next Review Date:9/14/23  Continued Stay Care Coordinator: Not Noted on Availity  P#:   Rodrigo Claire next review to via fax# [phone]   Care Manager notified:Antoinette Mendoza

## 2023-09-09 NOTE — DISCHARGE SUMMARY
Discharge Summary - [facility] Internal Medicine  Patient: Maya Delgado 80 y.o. male   MRN: [id number]  PCP: Patrick Prabhakar MD  Unit/Bed#: -01 Encounter: [id number]            Discharging Physician / Practitioner: Shaunna Roberts MD  PCP: Patrick Prabhakar MD  Admission Date:   Admission Orders (From admission, onward)     Ordered        08/29/23 1126  [address]  Once                      Discharge Date: 09/09/23      Reason for Admission:  Fever and left arm redness      Discharge Diagnoses:     Principal Problem(s):    Septic shock on admission (resolved)     Left arm cellulitis    Group B streptococcus bacteremia    Active Problems:    RA (rheumatoid arthritis)     Essential hypertension    Chronic systolic heart failure    Atrial fibrillation    Peripheral vascular disease     Resolved Problems:    Acute metabolic encephalopathy    Hypokalemia      Consultations During Hospital Stay:  · Infectious diseases  · General surgery  · Podiatry      Hospital Course:     Maya Delgado is a 80 y.o. male patient who originally presented to the hospital on 8/29/2023 due to high-grade fevers with left arm redness and increased warmth to touch. He was initially admitted to the ICU in the setting of septic shock requiring aggressive IV fluid and albumin resuscitation along with a transient course of vasopressor support. Once hemodynamics stabilize, he was downgraded to the general medical floor. Further work-up revealed evidence of group B streptococcus bacteremia, as the likely culprit for his presenting left upper extremity cellulitis. Through the hospital course, he was placed on an IV penicillin course, which per infectious disease recommendations, will be transition to oral amoxicillin through 9/13, post discharge. He also developed atrial fibrillation with RVR, likely as a sequela of presenting sepsis, with rates now controlled on Toprol-XL, along with Eliquis for anticoagulation.   He transiently also required restraints due to altered mentation, likely triggered by the presenting infection. Through the hospital course with improvement in infection, his mentation gradually returned back to baseline. He will continue his current Demadex regimen for systolic CHF, and his chronic medications for hyperlipidemia, hypothyroidism, and rheumatoid arthritis. He will be discharged today back to his skilled nursing facility. He will follow-up with his PCP as instructed on discharge reconciliation. Condition at Discharge: fair       Discharge Day Visit / Exam:     Vitals:  Blood Pressure: 146/90 (09/09/23 0717)  Pulse: 80 (09/09/23 0717)  Temperature: (!) 97.2 °F (36.2 °C) (09/09/23 0717)  Temp Source: Oral (09/08/23 0743)  Respirations: 16 (09/09/23 0717)  Height: 5' 9" (175.3 cm) (08/30/23 1020)  Weight - Scale: 96.3 kg (212 lb 4.9 oz) (09/09/23 0559)  SpO2: 97 % (09/09/23 0717)      Physical exam:  I had a face-to-face encounter with the patient on day of discharge. Discussion with Patient and/or Family:  The patient has been advised to return to the ER immediately if any symptoms recur or worsen. Discharge instructions/Information to Patient and/or Family:   See after visit summary for information provided to patient and/or family. Provisions for Follow-Up Care:  See after visit summary for information related to follow-up care and any pertinent home health orders. Disposition:   Skilled rehab facility      Discharge Medications:  See after visit summary for reconciled discharge medications provided to patient and/or family. Discharge Statement:  I spent 38 minutes discharging the patient. This time was spent on the day of discharge. I had direct contact with the patient on the day of discharge.  Greater than 50% of the total time was spent examining patient, answering all patient questions, arranging and discussing plan of care with patient as well as directly providing post-discharge instructions. Additional time then spent on discharge activities. ** Please Note: This note is constructed using a voice recognition dictation system. An occasional wrong word/phrase or “sound-a-like” substitution may have been picked up by dictation device due to the inherent limitations of voice recognition software. Read the chart carefully and recognize, using reasonable context, where substitutions may have occurred. **

## 2023-09-09 NOTE — CASE MANAGEMENT
Case Management Discharge Planning Note    Patient name Geraldine Moreno  Location /-82 MRN [id number]  : 1934 Date 2023       Current Admission Date: 2023  Current Admission Diagnosis:Cellulitis of left upper extremity   Patient Active Problem List    Diagnosis Date Noted   • Bacteremia due to group B Streptococcus 2023   • Respiratory insufficiency 2023   • Cellulitis of left upper extremity 2023   • HLD (hyperlipidemia) 2023   • GERD (gastroesophageal reflux disease) 2023   • Gout without acute exacerbation  2023   • Spinal stenosis 2023   • Hypothyroidism 2023   • Peripheral vascular disease ([address]) 2023   • Open wound of right great toe with damage to nail 2023   • Adverse effect of loop (high-ceiling) diuretics, subsequent encounter 2023   • Toxic metabolic encephalopathy    • Septic arthritis of shoulder, right ([address]) 2023   • Suture of skin wound 2023   • Hypomagnesemia 2023   • Elevated TSH 2023   • Chronic systolic heart failure ([address]) 2023   • Transient speech disturbance 2023   • KAMLESH (acute kidney injury) ([address]) 2023   • Dilated cardiomyopathy ([address]) 10/18/2022   • HTN (hypertension) 10/18/2022   • Low left ventricular ejection fraction 10/11/2022   • TIA (transient ischemic attack) 10/10/2022   • Speech disturbance 10/08/2022   • Accidental overdose 2020   • History of hypertension 2020   • Hypotension 2020   • RA (rheumatoid arthritis) ([address]) 2020      LOS (days): 11  Geometric Mean LOS (GMLOS) (days): 3.50  Days to GMLOS:-7.4     OBJECTIVE:  Risk of Unplanned Readmission Score: 31.26         Current admission status: Inpatient   Preferred Pharmacy:   [address] [place] [address] [address] [facility] [address]  [address] [place] [address]  Phone: [phone] Fax: [phone]    Primary Care Provider: Aubrey Cuevas, MD    Primary Insurance: [facility] REP  Secondary Insurance:     [address] Number: [id number] (Autumn Perdue)

## 2023-09-09 NOTE — PLAN OF CARE
Problem: MOBILITY - ADULT  Goal: Maintain or return to baseline ADL function  Description: INTERVENTIONS:  -  Assess patient's ability to carry out ADLs; assess patient's baseline for ADL function and identify physical deficits which impact ability to perform ADLs (bathing, care of mouth/teeth, toileting, grooming, dressing, etc.)  - Assess/evaluate cause of self-care deficits   - Assess range of motion  - Assess patient's mobility; develop plan if impaired  - Assess patient's need for assistive devices and provide as appropriate  - Encourage maximum independence but intervene and supervise when necessary  - Involve family in performance of ADLs  - Assess for home care needs following discharge   - Consider OT consult to assist with ADL evaluation and planning for discharge  - Provide patient education as appropriate  Outcome: Progressing  Goal: Maintains/Returns to pre admission functional level  Description: INTERVENTIONS:  - Perform BMAT or MOVE assessment daily.   - Set and communicate daily mobility goal to care team and patient/family/caregiver. - Collaborate with rehabilitation services on mobility goals if consulted  - Perform Range of Motion 2 times a day. - Reposition patient every 2 hours.   - Dangle patient 2 times a day  - Stand patient 2 times a day  - Ambulate patient 2 times a day  - Out of bed to chair 2 times a day   - Out of bed for meals 2 times a day  - Out of bed for toileting  - Record patient progress and toleration of activity level   Outcome: Progressing     Problem: Prexisting or High Potential for Compromised Skin Integrity  Goal: Skin integrity is maintained or improved  Description: INTERVENTIONS:  - Identify patients at risk for skin breakdown  - Assess and monitor skin integrity  - Assess and monitor nutrition and hydration status  - Monitor labs   - Assess for incontinence   - Turn and reposition patient  - Assist with mobility/ambulation  - Relieve pressure over bony prominences  - Avoid friction and shearing  - Provide appropriate hygiene as needed including keeping skin clean and dry  - Evaluate need for skin moisturizer/barrier cream  - Collaborate with interdisciplinary team   - Patient/family teaching  - Consider wound care consult   Outcome: Progressing     Problem: PAIN - ADULT  Goal: Verbalizes/displays adequate comfort level or baseline comfort level  Description: Interventions:  - Encourage patient to monitor pain and request assistance  - Assess pain using appropriate pain scale  - Administer analgesics based on type and severity of pain and evaluate response  - Implement non-pharmacological measures as appropriate and evaluate response  - Consider cultural and social influences on pain and pain management  - Notify physician/advanced practitioner if interventions unsuccessful or patient reports new pain  Outcome: Progressing     Problem: INFECTION - ADULT  Goal: Absence or prevention of progression during hospitalization  Description: INTERVENTIONS:  - Assess and monitor for signs and symptoms of infection  - Monitor lab/diagnostic results  - Monitor all insertion sites, i.e. indwelling lines, tubes, and drains  - Monitor endotracheal if appropriate and nasal secretions for changes in amount and color  - [place] appropriate cooling/warming therapies per order  - Administer medications as ordered  - Instruct and encourage patient and family to use good hand hygiene technique  - Identify and instruct in appropriate isolation precautions for identified infection/condition  Outcome: Progressing  Goal: Absence of fever/infection during neutropenic period  Description: INTERVENTIONS:  - Monitor WBC    Outcome: Progressing     Problem: SAFETY ADULT  Goal: Maintain or return to baseline ADL function  Description: INTERVENTIONS:  -  Assess patient's ability to carry out ADLs; assess patient's baseline for ADL function and identify physical deficits which impact ability to perform ADLs (bathing, care of mouth/teeth, toileting, grooming, dressing, etc.)  - Assess/evaluate cause of self-care deficits   - Assess range of motion  - Assess patient's mobility; develop plan if impaired  - Assess patient's need for assistive devices and provide as appropriate  - Encourage maximum independence but intervene and supervise when necessary  - Involve family in performance of ADLs  - Assess for home care needs following discharge   - Consider OT consult to assist with ADL evaluation and planning for discharge  - Provide patient education as appropriate  Outcome: Progressing  Goal: Maintains/Returns to pre admission functional level  Description: INTERVENTIONS:  - Perform BMAT or MOVE assessment daily.   - Set and communicate daily mobility goal to care team and patient/family/caregiver. - Collaborate with rehabilitation services on mobility goals if consulted  - Perform Range of Motion 2 times a day. - Reposition patient every 2 hours.   - Dangle patient 2 times a day  - Stand patient 2 times a day  - Ambulate patient 2 times a day  - Out of bed to chair 2 times a day   - Out of bed for meals 2 times a day  - Out of bed for toileting  - Record patient progress and toleration of activity level   Outcome: Progressing  Goal: Patient will remain free of falls  Description: INTERVENTIONS:  - Educate patient/family on patient safety including physical limitations  - Instruct patient to call for assistance with activity   - Consult OT/PT to assist with strengthening/mobility   - Keep Call bell within reach  - Keep bed low and locked with side rails adjusted as appropriate  - Keep care items and personal belongings within reach  - Initiate and maintain comfort rounds  - Make Fall Risk Sign visible to staff  - Offer Toileting every 2 Hours, in advance of need  - Initiate/Maintain bed alarm  - Obtain necessary fall risk management equipment:   - Apply yellow socks and bracelet for high fall risk patients  - Consider moving patient to room near nurses station  Outcome: Progressing     Problem: DISCHARGE PLANNING  Goal: Discharge to home or other facility with appropriate resources  Description: INTERVENTIONS:  - Identify barriers to discharge w/patient and caregiver  - Arrange for needed discharge resources and transportation as appropriate  - Identify discharge learning needs (meds, wound care, etc.)  - Arrange for interpretive services to assist at discharge as needed  - Refer to Case Management Department for coordinating discharge planning if the patient needs post-hospital services based on physician/advanced practitioner order or complex needs related to functional status, cognitive ability, or social support system  Outcome: Progressing     Problem: Knowledge Deficit  Goal: Patient/family/caregiver demonstrates understanding of disease process, treatment plan, medications, and discharge instructions  Description: Complete learning assessment and assess knowledge base. Interventions:  - Provide teaching at level of understanding  - Provide teaching via preferred learning methods  Outcome: Progressing     Problem: Nutrition/Hydration-ADULT  Goal: Nutrient/Hydration intake appropriate for improving, restoring or maintaining nutritional needs  Description: Monitor and assess patient's nutrition/hydration status for malnutrition. Collaborate with interdisciplinary team and initiate plan and interventions as ordered. Monitor patient's weight and dietary intake as ordered or per policy. Utilize nutrition screening tool and intervene as necessary. Determine patient's food preferences and provide high-protein, high-caloric foods as appropriate.      INTERVENTIONS:  - Monitor oral intake, urinary output, labs, and treatment plans  - Assess nutrition and hydration status and recommend course of action  - Evaluate amount of meals eaten  - Assist patient with eating if necessary   - Allow adequate time for meals  - Recommend/ encourage appropriate diets, oral nutritional supplements, and vitamin/mineral supplements  - Order, calculate, and assess calorie counts as needed  - Assess need for intravenous fluids  - Provide nutrition/hydration education as appropriate  - Include patient/family/caregiver in decisions related to nutrition  Outcome: Progressing     Problem: NEUROSENSORY - ADULT  Goal: Achieves stable or improved neurological status  Description: INTERVENTIONS  - Monitor and report changes in neurological status  - Monitor vital signs such as temperature, blood pressure, glucose, and any other labs ordered   - Initiate measures to prevent increased intracranial pressure  - Monitor for seizure activity and implement precautions if appropriate      Outcome: Progressing  Goal: Achieves maximal functionality and self care  Description: INTERVENTIONS  - Monitor swallowing and airway patency with patient fatigue and changes in neurological status  - Encourage and assist patient to increase activity and self care.    - Encourage visually impaired, hearing impaired and aphasic patients to use assistive/communication devices  Outcome: Progressing     Problem: CARDIOVASCULAR - ADULT  Goal: Maintains optimal cardiac output and hemodynamic stability  Description: INTERVENTIONS:  - Monitor I/O, vital signs and rhythm  - Monitor for S/S and trends of decreased cardiac output  - Administer and titrate ordered vasoactive medications to optimize hemodynamic stability  - Assess quality of pulses, skin color and temperature  - Assess for signs of decreased coronary artery perfusion  - Instruct patient to report change in severity of symptoms  Outcome: Progressing  Goal: Absence of cardiac dysrhythmias or at baseline rhythm  Description: INTERVENTIONS:  - Continuous cardiac monitoring, vital signs, obtain 12 lead EKG if ordered  - Administer antiarrhythmic and heart rate control medications as ordered  - Monitor electrolytes and administer replacement therapy as ordered  Outcome: Progressing     Problem: RESPIRATORY - ADULT  Goal: Achieves optimal ventilation and oxygenation  Description: INTERVENTIONS:  - Assess for changes in respiratory status  - Assess for changes in mentation and behavior  - Position to facilitate oxygenation and minimize respiratory effort  - Oxygen administered by appropriate delivery if ordered  - Initiate smoking cessation education as indicated  - Encourage broncho-pulmonary hygiene including cough, deep breathe, Incentive Spirometry  - Assess the need for suctioning and aspirate as needed  - Assess and instruct to report SOB or any respiratory difficulty  - Respiratory Therapy support as indicated  Outcome: Progressing     Problem: METABOLIC, FLUID AND ELECTROLYTES - ADULT  Goal: Electrolytes maintained within normal limits  Description: INTERVENTIONS:  - Monitor labs and assess patient for signs and symptoms of electrolyte imbalances  - Administer electrolyte replacement as ordered  - Monitor response to electrolyte replacements, including repeat lab results as appropriate  - Instruct patient on fluid and nutrition as appropriate  Outcome: Progressing  Goal: Fluid balance maintained  Description: INTERVENTIONS:  - Monitor labs   - Monitor I/O and WT  - Instruct patient on fluid and nutrition as appropriate  - Assess for signs & symptoms of volume excess or deficit  Outcome: Progressing  Goal: Glucose maintained within target range  Description: INTERVENTIONS:  - Monitor Blood Glucose as ordered  - Assess for signs and symptoms of hyperglycemia and hypoglycemia  - Administer ordered medications to maintain glucose within target range  - Assess nutritional intake and initiate nutrition service referral as needed  Outcome: Progressing     Problem: HEMATOLOGIC - ADULT  Goal: Maintains hematologic stability  Description: INTERVENTIONS  - Assess for signs and symptoms of bleeding or hemorrhage  - Monitor labs  - Administer supportive blood products/factors as ordered and appropriate  Outcome: Progressing     Problem: MUSCULOSKELETAL - ADULT  Goal: Maintain or return mobility to safest level of function  Description: INTERVENTIONS:  - Assess patient's ability to carry out ADLs; assess patient's baseline for ADL function and identify physical deficits which impact ability to perform ADLs (bathing, care of mouth/teeth, toileting, grooming, dressing, etc.)  - Assess/evaluate cause of self-care deficits   - Assess range of motion  - Assess patient's mobility  - Assess patient's need for assistive devices and provide as appropriate  - Encourage maximum independence but intervene and supervise when necessary  - Involve family in performance of ADLs  - Assess for home care needs following discharge   - Consider OT consult to assist with ADL evaluation and planning for discharge  - Provide patient education as appropriate  Outcome: Progressing  Goal: Maintain proper alignment of affected body part  Description: INTERVENTIONS:  - Support, maintain and protect limb and body alignment  - Provide patient/ family with appropriate education  Outcome: Progressing     Problem: SKIN/TISSUE INTEGRITY - ADULT  Goal: Skin Integrity remains intact(Skin Breakdown Prevention)  Description: Assess:  -Perform [place]a assessment   -Clean and moisturize skin  -Inspect skin when repositioning, toileting, and assisting with ADLS  -Assess under medical devices  -Assess extremities for adequate circulation and sensation     Bed Management:  -Have minimal linens on bed & keep smooth, unwrinkled  -Change linens as needed when moist or perspiring  -Avoid sitting or lying in one position for more than 2 hours while in bed  -Keep HOB at 30 degrees     Toileting:  -Offer bedside commode  -Assess for incontinence  -Use incontinent care products after each incontinent episode     Activity:  -Mobilize patient 2 times a day  -Encourage activity and walks on unit  -Encourage or provide ROM exercises   -Turn and reposition patient every 2 Hours  -Use appropriate equipment to lift or move patient in bed  -Instruct/ Assist with weight shifting every 2 when out of bed in chair  -Consider limitation of chair time 2 hour intervals    Skin Care:  -Avoid use of baby powder, tape, friction and shearing, hot water or constrictive clothing  -Relieve pressure over bony prominences   -Do not massage red bony areas    Next Steps:  -Teach patient strategies to minimize risks   -Consider consults to  interdisciplinary teams  Outcome: Progressing  Goal: Incision(s), wounds(s) or drain site(s) healing without S/S of infection  Description: INTERVENTIONS  - Assess and document dressing, incision, wound bed, drain sites and surrounding tissue  - Provide patient and family education  - Perform skin care/dressing changes   Outcome: Progressing

## 2023-09-11 NOTE — UTILIZATION REVIEW
NOTIFICATION OF ADMISSION DISCHARGE   This is a Notification of Discharge from [address] [place] [address]. Please be advised that this patient has been discharge from our facility. Below you will find the admission and discharge date and time including the patient’s disposition. UTILIZATION REVIEW CONTACT:  Luis Watt  Utilization   Network Utilization Review Department  Phone: [phone] carefully listen to the prompts. All voicemails are confidential.  Email: [email or web address]     ADMISSION INFORMATION  PRESENTATION DATE: 8/29/2023  9:04 AM  OBERVATION ADMISSION DATE:   INPATIENT ADMISSION DATE: 8/29/23 11:26 AM   DISCHARGE DATE: 9/9/2023  2:45 PM   DISPOSITION:Non [facility] SNF/TCU/SNU    IMPORTANT INFORMATION:  Send all requests for admission clinical reviews, approved or denied determinations and any other requests to dedicated fax number below belonging to the campus where the patient is receiving treatment.  List of dedicated fax numbers:  [place] DENIALS (Administrative/Medical Necessity) [phone] [address] [facility] (Maternity/NICU/Pediatrics) [phone]   [facility] [phone]   [facility] [phone] [address] [facility] [phone]   [address] [place] [address] [phone]   [facility] [phone]   [address] [place] [address] [phone]   [address] [place] [address] [phone] [address] [place] [address] [phone] [address] [facility] 3000 32[facility] [phone]

## 2023-10-09 ENCOUNTER — REMOTE DEVICE CLINIC VISIT (OUTPATIENT)
Dept: CARDIOLOGY CLINIC | Facility: CLINIC | Age: 88
End: 2023-10-09
Payer: COMMERCIAL

## 2023-10-09 DIAGNOSIS — Z95.818 PRESENCE OF OTHER CARDIAC IMPLANTS AND GRAFTS: Primary | ICD-10-CM

## 2023-10-09 PROCEDURE — G2066 INTER DEVC REMOTE 30D: HCPCS | Performed by: INTERNAL MEDICINE

## 2023-10-09 PROCEDURE — 93298 REM INTERROG DEV EVAL SCRMS: CPT | Performed by: INTERNAL MEDICINE

## 2023-10-10 ENCOUNTER — OFFICE VISIT (OUTPATIENT)
Dept: NEUROLOGY | Facility: CLINIC | Age: 88
End: 2023-10-10
Payer: COMMERCIAL

## 2023-10-10 VITALS
WEIGHT: 212 LBS | TEMPERATURE: 98.2 F | HEIGHT: 69 IN | SYSTOLIC BLOOD PRESSURE: 115 MMHG | DIASTOLIC BLOOD PRESSURE: 64 MMHG | HEART RATE: 97 BPM | BODY MASS INDEX: 31.4 KG/M2

## 2023-10-10 DIAGNOSIS — I42.0 DILATED CARDIOMYOPATHY (HCC): ICD-10-CM

## 2023-10-10 DIAGNOSIS — I10 HTN (HYPERTENSION): Primary | ICD-10-CM

## 2023-10-10 DIAGNOSIS — Z86.73 HISTORY OF TIA (TRANSIENT ISCHEMIC ATTACK): ICD-10-CM

## 2023-10-10 PROCEDURE — 99205 OFFICE O/P NEW HI 60 MIN: CPT | Performed by: PSYCHIATRY & NEUROLOGY

## 2023-10-10 RX ORDER — MIRABEGRON 25 MG/1
25 TABLET, FILM COATED, EXTENDED RELEASE ORAL
COMMUNITY
Start: 2023-10-03

## 2023-10-10 RX ORDER — POLYETHYLENE GLYCOL 3350 17 G/17G
POWDER ORAL
COMMUNITY
Start: 2023-09-18

## 2023-10-10 RX ORDER — BISMUTH TRIBROMOPH/PETROLATUM 5"X9"
BANDAGE TOPICAL
COMMUNITY
Start: 2023-10-02

## 2023-10-10 RX ORDER — DOXYCYCLINE HYCLATE 100 MG/1
100 CAPSULE ORAL EVERY 12 HOURS
COMMUNITY
Start: 2023-09-30

## 2023-10-10 NOTE — PROGRESS NOTES
Patient ID: Wing Sandoval is a 80 y.o. male who presents to the [address] [place] [address]. Assessment/Plan:   Patient Instructions   History of TIA: Blood presents for a follow-up evaluation with regard to a TIA he had approximately 1 year ago described as aphasia. Subsequent to that TIA he was found to have atrial fibrillation and cardiomyopathy for which reason he was transition from aspirin/Plavix to Eliquis. He had 1 additional episode which was more confusion in January of this year, and then most recently had a hospitalization for left upper extremity cellulitis during which time he did experience delirium which is not surprising. His neurologic exam is quite good in the office today.  -For ongoing stroke prevention I agree with his combination of full dose Eliquis and Zetia  -We will continue to avoid statin medications for him  -I would recommend his blood pressure should be checked reasonably frequently, ideally at least 2 or maybe 3 times per week, and we should be targeting numbers less than 130/80 most of the time  -I absolutely agree that he will benefit from ongoing physical and occupational therapy and would like for him to continue to be both physically and socially active inasmuch as he feels capable of doing so  -He is not in need of repeat imaging at this time  -In the future if his kidney function were ever to worsen where his creatinine is greater than 1.5 or if his weight were to go down so that he weighs less than 65 kg he would be a candidate to be on the low-dose Eliquis, but otherwise he should remain on 5 mg twice per day  -We had a discussion in the office with regard to pain control and if he does have significant nerve pain it would not be unreasonable to consider a baseline dose of gabapentin or a slow increase in the dose. Ideally we would avoid tramadol if possible.   Tylenol 1000 mg every 6 hours as needed could be substituted potentially    I will plan for him to return to the office to see us in approximately 8 months however if he is doing exceptionally well with no additional episodes and prefers to just work with his primary care team for monitoring of his risk factors that is reasonable. If he were to have strokelike symptoms such as sudden painless loss of vision or double vision, difficulty speaking or swallowing, vertigo/room spinning that does not quickly resolve, or sudden weakness/numbness/loss of coordination affecting 1 side of the face or body he should proceed by ambulance to the nearest emergency room immediately. Similarly if he were to fall and strike his head or to suddenly have the worst headache of his life he should also be seen at the nearest emergency room by ambulance. Diagnoses and all orders for this visit:    HTN (hypertension)    Dilated cardiomyopathy ([address])    History of TIA (transient ischemic attack)    Other orders  -     doxycycline hyclate (VIBRAMYCIN) 100 mg capsule; Take 100 mg by mouth every 12 (twelve) hours  -     Bismuth Tribromoph-Petrolatum (Xeroform Petrolat Gauze 5"x9") MISC  -     Myrbetriq 25 MG TB24; Take 25 mg by mouth daily at bedtime  -     polyethylene glycol (GLYCOLAX) 17 GM/SCOOP;  (Patient not taking: Reported on 10/10/2023)           Subjective:    MEGAN Wren is a 80 y.o. male who presents for evaluation of Transient ischemic attack. He was seen initially in the hospital in October 2022 at which time he was described to have an episode of transient aphasia. There was concern for transient ischemic attack at that time. The MRI did show relatively significant microvascular disease which I was able to review today and agree. Subsequently an implantable loop recorder was placed and he was diagnosed with atrial fibrillation. At that time he was transitioned from antiplatelet medications to Eliquis and has remained on it ever since.   In January 2023 after that transition he had an episode of transient confusion. This is less certain with regard to TIA as an etiology. Notably he also has cardiomyopathy with an ejection fraction of 20%. This also represents a possible embolic source. He currently resides in a nursing home setting. He is in a wheelchair most of the time for the last few years but is actually working with physical and Occupational Therapy on a daily basis to work towards getting back to ambulating with a walker or better. He reports that he does have some overnight awakenings as result of pain. He notes that he does have pain on a regular basis but that this does not prevent him from doing things he wants to most of the time. He uses Voltaren gel intermittently which I did caution him about with regard to increasing bleeding risk. He is also prescribed gabapentin and tramadol. Ideally we would prefer to avoid tramadol as this could bring on episodes of encephalopathy and can increase the risk for seizure in some patients. He also had questions about his overall medication regimen and I agree that a full review to determine which medications are appropriate/necessary for him would be reasonable. He reports some easy bruising but no severe bleeding issues.     Stroke risk factors were evaluated including:   Lab Results   Component Value Date/Time    CHOLESTEROL 132 01/27/2023 04:24 AM     Lab Results   Component Value Date/Time    TRIG 69 01/27/2023 04:24 AM     Lab Results   Component Value Date/Time    HDL 66 01/27/2023 04:24 AM     Lab Results   Component Value Date/Time    LDLCALC 52 01/27/2023 04:24 AM       Lab Results   Component Value Date/Time    HGBA1C 6.6 (H) 09/05/2023 11:42 AM     Lab Results   Component Value Date/Time     09/05/2023 11:42 AM           Past Medical History:   Diagnosis Date   • GERD (gastroesophageal reflux disease)    • Gout    • Hyperlipidemia    • Hypertension    • 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[address])    • Spinal stenosis        Current Outpatient Medications:   •  acetaminophen (TYLENOL) 650 mg CR tablet, Take 650 mg by mouth 2 (two) times a day, Disp: , Rfl:   •  allopurinol (ZYLOPRIM) 100 mg tablet, Take 400 mg by mouth daily, Disp: , Rfl:   •  apixaban (Eliquis) 5 mg, Take 1 tablet (5 mg total) by mouth 2 (two) times a day, Disp: 180 tablet, Rfl: 3  •  Betadine 10 % external solution, , Disp: , Rfl:   •  Bismuth Tribromoph-Petrolatum (Xeroform Petrolat Gauze 5"x9") MISC, , Disp: , Rfl:   •  Cholecalciferol (Vitamin D3) 50 MCG (2000 UT) TABS, , Disp: , Rfl:   •  Diclofenac Sodium (VOLTAREN) 1 %, Apply 4 g topically 4 (four) times a day, Disp: , Rfl:   •  diphenhydrAMINE-zinc acetate (BENADRYL) cream, Apply topically 3 (three) times a day as needed for itching, Disp: , Rfl: 0  •  doxycycline hyclate (VIBRAMYCIN) 100 mg capsule, Take 100 mg by mouth every 12 (twelve) hours, Disp: , Rfl:   •  ezetimibe (ZETIA) 10 mg tablet, Take 10 mg by mouth daily, Disp: , Rfl:   •  gabapentin (NEURONTIN) 100 mg capsule, Take 100 mg by mouth 2 (two) times a day, Disp: , Rfl:   •  hydroxychloroquine (PLAQUENIL) 200 mg tablet, Take 200 mg by mouth 2 (two) times a day with meals, Disp: , Rfl:   •  levothyroxine 25 mcg tablet, , Disp: , Rfl:   •  loperamide (IMODIUM) 2 mg capsule, Take 2 mg by mouth as needed for diarrhea, Disp: , Rfl:   •  metoprolol succinate (TOPROL-XL) 25 mg 24 hr tablet, Take 1 tablet (25 mg total) by mouth 2 (two) times a day, Disp: 180 tablet, Rfl: 3  •  Multiple Vitamin (multivitamin) tablet, Take 1 tablet by mouth daily, Disp: , Rfl:   •  Myrbetriq 25 MG TB24, Take 25 mg by mouth daily at bedtime, Disp: , Rfl:   •  nitroglycerin (NITROSTAT) 0.4 mg SL tablet, Place 0.4 mg under the tongue every 5 (five) minutes as needed for chest pain, Disp: , Rfl:   •  nystatin (MYCOSTATIN) powder, Apply topically 2 (two) times a day, Disp: 15 g, Rfl: 0  •  omeprazole (PriLOSEC) 10 mg delayed release capsule, Take 10 mg by mouth daily, Disp: , Rfl:   •  polyethylene glycol (MIRALAX) 17 g packet, Take 17 g by mouth every other day, Disp: 30 each, Rfl: 2  •  potassium chloride (K-DUR,KLOR-CON) 20 mEq tablet, Take 2 tablets (40 mEq total) by mouth daily Do not start before January 29, 2023., Disp: 30 tablet, Rfl: 0  •  predniSONE 5 mg tablet, Take by mouth daily, Disp: , Rfl:   •  tamsulosin (FLOMAX) 0.4 mg, Take 0.4 mg by mouth daily at bedtime, Disp: , Rfl:   •  torsemide (DEMADEX) 10 mg tablet, Take 1 tablet (10 mg total) by mouth 4 (four) times a week Take 10 mg on Tuesday, Thursday, Saturday, and Sunday, Disp: 30 tablet, Rfl: 3  •  torsemide (DEMADEX) 20 mg tablet, Take 1 tablet (20 mg total) by mouth 3 (three) times a week Take 20 mg on Monday, Wednesday, and Friday, Disp: 30 tablet, Rfl: 3  •  traMADol (ULTRAM) 50 mg tablet, Take 1 tablet (50 mg total) by mouth every 6 (six) hours as needed for moderate pain or severe pain, Disp: 15 tablet, Rfl: 0  •  Vitamin D, Cholecalciferol, 25 MCG (1000 UT) TABS, Take by mouth in the morning, Disp: , Rfl:   •  melatonin 3 mg, Take 3 tablets (9 mg total) by mouth daily at bedtime (Patient not taking: Reported on 8/24/2023), Disp: 30 tablet, Rfl: 0  •  polyethylene glycol (GLYCOLAX) 17 GM/SCOOP, , Disp: , Rfl:   •  Saccharomyces boulardii (Probiotic) 250 MG CAPS, , Disp: , Rfl:      Objective:    Blood pressure 115/64, pulse 97, temperature 98.2 °F (36.8 °C), temperature source Temporal, height 5' 9" (1.753 m), weight 96.2 kg (212 lb). Neurological Exam    Neurologic exam:  Awake and alert with appropriate mental status and language function. Cranial nerves II-XII were symmetrically intact bilaterally. Motor testing reveals some downward drift in both arms and weakness at the level of the left deltoid which is potentially related to shoulder issues. Strength at the biceps, triceps,  is roughly 4+/5, similarly at the bilateral psoas at 4/5 and bilateral quadriceps at 4+/5, bilateral tibialis anterior 4/5.   He presented in a wheelchair. Sensation is intact to light touch in the bilateral upper and lower extremities. Coordination testing is unremarkable with no ataxia on finger to nose. There were ecchymosis on the dorsal surface of both hands right greater than left with edema of the distal right upper extremity and a more proximal slight compression sleeve in place. ROS:    Review of Systems   Constitutional: Negative for appetite change, fatigue and fever. HENT: Negative. Negative for hearing loss, tinnitus, trouble swallowing and voice change. Eyes: Negative. Negative for photophobia, pain and visual disturbance. Respiratory: Negative. Negative for shortness of breath. Cardiovascular: Negative. Negative for palpitations. Gastrointestinal: Negative. Negative for nausea and vomiting. Endocrine: Negative. Negative for cold intolerance. Genitourinary: Negative. Negative for dysuria, frequency and urgency. Musculoskeletal: Negative for back pain, gait problem, myalgias and neck pain. Skin: Negative. Negative for rash. Allergic/Immunologic: Negative. Neurological: Negative. Negative for dizziness, tremors, seizures, syncope, facial asymmetry, speech difficulty, weakness, light-headedness, numbness and headaches. Hematological: Negative. Does not bruise/bleed easily. Psychiatric/Behavioral: Negative. Negative for confusion, hallucinations and sleep disturbance. I have spent a total time of 61 minutes on 10/10/23 in caring for this patient including Diagnostic results, Prognosis, Risks and benefits of tx options, Instructions for management, Patient and family education, Importance of tx compliance, Risk factor reductions, Impressions, Counseling / Coordination of care, Documenting in the medical record, Reviewing / ordering tests, medicine, procedures   and Obtaining or reviewing history  .

## 2023-10-10 NOTE — PATIENT INSTRUCTIONS
History of TIA: Blood presents for a follow-up evaluation with regard to a TIA he had approximately 1 year ago described as aphasia. Subsequent to that TIA he was found to have atrial fibrillation and cardiomyopathy for which reason he was transition from aspirin/Plavix to Eliquis. He had 1 additional episode which was more confusion in January of this year, and then most recently had a hospitalization for left upper extremity cellulitis during which time he did experience delirium which is not surprising. His neurologic exam is quite good in the office today.  -For ongoing stroke prevention I agree with his combination of full dose Eliquis and Zetia  -We will continue to avoid statin medications for him  -I would recommend his blood pressure should be checked reasonably frequently, ideally at least 2 or maybe 3 times per week, and we should be targeting numbers less than 130/80 most of the time  -I absolutely agree that he will benefit from ongoing physical and occupational therapy and would like for him to continue to be both physically and socially active inasmuch as he feels capable of doing so  -He is not in need of repeat imaging at this time  -In the future if his kidney function were ever to worsen where his creatinine is greater than 1.5 or if his weight were to go down so that he weighs less than 65 kg he would be a candidate to be on the low-dose Eliquis, but otherwise he should remain on 5 mg twice per day  -We had a discussion in the office with regard to pain control and if he does have significant nerve pain it would not be unreasonable to consider a baseline dose of gabapentin or a slow increase in the dose. Ideally we would avoid tramadol if possible.   Tylenol 1000 mg every 6 hours as needed could be substituted potentially    I will plan for him to return to the office to see us in approximately 8 months however if he is doing exceptionally well with no additional episodes and prefers to just work with his primary care team for monitoring of his risk factors that is reasonable. If he were to have strokelike symptoms such as sudden painless loss of vision or double vision, difficulty speaking or swallowing, vertigo/room spinning that does not quickly resolve, or sudden weakness/numbness/loss of coordination affecting 1 side of the face or body he should proceed by ambulance to the nearest emergency room immediately. Similarly if he were to fall and strike his head or to suddenly have the worst headache of his life he should also be seen at the nearest emergency room by ambulance.

## 2023-10-10 NOTE — PROGRESS NOTES
Results for orders placed or performed in visit on 10/09/23   Cardiac EP device report    Narrative    [facility] - ACTIVE SYSTEM IS MRI CONDITIONAL  MERLIN TRANSMISSION: BATTERY VOLTAGE ADEQUATE (GOOD). PRESENTING RHYTHM NS 70 BPM. 1 DEVICE CLASSIFIED AF EPISODE WITH EGM SHOWING PROBABLE AF LASTING FOR 2 MINS 7 SECS. TAKES ELIQUIS & METOPROLOL SUCC. NORMAL DEVICE FUNCTION.  AM/GV

## 2023-10-12 ENCOUNTER — TELEPHONE (OUTPATIENT)
Dept: NEUROLOGY | Facility: CLINIC | Age: 88
End: 2023-10-12

## 2023-10-12 NOTE — TELEPHONE ENCOUNTER
Nurse Oziel Escobar from patient's nursing home facility called in for some clarification in regards to patient's medication tramadol, if patient should or should not continue. I reiterated what Dr Braulio Shah recommendations were  to avoid medication if possible and she understood.

## 2023-10-13 NOTE — TELEPHONE ENCOUNTER
10/12/23 at 10:50    This is Kerwin Krause. I am calling for my dad Tonia Mckeon. His birth date is September 30th. 1934. We saw the Dr Jen Polanco, I guess his name is on the 10th of October, and I just need to get the his  request for holding the tramadol directly to them by their fax number at the [facility] in [facility]. If you could give me a call back,  I can give you their fax number. My name is Tali Dukes, his son. Phone number is area code [phone]. Thank you.

## 2023-10-13 NOTE — TELEPHONE ENCOUNTER
Pts son called in to check status of stopping medication for pt. I let him know it was being worked on.

## 2023-10-16 NOTE — TELEPHONE ENCOUNTER
Last visit Dr. Jen Polanco 10/10; note documents:   -We had a discussion in the office with regard to pain control and if he does have significant nerve pain it would not be unreasonable to consider a baseline dose of gabapentin or a slow increase in the dose. Ideally we would avoid tramadol if possible. Tylenol 1000 mg every 6 hours as needed could be substituted potentially. Please see communication thread; patient's son is asking for documentation to hold tramadol. Please provide input (I can generate letter and fax to [address] if needed). Thank you.

## 2023-10-17 NOTE — TELEPHONE ENCOUNTER
Recd vm 10/13 taken off 10/17    Kerwin calling for his father Wing Sandoval, [birth year]. Gave fax # to send tramadol hold to [address] [place] [address]; fax #[phone].    [phone]

## 2023-10-19 NOTE — TELEPHONE ENCOUNTER
Letter generated and faxed to Oralia Dyer dilia's son, Jim Owen and left a message on his voice mail making him aware this was done.

## 2023-10-19 NOTE — TELEPHONE ENCOUNTER
Drew Liriano MD  Neurology [facility] Clinical Team 42 days ago       So this medicine was stopped I believe at the time of our recent visit. Beyond reprinting and sending the after visit summary with the order to stop tramadol highlighted it may just require a brief letter/note that says that he is a patient under my professional care and at this time I have asked that tramadol be discontinued. Thanks!

## 2023-10-25 ENCOUNTER — OFFICE VISIT (OUTPATIENT)
Dept: CARDIOLOGY CLINIC | Facility: CLINIC | Age: 88
End: 2023-10-25
Payer: COMMERCIAL

## 2023-10-25 VITALS
BODY MASS INDEX: 32.58 KG/M2 | OXYGEN SATURATION: 95 % | SYSTOLIC BLOOD PRESSURE: 120 MMHG | HEIGHT: 69 IN | WEIGHT: 220 LBS | HEART RATE: 84 BPM | DIASTOLIC BLOOD PRESSURE: 64 MMHG

## 2023-10-25 DIAGNOSIS — I50.42 CHRONIC COMBINED SYSTOLIC AND DIASTOLIC CONGESTIVE HEART FAILURE (HCC): Primary | ICD-10-CM

## 2023-10-25 DIAGNOSIS — G45.9 TIA (TRANSIENT ISCHEMIC ATTACK): ICD-10-CM

## 2023-10-25 DIAGNOSIS — I48.0 PAF (PAROXYSMAL ATRIAL FIBRILLATION) (HCC): ICD-10-CM

## 2023-10-25 PROCEDURE — 99214 OFFICE O/P EST MOD 30 MIN: CPT | Performed by: INTERNAL MEDICINE

## 2023-10-25 RX ORDER — FLUTICASONE PROPIONATE 50 MCG
SPRAY, SUSPENSION (ML) NASAL
COMMUNITY
Start: 2023-10-14

## 2023-10-25 NOTE — PROGRESS NOTES
Cardiology Follow Up    Zay Brooks  1934  [id number]  [address] [place] [address] [facility] [place] [address] [address] [phone]    1. Chronic combined systolic and diastolic congestive heart failure ([address])        2. TIA (transient ischemic attack)        3. PAF (paroxysmal atrial fibrillation) (Formerly [facility])            Interval History: Followup for afib and CHF. Symptoms stable. No significant LE edema. He has some right hand edema. No chest pain. Adherent with medical therapy.      Medical Problems       Problem List       Accidental overdose    History of hypertension    Hypotension    RA (rheumatoid arthritis) (Formerly [facility])    Speech disturbance    History of TIA (transient ischemic attack)    Low left ventricular ejection fraction    Overview Signed 10/11/2022 12:47 PM by Lena Altman MD     Echo done 10/10/22 showed low EF of 20 %         Dilated cardiomyopathy ([address])    HTN (hypertension)    Chronic systolic heart failure ([address])    Wt Readings from Last 3 Encounters:   10/25/23 99.8 kg (220 lb)   10/10/23 96.2 kg (212 lb)   09/09/23 96.3 kg (212 lb 4.9 oz)                 Transient speech disturbance    KAMLESH (acute kidney injury) ([address])    Elevated TSH    Hypomagnesemia    Septic arthritis of shoulder, right ([facility]ly [facility])    Suture of skin wound    Adverse effect of loop (high-ceiling) diuretics, subsequent encounter    Toxic metabolic encephalopathy    Open wound of right great toe with damage to nail    Cellulitis of left upper extremity    HLD (hyperlipidemia)    GERD (gastroesophageal reflux disease)    Gout without acute exacerbation     Overview Deleted 8/29/2023  2:35 PM by ESTEFANIA Urbano            Spinal stenosis    Hypothyroidism    Peripheral vascular disease ([address])    Bacteremia due to group B Streptococcus    Respiratory insufficiency        Past Medical History:   Diagnosis Date    GERD (gastroesophageal reflux disease)     Gout     Hyperlipidemia     Hypertension     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[address])     Spinal stenosis      Social History     Socioeconomic History    Marital status:      Spouse name: Not on file    Number of children: Not on file    Years of education: Not on file    Highest education level: Not on file   Occupational History    Not on file   Tobacco Use    Smoking status: Never    Smokeless tobacco: Never   Vaping Use    Vaping Use: Never used   Substance and Sexual Activity    Alcohol use: Yes     Alcohol/week: 2.0 standard drinks of alcohol     Types: 2 Shots of liquor per week     Comment: 2-3 ounces of whiskey a day    Drug use: Yes     Types: Marijuana    Sexual activity: Not Currently   Other Topics Concern    Not on file   Social History Narrative    Not on file     Social Determinants of Health     Financial Resource Strain: Not on file   Food Insecurity: No Food Insecurity (8/30/2023)    Hunger Vital Sign     Worried About Running Out of Food in the Last Year: Never true     Ran Out of Food in the Last Year: Never true   Transportation Needs: No Transportation Needs (8/30/2023)    PRAPARE - Transportation     Lack of Transportation (Medical): No     Lack of Transportation (Non-Medical): No   Physical Activity: Not on file   Stress: Not on file   Social Connections: Not on file   Intimate Partner Violence: Not on file   Housing Stability: Low Risk  (8/30/2023)    Housing Stability Vital Sign     Unable to Pay for Housing in the Last Year: No     Number of Places Lived in the Last Year: 1     Unstable Housing in the Last Year: No      Family History   Problem Relation Age of Onset    Colon polyps Neg Hx     Colon cancer Neg Hx      Past Surgical History:   Procedure Laterality Date    CARDIAC ELECTROPHYSIOLOGY PROCEDURE N/A 12/17/2022    Procedure: Cardiac loop recorder implant;  Surgeon: Maureen Harrison MD;  Location: [facility];   Service: Cardiology    CARPAL TUNNEL RELEASE Bilateral     COLONOSCOPY      LAMINECTOMY      TOTAL KNEE ARTHROPLASTY Bilateral     TOTAL SHOULDER REPLACEMENT         Current Outpatient Medications:     acetaminophen (TYLENOL) 650 mg CR tablet, Take 650 mg by mouth 2 (two) times a day, Disp: , Rfl:     allopurinol (ZYLOPRIM) 100 mg tablet, Take 400 mg by mouth daily, Disp: , Rfl:     apixaban (Eliquis) 5 mg, Take 1 tablet (5 mg total) by mouth 2 (two) times a day, Disp: 180 tablet, Rfl: 3    Betadine 10 % external solution, , Disp: , Rfl:     Bismuth Tribromoph-Petrolatum (Xeroform Petrolat Gauze 5"x9") MISC, , Disp: , Rfl:     Diclofenac Sodium (VOLTAREN) 1 %, Apply 4 g topically 4 (four) times a day, Disp: , Rfl:     doxycycline hyclate (VIBRAMYCIN) 100 mg capsule, Take 100 mg by mouth every 12 (twelve) hours, Disp: , Rfl:     ezetimibe (ZETIA) 10 mg tablet, Take 10 mg by mouth daily, Disp: , Rfl:     fluticasone (FLONASE) 50 mcg/act nasal spray, , Disp: , Rfl:     gabapentin (NEURONTIN) 100 mg capsule, Take 100 mg by mouth 2 (two) times a day, Disp: , Rfl:     hydroxychloroquine (PLAQUENIL) 200 mg tablet, Take 200 mg by mouth 2 (two) times a day with meals, Disp: , Rfl:     levothyroxine 25 mcg tablet, , Disp: , Rfl:     loperamide (IMODIUM) 2 mg capsule, Take 2 mg by mouth as needed for diarrhea, Disp: , Rfl:     metoprolol succinate (TOPROL-XL) 25 mg 24 hr tablet, Take 1 tablet (25 mg total) by mouth 2 (two) times a day, Disp: 180 tablet, Rfl: 3    Myrbetriq 25 MG TB24, Take 25 mg by mouth daily at bedtime, Disp: , Rfl:     nitroglycerin (NITROSTAT) 0.4 mg SL tablet, Place 0.4 mg under the tongue every 5 (five) minutes as needed for chest pain, Disp: , Rfl:     omeprazole (PriLOSEC) 10 mg delayed release capsule, Take 10 mg by mouth daily, Disp: , Rfl:     polyethylene glycol (MIRALAX) 17 g packet, Take 17 g by mouth every other day, Disp: 30 each, Rfl: 2    potassium chloride (K-DUR,KLOR-CON) 20 mEq tablet, Take 2 tablets (40 mEq total) by mouth daily Do not start before January 29, 2023., Disp: 30 tablet, Rfl: 0    predniSONE 5 mg tablet, Take by mouth daily, Disp: , Rfl:     tamsulosin (FLOMAX) 0.4 mg, Take 0.4 mg by mouth daily at bedtime, Disp: , Rfl:     torsemide (DEMADEX) 10 mg tablet, Take 1 tablet (10 mg total) by mouth 4 (four) times a week Take 10 mg on Tuesday, Thursday, Saturday, and Sunday, Disp: 30 tablet, Rfl: 3    torsemide (DEMADEX) 20 mg tablet, Take 1 tablet (20 mg total) by mouth 3 (three) times a week Take 20 mg on Monday, Wednesday, and Friday, Disp: 30 tablet, Rfl: 3    Cholecalciferol (Vitamin D3) 50 MCG (2000 UT) TABS, , Disp: , Rfl:     diphenhydrAMINE-zinc acetate (BENADRYL) cream, Apply topically 3 (three) times a day as needed for itching (Patient not taking: Reported on 10/25/2023), Disp: , Rfl: 0    melatonin 3 mg, Take 3 tablets (9 mg total) by mouth daily at bedtime (Patient not taking: Reported on 8/24/2023), Disp: 30 tablet, Rfl: 0    Multiple Vitamin (multivitamin) tablet, Take 1 tablet by mouth daily (Patient not taking: Reported on 10/25/2023), Disp: , Rfl:     nystatin (MYCOSTATIN) powder, Apply topically 2 (two) times a day (Patient not taking: Reported on 10/25/2023), Disp: 15 g, Rfl: 0    polyethylene glycol (GLYCOLAX) 17 GM/SCOOP, , Disp: , Rfl:     Saccharomyces boulardii (Probiotic) 250 MG CAPS, , Disp: , Rfl:     traMADol (ULTRAM) 50 mg tablet, Take 1 tablet (50 mg total) by mouth every 6 (six) hours as needed for moderate pain or severe pain (Patient not taking: Reported on 10/25/2023), Disp: 15 tablet, Rfl: 0    Vitamin D, Cholecalciferol, 25 MCG (1000 UT) TABS, Take by mouth in the morning (Patient not taking: Reported on 10/25/2023), Disp: , Rfl:   Allergies   Allergen Reactions    Colchicine Other (See Comments)     Flushing      Niacin Other (See Comments)     flushed    Statins        Labs:     Chemistry        Component Value Date/Time    K 4.4 09/09/2023 1001    CL 97 09/08/2023 0518    CO2 34 (H) 09/08/2023 0518    CO2 34 (H) 09/03/2023 0555    BUN 23 09/08/2023 0518    CREATININE 1.26 09/08/2023 0518        Component Value Date/Time    CALCIUM 9.3 09/08/2023 0518    ALKPHOS 47 09/03/2023 0219    AST 17 09/03/2023 0219    ALT 11 09/03/2023 0219            No results found for: "CHOL"  Lab Results   Component Value Date    HDL 66 01/27/2023    HDL 70 10/09/2022     Lab Results   Component Value Date    LDLCALC 52 01/27/2023    LDLCALC 50 10/09/2022     Lab Results   Component Value Date    TRIG 69 01/27/2023    TRIG 138 10/09/2022     No results found for: "CHOLHDL"    Imaging: Cardiac EP device report    Result Date: 10/18/2023  Narrative: [facility] - ACTIVE SYSTEM IS MRI CONDITIONAL NB MERLIN TRANSMISSION:  BATTERY VOLTAGE ADEQUATE. SINCE 10-9-23;  19 DEVICE CLASSIFIED AF EPISODES WITH AVAILABLE EGMS SHOWING PROBABLE SR WITH PACS, PVCS, AND UNDERSENSING. LONGEST EPISODE 20 M 32 S.  12 TACHY EPISODES WITH 3 AVAILABLE EGMS SHOWING SVT WITH -176 BPM.  2 CARLOS EPISODES WITH EGMS SHOWING UNDERSENNSING. PATIENT TAKES ELIQUIS AND METOPROLOL. NORMAL DEVICE FUNCTION. RG     Cardiac EP device report    Result Date: 10/9/2023  Narrative: [facility] - ACTIVE SYSTEM IS MRI CONDITIONAL MERLIN TRANSMISSION: BATTERY VOLTAGE ADEQUATE (GOOD). PRESENTING RHYTHM NS 70 BPM. 1 DEVICE CLASSIFIED AF EPISODE WITH EGM SHOWING PROBABLE AF LASTING FOR 2 MINS 7 SECS. TAKES ELIQUIS & METOPROLOL SUCC. NORMAL DEVICE FUNCTION. AM/GV       . Review of Systems   Constitutional: Positive for malaise/fatigue. HENT: Negative. Eyes: Negative. Cardiovascular: Negative. Respiratory: Negative. Endocrine: Negative. Hematologic/Lymphatic: Negative. Skin: Negative. Musculoskeletal:  Positive for arthritis and back pain. Gastrointestinal: Negative. Genitourinary: Negative. Neurological: Negative. Psychiatric/Behavioral: Negative. Allergic/Immunologic: Negative.         Vitals:    10/25/23 1034   BP: 120/64   Pulse: 84   SpO2: 95%           Physical Exam  Vitals reviewed. Constitutional:       Appearance: Normal appearance. HENT:      Head: Normocephalic. Nose: Nose normal.      Mouth/Throat:      Mouth: Mucous membranes are moist.      Pharynx: Oropharynx is clear. Eyes:      General: No scleral icterus. Conjunctiva/sclera: Conjunctivae normal.   Cardiovascular:      Rate and Rhythm: Normal rate and regular rhythm. Heart sounds: No murmur heard. No friction rub. No gallop. Pulmonary:      Effort: Pulmonary effort is normal. No respiratory distress. Breath sounds: Normal breath sounds. No wheezing or rales. Abdominal:      General: Abdomen is flat. Bowel sounds are normal. There is no distension. Palpations: Abdomen is soft. Tenderness: There is no abdominal tenderness. There is no guarding. Musculoskeletal:      Cervical back: Normal range of motion and neck supple. Right lower leg: No edema. Left lower leg: No edema. Skin:     General: Skin is warm and dry. Neurological:      General: No focal deficit present. Mental Status: He is alert and oriented to person, place, and time. Psychiatric:         Mood and Affect: Mood normal.         Behavior: Behavior normal.         Discussion/Summary:    Heart Failure with reduced LVEF. EF improved to 45%. Continue torsemide at current dosing. He is euvolemic today. Hx of TIA  PAF: He has had PAF on his loop. Continue metoprolol and continue Eliquis. The patient was counseled regarding diagnostic results, instructions for management, risk factor reductions, impressions. total time of encounter was 25 minutes and 15 minutes was spent counseling.

## 2023-11-01 ENCOUNTER — HOSPITAL ENCOUNTER (OUTPATIENT)
Dept: NON INVASIVE DIAGNOSTICS | Facility: HOSPITAL | Age: 88
Discharge: HOME | End: 2023-11-01
Payer: COMMERCIAL

## 2023-11-01 DIAGNOSIS — M79.641 RIGHT HAND PAIN: ICD-10-CM

## 2023-11-01 DIAGNOSIS — R60.0 HAND EDEMA: ICD-10-CM

## 2023-11-01 PROCEDURE — 93971 EXTREMITY STUDY: CPT

## 2023-11-01 PROCEDURE — 93971 EXTREMITY STUDY: CPT | Performed by: SURGERY

## 2023-11-03 ENCOUNTER — TELEPHONE (OUTPATIENT)
Age: 88
End: 2023-11-03

## 2023-11-03 NOTE — TELEPHONE ENCOUNTER
Caller: Patient Son    Doctor: Sadie Bullock    Reason for call: Is concerned as patient toes are discolored and his removed nail is growing back - painfully. Can someone give him a call?       Call back#: [phone]

## 2023-11-09 ENCOUNTER — OFFICE VISIT (OUTPATIENT)
Dept: WOUND CARE | Facility: HOSPITAL | Age: 88
End: 2023-11-09
Payer: COMMERCIAL

## 2023-11-09 VITALS
HEIGHT: 68 IN | DIASTOLIC BLOOD PRESSURE: 70 MMHG | BODY MASS INDEX: 33.34 KG/M2 | TEMPERATURE: 96.4 F | HEART RATE: 66 BPM | WEIGHT: 220 LBS | RESPIRATION RATE: 16 BRPM | SYSTOLIC BLOOD PRESSURE: 118 MMHG

## 2023-11-09 DIAGNOSIS — M79.674 PAIN OF RIGHT GREAT TOE: Primary | ICD-10-CM

## 2023-11-09 PROCEDURE — 99202 OFFICE O/P NEW SF 15 MIN: CPT | Performed by: NURSE PRACTITIONER

## 2023-11-09 PROCEDURE — 99213 OFFICE O/P EST LOW 20 MIN: CPT | Performed by: NURSE PRACTITIONER

## 2023-11-09 NOTE — PROGRESS NOTES
Patient ID: Brinda Kimble is a 80 y.o. male Date of Birth 1934     Chief Complaint  Chief Complaint   Patient presents with    New Patient Visit     Wound care    No Wound Consult     No open wound       Allergies  Colchicine, Niacin, and Statins    Assessment:     Diagnoses and all orders for this visit:    Pain of right great toe  -     Wound cleansing and dressings; Future          Plan:  1. Initial visit. No wound consult. Right great toe nailbed is fully epithelialized. Right great toe not showing any signs or symptoms of infection. Counseled patient to follow-up with both his PCP and Dr. Trudy Lincoln for evaluation of pain in his right great toe. Pain could possibly be related to an exacerbation of gout? 2. Patient discharged from the wound center today. He may follow-up as needed     Wound 08/28/23 Traumatic Toe (Comment  which one) Right (Active)   Wound Image Images linked 11/09/23 1039   Wound Description Epithelialization 11/09/23 1038   Mylene-wound Assessment Intact 11/09/23 1038   Wound Length (cm) 0 cm 11/09/23 1038   Wound Width (cm) 0 cm 11/09/23 1038   Wound Depth (cm) 0 cm 11/09/23 1038   Wound Surface Area (cm^2) 0 cm^2 11/09/23 1038   Wound Volume (cm^3) 0 cm^3 11/09/23 1038   Calculated Wound Volume (cm^3) 0 cm^3 11/09/23 1038   Change in Wound Size % 100 11/09/23 1038   Drainage Amount None 11/09/23 1038   Non-staged Wound Description Not applicable 43/83/45 8909   Treatments Cleansed 11/09/23 1038   Wound packed?  No 11/09/23 1038   Dressing Changed Other (Comment) (no dressing) 11/09/23 1038   Patient Tolerance Tolerated well 11/09/23 1038       Wound 08/28/23 Traumatic Toe (Comment  which one) Right (Active)   Date First Assessed/Time First Assessed: 08/28/23 1433   Present on Original Admission: Yes  Primary Wound Type: Traumatic  Location: (c) Toe (Comment  which one)  Wound Location Orientation: Right       Wound 08/29/23 Incision  Neck Right (Active)   Date First Assessed/Time First Assessed: 08/29/23 1451   Present on Original Admission: Yes  Primary Wound Type: Incision  Traumatic Wound Type: (c)   Location: Neck  Wound Location Orientation: Right  Wound Description (Comments): status post biopsy       [REMOVED] Wound 08/29/23 Skin Tear Skin tear Hand Anterior;Right (Removed)   Resolved Date/Resolved Time: 11/09/23 1026  Date First Assessed/Time First Assessed: 08/29/23 1442   Present on Original Admission: Yes  Primary Wound Type: Skin Tear  Traumatic Wound Type: Skin tear  Location: Hand  Wound Location Orientation: Anteri. .. [REMOVED] Wound 08/30/23 Arm Left;Mid (Removed)   Resolved Date/Resolved Time: 11/09/23 1025  Date First Assessed/Time First Assessed: 08/30/23 1225   Location: Arm  Wound Location Orientation: Left;Mid  Wound Description (Comments): Antecubital area  Wound Outcome: (c) Other (Comment)       Subjective:      . Patient is an 51-year-old male who presents to the [facility] wound center for a no wound consult for evaluation of pain in his right great toe. Patient's son who is present with patient today reports that patient suffered an injury of his right great toenail which resulted in the toenail splitting in half. He was seen by Dr. Lizzy Perez in his office who removed the first half of his toenail. He reports approximately 1 week later the second half of his toenail fell off on its own. Patient reports he previously had an open wound after the toenail was removed however patient presents today with no open wound present. Patient still complains of increased pain in his right great toe. Patient's son reports he called Dr. Katya Veloz office in regards to his increased pain and was instructed to call the wound center for evaluation. Patient reports he has a history of gout for which he takes allopurinol. He reports he has been on the same dose for many years. He denies any fevers or chills.         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 He   Patient Active Problem List    Diagnosis Date Noted    Bacteremia due to group B Streptococcus 08/30/2023    Respiratory insufficiency 08/30/2023    Cellulitis of left upper extremity 08/29/2023    HLD (hyperlipidemia) 08/29/2023    GERD (gastroesophageal reflux disease) 08/29/2023    Gout without acute exacerbation  08/29/2023    Spinal stenosis 08/29/2023    Hypothyroidism 08/29/2023    Peripheral vascular disease ([address]) 08/29/2023    Open wound of right great toe with damage to nail 08/17/2023    Adverse effect of loop (high-ceiling) diuretics, subsequent encounter 19/44/7321    Toxic metabolic encephalopathy 77/95/7995    Septic arthritis of shoulder, right ([address]) 01/30/2023    Suture of skin wound 01/30/2023    Hypomagnesemia 01/28/2023    Elevated TSH 01/27/2023    Chronic systolic heart failure ([address]) 01/26/2023    Transient speech disturbance 01/26/2023    KAMLESH (acute kidney injury) ([address]) 01/26/2023    Dilated cardiomyopathy ([address]) 10/18/2022    HTN (hypertension) 10/18/2022    Low left ventricular ejection fraction 10/11/2022    History of TIA (transient ischemic attack) 10/10/2022    Speech disturbance 10/08/2022    Accidental overdose 08/28/2020    History of hypertension 08/28/2020    Hypotension 08/28/2020    RA (rheumatoid arthritis) ([address]) 08/28/2020     He  has a past surgical history that includes Laminectomy; Total shoulder replacement; Colonoscopy; Total knee arthroplasty (Bilateral); Carpal tunnel release (Bilateral); and Cardiac electrophysiology procedure (N/A, 12/17/2022). His family history is not on file. He  reports that he has never smoked. He has never used smokeless tobacco. He reports current alcohol use of about 2.0 standard drinks of alcohol per week. He reports current drug use.  Drug: Marijuana.   Current Outpatient Medications   Medication Sig Dispense Refill    acetaminophen (TYLENOL) 650 mg CR tablet Take 650 mg by mouth 2 (two) times a day      allopurinol (ZYLOPRIM) 100 mg tablet Take 400 mg by mouth daily      apixaban (Eliquis) 5 mg Take 1 tablet (5 mg total) by mouth 2 (two) times a day 180 tablet 3    Betadine 10 % external solution       Bismuth Tribromoph-Petrolatum (Xeroform Petrolat Gauze 5"x9") MISC       Cholecalciferol (Vitamin D3) 50 MCG (2000 UT) TABS  (Patient not taking: Reported on 10/25/2023)      Diclofenac Sodium (VOLTAREN) 1 % Apply 4 g topically 4 (four) times a day      diphenhydrAMINE-zinc acetate (BENADRYL) cream Apply topically 3 (three) times a day as needed for itching (Patient not taking: Reported on 10/25/2023)  0    doxycycline hyclate (VIBRAMYCIN) 100 mg capsule Take 100 mg by mouth every 12 (twelve) hours      ezetimibe (ZETIA) 10 mg tablet Take 10 mg by mouth daily      fluticasone (FLONASE) 50 mcg/act nasal spray       gabapentin (NEURONTIN) 100 mg capsule Take 100 mg by mouth 2 (two) times a day      hydroxychloroquine (PLAQUENIL) 200 mg tablet Take 200 mg by mouth 2 (two) times a day with meals      levothyroxine 25 mcg tablet       loperamide (IMODIUM) 2 mg capsule Take 2 mg by mouth as needed for diarrhea      melatonin 3 mg Take 3 tablets (9 mg total) by mouth daily at bedtime (Patient not taking: Reported on 8/24/2023) 30 tablet 0    metoprolol succinate (TOPROL-XL) 25 mg 24 hr tablet Take 1 tablet (25 mg total) by mouth 2 (two) times a day 180 tablet 3    Multiple Vitamin (multivitamin) tablet Take 1 tablet by mouth daily (Patient not taking: Reported on 10/25/2023)      Myrbetriq 25 MG TB24 Take 25 mg by mouth daily at bedtime      nitroglycerin (NITROSTAT) 0.4 mg SL tablet Place 0.4 mg under the tongue every 5 (five) minutes as needed for chest pain      nystatin (MYCOSTATIN) powder Apply topically 2 (two) times a day (Patient not taking: Reported on 10/25/2023) 15 g 0    omeprazole (PriLOSEC) 10 mg delayed release capsule Take 10 mg by mouth daily      polyethylene glycol (GLYCOLAX) 17 GM/SCOOP  (Patient not taking: Reported on 10/10/2023)      polyethylene glycol (MIRALAX) 17 g packet Take 17 g by mouth every other day 30 each 2    potassium chloride (K-DUR,KLOR-CON) 20 mEq tablet Take 2 tablets (40 mEq total) by mouth daily Do not start before January 29, 2023. 30 tablet 0    predniSONE 5 mg tablet Take by mouth daily      Saccharomyces boulardii (Probiotic) 250 MG CAPS  (Patient not taking: Reported on 10/25/2023)      tamsulosin (FLOMAX) 0.4 mg Take 0.4 mg by mouth daily at bedtime      torsemide (DEMADEX) 10 mg tablet Take 1 tablet (10 mg total) by mouth 4 (four) times a week Take 10 mg on Tuesday, Thursday, Saturday, and Sunday 30 tablet 3    torsemide (DEMADEX) 20 mg tablet Take 1 tablet (20 mg total) by mouth 3 (three) times a week Take 20 mg on Monday, Wednesday, and Friday 30 tablet 3    traMADol (ULTRAM) 50 mg tablet Take 1 tablet (50 mg total) by mouth every 6 (six) hours as needed for moderate pain or severe pain (Patient not taking: Reported on 10/25/2023) 15 tablet 0    Vitamin D, Cholecalciferol, 25 MCG (1000 UT) TABS Take by mouth in the morning (Patient not taking: Reported on 10/25/2023)       No current facility-administered medications for this visit. He is allergic to colchicine, niacin, and statins. .    Review of Systems   Constitutional: Negative. HENT:  Negative for ear pain and hearing loss. Eyes:  Negative for pain. Respiratory:  Negative for chest tightness and shortness of breath. Cardiovascular:  Negative for chest pain, palpitations and leg swelling. Gastrointestinal:  Negative for diarrhea, nausea and vomiting. Genitourinary:  Negative for dysuria. Musculoskeletal:  Negative for gait problem. Skin:  Positive for wound. Neurological:  Negative for tremors and weakness.    Psychiatric/Behavioral:  Negative for behavioral problems, confusion and suicidal ideas. Objective:       Wound 08/28/23 Traumatic Toe (Comment  which one) Right (Active)   Wound Image Images linked 11/09/23 1039   Wound Description Epithelialization 11/09/23 1038   Mylene-wound Assessment Intact 11/09/23 1038   Wound Length (cm) 0 cm 11/09/23 1038   Wound Width (cm) 0 cm 11/09/23 1038   Wound Depth (cm) 0 cm 11/09/23 1038   Wound Surface Area (cm^2) 0 cm^2 11/09/23 1038   Wound Volume (cm^3) 0 cm^3 11/09/23 1038   Calculated Wound Volume (cm^3) 0 cm^3 11/09/23 1038   Change in Wound Size % 100 11/09/23 1038   Drainage Amount None 11/09/23 1038   Non-staged Wound Description Not applicable 92/63/39 8694   Treatments Cleansed 11/09/23 1038   Wound packed? No 11/09/23 1038   Dressing Changed Other (Comment) (no dressing) 11/09/23 1038   Patient Tolerance Tolerated well 11/09/23 1038       /70   Pulse 66   Temp (!) 96.4 °F (35.8 °C)   Resp 16   Ht 5' 8" (1.727 m)   Wt 99.8 kg (220 lb)   BMI 33.45 kg/m²     Physical Exam  Vitals and nursing note reviewed. Constitutional:       General: He is not in acute distress. Appearance: Normal appearance. He is obese. HENT:      Head: Normocephalic and atraumatic. Eyes:      General:         Right eye: No discharge. Left eye: No discharge. Pulmonary:      Effort: Pulmonary effort is normal. No respiratory distress. Musculoskeletal:         General: Normal range of motion. Cervical back: Normal range of motion and neck supple. No rigidity. Right lower leg: No edema. Left lower leg: No edema. Skin:     General: Skin is warm and dry. Findings: No erythema or wound. Neurological:      General: No focal deficit present. Mental Status: He is alert and oriented to person, place, and time. Mental status is at baseline. Psychiatric:         Mood and Affect: Mood normal.         Behavior: Behavior normal.         Thought Content:  Thought content normal.         Judgment: Judgment normal.               Wound Instructions:  Orders Placed This Encounter   Procedures    Wound cleansing and dressings     No open wound. Instructed to follow up with Dr Sadie Bullock. Standing Status:   Future     Standing Expiration Date:   11/9/2024        Diagnosis ICD-10-CM Associated Orders   1.  Pain of right great toe  M79.674 Wound cleansing and dressings

## 2023-11-09 NOTE — PATIENT INSTRUCTIONS
Orders Placed This Encounter   Procedures    Wound cleansing and dressings     No open wound. Instructed to follow up with Dr Kim Beaulieu.      Standing Status:   Future     Standing Expiration Date:   11/9/2024

## 2023-11-15 ENCOUNTER — TELEPHONE (OUTPATIENT)
Dept: GASTROENTEROLOGY | Facility: CLINIC | Age: 88
End: 2023-11-15

## 2023-11-15 ENCOUNTER — OFFICE VISIT (OUTPATIENT)
Dept: GASTROENTEROLOGY | Facility: CLINIC | Age: 88
End: 2023-11-15
Payer: COMMERCIAL

## 2023-11-15 VITALS
HEART RATE: 120 BPM | HEIGHT: 69 IN | BODY MASS INDEX: 32.49 KG/M2 | DIASTOLIC BLOOD PRESSURE: 56 MMHG | SYSTOLIC BLOOD PRESSURE: 86 MMHG

## 2023-11-15 DIAGNOSIS — I48.0 PAROXYSMAL ATRIAL FIBRILLATION (HCC): ICD-10-CM

## 2023-11-15 DIAGNOSIS — R13.19 OTHER DYSPHAGIA: ICD-10-CM

## 2023-11-15 DIAGNOSIS — K21.9 GASTROESOPHAGEAL REFLUX DISEASE, UNSPECIFIED WHETHER ESOPHAGITIS PRESENT: ICD-10-CM

## 2023-11-15 DIAGNOSIS — R19.8 ALTERNATING CONSTIPATION AND DIARRHEA: ICD-10-CM

## 2023-11-15 DIAGNOSIS — R11.0 CHRONIC NAUSEA: Primary | ICD-10-CM

## 2023-11-15 PROCEDURE — 99214 OFFICE O/P EST MOD 30 MIN: CPT | Performed by: STUDENT IN AN ORGANIZED HEALTH CARE EDUCATION/TRAINING PROGRAM

## 2023-11-15 RX ORDER — ONDANSETRON 4 MG/1
4 TABLET, FILM COATED ORAL EVERY 8 HOURS PRN
Qty: 60 TABLET | Refills: 0 | Status: SHIPPED | OUTPATIENT
Start: 2023-11-15 | End: 2023-12-15

## 2023-11-15 NOTE — TELEPHONE ENCOUNTER
Our mutual patient is scheduled for a procedure: EGD    On 12/05/2023    With: Dr. Melodie Maier is taking the following blood thinner: Eliquis    Can this be stopped 2 Days prior to the procedure.     Physician approving Clearance: Dr. Eric Steiner    Last dose will be:  12/02/2023

## 2023-11-15 NOTE — PATIENT INSTRUCTIONS
1.  We recommend starting Metamucil on a daily basis for your alternating constipation and diarrhea. We recommend this be scheduled for daily and not an as needed medicine. 2.  Please continue to take Zofran as needed for your nausea  3. We will plan for an upper endoscopy at [address] [place] [address] for evaluation. We will need to hold your Eliquis for 2 days before the procedure. We will contact her cardiologist to ensure they are okay with this. You will need a ride to and from the procedure suite. 4.  Monitor for signs and symptoms of dehydration including lightheadedness, dizziness, fatigue, decrease in urine output. If any of these occur, consider going to the emergency department to be evaluated for dehydration. 5.  We have ordered stool studies due to the change in bowel habits as we discussed.

## 2023-11-15 NOTE — TELEPHONE ENCOUNTER
Scheduled date of EGD(as of today): 12/5/23  Physician performing EGD: Dr. Rivas  Location of EGD: [facility]  Instructions reviewed with patient by: Ubaldo Kruse  Clearances: yes-Yuli

## 2023-11-15 NOTE — PROGRESS NOTES
Consultation - [address] [place] [address] Gastroenterology     Sylvain Krause 80 y.o. male MRN: [id number]  Unit/Bed#:  Encounter: [id number]    Consults    ASSESSMENT and PLAN    1. Chronic nausea  He reports a few GI symptoms including chronic nausea, alternating constipation and diarrhea. Associated with some early satiety. He is already medicated for his for reflux although has never had an upper endoscopy. Mild dysphagia component. Recommend upper endoscopy to rule out erosive disease, acid mediated disease or ulcer/mass. Zofran as needed. We did discuss that if he has decreased p.o. intake and notices signs of dehydration such as decrease in urine output, darkening of his urine, lightheadedness etc., he should consider holding his water pill and contacting his cardiologist.  His blood pressure was initially low (88/56)  but on recheck later in the visit it was 100/68.    - EGD; Future  - ondansetron (ZOFRAN) 4 mg tablet; Take 1 tablet (4 mg total) by mouth every 8 (eight) hours as needed for nausea or vomiting  Dispense: 60 tablet; Refill: 0    2. Other dysphagia  Mild component of dysphagia with his nausea. Chronic reflux on omeprazole. Evaluate with upper endoscopy as above. - EGD; Future    3. Alternating constipation and diarrhea  Alternating constipation and diarrhea, does not appear to have significantly hard stools but can go 24 hours without a bowel movement. Recommend increasing his fiber to once daily to try to normalize his bowel pattern. Given the diarrhea component and since he is at a nursing facility will check stool testing including C. difficile, enteric panel, Giardia. He has loperamide he can take as needed. - psyllium (METAMUCIL SMOOTH TEXTURE) 28 % packet; Take 1 packet by mouth in the morning  Dispense: 30 packet; Refill: 0  - Calprotectin,Fecal; Future  - Giardia antigen; Future  - Clostridium difficile toxin by PCR with EIA;  Future  - Stool Enteric Bacterial Panel by PCR; Future    4. Gastroesophageal reflux disease, unspecified whether esophagitis present  History of reflux noted but has never had EGD. He is on chronic omeprazole. Upper endoscopy for further evaluation. 5. Paroxysmal atrial fibrillation (HCC)  History of paroxysmal atrial fibrillation in setting of sepsis. He is not in atrial fibrillation on cardiac evaluation today. He will need to hold his Eliquis for 2 days before EGD and we will ensure this is okay with his cardiologist.  Discussed that there is small clotting risk with holding anticoagulant which they understood. Chief Complaint   Patient presents with    Diarrhea    Nausea       Physician Requesting Consult: No att. providers found    HPI    This is an 40-year-old male with past medical history of chronic combined heart failure, EF 45%, paroxysmal atrial fibrillation on Eliquis, history of TIA, rheumatoid arthritis who presents for nausea, alternating constipation and diarrhea which have been going on for about 6 weeks. He has also had some poor appetite during this duration. He is not having any significant emesis, hematemesis, melena or hematochezia. Weight has been stable. He had labs drawn on October 31 with normal CBC and CMP. Initial blood pressure here was 86/56, however on personal rechecking later in the visit he was 100/68, heart rate 115. Home medications include loperamide, Metamucil and Zofran. He takes these medicines as needed. He does not report passing any hard stools requiring significant straining although can go a day or so without having a bowel movement. Unclear if nausea is related to his bowel patterns or not. His nausea does not seem to be associated with p.o. intake despite his early satiety. He is not having any fevers, chills, new neurologic complaints. He reports that he used to have colonoscopies every 5 years for screening purposes but recently stopped due to age.     Last hospitalization was in August 2023 due to septic shock from group B strep with left arm cellulitis. Presents with his son, Missael Husbands, who assists in history. GI History:  Prior Colonoscopy: Stopped several years ago due to age  Prior EGD:  No prior EGD  Family hx:  No reported first degree relatives with colorectal cancer  Surgical hx: No prior abdominal surgeries  Blood thinners: Denies antiplatelet or anticoagulation use  NSAID use: Denies regular NSAID use  DM meds: None  Social Hx: No regular ETOH, smoking, or drug use. Historical Information   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[address])     Spinal stenosis      Past Surgical History:   Procedure Laterality Date    CARDIAC ELECTROPHYSIOLOGY PROCEDURE N/A 12/17/2022    Procedure: Cardiac loop recorder implant;  Surgeon: Ez Almaguer MD;  Location: [facility]; Service: Cardiology    CARPAL TUNNEL RELEASE Bilateral     COLONOSCOPY      LAMINECTOMY      TOTAL KNEE ARTHROPLASTY Bilateral     TOTAL SHOULDER REPLACEMENT       Social History   Social History     Substance and Sexual Activity   Alcohol Use Yes    Alcohol/week: 2.0 standard drinks of alcohol    Types: 2 Shots of liquor per week    Comment: 2-3 ounces of whiskey a day     Social History     Substance and Sexual Activity   Drug Use Yes    Types: Marijuana     Social History     Tobacco Use   Smoking Status Never   Smokeless Tobacco Never     Family History   Problem Relation Age of Onset    Colon polyps Neg Hx     Colon cancer Neg Hx        Meds/Allergies     No current facility-administered medications for this visit.      (Not in a hospital admission)      Allergies   Allergen Reactions    Colchicine Other (See Comments)     Flushing      Niacin Other (See Comments)     flushed    Statins        PHYSICAL EXAM    Visit Vitals  BP (!) 86/56   Pulse (!) 120   Ht 5' 9" (1.753 m) BMI 32.49 kg/m²   Smoking Status Never   BSA 2.15 m²     Body mass index is 32.49 kg/m².   General Appearance: NAD, cooperative, alert  Eyes: Anicteric, EOMI  ENT: Normocephalic, atraumatic, normal mucosa  Neck: symmetrical, trachea midline  Lungs: clear to auscultation, non-labored respirations on room air, no wheezing  CV: S1 S2+ radial pulses bilaterally  GI:  Soft, non-tender, non-distended; normal bowel sounds; no masses, no organomegaly   Rectal: Deferred  Musculoskeletal: No gross deformities or pitting edema  Skin:  No jaundice, no rashes  Neurologic: awake, alert, oriented, no gross deficits    Lab Results   Component Value Date    GLUCOSE 110 09/03/2023    CALCIUM 9.3 09/08/2023    K 4.4 09/09/2023    CO2 34 (H) 09/08/2023    CL 97 09/08/2023    BUN 23 09/08/2023    CREATININE 1.26 09/08/2023    CREATININE 1.20 09/07/2023    CREATININE 1.13 09/06/2023     Lab Results   Component Value Date    WBC 9.30 09/08/2023    WBC 8.38 09/07/2023    WBC 8.80 09/04/2023    HGB 11.7 (L) 09/08/2023    HGB 13.5 09/07/2023    HGB 12.2 09/04/2023     (H) 09/08/2023     09/08/2023     09/07/2023     09/04/2023     Lab Results   Component Value Date    ALT 11 09/03/2023    ALT 17 08/29/2023    ALT 14 06/09/2023    AST 17 09/03/2023    AST 23 08/29/2023    AST 20 06/09/2023    ALKPHOS 47 09/03/2023    ALKPHOS 63 08/29/2023    ALKPHOS 67 06/09/2023    TBILI 0.67 09/03/2023    TBILI 0.94 08/29/2023    TBILI 0.46 06/09/2023     No results found for: "AMYLASE"  No results found for: "LIPASE"  Lab Results   Component Value Date    IRON 16 (L) 08/31/2023    TIBC 139 (L) 08/31/2023    FERRITIN 141 08/31/2023     Lab Results   Component Value Date    INR 1.84 (H) 08/30/2023    INR 1.41 (H) 08/29/2023    INR 1.31 (H) 08/29/2023       Cardiac EP device report    Result Date: 11/15/2023  Narrative: [facility] - ACTIVE SYSTEM IS MRI CONDITIONAL [facility] TRANSMISSION ALERT- NB: DEVICE DETECTED 1 TACHY EPISODE- 28 BEAT NSVT @ 178 BPM ON ECG. EF-45% (ECHO 8/30/23). DEVICE DETECTED 4 AF EPISODES MAX DURATION 16.75 HRS. AF BURDEN-19%. HX: PAF &ON ELIQUIS & METOPROLOL. NO PT ACTIVATED EPISODES. NSR W/ PVC'S @ 92 BPM ON CURRENT ECG. TASKED DR. STREET FOR NSVT. NORMAL DEVICE FUNCTION. GV     VAS upper limb venous duplex scan, unilateral/limited    Result Date: 11/1/2023  Narrative:  [facility] REPORT CLINICAL: Indications:  Patient presents with right upper extremity edema x 3-4 weeks. Operative History: no reported cardiovascular surgeries Risk Factors The patient has history of HTN and Hyperlipidemia. CONCLUSION:  Impression RIGHT UPPER LIMB: No evidence of acute or chronic deep vein thrombosis. No evidence of superficial thrombophlebitis noted. Doppler evaluation shows a normal response to augmentation maneuvers. LEFT UPPER LIMB LIMITED: Evaluation shows no evidence of thrombus in the internal jugular vein, subclavian vein, and the innominate vein. SIGNATURE: Electronically Signed by: Leeanne Moncada on 2023-11-01 12:22:15 PM    Cardiac EP device report    Result Date: 10/18/2023  Narrative: [facility] - ACTIVE SYSTEM IS MRI CONDITIONAL NB MERLIN TRANSMISSION:  BATTERY VOLTAGE ADEQUATE. SINCE 10-9-23;  19 DEVICE CLASSIFIED AF EPISODES WITH AVAILABLE EGMS SHOWING PROBABLE SR WITH PACS, PVCS, AND UNDERSENSING. LONGEST EPISODE 20 M 32 S.  12 TACHY EPISODES WITH 3 AVAILABLE EGMS SHOWING SVT WITH -176 BPM.  2 CARLOS EPISODES WITH EGMS SHOWING UNDERSENNSING. PATIENT TAKES ELIQUIS AND METOPROLOL. NORMAL DEVICE FUNCTION. RG       Imaging Studies: I have personally reviewed pertinent reports. Pathology, and Other Studies: I have personally reviewed pertinent reports.       REVIEW OF SYSTEMS    CONSTITUTIONAL: negative unless stated in HPI  GASTROINTESTINAL: As noted in the HPI

## 2023-11-16 ENCOUNTER — APPOINTMENT (EMERGENCY)
Dept: CT IMAGING | Facility: HOSPITAL | Age: 88
DRG: 100 | End: 2023-11-16
Payer: COMMERCIAL

## 2023-11-16 ENCOUNTER — HOSPITAL ENCOUNTER (EMERGENCY)
Facility: HOSPITAL | Age: 88
Discharge: HOME | DRG: 100 | End: 2023-11-16
Attending: EMERGENCY MEDICINE
Payer: COMMERCIAL

## 2023-11-16 VITALS
HEART RATE: 97 BPM | RESPIRATION RATE: 18 BRPM | OXYGEN SATURATION: 95 % | SYSTOLIC BLOOD PRESSURE: 142 MMHG | DIASTOLIC BLOOD PRESSURE: 68 MMHG | TEMPERATURE: 97 F

## 2023-11-16 DIAGNOSIS — N28.1 BILATERAL RENAL CYSTS: ICD-10-CM

## 2023-11-16 DIAGNOSIS — N20.1 URETEROLITHIASIS: ICD-10-CM

## 2023-11-16 DIAGNOSIS — K80.20 GALLSTONE: ICD-10-CM

## 2023-11-16 DIAGNOSIS — E83.51 HYPOCALCEMIA: ICD-10-CM

## 2023-11-16 DIAGNOSIS — R11.0 NAUSEA: Primary | ICD-10-CM

## 2023-11-16 LAB
ALBUMIN SERPL BCP-MCNC: 3.5 G/DL (ref 3.5–5)
ALP SERPL-CCNC: 70 U/L (ref 34–104)
ALT SERPL W P-5'-P-CCNC: 10 U/L (ref 7–52)
ANION GAP SERPL CALCULATED.3IONS-SCNC: 13 MMOL/L
AST SERPL W P-5'-P-CCNC: 20 U/L (ref 13–39)
BACTERIA UR QL AUTO: ABNORMAL /HPF
BASOPHILS # BLD AUTO: 0.07 THOUSANDS/ÂΜL (ref 0–0.1)
BASOPHILS NFR BLD AUTO: 1 % (ref 0–1)
BILIRUB SERPL-MCNC: 0.56 MG/DL (ref 0.2–1)
BILIRUB UR QL STRIP: NEGATIVE
BUN SERPL-MCNC: 17 MG/DL (ref 5–25)
CA-I BLD-SCNC: 0.91 MMOL/L (ref 1.12–1.32)
CALCIUM SERPL-MCNC: 7.5 MG/DL (ref 8.4–10.2)
CHLORIDE SERPL-SCNC: 100 MMOL/L (ref 96–108)
CLARITY UR: CLEAR
CO2 SERPL-SCNC: 26 MMOL/L (ref 21–32)
COLOR UR: YELLOW
CREAT SERPL-MCNC: 1.64 MG/DL (ref 0.6–1.3)
EOSINOPHIL # BLD AUTO: 0.61 THOUSAND/ÂΜL (ref 0–0.61)
EOSINOPHIL NFR BLD AUTO: 6 % (ref 0–6)
ERYTHROCYTE [DISTWIDTH] IN BLOOD BY AUTOMATED COUNT: 14 % (ref 11.6–15.1)
FLUAV RNA RESP QL NAA+PROBE: NEGATIVE
FLUBV RNA RESP QL NAA+PROBE: NEGATIVE
GFR SERPL CREATININE-BSD FRML MDRD: 36 ML/MIN/1.73SQ M
GLUCOSE SERPL-MCNC: 156 MG/DL (ref 65–140)
GLUCOSE UR STRIP-MCNC: NEGATIVE MG/DL
HCT VFR BLD AUTO: 41 % (ref 36.5–49.3)
HGB BLD-MCNC: 12.8 G/DL (ref 12–17)
HGB UR QL STRIP.AUTO: NEGATIVE
HYALINE CASTS #/AREA URNS LPF: ABNORMAL /LPF
IMM GRANULOCYTES # BLD AUTO: 0.04 THOUSAND/UL (ref 0–0.2)
IMM GRANULOCYTES NFR BLD AUTO: 0 % (ref 0–2)
KETONES UR STRIP-MCNC: NEGATIVE MG/DL
LEUKOCYTE ESTERASE UR QL STRIP: NEGATIVE
LIPASE SERPL-CCNC: 17 U/L (ref 11–82)
LYMPHOCYTES # BLD AUTO: 2.94 THOUSANDS/ÂΜL (ref 0.6–4.47)
LYMPHOCYTES NFR BLD AUTO: 28 % (ref 14–44)
MCH RBC QN AUTO: 29.5 PG (ref 26.8–34.3)
MCHC RBC AUTO-ENTMCNC: 31.2 G/DL (ref 31.4–37.4)
MCV RBC AUTO: 95 FL (ref 82–98)
MONOCYTES # BLD AUTO: 0.8 THOUSAND/ÂΜL (ref 0.17–1.22)
MONOCYTES NFR BLD AUTO: 8 % (ref 4–12)
MUCOUS THREADS UR QL AUTO: ABNORMAL
NEUTROPHILS # BLD AUTO: 6.18 THOUSANDS/ÂΜL (ref 1.85–7.62)
NEUTS SEG NFR BLD AUTO: 57 % (ref 43–75)
NITRITE UR QL STRIP: NEGATIVE
NON-SQ EPI CELLS URNS QL MICRO: ABNORMAL /HPF
NRBC BLD AUTO-RTO: 0 /100 WBCS
PH UR STRIP.AUTO: 6 [PH]
PLATELET # BLD AUTO: 223 THOUSANDS/UL (ref 149–390)
PMV BLD AUTO: 12.3 FL (ref 8.9–12.7)
POTASSIUM SERPL-SCNC: 3.8 MMOL/L (ref 3.5–5.3)
PROT SERPL-MCNC: 6.8 G/DL (ref 6.4–8.4)
PROT UR STRIP-MCNC: ABNORMAL MG/DL
RBC # BLD AUTO: 4.34 MILLION/UL (ref 3.88–5.62)
RBC #/AREA URNS AUTO: ABNORMAL /HPF
RSV RNA RESP QL NAA+PROBE: NEGATIVE
SARS-COV-2 RNA RESP QL NAA+PROBE: NEGATIVE
SODIUM SERPL-SCNC: 139 MMOL/L (ref 135–147)
SP GR UR STRIP.AUTO: 1.02 (ref 1–1.03)
UROBILINOGEN UR STRIP-ACNC: <2 MG/DL
WBC # BLD AUTO: 10.64 THOUSAND/UL (ref 4.31–10.16)
WBC #/AREA URNS AUTO: ABNORMAL /HPF

## 2023-11-16 PROCEDURE — 36415 COLL VENOUS BLD VENIPUNCTURE: CPT

## 2023-11-16 PROCEDURE — 80053 COMPREHEN METABOLIC PANEL: CPT | Performed by: EMERGENCY MEDICINE

## 2023-11-16 PROCEDURE — 81001 URINALYSIS AUTO W/SCOPE: CPT | Performed by: EMERGENCY MEDICINE

## 2023-11-16 PROCEDURE — 82330 ASSAY OF CALCIUM: CPT

## 2023-11-16 PROCEDURE — G1004 CDSM NDSC: HCPCS

## 2023-11-16 PROCEDURE — 96360 HYDRATION IV INFUSION INIT: CPT

## 2023-11-16 PROCEDURE — 74177 CT ABD & PELVIS W/CONTRAST: CPT

## 2023-11-16 PROCEDURE — 93005 ELECTROCARDIOGRAM TRACING: CPT

## 2023-11-16 PROCEDURE — 99285 EMERGENCY DEPT VISIT HI MDM: CPT

## 2023-11-16 PROCEDURE — 0241U HB NFCT DS VIR RESP RNA 4 TRGT: CPT | Performed by: EMERGENCY MEDICINE

## 2023-11-16 PROCEDURE — 85025 COMPLETE CBC W/AUTO DIFF WBC: CPT | Performed by: EMERGENCY MEDICINE

## 2023-11-16 PROCEDURE — 83690 ASSAY OF LIPASE: CPT | Performed by: EMERGENCY MEDICINE

## 2023-11-16 RX ORDER — TAMSULOSIN HYDROCHLORIDE 0.4 MG/1
0.4 CAPSULE ORAL
Qty: 7 CAPSULE | Refills: 0 | Status: SHIPPED | OUTPATIENT
Start: 2023-11-16 | End: 2023-11-23

## 2023-11-16 RX ORDER — TAMSULOSIN HYDROCHLORIDE 0.4 MG/1
0.4 CAPSULE ORAL ONCE
Status: COMPLETED | OUTPATIENT
Start: 2023-11-16 | End: 2023-11-16

## 2023-11-16 RX ORDER — ONDANSETRON 4 MG/1
4 TABLET, FILM COATED ORAL EVERY 6 HOURS
Qty: 12 TABLET | Refills: 0 | Status: SHIPPED | OUTPATIENT
Start: 2023-11-16 | End: 2023-11-24

## 2023-11-16 RX ADMIN — IOHEXOL 80 ML: 350 INJECTION, SOLUTION INTRAVENOUS at 16:46

## 2023-11-16 RX ADMIN — SODIUM CHLORIDE 500 ML: 0.9 INJECTION, SOLUTION INTRAVENOUS at 15:19

## 2023-11-16 RX ADMIN — TAMSULOSIN HYDROCHLORIDE 0.4 MG: 0.4 CAPSULE ORAL at 17:40

## 2023-11-16 NOTE — DISCHARGE INSTRUCTIONS
Take 0.4 mg flomax daily  Take 4 mg zofran every 4-6 hours as needed for nausea/vomiting  Schedule an appointment with urology for follow up 18-Oct-2020 21:44

## 2023-11-16 NOTE — ED PROVIDER NOTES
History  Chief Complaint   Patient presents with    Nausea     Pt to er with reports of nausea and decreased appetite for the past 3-4 weeks. Saw GI doctor yesterday where they ordered tests. Son states that he lost 10 pounds in less than a week. This is an 81 y/o male with PMH GERD, HTN, HLD, CHF who presents to the ER today for nausea x 3-4 weeks. He was seen yesterday by GI who recommended zofran which he says he has been taking for a week. Its a hit or miss but usually it doesn't help. Started metamucil yesterday. Denies any abdominal pain, chest pain, SOB, fevers, URI symptoms, hematuria, blood in stools, vomiting. Also admits to on and off diarrhea and constipation over those past 3-4 weeks. Dark urine in color and going more frequently. Not eating much during the day. Gets nauseous just from saltine crackers. Last known BM was yesterday - was diarrhea. Unsure of last solid bowel movement. Denies past abdominal surgeries. History provided by:  Patient   used: No        Prior to Admission Medications   Prescriptions Last Dose Informant Patient Reported? Taking?    Betadine 10 % external solution  Outside Facility (Specify) Yes No   Bismuth Tribromoph-Petrolatum (Xeroform Petrolat Gauze 5"x9") MISC  Outside Facility (Specify) Yes No   Cholecalciferol (Vitamin D3) 50 MCG (2000 UT) TABS  Outside Facility (Specify) Yes No   Patient not taking: Reported on 10/25/2023   Diclofenac Sodium (VOLTAREN) 1 %  Outside Facility (Specify) Yes No   Sig: Apply 4 g topically 4 (four) times a day   [place]-Calcium Carbonate (DRAMAMINE IRLANDA CHEWS PO)   Yes No   Sig: Take by mouth   Multiple Vitamin (multivitamin) tablet  Outside Facility (Specify) Yes No   Sig: Take 1 tablet by mouth daily   Patient not taking: Reported on 10/25/2023   Myrbetriq 25 MG TB24  Outside Facility (Specify) Yes No   Sig: Take 25 mg by mouth daily at bedtime   Saccharomyces boulardii (Probiotic) [address] (Specify) Yes No   Vitamin D, Cholecalciferol, 25 MCG (1000 UT) TABS  Outside Facility (Specify) Yes No   Sig: Take by mouth in the morning   Patient not taking: Reported on 10/25/2023   acetaminophen (TYLENOL) 650 mg CR tablet  Outside Facility (Specify) Yes No   Sig: Take 650 mg by mouth 2 (two) times a day   allopurinol (ZYLOPRIM) 100 mg tablet  Outside Facility (Specify) Yes No   Sig: Take 400 mg by mouth daily   apixaban (Eliquis) 5 mg  Outside Facility (Specify) No No   Sig: Take 1 tablet (5 mg total) by mouth 2 (two) times a day   diphenhydrAMINE-zinc acetate (BENADRYL) cream  Outside Facility (Specify) No No   Sig: Apply topically 3 (three) times a day as needed for itching   Patient not taking: Reported on 10/25/2023   doxycycline hyclate (VIBRAMYCIN) 100 mg capsule  Outside Facility (Specify) Yes No   Sig: Take 100 mg by mouth every 12 (twelve) hours   ezetimibe (ZETIA) 10 mg tablet  Outside Facility (Specify) Yes No   Sig: Take 10 mg by mouth daily   fluticasone (FLONASE) 50 mcg/act nasal spray  Outside Facility (Specify) Yes No   gabapentin (NEURONTIN) 100 mg capsule  Outside Facility (Specify) Yes No   Sig: Take 100 mg by mouth 2 (two) times a day   hydroxychloroquine (PLAQUENIL) 200 mg tablet  Outside Facility (Specify) Yes No   Sig: Take 200 mg by mouth 2 (two) times a day with meals   levothyroxine 25 mcg tablet  Outside Facility (Specify) Yes No   loperamide (IMODIUM) 2 mg capsule  Outside Facility (Specify) Yes No   Sig: Take 2 mg by mouth as needed for diarrhea   melatonin 3 mg  Outside Facility (Specify) No No   Sig: Take 3 tablets (9 mg total) by mouth daily at bedtime   Patient not taking: Reported on 11/15/2023   metoprolol succinate (TOPROL-XL) 25 mg 24 hr tablet  Outside Facility (Specify) No No   Sig: Take 1 tablet (25 mg total) by mouth 2 (two) times a day   nitroglycerin (NITROSTAT) 0.4 mg SL tablet  Outside Facility (Specify) Yes No   Sig: Place 0.4 mg under the tongue every 5 (five) minutes as needed for chest pain   nystatin (MYCOSTATIN) powder  Outside Facility (Specify) No No   Sig: Apply topically 2 (two) times a day   Patient not taking: Reported on 10/25/2023   omeprazole (PriLOSEC) 10 mg delayed release capsule  Outside Facility (Specify) Yes No   Sig: Take 10 mg by mouth daily   ondansetron (ZOFRAN) 4 mg tablet   No No   Sig: Take 1 tablet (4 mg total) by mouth every 8 (eight) hours as needed for nausea or vomiting   polyethylene glycol (GLYCOLAX) 17 GM/SCOOP  Outside Facility (Specify) Yes No   Patient not taking: Reported on 10/10/2023   polyethylene glycol (MIRALAX) 17 g packet  Outside Facility (Specify) No No   Sig: Take 17 g by mouth every other day   potassium chloride (K-DUR,KLOR-CON) 20 mEq tablet  Outside Facility (Specify) No No   Sig: Take 2 tablets (40 mEq total) by mouth daily Do not start before January 29, 2023.    predniSONE 5 mg tablet  Outside Facility (Specify) Yes No   Sig: Take by mouth daily   psyllium (METAMUCIL SMOOTH TEXTURE) 28 % packet   No No   Sig: Take 1 packet by mouth in the morning   tamsulosin (FLOMAX) 0.4 mg  Outside Facility (Specify) Yes No   Sig: Take 0.4 mg by mouth daily at bedtime   torsemide (DEMADEX) 10 mg tablet  Outside Facility (Specify) No No   Sig: Take 1 tablet (10 mg total) by mouth 4 (four) times a week Take 10 mg on Tuesday, Thursday, Saturday, and Sunday   torsemide (DEMADEX) 20 mg tablet  Outside Facility (Specify) No No   Sig: Take 1 tablet (20 mg total) by mouth 3 (three) times a week Take 20 mg on Monday, Wednesday, and Friday   traMADol (ULTRAM) 50 mg tablet  Outside Facility (Specify) No No   Sig: Take 1 tablet (50 mg total) by mouth every 6 (six) hours as needed for moderate pain or severe pain   Patient not taking: Reported on 10/25/2023      Facility-Administered Medications: None       Past Medical History:   Diagnosis Date    Diverticulitis of colon     GERD (gastroesophageal reflux disease)     Gout     Hyperlipidemia Hypertension     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 E-Cigarette/Vaping    E-Cigarette Use Never User      E-Cigarette/Vaping Substances    Nicotine No     Flavoring No      Social History     Tobacco Use    Smoking status: Never    Smokeless tobacco: Never   Vaping Use    Vaping Use: Never used   Substance Use Topics    Alcohol use: Yes     Alcohol/week: 2.0 standard drinks of alcohol     Types: 2 Shots of liquor per week     Comment: 2-3 ounces of whiskey a day    Drug use: Yes     Types: Marijuana       Review of Systems   Constitutional:  Negative for chills and fever. Respiratory:  Negative for shortness of breath. Cardiovascular:  Negative for chest pain. Gastrointestinal:  Positive for nausea. Negative for abdominal pain and vomiting. Genitourinary:  Negative for difficulty urinating, frequency and urgency. Skin:  Negative for color change. Neurological:  Negative for headaches. Psychiatric/Behavioral:  Negative for behavioral problems and sleep disturbance. All other systems reviewed and are negative. Physical Exam  Physical Exam  Vitals and nursing note reviewed. Constitutional:       General: He is awake. Appearance: Normal appearance. He is well-developed. HENT:      Head: Normocephalic and atraumatic.       Right Ear: External ear normal.      Left Ear: External ear normal.      Nose: Nose normal.   Eyes:      General: No scleral icterus. Extraocular Movements: Extraocular movements intact. Cardiovascular:      Rate and Rhythm: Normal rate and regular rhythm. Heart sounds: Normal heart sounds, S1 normal and S2 normal. No murmur heard. No gallop. Pulmonary:      Effort: Pulmonary effort is normal.      Breath sounds: Normal breath sounds. No wheezing, rhonchi or rales. Abdominal:      General: Abdomen is protuberant. Bowel sounds are normal.      Palpations: Abdomen is soft. Tenderness: There is abdominal tenderness in the right lower quadrant. There is no guarding or rebound. Musculoskeletal:         General: Normal range of motion. Cervical back: Normal range of motion. Skin:     General: Skin is warm and dry. Neurological:      General: No focal deficit present. Mental Status: He is alert. Psychiatric:         Attention and Perception: Attention and perception normal.         Mood and Affect: Mood normal.         Behavior: Behavior normal. Behavior is cooperative.          Vital Signs  ED Triage Vitals [11/16/23 1312]   Temperature Pulse Respirations Blood Pressure SpO2   (!) 97 °F (36.1 °C) 64 18 108/69 96 %      Temp Source Heart Rate Source Patient Position - Orthostatic VS BP Location FiO2 (%)   Temporal Monitor Sitting Left arm --      Pain Score       --           Vitals:    11/16/23 1500 11/16/23 1530 11/16/23 1600 11/16/23 1730   BP: 110/68 101/76 128/79 142/68   Pulse: (!) 106 (!) 107 97 97   Patient Position - Orthostatic VS:             Visual Acuity      ED Medications  Medications   sodium chloride 0.9 % bolus 500 mL (0 mL Intravenous Stopped 11/16/23 1619)   iohexol (OMNIPAQUE) 350 MG/ML injection (SINGLE-DOSE) 80 mL (80 mL Intravenous Given 11/16/23 1646)   tamsulosin (FLOMAX) capsule 0.4 mg (0.4 mg Oral Given 11/16/23 1740)       Diagnostic Studies  Results Reviewed       Procedure Component Value Units Date/Time    Urine Microscopic [[id number]]  (Abnormal) Collected: 11/16/23 1648 Lab Status: Final result Specimen: Urine, Other Updated: 11/16/23 1726     RBC, UA 0-1 /hpf      WBC, UA 1-2 /hpf      Epithelial Cells Occasional /hpf      Bacteria, UA Occasional /hpf      MUCUS THREADS Occasional     Hyaline Casts, UA 10-20 /lpf     Narrative:      Microscopic performed by Nael Villatoro. UA w Reflex to Microscopic w Reflex to Culture [[id number]]  (Abnormal) Collected: 11/16/23 1648    Lab Status: Final result Specimen: Urine, Other Updated: 11/16/23 1702     Color, UA Yellow     Clarity, UA Clear     Specific Gravity, UA 1.020     pH, UA 6.0     Leukocytes, UA Negative     Nitrite, UA Negative     Protein, UA Trace mg/dl      Glucose, UA Negative mg/dl      Ketones, UA Negative mg/dl      Urobilinogen, UA <2.0 mg/dl      Bilirubin, UA Negative     Occult Blood, UA Negative    Calcium, ionized [[id number]]  (Abnormal) Collected: 11/16/23 1408    Lab Status: Final result Specimen: Blood from Arm, Right Updated: 11/16/23 1413     Calcium, Ionized 0.91 mmol/L     COVID/FLU/RSV [[id number]]  (Normal) Collected: 11/16/23 1314    Lab Status: Final result Specimen: Nares from Nose Updated: 11/16/23 1355     SARS-CoV-2 Negative     INFLUENZA A PCR Negative     INFLUENZA B PCR Negative     RSV PCR Negative    Narrative:      FOR PEDIATRIC PATIENTS - copy/paste COVID Guidelines URL to browser: [email or web address]    SARS-CoV-2 assay is a Nucleic Acid Amplification assay intended for the  qualitative detection of nucleic acid from SARS-CoV-2 in nasopharyngeal  swabs. Results are for the presumptive identification of SARS-CoV-2 RNA. Positive results are indicative of infection with SARS-CoV-2, the virus  causing COVID-19, but do not rule out bacterial infection or co-infection  with other viruses. Laboratories within the [facility] and its  territories are required to report all positive results to the appropriate  public health authorities. Negative results do not preclude SARS-CoV-2  infection and should not be used as the sole basis for treatment or other  patient management decisions. Negative results must be combined with  clinical observations, patient history, and epidemiological information. This test has not been FDA cleared or approved. This test has been authorized by FDA under an Emergency Use Authorization  (EUA). This test is only authorized for the duration of time the  declaration that circumstances exist justifying the authorization of the  emergency use of an in vitro diagnostic tests for detection of SARS-CoV-2  virus and/or diagnosis of COVID-19 infection under section 564(b)(1) of  the Act, 21 U. S.C. 801MSA-9(I)(5), unless the authorization is terminated  or revoked sooner. The test has been validated but independent review by FDA  and CLIA is pending. Test performed using [employer]: This RT-PCR assay targets N2,  a region unique to SARS-CoV-2. A conserved region in the E-gene was chosen  for pan-Sarbecovirus detection which includes SARS-CoV-2. According to CMS-2020-01-R, this platform meets the definition of high-throughput technology.     Comprehensive metabolic panel [[id number]]  (Abnormal) Collected: 11/16/23 1319    Lab Status: Final result Specimen: Blood from Arm, Left Updated: 11/16/23 1343     Sodium 139 mmol/L      Potassium 3.8 mmol/L      Chloride 100 mmol/L      CO2 26 mmol/L      ANION GAP 13 mmol/L      BUN 17 mg/dL      Creatinine 1.64 mg/dL      Glucose 156 mg/dL      Calcium 7.5 mg/dL      AST 20 U/L      ALT 10 U/L      Alkaline Phosphatase 70 U/L      Total Protein 6.8 g/dL      Albumin 3.5 g/dL      Total Bilirubin 0.56 mg/dL      eGFR 36 ml/min/1.73sq m     Narrative:      [place] guidelines for Chronic Kidney Disease (CKD):     Stage 1 with normal or high GFR (GFR > 90 mL/min/1.73 square meters)    Stage 2 Mild CKD (GFR = 60-89 mL/min/1.73 square meters)    Stage 3A Moderate CKD (GFR = 45-59 mL/min/1.73 square meters)    Stage 3B Moderate CKD (GFR = 30-44 mL/min/1.73 square meters)    Stage 4 Severe CKD (GFR = 15-29 mL/min/1.73 square meters)    Stage 5 End Stage CKD (GFR <15 mL/min/1.73 square meters)  Note: GFR calculation is accurate only with a steady state creatinine    Lipase [[id number]]  (Normal) Collected: 11/16/23 1319    Lab Status: Final result Specimen: Blood from Arm, Left Updated: 11/16/23 1343     Lipase 17 u/L     CBC and differential [[id number]]  (Abnormal) Collected: 11/16/23 1319    Lab Status: Final result Specimen: Blood from Arm, Left Updated: 11/16/23 1326     WBC 10.64 Thousand/uL      RBC 4.34 Million/uL      Hemoglobin 12.8 g/dL      Hematocrit 41.0 %      MCV 95 fL      MCH 29.5 pg      MCHC 31.2 g/dL      RDW 14.0 %      MPV 12.3 fL      Platelets 040 Thousands/uL      nRBC 0 /100 WBCs      Neutrophils Relative 57 %      Immat GRANS % 0 %      Lymphocytes Relative 28 %      Monocytes Relative 8 %      Eosinophils Relative 6 %      Basophils Relative 1 %      Neutrophils Absolute 6.18 Thousands/µL      Immature Grans Absolute 0.04 Thousand/uL      Lymphocytes Absolute 2.94 Thousands/µL      Monocytes Absolute 0.80 Thousand/µL      Eosinophils Absolute 0.61 Thousand/µL      Basophils Absolute 0.07 Thousands/µL                    CT abdomen pelvis with contrast   Final Result by Lourdes Wild MD (11/16 1714)         1. Calculus in the distal left ureter at the ureterovesical junction measuring 3 mm. No proximal obstructive uropathy or evidence of pyelonephritis. 2. No findings to indicate bowel obstruction, colitis or diverticulitis. Workstation performed: [id number]                    Procedures  Procedures         ED Course  ED Course as of 11/16/23 1755   Thu Nov 16, 2023 1727 Patient feeling much better after fluids.  Kidney stone on CT will provide instructions and stable for d/c home                                SBIRT 22yo+ Flowsheet Row Most Recent Value   Initial Alcohol Screen: US AUDIT-C     1. How often do you have a drink containing alcohol? 0 Filed at: 11/16/2023 1313   2. How many drinks containing alcohol do you have on a typical day you are drinking? 0 Filed at: 11/16/2023 1313   3a. Male UNDER 65: How often do you have five or more drinks on one occasion? 0 Filed at: 11/16/2023 1313   3b. FEMALE Any Age, or MALE 65+: How often do you have 4 or more drinks on one occassion? 0 Filed at: 11/16/2023 1313   Audit-C Score 0 Filed at: 11/16/2023 1313   ARNOLDO: How many times in the past year have you. .. Used an illegal drug or used a prescription medication for non-medical reasons? Never Filed at: 11/16/2023 1313                      Medical Decision Making  79 y/o male here for nausea  Differential diagnosis including but not limited to: metabolic disturbance, electrolyte disturbance, viral syndrome, SBO/LBO, malignancy, pancreatitis, KAMLESH  Assessment: ureterolithiasis, nausea, gallstone, bilateral renal cysts, hypocalcemia  Plan:  discussed finding of stone at [facility] with patient. Hypocalcemia on labs but otherwise WNL. He states he felt better after fluids - likely was dry and will follow up with urology and try to strain his urine to collect the stone. Zofran as needed. Prescribed flomax to help assist with passing the stone. Patient and his children were given strict return to ER precautions both verbally and in discharge papers. Patient and family verbalized understanding and agrees with plan. Amount and/or Complexity of Data Reviewed  External Data Reviewed: notes. Details: GI visit from 11/15/23  Labs: ordered. Radiology: ordered. Risk  Prescription drug management.              Disposition  Final diagnoses:   Nausea   Bilateral renal cysts   Ureterolithiasis   Gallstone   Hypocalcemia     Time reflects when diagnosis was documented in both MDM as applicable and the Disposition within this note       Time User Action Codes Description Comment    11/16/2023  5:26 PM Rula Deonte Add [R11.0] Nausea     11/16/2023  5:27 PM Rula Deonte Add [N28.1] Bilateral renal cysts     11/16/2023  5:27 PM Rula Deonte Add [N20.1] Ureterolithiasis     11/16/2023  5:28 PM Rula Deonte Add [K80.20] Gallstone     11/16/2023  5:54 PM Rula Deonte Add [E83.51] Hypocalcemia           ED Disposition       ED Disposition   Discharge    Condition   Stable    Date/Time   Thu Nov 16, 2023 [address] discharge to home/self care.                    Follow-up Information    None         Patient's Medications   Discharge Prescriptions    ONDANSETRON (ZOFRAN) 4 MG TABLET    Take 1 tablet (4 mg total) by mouth every 6 (six) hours       Start Date: 11/16/2023End Date: --       Order Dose: 4 mg       Quantity: 12 tablet    Refills: 0    TAMSULOSIN (FLOMAX) 0.4 MG    Take 1 capsule (0.4 mg total) by mouth daily with dinner for 7 days       Start Date: 11/16/2023End Date: 11/23/2023       Order Dose: 0.4 mg       Quantity: 7 capsule    Refills: 0           PDMP Review         Value Time User    PDMP Reviewed  Yes 1/28/2023  6:41 AM Nereyda Baker MD            ED Provider  Electronically Signed by             Randall Ye PA-C  11/16/23 8938

## 2023-11-17 LAB
ATRIAL RATE: 100 BPM
PR INTERVAL: 160 MS
QRS AXIS: -47 DEGREES
QRSD INTERVAL: 92 MS
QT INTERVAL: 400 MS
QTC INTERVAL: 516 MS
T WAVE AXIS: 100 DEGREES
VENTRICULAR RATE: 100 BPM

## 2023-11-17 PROCEDURE — 93010 ELECTROCARDIOGRAM REPORT: CPT | Performed by: INTERNAL MEDICINE

## 2023-11-18 ENCOUNTER — HOSPITAL ENCOUNTER (INPATIENT)
Facility: HOSPITAL | Age: 88
LOS: 6 days | Discharge: NURSING, REHAB OR LONG-TERM CARE | DRG: 100 | End: 2023-11-24
Attending: EMERGENCY MEDICINE | Admitting: INTERNAL MEDICINE
Payer: COMMERCIAL

## 2023-11-18 ENCOUNTER — APPOINTMENT (EMERGENCY)
Dept: CT IMAGING | Facility: HOSPITAL | Age: 88
DRG: 100 | End: 2023-11-18
Payer: COMMERCIAL

## 2023-11-18 ENCOUNTER — APPOINTMENT (EMERGENCY)
Dept: MRI IMAGING | Facility: HOSPITAL | Age: 88
DRG: 100 | End: 2023-11-18
Payer: COMMERCIAL

## 2023-11-18 DIAGNOSIS — T80.1XXA IV INFILTRATION, INITIAL ENCOUNTER: ICD-10-CM

## 2023-11-18 DIAGNOSIS — E87.20 LACTIC ACIDOSIS: ICD-10-CM

## 2023-11-18 DIAGNOSIS — R33.9 URINARY RETENTION: ICD-10-CM

## 2023-11-18 DIAGNOSIS — R29.90 STROKE-LIKE SYMPTOM: Primary | ICD-10-CM

## 2023-11-18 DIAGNOSIS — N20.1 URETEROLITHIASIS: ICD-10-CM

## 2023-11-18 DIAGNOSIS — B35.6 TINEA CRURIS: ICD-10-CM

## 2023-11-18 DIAGNOSIS — G93.40 ACUTE ENCEPHALOPATHY: ICD-10-CM

## 2023-11-18 PROBLEM — R47.9 DIFFICULTY WITH SPEECH: Status: ACTIVE | Noted: 2023-11-18

## 2023-11-18 PROBLEM — I48.0 PAROXYSMAL A-FIB (HCC): Status: ACTIVE | Noted: 2023-01-26

## 2023-11-18 PROBLEM — I71.21 ANEURYSM OF ASCENDING AORTA WITHOUT RUPTURE (HCC): Status: ACTIVE | Noted: 2023-11-18

## 2023-11-18 PROBLEM — N18.2 STAGE 2 CHRONIC KIDNEY DISEASE: Status: ACTIVE | Noted: 2023-11-18

## 2023-11-18 LAB
2HR DELTA HS TROPONIN: -1 NG/L
4HR DELTA HS TROPONIN: 0 NG/L
ALBUMIN SERPL BCP-MCNC: 3.3 G/DL (ref 3.5–5)
ALP SERPL-CCNC: 67 U/L (ref 34–104)
ALT SERPL W P-5'-P-CCNC: 11 U/L (ref 7–52)
AMMONIA PLAS-SCNC: 21 UMOL/L (ref 18–72)
ANION GAP SERPL CALCULATED.3IONS-SCNC: 12 MMOL/L
APAP SERPL-MCNC: <2 UG/ML (ref 10–20)
APTT PPP: 35 SECONDS (ref 23–37)
AST SERPL W P-5'-P-CCNC: 18 U/L (ref 13–39)
BILIRUB SERPL-MCNC: 0.52 MG/DL (ref 0.2–1)
BILIRUB UR QL STRIP: NEGATIVE
BUN SERPL-MCNC: 19 MG/DL (ref 5–25)
CALCIUM ALBUM COR SERPL-MCNC: 7.7 MG/DL (ref 8.3–10.1)
CALCIUM SERPL-MCNC: 7.1 MG/DL (ref 8.4–10.2)
CARDIAC TROPONIN I PNL SERPL HS: 10 NG/L
CARDIAC TROPONIN I PNL SERPL HS: 11 NG/L
CARDIAC TROPONIN I PNL SERPL HS: 11 NG/L
CHLORIDE SERPL-SCNC: 98 MMOL/L (ref 96–108)
CLARITY UR: CLEAR
CO2 SERPL-SCNC: 27 MMOL/L (ref 21–32)
COLOR UR: COLORLESS
CREAT SERPL-MCNC: 1.64 MG/DL (ref 0.6–1.3)
ERYTHROCYTE [DISTWIDTH] IN BLOOD BY AUTOMATED COUNT: 14 % (ref 11.6–15.1)
ETHANOL SERPL-MCNC: <10 MG/DL
FLUAV RNA RESP QL NAA+PROBE: NEGATIVE
FLUBV RNA RESP QL NAA+PROBE: NEGATIVE
FOLATE SERPL-MCNC: 20.7 NG/ML
GFR SERPL CREATININE-BSD FRML MDRD: 36 ML/MIN/1.73SQ M
GLUCOSE SERPL-MCNC: 103 MG/DL (ref 65–140)
GLUCOSE SERPL-MCNC: 110 MG/DL (ref 65–140)
GLUCOSE UR STRIP-MCNC: NEGATIVE MG/DL
HCT VFR BLD AUTO: 38.9 % (ref 36.5–49.3)
HGB BLD-MCNC: 12.4 G/DL (ref 12–17)
HGB UR QL STRIP.AUTO: NEGATIVE
INR PPP: 1.53 (ref 0.84–1.19)
KETONES UR STRIP-MCNC: NEGATIVE MG/DL
LACTATE SERPL-SCNC: 2 MMOL/L (ref 0.5–2)
LACTATE SERPL-SCNC: 2.3 MMOL/L (ref 0.5–2)
LEUKOCYTE ESTERASE UR QL STRIP: NEGATIVE
MCH RBC QN AUTO: 29.5 PG (ref 26.8–34.3)
MCHC RBC AUTO-ENTMCNC: 31.9 G/DL (ref 31.4–37.4)
MCV RBC AUTO: 93 FL (ref 82–98)
NITRITE UR QL STRIP: NEGATIVE
PH UR STRIP.AUTO: 6 [PH]
PLATELET # BLD AUTO: 197 THOUSANDS/UL (ref 149–390)
PMV BLD AUTO: 13.3 FL (ref 8.9–12.7)
POTASSIUM SERPL-SCNC: 3.5 MMOL/L (ref 3.5–5.3)
PROCALCITONIN SERPL-MCNC: 0.11 NG/ML
PROT SERPL-MCNC: 6.5 G/DL (ref 6.4–8.4)
PROT UR STRIP-MCNC: NEGATIVE MG/DL
PROTHROMBIN TIME: 18.9 SECONDS (ref 11.6–14.5)
RBC # BLD AUTO: 4.2 MILLION/UL (ref 3.88–5.62)
RSV RNA RESP QL NAA+PROBE: NEGATIVE
SALICYLATES SERPL-MCNC: <5 MG/DL (ref 3–20)
SARS-COV-2 RNA RESP QL NAA+PROBE: NEGATIVE
SODIUM SERPL-SCNC: 137 MMOL/L (ref 135–147)
SP GR UR STRIP.AUTO: <1.005 (ref 1–1.03)
TSH SERPL DL<=0.05 MIU/L-ACNC: 4.34 UIU/ML (ref 0.45–4.5)
UROBILINOGEN UR STRIP-ACNC: <2 MG/DL
VIT B12 SERPL-MCNC: 400 PG/ML (ref 180–914)
WBC # BLD AUTO: 9.81 THOUSAND/UL (ref 4.31–10.16)

## 2023-11-18 PROCEDURE — 82077 ASSAY SPEC XCP UR&BREATH IA: CPT | Performed by: EMERGENCY MEDICINE

## 2023-11-18 PROCEDURE — 70498 CT ANGIOGRAPHY NECK: CPT

## 2023-11-18 PROCEDURE — 93005 ELECTROCARDIOGRAM TRACING: CPT

## 2023-11-18 PROCEDURE — 80179 DRUG ASSAY SALICYLATE: CPT | Performed by: EMERGENCY MEDICINE

## 2023-11-18 PROCEDURE — 70496 CT ANGIOGRAPHY HEAD: CPT

## 2023-11-18 PROCEDURE — 84484 ASSAY OF TROPONIN QUANT: CPT | Performed by: EMERGENCY MEDICINE

## 2023-11-18 PROCEDURE — 85610 PROTHROMBIN TIME: CPT | Performed by: EMERGENCY MEDICINE

## 2023-11-18 PROCEDURE — NC001 PR NO CHARGE: Performed by: STUDENT IN AN ORGANIZED HEALTH CARE EDUCATION/TRAINING PROGRAM

## 2023-11-18 PROCEDURE — 85730 THROMBOPLASTIN TIME PARTIAL: CPT | Performed by: EMERGENCY MEDICINE

## 2023-11-18 PROCEDURE — 96361 HYDRATE IV INFUSION ADD-ON: CPT

## 2023-11-18 PROCEDURE — 80053 COMPREHEN METABOLIC PANEL: CPT | Performed by: EMERGENCY MEDICINE

## 2023-11-18 PROCEDURE — 96375 TX/PRO/DX INJ NEW DRUG ADDON: CPT

## 2023-11-18 PROCEDURE — 80143 DRUG ASSAY ACETAMINOPHEN: CPT | Performed by: EMERGENCY MEDICINE

## 2023-11-18 PROCEDURE — 82607 VITAMIN B-12: CPT | Performed by: STUDENT IN AN ORGANIZED HEALTH CARE EDUCATION/TRAINING PROGRAM

## 2023-11-18 PROCEDURE — 99285 EMERGENCY DEPT VISIT HI MDM: CPT

## 2023-11-18 PROCEDURE — 96372 THER/PROPH/DIAG INJ SC/IM: CPT

## 2023-11-18 PROCEDURE — 82948 REAGENT STRIP/BLOOD GLUCOSE: CPT

## 2023-11-18 PROCEDURE — 84443 ASSAY THYROID STIM HORMONE: CPT | Performed by: STUDENT IN AN ORGANIZED HEALTH CARE EDUCATION/TRAINING PROGRAM

## 2023-11-18 PROCEDURE — 36415 COLL VENOUS BLD VENIPUNCTURE: CPT | Performed by: EMERGENCY MEDICINE

## 2023-11-18 PROCEDURE — 84145 PROCALCITONIN (PCT): CPT | Performed by: EMERGENCY MEDICINE

## 2023-11-18 PROCEDURE — 82140 ASSAY OF AMMONIA: CPT | Performed by: EMERGENCY MEDICINE

## 2023-11-18 PROCEDURE — 99223 1ST HOSP IP/OBS HIGH 75: CPT | Performed by: STUDENT IN AN ORGANIZED HEALTH CARE EDUCATION/TRAINING PROGRAM

## 2023-11-18 PROCEDURE — 96374 THER/PROPH/DIAG INJ IV PUSH: CPT

## 2023-11-18 PROCEDURE — 81003 URINALYSIS AUTO W/O SCOPE: CPT | Performed by: EMERGENCY MEDICINE

## 2023-11-18 PROCEDURE — 87040 BLOOD CULTURE FOR BACTERIA: CPT | Performed by: EMERGENCY MEDICINE

## 2023-11-18 PROCEDURE — 82746 ASSAY OF FOLIC ACID SERUM: CPT | Performed by: STUDENT IN AN ORGANIZED HEALTH CARE EDUCATION/TRAINING PROGRAM

## 2023-11-18 PROCEDURE — 70544 MR ANGIOGRAPHY HEAD W/O DYE: CPT

## 2023-11-18 PROCEDURE — 0241U HB NFCT DS VIR RESP RNA 4 TRGT: CPT | Performed by: EMERGENCY MEDICINE

## 2023-11-18 PROCEDURE — 83605 ASSAY OF LACTIC ACID: CPT | Performed by: EMERGENCY MEDICINE

## 2023-11-18 PROCEDURE — 85027 COMPLETE CBC AUTOMATED: CPT | Performed by: EMERGENCY MEDICINE

## 2023-11-18 PROCEDURE — 74176 CT ABD & PELVIS W/O CONTRAST: CPT

## 2023-11-18 PROCEDURE — 71250 CT THORAX DX C-: CPT

## 2023-11-18 PROCEDURE — 96376 TX/PRO/DX INJ SAME DRUG ADON: CPT

## 2023-11-18 PROCEDURE — 70551 MRI BRAIN STEM W/O DYE: CPT

## 2023-11-18 PROCEDURE — G1004 CDSM NDSC: HCPCS

## 2023-11-18 PROCEDURE — 99223 1ST HOSP IP/OBS HIGH 75: CPT | Performed by: PHYSICIAN ASSISTANT

## 2023-11-18 RX ORDER — PANTOPRAZOLE SODIUM 20 MG/1
20 TABLET, DELAYED RELEASE ORAL
Status: DISCONTINUED | OUTPATIENT
Start: 2023-11-19 | End: 2023-11-24 | Stop reason: HOSPADM

## 2023-11-18 RX ORDER — HYDROXYCHLOROQUINE SULFATE 200 MG/1
200 TABLET, FILM COATED ORAL 2 TIMES DAILY WITH MEALS
Status: DISCONTINUED | OUTPATIENT
Start: 2023-11-19 | End: 2023-11-24 | Stop reason: HOSPADM

## 2023-11-18 RX ORDER — HYDROMORPHONE HCL/PF 1 MG/ML
0.5 SYRINGE (ML) INJECTION ONCE
Status: COMPLETED | OUTPATIENT
Start: 2023-11-18 | End: 2023-11-18

## 2023-11-18 RX ORDER — HALOPERIDOL 5 MG/ML
2.5 INJECTION INTRAMUSCULAR ONCE
Status: DISCONTINUED | OUTPATIENT
Start: 2023-11-18 | End: 2023-11-18

## 2023-11-18 RX ORDER — HALOPERIDOL 5 MG/ML
5 INJECTION INTRAMUSCULAR ONCE
Status: COMPLETED | OUTPATIENT
Start: 2023-11-18 | End: 2023-11-18

## 2023-11-18 RX ORDER — MAGNESIUM HYDROXIDE/ALUMINUM HYDROXICE/SIMETHICONE 120; 1200; 1200 MG/30ML; MG/30ML; MG/30ML
30 SUSPENSION ORAL EVERY 6 HOURS PRN
Status: DISCONTINUED | OUTPATIENT
Start: 2023-11-18 | End: 2023-11-24 | Stop reason: HOSPADM

## 2023-11-18 RX ORDER — LORAZEPAM 2 MG/ML
1 INJECTION INTRAMUSCULAR ONCE
Status: COMPLETED | OUTPATIENT
Start: 2023-11-18 | End: 2023-11-18

## 2023-11-18 RX ORDER — GABAPENTIN 100 MG/1
100 CAPSULE ORAL 2 TIMES DAILY
Status: DISCONTINUED | OUTPATIENT
Start: 2023-11-19 | End: 2023-11-24 | Stop reason: HOSPADM

## 2023-11-18 RX ORDER — FENTANYL CITRATE 50 UG/ML
50 INJECTION, SOLUTION INTRAMUSCULAR; INTRAVENOUS ONCE
Status: DISCONTINUED | OUTPATIENT
Start: 2023-11-18 | End: 2023-11-18

## 2023-11-18 RX ORDER — ACETAMINOPHEN 325 MG/1
650 TABLET ORAL EVERY 4 HOURS PRN
Status: DISCONTINUED | OUTPATIENT
Start: 2023-11-18 | End: 2023-11-24 | Stop reason: HOSPADM

## 2023-11-18 RX ORDER — FENTANYL CITRATE 50 UG/ML
100 INJECTION, SOLUTION INTRAMUSCULAR; INTRAVENOUS ONCE
Status: COMPLETED | OUTPATIENT
Start: 2023-11-18 | End: 2023-11-18

## 2023-11-18 RX ORDER — PREDNISONE 5 MG/1
5 TABLET ORAL DAILY
Status: DISCONTINUED | OUTPATIENT
Start: 2023-11-19 | End: 2023-11-24 | Stop reason: HOSPADM

## 2023-11-18 RX ORDER — LEVOTHYROXINE SODIUM 0.03 MG/1
25 TABLET ORAL
Status: DISCONTINUED | OUTPATIENT
Start: 2023-11-19 | End: 2023-11-24 | Stop reason: HOSPADM

## 2023-11-18 RX ORDER — SODIUM CHLORIDE, SODIUM GLUCONATE, SODIUM ACETATE, POTASSIUM CHLORIDE, MAGNESIUM CHLORIDE, SODIUM PHOSPHATE, DIBASIC, AND POTASSIUM PHOSPHATE .53; .5; .37; .037; .03; .012; .00082 G/100ML; G/100ML; G/100ML; G/100ML; G/100ML; G/100ML; G/100ML
50 INJECTION, SOLUTION INTRAVENOUS CONTINUOUS
Status: DISCONTINUED | OUTPATIENT
Start: 2023-11-18 | End: 2023-11-19

## 2023-11-18 RX ORDER — DOXYCYCLINE HYCLATE 100 MG/1
100 CAPSULE ORAL EVERY 12 HOURS
Status: DISCONTINUED | OUTPATIENT
Start: 2023-11-18 | End: 2023-11-24 | Stop reason: HOSPADM

## 2023-11-18 RX ORDER — METOPROLOL SUCCINATE 25 MG/1
25 TABLET, EXTENDED RELEASE ORAL 2 TIMES DAILY
Status: DISCONTINUED | OUTPATIENT
Start: 2023-11-18 | End: 2023-11-24 | Stop reason: HOSPADM

## 2023-11-18 RX ORDER — SENNOSIDES 8.6 MG
1 TABLET ORAL
Status: DISCONTINUED | OUTPATIENT
Start: 2023-11-18 | End: 2023-11-24 | Stop reason: HOSPADM

## 2023-11-18 RX ORDER — MORPHINE SULFATE 4 MG/ML
4 INJECTION, SOLUTION INTRAMUSCULAR; INTRAVENOUS ONCE
Status: COMPLETED | OUTPATIENT
Start: 2023-11-18 | End: 2023-11-18

## 2023-11-18 RX ORDER — EZETIMIBE 10 MG/1
10 TABLET ORAL DAILY
Status: DISCONTINUED | OUTPATIENT
Start: 2023-11-19 | End: 2023-11-24 | Stop reason: HOSPADM

## 2023-11-18 RX ORDER — TAMSULOSIN HYDROCHLORIDE 0.4 MG/1
0.4 CAPSULE ORAL
Status: DISCONTINUED | OUTPATIENT
Start: 2023-11-18 | End: 2023-11-24 | Stop reason: HOSPADM

## 2023-11-18 RX ORDER — DIAZEPAM 5 MG/ML
10 INJECTION, SOLUTION INTRAMUSCULAR; INTRAVENOUS ONCE
Status: COMPLETED | OUTPATIENT
Start: 2023-11-18 | End: 2023-11-18

## 2023-11-18 RX ORDER — MIDAZOLAM HYDROCHLORIDE 2 MG/2ML
2 INJECTION, SOLUTION INTRAMUSCULAR; INTRAVENOUS ONCE
Status: COMPLETED | OUTPATIENT
Start: 2023-11-18 | End: 2023-11-18

## 2023-11-18 RX ADMIN — LORAZEPAM 1 MG: 2 INJECTION INTRAMUSCULAR; INTRAVENOUS at 12:55

## 2023-11-18 RX ADMIN — MIDAZOLAM 2 MG: 1 INJECTION INTRAMUSCULAR; INTRAVENOUS at 11:34

## 2023-11-18 RX ADMIN — LORAZEPAM 1 MG: 2 INJECTION INTRAMUSCULAR; INTRAVENOUS at 11:46

## 2023-11-18 RX ADMIN — IOHEXOL 145 ML: 350 INJECTION, SOLUTION INTRAVENOUS at 12:49

## 2023-11-18 RX ADMIN — APIXABAN 2.5 MG: 2.5 TABLET, FILM COATED ORAL at 21:01

## 2023-11-18 RX ADMIN — MORPHINE SULFATE 4 MG: 4 INJECTION INTRAVENOUS at 12:34

## 2023-11-18 RX ADMIN — FENTANYL CITRATE 100 MCG: 50 INJECTION INTRAMUSCULAR; INTRAVENOUS at 15:41

## 2023-11-18 RX ADMIN — DIAZEPAM 10 MG: 10 INJECTION, SOLUTION INTRAMUSCULAR; INTRAVENOUS at 15:42

## 2023-11-18 RX ADMIN — METOPROLOL SUCCINATE 25 MG: 25 TABLET, FILM COATED, EXTENDED RELEASE ORAL at 21:01

## 2023-11-18 RX ADMIN — HYDROMORPHONE HYDROCHLORIDE 0.5 MG: 1 INJECTION, SOLUTION INTRAMUSCULAR; INTRAVENOUS; SUBCUTANEOUS at 13:14

## 2023-11-18 RX ADMIN — HYDROMORPHONE HYDROCHLORIDE 0.5 MG: 1 INJECTION, SOLUTION INTRAMUSCULAR; INTRAVENOUS; SUBCUTANEOUS at 14:29

## 2023-11-18 RX ADMIN — FENTANYL CITRATE 100 MCG: 50 INJECTION INTRAMUSCULAR; INTRAVENOUS at 12:04

## 2023-11-18 RX ADMIN — DOXYCYCLINE 100 MG: 100 CAPSULE ORAL at 21:01

## 2023-11-18 RX ADMIN — HALOPERIDOL LACTATE 5 MG: 5 INJECTION, SOLUTION INTRAMUSCULAR at 11:46

## 2023-11-18 RX ADMIN — HALOPERIDOL LACTATE 5 MG: 5 INJECTION, SOLUTION INTRAMUSCULAR at 12:55

## 2023-11-18 RX ADMIN — LEVETIRACETAM 500 MG: 500 INJECTION, SOLUTION INTRAVENOUS at 21:32

## 2023-11-18 RX ADMIN — TAMSULOSIN HYDROCHLORIDE 0.4 MG: 0.4 CAPSULE ORAL at 21:01

## 2023-11-18 RX ADMIN — SODIUM CHLORIDE 1000 ML: 0.9 INJECTION, SOLUTION INTRAVENOUS at 12:34

## 2023-11-18 RX ADMIN — SODIUM CHLORIDE, SODIUM GLUCONATE, SODIUM ACETATE, POTASSIUM CHLORIDE AND MAGNESIUM CHLORIDE 50 ML/HR: 526; 502; 368; 37; 30 INJECTION, SOLUTION INTRAVENOUS at 20:48

## 2023-11-18 NOTE — CONSULTS
Consultation - Stroke   [place] Number 80 y.o. male MRN: [id number]  Unit/Bed#: ED 03 Encounter: [id number]      Assessment/Plan   #Acute encephalopathy, resolved  Kings Baker "Aleksey" Robinson Liriano is an 80year old man with PMHx including paroxysmal A fib on Eliquis, TIA vs seizure, HTN, HLD, neuropathy, gout, spinal stenosis who presented for evaluation of word finding difficulty and non-fluent speech. Last known normal around 8 AM. Reportedly was eating breakfast at that time. Son had called pt around 8:30 AM and noticed that he had non-fluent speech and also some word finding difficulty. Pt was brought to the ED for further evaluation. On arrival here, was noted to have word finding difficulty and non-fluent speech, but no other obvious deficits although difficult to properly examine due to agitation. He was recently seen for a kidney stone so unclear if this was leading to agitation. He received several rounds of sedation for imaging but unfortunately much of the imaging was degraded. MRA head was able to be obtained which did not show any large vessel occlusion. GRE and FLAIR sequences were adequate. GRE with no significant findings. When comparing today's FLAIR to FLAIR sequence from most recent MRI brain, appear similar. When speaking to family, appears that he did improve in the ED, and even on repeat evaluation by ED during the day, his examination improved. Once he received sedation, would become more altered. On my evaluation this evening, he appeared tired initially but throughout my evaluation, he became more and more awake. He participated with most of the examination, answering questions and following commands. He knew his name, where he was (hospital name and city), did not know the year, knew the upcoming holiday but not the month, knew his children's names at bedside, knew [place] was the president and [place] was the most recent president. Rest of examination as noted below. No obvious focal deficits.  Speech was clear with no noted word finding difficulty or non-fluent speech. Family felt he was close to baseline, just still mildly confused at times which is likely related to sedation. By the time my evaluation was completed, he wanted to sleep. Daughter at bedside mentioned that he has not been eating or drinking well over the past several weeks. Also had a GI bug several weeks ago. He also had a similar episode about a week ago that was short lasting. Discussed with family that given he is very close to baseline with non-focal examination, appears that risks outweigh benefits of anesthesia and intubation to complete imaging right now, especially in light of his age, they were in agreement. On review of his chart, he has had similar episodes in the past dating back to at least 2022, now seen for the third time in the hospital for these symptoms, and reported to have had other instances of these episodes outside the hospital. He has been maintained on Eliquis for atrial fibrillation. On first admission, felt to possibly have TIA. On second admission, felt to possibly have seizure vs migraine vs hypoperfusion. 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 Would be odd for TIA or stroke to present with similar symptoms many times. Presentation can fit with seizure. Does not appear to have had any workup for seizure, and family does not recall ever having an EEG done for him in the past. No noted headache. Plan:  - recommend admission for monitoring. Of note, family mentioned that pt has a history of hospital-acquired delirium, so would maintain delirium precautions  - given examination reassuring and mental status close to baseline, no need for vEEG. If mental status were to decline and there is a clinical suspicion for seizure, low threshold for transfer to [facility] for vEEG.    - otherwise can order routine EEG  - check MRI brain (epilepsy protocol) in addition to MRA neck w/o contrast to complete workup; ideally would have imaging done as soon as able. Can otherwise consider rechecking CTA head/neck once able to given 2 dye loads today if MRA neck not done.   - given stereotyped episodes over the past 1-2 years, would start an AED for now. Can consider starting Keppra 500 mg [id number] (expect Cr to improve to baseline, likely prerenal KAMLESH in light of decreased PO intake), no load needed since very close to baseline. Can alternatively try Vimpat 100 mg [id number] if Keppra not favorable. I discussed these medication options with family and side effect profile.   - given non-focal examination with examination (other than some mild confusion at times) at baseline, can continue Eliquis; lower suspicion for acute stroke at this time  - BP goal less than 160 for now. Of note, he has remained mostly normotensive which would lead to lower suspicion for acute stroke too   - plan for nutrition and correction of any metabolic/infectious derangements per primary team  - rest of care per primary     Thrombolytic Decision: Patient not a candidate. Bleeding risk. (On Eliquis, last taken this morning)    Recommendations for outpatient neurological follow up have yet to be determined. History of Present Illness     Reason for Consult / Principal Problem: word finding difficulty and non-fluent speech  Patient last known well: 8 AM today  Stroke alert called: 11:03 AM  Neurology time of arrival: immediate, over phone earlier today    Inpatient consult to Neurology  Consult performed by: Juan Jade MD  Consult ordered by: Mitchell Christianson DO      HPI:  Yulisa Ruiz "[place]" [facility] is an 80year old man with PMHx including paroxysmal A fib on Eliquis, TIA vs seizure, HTN, HLD, neuropathy, gout, spinal stenosis who presented for evaluation of word finding difficulty and non-fluent speech.  Last known normal around 8 AM. Reportedly was eating breakfast at that time. Son had called pt around 8:30 AM and noticed that he had non-fluent speech and also some word finding difficulty. Pt was brought to the ED for further evaluation. On arrival here, was noted to have word finding difficulty and non-fluent speech, but no other obvious deficits although difficult to properly examine due to agitation. He was recently seen for a kidney stone so unclear if this was leading to agitation. He received several rounds of sedation for imaging but unfortunately much of the imaging was degraded. MRA head was able to be obtained which did not show any large vessel occlusion. CT head with no acute findings. Imaging of the neck motion degraded. When speaking to family, appears that he did improve in the ED at times throughout the afternoon, and even on repeat evaluation by ED during the day, his examination improved. Once he received sedation, would become more altered. On my evaluation this evening, he appeared tired initially but throughout my evaluation, he became more and more awake. He participated with most of the examination, answering questions and following commands. He knew his name, where he was (hospital name and city), did not know the year, knew the upcoming holiday but not the month, knew his children's names at bedside, knew [place] was the president and [place] was the most recent president. Rest of examination as noted below. No obvious focal deficits. Speech was clear with no noted word finding difficulty or non-fluent speech. Family felt he was close to baseline, just still confused at times which is likely related to sedation. By the time my evaluation was completed, he wanted to sleep. Daughter at bedside mentioned that he has not been eating or drinking well over the past several weeks. Also had a GI bug several weeks ago. He also had a similar episode about a week ago that was short lasting.      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[address])     Spinal stenosis      Past Surgical History:   Procedure Laterality Date    CARDIAC ELECTROPHYSIOLOGY PROCEDURE N/A 12/17/2022    Procedure: Cardiac loop recorder implant;  Surgeon: Luz De León MD;  Location: [facility]; Service: Cardiology    CARPAL TUNNEL RELEASE Bilateral     COLONOSCOPY      LAMINECTOMY      TOTAL KNEE ARTHROPLASTY Bilateral     TOTAL SHOULDER REPLACEMENT       Social History   Social History     Substance and Sexual Activity   Alcohol Use Yes    Alcohol/week: 2.0 standard drinks of alcohol    Types: 2 Shots of liquor per week    Comment: 2-3 ounces of whiskey a day     Social History     Substance and Sexual Activity   Drug Use Yes    Types: Marijuana     E-Cigarette/Vaping    E-Cigarette Use Never User      E-Cigarette/Vaping Substances    Nicotine No     Flavoring No      Social History     Tobacco Use   Smoking Status Never   Smokeless Tobacco Never     Meds/Allergies   current meds:   No current facility-administered medications for this encounter. and PTA meds:   Prior to Admission Medications   Prescriptions Last Dose Informant Patient Reported? Taking?    Betadine 10 % external solution  Outside Facility (Specify) Yes No   Bismuth Tribromoph-Petrolatum (Xeroform Petrolat Gauze 5"x9") MISC  Outside Facility (Specify) Yes No   Cholecalciferol (Vitamin D3) 50 MCG (2000 UT) TABS  Outside Facility (Specify) Yes No   Patient not taking: Reported on 10/25/2023   Diclofenac Sodium (VOLTAREN) 1 %  Outside Facility (Specify) Yes No   Sig: Apply 4 g topically 4 (four) times a day   Alexa-Calcium Carbonate (DRAMAMINE ALEXA CHEWS PO)   Yes No   Sig: Take by mouth   Multiple Vitamin (multivitamin) tablet  Outside Facility (Specify) Yes No   Sig: Take 1 tablet by mouth daily   Patient not taking: Reported on 10/25/2023   Myrbetriq 25 MG TB24  Outside Facility (Specify) Yes No   Sig: Take 25 mg by mouth daily at bedtime   Saccharomyces boulardii (Probiotic) 250 MG CAPS  Outside Facility (Specify) Yes No   Vitamin D, Cholecalciferol, 25 MCG (1000 UT) TABS  Outside Facility (Specify) Yes No   Sig: Take by mouth in the morning   Patient not taking: Reported on 10/25/2023   acetaminophen (TYLENOL) 650 mg CR tablet  Outside Facility (Specify) Yes No   Sig: Take 650 mg by mouth 2 (two) times a day   allopurinol (ZYLOPRIM) 100 mg tablet  Outside Facility (Specify) Yes No   Sig: Take 400 mg by mouth daily   apixaban (Eliquis) 5 mg  Outside Facility (Specify) No No   Sig: Take 1 tablet (5 mg total) by mouth 2 (two) times a day   diphenhydrAMINE-zinc acetate (BENADRYL) cream  Outside Facility (Specify) No No   Sig: Apply topically 3 (three) times a day as needed for itching   Patient not taking: Reported on 10/25/2023   doxycycline hyclate (VIBRAMYCIN) 100 mg capsule  Outside Facility (Specify) Yes No   Sig: Take 100 mg by mouth every 12 (twelve) hours   ezetimibe (ZETIA) 10 mg tablet  Outside Facility (Specify) Yes No   Sig: Take 10 mg by mouth daily   fluticasone (FLONASE) 50 mcg/act nasal spray  Outside Facility (Specify) Yes No   gabapentin (NEURONTIN) 100 mg capsule  Outside Facility (Specify) Yes No   Sig: Take 100 mg by mouth 2 (two) times a day   hydroxychloroquine (PLAQUENIL) 200 mg tablet  Outside Facility (Specify) Yes No   Sig: Take 200 mg by mouth 2 (two) times a day with meals   levothyroxine 25 mcg tablet  Outside Facility (Specify) Yes No   loperamide (IMODIUM) 2 mg capsule  Outside Facility (Specify) Yes No   Sig: Take 2 mg by mouth as needed for diarrhea   melatonin 3 mg  Outside Facility (Specify) No No   Sig: Take 3 tablets (9 mg total) by mouth daily at bedtime   Patient not taking: Reported on 11/15/2023   metoprolol succinate (TOPROL-XL) 25 mg 24 hr tablet  Outside Facility (Specify) No No   Sig: Take 1 tablet (25 mg total) by mouth 2 (two) times a day   nitroglycerin (NITROSTAT) 0.4 mg SL tablet  Outside Facility (Specify) Yes No   Sig: Place 0.4 mg under the tongue every 5 (five) minutes as needed for chest pain   nystatin (MYCOSTATIN) powder  Outside Facility (Specify) No No   Sig: Apply topically 2 (two) times a day   Patient not taking: Reported on 10/25/2023   omeprazole (PriLOSEC) 10 mg delayed release capsule  Outside Facility (Specify) Yes No   Sig: Take 10 mg by mouth daily   ondansetron (ZOFRAN) 4 mg tablet   No No   Sig: Take 1 tablet (4 mg total) by mouth every 8 (eight) hours as needed for nausea or vomiting   ondansetron (ZOFRAN) 4 mg tablet   No No   Sig: Take 1 tablet (4 mg total) by mouth every 6 (six) hours   polyethylene glycol (GLYCOLAX) 17 GM/SCOOP  Outside Facility (Specify) Yes No   Patient not taking: Reported on 10/10/2023   polyethylene glycol (MIRALAX) 17 g packet  Outside Facility (Specify) No No   Sig: Take 17 g by mouth every other day   potassium chloride (K-DUR,KLOR-CON) 20 mEq tablet  Outside Facility (Specify) No No   Sig: Take 2 tablets (40 mEq total) by mouth daily Do not start before January 29, 2023.    predniSONE 5 mg tablet  Outside Facility (Specify) Yes No   Sig: Take by mouth daily   psyllium (METAMUCIL SMOOTH TEXTURE) 28 % packet   No No   Sig: Take 1 packet by mouth in the morning   tamsulosin (FLOMAX) 0.4 mg  Outside Facility (Specify) Yes No   Sig: Take 0.4 mg by mouth daily at bedtime   tamsulosin (FLOMAX) 0.4 mg   No No   Sig: Take 1 capsule (0.4 mg total) by mouth daily with dinner for 7 days   torsemide (DEMADEX) 10 mg tablet  Outside Facility (Specify) No No   Sig: Take 1 tablet (10 mg total) by mouth 4 (four) times a week Take 10 mg on Tuesday, Thursday, Saturday, and Sunday   torsemide (DEMADEX) 20 mg tablet  Outside Facility (Specify) No No   Sig: Take 1 tablet (20 mg total) by mouth 3 (three) times a week Take 20 mg on Monday, Wednesday, and Friday   traMADol (ULTRAM) 50 mg tablet  Outside Facility (Specify) No No   Sig: Take 1 tablet (50 mg total) by mouth every 6 (six) hours as needed for moderate pain or severe pain   Patient not taking: Reported on 10/25/2023      Facility-Administered Medications: None     Allergies   Allergen Reactions    Colchicine Other (See Comments)     Flushing      Niacin Other (See Comments)     flushed    Statins      Objective   Vitals:Blood pressure 105/74, pulse 103, temperature 98.1 °F (36.7 °C), resp. rate 18, weight 99.8 kg (220 lb 0.3 oz), SpO2 99 %. ,Body mass index is 32.49 kg/m². Intake/Output Summary (Last 24 hours) at 11/18/2023 1710  Last data filed at 11/18/2023 1629  Gross per 24 hour   Intake 1000 ml   Output 500 ml   Net 500 ml     Invasive Devices: Invasive Devices       Peripheral Intravenous Line  Duration             Peripheral IV 11/18/23 Left Antecubital <1 day    Peripheral IV 11/18/23 Left;Ventral (anterior) Forearm <1 day    Peripheral IV 11/18/23 Right Antecubital <1 day              Drain  Duration             Urethral Catheter Temperature probe 18 Fr. <1 day                  Physical Exam:  Gen: no acute distress   CV: s1 and s2 present, tachycardic  Pulm: normal respiratory effort  Abd: soft, non-distended, bowel sounds present  Ext: no edema  : (+) amador    Mental status: when initially seen, he was sleeping but woke up to voice, somewhat groggy initially but progressively became more awake and interactive in a short period of time. Would attend and track. Answers questions and follows commands. Naming, comprehension intact. Repetition intact. Attention/concentration somewhat impaired at times, but able to redirect easily. No dysarthria. No obvious aphasia. Able to name objects appropriately, identify situations noted in pictures.    Cranial nerves: pupils equally round and reactive to light, no obvious visual field deficits, no nystagmus, extraocular muscles intact, V1 through V3 intact bilaterally and symmetric, face symmetric, chronic hearing loss, palate elevation symmetric, tongue was midline, sternocleidomastoid/shoulder shrug strength bilaterally 5/5. Motor: Decreased bulk but normal tone, no drift, strength 5/5 in bilateral upper and lower extremities (except about 4-4+/5 with hip flexion b/l and 4/5 with plantarflexion/dorsiflexion b/l).  strength 5/5. Sensation: Intact to light touch. No neglect. Coordination: No dysmetria on finger-to-nose and heel-to-shin although required some redirection for these tasks to complete. No clumsiness. Reflexes: 1+ in upper and lower extremities (except 0 at patellar tendons b/l, has history of knee replacements)  Gait: deferred    NIHSS:  1a.Level of Consciousness: 0 = Alert   1b. LOC Questions: 1 = Answers one correctly   1c. LOC Commands: 0 = Obeys both correctly   2. Best Gaze: 0 = Normal   3. Visual: 0 = No visual field loss   4. Facial Palsy: 0=Normal symmetric movement   5a. Motor Right Arm: 0=No drift, limb holds 90 (or 45) degrees for full 10 seconds   5b. Motor Left Arm: 0=No drift, limb holds 90 (or 45) degrees for full 10 seconds   6a. Motor Right Le=No drift, limb holds 90 (or 45) degrees for full 10 seconds   6b. Motor Left Le=No drift, limb holds 90 (or 45) degrees for full 10 seconds   7. Limb Ataxia:  0=Absent   8. Sensory: 0=Normal; no sensory loss   9. Best Language:  0=No aphasia, normal   10. Dysarthria: 0=Normal articulation   11.  Extinction and Inattention (formerly Neglect): 0=No abnormality   Total Score: 1   Time NIHSS was completed: 5:30 PM    Modified [place] Score:  4 (Moderately severe disability; unable to walk and attend to bodily needs without assistance)    Lab Results: CBC:   Results from last 7 days   Lab Units 23  1103 23  1319   WBC Thousand/uL 9.81 10.64*   RBC Million/uL 4.20 4.34   HEMOGLOBIN g/dL 12.4 12.8 HEMATOCRIT % 38.9 41.0   MCV fL 93 95   PLATELETS Thousands/uL 197 223   , BMP/CMP:   Results from last 7 days   Lab Units 11/18/23  1107 11/16/23  1319   SODIUM mmol/L 137 139   POTASSIUM mmol/L 3.5 3.8   CHLORIDE mmol/L 98 100   CO2 mmol/L 27 26   BUN mg/dL 19 17   CREATININE mg/dL 1.64* 1.64*   CALCIUM mg/dL 7.1* 7.5*   AST U/L 18 20   ALT U/L 11 10   ALK PHOS U/L 67 70   EGFR ml/min/1.73sq m 36 36   , TSH:   Results from last 7 days   Lab Units 11/18/23  1738   TSH 3RD GENERATON uIU/mL 4.345   , Coagulation:   Results from last 7 days   Lab Units 11/18/23  1103   INR  1.53*   , Urinalysis:   Results from last 7 days   Lab Units 11/18/23  1321 11/16/23  1648   COLOR UA  Colorless Yellow   CLARITY UA  Clear Clear   SPEC GRAV UA  <1.005* 1.020   PH UA  6.0 6.0   LEUKOCYTES UA  Negative Negative   NITRITE UA  Negative Negative   GLUCOSE UA mg/dl Negative Negative   KETONES UA mg/dl Negative Negative   BILIRUBIN UA  Negative Negative   BLOOD UA  Negative Negative     Imaging Studies: I have personally reviewed pertinent reports.    and I have personally reviewed pertinent films in PACS

## 2023-11-18 NOTE — PROGRESS NOTES
Neurology Stroke Alert Note    Stroke alert called at [phone], neurology response was immediate. Dicussed with ED over the phone. Meg Reynolds is an 80year old man with PMHx including paroxysmal A fib on Eliquis, CHF, TIA, HTN, HLD who presented for evaluation of aphasia. Last known normal was about 8 AM this morning. He was eating breakfast at that time. Around 9 AM, son called out to him and noticed that pt was speaking gibberish. No other obvious deficits noted. Per ED, pt with mixed expressive/receptive aphasia, difficulty following commands but no focal weakness noted. - NIHSS 23 (limited by agitation)  - LKW 8 AM today    Discussed with Radiology:  CTH: no acute intracranial abnormality   CTA H/N: significant motion artifact    Not a TNK candidate since he last took Eliquis this morning. On review of his chart, he has had similar presentations in the past for what was thought to be aphasia, with suspicion after initial presentation being a non-vascular etiology, such as seizure. No EEG seen in review of chart. Will need MRI brain and MRA head/neck as soon as able to further workup presentation but if these are unrevealing, he will need EEG. May consider empiric treatment with AED in the interim. BP on arrival was in the low 407X systolic, which would be odd for an acute cerebrovascular event; however, given initial reported symptoms should have imaging as soon as possible to rule out acute ischemia and LVO.

## 2023-11-18 NOTE — ED PROVIDER NOTES
History  Chief Complaint   Patient presents with    Altered Mental Status     To ED from facility for evaluation of altered mental status since this morning. Staff denies any fever, chills. Patient has no complaints. Hx from patient, son, medical records -  PMH TIAs, chronic back pain, RA, GERD, recent diagnosis of kidney stone couple days ago. Patient presents with confusion, not making sense. Last nl 8 am at [place] at the [facility] at [address] [place] [address] then son called him 1 am and he was confused, slurred speech, and not making sense when he talked to his son. Patient appears rambling incomprehensible words and appears in distress/ agitated and unable to describe what is bothering him. Prior to Admission Medications   Prescriptions Last Dose Informant Patient Reported? Taking?    ACETAMINOPHEN ER PO 11/18/2023 Outside Facility (Specify) Yes Yes   Sig: Take 1,000 mg by mouth 3 (three) times a day   Betadine 10 % external solution  Outside Facility (Specify) Yes No   Bismuth Tribromoph-Petrolatum (Xeroform Petrolat Gauze 5"x9") MISC  Outside Facility (Specify) Yes No   Diclofenac Sodium (VOLTAREN) 1 %  Outside Facility (Specify) Yes No   Sig: Apply 4 g topically 4 (four) times a day   Ginger-Calcium Carbonate (DRAMAMINE GINGER CHEWS PO)   Yes No   Sig: Take by mouth   Myrbetriq 25 MG TB24 11/17/2023 Outside Facility (Specify) Yes Yes   Sig: Take 25 mg by mouth daily at bedtime   allopurinol (ZYLOPRIM) 100 mg tablet 11/18/2023 Outside Facility (Specify) Yes Yes   Sig: Take 400 mg by mouth daily   apixaban (Eliquis) 5 mg 11/18/2023 Outside Facility (Specify) No Yes   Sig: Take 1 tablet (5 mg total) by mouth 2 (two) times a day   diphenhydrAMINE-zinc acetate (BENADRYL) cream  Outside Facility (Specify) No No   Sig: Apply topically 3 (three) times a day as needed for itching   Patient not taking: Reported on 10/25/2023   doxycycline hyclate (VIBRAMYCIN) 100 mg capsule 11/18/2023 Outside Facility ([address] [place] [address]) Yes Yes   Sig: Take 100 mg by mouth every 12 (twelve) hours   ezetimibe (ZETIA) 10 mg tablet 2023 Outside Facility (Specify) Yes Yes   Sig: Take 10 mg by mouth daily   fluticasone (FLONASE) 50 mcg/act nasal spray 2023 Outside Facility (Specify) Yes Yes   Si spray into each nostril 2 (two) times a day as needed for rhinitis or allergies   gabapentin (NEURONTIN) 100 mg capsule 2023 Outside Facility (Specify) Yes Yes   Sig: Take 100 mg by mouth 2 (two) times a day   hydroxychloroquine (PLAQUENIL) 200 mg tablet 2023 Outside Facility (Specify) Yes Yes   Sig: Take 200 mg by mouth 2 (two) times a day with meals   levothyroxine 25 mcg tablet 2023 Outside Facility (Specify) Yes Yes   loperamide (IMODIUM) 2 mg capsule  Outside Facility (Specify) Yes No   Sig: Take 2 mg by mouth as needed for diarrhea   metoprolol succinate (TOPROL-XL) 25 mg 24 hr tablet 2023 Outside Facility (Specify) No Yes   Sig: Take 1 tablet (25 mg total) by mouth 2 (two) times a day   nystatin (MYCOSTATIN) powder  Outside Facility (Specify) No No   Sig: Apply topically 2 (two) times a day   Patient not taking: Reported on 10/25/2023   omeprazole (PriLOSEC) 10 mg delayed release capsule 2023 Outside Facility (Specify) Yes Yes   Sig: Take 10 mg by mouth daily   ondansetron (ZOFRAN) 4 mg tablet 2023  No Yes   Sig: Take 1 tablet (4 mg total) by mouth every 8 (eight) hours as needed for nausea or vomiting   Patient taking differently: Take 4 mg by mouth every 6 (six) hours   ondansetron (ZOFRAN) 4 mg tablet   No No   Sig: Take 1 tablet (4 mg total) by mouth every 6 (six) hours   polyethylene glycol (GLYCOLAX) 17 GM/SCOOP  Outside Facility (Specify) Yes No   Patient not taking: Reported on 10/10/2023   polyethylene glycol (MIRALAX) 17 g packet  Outside Facility (Specify) No No   Sig: Take 17 g by mouth every other day   potassium chloride (K-DUR,KLOR-CON) 20 mEq tablet 2023 Outside Facility (Specify) No Yes Sig: Take 2 tablets (40 mEq total) by mouth daily Do not start before January 29, 2023. predniSONE 5 mg tablet 11/18/2023 Outside Facility (Specify) Yes Yes   Sig: Take by mouth daily   psyllium (METAMUCIL SMOOTH TEXTURE) 28 % packet   No No   Sig: Take 1 packet by mouth in the morning   tamsulosin (FLOMAX) 0.4 mg 11/17/2023 Outside Facility (Specify) Yes Yes   Sig: Take 0.4 mg by mouth daily at bedtime   tamsulosin (FLOMAX) 0.4 mg   No No   Sig: Take 1 capsule (0.4 mg total) by mouth daily with dinner for 7 days   torsemide (DEMADEX) 10 mg tablet  Outside Facility (Specify) No No   Sig: Take 1 tablet (10 mg total) by mouth 4 (four) times a week Take 10 mg on Tuesday, Thursday, Saturday, and Sunday   torsemide (DEMADEX) 20 mg tablet 11/17/2023 Outside Facility (Specify) No Yes   Sig: Take 1 tablet (20 mg total) by mouth 3 (three) times a week Take 20 mg on Monday, Wednesday, and Friday      Facility-Administered Medications: None       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     E-Cigarette/Vaping    E-Cigarette Use Never User      E-Cigarette/Vaping Substances    Nicotine No     Flavoring No      Social History     Tobacco Use    Smoking status: Never    Smokeless tobacco: Never   Vaping Use    Vaping Use: Never used Substance Use Topics    Alcohol use: Yes     Alcohol/week: 2.0 standard drinks of alcohol     Types: 2 Shots of liquor per week     Comment: 2-3 ounces of whiskey a day    Drug use: Yes     Types: Marijuana       Review of Systems   Unable to perform ROS: Mental status change   All other systems reviewed and are negative. Physical Exam  Physical Exam  Vitals and nursing note reviewed. Constitutional:       General: He is in acute distress. Appearance: He is well-developed. HENT:      Head: Normocephalic and atraumatic. Eyes:      General: Vision grossly intact. Extraocular Movements: Extraocular movements intact. Conjunctiva/sclera: Conjunctivae normal.      Pupils: Pupils are equal, round, and reactive to light. Comments: Patient intermittently looks at me when I speak to him. He blinks eyes when I flash fingers in front of them   Cardiovascular:      Rate and Rhythm: Regular rhythm. Tachycardia present. Heart sounds: No murmur heard. Pulmonary:      Effort: Pulmonary effort is normal. No respiratory distress. Breath sounds: Normal breath sounds. Abdominal:      Palpations: Abdomen is soft. Tenderness: There is no abdominal tenderness. Genitourinary:     Penis: Normal.       Testes: Normal.      Comments: Patient grabbing his penis, stating that he has to urinate, but unable to  Musculoskeletal:         General: No swelling or tenderness. Cervical back: Neck supple. No bony tenderness. Thoracic back: No bony tenderness. Lumbar back: No bony tenderness. Comments: Multiple bruises bilateral arms   Skin:     General: Skin is warm and dry. Capillary Refill: Capillary refill takes less than 2 seconds. Findings: Rash present. Comments: Inguinal rash consistent with candida   Neurological:      Mental Status: He is alert. He is disoriented and confused. GCS: GCS eye subscore is 4. GCS verbal subscore is 2.  GCS motor subscore is 4.      Cranial Nerves: Dysarthria present. No cranial nerve deficit or facial asymmetry. Sensory: Sensory deficit present. Motor: Abnormal muscle tone present. Coordination: Coordination abnormal.      Comments: Initial neuro exam difficult - patient doesn't look at my fingers to check vision, he answers incorrectly for all visual fields. He is moving upper extremities but not lower. He has sensation upper but not lower (son states that could be chronic), unable to cooperative for finger to nose or heel to shin   Psychiatric:         Attention and Perception: He is inattentive. Mood and Affect: Mood normal. Affect is angry. Behavior: Behavior is agitated. Judgment: Judgment is impulsive.          Vital Signs  ED Triage Vitals   Temperature Pulse Respirations Blood Pressure SpO2   11/18/23 1044 11/18/23 1044 11/18/23 1044 11/18/23 1044 11/18/23 1044   98.1 °F (36.7 °C) 61 20 102/63 94 %      Temp Source Heart Rate Source Patient Position - Orthostatic VS BP Location FiO2 (%)   11/18/23 1430 11/18/23 1044 11/18/23 1044 11/18/23 1044 --   Oral Monitor Lying Right arm       Pain Score       11/18/23 1044       No Pain           Vitals:    11/18/23 1630 11/18/23 1700 11/18/23 1730 11/18/23 1933   BP: 105/74 98/60 139/60 133/75   Pulse: 103 98 102    Patient Position - Orthostatic VS: Lying  Lying          Visual Acuity      ED Medications  Medications   levETIRAcetam (KEPPRA) 500 mg in sodium chloride 0.9 % 100 mL IVPB (500 mg Intravenous New Bag 11/18/23 2132)   allopurinol (ZYLOPRIM) tablet 400 mg (has no administration in time range)   doxycycline hyclate (VIBRAMYCIN) capsule 100 mg (100 mg Oral Given 11/18/23 2101)   ezetimibe (ZETIA) tablet 10 mg (has no administration in time range)   gabapentin (NEURONTIN) capsule 100 mg (has no administration in time range)   hydroxychloroquine (PLAQUENIL) tablet 200 mg (has no administration in time range)   metoprolol succinate (TOPROL-XL) 24 hr tablet 25 mg (25 mg Oral Given 11/18/23 2101)   pantoprazole (PROTONIX) EC tablet 20 mg (has no administration in time range)   predniSONE tablet 5 mg (has no administration in time range)   tamsulosin (FLOMAX) capsule 0.4 mg (0.4 mg Oral Given 11/18/23 2101)   levothyroxine tablet 25 mcg (has no administration in time range)   acetaminophen (TYLENOL) tablet 650 mg (has no administration in time range)   senna (SENOKOT) tablet 8.6 mg (has no administration in time range)   aluminum-magnesium hydroxide-simethicone (MAALOX) oral suspension 30 mL (has no administration in time range)   apixaban (ELIQUIS) tablet 2.5 mg (2.5 mg Oral Given 11/18/23 2101)   multi-electrolyte (PLASMALYTE-A/ISOLYTE-S PH 7.4) IV solution (50 mL/hr Intravenous New Bag 11/18/23 2048)   midazolam (VERSED) injection 2 mg (2 mg Intravenous Given 11/18/23 1134)   LORazepam (ATIVAN) injection 1 mg (1 mg Intramuscular Given 11/18/23 1146)   haloperidol lactate (HALDOL) injection 5 mg (5 mg Intramuscular Given 11/18/23 1146)   fentanyl citrate (PF) 100 MCG/2ML 100 mcg (100 mcg Intravenous Given 11/18/23 1204)   sodium chloride 0.9 % bolus 1,000 mL (0 mL Intravenous Stopped 11/18/23 1629)   morphine injection 4 mg (4 mg Intravenous Given 11/18/23 1234)   haloperidol lactate (HALDOL) injection 5 mg (5 mg Intramuscular Given 11/18/23 1255)   LORazepam (ATIVAN) injection 1 mg (1 mg Intramuscular Given 11/18/23 1255)   iohexol (OMNIPAQUE) 350 MG/ML injection (SINGLE-DOSE) 100 mL (145 mL Intravenous Given 11/18/23 1249)   HYDROmorphone (DILAUDID) injection 0.5 mg (0.5 mg Intravenous Given 11/18/23 1314)   HYDROmorphone (DILAUDID) injection 0.5 mg (0.5 mg Intravenous Given 11/18/23 1429)   fentanyl citrate (PF) 100 MCG/2ML 100 mcg (100 mcg Intravenous Given 11/18/23 1541)   diazepam (VALIUM) injection 10 mg (10 mg Intravenous Given 11/18/23 1542)       Diagnostic Studies  Results Reviewed       Procedure Component Value Units Date/Time TSH, 3rd generation with Free T4 reflex [[id number]]  (Normal) Collected: 11/18/23 1738    Lab Status: Final result Specimen: Blood from Arm, Right Updated: 11/18/23 1837     TSH 3RD GENERATON 4.345 uIU/mL     Vitamin B12 [[id number]] Collected: 11/18/23 1738    Lab Status: In process Specimen: Blood from Arm, Right Updated: 11/18/23 1808    Folate [[id number]] Collected: 11/18/23 1738    Lab Status: In process Specimen: Blood from Arm, Right Updated: 11/18/23 1808    HS Troponin I 4hr [[id number]]  (Normal) Collected: 11/18/23 1738    Lab Status: Final result Specimen: Blood from Arm, Right Updated: 11/18/23 1808     hs TnI 4hr 11 ng/L      Delta 4hr hsTnI 0 ng/L     Salicylate level [[id number]]  (Normal) Collected: 11/18/23 1738    Lab Status: Final result Specimen: Blood from Arm, Right Updated: 43/79/17 8134     Salicylate Lvl <5 mg/dL     Acetaminophen level-If concentration is detectable, please discuss with medical  on call. [[id number]]  (Abnormal) Collected: 11/18/23 1738    Lab Status: Final result Specimen: Blood from Arm, Right Updated: 11/18/23 1802     Acetaminophen Level <2 ug/mL     Ethanol [[id number]]  (Normal) Collected: 11/18/23 1738    Lab Status: Final result Specimen: Blood from Arm, Right Updated: 11/18/23 1800     Ethanol Lvl <10 mg/dL     Lactic acid 2 Hours [[id number]]  (Normal) Collected: 11/18/23 1738    Lab Status: Final result Specimen: Blood from Arm, Right Updated: 11/18/23 1759     LACTIC ACID 2.0 mmol/L     Narrative:      Result may be elevated if tourniquet was used during collection.     Ammonia [[id number]]  (Normal) Collected: 11/18/23 1738    Lab Status: Final result Specimen: Blood from Arm, Right Updated: 11/18/23 1759     Ammonia 21 umol/L     Rapid drug screen, urine [[id number]]     Lab Status: No result Specimen: Urine     HS Troponin I 2hr [[id number]]  (Normal) Collected: 11/18/23 1327    Lab Status: Final result Specimen: Blood from Arm, Left Updated: 11/18/23 7126 hs TnI 2hr 10 ng/L      Delta 2hr hsTnI -1 ng/L     UA w Reflex to Microscopic w Reflex to Culture [[id number]]  (Abnormal) Collected: 11/18/23 1321    Lab Status: Final result Specimen: Urine, Indwelling Marino Catheter Updated: 11/18/23 1342     Color, UA Colorless     Clarity, UA Clear     Specific Gravity, UA <1.005     pH, UA 6.0     Leukocytes, UA Negative     Nitrite, UA Negative     Protein, UA Negative mg/dl      Glucose, UA Negative mg/dl      Ketones, UA Negative mg/dl      Urobilinogen, UA <2.0 mg/dl      Bilirubin, UA Negative     Occult Blood, UA Negative    Lactic acid [[id number]]  (Abnormal) Collected: 11/18/23 1208    Lab Status: Final result Specimen: Blood from Arm, Left Updated: 11/18/23 1234     LACTIC ACID 2.3 mmol/L     Narrative:      Result may be elevated if tourniquet was used during collection. Procalcitonin [[id number]]  (Normal) Collected: 11/18/23 1107    Lab Status: Final result Specimen: Blood from Arm, Left Updated: 11/18/23 1231     Procalcitonin 0.11 ng/ml     Blood culture #1 [[id number]] Collected: 11/18/23 1208    Lab Status: In process Specimen: Blood from Arm, Left Updated: 11/18/23 1213    Blood culture #2 [[id number]] Collected: 11/18/23 1208    Lab Status: In process Specimen: Blood from Hand, Left Updated: 11/18/23 1213    FLU/RSV/COVID - if FLU/RSV clinically relevant [[id number]]  (Normal) Collected: 11/18/23 1103    Lab Status: Final result Specimen: Nares from Nose Updated: 11/18/23 1153     SARS-CoV-2 Negative     INFLUENZA A PCR Negative     INFLUENZA B PCR Negative     RSV PCR Negative    Narrative:      FOR PEDIATRIC PATIENTS - copy/paste COVID Guidelines URL to browser: [email or web address]    SARS-CoV-2 assay is a Nucleic Acid Amplification assay intended for the  qualitative detection of nucleic acid from SARS-CoV-2 in nasopharyngeal  swabs.  Results are for the presumptive identification of SARS-CoV-2 RNA.    Positive results are indicative of infection with SARS-CoV-2, the virus  causing COVID-19, but do not rule out bacterial infection or co-infection  with other viruses. Laboratories within the [facility] and its  territories are required to report all positive results to the appropriate  public health authorities. Negative results do not preclude SARS-CoV-2  infection and should not be used as the sole basis for treatment or other  patient management decisions. Negative results must be combined with  clinical observations, patient history, and epidemiological information. This test has not been FDA cleared or approved. This test has been authorized by FDA under an Emergency Use Authorization  (EUA). This test is only authorized for the duration of time the  declaration that circumstances exist justifying the authorization of the  emergency use of an in vitro diagnostic tests for detection of SARS-CoV-2  virus and/or diagnosis of COVID-19 infection under section 564(b)(1) of  the Act, 21 U. S.C. 479ZES-4(U)(5), unless the authorization is terminated  or revoked sooner. The test has been validated but independent review by FDA  and CLIA is pending. Test performed using [employer] GeneXpert: This RT-PCR assay targets N2,  a region unique to SARS-CoV-2. A conserved region in the E-gene was chosen  for pan-Sarbecovirus detection which includes SARS-CoV-2. According to CMS-2020-01-R, this platform meets the definition of high-throughput technology.     HS Troponin 0hr (reflex protocol) [[id number]]  (Normal) Collected: 11/18/23 1103    Lab Status: Final result Specimen: Blood from Arm, Left Updated: 11/18/23 1135     hs TnI 0hr 11 ng/L     Protime-INR [[id number]]  (Abnormal) Collected: 11/18/23 1103    Lab Status: Final result Specimen: Blood from Arm, Left Updated: 11/18/23 1130     Protime 18.9 seconds      INR 1.53    APTT [[id number]]  (Normal) Collected: 11/18/23 1103    Lab Status: Final result Specimen: Blood from Arm, Left Updated: 11/18/23 1130     PTT 35 seconds     Comprehensive metabolic panel [[id number]]  (Abnormal) Collected: 11/18/23 1107    Lab Status: Final result Specimen: Blood from Arm, Left Updated: 11/18/23 1127     Sodium 137 mmol/L      Potassium 3.5 mmol/L      Chloride 98 mmol/L      CO2 27 mmol/L      ANION GAP 12 mmol/L      BUN 19 mg/dL      Creatinine 1.64 mg/dL      Glucose 103 mg/dL      Calcium 7.1 mg/dL      Corrected Calcium 7.7 mg/dL      AST 18 U/L      ALT 11 U/L      Alkaline Phosphatase 67 U/L      Total Protein 6.5 g/dL      Albumin 3.3 g/dL      Total Bilirubin 0.52 mg/dL      eGFR 36 ml/min/1.73sq m     Narrative:      [place] guidelines for Chronic Kidney Disease (CKD):     Stage 1 with normal or high GFR (GFR > 90 mL/min/1.73 square meters)    Stage 2 Mild CKD (GFR = 60-89 mL/min/1.73 square meters)    Stage 3A Moderate CKD (GFR = 45-59 mL/min/1.73 square meters)    Stage 3B Moderate CKD (GFR = 30-44 mL/min/1.73 square meters)    Stage 4 Severe CKD (GFR = 15-29 mL/min/1.73 square meters)    Stage 5 End Stage CKD (GFR <15 mL/min/1.73 square meters)  Note: GFR calculation is accurate only with a steady state creatinine    CBC and Platelet [[id number]]  (Abnormal) Collected: 11/18/23 1103    Lab Status: Final result Specimen: Blood from Arm, Left Updated: 11/18/23 1114     WBC 9.81 Thousand/uL      RBC 4.20 Million/uL      Hemoglobin 12.4 g/dL      Hematocrit 38.9 %      MCV 93 fL      MCH 29.5 pg      MCHC 31.9 g/dL      RDW 14.0 %      Platelets 539 Thousands/uL      MPV 13.3 fL     Fingerstick Glucose (POCT) [[id number]]  (Normal) Collected: 11/18/23 1056    Lab Status: Final result Updated: 11/18/23 1112     POC Glucose 110 mg/dl                    MRA head wo contrast   Final Result by Bony Mcpherson MD (11/18 1701)      No stenosis or occlusion of the major vessels of the [place].                Workstation performed: [id number]         MRI brain wo contrast   Final Result by Junaid Godinez MD (11/18 2741)      Nondiagnostic exam due to patient motion artifact. Repeat exam with appropriate sedation recommended, as clinically indicated. Workstation performed: [id number]         CT stroke alert brain   Final Result by Deysi Kennedy MD (11/18 7848)      No acute intracranial abnormality. Severe chronic microangiopathy, similar to prior exams. Findings were directly discussed with Mathew [place] 11/18/2023 at 11:39 AM.      Workstation performed: [id number]         CTA stroke alert (head/neck)   Final Result by Deysi Kennedy MD (11/18 4997)      Delayed examination due to multiple IV infiltrations and patient motion artifact -I asked the CT technologist to reinject the patient given the degree of motion artifact on the first provided images knowing that this patient has poor renal function given    that the benefit outweighs the risks and trying to determine a large vessel occlusion in the head or neck for which intervention could be performed. I was notified at a later time that the second bolus also had IV infiltration. Nondiagnostic examination    to definitively conclude no large vessel occlusion in the head or neck. There appears to be contrast opacification of the intracranial vasculature particularly in the left MCA distribution but the right MCA distribution is moderately degraded by motion    artifact and branch vessel occlusions can be missed in this situation. Consider repeat CTA head and neck with contrast when patient's condition improves or consider sedated MRI brain without contrast, MRA head, and MRA neck without contrast examination. Arterial system appears patent in the neck and head given degree of significant motion artifact. Confidence is low to detect large vessel occlusion in the head or neck given degree of motion artifact.       Additional chronic/incidental findings as detailed above. Findings were directly discussed with Criselda Sweeney at 12:42 p.m. Workstation performed: [id number]         CT chest abdomen pelvis wo contrast   Final Result by Jacqueline Montes MD (11/18 9269)      1.  3 mm stone at the left ureterovesicular junction shows no interval change in position. Still no significant upstream hydronephrosis. 2.  No other new/acute findings in the chest, abdomen, or pelvis. 3.  Ascending aortic ectasia warrants yearly chest CT surveillance to confirm size stability. Note: Imaging follow-up reminder notification was scheduled in the electronic medical record. Workstation performed: [id number]                    Procedures  ECG 12 Lead Documentation Only    Date/Time: 11/18/2023 10:50 AM    Performed by: Fady Benson DO  Authorized by: Fady Benson DO    Indications / Diagnosis:  Altered mental status  ECG reviewed by me, the ED Provider: yes    Patient location:  ED  Previous ECG:     Previous ECG:  Compared to current    Comparison ECG info:  2 days ago    Similarity:  No change  Interpretation:     Interpretation: non-specific    Rate:     ECG rate:  102    ECG rate assessment: tachycardic    Rhythm:     Rhythm: atrial fibrillation    Ectopy:     Ectopy: none    QRS:     QRS axis:  Normal    QRS intervals:  Normal  Conduction:     Conduction: normal    ST segments:     ST segments:  Normal  T waves:     T waves: normal    Comments: This EKG was interpreted by me.   CriticalCare Time    Date/Time: 11/18/2023 9:38 PM    Performed by: Fady Benson DO  Authorized by: Fady Benson DO    Critical care provider statement:     Critical care time (minutes):  90    Critical care time was exclusive of:  Separately billable procedures and treating other patients and teaching time    Critical care was necessary to treat or prevent imminent or life-threatening deterioration of the following conditions:  CNS failure or compromise    Critical care was time spent personally by me on the following activities:  Obtaining history from patient or surrogate, development of treatment plan with patient or surrogate, discussions with consultants, evaluation of patient's response to treatment, examination of patient, review of old charts, re-evaluation of patient's condition, ordering and review of radiographic studies, ordering and review of laboratory studies and ordering and performing treatments and interventions  US Guided Peripheral IV    Date/Time: 11/18/2023 11:40 AM    Performed by: Ruth Johnson DO  Authorized by: Ruth Johnson DO    Patient location:  ED  Performed by: Attending  Other Assisting Provider: Yes (comment) [place] Natter tech)    Indications:     Indications: difficulty obtaining IV access    Procedure details:     Location:  Left arm    Catheter size:  18 gauge    Number of attempts:  1    Successful placement: yes    Post-procedure details:     Post-procedure:  Dressing applied    Assessment: free fluid flow and no signs of infiltration      Patient tolerance of procedure: Tolerated with difficulty  Comments:      Line blew and infiltrated at ct department probably patient bent his arm during contrast injection as he was uncooperative for study  US Guided Peripheral IV    Date/Time: 11/18/2023 12:42 AM    Performed by: Ruth Johnson DO  Authorized by: Ruth Johnson DO    Patient location:  ED  Performed by: Attending  Other Assisting Provider: Yes (comment) [place] [employer] tech)    Indications:     Indications: difficulty obtaining IV access    Procedure details:     Location:  Right arm    Catheter size:  18 gauge    Number of attempts:  1    Successful placement: yes    Post-procedure details:     Post-procedure:  Dressing applied    Assessment: free fluid flow and no signs of infiltration      Post-procedure complications: none      Patient tolerance of procedure:   Tolerated with difficulty           ED Course  ED Course as of 11/18/23 2145   Sat Nov 18, 2023   1208 Patient agitated and line blew. Had to return to room and place another line. More oriented but still confused. Following commands, lifting legs. Son states he may be in pain as he is currently trying to pass a kidney stone - added fentanyl   1311 Second ultrasound iv done - still agitated and pain even after amador placed. Difficult to tell us where the pain is.   1623 Urology agrees no need to transfer for procedure at this point. Strain urine     multiple text messages with Dr. Abdiel Wynne neuro stroke call - difficulty sedating but also would not like to intubate as that would prevent neuro exams. Stroke Assessment       Row Name 11/18/23 1125 11/18/23 1402          NIH Stroke Scale    Interval Baseline --     Level of Consciousness (1a.) 0 0     LOC Questions (1b.) 2 1     LOC Commands (1c.) 2 0     Best Gaze (2.) 0 0     Visual (3.) 0 3  unable to cooperative for exam     Facial Palsy (4.) 0 0     Motor Arm, Left (5a.) 2 0     Motor Arm, Right (5b.) 2 0     Motor Leg, Left (6a.) 4 0     Motor Leg, Right (6b.) 2 0     Limb Ataxia (7.) 2 2  both legs     Sensory (8.) 2 1  both legs - may be chronic neuropathy     Best Language (9.) 2 1     Dysarthria (10.) 1 0     Extinction and Inattention (11.) (Formerly Neglect) 2 1     Total 23 9                   Flowsheet Row Most Recent Value   Thrombolytic Decision Options    Thrombolytic Decision Patient not a candidate. Patient is not a candidate options Bleeding risk. SBIRT 22yo+      Flowsheet Row Most Recent Value   Initial Alcohol Screen: US AUDIT-C     1. How often do you have a drink containing alcohol? 0 Filed at: 11/18/2023 1047   2. How many drinks containing alcohol do you have on a typical day you are drinking? 0 Filed at: 11/18/2023 1047   3a. Male UNDER 65: How often do you have five or more drinks on one occasion? 0 Filed at: 11/18/2023 1047   3b.  FEMALE Any Age, or MALE 65+: How often do you have 4 or more drinks on one occassion? 0 Filed at: 11/18/2023 1047   Audit-C Score 0 Filed at: 11/18/2023 1047   ARNOLDO: How many times in the past year have you. .. Used an illegal drug or used a prescription medication for non-medical reasons? Never Filed at: 11/18/2023 1047                      Medical Decision Making  Differential includes acute stroke, possibly post-ictal from seizure, although no witnessed seizure activity. Other differential includes acute agitation from pain from kidney stone but patient isn't able to communicate to explain. Less likely toxins/ renal failure, hyperglycemia, electrolyte abnl, or infectious process. Amount and/or Complexity of Data Reviewed  Labs: ordered. Radiology: ordered. Risk  Prescription drug management. Decision regarding hospitalization. Disposition  Final diagnoses:   Stroke-like symptom   Ureterolithiasis   Urinary retention   Acute encephalopathy - multifactorial   Tinea cruris   IV infiltration, initial encounter - acute left arm     Time reflects when diagnosis was documented in both MDM as applicable and the Disposition within this note       Time User Action Codes Description Comment    11/18/2023 11:01 AM Tamsen Or Add [R29.90] Stroke-like symptom     11/18/2023  3:44 PM Tamsen Or Add [N20.1] Ureterolithiasis     11/18/2023  3:44 PM Tamsen Or Add [R33.9] Urinary retention     11/18/2023  5:16 PM Tamsen Or Add [G93.40] Acute encephalopathy     11/18/2023  5:16 PM Tamsen Or Modify [G93.40] Acute encephalopathy multifactorial    11/18/2023  9:33 PM Tamsen Or Add [B35.6] Tinea cruris     11/18/2023  9:42 PM Tamsen Or Add [T80. 1XXA] IV infiltration, initial encounter     11/18/2023  9:42 PM Raj Granda [T80. 1XXA] IV infiltration, initial encounter acute left arm          ED Disposition       ED Disposition   Admit    Condition   Stable    Date/Time   Sat Nov 18, 2023 8049    Comment   Case was discussed with KOBY Salazar and the patient's admission status was agreed to be Admission Status: inpatient status to the service of Dr. Marcos Salazar . Follow-up Information    None         Current Discharge Medication List        CONTINUE these medications which have NOT CHANGED    Details   ACETAMINOPHEN ER PO Take 1,000 mg by mouth 3 (three) times a day      allopurinol (ZYLOPRIM) 100 mg tablet Take 400 mg by mouth daily      apixaban (Eliquis) 5 mg Take 1 tablet (5 mg total) by mouth 2 (two) times a day  Qty: 180 tablet, Refills: 3    Associated Diagnoses: TIA (transient ischemic attack); PAF (paroxysmal atrial fibrillation) ([facility])      doxycycline hyclate (VIBRAMYCIN) 100 mg capsule Take 100 mg by mouth every 12 (twelve) hours      ezetimibe (ZETIA) 10 mg tablet Take 10 mg by mouth daily      fluticasone (FLONASE) 50 mcg/act nasal spray 1 spray into each nostril 2 (two) times a day as needed for rhinitis or allergies      gabapentin (NEURONTIN) 100 mg capsule Take 100 mg by mouth 2 (two) times a day      hydroxychloroquine (PLAQUENIL) 200 mg tablet Take 200 mg by mouth 2 (two) times a day with meals      levothyroxine 25 mcg tablet       metoprolol succinate (TOPROL-XL) 25 mg 24 hr tablet Take 1 tablet (25 mg total) by mouth 2 (two) times a day  Qty: 180 tablet, Refills: 3    Associated Diagnoses: Low left ventricular ejection fraction      Myrbetriq 25 MG TB24 Take 25 mg by mouth daily at bedtime      omeprazole (PriLOSEC) 10 mg delayed release capsule Take 10 mg by mouth daily      !! ondansetron (ZOFRAN) 4 mg tablet Take 1 tablet (4 mg total) by mouth every 8 (eight) hours as needed for nausea or vomiting  Qty: 60 tablet, Refills: 0    Associated Diagnoses: Chronic nausea      potassium chloride (K-DUR,KLOR-CON) 20 mEq tablet Take 2 tablets (40 mEq total) by mouth daily Do not start before January 29, 2023.   Qty: 30 tablet, Refills: 0    Associated Diagnoses: Chronic combined systolic and diastolic congestive heart failure (HCC)      predniSONE 5 mg tablet Take by mouth daily      !! tamsulosin (FLOMAX) 0.4 mg Take 0.4 mg by mouth daily at bedtime      !! torsemide (DEMADEX) 20 mg tablet Take 1 tablet (20 mg total) by mouth 3 (three) times a week Take 20 mg on Monday, Wednesday, and Friday  Qty: 30 tablet, Refills: 3    Associated Diagnoses: Chronic combined systolic and diastolic congestive heart failure ([address]); HFrEF (heart failure with reduced ejection fraction) ([facility])      Betadine 10 % external solution       Bismuth Tribromoph-Petrolatum (Xeroform Petrolat Gauze 5"x9") MISC       Diclofenac Sodium (VOLTAREN) 1 % Apply 4 g topically 4 (four) times a day      diphenhydrAMINE-zinc acetate (BENADRYL) cream Apply topically 3 (three) times a day as needed for itching  Refills: 0    Associated Diagnoses: Rash      Ginger-Calcium Carbonate (DRAMAMINE GINGER CHEWS PO) Take by mouth      loperamide (IMODIUM) 2 mg capsule Take 2 mg by mouth as needed for diarrhea      nystatin (MYCOSTATIN) powder Apply topically 2 (two) times a day  Qty: 15 g, Refills: 0    Associated Diagnoses: Rash      !! ondansetron (ZOFRAN) 4 mg tablet Take 1 tablet (4 mg total) by mouth every 6 (six) hours  Qty: 12 tablet, Refills: 0    Associated Diagnoses: Nausea      polyethylene glycol (GLYCOLAX) 17 GM/SCOOP       polyethylene glycol (MIRALAX) 17 g packet Take 17 g by mouth every other day  Qty: 30 each, Refills: 2    Associated Diagnoses: Alternating constipation and diarrhea      psyllium (METAMUCIL SMOOTH TEXTURE) 28 % packet Take 1 packet by mouth in the morning  Qty: 30 packet, Refills: 0    Associated Diagnoses: Alternating constipation and diarrhea      !! tamsulosin (FLOMAX) 0.4 mg Take 1 capsule (0.4 mg total) by mouth daily with dinner for 7 days  Qty: 7 capsule, Refills: 0    Associated Diagnoses: Ureterolithiasis      ! ! torsemide (DEMADEX) 10 mg tablet Take 1 tablet (10 mg total) by mouth 4 (four) times a week Take 10 mg on Tuesday, Thursday, Saturday, and Sunday  Qty: 30 tablet, Refills: 3    Associated Diagnoses: HFrEF (heart failure with reduced ejection fraction) ([address])       ! ! - Potential duplicate medications found. Please discuss with provider. No discharge procedures on file.     PDMP Review         Value Time User    PDMP Reviewed  Yes 1/28/2023  6:41 AM Coreen Vincent MD            ED Provider  Electronically Signed by             Fredrick Wheatley DO  11/18/23 4113

## 2023-11-19 ENCOUNTER — APPOINTMENT (INPATIENT)
Dept: MRI IMAGING | Facility: HOSPITAL | Age: 88
DRG: 100 | End: 2023-11-19
Payer: COMMERCIAL

## 2023-11-19 ENCOUNTER — APPOINTMENT (INPATIENT)
Dept: CT IMAGING | Facility: HOSPITAL | Age: 88
DRG: 100 | End: 2023-11-19
Payer: COMMERCIAL

## 2023-11-19 PROBLEM — E87.20 LACTIC ACIDOSIS: Status: RESOLVED | Noted: 2023-11-18 | Resolved: 2023-11-19

## 2023-11-19 PROBLEM — E87.8 ELECTROLYTE ABNORMALITY: Status: ACTIVE | Noted: 2023-11-19

## 2023-11-19 LAB
ALBUMIN SERPL BCP-MCNC: 3.3 G/DL (ref 3.5–5)
ALP SERPL-CCNC: 68 U/L (ref 34–104)
ALT SERPL W P-5'-P-CCNC: 10 U/L (ref 7–52)
ANION GAP SERPL CALCULATED.3IONS-SCNC: 13 MMOL/L
AST SERPL W P-5'-P-CCNC: 17 U/L (ref 13–39)
ATRIAL RATE: 115 BPM
ATRIAL RATE: 117 BPM
ATRIAL RATE: 394 BPM
BILIRUB SERPL-MCNC: 0.58 MG/DL (ref 0.2–1)
BUN SERPL-MCNC: 17 MG/DL (ref 5–25)
CALCIUM ALBUM COR SERPL-MCNC: 7.6 MG/DL (ref 8.3–10.1)
CALCIUM SERPL-MCNC: 7 MG/DL (ref 8.4–10.2)
CHLORIDE SERPL-SCNC: 100 MMOL/L (ref 96–108)
CHOLEST SERPL-MCNC: 132 MG/DL
CO2 SERPL-SCNC: 26 MMOL/L (ref 21–32)
CREAT SERPL-MCNC: 1.5 MG/DL (ref 0.6–1.3)
ERYTHROCYTE [DISTWIDTH] IN BLOOD BY AUTOMATED COUNT: 14.2 % (ref 11.6–15.1)
EST. AVERAGE GLUCOSE BLD GHB EST-MCNC: 146 MG/DL
GFR SERPL CREATININE-BSD FRML MDRD: 40 ML/MIN/1.73SQ M
GLUCOSE SERPL-MCNC: 109 MG/DL (ref 65–140)
HBA1C MFR BLD: 6.7 %
HCT VFR BLD AUTO: 39.3 % (ref 36.5–49.3)
HDLC SERPL-MCNC: 58 MG/DL
HGB BLD-MCNC: 12.5 G/DL (ref 12–17)
LDLC SERPL CALC-MCNC: 51 MG/DL (ref 0–100)
MAGNESIUM SERPL-MCNC: 0.5 MG/DL (ref 1.9–2.7)
MAGNESIUM SERPL-MCNC: 2.9 MG/DL (ref 1.9–2.7)
MCH RBC QN AUTO: 29.3 PG (ref 26.8–34.3)
MCHC RBC AUTO-ENTMCNC: 31.8 G/DL (ref 31.4–37.4)
MCV RBC AUTO: 92 FL (ref 82–98)
P AXIS: 103 DEGREES
P AXIS: 110 DEGREES
PHOSPHATE SERPL-MCNC: 4.4 MG/DL (ref 2.3–4.1)
PLATELET # BLD AUTO: 188 THOUSANDS/UL (ref 149–390)
PMV BLD AUTO: 13 FL (ref 8.9–12.7)
POTASSIUM SERPL-SCNC: 3.5 MMOL/L (ref 3.5–5.3)
PR INTERVAL: 138 MS
PR INTERVAL: 150 MS
PROT SERPL-MCNC: 6.5 G/DL (ref 6.4–8.4)
QRS AXIS: -52 DEGREES
QRS AXIS: 228 DEGREES
QRS AXIS: 229 DEGREES
QRSD INTERVAL: 88 MS
QRSD INTERVAL: 92 MS
QRSD INTERVAL: 94 MS
QT INTERVAL: 342 MS
QT INTERVAL: 354 MS
QT INTERVAL: 380 MS
QTC INTERVAL: 473 MS
QTC INTERVAL: 493 MS
QTC INTERVAL: 495 MS
RBC # BLD AUTO: 4.26 MILLION/UL (ref 3.88–5.62)
SODIUM SERPL-SCNC: 139 MMOL/L (ref 135–147)
T WAVE AXIS: 115 DEGREES
T WAVE AXIS: 117 DEGREES
T WAVE AXIS: 78 DEGREES
TRIGL SERPL-MCNC: 113 MG/DL
VENTRICULAR RATE: 102 BPM
VENTRICULAR RATE: 115 BPM
VENTRICULAR RATE: 117 BPM
WBC # BLD AUTO: 10.8 THOUSAND/UL (ref 4.31–10.16)

## 2023-11-19 PROCEDURE — 99232 SBSQ HOSP IP/OBS MODERATE 35: CPT | Performed by: INTERNAL MEDICINE

## 2023-11-19 PROCEDURE — 83735 ASSAY OF MAGNESIUM: CPT | Performed by: PHYSICIAN ASSISTANT

## 2023-11-19 PROCEDURE — 83735 ASSAY OF MAGNESIUM: CPT | Performed by: STUDENT IN AN ORGANIZED HEALTH CARE EDUCATION/TRAINING PROGRAM

## 2023-11-19 PROCEDURE — 83036 HEMOGLOBIN GLYCOSYLATED A1C: CPT | Performed by: STUDENT IN AN ORGANIZED HEALTH CARE EDUCATION/TRAINING PROGRAM

## 2023-11-19 PROCEDURE — 93010 ELECTROCARDIOGRAM REPORT: CPT | Performed by: INTERNAL MEDICINE

## 2023-11-19 PROCEDURE — 84100 ASSAY OF PHOSPHORUS: CPT | Performed by: STUDENT IN AN ORGANIZED HEALTH CARE EDUCATION/TRAINING PROGRAM

## 2023-11-19 PROCEDURE — 93005 ELECTROCARDIOGRAM TRACING: CPT

## 2023-11-19 PROCEDURE — 80061 LIPID PANEL: CPT | Performed by: STUDENT IN AN ORGANIZED HEALTH CARE EDUCATION/TRAINING PROGRAM

## 2023-11-19 PROCEDURE — 99232 SBSQ HOSP IP/OBS MODERATE 35: CPT | Performed by: STUDENT IN AN ORGANIZED HEALTH CARE EDUCATION/TRAINING PROGRAM

## 2023-11-19 PROCEDURE — 80053 COMPREHEN METABOLIC PANEL: CPT | Performed by: STUDENT IN AN ORGANIZED HEALTH CARE EDUCATION/TRAINING PROGRAM

## 2023-11-19 PROCEDURE — 85027 COMPLETE CBC AUTOMATED: CPT | Performed by: STUDENT IN AN ORGANIZED HEALTH CARE EDUCATION/TRAINING PROGRAM

## 2023-11-19 RX ORDER — LORAZEPAM 2 MG/ML
0.5 INJECTION INTRAMUSCULAR ONCE
Status: COMPLETED | OUTPATIENT
Start: 2023-11-19 | End: 2023-11-19

## 2023-11-19 RX ORDER — MAGNESIUM SULFATE HEPTAHYDRATE 40 MG/ML
2 INJECTION, SOLUTION INTRAVENOUS
Status: DISCONTINUED | OUTPATIENT
Start: 2023-11-19 | End: 2023-11-19

## 2023-11-19 RX ORDER — MAGNESIUM SULFATE HEPTAHYDRATE 40 MG/ML
4 INJECTION, SOLUTION INTRAVENOUS ONCE
Status: COMPLETED | OUTPATIENT
Start: 2023-11-19 | End: 2023-11-19

## 2023-11-19 RX ORDER — LORAZEPAM 2 MG/ML
0.5 INJECTION INTRAMUSCULAR ONCE
Status: DISCONTINUED | OUTPATIENT
Start: 2023-11-19 | End: 2023-11-19

## 2023-11-19 RX ORDER — METOPROLOL TARTRATE 1 MG/ML
2.5 INJECTION, SOLUTION INTRAVENOUS EVERY 6 HOURS PRN
Status: DISCONTINUED | OUTPATIENT
Start: 2023-11-19 | End: 2023-11-24 | Stop reason: HOSPADM

## 2023-11-19 RX ORDER — NYSTATIN 100000 [USP'U]/G
POWDER TOPICAL 2 TIMES DAILY
Status: DISCONTINUED | OUTPATIENT
Start: 2023-11-19 | End: 2023-11-24 | Stop reason: HOSPADM

## 2023-11-19 RX ORDER — MAGNESIUM SULFATE HEPTAHYDRATE 40 MG/ML
2 INJECTION, SOLUTION INTRAVENOUS
Status: COMPLETED | OUTPATIENT
Start: 2023-11-19 | End: 2023-11-19

## 2023-11-19 RX ORDER — LANOLIN ALCOHOL/MO/W.PET/CERES
9 CREAM (GRAM) TOPICAL
Status: DISCONTINUED | OUTPATIENT
Start: 2023-11-19 | End: 2023-11-24 | Stop reason: HOSPADM

## 2023-11-19 RX ORDER — POTASSIUM CHLORIDE 20 MEQ/1
40 TABLET, EXTENDED RELEASE ORAL ONCE
Status: COMPLETED | OUTPATIENT
Start: 2023-11-19 | End: 2023-11-19

## 2023-11-19 RX ORDER — LIDOCAINE 50 MG/G
1 PATCH TOPICAL DAILY
Status: DISCONTINUED | OUTPATIENT
Start: 2023-11-19 | End: 2023-11-24 | Stop reason: HOSPADM

## 2023-11-19 RX ADMIN — LORAZEPAM 0.5 MG: 2 INJECTION INTRAMUSCULAR; INTRAVENOUS at 11:49

## 2023-11-19 RX ADMIN — HYDROXYCHLOROQUINE SULFATE 200 MG: 200 TABLET ORAL at 06:35

## 2023-11-19 RX ADMIN — APIXABAN 2.5 MG: 2.5 TABLET, FILM COATED ORAL at 08:27

## 2023-11-19 RX ADMIN — METOPROLOL SUCCINATE 25 MG: 25 TABLET, FILM COATED, EXTENDED RELEASE ORAL at 08:27

## 2023-11-19 RX ADMIN — PREDNISONE 5 MG: 5 TABLET ORAL at 08:27

## 2023-11-19 RX ADMIN — MAGNESIUM SULFATE HEPTAHYDRATE 2 G: 2 INJECTION, SOLUTION INTRAVENOUS at 06:35

## 2023-11-19 RX ADMIN — LIDOCAINE 1 PATCH: 700 PATCH TOPICAL at 15:16

## 2023-11-19 RX ADMIN — NYSTATIN: 100000 POWDER TOPICAL at 17:38

## 2023-11-19 RX ADMIN — GABAPENTIN 100 MG: 100 CAPSULE ORAL at 08:27

## 2023-11-19 RX ADMIN — EZETIMIBE 10 MG: 10 TABLET ORAL at 08:27

## 2023-11-19 RX ADMIN — MAGNESIUM SULFATE HEPTAHYDRATE 4 G: 40 INJECTION, SOLUTION INTRAVENOUS at 11:15

## 2023-11-19 RX ADMIN — APIXABAN 2.5 MG: 2.5 TABLET, FILM COATED ORAL at 17:42

## 2023-11-19 RX ADMIN — HYDROXYCHLOROQUINE SULFATE 200 MG: 200 TABLET ORAL at 17:41

## 2023-11-19 RX ADMIN — LORAZEPAM 0.5 MG: 2 INJECTION INTRAMUSCULAR; INTRAVENOUS at 22:06

## 2023-11-19 RX ADMIN — ALLOPURINOL 400 MG: 100 TABLET ORAL at 08:27

## 2023-11-19 RX ADMIN — LEVETIRACETAM 500 MG: 500 INJECTION, SOLUTION INTRAVENOUS at 21:32

## 2023-11-19 RX ADMIN — LEVOTHYROXINE SODIUM 25 MCG: 25 TABLET ORAL at 06:35

## 2023-11-19 RX ADMIN — TAMSULOSIN HYDROCHLORIDE 0.4 MG: 0.4 CAPSULE ORAL at 21:32

## 2023-11-19 RX ADMIN — DOXYCYCLINE 100 MG: 100 CAPSULE ORAL at 21:32

## 2023-11-19 RX ADMIN — LEVETIRACETAM 500 MG: 500 INJECTION, SOLUTION INTRAVENOUS at 10:21

## 2023-11-19 RX ADMIN — PANTOPRAZOLE SODIUM 20 MG: 20 TABLET, DELAYED RELEASE ORAL at 06:35

## 2023-11-19 RX ADMIN — METOPROLOL SUCCINATE 25 MG: 25 TABLET, FILM COATED, EXTENDED RELEASE ORAL at 21:32

## 2023-11-19 RX ADMIN — POTASSIUM CHLORIDE 40 MEQ: 1500 TABLET, EXTENDED RELEASE ORAL at 03:49

## 2023-11-19 RX ADMIN — MAGNESIUM SULFATE HEPTAHYDRATE 2 G: 2 INJECTION, SOLUTION INTRAVENOUS at 05:28

## 2023-11-19 RX ADMIN — DOXYCYCLINE 100 MG: 100 CAPSULE ORAL at 08:27

## 2023-11-19 RX ADMIN — GABAPENTIN 100 MG: 100 CAPSULE ORAL at 17:41

## 2023-11-19 NOTE — PLAN OF CARE
Problem: Potential for Falls  Goal: Patient will remain free of falls  Description: INTERVENTIONS:  - Educate patient/family on patient safety including physical limitations  - Instruct patient to call for assistance with activity   - Consult OT/PT to assist with strengthening/mobility   - Keep Call bell within reach  - Keep bed low and locked with side rails adjusted as appropriate  - Keep care items and personal belongings within reach  - Initiate and maintain comfort rounds  - Make Fall Risk Sign visible to staff  - Offer Toileting every 1 Hours, in advance of need  - Initiate/Maintain bed alarm  - Obtain necessary fall risk management equipment:   - Apply yellow socks and bracelet for high fall risk patients  - Consider moving patient to room near nurses station  11/18/2023 2127 by Sigifredo Watt RN  Outcome: Progressing  11/18/2023 2125 by Sigifredo Watt RN  Outcome: Progressing     Problem: SAFETY,RESTRAINT: NV/NON-SELF DESTRUCTIVE BEHAVIOR  Goal: Remains free of harm/injury (restraint for non violent/non self-detsructive behavior)  Description: INTERVENTIONS:  - Instruct patient/family regarding restraint use   - Assess and monitor physiologic and psychological status   - Provide interventions and comfort measures to meet assessed patient needs   - Identify and implement measures to help patient regain control  - Assess readiness for release of restraint   11/18/2023 2127 by Sigifredo Watt RN  Outcome: Progressing  11/18/2023 2125 by Sigifredo Watt RN  Outcome: Progressing  Goal: Returns to optimal restraint-free functioning  Description: INTERVENTIONS:  - Assess the patient's behavior and symptoms that indicate continued need for restraint  - Identify and implement measures to help patient regain control  - Assess readiness for release of restraint   11/18/2023 2127 by Sigifredo Watt RN  Outcome: Progressing  11/18/2023 2125 by Sigifredo Watt, RN  Outcome: Progressing

## 2023-11-19 NOTE — ASSESSMENT & PLAN NOTE
Presents with development of word finding difficulty and nonfluent speech noted at breakfast by son at 0830, last normal was 0800 on 11/18  Speech abnormality persisted on arrival to the ED, but then improved significantly throughout the day, returning to his baseline  Stroke alert called in the ED  Imaging:  CTH: "No acute intracranial abnormality. 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 "  CTA head/neck was nondiagnostic due to patient motion and IV infiltration  MRI brain was nondiagnostic again due to patient movement despite multiple sedating medications  Discussed with neurology, Dr. Octavia Rizo, on admission-recommendations:  Patient with similar presentation dating back to at least 2022, with this being the third admission other episodes have occurred outside the hospital  First episode attributed to TIA, second episode attributed to seizure VS migraine VS hypoperfusion  Recommends initiating Keppra 500 Mg every 12 hours  Obtain EEG  Placed on stroke pathway due to nondiagnostic MRI brain, patient unable to tolerate laying flat. Will defer MRI of brain after discussion with neurology.   Repeat CTA head  Statin not ordered due to allergy, but remains on Zetia  ASA not ordered due to anticoagulation with Eliquis  Monitor on telemetry  Vital signs and neurochecks per stroke pathway protocol  SBP goal < 160  Neurology following  NIHSS on admit 1 (was unable to name month but no aphasia, dysarthria, or focal numbness/weakness)

## 2023-11-19 NOTE — ASSESSMENT & PLAN NOTE
Rate control with metoprolol succinate 25 Mg twice daily  OAC with Eliquis 5 Mg twice daily, dose decreased to 2.5 Mg twice daily on admit due to rising creatinine  Will place lopressor PRN, patient has uncontrolled HR on tele

## 2023-11-19 NOTE — ASSESSMENT & PLAN NOTE
Seen in the ED on 11/16 due to nausea x3 to 4 weeks  CT A/P on admit revealed 3 mm stone at the left ureterovesicular junction with no change in position or development of hydronephrosis from 11/16 CT  Patient without any flank pain  Strain all urine

## 2023-11-19 NOTE — ASSESSMENT & PLAN NOTE
Home regimen: Hydroxychloroquine 200 Mg twice daily and prednisone 5 Mg daily  Continue home medicines

## 2023-11-19 NOTE — ASSESSMENT & PLAN NOTE
Lactic acid at 2.3 on admit  Improved to 2.0 status post 1 L IV fluids  No signs of acute infection  Suspect secondary to hypovolemia as patient also with elevated serum creatinine and dry mucous membranes  Placed on continuous IV fluids

## 2023-11-19 NOTE — ASSESSMENT & PLAN NOTE
Lab Results   Component Value Date    EGFR 36 11/18/2023    EGFR 36 11/16/2023    EGFR 50 09/08/2023    CREATININE 1.64 (H) 11/18/2023    CREATININE 1.64 (H) 11/16/2023    CREATININE 1.26 09/08/2023   Baseline creatinine 0.9-1.2  Creatinine on admission 1.64, examines hypovolemic therefore will hold home diuretic  Patient did receive 1 L NS in the ED, due to continuing to examine hypovolemic will place on gentle IV fluids at 50 cc/h overnight  Patient did receive IV contrast in the ED  Avoid further nephrotoxic drugs and hypotension  Eliquis decreased to 2.5 mg twice daily due to increase in creatinine -creatinine returns to <1.5 would resume 5 Mg twice daily

## 2023-11-19 NOTE — ASSESSMENT & PLAN NOTE
Presents with development of word finding difficulty and nonfluent speech noted at breakfast by son at 0830, last normal was 0800 on 11/18  Speech abnormality persisted on arrival to the ED, but then improved significantly throughout the day, returning to his baseline  Stroke alert called in the ED  Imaging:  CTH: "No acute intracranial abnormality. 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 "  CTA head/neck was nondiagnostic due to patient motion and IV infiltration  MRI brain was nondiagnostic again due to patient movement despite multiple sedating medications  Discussed with neurology, Dr. Meera Sands, on admission-recommendations:  Patient with similar presentation dating back to at least 2022, with this being the third admission other episodes have occurred outside the hospital  First episode attributed to TIA, second episode attributed to seizure VS migraine VS hypoperfusion  Neurology feels symptoms may be attributed to seizure. Recommends initiating Keppra 500 Mg every 12 hours  Obtain EEG  Placed on stroke pathway due to nondiagnostic MRI brain, patient unable to tolerate laying flat. Repeat MRI did not demonstrate CVA though was motion degraded.   Statin not ordered due to allergy, but remains on Zetia  ASA not ordered due to anticoagulation with Eliquis  Monitor on telemetry  Vital signs and neurochecks per stroke pathway protocol  SBP goal < 160  Neurology following  NIHSS on admit 1 (was unable to name month but no aphasia, dysarthria, or focal numbness/weakness)

## 2023-11-19 NOTE — ASSESSMENT & PLAN NOTE
Lab Results   Component Value Date    EGFR 40 11/19/2023    EGFR 36 11/18/2023    EGFR 36 11/16/2023    CREATININE 1.50 (H) 11/19/2023    CREATININE 1.64 (H) 11/18/2023    CREATININE 1.64 (H) 11/16/2023   Baseline creatinine 0.9-1.2  Creatinine on admission 1.64, examines hypovolemic therefore will hold home diuretic  Patient did receive 1 L NS in the ED, due to continuing to examine hypovolemic will place on gentle IV fluids at 50 cc/h overnight  Patient did receive IV contrast in the ED  Avoid further nephrotoxic drugs and hypotension  Eliquis decreased to 2.5 mg twice daily due to increase in creatinine -creatinine returns to <1.5 would resume 5 Mg twice daily

## 2023-11-19 NOTE — ASSESSMENT & PLAN NOTE
Presents with development of word finding difficulty and nonfluent speech noted at breakfast by son at 0830, last normal was 0800 on 11/18  Speech abnormality persisted on arrival to the ED, but then improved significantly throughout the day, returning to his baseline  Stroke alert called in the ED  Imaging:  CTH: "No acute intracranial abnormality.  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 "  CTA head/neck was nondiagnostic due to patient motion and IV infiltration  MRI brain was nondiagnostic again due to patient movement despite multiple sedating medications  Discussed with neurology, Dr. Zaire Laureano, on admission-recommendations:  Patient with similar presentation dating back to at least 2022, with this being the third admission other episodes have occurred outside the hospital  First episode attributed to TIA, second episode attributed to seizure VS migraine VS hypoperfusion  Recommends initiating Keppra 500 Mg every 12 hours  Obtain EEG  Placed on stroke pathway due to nondiagnostic MRI brain  Statin not ordered due to allergy, but remains on Zetia  ASA not ordered due to anticoagulation with Eliquis  Monitor on telemetry  Vital signs and neurochecks per stroke pathway protocol  SBP goal < 160  Neurology following  NIHSS on admit 1 (was unable to name month but no aphasia, dysarthria, or focal numbness/weakness)

## 2023-11-19 NOTE — PROGRESS NOTES
Progress Note - Neurology   Vevelyn Holter 80 y.o. male MRN: [id number]  Unit/Bed#: -01 Encounter: [id number]      Assessment/Plan   #Acute encephalopathy, resolved  Damien Nava "Aleksey" Long Silveira is an 80year old man with PMHx including paroxysmal A fib on Eliquis, TIA vs seizure, HTN, HLD, neuropathy, gout, spinal stenosis who presented for evaluation of word finding difficulty and non-fluent speech. This morning, appears tired but does wake up to answer questions, and follows commands. Likely residual effect of sedation received yesterday in addition to decreased sleep overnight, with likely component of delirium. No focal deficits on examination. No dysarthria or aphasia. Still some disorientation, likely due to aforementioned causes. Nursing reached out to MRI while in the room, will attempt to have MRI completed early afternoon. If not done, would otherwise consider repeat CTA H/N to assess vasculature while awaiting MRI brain. Etiology still unclear, but given recurrent episodes similar in nature each time, would like to work up for seizure while inpatient. Currently on Keppra for now. Also needs supportive care in light of recent decreased PO intake, and PT/OT/SLP. Plan:  - delirium precautions  - given examination reassuring and mental status close to baseline, no need for vEEG. If mental status were to decline and there is a clinical suspicion for seizure, low threshold for transfer to [facility] for vEEG.   - Routine EEG otherwise ordered  - check MRI brain (epilepsy protocol) in addition to MRA neck w/o contrast to complete workup. Can otherwise consider rechecking CTA head/neck if MRA neck not done. He is calm today.   - given stereotyped episodes over the past 1-2 years, would start an AED for now. Started on Keppra 500 mg [id number]. Will adjust plan based on clinical course and further workup.    - BP goal less than 160 for now   - plan for nutrition and correction of any metabolic/infectious derangements per primary team  - rest of care per primary     Recommendations for outpatient neurological follow up have yet to be determined. Addendum (3:30 PM)  Pt was unable to lay flat for MRI. Lidocaine patch placed to see if this helps. Discussed with primary team and nursing. Will plan on CTA H/N then limited sequence MRI brain to rule out stroke in the interim. If unable to tolerate full MRI brain for sequences for seizure, can consider having than done outpatient. Subjective:   Seen in follow up this morning. He was sleeping but would wake up and answer some questions, follows commands. Nursing noted that he did not get much sleep overnight and may have been delirious per report. This morning, calm and sleepy. Recommended maintaining delirium precautions. Discussed with MRI, will attempt to have repeat imaging in the early afternoon. Mg noted to be low, repleted by primary team. No issues or concerns otherwise. ROS:  As above    Vitals: Blood pressure 119/87, pulse (!) 115, temperature (!) 97.2 °F (36.2 °C), temperature source Oral, resp. rate 14, weight 99.8 kg (220 lb 0.3 oz), SpO2 93 %. ,Body mass index is 32.49 kg/m². Physical Exam:   Gen: no acute distress   CV: s1 and s2 present, tachycardic  Pulm: normal respiratory effort  Abd: soft, non-distended, bowel sounds present  Ext: no edema  : (+) amador     Mental status: sleeping, appeared tired, would intermittently attempt to go back to sleep but would wake up immediately when spoken to and answer questions and follow commands. No dysarthria or aphasia. Would attend and track. Answers questions and follows commands. Naming, comprehension intact. Repetition intact. Attention/concentration impaired at times.   Cranial nerves: pupils equally round and reactive to light (2 mm and sluggishly reactive), no obvious visual field deficits, no nystagmus, extraocular muscles intact, V1 through V3 intact bilaterally and symmetric, face symmetric, chronic hearing loss, palate elevation symmetric, tongue was midline, sternocleidomastoid/shoulder shrug strength bilaterally 5/5. Motor: Decreased bulk but normal tone, no drift, strength 5/5 in bilateral upper and lower extremities (except about 4-/5 with hip flexion b/l and 4/5 with plantarflexion/dorsiflexion b/l).  strength 5/5. Sensation: Intact to light touch. No neglect. Coordination: No dysmetria on finger-to-nose. No clumsiness.   Reflexes: 1+ in upper and lower extremities (except 0 at patellar tendons b/l, has history of knee replacements)  Gait: deferred    Lab, Imaging and other studies: CBC:   Results from last 7 days   Lab Units 11/19/23  0414 11/18/23  1103 11/16/23  1319   WBC Thousand/uL 10.80* 9.81 10.64*   RBC Million/uL 4.26 4.20 4.34   HEMOGLOBIN g/dL 12.5 12.4 12.8   HEMATOCRIT % 39.3 38.9 41.0   MCV fL 92 93 95   PLATELETS Thousands/uL 188 197 223   , BMP/CMP:   Results from last 7 days   Lab Units 11/19/23  0414 11/18/23  1107 11/16/23  1319   SODIUM mmol/L 139 137 139   POTASSIUM mmol/L 3.5 3.5 3.8   CHLORIDE mmol/L 100 98 100   CO2 mmol/L 26 27 26   BUN mg/dL 17 19 17   CREATININE mg/dL 1.50* 1.64* 1.64*   CALCIUM mg/dL 7.0* 7.1* 7.5*   AST U/L 17 18 20   ALT U/L 10 11 10   ALK PHOS U/L 68 67 70   EGFR ml/min/1.73sq m 40 36 36   , TSH:   Results from last 7 days   Lab Units 11/18/23  1738   TSH 3RD GENERATON uIU/mL 4.345   , Coagulation:   Results from last 7 days   Lab Units 11/18/23  1103   INR  1.53*

## 2023-11-19 NOTE — INCIDENTAL FINDINGS
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 Elvis Nunez

## 2023-11-19 NOTE — PLAN OF CARE
Problem: Potential for Falls  Goal: Patient will remain free of falls  Description: INTERVENTIONS:  - Educate patient/family on patient safety including physical limitations  - Instruct patient to call for assistance with activity   - Consult OT/PT to assist with strengthening/mobility   - Keep Call bell within reach  - Keep bed low and locked with side rails adjusted as appropriate  - Keep care items and personal belongings within reach  - Initiate and maintain comfort rounds  - Make Fall Risk Sign visible to staff  - Offer Toileting every 1 Hours, in advance of need  - Initiate/Maintain bed alarm  - Obtain necessary fall risk management equipment:   - Apply yellow socks and bracelet for high fall risk patients  - Consider moving patient to room near nurses station  Outcome: Progressing     Problem: SAFETY,RESTRAINT: NV/NON-SELF DESTRUCTIVE BEHAVIOR  Goal: Remains free of harm/injury (restraint for non violent/non self-detsructive behavior)  Description: INTERVENTIONS:  - Instruct patient/family regarding restraint use   - Assess and monitor physiologic and psychological status   - Provide interventions and comfort measures to meet assessed patient needs   - Identify and implement measures to help patient regain control  - Assess readiness for release of restraint   Outcome: Progressing  Goal: Returns to optimal restraint-free functioning  Description: INTERVENTIONS:  - Assess the patient's behavior and symptoms that indicate continued need for restraint  - Identify and implement measures to help patient regain control  - Assess readiness for release of restraint   Outcome: Progressing

## 2023-11-19 NOTE — ASSESSMENT & PLAN NOTE
CT C/A/P on admit showing: "There is fusiform ectasia of the ascending thoracic aorta measuring up to 44 mm."  Needs repeat low-dose CT chest in 1 year

## 2023-11-19 NOTE — ASSESSMENT & PLAN NOTE
History of TIA in the past, that had presented as word finding difficulties  Maintained on Eliquis due to history of paroxysmal A-fib and Zetia, continue

## 2023-11-19 NOTE — PROGRESS NOTES
[facility]  Progress Note  Name: Harley Reyes  MRN: [id number]  Unit/Bed#: -01 I Date of Admission: 11/18/2023   Date of Service: 11/19/2023 I Hospital Day: 1    Assessment/Plan   * Difficulty with speech  Assessment & Plan  Presents with development of word finding difficulty and nonfluent speech noted at breakfast by son at 0830, last normal was 0800 on 11/18  Speech abnormality persisted on arrival to the ED, but then improved significantly throughout the day, returning to his baseline  Stroke alert called in the ED  Imaging:  CTH: "No acute intracranial abnormality. Severe chronic microangiopathy, similar to prior exams."  MRA head: "No stenosis or occlusion of the major vessels of the [place]. "  CTA head/neck was nondiagnostic due to patient motion and IV infiltration  MRI brain was nondiagnostic again due to patient movement despite multiple sedating medications  Discussed with neurology, Dr. Latasha Schuster, on admission-recommendations:  Patient with similar presentation dating back to at least 2022, with this being the third admission other episodes have occurred outside the hospital  First episode attributed to TIA, second episode attributed to seizure VS migraine VS hypoperfusion  Recommends initiating Keppra 500 Mg every 12 hours  Obtain EEG  Placed on stroke pathway due to nondiagnostic MRI brain, patient unable to tolerate laying flat.  Repeat CTA head  Statin not ordered due to allergy, but remains on Zetia  ASA not ordered due to anticoagulation with Eliquis  Monitor on telemetry  Vital signs and neurochecks per stroke pathway protocol  SBP goal < 160  Neurology following  NIHSS on admit 1 (was unable to name month but no aphasia, dysarthria, or focal numbness/weakness)     Electrolyte abnormality  Assessment & Plan  Hypomagnesemia  Repleted  Recheck    Urinary retention  Assessment & Plan  Found to be retaining in the ED with 500 cc on Marino insertion  Likely secondary to sedating medications  Plan void trial in 48 hours  Continue home Flomax    Aneurysm of ascending aorta without rupture [facility])  Assessment & Plan  CT C/A/P on admit showing: "There is fusiform ectasia of the ascending thoracic aorta measuring up to 44 mm."  Needs repeat low-dose CT chest in 1 year    Ureterolithiasis  Assessment & Plan  Seen in the ED on 11/16 due to nausea x3 to 4 weeks  CT A/P on admit revealed 3 mm stone at the left ureterovesicular junction with no change in position or development of hydronephrosis from 11/16 CT  Patient without any flank pain  Strain all urine    Stage 2 chronic kidney disease  Assessment & Plan  Lab Results   Component Value Date    EGFR 40 11/19/2023    EGFR 36 11/18/2023    EGFR 36 11/16/2023    CREATININE 1.50 (H) 11/19/2023    CREATININE 1.64 (H) 11/18/2023    CREATININE 1.64 (H) 11/16/2023   Baseline creatinine 0.9-1.2  Creatinine on admission 1.64, examines hypovolemic therefore will hold home diuretic  Patient did receive 1 L NS in the ED, due to continuing to examine hypovolemic will place on gentle IV fluids at 50 cc/h overnight  Patient did receive IV contrast in the ED  Avoid further nephrotoxic drugs and hypotension  Eliquis decreased to 2.5 mg twice daily due to increase in creatinine -creatinine returns to <1.5 would resume 5 Mg twice daily    Hypothyroidism  Assessment & Plan  Continue home Synthroid 25 mcg daily  TSH within normal limits on admit    HLD (hyperlipidemia)  Assessment & Plan  Continue home Zetia 10 Mg daily  Prior allergy to statins    Paroxysmal A-fib (HCC)  Assessment & Plan  Rate control with metoprolol succinate 25 Mg twice daily  OAC with Eliquis 5 Mg twice daily, dose decreased to 2.5 Mg twice daily on admit due to rising creatinine  Will place lopressor PRN, patient has uncontrolled HR on tele    Chronic systolic heart failure (HCC)  Assessment & Plan  Wt Readings from Last 3 Encounters:   11/18/23 99.8 kg (220 lb 0.3 oz) 11/09/23 99.8 kg (220 lb)   10/25/23 99.8 kg (220 lb)   Last echo August 2023: LVEF 45%. Mild global hypokinesis. G1 DD  Home regimen: Torsemide 20 Mg MWF and 10 Mg remaining days  Examines hypovolemic with dry mucous membranes on admit, however did receive 1 L NS in the ED already -we will place on gentle IV fluids at 50 cc/h overnight  Hold diuretics  I/O and daily weights  Not on any fluid or sodium restriction at home, placed on normal cardiac diet on admit  Monitor for signs of any respiratory distress    History of TIA (transient ischemic attack)  Assessment & Plan  History of TIA in the past, that had presented as word finding difficulties  Maintained on Eliquis due to history of paroxysmal A-fib and Zetia, continue    RA (rheumatoid arthritis) ([facility])  Assessment & Plan  Home regimen: Hydroxychloroquine 200 Mg twice daily and prednisone 5 Mg daily  Continue home medicines    History of hypertension  Assessment & Plan  Home regimen: Metoprolol succinate 25 Mg twice daily  Continue    Lactic acidosis-resolved as of 11/19/2023  Assessment & Plan  Lactic acid at 2.3 on admit  Improved to 2.0 status post 1 L IV fluids  D cfluids             VTE Pharmacologic Prophylaxis: VTE Score: 11 High Risk (Score >/= 5) - Pharmacological DVT Prophylaxis Ordered: apixaban (Eliquis). Sequential Compression Devices Ordered. Mobility:   Basic Mobility Inpatient Raw Score: 9  -[facility] Goal: 3: Sit at edge of bed  -HL Achieved: 3: Sit at edge of bed  [facility] Goal NOT achieved. Continue with multidisciplinary rounding and encourage appropriate mobility to improve upon [facility] goals. Patient Centered Rounds: I performed bedside rounds with nursing staff today. Discussions with Specialists or Other Care Team Provider: Neurology, nursing    Education and Discussions with Family / Patient: Updated  (son and daughter in law) at bedside.   Discussed extensively regarding further plan and inability to obtain MRI    Total Time Spent on Date of Encounter in care of patient: 57 mins. This time was spent on one or more of the following: performing physical exam; counseling and coordination of care; obtaining or reviewing history; documenting in the medical record; reviewing/ordering tests, medications or procedures; communicating with other healthcare professionals and discussing with patient's family/caregivers. Current Length of Stay: 1 day(s)  Current Patient Status: Inpatient   Certification Statement: The patient will continue to require additional inpatient hospital stay due to stroke protocol, encephalopathy, obtain echo  Discharge Plan: Anticipate discharge in 24-48 hrs to prior facility -[facility]    Code Status: Level 1 - Full Code    Subjective:   Patient is alert, not oriented, does not open eyes to questions, oriented x1    Objective:     Vitals:   Temp (24hrs), Av.4 °F (36.3 °C), Min:97.2 °F (36.2 °C), Max:97.9 °F (36.6 °C)    Temp:  [97.2 °F (36.2 °C)-97.9 °F (36.6 °C)] 97.2 °F (36.2 °C)  HR:  [] 48  Resp:  [14-18] 16  BP: ()/(59-87) 122/59  SpO2:  [93 %-100 %] 99 %  Body mass index is 32.49 kg/m². Input and Output Summary (last 24 hours): Intake/Output Summary (Last 24 hours) at 2023 1531  Last data filed at 2023 0801  Gross per 24 hour   Intake 1560.83 ml   Output 750 ml   Net 810.83 ml       Physical Exam:   Physical Exam  HENT:      Head: Normocephalic and atraumatic. Mouth/Throat:      Mouth: Mucous membranes are moist.      Pharynx: Oropharynx is clear. Eyes:      General: No scleral icterus. Right eye: No discharge. Left eye: No discharge. Conjunctiva/sclera: Conjunctivae normal.   Cardiovascular:      Rate and Rhythm: Tachycardia present. Rhythm irregular. Pulses: Normal pulses. Pulmonary:      Effort: Pulmonary effort is normal. No respiratory distress.    Abdominal:      General: Bowel sounds are normal.      Palpations: Abdomen is soft. Skin:     General: Skin is warm and dry. Findings: Erythema (Left upper extremity, right infraclavicular area) present. Neurological:      Mental Status: He is alert. He is disoriented. Additional Data:     Labs:  Results from last 7 days   Lab Units 11/19/23  0414 11/18/23  1103 11/16/23  1319   WBC Thousand/uL 10.80*   < > 10.64*   HEMOGLOBIN g/dL 12.5   < > 12.8   HEMATOCRIT % 39.3   < > 41.0   PLATELETS Thousands/uL 188   < > 223   NEUTROS PCT %  --   --  57   LYMPHS PCT %  --   --  28   MONOS PCT %  --   --  8   EOS PCT %  --   --  6    < > = values in this interval not displayed.      Results from last 7 days   Lab Units 11/19/23  0414   SODIUM mmol/L 139   POTASSIUM mmol/L 3.5   CHLORIDE mmol/L 100   CO2 mmol/L 26   BUN mg/dL 17   CREATININE mg/dL 1.50*   ANION GAP mmol/L 13   CALCIUM mg/dL 7.0*   ALBUMIN g/dL 3.3*   TOTAL BILIRUBIN mg/dL 0.58   ALK PHOS U/L 68   ALT U/L 10   AST U/L 17   GLUCOSE RANDOM mg/dL 109     Results from last 7 days   Lab Units 11/18/23  1103   INR  1.53*     Results from last 7 days   Lab Units 11/18/23  1056   POC GLUCOSE mg/dl 110     Results from last 7 days   Lab Units 11/19/23  0414 11/14/23  0715   HEMOGLOBIN A1C % 6.7* 6.6*     Results from last 7 days   Lab Units 11/18/23  1738 11/18/23  1208 11/18/23  1107   LACTIC ACID mmol/L 2.0 2.3*  --    PROCALCITONIN ng/ml  --   --  0.11       Lines/Drains:  Invasive Devices       Peripheral Intravenous Line  Duration             Peripheral IV 11/19/23 Right Antecubital <1 day              Drain  Duration             Urethral Catheter 18 Fr. 1 day                  Urinary Catheter:  Goal for removal: N/A - Chronic Marino           Telemetry:  Telemetry Orders (From admission, onward)               24 Hour Telemetry Monitoring  Continuous x 24 Hours (Telem)        Expiring   Question:  Reason for 24 Hour Telemetry  Answer:  TIA/Suspected CVA/ Confirmed CVA                     Telemetry Reviewed: Atrial fibrillation. HR averaging 150  Indication for Continued Telemetry Use: Arrthymias requiring medical therapy             Imaging: Reviewed radiology reports from this admission including: CT head and MRI brain    Recent Cultures (last 7 days):   Results from last 7 days   Lab Units 11/18/23  1208   BLOOD CULTURE  Received in Microbiology Lab. Culture in Progress. Received in Microbiology Lab. Culture in Progress. Last 24 Hours Medication List:   Current Facility-Administered Medications   Medication Dose Route Frequency Provider Last Rate    acetaminophen  650 mg Oral Q4H PRN Aicha Prier, PA-C      allopurinol  400 mg Oral Daily Aicha Prier, PA-C      aluminum-magnesium hydroxide-simethicone  30 mL Oral Q6H PRN Aicha Prier, PA-C      apixaban  2.5 mg Oral BID Aicha Prier, PA-C      doxycycline hyclate  100 mg Oral Q12H Aicha Prier, PA-C      ezetimibe  10 mg Oral Daily Aicha Prier, PA-C      gabapentin  100 mg Oral BID Aicha Prier, PA-C      hydroxychloroquine  200 mg Oral BID With Meals Aicha Johnson, PA-C      levETIRAcetam  500 mg Intravenous Q12H [address] Aicha Johnson, PA-C 500 mg (11/19/23 1021)    levothyroxine  25 mcg Oral Early Morning Aicha Johnson, PA-C      lidocaine  1 patch Topical Daily Marika Wu MD      LORazepam  0.5 mg Intravenous Once Marika Wu MD      metoprolol  2.5 mg Intravenous Q6H PRN Marika Wu MD      metoprolol succinate  25 mg Oral BID Aicha Alex, PA-C      pantoprazole  20 mg Oral Early Morning Aicha Johnson, PA-C      predniSONE  5 mg Oral Daily Aicha Prier, PA-C      senna  1 tablet Oral HS PRN Aicha Johnson, PA-C      tamsulosin  0.4 mg Oral HS Aicha Johnson, PA-C          Today, Patient Was Seen By: Marika Wu MD    **Please Note: This note may have been constructed using a voice recognition system. **

## 2023-11-19 NOTE — H&P
[address] [facility]  H&P  Name: Abelardo Garrett 80 y.o. male I MRN: [id number]  Unit/Bed#: -01 I Date of Admission: 11/18/2023   Date of Service: 11/18/2023 I Hospital Day: 0      Assessment/Plan   * Difficulty with speech  Assessment & Plan  Presents with development of word finding difficulty and nonfluent speech noted at breakfast by son at 0830, last normal was 0800 on 11/18  Speech abnormality persisted on arrival to the ED, but then improved significantly throughout the day, returning to his baseline  Stroke alert called in the ED  Imaging:  CTH: "No acute intracranial abnormality.  Severe chronic microangiopathy, similar to prior exams."  MRA head: "No stenosis or occlusion of the major vessels of the [place]. "  CTA head/neck was nondiagnostic due to patient motion and IV infiltration  MRI brain was nondiagnostic again due to patient movement despite multiple sedating medications  Discussed with neurology, Dr. Luci Lopes, on admission-recommendations:  Patient with similar presentation dating back to at least 2022, with this being the third admission other episodes have occurred outside the hospital  First episode attributed to TIA, second episode attributed to seizure VS migraine VS hypoperfusion  Recommends initiating Keppra 500 Mg every 12 hours  Obtain EEG  Placed on stroke pathway due to nondiagnostic MRI brain  Statin not ordered due to allergy, but remains on Zetia  ASA not ordered due to anticoagulation with Eliquis  Monitor on telemetry  Vital signs and neurochecks per stroke pathway protocol  SBP goal < 160  Neurology following  NIHSS on admit 1 (was unable to name month but no aphasia, dysarthria, or focal numbness/weakness)     Lactic acidosis  Assessment & Plan  Lactic acid at 2.3 on admit  Improved to 2.0 status post 1 L IV fluids  No signs of acute infection  Suspect secondary to hypovolemia as patient also with elevated serum creatinine and dry mucous membranes  Placed on continuous IV fluids    Ureterolithiasis  Assessment & Plan  Seen in the ED on 11/16 due to nausea x3 to 4 weeks  CT A/P on admit revealed 3 mm stone at the left ureterovesicular junction with no change in position or development of hydronephrosis from 11/16 CT  Patient without any flank pain  Strain all urine    Urinary retention  Assessment & Plan  Found to be retaining in the ED with 500 cc on Marino insertion  Likely secondary to sedating medications  Plan void trial in 48 hours  Continue home Flomax    Stage 2 chronic kidney disease  Assessment & Plan  Lab Results   Component Value Date    EGFR 36 11/18/2023    EGFR 36 11/16/2023    EGFR 50 09/08/2023    CREATININE 1.64 (H) 11/18/2023    CREATININE 1.64 (H) 11/16/2023    CREATININE 1.26 09/08/2023   Baseline creatinine 0.9-1.2  Creatinine on admission 1.64, examines hypovolemic therefore will hold home diuretic  Patient did receive 1 L NS in the ED, due to continuing to examine hypovolemic will place on gentle IV fluids at 50 cc/h overnight  Patient did receive IV contrast in the ED  Avoid further nephrotoxic drugs and hypotension  Eliquis decreased to 2.5 mg twice daily due to increase in creatinine -creatinine returns to <1.5 would resume 5 Mg twice daily    Chronic systolic heart failure (HCC)  Assessment & Plan  Wt Readings from Last 3 Encounters:   11/18/23 99.8 kg (220 lb 0.3 oz)   11/09/23 99.8 kg (220 lb)   10/25/23 99.8 kg (220 lb)   Last echo August 2023: LVEF 45%. Mild global hypokinesis. G1 DD  Home regimen:  Torsemide 20 Mg MWF and 10 Mg remaining days  Examines hypovolemic with dry mucous membranes on admit, however did receive 1 L NS in the ED already -we will place on gentle IV fluids at 50 cc/h overnight  Hold diuretics  I/O and daily weights  Not on any fluid or sodium restriction at home, placed on normal cardiac diet on admit  Monitor for signs of any respiratory distress    History of TIA (transient ischemic attack)  Assessment & Plan  History of TIA in the past, that had presented as word finding difficulties  Maintained on Eliquis due to history of paroxysmal A-fib and Zetia, continue    History of hypertension  Assessment & Plan  Home regimen: Metoprolol succinate 25 Mg twice daily  Continue    Paroxysmal A-fib (HCC)  Assessment & Plan  Rate control with metoprolol succinate 25 Mg twice daily  OAC with Eliquis 5 Mg twice daily, dose decreased to 2.5 Mg twice daily on admit due to rising creatinine    HLD (hyperlipidemia)  Assessment & Plan  Continue home Zetia 10 Mg daily  Prior allergy to statins    RA (rheumatoid arthritis) (HCC)  Assessment & Plan  Home regimen: Hydroxychloroquine 200 Mg twice daily and prednisone 5 Mg daily  Continue home medicines    Aneurysm of ascending aorta without rupture (HCC)  Assessment & Plan  CT C/A/P on admit showing: "There is fusiform ectasia of the ascending thoracic aorta measuring up to 44 mm."  Needs repeat low-dose CT chest in 1 year    Hypothyroidism  Assessment & Plan  Continue home Synthroid 25 mcg daily  TSH within normal limits on admit           VTE Pharmacologic Prophylaxis: VTE Score: 11 High Risk (Score >/= 5) - Pharmacological DVT Prophylaxis Ordered: apixaban (Eliquis). Sequential Compression Devices Ordered. Code Status: Level 1 - Full Code   Discussion with family: Updated  (son) via phone. Anticipated Length of Stay: Patient will be admitted on an inpatient basis with an anticipated length of stay of greater than 2 midnights secondary to speech difficulty concern for seizure versus stroke. Total Time Spent on Date of Encounter in care of patient: 85 mins.  This time was spent on one or more of the following: performing physical exam; counseling and coordination of care; obtaining or reviewing history; documenting in the medical record; reviewing/ordering tests, medications or procedures; communicating with other healthcare professionals and discussing with patient's family/caregivers. Chief Complaint: Speech difficulty    History of Present Illness:  Vevelyn Holter is a 80 y.o. male with a PMH of paroxysmal A-fib, systolic heart failure, CKD stage II, HTN, HLD, and prior TIA who presents with speech difficulty noted by son at 0830 earlier today. Last normal was 0800. None reports he called his father on the phone and noticed he was having word finding difficulty and nonfluent speech. Problems continued on arrival to the ED. He had no focal numbness or weakness but was significantly agitated, therefore limiting exam.  On admission, only reports "I am sleepy. "He denies any pain. No numbness or weakness anywhere. Review of Systems:  Review of Systems   Constitutional:  Negative for fever. Gastrointestinal:  Positive for nausea. Negative for abdominal pain. Neurological:  Positive for speech difficulty. Negative for weakness and numbness. Psychiatric/Behavioral:  Positive for agitation. All other systems reviewed and are negative. 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  Authorizing Provider   ACETAMINOPHEN ER PO Take 1,000 mg by mouth 3 (three) times a day   Yes Historical Provider, MD   allopurinol (ZYLOPRIM) 100 mg tablet Take 400 mg by mouth daily   Yes Historical Provider, MD   apixaban (Eliquis) 5 mg Take 1 tablet (5 mg total) by mouth 2 (two) times a day 1/10/23  Yes Hattie Campos MD   doxycycline hyclate (VIBRAMYCIN) 100 mg capsule Take 100 mg by mouth every 12 (twelve) hours 9/30/23  Yes Historical Provider, MD   ezetimibe (ZETIA) 10 mg tablet Take 10 mg by mouth daily   Yes Historical Provider, MD   fluticasone (FLONASE) 50 mcg/act nasal spray 1 spray into each nostril 2 (two) times a day as needed for rhinitis or allergies 10/14/23  Yes Historical Provider, MD   gabapentin (NEURONTIN) 100 mg capsule Take 100 mg by mouth 2 (two) times a day   Yes Historical Provider, MD   hydroxychloroquine (PLAQUENIL) 200 mg tablet Take 200 mg by mouth 2 (two) times a day with meals   Yes Historical Provider, MD   levothyroxine 25 mcg tablet  8/24/22  Yes Historical Provider, MD   metoprolol succinate (TOPROL-XL) 25 mg 24 hr tablet Take 1 tablet (25 mg total) by mouth 2 (two) times a day 4/11/23 11/18/23 Yes Hattie Campos MD   Myrbetriq 25 MG TB24 Take 25 mg by mouth daily at bedtime 10/3/23  Yes Historical Provider, MD   omeprazole (PriLOSEC) 10 mg delayed release capsule Take 10 mg by mouth daily   Yes Historical Provider, MD   ondansetron (ZOFRAN) 4 mg tablet Take 1 tablet (4 mg total) by mouth every 8 (eight) hours as needed for nausea or vomiting  Patient taking differently: Take 4 mg by mouth every 6 (six) hours 11/15/23 12/15/23 Yes Juancho Lambert,    potassium chloride (K-DUR,KLOR-CON) 20 mEq tablet Take 2 tablets (40 mEq total) by mouth daily Do not start before January 29, 2023. 1/29/23  Yes Coreen Vincent MD   predniSONE 5 mg tablet Take by mouth daily   Yes Historical Provider, MD   tamsulosin (FLOMAX) 0.4 mg Take 0.4 mg by mouth daily at bedtime   Yes Historical Provider, MD   torsemide (DEMADEX) 20 mg tablet Take 1 tablet (20 mg total) by mouth 3 (three) times a week Take 20 mg on Monday, Wednesday, and Friday 8/25/23  Yes Ulises Loya PA-C Betadine 10 % external solution  8/18/23   Historical Provider, MD Abramsmutkeron Tribromoph-Petrolatum (Xeroform Petrolat Gauze 5"x9") MISC  10/2/23   Historical Provider, MD   Diclofenac Sodium (VOLTAREN) 1 % Apply 4 g topically 4 (four) times a day    Historical Provider, MD   diphenhydrAMINE-zinc acetate (BENADRYL) cream Apply topically 3 (three) times a day as needed for itching  Patient not taking: Reported on 10/25/2023 9/9/23   Tony Dong MD   Ginger-Calcium Carbonate (DRAMAMINE GINGER CHEWS PO) Take by mouth    Historical Provider, MD   loperamide (IMODIUM) 2 mg capsule Take 2 mg by mouth as needed for diarrhea    Historical Provider, MD   nystatin (MYCOSTATIN) powder Apply topically 2 (two) times a day  Patient not taking: Reported on 10/25/2023 1/28/23   Luis Groves MD   ondansetron (ZOFRAN) 4 mg tablet Take 1 tablet (4 mg total) by mouth every 6 (six) hours 11/16/23   Mamie Spencer PA-C   polyethylene glycol [facility]) 17 GM/SCOOP  9/18/23   Historical Provider, MD   polyethylene glycol (MIRALAX) 17 g packet Take 17 g by mouth every other day 12/21/21   ESTEFANIA Montano   psyllium (METAMUCIL SMOOTH TEXTURE) 28 % packet Take 1 packet by mouth in the morning 11/15/23 12/15/23  Sharyle Emery, DO   tamsulosin [facility]) 0.4 mg Take 1 capsule (0.4 mg total) by mouth daily with dinner for 7 days 11/16/23 11/23/23  Mamie Spencer PA-C   torsemide [facility]) 10 mg tablet Take 1 tablet (10 mg total) by mouth 4 (four) times a week Take 10 mg on Tuesday, Thursday, Saturday, and Sunday 8/24/23   Erica Leggett PA-C   Cholecalciferol (Vitamin D3) 50 MCG (2000 UT) TABS  8/21/23 11/18/23  Historical Provider, MD   melatonin 3 mg Take 3 tablets (9 mg total) by mouth daily at bedtime  Patient not taking: Reported on 11/15/2023 1/28/23 11/18/23  Luis Groves MD   Multiple Vitamin (multivitamin) tablet Take 1 tablet by mouth daily  Patient not taking: Reported on 10/25/2023  11/18/23 Historical Provider, MD   nitroglycerin (NITROSTAT) 0.4 mg SL tablet Place 0.4 mg under the tongue every 5 (five) minutes as needed for chest pain  11/18/23  Historical Provider, MD   Saccharomyces boulardii (Probiotic) 250 MG CAPS  3/24/23 11/18/23  Historical Provider, MD   traMADol (ULTRAM) 50 mg tablet Take 1 tablet (50 mg total) by mouth every 6 (six) hours as needed for moderate pain or severe pain  Patient not taking: Reported on 10/25/2023 9/9/23 11/18/23  Ayo Yuan MD   Vitamin D, Cholecalciferol, 25 MCG (1000 UT) TABS Take by mouth in the morning  Patient not taking: Reported on 10/25/2023  11/18/23  Historical Provider, MD     I have reveiwed home medications using records provided by [facility]. Allergies: Allergies   Allergen Reactions    Colchicine Other (See Comments)     Flushing      Niacin Other (See Comments)     flushed    Statins        Social History:  Marital Status:     Occupation: Retired  Patient Pre-hospital Living Situation: Assisted Living  Patient Pre-hospital Level of Mobility: non-ambulatory/bed bound  Patient Pre-hospital Diet Restrictions: Cardiac diet  Substance Use History:   Social History     Substance and Sexual Activity   Alcohol Use Yes    Alcohol/week: 2.0 standard drinks of alcohol    Types: 2 Shots of liquor per week    Comment: 2-3 ounces of whiskey a day     Social History     Tobacco Use   Smoking Status Never   Smokeless Tobacco Never     Social History     Substance and Sexual Activity   Drug Use Yes    Types: Marijuana       Family History:  Family History   Problem Relation Age of Onset    Colon polyps Neg Hx     Colon cancer Neg Hx        Physical Exam:     Vitals:   Blood Pressure: 133/75 (11/18/23 1933)  Pulse: 102 (11/18/23 1730)  Temperature: 98.1 °F (36.7 °C) (11/18/23 1044)  Temp Source: Oral (11/18/23 1430)  Respirations: 18 (11/18/23 1730)  Weight - Scale: 99.8 kg (220 lb 0.3 oz) (11/18/23 1044)  SpO2: 99 % (11/18/23 1730)    Physical Exam  Vitals and nursing note reviewed. Constitutional:       Comments: Initially sleeping but awoken easily to voice. Once awoken he was pleasant and conversational.   HENT:      Head: Normocephalic. Mouth/Throat:      Comments: Severely dry mucous membranes  Eyes:      Extraocular Movements: Extraocular movements intact. Conjunctiva/sclera: Conjunctivae normal.   Cardiovascular:      Rate and Rhythm: Normal rate. Rhythm irregular. Pulses: Normal pulses. Heart sounds: No murmur heard. Pulmonary:      Effort: Pulmonary effort is normal. No respiratory distress. Breath sounds: Normal breath sounds. No wheezing, rhonchi or rales. Comments: Currently on nasal cannula, however SPO2 98%. Will attempt to wean nasal cannula. Abdominal:      Palpations: Abdomen is soft. Tenderness: There is no abdominal tenderness. Musculoskeletal:         General: Normal range of motion. Cervical back: Normal range of motion. Right lower leg: No edema. Left lower leg: No edema. Skin:     General: Skin is warm and dry. Neurological:      Cranial Nerves: No cranial nerve deficit. Comments: Oriented to self, place, and president. Unable to name year (said it was '76) and could not name month. NIHSS 1 due to unable to name month. No aphasia or dysarthria appreciated on exam.  Follows all commands. 5/ 5 strength in bilateral upper and lower extremities. Unable to fully assess coordination but moves all extremities equally and with purpose.   Sensation is equal and intact in bilateral extremities   Psychiatric:         Mood and Affect: Mood normal.          Additional Data:     Lab Results:  Results from last 7 days   Lab Units 11/18/23  1103 11/16/23  1319   WBC Thousand/uL 9.81 10.64*   HEMOGLOBIN g/dL 12.4 12.8   HEMATOCRIT % 38.9 41.0   PLATELETS Thousands/uL 197 223   NEUTROS PCT %  --  57   LYMPHS PCT %  --  28   MONOS PCT %  --  8   EOS PCT %  --  6     Results from last 7 days Lab Units 11/18/23  1107   SODIUM mmol/L 137   POTASSIUM mmol/L 3.5   CHLORIDE mmol/L 98   CO2 mmol/L 27   BUN mg/dL 19   CREATININE mg/dL 1.64*   ANION GAP mmol/L 12   CALCIUM mg/dL 7.1*   ALBUMIN g/dL 3.3*   TOTAL BILIRUBIN mg/dL 0.52   ALK PHOS U/L 67   ALT U/L 11   AST U/L 18   GLUCOSE RANDOM mg/dL 103     Results from last 7 days   Lab Units 11/18/23  1103   INR  1.53*     Results from last 7 days   Lab Units 11/18/23  1056   POC GLUCOSE mg/dl 110     Results from last 7 days   Lab Units 11/14/23  0715   HEMOGLOBIN A1C % 6.6*     Results from last 7 days   Lab Units 11/18/23  1738 11/18/23  1208 11/18/23  1107   LACTIC ACID mmol/L 2.0 2.3*  --    PROCALCITONIN ng/ml  --   --  0.11       Lines/Drains:  Invasive Devices       Peripheral Intravenous Line  Duration             Peripheral IV 11/18/23 Left Antecubital <1 day    Peripheral IV 11/18/23 Left;Ventral (anterior) Forearm <1 day    Peripheral IV 11/18/23 Right Antecubital <1 day              Drain  Duration             Urethral Catheter 18 Fr. <1 day                  Urinary Catheter:  Goal for removal: Remove after 48 hrs of I/O monitoring             Imaging: Reviewed radiology reports from this admission including: chest CT scan, abdominal/pelvic CT, CT head, and MRI brain  MRA head wo contrast   Final Result by Evaristo Mccarty MD (11/18 1165)      No stenosis or occlusion of the major vessels of the [place]. Workstation performed: [id number]         MRI brain wo contrast   Final Result by Evaristo Mccarty MD (11/18 2263)      Nondiagnostic exam due to patient motion artifact. Repeat exam with appropriate sedation recommended, as clinically indicated. Workstation performed: [id number]         CT stroke alert brain   Final Result by Bonnye Aschoff, MD (11/18 9360)      No acute intracranial abnormality. Severe chronic microangiopathy, similar to prior exams.       Findings were directly discussed with Sakshi Hawkins 11/18/2023 at 11:39 AM.      Workstation performed: [id number]         CTA stroke alert (head/neck)   Final Result by Jarrell Roblero MD (11/18 1685)      Delayed examination due to multiple IV infiltrations and patient motion artifact -I asked the CT technologist to reinject the patient given the degree of motion artifact on the first provided images knowing that this patient has poor renal function given    that the benefit outweighs the risks and trying to determine a large vessel occlusion in the head or neck for which intervention could be performed. I was notified at a later time that the second bolus also had IV infiltration. Nondiagnostic examination    to definitively conclude no large vessel occlusion in the head or neck. There appears to be contrast opacification of the intracranial vasculature particularly in the left MCA distribution but the right MCA distribution is moderately degraded by motion    artifact and branch vessel occlusions can be missed in this situation. Consider repeat CTA head and neck with contrast when patient's condition improves or consider sedated MRI brain without contrast, MRA head, and MRA neck without contrast examination. Arterial system appears patent in the neck and head given degree of significant motion artifact. Confidence is low to detect large vessel occlusion in the head or neck given degree of motion artifact. Additional chronic/incidental findings as detailed above. Findings were directly discussed with Sakshi Hawkins at 12:42 p.m. Workstation performed: [id number]         CT chest abdomen pelvis wo contrast   Final Result by Zonia Trinidad MD (11/18 3944)      1.  3 mm stone at the left ureterovesicular junction shows no interval change in position. Still no significant upstream hydronephrosis. 2.  No other new/acute findings in the chest, abdomen, or pelvis.       3.  Ascending aortic ectasia warrants yearly chest CT surveillance to confirm size stability. Note: Imaging follow-up reminder notification was scheduled in the electronic medical record. Workstation performed: [id number]             EKG and Other Studies Reviewed on Admission:   EKG:  A-fib at a rate of 102 bpm.  Prolonged QT interval..    ** Please Note: This note has been constructed using a voice recognition system.  **

## 2023-11-19 NOTE — ASSESSMENT & PLAN NOTE
Wt Readings from Last 3 Encounters:   11/18/23 99.8 kg (220 lb 0.3 oz)   11/09/23 99.8 kg (220 lb)   10/25/23 99.8 kg (220 lb)   Last echo August 2023: LVEF 45%. Mild global hypokinesis. G1 DD  Home regimen:  Torsemide 20 Mg MWF and 10 Mg remaining days  Examines hypovolemic with dry mucous membranes on admit, however did receive 1 L NS in the ED already -we will place on gentle IV fluids at 50 cc/h overnight  Hold diuretics  I/O and daily weights  Not on any fluid or sodium restriction at home, placed on normal cardiac diet on admit  Monitor for signs of any respiratory distress

## 2023-11-19 NOTE — ASSESSMENT & PLAN NOTE
Found to be retaining in the ED with 500 cc on Marino insertion  Likely secondary to sedating medications  Plan void trial in 48 hours  Continue home Flomax

## 2023-11-19 NOTE — ASSESSMENT & PLAN NOTE
Rate control with metoprolol succinate 25 Mg twice daily  OAC with Eliquis 5 Mg twice daily, dose decreased to 2.5 Mg twice daily on admit due to rising creatinine

## 2023-11-19 NOTE — UTILIZATION REVIEW
Initial Clinical Review    Admission: Date/Time/Statement:   Admission Orders (From admission, onward)       Ordered        11/18/23 1755  INPATIENT ADMISSION  Once                          Orders Placed This Encounter   Procedures    INPATIENT ADMISSION     Standing Status:   Standing     Number of Occurrences:   1     Order Specific Question:   Level of Care     Answer:   Med Surg [16]     Order Specific Question:   Estimated length of stay     Answer:   More than 2 Midnights     Order Specific Question:   Certification     Answer:   I certify that inpatient services are medically necessary for this patient for a duration of greater than two midnights. See H&P and MD Progress Notes for additional information about the patient's course of treatment. ED Arrival Information       Expected   -    Arrival   11/18/2023 10:41    Acuity   Urgent              Means of arrival   Ambulance    Escorted by   Janie Villegas [facility]    Service   Hospitalist    Admission type   Emergency              Arrival complaint   altered mental status             Chief Complaint   Patient presents with    Altered Mental Status     To ED from facility for evaluation of altered mental status since this morning. Staff denies any fever, chills. Patient has no complaints. Initial Presentation: 80 y.o. male  PMH of paroxysmal A-fib, systolic heart failure, CKD stage II, HTN, HLD, and prior TIA who presents with speech difficulty noted by son at 0830 earlier today. Last normal was 0800 . Per son on phone he had difficult word finding and nonfluent speech. Problems continued on arrival ED. Denies focal numbness or weakness but significantly agitated. Oriented to self, place and president unable to name year.  NIHSS 1   Moves all extremities and follows commands  CTA head/neck was nondiagnostic due to patient motion and IV infiltration  MRI brain was nondiagnostic again due to patient movement despite multiple sedating medications  Admitted Inpatient   D/w Neurology recommend pt with similar presentation 2022 1st admission attributed to TIA second attributed to seizure. VS migraine  Recommend initiating Keppra q12 obtain EEG . Placed on stroke pathway due to nondiagnostic mri brain. Lactic acidosis at 2.3 down to 2.0 after IVF. No s/s infection, suspect 2/2 hypovolemia. Continue IVF  Ureterolithiasis strain all urine  Urinary retention on flomax  Ckd stage 2 exam hypovolemic hold home diuretic    Date:  11/19/23 Day 2:   NEUROLOGY CONSULT  Acute encephalopathy ,resolved   This morning, appears tired but does wake up to answer questions, and follows commands. Likely residual effect of sedation received yesteray. No focal deficits on exam still disorientation . Will attempt ot have MRI completed early afternoon. If not will consider repeat CTA H/N to assess vasculature while awaiting MRI. Etiology still unclear. Since 3 episodes similar symptoms would like to do seizure workup. Currently on keppra for now. No need vEEG , if mental status decline or seizure will transfer to [facility] veeg.  Routine EEG ordered      ED Triage Vitals   Temperature Pulse Respirations Blood Pressure SpO2   11/18/23 1044 11/18/23 1044 11/18/23 1044 11/18/23 1044 11/18/23 1044   98.1 °F (36.7 °C) 61 20 102/63 94 %      Temp Source Heart Rate Source Patient Position - Orthostatic VS BP Location FiO2 (%)   11/18/23 1430 11/18/23 1044 11/18/23 1044 11/18/23 1044 --   Oral Monitor Lying Right arm       Pain Score       11/18/23 1044       No Pain          Wt Readings from Last 1 Encounters:   11/18/23 99.8 kg (220 lb 0.3 oz)     Additional Vital Signs:   11/19/23 08:26:40 97.2 °F (36.2 °C) Abnormal  115 Abnormal  14 119/87 98 93 % -- -- -- Lying   11/19/23 0746 -- -- -- -- -- 95 % 24 1 L/min Nasal cannula --   11/18/23 2055 -- -- -- -- -- 99 % -- -- -- --   11/18/23 1933 -- -- -- 133/75 97 -- -- -- -- --   11/18/23 1730 -- 102 18 139/60 -- 99 % -- -- -- Lying 11/18/23 1700 -- 98 -- 98/60 68 100 % -- -- None (Room air) --   11/18/23 1630 -- 103 18 105/74 86 99 %         Pertinent Labs/Diagnostic Test Results:   MRA head wo contrast   Final Result by Bony Mcpherson MD (11/18 1701)      No stenosis or occlusion of the major vessels of the [place]. Workstation performed: [id number]         MRI brain wo contrast   Final Result by Bony Mcpherson MD (11/18 1653)      Nondiagnostic exam due to patient motion artifact. Repeat exam with appropriate sedation recommended, as clinically indicated. Workstation performed: [id number]         CT stroke alert brain   Final Result by Bennett hCan MD (11/18 1140)      No acute intracranial abnormality. Severe chronic microangiopathy, similar to prior exams. Findings were directly discussed with Juan Jade 11/18/2023 at 11:39 AM.      Workstation performed: [id number]         CTA stroke alert (head/neck)   Final Result by Bennett Chan MD (11/18 1256)      Delayed examination due to multiple IV infiltrations and patient motion artifact -I asked the CT technologist to reinject the patient given the degree of motion artifact on the first provided images knowing that this patient has poor renal function given    that the benefit outweighs the risks and trying to determine a large vessel occlusion in the head or neck for which intervention could be performed. I was notified at a later time that the second bolus also had IV infiltration. Nondiagnostic examination    to definitively conclude no large vessel occlusion in the head or neck. There appears to be contrast opacification of the intracranial vasculature particularly in the left MCA distribution but the right MCA distribution is moderately degraded by motion    artifact and branch vessel occlusions can be missed in this situation.  Consider repeat CTA head and neck with contrast when patient's condition improves or consider sedated MRI brain without contrast, MRA head, and MRA neck without contrast examination. Arterial system appears patent in the neck and head given degree of significant motion artifact. Confidence is low to detect large vessel occlusion in the head or neck given degree of motion artifact. Additional chronic/incidental findings as detailed above. Findings were directly discussed with Martita [place] at 12:42 p.m. Workstation performed: [id number]         CT chest abdomen pelvis wo contrast   Final Result by Carol Tesfaye MD (11/18 1344)      1.  3 mm stone at the left ureterovesicular junction shows no interval change in position. Still no significant upstream hydronephrosis. 2.  No other new/acute findings in the chest, abdomen, or pelvis. 3.  Ascending aortic ectasia warrants yearly chest CT surveillance to confirm size stability. Note: Imaging follow-up reminder notification was scheduled in the electronic medical record.                   Workstation performed: [id number]         MRI brain seizure wo contrast    (Results Pending)   MRA carotids wo contrast    (Results Pending)     Results from last 7 days   Lab Units 11/18/23  1103 11/16/23  1314   SARS-COV-2  Negative Negative     Results from last 7 days   Lab Units 11/19/23  0414 11/18/23  1103 11/16/23  1319   WBC Thousand/uL 10.80* 9.81 10.64*   HEMOGLOBIN g/dL 12.5 12.4 12.8   HEMATOCRIT % 39.3 38.9 41.0   PLATELETS Thousands/uL 188 197 223   NEUTROS ABS Thousands/µL  --   --  6.18         Results from last 7 days   Lab Units 11/19/23  0414 11/18/23  1107 11/16/23  1408 11/16/23  1319   SODIUM mmol/L 139 137  --  139   POTASSIUM mmol/L 3.5 3.5  --  3.8   CHLORIDE mmol/L 100 98  --  100   CO2 mmol/L 26 27  --  26   ANION GAP mmol/L 13 12  --  13   BUN mg/dL 17 19  --  17   CREATININE mg/dL 1.50* 1.64*  --  1.64*   EGFR ml/min/1.73sq m 40 36  --  36   CALCIUM mg/dL 7.0* 7.1*  --  7.5* CALCIUM, IONIZED mmol/L  --   --  0.91*  --    MAGNESIUM mg/dL 0.5*  --   --   --    PHOSPHORUS mg/dL 4.4*  --   --   --      Results from last 7 days   Lab Units 11/19/23 0414 11/18/23 1738 11/18/23  1107 11/16/23  1319   AST U/L 17  --  18 20   ALT U/L 10  --  11 10   ALK PHOS U/L 68  --  67 70   TOTAL PROTEIN g/dL 6.5  --  6.5 6.8   ALBUMIN g/dL 3.3*  --  3.3* 3.5   TOTAL BILIRUBIN mg/dL 0.58  --  0.52 0.56   AMMONIA umol/L  --  21  --   --      Results from last 7 days   Lab Units 11/18/23  1056   POC GLUCOSE mg/dl 110     Results from last 7 days   Lab Units 11/19/23 0414 11/18/23  1107 11/16/23  1319   GLUCOSE RANDOM mg/dL 109 103 156*     Results from last 7 days   Lab Units 11/19/23 0414 11/14/23  0715   HEMOGLOBIN A1C % 6.7* 6.6*   EAG mg/dl 146 143     Results from last 7 days   Lab Units 11/18/23 1738 11/18/23  1329 11/18/23  1103   HS TNI 0HR ng/L  --   --  11   HS TNI 2HR ng/L  --  10  --    HSTNI D2 ng/L  --  -1  --    HS TNI 4HR ng/L 11  --   --    HSTNI D4 ng/L 0  --   --          Results from last 7 days   Lab Units 11/18/23  1103   PROTIME seconds 18.9*   INR  1.53*   PTT seconds 35     Results from last 7 days   Lab Units 11/18/23  1738   TSH 3RD GENERATON uIU/mL 4.345     Results from last 7 days   Lab Units 11/18/23  1107   PROCALCITONIN ng/ml 0.11     Results from last 7 days   Lab Units 11/18/23  1738 11/18/23  1208   LACTIC ACID mmol/L 2.0 2.3*     Results from last 7 days   Lab Units 11/16/23  1319   LIPASE u/L 17     Results from last 7 days   Lab Units 11/18/23  1321 11/16/23  1648   CLARITY UA  Clear Clear   COLOR UA  Colorless Yellow   SPEC GRAV UA  <1.005* 1.020   PH UA  6.0 6.0   GLUCOSE UA mg/dl Negative Negative   KETONES UA mg/dl Negative Negative   BLOOD UA  Negative Negative   PROTEIN UA mg/dl Negative Trace*   NITRITE UA  Negative Negative   BILIRUBIN UA  Negative Negative   UROBILINOGEN UA (BE) mg/dl <2.0 <2.0   LEUKOCYTES UA  Negative Negative   WBC UA /hpf  --  1-2 RBC UA /hpf  --  0-1   BACTERIA UA /hpf  --  Occasional   EPITHELIAL CELLS WET PREP /hpf  --  Occasional   MUCUS THREADS   --  Occasional*     Results from last 7 days   Lab Units 11/18/23  1103 11/16/23  1314   INFLUENZA A PCR  Negative Negative   INFLUENZA B PCR  Negative Negative   RSV PCR  Negative Negative     Results from last 7 days   Lab Units 11/18/23  1738   ETHANOL LVL mg/dL <10   ACETAMINOPHEN LVL ug/mL <2*   SALICYLATE LVL mg/dL <5       Results from last 7 days   Lab Units 11/18/23  1208   BLOOD CULTURE  Received in Microbiology Lab. Culture in Progress. Received in Microbiology Lab. Culture in Progress.        ED Treatment:   Medication Administration from 11/18/2023 1041 to 11/18/2023 1829         Date/Time Order Dose Route Action Action by Comments     11/18/2023 1134 EST midazolam (VERSED) injection 2 mg 2 mg Intravenous Given Sudhir Stark RN --     11/18/2023 1146 EST LORazepam (ATIVAN) injection 1 mg 1 mg Intramuscular Given Sudhir Stark RN --     11/18/2023 1146 EST haloperidol lactate (HALDOL) injection 5 mg 5 mg Intramuscular Given Sudhir Stark RN --     11/18/2023 1204 EST fentanyl citrate (PF) 100 MCG/2ML 100 mcg 100 mcg Intravenous Given Sudhir Stark RN --     11/18/2023 1629 EST sodium chloride 0.9 % bolus 1,000 mL 0 mL Intravenous Ankita Devon Ramirez RN --     11/18/2023 1234 EST sodium chloride 0.9 % bolus 1,000 mL 1,000 mL Intravenous [address] Sudhir Stark RN --     11/18/2023 1234 EST morphine injection 4 mg 4 mg Intravenous Given Sudhir Stark RN --     11/18/2023 1255 EST haloperidol lactate (HALDOL) injection 5 mg 5 mg Intramuscular Given Sudhir Stark, SOMMER hold per provider     11/18/2023 1255 EST LORazepam (ATIVAN) injection 1 mg 1 mg Intramuscular Given Sudhir Stark, SOMMER hold per provider     11/18/2023 1249 EST iohexol (OMNIPAQUE) 350 MG/ML injection (SINGLE-DOSE) 100 mL 145 mL Intravenous Given Miguelina Mckeon 85 ml first scan when pt moved  60 ml infiltrated second scan     11/18/2023 1314 EST HYDROmorphone (DILAUDID) injection 0.5 mg 0.5 mg Intravenous Given Crowder Risk, RN --     11/18/2023 1429 EST HYDROmorphone (DILAUDID) injection 0.5 mg 0.5 mg Intravenous Given Crowder Risk, RN --     11/18/2023 1537 EST fentanyl citrate (PF) 100 MCG/2ML 50 mcg -- Intravenous Canceled Entry Talat Ramirez, RN --     11/18/2023 1541 EST fentanyl citrate (PF) 100 MCG/2ML 100 mcg 100 mcg Intravenous Given Talat Ramirez, RN --     11/18/2023 1542 EST diazepam (VALIUM) injection 10 mg 10 mg Intravenous Given Talat Ramirez, RN full dose given.  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 male  Admission Orders:  Med surg  telemetry  Mrsa cx  Mg   Rapid drug screen urine  Seizure precautions  Neuro checks q1hr >q4  IS  Strain all urine  I/O  MRI brain  EEG awake drowsy  Scheduled Medications:  allopurinol, 400 mg, Oral, Daily  apixaban, 2.5 mg, Oral, BID  doxycycline hyclate, 100 mg, Oral, Q12H  ezetimibe, 10 mg, Oral, Daily  gabapentin, 100 mg, Oral, BID  hydroxychloroquine, 200 mg, Oral, BID With Meals  levETIRAcetam, 500 mg, Intravenous, Q12H [address]  levothyroxine, 25 mcg, Oral, Early Morning  magnesium sulfate, 4 g, Intravenous, Once  metoprolol succinate, 25 mg, Oral, BID  pantoprazole, 20 mg, Oral, Early Morning  predniSONE, 5 mg, Oral, Daily  tamsulosin, 0.4 mg, Oral, HS      Continuous IV Infusions:     PRN Meds:  acetaminophen, 650 mg, Oral, Q4H PRN  aluminum-magnesium hydroxide-simethicone, 30 mL, Oral, Q6H PRN  metoprolol, 2.5 mg, Intravenous, Q6H PRN  senna, 1 tablet, Oral, HS PRN        IP CONSULT TO NEUROLOGY  IP CONSULT TO CASE MANAGEMENT  IP CONSULT TO NUTRITION SERVICES    Network Utilization Review Department  ATTENTION: Please call with any questions or concerns to [phone] and carefully listen to the prompts so that you are directed to the right person. All voicemails are confidential.   For Discharge needs, contact Care Management DC Support Team at [phone] opt. 2  Send all requests for admission clinical reviews, approved or denied determinations and any other requests to dedicated fax number below belonging to the campus where the patient is receiving treatment.  List of dedicated fax numbers for the Facilities:  [place] DENIALS (Administrative/Medical Necessity) [phone]   DISCHARGE SUPPORT TEAM (NETWORK) [id number] [facility] (Maternity/NICU/Pediatrics) [phone]   [address] [facility] [phone]   [address] [place] [address] [address] [place] [address] [address] [address] [facility] [address] [facility] [address]  [phone]

## 2023-11-20 ENCOUNTER — APPOINTMENT (INPATIENT)
Dept: NEUROLOGY | Facility: HOSPITAL | Age: 88
DRG: 100 | End: 2023-11-20
Payer: COMMERCIAL

## 2023-11-20 ENCOUNTER — APPOINTMENT (INPATIENT)
Dept: MRI IMAGING | Facility: HOSPITAL | Age: 88
DRG: 100 | End: 2023-11-20
Payer: COMMERCIAL

## 2023-11-20 PROBLEM — R56.9 FOCAL SEIZURE (HCC): Status: ACTIVE | Noted: 2023-01-26

## 2023-11-20 PROBLEM — R29.90 STROKE-LIKE SYMPTOM: Status: ACTIVE | Noted: 2023-11-20

## 2023-11-20 PROBLEM — G40.109 LOCALIZATION-RELATED FOCAL EPILEPSY WITH SIMPLE PARTIAL SEIZURES (HCC): Status: ACTIVE | Noted: 2023-01-26

## 2023-11-20 LAB
AMPHETAMINES SERPL QL SCN: NEGATIVE
ANION GAP SERPL CALCULATED.3IONS-SCNC: 6 MMOL/L
BARBITURATES UR QL: NEGATIVE
BASOPHILS # BLD AUTO: 0.04 THOUSANDS/ÂΜL (ref 0–0.1)
BASOPHILS NFR BLD AUTO: 1 % (ref 0–1)
BENZODIAZ UR QL: POSITIVE
BUN SERPL-MCNC: 14 MG/DL (ref 5–25)
CALCIUM SERPL-MCNC: 7.6 MG/DL (ref 8.4–10.2)
CHLORIDE SERPL-SCNC: 105 MMOL/L (ref 96–108)
CO2 SERPL-SCNC: 27 MMOL/L (ref 21–32)
COCAINE UR QL: NEGATIVE
CREAT SERPL-MCNC: 1.1 MG/DL (ref 0.6–1.3)
EOSINOPHIL # BLD AUTO: 0.63 THOUSAND/ÂΜL (ref 0–0.61)
EOSINOPHIL NFR BLD AUTO: 9 % (ref 0–6)
ERYTHROCYTE [DISTWIDTH] IN BLOOD BY AUTOMATED COUNT: 14.2 % (ref 11.6–15.1)
GFR SERPL CREATININE-BSD FRML MDRD: 59 ML/MIN/1.73SQ M
GLUCOSE SERPL-MCNC: 117 MG/DL (ref 65–140)
HCT VFR BLD AUTO: 36.5 % (ref 36.5–49.3)
HGB BLD-MCNC: 11.4 G/DL (ref 12–17)
IMM GRANULOCYTES # BLD AUTO: 0.01 THOUSAND/UL (ref 0–0.2)
IMM GRANULOCYTES NFR BLD AUTO: 0 % (ref 0–2)
LYMPHOCYTES # BLD AUTO: 1.64 THOUSANDS/ÂΜL (ref 0.6–4.47)
LYMPHOCYTES NFR BLD AUTO: 23 % (ref 14–44)
MCH RBC QN AUTO: 29.3 PG (ref 26.8–34.3)
MCHC RBC AUTO-ENTMCNC: 31.2 G/DL (ref 31.4–37.4)
MCV RBC AUTO: 94 FL (ref 82–98)
MONOCYTES # BLD AUTO: 0.65 THOUSAND/ÂΜL (ref 0.17–1.22)
MONOCYTES NFR BLD AUTO: 9 % (ref 4–12)
NEUTROPHILS # BLD AUTO: 4.03 THOUSANDS/ÂΜL (ref 1.85–7.62)
NEUTS SEG NFR BLD AUTO: 58 % (ref 43–75)
NRBC BLD AUTO-RTO: 0 /100 WBCS
OPIATES UR QL SCN: POSITIVE
OXYCODONE+OXYMORPHONE UR QL SCN: NEGATIVE
PCP UR QL: NEGATIVE
PLATELET # BLD AUTO: 151 THOUSANDS/UL (ref 149–390)
PMV BLD AUTO: 13.1 FL (ref 8.9–12.7)
POTASSIUM SERPL-SCNC: 3.7 MMOL/L (ref 3.5–5.3)
RBC # BLD AUTO: 3.89 MILLION/UL (ref 3.88–5.62)
SODIUM SERPL-SCNC: 138 MMOL/L (ref 135–147)
THC UR QL: NEGATIVE
WBC # BLD AUTO: 7 THOUSAND/UL (ref 4.31–10.16)

## 2023-11-20 PROCEDURE — 97167 OT EVAL HIGH COMPLEX 60 MIN: CPT

## 2023-11-20 PROCEDURE — 94640 AIRWAY INHALATION TREATMENT: CPT

## 2023-11-20 PROCEDURE — 95819 EEG AWAKE AND ASLEEP: CPT | Performed by: PSYCHIATRY & NEUROLOGY

## 2023-11-20 PROCEDURE — 97530 THERAPEUTIC ACTIVITIES: CPT

## 2023-11-20 PROCEDURE — 80048 BASIC METABOLIC PNL TOTAL CA: CPT | Performed by: INTERNAL MEDICINE

## 2023-11-20 PROCEDURE — 94664 DEMO&/EVAL PT USE INHALER: CPT

## 2023-11-20 PROCEDURE — 95816 EEG AWAKE AND DROWSY: CPT

## 2023-11-20 PROCEDURE — 94760 N-INVAS EAR/PLS OXIMETRY 1: CPT

## 2023-11-20 PROCEDURE — 99232 SBSQ HOSP IP/OBS MODERATE 35: CPT | Performed by: HOSPITALIST

## 2023-11-20 PROCEDURE — 99232 SBSQ HOSP IP/OBS MODERATE 35: CPT | Performed by: STUDENT IN AN ORGANIZED HEALTH CARE EDUCATION/TRAINING PROGRAM

## 2023-11-20 PROCEDURE — 97163 PT EVAL HIGH COMPLEX 45 MIN: CPT

## 2023-11-20 PROCEDURE — 92610 EVALUATE SWALLOWING FUNCTION: CPT

## 2023-11-20 PROCEDURE — 97116 GAIT TRAINING THERAPY: CPT

## 2023-11-20 PROCEDURE — 70551 MRI BRAIN STEM W/O DYE: CPT

## 2023-11-20 PROCEDURE — 85025 COMPLETE CBC W/AUTO DIFF WBC: CPT | Performed by: INTERNAL MEDICINE

## 2023-11-20 PROCEDURE — 80307 DRUG TEST PRSMV CHEM ANLYZR: CPT | Performed by: PHYSICIAN ASSISTANT

## 2023-11-20 RX ORDER — LEVALBUTEROL INHALATION SOLUTION 1.25 MG/3ML
1.25 SOLUTION RESPIRATORY (INHALATION)
Status: DISCONTINUED | OUTPATIENT
Start: 2023-11-20 | End: 2023-11-24 | Stop reason: HOSPADM

## 2023-11-20 RX ORDER — ALBUTEROL SULFATE 2.5 MG/3ML
2.5 SOLUTION RESPIRATORY (INHALATION) EVERY 6 HOURS PRN
Status: DISCONTINUED | OUTPATIENT
Start: 2023-11-20 | End: 2023-11-24 | Stop reason: HOSPADM

## 2023-11-20 RX ORDER — LORAZEPAM 2 MG/ML
1 INJECTION INTRAMUSCULAR ONCE
Status: COMPLETED | OUTPATIENT
Start: 2023-11-20 | End: 2023-11-20

## 2023-11-20 RX ORDER — LORAZEPAM 2 MG/ML
0.5 INJECTION INTRAMUSCULAR ONCE
Status: DISCONTINUED | OUTPATIENT
Start: 2023-11-20 | End: 2023-11-20

## 2023-11-20 RX ORDER — IPRATROPIUM BROMIDE AND ALBUTEROL SULFATE 2.5; .5 MG/3ML; MG/3ML
3 SOLUTION RESPIRATORY (INHALATION)
Status: DISCONTINUED | OUTPATIENT
Start: 2023-11-20 | End: 2023-11-20

## 2023-11-20 RX ADMIN — TAMSULOSIN HYDROCHLORIDE 0.4 MG: 0.4 CAPSULE ORAL at 22:15

## 2023-11-20 RX ADMIN — APIXABAN 2.5 MG: 2.5 TABLET, FILM COATED ORAL at 12:21

## 2023-11-20 RX ADMIN — APIXABAN 2.5 MG: 2.5 TABLET, FILM COATED ORAL at 17:32

## 2023-11-20 RX ADMIN — EZETIMIBE 10 MG: 10 TABLET ORAL at 12:21

## 2023-11-20 RX ADMIN — PANTOPRAZOLE SODIUM 20 MG: 20 TABLET, DELAYED RELEASE ORAL at 05:50

## 2023-11-20 RX ADMIN — GABAPENTIN 100 MG: 100 CAPSULE ORAL at 17:32

## 2023-11-20 RX ADMIN — IPRATROPIUM BROMIDE AND ALBUTEROL SULFATE 3 ML: .5; 3 SOLUTION RESPIRATORY (INHALATION) at 12:59

## 2023-11-20 RX ADMIN — LEVOTHYROXINE SODIUM 25 MCG: 25 TABLET ORAL at 05:49

## 2023-11-20 RX ADMIN — DOXYCYCLINE 100 MG: 100 CAPSULE ORAL at 22:12

## 2023-11-20 RX ADMIN — METOPROLOL SUCCINATE 25 MG: 25 TABLET, FILM COATED, EXTENDED RELEASE ORAL at 22:14

## 2023-11-20 RX ADMIN — METOPROLOL SUCCINATE 25 MG: 25 TABLET, FILM COATED, EXTENDED RELEASE ORAL at 12:20

## 2023-11-20 RX ADMIN — LORAZEPAM 1 MG: 2 INJECTION INTRAMUSCULAR; INTRAVENOUS at 08:48

## 2023-11-20 RX ADMIN — NYSTATIN: 100000 POWDER TOPICAL at 17:32

## 2023-11-20 RX ADMIN — LIDOCAINE 1 PATCH: 700 PATCH TOPICAL at 12:24

## 2023-11-20 RX ADMIN — LEVALBUTEROL HYDROCHLORIDE 1.25 MG: 1.25 SOLUTION RESPIRATORY (INHALATION) at 19:39

## 2023-11-20 RX ADMIN — IPRATROPIUM BROMIDE 0.5 MG: 0.5 SOLUTION RESPIRATORY (INHALATION) at 19:39

## 2023-11-20 RX ADMIN — Medication 9 MG: at 22:16

## 2023-11-20 RX ADMIN — GABAPENTIN 100 MG: 100 CAPSULE ORAL at 12:21

## 2023-11-20 RX ADMIN — LEVETIRACETAM 500 MG: 500 INJECTION, SOLUTION INTRAVENOUS at 12:26

## 2023-11-20 RX ADMIN — ALLOPURINOL 400 MG: 100 TABLET ORAL at 12:20

## 2023-11-20 RX ADMIN — PREDNISONE 5 MG: 5 TABLET ORAL at 12:21

## 2023-11-20 RX ADMIN — HYDROXYCHLOROQUINE SULFATE 200 MG: 200 TABLET ORAL at 17:32

## 2023-11-20 RX ADMIN — NYSTATIN: 100000 POWDER TOPICAL at 12:38

## 2023-11-20 RX ADMIN — DOXYCYCLINE 100 MG: 100 CAPSULE ORAL at 12:21

## 2023-11-20 RX ADMIN — LEVETIRACETAM 500 MG: 500 INJECTION, SOLUTION INTRAVENOUS at 22:19

## 2023-11-20 NOTE — PLAN OF CARE
Problem: OCCUPATIONAL THERAPY ADULT  Goal: Performs self-care activities at highest level of function for planned discharge setting. See evaluation for individualized goals. Description: Treatment Interventions: ADL retraining, Functional transfer training, Equipment evaluation/education, Patient/family training, Compensatory technique education, Activityengagement, Endurance training, Cognitive reorientation          See flowsheet documentation for full assessment, interventions and recommendations. Note: Limitation: Decreased ADL status, Decreased self-care trans, Decreased high-level ADLs, Decreased cognition, Decreased endurance  Prognosis: Fair  Assessment: Pt is a 80 y.o. male seen for OT evaluation at [address] [place] [address], admitted 11/18/2023 w/ Difficulty with speech. Comorbidities affecting pt's functional performance at time of assessment include:  paroxysmal A-fib, systolic heart failure, CKD stage II, HTN, HLD, and prior TIA . Personal factors affecting pt at time of IE include:difficulty performing ADLS, difficulty performing IADLS , and decreased functional mobility . Prior to admission, pt was [facility] at [facility]. Pt required assist w/  ADLS and IADLS,& required wheelchair  and RW PTA. Upon evaluation: Pt requires mod A x 1 for bed mobility, min-mod A x 2 for functional mobility/transfers, min-mod A for UB ADLs and max A for LB ADLS 2* the following deficits impacting occupational performance: weakness, decreased strength, decreased balance, decreased tolerance, impaired arousal, impaired attention, and impaired memory. Full objective findings from OT assessment regarding body systems outlined above. These impairments, as well as risk for falls  limit pt's ability to safely engage in all baseline areas of occupation and mobility.  Pt to benefit from continued skilled OT tx while in the hospital to address deficits as defined above and maximize level of functional independence w ADL's and functional mobility. Occupational Performance areas to address include: bathing/shower, toilet hygiene, dressing, and functional mobility. This evaluation required an extensive review of medical and/or therapy records and additional review of physical, cognitive and psychosocial history related to functional performance. Based upon functional performance deficits and assessments, this evaluation has been identified as a high complexity evaluation. The patient's raw score on the AM-PAC Daily Activity inpatient short form is 15, standardized score is 34.69, less than 39.4. Patients at this level are likely to benefit from DC to post-acute rehabilitation services. However please refer to therapist recommendation for discharge planning given other factors that may influence destination. At this time, OT recommendations at time of discharge are level 2 mod intensity resources.      Rehab Resource Intensity Level, OT: II (Moderate Resource Intensity)

## 2023-11-20 NOTE — SPEECH THERAPY NOTE
Speech Language/Pathology    Order received, chart reviewed. Pt known to this ST from previous admission. Attempted to see pt for evaluation this am, however pt going for MRI and being given Ativan. ST to f/u for evaluation as able/appropriate.

## 2023-11-20 NOTE — PLAN OF CARE
Problem: Potential for Falls  Goal: Patient will remain free of falls  Description: INTERVENTIONS:  - Educate patient/family on patient safety including physical limitations  - Instruct patient to call for assistance with activity   - Consult OT/PT to assist with strengthening/mobility   - Keep Call bell within reach  - Keep bed low and locked with side rails adjusted as appropriate  - Keep care items and personal belongings within reach  - Initiate and maintain comfort rounds  - Make Fall Risk Sign visible to staff  - Offer Toileting every  Hours, in advance of need  - Initiate/Maintain alarm  - Obtain necessary fall risk management equipment:  - Apply yellow socks and bracelet for high fall risk patients  - Consider moving patient to room near nurses station  Outcome: Progressing     Problem: SAFETY,RESTRAINT: NV/NON-SELF DESTRUCTIVE BEHAVIOR  Goal: Remains free of harm/injury (restraint for non violent/non self-detsructive behavior)  Description: INTERVENTIONS:  - Instruct patient/family regarding restraint use   - Assess and monitor physiologic and psychological status   - Provide interventions and comfort measures to meet assessed patient needs   - Identify and implement measures to help patient regain control  - Assess readiness for release of restraint   Outcome: Progressing  Goal: Returns to optimal restraint-free functioning  Description: INTERVENTIONS:  - Assess the patient's behavior and symptoms that indicate continued need for restraint  - Identify and implement measures to help patient regain control  - Assess readiness for release of restraint   Outcome: Progressing     Problem: Prexisting or High Potential for Compromised Skin Integrity  Goal: Skin integrity is maintained or improved  Description: INTERVENTIONS:  - Identify patients at risk for skin breakdown  - Assess and monitor skin integrity  - Assess and monitor nutrition and hydration status  - Monitor labs   - Assess for incontinence   - Turn and reposition patient  - Assist with mobility/ambulation  - Relieve pressure over bony prominences  - Avoid friction and shearing  - Provide appropriate hygiene as needed including keeping skin clean and dry  - Evaluate need for skin moisturizer/barrier cream  - Collaborate with interdisciplinary team   - Patient/family teaching  - Consider wound care consult   Outcome: Progressing     Problem: Nutrition/Hydration-ADULT  Goal: Nutrient/Hydration intake appropriate for improving, restoring or maintaining nutritional needs  Description: Monitor and assess patient's nutrition/hydration status for malnutrition. Collaborate with interdisciplinary team and initiate plan and interventions as ordered. Monitor patient's weight and dietary intake as ordered or per policy. Utilize nutrition screening tool and intervene as necessary. Determine patient's food preferences and provide high-protein, high-caloric foods as appropriate.      INTERVENTIONS:  - Monitor oral intake, urinary output, labs, and treatment plans  - Assess nutrition and hydration status and recommend course of action  - Evaluate amount of meals eaten  - Assist patient with eating if necessary   - Allow adequate time for meals  - Recommend/ encourage appropriate diets, oral nutritional supplements, and vitamin/mineral supplements  - Order, calculate, and assess calorie counts as needed  - Recommend, monitor, and adjust tube feedings and TPN/PPN based on assessed needs  - Assess need for intravenous fluids  - Provide specific nutrition/hydration education as appropriate  - Include patient/family/caregiver in decisions related to nutrition  Outcome: Progressing

## 2023-11-20 NOTE — CASE MANAGEMENT
Case Management Assessment & Discharge Planning Note    Patient name Hershal Leak  [facility] [facility] MRN [id number]  : 1934 Date 2023       Current Admission Date: 2023  Current Admission Diagnosis:Difficulty with speech   Patient Active Problem List    Diagnosis Date Noted    Stroke-like symptom 2023    Electrolyte abnormality 2023    Difficulty with speech 2023    Stage 2 chronic kidney disease 2023    Ureterolithiasis 2023    Aneurysm of ascending aorta without rupture ([address]) 2023    Urinary retention 2023    Bacteremia due to group B Streptococcus 2023    Respiratory insufficiency 2023    Cellulitis of left upper extremity 2023    HLD (hyperlipidemia) 2023    GERD (gastroesophageal reflux disease) 2023    Gout without acute exacerbation  2023    Spinal stenosis 2023    Hypothyroidism 2023    Peripheral vascular disease ([address]) 2023    Open wound of right great toe with damage to nail 2023    Adverse effect of loop (high-ceiling) diuretics, subsequent encounter     Toxic metabolic encephalopathy     Septic arthritis of shoulder, right ([address]) 2023    Suture of skin wound 2023    Hypomagnesemia 2023    Elevated TSH 2023    Chronic systolic heart failure ([address]) 2023    Paroxysmal A-fib ([address]) 2023    Focal seizure ([address]) 2023    KAMLESH (acute kidney injury) ([address]) 2023    Dilated cardiomyopathy ([address]) 10/18/2022    HTN (hypertension) 10/18/2022    Low left ventricular ejection fraction 10/11/2022    History of TIA (transient ischemic attack) 10/10/2022    Speech disturbance 10/08/2022    Accidental overdose 2020    History of hypertension 2020    Hypotension 2020    RA (rheumatoid arthritis) ([address]) 2020      LOS (days): 2  Geometric Mean LOS (GMLOS) (days):   Days to GMLOS:     OBJECTIVE:    Risk of Unplanned Readmission Score: 40.95         Current admission status: Inpatient       Preferred Pharmacy:   [address], [address] [facility] [address]  Phone: [phone] Fax: [phone]. [address], [address] [facility],[address]  [address] [facility],[address] Floor  [facility] [address]  Phone: [phone] Fax: [phone]    Primary Care Provider: Angie Julien MD    Primary Insurance: [employer] [facility]  Secondary Insurance:     ASSESSMENT:  [address], [address] - Son   Primary Phone: [phone] (Mobile)                 Advance Directives  Does patient have a [address]?: Yes  Does patient have Advance Directives?: Yes  Advance Directives: Power of  for health care  Primary Contact: Kerwin (son)    Readmission Root Cause  30 Day Readmission: No    Patient Information  Admitted from[de-identified] Home  Mental Status: Alert  During Assessment patient was accompanied by: Not accompanied during assessment  Assessment information provided by[de-identified] Patient  Primary Caregiver: Self  Support Systems: Ernie  [place] of Residence: [address] [place] [address] Street do you live in?: [address] [facility] entry access options. Select all that apply.: No steps to enter home  Type of Current Residence: Facility  Upon entering residence, is there a bedroom on the main floor (no further steps)?: Yes  Upon entering residence, is there a bathroom on the main floor (no further steps)?: Yes  Living Arrangements: Other (Comment)    Activities of Daily Living Prior to Admission  Functional Status: Assistance  Completes ADLs independently?: No  Level of ADL dependence: Assistance  Ambulates independently?: No  Level of ambulatory dependence: Assistance  Does patient use assisted devices?: Yes  Assisted Devices (DME) used:  Wheelchair, Laura Guero  Does patient currently own DME?: Yes  What DME does the patient currently own?: Wheelchair, Walker  Does patient have a history of Outpatient Therapy (PT/OT)?: No  Does the patient have a history of Short-Term Rehab?: Yes (hx at [address])  Does patient have a history of HHC?: No  Does patient currently have [facility]?: No    Patient Information Continued  Income Source: Pension/assisted  Does patient have prescription coverage?: Yes  Does patient receive dialysis treatments?: Yes  Does patient have a history of substance abuse?: No  Does patient have a history of Mental Health Diagnosis?: No      Housing Stability: Low Risk  (11/20/2023)    Housing Stability Vital Sign     Unable to Pay for Housing in the Last Year: No     Number of [address] in the Last Year: 1     Unstable Housing in the Last Year: No   Food Insecurity: No Food Insecurity (11/20/2023)    Hunger Vital Sign     Worried About Running Out of Food in the Last Year: Never true     Ran Out of Food in the Last Year: Never true   Transportation Needs: No Transportation Needs (11/20/2023)    PRAPARE - Transportation     Lack of Transportation (Medical): No     Lack of Transportation (Non-Medical): No   Utilities: Not At Risk (11/20/2023)    [facility] Utilities     Threatened with loss of utilities: No       DISCHARGE DETAILS:    Discharge planning discussed with[de-identified] Amando Vidal (son) and Reagan Covarrubias at [facility] at [facility] of Choice: Yes  Comments - Freedom of Choice: Per Jerilyn Gibson is to return to [facility] when medically stable. CM contacted family/caregiver?: Yes  Were Treatment Team discharge recommendations reviewed with patient/caregiver?: Yes  Did patient/caregiver verbalize understanding of patient care needs?: Yes  Were patient/caregiver advised of the risks associated with not following Treatment Team discharge recommendations?: Yes    Contacts  Patient Contacts:  Kerwin Krause  Relationship to Patient[de-identified] Family  Contact Method: Phone  Phone Number: [phone]  Reason/Outcome: Referral, Discharge [address]         Is the patient interested in [facility] at discharge?: No    DME Referral Provided  Referral made for DME?: No    Treatment Team Recommendation: Assisted Living  Discharge Destination Plan[de-identified] Assisted Living  Transport at Discharge : Wheelchair Dari     Additional Comments: ERIN was informed that pt was admitted from [place] at [address] called the [facility] and spoke to nurse Siomara Ortiz to discuss pt's prior level of functioning. Pt receives min assist x1 for all ADL's, pt is able to stand pivot with a RW and assistance to a WC. Staff administers medications. Pt is currently receiving therapy at the [facility] through [facility] and can continue when he returns. CM spoke to pt's son Anabel Lucas who states plan is for pt to return to the [facility] when medically stable.

## 2023-11-20 NOTE — PROGRESS NOTES
[address]  Progress Note  Name: Mally Pride  MRN: [id number]  Unit/Bed#: -01 I Date of Admission: 11/18/2023   Date of Service: 11/20/2023 I Hospital Day: 2    Assessment/Plan   Electrolyte abnormality  Assessment & Plan  Hypomagnesemia  Repleted  Recheck    Urinary retention  Assessment & Plan  Found to be retaining in the ED with 500 cc on Marino insertion  Likely secondary to sedating medications  Plan void trial in 48 hours  Continue home Flomax    Aneurysm of ascending aorta without rupture [facility])  Assessment & Plan  CT C/A/P on admit showing: "There is fusiform ectasia of the ascending thoracic aorta measuring up to 44 mm."  Needs repeat low-dose CT chest in 1 year    Ureterolithiasis  Assessment & Plan  Seen in the ED on 11/16 due to nausea x3 to 4 weeks  CT A/P on admit revealed 3 mm stone at the left ureterovesicular junction with no change in position or development of hydronephrosis from 11/16 CT  Patient without any flank pain  Strain all urine    Stage 2 chronic kidney disease  Assessment & Plan  Lab Results   Component Value Date    EGFR 40 11/19/2023    EGFR 36 11/18/2023    EGFR 36 11/16/2023    CREATININE 1.50 (H) 11/19/2023    CREATININE 1.64 (H) 11/18/2023    CREATININE 1.64 (H) 11/16/2023   Baseline creatinine 0.9-1.2  Creatinine on admission 1.64, examines hypovolemic therefore will hold home diuretic  Patient did receive 1 L NS in the ED, due to continuing to examine hypovolemic will place on gentle IV fluids at 50 cc/h overnight  Patient did receive IV contrast in the ED  Avoid further nephrotoxic drugs and hypotension  Eliquis decreased to 2.5 mg twice daily due to increase in creatinine -creatinine returns to <1.5 would resume 5 Mg twice daily    Hypothyroidism  Assessment & Plan  Continue home Synthroid 25 mcg daily  TSH within normal limits on admit    HLD (hyperlipidemia)  Assessment & Plan  Continue home Zetia 10 Mg daily  Prior allergy to statins    Paroxysmal A-fib ([facility])  Assessment & Plan  Rate control with metoprolol succinate 25 Mg twice daily  OAC with Eliquis 5 Mg twice daily, dose decreased to 2.5 Mg twice daily on admit due to rising creatinine  Will place lopressor PRN, patient has uncontrolled HR on tele    Chronic systolic heart failure [facility])  Assessment & Plan  Wt Readings from Last 3 Encounters:   11/18/23 99.8 kg (220 lb 0.3 oz)   11/09/23 99.8 kg (220 lb)   10/25/23 99.8 kg (220 lb)   Last echo August 2023: LVEF 45%. Mild global hypokinesis. G1 DD  Home regimen: Torsemide 20 Mg MWF and 10 Mg remaining days  Examines hypovolemic with dry mucous membranes on admit, however did receive 1 L NS in the ED already -we will place on gentle IV fluids at 50 cc/h overnight  Hold diuretics  I/O and daily weights  Not on any fluid or sodium restriction at home, placed on normal cardiac diet on admit  Monitor for signs of any respiratory distress    History of TIA (transient ischemic attack)  Assessment & Plan  History of TIA in the past, that had presented as word finding difficulties  Maintained on Eliquis due to history of paroxysmal A-fib and Zetia, continue    RA (rheumatoid arthritis) ([facility])  Assessment & Plan  Home regimen: Hydroxychloroquine 200 Mg twice daily and prednisone 5 Mg daily  Continue home medicines    History of hypertension  Assessment & Plan  Home regimen: Metoprolol succinate 25 Mg twice daily  Continue    * Difficulty with speech  Assessment & Plan  Presents with development of word finding difficulty and nonfluent speech noted at breakfast by son at 0830, last normal was 0800 on 11/18  Speech abnormality persisted on arrival to the ED, but then improved significantly throughout the day, returning to his baseline  Stroke alert called in the ED  Imaging:  CTH: "No acute intracranial abnormality.  Severe chronic microangiopathy, similar to prior exams."  MRA head: "No stenosis or occlusion of the major vessels of the [place]. "  CTA head/neck was nondiagnostic due to patient motion and IV infiltration  MRI brain was nondiagnostic again due to patient movement despite multiple sedating medications  Discussed with neurology, Dr. Nicole Gaspar, on admission-recommendations:  Patient with similar presentation dating back to at least 2022, with this being the third admission other episodes have occurred outside the hospital  First episode attributed to TIA, second episode attributed to seizure VS migraine VS hypoperfusion  Neurology feels symptoms may be attributed to seizure. Recommends initiating Keppra 500 Mg every 12 hours  Obtain EEG  Placed on stroke pathway due to nondiagnostic MRI brain, patient unable to tolerate laying flat. Repeat MRI did not demonstrate CVA though was motion degraded. Statin not ordered due to allergy, but remains on Zetia  ASA not ordered due to anticoagulation with Eliquis  Monitor on telemetry  Vital signs and neurochecks per stroke pathway protocol  SBP goal < 160  Neurology following  NIHSS on admit 1 (was unable to name month but no aphasia, dysarthria, or focal numbness/weakness)     Lactic acidosis-resolved as of 11/19/2023  Assessment & Plan  Lactic acid at 2.3 on admit  Improved to 2.0 status post 1 L IV fluids  D cfluids          VTE  Prophylaxis:   Pharmacologic: in place  Mechanical VTE Prophylaxis in Place: Yes    Patient Centered Rounds: I have performed bedside rounds with nursing staff today. Discussions with Specialists or Other Care Team Provider: case management    Education and Discussions with Family / Patient: pt family      Current Length of Stay: 2 day(s)    Current Patient Status: Inpatient        Code Status: Level 1 - Full Code    Discharge Plan: Pt will require continued inpatient hospitalization. Subjective:     Pt appears to be better per the family  No acute complaints      Patient is seen and examined at bedside. All other ROS are negative.     Objective:     Vitals: Temp (24hrs), Av.9 °F (36.1 °C), Min:95.8 °F (35.4 °C), Max:97.5 °F (36.4 °C)    Temp:  [95.8 °F (35.4 °C)-97.5 °F (36.4 °C)] 97.3 °F (36.3 °C)  HR:  [72-86] 86  Resp:  [16-18] 16  BP: (113-122)/(64-65) 113/64  SpO2:  [95 %-97 %] 96 %  Body mass index is 32.49 kg/m². Input and Output Summary (last 24 hours): Intake/Output Summary (Last 24 hours) at 2023 1653  Last data filed at 2023 1350  Gross per 24 hour   Intake 680 ml   Output 1075 ml   Net -395 ml       Physical Exam:       GEN: No acute distress, comfortable  HEEENT: No JVD, PERRLA, no scleral icterus  RESP: Lungs clear to auscultation bilaterally  CV: RRR, +s1/s2   ABD: SOFT NON TENDER, POSITIVE BOWEL SOUNDS, NO DISTENTION  PSYCH: CALM  NEURO: pt oriented to [facility]. States it is December. Does not know year.    SKIN: NO RASH  EXTREM: NO EDEMA    Additional Data:     Labs:    Results from last 7 days   Lab Units 23  0558   WBC Thousand/uL 7.00   HEMOGLOBIN g/dL 11.4*   HEMATOCRIT % 36.5   PLATELETS Thousands/uL 151   NEUTROS PCT % 58   LYMPHS PCT % 23   MONOS PCT % 9   EOS PCT % 9*     Results from last 7 days   Lab Units 23  0558 23  0414   SODIUM mmol/L 138 139   POTASSIUM mmol/L 3.7 3.5   CHLORIDE mmol/L 105 100   CO2 mmol/L 27 26   BUN mg/dL 14 17   CREATININE mg/dL 1.10 1.50*   ANION GAP mmol/L 6 13   CALCIUM mg/dL 7.6* 7.0*   ALBUMIN g/dL  --  3.3*   TOTAL BILIRUBIN mg/dL  --  0.58   ALK PHOS U/L  --  68   ALT U/L  --  10   AST U/L  --  17   GLUCOSE RANDOM mg/dL 117 109     Results from last 7 days   Lab Units 23  1103   INR  1.53*     Results from last 7 days   Lab Units 23  1056   POC GLUCOSE mg/dl 110     Results from last 7 days   Lab Units 23  0414 23  0715   HEMOGLOBIN A1C % 6.7* 6.6*     Results from last 7 days   Lab Units 23  1738 23  1208 23  1107   LACTIC ACID mmol/L 2.0 2.3*  --    PROCALCITONIN ng/ml  --   --  0.11       Lines/Drains:  Invasive Devices       Peripheral Intravenous Line  Duration             Peripheral IV 11/19/23 Left Antecubital 1 day    Peripheral IV 11/19/23 Right Antecubital 1 day              Drain  Duration             Urethral Catheter 18 Fr. 2 days                    Telemetry:   Telemetry Orders (From admission, onward)               24 Hour Telemetry Monitoring  Continuous x 24 Hours (Telem)        Expiring   Question:  Reason for 24 Hour Telemetry  Answer:  TIA/Suspected CVA/ Confirmed CVA                        * I Have Reviewed All Lab Data Listed Above. Imaging:     Results for orders placed during the hospital encounter of 08/29/23    XR chest portable    Narrative  CHEST    INDICATION:  Worsening shortness of breath, wheezing. COMPARISON: 8/31/2023, CT chest, abdomen and pelvis 6/9/2023    EXAM PERFORMED/VIEWS:  XR CHEST PORTABLE      FINDINGS: Loop recorder left chest wall. Cardiomediastinal silhouette appears stable. No focal infiltrates. Slight basilar vascular crowding. Slight asymmetry of the right lung markings may be rotational.    No pneumothorax or pleural effusion. Right shoulder arthroplasty and degenerative changes left glenohumeral joint. Impression  Limited portable study demonstrating no definite acute cardiopulmonary disease. Workstation performed: [id number]    No results found for this or any previous visit. *I have reviewed all imaging reports listed above      Recent Cultures (last 7 days):     Results from last 7 days   Lab Units 11/18/23  1208   BLOOD CULTURE  No Growth at 24 hrs. No Growth at 24 hrs.        Last 24 Hours Medication List:   Current Facility-Administered Medications   Medication Dose Route Frequency Provider Last Rate    acetaminophen  650 mg Oral Q4H PRN Luis Alfredo Malin PA-C      albuterol  2.5 mg Nebulization Q6H PRN Cathy Fan MD      allopurinol  400 mg Oral Daily Luis Alfredo Malin PA-C      aluminum-magnesium hydroxide-simethicone  30 mL Oral Q6H PRN Hue Litten, PA-C      apixaban  2.5 mg Oral BID Hue Litten, PA-C      doxycycline hyclate  100 mg Oral Q12H Hue Litten, PA-C      ezetimibe  10 mg Oral Daily Hue Litten, PA-C      gabapentin  100 mg Oral BID Hue Litten, PA-C      hydroxychloroquine  200 mg Oral BID With Meals Hue Litten, PA-C      ipratropium  0.5 mg Nebulization TID Ariana Krause MD      levalbuterol  1.25 mg Nebulization TID Ariana Krause MD      levETIRAcetam  500 mg Intravenous Q12H [address] Hue Litten, PA-C 500 mg (11/20/23 1226)    levothyroxine  25 mcg Oral Early Morning Hue Litten, PA-C      lidocaine  1 patch Topical Daily Raimundo Argueta MD      melatonin  9 mg Oral HS Hue Litten, PA-C      metoprolol  2.5 mg Intravenous Q6H PRN Raimundo Argueta MD      metoprolol succinate  25 mg Oral BID Hue Litten, PA-C      nystatin   Topical BID Raimundo Argueta MD      pantoprazole  20 mg Oral Early Morning Hue Litten, PA-C      predniSONE  5 mg Oral Daily Hue Litten, PA-C      senna  1 tablet Oral HS PRN Hue Litten, PA-C      tamsulosin  0.4 mg Oral HS Hue Litten, PA-C          Today, Patient Was Seen By: Ariana Krause MD    ** Please Note: Dictation voice to text software may have been used in the creation of this document.  **

## 2023-11-20 NOTE — PLAN OF CARE
Problem: Potential for Falls  Goal: Patient will remain free of falls  Description: INTERVENTIONS:  - Educate patient/family on patient safety including physical limitations  - Instruct patient to call for assistance with activity   - Consult OT/PT to assist with strengthening/mobility   - Keep Call bell within reach  - Keep bed low and locked with side rails adjusted as appropriate  - Keep care items and personal belongings within reach  - Initiate and maintain comfort rounds  - Make Fall Risk Sign visible to staff  - Offer Toileting every 2 Hours, in advance of need  - Initiate/Maintain bed alarm  - Obtain necessary fall risk management equipment: alarm  Problem: Nutrition/Hydration-ADULT  Goal: Nutrient/Hydration intake appropriate for improving, restoring or maintaining nutritional needs  Description: Monitor and assess patient's nutrition/hydration status for malnutrition. Collaborate with interdisciplinary team and initiate plan and interventions as ordered. Monitor patient's weight and dietary intake as ordered or per policy. Utilize nutrition screening tool and intervene as necessary. Determine patient's food preferences and provide high-protein, high-caloric foods as appropriate.      INTERVENTIONS:  - Monitor oral intake, urinary output, labs, and treatment plans  - Assess nutrition and hydration status and recommend course of action  - Evaluate amount of meals eaten  - Assist patient with eating if necessary   - Allow adequate time for meals  - Recommend/ encourage appropriate diets, oral nutritional supplements, and vitamin/mineral supplements  - Order, calculate, and assess calorie counts as needed  - Recommend, monitor, and adjust tube feedings and TPN/PPN based on assessed needs  - Assess need for intravenous fluids  - Provide specific nutrition/hydration education as appropriate  - Include patient/family/caregiver in decisions related to nutrition  Outcome: Progressing     - Apply yellow socks and bracelet for high fall risk patients  - Consider moving patient to room near nurses station  Outcome: Progressing

## 2023-11-20 NOTE — RESPIRATORY THERAPY NOTE
RT Protocol Note  Gayle Toscano 80 y.o. male MRN: [id number]  Unit/Bed#: -01 Encounter: [id number]    Assessment    Principal Problem:    Difficulty with speech  Active Problems:    History of hypertension    RA (rheumatoid arthritis) ([address])    History of TIA (transient ischemic attack)    Chronic systolic heart failure (HCC)    Paroxysmal A-fib (HCC)    Focal seizure (HCC)    Toxic metabolic encephalopathy    HLD (hyperlipidemia)    Hypothyroidism    Stage 2 chronic kidney disease    Ureterolithiasis    Aneurysm of ascending aorta without rupture (HCC)    Urinary retention    Electrolyte abnormality    Stroke-like symptom      Home Pulmonary Medications:  N/A       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[address])     Spinal stenosis      Social History     Socioeconomic History    Marital status:      Spouse name: None    Number of children: None    Years of education: None    Highest education level: None   Occupational History    None   Tobacco Use    Smoking status: Never    Smokeless tobacco: Never   Vaping Use    Vaping Use: Never used   Substance and Sexual Activity    Alcohol use:  Yes     Alcohol/week: 2.0 standard drinks of alcohol     Types: 2 Shots of liquor per week     Comment: 2-3 ounces of whiskey a day    Drug use: Yes     Types: Marijuana    Sexual activity: Not Currently   Other Topics Concern    None   Social History Narrative    None     Social Determinants of Health     Financial Resource Strain: Not on file   Food Insecurity: No Food Insecurity (8/30/2023)    Hunger Vital Sign     Worried About Running Out of Food in the Last Year: Never true     Ran Out of Food in the Last Year: Never true   Transportation Needs: No Transportation Needs (8/30/2023)    PRAPARE - Transportation     Lack of Transportation (Medical): No     Lack of Transportation (Non-Medical): No   Physical Activity: Not on file   Stress: Not on file   Social Connections: Not on file   Intimate Partner Violence: Not on file   Housing Stability: Low Risk  (8/30/2023)    Housing Stability Vital Sign     Unable to Pay for Housing in the Last Year: No     Number of Places Lived in the Last Year: 1     Unstable Housing in the Last Year: No       Subjective         Objective    Physical Exam:   Assessment Type: During-treatment  Respiratory Pattern: Normal  Chest Assessment: Chest expansion symmetrical  Bilateral Breath Sounds: Diminished, Other (Comment) (Audible expiratory wheezes)  Cough: None  O2 Device: 2L NC    Vitals:  Blood pressure 114/65, pulse 75, temperature (!) 95.8 °F (35.4 °C), resp. rate 16, height 5' 9" (1.753 m), weight 99.8 kg (220 lb 0.3 oz), SpO2 95 %. Imaging and other studies: I have personally reviewed pertinent reports. O2 Device: 2L NC     Plan    Respiratory Plan: Mild Distress pathway     Pt has no hx of pulmonary issues/medications. Pts breath sounds are diminished with expiratory wheezes. Breath sounds after tx showed increased wheeze/aeration. Plan is to switch Duo-Neb to TID Atrovent/Xoponex and PRN Q6 Albuterol. Re-assess patient/protocol after 24 hrs.

## 2023-11-20 NOTE — PLAN OF CARE
Problem: PHYSICAL THERAPY ADULT  Goal: Performs mobility at highest level of function for planned discharge setting. See evaluation for individualized goals. Description: Treatment/Interventions: Functional transfer training, LE strengthening/ROM, Therapeutic exercise, Endurance training, Cognitive reorientation, Patient/family training, Equipment eval/education, Gait training, Bed mobility          See flowsheet documentation for full assessment, interventions and recommendations. 11/20/2023 1630 by Jatinder Bella PT  Note:    Problem List: Decreased strength, Decreased endurance, Impaired balance, Decreased mobility, Decreased cognition, Decreased skin integrity, Obesity, Impaired vision, Impaired hearing  Assessment: Geraldine Moreno is a 80 y.o. Male who presents to [address] [place] [address] on 11/18/2023 from [facility] @ [address] [place] [address] w/ c/o AMS and diagnosis of difficulty w/ speech, stroke-like symptoms. Orders for PT eval and treat received. Pt presents w/ comorbidities of HTN, RA, CHF, Afib, HLD. At baseline, pt mobilizes S to his WC. Upon evaluation, pt presents w/ the following deficits: weakness, impaired skin integrity, impaired balance, decreased endurance, and impaired cognition . Upon eval, pt requires mod A x 1 for bed mobility, min A x 2 for transfers. Based on this PT evaluation today, patient's discharge recommendation is for Level II. During this admission, pt would benefit from continued skilled inpatient PT in the acute care setting in order to address the abovementioned deficits to maximize function and mobility before DC from acute care. Rehab Resource Intensity Level, PT: II (Moderate Resource Intensity)    See flowsheet documentation for full assessment.

## 2023-11-20 NOTE — SPEECH THERAPY NOTE
Speech Language/Pathology    Speech-Language Pathology Bedside Swallow Evaluation      Patient Name: Toshia Kingsley    IARVB'I Date: 11/20/2023     Problem List  Principal Problem:    Difficulty with speech  Active Problems:    History of hypertension    RA (rheumatoid arthritis) ([address])    History of TIA (transient ischemic attack)    Chronic systolic heart failure (HCC)    Paroxysmal A-fib (HCC)    Focal seizure (HCC)    Toxic metabolic encephalopathy    HLD (hyperlipidemia)    Hypothyroidism    Stage 2 chronic kidney disease    Ureterolithiasis    Aneurysm of ascending aorta without rupture (HCC)    Urinary retention    Electrolyte abnormality    Stroke-like symptom      Past Medical History  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[address])     Spinal stenosis        Past Surgical History  Past Surgical History:   Procedure Laterality Date    CARDIAC ELECTROPHYSIOLOGY PROCEDURE N/A 12/17/2022    Procedure: Cardiac loop recorder implant;  Surgeon: Ferny Lanier MD;  [facility]; Service: Cardiology    CARPAL TUNNEL RELEASE Bilateral     COLONOSCOPY      LAMINECTOMY      TOTAL KNEE ARTHROPLASTY Bilateral     TOTAL SHOULDER REPLACEMENT         Summary   Pt presented with functional appearing oral and pharyngeal stage swallowing skills with materials administered today. Mastication and oral organization is min-mild prolonged though ultimately effective. Cohesive bolus formation for transfers. No overt s/s reduced oral control. Swallows suspected fairly prompt. No overt s/s aspiration.        Risk/s for Aspiration: Mild      Recommended Diet:  continue regular/thin as tolerated     Recommended Form of Meds: whole with puree   Aspiration precautions and swallowing strategies: upright posture, only feed when fully alert, and slow rate of feeding  Other Recommendations: Continue frequent oral care        Current Medical Status  Pt is a 80 y.o. male who presented to  [address] [place] [address]  with aroxysmal Afib on Eliquis, history of TIA vs seizure, CHF, HTN, HLD, CKD2, RA,hypothyroidism,neuropathy, gout, and spinal stenosis who presented on 11/18/23 for evaluation of word finding difficulty and non-fluent speech. Stroke alert was called with NIHSS 1. Patient not a TNK candidate. Speech disturbance was reported to be present in the ED but improved significantly throughout the day back to baseline. No focal deficits appreciated on initial neurologic exam. No aphasia nor dysarthria. Patient did have some disorientation but this was thought to be secondary to sedation, poor sleep and likely a component of delirium. Given multiple episodes with similar semiologies, concern for seizure. Keppra was initiated. .    Current Precautions:   Seizure  Delirium    Allergies:  No known food allergies    Past medical history:  Please see H&P for details    Special Studies:  MRI brain 11/20: 1. Limited sequences obtained (per quick stroke protocol). Suboptimal assessment due to distorted image quality by motion artifact. 2.  No acute ischemia. 3.  Cerebral atrophy and microangiopathic changes. MRA 11/18: No stenosis or occlusion of the major vessels of the [place]. CTA stroke alert 11/18: Delayed examination due to multiple IV infiltrations and patient motion artifact -I asked the CT technologist to reinject the patient given the degree of motion artifact on the first provided images knowing that this patient has poor renal function given   that the benefit outweighs the risks and trying to determine a large vessel occlusion in the head or neck for which intervention could be performed. I was notified at a later time that the second bolus also had IV infiltration. Nondiagnostic examination   to definitively conclude no large vessel occlusion in the head or neck.  There appears to be contrast opacification of the intracranial vasculature particularly in the left MCA distribution but the right MCA distribution is moderately degraded by motion   artifact and branch vessel occlusions can be missed in this situation. Consider repeat CTA head and neck with contrast when patient's condition improves or consider sedated MRI brain without contrast, MRA head, and MRA neck without contrast examination. Arterial system appears patent in the neck and head given degree of significant motion artifact. Confidence is low to detect large vessel occlusion in the head or neck given degree of motion artifact. Additional chronic/incidental findings as detailed above. CT stroke alert brain 11/18: No acute intracranial abnormality. Severe chronic microangiopathy, similar to prior exams. CT chest abdomen pelvis 11/18: 1.  3 mm stone at the left ureterovesicular junction shows no interval change in position. Still no significant upstream hydronephrosis. 2.  No other new/acute findings in the chest, abdomen, or pelvis. 3.  Ascending aortic ectasia warrants yearly chest CT surveillance to confirm size stability. Social/Education/Vocational Hx:  Pt lives in assisted living facility    Swallow Information   Current Risks for Dysphagia & Aspiration: known history of dysphagia and AMS  Current Symptoms/Concerns:  poor PO intake  Current Diet: regular diet and thin liquids   Baseline Diet: regular diet and thin liquids      Baseline Assessment   Behavior/Cognition: alert  Speech/Language Status: able to participate in basic conversation and able to follow commands  Patient Positioning: upright in bed  Pain Status/Interventions/Response to Interventions:  No report of or nonverbal indications of pain.        Swallow Mechanism Exam  Facial: symmetrical  Labial: WFL  Lingual: WFL  Velum: symmetrical  Mandible: adequate ROM  Dentition: adequate  Vocal quality:clear/adequate   Volitional Cough: strong/productive  Respiratory Status:  on 1 L O2       Consistencies Assessed and Performance   Consistencies Administered: thin liquids, puree, and regular textures    Oral Stage:   Mastication was min-mild prolonged, ultimately effective. Bolus formation and transfer were functional with no significant oral residue noted. No overt s/s reduced oral control. Pharyngeal Stage:   Swallow Mechanics:  Swallowing initiation appeared fairly prompt. Laryngeal rise was palpated and judged to be fair  No coughing, throat clearing, change in vocal quality or respiratory status noted today. Esophageal Concerns: none reported    Strategies and Efficacy: -     Summary and Recommendations (see above)    Results Reviewed with: patient and RN     Treatment Recommended: Yes     Frequency of treatment: Brief f/u across larger sample     Patient Stated Goal: "This is a meal!"     Dysphagia LTG  -Patient will demonstrate safe and effective oral intake (without overt s/s significant oral/pharyngeal dysphagia including s/s penetration or aspiration) for the highest appropriate diet level.          Speech Therapy Prognosis   Prognosis: good    Prognosis Considerations: age, medical status, prior medical history, and cognitive status

## 2023-11-20 NOTE — ASSESSMENT & PLAN NOTE
Improved. Etiology likely multifactorial in the setting of suspected recent seizure, disturbed sleep/wake cycle, multiple sedating medications, respiratory compromise, urologic issues and hospital acquired delirium. Conservative management of delirium - minimize noise, maintain day/night cycle, provide frequent redirection, avoid restraints if possible, etc.  Avoid benzos and narcotics if possible  Recommendations as detailed above.

## 2023-11-20 NOTE — UTILIZATION REVIEW
Date: 11/20   Day 3: Has surpassed a 2nd midnight with active treatments and services, which include: Pending rpt CTA H/N and MRI brain. Cont Keppra. Cont to mon neuro exams. Maintain delirium precautions. See initial review completed by Yanick Stewart on 11/19.

## 2023-11-20 NOTE — PROGRESS NOTES
Progress Note - Neurology   Mojgan Cheema 80 y.o. male [id number]  Unit/Bed#: /-01    Assessment/Plan:  Focal seizure [facility])  [address] "Prole" Avinash Vázquez is an 79yo right handed man with paroxysmal Afib on Eliquis, history of TIA vs seizure, CHF, HTN, HLD, CKD2, RA,hypothyroidism,neuropathy, gout, and spinal stenosis who presented on 11/18/23 for evaluation of word finding difficulty and non-fluent speech. Stroke alert was called with NIHSS 1. Patient not a TNK candidate. Speech disturbance was reported to be present in the ED but improved significantly throughout the day back to baseline. No focal deficits appreciated on initial neurologic exam. No aphasia nor dysarthria. Patient did have some disorientation but this was thought to be secondary to sedation, poor sleep and likely a component of delirium. Given multiple episodes with similar semiologies, concern for seizure. Keppra was initiated. Work-up:  CTH: No acute intracranial abnormality. Severe chronic microangiopathy, similar to prior exams. CTA H/N:  Nondiagnostic examination due to multiple IV infiltrates. Unable to definitively conclude no large vessel occlusion in the head or neck. There appears to be contrast opacification of the intracranial vasculature particularly in the left MCA distribution but the right MCA distribution is moderately degraded by motion artifact and branch vessel occlusions can be missed in this situation. Consider repeat CTA head and neck with contrast when patient's condition improves or consider sedated MRI brain without contrast, MRA head, and MRA neck without contrast examination. Arterial system appears patent in the neck and head given degree of significant motion artifact. Confidence is low to detect large vessel occlusion in the head or neck given degree of motion artifact. MRI brain: Nondiagnostic exam due to patient motion artifact.    MRA head and neck: No stenosis or occlusion of the major vessels of the [place]. A1C 6.9  RSV/Influenza/Covid negative  UA negative  Limited MRI brain wo:   Limited sequences obtained (per quick stroke protocol). Suboptimal assessment due to distorted image quality by motion artifact. No acute ischemia. Cerebral atrophy and microangiopathic changes. Patient was seen by neurology team in examination x2 before and after the Ativan he received in preparation for his MRI. Patient continues to have difficulty with orientation; however, his neuro exam remains nonfocal. High suspicion for focal seizure with retained awareness based on recurrent events with similar semiology with abrupt onset and offset. Routine EEG pending. Will await result. If unremarkable, no further inpatient neurologic recommendations. If EEG abnormal, will provide additional recommendations as indicated. Plan:  Conservative management of delirium - minimize noise, maintain day/night cycle, provide frequent redirection, avoid restraints if possible, etc.  Routine EEG pending  No need for vEEG at this time; however if mental status were to decline and there is a clinical suspicion of seizure, low threshold for transfer to [facility] for vEEG  Continue Keppra 500mg q12hrs  Seizure precautions  Goal of normotension  PT/OT  Medical management (including management of wheezing and urologic issues), nutritional support and correction of metabolic/infectious derangements as per primary team.    Toxic metabolic encephalopathy  Assessment & Plan  Improved. Etiology likely multifactorial in the setting of suspected prolonged seizure, disturbed sleep/wake cycle, multiple sedating medications, respiratory compromise, urologic issues and hospital acquired delirium. Conservative management of delirium - minimize noise, maintain day/night cycle, provide frequent redirection, avoid restraints if possible, etc.  Avoid benzos and narcotics if possible  Recommendations as detailed above.      Paroxysmal A-fib [facility])  Assessment & Plan  Continue home Eliquis    History of hypertension  Assessment & Plan  Goal of normotension        Jocelyn Zhong will need follow up in in 6 weeks with epilepsy attending. He will not require outpatient neurological testing. Patient last saw Dr. Sylvia Lowery outpatient and was last seen on 10/10/23. Would recommend that he be changed from neurovascular team to epilepsy team. I will let Dr. Sylvia Lowery know of our recommendation. Subjective:   VSS this morning. With repletion, Mg improved from 0.5 to 2.9. Labs grossly unremarkable. Nursing notes overnight report that there was difficulty coordinating Ativan dosing and MRI earlier in the day yesterday and that he was given Ativan 0.5mg prior to CTA attempt but patient remained to agitated and test was unable to be completed. Patient required restraints overnight. They were removed in my presence and patient did well without agitation. Son, daughter in law and daughter present at bedside. Family reports that patient has had 5 or 6 events since 2022, all very similar, with speech disturbance presenting with abrupt onset/abrupt offset. Events typically last minutes. Son, Omayra De Jesus, reports that this episode "seems to be lasting longer" though he also notes that it may be because of medications. Daughter also notes that over the last several weeks patient has had increasing nausea and has not been eating well. 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[address])     Spinal stenosis      Past Surgical History:   Procedure Laterality Date    CARDIAC ELECTROPHYSIOLOGY PROCEDURE N/A 12/17/2022    Procedure: Cardiac loop recorder implant;  Surgeon: Margy Bartlett MD;  Location[facility];   Service: Cardiology    CARPAL TUNNEL RELEASE Bilateral     COLONOSCOPY      LAMINECTOMY      TOTAL KNEE ARTHROPLASTY Bilateral     TOTAL SHOULDER REPLACEMENT       Family History   Problem Relation Age of Onset    Colon polyps Neg Hx     Colon cancer Neg Hx      Social History     Socioeconomic History    Marital status:      Spouse name: None    Number of children: None    Years of education: None    Highest education level: None   Occupational History    None   Tobacco Use    Smoking status: Never    Smokeless tobacco: Never   Vaping Use    Vaping Use: Never used   Substance and Sexual Activity    Alcohol use: Yes     Alcohol/week: 2.0 standard drinks of alcohol     Types: 2 Shots of liquor per week     Comment: 2-3 ounces of whiskey a day    Drug use: Yes     Types: Marijuana    Sexual activity: Not Currently   Other Topics Concern    None   Social History Narrative    None     Social Determinants of Health     Financial Resource Strain: Not on file   Food Insecurity: No Food Insecurity (8/30/2023)    Hunger Vital Sign     Worried About Running Out of Food in the Last Year: Never true     Ran Out of Food in the Last Year: Never true   Transportation Needs: No Transportation Needs (8/30/2023)    PRAPARE - Transportation     Lack of Transportation (Medical): No     Lack of Transportation (Non-Medical): No   Physical Activity: Not on file   Stress: Not on file   Social Connections: Not on file   Intimate Partner Violence: Not on file   Housing Stability: Low Risk  (8/30/2023)    Housing Stability Vital Sign     Unable to Pay for Housing in the Last Year: No     Number of Places Lived in the Last Year: 1     Unstable Housing in the Last Year: No       Medications: Reviewed in detail by me. ROS: Review of Systems   Musculoskeletal:  Positive for back pain.    Limited due to timing as patient was being taken to MRI    Vitals: /65   Pulse 72   Temp (!) 95.8 °F (35.4 °C)   Resp 18   Ht 5' 9" (1.753 m)   Wt 99.8 kg (220 lb 0.3 oz)   SpO2 97%   BMI 32.49 kg/m²     Physical Exam:   Physical Exam  Constitutional:       General: He is not in acute distress. Appearance: Normal appearance. He is well-developed. He is ill-appearing (chronically). He is not toxic-appearing or diaphoretic. Comments: Elderly male reclined in bed   HENT:      Head: Normocephalic and atraumatic. Eyes:      General: No scleral icterus. Right eye: No discharge. Left eye: No discharge. Extraocular Movements: Extraocular movements intact and EOM normal.      Conjunctiva/sclera: Conjunctivae normal.   Pulmonary:      Effort: Pulmonary effort is normal. No respiratory distress. Breath sounds: No stridor. Wheezing (expiratory) present. Comments: On 1L NC O2 currently  Musculoskeletal:         General: No tenderness or deformity. Cervical back: Normal range of motion and neck supple. Skin:     General: Skin is warm and dry. Findings: No erythema or rash. Neurological:      Mental Status: He is alert. Coordination: Finger-Nose-Finger Test normal.       Neurologic Exam     Mental Status   Sleeping upon arrival. Wakes easily with verbal stimuli. Hearing aids placed. Oriented to person and family member names. Not oriented to place when given 3 choices. Not oriented to month. Incorrectly states it is 1973. Patient follows multistep commands briskly. Speech is normal.  Able to repeat, name and read. No aphasia nor dysarthria. Cranial Nerves     CN II   Visual acuity: (able to count fingers)  Right visual field deficit: none  Left visual field deficit: none     CN III, IV, VI   Extraocular motions are normal.   Nystagmus: none   Conjugate gaze: present    CN V   Facial sensation intact. CN VII   Facial expression full, symmetric.      CN VIII   Hearing: impaired (hearing aids bilaterally)    CN XII   Tongue deviation: none    Motor Exam   Muscle bulk: normal  Right arm pronator drift: absent  Left arm pronator drift: absent  BUEs 5 throughout  BLEs: 4/4+ proximally Sensory Exam   Light touch normal.     Gait, Coordination, and Reflexes     Coordination   Finger to nose coordination: normal      Labs: Reviewed in detail by me. Imaging: I have personally reviewed pertinent imaging and PACS reports. VTE Prophylaxis: Eliquis    Total time spent today 45 minutes. Greater than 50% of total time was spent with the patient and / or family counseling and / or coordination of care. A description of the counseling / coordination of care: speaking with the patient and family extensively about presenting symptoms, recurring events, current exam, pending work-up and overall plan of care.

## 2023-11-20 NOTE — ASSESSMENT & PLAN NOTE
Irlanda Maxwell "Aleksey" Avinash Vázquez is an 81yo right handed man with paroxysmal Afib on Eliquis, history of TIA vs seizure, CHF, HTN, HLD, CKD2, RA,hypothyroidism,neuropathy, gout, and spinal stenosis who presented on 11/18/23 for evaluation of abrupt onset word finding difficulty and non-fluent speech. This was patient's 5th or 6th event over the past year plus. Prior episodes were abrupt onset and offset and all with similar semiologies. Regarding previous events, patient typically remembers the event stating that he was unable to speak; however on this occurrence patient is amnestic to the events that occurred during this spell. NIHSS 1. Patient was not a TNK candidate. Speech disturbance improved after arrival to the ED, though patient experienced fluctuating confusion/lethargy following this which was multifactorial in the setting of multiple sedating medications (fentanyl, dilaudid, haldol, lorazepam), poor sleep/wake cycle, hospital acquired delirium and additional acute co-medical conditions. Work-up:  CTH: No acute intracranial abnormality. Severe chronic microangiopathy, similar to prior exams. CTA H/N:  Nondiagnostic examination due to multiple IV infiltrates. Unable to definitively conclude no large vessel occlusion in the head or neck. There appears to be contrast opacification of the intracranial vasculature particularly in the left MCA distribution but the right MCA distribution is moderately degraded by motion artifact and branch vessel occlusions can be missed in this situation. Consider repeat CTA head and neck with contrast when patient's condition improves or consider sedated MRI brain without contrast, MRA head, and MRA neck without contrast examination. Arterial system appears patent in the neck and head given degree of significant motion artifact. Confidence is low to detect large vessel occlusion in the head or neck given degree of motion artifact.   MRI brain: Nondiagnostic exam due to patient motion artifact. MRA head and neck: No stenosis or occlusion of the major vessels of the [place]. A1C 6.7  FLP: Total 132, LDL 51  RSV/Influenza/Covid negative  UA negative  11/20 UDS positive for benzos ands opiates  Limited MRI brain wo:   Limited sequences obtained (per quick stroke protocol). Suboptimal assessment due to distorted image quality by motion artifact. No acute ischemia. Cerebral atrophy and microangiopathic changes. Routine EEG: Normal    Continued high suspicion for focal left hemispheric seizures with retained awareness based on recurrent events with similar semiology of aphasia with abrupt onset and offset. Patient should remain on Keppra 500mg q12hrs. No further inpatient neurologic recommendations. Patient will followup outpatient with our Epilepsy team. If patient has recurrent brief events he should notify the office for a prolonged EEG study vs possible increase in Keppra dosing. If patient has a prolonged recurrent event >5 minutes, he should return to the ED.       Plan:  No need for vEEG at this time; however if mental status were to decline and there is a clinical suspicion of seizure, low threshold for transfer to [facility] for vEEG  Continue Keppra 500mg q12hrs  Seizure precautions  Goal of normotension  PT/OT  Medical management (including management of wheezing and urologic issues), nutritional support and correction of metabolic/infectious derangements as per primary team.  Conservative management of delirium - minimize noise, maintain day/night cycle, provide frequent redirection, avoid restraints if possible, etc.

## 2023-11-20 NOTE — PHYSICAL THERAPY NOTE
PHYSICAL THERAPY EVALUATION NOTE    Patient Name: Vanessa Barton  LKEIYGUSTABO Date: 2023    AGE:   80 y.o.  Mrn:   [id number]  ADMIT DX:  Ureterolithiasis [N20.1]  Urinary retention [R33.9]  Altered mental status [R41.82]  Acute encephalopathy [G93.40]  Stroke-like symptom [R29.90]    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[address])     Spinal stenosis      Length Of Stay: 2  PHYSICAL THERAPY EVALUATION :   Patient's identity confirmed via 2 patient identifiers (full name and ) at start of session       23 1421   PT Last Visit   PT Visit Date 23   Note Type   Note type Evaluation  (+ Treatment)   Pain Assessment   Pain Assessment Tool 0-10   Pain Score No Pain   Restrictions/Precautions   Weight Bearing Precautions Per Order No   Other Precautions Cognitive; Chair Alarm; Bed Alarm;Multiple lines;O2;Fall Risk;Pain;Hard of hearing  (3L NC O2)   Home Living   Type of Home Assisted living  ([facility])   [address]; Wheelchair-manual   Additional Comments Spoke to pt's son Eun Taylor and DANA Mathews. They report pt is able to stand and pivot to his manual WC. He walks to the dining room w/ RW w/ A x 1 from the therapist   Prior Function   Level of [place] Needs assistance with ADLs; Needs assistance with IADLS;Needs assistance with functional mobility   Lives With Facility staff   Receives Help From Personal care attendant   IADLs Family/Friend/Other provides transportation; Family/Friend/Other provides meals; Family/Friend/Other provides medication management   Comments At baseline, pivots to wheelchair with use of RW   General   Family/Caregiver Present Yes  (SonRahul & DIL Sailaja Mathews)   Cognition   Overall Cognitive Status Impaired Arousal/Participation Responsive   Attention Attends with cues to redirect   Orientation Level Oriented to person;Disoriented to place; Disoriented to time;Disoriented to situation   Memory Decreased recall of precautions;Decreased recall of recent events   Following Commands Follows one step commands with increased time or repetition   Comments Pt initially lethargic while supine in bed, more alert and awake upon sitting up. Family reports he's been more lethargic over the last few weeks, correlating w/ his dehydration and lack of PO intake   Subjective   Subjective "oh, hi there!"   RLE Assessment   RLE Assessment WFL   Strength RLE   RLE Overall Strength 3+/5   LLE Assessment   LLE Assessment WFL   Strength LLE   LLE Overall Strength 3+/5   Bed Mobility   Supine to Sit 3  Moderate assistance   Additional items Assist x 1; Increased time required;Verbal cues;HOB elevated   Transfers   Sit to Stand 3  Moderate assistance   Additional items Assist x 1; Increased time required;Verbal cues   Stand to Sit 4  Minimal assistance   Additional items Assist x 2; Increased time required;Verbal cues   Stand pivot 4  Minimal assistance   Additional items Assist x 2; Increased time required;Verbal cues  (w/RW for steppage transfer)   Balance   Static Sitting Fair   Static Standing Poor   Ambulatory Poor   Activity Tolerance   Activity Tolerance Patient limited by fatigue   Medical Staff Made Aware OT Ellsworth Goldmann, SLP Sonia   Nurse Made Aware Spoke to RN Letty Silva   Assessment   Problem List Decreased strength;Decreased endurance; Impaired balance;Decreased mobility; Decreased cognition;Decreased skin integrity;Obesity; Impaired vision; Impaired hearing   Assessment Parth Mahan is a 80 y.o. Male who presents to [address] [place] [address] on 11/18/2023 from [facility] @ [address] [place] [address] FRANCISCA w/ c/o AMS and diagnosis of difficulty w/ speech, stroke-like symptoms. Orders for PT eval and treat received. Pt presents w/ comorbidities of HTN, RA, CHF, Afib, HLD.  At baseline, pt mobilizes S to his WC. Upon evaluation, pt presents w/ the following deficits: weakness, impaired skin integrity, impaired balance, decreased endurance, and impaired cognition . Upon eval, pt requires mod A x 1 for bed mobility, min A x 2 for transfers. Based on this PT evaluation today, patient's discharge recommendation is for Level II. During this admission, pt would benefit from continued skilled inpatient PT in the acute care setting in order to address the abovementioned deficits to maximize function and mobility before DC from acute care. Goals   Patient Goals to get better   [facility] Expiration Date 11/30/23   Short Term Goal #1 Patient will: Perform all bed mobility tasks w/ minx1 to improve pt's independence w/ repositioning for decrease risk of skin breakdown, Perform all transfers w/ minx1 consistently from various height surfaces in order to improve I w/ engagement w/ real-world environments/situations, Ambulate at least 25 ft. with roller walker w/ minx1 w/o LOB to facilitate return and engagement w/ previous living environment, Increase all balance 1/2 grade to decrease risk for falls, and Tolerate 3 hr OOB to faciliate upright tolerance   Plan   Treatment/Interventions Functional transfer training;LE strengthening/ROM; Therapeutic exercise; Endurance training;Cognitive reorientation;Patient/family training;Equipment eval/education;Gait training;Bed mobility   PT Frequency 2-3x/wk   Discharge Recommendation   Rehab Resource Intensity Level, PT II (Moderate Resource Intensity)   AM-PAC Basic Mobility Inpatient   Turning in Flat Bed Without Bedrails 2   Lying on Back to Sitting on Edge of Flat Bed Without Bedrails 2   Moving Bed to Chair 2   Standing Up From Chair Using Arms 2   Walk in Room 2   Climb 3-5 Stairs With Railing 1   Basic Mobility Inpatient Raw Score 11   Basic Mobility Standardized Score 30.25   Highest Level Of Mobility   -HLM Goal 4: Move to chair/commode   JH-HLM Achieved 6: Walk 10 steps or more   Additional Treatment Session   Start Time 1411   End Time 1421   Treatment Assessment Pt seated OOB in recliner chair, pleasant and agreeable to participate in PT. He is able to perform STS from recliner chair w/ min A x 1. He is able to ambulate 5 ft w/ RW w/ mod A x 1 and 2nd person required for chair follow and ultimately needed due to intermittent buckling of BLEs/shakiness. Pt returned to recliner chair at end of session, immediately sounds less wheezy than laying in bed. Equipment Use RW   Additional Treatment Day 1   End of Consult   Patient Position at End of Consult Bedside chair;Bed/Chair alarm activated; All needs within reach         The patient's AM-PAC Basic Mobility Inpatient Short Form Raw Score is 11, Standardized Score is 30.25. A standardized score less than 38.32 (raw score of 16) suggests the patient may benefit from discharge to post-acute rehabilitation services which may not coincide with above PT recommendations. However please refer to therapist recommendation for discharge planning given other factors that may influence destination.     Pt would benefit from skilled inpatient PT during this admission in order to facilitate progress towards goals to maximize functional independence    Kendra Velazquez, PT, DPT

## 2023-11-20 NOTE — OCCUPATIONAL THERAPY NOTE
Occupational Therapy Evaluation & Treat     Patient Name: Abelardo Garrett  NFFZJ'H Date: 11/20/2023  Problem List  Principal Problem:    Difficulty with speech  Active Problems:    History of hypertension    RA (rheumatoid arthritis) ([address])    History of TIA (transient ischemic attack)    Chronic systolic heart failure (HCC)    Paroxysmal A-fib (HCC)    Focal seizure (HCC)    Toxic metabolic encephalopathy    HLD (hyperlipidemia)    Hypothyroidism    Stage 2 chronic kidney disease    Ureterolithiasis    Aneurysm of ascending aorta without rupture (HCC)    Urinary retention    Electrolyte abnormality    Stroke-like symptom    Past Medical History  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[address])     Spinal stenosis      Past Surgical History  Past Surgical History:   Procedure Laterality Date    CARDIAC ELECTROPHYSIOLOGY PROCEDURE N/A 12/17/2022    Procedure: Cardiac loop recorder implant;  Surgeon: Guerrero Sauceda MD;  Location:  CARDIAC CATH LAB; Service: Cardiology    CARPAL TUNNEL RELEASE Bilateral     COLONOSCOPY      LAMINECTOMY      TOTAL KNEE ARTHROPLASTY Bilateral     TOTAL SHOULDER REPLACEMENT           11/20/23 1420   OT Last Visit   OT Visit Date 11/20/23   Note Type   Note type Evaluation  (& Treat)   Pain Assessment   Pain Assessment Tool 0-10   Pain Score No Pain   Restrictions/Precautions   Weight Bearing Precautions Per Order No   Other Precautions Fall Risk;O2;Telemetry   Home Living   Type of Home Assisted living  ([facility])   Home Layout One level   [place]; Wheelchair-manual   Additional Comments Spoke to pt's son Toi Orellana and DANA Campos. They report pt is able to stand and pivot to his manual WC. He walks to the dining room w/ RW w/ A x 1 from the therapist   Prior Function   Level of [place] Needs assistance with ADLs; Needs assistance with IADLS;Needs assistance with functional mobility   Lives With Facility staff   Receives Help From Personal care attendant   IADLs Family/Friend/Other provides transportation; Family/Friend/Other provides meals; Family/Friend/Other provides medication management   Falls in the last 6 months   (none recently (that family is aware of))   Comments At baseline, pivots to wheelchair with use of RW   Subjective   Subjective Pt received in supine. Pt asleep but arounsable. ADL   Eating Assistance 4  Minimal Assistance   Grooming Assistance 4  Minimal Assistance   UB Bathing Assistance 3  Moderate Assistance   LB Bathing Assistance 2  Maximal Assistance   UB Dressing Assistance 3  Moderate Assistance   LB Dressing Assistance 2  Maximal [address] [place] [address]  2  Maximal Assistance   Bed Mobility   Supine to Sit 3  Moderate assistance   Additional items Verbal cues; Increased time required; Bedrails;HOB elevated;Assist x 1   Additional Comments Pt remained seated in recliner   Transfers   Sit to Stand 3  Moderate assistance   Additional items Verbal cues; Assist x 1   Stand to Sit 4  Minimal assistance   Additional items Assist x 2;Verbal cues   Stand pivot 4  Minimal assistance   Additional items Assist x 2;Verbal cues   Balance   Static Sitting Fair   Dynamic Sitting Fair -   Static Standing Poor +   Dynamic Standing Poor   Activity Tolerance   Activity Tolerance Patient limited by fatigue   Medical Staff Made Aware PT Abigail   Nurse Made Aware SOMMER Vargas   RUE Assessment   RUE Assessment WFL   LUE Assessment   LUE Assessment WFL   Cognition   Overall Cognitive Status Impaired     Arousal/Participation Arousable   Attention Attends with cues to redirect   Orientation Level Oriented to person;Disoriented to place; Disoriented to time;Disoriented to situation     Memory Decreased recall of precautions;Decreased recall of recent events     Following Commands Follows one step commands with increased time or repetition     Comments Pt initially lethargic while supine in bed, more alert and awake upon sitting up. Family reports he's been more lethargic over the last few weeks, correlating w/ his dehydration and lack of PO intake   Assessment   Limitation Decreased ADL status; Decreased self-care trans;Decreased high-level ADLs; Decreased cognition;Decreased endurance   Prognosis Fair   Assessment Pt is a 80 y.o. male seen for OT evaluation at [address] [place] [address], admitted 11/18/2023 w/ Difficulty with speech. Comorbidities affecting pt's functional performance at time of assessment include:  paroxysmal A-fib, systolic heart failure, CKD stage II, HTN, HLD, and prior TIA . Personal factors affecting pt at time of IE include:difficulty performing ADLS, difficulty performing IADLS , and decreased functional mobility . Prior to admission, pt was living at [facility]. Pt required assist w/  ADLS and IADLS,& required wheelchair  and RW PTA. Upon evaluation: Pt requires mod A x 1 for bed mobility, min-mod A x 2 for functional mobility/transfers, min-mod A for UB ADLs and max A for LB ADLS 2* the following deficits impacting occupational performance: weakness, decreased strength, decreased balance, decreased tolerance, impaired arousal, impaired attention, and impaired memory. Full objective findings from OT assessment regarding body systems outlined above. These impairments, as well as risk for falls  limit pt's ability to safely engage in all baseline areas of occupation and mobility. Pt to benefit from continued skilled OT tx while in the hospital to address deficits as defined above and maximize level of functional independence w ADL's and functional mobility. Occupational Performance areas to address include: bathing/shower, toilet hygiene, dressing, and functional mobility.   This evaluation required an extensive review of medical and/or therapy records and additional review of physical, cognitive and psychosocial history related to functional performance. Based upon functional performance deficits and assessments, this evaluation has been identified as a high complexity evaluation. The patient's raw score on the AM-PAC Daily Activity inpatient short form is 15, standardized score is 34.69, less than 39.4. Patients at this level are likely to benefit from DC to post-acute rehabilitation services. However please refer to therapist recommendation for discharge planning given other factors that may influence destination. At this time, OT recommendations at time of discharge are level 2 mod intensity resources. Goals   Patient Goals Pt wishses ot get better   Plan   Treatment Interventions ADL retraining;Functional transfer training;Equipment evaluation/education;Patient/family training; Compensatory technique education; Activityengagement; Endurance training;Cognitive reorientation   Goal Expiration Date 11/30/23   OT Treatment Day 0   OT Frequency 3-5x/wk   Discharge Recommendation   Rehab Resource Intensity Level, OT II (Moderate Resource Intensity)   AM-PAC Daily Activity Inpatient   Lower Body Dressing 2   Bathing 2   Toileting 2   Upper Body Dressing 3   Grooming 3   Eating 3   Daily Activity Raw Score 15   Daily Activity Standardized Score (Calc for Raw Score >=11) 34.69   AM-PAC Applied Cognition Inpatient   Following a Speech/Presentation 2   Understanding Ordinary Conversation 3   Taking Medications 1   Remembering Where Things Are Placed or Put Away 1   Remembering List of 4-5 Errands 1   Taking Care of Complicated Tasks 1   Applied Cognition Raw Score 9   Applied Cognition Standardized Score 22.48   Additional Treatment Session   Start Time 1410   End Time 1420   Treatment Assessment Pt seated in recliner. Performed sit>stand with mod Ax 2. Requried VC for appropriate hand placement during transfers. Performed functional mobility with mod Ax 2 with RW. Pt has 1 episode of knee buckling.  Pt returned to seated position in recliner with min Ax2. While seated in recliner, performed grooming task with min A. Call bell in reach. All needs met. End of Consult   Education Provided Yes   Patient Position at End of Consult Bedside chair;Standing; All needs within reach   Nurse Communication Nurse aware of consult         Pt will achieve the following goals within 10 days. *Pt will complete UB bathing and dressing with sup. *Pt will complete LB bathing and dressing with min A .    * Pt will complete toileting w/ min A w/ G hygiene/thoroughness using DME PRN    *Pt will complete bed mobility with min A, with bed flat and no side rail to prep for purposeful tasks    *Pt will perform functional transfers with on/off all surfaces with min A x 1 using DME as needed w/ G balance/safety. *Patient will verbalize 3 safety awareness/ principles to prevent falls in the home setting. *Pt will improve functional mobility during ADL/IADL/leisure tasks to min A x 1 using DME as needed w/ G balance/safety.      Thanh Paulino, OTR/L

## 2023-11-20 NOTE — NURSING NOTE
Pt was again given IV ativan and attempted to have CT when pt arrived down at CT he was uncooperative and the scan could not be done. CT staff said that he would need a heavier sedation to get the scan done.  Will pass on to dayshift in report

## 2023-11-20 NOTE — NURSING NOTE
Ct reached out at [facility] and asked if I was available to bring Bud down for CT and stay with him. I have a full assignment and I'm not able to sit down there with him . Pt was medicated this morning and taken down to MRI and they "couldn't fit him in" as per mary Coon's report. Pt spent most of the day sleeping from the ativan he received and is now wide awake and agitated. I informed CT of this and told them there is now way he is going to lay still for the test. CT asked for me to reach out to ordering provider  I sent TT to Willard Wilson and let her know this information and asked to her contact the son Susan Cosby when they know how they would like to proceed as he had called for an update and to check on his father since when they were here visiting he was sedated from the ativan.  Will forward message to dayshift RN

## 2023-11-20 NOTE — NURSING NOTE
Notified of IV extravasation to LAC with 60 ml Omnipaque contrast on 11/18 during CT scan. Per note, ED physician Dr. Monse Amor notified at time of occurrence. Assessed patient LAC this am upon arrival to MRI. Removed guaze wrap from arm to assess. Patient denies any pain, numbness or tingling to L arm. Distal pulse wnl, skin warm. Arm remains edematous, with purple bruising present. Noted several open areas to L arm that appear to be opened blisters. Mild serous drainage on dressing. Arm rewrapped. Per policy, surgical consult should be initiated due to skin blistering for evaluation. Notified Dr. Grace Santos. Currently an inpatient under nursing staff care. Tiger text sent to attending physician CHARITY Trammell to request surgical consult order.

## 2023-11-21 ENCOUNTER — APPOINTMENT (INPATIENT)
Dept: RADIOLOGY | Facility: HOSPITAL | Age: 88
DRG: 100 | End: 2023-11-21
Payer: COMMERCIAL

## 2023-11-21 ENCOUNTER — TELEPHONE (OUTPATIENT)
Age: 88
End: 2023-11-21

## 2023-11-21 PROBLEM — G40.009 LOCALIZATION-RELATED (FOCAL) (PARTIAL) IDIOPATHIC EPILEPSY AND EPILEPTIC SYNDROMES WITH SEIZURES OF LOCALIZED ONSET, NOT INTRACTABLE, WITHOUT STATUS EPILEPTICUS (HCC): Status: ACTIVE | Noted: 2023-01-26

## 2023-11-21 LAB
ALBUMIN SERPL BCP-MCNC: 3 G/DL (ref 3.5–5)
ALP SERPL-CCNC: 73 U/L (ref 34–104)
ALT SERPL W P-5'-P-CCNC: 8 U/L (ref 7–52)
ANION GAP SERPL CALCULATED.3IONS-SCNC: 5 MMOL/L
AST SERPL W P-5'-P-CCNC: 18 U/L (ref 13–39)
BASOPHILS # BLD AUTO: 0.02 THOUSANDS/ÂΜL (ref 0–0.1)
BASOPHILS NFR BLD AUTO: 0 % (ref 0–1)
BILIRUB SERPL-MCNC: 0.41 MG/DL (ref 0.2–1)
BUN SERPL-MCNC: 14 MG/DL (ref 5–25)
CALCIUM ALBUM COR SERPL-MCNC: 9.2 MG/DL (ref 8.3–10.1)
CALCIUM SERPL-MCNC: 8.4 MG/DL (ref 8.4–10.2)
CHLORIDE SERPL-SCNC: 105 MMOL/L (ref 96–108)
CO2 SERPL-SCNC: 28 MMOL/L (ref 21–32)
CREAT SERPL-MCNC: 0.92 MG/DL (ref 0.6–1.3)
EOSINOPHIL # BLD AUTO: 0.45 THOUSAND/ÂΜL (ref 0–0.61)
EOSINOPHIL NFR BLD AUTO: 6 % (ref 0–6)
ERYTHROCYTE [DISTWIDTH] IN BLOOD BY AUTOMATED COUNT: 14.1 % (ref 11.6–15.1)
GFR SERPL CREATININE-BSD FRML MDRD: 73 ML/MIN/1.73SQ M
GLUCOSE SERPL-MCNC: 126 MG/DL (ref 65–140)
HCT VFR BLD AUTO: 36.6 % (ref 36.5–49.3)
HGB BLD-MCNC: 11.2 G/DL (ref 12–17)
IMM GRANULOCYTES # BLD AUTO: 0.03 THOUSAND/UL (ref 0–0.2)
IMM GRANULOCYTES NFR BLD AUTO: 0 % (ref 0–2)
LYMPHOCYTES # BLD AUTO: 1.57 THOUSANDS/ÂΜL (ref 0.6–4.47)
LYMPHOCYTES NFR BLD AUTO: 22 % (ref 14–44)
MAGNESIUM SERPL-MCNC: 2.5 MG/DL (ref 1.9–2.7)
MCH RBC QN AUTO: 29.4 PG (ref 26.8–34.3)
MCHC RBC AUTO-ENTMCNC: 30.6 G/DL (ref 31.4–37.4)
MCV RBC AUTO: 96 FL (ref 82–98)
MONOCYTES # BLD AUTO: 0.59 THOUSAND/ÂΜL (ref 0.17–1.22)
MONOCYTES NFR BLD AUTO: 8 % (ref 4–12)
NEUTROPHILS # BLD AUTO: 4.56 THOUSANDS/ÂΜL (ref 1.85–7.62)
NEUTS SEG NFR BLD AUTO: 64 % (ref 43–75)
NRBC BLD AUTO-RTO: 0 /100 WBCS
PLATELET # BLD AUTO: 154 THOUSANDS/UL (ref 149–390)
PMV BLD AUTO: 13.2 FL (ref 8.9–12.7)
POTASSIUM SERPL-SCNC: 4.2 MMOL/L (ref 3.5–5.3)
PROT SERPL-MCNC: 6 G/DL (ref 6.4–8.4)
RBC # BLD AUTO: 3.81 MILLION/UL (ref 3.88–5.62)
SODIUM SERPL-SCNC: 138 MMOL/L (ref 135–147)
WBC # BLD AUTO: 7.22 THOUSAND/UL (ref 4.31–10.16)

## 2023-11-21 PROCEDURE — 99232 SBSQ HOSP IP/OBS MODERATE 35: CPT | Performed by: HOSPITALIST

## 2023-11-21 PROCEDURE — 94640 AIRWAY INHALATION TREATMENT: CPT

## 2023-11-21 PROCEDURE — 92526 ORAL FUNCTION THERAPY: CPT

## 2023-11-21 PROCEDURE — 71045 X-RAY EXAM CHEST 1 VIEW: CPT

## 2023-11-21 PROCEDURE — 85025 COMPLETE CBC W/AUTO DIFF WBC: CPT | Performed by: HOSPITALIST

## 2023-11-21 PROCEDURE — 87081 CULTURE SCREEN ONLY: CPT | Performed by: INTERNAL MEDICINE

## 2023-11-21 PROCEDURE — 80053 COMPREHEN METABOLIC PANEL: CPT | Performed by: HOSPITALIST

## 2023-11-21 PROCEDURE — 99222 1ST HOSP IP/OBS MODERATE 55: CPT | Performed by: NURSE PRACTITIONER

## 2023-11-21 PROCEDURE — 83735 ASSAY OF MAGNESIUM: CPT | Performed by: HOSPITALIST

## 2023-11-21 PROCEDURE — 94760 N-INVAS EAR/PLS OXIMETRY 1: CPT

## 2023-11-21 PROCEDURE — 99232 SBSQ HOSP IP/OBS MODERATE 35: CPT | Performed by: STUDENT IN AN ORGANIZED HEALTH CARE EDUCATION/TRAINING PROGRAM

## 2023-11-21 RX ORDER — TORSEMIDE 20 MG/1
20 TABLET ORAL 3 TIMES WEEKLY
Status: DISCONTINUED | OUTPATIENT
Start: 2023-11-22 | End: 2023-11-24 | Stop reason: HOSPADM

## 2023-11-21 RX ORDER — TORSEMIDE 10 MG/1
10 TABLET ORAL
Status: DISCONTINUED | OUTPATIENT
Start: 2023-11-21 | End: 2023-11-24 | Stop reason: HOSPADM

## 2023-11-21 RX ADMIN — IPRATROPIUM BROMIDE 0.5 MG: 0.5 SOLUTION RESPIRATORY (INHALATION) at 07:29

## 2023-11-21 RX ADMIN — METOPROLOL SUCCINATE 25 MG: 25 TABLET, FILM COATED, EXTENDED RELEASE ORAL at 07:53

## 2023-11-21 RX ADMIN — TORSEMIDE 10 MG: 20 TABLET ORAL at 14:17

## 2023-11-21 RX ADMIN — LIDOCAINE 1 PATCH: 700 PATCH TOPICAL at 08:04

## 2023-11-21 RX ADMIN — HYDROXYCHLOROQUINE SULFATE 200 MG: 200 TABLET ORAL at 08:06

## 2023-11-21 RX ADMIN — IPRATROPIUM BROMIDE 0.5 MG: 0.5 SOLUTION RESPIRATORY (INHALATION) at 19:30

## 2023-11-21 RX ADMIN — DOXYCYCLINE 100 MG: 100 CAPSULE ORAL at 07:52

## 2023-11-21 RX ADMIN — TAMSULOSIN HYDROCHLORIDE 0.4 MG: 0.4 CAPSULE ORAL at 20:53

## 2023-11-21 RX ADMIN — GABAPENTIN 100 MG: 100 CAPSULE ORAL at 07:54

## 2023-11-21 RX ADMIN — NYSTATIN: 100000 POWDER TOPICAL at 18:26

## 2023-11-21 RX ADMIN — APIXABAN 2.5 MG: 2.5 TABLET, FILM COATED ORAL at 17:32

## 2023-11-21 RX ADMIN — HYDROXYCHLOROQUINE SULFATE 200 MG: 200 TABLET ORAL at 15:32

## 2023-11-21 RX ADMIN — APIXABAN 2.5 MG: 2.5 TABLET, FILM COATED ORAL at 07:54

## 2023-11-21 RX ADMIN — EZETIMIBE 10 MG: 10 TABLET ORAL at 07:53

## 2023-11-21 RX ADMIN — IPRATROPIUM BROMIDE 0.5 MG: 0.5 SOLUTION RESPIRATORY (INHALATION) at 13:07

## 2023-11-21 RX ADMIN — LEVALBUTEROL HYDROCHLORIDE 1.25 MG: 1.25 SOLUTION RESPIRATORY (INHALATION) at 13:07

## 2023-11-21 RX ADMIN — DOXYCYCLINE 100 MG: 100 CAPSULE ORAL at 20:53

## 2023-11-21 RX ADMIN — LEVALBUTEROL HYDROCHLORIDE 1.25 MG: 1.25 SOLUTION RESPIRATORY (INHALATION) at 07:29

## 2023-11-21 RX ADMIN — NYSTATIN: 100000 POWDER TOPICAL at 08:12

## 2023-11-21 RX ADMIN — LEVETIRACETAM 500 MG: 500 INJECTION, SOLUTION INTRAVENOUS at 20:53

## 2023-11-21 RX ADMIN — GABAPENTIN 100 MG: 100 CAPSULE ORAL at 17:32

## 2023-11-21 RX ADMIN — METOPROLOL SUCCINATE 25 MG: 25 TABLET, FILM COATED, EXTENDED RELEASE ORAL at 20:53

## 2023-11-21 RX ADMIN — PREDNISONE 5 MG: 5 TABLET ORAL at 07:53

## 2023-11-21 RX ADMIN — ALLOPURINOL 400 MG: 100 TABLET ORAL at 07:52

## 2023-11-21 RX ADMIN — LEVETIRACETAM 500 MG: 500 INJECTION, SOLUTION INTRAVENOUS at 08:06

## 2023-11-21 RX ADMIN — Medication 9 MG: at 20:54

## 2023-11-21 RX ADMIN — LEVALBUTEROL HYDROCHLORIDE 1.25 MG: 1.25 SOLUTION RESPIRATORY (INHALATION) at 19:30

## 2023-11-21 NOTE — PLAN OF CARE
Problem: Potential for Falls  Goal: Patient will remain free of falls  Description: INTERVENTIONS:  - Educate patient/family on patient safety including physical limitations  - Instruct patient to call for assistance with activity   - Consult OT/PT to assist with strengthening/mobility   - Keep Call bell within reach  - Keep bed low and locked with side rails adjusted as appropriate  - Keep care items and personal belongings within reach  - Initiate and maintain comfort rounds  - Make Fall Risk Sign visible to staff  - Offer Toileting every 2 Hours, in advance of need  - Initiate/Maintain bed alarm  - Obtain necessary fall risk management equipment:   - Apply yellow socks and bracelet for high fall risk patients  - Consider moving patient to room near nurses station  Outcome: Progressing     Problem: SAFETY,RESTRAINT: NV/NON-SELF DESTRUCTIVE BEHAVIOR  Goal: Remains free of harm/injury (restraint for non violent/non self-detsructive behavior)  Description: INTERVENTIONS:  - Instruct patient/family regarding restraint use   - Assess and monitor physiologic and psychological status   - Provide interventions and comfort measures to meet assessed patient needs   - Identify and implement measures to help patient regain control  - Assess readiness for release of restraint   Outcome: Progressing  Goal: Returns to optimal restraint-free functioning  Description: INTERVENTIONS:  - Assess the patient's behavior and symptoms that indicate continued need for restraint  - Identify and implement measures to help patient regain control  - Assess readiness for release of restraint   Outcome: Progressing     Problem: Prexisting or High Potential for Compromised Skin Integrity  Goal: Skin integrity is maintained or improved  Description: INTERVENTIONS:  - Identify patients at risk for skin breakdown  - Assess and monitor skin integrity  - Assess and monitor nutrition and hydration status  - Monitor labs   - Assess for incontinence - Turn and reposition patient  - Assist with mobility/ambulation  - Relieve pressure over bony prominences  - Avoid friction and shearing  - Provide appropriate hygiene as needed including keeping skin clean and dry  - Evaluate need for skin moisturizer/barrier cream  - Collaborate with interdisciplinary team   - Patient/family teaching  - Consider wound care consult   Outcome: Progressing     Problem: Nutrition/Hydration-ADULT  Goal: Nutrient/Hydration intake appropriate for improving, restoring or maintaining nutritional needs  Description: Monitor and assess patient's nutrition/hydration status for malnutrition. Collaborate with interdisciplinary team and initiate plan and interventions as ordered. Monitor patient's weight and dietary intake as ordered or per policy. Utilize nutrition screening tool and intervene as necessary. Determine patient's food preferences and provide high-protein, high-caloric foods as appropriate.      INTERVENTIONS:  - Monitor oral intake, urinary output, labs, and treatment plans  - Assess nutrition and hydration status and recommend course of action  - Evaluate amount of meals eaten  - Assist patient with eating if necessary   - Allow adequate time for meals  - Recommend/ encourage appropriate diets, oral nutritional supplements, and vitamin/mineral supplements  - Order, calculate, and assess calorie counts as needed  - Recommend, monitor, and adjust tube feedings and TPN/PPN based on assessed needs  - Assess need for intravenous fluids  - Provide specific nutrition/hydration education as appropriate  - Include patient/family/caregiver in decisions related to nutrition  Outcome: Progressing

## 2023-11-21 NOTE — PROGRESS NOTES
[address] [facility]  Progress Note  Name: Isaura Santos  MRN: [id number]  Unit/Bed#: -01 I Date of Admission: 11/18/2023   Date of Service: 11/21/2023 I Hospital Day: 3    Assessment/Plan   Electrolyte abnormality  Assessment & Plan  Hypomagnesemia  Repleted  Recheck    Urinary retention  Assessment & Plan  Found to be retaining in the ED with 500 cc on Marino insertion  Likely secondary to sedating medications  Plan void trial in 48 hours  Continue home Flomax    Aneurysm of ascending aorta without rupture [facility])  Assessment & Plan  CT C/A/P on admit showing: "There is fusiform ectasia of the ascending thoracic aorta measuring up to 44 mm."  Needs repeat low-dose CT chest in 1 year    Ureterolithiasis  Assessment & Plan  Seen in the ED on 11/16 due to nausea x3 to 4 weeks  CT A/P on admit revealed 3 mm stone at the left ureterovesicular junction with no change in position or development of hydronephrosis from 11/16 CT  Patient without any flank pain  Strain all urine    Stage 2 chronic kidney disease  Assessment & Plan  Lab Results   Component Value Date    EGFR 73 11/21/2023    EGFR 59 11/20/2023    EGFR 40 11/19/2023    CREATININE 0.92 11/21/2023    CREATININE 1.10 11/20/2023    CREATININE 1.50 (H) 11/19/2023   Baseline creatinine 0.9-1.2  Creatinine on admission 1.64, examines hypovolemic therefore will hold home diuretic  Patient did receive 1 L NS in the ED, due to continuing to examine hypovolemic will place on gentle IV fluids at 50 cc/h overnight  Patient did receive IV contrast in the ED  Avoid further nephrotoxic drugs and hypotension  Eliquis decreased to 2.5 mg twice daily due to increase in creatinine -creatinine returns to <1.5 would resume 5 Mg twice daily    Hypothyroidism  Assessment & Plan  Continue home Synthroid 25 mcg daily  TSH within normal limits on admit    HLD (hyperlipidemia)  Assessment & Plan  Continue home Zetia 10 Mg daily  Prior allergy to statins    Paroxysmal A-fib (HCC)  Assessment & Plan  Rate control with metoprolol succinate 25 Mg twice daily  OAC with Eliquis 5 Mg twice daily, dose decreased to 2.5 Mg twice daily on admit due to rising creatinine  Will place lopressor PRN, patient has uncontrolled HR on tele    Chronic systolic heart failure [facility])  Assessment & Plan  Wt Readings from Last 3 Encounters:   11/18/23 99.8 kg (220 lb 0.3 oz)   11/09/23 99.8 kg (220 lb)   10/25/23 99.8 kg (220 lb)   Last echo August 2023: LVEF 45%. Mild global hypokinesis. G1 DD  Home regimen: Torsemide 20 Mg MWF and 10 Mg remaining days  Examines hypovolemic with dry mucous membranes on admit, however did receive 1 L NS in the ED already -we will place on gentle IV fluids at 50 cc/h overnight  Diuretics were held but will resume 11/22  I/O and daily weights  Not on any fluid or sodium restriction at home, placed on normal cardiac diet on admit  Monitor for signs of any respiratory distress    History of TIA (transient ischemic attack)  Assessment & Plan  History of TIA in the past, that had presented as word finding difficulties  Maintained on Eliquis due to history of paroxysmal A-fib and Zetia, continue    RA (rheumatoid arthritis) ([facility])  Assessment & Plan  Home regimen: Hydroxychloroquine 200 Mg twice daily and prednisone 5 Mg daily  Continue home medicines    History of hypertension  Assessment & Plan  Home regimen: Metoprolol succinate 25 Mg twice daily  Continue    * Difficulty with speech  Assessment & Plan  Presents with development of word finding difficulty and nonfluent speech noted at breakfast by son at 0830, last normal was 0800 on 11/18  Speech abnormality persisted on arrival to the ED, but then improved significantly throughout the day, returning to his baseline  Stroke alert called in the ED  Imaging:  CTH: "No acute intracranial abnormality.  Severe chronic microangiopathy, similar to prior exams."  MRA head: "No stenosis or occlusion of the major vessels of the [place]. "  CTA head/neck was nondiagnostic due to patient motion and IV infiltration  MRI brain was nondiagnostic again due to patient movement despite multiple sedating medications  Discussed with neurology, Dr. Jose Smith, on admission-recommendations:  Patient with similar presentation dating back to at least 2022, with this being the third admission other episodes have occurred outside the hospital  First episode attributed to TIA, second episode attributed to seizure VS migraine VS hypoperfusion  Neurology feels symptoms may be attributed to focal seizure with retained awareness  Recommends initiating Keppra 500 Mg every 12 hours    EEG - no clear evidence of seizure however was already on keppra, and received ativan  Placed on stroke pathway due to nondiagnostic MRI brain, patient unable to tolerate laying flat. Repeat MRI did not demonstrate CVA though was motion degraded. Statin not ordered due to allergy, but remains on Zetia  ASA not ordered due to anticoagulation with Eliquis  Monitor on telemetry  Vital signs and neurochecks per stroke pathway protocol  SBP goal < 160  Neurology following  NIHSS on admit 1 (was unable to name month but no aphasia, dysarthria, or focal numbness/weakness)   Pt cognition improving though may not be at baseline due to meds pt received (ativan, pain meds)     VTE  Prophylaxis:   Pharmacologic: in place  Mechanical VTE Prophylaxis in Place: Yes    Patient Centered Rounds: I have performed bedside rounds with nursing staff today. Discussions with Specialists or Other Care Team Provider: case management    Education and Discussions with Family / Patient: pt      Current Length of Stay: 3 day(s)    Current Patient Status: Inpatient        Code Status: Level 1 - Full Code    Discharge Plan: Pt will require continued inpatient hospitalization.     Subjective:     Pt appears improved from admission but family reports not quite yet at the baseline mental status thought secondary to pain meds, morphine    Patient is seen and examined at bedside. All other ROS are negative. Objective:     Vitals:   Temp (24hrs), Av °F (36.1 °C), Min:96.2 °F (35.7 °C), Max:97.5 °F (36.4 °C)    Temp:  [96.2 °F (35.7 °C)-97.5 °F (36.4 °C)] 96.2 °F (35.7 °C)  HR:  [79-95] 90  Resp:  [16-18] 16  BP: (111-131)/(64-78) 131/78  SpO2:  [96 %-100 %] 100 %  Body mass index is 32.49 kg/m². Input and Output Summary (last 24 hours): Intake/Output Summary (Last 24 hours) at 2023 1404  Last data filed at 2023 1301  Gross per 24 hour   Intake 720 ml   Output 600 ml   Net 120 ml       Physical Exam:       GEN: No acute distress, comfortable  HEEENT: No JVD, PERRLA, no scleral icterus  RESP: Lungs clear to auscultation bilaterally  CV: RRR, +s1/s2   ABD: SOFT NON TENDER, POSITIVE BOWEL SOUNDS, NO DISTENTION  PSYCH: CALM, apparent dementia.   NEURO: Mentation baseline, NO FOCAL DEFICITS  SKIN: NO RASH  EXTREM: NO EDEMA    Additional Data:     Labs:    Results from last 7 days   Lab Units 23  0443   WBC Thousand/uL 7.22   HEMOGLOBIN g/dL 11.2*   HEMATOCRIT % 36.6   PLATELETS Thousands/uL 154   NEUTROS PCT % 64   LYMPHS PCT % 22   MONOS PCT % 8   EOS PCT % 6     Results from last 7 days   Lab Units 23  0443   SODIUM mmol/L 138   POTASSIUM mmol/L 4.2   CHLORIDE mmol/L 105   CO2 mmol/L 28   BUN mg/dL 14   CREATININE mg/dL 0.92   ANION GAP mmol/L 5   CALCIUM mg/dL 8.4   ALBUMIN g/dL 3.0*   TOTAL BILIRUBIN mg/dL 0.41   ALK PHOS U/L 73   ALT U/L 8   AST U/L 18   GLUCOSE RANDOM mg/dL 126     Results from last 7 days   Lab Units 23  1103   INR  1.53*     Results from last 7 days   Lab Units 23  1056   POC GLUCOSE mg/dl 110     Results from last 7 days   Lab Units 23  0414   HEMOGLOBIN A1C % 6.7*     Results from last 7 days   Lab Units 23  1738 23  1208 23  1107   LACTIC ACID mmol/L 2.0 2.3*  --    PROCALCITONIN ng/ml  --   --  0.11 Lines/Drains:  Invasive Devices       Peripheral Intravenous Line  Duration             Peripheral IV 11/19/23 Left Antecubital 2 days              Drain  Duration             Urethral Catheter 18 Fr. 3 days                    Telemetry:   Telemetry Orders (From admission, onward)               24 Hour Telemetry Monitoring  Continuous x 24 Hours (Telem)        Question:  Reason for 24 Hour Telemetry  Answer:  TIA/Suspected CVA/ Confirmed CVA                        * I Have Reviewed All Lab Data Listed Above. Imaging:     Results for orders placed during the hospital encounter of 08/29/23    XR chest portable    Narrative  CHEST    INDICATION:  Worsening shortness of breath, wheezing. COMPARISON: 8/31/2023, CT chest, abdomen and pelvis 6/9/2023    EXAM PERFORMED/VIEWS:  XR CHEST PORTABLE      FINDINGS: Loop recorder left chest wall. Cardiomediastinal silhouette appears stable. No focal infiltrates. Slight basilar vascular crowding. Slight asymmetry of the right lung markings may be rotational.    No pneumothorax or pleural effusion. Right shoulder arthroplasty and degenerative changes left glenohumeral joint. Impression  Limited portable study demonstrating no definite acute cardiopulmonary disease. Workstation performed: [id number]    No results found for this or any previous visit. *I have reviewed all imaging reports listed above      Recent Cultures (last 7 days):     Results from last 7 days   Lab Units 11/18/23  1208   BLOOD CULTURE  No Growth at 48 hrs. No Growth at 48 hrs.        Last 24 Hours Medication List:   Current Facility-Administered Medications   Medication Dose Route Frequency Provider Last Rate    acetaminophen  650 mg Oral Q4H PRN Estephania De Jesus PA-C      albuterol  2.5 mg Nebulization Q6H PRN Caleb Mittal MD      allopurinol  400 mg Oral Daily Estephania De Jesus PA-C      aluminum-magnesium hydroxide-simethicone  30 mL Oral Q6H PRN Constanza Yee PA-C      apixaban  2.5 mg Oral BID Constanza Yee PA-C      doxycycline hyclate  100 mg Oral Q12H Constanza Yee PA-C      ezetimibe  10 mg Oral Daily Constanza Yee PA-C      gabapentin  100 mg Oral BID Constanza Yee PA-C      hydroxychloroquine  200 mg Oral BID With Meals Constanza Yee PA-C      ipratropium  0.5 mg Nebulization TID Jeanmarie Hansen MD      levalbuterol  1.25 mg Nebulization TID Jeanmarie Hansen MD      levETIRAcetam  500 mg Intravenous Q12H [address] Constanza Yee PA-C 500 mg (11/21/23 0806)    levothyroxine  25 mcg Oral Early Morning Constanza Yee PA-C      lidocaine  1 patch Topical Daily Neftaly Jacobo MD      melatonin  9 mg Oral HS Constanza Yee PA-C      metoprolol  2.5 mg Intravenous Q6H PRN Neftaly Jacobo MD      metoprolol succinate  25 mg Oral BID Constanza Yee PA-C      nystatin   Topical BID Neftaly Jacobo MD      pantoprazole  20 mg Oral Early Morning Constanza Yee PA-C      predniSONE  5 mg Oral Daily Constanza Yee PA-C      senna  1 tablet Oral HS PRN Constanza Yee PA-C      tamsulosin  0.4 mg Oral HS Constanza Yee PA-C      torsemide  10 mg Oral Once per day on Sun Tue Thu Sat Jeanmarie Hansen MD      [START ON 11/22/2023] torsemide  20 mg Oral Once per day on Mon Wed Fri Jeanmarie Hansen MD          Today, Patient Was Seen By: Jeanmarie Hansen MD    ** Please Note: Dictation voice to text software may have been used in the creation of this document.  **

## 2023-11-21 NOTE — CASE MANAGEMENT
Case Management Discharge Planning Note    Patient name Geraldine Moreno  Location [address] [facility] [place] [id number] MRN [id number]  : 1934 Date 2023       Current Admission Date: 2023  Current Admission Diagnosis:Difficulty with speech   Patient Active Problem List    Diagnosis Date Noted    Stroke-like symptom 2023    Electrolyte abnormality 2023    Difficulty with speech 2023    Stage 2 chronic kidney disease 2023    Ureterolithiasis 2023    Aneurysm of ascending aorta without rupture ([address]) 2023    Urinary retention 2023    Bacteremia due to group B Streptococcus 2023    Respiratory insufficiency 2023    Cellulitis of left upper extremity 2023    HLD (hyperlipidemia) 2023    GERD (gastroesophageal reflux disease) 2023    Gout without acute exacerbation  2023    Spinal stenosis 2023    Hypothyroidism 2023    Peripheral vascular disease ([address]) 2023    Open wound of right great toe with damage to nail 2023    Adverse effect of loop (high-ceiling) diuretics, subsequent encounter     Toxic metabolic encephalopathy     Septic arthritis of shoulder, right ([address]) 2023    Suture of skin wound 2023    Hypomagnesemia 2023    Elevated TSH 2023    Chronic systolic heart failure ([address]) 2023    Paroxysmal A-fib ([address]) 2023    Focal seizure with retained awareness 2023    KAMLESH (acute kidney injury) ([address]) 2023    Dilated cardiomyopathy ([address]) 10/18/2022    HTN (hypertension) 10/18/2022    Low left ventricular ejection fraction 10/11/2022    History of TIA (transient ischemic attack) 10/10/2022    Speech disturbance 10/08/2022    Accidental overdose 2020    History of hypertension 2020    Hypotension 2020    RA (rheumatoid arthritis) ([address]) 2020      LOS (days): 3  Geometric Mean LOS (GMLOS) (days):   Days to GMLOS:     OBJECTIVE:  Risk of Unplanned Readmission Score: 33.81         Current admission status: Inpatient   Preferred Pharmacy:   [address] [place], [address] [address] [place] [address]  Phone: [phone] Fax: [phone]  [address], [address] [facility] [address]  [facility] [address]  Phone: [phone] Fax: [phone]    Primary Care Provider: Kaity Robins MD    Primary Insurance: [facility]  Secondary Insurance:     DISCHARGE DETAILS:     CM spoke with son, Momo Hui, regarding therapy's recommendation for STR. Son is in agreement and would prefer pt to go to [place] as he is a resident of the [facility] [place]. Referral in [address]

## 2023-11-21 NOTE — CONSULTS
Consultation - [address] [place] [address] Edgardomefabián 80 y.o. male MRN: [id number]  Unit/Bed#: -01 Encounter: [id number]    Assessment/Plan     Assessment:  Open wound of left upper arm, initial encounter  IV infiltration initial encounter    Plan:  Left upper arm: Patient has multiple partial-thickness wounds of his left upper arm secondary to 60 mg Omnipaque contrast which occurred on 11/18 during CT scan. Per patient's chart, ED physician Dr. Ean Nava was notified at time of occurrence. Wounds not showing any signs or symptoms of infection. Entire area measures 11.5 x 7 x 0.1 cm. Wound beds pink and granular producing a small amount of drainage. Please cleanse wounds with normal saline, pat dry, apply Dermagran gauze to wounds covered with ABD and Branden Sly changed every other day    We will place preventative wound care orders    History of Present Illness   HPI:  Mikhail Alonso is a 80 y.o. male with a past medical history of TIAs, chronic back pain, RA, GERD, recent diagnosis of kidney stone a couple days ago. Patient was admitted on 11/18 due to altered mental status. Wound care was consulted for evaluation of wounds of patient's left upper arm secondary to IV infiltration of Omnipaque contrast which occurred on 11/18 during CT scan. Consults    Review of Systems   Constitutional: Negative. HENT:  Negative for ear pain and hearing loss. Eyes:  Negative for pain. Respiratory:  Negative for chest tightness and shortness of breath. Cardiovascular:  Negative for chest pain, palpitations and leg swelling. Gastrointestinal:  Negative for diarrhea, nausea and vomiting. Genitourinary:  Negative for dysuria. Musculoskeletal:  Negative for gait problem. Skin:  Positive for wound. Neurological:  Negative for tremors and weakness. Psychiatric/Behavioral:  Negative for behavioral problems, confusion and suicidal ideas.         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[address])     Spinal stenosis      Past Surgical History:   Procedure Laterality Date    CARDIAC ELECTROPHYSIOLOGY PROCEDURE N/A 12/17/2022    Procedure: Cardiac loop recorder implant;  Surgeon: Polo Lozano MD;  Location: [facility];   Service: Cardiology    CARPAL TUNNEL RELEASE Bilateral     COLONOSCOPY      LAMINECTOMY      TOTAL KNEE ARTHROPLASTY Bilateral     TOTAL SHOULDER REPLACEMENT       Social History   Social History     Substance and Sexual Activity   Alcohol Use Yes    Alcohol/week: 2.0 standard drinks of alcohol    Types: 2 Shots of liquor per week    Comment: 2-3 ounces of whiskey a day     Social History     Substance and Sexual Activity   Drug Use Yes    Types: Marijuana     E-Cigarette/Vaping    E-Cigarette Use Never User      E-Cigarette/Vaping Substances    Nicotine No     Flavoring No      Social History     Tobacco Use   Smoking Status Never   Smokeless Tobacco Never     Family History:   Family History   Problem Relation Age of Onset    Colon polyps Neg Hx     Colon cancer Neg Hx        Meds/Allergies   current meds:   Current Facility-Administered Medications   Medication Dose Route Frequency    acetaminophen (TYLENOL) tablet 650 mg  650 mg Oral Q4H PRN    albuterol inhalation solution 2.5 mg  2.5 mg Nebulization Q6H PRN    allopurinol (ZYLOPRIM) tablet 400 mg  400 mg Oral Daily    aluminum-magnesium hydroxide-simethicone (MAALOX) oral suspension 30 mL  30 mL Oral Q6H PRN    apixaban (ELIQUIS) tablet 2.5 mg  2.5 mg Oral BID    doxycycline hyclate (VIBRAMYCIN) capsule 100 mg  100 mg Oral Q12H    ezetimibe (ZETIA) tablet 10 mg  10 mg Oral Daily    gabapentin (NEURONTIN) capsule 100 mg  100 mg Oral BID    hydroxychloroquine (PLAQUENIL) tablet 200 mg  200 mg Oral BID With Meals    ipratropium (ATROVENT) 0.02 % inhalation solution 0.5 mg  0.5 mg Nebulization TID    levalbuterol (XOPENEX) inhalation solution 1.25 mg  1.25 mg Nebulization TID    levETIRAcetam (KEPPRA) 500 mg in sodium chloride 0.9 % 100 mL IVPB  500 mg Intravenous Q12H [address]    levothyroxine tablet 25 mcg  25 mcg Oral Early Morning    lidocaine (LIDODERM) 5 % patch 1 patch  1 patch Topical Daily    melatonin tablet 9 mg  9 mg Oral HS    metoprolol (LOPRESSOR) injection 2.5 mg  2.5 mg Intravenous Q6H PRN    metoprolol succinate (TOPROL-XL) 24 hr tablet 25 mg  25 mg Oral BID    nystatin (MYCOSTATIN) powder   Topical BID    pantoprazole (PROTONIX) EC tablet 20 mg  20 mg Oral Early Morning    predniSONE tablet 5 mg  5 mg Oral Daily    senna (SENOKOT) tablet 8.6 mg  1 tablet Oral HS PRN    tamsulosin (FLOMAX) capsule 0.4 mg  0.4 mg Oral HS    torsemide (DEMADEX) tablet 10 mg  10 mg Oral Once per day on Sun Tue Thu Sat    [START ON 11/22/2023] torsemide (DEMADEX) tablet 20 mg  20 mg Oral Once per day on Mon Wed Fri     Allergies   Allergen Reactions    Colchicine Other (See Comments)     Flushing      Niacin Other (See Comments)     flushed    Statins        Objective   Vitals: Blood pressure 131/78, pulse 90, temperature (!) 96.2 °F (35.7 °C), resp. rate 16, height 5' 9" (1.753 m), weight 99.8 kg (220 lb 0.3 oz), SpO2 100 %. Physical Exam  Vitals and nursing note reviewed. Constitutional:       General: He is not in acute distress. Appearance: Normal appearance. He is obese. HENT:      Head: Normocephalic and atraumatic. Eyes:      General:         Right eye: No discharge. Left eye: No discharge. Pulmonary:      Effort: Pulmonary effort is normal. No respiratory distress. Musculoskeletal:         General: Normal range of motion. Left upper arm: No edema. Cervical back: Normal range of motion and neck supple. No rigidity. Skin:     General: Skin is warm and dry. Findings: Bruising and wound present. No erythema.           Neurological:      General: No focal deficit present. Mental Status: He is alert and oriented to person, place, and time. Mental status is at baseline. Psychiatric:         Mood and Affect: Mood normal.         Behavior: Behavior normal.         Thought Content: Thought content normal.         Judgment: Judgment normal.           Lab Results: I have personally reviewed pertinent reports. Imaging: I have personally reviewed pertinent reports. EKG, Pathology, and Other Studies: I have personally reviewed pertinent reports. Code Status: Level 1 - Full Code  Advance Directive and Living Will: Yes    Power of : Yes       Counseling / Coordination of Care  Total floor / unit time spent today 15 minutes. Greater than 50% of total time was spent with the patient and / or family counseling and / or coordination of care. A description of the counseling / coordination of care: Wound care plan of care.

## 2023-11-21 NOTE — ASSESSMENT & PLAN NOTE
Presents with development of word finding difficulty and nonfluent speech noted at breakfast by son at 0830, last normal was 0800 on 11/18  Speech abnormality persisted on arrival to the ED, but then improved significantly throughout the day, returning to his baseline  Stroke alert called in the ED  Imaging:  CTH: "No acute intracranial abnormality. Severe chronic microangiopathy, similar to prior exams."  MRA head: "No stenosis or occlusion of the major vessels of the [place]. "  CTA head/neck was nondiagnostic due to patient motion and IV infiltration  MRI brain was nondiagnostic again due to patient movement despite multiple sedating medications  Discussed with neurology, Dr. Ro Saenz, on admission-recommendations:  Patient with similar presentation dating back to at least 2022, with this being the third admission other episodes have occurred outside the hospital  First episode attributed to TIA, second episode attributed to seizure VS migraine VS hypoperfusion  Neurology feels symptoms may be attributed to focal seizure with retained awareness  Recommends initiating Keppra 500 Mg every 12 hours    EEG - no clear evidence of seizure however was already on keppra, and received ativan  Placed on stroke pathway due to nondiagnostic MRI brain, patient unable to tolerate laying flat. Repeat MRI did not demonstrate CVA though was motion degraded.   Statin not ordered due to allergy, but remains on Zetia  ASA not ordered due to anticoagulation with Eliquis  Monitor on telemetry  Vital signs and neurochecks per stroke pathway protocol  SBP goal < 160  Neurology following  NIHSS on admit 1 (was unable to name month but no aphasia, dysarthria, or focal numbness/weakness)   Pt cognition improving though may not be at baseline due to meds pt received (ativan, pain meds)

## 2023-11-21 NOTE — PROGRESS NOTES
Progress Note - Neurology   Gayle Toscano 80 y.o. male [id number]  Unit/Bed#: /-01    Assessment/Plan:  Focal seizure with retained awareness  [address] "Aleksey" Isabella Perera is an 79yo right handed man with paroxysmal Afib on Eliquis, history of TIA vs seizure, CHF, HTN, HLD, CKD2, RA,hypothyroidism,neuropathy, gout, and spinal stenosis who presented on 11/18/23 for evaluation of abrupt onset word finding difficulty and non-fluent speech. This was patient's 5th or 6th event over the past year plus. Prior episodes were abrupt onset and offset and all with similar semiologies. Regarding previous events, patient typically remembers the event stating that he was unable to speak; however on this occurrence patient is amnestic to the events that occurred during this spell. NIHSS 1. Patient was not a TNK candidate. Speech disturbance improved after arrival to the ED, though patient experienced fluctuating confusion/lethargy following this which was multifactorial in the setting of multiple sedating medications (fentanyl, dilaudid, haldol, lorazepam), poor sleep/wake cycle, hospital acquired delirium and additional acute co-medical conditions. Work-up:  CTH: No acute intracranial abnormality. Severe chronic microangiopathy, similar to prior exams. CTA H/N:  Nondiagnostic examination due to multiple IV infiltrates. Unable to definitively conclude no large vessel occlusion in the head or neck. There appears to be contrast opacification of the intracranial vasculature particularly in the left MCA distribution but the right MCA distribution is moderately degraded by motion artifact and branch vessel occlusions can be missed in this situation. Consider repeat CTA head and neck with contrast when patient's condition improves or consider sedated MRI brain without contrast, MRA head, and MRA neck without contrast examination.   Arterial system appears patent in the neck and head given degree of significant motion artifact. Confidence is low to detect large vessel occlusion in the head or neck given degree of motion artifact. MRI brain: Nondiagnostic exam due to patient motion artifact. MRA head and neck: No stenosis or occlusion of the major vessels of the [place]. A1C 6.7  FLP: Total 132, LDL 51  RSV/Influenza/Covid negative  UA negative  11/20 UDS positive for benzos ands opiates  Limited MRI brain wo:   Limited sequences obtained (per quick stroke protocol). Suboptimal assessment due to distorted image quality by motion artifact. No acute ischemia. Cerebral atrophy and microangiopathic changes. Routine EEG: Normal    Continued high suspicion for focal left hemispheric seizures with retained awareness based on recurrent events with similar semiology of aphasia with abrupt onset and offset. Patient should remain on Keppra 500mg q12hrs. No further inpatient neurologic recommendations. Patient will followup outpatient with our Epilepsy team. If patient has recurrent brief events he should notify the office for a prolonged EEG study vs possible increase in Keppra dosing. If patient has a prolonged recurrent event >5 minutes, he should return to the ED. Plan:  No need for vEEG at this time; however if mental status were to decline and there is a clinical suspicion of seizure, low threshold for transfer to [facility] for vEEG  Continue Keppra 500mg q12hrs  Seizure precautions  Goal of normotension  PT/OT  Medical management (including management of wheezing and urologic issues), nutritional support and correction of metabolic/infectious derangements as per primary team.  Conservative management of delirium - minimize noise, maintain day/night cycle, provide frequent redirection, avoid restraints if possible, etc.    Toxic metabolic encephalopathy  Assessment & Plan  Improved.    Etiology likely multifactorial in the setting of suspected recent seizure, disturbed sleep/wake cycle, multiple sedating medications, respiratory compromise, urologic issues and hospital acquired delirium. Conservative management of delirium - minimize noise, maintain day/night cycle, provide frequent redirection, avoid restraints if possible, etc.  Avoid benzos and narcotics if possible  Recommendations as detailed above. Paroxysmal A-fib [facility])  Assessment & Plan  Continue home Eliquis    History of hypertension  Assessment & Plan  Goal of normotension      Angelika Solar will need follow up in in 6 weeks with epilepsy attending. He will not require outpatient neurological testing. Patient last saw Dr. Lorena Blackman outpatient and was last seen on 10/10/23. Would recommend that he be changed from neurovascular team to epilepsy team. I will let Dr. Lorena Blackman know of our recommendation. Subjective:   Patient reports he is doing well today. He has no complaints and denies return of spells. No family at bedside today. 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[address])     Spinal stenosis      Past Surgical History:   Procedure Laterality Date    CARDIAC ELECTROPHYSIOLOGY PROCEDURE N/A 12/17/2022    Procedure: Cardiac loop recorder implant;  Surgeon: Salazar Rocha MD;  Location: [facility]; Service: Cardiology    CARPAL TUNNEL RELEASE Bilateral     COLONOSCOPY      LAMINECTOMY      TOTAL KNEE ARTHROPLASTY Bilateral     TOTAL SHOULDER REPLACEMENT       Family History   Problem Relation Age of Onset    Colon polyps Neg Hx     Colon cancer Neg Hx      Social History     Socioeconomic History    Marital status:       Spouse name: None    Number of children: None    Years of education: None    Highest education level: None   Occupational History    None   Tobacco Use    Smoking status: Never    Smokeless tobacco: Never   Vaping Use    Vaping Use: Never used   Substance and Sexual Activity    Alcohol use: Yes     Alcohol/week: 2.0 standard drinks of alcohol     Types: 2 Shots of liquor per week     Comment: 2-3 ounces of whiskey a day    Drug use: Yes     Types: Marijuana    Sexual activity: Not Currently   Other Topics Concern    None   Social History Narrative    None     Social Determinants of Health     Financial Resource Strain: Not on file   Food Insecurity: No Food Insecurity (11/20/2023)    Hunger Vital Sign     Worried About Running Out of Food in the Last Year: Never true     Ran Out of Food in the Last Year: Never true   Transportation Needs: No Transportation Needs (11/20/2023)    PRAPARE - Transportation     Lack of Transportation (Medical): No     Lack of Transportation (Non-Medical): No   Physical Activity: Not on file   Stress: Not on file   Social Connections: Not on file   Intimate Partner Violence: Not on file   Housing Stability: Low Risk  (11/20/2023)    Housing Stability Vital Sign     Unable to Pay for Housing in the Last Year: No     Number of Places Lived in the Last Year: 1     Unstable Housing in the Last Year: No       Medications: Reviewed in detail by me. ROS: Review of Systems A 12 point ROS was completed and all remaining systems were negative. Vitals: /77 (BP Location: Right arm)   Pulse 79   Temp 97.5 °F (36.4 °C)   Resp 16   Ht 5' 9" (1.753 m)   Wt 99.8 kg (220 lb 0.3 oz)   SpO2 97%   BMI 32.49 kg/m²     Physical Exam:   Physical Exam  Constitutional:       General: He is not in acute distress. Appearance: Normal appearance. He is well-developed. He is not ill-appearing, toxic-appearing or diaphoretic. HENT:      Head: Normocephalic and atraumatic. Eyes:      General: No scleral icterus. Right eye: No discharge. Left eye: No discharge.       Extraocular Movements: Extraocular movements intact and EOM normal.      Conjunctiva/sclera: Conjunctivae normal.   Cardiovascular:      Rate and Rhythm: Normal rate and regular rhythm. Pulmonary:      Effort: Pulmonary effort is normal. No respiratory distress. Breath sounds: No stridor. Wheezing (minimal expiratory wheezes) present. Musculoskeletal:         General: No tenderness or deformity. Normal range of motion. Cervical back: Normal range of motion and neck supple. Skin:     General: Skin is warm and dry. Findings: No erythema or rash. Neurological:      Mental Status: He is alert. Psychiatric:         Speech: Speech normal.       Neurologic Exam     Mental Status   Attention: normal.   Speech: speech is normal   Level of consciousness: alert  Patient was awake on arrival. He is oriented to person, [facility] in [place] and the month of November. He also knows that we are a few days away from [facility] "which is coming up on 11/24." He incorrectly states the year as 1976 and 2011. Able to follow cross body commands. Able to name objects without difficulty. Able to repeat examiner. Cranial Nerves     CN II   Visual acuity: normal  Right visual field deficit: none  Left visual field deficit: none     CN III, IV, VI   Extraocular motions are normal.   Nystagmus: none   Conjugate gaze: present    CN VII   Facial expression full, symmetric. CN VIII   Hearing: impaired    Motor Exam   Muscle bulk: normal  Overall muscle tone: normal    Strength   Strength 5/5 except as noted. RLE: proximally 3  LLE primxally 4+     Sensory Exam   Light touch normal.       Labs: Reviewed in detail by me. Imaging: I have personally reviewed pertinent imaging and PACS reports. VTE Prophylaxis: Eliquis    Total time spent today 20 minutes. Greater than 50% of total time was spent with the patient and / or family counseling and / or coordination of care.  A description of the counseling / coordination of care: reviewing results of MRI and EEG with patient and discussing suspected etiology of seizures, medication recommendations and plan of care.

## 2023-11-21 NOTE — ASSESSMENT & PLAN NOTE
Lab Results   Component Value Date    EGFR 73 11/21/2023    EGFR 59 11/20/2023    EGFR 40 11/19/2023    CREATININE 0.92 11/21/2023    CREATININE 1.10 11/20/2023    CREATININE 1.50 (H) 11/19/2023   Baseline creatinine 0.9-1.2  Creatinine on admission 1.64, examines hypovolemic therefore will hold home diuretic  Patient did receive 1 L NS in the ED, due to continuing to examine hypovolemic will place on gentle IV fluids at 50 cc/h overnight  Patient did receive IV contrast in the ED  Avoid further nephrotoxic drugs and hypotension  Eliquis decreased to 2.5 mg twice daily due to increase in creatinine -creatinine returns to <1.5 would resume 5 Mg twice daily

## 2023-11-21 NOTE — PLAN OF CARE
Problem: Potential for Falls  Goal: Patient will remain free of falls  Description: INTERVENTIONS:  - Educate patient/family on patient safety including physical limitations  - Instruct patient to call for assistance with activity   - Consult OT/PT to assist with strengthening/mobility   - Keep Call bell within reach  - Keep bed low and locked with side rails adjusted as appropriate  - Keep care items and personal belongings within reach  - Initiate and maintain comfort rounds  - Make Fall Risk Sign visible to staff  - Offer Toileting every 2 Hours, in advance of need  - Initiate/Maintain alarm  - Obtain necessary fall risk management equipment:   - Apply yellow socks and bracelet for high fall risk patients  - Consider moving patient to room near nurses station  Outcome: Progressing     Problem: SAFETY,RESTRAINT: NV/NON-SELF DESTRUCTIVE BEHAVIOR  Goal: Remains free of harm/injury (restraint for non violent/non self-detsructive behavior)  Description: INTERVENTIONS:  - Instruct patient/family regarding restraint use   - Assess and monitor physiologic and psychological status   - Provide interventions and comfort measures to meet assessed patient needs   - Identify and implement measures to help patient regain control  - Assess readiness for release of restraint   Outcome: Progressing  Goal: Returns to optimal restraint-free functioning  Description: INTERVENTIONS:  - Assess the patient's behavior and symptoms that indicate continued need for restraint  - Identify and implement measures to help patient regain control  - Assess readiness for release of restraint   Outcome: Progressing     Problem: Prexisting or High Potential for Compromised Skin Integrity  Goal: Skin integrity is maintained or improved  Description: INTERVENTIONS:  - Identify patients at risk for skin breakdown  - Assess and monitor skin integrity  - Assess and monitor nutrition and hydration status  - Monitor labs   - Assess for incontinence   - Turn and reposition patient  - Assist with mobility/ambulation  - Relieve pressure over bony prominences  - Avoid friction and shearing  - Provide appropriate hygiene as needed including keeping skin clean and dry  - Evaluate need for skin moisturizer/barrier cream  - Collaborate with interdisciplinary team   - Patient/family teaching  - Consider wound care consult   Outcome: Progressing     Problem: Nutrition/Hydration-ADULT  Goal: Nutrient/Hydration intake appropriate for improving, restoring or maintaining nutritional needs  Description: Monitor and assess patient's nutrition/hydration status for malnutrition. Collaborate with interdisciplinary team and initiate plan and interventions as ordered. Monitor patient's weight and dietary intake as ordered or per policy. Utilize nutrition screening tool and intervene as necessary. Determine patient's food preferences and provide high-protein, high-caloric foods as appropriate.      INTERVENTIONS:  - Monitor oral intake, urinary output, labs, and treatment plans  - Assess nutrition and hydration status and recommend course of action  - Evaluate amount of meals eaten  - Assist patient with eating if necessary   - Allow adequate time for meals  - Recommend/ encourage appropriate diets, oral nutritional supplements, and vitamin/mineral supplements  - Order, calculate, and assess calorie counts as needed  - Recommend, monitor, and adjust tube feedings and TPN/PPN based on assessed needs  - Assess need for intravenous fluids  - Provide specific nutrition/hydration education as appropriate  - Include patient/family/caregiver in decisions related to nutrition  Outcome: Progressing

## 2023-11-21 NOTE — SPEECH THERAPY NOTE
Speech Language/Pathology    Speech/Language Pathology Progress Note    Patient Name: Zay Brooks  [facility]'Q Date: 11/21/2023     Problem List  Principal Problem:    Difficulty with speech  Active Problems:    History of hypertension    RA (rheumatoid arthritis) ([address])    History of TIA (transient ischemic attack)    Chronic systolic heart failure (HCC)    Paroxysmal A-fib (HCC)    Focal seizure (HCC)    Toxic metabolic encephalopathy    HLD (hyperlipidemia)    Hypothyroidism    Stage 2 chronic kidney disease    Ureterolithiasis    Aneurysm of ascending aorta without rupture (HCC)    Urinary retention    Electrolyte abnormality    Stroke-like symptom       Past Medical History  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[address])     Spinal stenosis         Past Surgical History  Past Surgical History:   Procedure Laterality Date    CARDIAC ELECTROPHYSIOLOGY PROCEDURE N/A 12/17/2022    Procedure: Cardiac loop recorder implant;  Surgeon: Kayla Stewart MD;  Location:  CARDIAC CATH LAB; Service: Cardiology    CARPAL TUNNEL RELEASE Bilateral     COLONOSCOPY      LAMINECTOMY      TOTAL KNEE ARTHROPLASTY Bilateral     TOTAL SHOULDER REPLACEMENT           Subjective:  Pt fully awake and alert, appropriately responding to all questions. Family present. "If it's not gin and [place], it's not worth it"     Current Diet:  Regular/thin     Objective:  Pt seen for dx dysphagia tx f/u. Pt assessed w/ regular texture snack (hard, dry cookies) and thin liquids. Pt takes appropriate bites of material w/ adequate bite strength. Timely and effective mastication and oral organization with A-P transfers. No oral residue. Straw sips of thin liquids drawn without difficulty. No overt s/s aspiration across trials.  Pt/family deny ongoing concerns for dysphagia/aspiration and report speech is almost at baseline (suspect 2/2 medication effects). Education reviewed on strategies to optimize swallow safety and s/s aspiration to notify medical team of should they arise. Pt/family verbalize understanding and deny questions/concerns at this time. Assessment:   Pt w/ functional oropharyngeal swallow skill, no overt s/s oropharyngeal dysphagia and/or aspiration at this time.      Plan/Recommendations:  Continue regular/thin   Frequent/thorough oral care  No further IP ST needs at this time, please re-consult if medically necessary

## 2023-11-21 NOTE — ASSESSMENT & PLAN NOTE
Wt Readings from Last 3 Encounters:   11/18/23 99.8 kg (220 lb 0.3 oz)   11/09/23 99.8 kg (220 lb)   10/25/23 99.8 kg (220 lb)   Last echo August 2023: LVEF 45%. Mild global hypokinesis. G1 DD  Home regimen:  Torsemide 20 Mg MWF and 10 Mg remaining days  Examines hypovolemic with dry mucous membranes on admit, however did receive 1 L NS in the ED already -we will place on gentle IV fluids at 50 cc/h overnight  Diuretics were held but will resume 11/22  I/O and daily weights  Not on any fluid or sodium restriction at home, placed on normal cardiac diet on admit  Monitor for signs of any respiratory distress

## 2023-11-21 NOTE — TELEPHONE ENCOUNTER
Spoke to son and advised it was unlikely we could get tests ordered for patient while in hospital unless medically indicated. I did offer to touch base with Dr. Velvet Pond to see if he thought somehow we could help.

## 2023-11-21 NOTE — TELEPHONE ENCOUNTER
Patients GI provider:  Dr. Stafford    Number to return call: [phone]    Reason for call: Pt son SANDRA called in to say his dad is in the hospital and  wanted him to have some stool sample tests done and he was wondering if he can send the orders to the hospital as it would be much easier to do while he is admitted.  Please reach out to pt son if it is possible    Scheduled procedure/appointment date if applicable: procedure 75/4/52

## 2023-11-22 LAB
ALBUMIN SERPL BCP-MCNC: 3.1 G/DL (ref 3.5–5)
ALP SERPL-CCNC: 81 U/L (ref 34–104)
ALT SERPL W P-5'-P-CCNC: 9 U/L (ref 7–52)
ANION GAP SERPL CALCULATED.3IONS-SCNC: 8 MMOL/L
AST SERPL W P-5'-P-CCNC: 18 U/L (ref 13–39)
BASOPHILS # BLD AUTO: 0.03 THOUSANDS/ÂΜL (ref 0–0.1)
BASOPHILS NFR BLD AUTO: 0 % (ref 0–1)
BILIRUB SERPL-MCNC: 0.41 MG/DL (ref 0.2–1)
BUN SERPL-MCNC: 18 MG/DL (ref 5–25)
CALCIUM ALBUM COR SERPL-MCNC: 9.7 MG/DL (ref 8.3–10.1)
CALCIUM SERPL-MCNC: 9 MG/DL (ref 8.4–10.2)
CHLORIDE SERPL-SCNC: 104 MMOL/L (ref 96–108)
CO2 SERPL-SCNC: 28 MMOL/L (ref 21–32)
CREAT SERPL-MCNC: 1 MG/DL (ref 0.6–1.3)
EOSINOPHIL # BLD AUTO: 0.65 THOUSAND/ÂΜL (ref 0–0.61)
EOSINOPHIL NFR BLD AUTO: 8 % (ref 0–6)
ERYTHROCYTE [DISTWIDTH] IN BLOOD BY AUTOMATED COUNT: 14.1 % (ref 11.6–15.1)
GFR SERPL CREATININE-BSD FRML MDRD: 66 ML/MIN/1.73SQ M
GLUCOSE SERPL-MCNC: 125 MG/DL (ref 65–140)
HCT VFR BLD AUTO: 39.5 % (ref 36.5–49.3)
HGB BLD-MCNC: 12 G/DL (ref 12–17)
IMM GRANULOCYTES # BLD AUTO: 0.02 THOUSAND/UL (ref 0–0.2)
IMM GRANULOCYTES NFR BLD AUTO: 0 % (ref 0–2)
LYMPHOCYTES # BLD AUTO: 2.32 THOUSANDS/ÂΜL (ref 0.6–4.47)
LYMPHOCYTES NFR BLD AUTO: 29 % (ref 14–44)
MCH RBC QN AUTO: 29.2 PG (ref 26.8–34.3)
MCHC RBC AUTO-ENTMCNC: 30.4 G/DL (ref 31.4–37.4)
MCV RBC AUTO: 96 FL (ref 82–98)
MONOCYTES # BLD AUTO: 0.63 THOUSAND/ÂΜL (ref 0.17–1.22)
MONOCYTES NFR BLD AUTO: 8 % (ref 4–12)
MRSA NOSE QL CULT: NORMAL
NEUTROPHILS # BLD AUTO: 4.42 THOUSANDS/ÂΜL (ref 1.85–7.62)
NEUTS SEG NFR BLD AUTO: 55 % (ref 43–75)
NRBC BLD AUTO-RTO: 0 /100 WBCS
PLATELET # BLD AUTO: 174 THOUSANDS/UL (ref 149–390)
PMV BLD AUTO: 13.5 FL (ref 8.9–12.7)
POTASSIUM SERPL-SCNC: 4.5 MMOL/L (ref 3.5–5.3)
PROT SERPL-MCNC: 6.3 G/DL (ref 6.4–8.4)
RBC # BLD AUTO: 4.11 MILLION/UL (ref 3.88–5.62)
SODIUM SERPL-SCNC: 140 MMOL/L (ref 135–147)
WBC # BLD AUTO: 8.07 THOUSAND/UL (ref 4.31–10.16)

## 2023-11-22 PROCEDURE — 85025 COMPLETE CBC W/AUTO DIFF WBC: CPT | Performed by: HOSPITALIST

## 2023-11-22 PROCEDURE — 99232 SBSQ HOSP IP/OBS MODERATE 35: CPT | Performed by: HOSPITALIST

## 2023-11-22 PROCEDURE — 80053 COMPREHEN METABOLIC PANEL: CPT | Performed by: HOSPITALIST

## 2023-11-22 PROCEDURE — 94640 AIRWAY INHALATION TREATMENT: CPT

## 2023-11-22 PROCEDURE — 94760 N-INVAS EAR/PLS OXIMETRY 1: CPT

## 2023-11-22 PROCEDURE — 97116 GAIT TRAINING THERAPY: CPT

## 2023-11-22 PROCEDURE — 97530 THERAPEUTIC ACTIVITIES: CPT

## 2023-11-22 RX ADMIN — METOPROLOL SUCCINATE 25 MG: 25 TABLET, FILM COATED, EXTENDED RELEASE ORAL at 07:38

## 2023-11-22 RX ADMIN — ALBUTEROL SULFATE 2.5 MG: 2.5 SOLUTION RESPIRATORY (INHALATION) at 16:41

## 2023-11-22 RX ADMIN — HYDROXYCHLOROQUINE SULFATE 200 MG: 200 TABLET ORAL at 08:56

## 2023-11-22 RX ADMIN — Medication 9 MG: at 21:00

## 2023-11-22 RX ADMIN — APIXABAN 2.5 MG: 2.5 TABLET, FILM COATED ORAL at 07:38

## 2023-11-22 RX ADMIN — LEVETIRACETAM 500 MG: 500 INJECTION, SOLUTION INTRAVENOUS at 07:36

## 2023-11-22 RX ADMIN — GABAPENTIN 100 MG: 100 CAPSULE ORAL at 07:38

## 2023-11-22 RX ADMIN — EZETIMIBE 10 MG: 10 TABLET ORAL at 07:38

## 2023-11-22 RX ADMIN — PREDNISONE 5 MG: 5 TABLET ORAL at 07:39

## 2023-11-22 RX ADMIN — ALLOPURINOL 400 MG: 100 TABLET ORAL at 07:37

## 2023-11-22 RX ADMIN — HYDROXYCHLOROQUINE SULFATE 200 MG: 200 TABLET ORAL at 17:19

## 2023-11-22 RX ADMIN — APIXABAN 2.5 MG: 2.5 TABLET, FILM COATED ORAL at 17:19

## 2023-11-22 RX ADMIN — LEVALBUTEROL HYDROCHLORIDE 1.25 MG: 1.25 SOLUTION RESPIRATORY (INHALATION) at 19:36

## 2023-11-22 RX ADMIN — LEVOTHYROXINE SODIUM 25 MCG: 25 TABLET ORAL at 05:01

## 2023-11-22 RX ADMIN — LEVETIRACETAM 500 MG: 500 INJECTION, SOLUTION INTRAVENOUS at 20:57

## 2023-11-22 RX ADMIN — IPRATROPIUM BROMIDE 0.5 MG: 0.5 SOLUTION RESPIRATORY (INHALATION) at 19:36

## 2023-11-22 RX ADMIN — NYSTATIN: 100000 POWDER TOPICAL at 21:04

## 2023-11-22 RX ADMIN — LEVALBUTEROL HYDROCHLORIDE 1.25 MG: 1.25 SOLUTION RESPIRATORY (INHALATION) at 08:06

## 2023-11-22 RX ADMIN — TORSEMIDE 10 MG: 20 TABLET ORAL at 07:38

## 2023-11-22 RX ADMIN — PANTOPRAZOLE SODIUM 20 MG: 20 TABLET, DELAYED RELEASE ORAL at 05:01

## 2023-11-22 RX ADMIN — GABAPENTIN 100 MG: 100 CAPSULE ORAL at 17:19

## 2023-11-22 RX ADMIN — TAMSULOSIN HYDROCHLORIDE 0.4 MG: 0.4 CAPSULE ORAL at 21:01

## 2023-11-22 RX ADMIN — DOXYCYCLINE 100 MG: 100 CAPSULE ORAL at 20:58

## 2023-11-22 RX ADMIN — DOXYCYCLINE 100 MG: 100 CAPSULE ORAL at 07:37

## 2023-11-22 RX ADMIN — IPRATROPIUM BROMIDE 0.5 MG: 0.5 SOLUTION RESPIRATORY (INHALATION) at 08:06

## 2023-11-22 RX ADMIN — LEVALBUTEROL HYDROCHLORIDE 1.25 MG: 1.25 SOLUTION RESPIRATORY (INHALATION) at 13:16

## 2023-11-22 RX ADMIN — IPRATROPIUM BROMIDE 0.5 MG: 0.5 SOLUTION RESPIRATORY (INHALATION) at 13:16

## 2023-11-22 NOTE — PHYSICAL THERAPY NOTE
PHYSICAL THERAPY TREATMENT NOTE      Patient Name: Brinda Kimble  NFVCN'R Date: 11/22/2023 11/22/23 1441   PT Last Visit   PT Visit Date 11/22/23   Note Type   Note Type Treatment for insurance authorization   Pain Assessment   Pain Assessment Tool 0-10   Pain Score No Pain   Restrictions/Precautions   Weight Bearing Precautions Per Order No   Other Precautions Cognitive; Chair Alarm; Bed Alarm; Fall Risk;Hard of hearing;Multiple lines   General   Chart Reviewed Yes   Family/Caregiver Present Yes  (Son, Kerwin and DIL)   Cognition   Overall Cognitive Status Impaired   Arousal/Participation Arousable   Attention Attends with cues to redirect   Orientation Level Oriented to person;Disoriented to place; Disoriented to situation;Disoriented to time   Memory Decreased recall of precautions;Decreased recall of recent events   Following Commands Follows one step commands with increased time or repetition   Comments Pt more lethargic today, a bit more confused. More alert when getting up from chair   Bed Mobility   Supine to Sit Unable to assess   Additional Comments Pt received sitting OOB in recliner chair   Transfers   Sit to Stand 3  Moderate assistance   Additional items Assist x 2; Increased time required;Armrests; Verbal cues   Stand to Sit 3  Moderate assistance   Additional items Assist x 2; Increased time required;Verbal cues   Additional Comments Pt performed STS x 2 from recliner chair, initially requiring mod A x 2, and then progressing to max A x 1   Ambulation/Elevation   Gait pattern Decreased foot clearance; Foward flexed; Excessively slow; Step to   Gait Assistance 3  Moderate assist   Additional items Assist x 1;Verbal cues; Tactile cues  (2nd person for chair follow)   Assistive Device Rolling walker   Distance 8 ft   Balance   Static Sitting Fair   Static Standing Poor   Ambulatory Poor   Activity Tolerance   Activity Tolerance Patient limited by fatigue   Medical Staff Made Aware OT Leticia Lawrence   Nurse Made Aware Spoke to [facility]   Assessment   Problem List Decreased strength;Decreased endurance; Impaired balance;Decreased mobility; Decreased cognition;Decreased skin integrity;Obesity; Impaired vision; Impaired hearing   Assessment [place] was seen today for PT intervention for insurance authorization. He is a bit more lethargic today than previous sessions, but is able to continue to work on STS transfers and ambulation. With multiple STS trials, he is able to decrease from A x 2 to max A x 1. He is able to ambulate about 8 ft, very slowly and very forward flexed. Benefits from cues to attempt to stand up straighter and take big steps. Pt reports fatigue and asks to sit down after 8 ft. Pt seated OOB in recliner chair at end of session, discharge recommendation continues to be for Level II   Goals   Patient Goals to nap   STG Expiration Date 11/30/23   Short Term Goal #1 Patient will: Perform all bed mobility tasks w/ minx1 to improve pt's independence w/ repositioning for decrease risk of skin breakdown, Perform all transfers w/ minx1 consistently from various height surfaces in order to improve I w/ engagement w/ real-world environments/situations, Ambulate at least 25 ft. with roller walker w/ minx1 w/o LOB to facilitate return and engagement w/ previous living environment, Increase all balance 1/2 grade to decrease risk for falls, and Tolerate 3 hr OOB to faciliate upright tolerance   PT Treatment Day 1   Plan   Treatment/Interventions Functional transfer training;LE strengthening/ROM; Therapeutic exercise; Endurance training;Cognitive reorientation;Patient/family training;Gait training;Bed mobility; Equipment eval/education   PT Frequency 2-3x/wk   Discharge Recommendation   Rehab Resource Intensity Level, PT II (Moderate Resource Intensity)   AM-PAC Basic Mobility Inpatient   Turning in Flat Bed Without Bedrails 2   Lying on Back to Sitting on Edge of Flat Bed Without Bedrails 2   Moving Bed to Chair 2   Standing Up From Chair Using Arms 2   Walk in Room 2   Climb 3-5 Stairs With Railing 1   Basic Mobility Inpatient Raw Score 11   Basic Mobility Standardized Score 30.25   Highest Level Of Mobility   -[facility] Goal 4: Move to chair/commode   JH-HLM Achieved 6: Walk 10 steps or more   Education   Education Provided Mobility training;Assistive device   Patient Reinforcement needed   End of Consult   Patient Position at End of Consult Bedside chair; All needs within reach;Bed/Chair alarm activated       Patient requires PT/OT co-treat due to signficant assistance with mobility, cognitive-behavioral impairments, and to challenge activity tolerance. Both PT and OT goals were addressed separately during this session. The patient's AM-PAC Basic Mobility Inpatient Short Form Raw Score is 11. A Raw score of less than or equal to 16 suggests the patient may benefit from discharge to post-acute rehabilitation services. Please also refer to the recommendation of the Physical Therapist for safe discharge planning. Pt would continue to benefit from skilled PT during this admission in order to progress patient towards goals to decrease risk of falls and maximize independence.      Jatinder Bella, PT, DPT

## 2023-11-22 NOTE — OCCUPATIONAL THERAPY NOTE
Occupational Therapy Tx Note     Patient Name: Zay LIZAMACROMEO Date: 11/22/2023  Problem List  Principal Problem:    Difficulty with speech  Active Problems:    History of hypertension    RA (rheumatoid arthritis) ([address])    History of TIA (transient ischemic attack)    Chronic systolic heart failure (HCC)    Paroxysmal A-fib (HCC)    Focal seizure with retained awareness    Toxic metabolic encephalopathy    HLD (hyperlipidemia)    Hypothyroidism    Stage 2 chronic kidney disease    Ureterolithiasis    Aneurysm of ascending aorta without rupture ([address])    Urinary retention    Electrolyte abnormality    Stroke-like symptom            11/22/23 1440   OT Last Visit   OT Visit Date 11/22/23   Note Type   Note Type Treatment for insurance authorization   Pain Assessment   Pain Assessment Tool 0-10   Pain Score No Pain   Restrictions/Precautions   Weight Bearing Precautions Per Order No   Other Precautions Cognitive; Chair Alarm; Bed Alarm;Telemetry; Fall Risk   ADL   Where Assessed Chair   Grooming Assistance 5  Supervision/Setup   Grooming Deficit Wash/dry face   Bed Mobility   Additional Comments Pt received sitting in recliner   Transfers   Sit to Stand 3  Moderate assistance   Additional items Assist x 2;Verbal cues; Increased time required;Armrests  (Progressed to max Ax 1)   Stand to Sit 3  Moderate assistance   Additional items Assist x 1;Verbal cues   Functional Mobility   Functional Mobility 3  Moderate assistance   Additional Comments x 1, RW + chair follow. Benefited from verbal/tactile cues to maintain upright posture. Subjective   Subjective Pt received sitting in recliner. Family at bedside. Cognition   Overall Cognitive Status Impaired   Arousal/Participation Arousable   Attention Attends with cues to redirect   Orientation Level Oriented to person;Disoriented to place; Disoriented to time;Disoriented to situation   Memory Decreased recall of precautions;Decreased recall of recent events   Following Commands Follows one step commands with increased time or repetition   Activity Tolerance   Activity Tolerance Patient tolerated treatment well   Medical Staff Made Aware PT KAVON Hayes   Assessment   Assessment Pt seen for OT tx session with focus on functional balance, functional mobility, ADL status, and transfer safety. Patient agreeable to OT treatment session. Pt received seated OOB to Recliner. Performed transfers with mod-max A x 1-2. Performed functional mobility with mod A x 1-2 with RW. Pt required verbal/tactile cues to maintain upright posture. Able to perform simple grooming task with set-up. Patient continues to be functioning below baseline level, occupational performance remains limited secondary to factors listed above, and pt at increased risk for falls and injury. The patient's raw score on the AM-PAC Daily Activity inpatient short form is 15, standardized score is 34.69, less than 39.4. Patients at this level are likely to benefit from DC to post-acute rehabilitation services. Please refer to the recommendation of the Occupational Therapist for safe DC planning. From OT standpoint, recommendation at time of d/c would be level 2 resources. Patient to benefit from continued Occupational Therapy treatment while in the hospital to address deficits as defined above and maximize level of functional independence with ADLs and functional mobility. Pt left with call bell in reach, tray table in reach, needs met, chair alarm activated, SCDs on. Plan   Treatment Interventions ADL retraining;Functional transfer training;Patient/family training;Cognitive reorientation; Compensatory technique education; Activityengagement;UE strengthening/ROM; Endurance training   Goal Expiration Date 11/30/23   OT Treatment Day 1   OT Frequency 3-5x/wk   Discharge Recommendation   Rehab Resource Intensity Level, OT II (Moderate Resource Intensity)   AM-PAC Daily Activity Inpatient   Lower Body Dressing 2   Bathing 2 Toileting 2   Upper Body Dressing 3   Grooming 3   Eating 3   Daily Activity Raw Score 15   Daily Activity Standardized Score (Calc for Raw Score >=11) 34.69   AM-PAC Applied Cognition Inpatient   Following a Speech/Presentation 2   Understanding Ordinary Conversation 3   Taking Medications 1   Remembering Where Things Are Placed or Put Away 1   Remembering List of 4-5 Errands 1   Taking Care of Complicated Tasks 1   Applied Cognition Raw Score 9   Applied Cognition Standardized Score 22.48   End of Consult   Education Provided Yes;Family or social support of family present for education by provider   Patient Position at End of Consult Bedside chair   Nurse Communication Nurse aware of consult                 Carlitos Canales OTR/L

## 2023-11-22 NOTE — CASE MANAGEMENT
Case Management Discharge Planning Note    Patient name Wing Sandoval  Location [address] [facility] 321/-01 MRN [id number]  : 1934 Date 2023       Current Admission Date: 2023  Current Admission Diagnosis:Difficulty with speech   Patient Active Problem List    Diagnosis Date Noted    Stroke-like symptom 2023    Electrolyte abnormality 2023    Difficulty with speech 2023    Stage 2 chronic kidney disease 2023    Ureterolithiasis 2023    Aneurysm of ascending aorta without rupture ([address]) 2023    Urinary retention 2023    Bacteremia due to group B Streptococcus 2023    Respiratory insufficiency 2023    Cellulitis of left upper extremity 2023    HLD (hyperlipidemia) 2023    GERD (gastroesophageal reflux disease) 2023    Gout without acute exacerbation  2023    Spinal stenosis 2023    Hypothyroidism 2023    Peripheral vascular disease ([address]) 2023    Open wound of right great toe with damage to nail 2023    Adverse effect of loop (high-ceiling) diuretics, subsequent encounter     Toxic metabolic encephalopathy     Septic arthritis of shoulder, right ([address]) 2023    Suture of skin wound 2023    Hypomagnesemia 2023    Elevated TSH 2023    Chronic systolic heart failure ([address]) 2023    Paroxysmal A-fib ([address]) 2023    Focal seizure with retained awareness 2023    KAMLESH (acute kidney injury) ([address]) 2023    Dilated cardiomyopathy ([address]) 10/18/2022    HTN (hypertension) 10/18/2022    Low left ventricular ejection fraction 10/11/2022    History of TIA (transient ischemic attack) 10/10/2022    Speech disturbance 10/08/2022    Accidental overdose 2020    History of hypertension 2020    Hypotension 2020    RA (rheumatoid arthritis) ([address]) 2020      LOS (days): 4  Geometric Mean LOS (GMLOS) (days): 4.50  Days to GMLOS:0.6 OBJECTIVE:  Risk of Unplanned Readmission Score: 30.02         Current admission status: Inpatient   Preferred Pharmacy:   [address], [address] [address] [place] [address]  Phone: [phone] Fax: [phone]  [address], 210 [facility] [address]  [address] [place] [address]  Phone: [phone] Fax: [phone]    Primary Care Provider: Max Taylor MD    Primary Insurance: Daily Erickson [address] [place] [address]  Secondary Insurance:     DISCHARGE DETAILS:     CM requested auth be started for pt to go to [address] [place] [address] via CM discharge support.                                                                                     IMM Given (Date):: 11/22/23 (9315p)  IMM Given to[de-identified] Family  Family notified[de-identified] SonBruno

## 2023-11-22 NOTE — PLAN OF CARE
Problem: PHYSICAL THERAPY ADULT  Goal: Performs mobility at highest level of function for planned discharge setting. See evaluation for individualized goals. Description: Treatment/Interventions: Functional transfer training, LE strengthening/ROM, Therapeutic exercise, Endurance training, Cognitive reorientation, Patient/family training, Equipment eval/education, Gait training, Bed mobility          See flowsheet documentation for full assessment, interventions and recommendations. Note:    Problem List: Decreased strength, Decreased endurance, Impaired balance, Decreased mobility, Decreased cognition, Decreased skin integrity, Obesity, Impaired vision, Impaired hearing  Assessment: Aleksey was seen today for PT intervention for insurance authorization. He is a bit more lethargic today than previous sessions, but is able to continue to work on STS transfers and ambulation. With multiple STS trials, he is able to decrease from A x 2 to max A x 1. He is able to ambulate about 8 ft, very slowly and very forward flexed. Benefits from cues to attempt to stand up straighter and take big steps. Pt reports fatigue and asks to sit down after 8 ft. Pt seated OOB in recliner chair at end of session, discharge recommendation continues to be for Level II        Rehab Resource Intensity Level, PT: II (Moderate Resource Intensity)    See flowsheet documentation for full assessment.

## 2023-11-22 NOTE — PLAN OF CARE
Problem: Potential for Falls  Goal: Patient will remain free of falls  Description: INTERVENTIONS:  - Educate patient/family on patient safety including physical limitations  - Instruct patient to call for assistance with activity   - Consult OT/PT to assist with strengthening/mobility   - Keep Call bell within reach  - Keep bed low and locked with side rails adjusted as appropriate  - Keep care items and personal belongings within reach  - Initiate and maintain comfort rounds  - Make Fall Risk Sign visible to staff  - Offer Toileting every 2 Hours, in advance of need  - Initiate/Maintain call bell alarm  - Obtain necessary fall risk management equipment: bed alarm and call bell usage  - Apply yellow socks and bracelet for high fall risk patients  - Consider moving patient to room near nurses station  Outcome: Progressing     Problem: SAFETY,RESTRAINT: NV/NON-SELF DESTRUCTIVE BEHAVIOR  Goal: Remains free of harm/injury (restraint for non violent/non self-detsructive behavior)  Description: INTERVENTIONS:  - Instruct patient/family regarding restraint use   - Assess and monitor physiologic and psychological status   - Provide interventions and comfort measures to meet assessed patient needs   - Identify and implement measures to help patient regain control  - Assess readiness for release of restraint   Outcome: Progressing  Goal: Returns to optimal restraint-free functioning  Description: INTERVENTIONS:  - Assess the patient's behavior and symptoms that indicate continued need for restraint  - Identify and implement measures to help patient regain control  - Assess readiness for release of restraint   Outcome: Progressing     Problem: Prexisting or High Potential for Compromised Skin Integrity  Goal: Skin integrity is maintained or improved  Description: INTERVENTIONS:  - Identify patients at risk for skin breakdown  - Assess and monitor skin integrity  - Assess and monitor nutrition and hydration status  - Monitor labs   - Assess for incontinence   - Turn and reposition patient  - Assist with mobility/ambulation  - Relieve pressure over bony prominences  - Avoid friction and shearing  - Provide appropriate hygiene as needed including keeping skin clean and dry  - Evaluate need for skin moisturizer/barrier cream  - Collaborate with interdisciplinary team   - Patient/family teaching  - Consider wound care consult   Outcome: Progressing

## 2023-11-22 NOTE — ASSESSMENT & PLAN NOTE
Wt Readings from Last 3 Encounters:   11/22/23 102 kg (225 lb 15.5 oz)   11/09/23 99.8 kg (220 lb)   10/25/23 99.8 kg (220 lb)   Last echo August 2023: LVEF 45%. Mild global hypokinesis. G1 DD  Home regimen:  Torsemide 20 Mg MWF and 10 Mg remaining days  Examines hypovolemic with dry mucous membranes on admit, however did receive 1 L NS in the ED already -we will place on gentle IV fluids at 50 cc/h overnight  Diuretics were held but will resume 11/22  I/O and daily weights  Not on any fluid or sodium restriction at home, placed on normal cardiac diet on admit  Monitor for signs of any respiratory distress

## 2023-11-22 NOTE — ASSESSMENT & PLAN NOTE
Lab Results   Component Value Date    EGFR 66 11/22/2023    EGFR 73 11/21/2023    EGFR 59 11/20/2023    CREATININE 1.00 11/22/2023    CREATININE 0.92 11/21/2023    CREATININE 1.10 11/20/2023   Baseline creatinine 0.9-1.2  Creatinine on admission 1.64, examines hypovolemic therefore will hold home diuretic  Patient did receive 1 L NS in the ED, due to continuing to examine hypovolemic will place on gentle IV fluids at 50 cc/h overnight  Patient did receive IV contrast in the ED  Avoid further nephrotoxic drugs and hypotension  Eliquis decreased to 2.5 mg twice daily due to increase in creatinine -creatinine returns to <1.5 would resume 5 Mg twice daily

## 2023-11-22 NOTE — CASE MANAGEMENT
Case Management Discharge Planning Note    Patient name Bennett Ellis  Location [address] [facility] 321/-01 MRN [id number]  : 1934 Date 2023       Current Admission Date: 2023  Current Admission Diagnosis:Difficulty with speech   Patient Active Problem List    Diagnosis Date Noted    Stroke-like symptom 2023    Electrolyte abnormality 2023    Difficulty with speech 2023    Stage 2 chronic kidney disease 2023    Ureterolithiasis 2023    Aneurysm of ascending aorta without rupture ([address]) 2023    Urinary retention 2023    Bacteremia due to group B Streptococcus 2023    Respiratory insufficiency 2023    Cellulitis of left upper extremity 2023    HLD (hyperlipidemia) 2023    GERD (gastroesophageal reflux disease) 2023    Gout without acute exacerbation  2023    Spinal stenosis 2023    Hypothyroidism 2023    Peripheral vascular disease ([address]) 2023    Open wound of right great toe with damage to nail 2023    Adverse effect of loop (high-ceiling) diuretics, subsequent encounter     Toxic metabolic encephalopathy     Septic arthritis of shoulder, right ([address]) 2023    Suture of skin wound 2023    Hypomagnesemia 2023    Elevated TSH 2023    Chronic systolic heart failure ([address]) 2023    Paroxysmal A-fib ([address]) 2023    Focal seizure with retained awareness 2023    KAMLESH (acute kidney injury) ([address]) 2023    Dilated cardiomyopathy ([address]) 10/18/2022    HTN (hypertension) 10/18/2022    Low left ventricular ejection fraction 10/11/2022    History of TIA (transient ischemic attack) 10/10/2022    Speech disturbance 10/08/2022    Accidental overdose 2020    History of hypertension 2020    Hypotension 2020    RA (rheumatoid arthritis) ([address]) 2020      LOS (days): 4  Geometric Mean LOS (GMLOS) (days): 4.50  Days to GMLOS:0.6 OBJECTIVE:  Risk of Unplanned Readmission Score: 30.02         Current admission status: Inpatient   Preferred Pharmacy:   [address], [address] [place] [address]  Phone: [phone] Fax: [phone]  [address], 210 [facility] [address]  [address] [place] [address]  Phone: [phone] Fax: [phone]    Primary Care Provider: Jazmyn Lopes MD    [facility]  Secondary Insurance:     DISCHARGE DETAILS:              CM met with son, Lily Inman, and dtr in law to discuss discharge plan and reviewed IMM. IMM reviewed with  sonBeti , Beti klein agrees with discharge determination.                                                                            IMM Given (Date):: 11/22/23 (1255p)  IMM Given to[de-identified] Family  Family notified[de-identified] Beti Klein

## 2023-11-22 NOTE — PROGRESS NOTES
Patient:  Angelika Rivera    MRN:  [id number]    Aidin Request ID:  [id number]    Level of care reserved:  [address]    Partner Reserved:  [facility], [facility] [address] [phone]    Clinical needs requested:    Geography searched:  10 miles around [address]    Start of Service:    Request sent:  2:03pm EST on 11/21/2023 by Batsheva Levin    Partner reserved:  8:43am EST on 11/22/2023 by Batsheva Levin    Choice list shared:  8:43am EST on 11/22/2023 by Batsheva Levin

## 2023-11-22 NOTE — CASE MANAGEMENT
[address] received request for authorization from Care Manager.   Authorization request for: SNF  Facility Name: [facility]  NPI: [id number]  Facility MD:  Dr. Catrachita Hall   NPI: [id number]  Authorization initiated by contacting insurance: Nicola Cuevas Via: [employer]   Pending Reference #: [id number]  Clinicals submitted via: [employer] attachment

## 2023-11-22 NOTE — PROGRESS NOTES
Patient's daughter called staff in, stating that patient was complaining of some double vision when looking at the ice cream cup. Stated that he had just woken up. This nurse made provider Jesse Davis aware, and performed a neur check, see charted. By the time this nurse had assessed patient, double vision was gone. Provider ordered a CT of the head. Daughter requested to not attempt CT because patient had a lot of trouble with previous MRI and CT.  took order out and will continue to monitor patient as ordered.

## 2023-11-22 NOTE — ASSESSMENT & PLAN NOTE
Presents with development of word finding difficulty and nonfluent speech noted at breakfast by son at 0830, last normal was 0800 on 11/18  Speech abnormality persisted on arrival to the ED, but then improved significantly throughout the day, returning to his baseline  Stroke alert called in the ED  Imaging:  CTH: "No acute intracranial abnormality. Severe chronic microangiopathy, similar to prior exams."  MRA head: "No stenosis or occlusion of the major vessels of the [place]. "  CTA head/neck was nondiagnostic due to patient motion and IV infiltration  MRI brain was nondiagnostic again due to patient movement despite multiple sedating medications  Discussed with neurology, Dr. Zaire Laureano, on admission-recommendations:  Patient with similar presentation dating back to at least 2022, with this being the third admission other episodes have occurred outside the hospital  First episode attributed to TIA, second episode attributed to seizure VS migraine VS hypoperfusion  Neurology feels symptoms may be attributed to focal seizure with retained awareness  Recommends initiating Keppra 500 Mg every 12 hours    EEG - no clear evidence of seizure however was already on keppra, and received ativan  Placed on stroke pathway due to nondiagnostic MRI brain, patient unable to tolerate laying flat. Repeat MRI did not demonstrate CVA though was motion degraded. Statin not ordered due to allergy, but remains on Zetia  ASA not ordered due to anticoagulation with Eliquis  Monitor on telemetry  Vital signs and neurochecks per stroke pathway protocol  SBP goal < 160  Neurology following  NIHSS on admit 1 (was unable to name month but no aphasia, dysarthria, or focal numbness/weakness)   Pt cognition improving though may not be at baseline due to meds pt received (ativan, pain meds), superimposed mild delirium.  D/w pt family better to get pt out of the hospital environment for rehabilitation

## 2023-11-22 NOTE — PROGRESS NOTES
[address] [facility]  Progress Note  Name: Harley Reyes  MRN: [id number]  Unit/Bed#: -01 I Date of Admission: 11/18/2023   Date of Service: 11/22/2023 I Hospital Day: 4    Assessment/Plan   Electrolyte abnormality  Assessment & Plan  Hypomagnesemia  Repleted  Recheck    Urinary retention  Assessment & Plan  Found to be retaining in the ED with 500 cc on Marino insertion  Likely secondary to sedating medications  Plan void trial in 48 hours  Continue home Flomax    Aneurysm of ascending aorta without rupture [facility])  Assessment & Plan  CT C/A/P on admit showing: "There is fusiform ectasia of the ascending thoracic aorta measuring up to 44 mm."  Needs repeat low-dose CT chest in 1 year    Ureterolithiasis  Assessment & Plan  Seen in the ED on 11/16 due to nausea x3 to 4 weeks  CT A/P on admit revealed 3 mm stone at the left ureterovesicular junction with no change in position or development of hydronephrosis from 11/16 CT  Patient without any flank pain  Strain all urine    Stage 2 chronic kidney disease  Assessment & Plan  Lab Results   Component Value Date    EGFR 66 11/22/2023    EGFR 73 11/21/2023    EGFR 59 11/20/2023    CREATININE 1.00 11/22/2023    CREATININE 0.92 11/21/2023    CREATININE 1.10 11/20/2023   Baseline creatinine 0.9-1.2  Creatinine on admission 1.64, examines hypovolemic therefore will hold home diuretic  Patient did receive 1 L NS in the ED, due to continuing to examine hypovolemic will place on gentle IV fluids at 50 cc/h overnight  Patient did receive IV contrast in the ED  Avoid further nephrotoxic drugs and hypotension  Eliquis decreased to 2.5 mg twice daily due to increase in creatinine -creatinine returns to <1.5 would resume 5 Mg twice daily    Hypothyroidism  Assessment & Plan  Continue home Synthroid 25 mcg daily  TSH within normal limits on admit    HLD (hyperlipidemia)  Assessment & Plan  Continue home Zetia 10 Mg daily  Prior allergy to statins    Paroxysmal A-fib (HCC)  Assessment & Plan  Rate control with metoprolol succinate 25 Mg twice daily  OAC with Eliquis 5 Mg twice daily, dose decreased to 2.5 Mg twice daily on admit due to rising creatinine  Will place lopressor PRN, patient has uncontrolled HR on tele    Chronic systolic heart failure [facility])  Assessment & Plan  Wt Readings from Last 3 Encounters:   11/22/23 102 kg (225 lb 15.5 oz)   11/09/23 99.8 kg (220 lb)   10/25/23 99.8 kg (220 lb)   Last echo August 2023: LVEF 45%. Mild global hypokinesis. G1 DD  Home regimen: Torsemide 20 Mg MWF and 10 Mg remaining days  Examines hypovolemic with dry mucous membranes on admit, however did receive 1 L NS in the ED already -we will place on gentle IV fluids at 50 cc/h overnight  Diuretics were held but will resume 11/22  I/O and daily weights  Not on any fluid or sodium restriction at home, placed on normal cardiac diet on admit  Monitor for signs of any respiratory distress    History of TIA (transient ischemic attack)  Assessment & Plan  History of TIA in the past, that had presented as word finding difficulties  Maintained on Eliquis due to history of paroxysmal A-fib and Zetia, continue    RA (rheumatoid arthritis) ([facility])  Assessment & Plan  Home regimen: Hydroxychloroquine 200 Mg twice daily and prednisone 5 Mg daily  Continue home medicines    History of hypertension  Assessment & Plan  Home regimen: Metoprolol succinate 25 Mg twice daily  Continue    * Difficulty with speech  Assessment & Plan  Presents with development of word finding difficulty and nonfluent speech noted at breakfast by son at 0830, last normal was 0800 on 11/18  Speech abnormality persisted on arrival to the ED, but then improved significantly throughout the day, returning to his baseline  Stroke alert called in the ED  Imaging:  CTH: "No acute intracranial abnormality.  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 "  CTA head/neck was nondiagnostic due to patient motion and IV infiltration  MRI brain was nondiagnostic again due to patient movement despite multiple sedating medications  Discussed with neurology, Dr. Abdiel Wynne, on admission-recommendations:  Patient with similar presentation dating back to at least 2022, with this being the third admission other episodes have occurred outside the hospital  First episode attributed to TIA, second episode attributed to seizure VS migraine VS hypoperfusion  Neurology feels symptoms may be attributed to focal seizure with retained awareness  Recommends initiating Keppra 500 Mg every 12 hours    EEG - no clear evidence of seizure however was already on keppra, and received ativan  Placed on stroke pathway due to nondiagnostic MRI brain, patient unable to tolerate laying flat. Repeat MRI did not demonstrate CVA though was motion degraded. Statin not ordered due to allergy, but remains on Zetia  ASA not ordered due to anticoagulation with Eliquis  Monitor on telemetry  Vital signs and neurochecks per stroke pathway protocol  SBP goal < 160  Neurology following  NIHSS on admit 1 (was unable to name month but no aphasia, dysarthria, or focal numbness/weakness)   Pt cognition improving though may not be at baseline due to meds pt received (ativan, pain meds), superimposed mild delirium. D/w pt family better to get pt out of the hospital environment for rehabilitation            VTE  Prophylaxis:   Pharmacologic: in place  Mechanical VTE Prophylaxis in Place: Yes    Patient Centered Rounds: I have performed bedside rounds with nursing staff today.     Discussions with Specialists or Other Care Team Provider: case management    Education and Discussions with Family / Patient: pt family bedside      Current Length of Stay: 4 day(s)    Current Patient Status: Inpatient        Code Status: Level 1 - Full Code    Discharge Plan: Pt will require continued inpatient hospitalization. Subjective: The patient does not report any complaints  This morning mentation appeared baseline, later in the day pt became a bit more fatigued, confused, reports not sleeping well    Patient is seen and examined at bedside. All other ROS are negative. Objective:     Vitals:   Temp (24hrs), Av.4 °F (36.3 °C), Min:96.9 °F (36.1 °C), Max:97.7 °F (36.5 °C)    Temp:  [96.9 °F (36.1 °C)-97.7 °F (36.5 °C)] 97.5 °F (36.4 °C)  HR:  [78-98] 98  Resp:  [16-17] 17  BP: (100-129)/(71-77) 100/71  SpO2:  [91 %-100 %] 100 %  Body mass index is 33.37 kg/m². Input and Output Summary (last 24 hours):        Intake/Output Summary (Last 24 hours) at 2023 1459  Last data filed at 2023 1432  Gross per 24 hour   Intake 480 ml   Output 600 ml   Net -120 ml       Physical Exam:       GEN: No acute distress, comfortable  HEEENT: No JVD, PERRLA, no scleral icterus  RESP: Lungs clear to auscultation bilaterally  CV: RRR, +s1/s2   ABD: SOFT NON TENDER, POSITIVE BOWEL SOUNDS, NO DISTENTION  PSYCH: CALM  NEURO: Mentation baseline, NO FOCAL DEFICITS  SKIN: NO RASH  EXTREM: NO EDEMA    Additional Data:     Labs:    Results from last 7 days   Lab Units 23  0345   WBC Thousand/uL 8.07   HEMOGLOBIN g/dL 12.0   HEMATOCRIT % 39.5   PLATELETS Thousands/uL 174   NEUTROS PCT % 55   LYMPHS PCT % 29   MONOS PCT % 8   EOS PCT % 8*     Results from last 7 days   Lab Units 23  0345   SODIUM mmol/L 140   POTASSIUM mmol/L 4.5   CHLORIDE mmol/L 104   CO2 mmol/L 28   BUN mg/dL 18   CREATININE mg/dL 1.00   ANION GAP mmol/L 8   CALCIUM mg/dL 9.0   ALBUMIN g/dL 3.1*   TOTAL BILIRUBIN mg/dL 0.41   ALK PHOS U/L 81   ALT U/L 9   AST U/L 18   GLUCOSE RANDOM mg/dL 125     Results from last 7 days   Lab Units 23  1103   INR  1.53*     Results from last 7 days   Lab Units 23  1056   POC GLUCOSE mg/dl 110     Results from last 7 days   Lab Units 23  0414   HEMOGLOBIN A1C % 6.7*     Results from last 7 days   Lab Units 11/18/23  1738 11/18/23  1208 11/18/23  1107   LACTIC ACID mmol/L 2.0 2.3*  --    PROCALCITONIN ng/ml  --   --  0.11       Lines/Drains:  Invasive Devices       Peripheral Intravenous Line  Duration             Peripheral IV 11/19/23 Left Antecubital 3 days              Drain  Duration             Urethral Catheter 18 Fr. 4 days                    Telemetry:   Telemetry Orders (From admission, onward)               24 Hour Telemetry Monitoring  Continuous x 24 Hours (Telem)        Question:  Reason for 24 Hour Telemetry  Answer:  TIA/Suspected CVA/ Confirmed CVA                        * I Have Reviewed All Lab Data Listed Above. Imaging:     Results for orders placed during the hospital encounter of 11/18/23    XR chest portable    Narrative  CHEST    INDICATION:   eval pulm edema. COMPARISON: 09/02/2023  EXAM PERFORMED/VIEWS:  XR CHEST PORTABLE  1 image    FINDINGS:    Cardiomediastinal silhouette appears unremarkable. A loop recorder overlies the left chest.    Minimal bibasilar scarring or atelectasis. No pneumothorax or pleural effusion. Osseous structures appear within normal limits for patient age. Reverse total shoulder arthroplasty on the right partially visualized. Impression  Bibasilar scarring or atelectasis. Workstation performed: [id number]    No results found for this or any previous visit. *I have reviewed all imaging reports listed above      Recent Cultures (last 7 days):     Results from last 7 days   Lab Units 11/18/23  1208   BLOOD CULTURE  No Growth at 72 hrs. No Growth at 72 hrs.        Last 24 Hours Medication List:   Current Facility-Administered Medications   Medication Dose Route Frequency Provider Last Rate    acetaminophen  650 mg Oral Q4H PRN Dai Montalvo PA-C      albuterol  2.5 mg Nebulization Q6H PRN Pinky Norris MD      allopurinol  400 mg Oral Daily Dai Montalvo PA-C      aluminum-magnesium hydroxide-simethicone  30 mL Oral Q6H PRN DUDLEY Talley-BERNARDINO      apixaban  2.5 mg Oral BID Lois Goldsmith, PA-C      doxycycline hyclate  100 mg Oral Q12H Lois Goldsmith, PA-BERNARDINO      ezetimibe  10 mg Oral Daily Lois Goldsmith, PA-C      gabapentin  100 mg Oral BID Lois Goldsmith, PA-C      hydroxychloroquine  200 mg Oral BID With Meals Lois Goldsmith PA-C      ipratropium  0.5 mg Nebulization TID Morena Mercer MD      levalbuterol  1.25 mg Nebulization TID Morena Mercer MD      levETIRAcetam  500 mg Intravenous Q12H [facility] Lois Goldsmith PA-C 500 mg (11/22/23 0736)    levothyroxine  25 mcg Oral Early Morning Lois Goldsmith, PA-C      lidocaine  1 patch Topical Daily Willow Bauman MD      melatonin  9 mg Oral HS Lois Goldsmith PA-C      metoprolol  2.5 mg Intravenous Q6H PRN Willow Bauman MD      metoprolol succinate  25 mg Oral BID Lois Goldsmith, TANNER      nystatin   Topical BID Willow Bauman MD      pantoprazole  20 mg Oral Early Morning Lois Goldsmith PA-C      predniSONE  5 mg Oral Daily Lois Goldsmith PA-C      senna  1 tablet Oral HS PRN DUDLEY Talley-BERNARDINO      tamsulosin  0.4 mg Oral HS DUDLEY Talley-C      torsemide  10 mg Oral Once per day on Sun Tue Thu Sat Morena Mercer MD      torsemide  20 mg Oral Once per day on Mon Wed Fri Morena Mercer MD          Today, Patient Was Seen By: Morena Mercer MD    ** Please Note: Dictation voice to text software may have been used in the creation of this document.  **

## 2023-11-22 NOTE — PLAN OF CARE
Problem: OCCUPATIONAL THERAPY ADULT  Goal: Performs self-care activities at highest level of function for planned discharge setting. See evaluation for individualized goals. Description: Treatment Interventions: ADL retraining, Functional transfer training, Patient/family training, Cognitive reorientation, Compensatory technique education, Activityengagement, UE strengthening/ROM, Endurance training          See flowsheet documentation for full assessment, interventions and recommendations. Outcome: Progressing  Note: Limitation: Decreased ADL status, Decreased self-care trans, Decreased high-level ADLs, Decreased cognition, Decreased endurance  Prognosis: Fair  Assessment: Pt seen for OT tx session with focus on functional balance, functional mobility, ADL status, and transfer safety. Patient agreeable to OT treatment session. Pt received seated OOB to Recliner. Performed transfers with mod-max A x 1-2. Performed functional mobility with mod A x 1-2 with RW. Pt required verbal/tactile cues to maintain upright posture. Able to perform simple grooming task with set-up. Patient continues to be functioning below baseline level, occupational performance remains limited secondary to factors listed above, and pt at increased risk for falls and injury. The patient's raw score on the AM-PAC Daily Activity inpatient short form is 15, standardized score is 34.69, less than 39.4. Patients at this level are likely to benefit from DC to post-acute rehabilitation services. Please refer to the recommendation of the Occupational Therapist for safe DC planning. From OT standpoint, recommendation at time of d/c would be level 2 resources. Patient to benefit from continued Occupational Therapy treatment while in the hospital to address deficits as defined above and maximize level of functional independence with ADLs and functional mobility.  Pt left with call bell in reach, tray table in reach, needs met, chair alarm activated, SCDs on.      Rehab Resource Intensity Level, OT: II (Moderate Resource Intensity)

## 2023-11-23 LAB
ALBUMIN SERPL BCP-MCNC: 2.9 G/DL (ref 3.5–5)
ALP SERPL-CCNC: 73 U/L (ref 34–104)
ALT SERPL W P-5'-P-CCNC: 9 U/L (ref 7–52)
ANION GAP SERPL CALCULATED.3IONS-SCNC: 6 MMOL/L
AST SERPL W P-5'-P-CCNC: 13 U/L (ref 13–39)
BASOPHILS # BLD AUTO: 0.02 THOUSANDS/ÂΜL (ref 0–0.1)
BASOPHILS NFR BLD AUTO: 0 % (ref 0–1)
BILIRUB SERPL-MCNC: 0.36 MG/DL (ref 0.2–1)
BUN SERPL-MCNC: 21 MG/DL (ref 5–25)
CALCIUM ALBUM COR SERPL-MCNC: 10 MG/DL (ref 8.3–10.1)
CALCIUM SERPL-MCNC: 9.1 MG/DL (ref 8.4–10.2)
CHLORIDE SERPL-SCNC: 103 MMOL/L (ref 96–108)
CO2 SERPL-SCNC: 31 MMOL/L (ref 21–32)
CREAT SERPL-MCNC: 0.96 MG/DL (ref 0.6–1.3)
EOSINOPHIL # BLD AUTO: 0.53 THOUSAND/ÂΜL (ref 0–0.61)
EOSINOPHIL NFR BLD AUTO: 8 % (ref 0–6)
ERYTHROCYTE [DISTWIDTH] IN BLOOD BY AUTOMATED COUNT: 14.1 % (ref 11.6–15.1)
GFR SERPL CREATININE-BSD FRML MDRD: 69 ML/MIN/1.73SQ M
GLUCOSE SERPL-MCNC: 132 MG/DL (ref 65–140)
HCT VFR BLD AUTO: 35.3 % (ref 36.5–49.3)
HGB BLD-MCNC: 10.8 G/DL (ref 12–17)
IMM GRANULOCYTES # BLD AUTO: 0.03 THOUSAND/UL (ref 0–0.2)
IMM GRANULOCYTES NFR BLD AUTO: 0 % (ref 0–2)
LYMPHOCYTES # BLD AUTO: 2.12 THOUSANDS/ÂΜL (ref 0.6–4.47)
LYMPHOCYTES NFR BLD AUTO: 31 % (ref 14–44)
MCH RBC QN AUTO: 29.2 PG (ref 26.8–34.3)
MCHC RBC AUTO-ENTMCNC: 30.6 G/DL (ref 31.4–37.4)
MCV RBC AUTO: 95 FL (ref 82–98)
MONOCYTES # BLD AUTO: 0.57 THOUSAND/ÂΜL (ref 0.17–1.22)
MONOCYTES NFR BLD AUTO: 8 % (ref 4–12)
NEUTROPHILS # BLD AUTO: 3.5 THOUSANDS/ÂΜL (ref 1.85–7.62)
NEUTS SEG NFR BLD AUTO: 53 % (ref 43–75)
NRBC BLD AUTO-RTO: 0 /100 WBCS
PLATELET # BLD AUTO: 158 THOUSANDS/UL (ref 149–390)
PMV BLD AUTO: 13.2 FL (ref 8.9–12.7)
POTASSIUM SERPL-SCNC: 4 MMOL/L (ref 3.5–5.3)
PROT SERPL-MCNC: 5.8 G/DL (ref 6.4–8.4)
RBC # BLD AUTO: 3.7 MILLION/UL (ref 3.88–5.62)
SODIUM SERPL-SCNC: 140 MMOL/L (ref 135–147)
WBC # BLD AUTO: 6.77 THOUSAND/UL (ref 4.31–10.16)

## 2023-11-23 PROCEDURE — 94640 AIRWAY INHALATION TREATMENT: CPT

## 2023-11-23 PROCEDURE — 85025 COMPLETE CBC W/AUTO DIFF WBC: CPT | Performed by: PHYSICIAN ASSISTANT

## 2023-11-23 PROCEDURE — 80053 COMPREHEN METABOLIC PANEL: CPT | Performed by: PHYSICIAN ASSISTANT

## 2023-11-23 PROCEDURE — 99232 SBSQ HOSP IP/OBS MODERATE 35: CPT | Performed by: HOSPITALIST

## 2023-11-23 PROCEDURE — 94760 N-INVAS EAR/PLS OXIMETRY 1: CPT

## 2023-11-23 RX ADMIN — LEVALBUTEROL HYDROCHLORIDE 1.25 MG: 1.25 SOLUTION RESPIRATORY (INHALATION) at 19:58

## 2023-11-23 RX ADMIN — APIXABAN 2.5 MG: 2.5 TABLET, FILM COATED ORAL at 08:29

## 2023-11-23 RX ADMIN — APIXABAN 5 MG: 5 TABLET, FILM COATED ORAL at 16:45

## 2023-11-23 RX ADMIN — LEVETIRACETAM 500 MG: 500 INJECTION, SOLUTION INTRAVENOUS at 08:27

## 2023-11-23 RX ADMIN — METOPROLOL SUCCINATE 25 MG: 25 TABLET, FILM COATED, EXTENDED RELEASE ORAL at 21:57

## 2023-11-23 RX ADMIN — HYDROXYCHLOROQUINE SULFATE 200 MG: 200 TABLET ORAL at 16:45

## 2023-11-23 RX ADMIN — IPRATROPIUM BROMIDE 0.5 MG: 0.5 SOLUTION RESPIRATORY (INHALATION) at 19:58

## 2023-11-23 RX ADMIN — NYSTATIN: 100000 POWDER TOPICAL at 08:34

## 2023-11-23 RX ADMIN — EZETIMIBE 10 MG: 10 TABLET ORAL at 08:29

## 2023-11-23 RX ADMIN — TORSEMIDE 10 MG: 20 TABLET ORAL at 08:29

## 2023-11-23 RX ADMIN — DOXYCYCLINE 100 MG: 100 CAPSULE ORAL at 08:28

## 2023-11-23 RX ADMIN — LEVALBUTEROL HYDROCHLORIDE 1.25 MG: 1.25 SOLUTION RESPIRATORY (INHALATION) at 09:14

## 2023-11-23 RX ADMIN — GABAPENTIN 100 MG: 100 CAPSULE ORAL at 16:45

## 2023-11-23 RX ADMIN — DOXYCYCLINE 100 MG: 100 CAPSULE ORAL at 21:57

## 2023-11-23 RX ADMIN — PANTOPRAZOLE SODIUM 20 MG: 20 TABLET, DELAYED RELEASE ORAL at 06:22

## 2023-11-23 RX ADMIN — LEVOTHYROXINE SODIUM 25 MCG: 25 TABLET ORAL at 06:22

## 2023-11-23 RX ADMIN — TAMSULOSIN HYDROCHLORIDE 0.4 MG: 0.4 CAPSULE ORAL at 23:12

## 2023-11-23 RX ADMIN — HYDROXYCHLOROQUINE SULFATE 200 MG: 200 TABLET ORAL at 08:29

## 2023-11-23 RX ADMIN — ALLOPURINOL 400 MG: 100 TABLET ORAL at 08:28

## 2023-11-23 RX ADMIN — IPRATROPIUM BROMIDE 0.5 MG: 0.5 SOLUTION RESPIRATORY (INHALATION) at 09:14

## 2023-11-23 RX ADMIN — IPRATROPIUM BROMIDE 0.5 MG: 0.5 SOLUTION RESPIRATORY (INHALATION) at 14:41

## 2023-11-23 RX ADMIN — LEVETIRACETAM 500 MG: 500 INJECTION, SOLUTION INTRAVENOUS at 21:57

## 2023-11-23 RX ADMIN — PREDNISONE 5 MG: 5 TABLET ORAL at 08:29

## 2023-11-23 RX ADMIN — LEVALBUTEROL HYDROCHLORIDE 1.25 MG: 1.25 SOLUTION RESPIRATORY (INHALATION) at 14:41

## 2023-11-23 RX ADMIN — Medication 9 MG: at 21:57

## 2023-11-23 RX ADMIN — GABAPENTIN 100 MG: 100 CAPSULE ORAL at 08:29

## 2023-11-23 NOTE — ASSESSMENT & PLAN NOTE
Lab Results   Component Value Date    EGFR 69 11/23/2023    EGFR 66 11/22/2023    EGFR 73 11/21/2023    CREATININE 0.96 11/23/2023    CREATININE 1.00 11/22/2023    CREATININE 0.92 11/21/2023   Baseline creatinine 0.9-1.2  Creatinine on admission 1.64, examines hypovolemic therefore will hold home diuretic  Patient did receive 1 L NS in the ED, due to continuing to examine hypovolemic will place on gentle IV fluids at 50 cc/h overnight  Patient did receive IV contrast in the ED  Avoid further nephrotoxic drugs and hypotension

## 2023-11-23 NOTE — ASSESSMENT & PLAN NOTE
Presents with development of word finding difficulty and nonfluent speech noted at breakfast by son at 0830, last normal was 0800 on 11/18  Speech abnormality persisted on arrival to the ED, but then improved significantly throughout the day, returning to his baseline  Stroke alert called in the ED  Imaging:  CTH: "No acute intracranial abnormality. 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 "  CTA head/neck was nondiagnostic due to patient motion and IV infiltration  MRI brain was nondiagnostic again due to patient movement despite multiple sedating medications  Discussed with neurology, Dr. Kyle Rg, on admission-recommendations:  Patient with similar presentation dating back to at least 2022, with this being the third admission other episodes have occurred outside the hospital  First episode attributed to TIA, second episode attributed to seizure VS migraine VS hypoperfusion  Neurology feels symptoms may be attributed to focal seizure with retained awareness  Recommends initiating Keppra 500 Mg every 12 hours    EEG - no clear evidence of seizure however was already on keppra, and received ativan  Placed on stroke pathway due to nondiagnostic MRI brain, patient unable to tolerate laying flat. Repeat MRI did not demonstrate CVA though was motion degraded. Appears patient has stable mentation for several days. Statin not ordered due to allergy, but remains on Zetia  ASA not ordered due to anticoagulation with Eliquis  Monitor on telemetry  Vital signs and neurochecks per stroke pathway protocol  SBP goal < 160  Neurology following  NIHSS on admit 1 (was unable to name month but no aphasia, dysarthria, or focal numbness/weakness)   Pt cognition improving though may not be at baseline due to meds pt received (ativan, pain meds), superimposed mild delirium.  D/w pt family better to get pt out of the hospital environment for rehabilitation

## 2023-11-23 NOTE — PROGRESS NOTES
[address] [facility]  Progress Note  Name: Mayuri Rojas  MRN: [id number]  Unit/Bed#: -01 I Date of Admission: 11/18/2023   Date of Service: 11/23/2023 I Hospital Day: 5    Assessment/Plan   Electrolyte abnormality  Assessment & Plan  Hypomagnesemia  Repleted  Recheck    Urinary retention  Assessment & Plan  Found to be retaining in the ED with 500 cc on Marino insertion  Likely secondary to sedating medications  Plan void trial in 48 hours  Continue home Flomax    Aneurysm of ascending aorta without rupture [facility])  Assessment & Plan  CT C/A/P on admit showing: "There is fusiform ectasia of the ascending thoracic aorta measuring up to 44 mm."  Needs repeat low-dose CT chest in 1 year    Ureterolithiasis  Assessment & Plan  Seen in the ED on 11/16 due to nausea x3 to 4 weeks  CT A/P on admit revealed 3 mm stone at the left ureterovesicular junction with no change in position or development of hydronephrosis from 11/16 CT  Patient without any flank pain  Strain all urine    Stage 2 chronic kidney disease  Assessment & Plan  Lab Results   Component Value Date    EGFR 69 11/23/2023    EGFR 66 11/22/2023    EGFR 73 11/21/2023    CREATININE 0.96 11/23/2023    CREATININE 1.00 11/22/2023    CREATININE 0.92 11/21/2023   Baseline creatinine 0.9-1.2  Creatinine on admission 1.64, examines hypovolemic therefore will hold home diuretic  Patient did receive 1 L NS in the ED, due to continuing to examine hypovolemic will place on gentle IV fluids at 50 cc/h overnight  Patient did receive IV contrast in the ED  Avoid further nephrotoxic drugs and hypotension      Hypothyroidism  Assessment & Plan  Continue home Synthroid 25 mcg daily  TSH within normal limits on admit    HLD (hyperlipidemia)  Assessment & Plan  Continue home Zetia 10 Mg daily  Prior allergy to statins    Paroxysmal A-fib (HCC)  Assessment & Plan  Rate control with metoprolol succinate 25 Mg twice daily  OAC with Eliquis 5 Mg twice daily, dose decreased to 2.5 Mg twice daily on admit due to rising creatinine  Will place lopressor PRN, patient has uncontrolled HR on tele    Chronic systolic heart failure [facility])  Assessment & Plan  Wt Readings from Last 3 Encounters:   11/23/23 102 kg (224 lb 13.9 oz)   11/09/23 99.8 kg (220 lb)   10/25/23 99.8 kg (220 lb)   Last echo August 2023: LVEF 45%. Mild global hypokinesis. G1 DD  Home regimen: Torsemide 20 Mg MWF and 10 Mg remaining days  Examines hypovolemic with dry mucous membranes on admit, however did receive 1 L NS in the ED already -we will place on gentle IV fluids at 50 cc/h overnight  Diuretics were held but will resume 11/22  I/O and daily weights  Not on any fluid or sodium restriction at home, placed on normal cardiac diet on admit  Monitor for signs of any respiratory distress    History of TIA (transient ischemic attack)  Assessment & Plan  History of TIA in the past, that had presented as word finding difficulties  Maintained on Eliquis due to history of paroxysmal A-fib and Zetia, continue    RA (rheumatoid arthritis) ([facility])  Assessment & Plan  Home regimen: Hydroxychloroquine 200 Mg twice daily and prednisone 5 Mg daily  Continue home medicines    History of hypertension  Assessment & Plan  Home regimen: Metoprolol succinate 25 Mg twice daily  Continue    * Difficulty with speech  Assessment & Plan  Presents with development of word finding difficulty and nonfluent speech noted at breakfast by son at 0830, last normal was 0800 on 11/18  Speech abnormality persisted on arrival to the ED, but then improved significantly throughout the day, returning to his baseline  Stroke alert called in the ED  Imaging:  CTH: "No acute intracranial abnormality.  Severe chronic microangiopathy, similar to prior exams."  MRA head: "No stenosis or occlusion of the major vessels of the [place]. "  CTA head/neck was nondiagnostic due to patient motion and IV infiltration  MRI brain was nondiagnostic again due to patient movement despite multiple sedating medications  Discussed with neurology, Dr. Kori Hart, on admission-recommendations:  Patient with similar presentation dating back to at least 2022, with this being the third admission other episodes have occurred outside the hospital  First episode attributed to TIA, second episode attributed to seizure VS migraine VS hypoperfusion  Neurology feels symptoms may be attributed to focal seizure with retained awareness  Recommends initiating Keppra 500 Mg every 12 hours    EEG - no clear evidence of seizure however was already on keppra, and received ativan  Placed on stroke pathway due to nondiagnostic MRI brain, patient unable to tolerate laying flat. Repeat MRI did not demonstrate CVA though was motion degraded. Appears patient has stable mentation for several days. Statin not ordered due to allergy, but remains on Zetia  ASA not ordered due to anticoagulation with Eliquis  Monitor on telemetry  Vital signs and neurochecks per stroke pathway protocol  SBP goal < 160  Neurology following  NIHSS on admit 1 (was unable to name month but no aphasia, dysarthria, or focal numbness/weakness)   Pt cognition improving though may not be at baseline due to meds pt received (ativan, pain meds), superimposed mild delirium. D/w pt family better to get pt out of the hospital environment for rehabilitation          VTE  Prophylaxis:   Pharmacologic: in place  Mechanical VTE Prophylaxis in Place: Yes    Patient Centered Rounds: I have performed bedside rounds with nursing staff today. Discussions with Specialists or Other Care Team Provider: case management    Education and Discussions with Family / Patient: pt family bedside      Current Length of Stay: 5 day(s)    Current Patient Status: Inpatient        Code Status: Level 1 - Full Code    Discharge Plan: Pt will require continued inpatient hospitalization.     Subjective:     Pt appears at baseline mentation   No acute complaints  No complaints of dipolopia    Patient is seen and examined at bedside. All other ROS are negative. Objective:     Vitals:   Temp (24hrs), Av.5 °F (36.4 °C), Min:97.3 °F (36.3 °C), Max:97.6 °F (36.4 °C)    Temp:  [97.3 °F (36.3 °C)-97.6 °F (36.4 °C)] 97.6 °F (36.4 °C)  HR:  [] 89  Resp:  [13-18] 13  BP: (100-105)/(61-71) 105/61  SpO2:  [94 %-100 %] 96 %  Body mass index is 33.21 kg/m². Input and Output Summary (last 24 hours):        Intake/Output Summary (Last 24 hours) at 2023 1109  Last data filed at 2023 0503  Gross per 24 hour   Intake 240 ml   Output 1250 ml   Net -1010 ml       Physical Exam:       GEN: No acute distress, comfortable  HEEENT: No JVD, PERRLA, no scleral icterus  RESP: Lungs clear to auscultation bilaterally  CV: RRR, +s1/s2   ABD: SOFT NON TENDER, POSITIVE BOWEL SOUNDS, NO DISTENTION  PSYCH: CALM  NEURO: Mentation baseline, NO FOCAL DEFICITS  SKIN: NO RASH  EXTREM: NO EDEMA    Additional Data:     Labs:    Results from last 7 days   Lab Units 23  0421   WBC Thousand/uL 6.77   HEMOGLOBIN g/dL 10.8*   HEMATOCRIT % 35.3*   PLATELETS Thousands/uL 158   NEUTROS PCT % 53   LYMPHS PCT % 31   MONOS PCT % 8   EOS PCT % 8*     Results from last 7 days   Lab Units 23  0421   SODIUM mmol/L 140   POTASSIUM mmol/L 4.0   CHLORIDE mmol/L 103   CO2 mmol/L 31   BUN mg/dL 21   CREATININE mg/dL 0.96   ANION GAP mmol/L 6   CALCIUM mg/dL 9.1   ALBUMIN g/dL 2.9*   TOTAL BILIRUBIN mg/dL 0.36   ALK PHOS U/L 73   ALT U/L 9   AST U/L 13   GLUCOSE RANDOM mg/dL 132     Results from last 7 days   Lab Units 23  1103   INR  1.53*     Results from last 7 days   Lab Units 23  1056   POC GLUCOSE mg/dl 110     Results from last 7 days   Lab Units 23  0414   HEMOGLOBIN A1C % 6.7*     Results from last 7 days   Lab Units 23  1738 23  1208 23  1107   LACTIC ACID mmol/L 2.0 2.3*  --    PROCALCITONIN ng/ml  --   --  0.11 Lines/Drains:  Invasive Devices       Peripheral Intravenous Line  Duration             Peripheral IV 23 Left Antecubital 3 days    Peripheral IV 23 Distal;Right;Upper;Ventral (anterior) Arm <1 day              Drain  Duration             Urethral Catheter 18 Fr. 4 days                    Telemetry:   Telemetry Orders (From admission, onward)               24 Hour Telemetry Monitoring  Continuous x 24 Hours (Telem)           Question:  Reason for 24 Hour Telemetry  Answer:  TIA/Suspected CVA/ Confirmed CVA                        * I Have Reviewed All Lab Data Listed Above. Imaging:     Results for orders placed during the hospital encounter of 23    XR chest portable    Narrative  CHEST    INDICATION:   eval pulm edema. COMPARISON: 2023  EXAM PERFORMED/VIEWS:  XR CHEST PORTABLE  1 image    FINDINGS:    Cardiomediastinal silhouette appears unremarkable. A loop recorder overlies the left chest.    Minimal bibasilar scarring or atelectasis. No pneumothorax or pleural effusion. Osseous structures appear within normal limits for patient age. Reverse total shoulder arthroplasty on the right partially visualized. Impression  Bibasilar scarring or atelectasis. Workstation performed: [id number]    No results found for this or any previous visit. *I have reviewed all imaging reports listed above      Recent Cultures (last 7 days):     Results from last 7 days   Lab Units 23  1208   BLOOD CULTURE  No Growth After 4 Days. No Growth After 4 Days.        Last 24 Hours Medication List:   Current Facility-Administered Medications   Medication Dose Route Frequency Provider Last Rate    acetaminophen  650 mg Oral Q4H PRN Lani Lomas PA-C      albuterol  2.5 mg Nebulization Q6H PRN Savanah Rico MD      allopurinol  400 mg Oral Daily Lani Lomas PA-C      aluminum-magnesium hydroxide-simethicone  30 mL Oral Q6H PRN Lani Lomas PA-C apixaban  5 mg Oral BID Morena Mercer MD      doxycycline hyclate  100 mg Oral Q12H Lois Goldsmith PA-C      ezetimibe  10 mg Oral Daily DUDLEY Talley-BERNARDINO      gabapentin  100 mg Oral BID DUDLEY Talley-BERNARDINO      hydroxychloroquine  200 mg Oral BID With Meals Lois Goldsmith PA-C      ipratropium  0.5 mg Nebulization TID Morena Mercer MD      levalbuterol  1.25 mg Nebulization TID Morena Mercer MD      levETIRAcetam  500 mg Intravenous Q12H [address] PARI TalleyC 500 mg (11/23/23 0827)    levothyroxine  25 mcg Oral Early Morning Lois Goldsmith PA-C      lidocaine  1 patch Topical Daily Willow Bauman MD      melatonin  9 mg Oral HS Lois Goldsmith PA-C      metoprolol  2.5 mg Intravenous Q6H PRN Willow Bauman MD      metoprolol succinate  25 mg Oral BID Lois Goldsmith PA-C      nystatin   Topical BID Willow Bauman MD      pantoprazole  20 mg Oral Early Morning Lois Goldsmith PA-C      predniSONE  5 mg Oral Daily Lois Goldsmith PA-C      senna  1 tablet Oral HS PRN Lois Goldsmith PA-C      tamsulosin  0.4 mg Oral HS DUDLEY Talley-C      torsemide  10 mg Oral Once per day on Sun Tue Thu Sat Morena Mercer MD      torsemide  20 mg Oral Once per day on Mon Wed Fri Morena Mercer MD          Today, Patient Was Seen By: Morena Mercer MD    ** Please Note: Dictation voice to text software may have been used in the creation of this document.  **

## 2023-11-23 NOTE — PLAN OF CARE
Problem: Potential for Falls  Goal: Patient will remain free of falls  Description: INTERVENTIONS:  - Educate patient/family on patient safety including physical limitations  - Instruct patient to call for assistance with activity   - Consult OT/PT to assist with strengthening/mobility   - Keep Call bell within reach  - Keep bed low and locked with side rails adjusted as appropriate  - Keep care items and personal belongings within reach  - Initiate and maintain comfort rounds  - Make Fall Risk Sign visible to staff  - Offer Toileting every xx   Hours, in advance of need  - Initiate/Maintain xalarm  - Obtain necessary fall risk management equipment: x  - Apply yellow socks and bracelet for high fall risk patients  - Consider moving patient to room near nurses station  Outcome: Progressing     Problem: SAFETY,RESTRAINT: NV/NON-SELF DESTRUCTIVE BEHAVIOR  Goal: Remains free of harm/injury (restraint for non violent/non self-detsructive behavior)  Description: INTERVENTIONS:  - Instruct patient/family regarding restraint use   - Assess and monitor physiologic and psychological status   - Provide interventions and comfort measures to meet assessed patient needs   - Identify and implement measures to help patient regain control  - Assess readiness for release of restraint   Outcome: Progressing  Goal: Returns to optimal restraint-free functioning  Description: INTERVENTIONS:  - Assess the patient's behavior and symptoms that indicate continued need for restraint  - Identify and implement measures to help patient regain control  - Assess readiness for release of restraint   Outcome: Progressing     Problem: Prexisting or High Potential for Compromised Skin Integrity  Goal: Skin integrity is maintained or improved  Description: INTERVENTIONS:  - Identify patients at risk for skin breakdown  - Assess and monitor skin integrity  - Assess and monitor nutrition and hydration status  - Monitor labs   - Assess for incontinence - Turn and reposition patient  - Assist with mobility/ambulation  - Relieve pressure over bony prominences  - Avoid friction and shearing  - Provide appropriate hygiene as needed including keeping skin clean and dry  - Evaluate need for skin moisturizer/barrier cream  - Collaborate with interdisciplinary team   - Patient/family teaching  - Consider wound care consult   Outcome: Progressing     Problem: Nutrition/Hydration-ADULT  Goal: Nutrient/Hydration intake appropriate for improving, restoring or maintaining nutritional needs  Description: Monitor and assess patient's nutrition/hydration status for malnutrition. Collaborate with interdisciplinary team and initiate plan and interventions as ordered. Monitor patient's weight and dietary intake as ordered or per policy. Utilize nutrition screening tool and intervene as necessary. Determine patient's food preferences and provide high-protein, high-caloric foods as appropriate.      INTERVENTIONS:  - Monitor oral intake, urinary output, labs, and treatment plans  - Assess nutrition and hydration status and recommend course of action  - Evaluate amount of meals eaten  - Assist patient with eating if necessary   - Allow adequate time for meals  - Recommend/ encourage appropriate diets, oral nutritional supplements, and vitamin/mineral supplements  - Order, calculate, and assess calorie counts as needed  - Recommend, monitor, and adjust tube feedings and TPN/PPN based on assessed needs  - Assess need for intravenous fluids  - Provide specific nutrition/hydration education as appropriate  - Include patient/family/caregiver in decisions related to nutrition  Outcome: Progressing   x

## 2023-11-23 NOTE — ASSESSMENT & PLAN NOTE
Wt Readings from Last 3 Encounters:   11/23/23 102 kg (224 lb 13.9 oz)   11/09/23 99.8 kg (220 lb)   10/25/23 99.8 kg (220 lb)   Last echo August 2023: LVEF 45%. Mild global hypokinesis. G1 DD  Home regimen:  Torsemide 20 Mg MWF and 10 Mg remaining days  Examines hypovolemic with dry mucous membranes on admit, however did receive 1 L NS in the ED already -we will place on gentle IV fluids at 50 cc/h overnight  Diuretics were held but will resume 11/22  I/O and daily weights  Not on any fluid or sodium restriction at home, placed on normal cardiac diet on admit  Monitor for signs of any respiratory distress

## 2023-11-23 NOTE — PLAN OF CARE
Problem: Potential for Falls  Goal: Patient will remain free of falls  Description: INTERVENTIONS:  - Educate patient/family on patient safety including physical limitations  - Instruct patient to call for assistance with activity   - Consult OT/PT to assist with strengthening/mobility   - Keep Call bell within reach  - Keep bed low and locked with side rails adjusted as appropriate  - Keep care items and personal belongings within reach  - Initiate and maintain comfort rounds  - Make Fall Risk Sign visible to staff  - Offer Toileting every 2 Hours, in advance of need  - Initiate/Maintain call bell and bed alarm  - Obtain necessary fall risk management equipment: call bell usage and bed alarm  - Apply yellow socks and bracelet for high fall risk patients  - Consider moving patient to room near nurses station  Outcome: Progressing     Problem: SAFETY,RESTRAINT: NV/NON-SELF DESTRUCTIVE BEHAVIOR  Goal: Remains free of harm/injury (restraint for non violent/non self-detsructive behavior)  Description: INTERVENTIONS:  - Instruct patient/family regarding restraint use   - Assess and monitor physiologic and psychological status   - Provide interventions and comfort measures to meet assessed patient needs   - Identify and implement measures to help patient regain control  - Assess readiness for release of restraint   Outcome: Progressing  Goal: Returns to optimal restraint-free functioning  Description: INTERVENTIONS:  - Assess the patient's behavior and symptoms that indicate continued need for restraint  - Identify and implement measures to help patient regain control  - Assess readiness for release of restraint   Outcome: Progressing     Problem: Prexisting or High Potential for Compromised Skin Integrity  Goal: Skin integrity is maintained or improved  Description: INTERVENTIONS:  - Identify patients at risk for skin breakdown  - Assess and monitor skin integrity  - Assess and monitor nutrition and hydration status  - Monitor labs   - Assess for incontinence   - Turn and reposition patient  - Assist with mobility/ambulation  - Relieve pressure over bony prominences  - Avoid friction and shearing  - Provide appropriate hygiene as needed including keeping skin clean and dry  - Evaluate need for skin moisturizer/barrier cream  - Collaborate with interdisciplinary team   - Patient/family teaching  - Consider wound care consult   Outcome: Progressing

## 2023-11-24 VITALS
OXYGEN SATURATION: 94 % | TEMPERATURE: 98 F | HEIGHT: 69 IN | DIASTOLIC BLOOD PRESSURE: 78 MMHG | BODY MASS INDEX: 33.01 KG/M2 | RESPIRATION RATE: 19 BRPM | HEART RATE: 101 BPM | WEIGHT: 222.88 LBS | SYSTOLIC BLOOD PRESSURE: 119 MMHG

## 2023-11-24 LAB
ALBUMIN SERPL BCP-MCNC: 3 G/DL (ref 3.5–5)
ALP SERPL-CCNC: 79 U/L (ref 34–104)
ALT SERPL W P-5'-P-CCNC: 16 U/L (ref 7–52)
ANION GAP SERPL CALCULATED.3IONS-SCNC: 7 MMOL/L
AST SERPL W P-5'-P-CCNC: 28 U/L (ref 13–39)
BACTERIA BLD CULT: NORMAL
BACTERIA BLD CULT: NORMAL
BASOPHILS # BLD AUTO: 0.02 THOUSANDS/ÂΜL (ref 0–0.1)
BASOPHILS NFR BLD AUTO: 0 % (ref 0–1)
BILIRUB SERPL-MCNC: 0.45 MG/DL (ref 0.2–1)
BUN SERPL-MCNC: 21 MG/DL (ref 5–25)
CALCIUM ALBUM COR SERPL-MCNC: 10.3 MG/DL (ref 8.3–10.1)
CALCIUM SERPL-MCNC: 9.5 MG/DL (ref 8.4–10.2)
CHLORIDE SERPL-SCNC: 103 MMOL/L (ref 96–108)
CO2 SERPL-SCNC: 29 MMOL/L (ref 21–32)
CREAT SERPL-MCNC: 1.04 MG/DL (ref 0.6–1.3)
EOSINOPHIL # BLD AUTO: 0.44 THOUSAND/ÂΜL (ref 0–0.61)
EOSINOPHIL NFR BLD AUTO: 6 % (ref 0–6)
ERYTHROCYTE [DISTWIDTH] IN BLOOD BY AUTOMATED COUNT: 14.2 % (ref 11.6–15.1)
GFR SERPL CREATININE-BSD FRML MDRD: 63 ML/MIN/1.73SQ M
GLUCOSE SERPL-MCNC: 131 MG/DL (ref 65–140)
HCT VFR BLD AUTO: 38.9 % (ref 36.5–49.3)
HGB BLD-MCNC: 11.9 G/DL (ref 12–17)
IMM GRANULOCYTES # BLD AUTO: 0.02 THOUSAND/UL (ref 0–0.2)
IMM GRANULOCYTES NFR BLD AUTO: 0 % (ref 0–2)
LYMPHOCYTES # BLD AUTO: 1.82 THOUSANDS/ÂΜL (ref 0.6–4.47)
LYMPHOCYTES NFR BLD AUTO: 25 % (ref 14–44)
MCH RBC QN AUTO: 29.2 PG (ref 26.8–34.3)
MCHC RBC AUTO-ENTMCNC: 30.6 G/DL (ref 31.4–37.4)
MCV RBC AUTO: 95 FL (ref 82–98)
MONOCYTES # BLD AUTO: 0.55 THOUSAND/ÂΜL (ref 0.17–1.22)
MONOCYTES NFR BLD AUTO: 8 % (ref 4–12)
NEUTROPHILS # BLD AUTO: 4.38 THOUSANDS/ÂΜL (ref 1.85–7.62)
NEUTS SEG NFR BLD AUTO: 61 % (ref 43–75)
NRBC BLD AUTO-RTO: 0 /100 WBCS
PLATELET # BLD AUTO: 168 THOUSANDS/UL (ref 149–390)
PMV BLD AUTO: 12.8 FL (ref 8.9–12.7)
POTASSIUM SERPL-SCNC: 4.2 MMOL/L (ref 3.5–5.3)
PROT SERPL-MCNC: 6.4 G/DL (ref 6.4–8.4)
RBC # BLD AUTO: 4.08 MILLION/UL (ref 3.88–5.62)
SODIUM SERPL-SCNC: 139 MMOL/L (ref 135–147)
WBC # BLD AUTO: 7.23 THOUSAND/UL (ref 4.31–10.16)

## 2023-11-24 PROCEDURE — 94664 DEMO&/EVAL PT USE INHALER: CPT

## 2023-11-24 PROCEDURE — 99239 HOSP IP/OBS DSCHRG MGMT >30: CPT | Performed by: HOSPITALIST

## 2023-11-24 PROCEDURE — 85025 COMPLETE CBC W/AUTO DIFF WBC: CPT | Performed by: HOSPITALIST

## 2023-11-24 PROCEDURE — 94760 N-INVAS EAR/PLS OXIMETRY 1: CPT

## 2023-11-24 PROCEDURE — 94640 AIRWAY INHALATION TREATMENT: CPT

## 2023-11-24 PROCEDURE — 80053 COMPREHEN METABOLIC PANEL: CPT | Performed by: HOSPITALIST

## 2023-11-24 RX ORDER — LIDOCAINE 50 MG/G
1 PATCH TOPICAL DAILY
Refills: 0
Start: 2023-11-25

## 2023-11-24 RX ORDER — LEVETIRACETAM 500 MG/1
500 TABLET ORAL EVERY 12 HOURS SCHEDULED
Refills: 0
Start: 2023-11-24

## 2023-11-24 RX ORDER — LANOLIN ALCOHOL/MO/W.PET/CERES
9 CREAM (GRAM) TOPICAL
Refills: 0
Start: 2023-11-24

## 2023-11-24 RX ADMIN — PANTOPRAZOLE SODIUM 20 MG: 20 TABLET, DELAYED RELEASE ORAL at 06:01

## 2023-11-24 RX ADMIN — METOPROLOL SUCCINATE 25 MG: 25 TABLET, FILM COATED, EXTENDED RELEASE ORAL at 08:05

## 2023-11-24 RX ADMIN — PREDNISONE 5 MG: 5 TABLET ORAL at 08:05

## 2023-11-24 RX ADMIN — LEVOTHYROXINE SODIUM 25 MCG: 25 TABLET ORAL at 06:00

## 2023-11-24 RX ADMIN — LEVALBUTEROL HYDROCHLORIDE 1.25 MG: 1.25 SOLUTION RESPIRATORY (INHALATION) at 13:20

## 2023-11-24 RX ADMIN — LEVETIRACETAM 500 MG: 500 INJECTION, SOLUTION INTRAVENOUS at 08:04

## 2023-11-24 RX ADMIN — LIDOCAINE 1 PATCH: 700 PATCH TOPICAL at 08:05

## 2023-11-24 RX ADMIN — TORSEMIDE 20 MG: 20 TABLET ORAL at 08:05

## 2023-11-24 RX ADMIN — GABAPENTIN 100 MG: 100 CAPSULE ORAL at 08:05

## 2023-11-24 RX ADMIN — HYDROXYCHLOROQUINE SULFATE 200 MG: 200 TABLET ORAL at 08:05

## 2023-11-24 RX ADMIN — APIXABAN 5 MG: 5 TABLET, FILM COATED ORAL at 08:05

## 2023-11-24 RX ADMIN — IPRATROPIUM BROMIDE 0.5 MG: 0.5 SOLUTION RESPIRATORY (INHALATION) at 07:46

## 2023-11-24 RX ADMIN — NYSTATIN: 100000 POWDER TOPICAL at 08:06

## 2023-11-24 RX ADMIN — IPRATROPIUM BROMIDE 0.5 MG: 0.5 SOLUTION RESPIRATORY (INHALATION) at 13:20

## 2023-11-24 RX ADMIN — ALLOPURINOL 400 MG: 100 TABLET ORAL at 08:05

## 2023-11-24 RX ADMIN — LEVALBUTEROL HYDROCHLORIDE 1.25 MG: 1.25 SOLUTION RESPIRATORY (INHALATION) at 07:45

## 2023-11-24 RX ADMIN — EZETIMIBE 10 MG: 10 TABLET ORAL at 08:05

## 2023-11-24 RX ADMIN — DOXYCYCLINE 100 MG: 100 CAPSULE ORAL at 08:05

## 2023-11-24 NOTE — ASSESSMENT & PLAN NOTE
Wt Readings from Last 3 Encounters:   11/24/23 101 kg (222 lb 14.2 oz)   11/09/23 99.8 kg (220 lb)   10/25/23 99.8 kg (220 lb)   Last echo August 2023: LVEF 45%. Mild global hypokinesis. G1 DD  Home regimen:  Torsemide 20 Mg MWF and 10 Mg remaining days  Examines hypovolemic with dry mucous membranes on admit, however did receive 1 L NS in the ED already -we will place on gentle IV fluids at 50 cc/h overnight  Diuretics were held but will resume 11/22  I/O and daily weights  Not on any fluid or sodium restriction at home, placed on normal cardiac diet on admit  Monitor for signs of any respiratory distress

## 2023-11-24 NOTE — CASE MANAGEMENT
Case Management Discharge Planning Note    Patient name Mariam Montelongo  Location [address] [facility] 321/-01 MRN [id number]  : 1934 Date 2023       Current Admission Date: 2023  Current Admission Diagnosis:Difficulty with speech   Patient Active Problem List    Diagnosis Date Noted    Stroke-like symptom 2023    Electrolyte abnormality 2023    Difficulty with speech 2023    Stage 2 chronic kidney disease 2023    Ureterolithiasis 2023    Aneurysm of ascending aorta without rupture ([address]) 2023    Urinary retention 2023    Bacteremia due to group B Streptococcus 2023    Respiratory insufficiency 2023    Cellulitis of left upper extremity 2023    HLD (hyperlipidemia) 2023    GERD (gastroesophageal reflux disease) 2023    Gout without acute exacerbation  2023    Spinal stenosis 2023    Hypothyroidism 2023    Peripheral vascular disease ([address]) 2023    Open wound of right great toe with damage to nail 2023    Adverse effect of loop (high-ceiling) diuretics, subsequent encounter     Toxic metabolic encephalopathy     Septic arthritis of shoulder, right ([address]) 2023    Suture of skin wound 2023    Hypomagnesemia 2023    Elevated TSH 2023    Chronic systolic heart failure ([address]) 2023    Paroxysmal A-fib ([address]) 2023    Focal seizure with retained awareness 2023    KAMLESH (acute kidney injury) ([address]) 2023    Dilated cardiomyopathy ([address]) 10/18/2022    HTN (hypertension) 10/18/2022    Low left ventricular ejection fraction 10/11/2022    History of TIA (transient ischemic attack) 10/10/2022    Speech disturbance 10/08/2022    Accidental overdose 2020    History of hypertension 2020    Hypotension 2020    RA (rheumatoid arthritis) ([address]) 2020      LOS (days): 6  Geometric Mean LOS (GMLOS) (days): 4.50  Days to GMLOS:-1.3 OBJECTIVE:  Risk of Unplanned Readmission Score: 33.12         Current admission status: Inpatient   Preferred Pharmacy:   [address], [address] [facility] [address]  Phone: [phone] Fax: [phone]  [address] [facility] [address], [address]  [address] [facility]  [address] [place] [address]  Phone: [phone] Fax: [phone]    Primary Care Provider: Sheryle Gros, MD    Primary Insurance: [facility] REP  Secondary Insurance:     DISCHARGE DETAILS:        CM received auth from [facility] discharge support for pt to go to [facility] today. CM notified MD via TT and facility via Aidin. CM requested transport via [facility]. CM notified son of receival of auth and estimated transport time of 230p. Awaiting confirmed  time.              Accepting Facility Name, [address] [place] [address] Street : [address] [place] [address]  Receiving Facility/Agency Phone Number: [phone]  Facility/Agency Fax Number: [phone]

## 2023-11-24 NOTE — PLAN OF CARE
Problem: Potential for Falls  Goal: Patient will remain free of falls  Description: INTERVENTIONS:  - Educate patient/family on patient safety including physical limitations  - Instruct patient to call for assistance with activity   - Consult OT/PT to assist with strengthening/mobility   - Keep Call bell within reach  - Keep bed low and locked with side rails adjusted as appropriate  - Keep care items and personal belongings within reach  - Initiate and maintain comfort rounds  - Make Fall Risk Sign visible to staff  - Offer Toileting every 2 Hours, in advance of need  - Initiate/Maintain 2alarm  - Obtain necessary fall risk management equipment: 2  - Apply yellow socks and bracelet for high fall risk patients  - Consider moving patient to room near nurses station  Outcome: Progressing     Problem: SAFETY,RESTRAINT: NV/NON-SELF DESTRUCTIVE BEHAVIOR  Goal: Remains free of harm/injury (restraint for non violent/non self-detsructive behavior)  Description: INTERVENTIONS:  - Instruct patient/family regarding restraint use   - Assess and monitor physiologic and psychological status   - Provide interventions and comfort measures to meet assessed patient needs   - Identify and implement measures to help patient regain control  - Assess readiness for release of restraint   Outcome: Progressing

## 2023-11-24 NOTE — DISCHARGE SUMMARY
[address] [facility]  Discharge- [facility] 1934, 80 y.o. male MRN: [id number]  Unit/Bed#: -01 Encounter: [id number]  Primary Care Provider: Terrence Stephens MD   Date and time admitted to hospital: 11/18/2023 10:43 AM    Electrolyte abnormality  Assessment & Plan  Hypomagnesemia  Repleted  Recheck    Urinary retention  Assessment & Plan  Found to be retaining in the ED with 500 cc on Marino insertion  Likely secondary to sedating medications  TOV as outpt as pt not ambulating much here  Continue home Flomax    Aneurysm of ascending aorta without rupture [facility])  Assessment & Plan  CT C/A/P on admit showing: "There is fusiform ectasia of the ascending thoracic aorta measuring up to 44 mm."  Needs repeat low-dose CT chest in 1 year    Ureterolithiasis  Assessment & Plan  Seen in the ED on 11/16 due to nausea x3 to 4 weeks  CT A/P on admit revealed 3 mm stone at the left ureterovesicular junction with no change in position or development of hydronephrosis from 11/16 CT  Patient without any flank pain  Strain all urine    Stage 2 chronic kidney disease  Assessment & Plan  Lab Results   Component Value Date    EGFR 63 11/24/2023    EGFR 69 11/23/2023    EGFR 66 11/22/2023    CREATININE 1.04 11/24/2023    CREATININE 0.96 11/23/2023    CREATININE 1.00 11/22/2023   Baseline creatinine 0.9-1.2  Patient did receive 1 L NS in the ED and gentle IVF  Torsemide resumed.   Patient did receive IV contrast in the ED  Avoid further nephrotoxic drugs and hypotension      Hypothyroidism  Assessment & Plan  Continue home Synthroid 25 mcg daily  TSH within normal limits on admit    HLD (hyperlipidemia)  Assessment & Plan  Continue home Zetia 10 Mg daily  Prior allergy to statins    Paroxysmal A-fib (HCC)  Assessment & Plan  Rate control with metoprolol succinate 25 Mg twice daily  OAC with Eliquis 5 Mg twice daily, dose decreased to 2.5 Mg twice daily on admit due to rising creatinine  Will place lopressor PRN,     Chronic systolic heart failure [facility])  Assessment & Plan  Wt Readings from Last 3 Encounters:   11/24/23 101 kg (222 lb 14.2 oz)   11/09/23 99.8 kg (220 lb)   10/25/23 99.8 kg (220 lb)   Last echo August 2023: LVEF 45%. Mild global hypokinesis. G1 DD  Home regimen: Torsemide 20 Mg MWF and 10 Mg remaining days  Examines hypovolemic with dry mucous membranes on admit, however did receive 1 L NS in the ED already -we will place on gentle IV fluids at 50 cc/h overnight  Diuretics were held but will resume 11/22  I/O and daily weights  Not on any fluid or sodium restriction at home, placed on normal cardiac diet on admit  Monitor for signs of any respiratory distress    History of TIA (transient ischemic attack)  Assessment & Plan  History of TIA in the past, that had presented as word finding difficulties  Maintained on Eliquis due to history of paroxysmal A-fib and Zetia, continue    RA (rheumatoid arthritis) ([facility])  Assessment & Plan  Home regimen: Hydroxychloroquine 200 Mg twice daily and prednisone 5 Mg daily  Continue home medicines    History of hypertension  Assessment & Plan  Home regimen: Metoprolol succinate 25 Mg twice daily  Continue    * Difficulty with speech  Assessment & Plan  Presents with development of word finding difficulty and nonfluent speech noted at breakfast by son at 0830, last normal was 0800 on 11/18  Speech abnormality persisted on arrival to the ED, but then improved significantly throughout the day, returning to his baseline  Stroke alert called in the ED  Imaging:  CTH: "No acute intracranial abnormality.  Severe chronic microangiopathy, similar to prior exams."  MRA head: "No stenosis or occlusion of the major vessels of the [place]. "  CTA head/neck was nondiagnostic due to patient motion and IV infiltration  MRI brain was nondiagnostic again due to patient movement despite multiple sedating medications  Discussed with neurology, Dr. Kyle Rg, on admission-recommendations:  Patient with similar presentation dating back to at least 2022, with this being the third admission other episodes have occurred outside the hospital  First episode attributed to TIA, second episode attributed to seizure VS migraine VS hypoperfusion  Neurology feels symptoms may be attributed to focal seizure with retained awareness  Recommends initiating Keppra 500 Mg every 12 hours    EEG - no clear evidence of seizure however was already on keppra, and received ativan  Placed on stroke pathway due to nondiagnostic MRI brain, patient unable to tolerate laying flat. Repeat MRI did not demonstrate CVA though was motion degraded. Appears patient has stable mentation for several days. Statin not ordered due to allergy, but remains on Zetia  ASA not ordered due to anticoagulation with Eliquis  Monitor on telemetry  Vital signs and neurochecks per stroke pathway protocol  SBP goal < 160  Neurology following  NIHSS on admit 1 (was unable to name month but no aphasia, dysarthria, or focal numbness/weakness)   Pt cognition continues to improve. Hospital Course:     Samaria Fox is a 80 y.o. male patient who originally presented to the hospital on   Admission Orders (From admission, onward)       Ordered        11/18/23 [address]  Once                         due to  focal seizures with retained awareness. Pt started on keppra with slow and steady improvement of mentation. Pt [facility] and will need follow up with Neurology as outpt. Please see above list of diagnoses and related plan for additional information. Physical Exam:    GEN: No acute distress, comfortable  HEEENT: No JVD, PERRLA, no scleral icterus  RESP: Lungs clear to auscultation bilaterally  CV: RRR, +s1/s2   ABD: SOFT NON TENDER, POSITIVE BOWEL SOUNDS, NO DISTENTION  PSYCH: CALM  NEURO: Mentation baseline, NO FOCAL DEFICITS. Pt oriented to person, place and time today. SKIN: NO RASH  EXTREM: NO EDEMA    CONSULTING PROVIDERS   IP CONSULT TO NEUROLOGY  IP CONSULT TO CASE MANAGEMENT  IP CONSULT TO NUTRITION SERVICES    PROCEDURES PERFORMED  * No surgery found *    RADIOLOGY RESULTS  XR chest portable    Result Date: 2023  Narrative: CHEST INDICATION:   eval pulm edema. COMPARISON: 2023 EXAM PERFORMED/VIEWS:  XR CHEST PORTABLE 1 image FINDINGS: Cardiomediastinal silhouette appears unremarkable. A loop recorder overlies the left chest. Minimal bibasilar scarring or atelectasis. No pneumothorax or pleural effusion. Osseous structures appear within normal limits for patient age. Reverse total shoulder arthroplasty on the right partially visualized. Impression: Bibasilar scarring or atelectasis. Workstation performed: [id number]     EEG awake or drowsy routine    Result Date: 2023  Narrative: Table formatting from the original result was not included. ELECTROENCEPHALOGRAM (EEG) Patient Name:  Stefan Mccullough  MRN: [id number] :  1934 File #: [id number] Age: 80 y.o. Date performed: 2023       Report date: 2023      Study type: Routine EEG ICD 10 diagnosis: Transient neurological symptoms R29.818 Start time: 11:38 End time: 10:07 --------------------------------------------------------------------------- ---------------------------------------- Patient History: This recording was observed in a 80 y.o. male to determine whether spells of language impairment are seizures. Medications include: Levetiracetam  --------------------------------------------------------------------------- ---------------------------------------- Description of Procedure: 32 channel digital recording with electrodes placed according to the International 10-20 system with additional T1/T2 electrodes, EOG, EKG, and simultaneous video. The recording was technically satisfactory.  --------------------------------------------------------------------------- ---------------------------------------- EEG Description: The recording was performed with the patient briefly awake, drowsy, and asleep. During very brief wakefulness, there were brief runs of poorly regulated, low amplitude, posteriorly dominant, symmetric 9 cps alpha rhythm that was not sustained long enough to determine reactivity to eye opening. There were symmetric low amplitude, frontally dominant beta activities. With drowsiness, alpha activity attenuated and was replaced by diffusely distributed theta activities. With sleep, symmetric sleep spindles were present. --------------------------------------------------------------------------- ---------------------------------------- Activation Procedures: Hyperventilation was not performed. Stepped photic stimulation between 1-30 cps was performed and induced symmetric photic driving. Other findings: The single lead ECG demonstrated regular rhythm --------------------------------------------------------------------------- ---------------------------------------- EEG Interpretation: This Routine EEG recorded during brief wakefulness, drowsiness, and sleep is normal. A normal EEG does not exclude the possibility of epilepsy. If clinically warranted, a repeat EEG following sleep deprivation could be considered. Gracie Hernadez MD [facility]     MRI brain wo contrast    Result Date: 11/20/2023  Narrative: MRI BRAIN WITHOUT CONTRAST INDICATION: Stroke. COMPARISON:   Brain MRI 1/26/2023 TECHNIQUE:  Multiplanar, multisequence imaging of the brain was performed. IMAGE QUALITY: Suboptimal due to motion artifact. FINDINGS: BRAIN PARENCHYMA: Cerebral atrophy. No significant change in nonspecific confluent foci of T2/FLAIR hyperintensities involving periventricular and subcortical white matter, most compatible with advanced microangiopathic change. No apparent discrete mass, mass effect or midline shift.  There is no intracranial hemorrhage. There is no evidence of acute infarction and diffusion imaging is unremarkable. VENTRICLES:  Ventricles and extra-axial CSF spaces are prominent commensurate with the degree of volume loss. No hydrocephalus. No acute intraventricular hemorrhage. SELLA AND PITUITARY GLAND: Assessment is limited. ORBITS: Assessment is limited. PARANASAL SINUSES:  Normal. VASCULATURE: Assessment is limited CALVARIUM AND SKULL BASE: Unremarkable. EXTRACRANIAL SOFT TISSUES: Unremarkable. Impression: 1. Limited sequences obtained (per quick stroke protocol). Suboptimal assessment due to distorted image quality by motion artifact. 2.  No acute ischemia. 3.  Cerebral atrophy and microangiopathic changes. Workstation performed: [id number]     MRA head wo contrast    Result Date: 11/18/2023  Narrative: MRA BRAIN WITHOUT CONTRAST INDICATION: Altered mental status. COMPARISON: 11/18/2022. TECHNIQUE:  Axial 3-D time-of-flight imaging with 3-D reconstructions performed without contrast. IV Contrast:  Not administered. FINDINGS: IMAGE QUALITY:  Diagnostic. ANATOMY INTERNAL CAROTID ARTERIES: No stenosis in the carotid siphons. ANTERIOR CIRCULATION:  Normal A1 segments. Normal anterior communicating artery. Normal flow-related signal of the anterior cerebral arteries. MIDDLE CEREBRAL ARTERY CIRCULATION:  The M1 segment and middle cerebral artery branches demonstrate normal flow-related signal. DISTAL VERTEBRAL ARTERIES:  Distal vertebral arteries are patent with a normal vertebrobasilar junction. The posterior inferior cerebellar arteries are now visualized. BASILAR ARTERY:  Normal. POSTERIOR CEREBRAL ARTERIES: Prominent right and small left posterior communicating arteries. No stenosis in the posterior cerebral arteries. Impression: No stenosis or occlusion of the major vessels of the [place] of [place].  Workstation performed: [id number]     MRI brain wo contrast    Result Date: 11/18/2023  Narrative: MRI BRAIN WITHOUT CONTRAST INDICATION: Altered mental status. COMPARISON:   1/26/2023 and 11/18/2023. TECHNIQUE: Exam terminated prior to completion due to patient motion artifact. IMAGE QUALITY: Partially nondiagnostic due to patient motion artifact. FINDINGS: BRAIN PARENCHYMA: Periventricular and subcortical T2/FLAIR hyperintense foci consistent with microangiopathic disease. White matter lesions in the brainstem. Diffusion weighted images are technically nondiagnostic. No intracranial hemorrhage or mass. VENTRICLES: Enlargement of the ventricles and sulci consistent with chronic volume loss. No hydrocephalus or extra-axial collection. SELLA AND PITUITARY GLAND: Not evaluated. ORBITS: Not diagnostically evaluated. PARANASAL SINUSES: Not diagnostically evaluated. VASCULATURE: Not diagnostically evaluated. CALVARIUM AND SKULL BASE: Normal visualized. EXTRACRANIAL SOFT TISSUES: Normal visualized. Impression: Nondiagnostic exam due to patient motion artifact. Repeat exam with appropriate sedation recommended, as clinically indicated. Workstation performed: [id number]     CT chest abdomen pelvis wo contrast    Result Date: 11/18/2023  Narrative: CT CHEST, ABDOMEN AND PELVIS WITHOUT IV CONTRAST INDICATION:   Review of note shows patient presented with altered mental status/confusion. Provided history with the study request is flank pain. Review of prior imaging shows left UVJ stone on recent CT. COMPARISON: CT of the abdomen/pelvis dated 11/16/2023. CT of the chest, abdomen, and pelvis dated 6/9/2023. TECHNIQUE: CT examination of the chest, abdomen and pelvis was performed without intravenous contrast. Multiplanar 2D reformatted images were created from the source data. This examination, like all CT scans performed in the [facility], was performed utilizing techniques to minimize radiation dose exposure, including the use of iterative reconstruction and automated exposure control.  Radiation dose length product (DLP) for this visit:  1731.08 mGy-cm Per report enteric contrast was not given, though there is hyperdense material evident in the sigmoid colon and rectum, possibly lingering from 11/16. IMAGE QUALITY: Technologist notes that patient was noncooperative throughout the exam resulting in extensive motion artifact, especially obscuring structures in the chest and upper abdomen. FINDINGS: CHEST LUNGS: Chronic left hemidiaphragm elevation. Patient motion and respiratory motion artifacts distort the lung fields. Thin bandlike opacities at the lung bases attributable to atelectasis related to exhalation. No superimposed acute consolidation. Small endobronchial lesions, small pulmonary nodules, and interstitial lung changes could be obscured by respiratory motion but there is no evidence for any of the above. PLEURA:  Unremarkable. HEART/GREAT VESSELS: Heart size is within normal limits. Valvular and coronary calcifications are present. No pericardial effusion. There is fusiform ectasia of the ascending thoracic aorta measuring up to 44 mm. Recommendation is for follow-up low radiation dose chest CT in one year. There is enlargement of the central pulmonary arterial tree suggesting some element of pulmonary artery hypertension. MEDIASTINUM AND ELIE:  Unremarkable. CHEST WALL AND LOWER NECK:  Unremarkable. ABDOMEN LIVER/BILIARY TREE: Contour difficult to evaluate due to motion but grossly smooth. Size is normal. Cysts in the right hepatic lobe measuring up to 15 mm--unchanged. No biliary ductal dilatation. GALLBLADDER: There are gallstone(s) within the gallbladder, without pericholecystic inflammatory changes. SPLEEN:  Unremarkable. PANCREAS: Diffuse parenchymal involution without focal mass or pancreatic ductal dilatation. Appearance is unchanged. ADRENAL GLANDS:  Unremarkable. KIDNEYS/URETERS: Bilateral cysts distorted by patient motion but grossly unchanged. No perinephric collections.  Punctate 3 mm stone at the left ureterovesicular junction appears unchanged in location compared to recent prior. No right-sided stones evident. Still no hydronephrosis on either side. Contrast in the renal collecting system from recent head/neck CTA. STOMACH AND BOWEL: No evidence of small or large bowel inflammation. Stomach is obscured by patient motion but grossly unremarkable when accounting for motion and level of distention. Numerous noninflamed colonic diverticula are present. APPENDIX: Not well demonstrated. No evidence for acute appendicitis. ABDOMINOPELVIC CAVITY:  No ascites. No pneumoperitoneum. No lymphadenopathy. VESSELS:  Unremarkable for patient's age. PELVIS REPRODUCTIVE ORGANS: Mildly patulous appearance of the prostatic urethra. Correlate for prior transurethral prostate procedure. Otherwise, unremarkable. URINARY BLADDER:  Unremarkable. ABDOMINAL WALL/INGUINAL REGIONS: Small amount of fat extending into both inguinal canals. Postsurgical changes in the posterior midline soft tissues related to prior spine surgery. No superficial collections or acute swelling. OSSEOUS STRUCTURES: Prior reverse glenohumeral arthroplasty at the right shoulder. Streak artifact and motion degrade evaluation of surrounding mineralization. Component alignment appears normal. Prior L3 and L4 laminectomies. Unchanged grade 1 anterolisthesis at L4-5. Hip degenerative changes are more severe on the left. No acute fracture or destructive osseous lesion. Impression: 1.  3 mm stone at the left ureterovesicular junction shows no interval change in position. Still no significant upstream hydronephrosis. 2.  No other new/acute findings in the chest, abdomen, or pelvis. 3.  Ascending aortic ectasia warrants yearly chest CT surveillance to confirm size stability. Note: Imaging follow-up reminder notification was scheduled in the electronic medical record.  Workstation performed: [id number]     CTA stroke alert (head/neck)    Result Date: 11/18/2023  Narrative: CTA NECK AND BRAIN WITH CONTRAST INDICATION: Stroke Alert COMPARISON:   Earlier same day CT stroke alert brain. TECHNIQUE:   Post contrast imaging was performed after administration of iodinated contrast through the neck and brain. Post contrast axial 0.625 mm images timed to opacify the arterial system. 3D rendering was performed on an independent workstation were attempted but difficult to do secondary to motion artifact. MIP reconstructions performed. Coronal reconstructions were performed of the noncontrast portion of the brain. Radiation dose length product (DLP) for this visit:  886.55 mGy-cm. This examination, like all CT scans performed in the [facility], was performed utilizing techniques to minimize radiation dose exposure, including the use of iterative reconstruction and automated exposure control. IV Contrast: 150 mL Omnipaque 350 IMAGE QUALITY:   Diagnostic FINDINGS: CERVICAL VASCULATURE AORTIC ARCH AND GREAT VESSELS: Mild atherosclerotic disease of the arch, proximal great vessels and visualized subclavian vessels. No significant stenosis. RIGHT VERTEBRAL ARTERY CERVICAL SEGMENT:  Normal origin. The vessel is hypoplastic in caliber throughout the neck but is significantly degraded by motion artifact. LEFT VERTEBRAL ARTERY CERVICAL SEGMENT:  Normal origin. The vessel is dominant in caliber throughout the neck but is significantly degraded by motion artifact. RIGHT EXTRACRANIAL CAROTID SEGMENT: Carotid artery appears patent however, there is significant degree of motion artifact LEFT EXTRACRANIAL CAROTID SEGMENT: Carotid artery appears patent, however, there is significant degree of motion artifact. NASCET criteria was used to determine the degree of internal carotid artery diameter stenosis. INTRACRANIAL VASCULATURE INTERNAL CAROTID ARTERIES: Bilateral internal carotid arteries appear patent but there is significant motion artifact.  ANTERIOR CIRCULATION: Bilateral anterior cerebral arteries appear patent but there is significant motion artifact MIDDLE CEREBRAL ARTERY CIRCULATION: Left MCA M1 segment and MCA branch vessels appear patent with distal vessels significantly degraded by motion artifact. Right MCA M1 segment appears patent with significant motion artifact in the M2, M3, and M4 cortical branches which can make a branch vessel occlusion missed given degree of significant motion artifact. DISTAL VERTEBRAL ARTERIES: Hypoplastic right and dominant left distal vertebral arteries appear to have contrast but there is significant motion artifact. BASILAR ARTERY:  Basilar artery appears to have contrast in it but it is difficult to assess the superior cerebellar arteries given degree of motion artifact. POSTERIOR CEREBRAL ARTERIES: Posterior cerebral arteries appear to have contrast in them but the distal segments have significant motion artifact. VENOUS STRUCTURES: Poor venous contrast opacification, suboptimal bolus timing for venous evaluation, and significant motion artifact. NON VASCULAR ANATOMY BONY STRUCTURES:  No acute osseous abnormality. Severe spinal degenerative changes. SOFT TISSUES OF THE NECK:  Normal. THORACIC INLET: Partially imaged cardiomegaly with mild coronary atherosclerosis. No full consolidation of visualized upper lung zones. Impression: Delayed examination due to multiple IV infiltrations and patient motion artifact -I asked the CT technologist to reinject the patient given the degree of motion artifact on the first provided images knowing that this patient has poor renal function given  that the benefit outweighs the risks and trying to determine a large vessel occlusion in the head or neck for which intervention could be performed. I was notified at a later time that the second bolus also had IV infiltration. Nondiagnostic examination  to definitively conclude no large vessel occlusion in the head or neck.  There appears to be contrast opacification of the intracranial vasculature particularly in the left MCA distribution but the right MCA distribution is moderately degraded by motion artifact and branch vessel occlusions can be missed in this situation. Consider repeat CTA head and neck with contrast when patient's condition improves or consider sedated MRI brain without contrast, MRA head, and MRA neck without contrast examination. Arterial system appears patent in the neck and head given degree of significant motion artifact. Confidence is low to detect large vessel occlusion in the head or neck given degree of motion artifact. Additional chronic/incidental findings as detailed above. Findings were directly discussed with Lb Winston at 12:42 p.m. Workstation performed: [id number]     CT stroke alert brain    Result Date: 11/18/2023  Narrative: CT BRAIN - STROKE ALERT PROTOCOL INDICATION:   Stroke Alert. COMPARISON: Pending same day CTA stroke alert head and neck. CT head without contrast 9/2/2023, 10/8/2022, 5/2/2022. CT stroke alert brain and CTA stroke alert head and neck 1/26/2023. MRI brain without contrast 1/26/2023. MRI brain without contrast 10/9/2022. . TECHNIQUE:  CT examination of the brain was performed. In addition to axial images, coronal reformatted images were created and submitted for interpretation. Radiation dose length product (DLP) for this visit:  918.94 mGy-cm. This examination, like all CT scans performed in the [facility], was performed utilizing techniques to minimize radiation dose exposure, including the use of iterative reconstruction and automated exposure control. IMAGE QUALITY:  Diagnostic. FINDINGS: PARENCHYMA: Decreased attenuation is noted in periventricular and subcortical white matter demonstrating an appearance that is statistically most likely to represent advanced microangiopathic change. Patchy hypodensities in the oscar correspond with chronic microangiopathy from prior MRI brains.  No CT signs of acute infarction. No intracranial mass, mass effect or midline shift. No acute parenchymal hemorrhage. Mild calcification of the cavernous internal carotid arteries. VENTRICLES AND EXTRA-AXIAL SPACES:  Ventricles and extra-axial CSF spaces are prominent commensurate with the degree of volume loss. No hydrocephalus. No acute extra-axial hemorrhage. VISUALIZED ORBITS: Sequela of bilateral cataract surgery. PARANASAL SINUSES: Normal visualized paranasal sinuses. CALVARIUM AND EXTRACRANIAL SOFT TISSUES:   Normal.     Impression: No acute intracranial abnormality. Severe chronic microangiopathy, similar to prior exams. Findings were directly discussed with Priya Setting 11/18/2023 at 11:39 AM. Workstation performed: [id number]     CT abdomen pelvis with contrast    Result Date: 11/16/2023  Narrative: CT ABDOMEN AND PELVIS WITH IV CONTRAST INDICATION:   Nausea, vomiting and abdominal pain. COMPARISON: 6/9/2023 TECHNIQUE:  CT examination of the abdomen and pelvis was performed. Multiplanar 2D reformatted images were created from the source data. This examination, like all CT scans performed in the [facility], was performed utilizing techniques to minimize radiation dose exposure, including the use of iterative reconstruction and automated exposure control. Radiation dose length product (DLP) for this visit:  956 mGy-cm IV Contrast:  80 mL of iohexol (OMNIPAQUE) Enteric Contrast:  Enteric contrast was administered. FINDINGS: ABDOMEN LOWER CHEST: Cardiomegaly. Clear lung bases. LIVER/BILIARY TREE: Stable circumscribed low-attenuation lesions throughout the liver measuring up to 2 cm, suggesting cysts and/or hemangiomas. GALLBLADDER: Stable small gallstone in the neck. SPLEEN:  Unremarkable. PANCREAS:  Unremarkable. ADRENAL GLANDS:  Unremarkable. KIDNEYS/URETERS: Stable bilateral renal cysts measuring up to 2 cm. Symmetric nephrographic phase enhancement of the kidneys.  Calculus in the distal left ureter at the ureterovesical junction measuring 3 mm. No proximal obstructive uropathy. STOMACH AND BOWEL: Contrast is present in the stomach and small bowel. No evidence of obstruction. Diverticulosis in the left colon without evidence of diverticulitis. No findings to indicate colitis. APPENDIX:  No findings to suggest appendicitis. ABDOMINOPELVIC CAVITY:  No ascites. No pneumoperitoneum. No lymphadenopathy. VESSELS: No abdominal aortic aneurysm. PELVIS REPRODUCTIVE ORGANS: Mild prostate enlargement. URINARY BLADDER:  Unremarkable. ABDOMINAL WALL/INGUINAL REGIONS:  Unremarkable. OSSEOUS STRUCTURES: Postsurgical change from L3-4 laminectomies. Impression: 1. Calculus in the distal left ureter at the ureterovesical junction measuring 3 mm. No proximal obstructive uropathy or evidence of pyelonephritis. 2. No findings to indicate bowel obstruction, colitis or diverticulitis. Workstation performed: [id number]     Cardiac EP device report    Result Date: 11/15/2023  Narrative: [facility] ILR - ACTIVE SYSTEM IS MRI CONDITIONAL MERLIN TRANSMISSION ALERT- NB: DEVICE DETECTED 1 TACHY EPISODE- 28 BEAT NSVT @ 178 BPM ON ECG. EF-45% (ECHO 8/30/23). DEVICE DETECTED 4 AF EPISODES MAX DURATION 16.75 HRS. AF BURDEN-19%. HX: PAF &ON ELIQUIS & METOPROLOL. NO PT ACTIVATED EPISODES. NSR W/ PVC'S @ 92 BPM ON CURRENT ECG. TASKED DR. STREET FOR NSVT. NORMAL DEVICE FUNCTION. GV     VAS upper limb venous duplex scan, unilateral/limited    Result Date: 11/1/2023  Narrative:  [facility] REPORT CLINICAL: Indications:  Patient presents with right upper extremity edema x 3-4 weeks. Operative History: no reported cardiovascular surgeries Risk Factors The patient has history of HTN and Hyperlipidemia. CONCLUSION:  Impression RIGHT UPPER LIMB: No evidence of acute or chronic deep vein thrombosis. No evidence of superficial thrombophlebitis noted. Doppler evaluation shows a normal response to augmentation maneuvers.   LEFT UPPER LIMB LIMITED: Evaluation shows no evidence of thrombus in the internal jugular vein, subclavian vein, and the innominate vein.   SIGNATURE: Electronically Signed by: Kasandra Baker on 2023-11-01 12:22:15 PM      LABS  Results from last 7 days   Lab Units 11/24/23  0521 11/23/23 0421 11/22/23 0345 11/21/23  0443 11/20/23  0558 11/19/23  0414 11/18/23  1103   WBC Thousand/uL 7.23 6.77 8.07 7.22 7.00 10.80* 9.81   HEMOGLOBIN g/dL 11.9* 10.8* 12.0 11.2* 11.4* 12.5 12.4   HEMATOCRIT % 38.9 35.3* 39.5 36.6 36.5 39.3 38.9   MCV fL 95 95 96 96 94 92 93   PLATELETS Thousands/uL 168 158 174 154 151 188 197   INR   --   --   --   --   --   --  1.53*     Results from last 7 days   Lab Units 11/24/23 0521 11/23/23 0421 11/22/23 0345 11/21/23  0443 11/20/23  0558 11/19/23  0414 11/18/23  1107   SODIUM mmol/L 139 140 140 138 138 139 137   POTASSIUM mmol/L 4.2 4.0 4.5 4.2 3.7 3.5 3.5   CHLORIDE mmol/L 103 103 104 105 105 100 98   CO2 mmol/L 29 31 28 28 27 26 27   BUN mg/dL 21 21 18 14 14 17 19   CREATININE mg/dL 1.04 0.96 1.00 0.92 1.10 1.50* 1.64*   CALCIUM mg/dL 9.5 9.1 9.0 8.4 7.6* 7.0* 7.1*   ALBUMIN g/dL 3.0* 2.9* 3.1* 3.0*  --  3.3* 3.3*   TOTAL BILIRUBIN mg/dL 0.45 0.36 0.41 0.41  --  0.58 0.52   ALK PHOS U/L 79 73 81 73  --  68 67   ALT U/L 16 9 9 8  --  10 11   AST U/L 28 13 18 18  --  17 18   EGFR ml/min/1.73sq m 63 69 66 73 59 40 36   GLUCOSE RANDOM mg/dL 131 132 125 126 117 109 103     Results from last 7 days   Lab Units 11/18/23  1738 11/18/23  1329 11/18/23  1103   HS TNI 0HR ng/L  --   --  11   HS TNI 2HR ng/L  --  10  --    HS TNI 4HR ng/L 11  --   --               Results from last 7 days   Lab Units 11/18/23  1056   POC GLUCOSE mg/dl 110     Results from last 7 days   Lab Units 11/19/23  0414   HEMOGLOBIN A1C % 6.7*     Results from last 7 days   Lab Units 11/18/23  1738   TSH 3RD GENERATON uIU/mL 4.345     Results from last 7 days   Lab Units 11/18/23  1738 11/18/23  1208 11/18/23  1107   LACTIC ACID mmol/L 2.0 2.3*  --    PROCALCITONIN ng/ml  --   --  0.11     Results from last 7 days   Lab Units 11/19/23  0414   TRIGLYCERIDES mg/dL 113   CHOLESTEROL mg/dL 132   LDL CALC mg/dL 51   HDL mg/dL 58       Cultures:   Results from last 7 days   Lab Units 11/18/23  1321   COLOR UA  Colorless   CLARITY UA  Clear   SPEC GRAV UA  <1.005*   PH UA  6.0   LEUKOCYTES UA  Negative   NITRITE UA  Negative   GLUCOSE UA mg/dl Negative   KETONES UA mg/dl Negative   BILIRUBIN UA  Negative   BLOOD UA  Negative           Results from last 7 days   Lab Units 11/18/23  1208 11/18/23  1103   BLOOD CULTURE  No Growth After 5 Days. No Growth After 5 Days. --    INFLUENZA A PCR   --  Negative         Condition at Discharge:  good      Discharge instructions/Information to patient and family:   See after visit summary for information provided to patient and family. Provisions for Follow-Up Care:  See after visit summary for information related to follow-up care and any pertinent home health orders. Disposition:     Other: snf       Discharge Statement:  I spent 44 minutes discharging the patient. This time was spent on the day of discharge. I had direct contact with the patient on the day of discharge. Greater than 50% of the total time was spent examining patient, answering all patient questions, arranging and discussing plan of care with patient as well as directly providing post-discharge instructions. Additional time then spent on discharge activities. Discharge Medications:  See after visit summary for reconciled discharge medications provided to patient and family.       ** Please Note: This note has been constructed using a voice recognition system **

## 2023-11-24 NOTE — ASSESSMENT & PLAN NOTE
Found to be retaining in the ED with 500 cc on Marino insertion  Likely secondary to sedating medications  TOV as outpt as pt not ambulating much here  Continue home Flomax

## 2023-11-24 NOTE — NURSING NOTE
Pt transported to [facility] rehab via ambulance transport. IV removed. Marino in place (outpt TOV) family at bedside and is transporting all personal belongings.

## 2023-11-24 NOTE — CASE MANAGEMENT
Case Management Discharge Planning Note    Patient name Le Cabral  [facility] [address] [facility] [id number]  : 1934 Date 2023       Current Admission Date: 2023  Current Admission Diagnosis:Difficulty with speech   Patient Active Problem List    Diagnosis Date Noted    Stroke-like symptom 2023    Electrolyte abnormality 2023    Difficulty with speech 2023    Stage 2 chronic kidney disease 2023    Ureterolithiasis 2023    Aneurysm of ascending aorta without rupture ([address]) 2023    Urinary retention 2023    Bacteremia due to group B Streptococcus 2023    Respiratory insufficiency 2023    Cellulitis of left upper extremity 2023    HLD (hyperlipidemia) 2023    GERD (gastroesophageal reflux disease) 2023    Gout without acute exacerbation  2023    Spinal stenosis 2023    Hypothyroidism 2023    Peripheral vascular disease ([address]) 2023    Open wound of right great toe with damage to nail 2023    Adverse effect of loop (high-ceiling) diuretics, subsequent encounter     Toxic metabolic encephalopathy     Septic arthritis of shoulder, right ([address]) 2023    Suture of skin wound 2023    Hypomagnesemia 2023    Elevated TSH 2023    Chronic systolic heart failure ([address]) 2023    Paroxysmal A-fib ([address]) 2023    Focal seizure with retained awareness 2023    KAMLESH (acute kidney injury) ([address]) 2023    Dilated cardiomyopathy ([address]) 10/18/2022    HTN (hypertension) 10/18/2022    Low left ventricular ejection fraction 10/11/2022    History of TIA (transient ischemic attack) 10/10/2022    Speech disturbance 10/08/2022    Accidental overdose 2020    History of hypertension 2020    Hypotension 2020    RA (rheumatoid arthritis) ([address]) 2020      LOS (days): 6  Geometric Mean LOS (GMLOS) (days): 4.50  Days to GMLOS:-1.3 OBJECTIVE:  Risk of Unplanned Readmission Score: 33.12         Current admission status: Inpatient   Preferred Pharmacy:   [address], [address] [place] [address]  Phone: [phone] Fax: [phone]  [address], [address] [facility] [address] [place] [address],[address]  [address] [place] [address]  Phone: [phone] Fax: [phone]    Primary Care Provider: Dorothy Sarmiento MD    Primary Insurance: [facility]  Secondary Insurance:     DISCHARGE DETAILS:     Received confirmed  time of 4p. CM notified MD and RN via TT, facility via Aidin, and son via phone.

## 2023-11-24 NOTE — ASSESSMENT & PLAN NOTE
Rate control with metoprolol succinate 25 Mg twice daily  OAC with Eliquis 5 Mg twice daily, dose decreased to 2.5 Mg twice daily on admit due to rising creatinine  Will place lopressor PRN,

## 2023-11-24 NOTE — ASSESSMENT & PLAN NOTE
Presents with development of word finding difficulty and nonfluent speech noted at breakfast by son at 0830, last normal was 0800 on 11/18  Speech abnormality persisted on arrival to the ED, but then improved significantly throughout the day, returning to his baseline  Stroke alert called in the ED  Imaging:  CTH: "No acute intracranial abnormality. Severe chronic microangiopathy, similar to prior exams."  MRA head: "No stenosis or occlusion of the major vessels of the [place]. "  CTA head/neck was nondiagnostic due to patient motion and IV infiltration  MRI brain was nondiagnostic again due to patient movement despite multiple sedating medications  Discussed with neurology, Dr. Mary Alice Garcia, on admission-recommendations:  Patient with similar presentation dating back to at least 2022, with this being the third admission other episodes have occurred outside the hospital  First episode attributed to TIA, second episode attributed to seizure VS migraine VS hypoperfusion  Neurology feels symptoms may be attributed to focal seizure with retained awareness  Recommends initiating Keppra 500 Mg every 12 hours    EEG - no clear evidence of seizure however was already on keppra, and received ativan  Placed on stroke pathway due to nondiagnostic MRI brain, patient unable to tolerate laying flat. Repeat MRI did not demonstrate CVA though was motion degraded. Appears patient has stable mentation for several days. Statin not ordered due to allergy, but remains on Zetia  ASA not ordered due to anticoagulation with Eliquis  Monitor on telemetry  Vital signs and neurochecks per stroke pathway protocol  SBP goal < 160  Neurology following  NIHSS on admit 1 (was unable to name month but no aphasia, dysarthria, or focal numbness/weakness)   Pt cognition continues to improve.

## 2023-11-24 NOTE — PLAN OF CARE
Problem: Potential for Falls  Goal: Patient will remain free of falls  Description: INTERVENTIONS:  - Educate patient/family on patient safety including physical limitations  - Instruct patient to call for assistance with activity   - Consult OT/PT to assist with strengthening/mobility   - Keep Call bell within reach  - Keep bed low and locked with side rails adjusted as appropriate  - Keep care items and personal belongings within reach  - Initiate and maintain comfort rounds  - Make Fall Risk Sign visible to staff  - Offer Toileting every x Hours, in advance of need  - Initiate/Maintain xalarm  - Obtain necessary fall risk management equipment: x  - Apply yellow socks and bracelet for high fall risk patients  - Consider moving patient to room near nurses station  Outcome: Progressing     Problem: SAFETY,RESTRAINT: NV/NON-SELF DESTRUCTIVE BEHAVIOR  Goal: Remains free of harm/injury (restraint for non violent/non self-detsructive behavior)  Description: INTERVENTIONS:  - Instruct patient/family regarding restraint use   - Assess and monitor physiologic and psychological status   - Provide interventions and comfort measures to meet assessed patient needs   - Identify and implement measures to help patient regain control  - Assess readiness for release of restraint   Outcome: Progressing  Goal: Returns to optimal restraint-free functioning  Description: INTERVENTIONS:  - Assess the patient's behavior and symptoms that indicate continued need for restraint  - Identify and implement measures to help patient regain control  - Assess readiness for release of restraint   Outcome: Progressing     Problem: Prexisting or High Potential for Compromised Skin Integrity  Goal: Skin integrity is maintained or improved  Description: INTERVENTIONS:  - Identify patients at risk for skin breakdown  - Assess and monitor skin integrity  - Assess and monitor nutrition and hydration status  - Monitor labs   - Assess for incontinence   - Turn and reposition patient  - Assist with mobility/ambulation  - Relieve pressure over bony prominences  - Avoid friction and shearing  - Provide appropriate hygiene as needed including keeping skin clean and dry  - Evaluate need for skin moisturizer/barrier cream  - Collaborate with interdisciplinary team   - Patient/family teaching  - Consider wound care consult   Outcome: Progressing     Problem: Nutrition/Hydration-ADULT  Goal: Nutrient/Hydration intake appropriate for improving, restoring or maintaining nutritional needs  Description: Monitor and assess patient's nutrition/hydration status for malnutrition. Collaborate with interdisciplinary team and initiate plan and interventions as ordered. Monitor patient's weight and dietary intake as ordered or per policy. Utilize nutrition screening tool and intervene as necessary. Determine patient's food preferences and provide high-protein, high-caloric foods as appropriate.      INTERVENTIONS:  - Monitor oral intake, urinary output, labs, and treatment plans  - Assess nutrition and hydration status and recommend course of action  - Evaluate amount of meals eaten  - Assist patient with eating if necessary   - Allow adequate time for meals  - Recommend/ encourage appropriate diets, oral nutritional supplements, and vitamin/mineral supplements  - Order, calculate, and assess calorie counts as needed  - Recommend, monitor, and adjust tube feedings and TPN/PPN based on assessed needs  - Assess need for intravenous fluids  - Provide specific nutrition/hydration education as appropriate  - Include patient/family/caregiver in decisions related to nutrition  Outcome: Progressing     Problem: Potential for Falls  Goal: Patient will remain free of falls  Description: INTERVENTIONS:  - Educate patient/family on patient safety including physical limitations  - Instruct patient to call for assistance with activity   - Consult OT/PT to assist with strengthening/mobility   - Keep Call bell within reach  - Keep bed low and locked with side rails adjusted as appropriate  - Keep care items and personal belongings within reach  - Initiate and maintain comfort rounds  - Make Fall Risk Sign visible to staff  - Offer Toileting every x Hours, in advance of need  - Initiate/Maintain xalarm  - Obtain necessary fall risk management equipment: x  - Apply yellow socks and bracelet for high fall risk patients  - Consider moving patient to room near nurses station  Outcome: Progressing     Problem: SAFETY,RESTRAINT: NV/NON-SELF DESTRUCTIVE BEHAVIOR  Goal: Remains free of harm/injury (restraint for non violent/non self-detsructive behavior)  Description: INTERVENTIONS:  - Instruct patient/family regarding restraint use   - Assess and monitor physiologic and psychological status   - Provide interventions and comfort measures to meet assessed patient needs   - Identify and implement measures to help patient regain control  - Assess readiness for release of restraint   Outcome: Progressing  Goal: Returns to optimal restraint-free functioning  Description: INTERVENTIONS:  - Assess the patient's behavior and symptoms that indicate continued need for restraint  - Identify and implement measures to help patient regain control  - Assess readiness for release of restraint   Outcome: Progressing     Problem: Prexisting or High Potential for Compromised Skin Integrity  Goal: Skin integrity is maintained or improved  Description: INTERVENTIONS:  - Identify patients at risk for skin breakdown  - Assess and monitor skin integrity  - Assess and monitor nutrition and hydration status  - Monitor labs   - Assess for incontinence   - Turn and reposition patient  - Assist with mobility/ambulation  - Relieve pressure over bony prominences  - Avoid friction and shearing  - Provide appropriate hygiene as needed including keeping skin clean and dry  - Evaluate need for skin moisturizer/barrier cream  - Collaborate with interdisciplinary team   - Patient/family teaching  - Consider wound care consult   Outcome: Progressing     Problem: Nutrition/Hydration-ADULT  Goal: Nutrient/Hydration intake appropriate for improving, restoring or maintaining nutritional needs  Description: Monitor and assess patient's nutrition/hydration status for malnutrition. Collaborate with interdisciplinary team and initiate plan and interventions as ordered. Monitor patient's weight and dietary intake as ordered or per policy. Utilize nutrition screening tool and intervene as necessary. Determine patient's food preferences and provide high-protein, high-caloric foods as appropriate.      INTERVENTIONS:  - Monitor oral intake, urinary output, labs, and treatment plans  - Assess nutrition and hydration status and recommend course of action  - Evaluate amount of meals eaten  - Assist patient with eating if necessary   - Allow adequate time for meals  - Recommend/ encourage appropriate diets, oral nutritional supplements, and vitamin/mineral supplements  - Order, calculate, and assess calorie counts as needed  - Recommend, monitor, and adjust tube feedings and TPN/PPN based on assessed needs  - Assess need for intravenous fluids  - Provide specific nutrition/hydration education as appropriate  - Include patient/family/caregiver in decisions related to nutrition  Outcome: Progressing     Problem: Potential for Falls  Goal: Patient will remain free of falls  Description: INTERVENTIONS:  - Educate patient/family on patient safety including physical limitations  - Instruct patient to call for assistance with activity   - Consult OT/PT to assist with strengthening/mobility   - Keep Call bell within reach  - Keep bed low and locked with side rails adjusted as appropriate  - Keep care items and personal belongings within reach  - Initiate and maintain comfort rounds  - Make Fall Risk Sign visible to staff  - Offer Toileting every x Hours, in advance of need  - Initiate/Maintain xxxalarm  - Obtain necessary fall risk management equipment: x  - Apply yellow socks and bracelet for high fall risk patients  - Consider moving patient to room near nurses station  Outcome: Progressing     Problem: SAFETY,RESTRAINT: NV/NON-SELF DESTRUCTIVE BEHAVIOR  Goal: Remains free of harm/injury (restraint for non violent/non self-detsructive behavior)  Description: INTERVENTIONS:  - Instruct patient/family regarding restraint use   - Assess and monitor physiologic and psychological status   - Provide interventions and comfort measures to meet assessed patient needs   - Identify and implement measures to help patient regain control  - Assess readiness for release of restraint   Outcome: Progressing  Goal: Returns to optimal restraint-free functioning  Description: INTERVENTIONS:  - Assess the patient's behavior and symptoms that indicate continued need for restraint  - Identify and implement measures to help patient regain control  - Assess readiness for release of restraint   Outcome: Progressing     Problem: Prexisting or High Potential for Compromised Skin Integrity  Goal: Skin integrity is maintained or improved  Description: INTERVENTIONS:  - Identify patients at risk for skin breakdown  - Assess and monitor skin integrity  - Assess and monitor nutrition and hydration status  - Monitor labs   - Assess for incontinence   - Turn and reposition patient  - Assist with mobility/ambulation  - Relieve pressure over bony prominences  - Avoid friction and shearing  - Provide appropriate hygiene as needed including keeping skin clean and dry  - Evaluate need for skin moisturizer/barrier cream  - Collaborate with interdisciplinary team   - Patient/family teaching  - Consider wound care consult   Outcome: Progressing     Problem: Nutrition/Hydration-ADULT  Goal: Nutrient/Hydration intake appropriate for improving, restoring or maintaining nutritional needs  Description: Monitor and assess patient's nutrition/hydration status for malnutrition. Collaborate with interdisciplinary team and initiate plan and interventions as ordered. Monitor patient's weight and dietary intake as ordered or per policy. Utilize nutrition screening tool and intervene as necessary. Determine patient's food preferences and provide high-protein, high-caloric foods as appropriate.      INTERVENTIONS:  - Monitor oral intake, urinary output, labs, and treatment plans  - Assess nutrition and hydration status and recommend course of action  - Evaluate amount of meals eaten  - Assist patient with eating if necessary   - Allow adequate time for meals  - Recommend/ encourage appropriate diets, oral nutritional supplements, and vitamin/mineral supplements  - Order, calculate, and assess calorie counts as needed  - Recommend, monitor, and adjust tube feedings and TPN/PPN based on assessed needs  - Assess need for intravenous fluids  - Provide specific nutrition/hydration education as appropriate  - Include patient/family/caregiver in decisions related to nutrition  Outcome: Progressing

## 2023-11-24 NOTE — ASSESSMENT & PLAN NOTE
Lab Results   Component Value Date    EGFR 63 11/24/2023    EGFR 69 11/23/2023    EGFR 66 11/22/2023    CREATININE 1.04 11/24/2023    CREATININE 0.96 11/23/2023    CREATININE 1.00 11/22/2023   Baseline creatinine 0.9-1.2  Patient did receive 1 L NS in the ED and gentle IVF  Torsemide resumed.   Patient did receive IV contrast in the ED  Avoid further nephrotoxic drugs and hypotension

## 2023-11-24 NOTE — CASE MANAGEMENT
[facility] has received approved authorization from insurance:  [place]iguel in by Rep: Dory Alyce P#  [phone]  Authorization received for: SNF  [facility] #: [id number]  Start of Care: 11/24  Next Review Date: 12/1  Continued Stay Care Coordinator:  Sara Carcamo P#: [phone]  Submit next review to: fax# [phone]  Care Manager notified: Jonathan Anne

## 2023-11-27 NOTE — UTILIZATION REVIEW
NOTIFICATION OF ADMISSION DISCHARGE   This is a Notification of Discharge from [address] [facility]. Please be advised that this patient has been discharge from our facility. Below you will find the admission and discharge date and time including the patient’s disposition. UTILIZATION REVIEW CONTACT:  Allison Davis  Utilization   Network Utilization Review Department  Phone: [phone] carefully listen to the prompts. All voicemails are confidential.  Email: [email or web address]     ADMISSION INFORMATION  PRESENTATION DATE: 11/18/2023 10:43 AM  OBERVATION ADMISSION DATE:   INPATIENT ADMISSION DATE: 11/18/23  5:55 PM   DISCHARGE DATE: 11/24/2023  4:36 PM   DISPOSITION:Discharged/Transferred to Long Term Care/Personal Care Home/Assisted Living    Network Utilization Review Department  ATTENTION: Please call with any questions or concerns to [phone] and carefully listen to the prompts so that you are directed to the right person. All voicemails are confidential.   For Discharge needs, contact Care Management DC Support Team at [phone] opt. 2  Send all requests for admission clinical reviews, approved or denied determinations and any other requests to dedicated fax number below belonging to the campus where the patient is receiving treatment.  List of dedicated fax numbers for the Facilities:  [place] DENIALS (Administrative/Medical Necessity) [phone]   DISCHARGE SUPPORT TEAM (Network) [phone] [facility] (Maternity/NICU/Pediatrics) [phone]   [address] [facility] [address] [place] [address] [phone] [place] [address]88940 [address] [phone]   [address] [place] [address]  [facility] [phone]

## 2023-11-28 ENCOUNTER — TELEPHONE (OUTPATIENT)
Age: 88
End: 2023-11-28

## 2023-11-28 NOTE — TELEPHONE ENCOUNTER
Patient's son Jc Junior called to schedule a New Patient appointment in [place] office. Pt is currently in a Rehab but was recently seen in ER and they found a 3 mm stone at the left ureterovesicular junction with no change in position or development of hydronephrosis from 11/16 CT. Pt is currently scheduled for 1/8/24 at 1:40PM.    Please, review that appointment is scheduled in appropriate time frame.      Call back [phone]

## 2023-11-29 ENCOUNTER — TELEPHONE (OUTPATIENT)
Dept: NEUROLOGY | Facility: CLINIC | Age: 88
End: 2023-11-29

## 2023-11-29 NOTE — TELEPHONE ENCOUNTER
2:06pm  Hi, this is Kerwin Krause. I am calling for my dad, Matthew,: 1934 septe. He was at [facility]  - . Anyway, I was informed that I should make a followup appointment for what was determined to be focal seizures. So I need to make an appointment to give me a call back. I appreciate it. My name is Michelle Jenkins [phone]. Thank you.    ___________________________________________    Vinay Stager (on consent form/POA) calling to make HFU appt.     Scheduling - jignai

## 2023-11-29 NOTE — TELEPHONE ENCOUNTER
Myrtle Farmer,     Patient was LOV with you was on 10/12/2023 for 3 different reason and now was admitted for a new reason and asked to be seen by epilepsy attending. Can you please advise if we can offer an HFU with you or ask requested ? Thank you for your time and help,     Matthew Prieto     HFU/ [facility]ANY[facility] [employer] / Focal seizure with retained awareness      DC- FACILITY- 11/24/2023    Vejuven Holter will need follow up in in 6 weeks with epilepsy attending. He will not require outpatient neurological testing. Patient last saw Dr. Mary Barriga outpatient and was last seen on 10/10/23. Would recommend that he be changed from neurovascular team to epilepsy team. I will let Dr. Mary Barriga know of our recommendation.

## 2023-11-30 NOTE — TELEPHONE ENCOUNTER
HFU:    SLUB 11//23 FOCAL SEIZURE W/RETAINED AWARENESS    Scheduled patient with Dr Paula García in [facility] 2/15/24 @ 1pm      Per notes:      Geraldine Moreno will need follow up in in 6 weeks with epilepsy attending. He will not require outpatient neurological testing. Patient last saw Dr. Calos Nelson outpatient and was last seen on 10/10/23. Would recommend that he be changed from neurovascular team to epilepsy team. I will let Dr. Calos Nelson know of our recommendation.

## 2023-11-30 NOTE — TELEPHONE ENCOUNTER
Recd  11/29 1:10 PM     this is Bruno Carlos I'm calling for my dad. Bennett Ellis,   september 30th, 1934. He was in [facility], and he was seen by neurology. And we received an e mail stating that he should be seen by an epilepsy attending. I don't know who to call for that. If you could give me a call back at [phone]. To let me know who I need to call. I appreciate it. Thank you very much. Have a good day.

## 2023-11-30 NOTE — TELEPHONE ENCOUNTER
I scheduled and added patient to wait list. Patient is currently in an independent assisted living so if a sooner appointment is available son would like a good couple days in advance to try to schedule proper transportation for patient.

## 2023-11-30 NOTE — TELEPHONE ENCOUNTER
---- Message -----   From: Tra Ceja MD   Sent: 11/30/2023   8:31 AM EST   To: Estrada Palma MD; Archana Renteria RN   Subject: RE: [address] to see epilepsy for follow up. Please schedule one hour visit with epileptologist. Thanks.   ----- Message -----   From: Estrada Palma MD   Sent: 11/29/2023   2:06 PM EST   To: Jerri Sher MD; Archana Renteria RN   Subject: FW: Hospital update                              Hi Lorelie Solcarmen,     I saw that Aleksey's family called to arrange his follow up. Based on the note below I would agree that epilepsy follow up may be reasonable. .. Judith Hernandez got this from the inpatient team.  What do you think? Thanks!     ----- Message -----   From: Mary Andrade PA-C   Sent: 11/20/2023  11:18 AM EST   To: Estrada Palma MD   Subject: Hospital update                                  [facility],   I just wanted to give you an update on Aleksey, who you last saw on 10/10/23. Aleksey returned to the hospital after a recurrent event of speech disturbance, once again with an abrupt onset. 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 This time patient was on the phone with son and patient abruptly could only make sounds and no longer say sentences. Son called staff who checked on him and they reported persistent speech dirstubance even after 20 minutes and thus he was brought to the ED. Typical events last minutes with patient recalling event stating that he was unable to speak. This event; however, lasted a bit longer, probably 30 plus minutes, though it is difficult to know when it stopped as the patient had multiple other reasons for confusion since hospitalization including sedating medications, delirium, other medical issues, etc vs ?postictal confusion. Patient does not recall this event nor that he was speaking to his son when it started (which is unusual).  Limited MRI was negative for stroke. Empiric Keppra 500mg q12 hrs initiated. EEG pending for today. If you agree, Dr. Alan Dave and I think he can switch teams for followup and follow-up with epilepsy instead. Sound ok? Let me know if you have any questions. Thank you! Alicja Figures   _____________  Per chart review patient scheduled with Dr. Duane Aguas 2/15/2024.

## 2023-11-30 NOTE — TELEPHONE ENCOUNTER
Called patient's son back , Austyn Aguirre and he has accepted 12/12/2023, 8am, [facility],  apprv and discussed with the RN Gilberto Siemens.     Thank you all,     Al Vides

## 2023-12-01 ENCOUNTER — APPOINTMENT (EMERGENCY)
Dept: RADIOLOGY | Facility: HOSPITAL | Age: 88
End: 2023-12-01
Payer: COMMERCIAL

## 2023-12-01 ENCOUNTER — HOSPITAL ENCOUNTER (EMERGENCY)
Facility: HOSPITAL | Age: 88
Discharge: HOME | End: 2023-12-01
Attending: EMERGENCY MEDICINE
Payer: COMMERCIAL

## 2023-12-01 ENCOUNTER — APPOINTMENT (EMERGENCY)
Dept: CT IMAGING | Facility: HOSPITAL | Age: 88
End: 2023-12-01
Payer: COMMERCIAL

## 2023-12-01 VITALS
HEART RATE: 84 BPM | OXYGEN SATURATION: 96 % | BODY MASS INDEX: 31.48 KG/M2 | RESPIRATION RATE: 20 BRPM | HEIGHT: 69 IN | DIASTOLIC BLOOD PRESSURE: 78 MMHG | TEMPERATURE: 97.2 F | WEIGHT: 212.52 LBS | SYSTOLIC BLOOD PRESSURE: 127 MMHG

## 2023-12-01 DIAGNOSIS — N39.0 URINARY TRACT INFECTION: ICD-10-CM

## 2023-12-01 DIAGNOSIS — M70.20 OLECRANON BURSITIS: ICD-10-CM
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
## 2023-12-01 LAB
2HR DELTA HS TROPONIN: 0 NG/L
ABO GROUP BLD: NORMAL
ALBUMIN SERPL BCP-MCNC: 3.3 G/DL (ref 3.5–5)
ALP SERPL-CCNC: 87 U/L (ref 34–104)
ALT SERPL W P-5'-P-CCNC: 21 U/L (ref 7–52)
ANION GAP SERPL CALCULATED.3IONS-SCNC: 8 MMOL/L
APTT PPP: 33 SECONDS (ref 23–37)
AST SERPL W P-5'-P-CCNC: 21 U/L (ref 13–39)
ATRIAL RATE: 89 BPM
BACTERIA UR QL AUTO: ABNORMAL /HPF
BASOPHILS # BLD AUTO: 0.02 THOUSANDS/ÂΜL (ref 0–0.1)
BASOPHILS NFR BLD AUTO: 0 % (ref 0–1)
BILIRUB SERPL-MCNC: 0.46 MG/DL (ref 0.2–1)
BILIRUB UR QL STRIP: NEGATIVE
BLD GP AB SCN SERPL QL: NEGATIVE
BUN SERPL-MCNC: 18 MG/DL (ref 5–25)
CALCIUM ALBUM COR SERPL-MCNC: 9.6 MG/DL (ref 8.3–10.1)
CALCIUM SERPL-MCNC: 9 MG/DL (ref 8.4–10.2)
CARDIAC TROPONIN I PNL SERPL HS: 8 NG/L
CARDIAC TROPONIN I PNL SERPL HS: 8 NG/L
CHLORIDE SERPL-SCNC: 98 MMOL/L (ref 96–108)
CK SERPL-CCNC: 68 U/L (ref 39–308)
CLARITY UR: CLEAR
CO2 SERPL-SCNC: 33 MMOL/L (ref 21–32)
COLOR UR: ABNORMAL
CREAT SERPL-MCNC: 1.09 MG/DL (ref 0.6–1.3)
EOSINOPHIL # BLD AUTO: 0.48 THOUSAND/ÂΜL (ref 0–0.61)
EOSINOPHIL NFR BLD AUTO: 6 % (ref 0–6)
ERYTHROCYTE [DISTWIDTH] IN BLOOD BY AUTOMATED COUNT: 14.4 % (ref 11.6–15.1)
GFR SERPL CREATININE-BSD FRML MDRD: 59 ML/MIN/1.73SQ M
GLUCOSE SERPL-MCNC: 147 MG/DL (ref 65–140)
GLUCOSE UR STRIP-MCNC: NEGATIVE MG/DL
HCT VFR BLD AUTO: 37.5 % (ref 36.5–49.3)
HGB BLD-MCNC: 11.8 G/DL (ref 12–17)
HGB UR QL STRIP.AUTO: ABNORMAL
IMM GRANULOCYTES # BLD AUTO: 0.04 THOUSAND/UL (ref 0–0.2)
IMM GRANULOCYTES NFR BLD AUTO: 1 % (ref 0–2)
INR PPP: 1.27 (ref 0.84–1.19)
KETONES UR STRIP-MCNC: NEGATIVE MG/DL
LEUKOCYTE ESTERASE UR QL STRIP: ABNORMAL
LYMPHOCYTES # BLD AUTO: 2.54 THOUSANDS/ÂΜL (ref 0.6–4.47)
LYMPHOCYTES NFR BLD AUTO: 31 % (ref 14–44)
MCH RBC QN AUTO: 29.1 PG (ref 26.8–34.3)
MCHC RBC AUTO-ENTMCNC: 31.5 G/DL (ref 31.4–37.4)
MCV RBC AUTO: 92 FL (ref 82–98)
MONOCYTES # BLD AUTO: 0.69 THOUSAND/ÂΜL (ref 0.17–1.22)
MONOCYTES NFR BLD AUTO: 9 % (ref 4–12)
MUCOUS THREADS UR QL AUTO: ABNORMAL
NEUTROPHILS # BLD AUTO: 4.37 THOUSANDS/ÂΜL (ref 1.85–7.62)
NEUTS SEG NFR BLD AUTO: 53 % (ref 43–75)
NITRITE UR QL STRIP: POSITIVE
NON-SQ EPI CELLS URNS QL MICRO: ABNORMAL /HPF
NRBC BLD AUTO-RTO: 0 /100 WBCS
P AXIS: 29 DEGREES
PH UR STRIP.AUTO: 6.5 [PH]
PLATELET # BLD AUTO: 171 THOUSANDS/UL (ref 149–390)
PMV BLD AUTO: 12.6 FL (ref 8.9–12.7)
POTASSIUM SERPL-SCNC: 3.5 MMOL/L (ref 3.5–5.3)
PR INTERVAL: 168 MS
PROT SERPL-MCNC: 6.5 G/DL (ref 6.4–8.4)
PROT UR STRIP-MCNC: NEGATIVE MG/DL
PROTHROMBIN TIME: 16.4 SECONDS (ref 11.6–14.5)
QRS AXIS: -52 DEGREES
QRSD INTERVAL: 94 MS
QT INTERVAL: 406 MS
QTC INTERVAL: 493 MS
RBC # BLD AUTO: 4.06 MILLION/UL (ref 3.88–5.62)
RBC #/AREA URNS AUTO: ABNORMAL /HPF
RH BLD: POSITIVE
SODIUM SERPL-SCNC: 139 MMOL/L (ref 135–147)
SP GR UR STRIP.AUTO: <1.005 (ref 1–1.03)
SPECIMEN EXPIRATION DATE: NORMAL
T WAVE AXIS: 66 DEGREES
UROBILINOGEN UR STRIP-ACNC: <2 MG/DL
VENTRICULAR RATE: 89 BPM
WBC # BLD AUTO: 8.14 THOUSAND/UL (ref 4.31–10.16)
WBC #/AREA URNS AUTO: ABNORMAL /HPF

## 2023-12-01 PROCEDURE — 82550 ASSAY OF CK (CPK): CPT | Performed by: EMERGENCY MEDICINE

## 2023-12-01 PROCEDURE — 71045 X-RAY EXAM CHEST 1 VIEW: CPT

## 2023-12-01 PROCEDURE — 93005 ELECTROCARDIOGRAM TRACING: CPT

## 2023-12-01 PROCEDURE — 99285 EMERGENCY DEPT VISIT HI MDM: CPT | Performed by: EMERGENCY MEDICINE

## 2023-12-01 PROCEDURE — 73080 X-RAY EXAM OF ELBOW: CPT

## 2023-12-01 PROCEDURE — 96365 THER/PROPH/DIAG IV INF INIT: CPT

## 2023-12-01 PROCEDURE — 87077 CULTURE AEROBIC IDENTIFY: CPT | Performed by: EMERGENCY MEDICINE

## 2023-12-01 PROCEDURE — 85610 PROTHROMBIN TIME: CPT | Performed by: EMERGENCY MEDICINE

## 2023-12-01 PROCEDURE — 87086 URINE CULTURE/COLONY COUNT: CPT | Performed by: EMERGENCY MEDICINE

## 2023-12-01 PROCEDURE — 36415 COLL VENOUS BLD VENIPUNCTURE: CPT | Performed by: EMERGENCY MEDICINE

## 2023-12-01 PROCEDURE — 85025 COMPLETE CBC W/AUTO DIFF WBC: CPT | Performed by: EMERGENCY MEDICINE

## 2023-12-01 PROCEDURE — 99285 EMERGENCY DEPT VISIT HI MDM: CPT

## 2023-12-01 PROCEDURE — 70450 CT HEAD/BRAIN W/O DYE: CPT

## 2023-12-01 PROCEDURE — 86901 BLOOD TYPING SEROLOGIC RH(D): CPT | Performed by: EMERGENCY MEDICINE

## 2023-12-01 PROCEDURE — 84484 ASSAY OF TROPONIN QUANT: CPT | Performed by: EMERGENCY MEDICINE

## 2023-12-01 PROCEDURE — 80053 COMPREHEN METABOLIC PANEL: CPT | Performed by: EMERGENCY MEDICINE

## 2023-12-01 PROCEDURE — 85730 THROMBOPLASTIN TIME PARTIAL: CPT | Performed by: EMERGENCY MEDICINE

## 2023-12-01 PROCEDURE — 87186 SC STD MICRODIL/AGAR DIL: CPT | Performed by: EMERGENCY MEDICINE

## 2023-12-01 PROCEDURE — 81001 URINALYSIS AUTO W/SCOPE: CPT | Performed by: EMERGENCY MEDICINE

## 2023-12-01 PROCEDURE — 72125 CT NECK SPINE W/O DYE: CPT

## 2023-12-01 PROCEDURE — 86900 BLOOD TYPING SEROLOGIC ABO: CPT | Performed by: EMERGENCY MEDICINE

## 2023-12-01 PROCEDURE — 86850 RBC ANTIBODY SCREEN: CPT | Performed by: EMERGENCY MEDICINE

## 2023-12-01 RX ORDER — CEFUROXIME AXETIL 500 MG/1
500 TABLET ORAL EVERY 12 HOURS SCHEDULED
Qty: 20 TABLET | Refills: 0 | Status: SHIPPED | OUTPATIENT
Start: 2023-12-01 | End: 2023-12-12

## 2023-12-01 RX ORDER — CEFTRIAXONE 1 G/50ML
1000 INJECTION, SOLUTION INTRAVENOUS ONCE
Status: COMPLETED | OUTPATIENT
Start: 2023-12-01 | End: 2023-12-01

## 2023-12-01 RX ADMIN — CEFTRIAXONE 1000 MG: 1 INJECTION, SOLUTION INTRAVENOUS at 03:26

## 2023-12-01 NOTE — ED PROVIDER NOTES
Emergency Department Trauma Note  Young Miner 80 y.o. male MRN: [id number]  Unit/Bed#: ED 12/ED 12 Encounter: [id number]      Trauma Alert: Trauma Acuity: Trauma Evaluation  Model of Arrival: Mode of Arrival: BLS via Trauma Squad Name and Number: [id number]  Trauma Team: Current Providers  Attending Provider: Eduardo Louis DO  Registered Nurse: Nirav Brennan RN  Consultants:     None      History of Present Illness     Chief Complaint:   Chief Complaint   Patient presents with    Trauma     Pt presents to the ED by EMS from [address] [place] [address] with c/o fall on DOAC. Pt was getting out of bed to use the bathroom and had an unwitnessed fall. Pt reports that he struck his hands, face, and bilateral arms on the floor when he fell. Pt denies LOC. Pt has baseline confusion. HPI:  Young Miner is a 80 y.o. male who presents with fall. Mechanism:Details of Incident: fall from standing, struck face Injury Date: 12/01/23 Injury Time: 0115 Injury Occurence Location - [address] [place] [address]: [facility]    80year-old male with history of GERD hyperlipidemia hypertension, dementia presents for evaluation of unwitnessed fall while at [facility], reportedly found by staff on the ground, states that he fell after he was trying to find the bathroom, unknown downtime, currently complains of left-sided facial and head pain and right-sided hand pain, does have old appearing bruising bilateral hands, no active bleeding. Patient is alert and oriented x1 which is his baseline. Able to provide any further history at this time      Review of Systems   Unable to perform ROS: Dementia       Historical Information     Immunizations: There is no immunization history on file for this patient.     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[address])     Spinal stenosis        Family History   Problem Relation Age of Onset    Colon polyps Neg Hx     Colon cancer Neg Hx      Past Surgical History:   Procedure Laterality Date    CARDIAC ELECTROPHYSIOLOGY PROCEDURE N/A 12/17/2022    Procedure: Cardiac loop recorder implant;  Surgeon: Margy Bartlett MD;  Location: [facility]; Service: Cardiology    CARPAL TUNNEL RELEASE Bilateral     COLONOSCOPY      LAMINECTOMY      TOTAL KNEE ARTHROPLASTY Bilateral     TOTAL SHOULDER REPLACEMENT       Social History     Tobacco Use    Smoking status: Never    Smokeless tobacco: Never   Vaping Use    Vaping Use: Never used   Substance Use Topics    Alcohol use: Yes     Alcohol/week: 2.0 standard drinks of alcohol     Types: 2 Shots of liquor per week     Comment: 2-3 ounces of whiskey a day    Drug use: Yes     Types: Marijuana     E-Cigarette/Vaping    E-Cigarette Use Never User      E-Cigarette/Vaping Substances    Nicotine No     Flavoring No        Family History: non-contributory    Meds/Allergies   Prior to Admission Medications   Prescriptions Last Dose Informant Patient Reported? Taking?    ACETAMINOPHEN ER PO  Outside Facility (Specify) Yes No   Sig: Take 1,000 mg by mouth 3 (three) times a day   Betadine 10 % external solution  Outside Facility (Specify) Yes No   Bismuth Tribromoph-Petrolatum (Xeroform Petrolat Gauze 5"x9") MISC  Outside Facility (Specify) Yes No   Diclofenac Sodium (VOLTAREN) 1 %  Outside Facility (Specify) Yes No   Sig: Apply 4 g topically 4 (four) times a day   [place]-Calcium Carbonate (DRAMAMINE IRLANDA CHEWS PO)   Yes No   Sig: Take by mouth   Myrbetriq 25 MG TB24  Outside Facility (Specify) Yes No   Sig: Take 25 mg by mouth daily at bedtime   allopurinol (ZYLOPRIM) 100 mg tablet  Outside Facility (Specify) Yes No   Sig: Take 400 mg by mouth daily   apixaban (Eliquis) 5 mg  Outside Facility (Specify) No No   Sig: Take 1 tablet (5 mg total) by mouth 2 (two) times a day   doxycycline hyclate (VIBRAMYCIN) 100 mg capsule  Outside Facility (Specify) Yes No   Sig: Take 100 mg by mouth every 12 (twelve) hours   ezetimibe (ZETIA) 10 mg tablet  Outside Facility (Specify) Yes No   Sig: Take 10 mg by mouth daily   fluticasone (FLONASE) 50 mcg/act nasal spray  Outside Facility (Specify) Yes No   Si spray into each nostril 2 (two) times a day as needed for rhinitis or allergies   gabapentin (NEURONTIN) 100 mg capsule  Outside Facility (Specify) Yes No   Sig: Take 100 mg by mouth 2 (two) times a day   hydroxychloroquine (PLAQUENIL) 200 mg tablet  Outside Facility (Specify) Yes No   Sig: Take 200 mg by mouth 2 (two) times a day with meals   levETIRAcetam (Keppra) 500 mg tablet   No No   Sig: Take 1 tablet (500 mg total) by mouth every 12 (twelve) hours   levothyroxine 25 mcg tablet  Outside Facility (Specify) Yes No   lidocaine (LIDODERM) 5 %   No No   Sig: Apply 1 patch topically over 12 hours daily Remove & Discard patch within 12 hours or as directed by MD Do not start before 2023.    loperamide (IMODIUM) 2 mg capsule  Outside Facility (Specify) Yes No   Sig: Take 2 mg by mouth as needed for diarrhea   melatonin 3 mg   No No   Sig: Take 3 tablets (9 mg total) by mouth daily at bedtime   metoprolol succinate (TOPROL-XL) 25 mg 24 hr tablet  Outside Facility (Specify) No No   Sig: Take 1 tablet (25 mg total) by mouth 2 (two) times a day   nystatin (MYCOSTATIN) powder  Outside Facility (Specify) No No   Sig: Apply topically 2 (two) times a day   omeprazole (PriLOSEC) 10 mg delayed release capsule  Outside Facility (Specify) Yes No   Sig: Take 10 mg by mouth daily   ondansetron (ZOFRAN) 4 mg tablet   No No   Sig: Take 1 tablet (4 mg total) by mouth every 8 (eight) hours as needed for nausea or vomiting   polyethylene glycol (MIRALAX) 17 g packet  Outside Facility (Specify) No No   Sig: Take 17 g by mouth every other day   predniSONE 5 mg tablet  Outside Facility (Specify) Yes No   Sig: Take by mouth daily   psyllium (METAMUCIL SMOOTH TEXTURE) 28 % packet   No No   Sig: Take 1 packet by mouth in the morning   tamsulosin (FLOMAX) 0.4 mg  Outside Facility (Specify) Yes No   Sig: Take 0.4 mg by mouth daily at bedtime   tamsulosin (FLOMAX) 0.4 mg   No No   Sig: Take 1 capsule (0.4 mg total) by mouth daily with dinner for 7 days   torsemide (DEMADEX) 10 mg tablet  Outside Facility (Specify) No No   Sig: Take 1 tablet (10 mg total) by mouth 4 (four) times a week Take 10 mg on Tuesday, Thursday, Saturday, and Sunday   torsemide (DEMADEX) 20 mg tablet  Outside Facility (Specify) No No   Sig: Take 1 tablet (20 mg total) by mouth 3 (three) times a week Take 20 mg on Monday, Wednesday, and Friday      Facility-Administered Medications: None       Allergies   Allergen Reactions    Colchicine Other (See Comments)     Flushing      Niacin Other (See Comments)     flushed    Statins        PHYSICAL EXAM    PE limited by: none    Objective   Vitals:   First set: Temperature: (!) 97.2 °F (36.2 °C) (12/01/23 0132)  Pulse: 86 (12/01/23 0131)  Respirations: 18 (12/01/23 0131)  Blood Pressure: 141/87 (12/01/23 0131)  SpO2: 97 % (12/01/23 0131)    Primary Survey:   (A) Airway: intact  (B) Breathing: cta b/l  (C) Circulation: Pulses:   normal  (D) Disabliity:  GCS Total:  14 (baseline)  (E) Expose:  Completed    Secondary Survey: (Click on Physical Exam tab above)  Physical Exam  Vitals and nursing note reviewed. Constitutional:       Appearance: He is well-developed. HENT:      Head: Normocephalic and atraumatic. Right Ear: External ear normal.      Left Ear: External ear normal.      Nose: Nose normal. No congestion or rhinorrhea. Comments: No septal hematoma  Eyes:      Conjunctiva/sclera: Conjunctivae normal.      Pupils: Pupils are equal, round, and reactive to light. Neck:      Vascular: No JVD. Trachea: No tracheal deviation. Cardiovascular:      Rate and Rhythm: Normal rate and regular rhythm. Heart sounds: Normal heart sounds. No murmur heard. No friction rub.  No gallop. Pulmonary:      Effort: Pulmonary effort is normal. No respiratory distress. Breath sounds: No stridor. No wheezing or rales. Abdominal:      General: There is no distension. Palpations: Abdomen is soft. There is no mass. Tenderness: There is no abdominal tenderness. There is no guarding or rebound. Musculoskeletal:         General: Normal range of motion. Cervical back: Normal range of motion and neck supple. Comments: Bruising bilateral arms and hands old appearing, swelling to right posterior elbow per patient chronic nontender no overlying warmth, no joint effusion   Skin:     General: Skin is warm and dry. Capillary Refill: Capillary refill takes less than 2 seconds. Coloration: Skin is not pale. Findings: No erythema or rash. Neurological:      General: No focal deficit present. Mental Status: He is alert. Mental status is at baseline. Cranial Nerves: No cranial nerve deficit. Cervical spine cleared by clinical criteria?  No (imaging required)      Invasive Devices       Peripheral Intravenous Line  Duration             Peripheral IV 12/01/23 Distal;Right;Upper;Ventral (anterior) Arm <1 day              Drain  Duration             Urethral Catheter 18 Fr. 12 days                    Lab Results:   Results Reviewed       Procedure Component Value Units Date/Time    HS Troponin I 2hr [[id number]]  (Normal) Collected: 12/01/23 0327    Lab Status: Final result Specimen: Blood from Arm, Right Updated: 12/01/23 0354     hs TnI 2hr 8 ng/L      Delta 2hr hsTnI 0 ng/L     HS Troponin I 4hr [[id number]]     Lab Status: No result Specimen: Blood     Urine Microscopic [[id number]]  (Abnormal) Collected: 12/01/23 0248    Lab Status: Final result Specimen: Urine, Other Updated: 12/01/23 0309     RBC, UA 20-30 /hpf      WBC, UA Innumerable /hpf      Epithelial Cells Occasional /hpf      Bacteria, UA Moderate /hpf      MUCUS THREADS None Seen    Urine culture [[id number]] Collected: 12/01/23 0248    Lab Status:  In process Specimen: Urine, Other Updated: 12/01/23 0309    UA w Reflex to Microscopic w Reflex to Culture [[id number]]  (Abnormal) Collected: 12/01/23 0248    Lab Status: Final result Specimen: Urine, Other Updated: 12/01/23 0300     Color, UA Light Yellow     Clarity, UA Clear     Specific Gravity, UA <1.005     pH, UA 6.5     Leukocytes, UA Moderate     Nitrite, UA Positive     Protein, UA Negative mg/dl      Glucose, UA Negative mg/dl      Ketones, UA Negative mg/dl      Urobilinogen, UA <2.0 mg/dl      Bilirubin, UA Negative     Occult Blood, UA Moderate    HS Troponin 0hr (reflex protocol) [[id number]]  (Normal) Collected: 12/01/23 0138    Lab Status: Final result Specimen: Blood from Arm, Right Updated: 12/01/23 0208     hs TnI 0hr 8 ng/L     APTT [[id number]]  (Normal) Collected: 12/01/23 0138    Lab Status: Final result Specimen: Blood from Arm, Right Updated: 12/01/23 0205     PTT 33 seconds     Protime-INR [[id number]]  (Abnormal) Collected: 12/01/23 0138    Lab Status: Final result Specimen: Blood from Arm, Right Updated: 12/01/23 0205     Protime 16.4 seconds      INR 1.27    Comprehensive metabolic panel [[id number]]  (Abnormal) Collected: 12/01/23 0138    Lab Status: Final result Specimen: Blood from Arm, Right Updated: 12/01/23 0203     Sodium 139 mmol/L      Potassium 3.5 mmol/L      Chloride 98 mmol/L      CO2 33 mmol/L      ANION GAP 8 mmol/L      BUN 18 mg/dL      Creatinine 1.09 mg/dL      Glucose 147 mg/dL      Calcium 9.0 mg/dL      Corrected Calcium 9.6 mg/dL      AST 21 U/L      ALT 21 U/L      Alkaline Phosphatase 87 U/L      Total Protein 6.5 g/dL      Albumin 3.3 g/dL      Total Bilirubin 0.46 mg/dL      eGFR 59 ml/min/1.73sq m     Narrative:      [facility] guidelines for Chronic Kidney Disease (CKD):     Stage 1 with normal or high GFR (GFR > 90 mL/min/1.73 square meters)    Stage 2 Mild CKD (GFR = 60-89 mL/min/1.73 square meters)    Stage 3A Moderate CKD (GFR = 45-59 mL/min/1.73 square meters)    Stage 3B Moderate CKD (GFR = 30-44 mL/min/1.73 square meters)    Stage 4 Severe CKD (GFR = 15-29 mL/min/1.73 square meters)    Stage 5 End Stage CKD (GFR <15 mL/min/1.73 square meters)  Note: GFR calculation is accurate only with a steady state creatinine    CK [[id number]]  (Normal) Collected: 12/01/23 0138    Lab Status: Final result Specimen: Blood from Arm, Right Updated: 12/01/23 0203     Total CK 68 U/L     CBC and differential [[id number]]  (Abnormal) Collected: 12/01/23 0138    Lab Status: Final result Specimen: Blood from Arm, Right Updated: 12/01/23 0146     WBC 8.14 Thousand/uL      RBC 4.06 Million/uL      Hemoglobin 11.8 g/dL      Hematocrit 37.5 %      MCV 92 fL      MCH 29.1 pg      MCHC 31.5 g/dL      RDW 14.4 %      MPV 12.6 fL      Platelets 726 Thousands/uL      nRBC 0 /100 WBCs      Neutrophils Relative 53 %      Immat GRANS % 1 %      Lymphocytes Relative 31 %      Monocytes Relative 9 %      Eosinophils Relative 6 %      Basophils Relative 0 %      Neutrophils Absolute 4.37 Thousands/µL      Immature Grans Absolute 0.04 Thousand/uL      Lymphocytes Absolute 2.54 Thousands/µL      Monocytes Absolute 0.69 Thousand/µL      Eosinophils Absolute 0.48 Thousand/µL      Basophils Absolute 0.02 Thousands/µL                    Imaging Studies:   Direct to CT: Yes  TRAUMA - CT spine cervical wo contrast   Final Result by Santa Richard MD (12/01 0215)      No cervical spine fracture or traumatic malalignment. 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 Workstation performed: [id number]         TRAUMA - CT head wo contrast   Final Result by Santa Richard MD (12/01 0209)      No acute intracranial abnormality. Moderate to marked chronic small vessel ischemic changes.                Workstation performed: [id number]         XR chest 1 view portable   ED Interpretation by Donnell Bosworth, DO (12/01 0259)   This study was ordered and independently reviewed by me    No acute findings noted         XR elbow 3+ vw RIGHT    (Results Pending)         Procedures  Procedures         ED Course  ED Course as of 12/01/23 0357   Fri Dec 01, 2023   0206 Son and daughter-in-law are now at bedside, states that he was recently admitted to the hospital for seizures in setting of worsening confusion started on her medications been doing better since then, tonight per family more confused as he is stating that he was in his childhood home when he got up to use the bathroom otherwise able to carry on a conversation with family recognizing everybody. Per family report no fevers, no chills, no nausea vomiting or diarrhea   0208 Procedure Note: EKG  Date/Time: 12/01/23 2:08 AM   Performed by: Donnell Bosworth  Authorized by: Donnell Bosworth  Indications / Diagnosis: CP  ECG reviewed by me, the ED Provider: yes   The EKG demonstrates:  Rhythm: normal sinus  Intervals: normal intervals  Axis: normal axis  QRS/Blocks:normal QRS  ST Changes: No acute ST Changes, no STD/EMILIE.       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 , no tonic-clonic seizures at this point likely require admission for further evaluation   0300 Patient with urinary tract infection, normal white blood cell count, no systemic symptoms, no vomiting, will trial oral antibiotics   0302 Chart review patient admitted to the hospital 11/18/2023 with strokelike symptoms with word finding difficulty, at that time started on stroke pathway however MRI nondiagnostic, neurology evaluated patient felt possibly symptoms related to focal seizures with retained awareness however patient already on Keppra 500 twice daily so no medication changes were made, EEG without any obvious seizure activity.   No suspicion for seizures as cause of his current symptomology likely secondary to UTI, no focal deficits, fluent speech with no slurring, no traumatic injury noted no systemic symptoms to suspect pyelonephritis, able to tolerate oral intake, will give 1 dose of IV antibiotics and likely discharge to facility with careful return precautions no indication for further inpatient evaluation or treatment   0310 Patient currently back at baseline mental status, per the family he had a urinary catheter for urinary retention that was removed 2 days ago, currently no dysuria no fevers or chills. 4027 Also states that he has been dealing with swelling of his right elbow for some time, was seen by his doctor at the facility that diagnosed him with bursitis, no worsening swelling however states that he is having pain at that area will obtain x-ray evaluate for fracture, otherwise the areas soft no warmth no overlying erythema full intact range of motion of the joint low suspicion for septic bursitis   0335 X-ray without any obvious displaced fracture,           Medical Decision Making  42-year-old male status post unwitnessed fall EKG to evaluate for arrhythmia, imaging to evaluate for intracranial bleeding, lab work to evaluate for electrolyte abnormalities, dehydration, rhabdomyolysis UA to evaluate for urinary tract infection    Amount and/or Complexity of Data Reviewed  Labs: ordered. Radiology: ordered and independent interpretation performed. Risk  Prescription drug management. Disposition  [facility] Transfer: No  Final diagnoses:   Fall, initial encounter   Urinary tract infection   Olecranon bursitis     Time reflects when diagnosis was documented in both MDM as applicable and the Disposition within this note       Time User Action Codes Description Comment    12/1/2023  3:10 AM Tyler Escobar [K68. TMOS] Fall, initial encounter     12/1/2023  3:10 AM Tyler Escobar [N39.0] Urinary tract infection     12/1/2023  3:10 AM Tyler Le Andie [M70.20] Olecranon bursitis           ED Disposition       ED Disposition   Discharge    Condition   Stable    Date/Time   Fri Dec 1, 2023  3:55 AM    Comment   Irlanda Krause discharge to home/self care. Follow-up Information       Follow up With Specialties Details Why Contact Info Additional Information    Kaity Robins MD Internal Medicine Schedule an appointment as soon as possible for a visit   [address] [place] [id number] [facility] Emergency Department Emergency Medicine  If symptoms worsen [address] [facility] [phone]  [address] Emergency Department, [address] [facility], [address] [facility]          Patient's Medications   Discharge Prescriptions    CEFUROXIME (CEFTIN) 500 MG TABLET    Take 1 tablet (500 mg total) by mouth every 12 (twelve) hours for 10 days       Start Date: 12/1/2023 End Date: 12/11/2023       Order Dose: 500 mg       Quantity: 20 tablet    Refills: 0     No discharge procedures on file.     PDMP Review         Value Time User    PDMP Reviewed  Yes 1/28/2023  6:41 AM Kelley Hannon MD            ED Provider  Electronically Signed by           Stefan Seymour DO  12/01/23 6516

## 2023-12-03 LAB — BACTERIA UR CULT: ABNORMAL

## 2023-12-11 ENCOUNTER — TELEPHONE (OUTPATIENT)
Dept: NEUROLOGY | Facility: CLINIC | Age: 88
End: 2023-12-11

## 2023-12-11 NOTE — TELEPHONE ENCOUNTER
Spoke to Pt's son and he confirmed pt will be able to make apt with Dr. Daphne Sosa in CV office at [address] [facility] on 12/12/23

## 2023-12-12 ENCOUNTER — OFFICE VISIT (OUTPATIENT)
Dept: NEUROLOGY | Facility: CLINIC | Age: 88
End: 2023-12-12
Payer: COMMERCIAL

## 2023-12-12 VITALS
HEIGHT: 69 IN | DIASTOLIC BLOOD PRESSURE: 74 MMHG | SYSTOLIC BLOOD PRESSURE: 113 MMHG | HEART RATE: 95 BPM | WEIGHT: 212 LBS | BODY MASS INDEX: 31.4 KG/M2 | TEMPERATURE: 98.6 F

## 2023-12-12 DIAGNOSIS — G40.009 LOCALIZATION-RELATED (FOCAL) (PARTIAL) IDIOPATHIC EPILEPSY AND EPILEPTIC SYNDROMES WITH SEIZURES OF LOCALIZED ONSET, NOT INTRACTABLE, WITHOUT STATUS EPILEPTICUS (HCC): Primary | ICD-10-CM

## 2023-12-12 DIAGNOSIS — E87.20 LACTIC ACIDOSIS: ICD-10-CM

## 2023-12-12 DIAGNOSIS — Z86.73 HISTORY OF TIA (TRANSIENT ISCHEMIC ATTACK): ICD-10-CM

## 2023-12-12 PROCEDURE — 99215 OFFICE O/P EST HI 40 MIN: CPT | Performed by: PSYCHIATRY & NEUROLOGY

## 2023-12-12 RX ORDER — NITROGLYCERIN 0.4 MG/1
0.4 TABLET SUBLINGUAL
COMMUNITY
Start: 2023-11-25

## 2023-12-12 RX ORDER — LEVETIRACETAM 500 MG/1
500 TABLET ORAL EVERY 12 HOURS SCHEDULED
Qty: 180 TABLET | Refills: 3 | Status: SHIPPED | OUTPATIENT
Start: 2023-12-12

## 2023-12-12 RX ORDER — ASPIRIN 325 MG
2 TABLET ORAL DAILY PRN
COMMUNITY
Start: 2023-12-07

## 2023-12-12 RX ORDER — SACCHAROMYCES BOULARDII 250 MG
250 CAPSULE ORAL 2 TIMES DAILY
COMMUNITY

## 2023-12-12 RX ORDER — CHOLECALCIFEROL (VITAMIN D3) 125 MCG
2000 CAPSULE ORAL
COMMUNITY
Start: 2023-11-25

## 2023-12-12 NOTE — PROGRESS NOTES
Patient ID: Matthew Krause is a 89 y.o. male with Atrial fibrillation on Eliquis and spells of aphasia concerning for seizures, who is presenting to Neurology office for evaluation of his spells.       Assessment/Plan:    Focal seizure with retained awareness  He has been having intermittent episodes of speech difficulty, with some loss of awareness with his more recent episodes. These do appear to have been happening even at home between his hospitalizations. Although he has Atrial Fibrillation and it is difficult to fully exclude the possibility of TIAs as a cause of his symptoms, the recurrent and stereotyped nature of his events would suggest that these events represent focal aware/impaired aware seizures. He was just recently started on Levetiracetam, and although he hasn't had any events since starting Levetiracetam, it is a little too soon to fully determine the response to treatment.     -- He will continue Levetiracetam unchanged for now, but if he would have additional events, his dose could be increased further.     -- I will not get any additional tests for now and would prefer to try to optimize his Levetiracetam dosing first if events continue. However, if he still has events despite optimizing Levetiracetam, he may benefit from additional testing to better characterize his events.     History of TIA (transient ischemic attack)  I did extensively discuss his prior diagnosis of TIA, paroxysmal atrial fibrillation and ongoing management. Given his history it would be best for him to remain on Eliquis to help prevent a stroke from his A. Fib. I discussed stroke symptoms and need to call 911 if he would have sudden onset stroke symptoms. If he has events of speech difficulty (which are concerning for focal seizures, as described above) that last longer than 5 -10 min, they should call 911 to be evaluated in the ED. As noted above, the possibility of these being vascular events cannot fully be excluded at  this point. He will also continue to follow up with his PCP for management of his other vascular risk factors.     I have spent a total time of 45 minutes on 12/18/23 in caring for this patient including Diagnostic results, Instructions for management, Impressions, Counseling / Coordination of care, and Obtaining or reviewing history  .     He will Return in about 3 months (around 3/12/2024).      Subjective:    HPI  Current seizure medications:  1. Levetiracetam 500 mg twice a day   Other medications as per Epic    Briefly reviewing his history, he had multiple episodes of speech difficulty. His first episode occurred in October 2022. Initially, this was felt to be due to a TIA. He was found to have A. Fib and was started on Eliquis. He then had two more episodes in January 2023. He largely did okay, other than an admission to the hospital over the summer for cellulitis with some encephalopathy.     His most recent episode happened on 11/18/2023. His son notes, he was talking normal on the phone, then would just start saying a few syllables with his speech getting garbled. He could understand and his motor skills were okay during these episodes. With his most recent episode, his speech stayed garbled for about an hour. He was seen in the hospital and because of his multiple events, he was started on Levetiracetam.     Since being discharged, he denies feeling tired, but his family notes that he may be a little more tired. He is getting up earlier and working with therapy since leaving the hospital. He isn't back at his assisted living.     They do report some other smaller episodes of speech trouble which were very brief outside of his hospitalization.     He does have a loop recorder implanted about a year ago. He does still have occasional runs of A. Fib on the loop recorder.     He has a lot of back pain, so he has difficulty lying flat for an MRI.     Event/Seizure semiology:  1.  Spells concerning for possible  "focal impaired awareness seizures. He will have sudden onset of difficulty speaking, mainly having difficulty forming sentences or making sense. He may also appear confused, at least one without recollection during the event.     Special Features  Status epilepticus: no  Self Injury Seizures: no  Precipitating Factors: no    Prior AEDs:  None other than Levetiracetam.     Prior Evaluation:  - MRI brain: 2023: Limited sequences obtained (per quick stroke protocol). Suboptimal assessment due to distorted image quality by motion artifact.  No acute ischemia.  Cerebral atrophy and microangiopathic changes.  - MRI brain: 2023:  No MR evidence of acute ischemia. No enhancing lesions. Volume loss/atrophy and microangiopathy.  - MRI brain: 10/9/2022: White matter changes suggestive of chronic microangiopathy.  No acute intracranial pathology   - Routine EE2023: normal  - Ambulatory EEG: none  - Video EEG: none  - PET scan brain : none  - Neuropsychologic testing: none      His history was also obtained from his son, who was present at today's visit.     I reviewed prior tests, as documented in Epic/[employer], and summarized above.     The following portions of the patient's history were reviewed and updated as appropriate: allergies, current medications, past family history, past medical history, past social history, past surgical history, and problem list.     Objective:    Blood pressure 113/74, pulse 95, temperature 98.6 °F (37 °C), temperature source Temporal, height 5' 9\" (1.753 m), weight 96.2 kg (212 lb).    Physical Exam    Neurological Exam  GENERAL EXAMINATION:   In general patient is well appearing and in no distress.  There is no peripheral edema.    NEUROLOGIC EXAMINATION:     Alert and oriented to person, date, location. Fund of knowledge is full with good understanding of medical situation.  Recent and remote memory were intact    Mood and affect are appropriate. Attention is " intact.    Language function including fluency, naming, and comprehension intact.    Cranial nerves: Pupils are equal round reactive to light and accommodation.  Visual Fields are full to confrontation bilaterally. Extraocular movements are intact without nystagmus. Facial sensation is intact to light touch. No facial droop, face activates symmetrically. There is no dysarthria. Hearing was intact to finger rub. Tongue and uvula are midline and palate elevates symmetrically.  Shoulder shrug  5/5.    Motor Exam:  No pronator drift. Bulk and tone are normal. Strength is 5/5 throughout.    Deep tendon reflexes: Biceps 1+, brachioradialis 1+, patellar 1+, Achilles 1+ bilaterally.     Sensation: Intact light touch    Coordination: Finger nose finger without dysmetria.       ROS:    Review of Systems   Constitutional:  Negative for appetite change, fatigue and fever.   HENT: Negative.  Negative for hearing loss, tinnitus, trouble swallowing and voice change.    Eyes: Negative.  Negative for photophobia, pain and visual disturbance.   Respiratory: Negative.  Negative for shortness of breath.    Cardiovascular: Negative.  Negative for palpitations.   Gastrointestinal: Negative.  Negative for nausea and vomiting.   Endocrine: Negative.  Negative for cold intolerance.   Genitourinary: Negative.  Negative for dysuria, frequency and urgency.   Musculoskeletal:  Negative for back pain, gait problem, myalgias and neck pain.   Skin: Negative.  Negative for rash.   Allergic/Immunologic: Negative.    Neurological: Negative.  Negative for dizziness, tremors, seizures, syncope, facial asymmetry, speech difficulty, weakness, light-headedness, numbness and headaches.   Hematological: Negative.  Does not bruise/bleed easily.   Psychiatric/Behavioral: Negative.  Negative for confusion, hallucinations and sleep disturbance.    All other systems reviewed and are negative.        I personally reviewed the ROS that was entered by the [facility]  assistant    Voice recognition software was used in the generation of this note. There may be unintentional errors including grammatical errors, spelling errors, or pronoun errors.

## 2023-12-12 NOTE — PATIENT INSTRUCTIONS
-- continue to take Levetiracetam 500 mg twice a day  -- it is also important to continue to take Eliquis and follow up with your Cardiology.

## 2023-12-18 NOTE — ASSESSMENT & PLAN NOTE
He has been having intermittent episodes of speech difficulty, with some loss of awareness with his more recent episodes. These do appear to have been happening even at home between his hospitalizations. Although he has Atrial Fibrillation and it is difficult to fully exclude the possibility of TIAs as a cause of his symptoms, the recurrent and stereotyped nature of his events would suggest that these events represent focal aware/impaired aware seizures. He was just recently started on Levetiracetam, and although he hasn't had any events since starting Levetiracetam, it is a little too soon to fully determine the response to treatment.     -- He will continue Levetiracetam unchanged for now, but if he would have additional events, his dose could be increased further.     -- I will not get any additional tests for now and would prefer to try to optimize his Levetiracetam dosing first if events continue. However, if he still has events despite optimizing Levetiracetam, he may benefit from additional testing to better characterize his events.

## 2023-12-21 ENCOUNTER — OFFICE VISIT (OUTPATIENT)
Dept: CARDIOLOGY CLINIC | Facility: CLINIC | Age: 88
End: 2023-12-21
Payer: COMMERCIAL

## 2023-12-21 VITALS
DIASTOLIC BLOOD PRESSURE: 64 MMHG | BODY MASS INDEX: 31.84 KG/M2 | OXYGEN SATURATION: 96 % | WEIGHT: 215 LBS | HEART RATE: 88 BPM | HEIGHT: 69 IN | SYSTOLIC BLOOD PRESSURE: 112 MMHG

## 2023-12-21 DIAGNOSIS — G45.9 TIA (TRANSIENT ISCHEMIC ATTACK): ICD-10-CM

## 2023-12-21 DIAGNOSIS — I48.0 PAF (PAROXYSMAL ATRIAL FIBRILLATION) (HCC): ICD-10-CM

## 2023-12-21 DIAGNOSIS — I50.42 CHRONIC COMBINED SYSTOLIC AND DIASTOLIC CONGESTIVE HEART FAILURE (HCC): Primary | ICD-10-CM

## 2023-12-21 PROCEDURE — 99214 OFFICE O/P EST MOD 30 MIN: CPT | Performed by: INTERNAL MEDICINE

## 2023-12-21 RX ORDER — SULFAMETHOXAZOLE AND TRIMETHOPRIM 800; 160 MG/1; MG/1
1 TABLET ORAL EVERY 12 HOURS SCHEDULED
COMMUNITY
End: 2023-12-25

## 2023-12-21 RX ORDER — POTASSIUM CHLORIDE 1.5 G/1.58G
20 POWDER, FOR SOLUTION ORAL 2 TIMES DAILY
COMMUNITY
End: 2023-12-28 | Stop reason: SDUPTHER

## 2023-12-21 RX ORDER — IBUPROFEN 200 MG
CAPSULE ORAL
COMMUNITY
Start: 2023-11-03

## 2023-12-21 NOTE — PROGRESS NOTES
Cardiology Follow Up    Matthew Krause  9/30/1934  [id number]  [facility] [place] [id number]  [facility] [place]  [facility] [id number] [phone]    1. Chronic combined systolic and diastolic congestive heart failure (HCC)        2. PAF (paroxysmal atrial fibrillation) (HCC)        3. TIA (transient ischemic attack)            Interval History: Followup CHF, PAF    One fall out of bed at [facility]. No other falls.     No chest pain or shortness of breath.     Medical Problems       Problem List       Accidental overdose    History of hypertension    Hypotension    RA (rheumatoid arthritis) (HCC)    Speech disturbance    History of TIA (transient ischemic attack)    Low left ventricular ejection fraction    Overview Signed 10/11/2022 12:47 PM by Aure Fuchs MD     Echo done 10/10/22 showed low EF of 20 %         Dilated cardiomyopathy (HCC)    HTN (hypertension)    Chronic systolic heart failure (HCC)    Wt Readings from Last 3 Encounters:   12/21/23 97.5 kg (215 lb)   12/12/23 96.2 kg (212 lb)   12/01/23 96.4 kg (212 lb 8.4 oz)                 Paroxysmal A-fib (HCC)    Focal seizure with retained awareness    KAMLESH (acute kidney injury) (HCC)    Elevated TSH    Hypomagnesemia    Septic arthritis of shoulder, right (HCC)    Suture of skin wound    Adverse effect of loop (high-ceiling) diuretics, subsequent encounter    Toxic metabolic encephalopathy    Open wound of right great toe with damage to nail    Cellulitis of left upper extremity    HLD (hyperlipidemia)    GERD (gastroesophageal reflux disease)    Gout without acute exacerbation     Overview Deleted 8/29/2023  2:35 PM by ESTEFANIA Belle            Spinal stenosis    Hypothyroidism    Peripheral vascular disease (HCC)    Bacteremia due to group B Streptococcus    Respiratory insufficiency    Difficulty with speech    Stage 2 chronic kidney disease    Lab  Results   Component Value Date    EGFR 59 12/01/2023    EGFR 63 11/24/2023    EGFR 69 11/23/2023    CREATININE 1.09 12/01/2023    CREATININE 1.04 11/24/2023    CREATININE 0.96 11/23/2023         Ureterolithiasis    Aneurysm of ascending aorta without rupture (HCC)    Urinary retention    Electrolyte abnormality    Stroke-like symptom        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HCC)     Spinal stenosis      Social History     Socioeconomic History    Marital status:      Spouse name: Not on file    Number of children: Not on file    Years of education: Not on file    Highest education level: Not on file   Occupational History    Not on file   Tobacco Use    Smoking status: Former     Current packs/day: 0.25     Average packs/day: 0.3 packs/day for 5.0 years (1.3 ttl pk-yrs)     Types: Cigarettes    Smokeless tobacco: Never   Vaping Use    Vaping status: Never Used   Substance and Sexual Activity    Alcohol use: Yes     Alcohol/week: 2.0 standard drinks of alcohol     Types: 2 Shots of liquor per week     Comment: 1 drink per week    Drug use: Yes     Types: Marijuana    Sexual activity: Not Currently     Partners: Male   Other Topics Concern    Not on file   Social History Narrative    Not on file     Social Determinants of Health     Financial Resource Strain: Not on file   Food Insecurity: No Food Insecurity (11/20/2023)    Hunger Vital Sign     Worried About Running Out of Food in the Last Year: Never true     Ran Out of Food in the Last Year: Never true   Transportation Needs: No Transportation Needs (11/20/2023)    PRAPARE - Transportation     Lack of Transportation (Medical): No     Lack of Transportation (Non-Medical): No   Physical Activity: Not on file   Stress: Not on file   Social Connections: Not on file   Intimate Partner Violence: Not on file   Housing  "Stability: Low Risk  (11/20/2023)    Housing Stability Vital Sign     Unable to Pay for Housing in the Last Year: No     Number of Places Lived in the Last Year: 1     Unstable Housing in the Last Year: No      Family History   Problem Relation Age of Onset    Seizures Son     Colon polyps Neg Hx     Colon cancer Neg Hx      Past Surgical History:   Procedure Laterality Date    CARDIAC ELECTROPHYSIOLOGY PROCEDURE N/A 12/17/2022    Procedure: Cardiac loop recorder implant;  Surgeon: Negrito Hollingsworth MD;  Location: [facility];  Service: Cardiology    CARPAL TUNNEL RELEASE Bilateral     COLONOSCOPY      LAMINECTOMY      TOTAL KNEE ARTHROPLASTY Bilateral     TOTAL SHOULDER REPLACEMENT         Current Outpatient Medications:     ACETAMINOPHEN ER PO, Take 1,000 mg by mouth 3 (three) times a day, Disp: , Rfl:     allopurinol (ZYLOPRIM) 100 mg tablet, Take 200 mg by mouth daily, Disp: , Rfl:     apixaban (Eliquis) 5 mg, Take 1 tablet (5 mg total) by mouth 2 (two) times a day, Disp: 180 tablet, Rfl: 3    Bismuth Tribromoph-Petrolatum (Xeroform Petrolat Gauze 5\"x9\") MISC, , Disp: , Rfl:     Diclofenac Sodium (VOLTAREN) 1 %, Apply 4 g topically 4 (four) times a day, Disp: , Rfl:     doxycycline hyclate (VIBRAMYCIN) 100 mg capsule, Take 100 mg by mouth every 12 (twelve) hours, Disp: , Rfl:     ezetimibe (ZETIA) 10 mg tablet, Take 10 mg by mouth daily, Disp: , Rfl:     fluticasone (FLONASE) 50 mcg/act nasal spray, 1 spray into each nostril 2 (two) times a day as needed for rhinitis or allergies, Disp: , Rfl:     gabapentin (NEURONTIN) 100 mg capsule, Take 100 mg by mouth 3 (three) times a day, Disp: , Rfl:     hydroxychloroquine (PLAQUENIL) 200 mg tablet, Take 200 mg by mouth 2 (two) times a day with meals, Disp: , Rfl:     levothyroxine 25 mcg tablet, , Disp: , Rfl:     loperamide (IMODIUM) 2 mg capsule, Take 2 mg by mouth as needed for diarrhea, Disp: , Rfl:     metoprolol succinate (TOPROL-XL) 25 mg 24 hr tablet, Take 1 " tablet (25 mg total) by mouth 2 (two) times a day, Disp: 180 tablet, Rfl: 3    Myrbetriq 25 MG TB24, Take 25 mg by mouth daily at bedtime, Disp: , Rfl:     neomycin-polymyxin b-bacitracin (NEOSPORIN) 3.5-400-5,000, , Disp: , Rfl:     nystatin (MYCOSTATIN) powder, Apply topically 2 (two) times a day, Disp: 15 g, Rfl: 0    omeprazole (PriLOSEC) 10 mg delayed release capsule, Take 10 mg by mouth daily, Disp: , Rfl:     ondansetron (ZOFRAN) 4 mg tablet, Take 1 tablet (4 mg total) by mouth every 8 (eight) hours as needed for nausea or vomiting, Disp: 60 tablet, Rfl: 0    polyethylene glycol (MIRALAX) 17 g packet, Take 17 g by mouth every other day, Disp: 30 each, Rfl: 2    potassium chloride (KLOR-CON) 20 mEq packet, Take 20 mEq by mouth 2 (two) times a day, Disp: , Rfl:     predniSONE 5 mg tablet, Take by mouth daily, Disp: , Rfl:     psyllium (METAMUCIL SMOOTH TEXTURE) 28 % packet, Take 1 packet by mouth in the morning, Disp: 30 packet, Rfl: 0    sulfamethoxazole-trimethoprim (BACTRIM DS) 800-160 mg per tablet, Take 1 tablet by mouth every 12 (twelve) hours, Disp: , Rfl:     tamsulosin (FLOMAX) 0.4 mg, Take 1 capsule (0.4 mg total) by mouth daily with dinner for 7 days, Disp: 7 capsule, Rfl: 0    torsemide (DEMADEX) 10 mg tablet, Take 1 tablet (10 mg total) by mouth 4 (four) times a week Take 10 mg on Tuesday, Thursday, Saturday, and Sunday, Disp: 30 tablet, Rfl: 3    torsemide (DEMADEX) 20 mg tablet, Take 1 tablet (20 mg total) by mouth 3 (three) times a week Take 20 mg on Monday, Wednesday, and Friday, Disp: 30 tablet, Rfl: 3    Betadine 10 % external solution, , Disp: , Rfl:     bisacodyl (FLEET) 10 MG/30ML ENEM, Insert 10 mg into the rectum if needed for constipation (Patient not taking: Reported on 12/21/2023), Disp: , Rfl:     Cholecalciferol (Vitamin D3) 50 MCG (2000 UT) TABS, Take 2,000 Units by mouth (Patient not taking: Reported on 12/21/2023), Disp: , Rfl:     Ginger-Calcium Carbonate (DRAMAMINE GINGER CHEWS  "PO), Take by mouth (Patient not taking: Reported on 12/21/2023), Disp: , Rfl:     levETIRAcetam (Keppra) 500 mg tablet, Take 1 tablet (500 mg total) by mouth every 12 (twelve) hours (Patient not taking: Reported on 12/21/2023), Disp: 180 tablet, Rfl: 3    lidocaine (LIDODERM) 5 %, Apply 1 patch topically over 12 hours daily Remove & Discard patch within 12 hours or as directed by MD Do not start before November 25, 2023. (Patient not taking: Reported on 12/21/2023), Disp: , Rfl: 0    Lubricant Eye Drops PF 0.5 % SOLN, Apply 2 drops to eye daily as needed (Patient not taking: Reported on 12/21/2023), Disp: , Rfl:     magnesium hydroxide (MILK OF MAGNESIA) 400 mg/5 mL oral suspension, Take 5 mL by mouth daily as needed for constipation (Patient not taking: Reported on 12/21/2023), Disp: , Rfl:     nitroglycerin (NITROSTAT) 0.4 mg SL tablet, Place 0.4 mg under the tongue every 5 (five) minutes as needed (Patient not taking: Reported on 12/21/2023), Disp: , Rfl:     saccharomyces boulardii (FLORASTOR) 250 mg capsule, Take 250 mg by mouth 2 (two) times a day (Patient not taking: Reported on 12/21/2023), Disp: , Rfl:     tamsulosin (FLOMAX) 0.4 mg, Take 0.4 mg by mouth daily at bedtime (Patient not taking: Reported on 12/21/2023), Disp: , Rfl:   Allergies   Allergen Reactions    Colchicine Other (See Comments)     Flushing      Niacin Other (See Comments)     flushed    Statins        Labs:     Chemistry        Component Value Date/Time    K 3.5 12/01/2023 0138    CL 98 12/01/2023 0138    CO2 33 (H) 12/01/2023 0138    CO2 34 (H) 09/03/2023 0555    BUN 18 12/01/2023 0138    CREATININE 1.09 12/01/2023 0138        Component Value Date/Time    CALCIUM 9.0 12/01/2023 0138    ALKPHOS 87 12/01/2023 0138    AST 21 12/01/2023 0138    ALT 21 12/01/2023 0138            No results found for: \"CHOL\"  Lab Results   Component Value Date    HDL 58 11/19/2023    HDL 66 01/27/2023    HDL 70 10/09/2022     Lab Results   Component Value Date " "   LDLCALC 51 11/19/2023    LDLCALC 52 01/27/2023    LDLCALC 50 10/09/2022     Lab Results   Component Value Date    TRIG 113 11/19/2023    TRIG 69 01/27/2023    TRIG 138 10/09/2022     No results found for: \"CHOLHDL\"    Imaging: Cardiac EP device report    Result Date: 12/4/2023  Narrative: [facility] - ACTIVE SYSTEM IS MRI CONDITIONAL NON-BILLABLE MERLIN TRANSMISSION: LOOP: CARLOS: BATTERY STATUS \"OK.\" PRESENTING RHYTHM: SR @ 98 BPM. 7 AF EPISODES W/ AVAIL EGMS SHOWING AF, MAX DURATION 49 MINS 50 SECS. AF BURDEN: 17%. 1 CARLOS EPISODE W/ EGM SHOWING PROBABLE BRADYCARDIA  vs UNDERSENSING @ 22-26 BPM, DURATION 8 SECS ON 12/3 @ 10:50AM. (CALL TO [facility] TO FURTHER REVIEW RHYTHM STRIP). L/M FOR NSG HOME IF THEY KNOW IF PT WAS SLEEPING/SYMPTOMATIC. PT TAKES METOPROLOL SUCC, ELIQUIS. EF: 45% (ECHO 8/30/23). TASK DR. STREET. NORMAL DEVICE FUNCTION.      XR chest 1 view portable    Result Date: 12/1/2023  Narrative: CHEST INDICATION:   trauma. Fall. COMPARISON: Chest radiograph 11/21/2023. CT chest abdomen pelvis 11/18/2023. EXAM PERFORMED/VIEWS:  XR CHEST PORTABLE FINDINGS:  Monitoring leads and clips project over the chest. Left chest cardiac loop recorder. Cardiomediastinal silhouette appears unremarkable. The lungs are clear.  No pneumothorax or pleural effusion. Right shoulder reverse arthroplasty. Degenerative changes of left glenohumeral joint.     Impression: No acute cardiopulmonary disease. Resident: ASIM ALMANZAR I, the attending radiologist, have reviewed the images and agree with the final report above. Workstation performed: [id number]     XR elbow 3+ vw RIGHT    Result Date: 12/1/2023  Narrative: RIGHT ELBOW INDICATION:   elbow swelling. Fall. COMPARISON:  None VIEWS:  XR ELBOW 3+ VW RIGHT FINDINGS: IV in the antecubital fossa. There is no acute fracture or dislocation. There is no joint effusion. Calcific tendinosis versus chronic avulsed enthesophyte of the triceps tendon. No lytic or blastic osseous lesion. Soft " tissue prominence versus swelling over the olecranon.     Impression: No acute osseous abnormality. Resident: ASIM ALMANZAR I, the attending radiologist, have reviewed the images and agree with the final report above. Workstation performed: [id number]     TRAUMA - CT spine cervical wo contrast    Result Date: 12/1/2023  Narrative: CT CERVICAL SPINE - WITHOUT CONTRAST INDICATION:   TRAUMA. COMPARISON: CT cervical spine dated May 2, 2022. TECHNIQUE:  CT examination of the cervical spine was performed without intravenous contrast.  Contiguous axial images were obtained. Multiplanar 2D reformatted images were created from the source data. Radiation dose length product (DLP) for this visit:  851.04 mGy-cm .  This examination, like all CT scans performed in the [facility], was performed utilizing techniques to minimize radiation dose exposure, including the use of iterative  reconstruction and automated exposure control. IMAGE QUALITY:  Diagnostic. FINDINGS: ALIGNMENT: There is mild reversal of the normal cervical lordosis. No subluxation. VERTEBRAE: No acute fracture. There is chronic fusion of the C4, C5 and C6 vertebral bodies. DEGENERATIVE CHANGES: Severe multilevel cervical degenerative changes are noted.  No critical central canal stenosis. PREVERTEBRAL AND PARASPINAL SOFT TISSUES: Unremarkable THORACIC INLET:  Normal.     Impression: No cervical spine fracture or traumatic malalignment. 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 Workstation performed: [id number]     TRAUMA - CT head wo contrast    Result Date: 12/1/2023  Narrative: CT BRAIN - WITHOUT CONTRAST INDICATION:   TRAUMA. COMPARISON: CT brain dated November 18, 2023. TECHNIQUE:  CT examination of the brain was performed.  Multiplanar 2D reformatted images were created from the source data. Radiation dose length product (DLP) for this visit:  937.16 mGy-cm .  This examination, like all CT scans performed  in the [facility], was performed utilizing techniques to minimize radiation dose exposure, including the use of iterative  reconstruction and automated exposure control. IMAGE QUALITY:  Diagnostic. FINDINGS: PARENCHYMA:  No intracranial mass, mass effect or midline shift. No CT signs of acute infarction.  No acute parenchymal hemorrhage. There is moderate to marked periventricular white matter low attenuation which is nonspecific and most likely related to chronic small vessel ischemic changes. VENTRICLES AND EXTRA-AXIAL SPACES: There is prominence of the ventricles and sulci related to mild volume loss. VISUALIZED ORBITS: Normal visualized orbits. PARANASAL SINUSES: Normal visualized paranasal sinuses. CALVARIUM AND EXTRACRANIAL SOFT TISSUES:  Normal.     Impression: No acute intracranial abnormality. Moderate to marked chronic small vessel ischemic changes. Workstation performed: [id number]     XR chest portable    Result Date: 11/22/2023  Narrative: CHEST INDICATION:   eval pulm edema. COMPARISON: 09/02/2023 EXAM PERFORMED/VIEWS:  XR CHEST PORTABLE 1 image FINDINGS: Cardiomediastinal silhouette appears unremarkable. A loop recorder overlies the left chest. Minimal bibasilar scarring or atelectasis.  No pneumothorax or pleural effusion. Osseous structures appear within normal limits for patient age. Reverse total shoulder arthroplasty on the right partially visualized.     Impression: Bibasilar scarring or atelectasis. Workstation performed: [id number]       EKG: .    Review of Systems   Constitutional: Negative.   HENT: Negative.     Eyes: Negative.    Cardiovascular: Negative.    Respiratory: Negative.     Endocrine: Negative.    Hematologic/Lymphatic: Negative.    Skin: Negative.    Musculoskeletal: Negative.    Gastrointestinal: Negative.    Genitourinary: Negative.    Neurological: Negative.    Psychiatric/Behavioral: Negative.     Allergic/Immunologic: Negative.        Vitals:    12/21/23  0931   BP: 112/64   Pulse: 88   SpO2: 96%           Physical Exam  Vitals reviewed.   Constitutional:       Appearance: Normal appearance.   HENT:      Head: Normocephalic.      Nose: Nose normal.      Mouth/Throat:      Mouth: Mucous membranes are moist.      Pharynx: Oropharynx is clear.   Eyes:      General: No scleral icterus.     Conjunctiva/sclera: Conjunctivae normal.   Cardiovascular:      Rate and Rhythm: Normal rate and regular rhythm.      Heart sounds: No murmur heard.     No friction rub. No gallop.   Pulmonary:      Effort: Pulmonary effort is normal. No respiratory distress.      Breath sounds: Normal breath sounds. No wheezing or rales.   Abdominal:      General: Abdomen is flat. Bowel sounds are normal. There is no distension.      Palpations: Abdomen is soft.      Tenderness: There is no abdominal tenderness. There is no guarding.   Musculoskeletal:      Cervical back: Normal range of motion and neck supple.      Right lower leg: No edema.      Left lower leg: No edema.   Skin:     General: Skin is warm and dry.   Neurological:      General: No focal deficit present.      Mental Status: He is alert and oriented to person, place, and time.   Psychiatric:         Mood and Affect: Mood normal.         Behavior: Behavior normal.         Discussion/Summary:    Heart Failure with reduced LVEF. EF 45%. Continue torsemide at current dosing. He is euvolemic today.      Hx of TIA  PAF: Occasional PAF on his loop. Continue metoprolol and continue Eliquis.          The patient was counseled regarding diagnostic results, instructions for management, risk factor reductions, impressions. total time of encounter was 25 minutes and 15 minutes was spent counseling.

## 2023-12-27 DIAGNOSIS — R94.30 LOW LEFT VENTRICULAR EJECTION FRACTION: ICD-10-CM

## 2023-12-27 DIAGNOSIS — I50.20 HFREF (HEART FAILURE WITH REDUCED EJECTION FRACTION) (HCC): ICD-10-CM

## 2023-12-27 DIAGNOSIS — G45.9 TIA (TRANSIENT ISCHEMIC ATTACK): ICD-10-CM

## 2023-12-27 DIAGNOSIS — I48.0 PAF (PAROXYSMAL ATRIAL FIBRILLATION) (HCC): ICD-10-CM

## 2023-12-27 DIAGNOSIS — I50.42 CHRONIC COMBINED SYSTOLIC AND DIASTOLIC CONGESTIVE HEART FAILURE (HCC): ICD-10-CM

## 2023-12-28 RX ORDER — EZETIMIBE 10 MG/1
10 TABLET ORAL DAILY
Qty: 90 TABLET | Refills: 3 | Status: SHIPPED | OUTPATIENT
Start: 2023-12-28

## 2023-12-28 RX ORDER — TORSEMIDE 20 MG/1
20 TABLET ORAL 3 TIMES WEEKLY
Qty: 36 TABLET | Refills: 3 | Status: SHIPPED | OUTPATIENT
Start: 2023-12-29

## 2023-12-28 RX ORDER — TORSEMIDE 10 MG/1
10 TABLET ORAL
Qty: 48 TABLET | Refills: 3 | Status: SHIPPED | OUTPATIENT
Start: 2023-12-28

## 2023-12-28 RX ORDER — POTASSIUM CHLORIDE 1.5 G/1.58G
20 POWDER, FOR SOLUTION ORAL 2 TIMES DAILY
Qty: 180 EACH | Refills: 3 | Status: SHIPPED | OUTPATIENT
Start: 2023-12-28

## 2023-12-28 RX ORDER — METOPROLOL SUCCINATE 25 MG/1
25 TABLET, EXTENDED RELEASE ORAL 2 TIMES DAILY
Qty: 180 TABLET | Refills: 3 | Status: SHIPPED | OUTPATIENT
Start: 2023-12-28 | End: 2024-12-22

## 2023-12-28 NOTE — TELEPHONE ENCOUNTER
Called spoke to pts son and to charge nurse at [facility]. Relayed Torsemide dosage to both, and both verbalized understanding. It appears that [facility] may have been giving 30 mg daily to pt instead of alternating 10mg and 20 mg dose on opposite days. Last BMP completed in 11/2023. Pts son is wanting to send all cardiac meds to [facility] as he will be providing to home at this point.     Eliquis 5 mg BID  Zetia 10 mg daily   Metoprolol succ. 25 mg BID   Potassium chloride 20 mg BID   Torsemide 20 mg on Sat. Sun, Tue, Thur, 10 mg on Mon, Wed, Fri    To [facility]

## 2023-12-28 NOTE — TELEPHONE ENCOUNTER
I'm calling from my dad's Matthew Merino birthday September 30th, 1934. I called yesterday and asking about his medication for his commercial mode on the dosage between the 10 and the 20 because the [facility] he resides at [facility] there. They were going to give my dad a 30 on Wednesday. So can you give me a call back to find out what you actually want him to take again? My name is Kerwin Merino, [phone]. Thank you

## 2024-01-01 ENCOUNTER — APPOINTMENT (INPATIENT)
Dept: NON INVASIVE DIAGNOSTICS | Facility: HOSPITAL | Age: 89
DRG: 644 | End: 2024-01-01
Payer: COMMERCIAL

## 2024-01-01 ENCOUNTER — HOSPITAL ENCOUNTER (INPATIENT)
Facility: HOSPITAL | Age: 89
LOS: 9 days | DRG: 644 | End: 2024-06-30
Attending: EMERGENCY MEDICINE | Admitting: HOSPITALIST
Payer: COMMERCIAL

## 2024-01-01 ENCOUNTER — APPOINTMENT (EMERGENCY)
Dept: CT IMAGING | Facility: HOSPITAL | Age: 89
DRG: 644 | End: 2024-01-01
Payer: COMMERCIAL

## 2024-01-01 ENCOUNTER — APPOINTMENT (INPATIENT)
Dept: RADIOLOGY | Facility: HOSPITAL | Age: 89
DRG: 644 | End: 2024-01-01
Payer: COMMERCIAL

## 2024-01-01 ENCOUNTER — APPOINTMENT (EMERGENCY)
Dept: RADIOLOGY | Facility: HOSPITAL | Age: 89
DRG: 644 | End: 2024-01-01
Payer: COMMERCIAL

## 2024-01-01 VITALS
DIASTOLIC BLOOD PRESSURE: 84 MMHG | WEIGHT: 219.14 LBS | RESPIRATION RATE: 26 BRPM | SYSTOLIC BLOOD PRESSURE: 128 MMHG | BODY MASS INDEX: 33.21 KG/M2 | HEIGHT: 68 IN | OXYGEN SATURATION: 90 % | HEART RATE: 112 BPM | TEMPERATURE: 104 F

## 2024-01-01 DIAGNOSIS — I50.22 CHRONIC SYSTOLIC HEART FAILURE (HCC): ICD-10-CM

## 2024-01-01 DIAGNOSIS — T38.0X5A ADRENAL INSUFFICIENCY DUE TO CORTICOSTEROID WITHDRAWAL (HCC): ICD-10-CM

## 2024-01-01 DIAGNOSIS — E27.3 ADRENAL INSUFFICIENCY DUE TO CORTICOSTEROID WITHDRAWAL (HCC): ICD-10-CM

## 2024-01-01 DIAGNOSIS — K57.92 DIVERTICULITIS: ICD-10-CM

## 2024-01-01 DIAGNOSIS — E27.40 ADRENAL INSUFFICIENCY (HCC): ICD-10-CM

## 2024-01-01 DIAGNOSIS — I42.0 DILATED CARDIOMYOPATHY (HCC): ICD-10-CM

## 2024-01-01 DIAGNOSIS — R53.1 GENERALIZED WEAKNESS: ICD-10-CM

## 2024-01-01 DIAGNOSIS — Z71.89 GOALS OF CARE, COUNSELING/DISCUSSION: ICD-10-CM

## 2024-01-01 DIAGNOSIS — I50.9 CHF (CONGESTIVE HEART FAILURE) (HCC): ICD-10-CM

## 2024-01-01 DIAGNOSIS — I95.9 HYPOTENSION: Primary | ICD-10-CM

## 2024-01-01 LAB
2HR DELTA HS TROPONIN: -3 NG/L
4HR DELTA HS TROPONIN: 1 NG/L
ACTH PLAS-MCNC: 13.6 PG/ML (ref 7.2–63.3)
ALBUMIN SERPL BCG-MCNC: 2.9 G/DL (ref 3.5–5)
ALBUMIN SERPL BCG-MCNC: 3.3 G/DL (ref 3.5–5)
ALBUMIN SERPL BCG-MCNC: 3.4 G/DL (ref 3.5–5)
ALBUMIN SERPL BCG-MCNC: 3.4 G/DL (ref 3.5–5)
ALP SERPL-CCNC: 53 U/L (ref 34–104)
ALP SERPL-CCNC: 54 U/L (ref 34–104)
ALP SERPL-CCNC: 58 U/L (ref 34–104)
ALP SERPL-CCNC: 62 U/L (ref 34–104)
ALT SERPL W P-5'-P-CCNC: 3 U/L (ref 7–52)
ALT SERPL W P-5'-P-CCNC: 5 U/L (ref 7–52)
ALT SERPL W P-5'-P-CCNC: 5 U/L (ref 7–52)
ALT SERPL W P-5'-P-CCNC: 8 U/L (ref 7–52)
ANION GAP SERPL CALCULATED.3IONS-SCNC: 4 MMOL/L (ref 4–13)
ANION GAP SERPL CALCULATED.3IONS-SCNC: 6 MMOL/L (ref 4–13)
ANION GAP SERPL CALCULATED.3IONS-SCNC: 6 MMOL/L (ref 4–13)
ANION GAP SERPL CALCULATED.3IONS-SCNC: 7 MMOL/L (ref 4–13)
AORTIC ROOT: 3.9 CM
APICAL FOUR CHAMBER EJECTION FRACTION: 20 %
APTT PPP: 109 SECONDS (ref 23–37)
APTT PPP: 42 SECONDS (ref 23–37)
APTT PPP: 46 SECONDS (ref 23–37)
APTT PPP: 47 SECONDS (ref 23–37)
APTT PPP: 59 SECONDS (ref 23–37)
APTT PPP: 76 SECONDS (ref 23–37)
APTT PPP: 81 SECONDS (ref 23–37)
AST SERPL W P-5'-P-CCNC: 15 U/L (ref 13–39)
AST SERPL W P-5'-P-CCNC: 16 U/L (ref 13–39)
AST SERPL W P-5'-P-CCNC: 17 U/L (ref 13–39)
AST SERPL W P-5'-P-CCNC: 38 U/L (ref 13–39)
ATRIAL RATE: 72 BPM
AV LVOT MEAN GRADIENT: 2 MMHG
AV LVOT PEAK GRADIENT: 2.4 MMHG
AV MEAN GRADIENT: 5 MMHG
AV PEAK GRADIENT: 7.4 MMHG
AV VELOCITY RATIO: 55.64
BACTERIA BLD CULT: NORMAL
BACTERIA BLD CULT: NORMAL
BACTERIA UR QL AUTO: ABNORMAL /HPF
BASE EX.OXY STD BLDV CALC-SCNC: 72.8 % (ref 60–80)
BASE EX.OXY STD BLDV CALC-SCNC: 93 % (ref 60–80)
BASE EXCESS BLDV CALC-SCNC: 2.5 MMOL/L
BASE EXCESS BLDV CALC-SCNC: 4.5 MMOL/L
BASOPHILS # BLD AUTO: 0.01 THOUSANDS/ÂΜL (ref 0–0.1)
BASOPHILS # BLD AUTO: 0.02 THOUSANDS/ÂΜL (ref 0–0.1)
BASOPHILS # BLD AUTO: 0.03 THOUSANDS/ÂΜL (ref 0–0.1)
BASOPHILS # BLD AUTO: 0.05 THOUSANDS/ÂΜL (ref 0–0.1)
BASOPHILS # BLD AUTO: 0.05 THOUSANDS/ÂΜL (ref 0–0.1)
BASOPHILS NFR BLD AUTO: 0 % (ref 0–1)
BASOPHILS NFR BLD AUTO: 1 % (ref 0–1)
BILIRUB SERPL-MCNC: 0.36 MG/DL (ref 0.2–1)
BILIRUB SERPL-MCNC: 0.37 MG/DL (ref 0.2–1)
BILIRUB SERPL-MCNC: 0.39 MG/DL (ref 0.2–1)
BILIRUB SERPL-MCNC: 0.46 MG/DL (ref 0.2–1)
BILIRUB UR QL STRIP: NEGATIVE
BNP SERPL-MCNC: 395 PG/ML (ref 0–100)
BSA FOR ECHO PROCEDURE: 2.14 M2
BUN SERPL-MCNC: 12 MG/DL (ref 5–25)
BUN SERPL-MCNC: 13 MG/DL (ref 5–25)
BUN SERPL-MCNC: 15 MG/DL (ref 5–25)
BUN SERPL-MCNC: 15 MG/DL (ref 5–25)
BUN SERPL-MCNC: 16 MG/DL (ref 5–25)
BUN SERPL-MCNC: 16 MG/DL (ref 5–25)
BUN SERPL-MCNC: 17 MG/DL (ref 5–25)
CA-I BLD-SCNC: 1.25 MMOL/L (ref 1.12–1.32)
CALCIUM ALBUM COR SERPL-MCNC: 10 MG/DL (ref 8.3–10.1)
CALCIUM ALBUM COR SERPL-MCNC: 10.6 MG/DL (ref 8.3–10.1)
CALCIUM ALBUM COR SERPL-MCNC: 9.7 MG/DL (ref 8.3–10.1)
CALCIUM ALBUM COR SERPL-MCNC: 9.8 MG/DL (ref 8.3–10.1)
CALCIUM SERPL-MCNC: 9.2 MG/DL (ref 8.4–10.2)
CALCIUM SERPL-MCNC: 9.2 MG/DL (ref 8.4–10.2)
CALCIUM SERPL-MCNC: 9.5 MG/DL (ref 8.4–10.2)
CALCIUM SERPL-MCNC: 9.5 MG/DL (ref 8.4–10.2)
CALCIUM SERPL-MCNC: 9.7 MG/DL (ref 8.4–10.2)
CALCIUM SERPL-MCNC: 9.7 MG/DL (ref 8.4–10.2)
CALCIUM SERPL-MCNC: 9.8 MG/DL (ref 8.4–10.2)
CARDIAC TROPONIN I PNL SERPL HS: 16 NG/L
CARDIAC TROPONIN I PNL SERPL HS: 19 NG/L
CARDIAC TROPONIN I PNL SERPL HS: 20 NG/L
CHLORIDE SERPL-SCNC: 100 MMOL/L (ref 96–108)
CHLORIDE SERPL-SCNC: 100 MMOL/L (ref 96–108)
CHLORIDE SERPL-SCNC: 101 MMOL/L (ref 96–108)
CHLORIDE SERPL-SCNC: 101 MMOL/L (ref 96–108)
CHLORIDE SERPL-SCNC: 105 MMOL/L (ref 96–108)
CHLORIDE SERPL-SCNC: 105 MMOL/L (ref 96–108)
CHLORIDE SERPL-SCNC: 106 MMOL/L (ref 96–108)
CLARITY UR: CLEAR
CO2 SERPL-SCNC: 25 MMOL/L (ref 21–32)
CO2 SERPL-SCNC: 25 MMOL/L (ref 21–32)
CO2 SERPL-SCNC: 27 MMOL/L (ref 21–32)
CO2 SERPL-SCNC: 29 MMOL/L (ref 21–32)
CO2 SERPL-SCNC: 29 MMOL/L (ref 21–32)
CO2 SERPL-SCNC: 30 MMOL/L (ref 21–32)
CO2 SERPL-SCNC: 31 MMOL/L (ref 21–32)
COLOR UR: YELLOW
CORTIS AM PEAK SERPL-MCNC: 3.7 UG/DL (ref 6.7–22.6)
CORTIS AM PEAK SERPL-MCNC: 36.2 UG/DL (ref 6.7–22.6)
CORTIS SERPL-MCNC: 1.4 UG/DL
CORTIS SERPL-MCNC: 10.6 UG/DL
CORTIS SERPL-MCNC: 3.5 UG/DL
CORTIS SERPL-MCNC: 7.6 UG/DL
CREAT SERPL-MCNC: 0.89 MG/DL (ref 0.6–1.3)
CREAT SERPL-MCNC: 0.93 MG/DL (ref 0.6–1.3)
CREAT SERPL-MCNC: 0.94 MG/DL (ref 0.6–1.3)
CREAT SERPL-MCNC: 0.95 MG/DL (ref 0.6–1.3)
CREAT SERPL-MCNC: 1.05 MG/DL (ref 0.6–1.3)
CREAT SERPL-MCNC: 1.06 MG/DL (ref 0.6–1.3)
CREAT SERPL-MCNC: 1.14 MG/DL (ref 0.6–1.3)
DOP CALC AO PEAK VEL: 1.4 M/S
DOP CALC AO VTI: 24.7 CM
DOP CALC LVOT PEAK VEL VTI: 16 CM
DOP CALC LVOT PEAK VEL: 77.9 M/S
EOSINOPHIL # BLD AUTO: 0.01 THOUSAND/ÂΜL (ref 0–0.61)
EOSINOPHIL # BLD AUTO: 0.11 THOUSAND/ÂΜL (ref 0–0.61)
EOSINOPHIL # BLD AUTO: 0.23 THOUSAND/ÂΜL (ref 0–0.61)
EOSINOPHIL # BLD AUTO: 0.91 THOUSAND/ÂΜL (ref 0–0.61)
EOSINOPHIL # BLD AUTO: 0.94 THOUSAND/ÂΜL (ref 0–0.61)
EOSINOPHIL # BLD AUTO: 0.96 THOUSAND/ÂΜL (ref 0–0.61)
EOSINOPHIL # BLD AUTO: 1.34 THOUSAND/ÂΜL (ref 0–0.61)
EOSINOPHIL NFR BLD AUTO: 0 % (ref 0–6)
EOSINOPHIL NFR BLD AUTO: 13 % (ref 0–6)
EOSINOPHIL NFR BLD AUTO: 14 % (ref 0–6)
EOSINOPHIL NFR BLD AUTO: 16 % (ref 0–6)
EOSINOPHIL NFR BLD AUTO: 17 % (ref 0–6)
EOSINOPHIL NFR BLD AUTO: 2 % (ref 0–6)
EOSINOPHIL NFR BLD AUTO: 4 % (ref 0–6)
ERYTHROCYTE [DISTWIDTH] IN BLOOD BY AUTOMATED COUNT: 15.2 % (ref 11.6–15.1)
ERYTHROCYTE [DISTWIDTH] IN BLOOD BY AUTOMATED COUNT: 15.4 % (ref 11.6–15.1)
ERYTHROCYTE [DISTWIDTH] IN BLOOD BY AUTOMATED COUNT: 15.4 % (ref 11.6–15.1)
ERYTHROCYTE [DISTWIDTH] IN BLOOD BY AUTOMATED COUNT: 15.7 % (ref 11.6–15.1)
ERYTHROCYTE [DISTWIDTH] IN BLOOD BY AUTOMATED COUNT: 15.8 % (ref 11.6–15.1)
FLUAV RNA RESP QL NAA+PROBE: NEGATIVE
FLUBV RNA RESP QL NAA+PROBE: NEGATIVE
FRACTIONAL SHORTENING: 9 % (ref 28–44)
GFR SERPL CREATININE-BSD FRML MDRD: 56 ML/MIN/1.73SQ M
GFR SERPL CREATININE-BSD FRML MDRD: 61 ML/MIN/1.73SQ M
GFR SERPL CREATININE-BSD FRML MDRD: 62 ML/MIN/1.73SQ M
GFR SERPL CREATININE-BSD FRML MDRD: 70 ML/MIN/1.73SQ M
GFR SERPL CREATININE-BSD FRML MDRD: 71 ML/MIN/1.73SQ M
GFR SERPL CREATININE-BSD FRML MDRD: 72 ML/MIN/1.73SQ M
GFR SERPL CREATININE-BSD FRML MDRD: 75 ML/MIN/1.73SQ M
GLUCOSE SERPL-MCNC: 105 MG/DL (ref 65–140)
GLUCOSE SERPL-MCNC: 105 MG/DL (ref 65–140)
GLUCOSE SERPL-MCNC: 109 MG/DL (ref 65–140)
GLUCOSE SERPL-MCNC: 111 MG/DL (ref 65–140)
GLUCOSE SERPL-MCNC: 121 MG/DL (ref 65–140)
GLUCOSE SERPL-MCNC: 149 MG/DL (ref 65–140)
GLUCOSE SERPL-MCNC: 154 MG/DL (ref 65–140)
GLUCOSE SERPL-MCNC: 163 MG/DL (ref 65–140)
GLUCOSE SERPL-MCNC: 91 MG/DL (ref 65–140)
GLUCOSE UR STRIP-MCNC: NEGATIVE MG/DL
HCO3 BLDV-SCNC: 27.5 MMOL/L (ref 24–30)
HCO3 BLDV-SCNC: 30.9 MMOL/L (ref 24–30)
HCT VFR BLD AUTO: 31.2 % (ref 36.5–49.3)
HCT VFR BLD AUTO: 32.2 % (ref 36.5–49.3)
HCT VFR BLD AUTO: 32.9 % (ref 36.5–49.3)
HCT VFR BLD AUTO: 33 % (ref 36.5–49.3)
HCT VFR BLD AUTO: 33.4 % (ref 36.5–49.3)
HCT VFR BLD AUTO: 33.8 % (ref 36.5–49.3)
HCT VFR BLD AUTO: 34.5 % (ref 36.5–49.3)
HCT VFR BLD AUTO: 35.8 % (ref 36.5–49.3)
HGB BLD-MCNC: 10 G/DL (ref 12–17)
HGB BLD-MCNC: 10.2 G/DL (ref 12–17)
HGB BLD-MCNC: 10.6 G/DL (ref 12–17)
HGB BLD-MCNC: 10.9 G/DL (ref 12–17)
HGB BLD-MCNC: 9.7 G/DL (ref 12–17)
HGB BLD-MCNC: 9.9 G/DL (ref 12–17)
HGB UR QL STRIP.AUTO: ABNORMAL
HYALINE CASTS #/AREA URNS LPF: ABNORMAL /LPF
IMM GRANULOCYTES # BLD AUTO: 0.02 THOUSAND/UL (ref 0–0.2)
IMM GRANULOCYTES # BLD AUTO: 0.02 THOUSAND/UL (ref 0–0.2)
IMM GRANULOCYTES # BLD AUTO: 0.03 THOUSAND/UL (ref 0–0.2)
IMM GRANULOCYTES # BLD AUTO: 0.04 THOUSAND/UL (ref 0–0.2)
IMM GRANULOCYTES NFR BLD AUTO: 0 % (ref 0–2)
IMM GRANULOCYTES NFR BLD AUTO: 1 % (ref 0–2)
INR PPP: 2.1 (ref 0.84–1.19)
INR PPP: 2.26 (ref 0.84–1.19)
INTERVENTRICULAR SEPTUM IN DIASTOLE (PARASTERNAL SHORT AXIS VIEW): 0.99 CM
KETONES UR STRIP-MCNC: NEGATIVE MG/DL
LACTATE SERPL-SCNC: 0.9 MMOL/L (ref 0.5–2)
LACTATE SERPL-SCNC: 1.4 MMOL/L (ref 0.5–2)
LEFT ATRIUM AREA SYSTOLE SINGLE PLANE A4C: 24.3 CM2
LEFT ATRIUM SIZE: 4.7 CM
LEFT ATRIUM VOLUME INDEX (MOD BIPLANE): 35.1 ML/M2
LEFT INTERNAL DIMENSION IN SYSTOLE: 6.19 CM (ref 2.1–4)
LEFT VENTRICLE DIASTOLIC VOLUME (MOD BIPLANE): 232 ML
LEFT VENTRICLE DIASTOLIC VOLUME INDEX (MOD BIPLANE): 108.4 ML/M2
LEFT VENTRICLE SYSTOLIC VOLUME (MOD BIPLANE): 175 ML
LEFT VENTRICLE SYSTOLIC VOLUME INDEX (MOD BIPLANE): 81.8 ML/M2
LEFT VENTRICULAR INTERNAL DIMENSION IN DIASTOLE: 6.82 CM (ref 3.5–6)
LEFT VENTRICULAR POSTERIOR WALL IN END DIASTOLE: 0.99 CM
LEUKOCYTE ESTERASE UR QL STRIP: ABNORMAL
LIPASE SERPL-CCNC: 16 U/L (ref 11–82)
LV EF: 25 %
LYMPHOCYTES # BLD AUTO: 0.56 THOUSANDS/ÂΜL (ref 0.6–4.47)
LYMPHOCYTES # BLD AUTO: 0.8 THOUSANDS/ÂΜL (ref 0.6–4.47)
LYMPHOCYTES # BLD AUTO: 0.96 THOUSANDS/ÂΜL (ref 0.6–4.47)
LYMPHOCYTES # BLD AUTO: 1.35 THOUSANDS/ÂΜL (ref 0.6–4.47)
LYMPHOCYTES # BLD AUTO: 1.39 THOUSANDS/ÂΜL (ref 0.6–4.47)
LYMPHOCYTES # BLD AUTO: 1.94 THOUSANDS/ÂΜL (ref 0.6–4.47)
LYMPHOCYTES # BLD AUTO: 1.99 THOUSANDS/ÂΜL (ref 0.6–4.47)
LYMPHOCYTES NFR BLD AUTO: 13 % (ref 14–44)
LYMPHOCYTES NFR BLD AUTO: 15 % (ref 14–44)
LYMPHOCYTES NFR BLD AUTO: 16 % (ref 14–44)
LYMPHOCYTES NFR BLD AUTO: 20 % (ref 14–44)
LYMPHOCYTES NFR BLD AUTO: 24 % (ref 14–44)
LYMPHOCYTES NFR BLD AUTO: 25 % (ref 14–44)
LYMPHOCYTES NFR BLD AUTO: 29 % (ref 14–44)
Lab: 30.8 ML
Lab: 38.2 ML
Lab: 59 CM/S
MAGNESIUM SERPL-MCNC: 1.3 MG/DL (ref 1.9–2.7)
MAGNESIUM SERPL-MCNC: 1.4 MG/DL (ref 1.9–2.7)
MAGNESIUM SERPL-MCNC: 1.8 MG/DL (ref 1.9–2.7)
MAGNESIUM SERPL-MCNC: 2 MG/DL (ref 1.9–2.7)
MAGNESIUM SERPL-MCNC: 2 MG/DL (ref 1.9–2.7)
MAGNESIUM SERPL-MCNC: 2.1 MG/DL (ref 1.9–2.7)
MCH RBC QN AUTO: 27.9 PG (ref 26.8–34.3)
MCH RBC QN AUTO: 28.2 PG (ref 26.8–34.3)
MCH RBC QN AUTO: 28.3 PG (ref 26.8–34.3)
MCH RBC QN AUTO: 28.4 PG (ref 26.8–34.3)
MCH RBC QN AUTO: 28.7 PG (ref 26.8–34.3)
MCH RBC QN AUTO: 28.9 PG (ref 26.8–34.3)
MCHC RBC AUTO-ENTMCNC: 29.6 G/DL (ref 31.4–37.4)
MCHC RBC AUTO-ENTMCNC: 29.9 G/DL (ref 31.4–37.4)
MCHC RBC AUTO-ENTMCNC: 30 G/DL (ref 31.4–37.4)
MCHC RBC AUTO-ENTMCNC: 30.4 G/DL (ref 31.4–37.4)
MCHC RBC AUTO-ENTMCNC: 30.7 G/DL (ref 31.4–37.4)
MCHC RBC AUTO-ENTMCNC: 31 G/DL (ref 31.4–37.4)
MCHC RBC AUTO-ENTMCNC: 31.1 G/DL (ref 31.4–37.4)
MCHC RBC AUTO-ENTMCNC: 31.1 G/DL (ref 31.4–37.4)
MCV RBC AUTO: 92 FL (ref 82–98)
MCV RBC AUTO: 93 FL (ref 82–98)
MCV RBC AUTO: 93 FL (ref 82–98)
MCV RBC AUTO: 94 FL (ref 82–98)
MONOCYTES # BLD AUTO: 0.3 THOUSAND/ÂΜL (ref 0.17–1.22)
MONOCYTES # BLD AUTO: 0.32 THOUSAND/ÂΜL (ref 0.17–1.22)
MONOCYTES # BLD AUTO: 0.47 THOUSAND/ÂΜL (ref 0.17–1.22)
MONOCYTES # BLD AUTO: 0.77 THOUSAND/ÂΜL (ref 0.17–1.22)
MONOCYTES # BLD AUTO: 0.8 THOUSAND/ÂΜL (ref 0.17–1.22)
MONOCYTES # BLD AUTO: 0.81 THOUSAND/ÂΜL (ref 0.17–1.22)
MONOCYTES # BLD AUTO: 0.86 THOUSAND/ÂΜL (ref 0.17–1.22)
MONOCYTES NFR BLD AUTO: 10 % (ref 4–12)
MONOCYTES NFR BLD AUTO: 11 % (ref 4–12)
MONOCYTES NFR BLD AUTO: 13 % (ref 4–12)
MONOCYTES NFR BLD AUTO: 14 % (ref 4–12)
MONOCYTES NFR BLD AUTO: 6 % (ref 4–12)
MONOCYTES NFR BLD AUTO: 7 % (ref 4–12)
MONOCYTES NFR BLD AUTO: 7 % (ref 4–12)
MRSA NOSE QL CULT: NORMAL
NEUTROPHILS # BLD AUTO: 2.6 THOUSANDS/ÂΜL (ref 1.85–7.62)
NEUTROPHILS # BLD AUTO: 3.22 THOUSANDS/ÂΜL (ref 1.85–7.62)
NEUTROPHILS # BLD AUTO: 3.39 THOUSANDS/ÂΜL (ref 1.85–7.62)
NEUTROPHILS # BLD AUTO: 3.47 THOUSANDS/ÂΜL (ref 1.85–7.62)
NEUTROPHILS # BLD AUTO: 3.73 THOUSANDS/ÂΜL (ref 1.85–7.62)
NEUTROPHILS # BLD AUTO: 3.88 THOUSANDS/ÂΜL (ref 1.85–7.62)
NEUTROPHILS # BLD AUTO: 4.89 THOUSANDS/ÂΜL (ref 1.85–7.62)
NEUTS SEG NFR BLD AUTO: 44 % (ref 43–75)
NEUTS SEG NFR BLD AUTO: 46 % (ref 43–75)
NEUTS SEG NFR BLD AUTO: 47 % (ref 43–75)
NEUTS SEG NFR BLD AUTO: 53 % (ref 43–75)
NEUTS SEG NFR BLD AUTO: 73 % (ref 43–75)
NEUTS SEG NFR BLD AUTO: 75 % (ref 43–75)
NEUTS SEG NFR BLD AUTO: 79 % (ref 43–75)
NITRITE UR QL STRIP: NEGATIVE
NON-SQ EPI CELLS URNS QL MICRO: ABNORMAL /HPF
NRBC BLD AUTO-RTO: 0 /100 WBCS
O2 CT BLDV-SCNC: 12 ML/DL
O2 CT BLDV-SCNC: 16.1 ML/DL
P AXIS: 76 DEGREES
PCO2 BLDV: 43.9 MM HG (ref 42–50)
PCO2 BLDV: 54.3 MM HG (ref 42–50)
PH BLDV: 7.37 [PH] (ref 7.3–7.4)
PH BLDV: 7.41 [PH] (ref 7.3–7.4)
PH UR STRIP.AUTO: 5 [PH]
PHOSPHATE SERPL-MCNC: 3 MG/DL (ref 2.3–4.1)
PHOSPHATE SERPL-MCNC: 4.2 MG/DL (ref 2.3–4.1)
PLATELET # BLD AUTO: 126 THOUSANDS/UL (ref 149–390)
PLATELET # BLD AUTO: 131 THOUSANDS/UL (ref 149–390)
PLATELET # BLD AUTO: 144 THOUSANDS/UL (ref 149–390)
PLATELET # BLD AUTO: 147 THOUSANDS/UL (ref 149–390)
PLATELET # BLD AUTO: 149 THOUSANDS/UL (ref 149–390)
PLATELET # BLD AUTO: 150 THOUSANDS/UL (ref 149–390)
PLATELET # BLD AUTO: 151 THOUSANDS/UL (ref 149–390)
PLATELET # BLD AUTO: 157 THOUSANDS/UL (ref 149–390)
PMV BLD AUTO: 12.6 FL (ref 8.9–12.7)
PMV BLD AUTO: 12.7 FL (ref 8.9–12.7)
PMV BLD AUTO: 12.7 FL (ref 8.9–12.7)
PMV BLD AUTO: 13 FL (ref 8.9–12.7)
PMV BLD AUTO: 13 FL (ref 8.9–12.7)
PMV BLD AUTO: 13.2 FL (ref 8.9–12.7)
PMV BLD AUTO: 13.5 FL (ref 8.9–12.7)
PMV BLD AUTO: 13.9 FL (ref 8.9–12.7)
PO2 BLDV: 35.7 MM HG (ref 35–45)
PO2 BLDV: 77.1 MM HG (ref 35–45)
POTASSIUM SERPL-SCNC: 3.4 MMOL/L (ref 3.5–5.3)
POTASSIUM SERPL-SCNC: 3.9 MMOL/L (ref 3.5–5.3)
POTASSIUM SERPL-SCNC: 4.1 MMOL/L (ref 3.5–5.3)
POTASSIUM SERPL-SCNC: 4.3 MMOL/L (ref 3.5–5.3)
POTASSIUM SERPL-SCNC: 4.6 MMOL/L (ref 3.5–5.3)
POTASSIUM SERPL-SCNC: 5.2 MMOL/L (ref 3.5–5.3)
POTASSIUM SERPL-SCNC: 5.5 MMOL/L (ref 3.5–5.3)
PR INTERVAL: 176 MS
PROCALCITONIN SERPL-MCNC: 0.24 NG/ML
PROCALCITONIN SERPL-MCNC: 0.24 NG/ML
PROCALCITONIN SERPL-MCNC: 0.26 NG/ML
PROT SERPL-MCNC: 5.2 G/DL (ref 6.4–8.4)
PROT SERPL-MCNC: 5.8 G/DL (ref 6.4–8.4)
PROT SERPL-MCNC: 6 G/DL (ref 6.4–8.4)
PROT SERPL-MCNC: 6.2 G/DL (ref 6.4–8.4)
PROT UR STRIP-MCNC: ABNORMAL MG/DL
PROTHROMBIN TIME: 24 SECONDS (ref 11.6–14.5)
PROTHROMBIN TIME: 25.4 SECONDS (ref 11.6–14.5)
QRS AXIS: -58 DEGREES
QRSD INTERVAL: 102 MS
QT INTERVAL: 444 MS
QTC INTERVAL: 486 MS
RA MAJOR: 6.1 CM
RA PRESSURE ESTIMATED: 3 MMHG
RBC # BLD AUTO: 3.41 MILLION/UL (ref 3.88–5.62)
RBC # BLD AUTO: 3.46 MILLION/UL (ref 3.88–5.62)
RBC # BLD AUTO: 3.5 MILLION/UL (ref 3.88–5.62)
RBC # BLD AUTO: 3.54 MILLION/UL (ref 3.88–5.62)
RBC # BLD AUTO: 3.55 MILLION/UL (ref 3.88–5.62)
RBC # BLD AUTO: 3.58 MILLION/UL (ref 3.88–5.62)
RBC # BLD AUTO: 3.69 MILLION/UL (ref 3.88–5.62)
RBC # BLD AUTO: 3.8 MILLION/UL (ref 3.88–5.62)
RBC #/AREA URNS AUTO: ABNORMAL /HPF
RIGHT ATRIAL 2D VOLUME: 36.9 ML
RIGHT ATRIUM AREA SYSTOLE A4C: 16.5 CM2
RIGHT VENTRICLE ID DIMENSION: 3.7 CM
RSV RNA RESP QL NAA+PROBE: NEGATIVE
RV PSP: 38 MMHG
SARS-COV-2 RNA RESP QL NAA+PROBE: NEGATIVE
SL CV LEFT ATRIUM LENGTH A2C: 5.82 CM
SL CV LV EF: 25
SODIUM SERPL-SCNC: 136 MMOL/L (ref 135–147)
SODIUM SERPL-SCNC: 136 MMOL/L (ref 135–147)
SODIUM SERPL-SCNC: 137 MMOL/L (ref 135–147)
SODIUM SERPL-SCNC: 138 MMOL/L (ref 135–147)
SP GR UR STRIP.AUTO: 1.01 (ref 1–1.03)
T WAVE AXIS: 73 DEGREES
T4 FREE SERPL-MCNC: 0.83 NG/DL (ref 0.61–1.12)
TR MAX PG: 35 MMHG
TR PEAK VELOCITY: 3 M/S
TSH SERPL DL<=0.05 MIU/L-ACNC: 7.46 UIU/ML (ref 0.45–4.5)
UROBILINOGEN UR STRIP-ACNC: <2 MG/DL
VENTRICULAR RATE: 72 BPM
WBC # BLD AUTO: 4.31 THOUSAND/UL (ref 4.31–10.16)
WBC # BLD AUTO: 4.82 THOUSAND/UL (ref 4.31–10.16)
WBC # BLD AUTO: 5.16 THOUSAND/UL (ref 4.31–10.16)
WBC # BLD AUTO: 5.8 THOUSAND/UL (ref 4.31–10.16)
WBC # BLD AUTO: 6.6 THOUSAND/UL (ref 4.31–10.16)
WBC # BLD AUTO: 6.68 THOUSAND/UL (ref 4.31–10.16)
WBC # BLD AUTO: 6.96 THOUSAND/UL (ref 4.31–10.16)
WBC # BLD AUTO: 7.89 THOUSAND/UL (ref 4.31–10.16)
WBC #/AREA URNS AUTO: ABNORMAL /HPF

## 2024-01-01 PROCEDURE — 85025 COMPLETE CBC W/AUTO DIFF WBC: CPT

## 2024-01-01 PROCEDURE — NC001 PR NO CHARGE

## 2024-01-01 PROCEDURE — 82533 TOTAL CORTISOL: CPT

## 2024-01-01 PROCEDURE — 82948 REAGENT STRIP/BLOOD GLUCOSE: CPT

## 2024-01-01 PROCEDURE — 85025 COMPLETE CBC W/AUTO DIFF WBC: CPT | Performed by: PHYSICIAN ASSISTANT

## 2024-01-01 PROCEDURE — 99232 SBSQ HOSP IP/OBS MODERATE 35: CPT | Performed by: PHYSICIAN ASSISTANT

## 2024-01-01 PROCEDURE — 97163 PT EVAL HIGH COMPLEX 45 MIN: CPT

## 2024-01-01 PROCEDURE — 83735 ASSAY OF MAGNESIUM: CPT | Performed by: PHYSICIAN ASSISTANT

## 2024-01-01 PROCEDURE — 93306 TTE W/DOPPLER COMPLETE: CPT | Performed by: INTERNAL MEDICINE

## 2024-01-01 PROCEDURE — 85610 PROTHROMBIN TIME: CPT

## 2024-01-01 PROCEDURE — 97167 OT EVAL HIGH COMPLEX 60 MIN: CPT

## 2024-01-01 PROCEDURE — 96368 THER/DIAG CONCURRENT INF: CPT

## 2024-01-01 PROCEDURE — 99232 SBSQ HOSP IP/OBS MODERATE 35: CPT

## 2024-01-01 PROCEDURE — 80048 BASIC METABOLIC PNL TOTAL CA: CPT | Performed by: PHYSICIAN ASSISTANT

## 2024-01-01 PROCEDURE — 99285 EMERGENCY DEPT VISIT HI MDM: CPT

## 2024-01-01 PROCEDURE — 82533 TOTAL CORTISOL: CPT | Performed by: PHYSICIAN ASSISTANT

## 2024-01-01 PROCEDURE — 96366 THER/PROPH/DIAG IV INF ADDON: CPT

## 2024-01-01 PROCEDURE — 83735 ASSAY OF MAGNESIUM: CPT

## 2024-01-01 PROCEDURE — 82533 TOTAL CORTISOL: CPT | Performed by: INTERNAL MEDICINE

## 2024-01-01 PROCEDURE — 85730 THROMBOPLASTIN TIME PARTIAL: CPT | Performed by: HOSPITALIST

## 2024-01-01 PROCEDURE — 84484 ASSAY OF TROPONIN QUANT: CPT

## 2024-01-01 PROCEDURE — 80053 COMPREHEN METABOLIC PANEL: CPT

## 2024-01-01 PROCEDURE — 94760 N-INVAS EAR/PLS OXIMETRY 1: CPT

## 2024-01-01 PROCEDURE — 83880 ASSAY OF NATRIURETIC PEPTIDE: CPT

## 2024-01-01 PROCEDURE — 83605 ASSAY OF LACTIC ACID: CPT

## 2024-01-01 PROCEDURE — 73502 X-RAY EXAM HIP UNI 2-3 VIEWS: CPT

## 2024-01-01 PROCEDURE — 96365 THER/PROPH/DIAG IV INF INIT: CPT

## 2024-01-01 PROCEDURE — 80048 BASIC METABOLIC PNL TOTAL CA: CPT

## 2024-01-01 PROCEDURE — 82024 ASSAY OF ACTH: CPT

## 2024-01-01 PROCEDURE — 93005 ELECTROCARDIOGRAM TRACING: CPT

## 2024-01-01 PROCEDURE — 84443 ASSAY THYROID STIM HORMONE: CPT | Performed by: PHYSICIAN ASSISTANT

## 2024-01-01 PROCEDURE — 74177 CT ABD & PELVIS W/CONTRAST: CPT

## 2024-01-01 PROCEDURE — 81001 URINALYSIS AUTO W/SCOPE: CPT | Performed by: PHYSICIAN ASSISTANT

## 2024-01-01 PROCEDURE — 84439 ASSAY OF FREE THYROXINE: CPT | Performed by: PHYSICIAN ASSISTANT

## 2024-01-01 PROCEDURE — 85730 THROMBOPLASTIN TIME PARTIAL: CPT

## 2024-01-01 PROCEDURE — 87081 CULTURE SCREEN ONLY: CPT | Performed by: INTERNAL MEDICINE

## 2024-01-01 PROCEDURE — 84145 PROCALCITONIN (PCT): CPT

## 2024-01-01 PROCEDURE — 84100 ASSAY OF PHOSPHORUS: CPT | Performed by: PHYSICIAN ASSISTANT

## 2024-01-01 PROCEDURE — 93010 ELECTROCARDIOGRAM REPORT: CPT | Performed by: INTERNAL MEDICINE

## 2024-01-01 PROCEDURE — 99222 1ST HOSP IP/OBS MODERATE 55: CPT | Performed by: PHYSICIAN ASSISTANT

## 2024-01-01 PROCEDURE — 85027 COMPLETE CBC AUTOMATED: CPT

## 2024-01-01 PROCEDURE — 87040 BLOOD CULTURE FOR BACTERIA: CPT

## 2024-01-01 PROCEDURE — 82805 BLOOD GASES W/O2 SATURATION: CPT

## 2024-01-01 PROCEDURE — 83690 ASSAY OF LIPASE: CPT

## 2024-01-01 PROCEDURE — C8929 TTE W OR WO FOL WCON,DOPPLER: HCPCS

## 2024-01-01 PROCEDURE — 94640 AIRWAY INHALATION TREATMENT: CPT

## 2024-01-01 PROCEDURE — 36415 COLL VENOUS BLD VENIPUNCTURE: CPT

## 2024-01-01 PROCEDURE — 99222 1ST HOSP IP/OBS MODERATE 55: CPT | Performed by: INTERNAL MEDICINE

## 2024-01-01 PROCEDURE — 99232 SBSQ HOSP IP/OBS MODERATE 35: CPT | Performed by: STUDENT IN AN ORGANIZED HEALTH CARE EDUCATION/TRAINING PROGRAM

## 2024-01-01 PROCEDURE — NC001 PR NO CHARGE: Performed by: PHYSICIAN ASSISTANT

## 2024-01-01 PROCEDURE — 71045 X-RAY EXAM CHEST 1 VIEW: CPT

## 2024-01-01 PROCEDURE — 99239 HOSP IP/OBS DSCHRG MGMT >30: CPT

## 2024-01-01 PROCEDURE — 82330 ASSAY OF CALCIUM: CPT | Performed by: PHYSICIAN ASSISTANT

## 2024-01-01 PROCEDURE — 0241U HB NFCT DS VIR RESP RNA 4 TRGT: CPT

## 2024-01-01 PROCEDURE — 99223 1ST HOSP IP/OBS HIGH 75: CPT | Performed by: INTERNAL MEDICINE

## 2024-01-01 PROCEDURE — 71260 CT THORAX DX C+: CPT

## 2024-01-01 PROCEDURE — 92610 EVALUATE SWALLOWING FUNCTION: CPT

## 2024-01-01 RX ORDER — METRONIDAZOLE 500 MG/100ML
500 INJECTION, SOLUTION INTRAVENOUS EVERY 8 HOURS
Status: DISCONTINUED | OUTPATIENT
Start: 2024-01-01 | End: 2024-01-01

## 2024-01-01 RX ORDER — ACETAMINOPHEN 325 MG/1
975 TABLET ORAL ONCE
Status: COMPLETED | OUTPATIENT
Start: 2024-01-01 | End: 2024-01-01

## 2024-01-01 RX ORDER — FUROSEMIDE 10 MG/ML
40 INJECTION INTRAMUSCULAR; INTRAVENOUS ONCE
Status: COMPLETED | OUTPATIENT
Start: 2024-01-01 | End: 2024-01-01

## 2024-01-01 RX ORDER — HALOPERIDOL 5 MG/ML
0.5 INJECTION INTRAMUSCULAR EVERY 2 HOUR PRN
Status: DISCONTINUED | OUTPATIENT
Start: 2024-01-01 | End: 2024-07-01 | Stop reason: HOSPADM

## 2024-01-01 RX ORDER — POTASSIUM CHLORIDE 20 MEQ/1
40 TABLET, EXTENDED RELEASE ORAL ONCE
Status: COMPLETED | OUTPATIENT
Start: 2024-01-01 | End: 2024-01-01

## 2024-01-01 RX ORDER — ALBUMIN (HUMAN) 12.5 G/50ML
25 SOLUTION INTRAVENOUS ONCE
Status: COMPLETED | OUTPATIENT
Start: 2024-01-01 | End: 2024-01-01

## 2024-01-01 RX ORDER — HEPARIN SODIUM 1000 [USP'U]/ML
4000 INJECTION, SOLUTION INTRAVENOUS; SUBCUTANEOUS EVERY 6 HOURS PRN
Status: DISCONTINUED | OUTPATIENT
Start: 2024-01-01 | End: 2024-01-01

## 2024-01-01 RX ORDER — ONDANSETRON 2 MG/ML
4 INJECTION INTRAMUSCULAR; INTRAVENOUS EVERY 6 HOURS PRN
Status: DISCONTINUED | OUTPATIENT
Start: 2024-01-01 | End: 2024-07-01 | Stop reason: HOSPADM

## 2024-01-01 RX ORDER — HEPARIN SODIUM 10000 [USP'U]/100ML
3-20 INJECTION, SOLUTION INTRAVENOUS
Status: DISCONTINUED | OUTPATIENT
Start: 2024-01-01 | End: 2024-01-01

## 2024-01-01 RX ORDER — MORPHINE SULFATE 4 MG/ML
4 INJECTION, SOLUTION INTRAMUSCULAR; INTRAVENOUS
Status: DISCONTINUED | OUTPATIENT
Start: 2024-01-01 | End: 2024-01-01

## 2024-01-01 RX ORDER — PANTOPRAZOLE SODIUM 20 MG/1
20 TABLET, DELAYED RELEASE ORAL
Status: DISCONTINUED | OUTPATIENT
Start: 2024-01-01 | End: 2024-01-01

## 2024-01-01 RX ORDER — BISACODYL 10 MG
10 SUPPOSITORY, RECTAL RECTAL DAILY PRN
Status: DISCONTINUED | OUTPATIENT
Start: 2024-01-01 | End: 2024-07-01 | Stop reason: HOSPADM

## 2024-01-01 RX ORDER — SODIUM CHLORIDE 9 MG/ML
75 INJECTION, SOLUTION INTRAVENOUS CONTINUOUS
Status: DISCONTINUED | OUTPATIENT
Start: 2024-01-01 | End: 2024-01-01

## 2024-01-01 RX ORDER — FUROSEMIDE 10 MG/ML
40 INJECTION INTRAMUSCULAR; INTRAVENOUS DAILY PRN
Status: DISCONTINUED | OUTPATIENT
Start: 2024-01-01 | End: 2024-01-01

## 2024-01-01 RX ORDER — MAGNESIUM SULFATE HEPTAHYDRATE 40 MG/ML
2 INJECTION, SOLUTION INTRAVENOUS ONCE
Status: COMPLETED | OUTPATIENT
Start: 2024-01-01 | End: 2024-01-01

## 2024-01-01 RX ORDER — GABAPENTIN 100 MG/1
100 CAPSULE ORAL 3 TIMES DAILY
Status: DISCONTINUED | OUTPATIENT
Start: 2024-01-01 | End: 2024-01-01

## 2024-01-01 RX ORDER — GLYCOPYRROLATE 0.2 MG/ML
0.1 INJECTION INTRAMUSCULAR; INTRAVENOUS EVERY 4 HOURS PRN
Status: DISCONTINUED | OUTPATIENT
Start: 2024-01-01 | End: 2024-07-01 | Stop reason: HOSPADM

## 2024-01-01 RX ORDER — LANOLIN ALCOHOL/MO/W.PET/CERES
800 CREAM (GRAM) TOPICAL 2 TIMES DAILY
Status: DISCONTINUED | OUTPATIENT
Start: 2024-01-01 | End: 2024-01-01

## 2024-01-01 RX ORDER — CEFTRIAXONE 2 G/50ML
2000 INJECTION, SOLUTION INTRAVENOUS EVERY 24 HOURS
Status: COMPLETED | OUTPATIENT
Start: 2024-01-01 | End: 2024-01-01

## 2024-01-01 RX ORDER — COSYNTROPIN 0.25 MG/ML
0.25 INJECTION, POWDER, FOR SOLUTION INTRAMUSCULAR; INTRAVENOUS ONCE
Status: COMPLETED | OUTPATIENT
Start: 2024-01-01 | End: 2024-01-01

## 2024-01-01 RX ORDER — DIPHENHYDRAMINE HYDROCHLORIDE, ZINC ACETATE 2; .1 G/100G; G/100G
CREAM TOPICAL 3 TIMES DAILY PRN
Status: DISCONTINUED | OUTPATIENT
Start: 2024-01-01 | End: 2024-07-01 | Stop reason: HOSPADM

## 2024-01-01 RX ORDER — LEVETIRACETAM 500 MG/1
500 TABLET ORAL EVERY 12 HOURS SCHEDULED
Status: DISCONTINUED | OUTPATIENT
Start: 2024-01-01 | End: 2024-01-01

## 2024-01-01 RX ORDER — DOXYCYCLINE HYCLATE 100 MG/1
100 CAPSULE ORAL EVERY 12 HOURS SCHEDULED
Status: DISCONTINUED | OUTPATIENT
Start: 2024-01-01 | End: 2024-01-01

## 2024-01-01 RX ORDER — FUROSEMIDE 10 MG/ML
40 INJECTION INTRAMUSCULAR; INTRAVENOUS DAILY
Status: DISCONTINUED | OUTPATIENT
Start: 2024-01-01 | End: 2024-01-01

## 2024-01-01 RX ORDER — HEPARIN SODIUM 1000 [USP'U]/ML
2000 INJECTION, SOLUTION INTRAVENOUS; SUBCUTANEOUS EVERY 6 HOURS PRN
Status: DISCONTINUED | OUTPATIENT
Start: 2024-01-01 | End: 2024-01-01

## 2024-01-01 RX ORDER — CEFEPIME HYDROCHLORIDE 2 G/50ML
2000 INJECTION, SOLUTION INTRAVENOUS ONCE
Status: DISCONTINUED | OUTPATIENT
Start: 2024-01-01 | End: 2024-01-01

## 2024-01-01 RX ORDER — HYDROXYCHLOROQUINE SULFATE 200 MG/1
200 TABLET, FILM COATED ORAL 2 TIMES DAILY WITH MEALS
Status: DISCONTINUED | OUTPATIENT
Start: 2024-01-01 | End: 2024-01-01

## 2024-01-01 RX ORDER — FUROSEMIDE 10 MG/ML
40 INJECTION INTRAMUSCULAR; INTRAVENOUS ONCE
Status: DISCONTINUED | OUTPATIENT
Start: 2024-01-01 | End: 2024-01-01

## 2024-01-01 RX ORDER — LEVOTHYROXINE SODIUM 0.05 MG/1
50 TABLET ORAL DAILY
Status: DISCONTINUED | OUTPATIENT
Start: 2024-01-01 | End: 2024-01-01

## 2024-01-01 RX ORDER — SODIUM CHLORIDE, SODIUM GLUCONATE, SODIUM ACETATE, POTASSIUM CHLORIDE, MAGNESIUM CHLORIDE, SODIUM PHOSPHATE, DIBASIC, AND POTASSIUM PHOSPHATE .53; .5; .37; .037; .03; .012; .00082 G/100ML; G/100ML; G/100ML; G/100ML; G/100ML; G/100ML; G/100ML
500 INJECTION, SOLUTION INTRAVENOUS ONCE
Status: COMPLETED | OUTPATIENT
Start: 2024-01-01 | End: 2024-01-01

## 2024-01-01 RX ORDER — CEFTRIAXONE 1 G/50ML
1000 INJECTION, SOLUTION INTRAVENOUS EVERY 24 HOURS
Status: DISCONTINUED | OUTPATIENT
Start: 2024-01-01 | End: 2024-01-01

## 2024-01-01 RX ORDER — ALBUMIN, HUMAN INJ 5% 5 %
25 SOLUTION INTRAVENOUS ONCE
Status: COMPLETED | OUTPATIENT
Start: 2024-01-01 | End: 2024-01-01

## 2024-01-01 RX ORDER — FUROSEMIDE 10 MG/ML
20 INJECTION INTRAMUSCULAR; INTRAVENOUS DAILY PRN
Status: DISCONTINUED | OUTPATIENT
Start: 2024-01-01 | End: 2024-07-01 | Stop reason: HOSPADM

## 2024-01-01 RX ORDER — LANOLIN ALCOHOL/MO/W.PET/CERES
1.5 CREAM (GRAM) TOPICAL
Status: DISCONTINUED | OUTPATIENT
Start: 2024-01-01 | End: 2024-01-01

## 2024-01-01 RX ORDER — ACETAMINOPHEN 325 MG/1
650 TABLET ORAL EVERY 6 HOURS PRN
Status: DISCONTINUED | OUTPATIENT
Start: 2024-01-01 | End: 2024-07-01 | Stop reason: HOSPADM

## 2024-01-01 RX ORDER — LEVETIRACETAM 500 MG/5ML
500 INJECTION, SOLUTION, CONCENTRATE INTRAVENOUS EVERY 12 HOURS SCHEDULED
Status: DISCONTINUED | OUTPATIENT
Start: 2024-01-01 | End: 2024-01-01

## 2024-01-01 RX ORDER — KETOROLAC TROMETHAMINE 30 MG/ML
15 INJECTION, SOLUTION INTRAMUSCULAR; INTRAVENOUS EVERY 6 HOURS PRN
Status: DISCONTINUED | OUTPATIENT
Start: 2024-01-01 | End: 2024-07-01 | Stop reason: HOSPADM

## 2024-01-01 RX ORDER — ALBUMIN, HUMAN INJ 5% 5 %
25 SOLUTION INTRAVENOUS ONCE
Status: DISCONTINUED | OUTPATIENT
Start: 2024-01-01 | End: 2024-01-01

## 2024-01-01 RX ORDER — HYDROMORPHONE HCL/PF 1 MG/ML
0.5 SYRINGE (ML) INJECTION EVERY 2 HOUR PRN
Status: DISCONTINUED | OUTPATIENT
Start: 2024-01-01 | End: 2024-01-01

## 2024-01-01 RX ORDER — IPRATROPIUM BROMIDE AND ALBUTEROL SULFATE 2.5; .5 MG/3ML; MG/3ML
3 SOLUTION RESPIRATORY (INHALATION) ONCE
Status: COMPLETED | OUTPATIENT
Start: 2024-01-01 | End: 2024-01-01

## 2024-01-01 RX ORDER — CHLORHEXIDINE GLUCONATE ORAL RINSE 1.2 MG/ML
15 SOLUTION DENTAL EVERY 12 HOURS SCHEDULED
Status: DISCONTINUED | OUTPATIENT
Start: 2024-01-01 | End: 2024-01-01

## 2024-01-01 RX ORDER — METRONIDAZOLE 500 MG/100ML
500 INJECTION, SOLUTION INTRAVENOUS EVERY 8 HOURS
Status: COMPLETED | OUTPATIENT
Start: 2024-01-01 | End: 2024-01-01

## 2024-01-01 RX ORDER — LIDOCAINE 50 MG/G
2 PATCH TOPICAL DAILY
Status: DISCONTINUED | OUTPATIENT
Start: 2024-01-01 | End: 2024-07-01 | Stop reason: HOSPADM

## 2024-01-01 RX ORDER — MIDODRINE HYDROCHLORIDE 5 MG/1
5 TABLET ORAL
Status: DISCONTINUED | OUTPATIENT
Start: 2024-01-01 | End: 2024-01-01

## 2024-01-01 RX ORDER — MIDODRINE HYDROCHLORIDE 5 MG/1
5 TABLET ORAL ONCE
Status: COMPLETED | OUTPATIENT
Start: 2024-01-01 | End: 2024-01-01

## 2024-01-01 RX ORDER — LORAZEPAM 2 MG/ML
1 INJECTION INTRAMUSCULAR
Status: DISCONTINUED | OUTPATIENT
Start: 2024-01-01 | End: 2024-07-01 | Stop reason: HOSPADM

## 2024-01-01 RX ORDER — ALLOPURINOL 100 MG/1
200 TABLET ORAL DAILY
Status: DISCONTINUED | OUTPATIENT
Start: 2024-01-01 | End: 2024-01-01

## 2024-01-01 RX ADMIN — DICLOFENAC SODIUM 2 G: 10 GEL TOPICAL at 17:10

## 2024-01-01 RX ADMIN — DOXYCYCLINE 100 MG: 100 CAPSULE ORAL at 09:20

## 2024-01-01 RX ADMIN — DICLOFENAC SODIUM 2 G: 10 GEL TOPICAL at 19:28

## 2024-01-01 RX ADMIN — GABAPENTIN 100 MG: 100 CAPSULE ORAL at 16:44

## 2024-01-01 RX ADMIN — METRONIDAZOLE 500 MG: 500 INJECTION, SOLUTION INTRAVENOUS at 21:47

## 2024-01-01 RX ADMIN — GABAPENTIN 100 MG: 100 CAPSULE ORAL at 08:41

## 2024-01-01 RX ADMIN — DOXYCYCLINE 100 MG: 100 CAPSULE ORAL at 08:26

## 2024-01-01 RX ADMIN — LEVETIRACETAM 500 MG: 500 TABLET, FILM COATED ORAL at 09:56

## 2024-01-01 RX ADMIN — SODIUM CHLORIDE 75 ML/HR: 0.9 INJECTION, SOLUTION INTRAVENOUS at 15:09

## 2024-01-01 RX ADMIN — FUROSEMIDE 40 MG: 10 INJECTION, SOLUTION INTRAMUSCULAR; INTRAVENOUS at 08:42

## 2024-01-01 RX ADMIN — HYDROXYCHLOROQUINE SULFATE 200 MG: 200 TABLET ORAL at 08:26

## 2024-01-01 RX ADMIN — DOXYCYCLINE 100 MG: 100 CAPSULE ORAL at 08:42

## 2024-01-01 RX ADMIN — METRONIDAZOLE 500 MG: 500 INJECTION, SOLUTION INTRAVENOUS at 05:57

## 2024-01-01 RX ADMIN — LEVETIRACETAM 500 MG: 500 TABLET, FILM COATED ORAL at 09:20

## 2024-01-01 RX ADMIN — DICLOFENAC SODIUM 2 G: 10 GEL TOPICAL at 09:08

## 2024-01-01 RX ADMIN — METRONIDAZOLE 500 MG: 500 INJECTION, SOLUTION INTRAVENOUS at 14:00

## 2024-01-01 RX ADMIN — GABAPENTIN 100 MG: 100 CAPSULE ORAL at 16:02

## 2024-01-01 RX ADMIN — HEPARIN SODIUM 11.1 UNITS/KG/HR: 10000 INJECTION, SOLUTION INTRAVENOUS at 21:04

## 2024-01-01 RX ADMIN — LEVETIRACETAM 500 MG: 500 TABLET, FILM COATED ORAL at 22:11

## 2024-01-01 RX ADMIN — DICLOFENAC SODIUM 2 G: 10 GEL TOPICAL at 12:02

## 2024-01-01 RX ADMIN — HYDROCORTISONE SODIUM SUCCINATE 50 MG: 100 INJECTION, POWDER, FOR SOLUTION INTRAMUSCULAR; INTRAVENOUS at 14:51

## 2024-01-01 RX ADMIN — MORPHINE SULFATE 2 MG: 2 INJECTION, SOLUTION INTRAMUSCULAR; INTRAVENOUS at 10:35

## 2024-01-01 RX ADMIN — DICLOFENAC SODIUM 2 G: 10 GEL TOPICAL at 11:21

## 2024-01-01 RX ADMIN — HYDROCORTISONE SODIUM SUCCINATE 25 MG: 100 INJECTION, POWDER, FOR SOLUTION INTRAMUSCULAR; INTRAVENOUS at 21:03

## 2024-01-01 RX ADMIN — LEVETIRACETAM 500 MG: 500 TABLET, FILM COATED ORAL at 21:01

## 2024-01-01 RX ADMIN — DICLOFENAC SODIUM 2 G: 10 GEL TOPICAL at 17:32

## 2024-01-01 RX ADMIN — FUROSEMIDE 40 MG: 10 INJECTION, SOLUTION INTRAMUSCULAR; INTRAVENOUS at 14:03

## 2024-01-01 RX ADMIN — APIXABAN 5 MG: 5 TABLET, FILM COATED ORAL at 17:05

## 2024-01-01 RX ADMIN — IPRATROPIUM BROMIDE AND ALBUTEROL SULFATE 3 ML: .5; 3 SOLUTION RESPIRATORY (INHALATION) at 18:09

## 2024-01-01 RX ADMIN — METRONIDAZOLE 500 MG: 500 INJECTION, SOLUTION INTRAVENOUS at 05:24

## 2024-01-01 RX ADMIN — LEVOTHYROXINE SODIUM 50 MCG: 50 TABLET ORAL at 08:26

## 2024-01-01 RX ADMIN — APIXABAN 5 MG: 5 TABLET, FILM COATED ORAL at 09:55

## 2024-01-01 RX ADMIN — HYDROXYCHLOROQUINE SULFATE 200 MG: 200 TABLET ORAL at 16:46

## 2024-01-01 RX ADMIN — APIXABAN 5 MG: 5 TABLET, FILM COATED ORAL at 17:33

## 2024-01-01 RX ADMIN — HYDROXYCHLOROQUINE SULFATE 200 MG: 200 TABLET ORAL at 11:00

## 2024-01-01 RX ADMIN — CEFTRIAXONE 2000 MG: 2 INJECTION, SOLUTION INTRAVENOUS at 15:09

## 2024-01-01 RX ADMIN — LORAZEPAM 1 MG: 2 INJECTION INTRAMUSCULAR; INTRAVENOUS at 16:38

## 2024-01-01 RX ADMIN — ALBUMIN (HUMAN) 25 G: 0.25 INJECTION, SOLUTION INTRAVENOUS at 20:40

## 2024-01-01 RX ADMIN — ALBUMIN (HUMAN) 25 G: 12.5 INJECTION, SOLUTION INTRAVENOUS at 17:58

## 2024-01-01 RX ADMIN — ONDANSETRON 4 MG: 2 INJECTION INTRAMUSCULAR; INTRAVENOUS at 12:53

## 2024-01-01 RX ADMIN — HYDROXYCHLOROQUINE SULFATE 200 MG: 200 TABLET ORAL at 07:31

## 2024-01-01 RX ADMIN — CEFTRIAXONE 1000 MG: 1 INJECTION, SOLUTION INTRAVENOUS at 14:00

## 2024-01-01 RX ADMIN — DICLOFENAC SODIUM 2 G: 10 GEL TOPICAL at 21:40

## 2024-01-01 RX ADMIN — MORPHINE SULFATE 4 MG: 4 INJECTION, SOLUTION INTRAMUSCULAR; INTRAVENOUS at 10:24

## 2024-01-01 RX ADMIN — CHLORHEXIDINE GLUCONATE 0.12% ORAL RINSE 15 ML: 1.2 LIQUID ORAL at 08:19

## 2024-01-01 RX ADMIN — DICLOFENAC SODIUM 2 G: 10 GEL TOPICAL at 08:26

## 2024-01-01 RX ADMIN — LEVETIRACETAM 500 MG: 500 TABLET, FILM COATED ORAL at 20:04

## 2024-01-01 RX ADMIN — DICLOFENAC SODIUM 2 G: 10 GEL TOPICAL at 17:26

## 2024-01-01 RX ADMIN — CEFTRIAXONE 2000 MG: 2 INJECTION, SOLUTION INTRAVENOUS at 16:45

## 2024-01-01 RX ADMIN — PANTOPRAZOLE SODIUM 20 MG: 20 TABLET, DELAYED RELEASE ORAL at 05:24

## 2024-01-01 RX ADMIN — ACETAMINOPHEN 975 MG: 325 TABLET, FILM COATED ORAL at 09:19

## 2024-01-01 RX ADMIN — PANTOPRAZOLE SODIUM 20 MG: 20 TABLET, DELAYED RELEASE ORAL at 06:00

## 2024-01-01 RX ADMIN — MORPHINE SULFATE 2 MG: 2 INJECTION, SOLUTION INTRAMUSCULAR; INTRAVENOUS at 08:09

## 2024-01-01 RX ADMIN — POTASSIUM CHLORIDE 40 MEQ: 1500 TABLET, EXTENDED RELEASE ORAL at 10:39

## 2024-01-01 RX ADMIN — MORPHINE SULFATE 2 MG: 2 INJECTION, SOLUTION INTRAMUSCULAR; INTRAVENOUS at 05:35

## 2024-01-01 RX ADMIN — GABAPENTIN 100 MG: 100 CAPSULE ORAL at 16:30

## 2024-01-01 RX ADMIN — SODIUM CHLORIDE, SODIUM GLUCONATE, SODIUM ACETATE, POTASSIUM CHLORIDE, MAGNESIUM CHLORIDE, SODIUM PHOSPHATE, DIBASIC, AND POTASSIUM PHOSPHATE 500 ML: .53; .5; .37; .037; .03; .012; .00082 INJECTION, SOLUTION INTRAVENOUS at 14:14

## 2024-01-01 RX ADMIN — LEVETIRACETAM 500 MG: 500 TABLET, FILM COATED ORAL at 14:39

## 2024-01-01 RX ADMIN — ALLOPURINOL 200 MG: 100 TABLET ORAL at 08:26

## 2024-01-01 RX ADMIN — HYDROXYCHLOROQUINE SULFATE 200 MG: 200 TABLET ORAL at 09:23

## 2024-01-01 RX ADMIN — PANTOPRAZOLE SODIUM 20 MG: 20 TABLET, DELAYED RELEASE ORAL at 07:09

## 2024-01-01 RX ADMIN — GABAPENTIN 100 MG: 100 CAPSULE ORAL at 21:38

## 2024-01-01 RX ADMIN — METRONIDAZOLE 500 MG: 500 INJECTION, SOLUTION INTRAVENOUS at 15:08

## 2024-01-01 RX ADMIN — ALLOPURINOL 200 MG: 100 TABLET ORAL at 09:07

## 2024-01-01 RX ADMIN — DICLOFENAC SODIUM 2 G: 10 GEL TOPICAL at 17:34

## 2024-01-01 RX ADMIN — CHLORHEXIDINE GLUCONATE 0.12% ORAL RINSE 15 ML: 1.2 LIQUID ORAL at 20:04

## 2024-01-01 RX ADMIN — HYDROXYCHLOROQUINE SULFATE 200 MG: 200 TABLET ORAL at 16:44

## 2024-01-01 RX ADMIN — Medication 1.5 MG: at 22:11

## 2024-01-01 RX ADMIN — MAGNESIUM SULFATE HEPTAHYDRATE 2 G: 2 INJECTION, SOLUTION INTRAVENOUS at 14:11

## 2024-01-01 RX ADMIN — CHLORHEXIDINE GLUCONATE 0.12% ORAL RINSE 15 ML: 1.2 LIQUID ORAL at 22:11

## 2024-01-01 RX ADMIN — PANTOPRAZOLE SODIUM 20 MG: 20 TABLET, DELAYED RELEASE ORAL at 06:30

## 2024-01-01 RX ADMIN — HYDROXYCHLOROQUINE SULFATE 200 MG: 200 TABLET ORAL at 16:02

## 2024-01-01 RX ADMIN — DICLOFENAC SODIUM 2 G: 10 GEL TOPICAL at 21:44

## 2024-01-01 RX ADMIN — LEVOTHYROXINE SODIUM 50 MCG: 50 TABLET ORAL at 09:54

## 2024-01-01 RX ADMIN — LEVETIRACETAM 500 MG: 500 TABLET, FILM COATED ORAL at 21:57

## 2024-01-01 RX ADMIN — MAGNESIUM SULFATE HEPTAHYDRATE 2 G: 2 INJECTION, SOLUTION INTRAVENOUS at 11:19

## 2024-01-01 RX ADMIN — DOXYCYCLINE 100 MG: 100 CAPSULE ORAL at 21:42

## 2024-01-01 RX ADMIN — LEVOTHYROXINE SODIUM 50 MCG: 50 TABLET ORAL at 08:42

## 2024-01-01 RX ADMIN — MORPHINE SULFATE 4 MG: 4 INJECTION, SOLUTION INTRAMUSCULAR; INTRAVENOUS at 14:57

## 2024-01-01 RX ADMIN — IOHEXOL 100 ML: 350 INJECTION, SOLUTION INTRAVENOUS at 11:03

## 2024-01-01 RX ADMIN — DICLOFENAC SODIUM 2 G: 10 GEL TOPICAL at 18:56

## 2024-01-01 RX ADMIN — DOXYCYCLINE 100 MG: 100 CAPSULE ORAL at 20:04

## 2024-01-01 RX ADMIN — METRONIDAZOLE 500 MG: 500 INJECTION, SOLUTION INTRAVENOUS at 16:35

## 2024-01-01 RX ADMIN — MORPHINE SULFATE 2 MG: 2 INJECTION, SOLUTION INTRAMUSCULAR; INTRAVENOUS at 18:51

## 2024-01-01 RX ADMIN — MORPHINE SULFATE 2 MG: 2 INJECTION, SOLUTION INTRAMUSCULAR; INTRAVENOUS at 17:26

## 2024-01-01 RX ADMIN — LEVETIRACETAM 500 MG: 500 TABLET, FILM COATED ORAL at 08:26

## 2024-01-01 RX ADMIN — HYDROCORTISONE SODIUM SUCCINATE 50 MG: 100 INJECTION, POWDER, FOR SOLUTION INTRAMUSCULAR; INTRAVENOUS at 04:48

## 2024-01-01 RX ADMIN — COSYNTROPIN 0.25 MG: 0.25 INJECTION, POWDER, LYOPHILIZED, FOR SOLUTION INTRAMUSCULAR; INTRAVENOUS at 15:04

## 2024-01-01 RX ADMIN — DICLOFENAC SODIUM 2 G: 10 GEL TOPICAL at 17:08

## 2024-01-01 RX ADMIN — KETOROLAC TROMETHAMINE 15 MG: 30 INJECTION, SOLUTION INTRAMUSCULAR; INTRAVENOUS at 16:09

## 2024-01-01 RX ADMIN — APIXABAN 5 MG: 5 TABLET, FILM COATED ORAL at 08:26

## 2024-01-01 RX ADMIN — FUROSEMIDE 40 MG: 10 INJECTION, SOLUTION INTRAMUSCULAR; INTRAVENOUS at 09:55

## 2024-01-01 RX ADMIN — METRONIDAZOLE 500 MG: 500 INJECTION, SOLUTION INTRAVENOUS at 05:49

## 2024-01-01 RX ADMIN — LEVETIRACETAM 500 MG: 500 TABLET, FILM COATED ORAL at 21:39

## 2024-01-01 RX ADMIN — PERFLUTREN 1.2 ML/MIN: 6.52 INJECTION, SUSPENSION INTRAVENOUS at 15:50

## 2024-01-01 RX ADMIN — HYDROXYCHLOROQUINE SULFATE 200 MG: 200 TABLET ORAL at 08:20

## 2024-01-01 RX ADMIN — DOXYCYCLINE 100 MG: 100 CAPSULE ORAL at 09:55

## 2024-01-01 RX ADMIN — ALBUMIN (HUMAN) 25 G: 0.25 INJECTION, SOLUTION INTRAVENOUS at 10:11

## 2024-01-01 RX ADMIN — DOXYCYCLINE 100 MG: 100 CAPSULE ORAL at 08:20

## 2024-01-01 RX ADMIN — HYDROCORTISONE SODIUM SUCCINATE 25 MG: 100 INJECTION, POWDER, FOR SOLUTION INTRAMUSCULAR; INTRAVENOUS at 16:47

## 2024-01-01 RX ADMIN — CHLORHEXIDINE GLUCONATE 0.12% ORAL RINSE 15 ML: 1.2 LIQUID ORAL at 09:54

## 2024-01-01 RX ADMIN — GABAPENTIN 100 MG: 100 CAPSULE ORAL at 16:47

## 2024-01-01 RX ADMIN — Medication 800 MG: at 17:05

## 2024-01-01 RX ADMIN — HYDROXYCHLOROQUINE SULFATE 200 MG: 200 TABLET ORAL at 16:30

## 2024-01-01 RX ADMIN — DICLOFENAC SODIUM 2 G: 10 GEL TOPICAL at 22:16

## 2024-01-01 RX ADMIN — HYDROXYCHLOROQUINE SULFATE 200 MG: 200 TABLET ORAL at 17:06

## 2024-01-01 RX ADMIN — GABAPENTIN 100 MG: 100 CAPSULE ORAL at 20:04

## 2024-01-01 RX ADMIN — HYDROXYCHLOROQUINE SULFATE 200 MG: 200 TABLET ORAL at 17:08

## 2024-01-01 RX ADMIN — LEVETIRACETAM 500 MG: 500 TABLET, FILM COATED ORAL at 09:07

## 2024-01-01 RX ADMIN — DICLOFENAC SODIUM 2 G: 10 GEL TOPICAL at 09:20

## 2024-01-01 RX ADMIN — GABAPENTIN 100 MG: 100 CAPSULE ORAL at 09:54

## 2024-01-01 RX ADMIN — NOREPINEPHRINE BITARTRATE 5 MCG/MIN: 1 SOLUTION INTRAVENOUS at 09:30

## 2024-01-01 RX ADMIN — GABAPENTIN 100 MG: 100 CAPSULE ORAL at 17:05

## 2024-01-01 RX ADMIN — MORPHINE SULFATE 2 MG: 2 INJECTION, SOLUTION INTRAMUSCULAR; INTRAVENOUS at 04:27

## 2024-01-01 RX ADMIN — MIDODRINE HYDROCHLORIDE 5 MG: 5 TABLET ORAL at 06:32

## 2024-01-01 RX ADMIN — HYDROXYCHLOROQUINE SULFATE 200 MG: 200 TABLET ORAL at 08:40

## 2024-01-01 RX ADMIN — METRONIDAZOLE 500 MG: 500 INJECTION, SOLUTION INTRAVENOUS at 22:11

## 2024-01-01 RX ADMIN — HYDROCORTISONE SODIUM SUCCINATE 50 MG: 100 INJECTION, POWDER, FOR SOLUTION INTRAMUSCULAR; INTRAVENOUS at 21:57

## 2024-01-01 RX ADMIN — APIXABAN 5 MG: 5 TABLET, FILM COATED ORAL at 12:02

## 2024-01-01 RX ADMIN — DICLOFENAC SODIUM 2 G: 10 GEL TOPICAL at 11:00

## 2024-01-01 RX ADMIN — ALLOPURINOL 200 MG: 100 TABLET ORAL at 08:41

## 2024-01-01 RX ADMIN — HEPARIN SODIUM 11.1 UNITS/KG/HR: 10000 INJECTION, SOLUTION INTRAVENOUS at 18:54

## 2024-01-01 RX ADMIN — DICLOFENAC SODIUM 2 G: 10 GEL TOPICAL at 12:06

## 2024-01-01 RX ADMIN — DOXYCYCLINE 100 MG: 100 CAPSULE ORAL at 21:01

## 2024-01-01 RX ADMIN — GLYCOPYRROLATE 0.1 MG: 0.2 INJECTION INTRAMUSCULAR; INTRAVENOUS at 04:27

## 2024-01-01 RX ADMIN — DOXYCYCLINE 100 MG: 100 CAPSULE ORAL at 21:38

## 2024-01-01 RX ADMIN — DIPHENHYDRAMINE HYDROCHLORIDE, ZINC ACETATE: 2; .1 CREAM TOPICAL at 14:03

## 2024-01-01 RX ADMIN — CHLORHEXIDINE GLUCONATE 0.12% ORAL RINSE 15 ML: 1.2 LIQUID ORAL at 21:42

## 2024-01-01 RX ADMIN — APIXABAN 5 MG: 5 TABLET, FILM COATED ORAL at 17:10

## 2024-01-01 RX ADMIN — GABAPENTIN 100 MG: 100 CAPSULE ORAL at 09:20

## 2024-01-01 RX ADMIN — GABAPENTIN 100 MG: 100 CAPSULE ORAL at 08:20

## 2024-01-01 RX ADMIN — GABAPENTIN 100 MG: 100 CAPSULE ORAL at 21:44

## 2024-01-01 RX ADMIN — Medication 800 MG: at 10:39

## 2024-01-01 RX ADMIN — ALLOPURINOL 200 MG: 100 TABLET ORAL at 09:20

## 2024-01-01 RX ADMIN — ALLOPURINOL 200 MG: 100 TABLET ORAL at 09:55

## 2024-01-01 RX ADMIN — SODIUM CHLORIDE 250 ML: 0.9 INJECTION, SOLUTION INTRAVENOUS at 08:30

## 2024-01-01 RX ADMIN — ALBUMIN (HUMAN) 25 G: 0.25 INJECTION, SOLUTION INTRAVENOUS at 20:49

## 2024-01-01 RX ADMIN — DICLOFENAC SODIUM 2 G: 10 GEL TOPICAL at 12:34

## 2024-01-01 RX ADMIN — LORAZEPAM 1 MG: 2 INJECTION INTRAMUSCULAR; INTRAVENOUS at 01:21

## 2024-01-01 RX ADMIN — Medication 800 MG: at 13:06

## 2024-01-01 RX ADMIN — DOXYCYCLINE 100 MG: 100 CAPSULE ORAL at 09:07

## 2024-01-01 RX ADMIN — GABAPENTIN 100 MG: 100 CAPSULE ORAL at 17:08

## 2024-01-01 RX ADMIN — HYDROCORTISONE SODIUM SUCCINATE 50 MG: 100 INJECTION, POWDER, FOR SOLUTION INTRAMUSCULAR; INTRAVENOUS at 17:06

## 2024-01-01 RX ADMIN — GABAPENTIN 100 MG: 100 CAPSULE ORAL at 09:07

## 2024-01-01 RX ADMIN — Medication 1.5 MG: at 21:40

## 2024-01-01 RX ADMIN — MORPHINE SULFATE 2 MG: 2 INJECTION, SOLUTION INTRAMUSCULAR; INTRAVENOUS at 02:27

## 2024-01-01 RX ADMIN — Medication 1.5 MG: at 21:01

## 2024-01-01 RX ADMIN — GABAPENTIN 100 MG: 100 CAPSULE ORAL at 22:11

## 2024-01-01 RX ADMIN — GLYCOPYRROLATE 0.1 MG: 0.2 INJECTION INTRAMUSCULAR; INTRAVENOUS at 11:11

## 2024-01-01 RX ADMIN — MIDODRINE HYDROCHLORIDE 5 MG: 5 TABLET ORAL at 21:41

## 2024-01-01 RX ADMIN — LEVOTHYROXINE SODIUM 50 MCG: 50 TABLET ORAL at 10:05

## 2024-01-01 RX ADMIN — DICLOFENAC SODIUM 2 G: 10 GEL TOPICAL at 08:50

## 2024-01-01 RX ADMIN — CEFTRIAXONE 1000 MG: 1 INJECTION, SOLUTION INTRAVENOUS at 14:39

## 2024-01-01 RX ADMIN — HEPARIN SODIUM 2000 UNITS: 1000 INJECTION INTRAVENOUS; SUBCUTANEOUS at 04:39

## 2024-01-01 RX ADMIN — ALLOPURINOL 200 MG: 100 TABLET ORAL at 08:19

## 2024-01-01 RX ADMIN — PANTOPRAZOLE SODIUM 20 MG: 20 TABLET, DELAYED RELEASE ORAL at 06:32

## 2024-01-01 RX ADMIN — HYDROCORTISONE SODIUM SUCCINATE 25 MG: 100 INJECTION, POWDER, FOR SOLUTION INTRAMUSCULAR; INTRAVENOUS at 06:32

## 2024-01-01 RX ADMIN — LEVETIRACETAM 500 MG: 500 TABLET, FILM COATED ORAL at 08:19

## 2024-01-01 RX ADMIN — FUROSEMIDE 40 MG: 10 INJECTION, SOLUTION INTRAMUSCULAR; INTRAVENOUS at 10:39

## 2024-01-01 RX ADMIN — CHLORHEXIDINE GLUCONATE 0.12% ORAL RINSE 15 ML: 1.2 LIQUID ORAL at 08:42

## 2024-01-01 RX ADMIN — DOXYCYCLINE 100 MG: 100 CAPSULE ORAL at 22:11

## 2024-01-01 RX ADMIN — MAGNESIUM SULFATE HEPTAHYDRATE 2 G: 2 INJECTION, SOLUTION INTRAVENOUS at 14:14

## 2024-01-01 RX ADMIN — DICLOFENAC SODIUM 2 G: 10 GEL TOPICAL at 21:03

## 2024-01-01 RX ADMIN — METRONIDAZOLE 500 MG: 500 INJECTION, SOLUTION INTRAVENOUS at 22:13

## 2024-01-01 RX ADMIN — MORPHINE SULFATE 2 MG: 2 INJECTION, SOLUTION INTRAMUSCULAR; INTRAVENOUS at 14:12

## 2024-01-01 RX ADMIN — LEVETIRACETAM 500 MG: 500 TABLET, FILM COATED ORAL at 00:49

## 2024-01-01 RX ADMIN — APIXABAN 5 MG: 5 TABLET, FILM COATED ORAL at 17:20

## 2024-01-01 RX ADMIN — FUROSEMIDE 20 MG: 10 INJECTION, SOLUTION INTRAMUSCULAR; INTRAVENOUS at 13:19

## 2024-01-01 RX ADMIN — CHLORHEXIDINE GLUCONATE 0.12% ORAL RINSE 15 ML: 1.2 LIQUID ORAL at 14:13

## 2024-01-01 RX ADMIN — APIXABAN 5 MG: 5 TABLET, FILM COATED ORAL at 08:40

## 2024-01-01 RX ADMIN — LEVETIRACETAM 500 MG: 500 TABLET, FILM COATED ORAL at 08:41

## 2024-01-01 RX ADMIN — LEVOTHYROXINE SODIUM 50 MCG: 50 TABLET ORAL at 09:20

## 2024-01-01 RX ADMIN — HYDROCORTISONE SODIUM SUCCINATE 25 MG: 100 INJECTION, POWDER, FOR SOLUTION INTRAMUSCULAR; INTRAVENOUS at 00:49

## 2024-01-01 RX ADMIN — MAGNESIUM SULFATE HEPTAHYDRATE 2 G: 2 INJECTION, SOLUTION INTRAVENOUS at 09:45

## 2024-01-01 RX ADMIN — METRONIDAZOLE 500 MG: 500 INJECTION, SOLUTION INTRAVENOUS at 05:59

## 2024-01-01 RX ADMIN — APIXABAN 5 MG: 5 TABLET, FILM COATED ORAL at 09:07

## 2024-01-01 RX ADMIN — METRONIDAZOLE 500 MG: 500 INJECTION, SOLUTION INTRAVENOUS at 21:57

## 2024-01-01 RX ADMIN — METRONIDAZOLE 500 MG: 500 INJECTION, SOLUTION INTRAVENOUS at 21:40

## 2024-01-01 RX ADMIN — CEFTRIAXONE 2000 MG: 2 INJECTION, SOLUTION INTRAVENOUS at 14:11

## 2024-01-01 RX ADMIN — METRONIDAZOLE 500 MG: 500 INJECTION, SOLUTION INTRAVENOUS at 14:11

## 2024-01-01 RX ADMIN — ALBUMIN (HUMAN) 25 G: 0.25 INJECTION, SOLUTION INTRAVENOUS at 14:03

## 2024-01-01 RX ADMIN — LEVOTHYROXINE SODIUM 50 MCG: 50 TABLET ORAL at 09:07

## 2024-01-01 RX ADMIN — MIDODRINE HYDROCHLORIDE 5 MG: 5 TABLET ORAL at 16:02

## 2024-01-01 RX ADMIN — GABAPENTIN 100 MG: 100 CAPSULE ORAL at 08:26

## 2024-01-01 RX ADMIN — MORPHINE SULFATE 2 MG: 2 INJECTION, SOLUTION INTRAMUSCULAR; INTRAVENOUS at 11:21

## 2024-01-01 RX ADMIN — CHLORHEXIDINE GLUCONATE 0.12% ORAL RINSE 15 ML: 1.2 LIQUID ORAL at 09:07

## 2024-01-01 RX ADMIN — GABAPENTIN 100 MG: 100 CAPSULE ORAL at 21:39

## 2024-01-01 RX ADMIN — DICLOFENAC SODIUM 2 G: 10 GEL TOPICAL at 08:21

## 2024-01-01 RX ADMIN — HYDROCORTISONE SODIUM SUCCINATE 25 MG: 100 INJECTION, POWDER, FOR SOLUTION INTRAMUSCULAR; INTRAVENOUS at 05:57

## 2024-01-01 RX ADMIN — FUROSEMIDE 40 MG: 10 INJECTION, SOLUTION INTRAMUSCULAR; INTRAVENOUS at 18:32

## 2024-01-01 RX ADMIN — Medication 800 MG: at 17:33

## 2024-01-01 RX ADMIN — GABAPENTIN 100 MG: 100 CAPSULE ORAL at 21:01

## 2024-01-08 ENCOUNTER — OFFICE VISIT (OUTPATIENT)
Dept: UROLOGY | Facility: AMBULATORY SURGERY CENTER | Age: 89
End: 2024-01-08
Payer: COMMERCIAL

## 2024-01-08 VITALS — HEART RATE: 116 BPM | SYSTOLIC BLOOD PRESSURE: 118 MMHG | DIASTOLIC BLOOD PRESSURE: 88 MMHG | OXYGEN SATURATION: 97 %

## 2024-01-08 DIAGNOSIS — N20.1 URETEROLITHIASIS: Primary | ICD-10-CM

## 2024-01-08 LAB — POST-VOID RESIDUAL VOLUME, ML POC: 85 ML

## 2024-01-08 PROCEDURE — 99203 OFFICE O/P NEW LOW 30 MIN: CPT

## 2024-01-08 PROCEDURE — 51798 US URINE CAPACITY MEASURE: CPT

## 2024-01-08 RX ORDER — OXYBUTYNIN CHLORIDE 5 MG/1
TABLET, EXTENDED RELEASE ORAL
COMMUNITY
Start: 2023-12-30 | End: 2024-01-08

## 2024-01-08 RX ORDER — MULTIVITAMIN WITH FOLIC ACID 400 MCG
TABLET ORAL
COMMUNITY
Start: 2023-12-08

## 2024-01-08 NOTE — PROGRESS NOTES
Office Visit- Urology  Matthew Krause 9/30/1934 MRN: [id number]      Assessment/Discussion/Plan    89 y.o. male managed by     Left UVJ Stone   -No known stone passage  -Reassess with CT stone study  - we will call patient with results of the CT scan    2.  BPH with urinary tract symptoms  -Main symptoms are mainly irritative in nature  -PVR today 85 mL.  Patient does have a history of urinary retention  -Patient on oxybutynin 5 mg.  Discussed the potential side effects of this medication.  Will have patient come off of oxybutynin and reassess his symptomatology off of it.  If he has worsening symptoms or wishes to utilize medication we will trial trospium 20 mg  -Continue with Flomax        Chief Complaint:   Matthew is a 89 y.o. male presenting to the office for an initial evaluation regarding right ureteral stone        Subjective    Patient is a 89-year-old male with past urologic history of BPH who he used to follow with urology about 20 years ago for presents to the office today for evaluation of right UVJ stone.  He had a CT scan in November 2023 which revealed a 3 mm stone at the left UVJ with no significant upstream hydronephrosis.  He had another CT scan 2 days later from the initial 1 which demonstrated no interval change.  At 1 point his assisted living facility was straining his urine but they have stopped doing so and patient has never been symptomatic.  What prompted the obtainment of the CT scan was that patient was persistently nauseous.  However this nausea has since subsided.  He states that he has chronic low urinary tract symptoms including frequency and urgency.  He does utilize loop diuretics for management of heart failure.  He was previously on Myrbetriq but was taken off of this due to cost.  He is currently on Flomax and oxybutynin.  He does endorse side effect such as dry eyes, dry mouth, constipation.  He has a history of memory difficulty.  No dysuria, gross hematuria, flank pain.  He  presents to the office today with his son          ROS:   Review of Systems   Constitutional: Negative.  Negative for chills, fatigue and fever.   HENT: Negative.     Respiratory:  Negative for shortness of breath.    Cardiovascular:  Negative for chest pain.   Gastrointestinal: Negative.  Negative for abdominal pain.   Endocrine: Negative.    Musculoskeletal: Negative.    Skin: Negative.    Neurological: Negative.  Negative for dizziness and light-headedness.   Hematological: Negative.    Psychiatric/Behavioral: Negative.           Past Medical History  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HCC)     Spinal stenosis        Past Surgical History  Past Surgical History:   Procedure Laterality Date    CARDIAC ELECTROPHYSIOLOGY PROCEDURE N/A 12/17/2022    Procedure: Cardiac loop recorder implant;  Surgeon: Negrito Hollingsworth MD;  Location: [facility];  Service: Cardiology    CARPAL TUNNEL RELEASE Bilateral     COLONOSCOPY      LAMINECTOMY      TOTAL KNEE ARTHROPLASTY Bilateral     TOTAL SHOULDER REPLACEMENT         Past Family History  Family History   Problem Relation Age of Onset    Seizures Son     Colon polyps Neg Hx     Colon cancer Neg Hx        Past Social history  Social History     Socioeconomic History    Marital status:      Spouse name: Not on file    Number of children: Not on file    Years of education: Not on file    Highest education level: Not on file   Occupational History    Not on file   Tobacco Use    Smoking status: Former     Current packs/day: 0.25     Average packs/day: 0.3 packs/day for 5.0 years (1.3 ttl pk-yrs)     Types: Cigarettes    Smokeless tobacco: Never   Vaping Use    Vaping status: Never Used   Substance and Sexual Activity    Alcohol use: Yes     Alcohol/week: 2.0 standard drinks of alcohol     Types: 2 Shots of liquor per week      "Comment: 1 drink per week    Drug use: Yes     Types: Marijuana    Sexual activity: Not Currently     Partners: Male   Other Topics Concern    Not on file   Social History Narrative    Not on file     Social Determinants of Health     Financial Resource Strain: Not on file   Food Insecurity: No Food Insecurity (11/20/2023)    Hunger Vital Sign     Worried About Running Out of Food in the Last Year: Never true     Ran Out of Food in the Last Year: Never true   Transportation Needs: No Transportation Needs (11/20/2023)    PRAPARE - Transportation     Lack of Transportation (Medical): No     Lack of Transportation (Non-Medical): No   Physical Activity: Not on file   Stress: Not on file   Social Connections: Not on file   Intimate Partner Violence: Not on file   Housing Stability: Low Risk  (11/20/2023)    Housing Stability Vital Sign     Unable to Pay for Housing in the Last Year: No     Number of Places Lived in the Last Year: 1     Unstable Housing in the Last Year: No       Current Medications  Current Outpatient Medications   Medication Sig Dispense Refill    Multiple Vitamin (Tab-A-Lencho) TABS       oxybutynin (DITROPAN-XL) 5 mg 24 hr tablet       ACETAMINOPHEN ER PO Take 1,000 mg by mouth 3 (three) times a day      allopurinol (ZYLOPRIM) 100 mg tablet Take 200 mg by mouth daily      apixaban (Eliquis) 5 mg Take 1 tablet (5 mg total) by mouth 2 (two) times a day 180 tablet 3    Betadine 10 % external solution  (Patient not taking: Reported on 12/12/2023)      Bismuth Tribromoph-Petrolatum (Xeroform Petrolat Gauze 5\"x9\") MISC       Cholecalciferol (Vitamin D3) 50 MCG (2000 UT) TABS Take 2,000 Units by mouth (Patient not taking: Reported on 12/21/2023)      Diclofenac Sodium (VOLTAREN) 1 % Apply 4 g topically 4 (four) times a day      doxycycline hyclate (VIBRAMYCIN) 100 mg capsule Take 100 mg by mouth every 12 (twelve) hours      ezetimibe (ZETIA) 10 mg tablet Take 1 tablet (10 mg total) by mouth daily 90 tablet 3 "    fluticasone (FLONASE) 50 mcg/act nasal spray 1 spray into each nostril 2 (two) times a day as needed for rhinitis or allergies      gabapentin (NEURONTIN) 100 mg capsule Take 100 mg by mouth 3 (three) times a day      hydroxychloroquine (PLAQUENIL) 200 mg tablet Take 200 mg by mouth 2 (two) times a day with meals      levETIRAcetam (Keppra) 500 mg tablet Take 1 tablet (500 mg total) by mouth every 12 (twelve) hours (Patient not taking: Reported on 12/21/2023) 180 tablet 3    levothyroxine 25 mcg tablet       lidocaine (LIDODERM) 5 % Apply 1 patch topically over 12 hours daily Remove & Discard patch within 12 hours or as directed by MD Do not start before November 25, 2023. (Patient not taking: Reported on 12/21/2023)  0    loperamide (IMODIUM) 2 mg capsule Take 2 mg by mouth as needed for diarrhea      magnesium hydroxide (MILK OF MAGNESIA) 400 mg/5 mL oral suspension Take 5 mL by mouth daily as needed for constipation (Patient not taking: Reported on 12/21/2023)      metoprolol succinate (TOPROL-XL) 25 mg 24 hr tablet Take 1 tablet (25 mg total) by mouth 2 (two) times a day 180 tablet 3    Myrbetriq 25 MG TB24 Take 25 mg by mouth daily at bedtime      nystatin (MYCOSTATIN) powder Apply topically 2 (two) times a day 15 g 0    omeprazole (PriLOSEC) 10 mg delayed release capsule Take 10 mg by mouth daily      polyethylene glycol (MIRALAX) 17 g packet Take 17 g by mouth every other day 30 each 2    potassium chloride (KLOR-CON) 20 mEq packet Take 20 mEq by mouth 2 (two) times a day 180 each 3    predniSONE 5 mg tablet Take by mouth daily      psyllium (METAMUCIL SMOOTH TEXTURE) 28 % packet Take 1 packet by mouth in the morning 30 packet 0    saccharomyces boulardii (FLORASTOR) 250 mg capsule Take 250 mg by mouth 2 (two) times a day (Patient not taking: Reported on 12/21/2023)      tamsulosin (FLOMAX) 0.4 mg Take 0.4 mg by mouth daily at bedtime (Patient not taking: Reported on 12/21/2023)      tamsulosin (FLOMAX)  0.4 mg Take 1 capsule (0.4 mg total) by mouth daily with dinner for 7 days 7 capsule 0    torsemide (DEMADEX) 10 mg tablet Take 1 tablet (10 mg total) by mouth 4 (four) times a week Take 10 mg on Tuesday, Thursday, Saturday, and Sunday 48 tablet 3    torsemide (DEMADEX) 20 mg tablet Take 1 tablet (20 mg total) by mouth 3 (three) times a week Take 20 mg on Monday, Wednesday, and Friday 36 tablet 3     No current facility-administered medications for this visit.       Allergies  Allergies   Allergen Reactions    Colchicine Other (See Comments)     Flushing      Niacin Other (See Comments)     flushed    Statins        OBJECTIVE    Vitals   Vitals:       PVR:    Physical Exam  Constitutional:       General: He is not in acute distress.     Appearance: Normal appearance. He is normal weight. He is not ill-appearing or toxic-appearing.   HENT:      Head: Normocephalic and atraumatic.   Eyes:      Conjunctiva/sclera: Conjunctivae normal.   Cardiovascular:      Rate and Rhythm: Normal rate.   Pulmonary:      Effort: Pulmonary effort is normal. No respiratory distress.   Skin:     General: Skin is warm and dry.   Neurological:      General: No focal deficit present.      Mental Status: He is alert and oriented to person, place, and time.      Cranial Nerves: No cranial nerve deficit.   Psychiatric:         Mood and Affect: Mood normal.         Behavior: Behavior normal.         Thought Content: Thought content normal.          Labs:     Lab Results   Component Value Date    CREATININE 1.09 12/01/2023      Lab Results   Component Value Date    HGBA1C 6.7 (H) 01/02/2024     Lab Results   Component Value Date    GLUCOSE 110 09/03/2023    CALCIUM 9.0 12/01/2023    K 3.5 12/01/2023    CO2 33 (H) 12/01/2023    CL 98 12/01/2023    BUN 18 12/01/2023    CREATININE 1.09 12/01/2023       I have personally reviewed all pertinent lab results and reviewed with patient    Imaging   CT CHEST, ABDOMEN AND PELVIS WITHOUT IV CONTRAST      INDICATION:   Review of note shows patient presented with altered mental status/confusion. Provided history with the study request is flank pain. Review of prior imaging shows left UVJ stone on recent CT.     COMPARISON: CT of the abdomen/pelvis dated 11/16/2023. CT of the chest, abdomen, and pelvis dated 6/9/2023.     TECHNIQUE: CT examination of the chest, abdomen and pelvis was performed without intravenous contrast. Multiplanar 2D reformatted images were created from the source data.     This examination, like all CT scans performed in the [facility], was performed utilizing techniques to minimize radiation dose exposure, including the use of iterative reconstruction and automated exposure control. Radiation dose length   product (DLP) for this visit:  1731.08 mGy-cm     Per report enteric contrast was not given, though there is hyperdense material evident in the sigmoid colon and rectum, possibly lingering from 11/16.     IMAGE QUALITY: Technologist notes that patient was noncooperative throughout the exam resulting in extensive motion artifact, especially obscuring structures in the chest and upper abdomen.     FINDINGS:     CHEST     LUNGS: Chronic left hemidiaphragm elevation. Patient motion and respiratory motion artifacts distort the lung fields. Thin bandlike opacities at the lung bases attributable to atelectasis related to exhalation. No superimposed acute consolidation. Small   endobronchial lesions, small pulmonary nodules, and interstitial lung changes could be obscured by respiratory motion but there is no evidence for any of the above.     PLEURA:  Unremarkable.     HEART/GREAT VESSELS: Heart size is within normal limits. Valvular and coronary calcifications are present. No pericardial effusion. There is fusiform ectasia of the ascending thoracic aorta measuring up to 44 mm.  Recommendation is for follow-up low   radiation dose chest CT in one year. There is enlargement of the  central pulmonary arterial tree suggesting some element of pulmonary artery hypertension.     MEDIASTINUM AND ELIE:  Unremarkable.     CHEST WALL AND LOWER NECK:  Unremarkable.     ABDOMEN     LIVER/BILIARY TREE: Contour difficult to evaluate due to motion but grossly smooth. Size is normal. Cysts in the right hepatic lobe measuring up to 15 mm--unchanged. No biliary ductal dilatation.     GALLBLADDER: There are gallstone(s) within the gallbladder, without pericholecystic inflammatory changes.     SPLEEN:  Unremarkable.     PANCREAS: Diffuse parenchymal involution without focal mass or pancreatic ductal dilatation. Appearance is unchanged.     ADRENAL GLANDS:  Unremarkable.     KIDNEYS/URETERS: Bilateral cysts distorted by patient motion but grossly unchanged. No perinephric collections. Punctate 3 mm stone at the left ureterovesicular junction appears unchanged in location compared to recent prior. No right-sided stones   evident. Still no hydronephrosis on either side. Contrast in the renal collecting system from recent head/neck CTA.     STOMACH AND BOWEL: No evidence of small or large bowel inflammation. Stomach is obscured by patient motion but grossly unremarkable when accounting for motion and level of distention. Numerous noninflamed colonic diverticula are present.     APPENDIX: Not well demonstrated.  No evidence for acute appendicitis.     ABDOMINOPELVIC CAVITY:  No ascites.  No pneumoperitoneum.  No lymphadenopathy.     VESSELS:  Unremarkable for patient's age.     PELVIS     REPRODUCTIVE ORGANS: Mildly patulous appearance of the prostatic urethra. Correlate for prior transurethral prostate procedure. Otherwise, unremarkable.     URINARY BLADDER:  Unremarkable.     ABDOMINAL WALL/INGUINAL REGIONS: Small amount of fat extending into both inguinal canals. Postsurgical changes in the posterior midline soft tissues related to prior spine surgery. No superficial collections or acute swelling.     OSSEOUS  STRUCTURES: Prior reverse glenohumeral arthroplasty at the right shoulder. Streak artifact and motion degrade evaluation of surrounding mineralization. Component alignment appears normal. Prior L3 and L4 laminectomies. Unchanged grade 1   anterolisthesis at L4-5. Hip degenerative changes are more severe on the left. No acute fracture or destructive osseous lesion.     IMPRESSION:     1.  3 mm stone at the left ureterovesicular junction shows no interval change in position. Still no significant upstream hydronephrosis.     2.  No other new/acute findings in the chest, abdomen, or pelvis.     3.  Ascending aortic ectasia warrants yearly chest CT surveillance to confirm size stability.     Note: Imaging follow-up reminder notification was scheduled in the electronic medical record.                Good Rivas PA-C  Date: 1/8/2024 Time: 1:45 PM  [facility]    This note was written using fluency dictation software. Please excuse any resulting minor grammatical errors.

## 2024-01-12 ENCOUNTER — REMOTE DEVICE CLINIC VISIT (OUTPATIENT)
Dept: CARDIOLOGY CLINIC | Facility: CLINIC | Age: 89
End: 2024-01-12
Payer: COMMERCIAL

## 2024-01-12 ENCOUNTER — TELEPHONE (OUTPATIENT)
Dept: CARDIOLOGY CLINIC | Facility: CLINIC | Age: 89
End: 2024-01-12

## 2024-01-12 DIAGNOSIS — Z95.818 PRESENCE OF OTHER CARDIAC IMPLANTS AND GRAFTS: Primary | ICD-10-CM

## 2024-01-12 PROCEDURE — 93298 REM INTERROG DEV EVAL SCRMS: CPT | Performed by: INTERNAL MEDICINE

## 2024-01-12 NOTE — TELEPHONE ENCOUNTER
Kerwin Merino I'm calling from my dad Matthew Mer[birth year]'s birthday September 30th, 1934. He has a loop recorder has been in his. He's had it for slightly more than a year and first time I came across a I missed a no show appointment through the reader remote from the remote device check. I don't understand what happened and I have something I need to be concerned about because this is the first time this ever showed up in over a year. If you could give me a call back to explain to me about this, I appreciate it. Again, my name is Kerwin Merino, [phone]. Thank you.

## 2024-01-12 NOTE — PROGRESS NOTES
"Results for orders placed or performed in visit on 01/12/24   Cardiac EP device report    Narrative    SJPIO ILR - ACTIVE SYSTEM IS MRI CONDITIONAL  MERLIN TRANSMISSION: LOOP: BATTERY STATUS \"OK.\" PRESENTING RHYTHM: ST @ 103 BPM. 41 TACHY EPISODES W/ AVAIL EGMS SHOWING ST, CANNOT R/O AF/RVR W/ -165 BPM, MAX DURATION 38 SECS. 3 PAUSES W/ AVAIL EGM SHOWING SR W/ UNDERSENSING. NO PAUSE DETECTED. AF BURDEN: 13%. 1 SYMPTOM, PT WAS NOT HAVING SYMPTOMS, JUST TRYING TO SEND TX.  PT IS ASYMPTOMATIC. PT TAKES ELIQUIS, ,METOPROLOL SUCC. EF: 45 %(ECHO 8/30/23).        "

## 2024-01-24 ENCOUNTER — HOSPITAL ENCOUNTER (OUTPATIENT)
Dept: CT IMAGING | Facility: HOSPITAL | Age: 89
Discharge: HOME | End: 2024-01-24
Payer: COMMERCIAL

## 2024-01-24 DIAGNOSIS — N20.1 URETEROLITHIASIS: ICD-10-CM

## 2024-01-24 PROCEDURE — G1004 CDSM NDSC: HCPCS

## 2024-01-24 PROCEDURE — 74176 CT ABD & PELVIS W/O CONTRAST: CPT

## 2024-01-29 ENCOUNTER — NURSE TRIAGE (OUTPATIENT)
Age: 89
End: 2024-01-29

## 2024-01-29 DIAGNOSIS — N28.9 KIDNEY FUNCTION ABNORMAL: Primary | ICD-10-CM

## 2024-01-29 DIAGNOSIS — N20.0 NEPHROLITHIASIS: ICD-10-CM

## 2024-01-29 NOTE — TELEPHONE ENCOUNTER
"Answer Assessment - Initial Assessment Questions  1. REASON FOR CALL or QUESTION: \"What is your reason for calling today?\" or \"How can I best help you?\" or \"What question do you have that I can help answer?\"           Patients son, Kerwin called for CT results done 1/24/24.      Advised results are still in process at this time.       Keysha #[phone]    Protocols used: Information Only Call - No Triage-ADULT-[place]    "

## 2024-02-02 NOTE — TELEPHONE ENCOUNTER
IMPRESSION:     2 mm left UVJ calculus without hydronephrosis or hydroureter.     If pt is asymptomatic with no blood in the urine and kidney function stable will just observe. Could consider surgery but risks may outweigh benefits. Would want repeat imaging in 6 months. Bmp now     Spoke with son and relayed message. He asked for me to fax abs and Ct scan to facility that he is living in    DON - [place] [phone]

## 2024-02-02 NOTE — TELEPHONE ENCOUNTER
1/24/24    CT ABDOMEN AND PELVIS WITHOUT IV CONTRAST - LOW DOSE RENAL STONE     INDICATION: N20.1: Calculus of ureter.     IMPRESSION:     2 mm left UVJ calculus without hydronephrosis or hydroureter.     Colonic diverticulosis.       Please review, advise.     CB#[phone]

## 2024-02-14 ENCOUNTER — TELEPHONE (OUTPATIENT)
Dept: NEUROLOGY | Facility: CLINIC | Age: 89
End: 2024-02-14

## 2024-02-14 NOTE — TELEPHONE ENCOUNTER
Patient's son called and would like for patient to be seen sooner than March 4th. Son is going on vacation for 3 months and he is the one who brings patient to appointments.   Would that be ok with Abigail?

## 2024-02-20 ENCOUNTER — NURSE TRIAGE (OUTPATIENT)
Age: 89
End: 2024-02-20

## 2024-02-20 NOTE — TELEPHONE ENCOUNTER
Son called to confirm if we received lab results from nursing home. Reports they were to fax them on Friday. Asked if we did not receive them could we fax a duplicate order to them to make sure labs are completed. Fax number is [phone]

## 2024-02-21 ENCOUNTER — OFFICE VISIT (OUTPATIENT)
Dept: NEUROLOGY | Facility: CLINIC | Age: 89
End: 2024-02-21
Payer: COMMERCIAL

## 2024-02-21 VITALS
HEIGHT: 69 IN | WEIGHT: 215 LBS | SYSTOLIC BLOOD PRESSURE: 142 MMHG | BODY MASS INDEX: 31.84 KG/M2 | DIASTOLIC BLOOD PRESSURE: 62 MMHG | TEMPERATURE: 97.7 F

## 2024-02-21 DIAGNOSIS — G40.009 LOCALIZATION-RELATED (FOCAL) (PARTIAL) IDIOPATHIC EPILEPSY AND EPILEPTIC SYNDROMES WITH SEIZURES OF LOCALIZED ONSET, NOT INTRACTABLE, WITHOUT STATUS EPILEPTICUS (HCC): Primary | ICD-10-CM

## 2024-02-21 PROCEDURE — 99214 OFFICE O/P EST MOD 30 MIN: CPT | Performed by: NURSE PRACTITIONER

## 2024-02-21 NOTE — PROGRESS NOTES
Patient ID: Matthew Krause is a 89 y.o. male with Atrial fibrillation on Eliquis and spells of aphasia concerning for seizures, who is presenting to Neurology office for evaluation of his spells.      Assessment/Plan:    Focal seizure with retained awareness  Patient with intermittent episodes of speech difficulty suspected to be focal seizures with retained awareness.  He is currently on levetiracetam 500 mg twice per day without recurrence of events.  While the patient and family report that he may be more tired on the medication it is difficult to determine if it is truly the medication as he did not sleep and taking naps quite frequently prior to being on the medication.  This has not significantly changed from prior to when he was started on the medication.    Since events have resolved with initiation of medication, recommended he continue on levetiracetam unchanged. Will check levetiracetam level for baseline as well as CBC and CMP. They will call the office with seizures, issues or concerns. 911 with episodes >5-10 minutes. Follow up in 4-6 months or sooner if needed.      Subjective:  Matthew Krause is a 89 y.o. male with Atrial fibrillation on Eliquis and spells of aphasia concerning for seizures, who is presenting to Neurology office for evaluation of his spells. He was last seen in the office on 12/12/23 by Dr Hugo. At that time, there were intermittent episodes of speech difficulty, with some loss of awareness with his more recent episodes. These do appear to have been happening even at home between his hospitalizations. Although he has Atrial Fibrillation and it is difficult to fully exclude the possibility of TIAs as a cause of his symptoms, the recurrent and stereotyped nature of his events would suggest that these events represent focal aware/impaired aware seizures. He had just recently started on Levetiracetam, and although he hadn't had any events since starting Levetiracetam, it was a little  too soon to fully determine the response to treatment. Noted that if there were further events his dose of levetiracetam could be increased. No further tests recommended at that time. He was to continue on eliquis and to prevent stroke from afib. If he has events of speech difficulty (which are concerning for focal seizures, as described above) that last longer than 5 -10 min, they should call 911 to be evaluated in the ED. As noted above, the possibility of these being vascular events cannot fully be excluded at this point. Recommended 3 month follow up.     They did call to request a sooner appointment on 2/14 due to family going on vacation.     He does seem to be more tired on the levetiracetam but he was tired before. Otherwise no issuesDenies further episodes since being on the levetiracetam. Prior to being on levetiracetam they were about every other month. Resides at facility.     Continues on eliquis and has a loop recorder in place    Family requested that labs order be faxed to [facility] - DON: Shannon - [phone]    Current seizure medications:  - levetiracetam 500 mg BID  Other medications as per Epic.    Event/Seizure semiology:  1.  Spells concerning for possible focal impaired awareness seizures. He will have sudden onset of difficulty speaking, mainly having difficulty forming sentences or making sense. He may also appear confused, at least one without recollection during the event.      Special Features  Status epilepticus: no  Self Injury Seizures: no  Precipitating Factors: no     Prior AEDs:  None other than Levetiracetam.      Prior Evaluation:  - MRI brain: 11/20/2023: Limited sequences obtained (per quick stroke protocol). Suboptimal assessment due to distorted image quality by motion artifact.  No acute ischemia.  Cerebral atrophy and microangiopathic changes.  - MRI brain: 1/26/2023:  No MR evidence of acute ischemia. No enhancing lesions. Volume loss/atrophy and microangiopathy.  - MRI  "brain: 10/9/2022: White matter changes suggestive of chronic microangiopathy.  No acute intracranial pathology   - Routine EE2023: normal  - Ambulatory EEG: none  - Video EEG: none  - PET scan brain : none  - Neuropsychologic testing: none    His history was also obtained from his family, who was present at today's visit.      I reviewed prior neurology notes, most recent labs, [facility], and summarized above.        Objective:    Blood pressure 142/62, temperature 97.7 °F (36.5 °C), temperature source Temporal, height 5' 9\" (1.753 m), weight 97.5 kg (215 lb).    Physical Exam  No apparent distress.   Appears well nourished.   Mood appropriate for situation     Neurologic Exam  Mental status- alert and oriented to person, place, and time. Speech appropriate for conversation. Good attention and knowledge.     Cranial Nerves- EOMS normal, facial muscles symmetric, hearing intact bilaterally to finger rubs, tongue midline, palate rise symmetrical, shoulder shrug symmetrical.    Motor- No pronator drift. Appropriate strength. Moves all extremities freely. No tremor.    Sensory-  Intact distally in all extremities to light touch.     DTRs- 1+ and symmetric in bilateral upper extremities and at ankles.     Gait- seated in wheelchair    Coordination- FNF intact.     ROS:  Review of Systems   Constitutional:  Negative for appetite change, fatigue and fever.   HENT: Negative.  Negative for hearing loss, tinnitus, trouble swallowing and voice change.    Eyes: Negative.  Negative for photophobia, pain and visual disturbance.   Respiratory: Negative.  Negative for shortness of breath.    Cardiovascular: Negative.  Negative for palpitations.   Gastrointestinal: Negative.  Negative for nausea and vomiting.   Endocrine: Negative.  Negative for cold intolerance.   Genitourinary: Negative.  Negative for dysuria, frequency and urgency.   Musculoskeletal:  Negative for back pain, gait problem, " myalgias, neck pain and neck stiffness.   Skin: Negative.  Negative for rash.   Allergic/Immunologic: Negative.    Neurological: Negative.  Negative for dizziness, tremors, seizures, syncope, facial asymmetry, speech difficulty, weakness, light-headedness, numbness and headaches.   Hematological: Negative.  Does not bruise/bleed easily.   Psychiatric/Behavioral: Negative.  Negative for confusion, hallucinations and sleep disturbance.    All other systems reviewed and are negative.       ROS obtained by MA and reviewed by myself.       This note may have been created using voice recognition software. There may be unintentional errors such as grammatical errors, spelling errors, or pronoun errors.

## 2024-02-21 NOTE — PATIENT INSTRUCTIONS
- Continue current medications  - Have blood work completed in the next few weeks  - Call the office with seizures, episodes of speech difficulty, issues or concerns  - If he has events of speech difficulty (which are concerning for focal seizures, as described above) that last longer than 5 -10 min, they should call 911 to be evaluated in the ED  - Follow up in 4-6 months with Dr Hugo

## 2024-02-21 NOTE — ASSESSMENT & PLAN NOTE
Patient with intermittent episodes of speech difficulty suspected to be focal seizures with retained awareness.  He is currently on levetiracetam 500 mg twice per day without recurrence of events.  While the patient and family report that he may be more tired on the medication it is difficult to determine if it is truly the medication as he did not sleep and taking naps quite frequently prior to being on the medication.  This has not significantly changed from prior to when he was started on the medication.    Since events have resolved with initiation of medication, recommended he continue on levetiracetam unchanged. Will check levetiracetam level for baseline as well as CBC and CMP. They will call the office with seizures, issues or concerns. 911 with episodes >5-10 minutes. Follow up in 4-6 months or sooner if needed.

## 2024-02-23 NOTE — TELEPHONE ENCOUNTER
Pts son called to see if we received lab results. Advised as of today we do not have them and could take time to have them scanned in chart. Pts son will call back next week to follow up.     #[phone]

## 2024-02-29 ENCOUNTER — OFFICE VISIT (OUTPATIENT)
Age: 89
End: 2024-02-29
Payer: COMMERCIAL

## 2024-02-29 ENCOUNTER — NURSE TRIAGE (OUTPATIENT)
Age: 89
End: 2024-02-29

## 2024-02-29 VITALS
SYSTOLIC BLOOD PRESSURE: 124 MMHG | WEIGHT: 215 LBS | DIASTOLIC BLOOD PRESSURE: 80 MMHG | HEIGHT: 69 IN | BODY MASS INDEX: 31.84 KG/M2

## 2024-02-29 DIAGNOSIS — R11.0 NAUSEA: ICD-10-CM

## 2024-02-29 DIAGNOSIS — K21.9 GASTROESOPHAGEAL REFLUX DISEASE WITHOUT ESOPHAGITIS: Primary | ICD-10-CM

## 2024-02-29 PROCEDURE — 99214 OFFICE O/P EST MOD 30 MIN: CPT | Performed by: NURSE PRACTITIONER

## 2024-02-29 RX ORDER — ONDANSETRON 4 MG/1
4 TABLET, FILM COATED ORAL EVERY 8 HOURS PRN
Qty: 20 TABLET | Refills: 0 | Status: SHIPPED | OUTPATIENT
Start: 2024-02-29

## 2024-02-29 RX ORDER — OMEPRAZOLE 20 MG/1
20 CAPSULE, DELAYED RELEASE ORAL DAILY
Qty: 90 CAPSULE | Refills: 3 | Status: SHIPPED | OUTPATIENT
Start: 2024-02-29

## 2024-02-29 NOTE — TELEPHONE ENCOUNTER
"I returned call and advised that the order is for stat labs and should not wait for Tuesday.      Answer Assessment - Initial Assessment Questions  1. REASON FOR CALL or QUESTION: \"What is your reason for calling today?\" or \"How can I best help you?\" or \"What question do you have that I can help answer?\"      Patient's son called in with question related to lab orders placed today.    Protocols used: Information Only Call - No Triage-ADULT-[place]    "

## 2024-02-29 NOTE — TELEPHONE ENCOUNTER
Pt's son called looking to see if lab work was received from nursing home. States he called over a week ago and nursing home faxed it over awhile ago. Please advise if lab work was received and pt's son is requesting to be notified if it is received or not. His number is [phone]

## 2024-02-29 NOTE — PROGRESS NOTES
[facility] - Outpatient Follow-up Note  Matthew Krause 89 y.o. male MRN: [id number]  Encounter: [id number]    ASSESSMENT AND PLAN:      1.  Nausea  2.  Gastroesophageal reflux disease without esophagitis  Patient was evaluated in November 2023 with intermittent nausea of unclear etiology.  EGD was recommended but never performed at that time due to hospitalization with dehydration and urinary retention  Nausea was quiescent for several months however patient had norovirus 10 to 14 days ago with several days of nausea and vomiting which did improve  Presents today with 3 to 4-day history of intermittent episodes of nausea without vomiting  No change in appetite or p.o. intake  Denies abdominal pain or epigastric pain.  No GERD symptoms  Persistent nausea may be due to still resolving norovirus but would also consider polypharmacy versus worsening GERD versus gastritis/PUD  -Check CBC/CMP to evaluate for dehydration given recent viral illness  -Increase omeprazole to 20 mg daily  -Zofran 4 mg as needed for nausea  -Recommend small frequent meals, avoid fatty fried foods  -If no improvement in symptoms in 2 to 3 weeks, recommend proceeding with EGD for further evaluation      3. Paroxysmal atrial fibrillation (HCC)  History of paroxysmal atrial fibrillation, currently on Eliquis          Followup Appointment: 1 month  ______________________________________________________________________    Chief Complaint   Patient presents with    Nausea     Pt had norovirus 5 weeks ago, has kidney stone, it is getting smaller and meds were stopped, still having nausea and there is concern now it has to do with the kidney stone     HPI: Patient was evaluated previously in November with intermittent nausea.  EGD was recommended at that time however never performed as patient was hospitalized with urinary retention and dehydration    Nausea improved after treated for these conditions and patient  was doing well  However he developed norovirus approximately 2 weeks ago with nausea and vomiting for several days.  These symptoms did esolve however 4 days ago he again had intermittent episodes of nausea.  Denies any vomiting but does report decreased appetite with nausea.  Improved after taking Zofran  He denies any dysphagia.  Denies reflux symptoms but does experience occasional breakthrough heartburn  Denies epigastric or abdominal pain  No change in bowel habits with soft to loose bowel movements  Denies melena or rectal bleeding        Historical Information   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HCC)     Spinal stenosis      Past Surgical History:   Procedure Laterality Date    CARDIAC ELECTROPHYSIOLOGY PROCEDURE N/A 12/17/2022    Procedure: Cardiac loop recorder implant;  Surgeon: Negrito Hollingsworth MD;  Location: [facility];  Service: Cardiology    CARPAL TUNNEL RELEASE Bilateral     COLONOSCOPY      LAMINECTOMY      TOTAL KNEE ARTHROPLASTY Bilateral     TOTAL SHOULDER REPLACEMENT       Social History     Substance and Sexual Activity   Alcohol Use Yes    Alcohol/week: 2.0 standard drinks of alcohol    Types: 2 Shots of liquor per week    Comment: 1 drink per week     Social History     Substance and Sexual Activity   Drug Use Yes    Types: Marijuana     Social History     Tobacco Use   Smoking Status Former    Current packs/day: 0.25    Average packs/day: 0.3 packs/day for 5.0 years (1.3 ttl pk-yrs)    Types: Cigarettes   Smokeless Tobacco Never     Family History   Problem Relation Age of Onset    Seizures Son     Colon polyps Neg Hx     Colon cancer Neg Hx          Current Outpatient Medications:     ACETAMINOPHEN ER PO    allopurinol (ZYLOPRIM) 100 mg tablet    apixaban (Eliquis) 5 mg    Diclofenac Sodium (VOLTAREN) 1 %    doxycycline hyclate (VIBRAMYCIN)  "100 mg capsule    ezetimibe (ZETIA) 10 mg tablet    fluticasone (FLONASE) 50 mcg/act nasal spray    gabapentin (NEURONTIN) 100 mg capsule    hydroxychloroquine (PLAQUENIL) 200 mg tablet    levETIRAcetam (Keppra) 500 mg tablet    levothyroxine 25 mcg tablet    loperamide (IMODIUM) 2 mg capsule    magnesium hydroxide (MILK OF MAGNESIA) 400 mg/5 mL oral suspension    metoprolol succinate (TOPROL-XL) 25 mg 24 hr tablet    nystatin (MYCOSTATIN) powder    omeprazole (PriLOSEC) 20 mg delayed release capsule    ondansetron (ZOFRAN) 4 mg tablet    polyethylene glycol (MIRALAX) 17 g packet    potassium chloride (KLOR-CON) 20 mEq packet    predniSONE 5 mg tablet    saccharomyces boulardii (FLORASTOR) 250 mg capsule    tamsulosin (FLOMAX) 0.4 mg    torsemide (DEMADEX) 10 mg tablet    torsemide (DEMADEX) 20 mg tablet    Betadine 10 % external solution    Bismuth Tribromoph-Petrolatum (Xeroform Petrolat Gauze 5\"x9\") MISC    Cholecalciferol (Vitamin D3) 50 MCG (2000 UT) TABS    lidocaine (LIDODERM) 5 %    Multiple Vitamin (Tab-A-Lencho) TABS    psyllium (METAMUCIL SMOOTH TEXTURE) 28 % packet    tamsulosin (FLOMAX) 0.4 mg  Allergies   Allergen Reactions    Colchicine Other (See Comments)     Flushing      Niacin Other (See Comments)     flushed    Statins      Reviewed medications and allergies and updated as indicated    PHYSICAL EXAM:    Blood pressure 124/80, height 5' 9\" (1.753 m), weight 97.5 kg (215 lb). Body mass index is 31.75 kg/m².  General Appearance: NAD, cooperative, alert  Eyes: Anicteric  ENT:  Normocephalic, atraumatic, normal mucosa.    Neck:  Supple, symmetrical, trachea midline  Resp:  Clear to auscultation bilaterally; no rales, rhonchi or wheezing; respirations unlabored   CV:  S1 S2, Regular rate and rhythm; no murmur, rub, or gallop.  GI:  Soft, non-tender, non-distended; normal bowel sounds; no masses, no organomegaly   Rectal: Deferred  Musculoskeletal: No cyanosis, clubbing or edema. Normal ROM.  Skin:  No " jaundice, rashes, or lesions   Psych: Normal affect, good eye contact  Neuro: No gross deficits, AAOx3    Lab Results:   Lab Results   Component Value Date    WBC 8.14 12/01/2023    HGB 11.8 (L) 12/01/2023    HCT 37.5 12/01/2023    MCV 92 12/01/2023     12/01/2023     Lab Results   Component Value Date    K 3.7 01/02/2024     01/02/2024    CO2 30 01/02/2024    BUN 16 01/02/2024    CREATININE 1.04 01/02/2024    GLUCOSE 110 09/03/2023    GLUF 107 (H) 01/31/2023    CALCIUM 8.7 01/02/2024    CORRECTEDCA 9.6 12/01/2023    AST 13 01/02/2024    ALT 8 01/02/2024    ALKPHOS 67 01/02/2024    EGFR 69 01/02/2024     Lab Results   Component Value Date    IRON 16 (L) 08/31/2023    TIBC 139 (L) 08/31/2023    FERRITIN 141 08/31/2023     Lab Results   Component Value Date    LIPASE 17 11/16/2023

## 2024-02-29 NOTE — TELEPHONE ENCOUNTER
Regarding: labs  ----- Message from Micheal Harris MA sent at 2/29/2024  1:05 PM EST -----  Pt son called asking if pt can have labs done on tue instead of stat.

## 2024-03-01 NOTE — TELEPHONE ENCOUNTER
Son called to check on patient's blood test but it doesn't need to be done until July before his CT.
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
## 2024-03-04 DIAGNOSIS — R11.0 NAUSEA: ICD-10-CM

## 2024-03-05 RX ORDER — ONDANSETRON 4 MG/1
4 TABLET, FILM COATED ORAL EVERY 8 HOURS PRN
Qty: 20 TABLET | Refills: 5 | Status: SHIPPED | OUTPATIENT
Start: 2024-03-05

## 2024-03-08 NOTE — TELEPHONE ENCOUNTER
Pt sons calling in, reports pt had labs done at the facility he is living at and wanted to know if results were faxed over. I reviewed chart and no labs faxed over. I provider [employer] fax number to pts son. He will have facility fax it.

## 2024-03-11 ENCOUNTER — TELEPHONE (OUTPATIENT)
Age: 89
End: 2024-03-11

## 2024-03-11 NOTE — TELEPHONE ENCOUNTER
Spoke with Nursing Department at [facility]. Labs were done at [facility] labs. Sent lab reports to [facility] to be scanned into patient chart.

## 2024-03-11 NOTE — TELEPHONE ENCOUNTER
Chart reviewed.  Spoke with Shannon saleh director of nursing at [facility] and son Kerwin.   Patient had an episode of vomiting containing food yesterday and was given Zofran.  He is asymptomatic today tolerating regular diet without abdominal pain vomiting and having regular bowel movements.  Nausea has been chronic.  Advised to continue PPI daily and Zofran as needed.  He will keep follow-up with Brianna to 3/28/2024 and if symptoms persist may need EGD.  If vomiting is persistent and he is not tolerating oral intake advised christophe and Shannon to call back or take patient to the ER.  They are agreeable to this plan.

## 2024-03-22 NOTE — ASSESSMENT & PLAN NOTE
I did extensively discuss his prior diagnosis of TIA, paroxysmal atrial fibrillation and ongoing management. Given his history it would be best for him to remain on Eliquis to help prevent a stroke from his A. Fib. I discussed stroke symptoms and need to call 911 if he would have sudden onset stroke symptoms. If he has events of speech difficulty (which are concerning for focal seizures, as described above) that last longer than 5 -10 min, they should call 911 to be evaluated in the ED. As noted above, the possibility of these being vascular events cannot fully be excluded at this point. He will also continue to follow up with his PCP for management of his other vascular risk factors.    Patient has an existing appointment with you on 4/16/24 for 20 minutes. Is it acceptable for patient to just keep this appointment for this concern?

## 2024-03-25 ENCOUNTER — TELEPHONE (OUTPATIENT)
Dept: GASTROENTEROLOGY | Facility: CLINIC | Age: 89
End: 2024-03-25

## 2024-03-27 ENCOUNTER — TELEPHONE (OUTPATIENT)
Age: 89
End: 2024-03-27

## 2024-03-27 NOTE — TELEPHONE ENCOUNTER
Pt's son calling about a listed co-pay but Pt has already met OOP limit. I provided the number for billing

## 2024-03-28 ENCOUNTER — TELEPHONE (OUTPATIENT)
Dept: GASTROENTEROLOGY | Facility: CLINIC | Age: 89
End: 2024-03-28

## 2024-03-28 ENCOUNTER — OFFICE VISIT (OUTPATIENT)
Dept: GASTROENTEROLOGY | Facility: CLINIC | Age: 89
End: 2024-03-28
Payer: COMMERCIAL

## 2024-03-28 VITALS
HEART RATE: 124 BPM | BODY MASS INDEX: 31.25 KG/M2 | HEIGHT: 69 IN | DIASTOLIC BLOOD PRESSURE: 58 MMHG | WEIGHT: 211 LBS | SYSTOLIC BLOOD PRESSURE: 107 MMHG

## 2024-03-28 DIAGNOSIS — K21.9 GASTROESOPHAGEAL REFLUX DISEASE WITHOUT ESOPHAGITIS: ICD-10-CM

## 2024-03-28 DIAGNOSIS — R11.0 NAUSEA: Primary | ICD-10-CM

## 2024-03-28 PROCEDURE — G2211 COMPLEX E/M VISIT ADD ON: HCPCS | Performed by: NURSE PRACTITIONER

## 2024-03-28 PROCEDURE — 99214 OFFICE O/P EST MOD 30 MIN: CPT | Performed by: NURSE PRACTITIONER

## 2024-03-28 RX ORDER — PANTOPRAZOLE SODIUM 40 MG/1
40 TABLET, DELAYED RELEASE ORAL DAILY
COMMUNITY

## 2024-03-28 RX ORDER — OLOPATADINE HYDROCHLORIDE 1 MG/ML
1 SOLUTION/ DROPS OPHTHALMIC 2 TIMES DAILY
COMMUNITY

## 2024-03-28 RX ORDER — UREA 10 %
1 LOTION (ML) TOPICAL
COMMUNITY

## 2024-03-28 NOTE — PROGRESS NOTES
[facility] - Outpatient Follow-up Note  Matthew Krause 89 y.o. male MRN: [id number]  Encounter: [id number]  ASSESSMENT AND PLAN:    1. Nausea  2. Gastroesophageal reflux disease without esophagitis  Continues to experience intermittent nausea 1-2 times per week, often occurs in the a.m. after taking multiple medications  Nausea relieved with Zofran  Had episode of vomiting last evening while brushing his teeth-likely due to gag reflex.  Otherwise has not vomited  Reports decreased appetite, eating smaller meals subsequent 5 pound weight loss  No GERD symptoms, currently taking pantoprazole 40 mg daily  Recommend proceeding with EGD to evaluate for possible esophagitis, gastritis, PUD or H. Pylori  -Scheduled for EGD at [facility]  -Continue pantoprazole 40 mg daily  -Zofran as needed  -Consider gastric emptying study if EGD normal    3.  Paroxysmal atrial fibrillation  Currently on Eliquis for anticoagulation  Discussed risks of bleeding if EGD performed on Eliquis  Also discussed risk of thrombus or stroke with holding Eliquis for EGD.  Patient understands and accepts risk  He has interrupted anticoagulation previously without complications  -Instructed to hold Eliquis for 2 days prior to EGD  -Will confer with patient's cardiologist Dr. Tomas to ensure no further instructions or recommendations required prior to interrupting anticoagulation  Followup Appointment: 3 months  ______________________________________________________________________    Chief Complaint   Patient presents with    Follow-up     Patient had gagging with vomiting last night     HPI: Presents in follow-up for history of nausea vomiting.  Symptoms have improved somewhat, currently taking pantoprazole 40 mg daily  He continues to experience nausea 1-2x per week which can persist for several hours.  Does not usually vomit and able to eat but does report that he avoids heavy fatty foods and is  eating smaller quantities  Did have an episode of vomiting last evening while brushing his teeth    Reports mild dysphagia at times-describes some discomfort with swallowing foods but denies sensation of food sticking or retained food bolus  Denies GERD symptoms  No abdominal or epigastric pain  Appetite is good, he has lost approximately 5 pounds intentionally    Denies recent acute change in bowels-does report irregular bowel habits with constipation alternating with diarrhea at times  No melena or rectal bleeding  Not currently taking fiber supplement      Historical Information   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HCC)     Spinal stenosis      Past Surgical History:   Procedure Laterality Date    CARDIAC ELECTROPHYSIOLOGY PROCEDURE N/A 12/17/2022    Procedure: Cardiac loop recorder implant;  Surgeon: Negrito Hollingsworth MD;  Location: [facility];  Service: Cardiology    CARPAL TUNNEL RELEASE Bilateral     COLONOSCOPY      LAMINECTOMY      TOTAL KNEE ARTHROPLASTY Bilateral     TOTAL SHOULDER REPLACEMENT       Social History     Substance and Sexual Activity   Alcohol Use Yes    Alcohol/week: 2.0 standard drinks of alcohol    Types: 2 Shots of liquor per week    Comment: 1 drink per week     Social History     Substance and Sexual Activity   Drug Use Yes    Types: Marijuana     Social History     Tobacco Use   Smoking Status Former    Current packs/day: 0.25    Average packs/day: 0.3 packs/day for 5.0 years (1.3 ttl pk-yrs)    Types: Cigarettes   Smokeless Tobacco Never     Family History   Problem Relation Age of Onset    Seizures Son     Colon polyps Neg Hx     Colon cancer Neg Hx          Current Outpatient Medications:     ACETAMINOPHEN ER PO    allopurinol (ZYLOPRIM) 100 mg tablet    apixaban (Eliquis) 5 mg    Diclofenac Sodium (VOLTAREN) 1 %    doxycycline hyclate  "(VIBRAMYCIN) 100 mg capsule    ezetimibe (ZETIA) 10 mg tablet    fluticasone (FLONASE) 50 mcg/act nasal spray    gabapentin (NEURONTIN) 100 mg capsule    hydroxychloroquine (PLAQUENIL) 200 mg tablet    levothyroxine 25 mcg tablet    melatonin 1 mg    nystatin (MYCOSTATIN) powder    olopatadine (PATANOL) 0.1 % ophthalmic solution    ondansetron (ZOFRAN) 4 mg tablet    pantoprazole (PROTONIX) 40 mg tablet    polyethylene glycol (MIRALAX) 17 g packet    potassium chloride (KLOR-CON) 20 mEq packet    predniSONE 5 mg tablet    psyllium (METAMUCIL) 58.6 % packet    tamsulosin (FLOMAX) 0.4 mg    torsemide (DEMADEX) 10 mg tablet    torsemide (DEMADEX) 20 mg tablet    Betadine 10 % external solution    Bismuth Tribromoph-Petrolatum (Xeroform Petrolat Gauze 5\"x9\") MISC    Cholecalciferol (Vitamin D3) 50 MCG (2000 UT) TABS    levETIRAcetam (Keppra) 500 mg tablet    lidocaine (LIDODERM) 5 %    loperamide (IMODIUM) 2 mg capsule    magnesium hydroxide (MILK OF MAGNESIA) 400 mg/5 mL oral suspension    metoprolol succinate (TOPROL-XL) 25 mg 24 hr tablet    Multiple Vitamin (Tab-A-Lencho) TABS    omeprazole (PriLOSEC) 20 mg delayed release capsule    psyllium (METAMUCIL SMOOTH TEXTURE) 28 % packet    saccharomyces boulardii (FLORASTOR) 250 mg capsule    tamsulosin (FLOMAX) 0.4 mg  Allergies   Allergen Reactions    Colchicine Other (See Comments)     Flushing      Niacin Other (See Comments)     flushed    Statins      Reviewed medications and allergies and updated as indicated    PHYSICAL EXAM:    Blood pressure 107/58, pulse (!) 124, height 5' 9\" (1.753 m), weight 95.7 kg (211 lb). Body mass index is 31.16 kg/m².  General Appearance: NAD, cooperative, alert  Eyes: Anicteric  ENT:  Normocephalic, atraumatic, normal mucosa.    Neck:  Supple, symmetrical, trachea midline  Resp:  Clear to auscultation bilaterally; no rales, rhonchi or wheezing; respirations unlabored   CV:  S1 S2, Regular rate and rhythm; no murmur, rub, or gallop.  GI:  " Soft, non-tender, non-distended; normal bowel sounds; no masses, no organomegaly   Rectal: Deferred  Musculoskeletal: No cyanosis, clubbing or edema. Normal ROM.  Skin:  No jaundice, diffuse ecchymosis on upper extremities bilaterally  Psych: Normal affect, good eye contact  Neuro: No gross deficits, AAOx3    Lab Results:   Lab Results   Component Value Date    WBC 8.14 12/01/2023    HGB 11.8 (L) 12/01/2023    HCT 37.5 12/01/2023    MCV 92 12/01/2023     12/01/2023     Lab Results   Component Value Date    K 3.7 01/02/2024     01/02/2024    CO2 30 01/02/2024    BUN 16 01/02/2024    CREATININE 1.04 01/02/2024    GLUCOSE 110 09/03/2023    GLUF 107 (H) 01/31/2023    CALCIUM 8.7 01/02/2024    CORRECTEDCA 9.6 12/01/2023    AST 13 01/02/2024    ALT 8 01/02/2024    ALKPHOS 67 01/02/2024    EGFR 69 01/02/2024     Lab Results   Component Value Date    IRON 16 (L) 08/31/2023    TIBC 139 (L) 08/31/2023    FERRITIN 141 08/31/2023     Lab Results   Component Value Date    LIPASE 17 11/16/2023

## 2024-03-28 NOTE — H&P (VIEW-ONLY)
[facility] - Outpatient Follow-up Note  Matthew Krause 89 y.o. male MRN: [id number]  Encounter: [id number]  ASSESSMENT AND PLAN:    1. Nausea  2. Gastroesophageal reflux disease without esophagitis  Continues to experience intermittent nausea 1-2 times per week, often occurs in the a.m. after taking multiple medications  Nausea relieved with Zofran  Had episode of vomiting last evening while brushing his teeth-likely due to gag reflex.  Otherwise has not vomited  Reports decreased appetite, eating smaller meals subsequent 5 pound weight loss  No GERD symptoms, currently taking pantoprazole 40 mg daily  Recommend proceeding with EGD to evaluate for possible esophagitis, gastritis, PUD or H. Pylori  -Scheduled for EGD at [facility]  -Continue pantoprazole 40 mg daily  -Zofran as needed  -Consider gastric emptying study if EGD normal    3.  Paroxysmal atrial fibrillation  Currently on Eliquis for anticoagulation  Discussed risks of bleeding if EGD performed on Eliquis  Also discussed risk of thrombus or stroke with holding Eliquis for EGD.  Patient understands and accepts risk  He has interrupted anticoagulation previously without complications  -Instructed to hold Eliquis for 2 days prior to EGD  -Will confer with patient's cardiologist Dr. Tomas to ensure no further instructions or recommendations required prior to interrupting anticoagulation  Followup Appointment: 3 months  ______________________________________________________________________    Chief Complaint   Patient presents with    Follow-up     Patient had gagging with vomiting last night     HPI: Presents in follow-up for history of nausea vomiting.  Symptoms have improved somewhat, currently taking pantoprazole 40 mg daily  He continues to experience nausea 1-2x per week which can persist for several hours.  Does not usually vomit and able to eat but does report that he avoids heavy fatty foods and is  eating smaller quantities  Did have an episode of vomiting last evening while brushing his teeth    Reports mild dysphagia at times-describes some discomfort with swallowing foods but denies sensation of food sticking or retained food bolus  Denies GERD symptoms  No abdominal or epigastric pain  Appetite is good, he has lost approximately 5 pounds intentionally    Denies recent acute change in bowels-does report irregular bowel habits with constipation alternating with diarrhea at times  No melena or rectal bleeding  Not currently taking fiber supplement      Historical Information   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HCC)     Spinal stenosis      Past Surgical History:   Procedure Laterality Date    CARDIAC ELECTROPHYSIOLOGY PROCEDURE N/A 12/17/2022    Procedure: Cardiac loop recorder implant;  Surgeon: Negrito Hollingsworth MD;  Location: [facility];  Service: Cardiology    CARPAL TUNNEL RELEASE Bilateral     COLONOSCOPY      LAMINECTOMY      TOTAL KNEE ARTHROPLASTY Bilateral     TOTAL SHOULDER REPLACEMENT       Social History     Substance and Sexual Activity   Alcohol Use Yes    Alcohol/week: 2.0 standard drinks of alcohol    Types: 2 Shots of liquor per week    Comment: 1 drink per week     Social History     Substance and Sexual Activity   Drug Use Yes    Types: Marijuana     Social History     Tobacco Use   Smoking Status Former    Current packs/day: 0.25    Average packs/day: 0.3 packs/day for 5.0 years (1.3 ttl pk-yrs)    Types: Cigarettes   Smokeless Tobacco Never     Family History   Problem Relation Age of Onset    Seizures Son     Colon polyps Neg Hx     Colon cancer Neg Hx          Current Outpatient Medications:     ACETAMINOPHEN ER PO    allopurinol (ZYLOPRIM) 100 mg tablet    apixaban (Eliquis) 5 mg    Diclofenac Sodium (VOLTAREN) 1 %    doxycycline hyclate  "(VIBRAMYCIN) 100 mg capsule    ezetimibe (ZETIA) 10 mg tablet    fluticasone (FLONASE) 50 mcg/act nasal spray    gabapentin (NEURONTIN) 100 mg capsule    hydroxychloroquine (PLAQUENIL) 200 mg tablet    levothyroxine 25 mcg tablet    melatonin 1 mg    nystatin (MYCOSTATIN) powder    olopatadine (PATANOL) 0.1 % ophthalmic solution    ondansetron (ZOFRAN) 4 mg tablet    pantoprazole (PROTONIX) 40 mg tablet    polyethylene glycol (MIRALAX) 17 g packet    potassium chloride (KLOR-CON) 20 mEq packet    predniSONE 5 mg tablet    psyllium (METAMUCIL) 58.6 % packet    tamsulosin (FLOMAX) 0.4 mg    torsemide (DEMADEX) 10 mg tablet    torsemide (DEMADEX) 20 mg tablet    Betadine 10 % external solution    Bismuth Tribromoph-Petrolatum (Xeroform Petrolat Gauze 5\"x9\") MISC    Cholecalciferol (Vitamin D3) 50 MCG (2000 UT) TABS    levETIRAcetam (Keppra) 500 mg tablet    lidocaine (LIDODERM) 5 %    loperamide (IMODIUM) 2 mg capsule    magnesium hydroxide (MILK OF MAGNESIA) 400 mg/5 mL oral suspension    metoprolol succinate (TOPROL-XL) 25 mg 24 hr tablet    Multiple Vitamin (Tab-A-Lencho) TABS    omeprazole (PriLOSEC) 20 mg delayed release capsule    psyllium (METAMUCIL SMOOTH TEXTURE) 28 % packet    saccharomyces boulardii (FLORASTOR) 250 mg capsule    tamsulosin (FLOMAX) 0.4 mg  Allergies   Allergen Reactions    Colchicine Other (See Comments)     Flushing      Niacin Other (See Comments)     flushed    Statins      Reviewed medications and allergies and updated as indicated    PHYSICAL EXAM:    Blood pressure 107/58, pulse (!) 124, height 5' 9\" (1.753 m), weight 95.7 kg (211 lb). Body mass index is 31.16 kg/m².  General Appearance: NAD, cooperative, alert  Eyes: Anicteric  ENT:  Normocephalic, atraumatic, normal mucosa.    Neck:  Supple, symmetrical, trachea midline  Resp:  Clear to auscultation bilaterally; no rales, rhonchi or wheezing; respirations unlabored   CV:  S1 S2, Regular rate and rhythm; no murmur, rub, or gallop.  GI:  " Soft, non-tender, non-distended; normal bowel sounds; no masses, no organomegaly   Rectal: Deferred  Musculoskeletal: No cyanosis, clubbing or edema. Normal ROM.  Skin:  No jaundice, diffuse ecchymosis on upper extremities bilaterally  Psych: Normal affect, good eye contact  Neuro: No gross deficits, AAOx3    Lab Results:   Lab Results   Component Value Date    WBC 8.14 12/01/2023    HGB 11.8 (L) 12/01/2023    HCT 37.5 12/01/2023    MCV 92 12/01/2023     12/01/2023     Lab Results   Component Value Date    K 3.7 01/02/2024     01/02/2024    CO2 30 01/02/2024    BUN 16 01/02/2024    CREATININE 1.04 01/02/2024    GLUCOSE 110 09/03/2023    GLUF 107 (H) 01/31/2023    CALCIUM 8.7 01/02/2024    CORRECTEDCA 9.6 12/01/2023    AST 13 01/02/2024    ALT 8 01/02/2024    ALKPHOS 67 01/02/2024    EGFR 69 01/02/2024     Lab Results   Component Value Date    IRON 16 (L) 08/31/2023    TIBC 139 (L) 08/31/2023    FERRITIN 141 08/31/2023     Lab Results   Component Value Date    LIPASE 17 11/16/2023

## 2024-03-28 NOTE — TELEPHONE ENCOUNTER
Scheduled date of EGD(as of today):06/20/24  Physician performing EGD:Lucy  Location of EGD:[facility]  Instructions reviewed with patient by:OLIVIA  Clearances: ELIQUIS - DR.SUNDEEP

## 2024-03-29 ENCOUNTER — TELEPHONE (OUTPATIENT)
Age: 89
End: 2024-03-29

## 2024-03-29 ENCOUNTER — NURSE TRIAGE (OUTPATIENT)
Age: 89
End: 2024-03-29

## 2024-03-29 ENCOUNTER — TELEPHONE (OUTPATIENT)
Dept: GASTROENTEROLOGY | Facility: CLINIC | Age: 89
End: 2024-03-29

## 2024-03-29 NOTE — TELEPHONE ENCOUNTER
Rescheduled date of EGD(as of today): 05/20/2024  Physician performing EGD: DR ROA  Location of EGD: [facility]  Instructions reviewed with patient by: Previously reviewed with pt. Son is verifying that nursing facility has all procedure directions.  Clearances: FARIBA      Patients son rescheduled EGD for sooner avail. Appt.

## 2024-03-29 NOTE — TELEPHONE ENCOUNTER
Son advised as per ESTEFANIA Brennan.  Please call the son as he would like to schedule f/u appt for his father for after EGD.

## 2024-03-29 NOTE — TELEPHONE ENCOUNTER
Regarding: UNABLE TO EAT/NAUSEA  ----- Message from Martita Gutierrez MA sent at 3/29/2024 11:19 AM EDT -----  Patients GI provider:  [facility]    Number to return call: [phone]    Reason for call: Pt son contacted office stating patient is complaining of feeling full. Patient is afraid to eat normally as it makes him feel nauseous. Son is requesting call back to further discuss. Patient currently resides in a nursing facility. EGD was rescheduled for sooner date at time of phone call.     Scheduled procedure/appointment date if applicable: 05/20/2024

## 2024-03-29 NOTE — TELEPHONE ENCOUNTER
"Patient was seen in office yesterday.    Forwarding to provider to call his sone in regards to symptoms.    I did speak with Kerwin and he notes he didn't feel his father provided all the information at visit yesterday and he is concerned about his lack of appetite, continued weight loss. He spoke with his father this morning and he was told he went down for breakfast oatmeal, juice and coffee and after only a little oatmeal he felt full and didn't want to become nauseated so he doesn't eat. He did state he spoke with ESTEFANIA at the [facility] and she was going to change his Zofran order to daily vs prn. He also had scheduling move the EGD up to earlier date.       Answer Assessment - Initial Assessment Questions  1. REASON FOR CALL or QUESTION: \"What is your reason for calling today?\" or \"How can I best help you?\" or \"What question do you have that I can help answer?\"      Patient's son called to discuss his father's symptoms.    Protocols used: Information Only Call - No Triage-ADULT-[place]    "

## 2024-04-01 ENCOUNTER — TELEPHONE (OUTPATIENT)
Age: 89
End: 2024-04-01

## 2024-04-01 NOTE — TELEPHONE ENCOUNTER
Pt. Son Calling to see if [employer] had faxed over pt. weights for the past month, no media scanned in chart, pt. Son is asking if office has not received anything to please call pt. Son so he can follow through with the [employer] , thank you

## 2024-04-02 ENCOUNTER — TELEPHONE (OUTPATIENT)
Dept: NEUROLOGY | Facility: CLINIC | Age: 89
End: 2024-04-02

## 2024-04-02 NOTE — TELEPHONE ENCOUNTER
VM 3/29 at 9:55 am:    Hi, this is Kerwin Krause. I'm calling for my dad Matthew Krause. Birth date, September 30th, 1934. He was seen last by Dr. 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 But lately he's been getting irritated stomach as far as nauseous and feeling full when he didn't even eat anything. Unfortunately I have a nurse in the family, a retired nurse. She seems to think that might be Keppra, but I don't believe so. But I'd like to have the doctor's opinion on that. And if he has access to his med list, if there's any other medications that it may be acting against to give him this feeling. If you give me a call back, I would greatly appreciate it. Again, my name is Kerwin Krause and I'm the caregiver for my dad. And my number is area code [phone]. Thank you.  __________________________________________________________________    Called Kerwin. He stated that pt constantly feels full and nauseated, even before he eats anything. This has been ongoing for a month. Pt did see gastroenterology, and is scheduled for an EGD on 5/20. A family member who is a retired nurse stated that the nausea may be due to the Keppra. Kerwin is hesitant to make any med changes at this time, since the Keppra seems to be working for pt and he has not had any focal seizures since starting the medication. He would rather wait until after the EGD to make any medication changes. Routed to provider as a FYI.

## 2024-04-02 NOTE — TELEPHONE ENCOUNTER
Last scanned weight list for patient reflects weights for January and February 2024. Spoke with Kerwin, patient's son, he will follow up with [employer] to have updated weight list faxed to the office.

## 2024-04-02 NOTE — TELEPHONE ENCOUNTER
It is unlikely to be related to the keppra. If it were a side effect he would have started to have it early on in his treatment with this medication.     He has been on it for about 5 months and this GI issue only started one month ago. I do not believe that the keppra is causing him this issue and he should continue it unchanged.

## 2024-04-03 NOTE — TELEPHONE ENCOUNTER
Brandy from [employer] called to confirm fax was received with pt's weights for March and April had been received. I did not see anything in the chart. Confirmed fax number with her

## 2024-04-03 NOTE — TELEPHONE ENCOUNTER
Pt's son calling to see if weights were received. Advised that they were. Son would also like to know if pt can have EGD sooner. Please review and call son back.

## 2024-04-04 ENCOUNTER — NURSE TRIAGE (OUTPATIENT)
Age: 89
End: 2024-04-04

## 2024-04-04 ENCOUNTER — TELEPHONE (OUTPATIENT)
Dept: GASTROENTEROLOGY | Facility: CLINIC | Age: 89
End: 2024-04-04

## 2024-04-04 NOTE — TELEPHONE ENCOUNTER
Pt's egd has been rescheduled to 4/12/24 at [facility]-  Director of nursing at [facility] has to be notified Monday morning of the reschedule and make sure dates of pt's blood thinner hold are gone over

## 2024-04-04 NOTE — TELEPHONE ENCOUNTER
Our mutual patient is scheduled for procedure: EGD    On: 5/20/2024    With: Dr. Ankit Nicholas    He is taking the following blood thinner: Eliquis    Can this be stopped __2__ days prior to the procedure?      Physician Approving clearance:   Dr. Tomas

## 2024-04-04 NOTE — TELEPHONE ENCOUNTER
Reason for Disposition   Information only question and nurse able to answer    Answer Assessment - Initial Assessment Questions  1. REASON FOR CALL or QUESTION: Patient's son calling in to see if he needs to have his follow up apt moved up since his colonoscopy was.  At this time we will leave the apt for July.  If anything changes or anything concerning shows we will move the apt. He was also made aware that there is a two day hold for eliquis prior to the procedure.    Protocols used: Information Only Call - No Triage-ADULT-[place]

## 2024-04-05 ENCOUNTER — TELEPHONE (OUTPATIENT)
Age: 89
End: 2024-04-05

## 2024-04-05 NOTE — TELEPHONE ENCOUNTER
Patients son contacted office with medication questions prior to his procedure. Confirmed As long as its not a diabetic, bloodthinning, or pain medication he can take with a tiny sip of water 2 hrs prior. Patient resides in a nursing facility, made aware they can contact our office with questions.     Reviewed EGD prep with patients son as well.

## 2024-04-08 ENCOUNTER — TELEPHONE (OUTPATIENT)
Dept: GASTROENTEROLOGY | Facility: CLINIC | Age: 89
End: 2024-04-08

## 2024-04-08 ENCOUNTER — REMOTE DEVICE CLINIC VISIT (OUTPATIENT)
Dept: CARDIOLOGY CLINIC | Facility: CLINIC | Age: 89
End: 2024-04-08
Payer: COMMERCIAL

## 2024-04-08 DIAGNOSIS — Z95.818 PRESENCE OF CARDIAC DEVICE: Primary | ICD-10-CM

## 2024-04-08 PROCEDURE — 93298 REM INTERROG DEV EVAL SCRMS: CPT | Performed by: INTERNAL MEDICINE

## 2024-04-08 NOTE — TELEPHONE ENCOUNTER
Procedure confirmed  Endoscopy     Via: Spoke with patients son and Spoke with Shannon in Nursing at [facility] in [place].     Confirmed 2 day Eliquis hold.  Last dose of Eliquis will be 4/9/24.    Instructions given: Faxed EGD instructions to Shannon at [employer] [phone]     Prep Given: N/A    Call the office if there are any questions.

## 2024-04-08 NOTE — TELEPHONE ENCOUNTER
Spoke w/Shannon at [facility] in [place] [phone] to confirm EGD and Eliquis hold. Last dose will be 4/9/24. Faxed EGD instructions [phone]

## 2024-04-08 NOTE — PROGRESS NOTES
Results for orders placed or performed in visit on 04/08/24   Cardiac EP device report    Narrative    SJM ILR - ACTIVE SYSTEM IS MRI CONDITIONAL  MERLIN TRANSMISSION: BATTERY VOLTAGE ADEQUATE. DEVICE CLASSIFIED AF EPISODES EGMS SHOW AF LONGEST 16 HOURS. AF BURDEN 17%. PATIENT IS ON ELIQUIS AND METOPROLOL SUCC. NORMAL DEVICE FUNCTION.--CARDENAS

## 2024-04-10 ENCOUNTER — NURSE TRIAGE (OUTPATIENT)
Age: 89
End: 2024-04-10

## 2024-04-10 DIAGNOSIS — N20.1 URETEROLITHIASIS: Primary | ICD-10-CM

## 2024-04-10 NOTE — TELEPHONE ENCOUNTER
"Patient of [facility], last seen 1/8/2024    Patient son, Kerwin calling in to inform that patient has developed nausea again that is constant. He was given Zofran by another provider and that helps.    He states this is what he felt when a kidney stone was found.     Patient has an endoscopy scheduled on Friday ordered by gastro.    Kerwin states he is aware he may be \"jumping the gun, I'm worried because I'm in [place] and my Dad is uncomfortable and he is losing so much weight\"  Emotional support provided.    Patient denies pain, blood in urine, frequency, urgency, dysuria.     Kerwin wants his Dad's provider to be aware of this. Also he wants provider to know that if he wanted to order anything it would be helpful for patient to get on Friday when he is already out. He states its hard to get patient out.    Discussed ED precautions    Please advise if any recommendations.     CB # For Kerwin [phone]   "

## 2024-04-10 NOTE — TELEPHONE ENCOUNTER
Called and spoke with Miriam to see if we could get Hilliard scheduled around other appointment that he is having on Friday Miriam got him scheduled for 11:30 on 4/12

## 2024-04-10 NOTE — TELEPHONE ENCOUNTER
Spoke with horace - son and he asked if we could have Ct scan done on Friday when he is already going to hospital for other tests.

## 2024-04-10 NOTE — TELEPHONE ENCOUNTER
Please see if we can get CT scan on a urgent basis.  Hopefully we can get sometime this week.  Patient with a persistent 2 mm UVJ stone with no significant hydronephrosis at last seen.  Discussion at that point in time was to continue to survey given that patient was asymptomatic but now that patient is having nausea and pain this very well could be from the stone change in position and now causing hydronephrosis in which the indication for surgical intervention would be greater.  Clinical team please help to arrange  for CT scan as soon as we can get it with a follow-up afterwards in office to discuss the results

## 2024-04-10 NOTE — TELEPHONE ENCOUNTER
Son Kerwin called (on comunication consent). States pt had similar symptoms (nausea) in the past and it was caused by kidney stones. Questioning if EGD would be able to visualize kidneys and whether pt can have additional testing done at same time. Advised EGD will look at upper GI tract only. Explained best to move forward with EGD to rule out GI cause first. He verbalzied understanding will contact managing provider (Urology) to discuss additional testing/concerns.

## 2024-04-11 NOTE — TELEPHONE ENCOUNTER
Procedure Prep:  EGD instructions    Sent Via:  Faxed [phone]    Mihir Ortega [facility]

## 2024-04-11 NOTE — TELEPHONE ENCOUNTER
Please send EGD instructions to [facility] , fax instructions to [phone] with attention to ALEA Ortega, pt. Son called  and stated they are holding all his medications tomorrow morning, advised he is allowed to have his prescription medications with a sip of water at least 2 hours prior to procedure, pt. Son would like nursing home to  be made aware of instructions, please include that on note with fax as pt. Takes blood pressure medications, thank you

## 2024-04-12 ENCOUNTER — HOSPITAL ENCOUNTER (OUTPATIENT)
Dept: CT IMAGING | Facility: HOSPITAL | Age: 89
Discharge: HOME | End: 2024-04-12
Payer: COMMERCIAL

## 2024-04-12 ENCOUNTER — HOSPITAL ENCOUNTER (OUTPATIENT)
Dept: GASTROENTEROLOGY | Facility: HOSPITAL | Age: 89
Setting detail: OUTPATIENT SURGERY
End: 2024-04-12
Attending: STUDENT IN AN ORGANIZED HEALTH CARE EDUCATION/TRAINING PROGRAM
Payer: COMMERCIAL

## 2024-04-12 ENCOUNTER — NURSE TRIAGE (OUTPATIENT)
Age: 89
End: 2024-04-12

## 2024-04-12 ENCOUNTER — TELEPHONE (OUTPATIENT)
Age: 89
End: 2024-04-12

## 2024-04-12 ENCOUNTER — ANESTHESIA EVENT (OUTPATIENT)
Dept: GASTROENTEROLOGY | Facility: HOSPITAL | Age: 89
End: 2024-04-12

## 2024-04-12 ENCOUNTER — ANESTHESIA (OUTPATIENT)
Dept: GASTROENTEROLOGY | Facility: HOSPITAL | Age: 89
End: 2024-04-12

## 2024-04-12 VITALS
OXYGEN SATURATION: 96 % | WEIGHT: 209.44 LBS | HEART RATE: 102 BPM | TEMPERATURE: 97.9 F | HEIGHT: 69 IN | RESPIRATION RATE: 12 BRPM | SYSTOLIC BLOOD PRESSURE: 105 MMHG | DIASTOLIC BLOOD PRESSURE: 65 MMHG | BODY MASS INDEX: 31.02 KG/M2

## 2024-04-12 DIAGNOSIS — R11.0 NAUSEA: ICD-10-CM

## 2024-04-12 DIAGNOSIS — N20.1 URETEROLITHIASIS: ICD-10-CM

## 2024-04-12 DIAGNOSIS — K22.10 EROSIVE ESOPHAGITIS: ICD-10-CM

## 2024-04-12 DIAGNOSIS — K22.10 EROSIVE ESOPHAGITIS: Primary | ICD-10-CM

## 2024-04-12 PROCEDURE — 88305 TISSUE EXAM BY PATHOLOGIST: CPT | Performed by: PATHOLOGY

## 2024-04-12 PROCEDURE — 43239 EGD BIOPSY SINGLE/MULTIPLE: CPT | Performed by: STUDENT IN AN ORGANIZED HEALTH CARE EDUCATION/TRAINING PROGRAM

## 2024-04-12 PROCEDURE — 88342 IMHCHEM/IMCYTCHM 1ST ANTB: CPT | Performed by: PATHOLOGY

## 2024-04-12 PROCEDURE — 74176 CT ABD & PELVIS W/O CONTRAST: CPT

## 2024-04-12 PROCEDURE — 88313 SPECIAL STAINS GROUP 2: CPT | Performed by: PATHOLOGY

## 2024-04-12 RX ORDER — LIDOCAINE HYDROCHLORIDE 10 MG/ML
INJECTION, SOLUTION EPIDURAL; INFILTRATION; INTRACAUDAL; PERINEURAL AS NEEDED
Status: DISCONTINUED | OUTPATIENT
Start: 2024-04-12 | End: 2024-04-12

## 2024-04-12 RX ORDER — PROPOFOL 10 MG/ML
INJECTION, EMULSION INTRAVENOUS AS NEEDED
Status: DISCONTINUED | OUTPATIENT
Start: 2024-04-12 | End: 2024-04-12

## 2024-04-12 RX ORDER — PROPOFOL 10 MG/ML
INJECTION, EMULSION INTRAVENOUS CONTINUOUS PRN
Status: DISCONTINUED | OUTPATIENT
Start: 2024-04-12 | End: 2024-04-12

## 2024-04-12 RX ORDER — OMEPRAZOLE 20 MG/1
20 CAPSULE, DELAYED RELEASE ORAL 2 TIMES DAILY
Qty: 60 CAPSULE | Refills: 2 | Status: SHIPPED | OUTPATIENT
Start: 2024-04-12 | End: 2024-04-12 | Stop reason: ALTCHOICE

## 2024-04-12 RX ORDER — SODIUM CHLORIDE 9 MG/ML
INJECTION, SOLUTION INTRAVENOUS CONTINUOUS PRN
Status: DISCONTINUED | OUTPATIENT
Start: 2024-04-12 | End: 2024-04-12

## 2024-04-12 RX ADMIN — SODIUM CHLORIDE: 9 INJECTION, SOLUTION INTRAVENOUS at 10:15

## 2024-04-12 RX ADMIN — LIDOCAINE HYDROCHLORIDE 100 MG: 10 INJECTION, SOLUTION EPIDURAL; INFILTRATION; INTRACAUDAL; PERINEURAL at 10:27

## 2024-04-12 RX ADMIN — PROPOFOL 80 MG: 10 INJECTION, EMULSION INTRAVENOUS at 10:27

## 2024-04-12 RX ADMIN — PROPOFOL 100 MCG/KG/MIN: 10 INJECTION, EMULSION INTRAVENOUS at 10:27

## 2024-04-12 NOTE — TELEPHONE ENCOUNTER
Pt. Son calling asking to fax script for Nexium to [facility] at [phone], script faxed as requested to ALEA Ortega

## 2024-04-12 NOTE — TELEPHONE ENCOUNTER
Received call from Kerwin patient's son who is concerned with the well-being of his father, reports he gets nauseous and is not eating, lost 10lbs in a week, he is very shaky and weak. -gastro involved in care for this there is a tissue exam in process. [facility] quest is monitoring vitals daily and keeping Kerwin informed.   Kerwin inquiring if these symptoms can be caused by the stone and what the next steps are with urology.     Relayed providers note:  ESTEFANIA Melara   Reviewed his imaging and he has 4mm UVJ stable stone.    Please advise on the next steps, checked schedule to make appt. However he would like to be scheduled a the [place] office, if it is okay to wait for appt. Kerwin will be back from [place] and would like to be present at the appt., will be back the week of June 17th. Understands if this can not wait, he would just like to know what next steps are.     Kerwin's call back#:[phone]

## 2024-04-12 NOTE — ANESTHESIA POSTPROCEDURE EVALUATION
Post-Op Assessment Note    CV Status:  Stable  Pain Score: 0    Pain management: adequate       Mental Status:  Alert and awake   Hydration Status:  Euvolemic   PONV Controlled:  Controlled   Airway Patency:  Patent     Post Op Vitals Reviewed: Yes    No anethesia notable event occurred.    Staff: CRNA               BP   105/65   Temp      Pulse   101   Resp   16   SpO2   95%     
Stable

## 2024-04-12 NOTE — TELEPHONE ENCOUNTER
"Pt had EGD today and switched from Omeprazole to Nexium 20 mg BID. Rx was originally sent to [facility].     Son Kerwin (on Communication Consent) requesting 1 wk bridge supply be sent to local pharmacy as it will take a few days for them to receive medication from mail order pharmacy. Rx queued up for provider sign off.     Reason for Disposition  • Caller with prescription and triager answers question    Answer Assessment - Initial Assessment Questions  1. DRUG NAME: \"What medicine do you need to have refilled?\"     Nexium    Protocols used: Medication Refill and Renewal Call-ADULT-    "

## 2024-04-12 NOTE — ANESTHESIA PREPROCEDURE EVALUATION
Procedure:  EGD    Relevant Problems   ANESTHESIA (within normal limits)      CARDIO   (+) Aneurysm of ascending aorta without rupture (HCC)   (+) HLD (hyperlipidemia)   (+) HTN (hypertension)   (+) Paroxysmal A-fib (HCC)      ENDO   (+) Hypothyroidism      GI/HEPATIC   (+) GERD (gastroesophageal reflux disease)      /RENAL   (+) KAMLESH (acute kidney injury) (HCC)   (+) Stage 2 chronic kidney disease      MUSCULOSKELETAL   (+) Gout without acute exacerbation    (+) RA (rheumatoid arthritis) (HCC)   (+) Septic arthritis of shoulder, right (HCC)      NEURO/PSYCH   (+) Focal seizure with retained awareness      Neurology/Sleep   (+) History of TIA (transient ischemic attack)      Cardiovascular/Peripheral Vascular   (+) Chronic systolic heart failure (HCC)   (+) Dilated cardiomyopathy (HCC)       Interpretation Summary 8/30/23          Left Ventricle: Left ventricular cavity size is mildly dilated. Wall thickness is normal. The left ventricular ejection fraction is 45%. Systolic function is mildly reduced. There is mild global hypokinesis with regional variation. Diastolic function is mildly abnormal, consistent with grade I (abnormal) relaxation.    Right Ventricle: Right ventricular cavity size is dilated. Systolic function is normal.    Left Atrium: The atrium is mildly dilated.    Right Atrium: The atrium is mildly dilated.    Aortic Valve: There is mild regurgitation.    Mitral Valve: There is mild annular calcification. There is moderate regurgitation with an eccentrically directed jet.    Tricuspid Valve: There is mild regurgitation.    Pulmonic Valve: There is mild regurgitation.    Aorta: The aortic root is normal in size. The ascending aorta is mildly dilated.    Pulmonary Artery: The pulmonary artery systolic pressure is normal.     Physical Exam    Airway    Mallampati score: I  TM Distance: >3 FB  Neck ROM: full     Dental   No notable dental hx     Cardiovascular  Cardiovascular exam  normal    Pulmonary  Pulmonary exam normal     Other Findings        Anesthesia Plan  ASA Score- 4     Anesthesia Type- IV sedation with anesthesia with ASA Monitors.         Additional Monitors:     Airway Plan:     Comment: I discussed risks (reviewed with patient on the anesthesia consent form), benefits and alternatives of monitored sedation including the possibility under sedation to have recall or mild discomfort.  .       Plan Factors-    Chart reviewed. EKG reviewed.   Patient summary reviewed.                  Induction- intravenous.    Postoperative Plan-     Informed Consent- Anesthetic plan and risks discussed with patient.  I personally reviewed this patient with the CRNA. Discussed and agreed on the Anesthesia Plan with the CRNA..

## 2024-04-12 NOTE — TELEPHONE ENCOUNTER
Radiology Test Results - Radiology Calling with report update    Pt under care of: [facility]    Imaging Completed:4/12/24  CT renal stone study abdomen pelvis wo contrast      Significant Findings - Please review

## 2024-04-12 NOTE — TELEPHONE ENCOUNTER
.  CT is demonstrated persistence of ureteral stone without obstruction.  CT also demonstrates concern for possible acute uncomplicated diverticulitis which could explain patient's symptomatology.  Will forward this message to patient's gastroenterologist/PCP.  I cannot definitively say that the stone is not causing symptoms but I think it is less likely due to there being no obstruction nonetheless since the stone is persistent we should probably plan for surgery to have stone removed.  Please arrange for urgent follow-up for Uteroscopy discussion    Clinical please forward this message to patient's PCP as well as patient's son.  Patient's gastroenterologist was CCed through epic

## 2024-04-12 NOTE — INTERVAL H&P NOTE
H&P reviewed. After examining the patient I find no changes in the patients condition since the H&P had been written.    Vitals:    04/12/24 0932   BP: 136/68   Pulse: 95   Resp: 18   Temp: 97.6 °F (36.4 °C)   SpO2: 96%

## 2024-04-16 NOTE — TELEPHONE ENCOUNTER
Son Kerwin states he spoke with Dr. Nicholas regarding biopsy results today. He has questions regarding inflammation and reducing acid, Discussed GERD diet and lifestyle modifications including avoiding acidic, fried, and spicy foods and not eating late in the day. Advised additional resources available on line. He verbalized understanding.

## 2024-04-17 ENCOUNTER — OFFICE VISIT (OUTPATIENT)
Dept: UROLOGY | Facility: AMBULATORY SURGERY CENTER | Age: 89
End: 2024-04-17
Payer: COMMERCIAL

## 2024-04-17 VITALS
SYSTOLIC BLOOD PRESSURE: 118 MMHG | DIASTOLIC BLOOD PRESSURE: 58 MMHG | BODY MASS INDEX: 30.93 KG/M2 | HEIGHT: 69 IN | OXYGEN SATURATION: 94 % | HEART RATE: 86 BPM

## 2024-04-17 DIAGNOSIS — K22.10 EROSIVE ESOPHAGITIS: ICD-10-CM

## 2024-04-17 DIAGNOSIS — N20.0 NEPHROLITHIASIS: Primary | ICD-10-CM

## 2024-04-17 PROCEDURE — 99214 OFFICE O/P EST MOD 30 MIN: CPT

## 2024-04-17 RX ORDER — LEVOTHYROXINE SODIUM 50 MCG
TABLET ORAL
COMMUNITY
Start: 2024-02-20

## 2024-04-17 RX ORDER — POTASSIUM CHLORIDE 1500 MG/1
TABLET, EXTENDED RELEASE ORAL
COMMUNITY
Start: 2024-04-06

## 2024-04-17 RX ORDER — ALBUTEROL SULFATE 2.5 MG/3ML
SOLUTION RESPIRATORY (INHALATION)
COMMUNITY
Start: 2024-04-04

## 2024-04-17 RX ORDER — PANTOPRAZOLE SODIUM 40 MG/1
TABLET, DELAYED RELEASE ORAL
COMMUNITY
Start: 2024-04-16 | End: 2024-04-17 | Stop reason: SDUPTHER

## 2024-04-17 RX ORDER — LEVOFLOXACIN 500 MG/1
TABLET, FILM COATED ORAL
COMMUNITY
Start: 2024-04-04

## 2024-04-17 NOTE — PROGRESS NOTES
Office Visit- Urology  Matthew Krause 9/30/1934 MRN: [id number]      Assessment/Discussion/Plan    89 y.o. male managed by     Left ureteral stone  -4 mm left UVJ calculus that is persistent on repeat imaging.  Has been present since November 2023 with no passage   -Patient has recently been having symptoms of loss of appetite, nausea, weight loss.  He recently had a EGD which demonstrated benign inflammation.  -Related-start CT stone study on 4/12/2024 did not demonstrate upstream hydroureteronephrosis.  Discussed that while I do not think that his symptoms of loss of appetite, nausea, weight loss is likely due to UVJ calculus given that there is no obstruction noted it cannot be definitively said that there is not intermittent obstruction with change in position of the UVJ calculus.  Of note patient has had significant improvement in nausea since initiating Nexium  -Because stone has been present for a number of months with no passage we will plan for surgical intervention with a cystoscopy, ureteroscopy with holmium laser lithotripsy, basket stone extraction, retrograde pyelogram, and insertion of left ureteral stent  -Discussed risk including bleeding, infection including sepsis, need for additional procedures, injury to the urethra, bladder, ureters, kidneys, stent related discomfort, ureteral stricture  -Patient and family is not sure if they want to proceed with surgical intervention or continue with observation at this point in time.  They will call the office with how they would like to proceed.  Informed consent was signed in the office today by patient in case patient does decide to proceed as procedure was detailed in depth as well as the risks associated with procedure        Chief Complaint:   Matthew is a 89 y.o. male presenting to the office for a follow up visit regarding left ureteral stone        Subjective    Hx 1/8/2024    Patient is a 89-year-old male with past urologic history of BPH who he  used to follow with urology about 20 years ago for presents to the office today for evaluation of left UVJ stone.  He had a CT scan in November 2023 which revealed a 3 mm stone at the left UVJ with no significant upstream hydronephrosis.  He had another CT scan 2 days later from the initial 1 which demonstrated no interval change.  At 1 point his assisted living facility was straining his urine but they have stopped doing so and patient has never been symptomatic.  What prompted the obtainment of the CT scan was that patient was persistently nauseous.  However this nausea has since subsided.  He states that he has chronic low urinary tract symptoms including frequency and urgency.  He does utilize loop diuretics for management of heart failure.  He was previously on Myrbetriq but was taken off of this due to cost.  He is currently on Flomax and oxybutynin.  He does endorse side effect such as dry eyes, dry mouth, constipation.  He has a history of memory difficulty.  No dysuria, gross hematuria, flank pain.  He presents to the office today with his son     Hx 4/17/2024     patient presents to the office today with family.  Here he has recently been having nausea lack of appetite and loss of weight.  He had an EGD performed last week which demonstrated inflammation.  Patient was initiated on Nexium and has had improvement in the symptoms.  He presents to the office today because CT scan is demonstrating persistence of left ureteral stone without obstruction at time of CT scan.  Patient with medical history of heart failure, A-fib on Eliquis, aneurysm of ascending aorta, hypothyroidism, seizure disorder, RA, spinal stenosis, history of TIA      AUA SYMPTOM SCORE      Flowsheet Row Most Recent Value   AUA SYMPTOM SCORE    How often have you had a sensation of not emptying your bladder completely after you finished urinating? 0 (P)    How often have you had to urinate again less than two hours after you finished  urinating? 0 (P)    How often have you found you stopped and started again several times when you urinate? 0 (P)    How often have you found it difficult to postpone urination? 0 (P)    How often have you had a weak urinary stream? 0 (P)    How often have you had to push or strain to begin urination? 0 (P)    How many times did you most typically get up to urinate from the time you went to bed at night until the time you got up in the morning? 1 (P)    Quality of Life: If you were to spend the rest of your life with your urinary condition just the way it is now, how would you feel about that? 2 (P)    AUA SYMPTOM SCORE 1 (P)             ROS:   Review of Systems   Constitutional: Negative.  Negative for chills, fatigue and fever.   HENT: Negative.     Respiratory:  Negative for shortness of breath.    Cardiovascular:  Negative for chest pain.   Gastrointestinal: Negative.  Negative for abdominal pain.   Endocrine: Negative.    Musculoskeletal: Negative.    Skin: Negative.    Neurological: Negative.  Negative for dizziness and light-headedness.   Hematological: Negative.    Psychiatric/Behavioral: Negative.           Past Medical History  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HCC)     Spinal stenosis        Past Surgical History  Past Surgical History:   Procedure Laterality Date    CARDIAC ELECTROPHYSIOLOGY PROCEDURE N/A 12/17/2022    Procedure: Cardiac loop recorder implant;  Surgeon: Negrito Hollingsworth MD;  Location: [facility];  Service: Cardiology    CARPAL TUNNEL RELEASE Bilateral     COLONOSCOPY      LAMINECTOMY      TOTAL KNEE ARTHROPLASTY Bilateral     TOTAL SHOULDER REPLACEMENT         Past Family History  Family History   Problem Relation Age of Onset    Seizures Son     Colon polyps Neg Hx     Colon cancer Neg Hx        Past Social history  Social History      Socioeconomic History    Marital status:      Spouse name: Not on file    Number of children: Not on file    Years of education: Not on file    Highest education level: Not on file   Occupational History    Not on file   Tobacco Use    Smoking status: Former     Current packs/day: 0.25     Average packs/day: 0.3 packs/day for 5.0 years (1.3 ttl pk-yrs)     Types: Cigarettes    Smokeless tobacco: Never   Vaping Use    Vaping status: Never Used   Substance and Sexual Activity    Alcohol use: Yes     Alcohol/week: 2.0 standard drinks of alcohol     Types: 2 Shots of liquor per week     Comment: 1 drink per week    Drug use: Yes     Types: Marijuana    Sexual activity: Not Currently     Partners: Male   Other Topics Concern    Not on file   Social History Narrative    Not on file     Social Determinants of Health     Financial Resource Strain: Not on file   Food Insecurity: No Food Insecurity (11/20/2023)    Hunger Vital Sign     Worried About Running Out of Food in the Last Year: Never true     Ran Out of Food in the Last Year: Never true   Transportation Needs: No Transportation Needs (11/20/2023)    PRAPARE - Transportation     Lack of Transportation (Medical): No     Lack of Transportation (Non-Medical): No   Physical Activity: Not on file   Stress: Not on file   Social Connections: Not on file   Intimate Partner Violence: Not on file   Housing Stability: Low Risk  (11/20/2023)    Housing Stability Vital Sign     Unable to Pay for Housing in the Last Year: No     Number of Places Lived in the Last Year: 1     Unstable Housing in the Last Year: No       Current Medications  Current Outpatient Medications   Medication Sig Dispense Refill    ACETAMINOPHEN ER PO Take 1,000 mg by mouth 3 (three) times a day      allopurinol (ZYLOPRIM) 100 mg tablet Take 200 mg by mouth daily      apixaban (Eliquis) 5 mg Take 1 tablet (5 mg total) by mouth 2 (two) times a day 180 tablet 3    Betadine 10 % external solution   "(Patient not taking: Reported on 12/12/2023)      Bismuth Tribromoph-Petrolatum (Xeroform Petrolat Gauze 5\"x9\") MISC  (Patient not taking: Reported on 2/21/2024)      Cholecalciferol (Vitamin D3) 50 MCG (2000 UT) TABS Take 2,000 Units by mouth (Patient not taking: Reported on 12/21/2023)      Diclofenac Sodium (VOLTAREN) 1 % Apply 4 g topically 4 (four) times a day      doxycycline hyclate (VIBRAMYCIN) 100 mg capsule Take 100 mg by mouth every 12 (twelve) hours      esomeprazole (NexIUM) 20 mg capsule Take 1 capsule (20 mg total) by mouth 2 (two) times a day before meals 60 capsule 2    ezetimibe (ZETIA) 10 mg tablet Take 1 tablet (10 mg total) by mouth daily 90 tablet 3    fluticasone (FLONASE) 50 mcg/act nasal spray 1 spray into each nostril 2 (two) times a day as needed for rhinitis or allergies      gabapentin (NEURONTIN) 100 mg capsule Take 100 mg by mouth 3 (three) times a day      hydroxychloroquine (PLAQUENIL) 200 mg tablet Take 200 mg by mouth 2 (two) times a day with meals      levETIRAcetam (Keppra) 500 mg tablet Take 1 tablet (500 mg total) by mouth every 12 (twelve) hours (Patient not taking: Reported on 3/28/2024) 180 tablet 3    levothyroxine 25 mcg tablet 50 mcg      lidocaine (LIDODERM) 5 % Apply 1 patch topically over 12 hours daily Remove & Discard patch within 12 hours or as directed by MD Do not start before November 25, 2023. (Patient not taking: Reported on 12/21/2023)  0    loperamide (IMODIUM) 2 mg capsule Take 2 mg by mouth as needed for diarrhea (Patient not taking: Reported on 3/28/2024)      magnesium hydroxide (MILK OF MAGNESIA) 400 mg/5 mL oral suspension Take 5 mL by mouth daily as needed for constipation (Patient not taking: Reported on 3/28/2024)      melatonin 1 mg Take 1 mg by mouth daily at bedtime Take 2 tablets      metoprolol succinate (TOPROL-XL) 25 mg 24 hr tablet Take 1 tablet (25 mg total) by mouth 2 (two) times a day (Patient not taking: Reported on 3/28/2024) 180 tablet " 3    Multiple Vitamin (Tab-A-Lencho) TABS  (Patient not taking: Reported on 2/21/2024)      nystatin (MYCOSTATIN) powder Apply topically 2 (two) times a day 15 g 0    olopatadine (PATANOL) 0.1 % ophthalmic solution 1 drop 2 (two) times a day      ondansetron (ZOFRAN) 4 mg tablet TAKE 1 TABLET EVERY 8 HOURS AS NEEDED FOR NAUSEA OR VOMITING 20 tablet 5    polyethylene glycol (MIRALAX) 17 g packet Take 17 g by mouth every other day 30 each 2    potassium chloride (KLOR-CON) 20 mEq packet Take 20 mEq by mouth 2 (two) times a day 180 each 3    predniSONE 5 mg tablet Take by mouth daily      psyllium (METAMUCIL SMOOTH TEXTURE) 28 % packet Take 1 packet by mouth in the morning 30 packet 0    psyllium (METAMUCIL) 58.6 % packet Take 1 packet by mouth daily      saccharomyces boulardii (FLORASTOR) 250 mg capsule Take 250 mg by mouth 2 (two) times a day (Patient not taking: Reported on 3/28/2024)      tamsulosin (FLOMAX) 0.4 mg Take 0.4 mg by mouth daily at bedtime (Patient not taking: Reported on 3/28/2024)      tamsulosin (FLOMAX) 0.4 mg Take 1 capsule (0.4 mg total) by mouth daily with dinner for 7 days 7 capsule 0    torsemide (DEMADEX) 10 mg tablet Take 1 tablet (10 mg total) by mouth 4 (four) times a week Take 10 mg on Tuesday, Thursday, Saturday, and Sunday 48 tablet 3    torsemide (DEMADEX) 20 mg tablet Take 1 tablet (20 mg total) by mouth 3 (three) times a week Take 20 mg on Monday, Wednesday, and Friday 36 tablet 3     No current facility-administered medications for this visit.       Allergies  Allergies   Allergen Reactions    Colchicine Other (See Comments)     Flushing      Niacin Other (See Comments)     flushed    Other GI Intolerance    Statins        OBJECTIVE    Vitals   There were no vitals filed for this visit.    PVR:    Physical Exam  Constitutional:       General: He is not in acute distress.     Appearance: Normal appearance. He is normal weight. He is not ill-appearing or toxic-appearing.   HENT:       Head: Normocephalic and atraumatic.   Eyes:      Conjunctiva/sclera: Conjunctivae normal.   Cardiovascular:      Rate and Rhythm: Normal rate. Rhythm irregular.      Pulses: Normal pulses.      Comments: known A-fib  Pulmonary:      Effort: Pulmonary effort is normal. No respiratory distress.   Musculoskeletal:         General: Normal range of motion.      Cervical back: Normal range of motion and neck supple.   Skin:     General: Skin is warm and dry.   Neurological:      General: No focal deficit present.      Mental Status: He is alert and oriented to person, place, and time.      Cranial Nerves: No cranial nerve deficit.   Psychiatric:         Mood and Affect: Mood normal.         Behavior: Behavior normal.         Thought Content: Thought content normal.          Labs:     Lab Results   Component Value Date    CREATININE 1.04 01/02/2024      Lab Results   Component Value Date    HGBA1C 6.7 (H) 01/02/2024     Lab Results   Component Value Date    GLUCOSE 110 09/03/2023    CALCIUM 8.7 01/02/2024    K 3.7 01/02/2024    CO2 30 01/02/2024     01/02/2024    BUN 16 01/02/2024    CREATININE 1.04 01/02/2024       I have personally reviewed all pertinent lab results and reviewed with patient    Imaging   Narrative & Impression   CT ABDOMEN AND PELVIS WITHOUT IV CONTRAST - LOW DOSE RENAL STONE     INDICATION: N20.1: Calculus of ureter.     COMPARISON: CT abdomen/pelvis 1/24/2024.     TECHNIQUE: Low radiation dose thin section CT examination of the abdomen and pelvis was performed without intravenous or oral contrast according to a protocol specifically designed to evaluate for urinary tract calculus. Axial, sagittal, and coronal 2D   reformatted images were created from the source data and submitted for interpretation. Evaluation for pathology in the abdomen and pelvis that is unrelated to urinary tract calculi is limited.     Radiation dose length product (DLP) for this visit: 513 mGy-cm . This examination, like  all CT scans performed in the [facility], was performed utilizing techniques to minimize radiation dose exposure, including the use of iterative   reconstruction and automated exposure control.     URINARY TRACT FINDINGS:     RIGHT KIDNEY AND URETER: No urinary tract calculi. No hydronephrosis or hydroureter. Renal cortical scarring and scattered simple cysts.     LEFT KIDNEY AND URETER: Stable 4 mm left ureterovesicular junction calculus without upstream hydroureteronephrosis. Scattered simple cysts.     URINARY BLADDER: Unremarkable.        ADDITIONAL FINDINGS:     LOWER CHEST: No clinically significant abnormality in the visualized lower chest.     SOLID VISCERA: Stable hepatic cysts and subcentimeter hypodensities, too small to definitively characterize, but statistically likely benign. Otherwise, limited low radiation dose noncontrast CT evaluation demonstrates no clinically significant   abnormality of the imaged portions of the liver, spleen, pancreas, or adrenal glands.     GALLBLADDER/BILIARY TREE: Cholelithiasis without findings of acute cholecystitis. No biliary dilation.     STOMACH AND BOWEL: Colonic diverticulosis with questionable mild fat stranding surrounding a proximal sigmoid diverticulum (series 2 image 104). No adjacent free air or fluid collection.     APPENDIX: No findings to suggest appendicitis.     ABDOMINOPELVIC CAVITY: No ascites. No pneumoperitoneum. No lymphadenopathy. Stable focus of fat necrosis in the mid abdomen (series 2 image 59)     VESSELS: Atherosclerosis without abdominal aortic aneurysm.     REPRODUCTIVE ORGANS: Unremarkable for patient's age.     ABDOMINAL WALL/INGUINAL REGIONS: Unremarkable.     BONES: No acute fracture or suspicious osseous lesion. Spinal degenerative changes. 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     IMPRESSION:     1. Stable 4 mm left UVJ calculus without upstream hydroureteronephrosis.     2. Colonic diverticulosis with  questionable mild fat stranding surrounding a proximal sigmoid diverticulum. Correlate with left lower quadrant pain for mild acute uncomplicated diverticulitis.     The study was marked in EPIC for immediate notification.     Workstation performed: [id number]       Good Rivas PA-C  Date: 4/17/2024 Time: 11:06 AM  [facility]    This note was written using fluency dictation software. Please excuse any resulting minor grammatical errors.

## 2024-04-17 NOTE — Clinical Note
Hello just making sure that you agree with recommendation to proceed with surgery.  Multiple medical comorbidities nonobstructing stone but has been present now since November

## 2024-04-18 ENCOUNTER — TELEPHONE (OUTPATIENT)
Age: 89
End: 2024-04-18

## 2024-04-18 ENCOUNTER — NURSE TRIAGE (OUTPATIENT)
Age: 89
End: 2024-04-18

## 2024-04-18 DIAGNOSIS — K29.00 ACUTE SUPERFICIAL GASTRITIS WITHOUT HEMORRHAGE: ICD-10-CM

## 2024-04-18 NOTE — TELEPHONE ENCOUNTER
Routing to GI Office  As per son's statement, pt is to be on Nexium.  The script is written for generic.  Please advise if PA is for the generic or for the brand name. If for brand name only, please redo script for Nexium.  Please advise the PA team so we know what medication to get a pa on, esomeprazole or Nexium.  Thank you

## 2024-04-18 NOTE — TELEPHONE ENCOUNTER
Pharmacist from [facility] states insurance will cover esomeprazole 40 mg daily vs 20 mg BID. If BID, needs PA otherwise we can send new Rx for 40 mg daily.

## 2024-04-18 NOTE — TELEPHONE ENCOUNTER
Reason for Disposition   Information only question and nurse able to answer    Answer Assessment - Initial Assessment Questions  1. REASON FOR CALL or QUESTION Nexium BID    Protocols used: Information Only Call - No Triage-ADULT-[place]

## 2024-04-19 DIAGNOSIS — K29.00 ACUTE SUPERFICIAL GASTRITIS WITHOUT HEMORRHAGE: Primary | ICD-10-CM

## 2024-04-19 RX ORDER — ESOMEPRAZOLE MAGNESIUM 40 MG/1
40 CAPSULE, DELAYED RELEASE ORAL
Qty: 90 CAPSULE | Refills: 1 | Status: SHIPPED | OUTPATIENT
Start: 2024-04-19 | End: 2024-04-19 | Stop reason: SDUPTHER

## 2024-04-19 NOTE — TELEPHONE ENCOUNTER
Pts son Kerwin, calling in, explained to him due to insurance provider has sent over nexium 40 mg daily instead. He is [facility]

## 2024-04-22 RX ORDER — ESOMEPRAZOLE MAGNESIUM 40 MG/1
40 CAPSULE, DELAYED RELEASE ORAL
Qty: 90 CAPSULE | Refills: 1 | Status: SHIPPED | OUTPATIENT
Start: 2024-04-22 | End: 2024-10-19

## 2024-04-24 ENCOUNTER — TELEPHONE (OUTPATIENT)
Dept: NEUROLOGY | Facility: CLINIC | Age: 89
End: 2024-04-24

## 2024-04-24 NOTE — TELEPHONE ENCOUNTER
Patient's son Kerwin called and states that currently [facility] has not been giving patient his Keppra medication.  Patient has not had RX since November.    Facility stated that  they would need a physicians letter stating that patient is currently on medication and needs to continue taking medication.    If a letter could please be generated and faxed to facility fax # [phone]   CHA Ortega (Director of nursing)    If someone could please call son Kerwin so that he is aware that letter was generated and faxed.  Kerwin [phone]     Please assist and Thank you!

## 2024-04-25 NOTE — TELEPHONE ENCOUNTER
4/23 4:27    Pt's son left as VM re: Keppra.    Transcribed VM:   Hi, this is Kerwin Krause. I'm calling for my dad Matthew Merino. His birthday is September 30th, 1934. He lives in assisted living in [facility]. And I was just told by them that they had no record of him being on Keppra, which that was the medicine that he was given through your office for his focal seizures. So if you give me a call, I'd like to get this straightened out. I'm Kerwin Krause, I'm his son, I'm his caregiver. So my number is area code [phone]. Thank you.    Called [employer] to see what the issue was with pt's Marcello and spoke with Cindy. Per Cindy they need the script faxed to them at [phone]. Faxed to # provided.    Called pt's son to make aware and left a detailed VM with call back # if any further assistance is required.

## 2024-04-25 NOTE — TELEPHONE ENCOUNTER
4/24 at 9:05 am:    Hi, this is Kerwin Krause. This is my 2nd call in. I called yesterday at 3:24 my time. I'm an hour behind. So anyway, I'm calling for my dad Matthew Krause. His birthday is September 30th 1934. I got questions about his Keppra that he was prescribed. If you give me a call back ASAP, I'd greatly appreciate it because according to where he lives at the [facility], he has not received it in months. So please give me a call back to find out so I can tell them what's going on. Thank you. Have a good day. [phone]  ____________________________________________    Already addressed. See previous note.

## 2024-04-30 ENCOUNTER — TELEPHONE (OUTPATIENT)
Dept: RHEUMATOLOGY | Facility: CLINIC | Age: 89
End: 2024-04-30

## 2024-04-30 DIAGNOSIS — M06.00 SERONEGATIVE RHEUMATOID ARTHRITIS (HCC): Primary | ICD-10-CM

## 2024-05-08 NOTE — TELEPHONE ENCOUNTER
Please call [facility] to follow up on Mr. Krause's Levetiracetam. He should be taking Levetiracetam 500 mg twice a day. I had sent a prescription in December for this and as of Abigail's last visit, he was reported to be taking it at that point.  If they need letters or a new prescription, we can resend.

## 2024-05-08 NOTE — TELEPHONE ENCOUNTER
I called the village at [facility], [phone], reached , left message that patient's son was concerned that patient was not receiving levetiracetam; According to our records, patient should be taking 599 mg every 12 hours.  Requested cb to clarify.

## 2024-05-20 ENCOUNTER — NURSE TRIAGE (OUTPATIENT)
Age: 89
End: 2024-05-20

## 2024-05-20 NOTE — TELEPHONE ENCOUNTER
Patient's son Kerwin called in regarding using a screen for stones when urinating, states [facility] would need an order faxed to them if we would like him to do this and indicate length of need as well.      Fax#: attn:[email or web address] [phone]    Please call sonKerwin back at [phone]  and let him know one way or another.

## 2024-05-20 NOTE — TELEPHONE ENCOUNTER
Can you please call the patient's son and let him know that he can come to any office in  a urine strainer to see if there has been any stone passage.  It appears that he was seen by Good on 4/17 and they discussed the potential for surgery to remove the stone.  Can you please ask him what they planned to do as I do not see any notes regarding this or surgical case placement.  They can strain the urine every time he uses the bathroom, but he does not need to do this when he is out in public as it can be very bothersome to carry around a urine strainer.

## 2024-05-20 NOTE — TELEPHONE ENCOUNTER
"Kerwin calling back in as he had seen the message from Radha.    Kerwin states that he called in about a strainer because the home the patient lives in had told him that they stopped straining the urine. Kerwin thought that perhaps they should not have stopped if we wanted that to be continued. He states \" I don't have a lot of roney in the staff at the home\"    Informed him we don't have an order to strain the urine unless he wants us to send an order and he states if it isn't necessary then he does not want to continue.    He also states that when he spoke with Good last on 4/17 they had discussed that they will not move forward with any surgical intervention because the patient is not having any sxs   "

## 2024-05-20 NOTE — TELEPHONE ENCOUNTER
ESTEFANIA Melara2 hours ago (12:53 PM)      Sounds good. We do not need to continuing straining the urine. Thanks!

## 2024-06-03 ENCOUNTER — TELEPHONE (OUTPATIENT)
Age: 89
End: 2024-06-03

## 2024-06-03 NOTE — TELEPHONE ENCOUNTER
Kerwin the son called, asking if we can fax an order/letter over to [employer] requesting Bud be weight daily.   Fax [phone] tel [phone]  Son advised that he is also have fluid in lower legs. Advised son to have [employer] call office to discuss s/s.     Verbally understood

## 2024-06-05 NOTE — TELEPHONE ENCOUNTER
Will fax telephone encounter to [employer]:  Daily weight first thing every am.  Notify Cardiology for weight increase 3lbs overnight or 5 lbs in 5 days and s/s of CHF.     [phone].

## 2024-06-10 ENCOUNTER — APPOINTMENT (EMERGENCY)
Dept: CT IMAGING | Facility: HOSPITAL | Age: 89
End: 2024-06-10
Payer: COMMERCIAL

## 2024-06-10 ENCOUNTER — APPOINTMENT (EMERGENCY)
Dept: RADIOLOGY | Facility: HOSPITAL | Age: 89
End: 2024-06-10
Payer: COMMERCIAL

## 2024-06-10 ENCOUNTER — HOSPITAL ENCOUNTER (EMERGENCY)
Facility: HOSPITAL | Age: 89
Discharge: HOME | End: 2024-06-10
Attending: EMERGENCY MEDICINE | Admitting: EMERGENCY MEDICINE
Payer: COMMERCIAL

## 2024-06-10 VITALS
TEMPERATURE: 98 F | SYSTOLIC BLOOD PRESSURE: 122 MMHG | DIASTOLIC BLOOD PRESSURE: 79 MMHG | RESPIRATION RATE: 18 BRPM | OXYGEN SATURATION: 95 % | HEART RATE: 96 BPM

## 2024-06-10 DIAGNOSIS — N28.9 ACUTE RENAL INSUFFICIENCY: ICD-10-CM

## 2024-06-10 DIAGNOSIS — K57.92 DIVERTICULITIS: ICD-10-CM

## 2024-06-10 DIAGNOSIS — K29.00 ACUTE SUPERFICIAL GASTRITIS WITHOUT HEMORRHAGE: ICD-10-CM

## 2024-06-10 DIAGNOSIS — N20.1 URETEROLITHIASIS: ICD-10-CM

## 2024-06-10 DIAGNOSIS — K20.90 ESOPHAGITIS: Primary | ICD-10-CM

## 2024-06-10 DIAGNOSIS — D72.10 EOSINOPHILIA: ICD-10-CM

## 2024-06-10 LAB
ALBUMIN SERPL BCP-MCNC: 3.2 G/DL (ref 3.5–5)
ALP SERPL-CCNC: 79 U/L (ref 34–104)
ALT SERPL W P-5'-P-CCNC: 6 U/L (ref 7–52)
ANION GAP SERPL CALCULATED.3IONS-SCNC: 10 MMOL/L (ref 4–13)
AST SERPL W P-5'-P-CCNC: 22 U/L (ref 13–39)
BACTERIA UR QL AUTO: NORMAL /HPF
BASOPHILS # BLD AUTO: 0.04 THOUSANDS/ÂΜL (ref 0–0.1)
BASOPHILS NFR BLD AUTO: 0 % (ref 0–1)
BILIRUB SERPL-MCNC: 0.61 MG/DL (ref 0.2–1)
BILIRUB UR QL STRIP: NEGATIVE
BUN SERPL-MCNC: 16 MG/DL (ref 5–25)
CALCIUM ALBUM COR SERPL-MCNC: 10.1 MG/DL (ref 8.3–10.1)
CALCIUM SERPL-MCNC: 9.5 MG/DL (ref 8.4–10.2)
CHLORIDE SERPL-SCNC: 102 MMOL/L (ref 96–108)
CLARITY UR: CLEAR
CO2 SERPL-SCNC: 26 MMOL/L (ref 21–32)
COLOR UR: YELLOW
CREAT SERPL-MCNC: 1.65 MG/DL (ref 0.6–1.3)
EOSINOPHIL # BLD AUTO: 1.13 THOUSAND/ÂΜL (ref 0–0.61)
EOSINOPHIL NFR BLD AUTO: 12 % (ref 0–6)
ERYTHROCYTE [DISTWIDTH] IN BLOOD BY AUTOMATED COUNT: 14.8 % (ref 11.6–15.1)
GFR SERPL CREATININE-BSD FRML MDRD: 36 ML/MIN/1.73SQ M
GLUCOSE SERPL-MCNC: 102 MG/DL (ref 65–140)
GLUCOSE UR STRIP-MCNC: NEGATIVE MG/DL
HCT VFR BLD AUTO: 39.5 % (ref 36.5–49.3)
HGB BLD-MCNC: 12 G/DL (ref 12–17)
HGB UR QL STRIP.AUTO: ABNORMAL
IMM GRANULOCYTES # BLD AUTO: 0.04 THOUSAND/UL (ref 0–0.2)
IMM GRANULOCYTES NFR BLD AUTO: 0 % (ref 0–2)
KETONES UR STRIP-MCNC: ABNORMAL MG/DL
LEUKOCYTE ESTERASE UR QL STRIP: ABNORMAL
LIPASE SERPL-CCNC: 20 U/L (ref 11–82)
LYMPHOCYTES # BLD AUTO: 3.09 THOUSANDS/ÂΜL (ref 0.6–4.47)
LYMPHOCYTES NFR BLD AUTO: 32 % (ref 14–44)
MCH RBC QN AUTO: 28.6 PG (ref 26.8–34.3)
MCHC RBC AUTO-ENTMCNC: 30.4 G/DL (ref 31.4–37.4)
MCV RBC AUTO: 94 FL (ref 82–98)
MONOCYTES # BLD AUTO: 1 THOUSAND/ÂΜL (ref 0.17–1.22)
MONOCYTES NFR BLD AUTO: 10 % (ref 4–12)
NEUTROPHILS # BLD AUTO: 4.36 THOUSANDS/ÂΜL (ref 1.85–7.62)
NEUTS SEG NFR BLD AUTO: 46 % (ref 43–75)
NITRITE UR QL STRIP: NEGATIVE
NON-SQ EPI CELLS URNS QL MICRO: NORMAL /HPF
NRBC BLD AUTO-RTO: 0 /100 WBCS
PH UR STRIP.AUTO: 7 [PH]
PLATELET # BLD AUTO: 139 THOUSANDS/UL (ref 149–390)
PMV BLD AUTO: 13.4 FL (ref 8.9–12.7)
POTASSIUM SERPL-SCNC: 4.6 MMOL/L (ref 3.5–5.3)
PROT SERPL-MCNC: 6.3 G/DL (ref 6.4–8.4)
PROT UR STRIP-MCNC: ABNORMAL MG/DL
RBC # BLD AUTO: 4.2 MILLION/UL (ref 3.88–5.62)
RBC #/AREA URNS AUTO: NORMAL /HPF
SODIUM SERPL-SCNC: 138 MMOL/L (ref 135–147)
SP GR UR STRIP.AUTO: >=1.03 (ref 1–1.03)
UROBILINOGEN UR STRIP-ACNC: <2 MG/DL
WBC # BLD AUTO: 9.66 THOUSAND/UL (ref 4.31–10.16)
WBC #/AREA URNS AUTO: NORMAL /HPF

## 2024-06-10 PROCEDURE — 99284 EMERGENCY DEPT VISIT MOD MDM: CPT | Performed by: EMERGENCY MEDICINE

## 2024-06-10 PROCEDURE — 71250 CT THORAX DX C-: CPT

## 2024-06-10 PROCEDURE — 85025 COMPLETE CBC W/AUTO DIFF WBC: CPT | Performed by: EMERGENCY MEDICINE

## 2024-06-10 PROCEDURE — 81001 URINALYSIS AUTO W/SCOPE: CPT | Performed by: EMERGENCY MEDICINE

## 2024-06-10 PROCEDURE — 96374 THER/PROPH/DIAG INJ IV PUSH: CPT

## 2024-06-10 PROCEDURE — 83690 ASSAY OF LIPASE: CPT | Performed by: EMERGENCY MEDICINE

## 2024-06-10 PROCEDURE — 71045 X-RAY EXAM CHEST 1 VIEW: CPT

## 2024-06-10 PROCEDURE — 99284 EMERGENCY DEPT VISIT MOD MDM: CPT

## 2024-06-10 PROCEDURE — 80053 COMPREHEN METABOLIC PANEL: CPT | Performed by: EMERGENCY MEDICINE

## 2024-06-10 PROCEDURE — 74176 CT ABD & PELVIS W/O CONTRAST: CPT

## 2024-06-10 PROCEDURE — 96375 TX/PRO/DX INJ NEW DRUG ADDON: CPT

## 2024-06-10 PROCEDURE — C9113 INJ PANTOPRAZOLE SODIUM, VIA: HCPCS | Performed by: EMERGENCY MEDICINE

## 2024-06-10 PROCEDURE — 36415 COLL VENOUS BLD VENIPUNCTURE: CPT | Performed by: EMERGENCY MEDICINE

## 2024-06-10 RX ORDER — PANTOPRAZOLE SODIUM 40 MG/10ML
40 INJECTION, POWDER, LYOPHILIZED, FOR SOLUTION INTRAVENOUS ONCE
Status: COMPLETED | OUTPATIENT
Start: 2024-06-10 | End: 2024-06-10

## 2024-06-10 RX ORDER — AMOXICILLIN AND CLAVULANATE POTASSIUM 875; 125 MG/1; MG/1
1 TABLET, FILM COATED ORAL EVERY 12 HOURS
Qty: 14 TABLET | Refills: 0 | Status: SHIPPED | OUTPATIENT
Start: 2024-06-10 | End: 2024-06-19

## 2024-06-10 RX ORDER — ONDANSETRON 2 MG/ML
4 INJECTION INTRAMUSCULAR; INTRAVENOUS ONCE
Status: COMPLETED | OUTPATIENT
Start: 2024-06-10 | End: 2024-06-10

## 2024-06-10 RX ORDER — TAMSULOSIN HYDROCHLORIDE 0.4 MG/1
0.4 CAPSULE ORAL
Qty: 7 CAPSULE | Refills: 0 | Status: ON HOLD | OUTPATIENT
Start: 2024-06-10 | End: 2024-06-17

## 2024-06-10 RX ORDER — ESOMEPRAZOLE MAGNESIUM 40 MG/1
40 CAPSULE, DELAYED RELEASE ORAL
Qty: 20 CAPSULE | Refills: 0 | Status: SHIPPED | OUTPATIENT
Start: 2024-06-10 | End: 2024-06-10

## 2024-06-10 RX ADMIN — ONDANSETRON 4 MG: 2 INJECTION INTRAMUSCULAR; INTRAVENOUS at 11:10

## 2024-06-10 RX ADMIN — PANTOPRAZOLE SODIUM 40 MG: 40 INJECTION, POWDER, FOR SOLUTION INTRAVENOUS at 11:08

## 2024-06-10 NOTE — ED PROVIDER NOTES
"History  Chief Complaint   Patient presents with    Nausea     Pt to er via ems from [facility] with reports of nausea and decreased appetite. Chronic problem per patient, states that he has always had issues with losing his appetite once he sees food for as long as he can remember. No other complaints      History from patient, medical records, paperwork with patient from [facility].  Past medical history GERD diverticulitis hyper lipid hypertension spinal stenosis cardiac loop recorder spinal laminectomy presents with concern for persistent nausea decreased appetite chronic illness but worse today he went for breakfast and could not eat at all.  He had vomiting several days ago but has not vomited since.  Records show he had endoscopy April 2024 with gastritis esophagitis.  It looks like Dr. Ankit Nicholas recommended changing to a different proton pump inhibitor and increasing it to twice daily.  According to papers from the nursing home patient is only taking as metoprolol once daily.        Prior to Admission Medications   Prescriptions Last Dose Informant Patient Reported? Taking?   ACETAMINOPHEN ER PO  Family Member Yes No   Sig: Take 1,000 mg by mouth 3 (three) times a day   Betadine 10 % external solution  Family Member Yes No   Patient not taking: Reported on 12/12/2023   Bismuth Tribromoph-Petrolatum (Xeroform Petrolat Gauze 5\"x9\") MISC  Family Member Yes No   Patient not taking: Reported on 2/21/2024   Cholecalciferol (Vitamin D3) 50 MCG (2000 UT) TABS  Family Member Yes No   Sig: Take 2,000 Units by mouth   Patient not taking: Reported on 12/21/2023   Diclofenac Sodium (VOLTAREN) 1 %  Family Member Yes No   Sig: Apply 4 g topically 4 (four) times a day   Klor-Con M20 20 MEQ tablet   Yes No   Synthroid 50 MCG tablet   Yes No   albuterol (2.5 mg/3 mL) 0.083 % nebulizer solution   Yes No   allopurinol (ZYLOPRIM) 100 mg tablet  Family Member Yes No   Sig: Take 200 mg by mouth daily   apixaban (Eliquis) 5 mg  " Family Member No No   Sig: Take 1 tablet (5 mg total) by mouth 2 (two) times a day   doxycycline hyclate (VIBRAMYCIN) 100 mg capsule  Family Member Yes No   Sig: Take 100 mg by mouth every 12 (twelve) hours   esomeprazole (NexIUM) 20 mg capsule   No Yes   Sig: Take 1 capsule (20 mg total) by mouth in the morning   esomeprazole (NexIUM) 40 MG capsule   No No   Sig: Take 1 capsule (40 mg total) by mouth daily before breakfast   esomeprazole (NexIUM) 40 MG capsule   No Yes   Sig: Take 1 capsule (40 mg total) by mouth 2 (two) times a day before meals   ezetimibe (ZETIA) 10 mg tablet  Family Member No No   Sig: Take 1 tablet (10 mg total) by mouth daily   fluticasone (FLONASE) 50 mcg/act nasal spray  Family Member Yes No   Si spray into each nostril 2 (two) times a day as needed for rhinitis or allergies   gabapentin (NEURONTIN) 100 mg capsule  Family Member Yes No   Sig: Take 100 mg by mouth 3 (three) times a day   hydroxychloroquine (PLAQUENIL) 200 mg tablet  Family Member Yes No   Sig: Take 200 mg by mouth 2 (two) times a day with meals   levETIRAcetam (Keppra) 500 mg tablet  Family Member No No   Sig: Take 1 tablet (500 mg total) by mouth every 12 (twelve) hours   Patient not taking: Reported on 3/28/2024   lidocaine (LIDODERM) 5 %  Family Member No No   Sig: Apply 1 patch topically over 12 hours daily Remove & Discard patch within 12 hours or as directed by MD Do not start before 2023.   Patient not taking: Reported on 2023   loperamide (IMODIUM) 2 mg capsule  Family Member Yes No   Sig: Take 2 mg by mouth as needed for diarrhea   Patient not taking: Reported on 3/28/2024   magnesium hydroxide (MILK OF MAGNESIA) 400 mg/5 mL oral suspension  Family Member Yes No   Sig: Take 5 mL by mouth daily as needed for constipation   Patient not taking: Reported on 3/28/2024   melatonin 1 mg  Family Member Yes No   Sig: Take 1 mg by mouth daily at bedtime Take 2 tablets   metoprolol succinate (TOPROL-XL)  25 mg 24 hr tablet  Family Member No No   Sig: Take 1 tablet (25 mg total) by mouth 2 (two) times a day   Patient not taking: Reported on 3/28/2024   nystatin (MYCOSTATIN) powder  Family Member No No   Sig: Apply topically 2 (two) times a day   olopatadine (PATANOL) 0.1 % ophthalmic solution  Family Member Yes No   Si drop 2 (two) times a day   ondansetron (ZOFRAN) 4 mg tablet  Family Member No No   Sig: TAKE 1 TABLET EVERY 8 HOURS AS NEEDED FOR NAUSEA OR VOMITING   polyethylene glycol (MIRALAX) 17 g packet  Family Member No No   Sig: Take 17 g by mouth every other day   potassium chloride (KLOR-CON) 20 mEq packet  Family Member No No   Sig: Take 20 mEq by mouth 2 (two) times a day   psyllium (METAMUCIL SMOOTH TEXTURE) 28 % packet  Family Member No No   Sig: Take 1 packet by mouth in the morning   psyllium (METAMUCIL) 58.6 % packet  Family Member Yes No   Sig: Take 1 packet by mouth daily   saccharomyces boulardii (FLORASTOR) 250 mg capsule  Family Member Yes No   Sig: Take 250 mg by mouth 2 (two) times a day   Patient not taking: Reported on 3/28/2024   tamsulosin (FLOMAX) 0.4 mg  Family Member Yes No   Sig: Take 0.4 mg by mouth daily at bedtime   Patient not taking: Reported on 3/28/2024   tamsulosin (FLOMAX) 0.4 mg  Outside Facility (Specify), Family Member No No   Sig: Take 1 capsule (0.4 mg total) by mouth daily with dinner for 7 days   tamsulosin (FLOMAX) 0.4 mg   No Yes   Sig: Take 1 capsule (0.4 mg total) by mouth daily with dinner for 7 days   torsemide (DEMADEX) 10 mg tablet  Family Member No No   Sig: Take 1 tablet (10 mg total) by mouth 4 (four) times a week Take 10 mg on Tuesday, Thursday, Saturday, and    torsemide (DEMADEX) 20 mg tablet  Family Member No No   Sig: Take 1 tablet (20 mg total) by mouth 3 (three) times a week Take 20 mg on Monday, Wednesday, and Friday      Facility-Administered Medications: None       Past Medical History:   Diagnosis Date    Diverticulitis of colon     GERD  (gastroesophageal reflux disease)     Gout     Hyperlipidemia     Hypertension     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HCC)     Spinal stenosis        Past Surgical History:   Procedure Laterality Date    CARDIAC ELECTROPHYSIOLOGY PROCEDURE N/A 12/17/2022    Procedure: Cardiac loop recorder implant;  Surgeon: Negrito Hollingsworth MD;  Location: [facility];  Service: Cardiology    CARPAL TUNNEL RELEASE Bilateral     COLONOSCOPY      LAMINECTOMY      TOTAL KNEE ARTHROPLASTY Bilateral     TOTAL SHOULDER REPLACEMENT         Family History   Problem Relation Age of Onset    Seizures Son     Colon polyps Neg Hx     Colon cancer Neg Hx      I have reviewed and agree with the history as documented.    E-Cigarette/Vaping    E-Cigarette Use Never User      E-Cigarette/Vaping Substances    Nicotine No     THC No     CBD No     Flavoring No     Other No     Unknown No      Social History     Tobacco Use    Smoking status: Former     Current packs/day: 0.25     Average packs/day: 0.3 packs/day for 5.0 years (1.3 ttl pk-yrs)     Types: Cigarettes    Smokeless tobacco: Never   Vaping Use    Vaping status: Never Used   Substance Use Topics    Alcohol use: Yes     Alcohol/week: 2.0 standard drinks of alcohol     Types: 2 Shots of liquor per week     Comment: 1 drink per week    Drug use: Yes     Types: Marijuana       Review of Systems   Constitutional:  Positive for appetite change. Negative for chills and fever.   HENT:  Negative for ear pain and sore throat.    Eyes:  Negative for pain and visual disturbance.   Respiratory:  Negative for cough and shortness of breath.    Cardiovascular:  Negative for chest pain and palpitations.   Gastrointestinal:  Positive for nausea. Negative for abdominal pain and vomiting.   Genitourinary:  Negative for dysuria, flank pain and hematuria.   Musculoskeletal:  Negative for arthralgias and back pain.   Skin:  Negative for color change and rash.   Neurological:  Negative for seizures and syncope.   All other  systems reviewed and are negative.      Physical Exam  Physical Exam  Vitals and nursing note reviewed.   Constitutional:       General: He is not in acute distress.     Appearance: He is well-developed.   HENT:      Head: Normocephalic and atraumatic.   Eyes:      Conjunctiva/sclera: Conjunctivae normal.   Cardiovascular:      Rate and Rhythm: Normal rate and regular rhythm.      Heart sounds: No murmur heard.  Pulmonary:      Effort: Pulmonary effort is normal. No respiratory distress.      Breath sounds: Normal breath sounds.   Abdominal:      Palpations: Abdomen is soft.      Tenderness: There is no abdominal tenderness.   Musculoskeletal:         General: No swelling.      Cervical back: Neck supple.   Skin:     General: Skin is warm and dry.      Capillary Refill: Capillary refill takes less than 2 seconds.   Neurological:      Mental Status: He is alert.   Psychiatric:         Mood and Affect: Mood normal.         Vital Signs  ED Triage Vitals   Temperature Pulse Respirations Blood Pressure SpO2   06/10/24 1010 06/10/24 1011 06/10/24 1011 06/10/24 1011 06/10/24 1011   98 °F (36.7 °C) 101 18 123/58 98 %      Temp Source Heart Rate Source Patient Position - Orthostatic VS BP Location FiO2 (%)   06/10/24 1010 06/10/24 1011 06/10/24 1011 06/10/24 1011 --   Temporal Monitor Lying Left arm       Pain Score       06/10/24 1125       No Pain           Vitals:    06/10/24 1011 06/10/24 1230 06/10/24 1330 06/10/24 1408   BP: 123/58 123/72 124/75 122/79   Pulse: 101 100 97 96   Patient Position - Orthostatic VS: Lying   Sitting         Visual Acuity      ED Medications  Medications   ondansetron (ZOFRAN) injection 4 mg (4 mg Intravenous Given 6/10/24 1110)   pantoprazole (PROTONIX) injection 40 mg (40 mg Intravenous Given 6/10/24 1108)       Diagnostic Studies  Results Reviewed       Procedure Component Value Units Date/Time    Urine Microscopic [[id number]]  (Normal) Collected: 06/10/24 1436    Lab Status: Final result  Specimen: Urine, Clean Catch Updated: 06/10/24 1458     RBC, UA None Seen /hpf      WBC, UA None Seen /hpf      Epithelial Cells None Seen /hpf      Bacteria, UA None Seen /hpf     UA w Reflex to Microscopic w Reflex to Culture [[id number]]  (Abnormal) Collected: 06/10/24 1436    Lab Status: Final result Specimen: Urine, Clean Catch Updated: 06/10/24 1445     Color, UA Yellow     Clarity, UA Clear     Specific Gravity, UA >=1.030     pH, UA 7.0     Leukocytes, UA Trace     Nitrite, UA Negative     Protein, UA 30 (1+) mg/dl      Glucose, UA Negative mg/dl      Ketones, UA 10 (1+) mg/dl      Urobilinogen, UA <2.0 mg/dl      Bilirubin, UA Negative     Occult Blood, UA Trace    CMP [[id number]]  (Abnormal) Collected: 06/10/24 1106    Lab Status: Final result Specimen: Blood from Hand, Left Updated: 06/10/24 1150     Sodium 138 mmol/L      Potassium 4.6 mmol/L      Chloride 102 mmol/L      CO2 26 mmol/L      ANION GAP 10 mmol/L      BUN 16 mg/dL      Creatinine 1.65 mg/dL      Glucose 102 mg/dL      Calcium 9.5 mg/dL      Corrected Calcium 10.1 mg/dL      AST 22 U/L      ALT 6 U/L      Alkaline Phosphatase 79 U/L      Total Protein 6.3 g/dL      Albumin 3.2 g/dL      Total Bilirubin 0.61 mg/dL      eGFR 36 ml/min/1.73sq m     Narrative:      National Kidney Disease Foundation guidelines for Chronic Kidney Disease (CKD):     Stage 1 with normal or high GFR (GFR > 90 mL/min/1.73 square meters)    Stage 2 Mild CKD (GFR = 60-89 mL/min/1.73 square meters)    Stage 3A Moderate CKD (GFR = 45-59 mL/min/1.73 square meters)    Stage 3B Moderate CKD (GFR = 30-44 mL/min/1.73 square meters)    Stage 4 Severe CKD (GFR = 15-29 mL/min/1.73 square meters)    Stage 5 End Stage CKD (GFR <15 mL/min/1.73 square meters)  Note: GFR calculation is accurate only with a steady state creatinine    Lipase [[id number]]  (Normal) Collected: 06/10/24 1106    Lab Status: Final result Specimen: Blood from Hand, Left Updated: 06/10/24 1150     Lipase 20 u/L      CBC and differential [[id number]]  (Abnormal) Collected: 06/10/24 1106    Lab Status: Final result Specimen: Blood from Hand, Left Updated: 06/10/24 1133     WBC 9.66 Thousand/uL      RBC 4.20 Million/uL      Hemoglobin 12.0 g/dL      Hematocrit 39.5 %      MCV 94 fL      MCH 28.6 pg      MCHC 30.4 g/dL      RDW 14.8 %      MPV 13.4 fL      Platelets 139 Thousands/uL      nRBC 0 /100 WBCs      Segmented % 46 %      Immature Grans % 0 %      Lymphocytes % 32 %      Monocytes % 10 %      Eosinophils Relative 12 %      Basophils Relative 0 %      Absolute Neutrophils 4.36 Thousands/µL      Absolute Immature Grans 0.04 Thousand/uL      Absolute Lymphocytes 3.09 Thousands/µL      Absolute Monocytes 1.00 Thousand/µL      Eosinophils Absolute 1.13 Thousand/µL      Basophils Absolute 0.04 Thousands/µL                    CT chest abdomen pelvis wo contrast   Final Result by Jay Felder MD (06/10 1441)      Mild acute diverticulitis involving the proximal sigmoid colon. No extraluminal free air or abscess formation identified      Stable 4 mm left UVJ calculus without evidence for upstream hydronephrosis.      No focal airspace consolidation identified.      The study was marked in EPIC for immediate notification.         Workstation performed: [id number]         XR chest portable   ED Interpretation by Dk Nicholas DO (06/10 1245)   No acute abnl, no ptx, effusion, infiltrate, no obvious rib fracture.  Interpreted by me          Final Result by Esdras Abreu MD (06/10 3398)      Mild bibasilar atelectasis. Lungs are otherwise clear.            Resident: FAMILIA SUÁREZ I, the attending radiologist, have reviewed the images and agree with the final report above.      Workstation performed: [id number]                    Procedures  Procedures         ED Course  ED Course as of 06/10/24 1556   Mon Vincenzo 10, 2024   1307 Family here now they are stating that patient has nausea now that was similar to when he had kidney  stone in the past.  Bumped creatinine, I ordered imaging to confirm if kidney stone is causing obstruction.                               SBIRT 22yo+      Flowsheet Row Most Recent Value   Initial Alcohol Screen: US AUDIT-C     1. How often do you have a drink containing alcohol? 0 Filed at: 06/10/2024 1011   2. How many drinks containing alcohol do you have on a typical day you are drinking?  0 Filed at: 06/10/2024 1011   3a. Male UNDER 65: How often do you have five or more drinks on one occasion? 0 Filed at: 06/10/2024 1011   3b. FEMALE Any Age, or MALE 65+: How often do you have 4 or more drinks on one occassion? 0 Filed at: 06/10/2024 1011   Audit-C Score 0 Filed at: 06/10/2024 1011   ARNOLDO: How many times in the past year have you...    Used an illegal drug or used a prescription medication for non-medical reasons? Never Filed at: 06/10/2024 1011                      Medical Decision Making  Diff includes acute exacerbation of chronic esophagitis, gastritis, - Other diff is acute renal insufficiency from chronic kidney stone, dehydration, less likely sepsis, uti, urinary obstruction.  Eosinophilia could be from diverticulitis but also concern for side effect of esomeprazole    Amount and/or Complexity of Data Reviewed  Labs: ordered.  Radiology: ordered and independent interpretation performed.    Risk  Prescription drug management.             Disposition  Final diagnoses:   Esophagitis   Diverticulitis   Ureterolithiasis   Acute renal insufficiency   Eosinophilia     Time reflects when diagnosis was documented in both MDM as applicable and the Disposition within this note       Time User Action Codes Description Comment    6/10/2024 12:46 PM Dk Nicholas [K20.90] Esophagitis     6/10/2024 12:48 PM Dk Nicholas [K29.00] Acute superficial gastritis without hemorrhage     6/10/2024  3:06 PM Dk Nicholas [K57.92] Diverticulitis     6/10/2024  3:07 PM Dk Nicholas [N20.1] Ureterolithiasis     6/10/2024   3:07 PM Dk Nicholas [N28.9] Acute renal insufficiency     6/10/2024  3:07 PM Dk Nicholas [D72.10] Eosinophilia           ED Disposition       ED Disposition   Discharge    Condition   Stable    Date/Time   Mon Vincenzo 10, 2024 1506    Comment   Matthew Krause discharge to home/self care.                   Follow-up Information       Follow up With Specialties Details Why Contact Info Additional Information    [facility] Schedule an appointment as soon as possible for a visit   [address]  [address]  [facility] [address]  [phone] [facility] [place], [address], [address], [facility]  [address]     [phone]    [facility]   [address]  [address]  [facility] [address]  [phone] [facility] [place], [facility] [address], [place], [place], [address]   [phone]            Discharge Medication List as of 6/10/2024  3:10 PM        START taking these medications    Details   amoxicillin-clavulanate (AUGMENTIN) 875-125 mg per tablet Take 1 tablet by mouth every 12 (twelve) hours for 10 days, Starting Mon 6/10/2024, Until Thu 6/20/2024, Normal           CONTINUE these medications which have CHANGED    Details   esomeprazole (NexIUM) 20 mg capsule Take 1 capsule (20 mg total) by mouth in the morning, Starting Mon 6/10/2024, Until Sat 12/7/2024, Normal      tamsulosin (FLOMAX) 0.4 mg Take 1 capsule (0.4 mg total) by mouth daily with dinner for 7 days, Starting Mon 6/10/2024, Until Mon 6/17/2024, Normal           CONTINUE these medications which have NOT CHANGED    Details   ACETAMINOPHEN ER PO Take 1,000 mg by mouth 3 (three) times a day, Historical Med      albuterol (2.5 mg/3 mL) 0.083 % nebulizer solution Historical Med      allopurinol (ZYLOPRIM) 100 mg tablet Take 200 mg by mouth daily, Historical Med       apixaban (Eliquis) 5 mg Take 1 tablet (5 mg total) by mouth 2 (two) times a day, Starting Thu 12/28/2023, Normal      Diclofenac Sodium (VOLTAREN) 1 % Apply 4 g topically 4 (four) times a day, Historical Med      doxycycline hyclate (VIBRAMYCIN) 100 mg capsule Take 100 mg by mouth every 12 (twelve) hours, Starting Sat 9/30/2023, Historical Med      ezetimibe (ZETIA) 10 mg tablet Take 1 tablet (10 mg total) by mouth daily, Starting Thu 12/28/2023, Normal      fluticasone (FLONASE) 50 mcg/act nasal spray 1 spray into each nostril 2 (two) times a day as needed for rhinitis or allergies, Starting Sat 10/14/2023, Historical Med      gabapentin (NEURONTIN) 100 mg capsule Take 100 mg by mouth 3 (three) times a day, Historical Med      hydroxychloroquine (PLAQUENIL) 200 mg tablet Take 200 mg by mouth 2 (two) times a day with meals, Historical Med      Klor-Con M20 20 MEQ tablet Historical Med      levETIRAcetam (Keppra) 500 mg tablet Take 1 tablet (500 mg total) by mouth every 12 (twelve) hours, Starting Tue 12/12/2023, Normal      lidocaine (LIDODERM) 5 % Apply 1 patch topically over 12 hours daily Remove & Discard patch within 12 hours or as directed by MD Do not start before November 25, 2023., Starting Sat 11/25/2023, No Print      loperamide (IMODIUM) 2 mg capsule Take 2 mg by mouth as needed for diarrhea, Historical Med      melatonin 1 mg Take 1 mg by mouth daily at bedtime Take 2 tablets, Historical Med      metoprolol succinate (TOPROL-XL) 25 mg 24 hr tablet Take 1 tablet (25 mg total) by mouth 2 (two) times a day, Starting u 12/28/2023, Until Sun 12/22/2024, Normal      nystatin (MYCOSTATIN) powder Apply topically 2 (two) times a day, Starting Sat 1/28/2023, Normal      olopatadine (PATANOL) 0.1 % ophthalmic solution 1 drop 2 (two) times a day, Historical Med      ondansetron (ZOFRAN) 4 mg tablet TAKE 1 TABLET EVERY 8 HOURS AS NEEDED FOR NAUSEA OR VOMITING, Starting Tue 3/5/2024, Normal      polyethylene  "glycol (MIRALAX) 17 g packet Take 17 g by mouth every other day, Starting Tue 12/21/2021, Normal      potassium chloride (KLOR-CON) 20 mEq packet Take 20 mEq by mouth 2 (two) times a day, Starting Thu 12/28/2023, Normal      psyllium (METAMUCIL SMOOTH TEXTURE) 28 % packet Take 1 packet by mouth in the morning, Starting Wed 11/15/2023, Until Wed 2/21/2024, Normal      psyllium (METAMUCIL) 58.6 % packet Take 1 packet by mouth daily, Historical Med      saccharomyces boulardii (FLORASTOR) 250 mg capsule Take 250 mg by mouth 2 (two) times a day, Historical Med      Synthroid 50 MCG tablet Historical Med      !! torsemide (DEMADEX) 10 mg tablet Take 1 tablet (10 mg total) by mouth 4 (four) times a week Take 10 mg on Tuesday, Thursday, Saturday, and Sunday, Starting Thu 12/28/2023, Normal      !! torsemide (DEMADEX) 20 mg tablet Take 1 tablet (20 mg total) by mouth 3 (three) times a week Take 20 mg on Monday, Wednesday, and Friday, Starting Fri 12/29/2023, Normal       !! - Potential duplicate medications found. Please discuss with provider.        STOP taking these medications       Betadine 10 % external solution Comments:   Reason for Stopping:         Bismuth Tribromoph-Petrolatum (Xeroform Petrolat Gauze 5\"x9\") MISC Comments:   Reason for Stopping:         Cholecalciferol (Vitamin D3) 50 MCG (2000 UT) TABS Comments:   Reason for Stopping:         magnesium hydroxide (MILK OF MAGNESIA) 400 mg/5 mL oral suspension Comments:   Reason for Stopping:                   PDMP Review         Value Time User    PDMP Reviewed  Yes 1/28/2023  6:41 AM Elenita Delgadillo MD            ED Provider  Electronically Signed by             Dk Nicholas DO  06/10/24 2919    "

## 2024-06-12 ENCOUNTER — TELEPHONE (OUTPATIENT)
Age: 89
End: 2024-06-12

## 2024-06-12 NOTE — TELEPHONE ENCOUNTER
Called and spoke with the patient's son , Kerwin, advising his father does not need to obtain August CT scan.    Patient has an appointment with Kelle HART on 7/3/2024 for discussion of stone.  Kerwin states his father does not want to have surgery.    Advised to have that discussion with Kelle HART and decide what is the best and safest option for the patient.    CT scan cancelled.

## 2024-06-12 NOTE — TELEPHONE ENCOUNTER
Pt's son Kerwin called to schedule a follow up appointment as the patient was seen in the ER on 6/10 and his CT scan revealed - There is a stable calculus of the left ureterovesicular junction measuring approximately 4 mm. There is no evidence for hydronephrosis. There are renal cysts.     Pt was scheduled for the next available 7/3 at the [facility] as requested and placed on the wait list. Son is questioning if the pt is to keep his scheduled CT scan on 8/5/24 or if it is okay to cancel the study since the recent CT scan on 6/10.     Kerwin call back: [phone]

## 2024-06-14 ENCOUNTER — HOSPITAL ENCOUNTER (INPATIENT)
Facility: HOSPITAL | Age: 89
LOS: 5 days | Discharge: NURSING, REHAB OR LONG-TERM CARE | DRG: 392 | End: 2024-06-19
Attending: INTERNAL MEDICINE | Admitting: INTERNAL MEDICINE
Payer: COMMERCIAL

## 2024-06-14 DIAGNOSIS — R53.1 GENERALIZED WEAKNESS: Primary | ICD-10-CM

## 2024-06-14 DIAGNOSIS — R19.7 DIARRHEA, UNSPECIFIED TYPE: ICD-10-CM

## 2024-06-14 DIAGNOSIS — K57.92 DIVERTICULITIS: ICD-10-CM

## 2024-06-14 LAB
2HR DELTA HS TROPONIN: -2 NG/L
ALBUMIN SERPL BCG-MCNC: 3.2 G/DL (ref 3.5–5)
ALP SERPL-CCNC: 88 U/L (ref 34–104)
ALT SERPL W P-5'-P-CCNC: 8 U/L (ref 7–52)
ANION GAP SERPL CALCULATED.3IONS-SCNC: 7 MMOL/L (ref 4–13)
ANISOCYTOSIS BLD QL SMEAR: PRESENT
AST SERPL W P-5'-P-CCNC: 22 U/L (ref 13–39)
BASOPHILS # BLD MANUAL: 0 THOUSAND/UL (ref 0–0.1)
BASOPHILS NFR MAR MANUAL: 0 % (ref 0–1)
BILIRUB SERPL-MCNC: 0.52 MG/DL (ref 0.2–1)
BUN SERPL-MCNC: 19 MG/DL (ref 5–25)
CALCIUM ALBUM COR SERPL-MCNC: 9.6 MG/DL (ref 8.3–10.1)
CALCIUM SERPL-MCNC: 9 MG/DL (ref 8.4–10.2)
CARDIAC TROPONIN I PNL SERPL HS: 19 NG/L
CARDIAC TROPONIN I PNL SERPL HS: 21 NG/L
CHLORIDE SERPL-SCNC: 101 MMOL/L (ref 96–108)
CO2 SERPL-SCNC: 31 MMOL/L (ref 21–32)
CREAT SERPL-MCNC: 1.45 MG/DL (ref 0.6–1.3)
EOSINOPHIL # BLD MANUAL: 1.04 THOUSAND/UL (ref 0–0.4)
EOSINOPHIL NFR BLD MANUAL: 16 % (ref 0–6)
ERYTHROCYTE [DISTWIDTH] IN BLOOD BY AUTOMATED COUNT: 15.2 % (ref 11.6–15.1)
GFR SERPL CREATININE-BSD FRML MDRD: 42 ML/MIN/1.73SQ M
GLUCOSE SERPL-MCNC: 124 MG/DL (ref 65–140)
HCT VFR BLD AUTO: 39.8 % (ref 36.5–49.3)
HGB BLD-MCNC: 12.4 G/DL (ref 12–17)
LYMPHOCYTES # BLD AUTO: 1.83 THOUSAND/UL (ref 0.6–4.47)
LYMPHOCYTES # BLD AUTO: 26 % (ref 14–44)
MCH RBC QN AUTO: 28.8 PG (ref 26.8–34.3)
MCHC RBC AUTO-ENTMCNC: 31.2 G/DL (ref 31.4–37.4)
MCV RBC AUTO: 93 FL (ref 82–98)
MONOCYTES # BLD AUTO: 0.59 THOUSAND/UL (ref 0–1.22)
MONOCYTES NFR BLD: 9 % (ref 4–12)
NEUTROPHILS # BLD MANUAL: 3.06 THOUSAND/UL (ref 1.85–7.62)
NEUTS SEG NFR BLD AUTO: 47 % (ref 43–75)
PLATELET # BLD AUTO: 157 THOUSANDS/UL (ref 149–390)
PLATELET BLD QL SMEAR: ADEQUATE
PMV BLD AUTO: 13.1 FL (ref 8.9–12.7)
POTASSIUM SERPL-SCNC: 3.9 MMOL/L (ref 3.5–5.3)
PROT SERPL-MCNC: 6.3 G/DL (ref 6.4–8.4)
RBC # BLD AUTO: 4.3 MILLION/UL (ref 3.88–5.62)
RBC MORPH BLD: PRESENT
SODIUM SERPL-SCNC: 139 MMOL/L (ref 135–147)
TSH SERPL DL<=0.05 MIU/L-ACNC: 6.54 UIU/ML (ref 0.45–4.5)
VARIANT LYMPHS # BLD AUTO: 2 %
WBC # BLD AUTO: 6.52 THOUSAND/UL (ref 4.31–10.16)

## 2024-06-14 PROCEDURE — 80053 COMPREHEN METABOLIC PANEL: CPT | Performed by: PHYSICIAN ASSISTANT

## 2024-06-14 PROCEDURE — 84484 ASSAY OF TROPONIN QUANT: CPT | Performed by: PHYSICIAN ASSISTANT

## 2024-06-14 PROCEDURE — 84443 ASSAY THYROID STIM HORMONE: CPT | Performed by: PHYSICIAN ASSISTANT

## 2024-06-14 PROCEDURE — 85027 COMPLETE CBC AUTOMATED: CPT | Performed by: PHYSICIAN ASSISTANT

## 2024-06-14 PROCEDURE — 99285 EMERGENCY DEPT VISIT HI MDM: CPT

## 2024-06-14 PROCEDURE — 99223 1ST HOSP IP/OBS HIGH 75: CPT | Performed by: PHYSICIAN ASSISTANT

## 2024-06-14 PROCEDURE — 36415 COLL VENOUS BLD VENIPUNCTURE: CPT | Performed by: PHYSICIAN ASSISTANT

## 2024-06-14 PROCEDURE — 85007 BL SMEAR W/DIFF WBC COUNT: CPT | Performed by: PHYSICIAN ASSISTANT

## 2024-06-14 PROCEDURE — 93005 ELECTROCARDIOGRAM TRACING: CPT

## 2024-06-14 RX ORDER — METRONIDAZOLE 500 MG/100ML
500 INJECTION, SOLUTION INTRAVENOUS EVERY 8 HOURS
Status: DISCONTINUED | OUTPATIENT
Start: 2024-06-14 | End: 2024-06-15

## 2024-06-14 RX ORDER — ALBUMIN (HUMAN) 12.5 G/50ML
50 SOLUTION INTRAVENOUS ONCE
Status: COMPLETED | OUTPATIENT
Start: 2024-06-14 | End: 2024-06-14

## 2024-06-14 RX ORDER — SODIUM CHLORIDE, SODIUM GLUCONATE, SODIUM ACETATE, POTASSIUM CHLORIDE, MAGNESIUM CHLORIDE, SODIUM PHOSPHATE, DIBASIC, AND POTASSIUM PHOSPHATE .53; .5; .37; .037; .03; .012; .00082 G/100ML; G/100ML; G/100ML; G/100ML; G/100ML; G/100ML; G/100ML
75 INJECTION, SOLUTION INTRAVENOUS CONTINUOUS
Status: DISCONTINUED | OUTPATIENT
Start: 2024-06-15 | End: 2024-06-16

## 2024-06-14 RX ORDER — SODIUM CHLORIDE, SODIUM LACTATE, POTASSIUM CHLORIDE, CALCIUM CHLORIDE 600; 310; 30; 20 MG/100ML; MG/100ML; MG/100ML; MG/100ML
100 INJECTION, SOLUTION INTRAVENOUS CONTINUOUS
Status: DISCONTINUED | OUTPATIENT
Start: 2024-06-14 | End: 2024-06-14

## 2024-06-14 RX ORDER — CEFTRIAXONE 1 G/50ML
1000 INJECTION, SOLUTION INTRAVENOUS EVERY 24 HOURS
Status: DISCONTINUED | OUTPATIENT
Start: 2024-06-15 | End: 2024-06-15

## 2024-06-14 RX ADMIN — ALBUMIN (HUMAN) 50 G: 0.25 INJECTION, SOLUTION INTRAVENOUS at 22:41

## 2024-06-14 RX ADMIN — SODIUM CHLORIDE, SODIUM LACTATE, POTASSIUM CHLORIDE, AND CALCIUM CHLORIDE 100 ML/HR: .6; .31; .03; .02 INJECTION, SOLUTION INTRAVENOUS at 21:46

## 2024-06-14 RX ADMIN — SODIUM CHLORIDE 1000 ML: 0.9 INJECTION, SOLUTION INTRAVENOUS at 19:25

## 2024-06-15 PROBLEM — Z79.2 USING PROPHYLACTIC ANTIBIOTIC ON DAILY BASIS: Status: ACTIVE | Noted: 2024-06-15

## 2024-06-15 LAB
ANION GAP SERPL CALCULATED.3IONS-SCNC: 8 MMOL/L (ref 4–13)
ATRIAL RATE: 94 BPM
BACTERIA UR QL AUTO: ABNORMAL /HPF
BILIRUB UR QL STRIP: NEGATIVE
BUN SERPL-MCNC: 19 MG/DL (ref 5–25)
CALCIUM SERPL-MCNC: 8.4 MG/DL (ref 8.4–10.2)
CHLORIDE SERPL-SCNC: 105 MMOL/L (ref 96–108)
CLARITY UR: CLEAR
CO2 SERPL-SCNC: 27 MMOL/L (ref 21–32)
COLOR UR: YELLOW
CREAT SERPL-MCNC: 1.42 MG/DL (ref 0.6–1.3)
ERYTHROCYTE [DISTWIDTH] IN BLOOD BY AUTOMATED COUNT: 14.8 % (ref 11.6–15.1)
GFR SERPL CREATININE-BSD FRML MDRD: 43 ML/MIN/1.73SQ M
GLUCOSE SERPL-MCNC: 100 MG/DL (ref 65–140)
GLUCOSE UR STRIP-MCNC: NEGATIVE MG/DL
HCT VFR BLD AUTO: 33 % (ref 36.5–49.3)
HGB BLD-MCNC: 10.1 G/DL (ref 12–17)
HGB UR QL STRIP.AUTO: NEGATIVE
HYALINE CASTS #/AREA URNS LPF: ABNORMAL /LPF
KETONES UR STRIP-MCNC: ABNORMAL MG/DL
LEUKOCYTE ESTERASE UR QL STRIP: ABNORMAL
MAGNESIUM SERPL-MCNC: 0.9 MG/DL (ref 1.9–2.7)
MAGNESIUM SERPL-MCNC: 1.3 MG/DL (ref 1.9–2.7)
MCH RBC QN AUTO: 28.3 PG (ref 26.8–34.3)
MCHC RBC AUTO-ENTMCNC: 30.6 G/DL (ref 31.4–37.4)
MCV RBC AUTO: 92 FL (ref 82–98)
MUCOUS THREADS UR QL AUTO: ABNORMAL
NITRITE UR QL STRIP: NEGATIVE
NON-SQ EPI CELLS URNS QL MICRO: ABNORMAL /HPF
P AXIS: 93 DEGREES
PH UR STRIP.AUTO: 5 [PH]
PHOSPHATE SERPL-MCNC: 4.3 MG/DL (ref 2.3–4.1)
PLATELET # BLD AUTO: 110 THOUSANDS/UL (ref 149–390)
PMV BLD AUTO: 12.7 FL (ref 8.9–12.7)
POTASSIUM SERPL-SCNC: 3.7 MMOL/L (ref 3.5–5.3)
PR INTERVAL: 166 MS
PROT UR STRIP-MCNC: ABNORMAL MG/DL
QRS AXIS: -67 DEGREES
QRSD INTERVAL: 100 MS
QT INTERVAL: 422 MS
QTC INTERVAL: 527 MS
RBC # BLD AUTO: 3.57 MILLION/UL (ref 3.88–5.62)
RBC #/AREA URNS AUTO: ABNORMAL /HPF
SODIUM SERPL-SCNC: 140 MMOL/L (ref 135–147)
SP GR UR STRIP.AUTO: 1.02 (ref 1–1.03)
T WAVE AXIS: 78 DEGREES
T4 FREE SERPL-MCNC: 0.69 NG/DL (ref 0.61–1.12)
UROBILINOGEN UR STRIP-ACNC: <2 MG/DL
VENTRICULAR RATE: 94 BPM
WBC # BLD AUTO: 5.48 THOUSAND/UL (ref 4.31–10.16)
WBC #/AREA URNS AUTO: ABNORMAL /HPF

## 2024-06-15 PROCEDURE — 93010 ELECTROCARDIOGRAM REPORT: CPT | Performed by: INTERNAL MEDICINE

## 2024-06-15 PROCEDURE — 99222 1ST HOSP IP/OBS MODERATE 55: CPT | Performed by: STUDENT IN AN ORGANIZED HEALTH CARE EDUCATION/TRAINING PROGRAM

## 2024-06-15 PROCEDURE — 83735 ASSAY OF MAGNESIUM: CPT | Performed by: INTERNAL MEDICINE

## 2024-06-15 PROCEDURE — 99285 EMERGENCY DEPT VISIT HI MDM: CPT | Performed by: PHYSICIAN ASSISTANT

## 2024-06-15 PROCEDURE — 84100 ASSAY OF PHOSPHORUS: CPT | Performed by: INTERNAL MEDICINE

## 2024-06-15 PROCEDURE — 87209 SMEAR COMPLEX STAIN: CPT | Performed by: PHYSICIAN ASSISTANT

## 2024-06-15 PROCEDURE — 81001 URINALYSIS AUTO W/SCOPE: CPT | Performed by: INTERNAL MEDICINE

## 2024-06-15 PROCEDURE — 80048 BASIC METABOLIC PNL TOTAL CA: CPT | Performed by: INTERNAL MEDICINE

## 2024-06-15 PROCEDURE — 87505 NFCT AGENT DETECTION GI: CPT | Performed by: PHYSICIAN ASSISTANT

## 2024-06-15 PROCEDURE — 87493 C DIFF AMPLIFIED PROBE: CPT | Performed by: PHYSICIAN ASSISTANT

## 2024-06-15 PROCEDURE — 99233 SBSQ HOSP IP/OBS HIGH 50: CPT | Performed by: INTERNAL MEDICINE

## 2024-06-15 PROCEDURE — 87081 CULTURE SCREEN ONLY: CPT | Performed by: INTERNAL MEDICINE

## 2024-06-15 PROCEDURE — 87329 GIARDIA AG IA: CPT | Performed by: PHYSICIAN ASSISTANT

## 2024-06-15 PROCEDURE — 85027 COMPLETE CBC AUTOMATED: CPT | Performed by: INTERNAL MEDICINE

## 2024-06-15 PROCEDURE — 87177 OVA AND PARASITES SMEARS: CPT | Performed by: PHYSICIAN ASSISTANT

## 2024-06-15 PROCEDURE — 84439 ASSAY OF FREE THYROXINE: CPT | Performed by: PHYSICIAN ASSISTANT

## 2024-06-15 RX ORDER — TORSEMIDE 10 MG/1
10 TABLET ORAL
Status: DISCONTINUED | OUTPATIENT
Start: 2024-06-16 | End: 2024-06-15

## 2024-06-15 RX ORDER — SODIUM CHLORIDE, SODIUM GLUCONATE, SODIUM ACETATE, POTASSIUM CHLORIDE, MAGNESIUM CHLORIDE, SODIUM PHOSPHATE, DIBASIC, AND POTASSIUM PHOSPHATE .53; .5; .37; .037; .03; .012; .00082 G/100ML; G/100ML; G/100ML; G/100ML; G/100ML; G/100ML; G/100ML
500 INJECTION, SOLUTION INTRAVENOUS ONCE
Status: COMPLETED | OUTPATIENT
Start: 2024-06-15 | End: 2024-06-16

## 2024-06-15 RX ORDER — TAMSULOSIN HYDROCHLORIDE 0.4 MG/1
0.4 CAPSULE ORAL
Status: DISCONTINUED | OUTPATIENT
Start: 2024-06-15 | End: 2024-06-19 | Stop reason: HOSPADM

## 2024-06-15 RX ORDER — LEVETIRACETAM 500 MG/1
500 TABLET ORAL EVERY 12 HOURS SCHEDULED
Status: DISCONTINUED | OUTPATIENT
Start: 2024-06-15 | End: 2024-06-19 | Stop reason: HOSPADM

## 2024-06-15 RX ORDER — UREA 10 %
1 LOTION (ML) TOPICAL
Status: DISCONTINUED | OUTPATIENT
Start: 2024-06-15 | End: 2024-06-19 | Stop reason: HOSPADM

## 2024-06-15 RX ORDER — EZETIMIBE 10 MG/1
10 TABLET ORAL DAILY
Status: DISCONTINUED | OUTPATIENT
Start: 2024-06-15 | End: 2024-06-19 | Stop reason: HOSPADM

## 2024-06-15 RX ORDER — TORSEMIDE 10 MG/1
10 TABLET ORAL
Status: DISCONTINUED | OUTPATIENT
Start: 2024-06-15 | End: 2024-06-15

## 2024-06-15 RX ORDER — MAGNESIUM SULFATE HEPTAHYDRATE 40 MG/ML
2 INJECTION, SOLUTION INTRAVENOUS ONCE
Status: COMPLETED | OUTPATIENT
Start: 2024-06-15 | End: 2024-06-16

## 2024-06-15 RX ORDER — FLUTICASONE PROPIONATE 50 MCG
1 SPRAY, SUSPENSION (ML) NASAL 2 TIMES DAILY PRN
Status: DISCONTINUED | OUTPATIENT
Start: 2024-06-15 | End: 2024-06-19 | Stop reason: HOSPADM

## 2024-06-15 RX ORDER — TORSEMIDE 20 MG/1
20 TABLET ORAL 3 TIMES WEEKLY
Status: DISCONTINUED | OUTPATIENT
Start: 2024-06-17 | End: 2024-06-15

## 2024-06-15 RX ORDER — METOPROLOL SUCCINATE 25 MG/1
25 TABLET, EXTENDED RELEASE ORAL 2 TIMES DAILY
Status: DISCONTINUED | OUTPATIENT
Start: 2024-06-15 | End: 2024-06-15

## 2024-06-15 RX ORDER — METRONIDAZOLE 500 MG/100ML
500 INJECTION, SOLUTION INTRAVENOUS EVERY 8 HOURS
Status: DISCONTINUED | OUTPATIENT
Start: 2024-06-15 | End: 2024-06-19 | Stop reason: HOSPADM

## 2024-06-15 RX ORDER — MAGNESIUM HYDROXIDE/ALUMINUM HYDROXICE/SIMETHICONE 120; 1200; 1200 MG/30ML; MG/30ML; MG/30ML
30 SUSPENSION ORAL EVERY 6 HOURS PRN
Status: DISCONTINUED | OUTPATIENT
Start: 2024-06-15 | End: 2024-06-19 | Stop reason: HOSPADM

## 2024-06-15 RX ORDER — DOXYCYCLINE HYCLATE 100 MG/1
100 CAPSULE ORAL EVERY 12 HOURS
Status: DISCONTINUED | OUTPATIENT
Start: 2024-06-15 | End: 2024-06-19 | Stop reason: HOSPADM

## 2024-06-15 RX ORDER — ACETAMINOPHEN 325 MG/1
650 TABLET ORAL EVERY 6 HOURS PRN
Status: DISCONTINUED | OUTPATIENT
Start: 2024-06-15 | End: 2024-06-19 | Stop reason: HOSPADM

## 2024-06-15 RX ORDER — ALLOPURINOL 300 MG/1
300 TABLET ORAL DAILY
Status: DISCONTINUED | OUTPATIENT
Start: 2024-06-15 | End: 2024-06-19 | Stop reason: HOSPADM

## 2024-06-15 RX ORDER — PANTOPRAZOLE SODIUM 40 MG/1
40 TABLET, DELAYED RELEASE ORAL
Status: DISCONTINUED | OUTPATIENT
Start: 2024-06-15 | End: 2024-06-15

## 2024-06-15 RX ORDER — PANTOPRAZOLE SODIUM 40 MG/1
40 TABLET, DELAYED RELEASE ORAL
Status: DISCONTINUED | OUTPATIENT
Start: 2024-06-15 | End: 2024-06-19 | Stop reason: HOSPADM

## 2024-06-15 RX ORDER — ALLOPURINOL 100 MG/1
100 TABLET ORAL 2 TIMES DAILY
Status: DISCONTINUED | OUTPATIENT
Start: 2024-06-15 | End: 2024-06-15

## 2024-06-15 RX ORDER — GABAPENTIN 100 MG/1
100 CAPSULE ORAL 3 TIMES DAILY
Status: DISCONTINUED | OUTPATIENT
Start: 2024-06-15 | End: 2024-06-19 | Stop reason: HOSPADM

## 2024-06-15 RX ORDER — OMEPRAZOLE 20 MG/1
20 CAPSULE, DELAYED RELEASE ORAL DAILY
Status: ON HOLD | COMMUNITY
End: 2024-06-19

## 2024-06-15 RX ORDER — MAGNESIUM SULFATE HEPTAHYDRATE 40 MG/ML
2 INJECTION, SOLUTION INTRAVENOUS ONCE
Status: COMPLETED | OUTPATIENT
Start: 2024-06-15 | End: 2024-06-15

## 2024-06-15 RX ORDER — LEVOTHYROXINE SODIUM 0.05 MG/1
50 TABLET ORAL
Status: DISCONTINUED | OUTPATIENT
Start: 2024-06-15 | End: 2024-06-19 | Stop reason: HOSPADM

## 2024-06-15 RX ORDER — CEFTRIAXONE 1 G/50ML
1000 INJECTION, SOLUTION INTRAVENOUS EVERY 24 HOURS
Status: DISCONTINUED | OUTPATIENT
Start: 2024-06-15 | End: 2024-06-19 | Stop reason: HOSPADM

## 2024-06-15 RX ORDER — HYDROXYCHLOROQUINE SULFATE 200 MG/1
200 TABLET, FILM COATED ORAL 2 TIMES DAILY WITH MEALS
Status: DISCONTINUED | OUTPATIENT
Start: 2024-06-15 | End: 2024-06-19 | Stop reason: HOSPADM

## 2024-06-15 RX ORDER — SACCHAROMYCES BOULARDII 250 MG
250 CAPSULE ORAL 2 TIMES DAILY
Status: DISCONTINUED | OUTPATIENT
Start: 2024-06-15 | End: 2024-06-19 | Stop reason: HOSPADM

## 2024-06-15 RX ADMIN — HYDROXYCHLOROQUINE SULFATE 200 MG: 200 TABLET ORAL at 08:39

## 2024-06-15 RX ADMIN — MAGNESIUM SULFATE HEPTAHYDRATE 2 G: 2 INJECTION, SOLUTION INTRAVENOUS at 16:37

## 2024-06-15 RX ADMIN — LEVETIRACETAM 500 MG: 500 TABLET, FILM COATED ORAL at 08:39

## 2024-06-15 RX ADMIN — PANTOPRAZOLE SODIUM 40 MG: 40 TABLET, DELAYED RELEASE ORAL at 16:35

## 2024-06-15 RX ADMIN — Medication 1 MG: at 21:40

## 2024-06-15 RX ADMIN — LEVOTHYROXINE SODIUM 50 MCG: 50 TABLET ORAL at 05:54

## 2024-06-15 RX ADMIN — LEVETIRACETAM 500 MG: 500 TABLET, FILM COATED ORAL at 00:38

## 2024-06-15 RX ADMIN — LEVETIRACETAM 500 MG: 500 TABLET, FILM COATED ORAL at 21:34

## 2024-06-15 RX ADMIN — SODIUM CHLORIDE, SODIUM GLUCONATE, SODIUM ACETATE, POTASSIUM CHLORIDE, MAGNESIUM CHLORIDE, SODIUM PHOSPHATE, DIBASIC, AND POTASSIUM PHOSPHATE 500 ML: .53; .5; .37; .037; .03; .012; .00082 INJECTION, SOLUTION INTRAVENOUS at 15:22

## 2024-06-15 RX ADMIN — SODIUM CHLORIDE, SODIUM GLUCONATE, SODIUM ACETATE, POTASSIUM CHLORIDE, MAGNESIUM CHLORIDE, SODIUM PHOSPHATE, DIBASIC, AND POTASSIUM PHOSPHATE 100 ML/HR: .53; .5; .37; .037; .03; .012; .00082 INJECTION, SOLUTION INTRAVENOUS at 00:20

## 2024-06-15 RX ADMIN — ACETAMINOPHEN 650 MG: 325 TABLET, FILM COATED ORAL at 11:24

## 2024-06-15 RX ADMIN — Medication 1 MG: at 00:38

## 2024-06-15 RX ADMIN — METRONIDAZOLE 500 MG: 500 INJECTION, SOLUTION INTRAVENOUS at 05:54

## 2024-06-15 RX ADMIN — CEFTRIAXONE 1000 MG: 1 INJECTION, SOLUTION INTRAVENOUS at 10:31

## 2024-06-15 RX ADMIN — Medication 250 MG: at 08:35

## 2024-06-15 RX ADMIN — GABAPENTIN 100 MG: 100 CAPSULE ORAL at 21:34

## 2024-06-15 RX ADMIN — APIXABAN 5 MG: 5 TABLET, FILM COATED ORAL at 17:19

## 2024-06-15 RX ADMIN — PANTOPRAZOLE SODIUM 40 MG: 40 TABLET, DELAYED RELEASE ORAL at 05:54

## 2024-06-15 RX ADMIN — APIXABAN 5 MG: 5 TABLET, FILM COATED ORAL at 08:35

## 2024-06-15 RX ADMIN — GABAPENTIN 100 MG: 100 CAPSULE ORAL at 16:35

## 2024-06-15 RX ADMIN — EZETIMIBE 10 MG: 10 TABLET ORAL at 08:35

## 2024-06-15 RX ADMIN — ALLOPURINOL 300 MG: 300 TABLET ORAL at 08:35

## 2024-06-15 RX ADMIN — DICLOFENAC SODIUM 2 G: 10 GEL TOPICAL at 08:39

## 2024-06-15 RX ADMIN — TAMSULOSIN HYDROCHLORIDE 0.4 MG: 0.4 CAPSULE ORAL at 16:35

## 2024-06-15 RX ADMIN — DICLOFENAC SODIUM 2 G: 10 GEL TOPICAL at 00:23

## 2024-06-15 RX ADMIN — DICLOFENAC SODIUM 2 G: 10 GEL TOPICAL at 17:20

## 2024-06-15 RX ADMIN — METRONIDAZOLE 500 MG: 500 INJECTION, SOLUTION INTRAVENOUS at 21:34

## 2024-06-15 RX ADMIN — METRONIDAZOLE 500 MG: 500 INJECTION, SOLUTION INTRAVENOUS at 13:11

## 2024-06-15 RX ADMIN — DOXYCYCLINE 100 MG: 100 CAPSULE ORAL at 08:35

## 2024-06-15 RX ADMIN — DICLOFENAC SODIUM 2 G: 10 GEL TOPICAL at 21:43

## 2024-06-15 RX ADMIN — MAGNESIUM SULFATE HEPTAHYDRATE 2 G: 2 INJECTION, SOLUTION INTRAVENOUS at 07:30

## 2024-06-15 RX ADMIN — GABAPENTIN 100 MG: 100 CAPSULE ORAL at 08:35

## 2024-06-15 RX ADMIN — HYDROXYCHLOROQUINE SULFATE 200 MG: 200 TABLET ORAL at 16:35

## 2024-06-15 RX ADMIN — Medication 250 MG: at 17:19

## 2024-06-15 RX ADMIN — DOXYCYCLINE 100 MG: 100 CAPSULE ORAL at 21:34

## 2024-06-15 RX ADMIN — DICLOFENAC SODIUM 2 G: 10 GEL TOPICAL at 11:12

## 2024-06-15 NOTE — PLAN OF CARE
Problem: Potential for Falls  Goal: Patient will remain free of falls  Description: INTERVENTIONS:  - Educate patient/family on patient safety including physical limitations  - Instruct patient to call for assistance with activity   - Consult OT/PT to assist with strengthening/mobility   - Keep Call bell within reach  - Keep bed low and locked with side rails adjusted as appropriate  - Keep care items and personal belongings within reach  - Initiate and maintain comfort rounds  - Make Fall Risk Sign visible to staff  - Offer Toileting every 2 Hours, in advance of need  - Initiate/Maintain alarm  - Obtain necessary fall risk management equipment:   - Apply yellow socks and bracelet for high fall risk patients  - Consider moving patient to room near nurses station  Outcome: Progressing     Problem: Prexisting or High Potential for Compromised Skin Integrity  Goal: Skin integrity is maintained or improved  Description: INTERVENTIONS:  - Identify patients at risk for skin breakdown  - Assess and monitor skin integrity  - Assess and monitor nutrition and hydration status  - Monitor labs   - Assess for incontinence   - Turn and reposition patient  - Assist with mobility/ambulation  - Relieve pressure over bony prominences  - Avoid friction and shearing  - Provide appropriate hygiene as needed including keeping skin clean and dry  - Evaluate need for skin moisturizer/barrier cream  - Collaborate with interdisciplinary team   - Patient/family teaching  - Consider wound care consult   Outcome: Progressing     Problem: PAIN - ADULT  Goal: Verbalizes/displays adequate comfort level or baseline comfort level  Description: Interventions:  - Encourage patient to monitor pain and request assistance  - Assess pain using appropriate pain scale  - Administer analgesics based on type and severity of pain and evaluate response  - Implement non-pharmacological measures as appropriate and evaluate response  - Consider cultural and  social influences on pain and pain management  - Notify physician/advanced practitioner if interventions unsuccessful or patient reports new pain  Outcome: Progressing     Problem: INFECTION - ADULT  Goal: Absence or prevention of progression during hospitalization  Description: INTERVENTIONS:  - Assess and monitor for signs and symptoms of infection  - Monitor lab/diagnostic results  - Monitor all insertion sites, i.e. indwelling lines, tubes, and drains  - Monitor endotracheal if appropriate and nasal secretions for changes in amount and color  - [place] appropriate cooling/warming therapies per order  - Administer medications as ordered  - Instruct and encourage patient and family to use good hand hygiene technique  - Identify and instruct in appropriate isolation precautions for identified infection/condition  Outcome: Progressing  Goal: Absence of fever/infection during neutropenic period  Description: INTERVENTIONS:  - Monitor WBC    Outcome: Progressing     Problem: SAFETY ADULT  Goal: Patient will remain free of falls  Description: INTERVENTIONS:  - Educate patient/family on patient safety including physical limitations  - Instruct patient to call for assistance with activity   - Consult OT/PT to assist with strengthening/mobility   - Keep Call bell within reach  - Keep bed low and locked with side rails adjusted as appropriate  - Keep care items and personal belongings within reach  - Initiate and maintain comfort rounds  - Make Fall Risk Sign visible to staff  - Offer Toileting every 2 Hours, in advance of need  - Initiate/Maintain alarm  - Obtain necessary fall risk management equipment:   - Apply yellow socks and bracelet for high fall risk patients  - Consider moving patient to room near nurses station  Outcome: Progressing  Goal: Maintain or return to baseline ADL function  Description: INTERVENTIONS:  -  Assess patient's ability to carry out ADLs; assess patient's baseline for ADL function and  identify physical deficits which impact ability to perform ADLs (bathing, care of mouth/teeth, toileting, grooming, dressing, etc.)  - Assess/evaluate cause of self-care deficits   - Assess range of motion  - Assess patient's mobility; develop plan if impaired  - Assess patient's need for assistive devices and provide as appropriate  - Encourage maximum independence but intervene and supervise when necessary  - Involve family in performance of ADLs  - Assess for home care needs following discharge   - Consider OT consult to assist with ADL evaluation and planning for discharge  - Provide patient education as appropriate  Outcome: Progressing  Goal: Maintains/Returns to pre admission functional level  Description: INTERVENTIONS:  - Perform AM-PAC 6 Click Basic Mobility/ Daily Activity assessment daily.  - Set and communicate daily mobility goal to care team and patient/family/caregiver.   - Collaborate with rehabilitation services on mobility goals if consulted  - Perform Range of Motion 2 times a day.  - Reposition patient every 2 hours.  - Dangle patient 2 times a day  - Stand patient 2 times a day  - Ambulate patient 2 times a day  - Out of bed to chair 2 times a day   - Out of bed for meals 2 times a day  - Out of bed for toileting  - Record patient progress and toleration of activity level   Outcome: Progressing     Problem: DISCHARGE PLANNING  Goal: Discharge to home or other facility with appropriate resources  Description: INTERVENTIONS:  - Identify barriers to discharge w/patient and caregiver  - Arrange for needed discharge resources and transportation as appropriate  - Identify discharge learning needs (meds, wound care, etc.)  - Arrange for interpretive services to assist at discharge as needed  - Refer to Case Management Department for coordinating discharge planning if the patient needs post-hospital services based on physician/advanced practitioner order or complex needs related to functional status,  cognitive ability, or social support system  Outcome: Progressing     Problem: Knowledge Deficit  Goal: Patient/family/caregiver demonstrates understanding of disease process, treatment plan, medications, and discharge instructions  Description: Complete learning assessment and assess knowledge base.  Interventions:  - Provide teaching at level of understanding  - Provide teaching via preferred learning methods  Outcome: Progressing

## 2024-06-15 NOTE — ASSESSMENT & PLAN NOTE
AC: Eliquis 5 Mg twice daily  RC: Metoprolol succinate 25 Mg twice daily  Continue home medications

## 2024-06-15 NOTE — ASSESSMENT & PLAN NOTE
Lab Results   Component Value Date    EGFR 43 06/15/2024    EGFR 42 06/14/2024    EGFR 36 06/10/2024    CREATININE 1.42 (H) 06/15/2024    CREATININE 1.45 (H) 06/14/2024    CREATININE 1.65 (H) 06/10/2024   Creatinine 0.9-1.0  Creatinine meeting KAMLESH criteria   placed on continuous IVF suspect hypovolemia as contributory in setting of GI losses

## 2024-06-15 NOTE — PLAN OF CARE
Problem: Potential for Falls  Goal: Patient will remain free of falls  Description: INTERVENTIONS:  - Educate patient/family on patient safety including physical limitations  - Instruct patient to call for assistance with activity   - Consult OT/PT to assist with strengthening/mobility   - Keep Call bell within reach  - Keep bed low and locked with side rails adjusted as appropriate  - Keep care items and personal belongings within reach  - Initiate and maintain comfort rounds  - Make Fall Risk Sign visible to staff  - Offer Toileting every 2 Hours, in advance of need  - Initiate/Maintain bed/chair alarm  - Obtain necessary fall risk management equipment: yellow bracelet and yellow socks   - Apply yellow socks and bracelet for high fall risk patients  - Consider moving patient to room near nurses station  Outcome: Progressing     Problem: Prexisting or High Potential for Compromised Skin Integrity  Goal: Skin integrity is maintained or improved  Description: INTERVENTIONS:  - Identify patients at risk for skin breakdown  - Assess and monitor skin integrity  - Assess and monitor nutrition and hydration status  - Monitor labs   - Assess for incontinence   - Turn and reposition patient  - Assist with mobility/ambulation  - Relieve pressure over bony prominences  - Avoid friction and shearing  - Provide appropriate hygiene as needed including keeping skin clean and dry  - Evaluate need for skin moisturizer/barrier cream  - Collaborate with interdisciplinary team   - Patient/family teaching  - Consider wound care consult   Outcome: Progressing     Problem: PAIN - ADULT  Goal: Verbalizes/displays adequate comfort level or baseline comfort level  Description: Interventions:  - Encourage patient to monitor pain and request assistance  - Assess pain using appropriate pain scale  - Administer analgesics based on type and severity of pain and evaluate response  - Implement non-pharmacological measures as appropriate and  evaluate response  - Consider cultural and social influences on pain and pain management  - Notify physician/advanced practitioner if interventions unsuccessful or patient reports new pain  Outcome: Progressing     Problem: INFECTION - ADULT  Goal: Absence or prevention of progression during hospitalization  Description: INTERVENTIONS:  - Assess and monitor for signs and symptoms of infection  - Monitor lab/diagnostic results  - Monitor all insertion sites, i.e. indwelling lines, tubes, and drains  - Monitor endotracheal if appropriate and nasal secretions for changes in amount and color  - [place] appropriate cooling/warming therapies per order  - Administer medications as ordered  - Instruct and encourage patient and family to use good hand hygiene technique  - Identify and instruct in appropriate isolation precautions for identified infection/condition  Outcome: Progressing  Goal: Absence of fever/infection during neutropenic period  Description: INTERVENTIONS:  - Monitor WBC    Outcome: Progressing     Problem: SAFETY ADULT  Goal: Patient will remain free of falls  Description: INTERVENTIONS:  - Educate patient/family on patient safety including physical limitations  - Instruct patient to call for assistance with activity   - Consult OT/PT to assist with strengthening/mobility   - Keep Call bell within reach  - Keep bed low and locked with side rails adjusted as appropriate  - Keep care items and personal belongings within reach  - Initiate and maintain comfort rounds  - Make Fall Risk Sign visible to staff  - Offer Toileting every 2 Hours, in advance of need  - Initiate/Maintain bed/chair alarm  - Obtain necessary fall risk management equipment: yellow bracelet and yellow socks   - Apply yellow socks and bracelet for high fall risk patients  - Consider moving patient to room near nurses station  Outcome: Progressing  Goal: Maintain or return to baseline ADL function  Description: INTERVENTIONS:  -  Assess  patient's ability to carry out ADLs; assess patient's baseline for ADL function and identify physical deficits which impact ability to perform ADLs (bathing, care of mouth/teeth, toileting, grooming, dressing, etc.)  - Assess/evaluate cause of self-care deficits   - Assess range of motion  - Assess patient's mobility; develop plan if impaired  - Assess patient's need for assistive devices and provide as appropriate  - Encourage maximum independence but intervene and supervise when necessary  - Involve family in performance of ADLs  - Assess for home care needs following discharge   - Consider OT consult to assist with ADL evaluation and planning for discharge  - Provide patient education as appropriate  Outcome: Progressing  Goal: Maintains/Returns to pre admission functional level  Description: INTERVENTIONS:  - Perform AM-PAC 6 Click Basic Mobility/ Daily Activity assessment daily.  - Set and communicate daily mobility goal to care team and patient/family/caregiver.   - Collaborate with rehabilitation services on mobility goals if consulted  - Perform Range of Motion 3 times a day.  - Reposition patient every 2 hours.  - Dangle patient 3 times a day  - Stand patient 3 times a day  - Ambulate patient 3 times a day  - Out of bed to chair 3 times a day   - Out of bed for meals 3 times a day  - Out of bed for toileting  - Record patient progress and toleration of activity level   Outcome: Progressing     Problem: DISCHARGE PLANNING  Goal: Discharge to home or other facility with appropriate resources  Description: INTERVENTIONS:  - Identify barriers to discharge w/patient and caregiver  - Arrange for needed discharge resources and transportation as appropriate  - Identify discharge learning needs (meds, wound care, etc.)  - Arrange for interpretive services to assist at discharge as needed  - Refer to Case Management Department for coordinating discharge planning if the patient needs post-hospital services based on  physician/advanced practitioner order or complex needs related to functional status, cognitive ability, or social support system  Outcome: Progressing     Problem: Knowledge Deficit  Goal: Patient/family/caregiver demonstrates understanding of disease process, treatment plan, medications, and discharge instructions  Description: Complete learning assessment and assess knowledge base.  Interventions:  - Provide teaching at level of understanding  - Provide teaching via preferred learning methods  Outcome: Progressing

## 2024-06-15 NOTE — UTILIZATION REVIEW
Initial Clinical Review    Admission: Date/Time/Statement:   Admission Orders (From admission, onward)       Ordered        06/14/24 2215  INPATIENT ADMISSION  Once                          Orders Placed This Encounter   Procedures    INPATIENT ADMISSION     Standing Status:   Standing     Number of Occurrences:   1     Order Specific Question:   Level of Care     Answer:   Med Surg [16]     Order Specific Question:   Estimated length of stay     Answer:   More than 2 Midnights     Order Specific Question:   Certification     Answer:   I certify that inpatient services are medically necessary for this patient for a duration of greater than two midnights. See H&P and MD Progress Notes for additional information about the patient's course of treatment.     ED Arrival Information       Expected   -    Arrival   6/14/2024 19:08    Acuity   Urgent              Means of arrival   Ambulance    Escorted by   [facility] [employer])    Service   Hospitalist    Admission type   Emergency              Arrival complaint   Weakness             Chief Complaint   Patient presents with    Weakness - Generalized     3 days of diarrhea and feeling more weak today, sent from [facility].       Initial Presentation: 89 y.o. male to the ED from group home via EMS with complaints of generalized weakness, 3 days of diarrhea.   Admitted to inpatient for diverticulitis, kamlesh, seizures. 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  Had 5-7 episodes of diarhhea daily since starting augmentin.     Anticipated Length of Stay/Certification Statement: Patient will be admitted on an inpatient basis with an anticipated length of stay of greater than 2 midnights secondary to diverticulitis, KAMLESH.     Date: 6/15  Continue with abx. Abdomen soft, nontender. No need to repeat ct scan at this time.   GI consult: Low fiber diet, Continue iv fluids.  Change IV abx to  ceftriaxone/flagyl, check stool studies.  Clinically appears to be improving.  Diarrhea likely multifactorial. Low fiber diet.     Date: 6/16  Day 3: Has surpassed a 2nd midnight with active treatments and services. Initially admitted for acute diverticulitis.  Acute diarrhea improving, continue with present abx, low residue diet.  Mag low, improving.  Continue to check.  C-diff and enteric panel neg.       Weight (last 2 days)       Date/Time Weight    06/15/24 1100 92.1 (203.04)    06/14/24 23:40:10 94.4 (208.11)            Vital Signs (last 3 days)       Date/Time Temp Pulse Resp BP MAP (mmHg) SpO2 O2 Device Patient Position - Orthostatic VS Wanda Coma Scale Score Pain    06/15/24 1124 -- -- -- -- -- -- -- -- -- 7    06/15/24 0759 -- -- -- -- -- -- None (Room air) -- -- 3    06/15/24 07:44:12 -- 87 -- 102/67 79 91 % -- -- -- --    06/15/24 07:33:34 97.5 °F (36.4 °C) -- -- 102/67 79 -- -- -- -- --    06/15/24 0100 -- -- -- -- -- -- -- -- 15 6    06/14/24 23:40:10 97.5 °F (36.4 °C) 59 18 102/68 79 100 % None (Room air) Lying -- --    06/14/24 23:39:33 97.5 °F (36.4 °C) -- -- 102/68 79 -- -- -- -- --    06/14/24 2300 -- 96 18 93/60 71 94 % None (Room air) -- -- --    06/14/24 2230 -- 97 18 94/56 70 94 % None (Room air) -- -- --    06/14/24 2200 -- 96 16 86/51 58 94 % None (Room air) -- -- --    06/14/24 2130 -- 97 18 88/57 68 94 % None (Room air) Sitting -- --    06/14/24 2100 -- 95 16 85/53 65 95 % None (Room air) Sitting -- --    06/14/24 2030 -- 92 18 92/57 66 94 % None (Room air) -- -- --    06/14/24 2028 -- -- -- -- -- -- -- -- 15 --    06/14/24 2000 -- 92 16 90/55 67 96 % None (Room air) -- -- --    06/14/24 1930 -- 97 16 97/52 71 93 % None (Room air) -- -- --    06/14/24 1912 97.7 °F (36.5 °C) 72 16 104/71 -- 94 % None (Room air) -- -- --              Pertinent Labs/Diagnostic Test Results:   Radiology:  No orders to display     Cardiology:  ECG 12 lead   Final Result by Emily Camarillo MD (06/15 0038)    Normal sinus rhythm   Low voltage QRS   Left anterior fascicular block   Cannot rule out Anterior infarct , age undetermined   Abnormal ECG   When compared with ECG of 01-DEC-2023 01:31,   Premature ventricular complexes are no longer Present   Incomplete right bundle branch block is no longer Present   Minimal criteria for Anterior infarct are now Present   Confirmed by Emily Camarillo [id number]) on 6/15/2024 12:37:59 AM        GI:  No orders to display           Results from last 7 days   Lab Units 06/15/24  0553 06/14/24  1924 06/10/24  1106   WBC Thousand/uL 5.48 6.52 9.66   HEMOGLOBIN g/dL 10.1* 12.4 12.0   HEMATOCRIT % 33.0* 39.8 39.5   PLATELETS Thousands/uL 110* 157 139*   TOTAL NEUT ABS Thousands/µL  --   --  4.36         Results from last 7 days   Lab Units 06/15/24  0553 06/14/24  1924 06/10/24  1106   SODIUM mmol/L 140 139 138   POTASSIUM mmol/L 3.7 3.9 4.6   CHLORIDE mmol/L 105 101 102   CO2 mmol/L 27 31 26   ANION GAP mmol/L 8 7 10   BUN mg/dL 19 19 16   CREATININE mg/dL 1.42* 1.45* 1.65*   EGFR ml/min/1.73sq m 43 42 36   CALCIUM mg/dL 8.4 9.0 9.5   MAGNESIUM mg/dL 0.9*  --   --    PHOSPHORUS mg/dL 4.3*  --   --      Results from last 7 days   Lab Units 06/14/24  1924 06/10/24  1106   AST U/L 22 22   ALT U/L 8 6*   ALK PHOS U/L 88 79   TOTAL PROTEIN g/dL 6.3* 6.3*   ALBUMIN g/dL 3.2* 3.2*   TOTAL BILIRUBIN mg/dL 0.52 0.61         Results from last 7 days   Lab Units 06/15/24  0553 06/14/24  1924 06/10/24  1106   GLUCOSE RANDOM mg/dL 100 124 102               Results from last 7 days   Lab Units 06/14/24 2151 06/14/24 1924   HS TNI 0HR ng/L  --  21   HS TNI 2HR ng/L 19  --    HSTNI D2 ng/L -2  --              Results from last 7 days   Lab Units 06/14/24  1924   TSH 3RD GENERATON uIU/mL 6.542*     Results from last 7 days   Lab Units 06/10/24  1106   LIPASE u/L 20       Results from last 7 days   Lab Units 06/10/24  1436   CLARITY UA  Clear   COLOR UA  Yellow   SPEC GRAV UA  >=1.030   PH UA  7.0    GLUCOSE UA mg/dl Negative   KETONES UA mg/dl 10 (1+)*   BLOOD UA  Trace*   PROTEIN UA mg/dl 30 (1+)*   NITRITE UA  Negative   BILIRUBIN UA  Negative   UROBILINOGEN UA (BE) mg/dl <2.0   LEUKOCYTES UA  Trace*   WBC UA /hpf None Seen   RBC UA /hpf None Seen   BACTERIA UA /hpf None Seen   EPITHELIAL CELLS WET PREP /hpf None Seen     ED Treatment-Medication Administration from 06/14/2024 1908 to 06/14/2024 2329         Date/Time Order Dose Route Action     06/14/2024 1925 sodium chloride 0.9 % bolus 1,000 mL 1,000 mL Intravenous New Bag     06/14/2024 2146 lactated ringers infusion 100 mL/hr Intravenous New Bag     06/14/2024 2241 albumin human (FLEXBUMIN) 25 % injection 50 g 50 g Intravenous New Bag            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HCC)     Spinal stenosis      Present on Admission:   Stage 2 chronic kidney disease   Chronic systolic heart failure (HCC)   Paroxysmal A-fib (HCC)   HLD (hyperlipidemia)   Hypothyroidism   RA (rheumatoid arthritis) (HCC)   KAMLESH (acute kidney injury) (HCC)   Focal seizure with retained awareness      Admitting Diagnosis: Diverticulitis [K57.92]  Weakness [R53.1]  Generalized weakness [R53.1]  Diarrhea, unspecified type [R19.7]  Age/Sex: 89 y.o. male  Admission Orders:  Scheduled Medications:  allopurinol, 300 mg, Oral, Daily  apixaban, 5 mg, Oral, BID  cefTRIAXone, 1,000 mg, Intravenous, Q24H  Diclofenac Sodium, 2 g, Topical, 4x Daily  doxycycline hyclate, 100 mg, Oral, Q12H  ezetimibe, 10 mg, Oral, Daily  gabapentin, 100 mg, Oral, TID  hydroxychloroquine, 200 mg, Oral, BID With Meals  levETIRAcetam, 500 mg, Oral, Q12H ODALYS  levothyroxine, 50 mcg, Oral, Early Morning  melatonin, 1 mg, Oral, HS  metoprolol succinate, 25 mg, Oral, BID  metroNIDAZOLE, 500 mg, Intravenous, Q8H  pantoprazole, 40 mg, Oral, BID AC  saccharomyces boulardii, 250  mg, Oral, BID  tamsulosin, 0.4 mg, Oral, Daily With Dinner      Continuous IV Infusions:  multi-electrolyte, 75 mL/hr, Intravenous, Continuous      PRN Meds:  acetaminophen, 650 mg, Oral, Q6H PRN  aluminum-magnesium hydroxide-simethicone, 30 mL, Oral, Q6H PRN  fluticasone, 1 spray, Nasal, BID PRN        IP CONSULT TO GASTROENTEROLOGY    Network Utilization Review Department  ATTENTION: Please call with any questions or concerns to [phone] and carefully listen to the prompts so that you are directed to the right person. All voicemails are confidential.   For Discharge needs, contact Care Management DC Support Team at [phone] opt. [phone]  Send all requests for admission clinical reviews, approved or denied determinations and any other requests to dedicated fax number below belonging to the [facility] where the patient is receiving treatment. List of dedicated fax numbers for the Facilities:  FACILITY NAME UR FAX NUMBER   ADMISSION DENIALS (Administrative/Medical Necessity) [phone]   DISCHARGE SUPPORT TEAM (NETWORK) [phone]   PARENT CHILD HEALTH (Maternity/NICU/Pediatrics) [phone]   [facility] [phone]   [facility] [phone]   [facility] [phone]   [facility] [phone]   [facility] [phone]   [facility] [phone]   [facility] [phone]   [facility] [phone]   [facility] [phone]   [facility] [phone]   [facility] [phone]   [facility] [phone]

## 2024-06-15 NOTE — CONSULTS
Consultation - [facility] Gastroenterology     Matthew Krause 89 y.o. male MRN: [id number]  Unit/Bed#: -01 Encounter: [id number]    Inpatient consult to gastroenterology  Consult performed by: Sonia Bliss PA-C  Consult ordered by: Erin Null PA-C          ASSESSMENT and PLAN    Matthew Krause is a 89 y.o. year old male with a past medical history of atrial fibrillation and TIA on Eliquis, hyperlipidemia, RA on Plaquenil, hypothyroidism, hypertension, CHF, CKD, focal seizure on Keppra presented to the ER with loose stools.  Patient was here 6 days ago with nausea intermittent vomiting poor oral intake.  At that time CT showed mild proximal sigmoid diverticulitis without complication and stable UVJ stone without obstruction patient discharged on Augmentin.  Patient states in the last 4 days developed loose stools with urgency up to twice a day without bleeding. On admission patient slightly hypotensive BP 86/51 improved with IV fluids.  CBC shows normocytic anemia 10.1.  CMP shows creatinine 1.42 GFR 43, hypomagnesemia TSH high.  CEA normal.   Augmentin changed to Cipro/Flagyl and continued on doxycycline.    Patient with acute uncomplicated sigmoid diverticulitis first episode now with loose bowels may be side effect of Augmentin however his C. difficile and infection needs to be ruled out with stool studies.    1.  Acute uncomplicated sigmoid diverticulitis first episode  2.  Loose stools   -Low fiber diet, IV fluids, antiemetics prn   -On Flagyl and Rocephin per primary team, also on Doxycycline for unclear reasons   -Check stool studies rule out infection   -Would try to hold off on follow-up colonoscopy unless symptoms do not resolve given his age    3.  Nausea  4.  Poor oral intake  5.  Reflux esophagitis   -Low fiber diet, IV fluids, antiemetics as needed   -Increase pantoprazole to 40 mg twice daily   -TSH high will check T4    6.  KAMLESH   -Monitor and correct electrolyte abnormalities  and Cr    7.  Atrial fibrillation on Eliquis   -cont per primary team      Chief Complaint   Patient presents with    Weakness - Generalized     3 days of diarrhea and feeling more weak today, sent from [facility].       Physician Requesting Consult: Tierra Eduardo MD    MEGAN Krause is a 89 y.o. year old male with a past medical history of atrial fibrillation and TIA on Eliquis, hyperlipidemia, RA on Plaquenil, hypothyroidism, hypertension, CHF, CKD, focal seizure on Keppra presented to the ER with loose stools.  Patient was here 6 days ago with nausea intermittent vomiting poor oral intake.  At that time CT showed mild proximal sigmoid diverticulitis without complication and stable UVJ stone without obstruction patient discharged on Augmentin.  Patient states in the last 4 days developed loose stools with urgency up to twice a day without bleeding.He does have formed or loose stools once a day at baseline.  He has chronic reflux controlled with PPI 20 mg daily.  Also with intermittent nausea worse with the site of food improved with Zofran.  Denies vertigo.  Denies abdominal pain.  Still notes poor p.o. intake and weight loss but is unable to quantify amount.  Patient also on doxycycline as an outpatient for unclear reasons.Has never had diverticulitis in the past.    Seen in our office March 2024 for reflux and nausea. He  underwent endoscopy April 2024 showing grade B esophagitis, gastritis biopsy unremarkable.  PPI changed to nexium BID with improvement of symptoms.  He reports normal colonoscopy 5 years ago at [place] no repeat recommended given age.  Has never had diverticulitis in the past.    On admission patient slightly hypotensive BP 86/51 improved with IV fluids.  CBC shows normocytic anemia 10.1.  CMP shows creatinine 1.42 GFR 43, hypomagnesemia TSH high.  CEA normal.   CT C/A/P without contrast from 6/10/2024 shows mild sigmoid proximal diverticulitis and stable UVJ stone without  obstruction as well as gallstones without acute cholecystitis or biliary dilation.        ROS:   Constitutional: denies fatigue, fever.  HEENT: denies visual disturbance, postnasal drip, sore throat.  Respiratory: denies cough, + shortness of breath.  Cardiovascular: denies chest pain, leg swelling.  Gastrointestinal: as noted above in HPI.  : denies difficulty urinating, dysuria.  Musculoskeletal:+arthralgias, back pain.  Neurological: denies dizziness, syncope.  Psychiatric: denies confusion, anxiety.    Historical Information   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HCC)     Spinal stenosis      Past Surgical History:   Procedure Laterality Date    CARDIAC ELECTROPHYSIOLOGY PROCEDURE N/A 12/17/2022    Procedure: Cardiac loop recorder implant;  Surgeon: Negrito Hollingsworth MD;  Location: [facility];  Service: Cardiology    CARPAL TUNNEL RELEASE Bilateral     COLONOSCOPY      LAMINECTOMY      TOTAL KNEE ARTHROPLASTY Bilateral     TOTAL SHOULDER REPLACEMENT       Social History   Social History     Substance and Sexual Activity   Alcohol Use Yes    Alcohol/week: 2.0 standard drinks of alcohol    Types: 2 Shots of liquor per week    Comment: 1 drink per week     Social History     Substance and Sexual Activity   Drug Use Yes    Types: Marijuana     Social History     Tobacco Use   Smoking Status Former    Current packs/day: 0.25    Average packs/day: 0.3 packs/day for 5.0 years (1.3 ttl pk-yrs)    Types: Cigarettes   Smokeless Tobacco Never     Family History   Problem Relation Age of Onset    Seizures Son     Colon polyps Neg Hx     Colon cancer Neg Hx        Meds/Allergies     Current Facility-Administered Medications   Medication Dose Route Frequency    acetaminophen (TYLENOL) tablet 650 mg  650 mg Oral Q6H PRN    allopurinol (ZYLOPRIM) tablet 300 mg  300 mg Oral Daily     aluminum-magnesium hydroxide-simethicone (MAALOX) oral suspension 30 mL  30 mL Oral Q6H PRN    apixaban (ELIQUIS) tablet 5 mg  5 mg Oral BID    cefTRIAXone (ROCEPHIN) IVPB (premix in dextrose) 1,000 mg 50 mL  1,000 mg Intravenous Q24H    Diclofenac Sodium (VOLTAREN) 1 % topical gel 2 g  2 g Topical 4x Daily    doxycycline hyclate (VIBRAMYCIN) capsule 100 mg  100 mg Oral Q12H    ezetimibe (ZETIA) tablet 10 mg  10 mg Oral Daily    fluticasone (FLONASE) 50 mcg/act nasal spray 1 spray  1 spray Nasal BID PRN    gabapentin (NEURONTIN) capsule 100 mg  100 mg Oral TID    hydroxychloroquine (PLAQUENIL) tablet 200 mg  200 mg Oral BID With Meals    levETIRAcetam (KEPPRA) tablet 500 mg  500 mg Oral Q12H ODALYS    levothyroxine tablet 50 mcg  50 mcg Oral Early Morning    magnesium sulfate 2 g/50 mL IVPB (premix) 2 g  2 g Intravenous Once    melatonin tablet 1 mg  1 mg Oral HS    metoprolol succinate (TOPROL-XL) 24 hr tablet 25 mg  25 mg Oral BID    metroNIDAZOLE (FLAGYL) IVPB (premix) 500 mg 100 mL  500 mg Intravenous Q8H    multi-electrolyte (PLASMALYTE-A/ISOLYTE-S PH 7.4) IV solution  100 mL/hr Intravenous Continuous    pantoprazole (PROTONIX) EC tablet 40 mg  40 mg Oral BID AC    saccharomyces boulardii (FLORASTOR) capsule 250 mg  250 mg Oral BID    tamsulosin (FLOMAX) capsule 0.4 mg  0.4 mg Oral Daily With Dinner    [START ON 6/16/2024] torsemide (DEMADEX) tablet 10 mg  10 mg Oral Once per day on Sunday Tuesday Thursday Saturday    [START ON 6/17/2024] torsemide (DEMADEX) tablet 20 mg  20 mg Oral Once per day on Monday Wednesday Friday       Medications Prior to Admission:     allopurinol (ZYLOPRIM) 100 mg tablet    apixaban (Eliquis) 5 mg    Diclofenac Sodium (VOLTAREN) 1 %    doxycycline hyclate (VIBRAMYCIN) 100 mg capsule    ezetimibe (ZETIA) 10 mg tablet    gabapentin (NEURONTIN) 100 mg capsule    hydroxychloroquine (PLAQUENIL) 200 mg tablet    levETIRAcetam (Keppra) 500 mg tablet    metoprolol succinate (TOPROL-XL) 25 mg  24 hr tablet    omeprazole (PriLOSEC) 20 mg delayed release capsule    potassium chloride (KLOR-CON) 20 mEq packet    saccharomyces boulardii (FLORASTOR) 250 mg capsule    Synthroid 50 MCG tablet    tamsulosin (FLOMAX) 0.4 mg    torsemide (DEMADEX) 10 mg tablet    torsemide (DEMADEX) 20 mg tablet    ACETAMINOPHEN ER PO    albuterol (2.5 mg/3 mL) 0.083 % nebulizer solution    amoxicillin-clavulanate (AUGMENTIN) 875-125 mg per tablet    fluticasone (FLONASE) 50 mcg/act nasal spray    lidocaine (LIDODERM) 5 %    loperamide (IMODIUM) 2 mg capsule    melatonin 1 mg    nystatin (MYCOSTATIN) powder    ondansetron (ZOFRAN) 4 mg tablet    polyethylene glycol (MIRALAX) 17 g packet    psyllium (METAMUCIL SMOOTH TEXTURE) 28 % packet    psyllium (METAMUCIL) 58.6 % packet    Allergies   Allergen Reactions    Colchicine Other (See Comments)     Flushing      Niacin Other (See Comments)     flushed    Other GI Intolerance    Statins        PHYSICAL EXAM:    Constitutional: Well-developed, no acute distress, elderly  HEENT: normocephalic, mucous membranes moist.  Neck: Supple  Skin: warm and dry  Respiratory: Lungs are clear to auscultation B/L.  Cardiovascular: Heart is regular rate and rhythm.  Gastrointestinal: Soft, nontender, nondistended with normal active bowel sounds.  No masses, guarding, rebound.   Rectal Exam: Deferred.  Extremities: No edema.  Neurologic: Nonfocal. A & O ×3.   Psychiatric: Normal affect.      Lab Results   Component Value Date    GLUCOSE 110 09/03/2023    CALCIUM 8.4 06/15/2024    K 3.7 06/15/2024    CO2 27 06/15/2024     06/15/2024    BUN 19 06/15/2024    CREATININE 1.42 (H) 06/15/2024    CREATININE 1.45 (H) 06/14/2024    CREATININE 1.65 (H) 06/10/2024     Lab Results   Component Value Date    WBC 5.48 06/15/2024    WBC 6.52 06/14/2024    WBC 9.66 06/10/2024    HGB 10.1 (L) 06/15/2024    HGB 12.4 06/14/2024    HGB 12.0 06/10/2024    MCV 92 06/15/2024     (L) 06/15/2024     06/14/2024  "    (L) 06/10/2024     Lab Results   Component Value Date    ALT 8 06/14/2024    ALT 6 (L) 06/10/2024    ALT 8 01/02/2024    AST 22 06/14/2024    AST 22 06/10/2024    AST 13 01/02/2024    ALKPHOS 88 06/14/2024    ALKPHOS 79 06/10/2024    ALKPHOS 67 01/02/2024    TBILI 0.52 06/14/2024    TBILI 0.61 06/10/2024    TBILI 0.6 01/02/2024     No results found for: \"AMYLASE\"  Lab Results   Component Value Date    LIPASE 20 06/10/2024     Lab Results   Component Value Date    IRON 16 (L) 08/31/2023    TIBC 139 (L) 08/31/2023    FERRITIN 141 08/31/2023     Lab Results   Component Value Date    INR 1.27 (H) 12/01/2023    INR 1.53 (H) 11/18/2023    INR 1.84 (H) 08/30/2023       XR chest portable    Result Date: 6/10/2024  Narrative: XR CHEST PORTABLE INDICATION: nausea, no appetite. COMPARISON: Same day CT chest abdomen pelvis, chest radiograph 12/01/2023 FINDINGS: Loop recorder projects over the left mid chest. Mild bibasilar atelectasis. Lungs are otherwise clear. No pneumothorax or pleural effusion. Stable cardiomediastinal silhouette. No acute osseous abnormalities. Right shoulder arthroplasty. Normal upper abdomen.     Impression: Mild bibasilar atelectasis. Lungs are otherwise clear. Resident: FAMILIA SUÁREZ I, the attending radiologist, have reviewed the images and agree with the final report above. Workstation performed: [id number]     CT chest abdomen pelvis wo contrast    Result Date: 6/10/2024  Narrative: CT CHEST, ABDOMEN AND PELVIS WITHOUT IV CONTRAST INDICATION: nausea similar to kidney stone in the past, bumped creatinine. COMPARISON: 4/12/2024. TECHNIQUE: CT examination of the chest, abdomen and pelvis was performed without intravenous contrast. Multiplanar 2D reformatted images were created from the source data. This examination, like all CT scans performed in the [facility], was performed utilizing techniques to minimize radiation dose exposure, including the use of iterative " reconstruction and automated exposure control. Radiation dose length product (DLP) for this visit: 778.72 mGy-cm Enteric Contrast: Not administered. FINDINGS: CHEST LUNGS: Lungs are clear. No tracheal or endobronchial lesion. PLEURA: Unremarkable. HEART/GREAT VESSELS: There is coronary artery calcification. There is thoracic aortic calcification.. No thoracic aortic aneurysm. MEDIASTINUM AND ELIE: Unremarkable. CHEST WALL AND LOWER NECK: Unremarkable. ABDOMEN LIVER/BILIARY TREE: There are hepatic cysts. GALLBLADDER: Cholelithiasis without findings of acute cholecystitis. SPLEEN: Unremarkable. PANCREAS: Unremarkable. ADRENAL GLANDS: Unremarkable. KIDNEYS/URETERS: There is a stable calculus of the left ureterovesicular junction measuring approximately 4 mm. There is no evidence for hydronephrosis. There are renal cysts. STOMACH AND BOWEL: There is acute proximal sigmoid diverticulitis best appreciated series 2, image 184 with regional fat stranding noted. There is associated focal wall thickening at this level. There is no extraluminal free air. There is no abscess formation. APPENDIX: No findings to suggest appendicitis. ABDOMINOPELVIC CAVITY: No ascites. No pneumoperitoneum. No lymphadenopathy. Small focus of fat necrosis in the mid abdomen. VESSELS: Unremarkable for patient's age. PELVIS REPRODUCTIVE ORGANS: Unremarkable for patient's age. URINARY BLADDER: Unremarkable. ABDOMINAL WALL/INGUINAL REGIONS: Unremarkable. BONES: Postsurgical changes in the spine. Bones are osteopenic. There are degenerative changes. 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 Status post right shoulder arthroplasty.     Impression: Mild acute diverticulitis involving the proximal sigmoid colon. No extraluminal free air or abscess formation identified Stable 4 mm left UVJ calculus without evidence for upstream hydronephrosis. No focal airspace consolidation identified. The study was marked in EPIC for immediate notification. Workstation  performed: [id number]       Imaging Studies: I have personally reviewed pertinent reports.      Pathology, and Other Studies: I have personally reviewed pertinent reports.      Patient expressed understanding and had all questions and concerns addressed.    Sonia Bliss PA-C  06/15/24   8:11 AM     Counseling / Coordination of Care  Total floor / unit time spent today 45 minutes.     This chart was completed in part utilizing Streamline speech voice recognition software. Random word insertions, pronoun errors, and incomplete sentences are an occasional consequence of this system due to software limitations, and ambient noise. Any questions or concerns about the content, text, or information contained within the body of this dictation should be directly addressed to the provider for clarification.

## 2024-06-15 NOTE — ASSESSMENT & PLAN NOTE
History of TIA in the past that presented as word finding difficulties  Maintained on Eliquis due to history of paroxysmal A-fib and Zetia, continue

## 2024-06-15 NOTE — PROGRESS NOTES
[facility]  Progress Note  Name: Matthew Krause I  MRN: [id number]  Unit/Bed#: -01 I Date of Admission: 6/14/2024   Date of Service: 6/15/2024 I Hospital Day: 1    Assessment & Plan   Using prophylactic antibiotic on daily basis  Assessment & Plan  Stated he had right shoulder replacement with reversal.  Had infection.  Follows up with Raheel.  Patient was told not a candidate for revision and need to stay on chronic doxycycline  And doxycycline 50 mg twice daily as per previous chart review unsure when it was changed to doxycycline 100 twice daily      Stage 2 chronic kidney disease  Assessment & Plan  Lab Results   Component Value Date    EGFR 43 06/15/2024    EGFR 42 06/14/2024    EGFR 36 06/10/2024    CREATININE 1.42 (H) 06/15/2024    CREATININE 1.45 (H) 06/14/2024    CREATININE 1.65 (H) 06/10/2024   Creatinine 0.9-1.0  Creatinine meeting KAMLESH criteria   placed on continuous IVF suspect hypovolemia as contributory in setting of GI losses    Hypothyroidism  Assessment & Plan  Continue home Synthroid 50 mcg daily    HLD (hyperlipidemia)  Assessment & Plan  Continue home Zetia    KAMLESH (acute kidney injury) (HCC)  Assessment & Plan  Creatinine 0.9-1.0  Creatinine in the ED on 6/10 was 1.65, meeting KDIGO KAMLESH criteria  Creatinine has improved to 1.45 on admission, however though still not within baseline  Placed on continuous IVF in setting of GI losses  Resume home torsemide in 48 hours    Focal seizure with retained awareness  Assessment & Plan  Maintained on Keppra 500 Mg twice daily  Seizures are episodes of speech difficulty per review of admission from 11/20/2023    Paroxysmal A-fib (HCC)  Assessment & Plan  AC: Eliquis 5 Mg twice daily  RC: Metoprolol succinate 25 Mg twice daily  Continue home medications    Chronic systolic heart failure (HCC)  Assessment & Plan  Wt Readings from Last 3 Encounters:   06/15/24 92.1 kg (203 lb 0.7 oz)   04/12/24 95 kg (209 lb 7 oz)    03/28/24 95.7 kg (211 lb)     Home diuretic: Torsemide 20 Mg MWF and 10 Mg TRSS  Last echo 8/2023: LVEF 45%.  G1 DD.  Examines hypovolemic on admit with dry mucous membranes  Will resume torsemide in 48 hours and placed on continuous IVF overnight    History of TIA (transient ischemic attack)  Assessment & Plan  History of TIA in the past that presented as word finding difficulties  Maintained on Eliquis due to history of paroxysmal A-fib and Zetia, continue    RA (rheumatoid arthritis) ([facility])  Assessment & Plan  Continue home hydroxychloroquine 200 Mg twice daily, no longer daily prednisone    History of hypertension  Assessment & Plan  Maintained on metoprolol succinate 25 Mg twice daily  Continue    * Diverticulitis  Assessment & Plan  In the ED on 6/10 for evaluation of nausea and decreased oral intake-CT A/P at that time provide abnormal diverticulitis, so patient was initiated on Augmentin  Shortly after starting Augmentin patient then developed significant diarrhea of 5-7 episodes of watery stool  Check stool studies-if negative, could utilize Imodium  Transition Augmentin to ceftriaxone and Flagyl  GI consulted  Abdomen is soft and nontender on admission, therefore will hold on repeat CT A/P at this time             VTE  Prophylaxis:   Pharmacologic: in place, eliquis  Mechanical VTE Prophylaxis in Place: Yes    Patient Centered Rounds: I have performed bedside rounds with nursing staff today.    Discussions with Specialists or Other Care Team Provider: case management    Education and Discussions with Family / Patient: patient and son, daughter in law at bedside    Mobility:   Basic Mobility Inpatient Raw Score: 8  JH-HLM Goal: 3: Sit at edge of bed  JH-HLM Achieved: 5: Stand (1 or more minutes)  JH-HLM Goal achieved. Continue to encourage appropriate mobility.    Total Time Spent on Date of Encounter in care of patient: 55 mins. This time was spent on one or more of the following: performing physical  exam; counseling and coordination of care; obtaining or reviewing history; documenting in the medical record; reviewing/ordering tests, medications or procedures; communicating with other healthcare professionals and discussing with patient's family/caregivers.      Current Length of Stay: 1 day(s)    Current Patient Status: Inpatient        Code Status: Level 1 - Full Code    Discharge Plan: Pt will require continued inpatient hospitalization.    Subjective:   Stated Bms are solid now. Had one this AM that was formed. No nausea, abdominal pain    Patient is seen and examined at bedside.  All other ROS are negative.    Objective:     Vitals:   Temp (24hrs), Av.6 °F (36.4 °C), Min:97.5 °F (36.4 °C), Max:97.7 °F (36.5 °C)    Temp:  [97.5 °F (36.4 °C)-97.7 °F (36.5 °C)] 97.5 °F (36.4 °C)  HR:  [59-97] 87  Resp:  [16-18] 18  BP: ()/(51-71) 102/67  SpO2:  [91 %-100 %] 91 %  Body mass index is 30.87 kg/m².     Input and Output Summary (last 24 hours):       Intake/Output Summary (Last 24 hours) at 6/15/2024 1353  Last data filed at 6/15/2024 1231  Gross per 24 hour   Intake 1480 ml   Output 300 ml   Net 1180 ml       Physical Exam:       GEN: No acute distress, comfortable  HEEENT: No JVD, PERRLA, no scleral icterus  RESP: Lungs clear to auscultation bilaterally  CV: RRR, +s1/s2   ABD: SOFT NON TENDER, POSITIVE BOWEL SOUNDS, NO DISTENTION  PSYCH: CALM  NEURO: Mentation baseline, NO FOCAL DEFICITS  SKIN: NO RASH  EXTREM: NO EDEMA    Additional Data:     Labs:    Results from last 7 days   Lab Units 06/15/24  0553 24  1924 06/10/24  1106   WBC Thousand/uL 5.48 6.52 9.66   HEMOGLOBIN g/dL 10.1* 12.4 12.0   HEMATOCRIT % 33.0* 39.8 39.5   PLATELETS Thousands/uL 110* 157 139*   SEGS PCT %  --   --  46   LYMPHO PCT %  --  26 32   MONO PCT %  --  9 10   EOS PCT %  --  16* 12*     Results from last 7 days   Lab Units 06/15/24  0553 24  1924   SODIUM mmol/L 140 139   POTASSIUM mmol/L 3.7 3.9   CHLORIDE mmol/L  105 101   CO2 mmol/L 27 31   BUN mg/dL 19 19   CREATININE mg/dL 1.42* 1.45*   ANION GAP mmol/L 8 7   CALCIUM mg/dL 8.4 9.0   ALBUMIN g/dL  --  3.2*   TOTAL BILIRUBIN mg/dL  --  0.52   ALK PHOS U/L  --  88   ALT U/L  --  8   AST U/L  --  22   GLUCOSE RANDOM mg/dL 100 124                       Lines/Drains:  Invasive Devices       Peripheral Intravenous Line  Duration             Peripheral IV 06/14/24 Right;Upper;Ventral (anterior) Arm <1 day                    Telemetry:        * I Have Reviewed All Lab Data Listed Above.           Imaging:     Results for orders placed during the hospital encounter of 06/10/24    XR chest portable    Narrative  XR CHEST PORTABLE    INDICATION: nausea, no appetite.    COMPARISON: Same day CT chest abdomen pelvis, chest radiograph 12/01/2023    FINDINGS: Loop recorder projects over the left mid chest.    Mild bibasilar atelectasis. Lungs are otherwise clear.    No pneumothorax or pleural effusion.    Stable cardiomediastinal silhouette.    No acute osseous abnormalities. Right shoulder arthroplasty.    Normal upper abdomen.    Impression  Mild bibasilar atelectasis. Lungs are otherwise clear.        Resident: FAMILIA SUÁREZ I, the attending radiologist, have reviewed the images and agree with the final report above.    Workstation performed: [id number]    No results found for this or any previous visit.      *I have reviewed all imaging reports listed above      Recent Cultures (last 7 days):           Last 24 Hours Medication List:   Current Facility-Administered Medications   Medication Dose Route Frequency Provider Last Rate    acetaminophen  650 mg Oral Q6H PRN Erin Null PA-C      allopurinol  300 mg Oral Daily Erin Null PA-C      aluminum-magnesium hydroxide-simethicone  30 mL Oral Q6H PRN Erin Null PA-C      apixaban  5 mg Oral BID Erin Null PA-C      cefTRIAXone  1,000 mg Intravenous Q24H Erin Null PA-C 1,000 mg (06/15/24 1031)     Diclofenac Sodium  2 g Topical 4x Daily Erin Null PA-C      doxycycline hyclate  100 mg Oral Q12H Erin Null PA-C      ezetimibe  10 mg Oral Daily Erin Null PA-C      fluticasone  1 spray Nasal BID PRN Erin Null PA-C      gabapentin  100 mg Oral TID Erin Null PA-C      hydroxychloroquine  200 mg Oral BID With Meals Erin Null PA-C      levETIRAcetam  500 mg Oral Q12H ODALYS Erin Null PA-C      levothyroxine  50 mcg Oral Early Morning Erin Null PA-C      melatonin  1 mg Oral HS rEin Null PA-C      metoprolol succinate  25 mg Oral BID Erin Null PA-C      metroNIDAZOLE  500 mg Intravenous Q8H Erin Null PA-C 500 mg (06/15/24 1311)    multi-electrolyte  75 mL/hr Intravenous Continuous Tierra Eduardo MD 75 mL/hr (06/15/24 1112)    pantoprazole  40 mg Oral BID AC Sonia Bliss PA-C      saccharomyces boulardii  250 mg Oral BID Erin Null PA-C      tamsulosin  0.4 mg Oral Daily With Dinner Erin Null PA-C          Today, Patient Was Seen By: Tierra Eduardo MD    ** Please Note: Dictation voice to text software may have been used in the creation of this document. **

## 2024-06-15 NOTE — ASSESSMENT & PLAN NOTE
In the ED on 6/10 for evaluation of nausea and decreased oral intake-CT A/P at that time provide abnormal diverticulitis, so patient was initiated on Augmentin  Shortly after starting Augmentin patient then developed significant diarrhea of 5-7 episodes of watery stool  Check stool studies-if negative, could utilize Imodium  Transition Augmentin to ceftriaxone and Flagyl  GI consulted  Abdomen is soft and nontender on admission, therefore will hold on repeat CT A/P at this time

## 2024-06-15 NOTE — ASSESSMENT & PLAN NOTE
Creatinine 0.9-1.0  Creatinine in the ED on 6/10 was 1.65, meeting KDIGO KAMLESH criteria  Creatinine has improved to 1.45 on admission, however though still not within baseline  Placed on continuous IVF in setting of GI losses  Resume home torsemide in 48 hours

## 2024-06-15 NOTE — ED PROVIDER NOTES
History  Chief Complaint   Patient presents with    Weakness - Generalized     3 days of diarrhea and feeling more weak today, sent from [facility].     Patient is an 89-year-old male with past medical history significant for GERD, gout, remote history of C. difficile colitis, on chronic doxycycline for infection suppression related to right shoulder implant, hypertension, hyperlipidemia, rheumatoid arthritis, spinal stenosis, who was evaluated in this ED on Holly 10 and found to have acute mild diverticulitis and was discharged on Augmentin.  The following day he developed diarrhea and has been having 5-7 watery bowel movements per day.  With the worsening diarrhea he has become increasingly weak prompting him to present to the ED for evaluation.  He describes his diarrhea as brown and watery.  He is having 5-7 episodes per day.  It is constant.  Is worsening.  He has not tried any dietary modifications or antimotility medications.  He has not noted any relapsing remitting factors.  He denies fever, chills, nausea, vomiting, abdominal pain, arthralgias, cough, headache, URI, diaphoresis.      Diarrhea  Associated symptoms: no abdominal pain, no arthralgias, no chills, no fever and no vomiting        Prior to Admission Medications   Prescriptions Last Dose Informant Patient Reported? Taking?   ACETAMINOPHEN ER PO  Family Member Yes No   Sig: Take 1,000 mg by mouth 3 (three) times a day   Diclofenac Sodium (VOLTAREN) 1 % 6/14/2024 Family Member Yes Yes   Sig: Apply 4 g topically 4 (four) times a day   Synthroid 50 MCG tablet 6/14/2024  Yes Yes   Sig: Take 50 mcg by mouth daily   albuterol (2.5 mg/3 mL) 0.083 % nebulizer solution   Yes No   allopurinol (ZYLOPRIM) 100 mg tablet 6/14/2024 Family Member Yes Yes   Sig: Take 100 mg by mouth 4 (four) times a day   amoxicillin-clavulanate (AUGMENTIN) 875-125 mg per tablet   No No   Sig: Take 1 tablet by mouth every 12 (twelve) hours for 10 days   apixaban (Eliquis) 5 mg  2024 Family Member No Yes   Sig: Take 1 tablet (5 mg total) by mouth 2 (two) times a day   doxycycline hyclate (VIBRAMYCIN) 100 mg capsule 2024 Family Member Yes Yes   Sig: Take 100 mg by mouth every 12 (twelve) hours   ezetimibe (ZETIA) 10 mg tablet 2024 Family Member No Yes   Sig: Take 1 tablet (10 mg total) by mouth daily   fluticasone (FLONASE) 50 mcg/act nasal spray  Family Member Yes No   Si spray into each nostril 2 (two) times a day as needed for rhinitis or allergies   gabapentin (NEURONTIN) 100 mg capsule 2024 Family Member Yes Yes   Sig: Take 100 mg by mouth 3 (three) times a day   hydroxychloroquine (PLAQUENIL) 200 mg tablet 2024 Family Member Yes Yes   Sig: Take 200 mg by mouth 2 (two) times a day with meals   levETIRAcetam (Keppra) 500 mg tablet 2024 Family Member No Yes   Sig: Take 1 tablet (500 mg total) by mouth every 12 (twelve) hours   lidocaine (LIDODERM) 5 %  Family Member No No   Sig: Apply 1 patch topically over 12 hours daily Remove & Discard patch within 12 hours or as directed by MD Do not start before 2023.   Patient not taking: Reported on 2023   loperamide (IMODIUM) 2 mg capsule  Family Member Yes No   Sig: Take 2 mg by mouth as needed for diarrhea   Patient not taking: Reported on 3/28/2024   melatonin 1 mg  Family Member Yes No   Sig: Take 1 mg by mouth daily at bedtime Take 2 tablets   metoprolol succinate (TOPROL-XL) 25 mg 24 hr tablet 2024 Family Member No Yes   Sig: Take 1 tablet (25 mg total) by mouth 2 (two) times a day   nystatin (MYCOSTATIN) powder  Family Member No No   Sig: Apply topically 2 (two) times a day   omeprazole (PriLOSEC) 20 mg delayed release capsule 2024  Yes Yes   Sig: Take 20 mg by mouth daily   ondansetron (ZOFRAN) 4 mg tablet  Family Member No No   Sig: TAKE 1 TABLET EVERY 8 HOURS AS NEEDED FOR NAUSEA OR VOMITING   polyethylene glycol (MIRALAX) 17 g packet  Family Member No No   Sig: Take 17 g by  mouth every other day   potassium chloride (KLOR-CON) 20 mEq packet 6/14/2024 Family Member No Yes   Sig: Take 20 mEq by mouth 2 (two) times a day   psyllium (METAMUCIL SMOOTH TEXTURE) 28 % packet  Family Member No No   Sig: Take 1 packet by mouth in the morning   psyllium (METAMUCIL) 58.6 % packet  Family Member Yes No   Sig: Take 1 packet by mouth daily   saccharomyces boulardii (FLORASTOR) 250 mg capsule 6/14/2024 Family Member Yes Yes   Sig: Take 250 mg by mouth 2 (two) times a day   tamsulosin (FLOMAX) 0.4 mg 6/13/2024  No Yes   Sig: Take 1 capsule (0.4 mg total) by mouth daily with dinner for 7 days   torsemide (DEMADEX) 10 mg tablet 6/13/2024 Family Member No Yes   Sig: Take 1 tablet (10 mg total) by mouth 4 (four) times a week Take 10 mg on Tuesday, Thursday, Saturday, and Sunday   torsemide (DEMADEX) 20 mg tablet 6/14/2024 Family Member No Yes   Sig: Take 1 tablet (20 mg total) by mouth 3 (three) times a week Take 20 mg on Monday, Wednesday, and Friday      Facility-Administered Medications: None       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HCC)     Spinal stenosis        Past Surgical History:   Procedure Laterality Date    CARDIAC ELECTROPHYSIOLOGY PROCEDURE N/A 12/17/2022    Procedure: Cardiac loop recorder implant;  Surgeon: Negrito Hollingsworth MD;  Location: [facility];  Service: Cardiology    CARPAL TUNNEL RELEASE Bilateral     COLONOSCOPY      LAMINECTOMY      TOTAL KNEE ARTHROPLASTY Bilateral     TOTAL SHOULDER REPLACEMENT         Family History   Problem Relation Age of Onset    Seizures Son     Colon polyps Neg Hx     Colon cancer Neg Hx      I have reviewed and agree with the history as documented.    E-Cigarette/Vaping    E-Cigarette Use Never User      E-Cigarette/Vaping Substances    Nicotine No     THC No     CBD No     Flavoring No     Other No      Unknown No      Social History     Tobacco Use    Smoking status: Former     Current packs/day: 0.25     Average packs/day: 0.3 packs/day for 5.0 years (1.3 ttl pk-yrs)     Types: Cigarettes    Smokeless tobacco: Never   Vaping Use    Vaping status: Never Used   Substance Use Topics    Alcohol use: Yes     Alcohol/week: 2.0 standard drinks of alcohol     Types: 2 Shots of liquor per week     Comment: 1 drink per week    Drug use: Yes     Types: Marijuana       Review of Systems   Constitutional: Negative.  Negative for chills and fever.   HENT: Negative.  Negative for ear pain and sore throat.    Eyes: Negative.  Negative for pain and visual disturbance.   Respiratory: Negative.  Negative for cough and shortness of breath.    Cardiovascular:  Negative for chest pain and palpitations.   Gastrointestinal:  Positive for diarrhea. Negative for abdominal pain and vomiting.   Endocrine: Negative.    Genitourinary: Negative.  Negative for dysuria and hematuria.   Musculoskeletal: Negative.  Negative for arthralgias and back pain.   Skin:  Negative for color change and rash.   Allergic/Immunologic: Negative.    Neurological:  Positive for weakness. Negative for seizures and syncope.   Hematological: Negative.    Psychiatric/Behavioral: Negative.     All other systems reviewed and are negative.      Physical Exam  Physical Exam  Vitals and nursing note reviewed.   Constitutional:       General: He is not in acute distress.     Appearance: Normal appearance. He is well-developed.   HENT:      Head: Normocephalic and atraumatic.      Right Ear: External ear normal.      Left Ear: External ear normal.      Nose: Nose normal.      Mouth/Throat:      Mouth: Mucous membranes are moist.   Eyes:      General: No scleral icterus.     Conjunctiva/sclera: Conjunctivae normal.      Pupils: Pupils are equal, round, and reactive to light.   Cardiovascular:      Rate and Rhythm: Normal rate and regular rhythm.      Pulses: Normal pulses.       Heart sounds: No murmur heard.  Pulmonary:      Effort: Pulmonary effort is normal. No respiratory distress.      Breath sounds: Normal breath sounds.   Abdominal:      General: Abdomen is flat. Bowel sounds are normal. There is no distension.      Palpations: Abdomen is soft.      Tenderness: There is no abdominal tenderness. There is no right CVA tenderness, left CVA tenderness, guarding or rebound.   Musculoskeletal:         General: No swelling. Normal range of motion.      Cervical back: Normal range of motion and neck supple.      Right lower leg: No edema.      Left lower leg: No edema.   Skin:     General: Skin is warm and dry.      Capillary Refill: Capillary refill takes less than 2 seconds.      Coloration: Skin is pale.      Comments: Multiple areas of ecchymosis   Neurological:      General: No focal deficit present.      Mental Status: He is alert. Mental status is at baseline.      Cranial Nerves: No cranial nerve deficit.      Sensory: No sensory deficit.      Motor: No weakness.      Coordination: Coordination normal.      Gait: Gait normal.      Deep Tendon Reflexes: Reflexes normal.   Psychiatric:         Mood and Affect: Mood normal.         Behavior: Behavior normal.         Vital Signs  ED Triage Vitals   Temperature Pulse Respirations Blood Pressure SpO2   06/14/24 1912 06/14/24 1912 06/14/24 1912 06/14/24 1912 06/14/24 1912   97.7 °F (36.5 °C) 72 16 104/71 94 %      Temp Source Heart Rate Source Patient Position - Orthostatic VS BP Location FiO2 (%)   06/14/24 1912 06/14/24 1912 06/14/24 2100 06/14/24 1912 --   Temporal Monitor Sitting Left arm       Pain Score       --                  Vitals:    06/14/24 2230 06/14/24 2300 06/14/24 2339 06/14/24 2340   BP: 94/56 93/60 102/68 102/68   Pulse: 97 96  59   Patient Position - Orthostatic VS:    Lying         Visual Acuity      ED Medications  Medications   Diclofenac Sodium (VOLTAREN) 1 % topical gel 2 g (has no administration in time  range)   cefTRIAXone (ROCEPHIN) IVPB (premix in dextrose) 1,000 mg 50 mL (has no administration in time range)   metroNIDAZOLE (FLAGYL) IVPB (premix) 500 mg 100 mL (has no administration in time range)   multi-electrolyte (PLASMALYTE-A/ISOLYTE-S PH 7.4) IV solution (has no administration in time range)   sodium chloride 0.9 % bolus 1,000 mL (0 mL Intravenous Stopped 6/14/24 1955)   albumin human (FLEXBUMIN) 25 % injection 50 g (50 g Intravenous New Bag 6/14/24 2241)       Diagnostic Studies  Results Reviewed       Procedure Component Value Units Date/Time    HS Troponin I 2hr [[id number]]  (Normal) Collected: 06/14/24 2151    Lab Status: Final result Specimen: Blood from Arm, Right Updated: 06/14/24 2219     hs TnI 2hr 19 ng/L      Delta 2hr hsTnI -2 ng/L     RBC Morphology Reflex Test [[id number]] Collected: 06/14/24 1924    Lab Status: Final result Specimen: Blood from Arm, Right Updated: 06/14/24 2101    CBC and differential [[id number]]  (Abnormal) Collected: 06/14/24 1924    Lab Status: Final result Specimen: Blood from Arm, Right Updated: 06/14/24 2038     WBC 6.52 Thousand/uL      RBC 4.30 Million/uL      Hemoglobin 12.4 g/dL      Hematocrit 39.8 %      MCV 93 fL      MCH 28.8 pg      MCHC 31.2 g/dL      RDW 15.2 %      MPV 13.1 fL      Platelets 157 Thousands/uL     Narrative:      This is an appended report.  These results have been appended to a previously verified report.    Manual Differential(PHLEBS Do Not Order) [[id number]]  (Abnormal) Collected: 06/14/24 1924    Lab Status: Final result Specimen: Blood from Arm, Right Updated: 06/14/24 2038     Segmented % 47 %      Lymphocytes % 26 %      Monocytes % 9 %      Eosinophils % 16 %      Basophils % 0 %      Atypical Lymphocytes % 2 %      Absolute Neutrophils 3.06 Thousand/uL      Absolute Lymphocytes 1.83 Thousand/uL      Absolute Monocytes 0.59 Thousand/uL      Absolute Eosinophils 1.04 Thousand/uL      Absolute Basophils 0.00 Thousand/uL      Total  Counted --     RBC Morphology Present     Platelet Estimate Adequate     Anisocytosis Present    TSH [[id number]]  (Abnormal) Collected: 06/14/24 1924    Lab Status: Final result Specimen: Blood from Arm, Right Updated: 06/14/24 2002     TSH 3RD GENERATON 6.542 uIU/mL     HS Troponin 0hr (reflex protocol) [[id number]]  (Normal) Collected: 06/14/24 1924    Lab Status: Final result Specimen: Blood from Arm, Right Updated: 06/14/24 1953     hs TnI 0hr 21 ng/L     Comprehensive metabolic panel [[id number]]  (Abnormal) Collected: 06/14/24 1924    Lab Status: Final result Specimen: Blood from Arm, Right Updated: 06/14/24 1950     Sodium 139 mmol/L      Potassium 3.9 mmol/L      Chloride 101 mmol/L      CO2 31 mmol/L      ANION GAP 7 mmol/L      BUN 19 mg/dL      Creatinine 1.45 mg/dL      Glucose 124 mg/dL      Calcium 9.0 mg/dL      Corrected Calcium 9.6 mg/dL      AST 22 U/L      ALT 8 U/L      Alkaline Phosphatase 88 U/L      Total Protein 6.3 g/dL      Albumin 3.2 g/dL      Total Bilirubin 0.52 mg/dL      eGFR 42 ml/min/1.73sq m     Narrative:      [employer] guidelines for Chronic Kidney Disease (CKD):     Stage 1 with normal or high GFR (GFR > 90 mL/min/1.73 square meters)    Stage 2 Mild CKD (GFR = 60-89 mL/min/1.73 square meters)    Stage 3A Moderate CKD (GFR = 45-59 mL/min/1.73 square meters)    Stage 3B Moderate CKD (GFR = 30-44 mL/min/1.73 square meters)    Stage 4 Severe CKD (GFR = 15-29 mL/min/1.73 square meters)    Stage 5 End Stage CKD (GFR <15 mL/min/1.73 square meters)  Note: GFR calculation is accurate only with a steady state creatinine    Stool Enteric Bacterial Panel by PCR [[id number]]     Lab Status: No result Specimen: Stool from Rectum     Clostridium difficile toxin by PCR with EIA [[id number]]     Lab Status: No result Specimen: Stool from Per Rectum                    No orders to display              Procedures  ECG 12 Lead Documentation Only    Date/Time: 6/14/2024 7:38  PM    Performed by: Syl Joshi PA-C  Authorized by: Syl Joshi PA-C    ECG reviewed by me, the ED Provider: yes    Patient location:  ED  Previous ECG:     Previous ECG:  Compared to current    Similarity:  No change  Interpretation:     Interpretation: abnormal    Rate:     ECG rate:  94    ECG rate assessment: normal    Rhythm:     Rhythm: sinus rhythm    Ectopy:     Ectopy: none    ST segments:     ST segments:  Non-specific           ED Course  ED Course as of 06/15/24 0016   Fri Jun 14, 2024   1939 WBC: 6.52   2118 WBC: 6.52   2219 Delta 2hr hsTnI: -2                               SBIRT 22yo+      Flowsheet Row Most Recent Value   Initial Alcohol Screen: US AUDIT-C     1. How often do you have a drink containing alcohol? 0 Filed at: 06/14/2024 2029   2. How many drinks containing alcohol do you have on a typical day you are drinking?  0 Filed at: 06/14/2024 2029   3b. FEMALE Any Age, or MALE 65+: How often do you have 4 or more drinks on one occassion? 0 Filed at: 06/14/2024 2029   Audit-C Score 0 Filed at: 06/14/2024 2029   ARNOLDO: How many times in the past year have you...    Used an illegal drug or used a prescription medication for non-medical reasons? Never Filed at: 06/14/2024 2029                      Medical Decision Making  Patient with generalized weakness likely secondary to fluid losses from diarrhea  Unclear etiology for diarrhea-suspect antibiotic mediated versus C. difficile  Patient to be admitted for further evaluation and treatment    Problems Addressed:  Diarrhea, unspecified type: acute illness or injury  Diverticulitis: acute illness or injury  Generalized weakness: acute illness or injury    Amount and/or Complexity of Data Reviewed  Labs: ordered. Decision-making details documented in ED Course.    Risk  Prescription drug management.  Decision regarding hospitalization.             Disposition  Final diagnoses:   Generalized weakness   Diarrhea, unspecified type    Diverticulitis     Time reflects when diagnosis was documented in both MDM as applicable and the Disposition within this note       Time User Action Codes Description Comment    6/14/2024 10:14 PM Syl Joshi [R53.1] Generalized weakness     6/14/2024 10:14 PM Syl Joshi [R19.7] Diarrhea, unspecified type     6/14/2024 10:14 PM Syl Joshi [K57.92] Diverticulitis           ED Disposition       ED Disposition   Admit    Condition   Stable    Date/Time   Fri Jun 14, 2024 2214    Comment   Case was discussed with ISAK and the patient's admission status was agreed to be Admission Status: inpatient status to the service of Dr. Lawrence .               Follow-up Information    None         Current Discharge Medication List        CONTINUE these medications which have NOT CHANGED    Details   allopurinol (ZYLOPRIM) 100 mg tablet Take 100 mg by mouth 4 (four) times a day      apixaban (Eliquis) 5 mg Take 1 tablet (5 mg total) by mouth 2 (two) times a day  Qty: 180 tablet, Refills: 3    Associated Diagnoses: TIA (transient ischemic attack); PAF (paroxysmal atrial fibrillation) ([facility])      Diclofenac Sodium (VOLTAREN) 1 % Apply 4 g topically 4 (four) times a day      doxycycline hyclate (VIBRAMYCIN) 100 mg capsule Take 100 mg by mouth every 12 (twelve) hours      ezetimibe (ZETIA) 10 mg tablet Take 1 tablet (10 mg total) by mouth daily  Qty: 90 tablet, Refills: 3    Associated Diagnoses: Low left ventricular ejection fraction; HFrEF (heart failure with reduced ejection fraction) ([facility]); Chronic combined systolic and diastolic congestive heart failure ([facility])      gabapentin (NEURONTIN) 100 mg capsule Take 100 mg by mouth 3 (three) times a day      hydroxychloroquine (PLAQUENIL) 200 mg tablet Take 200 mg by mouth 2 (two) times a day with meals      levETIRAcetam (Keppra) 500 mg tablet Take 1 tablet (500 mg total) by mouth every 12 (twelve) hours  Qty: 180 tablet, Refills: 3    Associated  Diagnoses: Lactic acidosis      metoprolol succinate (TOPROL-XL) 25 mg 24 hr tablet Take 1 tablet (25 mg total) by mouth 2 (two) times a day  Qty: 180 tablet, Refills: 3    Associated Diagnoses: PAF (paroxysmal atrial fibrillation) ([facility]); Low left ventricular ejection fraction      omeprazole (PriLOSEC) 20 mg delayed release capsule Take 20 mg by mouth daily      potassium chloride (KLOR-CON) 20 mEq packet Take 20 mEq by mouth 2 (two) times a day  Qty: 180 each, Refills: 3    Associated Diagnoses: HFrEF (heart failure with reduced ejection fraction) ([facility]); Chronic combined systolic and diastolic congestive heart failure ([facility])      saccharomyces boulardii (FLORASTOR) 250 mg capsule Take 250 mg by mouth 2 (two) times a day      Synthroid 50 MCG tablet Take 50 mcg by mouth daily      tamsulosin (FLOMAX) 0.4 mg Take 1 capsule (0.4 mg total) by mouth daily with dinner for 7 days  Qty: 7 capsule, Refills: 0    Associated Diagnoses: Ureterolithiasis      !! torsemide (DEMADEX) 10 mg tablet Take 1 tablet (10 mg total) by mouth 4 (four) times a week Take 10 mg on Tuesday, Thursday, Saturday, and Sunday  Qty: 48 tablet, Refills: 3    Associated Diagnoses: HFrEF (heart failure with reduced ejection fraction) ([facility])      !! torsemide (DEMADEX) 20 mg tablet Take 1 tablet (20 mg total) by mouth 3 (three) times a week Take 20 mg on Monday, Wednesday, and Friday  Qty: 36 tablet, Refills: 3    Associated Diagnoses: HFrEF (heart failure with reduced ejection fraction) ([facility]); Chronic combined systolic and diastolic congestive heart failure ([facility])      ACETAMINOPHEN ER PO Take 1,000 mg by mouth 3 (three) times a day      albuterol (2.5 mg/3 mL) 0.083 % nebulizer solution       amoxicillin-clavulanate (AUGMENTIN) 875-125 mg per tablet Take 1 tablet by mouth every 12 (twelve) hours for 10 days  Qty: 14 tablet, Refills: 0    Associated Diagnoses: Diverticulitis      fluticasone (FLONASE) 50 mcg/act nasal spray 1 spray into each nostril 2  (two) times a day as needed for rhinitis or allergies      lidocaine (LIDODERM) 5 % Apply 1 patch topically over 12 hours daily Remove & Discard patch within 12 hours or as directed by MD Do not start before November 25, 2023.  Refills: 0    Associated Diagnoses: Lactic acidosis      loperamide (IMODIUM) 2 mg capsule Take 2 mg by mouth as needed for diarrhea      melatonin 1 mg Take 1 mg by mouth daily at bedtime Take 2 tablets      nystatin (MYCOSTATIN) powder Apply topically 2 (two) times a day  Qty: 15 g, Refills: 0    Associated Diagnoses: Rash      ondansetron (ZOFRAN) 4 mg tablet TAKE 1 TABLET EVERY 8 HOURS AS NEEDED FOR NAUSEA OR VOMITING  Qty: 20 tablet, Refills: 5    Associated Diagnoses: Nausea      polyethylene glycol (MIRALAX) 17 g packet Take 17 g by mouth every other day  Qty: 30 each, Refills: 2    Associated Diagnoses: Alternating constipation and diarrhea      psyllium (METAMUCIL SMOOTH TEXTURE) 28 % packet Take 1 packet by mouth in the morning  Qty: 30 packet, Refills: 0    Associated Diagnoses: Alternating constipation and diarrhea      psyllium (METAMUCIL) 58.6 % packet Take 1 packet by mouth daily       !! - Potential duplicate medications found. Please discuss with provider.          No discharge procedures on file.    PDMP Review         Value Time User    PDMP Reviewed  Yes 1/28/2023  6:41 AM Elenita Delgadillo MD            ED Provider  Electronically Signed by             Syl Joshi PA-C  06/15/24 0016

## 2024-06-15 NOTE — ASSESSMENT & PLAN NOTE
Lab Results   Component Value Date    EGFR 42 06/14/2024    EGFR 36 06/10/2024    EGFR 69 01/02/2024    CREATININE 1.45 (H) 06/14/2024    CREATININE 1.65 (H) 06/10/2024    CREATININE 1.04 01/02/2024   Creatinine 0.9-1.0  Creatinine meeting KAMLESH criteria   placed on continuous IVF suspect hypovolemia as contributory in setting of GI losses

## 2024-06-15 NOTE — ASSESSMENT & PLAN NOTE
Wt Readings from Last 3 Encounters:   06/15/24 92.1 kg (203 lb 0.7 oz)   04/12/24 95 kg (209 lb 7 oz)   03/28/24 95.7 kg (211 lb)     Home diuretic: Torsemide 20 Mg MWF and 10 Mg TRSS  Last echo 8/2023: LVEF 45%.  G1 DD.  Examines hypovolemic on admit with dry mucous membranes  Will resume torsemide in 48 hours and placed on continuous IVF overnight

## 2024-06-15 NOTE — ASSESSMENT & PLAN NOTE
Stated he had right shoulder replacement with reversal.  Had infection.  Follows up with Raheel.  Patient was told not a candidate for revision and need to stay on chronic doxycycline  And doxycycline 50 mg twice daily as per previous chart review unsure when it was changed to doxycycline 100 twice daily

## 2024-06-15 NOTE — ASSESSMENT & PLAN NOTE
Wt Readings from Last 3 Encounters:   06/14/24 94.4 kg (208 lb 1.8 oz)   04/12/24 95 kg (209 lb 7 oz)   03/28/24 95.7 kg (211 lb)     Home diuretic: Torsemide 20 Mg MWF and 10 Mg TRSS  Last echo 8/2023: LVEF 45%.  G1 DD.  Examines hypovolemic on admit with dry mucous membranes  Will resume torsemide in 48 hours and placed on continuous IVF overnight

## 2024-06-15 NOTE — H&P
[facility]  H&P  Name: Matthew Kruase 89 y.o. male I MRN: [id number]  Unit/Bed#: -01 I Date of Admission: 6/14/2024   Date of Service: 6/15/2024 I Hospital Day: 1      Assessment & Plan   * Diverticulitis  Assessment & Plan  In the ED on 6/10 for evaluation of nausea and decreased oral intake-CT A/P at that time provide abnormal diverticulitis, so patient was initiated on Augmentin  Shortly after starting Augmentin patient then developed significant diarrhea of 5-7 episodes of watery stool  Check stool studies-if negative, could utilize Imodium  Transition Augmentin to ceftriaxone and Flagyl  GI consulted  Abdomen is soft and nontender on admission, therefore will hold on repeat CT A/P at this time    KAMLESH (acute kidney injury) (HCC)  Assessment & Plan  Creatinine 0.9-1.0  Creatinine in the ED on 6/10 was 1.65, meeting KDIGO KAMLESH criteria  Creatinine has improved to 1.45 on admission, however though still not within baseline  Placed on continuous IVF in setting of GI losses  Resume home torsemide in 48 hours    Focal seizure with retained awareness  Assessment & Plan  Maintained on Keppra 500 Mg twice daily  Seizures are episodes of speech difficulty per review of admission from 11/20/2023    Stage 2 chronic kidney disease  Assessment & Plan  Lab Results   Component Value Date    EGFR 42 06/14/2024    EGFR 36 06/10/2024    EGFR 69 01/02/2024    CREATININE 1.45 (H) 06/14/2024    CREATININE 1.65 (H) 06/10/2024    CREATININE 1.04 01/02/2024   Creatinine 0.9-1.0  Creatinine meeting KAMLESH criteria   placed on continuous IVF suspect hypovolemia as contributory in setting of GI losses    Chronic systolic heart failure (HCC)  Assessment & Plan  Wt Readings from Last 3 Encounters:   06/14/24 94.4 kg (208 lb 1.8 oz)   04/12/24 95 kg (209 lb 7 oz)   03/28/24 95.7 kg (211 lb)     Home diuretic: Torsemide 20 Mg MWF and 10 Mg TRSS  Last echo 8/2023: LVEF 45%.  G1 DD.  Examines hypovolemic on  "admit with dry mucous membranes  Will resume torsemide in 48 hours and placed on continuous IVF overnight    History of TIA (transient ischemic attack)  Assessment & Plan  History of TIA in the past that presented as word finding difficulties  Maintained on Eliquis due to history of paroxysmal A-fib and Zetia, continue    History of hypertension  Assessment & Plan  Maintained on metoprolol succinate 25 Mg twice daily  Continue    Paroxysmal A-fib (HCC)  Assessment & Plan  AC: Eliquis 5 Mg twice daily  RC: Metoprolol succinate 25 Mg twice daily  Continue home medications    HLD (hyperlipidemia)  Assessment & Plan  Continue home Zetia    RA (rheumatoid arthritis) (HCC)  Assessment & Plan  Continue home hydroxychloroquine 200 Mg twice daily, no longer daily prednisone    Hypothyroidism  Assessment & Plan  Continue home Synthroid 50 mcg daily           VTE Pharmacologic Prophylaxis: VTE Score: 5 High Risk (Score >/= 5) - Pharmacological DVT Prophylaxis Ordered: apixaban (Eliquis). Sequential Compression Devices Ordered.  Code Status: Level 1 - Full Code   Discussion with family: Updated  (son and daughter in law) at bedside.    Anticipated Length of Stay: Patient will be admitted on an inpatient basis with an anticipated length of stay of greater than 2 midnights secondary to diverticulitis, KAMLESH.    Chief Complaint: \" I was having diarrhea\"    History of Present Illness:  Matthew Krause is a 89 y.o. male with a PMH of focal seizures on Keppra, CKD stage II, chronic systolic heart failure, history of TIA, HTN, A-fib on Eliquis, and RA who presents with 5-7 episodes of diarrhea daily since starting Augmentin on Sunday.  Patient initially presented to the ED on Sunday for nausea and decreased oral intake.  He was diagnosed with diverticulitis and started Augmentin.  His appetite has greatly improved but then he developed diarrhea.  No fevers or chills.  No chest pain or dyspnea.  He denies any focal " abdominal pain.  Son and patient report right arm swelling that has been ongoing.     Review of Systems:  Review of Systems   Constitutional:  Negative for chills and fever.   HENT:  Negative for congestion.    Respiratory:  Negative for cough and shortness of breath.    Cardiovascular:  Negative for chest pain and leg swelling.   Gastrointestinal:  Positive for diarrhea and nausea. Negative for abdominal pain, constipation and vomiting.   Genitourinary:  Negative for difficulty urinating and dysuria.   Musculoskeletal:         Right arm swelling   Neurological:  Negative for weakness and numbness.   All other systems reviewed and are negative.      Past Medical and Surgical History:   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HCC)     Spinal stenosis        Past Surgical History:   Procedure Laterality Date    CARDIAC ELECTROPHYSIOLOGY PROCEDURE N/A 12/17/2022    Procedure: Cardiac loop recorder implant;  Surgeon: Negrito Hollingsworth MD;  Location: [facility];  Service: Cardiology    CARPAL TUNNEL RELEASE Bilateral     COLONOSCOPY      LAMINECTOMY      TOTAL KNEE ARTHROPLASTY Bilateral     TOTAL SHOULDER REPLACEMENT         Meds/Allergies:  Prior to Admission medications    Medication Sig Start Date End Date Taking? Authorizing Provider   allopurinol (ZYLOPRIM) 100 mg tablet Take 100 mg by mouth 4 (four) times a day   Yes Historical Provider, MD   apixaban (Eliquis) 5 mg Take 1 tablet (5 mg total) by mouth 2 (two) times a day 12/28/23  Yes Nitish Tomas MD   Diclofenac Sodium (VOLTAREN) 1 % Apply 4 g topically 4 (four) times a day   Yes Historical Provider, MD   doxycycline hyclate (VIBRAMYCIN) 100 mg capsule Take 100 mg by mouth every 12 (twelve) hours 9/30/23  Yes Historical Provider, MD   ezetimibe (ZETIA) 10 mg tablet Take 1 tablet (10 mg total) by mouth daily 12/28/23  Yes  Nitish Tomas MD   gabapentin (NEURONTIN) 100 mg capsule Take 100 mg by mouth 3 (three) times a day   Yes Historical Provider, MD   hydroxychloroquine (PLAQUENIL) 200 mg tablet Take 200 mg by mouth 2 (two) times a day with meals   Yes Historical Provider, MD   levETIRAcetam (Keppra) 500 mg tablet Take 1 tablet (500 mg total) by mouth every 12 (twelve) hours 12/12/23  Yes Christo Hugo MD   metoprolol succinate (TOPROL-XL) 25 mg 24 hr tablet Take 1 tablet (25 mg total) by mouth 2 (two) times a day 12/28/23 12/22/24 Yes Nitish Tomas MD   omeprazole (PriLOSEC) 20 mg delayed release capsule Take 20 mg by mouth daily   Yes Historical Provider, MD   potassium chloride (KLOR-CON) 20 mEq packet Take 20 mEq by mouth 2 (two) times a day 12/28/23  Yes Nitish Tomas MD   saccharomyces boulardii (FLORASTOR) 250 mg capsule Take 250 mg by mouth 2 (two) times a day   Yes Historical Provider, MD   Synthroid 50 MCG tablet Take 50 mcg by mouth daily 2/20/24  Yes Historical Provider, MD   tamsulosin (FLOMAX) 0.4 mg Take 1 capsule (0.4 mg total) by mouth daily with dinner for 7 days 6/10/24 6/17/24 Yes Dk Nicholas DO   torsemide (DEMADEX) 10 mg tablet Take 1 tablet (10 mg total) by mouth 4 (four) times a week Take 10 mg on Tuesday, Thursday, Saturday, and Sunday 12/28/23  Yes Nitish Tomas MD   torsemide (DEMADEX) 20 mg tablet Take 1 tablet (20 mg total) by mouth 3 (three) times a week Take 20 mg on Monday, Wednesday, and Friday 12/29/23  Yes Nitish Tomas MD   ACETAMINOPHEN ER PO Take 1,000 mg by mouth 3 (three) times a day    Historical Provider, MD   albuterol (2.5 mg/3 mL) 0.083 % nebulizer solution  4/4/24   Historical Provider, MD   amoxicillin-clavulanate (AUGMENTIN) 875-125 mg per tablet Take 1 tablet by mouth every 12 (twelve) hours for 10 days 6/10/24 6/20/24  Dk Nicholas DO   fluticasone (FLONASE) 50 mcg/act nasal spray 1 spray into each nostril 2 (two) times a day as needed for rhinitis or allergies 10/14/23    Historical Provider, MD   lidocaine (LIDODERM) 5 % Apply 1 patch topically over 12 hours daily Remove & Discard patch within 12 hours or as directed by MD Do not start before November 25, 2023.  Patient not taking: Reported on 12/21/2023 11/25/23   Cassandra Figueroa MD   loperamide (IMODIUM) 2 mg capsule Take 2 mg by mouth as needed for diarrhea  Patient not taking: Reported on 3/28/2024    Historical Provider, MD   melatonin 1 mg Take 1 mg by mouth daily at bedtime Take 2 tablets    Historical Provider, MD   nystatin (MYCOSTATIN) powder Apply topically 2 (two) times a day 1/28/23   Elenita Delgadillo MD   ondansetron (ZOFRAN) 4 mg tablet TAKE 1 TABLET EVERY 8 HOURS AS NEEDED FOR NAUSEA OR VOMITING 3/5/24   ESTEFANIA Avila   polyethylene glycol (MIRALAX) 17 g packet Take 17 g by mouth every other day 12/21/21   ESTEFANIA Duncan   psyllium (METAMUCIL SMOOTH TEXTURE) 28 % packet Take 1 packet by mouth in the morning 11/15/23 2/21/24  Ankit Nicholas DO   psyllium (METAMUCIL) 58.6 % packet Take 1 packet by mouth daily    Historical Provider, MD   esomeprazole (NexIUM) 20 mg capsule Take 1 capsule (20 mg total) by mouth in the morning 6/10/24 6/15/24  Dk Nicholas DO   Klor-Con M20 20 MEQ tablet  4/6/24 6/15/24  Historical Provider, MD   olopatadine (PATANOL) 0.1 % ophthalmic solution 1 drop 2 (two) times a day  6/15/24  Historical Provider, MD     I have reviewed home medications with patient family member.    Allergies:   Allergies   Allergen Reactions    Colchicine Other (See Comments)     Flushing      Niacin Other (See Comments)     flushed    Other GI Intolerance    Statins        Social History:  Marital Status:    Occupation: Retired  Patient Pre-hospital Living Situation: Assisted Living  Patient Pre-hospital Level of Mobility: walks  Patient Pre-hospital Diet Restrictions: None  Substance Use History:   Social History     Substance and Sexual Activity   Alcohol Use Yes     "Alcohol/week: 2.0 standard drinks of alcohol    Types: 2 Shots of liquor per week    Comment: 1 drink per week     Social History     Tobacco Use   Smoking Status Former    Current packs/day: 0.25    Average packs/day: 0.3 packs/day for 5.0 years (1.3 ttl pk-yrs)    Types: Cigarettes   Smokeless Tobacco Never     Social History     Substance and Sexual Activity   Drug Use Yes    Types: Marijuana       Family History:  Family History   Problem Relation Age of Onset    Seizures Son     Colon polyps Neg Hx     Colon cancer Neg Hx        Physical Exam:     Vitals:   Blood Pressure: 102/68 (06/14/24 2340)  Pulse: 59 (06/14/24 2340)  Temperature: 97.5 °F (36.4 °C) (06/14/24 2340)  Temp Source: Tympanic (06/14/24 2340)  Respirations: 18 (06/14/24 2340)  Height: 5' 8\" (172.7 cm) (06/14/24 2340)  Weight - Scale: 94.4 kg (208 lb 1.8 oz) (06/14/24 2340)  SpO2: 100 % (06/14/24 2340)    Physical Exam  Vitals and nursing note reviewed.   Constitutional:       General: He is not in acute distress.     Appearance: He is not ill-appearing.      Comments: Pleasant and conversational.  Appears fatigued   HENT:      Head: Normocephalic.      Nose: Nose normal.      Mouth/Throat:      Mouth: Mucous membranes are dry.   Eyes:      Extraocular Movements: Extraocular movements intact.      Conjunctiva/sclera: Conjunctivae normal.   Cardiovascular:      Rate and Rhythm: Normal rate and regular rhythm.   Pulmonary:      Effort: Pulmonary effort is normal.      Breath sounds: Normal breath sounds.   Abdominal:      General: Abdomen is flat.      Palpations: Abdomen is soft.      Tenderness: There is no abdominal tenderness. There is no guarding or rebound.      Comments: Hyperactive bowel sounds in all 4 quadrants   Musculoskeletal:         General: Normal range of motion.      Cervical back: Normal range of motion.      Comments: 2+ pitting edema to right upper extremity.  Trace edema to bilateral lower extremities   Skin:     General: Skin " is warm and dry.      Coloration: Skin is not pale.   Neurological:      General: No focal deficit present.      Mental Status: He is alert.      Comments: Oriented to self, place, month, year, president   Psychiatric:         Mood and Affect: Mood normal.         Thought Content: Thought content normal.          Additional Data:     Lab Results:  Results from last 7 days   Lab Units 06/14/24  1924 06/10/24  1106   WBC Thousand/uL 6.52 9.66   HEMOGLOBIN g/dL 12.4 12.0   HEMATOCRIT % 39.8 39.5   PLATELETS Thousands/uL 157 139*   SEGS PCT %  --  46   LYMPHO PCT % 26 32   MONO PCT % 9 10   EOS PCT % 16* 12*     Results from last 7 days   Lab Units 06/14/24 1924   SODIUM mmol/L 139   POTASSIUM mmol/L 3.9   CHLORIDE mmol/L 101   CO2 mmol/L 31   BUN mg/dL 19   CREATININE mg/dL 1.45*   ANION GAP mmol/L 7   CALCIUM mg/dL 9.0   ALBUMIN g/dL 3.2*   TOTAL BILIRUBIN mg/dL 0.52   ALK PHOS U/L 88   ALT U/L 8   AST U/L 22   GLUCOSE RANDOM mg/dL 124             Lab Results   Component Value Date    HGBA1C 6.7 (H) 01/02/2024    HGBA1C 6.7 (H) 11/19/2023    HGBA1C 6.6 (H) 11/14/2023           Lines/Drains:  Invasive Devices       Peripheral Intravenous Line  Duration             Peripheral IV 06/14/24 Right;Upper;Ventral (anterior) Arm <1 day                        Imaging: Reviewed radiology reports from this admission including: abdominal/pelvic CT  No orders to display       EKG and Other Studies Reviewed on Admission:   EKG:  NSR without acute ischemia.  Prolonged QTc.  No acute ischemia.  This is my personal interpretation.    ** Please Note: This note has been constructed using a voice recognition system. **

## 2024-06-15 NOTE — ASSESSMENT & PLAN NOTE
Maintained on Keppra 500 Mg twice daily  Seizures are episodes of speech difficulty per review of admission from 11/20/2023

## 2024-06-16 PROBLEM — N17.9 AKI (ACUTE KIDNEY INJURY) (HCC): Status: RESOLVED | Noted: 2023-01-26 | Resolved: 2024-06-16

## 2024-06-16 LAB
ANION GAP SERPL CALCULATED.3IONS-SCNC: 8 MMOL/L (ref 4–13)
BUN SERPL-MCNC: 17 MG/DL (ref 5–25)
C COLI+JEJUNI TUF STL QL NAA+PROBE: NEGATIVE
C DIFF TOX B TCDB STL QL NAA+PROBE: NEGATIVE
CALCIUM SERPL-MCNC: 8.5 MG/DL (ref 8.4–10.2)
CHLORIDE SERPL-SCNC: 104 MMOL/L (ref 96–108)
CO2 SERPL-SCNC: 27 MMOL/L (ref 21–32)
CREAT SERPL-MCNC: 1.15 MG/DL (ref 0.6–1.3)
EC STX1+STX2 GENES STL QL NAA+PROBE: NEGATIVE
ERYTHROCYTE [DISTWIDTH] IN BLOOD BY AUTOMATED COUNT: 14.7 % (ref 11.6–15.1)
GFR SERPL CREATININE-BSD FRML MDRD: 56 ML/MIN/1.73SQ M
GLUCOSE SERPL-MCNC: 108 MG/DL (ref 65–140)
HCT VFR BLD AUTO: 32.9 % (ref 36.5–49.3)
HGB BLD-MCNC: 10.3 G/DL (ref 12–17)
MAGNESIUM SERPL-MCNC: 1.8 MG/DL (ref 1.9–2.7)
MCH RBC QN AUTO: 28.8 PG (ref 26.8–34.3)
MCHC RBC AUTO-ENTMCNC: 31.3 G/DL (ref 31.4–37.4)
MCV RBC AUTO: 92 FL (ref 82–98)
MRSA NOSE QL CULT: NORMAL
PHOSPHATE SERPL-MCNC: 4 MG/DL (ref 2.3–4.1)
PLATELET # BLD AUTO: 115 THOUSANDS/UL (ref 149–390)
PMV BLD AUTO: 13.4 FL (ref 8.9–12.7)
POTASSIUM SERPL-SCNC: 3.4 MMOL/L (ref 3.5–5.3)
RBC # BLD AUTO: 3.58 MILLION/UL (ref 3.88–5.62)
SALMONELLA SP SPAO STL QL NAA+PROBE: NEGATIVE
SHIGELLA SP+EIEC IPAH STL QL NAA+PROBE: NEGATIVE
SODIUM SERPL-SCNC: 139 MMOL/L (ref 135–147)
WBC # BLD AUTO: 4.86 THOUSAND/UL (ref 4.31–10.16)

## 2024-06-16 PROCEDURE — 83735 ASSAY OF MAGNESIUM: CPT | Performed by: INTERNAL MEDICINE

## 2024-06-16 PROCEDURE — 84100 ASSAY OF PHOSPHORUS: CPT | Performed by: INTERNAL MEDICINE

## 2024-06-16 PROCEDURE — 85027 COMPLETE CBC AUTOMATED: CPT | Performed by: INTERNAL MEDICINE

## 2024-06-16 PROCEDURE — 80048 BASIC METABOLIC PNL TOTAL CA: CPT | Performed by: INTERNAL MEDICINE

## 2024-06-16 PROCEDURE — 99232 SBSQ HOSP IP/OBS MODERATE 35: CPT | Performed by: STUDENT IN AN ORGANIZED HEALTH CARE EDUCATION/TRAINING PROGRAM

## 2024-06-16 PROCEDURE — 99233 SBSQ HOSP IP/OBS HIGH 50: CPT | Performed by: INTERNAL MEDICINE

## 2024-06-16 RX ORDER — POTASSIUM CHLORIDE 20 MEQ/1
40 TABLET, EXTENDED RELEASE ORAL ONCE
Status: COMPLETED | OUTPATIENT
Start: 2024-06-16 | End: 2024-06-16

## 2024-06-16 RX ORDER — MAGNESIUM SULFATE 1 G/100ML
1 INJECTION INTRAVENOUS ONCE
Status: COMPLETED | OUTPATIENT
Start: 2024-06-16 | End: 2024-06-16

## 2024-06-16 RX ORDER — LIDOCAINE 50 MG/G
1 PATCH TOPICAL DAILY
Status: DISCONTINUED | OUTPATIENT
Start: 2024-06-16 | End: 2024-06-19 | Stop reason: HOSPADM

## 2024-06-16 RX ADMIN — EZETIMIBE 10 MG: 10 TABLET ORAL at 08:00

## 2024-06-16 RX ADMIN — METRONIDAZOLE 500 MG: 500 INJECTION, SOLUTION INTRAVENOUS at 22:25

## 2024-06-16 RX ADMIN — LEVOTHYROXINE SODIUM 50 MCG: 50 TABLET ORAL at 05:13

## 2024-06-16 RX ADMIN — PANTOPRAZOLE SODIUM 40 MG: 40 TABLET, DELAYED RELEASE ORAL at 05:13

## 2024-06-16 RX ADMIN — Medication 250 MG: at 08:00

## 2024-06-16 RX ADMIN — PANTOPRAZOLE SODIUM 40 MG: 40 TABLET, DELAYED RELEASE ORAL at 15:36

## 2024-06-16 RX ADMIN — TAMSULOSIN HYDROCHLORIDE 0.4 MG: 0.4 CAPSULE ORAL at 15:36

## 2024-06-16 RX ADMIN — DOXYCYCLINE 100 MG: 100 CAPSULE ORAL at 08:00

## 2024-06-16 RX ADMIN — LIDOCAINE 5% 1 PATCH: 700 PATCH TOPICAL at 09:49

## 2024-06-16 RX ADMIN — Medication 250 MG: at 17:45

## 2024-06-16 RX ADMIN — ACETAMINOPHEN 650 MG: 325 TABLET, FILM COATED ORAL at 05:15

## 2024-06-16 RX ADMIN — DOXYCYCLINE 100 MG: 100 CAPSULE ORAL at 22:26

## 2024-06-16 RX ADMIN — HYDROXYCHLOROQUINE SULFATE 200 MG: 200 TABLET ORAL at 08:00

## 2024-06-16 RX ADMIN — HYDROXYCHLOROQUINE SULFATE 200 MG: 200 TABLET ORAL at 15:36

## 2024-06-16 RX ADMIN — ACETAMINOPHEN 650 MG: 325 TABLET, FILM COATED ORAL at 11:39

## 2024-06-16 RX ADMIN — GABAPENTIN 100 MG: 100 CAPSULE ORAL at 15:36

## 2024-06-16 RX ADMIN — APIXABAN 5 MG: 5 TABLET, FILM COATED ORAL at 17:45

## 2024-06-16 RX ADMIN — CEFTRIAXONE 1000 MG: 1 INJECTION, SOLUTION INTRAVENOUS at 10:14

## 2024-06-16 RX ADMIN — ALUMINUM HYDROXIDE, MAGNESIUM HYDROXIDE, DIMETHICONE 30 ML: 200; 200; 20 LIQUID ORAL at 05:17

## 2024-06-16 RX ADMIN — DICLOFENAC SODIUM 2 G: 10 GEL TOPICAL at 11:39

## 2024-06-16 RX ADMIN — GABAPENTIN 100 MG: 100 CAPSULE ORAL at 08:00

## 2024-06-16 RX ADMIN — DICLOFENAC SODIUM 2 G: 10 GEL TOPICAL at 08:01

## 2024-06-16 RX ADMIN — ALLOPURINOL 300 MG: 300 TABLET ORAL at 08:00

## 2024-06-16 RX ADMIN — METRONIDAZOLE 500 MG: 500 INJECTION, SOLUTION INTRAVENOUS at 13:26

## 2024-06-16 RX ADMIN — Medication 1 MG: at 22:26

## 2024-06-16 RX ADMIN — APIXABAN 5 MG: 5 TABLET, FILM COATED ORAL at 08:00

## 2024-06-16 RX ADMIN — MAGNESIUM SULFATE HEPTAHYDRATE 1 G: 10 INJECTION, SOLUTION INTRAVENOUS at 07:58

## 2024-06-16 RX ADMIN — LEVETIRACETAM 500 MG: 500 TABLET, FILM COATED ORAL at 22:26

## 2024-06-16 RX ADMIN — GABAPENTIN 100 MG: 100 CAPSULE ORAL at 22:26

## 2024-06-16 RX ADMIN — DICLOFENAC SODIUM 2 G: 10 GEL TOPICAL at 17:45

## 2024-06-16 RX ADMIN — LEVETIRACETAM 500 MG: 500 TABLET, FILM COATED ORAL at 08:00

## 2024-06-16 RX ADMIN — METRONIDAZOLE 500 MG: 500 INJECTION, SOLUTION INTRAVENOUS at 05:13

## 2024-06-16 RX ADMIN — DICLOFENAC SODIUM 2 G: 10 GEL TOPICAL at 22:28

## 2024-06-16 RX ADMIN — POTASSIUM CHLORIDE 40 MEQ: 1500 TABLET, EXTENDED RELEASE ORAL at 08:00

## 2024-06-16 NOTE — ASSESSMENT & PLAN NOTE
Lab Results   Component Value Date    EGFR 56 06/16/2024    EGFR 43 06/15/2024    EGFR 42 06/14/2024    CREATININE 1.15 06/16/2024    CREATININE 1.42 (H) 06/15/2024    CREATININE 1.45 (H) 06/14/2024   Creatinine 0.9-1.0  Creatinine meeting KAMLESH criteria   placed on continuous IVF suspect hypovolemia as contributory in setting of GI losses

## 2024-06-16 NOTE — PROGRESS NOTES
[facility]  Progress Note  Name: Matthew Krause I  MRN: [id number]  Unit/Bed#: -01 I Date of Admission: 6/14/2024   Date of Service: 6/16/2024 I Hospital Day: 2    Assessment & Plan   Using prophylactic antibiotic on daily basis  Assessment & Plan  Stated he had right shoulder replacement with reversal.  Had infection.  Follows up with Raheel.  Patient was told not a candidate for revision and need to stay on chronic doxycycline  And doxycycline 50 mg twice daily as per previous chart review unsure when it was changed to doxycycline 100 twice daily      Stage 2 chronic kidney disease  Assessment & Plan  Lab Results   Component Value Date    EGFR 56 06/16/2024    EGFR 43 06/15/2024    EGFR 42 06/14/2024    CREATININE 1.15 06/16/2024    CREATININE 1.42 (H) 06/15/2024    CREATININE 1.45 (H) 06/14/2024   Creatinine 0.9-1.0  Creatinine meeting KAMLESH criteria   placed on continuous IVF suspect hypovolemia as contributory in setting of GI losses    Hypothyroidism  Assessment & Plan  Continue home Synthroid 50 mcg daily    HLD (hyperlipidemia)  Assessment & Plan  Continue home Zetia    Hypomagnesemia  Assessment & Plan  replete    Focal seizure with retained awareness  Assessment & Plan  Maintained on Keppra 500 Mg twice daily  Seizures are episodes of speech difficulty per review of admission from 11/20/2023    Paroxysmal A-fib (HCC)  Assessment & Plan  AC: Eliquis 5 Mg twice daily  RC: Metoprolol succinate 25 Mg twice daily  Continue home medications    Chronic systolic heart failure (HCC)  Assessment & Plan  Wt Readings from Last 3 Encounters:   06/16/24 95.7 kg (210 lb 15.7 oz)   04/12/24 95 kg (209 lb 7 oz)   03/28/24 95.7 kg (211 lb)     Home diuretic: Torsemide 20 Mg MWF and 10 Mg TRSS  Last echo 8/2023: LVEF 45%.  G1 DD.  Patient is euvolemic, hold off IV hydration  Same torsemide a.m.    History of TIA (transient ischemic attack)  Assessment & Plan  History of TIA in the past  that presented as word finding difficulties  Maintained on Eliquis due to history of paroxysmal A-fib and Zetia, continue    RA (rheumatoid arthritis) ([facility])  Assessment & Plan  Continue home hydroxychloroquine 200 Mg twice daily, no longer daily prednisone    History of hypertension  Assessment & Plan  Maintained on metoprolol succinate 25 Mg twice daily  Continue    * Diverticulitis  Assessment & Plan  In the ED on 6/10 for evaluation of nausea and decreased oral intake-CT A/P at that time provide abnormal diverticulitis, so patient was initiated on Augmentin  Shortly after starting Augmentin patient then developed significant diarrhea of 5-7 episodes of watery stool  Check stool studies-if negative, could utilize Imodium  Transition Augmentin to ceftriaxone and Flagyl  GI consulted  Abdomen is soft and nontender on admission, therefore will hold on repeat CT A/P at this time    KAMLESH (acute kidney injury) ([facility])-resolved as of 6/16/2024  Assessment & Plan  Creatinine 0.9-1.0  Creatinine in the ED on 6/10 was 1.65, meeting KDIGO KAMLESH criteria  Creatinine has improved to 1.45 on admission, however though still not within baseline  KAMLESH resolved, monitor off IV fluids today  Resume home torsemide Lonnie               VTE Pharmacologic Prophylaxis: VTE Score: 5 High Risk (Score >/= 5) - Pharmacological DVT Prophylaxis Ordered: apixaban (Eliquis). Sequential Compression Devices Ordered.    Mobility:   Basic Mobility Inpatient Raw Score: 12  JH-HLM Goal: 4: Move to chair/commode  JH-HLM Achieved: 6: Walk 10 steps or more  JH-HLM Goal achieved. Continue to encourage appropriate mobility.    Patient Centered Rounds: I performed bedside rounds with nursing staff today.   Discussions with Specialists or Other Care Team Provider: GI, nursing    Education and Discussions with Family / Patient:  patient.     Total Time Spent on Date of Encounter in care of patient: 55 mins. This time was spent on one or more of the following:  performing physical exam; counseling and coordination of care; obtaining or reviewing history; documenting in the medical record; reviewing/ordering tests, medications or procedures; communicating with other healthcare professionals and discussing with patient's family/caregivers.    Current Length of Stay: 2 day(s)  Current Patient Status: Inpatient   Certification Statement: The patient will continue to require additional inpatient hospital stay due to Monitor Stool, GI clearance  Discharge Plan: Anticipate discharge tomorrow to rehab facility.    Code Status: Level 1 - Full Code    Subjective:   Patient stated he had soft bowel movements, not loose today.  Denies abdominal pain, nausea or vomiting.  Chronic back pain which was bad today.    Objective:     Vitals:   Temp (24hrs), Av.7 °F (36.5 °C), Min:97.5 °F (36.4 °C), Max:97.9 °F (36.6 °C)    Temp:  [97.5 °F (36.4 °C)-97.9 °F (36.6 °C)] 97.5 °F (36.4 °C)  HR:  [83-88] 88  BP: ()/(59-68) 109/68  SpO2:  [89 %-99 %] 99 %  Body mass index is 32.08 kg/m².     Input and Output Summary (last 24 hours):     Intake/Output Summary (Last 24 hours) at 2024 1304  Last data filed at 2024 0901  Gross per 24 hour   Intake 500 ml   Output 300 ml   Net 200 ml       Physical Exam:   Physical Exam  Vitals and nursing note reviewed.   Constitutional:       General: He is not in acute distress.     Appearance: He is well-developed.   HENT:      Head: Normocephalic and atraumatic.      Mouth/Throat:      Mouth: Mucous membranes are moist.      Pharynx: Oropharynx is clear.   Eyes:      Conjunctiva/sclera: Conjunctivae normal.   Cardiovascular:      Rate and Rhythm: Normal rate and regular rhythm.   Pulmonary:      Effort: Pulmonary effort is normal.      Breath sounds: Normal breath sounds.   Abdominal:      Palpations: Abdomen is soft.      Tenderness: There is no abdominal tenderness.   Musculoskeletal:         General: No swelling.      Cervical back: Neck  supple.   Skin:     General: Skin is warm and dry.      Capillary Refill: Capillary refill takes 2 to 3 seconds.   Neurological:      Mental Status: He is alert. Mental status is at baseline.   Psychiatric:         Mood and Affect: Mood normal.          Additional Data:     Labs:  Results from last 7 days   Lab Units 06/16/24  0509 06/15/24  0553 06/14/24 1924 06/10/24  1106   WBC Thousand/uL 4.86   < > 6.52 9.66   HEMOGLOBIN g/dL 10.3*   < > 12.4 12.0   HEMATOCRIT % 32.9*   < > 39.8 39.5   PLATELETS Thousands/uL 115*   < > 157 139*   SEGS PCT %  --   --   --  46   LYMPHO PCT %  --   --  26 32   MONO PCT %  --   --  9 10   EOS PCT %  --   --  16* 12*    < > = values in this interval not displayed.     Results from last 7 days   Lab Units 06/16/24  0509 06/15/24  0553 06/14/24  1924   SODIUM mmol/L 139   < > 139   POTASSIUM mmol/L 3.4*   < > 3.9   CHLORIDE mmol/L 104   < > 101   CO2 mmol/L 27   < > 31   BUN mg/dL 17   < > 19   CREATININE mg/dL 1.15   < > 1.45*   ANION GAP mmol/L 8   < > 7   CALCIUM mg/dL 8.5   < > 9.0   ALBUMIN g/dL  --   --  3.2*   TOTAL BILIRUBIN mg/dL  --   --  0.52   ALK PHOS U/L  --   --  88   ALT U/L  --   --  8   AST U/L  --   --  22   GLUCOSE RANDOM mg/dL 108   < > 124    < > = values in this interval not displayed.                       Lines/Drains:  Invasive Devices       Peripheral Intravenous Line  Duration             Peripheral IV 06/14/24 Right;Upper;Ventral (anterior) Arm 1 day                          Imaging: No pertinent imaging reviewed.    Recent Cultures (last 7 days):   Results from last 7 days   Lab Units 06/15/24  0832   C DIFF TOXIN B BY PCR  Negative       Last 24 Hours Medication List:   Current Facility-Administered Medications   Medication Dose Route Frequency Provider Last Rate    acetaminophen  650 mg Oral Q6H PRN Erin Null PA-C      allopurinol  300 mg Oral Daily Erin Null PA-C      aluminum-magnesium hydroxide-simethicone  30 mL Oral Q6H PRN  Erin Null PA-C      apixaban  5 mg Oral BID Erin Null PA-C      cefTRIAXone  1,000 mg Intravenous Q24H Erin Null PA-C 1,000 mg (06/16/24 1014)    Diclofenac Sodium  2 g Topical 4x Daily Erin Null PA-C      doxycycline hyclate  100 mg Oral Q12H Erin Null PA-C      ezetimibe  10 mg Oral Daily Erin Null PA-C      fluticasone  1 spray Nasal BID PRN Erin Null PA-C      gabapentin  100 mg Oral TID Erin Null PA-C      hydroxychloroquine  200 mg Oral BID With Meals Erin Null PA-C      levETIRAcetam  500 mg Oral Q12H ODALYS Erin Null PA-C      levothyroxine  50 mcg Oral Early Morning Erin Null PA-C      lidocaine  1 patch Topical Daily Tierra Eduardo MD      melatonin  1 mg Oral HS Erin Null PA-C      metroNIDAZOLE  500 mg Intravenous Q8H Erin Null PA-C 500 mg (06/16/24 0513)    pantoprazole  40 mg Oral BID AC Sonia Bliss PA-C      saccharomyces boulardii  250 mg Oral BID Erin Null PA-C      tamsulosin  0.4 mg Oral Daily With Dinner Erin Null PA-C          Today, Patient Was Seen By: Tierra Eduardo MD    **Please Note: This note may have been constructed using a voice recognition system.**

## 2024-06-16 NOTE — PROGRESS NOTES
Progress note - [facility]   Matthew Krause 89 y.o. male MRN: [id number]  Unit/Bed#: -Jamie Encounter: [id number]    ASSESSMENT and PLAN    Acute diarrhea, improving  Acute uncomplicated sigmoid diverticulitis, improving  KAMLESH secondary to dehydration, resolved  Erosive esophagitis    Plan:  - Continue present antibiotics. Diarrhea is improving. Suspect multifactorial due to diverticulitis and antibiotic side effect. Would only provide anti-diarrheal such as Imodium if significant diarrhea recurs.  - Continue low residue diet  - If remains stable, no contraindication to discharge in the next 24 hours with abx to complete 10 day course.  - Outpatient GI follow up for colonoscopy  - PPI for hx erosive esophagitis    Chief Complaint   Patient presents with    Weakness - Generalized     3 days of diarrhea and feeling more weak today, sent from [facility].       SUBJECTIVE  Feels well. He reports one soft stool today. No significant abdominal pain. Mag low, but improving, 1.8 today from 0.9 on 06/15. Mild hypokalemia at 3.4.    C diff negative. Enteric panel negative.    Temp:  [97.5 °F (36.4 °C)-97.9 °F (36.6 °C)] 97.5 °F (36.4 °C)  HR:  [83-88] 88  BP: ()/(59-68) 109/68     PHYSICAL EXAM  General Appearance: NAD, cooperative, alert  Eyes: Anicteric  GI:  Soft, non-tender, non-distended; normal bowel sounds; no masses, no organomegaly   Rectal: Deferred  Musculoskeletal: No edema.  Skin:  No jaundice    LABS & STUDIES  Lab Results   Component Value Date    GLUCOSE 110 09/03/2023    CALCIUM 8.5 06/16/2024    K 3.4 (L) 06/16/2024    CO2 27 06/16/2024     06/16/2024    BUN 17 06/16/2024    CREATININE 1.15 06/16/2024    CREATININE 1.42 (H) 06/15/2024    CREATININE 1.45 (H) 06/14/2024     Lab Results   Component Value Date    WBC 4.86 06/16/2024    WBC 5.48 06/15/2024    WBC 6.52 06/14/2024    HGB 10.3 (L) 06/16/2024    HGB 10.1 (L) 06/15/2024    HGB 12.4 06/14/2024    MCV 92 06/16/2024  "    (L) 06/16/2024     (L) 06/15/2024     06/14/2024     Lab Results   Component Value Date    ALT 8 06/14/2024    ALT 6 (L) 06/10/2024    ALT 8 01/02/2024    AST 22 06/14/2024    AST 22 06/10/2024    AST 13 01/02/2024    ALKPHOS 88 06/14/2024    ALKPHOS 79 06/10/2024    ALKPHOS 67 01/02/2024    TBILI 0.52 06/14/2024    TBILI 0.61 06/10/2024    TBILI 0.6 01/02/2024     No results found for: \"AMYLASE\"  Lab Results   Component Value Date    LIPASE 20 06/10/2024     Lab Results   Component Value Date    IRON 16 (L) 08/31/2023    TIBC 139 (L) 08/31/2023    FERRITIN 141 08/31/2023     Lab Results   Component Value Date    INR 1.27 (H) 12/01/2023    INR 1.53 (H) 11/18/2023    INR 1.84 (H) 08/30/2023       XR chest portable    Result Date: 6/10/2024  Narrative: XR CHEST PORTABLE INDICATION: nausea, no appetite. COMPARISON: Same day CT chest abdomen pelvis, chest radiograph 12/01/2023 FINDINGS: Loop recorder projects over the left mid chest. Mild bibasilar atelectasis. Lungs are otherwise clear. No pneumothorax or pleural effusion. Stable cardiomediastinal silhouette. No acute osseous abnormalities. Right shoulder arthroplasty. Normal upper abdomen.     Impression: Mild bibasilar atelectasis. Lungs are otherwise clear. Resident: FAMILIA SUÁREZ I, the attending radiologist, have reviewed the images and agree with the final report above. Workstation performed: [id number]     CT chest abdomen pelvis wo contrast    Result Date: 6/10/2024  Narrative: CT CHEST, ABDOMEN AND PELVIS WITHOUT IV CONTRAST INDICATION: nausea similar to kidney stone in the past, bumped creatinine. COMPARISON: 4/12/2024. TECHNIQUE: CT examination of the chest, abdomen and pelvis was performed without intravenous contrast. Multiplanar 2D reformatted images were created from the source data. This examination, like all CT scans performed in the  Network, was performed utilizing techniques to minimize radiation dose " exposure, including the use of iterative reconstruction and automated exposure control. Radiation dose length product (DLP) for this visit: 778.72 mGy-cm Enteric Contrast: Not administered. FINDINGS: CHEST LUNGS: Lungs are clear. No tracheal or endobronchial lesion. PLEURA: Unremarkable. HEART/GREAT VESSELS: There is coronary artery calcification. There is thoracic aortic calcification.. No thoracic aortic aneurysm. MEDIASTINUM AND ELIE: Unremarkable. CHEST WALL AND LOWER NECK: Unremarkable. ABDOMEN LIVER/BILIARY TREE: There are hepatic cysts. GALLBLADDER: Cholelithiasis without findings of acute cholecystitis. SPLEEN: Unremarkable. PANCREAS: Unremarkable. ADRENAL GLANDS: Unremarkable. KIDNEYS/URETERS: There is a stable calculus of the left ureterovesicular junction measuring approximately 4 mm. There is no evidence for hydronephrosis. There are renal cysts. STOMACH AND BOWEL: There is acute proximal sigmoid diverticulitis best appreciated series 2, image 184 with regional fat stranding noted. There is associated focal wall thickening at this level. There is no extraluminal free air. There is no abscess formation. APPENDIX: No findings to suggest appendicitis. ABDOMINOPELVIC CAVITY: No ascites. No pneumoperitoneum. No lymphadenopathy. Small focus of fat necrosis in the mid abdomen. VESSELS: Unremarkable for patient's age. PELVIS REPRODUCTIVE ORGANS: Unremarkable for patient's age. URINARY BLADDER: Unremarkable. ABDOMINAL WALL/INGUINAL REGIONS: Unremarkable. BONES: Postsurgical changes in the spine. Bones are osteopenic. There are degenerative changes. 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 Status post right shoulder arthroplasty.     Impression: Mild acute diverticulitis involving the proximal sigmoid colon. No extraluminal free air or abscess formation identified Stable 4 mm left UVJ calculus without evidence for upstream hydronephrosis. No focal airspace consolidation identified. The study was marked in EPIC  for immediate notification. Workstation performed: [id number]

## 2024-06-16 NOTE — ASSESSMENT & PLAN NOTE
Creatinine 0.9-1.0  Creatinine in the ED on 6/10 was 1.65, meeting KDIGO KAMLESH criteria  Creatinine has improved to 1.45 on admission, however though still not within baseline  KAMLESH resolved, monitor off IV fluids today  Resume home torsemide Lonnie

## 2024-06-16 NOTE — PLAN OF CARE
Problem: Potential for Falls  Goal: Patient will remain free of falls  Description: INTERVENTIONS:  - Educate patient/family on patient safety including physical limitations  - Instruct patient to call for assistance with activity   - Consult OT/PT to assist with strengthening/mobility   - Keep Call bell within reach  - Keep bed low and locked with side rails adjusted as appropriate  - Keep care items and personal belongings within reach  - Initiate and maintain comfort rounds  - Make Fall Risk Sign visible to staff  - Offer Toileting every 2 Hours, in advance of need  - Initiate/Maintain bed/chair alarm  - Obtain necessary fall risk management equipment: yellow bracelet and yellow socks   - Apply yellow socks and bracelet for high fall risk patients  - Consider moving patient to room near nurses station  Outcome: Progressing     Problem: Prexisting or High Potential for Compromised Skin Integrity  Goal: Skin integrity is maintained or improved  Description: INTERVENTIONS:  - Identify patients at risk for skin breakdown  - Assess and monitor skin integrity  - Assess and monitor nutrition and hydration status  - Monitor labs   - Assess for incontinence   - Turn and reposition patient  - Assist with mobility/ambulation  - Relieve pressure over bony prominences  - Avoid friction and shearing  - Provide appropriate hygiene as needed including keeping skin clean and dry  - Evaluate need for skin moisturizer/barrier cream  - Collaborate with interdisciplinary team   - Patient/family teaching  - Consider wound care consult   Outcome: Progressing     Problem: PAIN - ADULT  Goal: Verbalizes/displays adequate comfort level or baseline comfort level  Description: Interventions:  - Encourage patient to monitor pain and request assistance  - Assess pain using appropriate pain scale  - Administer analgesics based on type and severity of pain and evaluate response  - Implement non-pharmacological measures as appropriate and  evaluate response  - Consider cultural and social influences on pain and pain management  - Notify physician/advanced practitioner if interventions unsuccessful or patient reports new pain  Outcome: Progressing     Problem: INFECTION - ADULT  Goal: Absence or prevention of progression during hospitalization  Description: INTERVENTIONS:  - Assess and monitor for signs and symptoms of infection  - Monitor lab/diagnostic results  - Monitor all insertion sites, i.e. indwelling lines, tubes, and drains  - Monitor endotracheal if appropriate and nasal secretions for changes in amount and color  - [place] appropriate cooling/warming therapies per order  - Administer medications as ordered  - Instruct and encourage patient and family to use good hand hygiene technique  - Identify and instruct in appropriate isolation precautions for identified infection/condition  Outcome: Progressing  Goal: Absence of fever/infection during neutropenic period  Description: INTERVENTIONS:  - Monitor WBC    Outcome: Progressing     Problem: SAFETY ADULT  Goal: Patient will remain free of falls  Description: INTERVENTIONS:  - Educate patient/family on patient safety including physical limitations  - Instruct patient to call for assistance with activity   - Consult OT/PT to assist with strengthening/mobility   - Keep Call bell within reach  - Keep bed low and locked with side rails adjusted as appropriate  - Keep care items and personal belongings within reach  - Initiate and maintain comfort rounds  - Make Fall Risk Sign visible to staff  - Offer Toileting every 2 Hours, in advance of need  - Initiate/Maintain bed/chair alarm  - Obtain necessary fall risk management equipment: yellow bracelet and yellow socks   - Apply yellow socks and bracelet for high fall risk patients  - Consider moving patient to room near nurses station  Outcome: Progressing  Goal: Maintain or return to baseline ADL function  Description: INTERVENTIONS:  -  Assess  patient's ability to carry out ADLs; assess patient's baseline for ADL function and identify physical deficits which impact ability to perform ADLs (bathing, care of mouth/teeth, toileting, grooming, dressing, etc.)  - Assess/evaluate cause of self-care deficits   - Assess range of motion  - Assess patient's mobility; develop plan if impaired  - Assess patient's need for assistive devices and provide as appropriate  - Encourage maximum independence but intervene and supervise when necessary  - Involve family in performance of ADLs  - Assess for home care needs following discharge   - Consider OT consult to assist with ADL evaluation and planning for discharge  - Provide patient education as appropriate  Outcome: Progressing  Goal: Maintains/Returns to pre admission functional level  Description: INTERVENTIONS:  - Perform AM-PAC 6 Click Basic Mobility/ Daily Activity assessment daily.  - Set and communicate daily mobility goal to care team and patient/family/caregiver.   - Collaborate with rehabilitation services on mobility goals if consulted  - Perform Range of Motion 3 times a day.  - Reposition patient every 2 hours.  - Dangle patient 3 times a day  - Stand patient 3 times a day  - Ambulate patient 3 times a day  - Out of bed to chair 3 times a day   - Out of bed for meals 3 times a day  - Out of bed for toileting  - Record patient progress and toleration of activity level   Outcome: Progressing     Problem: DISCHARGE PLANNING  Goal: Discharge to home or other facility with appropriate resources  Description: INTERVENTIONS:  - Identify barriers to discharge w/patient and caregiver  - Arrange for needed discharge resources and transportation as appropriate  - Identify discharge learning needs (meds, wound care, etc.)  - Arrange for interpretive services to assist at discharge as needed  - Refer to Case Management Department for coordinating discharge planning if the patient needs post-hospital services based on  physician/advanced practitioner order or complex needs related to functional status, cognitive ability, or social support system  Outcome: Progressing     Problem: Knowledge Deficit  Goal: Patient/family/caregiver demonstrates understanding of disease process, treatment plan, medications, and discharge instructions  Description: Complete learning assessment and assess knowledge base.  Interventions:  - Provide teaching at level of understanding  - Provide teaching via preferred learning methods  Outcome: Progressing

## 2024-06-16 NOTE — ASSESSMENT & PLAN NOTE
Wt Readings from Last 3 Encounters:   06/16/24 95.7 kg (210 lb 15.7 oz)   04/12/24 95 kg (209 lb 7 oz)   03/28/24 95.7 kg (211 lb)     Home diuretic: Torsemide 20 Mg MWF and 10 Mg TRSS  Last echo 8/2023: LVEF 45%.  G1 DD.  Patient is euvolemic, hold off IV hydration  Same torsemide a.m.

## 2024-06-16 NOTE — PLAN OF CARE
Problem: Potential for Falls  Goal: Patient will remain free of falls  Description: INTERVENTIONS:  - Educate patient/family on patient safety including physical limitations  - Instruct patient to call for assistance with activity   - Consult OT/PT to assist with strengthening/mobility   - Keep Call bell within reach  - Keep bed low and locked with side rails adjusted as appropriate  - Keep care items and personal belongings within reach  - Initiate and maintain comfort rounds  - Make Fall Risk Sign visible to staff  - Offer Toileting every 2 Hours, in advance of need  - Initiate/Maintain alarm  - Obtain necessary fall risk management equipment:   - Apply yellow socks and bracelet for high fall risk patients  - Consider moving patient to room near nurses station  Outcome: Progressing     Problem: Prexisting or High Potential for Compromised Skin Integrity  Goal: Skin integrity is maintained or improved  Description: INTERVENTIONS:  - Identify patients at risk for skin breakdown  - Assess and monitor skin integrity  - Assess and monitor nutrition and hydration status  - Monitor labs   - Assess for incontinence   - Turn and reposition patient  - Assist with mobility/ambulation  - Relieve pressure over bony prominences  - Avoid friction and shearing  - Provide appropriate hygiene as needed including keeping skin clean and dry  - Evaluate need for skin moisturizer/barrier cream  - Collaborate with interdisciplinary team   - Patient/family teaching  - Consider wound care consult   Outcome: Progressing     Problem: PAIN - ADULT  Goal: Verbalizes/displays adequate comfort level or baseline comfort level  Description: Interventions:  - Encourage patient to monitor pain and request assistance  - Assess pain using appropriate pain scale  - Administer analgesics based on type and severity of pain and evaluate response  - Implement non-pharmacological measures as appropriate and evaluate response  - Consider cultural and  social influences on pain and pain management  - Notify physician/advanced practitioner if interventions unsuccessful or patient reports new pain  Outcome: Progressing     Problem: INFECTION - ADULT  Goal: Absence or prevention of progression during hospitalization  Description: INTERVENTIONS:  - Assess and monitor for signs and symptoms of infection  - Monitor lab/diagnostic results  - Monitor all insertion sites, i.e. indwelling lines, tubes, and drains  - Monitor endotracheal if appropriate and nasal secretions for changes in amount and color  - [place] appropriate cooling/warming therapies per order  - Administer medications as ordered  - Instruct and encourage patient and family to use good hand hygiene technique  - Identify and instruct in appropriate isolation precautions for identified infection/condition  Outcome: Progressing  Goal: Absence of fever/infection during neutropenic period  Description: INTERVENTIONS:  - Monitor WBC    Outcome: Progressing     Problem: SAFETY ADULT  Goal: Patient will remain free of falls  Description: INTERVENTIONS:  - Educate patient/family on patient safety including physical limitations  - Instruct patient to call for assistance with activity   - Consult OT/PT to assist with strengthening/mobility   - Keep Call bell within reach  - Keep bed low and locked with side rails adjusted as appropriate  - Keep care items and personal belongings within reach  - Initiate and maintain comfort rounds  - Make Fall Risk Sign visible to staff  - Offer Toileting every 2 Hours, in advance of need  - Initiate/Maintain alarm  - Obtain necessary fall risk management equipment:   - Apply yellow socks and bracelet for high fall risk patients  - Consider moving patient to room near nurses station  Outcome: Progressing  Goal: Maintain or return to baseline ADL function  Description: INTERVENTIONS:  -  Assess patient's ability to carry out ADLs; assess patient's baseline for ADL function and  identify physical deficits which impact ability to perform ADLs (bathing, care of mouth/teeth, toileting, grooming, dressing, etc.)  - Assess/evaluate cause of self-care deficits   - Assess range of motion  - Assess patient's mobility; develop plan if impaired  - Assess patient's need for assistive devices and provide as appropriate  - Encourage maximum independence but intervene and supervise when necessary  - Involve family in performance of ADLs  - Assess for home care needs following discharge   - Consider OT consult to assist with ADL evaluation and planning for discharge  - Provide patient education as appropriate  Outcome: Progressing  Goal: Maintains/Returns to pre admission functional level  Description: INTERVENTIONS:  - Perform AM-PAC 6 Click Basic Mobility/ Daily Activity assessment daily.  - Set and communicate daily mobility goal to care team and patient/family/caregiver.   - Collaborate with rehabilitation services on mobility goals if consulted  - Perform Range of Motion 2 times a day.  - Reposition patient every 2 hours.  - Dangle patient 2 times a day  - Stand patient 2 times a day  - Ambulate patient 2 times a day  - Out of bed to chair 2 times a day   - Out of bed for meals 2 times a day  - Out of bed for toileting  - Record patient progress and toleration of activity level   Outcome: Progressing

## 2024-06-17 LAB
ANION GAP SERPL CALCULATED.3IONS-SCNC: 5 MMOL/L (ref 4–13)
BASOPHILS # BLD AUTO: 0.05 THOUSANDS/ÂΜL (ref 0–0.1)
BASOPHILS NFR BLD AUTO: 1 % (ref 0–1)
BUN SERPL-MCNC: 14 MG/DL (ref 5–25)
CALCIUM SERPL-MCNC: 9.1 MG/DL (ref 8.4–10.2)
CHLORIDE SERPL-SCNC: 105 MMOL/L (ref 96–108)
CO2 SERPL-SCNC: 29 MMOL/L (ref 21–32)
CREAT SERPL-MCNC: 1.05 MG/DL (ref 0.6–1.3)
EOSINOPHIL # BLD AUTO: 0.97 THOUSAND/ÂΜL (ref 0–0.61)
EOSINOPHIL NFR BLD AUTO: 17 % (ref 0–6)
ERYTHROCYTE [DISTWIDTH] IN BLOOD BY AUTOMATED COUNT: 14.9 % (ref 11.6–15.1)
GFR SERPL CREATININE-BSD FRML MDRD: 62 ML/MIN/1.73SQ M
GLUCOSE SERPL-MCNC: 109 MG/DL (ref 65–140)
HCT VFR BLD AUTO: 34.6 % (ref 36.5–49.3)
HGB BLD-MCNC: 10.6 G/DL (ref 12–17)
IMM GRANULOCYTES # BLD AUTO: 0.01 THOUSAND/UL (ref 0–0.2)
IMM GRANULOCYTES NFR BLD AUTO: 0 % (ref 0–2)
LYMPHOCYTES # BLD AUTO: 1.6 THOUSANDS/ÂΜL (ref 0.6–4.47)
LYMPHOCYTES NFR BLD AUTO: 28 % (ref 14–44)
MAGNESIUM SERPL-MCNC: 1.8 MG/DL (ref 1.9–2.7)
MCH RBC QN AUTO: 28 PG (ref 26.8–34.3)
MCHC RBC AUTO-ENTMCNC: 30.6 G/DL (ref 31.4–37.4)
MCV RBC AUTO: 92 FL (ref 82–98)
MONOCYTES # BLD AUTO: 0.69 THOUSAND/ÂΜL (ref 0.17–1.22)
MONOCYTES NFR BLD AUTO: 12 % (ref 4–12)
NEUTROPHILS # BLD AUTO: 2.33 THOUSANDS/ÂΜL (ref 1.85–7.62)
NEUTS SEG NFR BLD AUTO: 42 % (ref 43–75)
NRBC BLD AUTO-RTO: 0 /100 WBCS
PHOSPHATE SERPL-MCNC: 3.5 MG/DL (ref 2.3–4.1)
PLATELET # BLD AUTO: 124 THOUSANDS/UL (ref 149–390)
PMV BLD AUTO: 13.4 FL (ref 8.9–12.7)
POTASSIUM SERPL-SCNC: 3.4 MMOL/L (ref 3.5–5.3)
RBC # BLD AUTO: 3.78 MILLION/UL (ref 3.88–5.62)
SODIUM SERPL-SCNC: 139 MMOL/L (ref 135–147)
WBC # BLD AUTO: 5.65 THOUSAND/UL (ref 4.31–10.16)

## 2024-06-17 PROCEDURE — 99232 SBSQ HOSP IP/OBS MODERATE 35: CPT | Performed by: STUDENT IN AN ORGANIZED HEALTH CARE EDUCATION/TRAINING PROGRAM

## 2024-06-17 PROCEDURE — 80048 BASIC METABOLIC PNL TOTAL CA: CPT | Performed by: INTERNAL MEDICINE

## 2024-06-17 PROCEDURE — 83735 ASSAY OF MAGNESIUM: CPT | Performed by: INTERNAL MEDICINE

## 2024-06-17 PROCEDURE — 99232 SBSQ HOSP IP/OBS MODERATE 35: CPT | Performed by: INTERNAL MEDICINE

## 2024-06-17 PROCEDURE — 97163 PT EVAL HIGH COMPLEX 45 MIN: CPT

## 2024-06-17 PROCEDURE — 97167 OT EVAL HIGH COMPLEX 60 MIN: CPT

## 2024-06-17 PROCEDURE — 97530 THERAPEUTIC ACTIVITIES: CPT

## 2024-06-17 PROCEDURE — 84100 ASSAY OF PHOSPHORUS: CPT | Performed by: INTERNAL MEDICINE

## 2024-06-17 PROCEDURE — 85025 COMPLETE CBC W/AUTO DIFF WBC: CPT | Performed by: INTERNAL MEDICINE

## 2024-06-17 RX ORDER — POTASSIUM CHLORIDE 20 MEQ/1
40 TABLET, EXTENDED RELEASE ORAL ONCE
Status: COMPLETED | OUTPATIENT
Start: 2024-06-17 | End: 2024-06-17

## 2024-06-17 RX ORDER — DIPHENHYDRAMINE HYDROCHLORIDE, ZINC ACETATE 2; .1 G/100G; G/100G
CREAM TOPICAL 3 TIMES DAILY PRN
Status: DISCONTINUED | OUTPATIENT
Start: 2024-06-17 | End: 2024-06-19 | Stop reason: HOSPADM

## 2024-06-17 RX ORDER — MAGNESIUM SULFATE HEPTAHYDRATE 40 MG/ML
2 INJECTION, SOLUTION INTRAVENOUS ONCE
Status: COMPLETED | OUTPATIENT
Start: 2024-06-17 | End: 2024-06-17

## 2024-06-17 RX ORDER — ONDANSETRON 4 MG/1
4 TABLET, ORALLY DISINTEGRATING ORAL EVERY 6 HOURS PRN
Status: DISCONTINUED | OUTPATIENT
Start: 2024-06-17 | End: 2024-06-19 | Stop reason: HOSPADM

## 2024-06-17 RX ADMIN — Medication 1 MG: at 21:16

## 2024-06-17 RX ADMIN — EZETIMIBE 10 MG: 10 TABLET ORAL at 08:33

## 2024-06-17 RX ADMIN — DICLOFENAC SODIUM 2 G: 10 GEL TOPICAL at 12:00

## 2024-06-17 RX ADMIN — Medication 250 MG: at 08:33

## 2024-06-17 RX ADMIN — MAGNESIUM SULFATE HEPTAHYDRATE 2 G: 2 INJECTION, SOLUTION INTRAVENOUS at 14:36

## 2024-06-17 RX ADMIN — Medication 250 MG: at 17:06

## 2024-06-17 RX ADMIN — ALUMINUM HYDROXIDE, MAGNESIUM HYDROXIDE, DIMETHICONE 30 ML: 200; 200; 20 LIQUID ORAL at 17:01

## 2024-06-17 RX ADMIN — DICLOFENAC SODIUM 2 G: 10 GEL TOPICAL at 21:16

## 2024-06-17 RX ADMIN — DEXTRAN 70, GLYCERIN, HYPROMELLOSE 1 DROP: 1; 2; 3 SOLUTION/ DROPS OPHTHALMIC at 17:06

## 2024-06-17 RX ADMIN — METRONIDAZOLE 500 MG: 500 INJECTION, SOLUTION INTRAVENOUS at 13:47

## 2024-06-17 RX ADMIN — APIXABAN 5 MG: 5 TABLET, FILM COATED ORAL at 08:33

## 2024-06-17 RX ADMIN — DICLOFENAC SODIUM 2 G: 10 GEL TOPICAL at 08:32

## 2024-06-17 RX ADMIN — POTASSIUM CHLORIDE 40 MEQ: 1500 TABLET, EXTENDED RELEASE ORAL at 13:47

## 2024-06-17 RX ADMIN — ALLOPURINOL 300 MG: 300 TABLET ORAL at 08:33

## 2024-06-17 RX ADMIN — ACETAMINOPHEN 650 MG: 325 TABLET, FILM COATED ORAL at 21:38

## 2024-06-17 RX ADMIN — GABAPENTIN 100 MG: 100 CAPSULE ORAL at 17:06

## 2024-06-17 RX ADMIN — TAMSULOSIN HYDROCHLORIDE 0.4 MG: 0.4 CAPSULE ORAL at 17:06

## 2024-06-17 RX ADMIN — GABAPENTIN 100 MG: 100 CAPSULE ORAL at 21:16

## 2024-06-17 RX ADMIN — LEVETIRACETAM 500 MG: 500 TABLET, FILM COATED ORAL at 21:16

## 2024-06-17 RX ADMIN — HYDROXYCHLOROQUINE SULFATE 200 MG: 200 TABLET ORAL at 08:33

## 2024-06-17 RX ADMIN — METRONIDAZOLE 500 MG: 500 INJECTION, SOLUTION INTRAVENOUS at 05:00

## 2024-06-17 RX ADMIN — LEVETIRACETAM 500 MG: 500 TABLET, FILM COATED ORAL at 08:33

## 2024-06-17 RX ADMIN — DOXYCYCLINE 100 MG: 100 CAPSULE ORAL at 21:16

## 2024-06-17 RX ADMIN — APIXABAN 5 MG: 5 TABLET, FILM COATED ORAL at 17:06

## 2024-06-17 RX ADMIN — HYDROXYCHLOROQUINE SULFATE 200 MG: 200 TABLET ORAL at 17:12

## 2024-06-17 RX ADMIN — METRONIDAZOLE 500 MG: 500 INJECTION, SOLUTION INTRAVENOUS at 21:16

## 2024-06-17 RX ADMIN — GABAPENTIN 100 MG: 100 CAPSULE ORAL at 08:59

## 2024-06-17 RX ADMIN — PANTOPRAZOLE SODIUM 40 MG: 40 TABLET, DELAYED RELEASE ORAL at 17:06

## 2024-06-17 RX ADMIN — ACETAMINOPHEN 650 MG: 325 TABLET, FILM COATED ORAL at 08:33

## 2024-06-17 RX ADMIN — PANTOPRAZOLE SODIUM 40 MG: 40 TABLET, DELAYED RELEASE ORAL at 06:04

## 2024-06-17 RX ADMIN — LEVOTHYROXINE SODIUM 50 MCG: 50 TABLET ORAL at 05:00

## 2024-06-17 RX ADMIN — CEFTRIAXONE 1000 MG: 1 INJECTION, SOLUTION INTRAVENOUS at 08:59

## 2024-06-17 RX ADMIN — SODIUM CHLORIDE 500 ML: 0.9 INJECTION, SOLUTION INTRAVENOUS at 15:31

## 2024-06-17 RX ADMIN — DOXYCYCLINE 100 MG: 100 CAPSULE ORAL at 08:33

## 2024-06-17 RX ADMIN — ONDANSETRON 4 MG: 4 TABLET, ORALLY DISINTEGRATING ORAL at 17:18

## 2024-06-17 NOTE — PHYSICAL THERAPY NOTE
PHYSICAL THERAPY EVALUATION NOTE      Patient Name: Matthew Krause  Today's Date: 2024    AGE:   89 y.o.  Mrn:   [id number]  ADMIT DX:  Diverticulitis [K57.92]  Weakness [R53.1]  Generalized weakness [R53.1]  Diarrhea, unspecified type [R19.7]    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HCC)     Spinal stenosis      Length Of Stay: 3  PHYSICAL THERAPY EVALUATION :   Patient's identity confirmed via 2 patient identifiers (full name and ) at start of session       24 1446   PT Last Visit   PT Visit Date 24   Note Type   Note type Evaluation   Pain Assessment   Pain Assessment Tool 0-10   Pain Score 6   Pain Location/Orientation   (low back and RLE)   Hospital Pain Intervention(s) Repositioned  (PT)   Restrictions/Precautions   Weight Bearing Precautions Per Order No   Other Precautions Chair Alarm;Bed Alarm;Multiple lines;O2;Fall Risk;Pain;Hard of hearing   Home Living   Type of Home Assisted living   Home Layout One level   Bathroom Shower/Tub Walk-in shower   Bathroom Toilet Raised   Home Equipment Walker;Wheelchair-manual;Hospital bed   Prior Function   Level of [place] Modified independent with wheelchair  (At baseline, transfers Mod Ind to toilet with grab bars at WC level, and transfers OOB to WC with squat pivot)   Lives With Facility staff   IADLs Family/Friend/Other provides transportation;Family/Friend/Other provides meals;Family/Friend/Other provides medication management   Falls in the last 6 months 0   Vocational Retired   General   Family/Caregiver Present No   Cognition   Overall Cognitive Status WFL   Arousal/Participation Alert   Attention Within functional limits   Orientation Level Oriented X4   Memory Within functional limits   Following Commands Follows  "all commands and directions without difficulty   Comments Pt c/o feeling very tired   Subjective   Subjective \"I'll try a little. I'm groggy.\"   RUE Assessment   RUE Assessment X  (See OT eval for details)   LUE Assessment   LUE Assessment X  (see OT eval for details)   RLE Assessment   RLE Assessment   (hip flex 3/5, knee ext 3-/5, ankle DF 3-/5)   LLE Assessment   LLE Assessment   (hip flex 3-/5, knee ext 4-/5, ankle DF 3+/5)   Vision-Basic Assessment   Current Vision Wears glasses only for reading   Light Touch   RLE Light Touch Impaired   RLE Light Touch Comments very dull to touch   LLE Light Touch Impaired   LLE Light Touch Comments very dull to touch   Bed Mobility   Rolling L 5  Supervision   Additional items Assist x 1;HOB elevated;Bedrails;Increased time required   Supine to Sit 5  Supervision   Additional items Assist x 1;Bedrails;HOB elevated;Increased time required   Sit to Supine 4  Minimal assistance   Additional items LE management;Increased time required   Additional Comments pt able to perform lateral scooting along EOB with min assist x 3 trials for repositioning   Transfers   Additional Comments pt deferred transfers at this time.   Ambulation/Elevation   Ambulation/Elevation Additional Comments unable to assess today   Balance   Static Sitting Fair +   Dynamic Sitting Fair   Activity Tolerance   Activity Tolerance Patient limited by fatigue   Medical Staff Made Aware [facility]   Assessment   Prognosis Good   Problem List Decreased strength;Decreased range of motion;Decreased endurance;Impaired balance;Decreased mobility;Decreased coordination;Impaired sensation;Pain   Assessment Matthew Krause is a 89 y.o. Male who presents to [facility] on 6/14/24 from the [facility] w/ c/o worsening diarrhea following antibiotics for diverticulitis. Orders for PT eval and treat received, w/ activity orders of up and out of bed as tolerated and fall precautions. Pt presents w/ comorbidities of GERD, gout, " remote history of C. difficile colitis, on chronic doxycycline for infection suppression related to right shoulder implant, hypertension, hyperlipidemia, rheumatoid arthritis, spinal stenosis. At baseline, pt mobilizes independently for transfers to a W/C, utilizing squat pivot technique. Upon evaluation, pt presents w/ the following deficits: weakness, decreased ROM, altered sensation, impaired coordination, impaired balance, decreased endurance, and pain limiting functional mobility. Pt currently demonstrates supervision for bed mobility - supine to sit but requires min assist for sit to supine for LE management. Pt was unable to tolerate OOB transfer trials during this session secondary to low back pain and fatigue. The patient's AM-PAC Basic Mobility Inpatient Short Form Raw Score is 13. A Raw score of less than or equal to 17 suggests the patient may benefit from discharge to post-acute rehabilitation services. Please also refer to the recommendation of the Physical Therapist for safe discharge planning. Based on current status, Level 2 rehab intensity is recommended at time of discharge. At this time, pt would benefit from continued skilled PT services, in the acute care setting, in order to address the abovementioned deficits and maximize independence and functional gains prior to discharge.   Goals   Patient Goals to get stronger, stay independent   [facility] Expiration Date 07/01/24   Short Term Goal #1 1. supine to sit modified independent. 2. sit to stand modified independent. 3. stand pivot transfer to W/C modified independent. 4. propel W/C >50ft with supervision.   PT Treatment Day 0   Plan   Treatment/Interventions Functional transfer training;LE strengthening/ROM;Therapeutic exercise;Endurance training;Patient/family training;Bed mobility;Gait training;Compensatory technique education;Spoke to case management   PT Frequency 2-3x/wk   Discharge Recommendation   Rehab Resource Intensity Level, PT II  (Moderate Resource Intensity)   AM-PAC Basic Mobility Inpatient   Turning in Flat Bed Without Bedrails 3   Lying on Back to Sitting on Edge of Flat Bed Without Bedrails 3   Moving Bed to Chair 2   Standing Up From Chair Using Arms 2   Walk in Room 2   Climb 3-5 Stairs With Railing 1   Basic Mobility Inpatient Raw Score 13   Basic Mobility Standardized Score 33.99   [facility] Highest Level Of Mobility   -[facility] Goal 4: Move to chair/commode   -[facility] Achieved 3: Sit at edge of bed   End of Consult   Patient Position at End of Consult Supine;Bed/Chair alarm activated;All needs within reach       The patient's AM-PAC Basic Mobility Inpatient Short Form Raw Score is 13, Standardized Score is 33.99. A standardized score less than 38.32 (raw score of 16) suggests the patient may benefit from discharge to post-acute rehabilitation services which may not coincide with above PT recommendations. However please refer to therapist recommendation for discharge planning given other factors that may influence destination.      Pt would benefit from skilled inpatient PT during this admission in order to facilitate progress towards goals to maximize functional independence    Barry García, PT

## 2024-06-17 NOTE — ASSESSMENT & PLAN NOTE
Lab Results   Component Value Date    EGFR 62 06/17/2024    EGFR 56 06/16/2024    EGFR 43 06/15/2024    CREATININE 1.05 06/17/2024    CREATININE 1.15 06/16/2024    CREATININE 1.42 (H) 06/15/2024   Creatinine 0.9-1.0  Creatinine meeting KAMLESH criteria   placed on continuous IVF suspect hypovolemia as contributory in setting of GI losses

## 2024-06-17 NOTE — PROGRESS NOTES
Progress note - [facility]   Matthew Krause 89 y.o. male MRN: [id number]  Unit/Bed#: -01 Encounter: [id number]    ASSESSMENT and PLAN  1.  Acute uncomplicated sigmoid diverticulitis  89M with hx/o a-fib on Eliquis, TIA, seizures on Keppra, hx/o erosive esophagitis who presented for diarrhea after starting abx for acute uncomplicated sigmoid diverticulitis. He presented to the ED on 6/10 with nausea and decreased appetite with abdominal fullness. CT showed acute diverticulitis involving the proximal sigmoid colon. He was discharged on Augmentin and started having more diarrhea. Admitted due to diarrhea, dehydration, and KAMLESH.    - 6/17: overall improved with no abdominal pain, passing formed stool, appetite improving. Does endorse significant nausea  - House diet (lo fiber)  - Abx: ceftriaxone day #3, metronidazole day #3 -> recommend 10 total days of current abx since switching from Augmentin  - Stool studies negative to date  - Recommend outpatient colonoscopy in 6-8 weeks    2.  Acute diarrhea  - Resolved as of 6/17; felt to be 2/2 Augmentin    3.  KAMLESH  - Resolved as of 6/17, felt to be 2/2 dehydration from diarrhea 2/2 Augmentin    4.  Erosive esophagitis  - Continue pantoprazole 40 mg BID    5.  Nausea  - Favor most likely 2/2 abx  - START ondansetron 8 mg Q6 prn  - Continue pantoprazole 40 mg BID  - Monitor/correct electrolytes in case of vomiting  - If no meaningful improvement after completing abx for diverticulitis, patient may benefit from outpatient EGD    SUBJECTIVE  Accompanied by self and history is obtained from self.    Lot of nausea this AM after drinking 1 cup coffee. Feels like he is going to vomit. Denies abdominal pain. Notes that he had nausea prior to admission but none in the past week until today.      Review of Systems   Constitutional:  Negative for appetite change, chills, fever and unexpected weight change.   HENT:  Negative for sore throat, trouble  swallowing and voice change.    Respiratory:  Negative for cough and choking.    Cardiovascular:  Negative for chest pain and leg swelling.   Gastrointestinal:  Positive for nausea. Negative for abdominal distention, abdominal pain, anal bleeding, blood in stool, constipation, diarrhea, rectal pain and vomiting.   Skin:  Negative for pallor and rash.   Psychiatric/Behavioral:  Negative for confusion and sleep disturbance. The patient is not nervous/anxious.        Temp:  [97.5 °F (36.4 °C)-97.7 °F (36.5 °C)] 97.7 °F (36.5 °C)  HR:  [85-98] 98  Resp:  [22] 22  BP: ()/(48-68) 102/66     PHYSICAL EXAM  Physical Exam  Vitals and nursing note reviewed.   Constitutional:       General: He is not in acute distress.     Appearance: He is not toxic-appearing.   HENT:      Head: Normocephalic and atraumatic.      Mouth/Throat:      Mouth: Mucous membranes are moist.      Pharynx: Oropharynx is clear.   Eyes:      General: No scleral icterus.     Extraocular Movements: Extraocular movements intact.   Neck:      Trachea: Phonation normal.   Cardiovascular:      Comments: Appears well perfused  Pulmonary:      Effort: Pulmonary effort is normal. No respiratory distress.   Abdominal:      General: Bowel sounds are normal. There is no distension or abdominal bruit.      Palpations: Abdomen is soft. There is no hepatomegaly or splenomegaly.      Tenderness: There is no abdominal tenderness. There is no guarding or rebound.   Musculoskeletal:      Cervical back: Neck supple.      Comments: Moving all 4 extremities spontaneously   Skin:     General: Skin is warm and dry.      Capillary Refill: Capillary refill takes less than 2 seconds.      Coloration: Skin is not jaundiced or pale.   Neurological:      General: No focal deficit present.      Mental Status: He is alert and oriented to person, place, and time.   Psychiatric:         Behavior: Behavior normal. Behavior is cooperative.         Thought Content: Thought content  normal.           LABS & STUDIES  Lab Results   Component Value Date    GLUCOSE 110 09/03/2023    CALCIUM 9.1 06/17/2024    K 3.4 (L) 06/17/2024    CO2 29 06/17/2024     06/17/2024    BUN 14 06/17/2024    CREATININE 1.05 06/17/2024    CREATININE 1.15 06/16/2024    CREATININE 1.42 (H) 06/15/2024     Lab Results   Component Value Date    WBC 5.65 06/17/2024    WBC 4.86 06/16/2024    WBC 5.48 06/15/2024    HGB 10.6 (L) 06/17/2024    HGB 10.3 (L) 06/16/2024    HGB 10.1 (L) 06/15/2024    MCV 92 06/17/2024     (L) 06/17/2024     (L) 06/16/2024     (L) 06/15/2024     Lab Results   Component Value Date    ALT 8 06/14/2024    ALT 6 (L) 06/10/2024    ALT 8 01/02/2024    AST 22 06/14/2024    AST 22 06/10/2024    AST 13 01/02/2024    ALKPHOS 88 06/14/2024    ALKPHOS 79 06/10/2024    ALKPHOS 67 01/02/2024    TBILI 0.52 06/14/2024    TBILI 0.61 06/10/2024    TBILI 0.6 01/02/2024     Lab Results   Component Value Date    LIPASE 20 06/10/2024     Lab Results   Component Value Date    IRON 16 (L) 08/31/2023    TIBC 139 (L) 08/31/2023    FERRITIN 141 08/31/2023     Lab Results   Component Value Date    INR 1.27 (H) 12/01/2023    INR 1.53 (H) 11/18/2023    INR 1.84 (H) 08/30/2023       XR chest portable    Result Date: 6/10/2024  Narrative: XR CHEST PORTABLE INDICATION: nausea, no appetite. COMPARISON: Same day CT chest abdomen pelvis, chest radiograph 12/01/2023 FINDINGS: Loop recorder projects over the left mid chest. Mild bibasilar atelectasis. Lungs are otherwise clear. No pneumothorax or pleural effusion. Stable cardiomediastinal silhouette. No acute osseous abnormalities. Right shoulder arthroplasty. Normal upper abdomen.     Impression: Mild bibasilar atelectasis. Lungs are otherwise clear. Resident: FAMILIA SUÁREZ I, the attending radiologist, have reviewed the images and agree with the final report above. Workstation performed: [id number]     CT chest abdomen pelvis wo contrast    Result Date:  6/10/2024  Narrative: CT CHEST, ABDOMEN AND PELVIS WITHOUT IV CONTRAST INDICATION: nausea similar to kidney stone in the past, bumped creatinine. COMPARISON: 4/12/2024. TECHNIQUE: CT examination of the chest, abdomen and pelvis was performed without intravenous contrast. Multiplanar 2D reformatted images were created from the source data. This examination, like all CT scans performed in the [facility], was performed utilizing techniques to minimize radiation dose exposure, including the use of iterative reconstruction and automated exposure control. Radiation dose length product (DLP) for this visit: 778.72 mGy-cm Enteric Contrast: Not administered. FINDINGS: CHEST LUNGS: Lungs are clear. No tracheal or endobronchial lesion. PLEURA: Unremarkable. HEART/GREAT VESSELS: There is coronary artery calcification. There is thoracic aortic calcification.. No thoracic aortic aneurysm. MEDIASTINUM AND ELIE: Unremarkable. CHEST WALL AND LOWER NECK: Unremarkable. ABDOMEN LIVER/BILIARY TREE: There are hepatic cysts. GALLBLADDER: Cholelithiasis without findings of acute cholecystitis. SPLEEN: Unremarkable. PANCREAS: Unremarkable. ADRENAL GLANDS: Unremarkable. KIDNEYS/URETERS: There is a stable calculus of the left ureterovesicular junction measuring approximately 4 mm. There is no evidence for hydronephrosis. There are renal cysts. STOMACH AND BOWEL: There is acute proximal sigmoid diverticulitis best appreciated series 2, image 184 with regional fat stranding noted. There is associated focal wall thickening at this level. There is no extraluminal free air. There is no abscess formation. APPENDIX: No findings to suggest appendicitis. ABDOMINOPELVIC CAVITY: No ascites. No pneumoperitoneum. No lymphadenopathy. Small focus of fat necrosis in the mid abdomen. VESSELS: Unremarkable for patient's age. PELVIS REPRODUCTIVE ORGANS: Unremarkable for patient's age. URINARY BLADDER: Unremarkable. ABDOMINAL WALL/INGUINAL  REGIONS: Unremarkable. BONES: Postsurgical changes in the spine. Bones are osteopenic. There are degenerative changes. 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 Status post right shoulder arthroplasty.     Impression: Mild acute diverticulitis involving the proximal sigmoid colon. No extraluminal free air or abscess formation identified Stable 4 mm left UVJ calculus without evidence for upstream hydronephrosis. No focal airspace consolidation identified. The study was marked in EPIC for immediate notification. Workstation performed: [id number]       Patient expressed understanding and had all questions and concerns addressed.    Laura Maldonado PA-C   06/17/24  10:03 AM    This chart was completed in part utilizing [employer] speech voice recognition software. Random word insertions, pronoun errors, and incomplete sentences are an occasional consequence of this system due to software limitations, and ambient noise. Any questions or concerns about the content, text, or information contained within the body of this dictation should be directly addressed to the provider for clarification.

## 2024-06-17 NOTE — PLAN OF CARE
Problem: OCCUPATIONAL THERAPY ADULT  Goal: Performs self-care activities at highest level of function for planned discharge setting.  See evaluation for individualized goals.  Description: Treatment Interventions: ADL retraining, UE strengthening/ROM, Functional transfer training, Equipment evaluation/education, Patient/family training, Continued evaluation, Activityengagement, Energy conservation          See flowsheet documentation for full assessment, interventions and recommendations.   Outcome: Progressing  Note: Limitation: Decreased UE strength, Decreased endurance, Decreased ADL status, Decreased UE ROM  Prognosis: Good  Assessment: Pt is a 89 y.o. male seen for OT evaluation at [facility], admitted 6/14/2024 w/ Diverticulitis and recurring loose stool. OT completed extensive review of pt's medical and social history. Comorbidities affecting pt's functional performance at time of assessment include: paroxysmal A-fib, systolic heart failure, h/o seizures (controlled with Keppra), CKD stage II, HTN, HLD, h/o spinal stenosis and pain related to arthritis, h/o WC level functional mobility and prior TIA . Personal factors affecting pt at time of IE include: difficulty performing ADLS, difficulty performing IADLS, and decreased functional mobility with different setup for transfers (related to equipment, pt typically transfers to lower height , rather than recliners in this hospital setting). Personal factors affecting pt at time of IE include:difficulty performing ADLS and difficulty performing IADLS . Prior to admission, pt was living at [facility] with assistance for ADL's, IADL's including meals and transportation and was Mod Ind at  for all functional mobility, but did intermittently have assist for squat pivot transfers without AD. Upon evaluation, pt presents to OT close to baseline performance skills, but slight deficit due to the following performance deficits: weakness, decreased ROM, decreased  strength, decreased balance, impaired GMC, and increased pain. Pt to benefit from continued skilled OT tx while in the hospital to address deficits as defined above and maximize level of functional independence w ADL's and functional mobility. Occupational Performance areas to address include: grooming, bathing/shower, toilet hygiene, dressing, functional mobility, community mobility, and clothing management. The patient's raw score on the AM-PAC Daily Activity inpatient short form is 15, standardized score is 34.69, less than 39.4. Patients at this level are likely to benefit from DC to post-acute rehabilitation services. Pt is near baseline functioning and is typically assisted with all ADL's and high level transfers during ADL's; recommending discharge to home with home health OT services. Based on findings, pt is of high complexity, due to present impairments which are a regression from the patient's baseline. At this time, OT recommendations at time of discharge are level III with home health services.     Rehab Resource Intensity Level, OT: III (Minimum Resource Intensity)

## 2024-06-17 NOTE — PLAN OF CARE
Problem: Potential for Falls  Goal: Patient will remain free of falls  Description: INTERVENTIONS:  - Educate patient/family on patient safety including physical limitations  - Instruct patient to call for assistance with activity   - Consult OT/PT to assist with strengthening/mobility   - Keep Call bell within reach  - Keep bed low and locked with side rails adjusted as appropriate  - Keep care items and personal belongings within reach  - Initiate and maintain comfort rounds  - Make Fall Risk Sign visible to staff  - Offer Toileting every 2 Hours, in advance of need  - Initiate/Maintain bed/ chair alarm  - Apply yellow socks and bracelet for high fall risk patients  - Consider moving patient to room near nurses station  Outcome: Progressing     Problem: Prexisting or High Potential for Compromised Skin Integrity  Goal: Skin integrity is maintained or improved  Description: INTERVENTIONS:  - Identify patients at risk for skin breakdown  - Assess and monitor skin integrity  - Assess and monitor nutrition and hydration status  - Monitor labs   - Assess for incontinence   - Turn and reposition patient  - Assist with mobility/ambulation  - Relieve pressure over bony prominences  - Avoid friction and shearing  - Provide appropriate hygiene as needed including keeping skin clean and dry  - Evaluate need for skin moisturizer/barrier cream  - Collaborate with interdisciplinary team   - Patient/family teaching  - Consider wound care consult   Outcome: Progressing     Problem: PAIN - ADULT  Goal: Verbalizes/displays adequate comfort level or baseline comfort level  Description: Interventions:  - Encourage patient to monitor pain and request assistance  - Assess pain using appropriate pain scale  - Administer analgesics based on type and severity of pain and evaluate response  - Implement non-pharmacological measures as appropriate and evaluate response  - Consider cultural and social influences on pain and pain  management  - Notify physician/advanced practitioner if interventions unsuccessful or patient reports new pain  Outcome: Progressing

## 2024-06-17 NOTE — ASSESSMENT & PLAN NOTE
In the ED on 6/10 for evaluation of nausea and decreased oral intake-CT A/P at that time provide abnormal diverticulitis, so patient was initiated on Augmentin  Shortly after starting Augmentin patient then developed significant diarrhea of 5-7 episodes of watery stool  Check stool studies-if negative, could utilize Imodium  Transition Augmentin to ceftriaxone and Flagyl  GI consulted  Abdomen is soft and nontender on admission, therefore will hold on repeat CT A/P at this time  Continue on antibiotics to complete the course

## 2024-06-17 NOTE — ASSESSMENT & PLAN NOTE
Wt Readings from Last 3 Encounters:   06/17/24 95.4 kg (210 lb 5.1 oz)   04/12/24 95 kg (209 lb 7 oz)   03/28/24 95.7 kg (211 lb)     Home diuretic: Torsemide 20 Mg MWF and 10 Mg TRSS  Last echo 8/2023: LVEF 45%.  G1 DD.  Patient is euvolemic, hold off IV hydration  Same torsemide a.m.

## 2024-06-17 NOTE — PROGRESS NOTES
Counts include 234 beds at the [facility]  Progress Note  Name: Matthew Krause I  MRN: [id number]  Unit/Bed#: -01 I Date of Admission: 6/14/2024   Date of Service: 6/17/2024 I Hospital Day: 3    Assessment & Plan   Using prophylactic antibiotic on daily basis  Assessment & Plan  Stated he had right shoulder replacement with reversal.  Had infection.  Follows up with Raheel.  Patient was told not a candidate for revision and need to stay on chronic doxycycline  And doxycycline 50 mg twice daily as per previous chart review unsure when it was changed to doxycycline 100 twice daily      Stage 2 chronic kidney disease  Assessment & Plan  Lab Results   Component Value Date    EGFR 62 06/17/2024    EGFR 56 06/16/2024    EGFR 43 06/15/2024    CREATININE 1.05 06/17/2024    CREATININE 1.15 06/16/2024    CREATININE 1.42 (H) 06/15/2024   Creatinine 0.9-1.0  Creatinine meeting KAMLESH criteria   placed on continuous IVF suspect hypovolemia as contributory in setting of GI losses    Hypothyroidism  Assessment & Plan  Continue home Synthroid 50 mcg daily    HLD (hyperlipidemia)  Assessment & Plan  Continue home Zetia    Hypomagnesemia  Assessment & Plan  Monitor and replace electrolytes    Focal seizure with retained awareness  Assessment & Plan  Maintained on Keppra 500 Mg twice daily  Seizures are episodes of speech difficulty per review of admission from 11/20/2023    Paroxysmal A-fib (HCC)  Assessment & Plan  AC: Eliquis 5 Mg twice daily  RC: Metoprolol succinate 25 Mg twice daily  Continue home medications    Chronic systolic heart failure (HCC)  Assessment & Plan  Wt Readings from Last 3 Encounters:   06/17/24 95.4 kg (210 lb 5.1 oz)   04/12/24 95 kg (209 lb 7 oz)   03/28/24 95.7 kg (211 lb)     Home diuretic: Torsemide 20 Mg MWF and 10 Mg TRSS  Last echo 8/2023: LVEF 45%.  G1 DD.  Patient is euvolemic, hold off IV hydration  Same torsemide a.m.    History of TIA (transient ischemic attack)  Assessment & Plan  History  of TIA in the past that presented as word finding difficulties  Maintained on Eliquis due to history of paroxysmal A-fib and Zetia, continue    RA (rheumatoid arthritis) (HCC)  Assessment & Plan  Continue home hydroxychloroquine 200 Mg twice daily, no longer daily prednisone    History of hypertension  Assessment & Plan  Maintained on metoprolol succinate 25 Mg twice daily  Continue    * Diverticulitis  Assessment & Plan  In the ED on 6/10 for evaluation of nausea and decreased oral intake-CT A/P at that time provide abnormal diverticulitis, so patient was initiated on Augmentin  Shortly after starting Augmentin patient then developed significant diarrhea of 5-7 episodes of watery stool  Check stool studies-if negative, could utilize Imodium  Transition Augmentin to ceftriaxone and Flagyl  GI consulted  Abdomen is soft and nontender on admission, therefore will hold on repeat CT A/P at this time  Continue on antibiotics to complete the course             Labs & Imaging: I have personally reviewed pertinent reports.      VTE Prophylaxis: in place.    Code Status:   Level 1 - Full Code    Patient Centered Rounds: I have performed bedside rounds with nursing staff today.    Mobility:   Basic Mobility Inpatient Raw Score: 12  JH-HLM Goal: 4: Move to chair/commode  JH-HLM Achieved: 4: Move to chair/commode  JH-HLM Goal NOT achieved. Continue with multidisciplinary rounding and encourage appropriate mobility to improve upon JH-HLM goals.    Discussions with Specialists or Other Care Team Provider: GI    Education and Discussions with Family / Patient: Son at bedside    Total Time Spent on Date of Encounter in care of patient: 35 mins. This time was spent on one or more of the following: performing physical exam; counseling and coordination of care; obtaining or reviewing history; documenting in the medical record; reviewing/ordering tests, medications or procedures; communicating with other healthcare professionals and  "discussing with patient's family/caregivers.    Current Length of Stay: 3 day(s)    Current Patient Status: Inpatient   Certification Statement: The patient will continue to require additional inpatient hospital stay due to see my assessment and plan.     Subjective:   Patient is seen and examined at bedside.  Complains of of nausea and had an episode of vomiting this morning.  Denies any abdominal pain.  No further diarrhea.  Afebrile  All other ROS are negative.    Objective:    Vitals: Blood pressure 102/66, pulse 98, temperature 97.7 °F (36.5 °C), temperature source Temporal, resp. rate 22, height 5' 8\" (1.727 m), weight 95.4 kg (210 lb 5.1 oz), SpO2 98%.,Body mass index is 31.98 kg/m².  SPO2 RA Rest      Flowsheet Row ED to Hosp-Admission (Current) from 6/14/2024 in [facility]   SpO2 98 %   SpO2 Activity At Rest   O2 Device Nasal cannula   O2 Flow Rate --          I&O:   Intake/Output Summary (Last 24 hours) at 6/17/2024 1205  Last data filed at 6/17/2024 0900  Gross per 24 hour   Intake 631 ml   Output 325 ml   Net 306 ml       Physical Exam:    General- Alert, lying comfortably in bed. Not in any acute distress.  Neck- Supple, No JVD  CVS- regular, S1 and S2 normal  Chest- Bilateral Air entry, No rhochi, crackles or wheezing present.  Abdomen- soft, nontender, not distended, no guarding or rigidity, BS+  Extremities-  No pedal edema, No calf tenderness                         Normal ROM in all extremities.  CNS-   Alert, awake and orientedx3. No focal deficits present.    Invasive Devices       Peripheral Intravenous Line  Duration             Peripheral IV 06/14/24 Right;Upper;Ventral (anterior) Arm 2 days                          Social History  reviewed  Family History   Problem Relation Age of Onset    Seizures Son     Colon polyps Neg Hx     Colon cancer Neg Hx     reviewed    Meds:  Current Facility-Administered Medications   Medication Dose Route Frequency Provider Last " Rate Last Admin    acetaminophen (TYLENOL) tablet 650 mg  650 mg Oral Q6H PRN DUDLEY Rojas-C   650 mg at 06/17/24 0833    allopurinol (ZYLOPRIM) tablet 300 mg  300 mg Oral Daily DUDLEY Rojas-C   300 mg at 06/17/24 0833    aluminum-magnesium hydroxide-simethicone (MAALOX) oral suspension 30 mL  30 mL Oral Q6H PRN DUDLEY Rojas-C   30 mL at 06/16/24 0517    apixaban (ELIQUIS) tablet 5 mg  5 mg Oral BID DUDLEY Rojas-C   5 mg at 06/17/24 0833    Artificial Tears ophthalmic solution 1 drop  1 drop Both Eyes BID Tierra Eduardo MD        cefTRIAXone (ROCEPHIN) IVPB (premix in dextrose) 1,000 mg 50 mL  1,000 mg Intravenous Q24H PARI RojasC 100 mL/hr at 06/17/24 0859 1,000 mg at 06/17/24 0859    Diclofenac Sodium (VOLTAREN) 1 % topical gel 2 g  2 g Topical 4x Daily PARI RojasC   2 g at 06/17/24 0832    doxycycline hyclate (VIBRAMYCIN) capsule 100 mg  100 mg Oral Q12H DUDLEY Rojas-C   100 mg at 06/17/24 0833    ezetimibe (ZETIA) tablet 10 mg  10 mg Oral Daily DUDLEY Rojas-C   10 mg at 06/17/24 0833    fluticasone (FLONASE) 50 mcg/act nasal spray 1 spray  1 spray Nasal BID PRN PARI RojasC        gabapentin (NEURONTIN) capsule 100 mg  100 mg Oral TID DUDLEY Rojas-C   100 mg at 06/17/24 0859    hydroxychloroquine (PLAQUENIL) tablet 200 mg  200 mg Oral BID With Meals DUDLEY Rojas-C   200 mg at 06/17/24 0833    levETIRAcetam (KEPPRA) tablet 500 mg  500 mg Oral Q12H ODALYS DUDLEY Rojas-C   500 mg at 06/17/24 0833    levothyroxine tablet 50 mcg  50 mcg Oral Early Morning Erin Null PA-C   50 mcg at 06/17/24 0500    lidocaine (LIDODERM) 5 % patch 1 patch  1 patch Topical Daily Tierra Eduardo MD   1 patch at 06/16/24 0949    magnesium sulfate 2 g/50 mL IVPB (premix) 2 g  2 g Intravenous Once Darrell Flower MD        melatonin tablet 1 mg  1 mg Oral HS Erin Null PA-C   1 mg at 06/16/24 8475     metroNIDAZOLE (FLAGYL) IVPB (premix) 500 mg 100 mL  500 mg Intravenous Q8H Erin Null PA-C   Stopped at 06/17/24 0605    ondansetron (ZOFRAN-ODT) dispersible tablet 4 mg  4 mg Oral Q6H PRN Laura Maldonado PA-C        pantoprazole (PROTONIX) EC tablet 40 mg  40 mg Oral BID KATHY Bliss PA-C   40 mg at 06/17/24 0604    potassium chloride (Klor-Con M20) CR tablet 40 mEq  40 mEq Oral Once Darrell Flower MD        saccharomyces boulardii (FLORASTOR) capsule 250 mg  250 mg Oral BID Erin Null PA-C   250 mg at 06/17/24 0833    tamsulosin (FLOMAX) capsule 0.4 mg  0.4 mg Oral Daily With Dinner Erin Null PA-C   0.4 mg at 06/16/24 1536        Medications Prior to Admission:     allopurinol (ZYLOPRIM) 100 mg tablet    apixaban (Eliquis) 5 mg    Diclofenac Sodium (VOLTAREN) 1 %    doxycycline hyclate (VIBRAMYCIN) 100 mg capsule    ezetimibe (ZETIA) 10 mg tablet    gabapentin (NEURONTIN) 100 mg capsule    hydroxychloroquine (PLAQUENIL) 200 mg tablet    levETIRAcetam (Keppra) 500 mg tablet    metoprolol succinate (TOPROL-XL) 25 mg 24 hr tablet    omeprazole (PriLOSEC) 20 mg delayed release capsule    potassium chloride (KLOR-CON) 20 mEq packet    saccharomyces boulardii (FLORASTOR) 250 mg capsule    Synthroid 50 MCG tablet    tamsulosin (FLOMAX) 0.4 mg    torsemide (DEMADEX) 10 mg tablet    torsemide (DEMADEX) 20 mg tablet    ACETAMINOPHEN ER PO    albuterol (2.5 mg/3 mL) 0.083 % nebulizer solution    amoxicillin-clavulanate (AUGMENTIN) 875-125 mg per tablet    fluticasone (FLONASE) 50 mcg/act nasal spray    lidocaine (LIDODERM) 5 %    loperamide (IMODIUM) 2 mg capsule    melatonin 1 mg    nystatin (MYCOSTATIN) powder    ondansetron (ZOFRAN) 4 mg tablet    polyethylene glycol (MIRALAX) 17 g packet    psyllium (METAMUCIL SMOOTH TEXTURE) 28 % packet    psyllium (METAMUCIL) 58.6 % packet    Labs:  Results from last 7 days   Lab Units 06/17/24  0515 06/16/24  0509 06/15/24  0553 06/14/24  1924    WBC Thousand/uL 5.65 4.86 5.48 6.52   HEMOGLOBIN g/dL 10.6* 10.3* 10.1* 12.4   HEMATOCRIT % 34.6* 32.9* 33.0* 39.8   PLATELETS Thousands/uL 124* 115* 110* 157   SEGS PCT % 42*  --   --   --    LYMPHO PCT % 28  --   --  26   MONO PCT % 12  --   --  9   EOS PCT % 17*  --   --  16*     Results from last 7 days   Lab Units 06/17/24  0515 06/16/24  0509 06/15/24  0553 06/14/24  1924   POTASSIUM mmol/L 3.4* 3.4* 3.7 3.9   CHLORIDE mmol/L 105 104 105 101   CO2 mmol/L 29 27 27 31   BUN mg/dL 14 17 19 19   CREATININE mg/dL 1.05 1.15 1.42* 1.45*   CALCIUM mg/dL 9.1 8.5 8.4 9.0   ALK PHOS U/L  --   --   --  88   ALT U/L  --   --   --  8   AST U/L  --   --   --  22     Lab Results   Component Value Date    TROPONINI <0.02 08/28/2020    CKTOTAL 68 12/01/2023    CKTOTAL 273 08/30/2023         Lab Results   Component Value Date    BLOODCX No Growth After 5 Days. 11/18/2023    BLOODCX No Growth After 5 Days. 11/18/2023    BLOODCX No Growth After 5 Days. 08/31/2023    BLOODCX No Growth After 5 Days. 08/31/2023    URINECX >100,000 cfu/ml Klebsiella pneumoniae (A) 12/01/2023    URINECX No Growth <1000 cfu/mL 01/26/2023         Imaging:  Results for orders placed during the hospital encounter of 06/10/24    XR chest portable    Narrative  XR CHEST PORTABLE    INDICATION: nausea, no appetite.    COMPARISON: Same day CT chest abdomen pelvis, chest radiograph 12/01/2023    FINDINGS: Loop recorder projects over the left mid chest.    Mild bibasilar atelectasis. Lungs are otherwise clear.    No pneumothorax or pleural effusion.    Stable cardiomediastinal silhouette.    No acute osseous abnormalities. Right shoulder arthroplasty.    Normal upper abdomen.    Impression  Mild bibasilar atelectasis. Lungs are otherwise clear.        Resident: FAMILIA SUÁREZ I, the attending radiologist, have reviewed the images and agree with the final report above.    Workstation performed: [id number]    No results found for this or any previous  visit.      Last 24 Hours Medication List:   Current Facility-Administered Medications   Medication Dose Route Frequency Provider Last Rate    acetaminophen  650 mg Oral Q6H PRN Erin Null PA-C      allopurinol  300 mg Oral Daily Erin Null PA-C      aluminum-magnesium hydroxide-simethicone  30 mL Oral Q6H PRN Erin Null PA-C      apixaban  5 mg Oral BID Erin Null PA-C      Artificial Tears  1 drop Both Eyes BID Tierra Eduardo MD      cefTRIAXone  1,000 mg Intravenous Q24H Erin Null PA-C 1,000 mg (06/17/24 0859)    Diclofenac Sodium  2 g Topical 4x Daily Erin Null PA-C      doxycycline hyclate  100 mg Oral Q12H Erin Null PA-C      ezetimibe  10 mg Oral Daily Erin Null PA-C      fluticasone  1 spray Nasal BID PRN Erin Null PA-C      gabapentin  100 mg Oral TID Erin Null PA-C      hydroxychloroquine  200 mg Oral BID With Meals Erin Null PA-C      levETIRAcetam  500 mg Oral Q12H ODALYS Erin Null PA-C      levothyroxine  50 mcg Oral Early Morning Erin Null PA-C      lidocaine  1 patch Topical Daily Tierra Eduardo MD      magnesium sulfate  2 g Intravenous Once Darrell Flower MD      melatonin  1 mg Oral HS Erin Null PA-C      metroNIDAZOLE  500 mg Intravenous Q8H Erin Null PA-C Stopped (06/17/24 0605)    ondansetron  4 mg Oral Q6H PRN Laura Maldonado PA-C      pantoprazole  40 mg Oral BID AC Sonia Bliss PA-C      potassium chloride  40 mEq Oral Once Darrell Flower MD      saccharomyces boulardii  250 mg Oral BID Erin Null PA-C      tamsulosin  0.4 mg Oral Daily With Dinner Erin Null PA-C          Today, Patient Was Seen By: Darrell Flower MD    ** Please Note: Dictation voice to text software may have been used in the creation of this document. **

## 2024-06-17 NOTE — OCCUPATIONAL THERAPY NOTE
Occupational Therapy Evaluation & Treatment     Patient Name: Matthew Krause  Today's Date: 6/17/2024  Problem List  Principal Problem:    Diverticulitis  Active Problems:    History of hypertension    RA (rheumatoid arthritis) (HCC)    History of TIA (transient ischemic attack)    Chronic systolic heart failure (HCC)    Paroxysmal A-fib (HCC)    Focal seizure with retained awareness    Hypomagnesemia    HLD (hyperlipidemia)    Hypothyroidism    Stage 2 chronic kidney disease    Using prophylactic antibiotic on daily basis    Past Medical History  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HCC)     Spinal stenosis      Past Surgical History  Past Surgical History:   Procedure Laterality Date    CARDIAC ELECTROPHYSIOLOGY PROCEDURE N/A 12/17/2022    Procedure: Cardiac loop recorder implant;  Surgeon: Negrito Hollingsworth MD;  Location: [facility];  Service: Cardiology    CARPAL TUNNEL RELEASE Bilateral     COLONOSCOPY      LAMINECTOMY      TOTAL KNEE ARTHROPLASTY Bilateral     TOTAL SHOULDER REPLACEMENT           06/17/24 0815   OT Last Visit   OT Visit Date 06/17/24   Note Type   Note type Evaluation   Pain Assessment   Pain Assessment Tool 0-10   Pain Score 7   Pain Location/Orientation Orientation: Left;Location: Leg;Location: Hip   Pain Radiating Towards distally, towards LLE   Patient's Stated Pain Goal No pain   Restrictions/Precautions   Weight Bearing Precautions Per Order No   Other Precautions Chair Alarm;Bed Alarm;Seizure;Fall Risk;Pain;Hard of hearing  (Hearing aides in room)   Home Living   Type of Home Assisted living   Home Layout One level;Performs ADLs on one level;Access   Bathroom Shower/Tub Walk-in shower   Bathroom Toilet Raised   Bathroom Equipment Grab bars in shower;Grab bars around toilet;Commode;Shower chair   Bathroom Accessibility Accessible via  "wheelchair   Home Equipment Walker;Wheelchair-manual;Hospital bed;Grab bars   Prior Function   Level of [place] Needs assistance with ADLs;Modified independent with wheelchair;Needs assistance with IADLS  (At baseline, transfers Mod Ind to toilet with grab bars at WC level, and transfers OOB to WC with squat pivot)   Lives With Facility staff   Receives Help From Personal care attendant   IADLs Family/Friend/Other provides transportation;Family/Friend/Other provides meals;Family/Friend/Other provides medication management   Falls in the last 6 months 0   Vocational Retired   Comments 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   Lifestyle   Reciprocal Relationships , wife passed away 2 years ago, proud father   General   Family/Caregiver Present No   Subjective   Subjective \"They were surpised that I was doing everything by myself, and they want me to call for more help\" (re: ADL's and transfers at his home)   ADL   Where Assessed Chair   Eating Assistance 5  Supervision/Setup   Eating Deficit Setup;Other (Comment)  (opening packages)   Grooming Assistance 5  Supervision/Setup   UB Bathing Assistance 4  Minimal Assistance   UB Bathing Deficit Other (Comment)  (anticiipated level of assist 2* shoulder ROM <90 degrees bilaterally)   LB Bathing Assistance 3  Moderate Assistance   LB Bathing Deficit Perineal area;Right lower leg including foot;Left lower leg including foot;Other (Comment)  (anticipated level of assist, not formally completed during session)   UB Dressing Assistance 4  Minimal Assistance   UB Dressing Deficit Pull over head;Pull down in back;Increased time to complete;Other (Comment)   LB Dressing Assistance 3  Moderate Assistance   Toileting Assistance  4  Minimal Assistance   Additional Comments Pt performed bed mobility, transfer OOB to bedside chair and eating during session. Other ADL levels of assist are estimated based of current performance skills during evaluation "   Bed Mobility   Rolling L 5  Supervision   Additional items Assist x 1;HOB elevated;Bedrails;Increased time required   Supine to Sit 5  Supervision   Additional items Assist x 1;HOB elevated;Bedrails;Increased time required   Transfers   Sit to Stand 4  Minimal assistance   Additional items Assist x 1;Increased time required;Verbal cues  (use of RW)   Stand to Sit 4  Minimal assistance   Additional items Assist x 1;Other  (RW)   Stand pivot 4  Minimal assistance   Additional items Assist x 2;Increased time required;Other  (RW, 2nd person for standby safety for 1st transfer OOB this AM. However, patient required 1 person hands-on assist with RW for transfer at Min A level.)   Balance   Static Sitting Fair +   Dynamic Sitting Fair -   Static Standing Poor +   Dynamic Standing Poor -   Activity Tolerance   Activity Tolerance Patient limited by pain   Medical Staff Made Aware SOMMER Colin present throughout session   Nurse Made Aware SOMMER Colin   RUANN MARIE Assessment   RUE Assessment X   RUE Overall AROM   R Shoulder Flexion 80 degrees  (h/o reverse TSA)   R Shoulder Extension WFL   R Shoulder ABduction 80 degrees   R Elbow Flexion WFL   R Elbow Extension WFL   R Elbow Supination WFL   R Elbow Pronation WFL   R Wrist Flexion WFL   R Wrist Extension WFL   R Mass Grasp WFL   Edema   RUE Edema None   LUE Assessment   LUE Assessment X   LUE Overall AROM   L Shoulder Flexion 80 degrees   L Shoulder ABduction 80 degrees   L Shoulder ADduction WFL   L Elbow Flexion WFL   L Elbow Extension WFL   L Elbow Supination WFL   L Elbow Pronation WFL   L Wrist Flexion WFL   L Wrist Extension WFL   L Mass Grasp WFL   Edema   LUE Edema +1   Hand Function   Gross Motor Coordination Impaired  (bradykinetic and limited by pain. Pt also reporting dense numbness BLE's *unable to detect sensation to feet bilaterally)   Fine Motor Coordination Impaired   Vision-Basic Assessment   Current Vision Wears glasses only for reading   Psychosocial   Psychosocial (WDL)  WDL   Patient Behaviors/Mood Appropriate for situation;Calm;Cooperative;Pleasant   Cognition   Overall Cognitive Status WFL   Arousal/Participation Alert;Cooperative   Attention Within functional limits   Orientation Level Oriented X4   Memory Within functional limits   Following Commands Follows all commands and directions without difficulty   Assessment   Limitation Decreased UE strength;Decreased endurance;Decreased ADL status;Decreased UE ROM   Prognosis Good   Assessment Pt is a 89 y.o. male seen for OT evaluation at [facility], admitted 6/14/2024 w/ Diverticulitis and recurring loose stool. OT completed extensive review of pt's medical and social history. Comorbidities affecting pt's functional performance at time of assessment include: paroxysmal A-fib, systolic heart failure, h/o seizures (controlled with Keppra), CKD stage II, HTN, HLD, h/o spinal stenosis and pain related to arthritis, h/o WC level functional mobility and prior TIA . Personal factors affecting pt at time of IE include: difficulty performing ADLS, difficulty performing IADLS, and decreased functional mobility with different setup for transfers (related to equipment, pt typically transfers to lower height , rather than recliners in this hospital setting). Personal factors affecting pt at time of IE include:difficulty performing ADLS and difficulty performing IADLS . Prior to admission, pt was living at [facility] with assistance for ADL's, IADL's including meals and transportation and was Mod Ind at  for all functional mobility, but did intermittently have assist for squat pivot transfers without AD. Upon evaluation, pt presents to OT close to baseline performance skills, but slight deficit due to the following performance deficits: weakness, decreased ROM, decreased strength, decreased balance, impaired GMC, and increased pain. Pt to benefit from continued skilled OT tx while in the hospital to address deficits as defined above and  maximize level of functional independence w ADL's and functional mobility. Occupational Performance areas to address include: grooming, bathing/shower, toilet hygiene, dressing, functional mobility, community mobility, and clothing management. The patient's raw score on the AM-PAC Daily Activity inpatient short form is 15, standardized score is 34.69, less than 39.4. Patients at this level are likely to benefit from DC to post-acute rehabilitation services. Pt is near baseline functioning and is typically assisted with all ADL's and high level transfers during ADL's; recommending discharge to home with home health OT services. Based on findings, pt is of high complexity, due to present impairments which are a regression from the patient's baseline. At this time, OT recommendations at time of discharge are level III with home health services.   Goals   Patient Goals To increase strength and return to [facility] residential   Plan   Treatment Interventions ADL retraining;UE strengthening/ROM;Functional transfer training;Equipment evaluation/education;Patient/family training;Continued evaluation;Activityengagement;Energy conservation   Goal Expiration Date 06/27/24   OT Treatment Day 0   OT Frequency 2-3x/wk   Discharge Recommendation   Rehab Resource Intensity Level, OT III (Minimum Resource Intensity)   AM-PAC Daily Activity Inpatient   Lower Body Dressing 2   Bathing 2   Toileting 2   Upper Body Dressing 3   Grooming 3   Eating 3   Daily Activity Raw Score 15   Daily Activity Standardized Score (Calc for Raw Score >=11) 34.69   AM-PAC Applied Cognition Inpatient   Following a Speech/Presentation 4   Understanding Ordinary Conversation 4   Taking Medications 3   Remembering Where Things Are Placed or Put Away 4   Remembering List of 4-5 Errands 3   Taking Care of Complicated Tasks 3   Applied Cognition Raw Score 21   Applied Cognition Standardized Score 44.3   Additional Treatment Session   Start Time 0845   End Time 0820    Treatment Assessment Pt agreeable to transfer training with goal of transferring to BS chair. Pt required cues for RW hand placement with pushing up from surface. Pt required A x 2 with 2nd person as standby assist and Min A for hands-on by OT to ensure safety with first transfer OOB this morning. Pt reporting that his R LE knee was buckling during transfer; however, no losses of balance and pt was able to advocate for appropriate level of care thorughout session. Pt reports that [facility] skilled nursing can provide his current level of care. Additionally, he plans to call for help more readily upon DC from this hospital once medically stable to DC.   Additional Treatment Day 0   End of Consult   Education Provided Yes   Patient Position at End of Consult Bed/Chair alarm activated;All needs within reach  (RN Dixie present)   Nurse Communication Nurse aware of consult       Pt will achieve the following goals within 10 days.    *Pt will complete grooming with sitting with Supervision.    *Pt will complete UB dressing with SBA.    *Pt will complete LB dressing with Min A .    *Pt will complete toileting (hygiene and clothing management) with Min A.    *Pt will perform functional transfers with SBA x 1 in order to complete ADL routine.    *Pt will complete item retrieval and light home management with Mod Ind at WC level while demonstrating good safety.    *Pt will demonstrate increased activity tolerance in order to complete ADL routine.    *Pt will participate in cognitive assessment to determine level of safety for returning home    *Pt will participate in UE therapeutic exercise in order to maximize strength for ADL transfers.       Gloria Sousa OTR/L

## 2024-06-17 NOTE — CASE MANAGEMENT
Case Management Discharge Planning Note    Patient name Matthew Krause  Location /-01 MRN [id number]  : 1934 Date 2024       Current Admission Date: 2024  Current Admission Diagnosis:Diverticulitis   Patient Active Problem List    Diagnosis Date Noted Date Diagnosed    Using prophylactic antibiotic on daily basis 06/15/2024     Diverticulitis 2024     Stroke-like symptom 2023     Electrolyte abnormality 2023     Difficulty with speech 2023     Stage 2 chronic kidney disease 2023     Ureterolithiasis 2023     Aneurysm of ascending aorta without rupture (HCC) 2023     Urinary retention 2023     Bacteremia due to group B Streptococcus 2023     Respiratory insufficiency 2023     Cellulitis of left upper extremity 2023     HLD (hyperlipidemia) 2023     GERD (gastroesophageal reflux disease) 2023     Gout without acute exacerbation  2023     Spinal stenosis 2023     Hypothyroidism 2023     Peripheral vascular disease (HCC) 2023     Open wound of right great toe with damage to nail 2023     Adverse effect of loop (high-ceiling) diuretics, subsequent encounter 2023     Toxic metabolic encephalopathy 2023     Septic arthritis of shoulder, right (HCC) 2023     Suture of skin wound 2023     Hypomagnesemia 2023     Elevated TSH 2023     Chronic systolic heart failure (HCC) 2023     Paroxysmal A-fib ([facility]) 2023     Focal seizure with retained awareness 2023     Dilated cardiomyopathy ([facility]) 10/18/2022     HTN (hypertension) 10/18/2022     Low left ventricular ejection fraction 10/11/2022     History of TIA (transient ischemic attack) 10/10/2022     Speech disturbance 10/08/2022     Accidental overdose 2020     History of hypertension 2020     Hypotension 2020     RA (rheumatoid arthritis) ([facility]) 2020       LOS  (days): 3  Geometric Mean LOS (GMLOS) (days): 2.6  Days to GMLOS:-0.1     OBJECTIVE:  Risk of Unplanned Readmission Score: 23.81         Current admission status: Inpatient   Preferred Pharmacy:   [facility] - [address] [address]  Phone: [phone] Fax: [phone]    Primary Care Provider: ESTEFANIA Pimentel    Primary Insurance: [facility]  Secondary Insurance:     DISCHARGE DETAILS:    Additional Comments: CM Attempted to meet with pt at bedside however pt was nauseous and vomiting, not able to answer questions at that time. Referral sent to [facility] who reports that pt [facility] at the [facility]. PT reports that pt is requiring additional assistance including a 2 person transfer. CM sent message to [facility] inquiring about whether the [facility] is able to manage that or if they feel pt would be better suited in their skilled side prior to returning to the [facility]. CM department to return to complete assessment when pt is feeling less ill.

## 2024-06-17 NOTE — PLAN OF CARE
Problem: Potential for Falls  Goal: Patient will remain free of falls  Description: INTERVENTIONS:  - Educate patient/family on patient safety including physical limitations  - Instruct patient to call for assistance with activity   - Consult OT/PT to assist with strengthening/mobility   - Keep Call bell within reach  - Keep bed low and locked with side rails adjusted as appropriate  - Keep care items and personal belongings within reach  - Initiate and maintain comfort rounds  - Make Fall Risk Sign visible to staff  - Apply yellow socks and bracelet for high fall risk patients  - Consider moving patient to room near nurses station  Outcome: Progressing     Problem: Prexisting or High Potential for Compromised Skin Integrity  Goal: Skin integrity is maintained or improved  Description: INTERVENTIONS:  - Identify patients at risk for skin breakdown  - Assess and monitor skin integrity  - Assess and monitor nutrition and hydration status  - Monitor labs   - Assess for incontinence   - Turn and reposition patient  - Assist with mobility/ambulation  - Relieve pressure over bony prominences  - Avoid friction and shearing  - Provide appropriate hygiene as needed including keeping skin clean and dry  - Evaluate need for skin moisturizer/barrier cream  - Collaborate with interdisciplinary team   - Patient/family teaching  - Consider wound care consult   Outcome: Progressing     Problem: SAFETY ADULT  Goal: Patient will remain free of falls  Description: INTERVENTIONS:  - Educate patient/family on patient safety including physical limitations  - Instruct patient to call for assistance with activity   - Consult OT/PT to assist with strengthening/mobility   - Keep Call bell within reach  - Keep bed low and locked with side rails adjusted as appropriate  - Keep care items and personal belongings within reach  - Initiate and maintain comfort rounds  - Make Fall Risk Sign visible to staff  -- Apply yellow socks and bracelet for  high fall risk patients  - Consider moving patient to room near nurses station  Outcome: Progressing     Problem: Nutrition/Hydration-ADULT  Goal: Nutrient/Hydration intake appropriate for improving, restoring or maintaining nutritional needs  Description: Monitor and assess patient's nutrition/hydration status for malnutrition. Collaborate with interdisciplinary team and initiate plan and interventions as ordered.  Monitor patient's weight and dietary intake as ordered or per policy. Utilize nutrition screening tool and intervene as necessary. Determine patient's food preferences and provide high-protein, high-caloric foods as appropriate.     INTERVENTIONS:  - Monitor oral intake, urinary output, labs, and treatment plans  - Assess nutrition and hydration status and recommend course of action  - Evaluate amount of meals eaten  - Assist patient with eating if necessary   - Allow adequate time for meals  - Recommend/ encourage appropriate diets, oral nutritional supplements, and vitamin/mineral supplements  - Order, calculate, and assess calorie counts as needed  - Recommend, monitor, and adjust tube feedings and TPN/PPN based on assessed needs  - Assess need for intravenous fluids  - Provide specific nutrition/hydration education as appropriate  - Include patient/family/caregiver in decisions related to nutrition  Outcome: Progressing     Problem: RESPIRATORY - ADULT  Goal: Achieves optimal ventilation and oxygenation  Description: INTERVENTIONS:  - Assess for changes in respiratory status  - Assess for changes in mentation and behavior  - Position to facilitate oxygenation and minimize respiratory effort  - Oxygen administered by appropriate delivery if ordered  - Initiate smoking cessation education as indicated  - Encourage broncho-pulmonary hygiene including cough, deep breathe, Incentive Spirometry  - Assess the need for suctioning and aspirate as needed  - Assess and instruct to report SOB or any respiratory  difficulty  - Respiratory Therapy support as indicated  Outcome: Progressing

## 2024-06-17 NOTE — PLAN OF CARE
Problem: PHYSICAL THERAPY ADULT  Goal: Performs mobility at highest level of function for planned discharge setting.  See evaluation for individualized goals.  Description: Treatment/Interventions: Functional transfer training, LE strengthening/ROM, Therapeutic exercise, Endurance training, Patient/family training, Bed mobility, Gait training, Compensatory technique education, Spoke to case management          See flowsheet documentation for full assessment, interventions and recommendations.  Note: Prognosis: Good  Problem List: Decreased strength, Decreased range of motion, Decreased endurance, Impaired balance, Decreased mobility, Decreased coordination, Impaired sensation, Pain  Assessment: Matthew Krause is a 89 y.o. Male who presents to [facility] on [facility]/14/24 from the [facility] w/ c/o worsening diarrhea following antibiotics for diverticulitis. Orders for PT eval and treat received, w/ activity orders of up and out of bed as tolerated and fall precautions. Pt presents w/ comorbidities of GERD, gout, remote history of C. difficile colitis, on chronic doxycycline for infection suppression related to right shoulder implant, hypertension, hyperlipidemia, rheumatoid arthritis, spinal stenosis. At baseline, pt mobilizes independently for transfers to a W/C, utilizing squat pivot technique. Upon evaluation, pt presents w/ the following deficits: weakness, decreased ROM, altered sensation, impaired coordination, impaired balance, decreased endurance, and pain limiting functional mobility. Pt currently demonstrates supervision for bed mobility - supine to sit but requires min assist for sit to supine for LE management. Pt was unable to tolerate OOB transfer trials during this session secondary to low back pain and fatigue. The patient's AM-PAC Basic Mobility Inpatient Short Form Raw Score is 13. A Raw score of less than or equal to 17 suggests the patient may benefit from discharge to post-acute rehabilitation  services. Please also refer to the recommendation of the Physical Therapist for safe discharge planning. Based on current status, Level 2 rehab intensity is recommended at time of discharge. At this time, pt would benefit from continued skilled PT services, in the acute care setting, in order to address the abovementioned deficits and maximize independence and functional gains prior to discharge.        Rehab Resource Intensity Level, PT: II (Moderate Resource Intensity)    See flowsheet documentation for full assessment.

## 2024-06-18 ENCOUNTER — TELEPHONE (OUTPATIENT)
Age: 89
End: 2024-06-18

## 2024-06-18 LAB
ANION GAP SERPL CALCULATED.3IONS-SCNC: 3 MMOL/L (ref 4–13)
BASOPHILS # BLD AUTO: 0.04 THOUSANDS/ÂΜL (ref 0–0.1)
BASOPHILS NFR BLD AUTO: 1 % (ref 0–1)
BUN SERPL-MCNC: 10 MG/DL (ref 5–25)
CALCIUM SERPL-MCNC: 9.3 MG/DL (ref 8.4–10.2)
CHLORIDE SERPL-SCNC: 108 MMOL/L (ref 96–108)
CO2 SERPL-SCNC: 29 MMOL/L (ref 21–32)
CREAT SERPL-MCNC: 0.97 MG/DL (ref 0.6–1.3)
EOSINOPHIL # BLD AUTO: 1.01 THOUSAND/ÂΜL (ref 0–0.61)
EOSINOPHIL NFR BLD AUTO: 20 % (ref 0–6)
ERYTHROCYTE [DISTWIDTH] IN BLOOD BY AUTOMATED COUNT: 15.2 % (ref 11.6–15.1)
GFR SERPL CREATININE-BSD FRML MDRD: 68 ML/MIN/1.73SQ M
GLUCOSE SERPL-MCNC: 106 MG/DL (ref 65–140)
HCT VFR BLD AUTO: 34.4 % (ref 36.5–49.3)
HGB BLD-MCNC: 10.6 G/DL (ref 12–17)
IMM GRANULOCYTES # BLD AUTO: 0.01 THOUSAND/UL (ref 0–0.2)
IMM GRANULOCYTES NFR BLD AUTO: 0 % (ref 0–2)
LYMPHOCYTES # BLD AUTO: 1.56 THOUSANDS/ÂΜL (ref 0.6–4.47)
LYMPHOCYTES NFR BLD AUTO: 31 % (ref 14–44)
MAGNESIUM SERPL-MCNC: 2.1 MG/DL (ref 1.9–2.7)
MCH RBC QN AUTO: 28.8 PG (ref 26.8–34.3)
MCHC RBC AUTO-ENTMCNC: 30.8 G/DL (ref 31.4–37.4)
MCV RBC AUTO: 94 FL (ref 82–98)
MONOCYTES # BLD AUTO: 0.6 THOUSAND/ÂΜL (ref 0.17–1.22)
MONOCYTES NFR BLD AUTO: 12 % (ref 4–12)
NEUTROPHILS # BLD AUTO: 1.75 THOUSANDS/ÂΜL (ref 1.85–7.62)
NEUTS SEG NFR BLD AUTO: 36 % (ref 43–75)
NRBC BLD AUTO-RTO: 0 /100 WBCS
PLATELET # BLD AUTO: 122 THOUSANDS/UL (ref 149–390)
PMV BLD AUTO: 12.9 FL (ref 8.9–12.7)
POTASSIUM SERPL-SCNC: 3.7 MMOL/L (ref 3.5–5.3)
RBC # BLD AUTO: 3.68 MILLION/UL (ref 3.88–5.62)
SODIUM SERPL-SCNC: 140 MMOL/L (ref 135–147)
WBC # BLD AUTO: 4.97 THOUSAND/UL (ref 4.31–10.16)

## 2024-06-18 PROCEDURE — 99232 SBSQ HOSP IP/OBS MODERATE 35: CPT | Performed by: INTERNAL MEDICINE

## 2024-06-18 PROCEDURE — 99231 SBSQ HOSP IP/OBS SF/LOW 25: CPT | Performed by: INTERNAL MEDICINE

## 2024-06-18 PROCEDURE — 85025 COMPLETE CBC W/AUTO DIFF WBC: CPT | Performed by: INTERNAL MEDICINE

## 2024-06-18 PROCEDURE — 80048 BASIC METABOLIC PNL TOTAL CA: CPT | Performed by: INTERNAL MEDICINE

## 2024-06-18 PROCEDURE — 83735 ASSAY OF MAGNESIUM: CPT | Performed by: INTERNAL MEDICINE

## 2024-06-18 RX ADMIN — CEFTRIAXONE 1000 MG: 1 INJECTION, SOLUTION INTRAVENOUS at 07:48

## 2024-06-18 RX ADMIN — HYDROXYCHLOROQUINE SULFATE 200 MG: 200 TABLET ORAL at 17:20

## 2024-06-18 RX ADMIN — HYDROXYCHLOROQUINE SULFATE 200 MG: 200 TABLET ORAL at 07:53

## 2024-06-18 RX ADMIN — TAMSULOSIN HYDROCHLORIDE 0.4 MG: 0.4 CAPSULE ORAL at 17:20

## 2024-06-18 RX ADMIN — GABAPENTIN 100 MG: 100 CAPSULE ORAL at 07:49

## 2024-06-18 RX ADMIN — DOXYCYCLINE 100 MG: 100 CAPSULE ORAL at 07:49

## 2024-06-18 RX ADMIN — GABAPENTIN 100 MG: 100 CAPSULE ORAL at 21:08

## 2024-06-18 RX ADMIN — APIXABAN 5 MG: 5 TABLET, FILM COATED ORAL at 07:49

## 2024-06-18 RX ADMIN — DICLOFENAC SODIUM 2 G: 10 GEL TOPICAL at 11:05

## 2024-06-18 RX ADMIN — METRONIDAZOLE 500 MG: 500 INJECTION, SOLUTION INTRAVENOUS at 05:38

## 2024-06-18 RX ADMIN — Medication 250 MG: at 17:20

## 2024-06-18 RX ADMIN — LEVOTHYROXINE SODIUM 50 MCG: 50 TABLET ORAL at 05:39

## 2024-06-18 RX ADMIN — ALLOPURINOL 300 MG: 300 TABLET ORAL at 07:48

## 2024-06-18 RX ADMIN — ONDANSETRON 4 MG: 4 TABLET, ORALLY DISINTEGRATING ORAL at 21:52

## 2024-06-18 RX ADMIN — Medication 1 MG: at 21:08

## 2024-06-18 RX ADMIN — DICLOFENAC SODIUM 2 G: 10 GEL TOPICAL at 21:15

## 2024-06-18 RX ADMIN — GABAPENTIN 100 MG: 100 CAPSULE ORAL at 17:20

## 2024-06-18 RX ADMIN — PANTOPRAZOLE SODIUM 40 MG: 40 TABLET, DELAYED RELEASE ORAL at 06:12

## 2024-06-18 RX ADMIN — METRONIDAZOLE 500 MG: 500 INJECTION, SOLUTION INTRAVENOUS at 21:07

## 2024-06-18 RX ADMIN — ONDANSETRON 4 MG: 4 TABLET, ORALLY DISINTEGRATING ORAL at 13:17

## 2024-06-18 RX ADMIN — DEXTRAN 70, GLYCERIN, HYPROMELLOSE 1 DROP: 1; 2; 3 SOLUTION/ DROPS OPHTHALMIC at 07:51

## 2024-06-18 RX ADMIN — EZETIMIBE 10 MG: 10 TABLET ORAL at 07:49

## 2024-06-18 RX ADMIN — DIPHENHYDRAMINE HYDROCHLORIDE, ZINC ACETATE: 2; .1 CREAM TOPICAL at 08:52

## 2024-06-18 RX ADMIN — APIXABAN 5 MG: 5 TABLET, FILM COATED ORAL at 17:20

## 2024-06-18 RX ADMIN — LEVETIRACETAM 500 MG: 500 TABLET, FILM COATED ORAL at 07:50

## 2024-06-18 RX ADMIN — DEXTRAN 70, GLYCERIN, HYPROMELLOSE 1 DROP: 1; 2; 3 SOLUTION/ DROPS OPHTHALMIC at 17:20

## 2024-06-18 RX ADMIN — Medication 250 MG: at 07:50

## 2024-06-18 RX ADMIN — METRONIDAZOLE 500 MG: 500 INJECTION, SOLUTION INTRAVENOUS at 13:48

## 2024-06-18 RX ADMIN — DICLOFENAC SODIUM 2 G: 10 GEL TOPICAL at 07:51

## 2024-06-18 RX ADMIN — PANTOPRAZOLE SODIUM 40 MG: 40 TABLET, DELAYED RELEASE ORAL at 17:20

## 2024-06-18 RX ADMIN — ACETAMINOPHEN 650 MG: 325 TABLET, FILM COATED ORAL at 11:08

## 2024-06-18 RX ADMIN — DICLOFENAC SODIUM 2 G: 10 GEL TOPICAL at 17:20

## 2024-06-18 RX ADMIN — DOXYCYCLINE 100 MG: 100 CAPSULE ORAL at 21:08

## 2024-06-18 RX ADMIN — LEVETIRACETAM 500 MG: 500 TABLET, FILM COATED ORAL at 21:08

## 2024-06-18 NOTE — ASSESSMENT & PLAN NOTE
Lab Results   Component Value Date    EGFR 68 06/18/2024    EGFR 62 06/17/2024    EGFR 56 06/16/2024    CREATININE 0.97 06/18/2024    CREATININE 1.05 06/17/2024    CREATININE 1.15 06/16/2024   Creatinine 0.9-1.0  Creatinine meeting KAMLESH criteria   placed on continuous IVF suspect hypovolemia as contributory in setting of GI losses

## 2024-06-18 NOTE — PROGRESS NOTES
Progress note - Gastroenterology   Matthew Krause 89 y.o. male MRN: [id number]  Unit/Bed#: -01 Encounter: [id number]    ASSESSMENT and PLAN      1.  Acute uncomplicated sigmoid diverticulitis  89M with hx/o a-fib on Eliquis, TIA, seizures on Keppra, hx/o erosive esophagitis who presented for diarrhea after starting abx for acute uncomplicated sigmoid diverticulitis. He presented to the ED on 6/10 with nausea and decreased appetite with abdominal fullness. CT showed acute diverticulitis involving the proximal sigmoid colon. He was discharged on Augmentin and started having more diarrhea. Admitted due to diarrhea, dehydration, and KAMLESH.     - 6/18: overall improved with no abdominal pain, passing formed stool, appetite improving.  Patient denies nausea at this time.  Discussed with patient with family at bedside that frequent small meals likely be helpful.  Try adding yogurt to his dietary regiment.  Also will make sure patient prescribed Nexium 20 mg twice daily prior to discharge.  Patient will likely continue and finish regiment of oral antibiotics.  Likely increased nausea created by added antibiotic use.  - House diet (lo fiber)  - Abx: ceftriaxone day #3, metronidazole day #3 -> recommend 10 total days of current abx since switching from Augmentin.  Discussed with Dr. Cervantes and patient should be discharged on equivalent oral antibiotics as currently on at discharge.  - Stool studies negative to date  - Recommend outpatient colonoscopy in 6-8 weeks    Patient has follow-up appointment with Dr. Nicholas in the office on 7/9.     2.  Acute diarrhea  -Patient formed stool this morning.  Initial diarrhea likely due to Augmentin.     3.  KAMLESH  - Resolved as of 6/17, felt to be 2/2 dehydration from diarrhea 2/2 Augmentin     4.  Erosive esophagitis  - Continue pantoprazole 40 mg BID    Patient to be discharged on Nexium 20 mg po twice daily.     5.  Nausea  -Patient denies nausea at this time.  Discussed with  "patient and family at the bedside.  Nausea likely from antibiotic use.  Will discharge on dose recommended by GI provider prior to admission at Nexium 20 mg twice daily.  Patient's daughter feels that he had less nausea when receiving the 40 mg dosing prior to admission.    Recommended that patient offered Zofran prior to meals if he continues to have increased nausea after eating.      Chief Complaint   Patient presents with    Weakness - Generalized     3 days of diarrhea and feeling more weak today, sent from [facility].       SUBJECTIVE/HPI   Patient denies abdominal pain.  He denies nausea at this time however states he does have waves of nausea especially in the morning if taking pills without eating.  Patient family at the bedside.  Discussed continued antibiotic use as well as making sure patient is getting Nexium 20 mg twice daily as prescribed by GI provider previous.  Patient likely will be discharged to rehab today.  Discussed with family to make sure patient continues his follow-up appointment with Dr. Nicholas on 7/9.    /66   Pulse 86   Temp 97.5 °F (36.4 °C)   Resp 14   Ht 5' 8\" (1.727 m)   Wt 95.4 kg (210 lb 5.1 oz)   SpO2 97%   BMI 31.98 kg/m²     PHYSICALEXAM  General appearance: alert, appears stated age and cooperative  Eyes: PERLLA, EOMI, no icterus   Head: Normocephalic, without obvious abnormality, atraumatic  Lungs: clear to auscultation bilaterally  Heart: regular rate and rhythm, S1, S2 normal, no murmur, click, rub or gallop  Abdomen: Large, soft, non-tender; bowel sounds normal; no masses,  no organomegaly  Extremities: extremities normal, atraumatic, no cyanosis or edema, increased bilateral upper extremity discoloration.  Neurologic: Grossly normal    Lab Results   Component Value Date    GLUCOSE 110 09/03/2023    CALCIUM 9.3 06/18/2024    K 3.7 06/18/2024    CO2 29 06/18/2024     06/18/2024    BUN 10 06/18/2024    CREATININE 0.97 06/18/2024     Lab Results   Component " "Value Date    WBC 4.97 06/18/2024    HGB 10.6 (L) 06/18/2024    HCT 34.4 (L) 06/18/2024    MCV 94 06/18/2024     (L) 06/18/2024     Lab Results   Component Value Date    ALT 8 06/14/2024    AST 22 06/14/2024    ALKPHOS 88 06/14/2024     No results found for: \"AMYLASE\"  Lab Results   Component Value Date    LIPASE 20 06/10/2024     Lab Results   Component Value Date    IRON 16 (L) 08/31/2023    TIBC 139 (L) 08/31/2023    FERRITIN 141 08/31/2023     Lab Results   Component Value Date    INR 1.27 (H) 12/01/2023     "

## 2024-06-18 NOTE — CASE MANAGEMENT
Case Management Discharge Planning Note    Patient name Matthew Krause  Location /-01 MRN [id number]  : 1934 Date 2024       Current Admission Date: 2024  Current Admission Diagnosis:Diverticulitis   Patient Active Problem List    Diagnosis Date Noted Date Diagnosed    Using prophylactic antibiotic on daily basis 06/15/2024     Diverticulitis 2024     Stroke-like symptom 2023     Electrolyte abnormality 2023     Difficulty with speech 2023     Stage 2 chronic kidney disease 2023     Ureterolithiasis 2023     Aneurysm of ascending aorta without rupture (HCC) 2023     Urinary retention 2023     Bacteremia due to group B Streptococcus 2023     Respiratory insufficiency 2023     Cellulitis of left upper extremity 2023     HLD (hyperlipidemia) 2023     GERD (gastroesophageal reflux disease) 2023     Gout without acute exacerbation  2023     Spinal stenosis 2023     Hypothyroidism 2023     Peripheral vascular disease (HCC) 2023     Open wound of right great toe with damage to nail 2023     Adverse effect of loop (high-ceiling) diuretics, subsequent encounter 2023     Toxic metabolic encephalopathy 2023     Septic arthritis of shoulder, right (HCC) 2023     Suture of skin wound 2023     Hypomagnesemia 2023     Elevated TSH 2023     Chronic systolic heart failure (HCC) 2023     Paroxysmal A-fib ([facility]) 2023     Focal seizure with retained awareness 2023     Dilated cardiomyopathy ([facility]) 10/18/2022     HTN (hypertension) 10/18/2022     Low left ventricular ejection fraction 10/11/2022     History of TIA (transient ischemic attack) 10/10/2022     Speech disturbance 10/08/2022     Accidental overdose 2020     History of hypertension 2020     Hypotension 2020     RA (rheumatoid arthritis) ([facility]) 2020       LOS  (days): 4  Geometric Mean LOS (GMLOS) (days): 2.6  Days to GMLOS:-1     OBJECTIVE:  Risk of Unplanned Readmission Score: 24.36         Current admission status: Inpatient   Preferred Pharmacy:   [facility] - [address] [address]  Phone: [phone] Fax: [phone]    Primary Care Provider: ESTEFANIA Pimentel    Primary Insurance: [facility]  Secondary Insurance:     DISCHARGE DETAILS:     Additional Comments: CM spoke with pt, pt's son, and pt's DIL at bedside who are in agreement that pt would benefit from going to [facility] for a period of time at discharge. Auth request sent to Discharge Support, ERIN department to follow for auth determination.

## 2024-06-18 NOTE — CASE MANAGEMENT
Case Management Assessment & Discharge Planning Note    Patient name Matthew Krause  Location /-01 MRN [id number]  : 1934 Date 2024       Current Admission Date: 2024  Current Admission Diagnosis:Diverticulitis   Patient Active Problem List    Diagnosis Date Noted Date Diagnosed    Using prophylactic antibiotic on daily basis 06/15/2024     Diverticulitis 2024     Stroke-like symptom 2023     Electrolyte abnormality 2023     Difficulty with speech 2023     Stage 2 chronic kidney disease 2023     Ureterolithiasis 2023     Aneurysm of ascending aorta without rupture ([facility]) 2023     Urinary retention 2023     Bacteremia due to group B Streptococcus 2023     Respiratory insufficiency 2023     Cellulitis of left upper extremity 2023     HLD (hyperlipidemia) 2023     GERD (gastroesophageal reflux disease) 2023     Gout without acute exacerbation  2023     Spinal stenosis 2023     Hypothyroidism 2023     Peripheral vascular disease (HCC) 2023     Open wound of right great toe with damage to nail 2023     Adverse effect of loop (high-ceiling) diuretics, subsequent encounter 2023     Toxic metabolic encephalopathy 2023     Septic arthritis of shoulder, right (HCC) 2023     Suture of skin wound 2023     Hypomagnesemia 2023     Elevated TSH 2023     Chronic systolic heart failure (HCC) 2023     Paroxysmal A-fib ([facility]) 2023     Focal seizure with retained awareness 2023     Dilated cardiomyopathy ([facility]) 10/18/2022     HTN (hypertension) 10/18/2022     Low left ventricular ejection fraction 10/11/2022     History of TIA (transient ischemic attack) 10/10/2022     Speech disturbance 10/08/2022     Accidental overdose 2020     History of hypertension 2020     Hypotension 2020     RA (rheumatoid arthritis) ([facility]) 2020        LOS (days): 4  Geometric Mean LOS (GMLOS) (days): 2.6  Days to GMLOS:-0.8     OBJECTIVE:    Risk of Unplanned Readmission Score: 24.3         Current admission status: Inpatient       Preferred Pharmacy:   [facility] - [place], [place] - [address]  [address] [facility] [address]  Phone: [phone] Fax: [phone]    Primary Care Provider: ESTEFANIA Pimentel    Primary Insurance: [employer] [facility] [employer]  Secondary Insurance:     ASSESSMENT:  Active Health Care Proxies       Kerwin Krause Health Care Agent - Ernie   Primary Phone: [phone] (Mobile)                 Advance Directives  Does patient have a Health Care POA?: Yes  Does patient have Advance Directives?: Yes  Advance Directives: Power of  for health care  Primary Contact: Kerwin Krause/Ernie ([phone])    Readmission Root Cause  30 Day Readmission: No    Patient Information  Admitted from:: Home ([facility])  Mental Status: Alert  During Assessment patient was accompanied by: Not accompanied during assessment  Assessment information provided by:: Patient  Primary Caregiver: Self  Support Systems: Son, Home care staff  County of Residence: [place]  What [facility] do you live in?: [place]  Home entry access options. Select all that apply.: No steps to enter home  Type of Current Residence: Apartment  Upon entering residence, is there a bedroom on the main floor (no further steps)?: Yes  Upon entering residence, is there a bathroom on the main floor (no further steps)?: Yes  Living Arrangements: Lives Alone (Apartment at [facility])    Activities of Daily Living Prior to Admission  Functional Status: Independent  Completes ADLs independently?: Yes  Ambulates independently?: Yes  Does patient use assisted devices?: Yes  Assisted Devices (DME) used: Wheelchair  Does patient currently own DME?: Yes  What DME does the patient currently own?: Wheelchair  Does the patient have a history of  Short-Term Rehab?: Yes ([facility])    Patient Information Continued  Income Source: SSI/SSD  Does patient have prescription coverage?: Yes  Does patient have a history of substance abuse?: No  Does patient have a history of Mental Health Diagnosis?: No    Means of Transportation  Means of Transport to Appts:: Family transport      Social Determinants of Health (SDOH)      Flowsheet Row Most Recent Value   Housing Stability    In the last 12 months, was there a time when you were not able to pay the mortgage or rent on time? N   In the past 12 months, how many times have you moved where you were living? 0   At any time in the past 12 months, were you homeless or living in a shelter (including now)? N   Transportation Needs    In the past 12 months, has lack of transportation kept you from medical appointments or from getting medications? no   In the past 12 months, has lack of transportation kept you from meetings, work, or from getting things needed for daily living? No   Food Insecurity    Within the past 12 months, you worried that your food would run out before you got the money to buy more. Never true   Within the past 12 months, the food you bought just didn't last and you didn't have money to get more. Never true   Utilities    In the past 12 months has the electric, gas, oil, or water company threatened to shut off services in your home? No            DISCHARGE DETAILS:    Discharge planning discussed with:: Patient  Freedom of Choice: Yes  Comments - Freedom of Choice: Pt wishes to return to his apartment at the [facility] at [facility] but is in agreement with going to [facility] for a few days for rehab if necessary.  CM contacted family/caregiver?: No- see comments  Were Treatment Team discharge recommendations reviewed with patient/caregiver?: Yes  Did patient/caregiver verbalize understanding of patient care needs?: Yes  Were patient/caregiver advised of the risks associated with  not following Treatment Team discharge recommendations?: Yes    Contacts  Patient Contacts: PAtient  Contact Method: In Person  Reason/Outcome: Continuity of Care, Emergency Contact, Discharge Planning, Referral    Requested Home Health Care         Is the patient interested in [facility] at discharge?: No    DME Referral Provided  Referral made for DME?: No    Other Referral/Resources/Interventions Provided:  Interventions: Short Term Rehab  Referral Comments: Referral sent to [facility] via sickweatherin.    IMM Given (Date):: 06/18/24  IMM Given to:: Patient     Additional Comments: Patient reports that he resides in an apartment at the [facility] at [facility] where he is primarily independent with ADLS and ambulates using a wheelchair. Pt reports that he feels it would be easier to discharge directly back to his apartment but is in agreement with a few days at [facility] SN[facility] if necessary. CM waiting to hear back from the [facility] if they are able to manage pt's current needs as he is requiring more assistance than baseline. IMM reviewed with pt who expressed understanding and agreement with discharge plan, IMM in scan bin to be entered into pt's EHR. CM department to follow.

## 2024-06-18 NOTE — PLAN OF CARE
Problem: PAIN - ADULT  Goal: Verbalizes/displays adequate comfort level or baseline comfort level  Description: Interventions:  - Encourage patient to monitor pain and request assistance  - Assess pain using appropriate pain scale  - Administer analgesics based on type and severity of pain and evaluate response  - Implement non-pharmacological measures as appropriate and evaluate response  - Consider cultural and social influences on pain and pain management  - Notify physician/advanced practitioner if interventions unsuccessful or patient reports new pain  Outcome: Progressing     Problem: SAFETY ADULT  Goal: Patient will remain free of falls  Description: INTERVENTIONS:  - Educate patient/family on patient safety including physical limitations  - Instruct patient to call for assistance with activity   - Consult OT/PT to assist with strengthening/mobility   - Keep Call bell within reach  - Keep bed low and locked with side rails adjusted as appropriate  - Keep care items and personal belongings within reach  - Initiate and maintain comfort rounds  - Make Fall Risk Sign visible to staff  - Offer Toileting every 2 Hours, in advance of need  - Initiate/Maintain bed and chair alarm  - Obtain necessary fall risk management equipment: RW and damien PRN  - Apply yellow socks and bracelet for high fall risk patients  - Consider moving patient to room near nurses station  Outcome: Progressing  Goal: Maintain or return to baseline ADL function  Description: INTERVENTIONS:  -  Assess patient's ability to carry out ADLs; assess patient's baseline for ADL function and identify physical deficits which impact ability to perform ADLs (bathing, care of mouth/teeth, toileting, grooming, dressing, etc.)  - Assess/evaluate cause of self-care deficits   - Assess range of motion  - Assess patient's mobility; develop plan if impaired  - Assess patient's need for assistive devices and provide as appropriate  - Encourage maximum  independence but intervene and supervise when necessary  - Involve family in performance of ADLs  - Assess for home care needs following discharge   - Consider OT consult to assist with ADL evaluation and planning for discharge  - Provide patient education as appropriate  Outcome: Progressing  Goal: Maintains/Returns to pre admission functional level  Description: INTERVENTIONS:  - Perform AM-PAC 6 Click Basic Mobility/ Daily Activity assessment daily.  - Set and communicate daily mobility goal to care team and patient/family/caregiver.   - Collaborate with rehabilitation services on mobility goals if consulted  - Perform Range of Motion 2 times a day.  - Reposition patient every 2 hours.  - Dangle patient 2 times a day  - Stand patient 2 times a day  - Ambulate patient 2 times a day  - Out of bed to chair 2 times a day   - Out of bed for meals 2 times a day  - Out of bed for toileting  - Record patient progress and toleration of activity level   Outcome: Progressing

## 2024-06-18 NOTE — PLAN OF CARE
Problem: PAIN - ADULT  Goal: Verbalizes/displays adequate comfort level or baseline comfort level  Description: Interventions:  - Encourage patient to monitor pain and request assistance  - Assess pain using appropriate pain scale  - Administer analgesics based on type and severity of pain and evaluate response  - Implement non-pharmacological measures as appropriate and evaluate response  - Consider cultural and social influences on pain and pain management  - Notify physician/advanced practitioner if interventions unsuccessful or patient reports new pain  Outcome: Progressing     Problem: INFECTION - ADULT  Goal: Absence or prevention of progression during hospitalization  Description: INTERVENTIONS:  - Assess and monitor for signs and symptoms of infection  - Monitor lab/diagnostic results  - Monitor all insertion sites, i.e. indwelling lines, tubes, and drains  - Monitor endotracheal if appropriate and nasal secretions for changes in amount and color  - [place] appropriate cooling/warming therapies per order  - Administer medications as ordered  - Instruct and encourage patient and family to use good hand hygiene technique  - Identify and instruct in appropriate isolation precautions for identified infection/condition  Outcome: Progressing  Goal: Absence of fever/infection during neutropenic period  Description: INTERVENTIONS:  - Monitor WBC    Outcome: Progressing

## 2024-06-18 NOTE — CASE MANAGEMENT
[facility] received request for authorization from Care Manager.  Authorization request submitted for: [facility]  Facility Name: [facility]   NPI:[id number]  Facility MD:  Dr Hernandez    NPI: [id number]  Authorization initiated by contacting insurance:  [employer]  Via: [employer] Portal   Clinicals submitted via [employer] attachment   Pending Reference #:[id number]    Care Manager notified: Miriam Mancia        Updates to authorization status will be noted in chart. Please reach out to CM for updates on any clinical information.

## 2024-06-18 NOTE — PROGRESS NOTES
[facility]  Progress Note  Name: Matthew Krause I  MRN: [id number]  Unit/Bed#: -01 I Date of Admission: 6/14/2024   Date of Service: 6/18/2024 I Hospital Day: 4    Assessment & Plan   Using prophylactic antibiotic on daily basis  Assessment & Plan  Stated he had right shoulder replacement with reversal.  Had infection.  Follows up with Raheel.  Patient was told not a candidate for revision and need to stay on chronic doxycycline  And doxycycline 50 mg twice daily as per previous chart review unsure when it was changed to doxycycline 100 twice daily      Stage 2 chronic kidney disease  Assessment & Plan  Lab Results   Component Value Date    EGFR 68 06/18/2024    EGFR 62 06/17/2024    EGFR 56 06/16/2024    CREATININE 0.97 06/18/2024    CREATININE 1.05 06/17/2024    CREATININE 1.15 06/16/2024   Creatinine 0.9-1.0  Creatinine meeting KAMLESH criteria   placed on continuous IVF suspect hypovolemia as contributory in setting of GI losses    Hypothyroidism  Assessment & Plan  Continue home Synthroid 50 mcg daily    HLD (hyperlipidemia)  Assessment & Plan  Continue home Zetia    Hypomagnesemia  Assessment & Plan  Monitor and replace electrolytes    Focal seizure with retained awareness  Assessment & Plan  Maintained on Keppra 500 Mg twice daily  Seizures are episodes of speech difficulty per review of admission from 11/20/2023    Paroxysmal A-fib (HCC)  Assessment & Plan  AC: Eliquis 5 Mg twice daily  RC: Metoprolol succinate 25 Mg twice daily  Continue home medications    Chronic systolic heart failure (HCC)  Assessment & Plan  Wt Readings from Last 3 Encounters:   06/17/24 95.4 kg (210 lb 5.1 oz)   04/12/24 95 kg (209 lb 7 oz)   03/28/24 95.7 kg (211 lb)     Home diuretic: Torsemide 20 Mg MWF and 10 Mg TRSS  Last echo 8/2023: LVEF 45%.  G1 DD.  Patient is euvolemic, hold off IV hydration  Same torsemide a.m.    History of TIA (transient ischemic attack)  Assessment & Plan  History of  TIA in the past that presented as word finding difficulties  Maintained on Eliquis due to history of paroxysmal A-fib and Zetia, continue    RA (rheumatoid arthritis) (HCC)  Assessment & Plan  Continue home hydroxychloroquine 200 Mg twice daily, no longer daily prednisone    History of hypertension  Assessment & Plan  Maintained on metoprolol succinate 25 Mg twice daily  Continue    * Diverticulitis  Assessment & Plan  In the ED on 6/10 for evaluation of nausea and decreased oral intake-CT A/P at that time provide abnormal diverticulitis, so patient was initiated on Augmentin  Shortly after starting Augmentin patient then developed significant diarrhea of 5-7 episodes of watery stool  Check stool studies-if negative, could utilize Imodium  Transition Augmentin to ceftriaxone and Flagyl  GI consulted  Abdomen is soft and nontender on admission, therefore will hold on repeat CT A/P at this time  Continue on antibiotics to complete the course         Labs & Imaging: I have personally reviewed pertinent reports.      VTE Prophylaxis: in place.    Code Status:   Level 1 - Full Code    Patient Centered Rounds: I have performed bedside rounds with nursing staff today.    Mobility:   Basic Mobility Inpatient Raw Score: 13  JH-HLM Goal: 4: Move to chair/commode  JH-HLM Achieved: 4: Move to chair/commode  JH-HLM Goal NOT achieved. Continue with multidisciplinary rounding and encourage appropriate mobility to improve upon JH-HLM goals.    Discussions with Specialists or Other Care Team Provider: GI    Education and Discussions with Family / Patient: Family    Total Time Spent on Date of Encounter in care of patient: 35 mins. This time was spent on one or more of the following: performing physical exam; counseling and coordination of care; obtaining or reviewing history; documenting in the medical record; reviewing/ordering tests, medications or procedures; communicating with other healthcare professionals and discussing with  "patient's family/caregivers.    Current Length of Stay: 4 day(s)    Current Patient Status: Inpatient   Certification Statement: The patient will continue to require additional inpatient hospital stay due to see my assessment and plan.     Subjective:   Patient is seen and examined at bedside.  No new complains. Afebrile  All other ROS are negative.    Objective:    Vitals: Blood pressure 100/56, pulse 88, temperature 97.5 °F (36.4 °C), resp. rate 14, height 5' 8\" (1.727 m), weight 95.4 kg (210 lb 5.1 oz), SpO2 96%.,Body mass index is 31.98 kg/m².  SPO2 RA Rest      Flowsheet Row ED to Hosp-Admission (Current) from 6/14/2024 in [facility]   SpO2 96 %   SpO2 Activity At Rest   O2 Device None (Room air)   O2 Flow Rate --          I&O:   Intake/Output Summary (Last 24 hours) at 6/18/2024 1454  Last data filed at 6/18/2024 0810  Gross per 24 hour   Intake 180 ml   Output 175 ml   Net 5 ml       Physical Exam:    General- Alert, lying comfortably in bed. Not in any acute distress.  Neck- Supple, No JVD  CVS- regular, S1 and S2 normal  Chest- Bilateral Air entry, No rhochi, crackles or wheezing present.  Abdomen- soft, nontender, not distended, no guarding or rigidity, BS+  Extremities-  No pedal edema, No calf tenderness  CNS-   Alert, awake and orientedx3. No focal deficits present.    Invasive Devices       Peripheral Intravenous Line  Duration             Peripheral IV 06/18/24 Left;Proximal;Ventral (anterior) Forearm <1 day                          Social History  reviewed  Family History   Problem Relation Age of Onset    Seizures Son     Colon polyps Neg Hx     Colon cancer Neg Hx     reviewed    Meds:  Current Facility-Administered Medications   Medication Dose Route Frequency Provider Last Rate Last Admin    acetaminophen (TYLENOL) tablet 650 mg  650 mg Oral Q6H PRN Erin Null PA-C   650 mg at 06/18/24 1108    allopurinol (ZYLOPRIM) tablet 300 mg  300 mg Oral Daily Erin " OFELIA Null PA-C   300 mg at 06/18/24 0748    aluminum-magnesium hydroxide-simethicone (MAALOX) oral suspension 30 mL  30 mL Oral Q6H PRN Erin Null PA-C   30 mL at 06/17/24 1701    apixaban (ELIQUIS) tablet 5 mg  5 mg Oral BID Erin Null PA-C   5 mg at 06/18/24 0749    Artificial Tears ophthalmic solution 1 drop  1 drop Both Eyes BID Tierra Eduardo MD   1 drop at 06/18/24 0751    cefTRIAXone (ROCEPHIN) IVPB (premix in dextrose) 1,000 mg 50 mL  1,000 mg Intravenous Q24H Erin Null PA-C 100 mL/hr at 06/18/24 0748 1,000 mg at 06/18/24 0748    Diclofenac Sodium (VOLTAREN) 1 % topical gel 2 g  2 g Topical 4x Daily Erin Null PA-C   2 g at 06/18/24 1105    diphenhydrAMINE-zinc acetate (BENADRYL) 2-0.1 % cream   Topical TID PRN Saloni Campo PA-C   Given at 06/18/24 0852    doxycycline hyclate (VIBRAMYCIN) capsule 100 mg  100 mg Oral Q12H Erin Null PA-C   100 mg at 06/18/24 0749    ezetimibe (ZETIA) tablet 10 mg  10 mg Oral Daily Erin Null PA-C   10 mg at 06/18/24 0749    fluticasone (FLONASE) 50 mcg/act nasal spray 1 spray  1 spray Nasal BID PRN Erin Null PA-C        gabapentin (NEURONTIN) capsule 100 mg  100 mg Oral TID Erin Null PA-C   100 mg at 06/18/24 0749    hydroxychloroquine (PLAQUENIL) tablet 200 mg  200 mg Oral BID With Meals Erin Null PA-C   200 mg at 06/18/24 0753    levETIRAcetam (KEPPRA) tablet 500 mg  500 mg Oral Q12H ODALYS Erin Null PA-C   500 mg at 06/18/24 0750    levothyroxine tablet 50 mcg  50 mcg Oral Early Morning Erin Null PA-C   50 mcg at 06/18/24 0539    lidocaine (LIDODERM) 5 % patch 1 patch  1 patch Topical Daily Tierra Eduardo MD   1 patch at 06/16/24 0949    melatonin tablet 1 mg  1 mg Oral HS Erin Null PA-C   1 mg at 06/17/24 2116    metroNIDAZOLE (FLAGYL) IVPB (premix) 500 mg 100 mL  500 mg Intravenous Q8H Erin Null PA-C 200 mL/hr at 06/18/24 1348 500 mg at 06/18/24 1348     ondansetron (ZOFRAN-ODT) dispersible tablet 4 mg  4 mg Oral Q6H PRN Laura Maldonado PA-C   4 mg at 06/18/24 1317    pantoprazole (PROTONIX) EC tablet 40 mg  40 mg Oral BID AC Sonia Bliss PA-C   40 mg at 06/18/24 0612    saccharomyces boulardii (FLORASTOR) capsule 250 mg  250 mg Oral BID Erin Null PA-C   250 mg at 06/18/24 0750    tamsulosin (FLOMAX) capsule 0.4 mg  0.4 mg Oral Daily With Dinner Erin Nlul PA-C   0.4 mg at 06/17/24 1706        Medications Prior to Admission:     allopurinol (ZYLOPRIM) 100 mg tablet    apixaban (Eliquis) 5 mg    Diclofenac Sodium (VOLTAREN) 1 %    doxycycline hyclate (VIBRAMYCIN) 100 mg capsule    ezetimibe (ZETIA) 10 mg tablet    gabapentin (NEURONTIN) 100 mg capsule    hydroxychloroquine (PLAQUENIL) 200 mg tablet    levETIRAcetam (Keppra) 500 mg tablet    metoprolol succinate (TOPROL-XL) 25 mg 24 hr tablet    omeprazole (PriLOSEC) 20 mg delayed release capsule    potassium chloride (KLOR-CON) 20 mEq packet    saccharomyces boulardii (FLORASTOR) 250 mg capsule    Synthroid 50 MCG tablet    tamsulosin (FLOMAX) 0.4 mg    torsemide (DEMADEX) 10 mg tablet    torsemide (DEMADEX) 20 mg tablet    ACETAMINOPHEN ER PO    albuterol (2.5 mg/3 mL) 0.083 % nebulizer solution    amoxicillin-clavulanate (AUGMENTIN) 875-125 mg per tablet    fluticasone (FLONASE) 50 mcg/act nasal spray    lidocaine (LIDODERM) 5 %    loperamide (IMODIUM) 2 mg capsule    melatonin 1 mg    nystatin (MYCOSTATIN) powder    ondansetron (ZOFRAN) 4 mg tablet    polyethylene glycol (MIRALAX) 17 g packet    psyllium (METAMUCIL SMOOTH TEXTURE) 28 % packet    psyllium (METAMUCIL) 58.6 % packet    Labs:  Results from last 7 days   Lab Units 06/18/24  0439 06/17/24  0515 06/16/24  0509 06/15/24  0553 06/14/24 1924   WBC Thousand/uL 4.97 5.65 4.86   < > 6.52   HEMOGLOBIN g/dL 10.6* 10.6* 10.3*   < > 12.4   HEMATOCRIT % 34.4* 34.6* 32.9*   < > 39.8   PLATELETS Thousands/uL 122* 124* 115*   < > 157   SEGS  PCT % 36* 42*  --   --   --    LYMPHO PCT % 31 28  --   --  26   MONO PCT % 12 12  --   --  9   EOS PCT % 20* 17*  --   --  16*    < > = values in this interval not displayed.     Results from last 7 days   Lab Units 06/18/24  0439 06/17/24  0515 06/16/24  0509 06/15/24  0553 06/14/24  1924   POTASSIUM mmol/L 3.7 3.4* 3.4*   < > 3.9   CHLORIDE mmol/L 108 105 104   < > 101   CO2 mmol/L 29 29 27   < > 31   BUN mg/dL 10 14 17   < > 19   CREATININE mg/dL 0.97 1.05 1.15   < > 1.45*   CALCIUM mg/dL 9.3 9.1 8.5   < > 9.0   ALK PHOS U/L  --   --   --   --  88   ALT U/L  --   --   --   --  8   AST U/L  --   --   --   --  22    < > = values in this interval not displayed.     Lab Results   Component Value Date    TROPONINI <0.02 08/28/2020    CKTOTAL 68 12/01/2023    CKTOTAL 273 08/30/2023         Lab Results   Component Value Date    BLOODCX No Growth After 5 Days. 11/18/2023    BLOODCX No Growth After 5 Days. 11/18/2023    BLOODCX No Growth After 5 Days. 08/31/2023    BLOODCX No Growth After 5 Days. 08/31/2023    URINECX >100,000 cfu/ml Klebsiella pneumoniae (A) 12/01/2023    URINECX No Growth <1000 cfu/mL 01/26/2023         Imaging:  Results for orders placed during the hospital encounter of 06/10/24    XR chest portable    Narrative  XR CHEST PORTABLE    INDICATION: nausea, no appetite.    COMPARISON: Same day CT chest abdomen pelvis, chest radiograph 12/01/2023    FINDINGS: Loop recorder projects over the left mid chest.    Mild bibasilar atelectasis. Lungs are otherwise clear.    No pneumothorax or pleural effusion.    Stable cardiomediastinal silhouette.    No acute osseous abnormalities. Right shoulder arthroplasty.    Normal upper abdomen.    Impression  Mild bibasilar atelectasis. Lungs are otherwise clear.        Resident: FAMILIA SUÁREZ I, the attending radiologist, have reviewed the images and agree with the final report above.    Workstation performed: [id number]    No results found for this or any previous  visit.      Last 24 Hours Medication List:   Current Facility-Administered Medications   Medication Dose Route Frequency Provider Last Rate    acetaminophen  650 mg Oral Q6H PRN Erin Null PA-C      allopurinol  300 mg Oral Daily Erin Null PA-C      aluminum-magnesium hydroxide-simethicone  30 mL Oral Q6H PRN Erin Null PA-C      apixaban  5 mg Oral BID Erin Null PA-C      Artificial Tears  1 drop Both Eyes BID Tierra Eduardo MD      cefTRIAXone  1,000 mg Intravenous Q24H Erin Null PA-C 1,000 mg (06/18/24 0748)    Diclofenac Sodium  2 g Topical 4x Daily Erin Null PA-C      diphenhydrAMINE-zinc acetate   Topical TID PRN Saloni Campo PA-C      doxycycline hyclate  100 mg Oral Q12H Erin Null PA-C      ezetimibe  10 mg Oral Daily Erin Null PA-C      fluticasone  1 spray Nasal BID PRN Erin Null PA-C      gabapentin  100 mg Oral TID Erin Null PA-C      hydroxychloroquine  200 mg Oral BID With Meals Erin Null PA-C      levETIRAcetam  500 mg Oral Q12H ODALYS Erin Null PA-C      levothyroxine  50 mcg Oral Early Morning Erin Null PA-C      lidocaine  1 patch Topical Daily Tierra Eduardo MD      melatonin  1 mg Oral HS Erin Null PA-C      metroNIDAZOLE  500 mg Intravenous Q8H Erin Null PA-C 500 mg (06/18/24 1348)    ondansetron  4 mg Oral Q6H PRN Laura Maldonado PA-C      pantoprazole  40 mg Oral BID KATHY Bliss PA-C      saccharomyces boulardii  250 mg Oral BID Erin Null PA-C      tamsulosin  0.4 mg Oral Daily With Dinner Erin Null PA-C          Today, Patient Was Seen By: Darrell Flower MD    ** Please Note: Dictation voice to text software may have been used in the creation of this document. **

## 2024-06-18 NOTE — PLAN OF CARE
Problem: Potential for Falls  Goal: Patient will remain free of falls  Description: INTERVENTIONS:  - Educate patient/family on patient safety including physical limitations  - Instruct patient to call for assistance with activity   - Consult OT/PT to assist with strengthening/mobility   - Keep Call bell within reach  - Keep bed low and locked with side rails adjusted as appropriate  - Keep care items and personal belongings within reach  - Initiate and maintain comfort rounds  - Make Fall Risk Sign visible to staff  - Apply yellow socks and bracelet for high fall risk patients  - Consider moving patient to room near nurses station  Outcome: Progressing     Problem: Prexisting or High Potential for Compromised Skin Integrity  Goal: Skin integrity is maintained or improved  Description: INTERVENTIONS:  - Identify patients at risk for skin breakdown  - Assess and monitor skin integrity  - Assess and monitor nutrition and hydration status  - Monitor labs   - Assess for incontinence   - Turn and reposition patient  - Assist with mobility/ambulation  - Relieve pressure over bony prominences  - Avoid friction and shearing  - Provide appropriate hygiene as needed including keeping skin clean and dry  - Evaluate need for skin moisturizer/barrier cream  - Collaborate with interdisciplinary team   - Patient/family teaching  - Consider wound care consult   Outcome: Progressing     Problem: INFECTION - ADULT  Goal: Absence or prevention of progression during hospitalization  Description: INTERVENTIONS:  - Assess and monitor for signs and symptoms of infection  - Monitor lab/diagnostic results  - Monitor all insertion sites, i.e. indwelling lines, tubes, and drains  - Monitor endotracheal if appropriate and nasal secretions for changes in amount and color  - [place] appropriate cooling/warming therapies per order  - Administer medications as ordered  - Instruct and encourage patient and family to use good hand hygiene  technique  - Identify and instruct in appropriate isolation precautions for identified infection/condition  Outcome: Progressing  Goal: Absence of fever/infection during neutropenic period  Description: INTERVENTIONS:  - Monitor WBC    Outcome: Progressing     Problem: SAFETY ADULT  Goal: Patient will remain free of falls  Description: INTERVENTIONS:  - Educate patient/family on patient safety including physical limitations  - Instruct patient to call for assistance with activity   - Consult OT/PT to assist with strengthening/mobility   - Keep Call bell within reach  - Keep bed low and locked with side rails adjusted as appropriate  - Keep care items and personal belongings within reach  - Initiate and maintain comfort rounds  - Make Fall Risk Sign visible to staff  - Apply yellow socks and bracelet for high fall risk patients  - Consider moving patient to room near nurses station  Outcome: Progressing

## 2024-06-18 NOTE — TELEPHONE ENCOUNTER
Patients son called and stated patient is currently in the hosp and will be discharge but pt will be going to a rehab center, he will call later on to see if pt is able to keep appointment on 07/09 thank you

## 2024-06-19 VITALS
RESPIRATION RATE: 16 BRPM | HEIGHT: 68 IN | BODY MASS INDEX: 33.28 KG/M2 | DIASTOLIC BLOOD PRESSURE: 64 MMHG | TEMPERATURE: 98.2 F | OXYGEN SATURATION: 96 % | SYSTOLIC BLOOD PRESSURE: 96 MMHG | WEIGHT: 219.58 LBS | HEART RATE: 99 BPM

## 2024-06-19 LAB
ANION GAP SERPL CALCULATED.3IONS-SCNC: 5 MMOL/L (ref 4–13)
BASOPHILS # BLD AUTO: 0.04 THOUSANDS/ÂΜL (ref 0–0.1)
BASOPHILS NFR BLD AUTO: 1 % (ref 0–1)
BUN SERPL-MCNC: 10 MG/DL (ref 5–25)
CALCIUM SERPL-MCNC: 9.5 MG/DL (ref 8.4–10.2)
CHLORIDE SERPL-SCNC: 106 MMOL/L (ref 96–108)
CO2 SERPL-SCNC: 27 MMOL/L (ref 21–32)
CREAT SERPL-MCNC: 0.96 MG/DL (ref 0.6–1.3)
EOSINOPHIL # BLD AUTO: 1.22 THOUSAND/ÂΜL (ref 0–0.61)
EOSINOPHIL NFR BLD AUTO: 21 % (ref 0–6)
ERYTHROCYTE [DISTWIDTH] IN BLOOD BY AUTOMATED COUNT: 15.3 % (ref 11.6–15.1)
G LAMBLIA AG STL QL IA: NEGATIVE
GFR SERPL CREATININE-BSD FRML MDRD: 69 ML/MIN/1.73SQ M
GLUCOSE SERPL-MCNC: 110 MG/DL (ref 65–140)
HCT VFR BLD AUTO: 35.1 % (ref 36.5–49.3)
HGB BLD-MCNC: 10.9 G/DL (ref 12–17)
IMM GRANULOCYTES # BLD AUTO: 0.01 THOUSAND/UL (ref 0–0.2)
IMM GRANULOCYTES NFR BLD AUTO: 0 % (ref 0–2)
LYMPHOCYTES # BLD AUTO: 1.7 THOUSANDS/ÂΜL (ref 0.6–4.47)
LYMPHOCYTES NFR BLD AUTO: 29 % (ref 14–44)
MCH RBC QN AUTO: 28.9 PG (ref 26.8–34.3)
MCHC RBC AUTO-ENTMCNC: 31.1 G/DL (ref 31.4–37.4)
MCV RBC AUTO: 93 FL (ref 82–98)
MONOCYTES # BLD AUTO: 0.63 THOUSAND/ÂΜL (ref 0.17–1.22)
MONOCYTES NFR BLD AUTO: 11 % (ref 4–12)
NEUTROPHILS # BLD AUTO: 2.29 THOUSANDS/ÂΜL (ref 1.85–7.62)
NEUTS SEG NFR BLD AUTO: 38 % (ref 43–75)
NRBC BLD AUTO-RTO: 0 /100 WBCS
O+P STL CONC: NORMAL
PLATELET # BLD AUTO: 136 THOUSANDS/UL (ref 149–390)
PMV BLD AUTO: 13.4 FL (ref 8.9–12.7)
POTASSIUM SERPL-SCNC: 4 MMOL/L (ref 3.5–5.3)
RBC # BLD AUTO: 3.77 MILLION/UL (ref 3.88–5.62)
SODIUM SERPL-SCNC: 138 MMOL/L (ref 135–147)
WBC # BLD AUTO: 5.89 THOUSAND/UL (ref 4.31–10.16)

## 2024-06-19 PROCEDURE — 99231 SBSQ HOSP IP/OBS SF/LOW 25: CPT | Performed by: INTERNAL MEDICINE

## 2024-06-19 PROCEDURE — 80048 BASIC METABOLIC PNL TOTAL CA: CPT | Performed by: INTERNAL MEDICINE

## 2024-06-19 PROCEDURE — 85025 COMPLETE CBC W/AUTO DIFF WBC: CPT | Performed by: INTERNAL MEDICINE

## 2024-06-19 PROCEDURE — 99239 HOSP IP/OBS DSCHRG MGMT >30: CPT | Performed by: INTERNAL MEDICINE

## 2024-06-19 RX ORDER — LOPERAMIDE HYDROCHLORIDE 2 MG/1
2 CAPSULE ORAL 4 TIMES DAILY PRN
Status: DISCONTINUED | OUTPATIENT
Start: 2024-06-19 | End: 2024-06-19 | Stop reason: HOSPADM

## 2024-06-19 RX ORDER — CEFUROXIME AXETIL 500 MG/1
500 TABLET ORAL EVERY 12 HOURS SCHEDULED
Start: 2024-06-19 | End: 2024-07-01

## 2024-06-19 RX ORDER — OMEPRAZOLE 20 MG/1
20 CAPSULE, DELAYED RELEASE ORAL 2 TIMES DAILY
Start: 2024-06-19 | End: 2024-07-01

## 2024-06-19 RX ORDER — METRONIDAZOLE 500 MG/1
500 TABLET ORAL EVERY 8 HOURS SCHEDULED
Start: 2024-06-19 | End: 2024-07-01

## 2024-06-19 RX ORDER — KETOROLAC TROMETHAMINE 30 MG/ML
15 INJECTION, SOLUTION INTRAMUSCULAR; INTRAVENOUS EVERY 6 HOURS PRN
Status: DISCONTINUED | OUTPATIENT
Start: 2024-06-19 | End: 2024-06-19 | Stop reason: HOSPADM

## 2024-06-19 RX ORDER — LOPERAMIDE HYDROCHLORIDE 2 MG/1
2 CAPSULE ORAL 4 TIMES DAILY PRN
Start: 2024-06-19 | End: 2024-07-01

## 2024-06-19 RX ADMIN — METRONIDAZOLE 500 MG: 500 INJECTION, SOLUTION INTRAVENOUS at 14:09

## 2024-06-19 RX ADMIN — ALLOPURINOL 300 MG: 300 TABLET ORAL at 08:56

## 2024-06-19 RX ADMIN — LEVOTHYROXINE SODIUM 50 MCG: 50 TABLET ORAL at 05:05

## 2024-06-19 RX ADMIN — DICLOFENAC SODIUM 2 G: 10 GEL TOPICAL at 10:11

## 2024-06-19 RX ADMIN — ONDANSETRON 4 MG: 4 TABLET, ORALLY DISINTEGRATING ORAL at 07:26

## 2024-06-19 RX ADMIN — CEFTRIAXONE 1000 MG: 1 INJECTION, SOLUTION INTRAVENOUS at 07:28

## 2024-06-19 RX ADMIN — DICLOFENAC SODIUM 2 G: 10 GEL TOPICAL at 11:05

## 2024-06-19 RX ADMIN — METRONIDAZOLE 500 MG: 500 INJECTION, SOLUTION INTRAVENOUS at 05:06

## 2024-06-19 RX ADMIN — LEVETIRACETAM 500 MG: 500 TABLET, FILM COATED ORAL at 08:56

## 2024-06-19 RX ADMIN — HYDROXYCHLOROQUINE SULFATE 200 MG: 200 TABLET ORAL at 08:56

## 2024-06-19 RX ADMIN — GABAPENTIN 100 MG: 100 CAPSULE ORAL at 08:56

## 2024-06-19 RX ADMIN — ACETAMINOPHEN 650 MG: 325 TABLET, FILM COATED ORAL at 05:26

## 2024-06-19 RX ADMIN — DOXYCYCLINE 100 MG: 100 CAPSULE ORAL at 08:56

## 2024-06-19 RX ADMIN — DICLOFENAC SODIUM 2 G: 10 GEL TOPICAL at 14:08

## 2024-06-19 RX ADMIN — Medication 250 MG: at 08:55

## 2024-06-19 RX ADMIN — EZETIMIBE 10 MG: 10 TABLET ORAL at 08:55

## 2024-06-19 RX ADMIN — APIXABAN 5 MG: 5 TABLET, FILM COATED ORAL at 08:56

## 2024-06-19 RX ADMIN — PANTOPRAZOLE SODIUM 40 MG: 40 TABLET, DELAYED RELEASE ORAL at 06:07

## 2024-06-19 RX ADMIN — ACETAMINOPHEN 650 MG: 325 TABLET, FILM COATED ORAL at 13:51

## 2024-06-19 RX ADMIN — KETOROLAC TROMETHAMINE 15 MG: 30 INJECTION, SOLUTION INTRAMUSCULAR; INTRAVENOUS at 09:25

## 2024-06-19 RX ADMIN — DICLOFENAC SODIUM 2 G: 10 GEL TOPICAL at 07:27

## 2024-06-19 NOTE — CASE MANAGEMENT
Case Management Discharge Planning Note    Patient name Matthew Krause  Location /-01 MRN [id number]  : 1934 Date 2024       Current Admission Date: 2024  Current Admission Diagnosis:Diverticulitis   Patient Active Problem List    Diagnosis Date Noted Date Diagnosed    Using prophylactic antibiotic on daily basis 06/15/2024     Diverticulitis 2024     Stroke-like symptom 2023     Electrolyte abnormality 2023     Difficulty with speech 2023     Stage 2 chronic kidney disease 2023     Ureterolithiasis 2023     Aneurysm of ascending aorta without rupture (HCC) 2023     Urinary retention 2023     Bacteremia due to group B Streptococcus 2023     Respiratory insufficiency 2023     Cellulitis of left upper extremity 2023     HLD (hyperlipidemia) 2023     GERD (gastroesophageal reflux disease) 2023     Gout without acute exacerbation  2023     Spinal stenosis 2023     Hypothyroidism 2023     Peripheral vascular disease (HCC) 2023     Open wound of right great toe with damage to nail 2023     Adverse effect of loop (high-ceiling) diuretics, subsequent encounter 2023     Toxic metabolic encephalopathy 2023     Septic arthritis of shoulder, right (HCC) 2023     Suture of skin wound 2023     Hypomagnesemia 2023     Elevated TSH 2023     Chronic systolic heart failure (HCC) 2023     Paroxysmal A-fib ([facility]) 2023     Focal seizure with retained awareness 2023     Dilated cardiomyopathy ([facility]) 10/18/2022     HTN (hypertension) 10/18/2022     Low left ventricular ejection fraction 10/11/2022     History of TIA (transient ischemic attack) 10/10/2022     Speech disturbance 10/08/2022     Accidental overdose 2020     History of hypertension 2020     Hypotension 2020     RA (rheumatoid arthritis) ([facility]) 2020       LOS  (days): 5  Geometric Mean LOS (GMLOS) (days): 2.6  Days to GMLOS:-2     OBJECTIVE:  Risk of Unplanned Readmission Score: 18.33         Current admission status: Inpatient   Preferred Pharmacy:   [facility] - [address]  [address] [facility] [address]  Phone: [phone] Fax: [phone]    Primary Care Provider: ESTEFANIA Pimentel    Primary Insurance: [facility]  Secondary Insurance:     DISCHARGE DETAILS:    Transport at Discharge : [facility]  Dispatcher Contacted: Yes  Number/Name of Dispatcher: [employer]trip  Transported by (Company and Unit #): [facility]  ETA of Transport (Date): 06/19/24  ETA of Transport (Time): 1530    Additional Comments: Per [employer], pt to be transported to [facility] with a 3:30 PM pickup, pt, pt's son, RN, Dr. Flower, and [facility] aware of transportation time. CMN in paper chart.    Accepting Facility Name, City & State : [facility]  Receiving Facility/Agency Phone Number: [phone]  Facility/Agency Fax Number: [phone]

## 2024-06-19 NOTE — PROGRESS NOTES
Progress note - Gastroenterology   Matthew Krause 89 y.o. male MRN: [id number]  Unit/Bed#: -01 Encounter: [id number]    ASSESSMENT and PLAN    1.  Acute uncomplicated sigmoid diverticulitis  89M with hx/o a-fib on Eliquis, TIA, seizures on Keppra, hx/o erosive esophagitis who presented for diarrhea after starting abx for acute uncomplicated sigmoid diverticulitis. He presented to the ED on 6/10 with nausea and decreased appetite with abdominal fullness. CT showed acute diverticulitis involving the proximal sigmoid colon. He was discharged on Augmentin and started having more diarrhea. Admitted due to diarrhea, dehydration, and KAMLESH.     - 6/19: overall improved with no abdominal pain, diarrhea x1 today, appetite improving.  Patient denies nausea at this time.  Discussed with patient with family at bedside that frequent small meals likely be helpful.  Try adding yogurt to his dietary regiment.  Also will make sure patient prescribed Nexium 20 mg twice daily prior to discharge.  Patient will likely continue and finish regiment of oral antibiotics.  Likely increased nausea created by added antibiotic use.    - House diet (lo fiber)  - Abx: ceftriaxone, metronidazole -> recommend 10 total days of current abx since switching from Augmentin.  Discussed with Dr. Cervantes and patient should be discharged on equivalent oral antibiotics as currently on at discharge.  - Stool studies negative to date     Patient has follow-up appointment with Dr. Nicholas in the office on 7/9.     2.  Acute diarrhea  -Patient diarrhea x1 this morning.  Initial diarrhea likely due to Augmentin.  Discussed with patient's nurse, will prescribe Imodium 2 mg 1 capsule 4 times a day as needed for diarrhea.    If diarrhea persists would add cholestyramine at bedtime to patient's regiment.     3.  KAMLESH  - Resolved as of 6/17, felt to be 2/2 dehydration from diarrhea 2/2 Augmentin     4.  Erosive esophagitis  - Continue pantoprazole 40 mg BID  "(Inpatient)     Patient to be discharged on Nexium 20 mg po twice daily.     5.  Nausea  -Patient denies nausea at this time.  Discussed with patient and family at the bedside.  Nausea likely from antibiotic use.  Will discharge on dose recommended by GI provider prior to admission at Nexium 20 mg twice daily.  Patient's daughter feels that he had less nausea when receiving the 40 mg dosing prior to admission.     Recommended that patient offered Zofran prior to meals if he continues to have increased nausea after eating.              Chief Complaint   Patient presents with    Weakness - Generalized     3 days of diarrhea and feeling more weak today, sent from [employer].       SUBJECTIVE/HPI   Patient denies abdominal pain, nausea or vomiting.  Patient states she has had at least 1 bout of diarrhea today.  Discussed with patient's family since patient will be discharged to rehab later today.  Patient should be taking Imodium 1 capsule 4 times a day.  Low fiber diet.  Suggested patient have yogurt at least while he is on the oral antibiotics for the next few days.    BP 96/64   Pulse 99   Temp 98.2 °F (36.8 °C)   Resp 16   Ht 5' 8\" (1.727 m)   Wt 95.4 kg (210 lb 5.1 oz)   SpO2 96%   BMI 31.98 kg/m²     PHYSICALEXAM  General appearance: alert, appears stated age and cooperative  Eyes: PERLLA, EOMI, no icterus   Head: Normocephalic, without obvious abnormality, atraumatic  Lungs: clear to auscultation bilaterally  Heart: regular rate and rhythm, S1, S2 normal, no murmur, click, rub or gallop  Abdomen: soft, non-tender; bowel sounds normal; no masses,  no organomegaly  Extremities: extremities normal, atraumatic, no cyanosis or edema.  Increased edema and bruising to upper extremities.  Neurologic: Grossly normal    Lab Results   Component Value Date    GLUCOSE 110 09/03/2023    CALCIUM 9.5 06/19/2024    K 4.0 06/19/2024    CO2 27 06/19/2024     06/19/2024    BUN 10 06/19/2024    CREATININE 0.96 06/19/2024 " "    Lab Results   Component Value Date    WBC 5.89 06/19/2024    HGB 10.9 (L) 06/19/2024    HCT 35.1 (L) 06/19/2024    MCV 93 06/19/2024     (L) 06/19/2024     Lab Results   Component Value Date    ALT 8 06/14/2024    AST 22 06/14/2024    ALKPHOS 88 06/14/2024     No results found for: \"AMYLASE\"  Lab Results   Component Value Date    LIPASE 20 06/10/2024     Lab Results   Component Value Date    IRON 16 (L) 08/31/2023    TIBC 139 (L) 08/31/2023    FERRITIN 141 08/31/2023     Lab Results   Component Value Date    INR 1.27 (H) 12/01/2023     "

## 2024-06-19 NOTE — ASSESSMENT & PLAN NOTE
Lab Results   Component Value Date    EGFR 69 06/19/2024    EGFR 68 06/18/2024    EGFR 62 06/17/2024    CREATININE 0.96 06/19/2024    CREATININE 0.97 06/18/2024    CREATININE 1.05 06/17/2024   Creatinine 0.9-1.0  Creatinine meeting KAMLESH criteria   placed on continuous IVF suspect hypovolemia as contributory in setting of GI losses

## 2024-06-19 NOTE — CASE MANAGEMENT
Case Management Discharge Planning Note    Patient name Matthew Krause  Location /-01 MRN [id number]  : 1934 Date 2024       Current Admission Date: 2024  Current Admission Diagnosis:Diverticulitis   Patient Active Problem List    Diagnosis Date Noted Date Diagnosed    Using prophylactic antibiotic on daily basis 06/15/2024     Diverticulitis 2024     Stroke-like symptom 2023     Electrolyte abnormality 2023     Difficulty with speech 2023     Stage 2 chronic kidney disease 2023     Ureterolithiasis 2023     Aneurysm of ascending aorta without rupture (HCC) 2023     Urinary retention 2023     Bacteremia due to group B Streptococcus 2023     Respiratory insufficiency 2023     Cellulitis of left upper extremity 2023     HLD (hyperlipidemia) 2023     GERD (gastroesophageal reflux disease) 2023     Gout without acute exacerbation  2023     Spinal stenosis 2023     Hypothyroidism 2023     Peripheral vascular disease (HCC) 2023     Open wound of right great toe with damage to nail 2023     Adverse effect of loop (high-ceiling) diuretics, subsequent encounter 2023     Toxic metabolic encephalopathy 2023     Septic arthritis of shoulder, right (HCC) 2023     Suture of skin wound 2023     Hypomagnesemia 2023     Elevated TSH 2023     Chronic systolic heart failure (HCC) 2023     Paroxysmal A-fib ([facility]) 2023     Focal seizure with retained awareness 2023     Dilated cardiomyopathy ([facility]) 10/18/2022     HTN (hypertension) 10/18/2022     Low left ventricular ejection fraction 10/11/2022     History of TIA (transient ischemic attack) 10/10/2022     Speech disturbance 10/08/2022     Accidental overdose 2020     History of hypertension 2020     Hypotension 2020     RA (rheumatoid arthritis) ([facility]) 2020       LOS  (days): 5  Geometric Mean LOS (GMLOS) (days): 2.6  Days to GMLOS:-2     OBJECTIVE:  Risk of Unplanned Readmission Score: 18.33         Current admission status: Inpatient   Preferred Pharmacy:   [facility] - [address]  [address] [place] [address]  Phone: [phone] Fax: [phone]    Primary Care Provider: ESTEFANIA Pimentel    [facility]  Secondary Insurance:     DISCHARGE DETAILS:     Transport at Discharge : [facility]  Dispatcher Contacted: Yes  Number/Name of Dispatcher: [employer]  Transported by (Company and Unit #): TBD  ETA of Transport (Date): 06/19/24  ETA of Transport (Time): 1400    Additional Comments: CM received insurance auth for [employer] RN, Dr Flower, pt, and family aware of same. [facility] transport request sent via [employer], awaiting confirmation of transportation time.

## 2024-06-19 NOTE — CASE MANAGEMENT
Per Availity, SNF auth still pending.     Escalation email sent to [employer] Team requesting expedite of determination.     ERIN notified: Miriam Mancia

## 2024-06-19 NOTE — CASE MANAGEMENT
Case Management Discharge Planning Note    Patient name Matthew Krause  Location /-01 MRN [id number]  : 1934 Date 2024       Current Admission Date: 2024  Current Admission Diagnosis:Diverticulitis   Patient Active Problem List    Diagnosis Date Noted Date Diagnosed    Using prophylactic antibiotic on daily basis 06/15/2024     Diverticulitis 2024     Stroke-like symptom 2023     Electrolyte abnormality 2023     Difficulty with speech 2023     Stage 2 chronic kidney disease 2023     Ureterolithiasis 2023     Aneurysm of ascending aorta without rupture (HCC) 2023     Urinary retention 2023     Bacteremia due to group B Streptococcus 2023     Respiratory insufficiency 2023     Cellulitis of left upper extremity 2023     HLD (hyperlipidemia) 2023     GERD (gastroesophageal reflux disease) 2023     Gout without acute exacerbation  2023     Spinal stenosis 2023     Hypothyroidism 2023     Peripheral vascular disease (HCC) 2023     Open wound of right great toe with damage to nail 2023     Adverse effect of loop (high-ceiling) diuretics, subsequent encounter 2023     Toxic metabolic encephalopathy 2023     Septic arthritis of shoulder, right (HCC) 2023     Suture of skin wound 2023     Hypomagnesemia 2023     Elevated TSH 2023     Chronic systolic heart failure (HCC) 2023     Paroxysmal A-fib ([facility]) 2023     Focal seizure with retained awareness 2023     Dilated cardiomyopathy ([facility]) 10/18/2022     HTN (hypertension) 10/18/2022     Low left ventricular ejection fraction 10/11/2022     History of TIA (transient ischemic attack) 10/10/2022     Speech disturbance 10/08/2022     Accidental overdose 2020     History of hypertension 2020     Hypotension 2020     RA (rheumatoid arthritis) ([facility]) 2020       LOS  (days): 5  Geometric Mean LOS (GMLOS) (days): 2.6  Days to GMLOS:-2     OBJECTIVE:  Risk of Unplanned Readmission Score: 18.33         Current admission status: Inpatient   Preferred Pharmacy:   [facility] [address] [address]  Phone: [phone] Fax: [phone]    Primary Care Provider: ESTEFANIA Pimentel    Primary Insurance: EVI GRANT  Secondary Insurance:     DISCHARGE DETAILS:                                                                                                               Facility Insurance Auth Number: [id number]

## 2024-06-19 NOTE — CASE MANAGEMENT
[employer] [place] has received APPROVED authorization.  Insurance:  [employer]   Called in by Rep:Franchesca   Authorization received for: SNF  Facility: [employer]   Authorization #:[id number]   Start of Care:06/19  Next Review Date:  07/01  Continued Stay Care Coordinator: Britta CHI#: [phone]  Submit next review to: [phone]     Care Manager notified:Miriam Mancia      Please reach out to  for updates on any clinical information.

## 2024-06-19 NOTE — ASSESSMENT & PLAN NOTE
In the ED on 6/10 for evaluation of nausea and decreased oral intake-CT A/P at that time provide abnormal diverticulitis, so patient was initiated on Augmentin  Shortly after starting Augmentin patient then developed significant diarrhea of 5-7 episodes of watery stool  Stool studies noted  Transition Augmentin to ceftriaxone and Flagyl  GI consulted  Abdomen is soft and nontender on admission, therefore will hold on repeat CT A/P at this time  Continue on antibiotics to complete the course  Patient will be discharged on oral antibiotic to complete a 10-day course.  Will discharge on Protonix and Zofran

## 2024-06-19 NOTE — DISCHARGE SUMMARY
[facility]  Discharge- Matthew Krause 9/30/1934, 89 y.o. male MRN: [id number]  Unit/Bed#: -Jamie Encounter: [id number]  Primary Care Provider: ESTEFANIA Pimentel   Date and time admitted to hospital: 6/14/2024  7:08 PM    Using prophylactic antibiotic on daily basis  Assessment & Plan  Stated he had right shoulder replacement with reversal.  Had infection.  Follows up with Raheel.  Patient was told not a candidate for revision and need to stay on chronic doxycycline  And doxycycline 50 mg twice daily as per previous chart review unsure when it was changed to doxycycline 100 twice daily      Stage 2 chronic kidney disease  Assessment & Plan  Lab Results   Component Value Date    EGFR 69 06/19/2024    EGFR 68 06/18/2024    EGFR 62 06/17/2024    CREATININE 0.96 06/19/2024    CREATININE 0.97 06/18/2024    CREATININE 1.05 06/17/2024   Creatinine 0.9-1.0  Creatinine meeting KAMLESH criteria   placed on continuous IVF suspect hypovolemia as contributory in setting of GI losses    Hypothyroidism  Assessment & Plan  Continue home Synthroid 50 mcg daily    HLD (hyperlipidemia)  Assessment & Plan  Continue home Zetia    Hypomagnesemia  Assessment & Plan  Monitor and replace electrolytes    Focal seizure with retained awareness  Assessment & Plan  Maintained on Keppra 500 Mg twice daily  Seizures are episodes of speech difficulty per review of admission from 11/20/2023    Paroxysmal A-fib (HCC)  Assessment & Plan  AC: Eliquis 5 Mg twice daily  RC: Metoprolol succinate 25 Mg twice daily  Continue home medications    Chronic systolic heart failure (HCC)  Assessment & Plan  Wt Readings from Last 3 Encounters:   06/17/24 95.4 kg (210 lb 5.1 oz)   04/12/24 95 kg (209 lb 7 oz)   03/28/24 95.7 kg (211 lb)     Home diuretic: Torsemide 20 Mg MWF and 10 Mg TRSS  Last echo 8/2023: LVEF 45%.  G1 DD.  Patient is euvolemic, hold off IV hydration  Continue on torsemide    History of TIA (transient  ischemic attack)  Assessment & Plan  History of TIA in the past that presented as word finding difficulties  Maintained on Eliquis due to history of paroxysmal A-fib and Zetia, continue    RA (rheumatoid arthritis) (HCC)  Assessment & Plan  Continue home hydroxychloroquine 200 Mg twice daily, no longer daily prednisone    History of hypertension  Assessment & Plan  Maintained on metoprolol succinate 25 Mg twice daily  Continue    * Diverticulitis  Assessment & Plan  In the ED on 6/10 for evaluation of nausea and decreased oral intake-CT A/P at that time provide abnormal diverticulitis, so patient was initiated on Augmentin  Shortly after starting Augmentin patient then developed significant diarrhea of 5-7 episodes of watery stool  Stool studies noted  Transition Augmentin to ceftriaxone and Flagyl  GI consulted  Abdomen is soft and nontender on admission, therefore will hold on repeat CT A/P at this time  Continue on antibiotics to complete the course  Patient will be discharged on oral antibiotic to complete a 10-day course.  Will discharge on Protonix and Zofran      Hospital Course:     Matthew Krause is a 89 y.o. male patient who originally presented to the hospital on   Admission Orders (From admission, onward)       Ordered        06/14/24 2215  INPATIENT ADMISSION  Once                         due to diarrhea.  Patient was diagnosed with diverticulitis, acute kidney injury.  Patient was started on Rocephin and Flagyl.  Patient was seen by GI.  Patient stool workup is negative  Patient was given IV fluids and patient's symptoms improved.  Patient was slowly advanced on diet.  Patient as per GI can be discharged on oral antibiotics to complete a 10-day course and outpatient follow-up for colonoscopy.  Continue PPI and Zofran.  Patient was seen by PT OT and recommended rehab.  Patient will be discharged to [facility] for skilled nursing rehab.  Patient and family are in agreement with the  discharge plan  On Exam-  Chest-bilateral air entry, clear to auscultation  Abdomen-soft, nontender  Heart-S1 S2 regular  Extremities-no pedal edema or calf tenderness  Neuro-alert awake oriented x3.  No focal deficits    Please see above list of diagnoses and related plan for additional information.   Follow-up with PCP and GI as outpatient  Outpatient colonoscopy    Condition at Discharge:  good      Discharge instructions/Information to patient and family:   See after visit summary for information provided to patient and family.      Provisions for Follow-Up Care:  See after visit summary for information related to follow-up care and any pertinent home health orders.      Disposition:     Other Skilled Nursing Facility at [facility]       Discharge Statement:  I spent 40 minutes discharging the patient. This time was spent on the day of discharge. I had direct contact with the patient on the day of discharge. Greater than 50% of the total time was spent examining patient, answering all patient questions, arranging and discussing plan of care with patient as well as directly providing post-discharge instructions.  Additional time then spent on discharge activities.    Discharge Medications:  See after visit summary for reconciled discharge medications provided to patient and family.      ** Please Note: This note has been constructed using a voice recognition system **

## 2024-06-19 NOTE — PLAN OF CARE
Problem: Potential for Falls  Goal: Patient will remain free of falls  Description: INTERVENTIONS:  - Educate patient/family on patient safety including physical limitations  - Instruct patient to call for assistance with activity   - Consult OT/PT to assist with strengthening/mobility   - Keep Call bell within reach  - Keep bed low and locked with side rails adjusted as appropriate  - Keep care items and personal belongings within reach  - Initiate and maintain comfort rounds  - Make Fall Risk Sign visible to staff  - Offer Toileting every 2 Hours, in advance of need  - Initiate/Maintain bed alarm  - Obtain necessary fall risk management equipment  - Apply yellow socks and bracelet for high fall risk patients  - Consider moving patient to room near nurses station  Outcome: Progressing     Problem: Prexisting or High Potential for Compromised Skin Integrity  Goal: Skin integrity is maintained or improved  Description: INTERVENTIONS:  - Identify patients at risk for skin breakdown  - Assess and monitor skin integrity  - Assess and monitor nutrition and hydration status  - Monitor labs   - Assess for incontinence   - Turn and reposition patient  - Assist with mobility/ambulation  - Relieve pressure over bony prominences  - Avoid friction and shearing  - Provide appropriate hygiene as needed including keeping skin clean and dry  - Evaluate need for skin moisturizer/barrier cream  - Collaborate with interdisciplinary team   - Patient/family teaching  - Consider wound care consult   Outcome: Progressing     Problem: PAIN - ADULT  Goal: Verbalizes/displays adequate comfort level or baseline comfort level  Description: Interventions:  - Encourage patient to monitor pain and request assistance  - Assess pain using appropriate pain scale  - Administer analgesics based on type and severity of pain and evaluate response  - Implement non-pharmacological measures as appropriate and evaluate response  - Consider cultural and  social influences on pain and pain management  - Notify physician/advanced practitioner if interventions unsuccessful or patient reports new pain  Outcome: Progressing     Problem: INFECTION - ADULT  Goal: Absence or prevention of progression during hospitalization  Description: INTERVENTIONS:  - Assess and monitor for signs and symptoms of infection  - Monitor lab/diagnostic results  - Monitor all insertion sites, i.e. indwelling lines, tubes, and drains  - Monitor endotracheal if appropriate and nasal secretions for changes in amount and color  - Independence appropriate cooling/warming therapies per order  - Administer medications as ordered  - Instruct and encourage patient and family to use good hand hygiene technique  - Identify and instruct in appropriate isolation precautions for identified infection/condition  Outcome: Progressing  Goal: Absence of fever/infection during neutropenic period  Description: INTERVENTIONS:  - Monitor WBC    Outcome: Progressing     Problem: SAFETY ADULT  Goal: Patient will remain free of falls  Description: INTERVENTIONS:  - Educate patient/family on patient safety including physical limitations  - Instruct patient to call for assistance with activity   - Consult OT/PT to assist with strengthening/mobility   - Keep Call bell within reach  - Keep bed low and locked with side rails adjusted as appropriate  - Keep care items and personal belongings within reach  - Initiate and maintain comfort rounds  - Make Fall Risk Sign visible to staff  - Offer Toileting every 2 Hours, in advance of need  - Initiate/Maintain bed alarm  - Obtain necessary fall risk management equipment  - Apply yellow socks and bracelet for high fall risk patients  - Consider moving patient to room near nurses station  Outcome: Progressing  Goal: Maintain or return to baseline ADL function  Description: INTERVENTIONS:  -  Assess patient's ability to carry out ADLs; assess patient's baseline for ADL function and  identify physical deficits which impact ability to perform ADLs (bathing, care of mouth/teeth, toileting, grooming, dressing, etc.)  - Assess/evaluate cause of self-care deficits   - Assess range of motion  - Assess patient's mobility; develop plan if impaired  - Assess patient's need for assistive devices and provide as appropriate  - Encourage maximum independence but intervene and supervise when necessary  - Involve family in performance of ADLs  - Assess for home care needs following discharge   - Consider OT consult to assist with ADL evaluation and planning for discharge  - Provide patient education as appropriate  Outcome: Progressing  Goal: Maintains/Returns to pre admission functional level  Description: INTERVENTIONS:  - Perform AM-PAC 6 Click Basic Mobility/ Daily Activity assessment daily.  - Set and communicate daily mobility goal to care team and patient/family/caregiver.   - Collaborate with rehabilitation services on mobility goals if consulted  - Perform Range of Motion 6 times a day.  - Reposition patient every 2 hours.  - Dangle patient 4 times a day  - Stand patient 4 times a day  - Ambulate patient 4 times a day  - Out of bed to chair 3 times a day   - Out of bed for meals 3 times a day  - Out of bed for toileting  - Record patient progress and toleration of activity level   Outcome: Progressing

## 2024-06-19 NOTE — ASSESSMENT & PLAN NOTE
Wt Readings from Last 3 Encounters:   06/17/24 95.4 kg (210 lb 5.1 oz)   04/12/24 95 kg (209 lb 7 oz)   03/28/24 95.7 kg (211 lb)     Home diuretic: Torsemide 20 Mg MWF and 10 Mg TRSS  Last echo 8/2023: LVEF 45%.  G1 DD.  Patient is euvolemic, hold off IV hydration  Continue on torsemide

## 2024-06-21 PROBLEM — R93.89 ABNORMAL CT SCAN: Status: ACTIVE | Noted: 2024-01-01

## 2024-06-21 NOTE — ASSESSMENT & PLAN NOTE
Recently admitted for dehydration in the setting of diarrhea secondary to diverticulitis  Presented today from [facility] due to hypotension  Not meeting SIRS criteria, lactic negative, Pro-Larry negative  6/21 CT CAP: Sigmoid diverticulitis with no fluid collection or abscess. Consider colonoscopy when the acute illness subsides. Trace left effusion. No acute airspace consolidation. Dilated ascending artery measuring 4.4 cm, annual surveillance generally suggested. Cholelithiasis. No intra-abdominal hemorrhage  UA pending  Doubt hypotension is secondary to infection  Patient previously maintained on prednisone 5 mg daily due to RA; was discontinued but patient may be experiencing adrenal insufficiency  Given 250 cc crystalloid and 100 cc albumin in ED  Briefly required vasopressors, now off    Plan:  Check a.m. cortisol and TSH  Give additional 500 cc isolyte now  Encourage PO intake  Consider steroids if ongoing hypotension  Wean vasopressors to maintain MAP > 65

## 2024-06-21 NOTE — H&P
[facility]  H&P  Name: Matthew Krause 89 y.o. male I MRN: [id number]  Unit/Bed#: -01 SDU I Date of Admission: 6/21/2024   Date of Service: 6/21/2024 I Hospital Day: 0      Assessment & Plan   * Hypotension  Assessment & Plan  Recently admitted for dehydration in the setting of diarrhea secondary to diverticulitis  Presented today from [facility] due to hypotension  Not meeting SIRS criteria, lactic negative, Pro-Larry negative  6/21 CT CAP: Sigmoid diverticulitis with no fluid collection or abscess. Consider colonoscopy when the acute illness subsides. Trace left effusion. No acute airspace consolidation. Dilated ascending artery measuring 4.4 cm, annual surveillance generally suggested. Cholelithiasis. No intra-abdominal hemorrhage  UA pending  Doubt hypotension is secondary to infection  Patient previously maintained on prednisone 5 mg daily due to RA; was discontinued but patient may be experiencing adrenal insufficiency  Given 250 cc crystalloid and 100 cc albumin in ED  Briefly required vasopressors, now off    Plan:  Check a.m. cortisol and TSH  Give additional 500 cc isolyte now  Encourage PO intake  Consider steroids if ongoing hypotension  Wean vasopressors to maintain MAP > 65    Diverticulitis  Assessment & Plan  Recently diagnosed with diverticulitis and had been started on Augmentin outpatient.  Subsequently developed severe diarrhea and was admitted for dehydration.  Antibiotics at that time transition to ceftriaxone and Flagyl.  Discharged on 6/19 on cefuroxime and Flagyl p.o.  Presented today with hypotension, however Pro-Larry negative, lactic negative, not meeting SIRS criteria  Doubt hypotension is secondary to sepsis however CT CAP showing ongoing sigmoid diverticulitis    Plan  We will continue with IV ceftriaxone and IV Flagyl  Low fiber diet  Zofran as needed for nausea    RA (rheumatoid arthritis) ([facility])  Assessment & Plan  Home regimen: Hydroxychloroquine  milligrams twice daily  Recently stopped daily prednisone  Check cortisol    Hypothyroidism  Assessment & Plan  Continue home Synthroid 50 mcg daily  Check TSH      Chronic systolic heart failure (HCC)  Assessment & Plan  Wt Readings from Last 3 Encounters:   06/21/24 98.5 kg (217 lb 2.5 oz)   06/19/24 99.6 kg (219 lb 9.3 oz)   04/12/24 95 kg (209 lb 7 oz)     Home diuretic: Torsemide 20 Mg MWF and 10 Mg T, Th, Sat  Last echo 8/2023: LVEF 45%.  G1 DD.  Hold home diuretics  Daily weights          Respiratory insufficiency  Assessment & Plan  Currently on 2L NC  CT CAP: Sigmoid diverticulitis with no fluid collection or abscess. Consider colonoscopy when the acute illness subsides. Trace left effusion. No acute airspace consolidation. Dilated ascending artery measuring 4.4 cm, annual surveillance generally suggested. Cholelithiasis. No intra-abdominal hemorrhage  Encourage IS  Wean supplemental O2 to maintain spO2 > 92%    Paroxysmal A-fib (HCC)  Assessment & Plan  AC: Eliquis 5 Mg twice daily  RC: Metoprolol succinate 25 Mg twice daily  Hold home metoprolol 2/2 hypotension    HTN (hypertension)  Assessment & Plan  Hold home metoprolol 2/2 hypotension    Abnormal CT scan  Assessment & Plan  6/21 CT CAP: Dilated ascending artery measuring 4.4 cm, annual surveillance generally suggested  Incidental finding note completed        Stage 2 chronic kidney disease  Assessment & Plan  Lab Results   Component Value Date    EGFR 56 06/21/2024    EGFR 69 06/19/2024    EGFR 68 06/18/2024    CREATININE 1.14 06/21/2024    CREATININE 0.96 06/19/2024    CREATININE 0.97 06/18/2024     Baseline Creatinine 0.9-1.0  Cr at baseline  Strict I/O  Trend renal indices    Focal seizure with retained awareness  Assessment & Plan  Continue home Keppra 500 mg BID           History of Present Illness     HPI: Matthew Krause is a 89 y.o. male with past medical history significant for recent diverticulitis, heart failure with reduced EF, moderate  mitral valve regurgitation, A-fib on Eliquis, hypertension, hyperlipidemia, seizure, RA, hypothyroidism who presented to the emergency department with hypotension.  Patient was recently admitted from 6/15-6/19 due to dehydration in the setting of diverticulitis with frequent bouts of diarrhea.  He was discharged home on cefuroxime and Flagyl.  Patient reports poor p.o. intake and ongoing diarrhea since that time.  He denies any recent fevers, abdominal pain, chest pain, shortness of breath.  He was hypotensive in the emergency department and given 250 cc of normal saline and 25 g of 25% albumin.  Briefly required Levophed which was quickly weaned off.  CT chest abdomen pelvis revealed ongoing sigmoid diverticulitis but no other acute infection.  Patient has been on prednisone 5 mg daily for some time which was discontinued 2 weeks ago.  Hypotension likely secondary to hypovolemia and adrenal insufficiency.  Will be admitted stepdown 1 under critical care service for further management.    History obtained from chart review and the patient.  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)  No date: Spinal stenosis Past Surgical History:  12/17/2022: CARDIAC ELECTROPHYSIOLOGY PROCEDURE; N/A      Comment:  Procedure: Cardiac loop recorder implant;  Surgeon:                Negrito Hollingsworth MD;  Location: [facility];                 Service: Cardiology  No date: CARPAL TUNNEL RELEASE; Bilateral  No date: COLONOSCOPY  No date: LAMINECTOMY  No date: TOTAL KNEE ARTHROPLASTY; Bilateral  No date: TOTAL SHOULDER REPLACEMENT   Current Outpatient Medications   Medication Instructions    ACETAMINOPHEN ER PO 1,000 mg, Oral, 3 times daily    albuterol (2.5 mg/3 mL) 0.083 % nebulizer  solution     allopurinol (ZYLOPRIM) 400 mg, Oral, Daily    apixaban (ELIQUIS) 5 mg, Oral, 2 times daily    cefuroxime (CEFTIN) 500 mg, Oral, Every 12 hours scheduled    Diclofenac Sodium (VOLTAREN) 4 g, Topical, 4 times daily    doxycycline hyclate (VIBRAMYCIN) 100 mg, Oral, Every 12 hours    ezetimibe (ZETIA) 10 mg, Oral, Daily    fluticasone (FLONASE) 50 mcg/act nasal spray 1 spray, Nasal, 2 times daily PRN    gabapentin (NEURONTIN) 100 mg, Oral, 3 times daily    hydroxychloroquine (PLAQUENIL) 200 mg, Oral, 2 times daily with meals    levETIRAcetam (KEPPRA) 500 mg, Oral, Every 12 hours scheduled    lidocaine (LIDODERM) 5 % 1 patch, Topical, Daily, Remove & Discard patch within 12 hours or as directed by MD    loperamide (IMODIUM) 2 mg, Oral, 4 times daily PRN    melatonin 1 mg, Oral, Daily at bedtime, Pt using only 1mg not 2mg<BR>    metoprolol succinate (TOPROL-XL) 25 mg, Oral, 2 times daily    metroNIDAZOLE (FLAGYL) 500 mg, Oral, Every 8 hours scheduled    nystatin (MYCOSTATIN) powder Topical, 2 times daily    omeprazole (PRILOSEC) 20 mg, Oral, 2 times daily    ondansetron (ZOFRAN) 4 mg, Oral, Every 8 hours PRN    polyethylene glycol (MIRALAX) 17 g, Oral, Every other day    potassium chloride (KLOR-CON) 20 mEq packet 20 mEq, Oral, 2 times daily    psyllium (METAMUCIL SMOOTH TEXTURE) 28 % packet 1 packet, Oral, Daily    saccharomyces boulardii (FLORASTOR) 250 mg, Oral, 2 times daily    Synthroid 50 mcg, Oral, Daily    tamsulosin (FLOMAX) 0.4 mg, Oral, Daily with dinner    torsemide (DEMADEX) 10 mg, Oral, 4 times weekly, Take 10 mg on Tuesday, Thursday, Saturday, and Sunday    torsemide (DEMADEX) 20 mg, Oral, 3 times weekly, Take 20 mg on Monday, Wednesday, and Friday    Allergies   Allergen Reactions    Colchicine Other (See Comments)     Flushing      Niacin Other (See Comments)     flushed    Other GI Intolerance    Statins       Social History     Tobacco Use    Smoking status: Former     Current packs/day:  0.25     Average packs/day: 0.3 packs/day for 5.0 years (1.3 ttl pk-yrs)     Types: Cigarettes    Smokeless tobacco: Never   Vaping Use    Vaping status: Never Used   Substance Use Topics    Alcohol use: Yes     Alcohol/week: 2.0 standard drinks of alcohol     Types: 2 Shots of liquor per week     Comment: 1 drink per week    Drug use: Yes     Types: Marijuana    Family History   Problem Relation Age of Onset    Seizures Son     Colon polyps Neg Hx     Colon cancer Neg Hx           Objective                            Vitals I/O      Most Recent Min/Max in 24hrs   Temp (!) 97.4 °F (36.3 °C) Temp  Min: 97.4 °F (36.3 °C)  Max: 97.4 °F (36.3 °C)   Pulse 77 Pulse  Min: 71  Max: 80   Resp 20 Resp  Min: 18  Max: 42   /66 BP  Min: 74/44  Max: 115/73   O2 Sat 96 % SpO2  Min: 91 %  Max: 96 %      Intake/Output Summary (Last 24 hours) at 2024 1351  Last data filed at 2024 1216  Gross per 24 hour   Intake 134.04 ml   Output --   Net 134.04 ml       Diet Regular; Regular House    Invasive Monitoring           Physical Exam   Physical Exam  Vitals reviewed.   Eyes:      Extraocular Movements: Extraocular movements intact.      Pupils: Pupils are equal, round, and reactive to light.   Skin:     General: Skin is warm and dry.      Capillary Refill: Capillary refill takes 2 to 3 seconds.      Coloration: Skin is pale.      Comments: Ecchymosis of bilateral upper extremities   HENT:      Head: Normocephalic and atraumatic.      Mouth/Throat:      Mouth: Mucous membranes are dry.   Cardiovascular:      Rate and Rhythm: Normal rate and regular rhythm.      Heart sounds: No murmur heard.     No friction rub. No gallop.   Musculoskeletal:      Right lower le+ Edema present.      Left lower le+ Edema present.   Abdominal: General: There is no distension.      Palpations: Abdomen is soft.      Tenderness: There is no abdominal tenderness.   Constitutional:       Appearance: He is well-developed and well-nourished.    Pulmonary:      Effort: Pulmonary effort is normal. No accessory muscle usage, respiratory distress or accessory muscle usage.      Breath sounds: No wheezing or rales.   Neurological:      General: No focal deficit present.      Mental Status: He is alert and oriented to person, place and time. Mental status is at baseline.      Motor: Strength full and intact in all extremities.            Diagnostic Studies      EKG: NSR  Imaging:  I have personally reviewed pertinent reports.       Medications:  Scheduled PRN   allopurinol, 400 mg, Daily  apixaban, 5 mg, BID  cefepime, 2,000 mg, Once  chlorhexidine, 15 mL, Q12H ODALYS  Diclofenac Sodium, 2 g, 4x Daily  gabapentin, 100 mg, TID  hydroxychloroquine, 200 mg, BID With Meals  levETIRAcetam, 500 mg, Q12H ODALYS  levothyroxine, 50 mcg, Daily  magnesium sulfate, 2 g, Once  multi-electrolyte, 500 mL, Once  [START ON 6/22/2024] pantoprazole, 20 mg, Early Morning  vancomycin, 25 mg/kg (Adjusted), Once      acetaminophen, 650 mg, Q6H PRN       Continuous          Labs:    CBC    Recent Labs     06/21/24  0838   WBC 7.89   HGB 10.6*   HCT 34.5*        BMP    Recent Labs     06/21/24  0838   SODIUM 137   K 5.5*      CO2 25   AGAP 6   BUN 12   CREATININE 1.14   CALCIUM 9.7       Coags    Recent Labs     06/21/24  0838   INR 2.26*   PTT 46*        Additional Electrolytes  Recent Labs     06/21/24  0838   MG 1.4*          Blood Gas    No recent results  No recent results LFTs  Recent Labs     06/21/24  0838   ALT 8   AST 38   ALKPHOS 62   ALB 2.9*   TBILI 0.46       Infectious  Recent Labs     06/21/24  0838   PROCALCITONI 0.24     Glucose  Recent Labs     06/21/24  0838   GLUC 105             Anticipated Length of Stay is > 2 midnights  Rama Rey PA-C

## 2024-06-21 NOTE — ASSESSMENT & PLAN NOTE
6/21 CT CAP: Dilated ascending artery measuring 4.4 cm, annual surveillance generally suggested  Incidental finding note completed

## 2024-06-21 NOTE — ASSESSMENT & PLAN NOTE
Recently diagnosed with diverticulitis and had been started on Augmentin outpatient.  Subsequently developed severe diarrhea and was admitted for dehydration.  Antibiotics at that time transition to ceftriaxone and Flagyl.  Discharged on 6/19 on cefuroxime and Flagyl p.o.  Presented today with hypotension, however Pro-Larry negative, lactic negative, not meeting SIRS criteria  Doubt hypotension is secondary to sepsis however CT CAP showing ongoing sigmoid diverticulitis    Plan  We will continue with IV ceftriaxone and IV Flagyl  Low fiber diet  Zofran as needed for nausea

## 2024-06-21 NOTE — ASSESSMENT & PLAN NOTE
Wt Readings from Last 3 Encounters:   06/21/24 104 kg (229 lb 11.5 oz)   06/19/24 99.6 kg (219 lb 9.3 oz)   04/12/24 95 kg (209 lb 7 oz)     Home diuretic: Torsemide 20 Mg MWF and 10 Mg T, Th, Sat  Last echo 8/2023: LVEF 45%.  G1 DD.

## 2024-06-21 NOTE — ASSESSMENT & PLAN NOTE
Currently on 2L NC  CT CAP: Sigmoid diverticulitis with no fluid collection or abscess. Consider colonoscopy when the acute illness subsides. Trace left effusion. No acute airspace consolidation. Dilated ascending artery measuring 4.4 cm, annual surveillance generally suggested. Cholelithiasis. No intra-abdominal hemorrhage  Encourage IS  Wean supplemental O2 to maintain spO2 > 92%

## 2024-06-21 NOTE — ASSESSMENT & PLAN NOTE
AC: Eliquis 5 Mg twice daily  RC: Metoprolol succinate 25 Mg twice daily  Hold home metoprolol 2/2 hypotension

## 2024-06-21 NOTE — ASSESSMENT & PLAN NOTE
Lab Results   Component Value Date    EGFR 56 06/21/2024    EGFR 69 06/19/2024    EGFR 68 06/18/2024    CREATININE 1.14 06/21/2024    CREATININE 0.96 06/19/2024    CREATININE 0.97 06/18/2024     Baseline Creatinine 0.9-1.0  Cr at baseline  Strict I/O  Trend renal indices

## 2024-06-21 NOTE — Clinical Note
Case was discussed with MONO Rey and the patient's admission status was agreed to be Admission Status: inpatient status to the service of Dr. STEINER .

## 2024-06-21 NOTE — PLAN OF CARE
Problem: INFECTION - ADULT  Goal: Absence or prevention of progression during hospitalization  Description: INTERVENTIONS:  - Assess and monitor for signs and symptoms of infection  - Monitor lab/diagnostic results  - Monitor all insertion sites, i.e. indwelling lines, tubes, and drains  - Monitor endotracheal if appropriate and nasal secretions for changes in amount and color  - [place] appropriate cooling/warming therapies per order  - Administer medications as ordered  - Instruct and encourage patient and family to use good hand hygiene technique  - Identify and instruct in appropriate isolation precautions for identified infection/condition  Outcome: Progressing  Goal: Absence of fever/infection during neutropenic period  Description: INTERVENTIONS:  - Monitor WBC    Outcome: Progressing     Problem: SAFETY ADULT  Goal: Patient will remain free of falls  Description: INTERVENTIONS:  - Educate patient/family on patient safety including physical limitations  - Instruct patient to call for assistance with activity   - Consult OT/PT to assist with strengthening/mobility   - Keep Call bell within reach  - Keep bed low and locked with side rails adjusted as appropriate  - Keep care items and personal belongings within reach  - Initiate and maintain comfort rounds  - Make Fall Risk Sign visible to staff  - Offer Toileting every  Hours, in advance of need  - Initiate/Maintain alarm  - Obtain necessary fall risk management equipment:   - Apply yellow socks and bracelet for high fall risk patients  - Consider moving patient to room near nurses station  Outcome: Progressing  Goal: Maintain or return to baseline ADL function  Description: INTERVENTIONS:  -  Assess patient's ability to carry out ADLs; assess patient's baseline for ADL function and identify physical deficits which impact ability to perform ADLs (bathing, care of mouth/teeth, toileting, grooming, dressing, etc.)  - Assess/evaluate cause of self-care  deficits   - Assess range of motion  - Assess patient's mobility; develop plan if impaired  - Assess patient's need for assistive devices and provide as appropriate  - Encourage maximum independence but intervene and supervise when necessary  - Involve family in performance of ADLs  - Assess for home care needs following discharge   - Consider OT consult to assist with ADL evaluation and planning for discharge  - Provide patient education as appropriate    Problem: INFECTION - ADULT  Goal: Absence of fever/infection during neutropenic period  Description: INTERVENTIONS:  - Monitor WBC    Outcome: Progressing     Problem: DISCHARGE PLANNING  Goal: Discharge to home or other facility with appropriate resources  Description: INTERVENTIONS:  - Identify barriers to discharge w/patient and caregiver  - Arrange for needed discharge resources and transportation as appropriate  - Identify discharge learning needs (meds, wound care, etc.)  - Arrange for interpretive services to assist at discharge as needed  - Refer to Case Management Department for coordinating discharge planning if the patient needs post-hospital services based on physician/advanced practitioner order or complex needs related to functional status, cognitive ability, or social support system  Outcome: Progressing

## 2024-06-21 NOTE — SEPSIS NOTE
Sepsis Note   Matthew Krause 89 y.o. male MRN: [id number]  Unit/Bed#: -01 SDU Encounter: [id number]    Tachypnea, hypothermia, and hypotension likely secondary to hypovolemia and adrenal insufficiency. Unlikely new or worsening sepsis.       Initial Sepsis Screening       Row Name 06/21/24 1545 06/21/24 0833             Is the patient's history suggestive of a new or worsening infection? No  -SB Yes (Proceed)  -TS       Suspected source of infection -- suspect infection, source unknown  -TS       Indicate SIRS criteria -- Tachypnea > 20 resp per min  -TS       Are two or more of the above signs & symptoms of infection both present and new to the patient? No  -SB --                 User Key  (r) = Recorded By, (t) = Taken By, (c) = Cosigned By      Initials Name Provider Type    SB Rama Rey PA-C Physician Assistant    ESTEFANIA Del Cid Nurse Practitioner                        Body mass index is 33.02 kg/m².  Wt Readings from Last 1 Encounters:   06/21/24 98.5 kg (217 lb 2.5 oz)        Ideal body weight: 68.4 kg (150 lb 12.7 oz)  Adjusted ideal body weight: 80.4 kg (177 lb 5.4 oz)

## 2024-06-21 NOTE — ASSESSMENT & PLAN NOTE
Lab Results   Component Value Date    EGFR 56 06/21/2024    EGFR 69 06/19/2024    EGFR 68 06/18/2024    CREATININE 1.14 06/21/2024    CREATININE 0.96 06/19/2024    CREATININE 0.97 06/18/2024     Baseline Creatinine 0.9-1.0

## 2024-06-21 NOTE — ED PROVIDER NOTES
History  Chief Complaint   Patient presents with    Hypotension     Pt arrives from [facility] via EMS after staff reported that last night pt was hypotensive.Pt reports sob but denies any cp. Denies any dizziness.      The patient is an 89-year-old male with PMH of HTN, HLD, RA, gout, GERD, diverticulitis recently DC'd on Ceftin, paroxysmal A-fib on Eliquis, and right shoulder arthroscopy that was reversed now on doxycycline twice daily brought in by EMS for hypotension and shortness of breath.  The patient is brought in from [facility] via EMS for report of hypotension starting yesterday afternoon.  Yesterday evening and overnight the patient had blood pressures of 70s-80s/40s.  EMS was called this morning given persistent hypotension.  On arrival, EMS reports blood pressures of 90s-100s/50s.  They also reported the patient with shortness of breath.  The patient is unable to determine when the shortness of breath started, however he estimates about a week.  He currently endorses bilateral leg pain worse in the left hip and left anterolateral thigh.  He notes having leg pains on and off again but this is worse than his normal.  He denies trauma and falls.  He denies fevers, headaches, cough, sore throat, CP, palpitations, N/V, and abdominal pain.  He notes the diarrhea from last week is improving but he continues with looser than normal stools.      History provided by:  Patient and EMS personnel   used: No        Prior to Admission Medications   Prescriptions Last Dose Informant Patient Reported? Taking?   ACETAMINOPHEN ER PO  Family Member Yes No   Sig: Take 1,000 mg by mouth 3 (three) times a day   Diclofenac Sodium (VOLTAREN) 1 %  Family Member Yes No   Sig: Apply 4 g topically 4 (four) times a day   Synthroid 50 MCG tablet   Yes No   Sig: Take 50 mcg by mouth daily   albuterol (2.5 mg/3 mL) 0.083 % nebulizer solution   Yes No   allopurinol (ZYLOPRIM) 100 mg tablet  Family Member Yes No    Sig: Take 400 mg by mouth daily   apixaban (Eliquis) 5 mg  Family Member No No   Sig: Take 1 tablet (5 mg total) by mouth 2 (two) times a day   cefuroxime (CEFTIN) 500 mg tablet   No No   Sig: Take 1 tablet (500 mg total) by mouth every 12 (twelve) hours for 5 days   doxycycline hyclate (VIBRAMYCIN) 100 mg capsule  Family Member Yes No   Sig: Take 100 mg by mouth every 12 (twelve) hours   ezetimibe (ZETIA) 10 mg tablet  Family Member No No   Sig: Take 1 tablet (10 mg total) by mouth daily   fluticasone (FLONASE) 50 mcg/act nasal spray  Family Member Yes No   Si spray into each nostril 2 (two) times a day as needed for rhinitis or allergies   gabapentin (NEURONTIN) 100 mg capsule  Family Member Yes No   Sig: Take 100 mg by mouth 3 (three) times a day   hydroxychloroquine (PLAQUENIL) 200 mg tablet  Family Member Yes No   Sig: Take 200 mg by mouth 2 (two) times a day with meals   levETIRAcetam (Keppra) 500 mg tablet  Family Member No No   Sig: Take 1 tablet (500 mg total) by mouth every 12 (twelve) hours   lidocaine (LIDODERM) 5 %  Family Member No No   Sig: Apply 1 patch topically over 12 hours daily Remove & Discard patch within 12 hours or as directed by MD Do not start before 2023.   Patient not taking: Reported on 2023   loperamide (IMODIUM) 2 mg capsule   No No   Sig: Take 1 capsule (2 mg total) by mouth 4 (four) times a day as needed for diarrhea   melatonin 1 mg  Family Member Yes No   Sig: Take 1 mg by mouth daily at bedtime Pt using only 1mg not 2mg   metoprolol succinate (TOPROL-XL) 25 mg 24 hr tablet  Family Member No No   Sig: Take 1 tablet (25 mg total) by mouth 2 (two) times a day   metroNIDAZOLE (FLAGYL) 500 mg tablet   No No   Sig: Take 1 tablet (500 mg total) by mouth every 8 (eight) hours for 5 days   nystatin (MYCOSTATIN) powder  Family Member No No   Sig: Apply topically 2 (two) times a day   omeprazole (PriLOSEC) 20 mg delayed release capsule   No No   Sig: Take 1 capsule  (20 mg total) by mouth 2 (two) times a day   ondansetron (ZOFRAN) 4 mg tablet  Family Member No No   Sig: TAKE 1 TABLET EVERY 8 HOURS AS NEEDED FOR NAUSEA OR VOMITING   polyethylene glycol (MIRALAX) 17 g packet  Family Member No No   Sig: Take 17 g by mouth every other day   potassium chloride (KLOR-CON) 20 mEq packet  Family Member No No   Sig: Take 20 mEq by mouth 2 (two) times a day   psyllium (METAMUCIL SMOOTH TEXTURE) 28 % packet  Family Member No No   Sig: Take 1 packet by mouth in the morning   saccharomyces boulardii (FLORASTOR) 250 mg capsule  Family Member Yes No   Sig: Take 250 mg by mouth 2 (two) times a day   tamsulosin (FLOMAX) 0.4 mg   No No   Sig: Take 1 capsule (0.4 mg total) by mouth daily with dinner for 7 days   Patient taking differently: Take 0.4 mg by mouth daily at bedtime   torsemide (DEMADEX) 10 mg tablet  Family Member No No   Sig: Take 1 tablet (10 mg total) by mouth 4 (four) times a week Take 10 mg on Tuesday, Thursday, Saturday, and Sunday   torsemide (DEMADEX) 20 mg tablet  Family Member No No   Sig: Take 1 tablet (20 mg total) by mouth 3 (three) times a week Take 20 mg on Monday, Wednesday, and Friday      Facility-Administered Medications: None       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HCC)     Spinal stenosis        Past Surgical History:   Procedure Laterality Date    CARDIAC ELECTROPHYSIOLOGY PROCEDURE N/A 12/17/2022    Procedure: Cardiac loop recorder implant;  Surgeon: Negrito Hollingsworth MD;  Location: [facility];  Service: Cardiology    CARPAL TUNNEL RELEASE Bilateral     COLONOSCOPY      LAMINECTOMY      TOTAL KNEE ARTHROPLASTY Bilateral     TOTAL SHOULDER REPLACEMENT         Family History   Problem Relation Age of Onset    Seizures Son     Colon polyps Neg Hx     Colon cancer Neg Hx      I have reviewed and agree with the history  as documented.    E-Cigarette/Vaping    E-Cigarette Use Never User      E-Cigarette/Vaping Substances    Nicotine No     THC No     CBD No     Flavoring No     Other No     Unknown No      Social History     Tobacco Use    Smoking status: Former     Current packs/day: 0.25     Average packs/day: 0.3 packs/day for 5.0 years (1.3 ttl pk-yrs)     Types: Cigarettes    Smokeless tobacco: Never   Vaping Use    Vaping status: Never Used   Substance Use Topics    Alcohol use: Yes     Alcohol/week: 2.0 standard drinks of alcohol     Types: 2 Shots of liquor per week     Comment: 1 drink per week    Drug use: Yes     Types: Marijuana       Review of Systems   Constitutional:  Positive for fatigue. Negative for chills and fever.   HENT:  Negative for congestion, rhinorrhea, sneezing, sore throat and trouble swallowing.    Respiratory:  Positive for shortness of breath. Negative for cough.    Cardiovascular:  Negative for chest pain, palpitations and leg swelling.        Low BP x1 day   Gastrointestinal:  Positive for diarrhea. Negative for abdominal pain, constipation, nausea and vomiting.   Musculoskeletal:  Positive for arthralgias (L hip) and myalgias (b/l lower leg muscles). Negative for back pain and joint swelling.   Neurological:  Negative for dizziness, syncope, weakness, light-headedness and headaches.   All other systems reviewed and are negative.      Physical Exam  Physical Exam  Vitals and nursing note reviewed.   Constitutional:       General: He is not in acute distress (Evidencing moderate discomfort, in no acute distress).     Appearance: Normal appearance. He is well-developed. He is ill-appearing (Appears fatigued mildly ill). He is not toxic-appearing or diaphoretic.   HENT:      Head: Normocephalic and atraumatic.      Jaw: There is normal jaw occlusion.      Nose: Nose normal. No congestion or rhinorrhea.      Mouth/Throat:      Lips: Pink.      Mouth: Mucous membranes are dry.      Pharynx: Oropharynx is  clear. Uvula midline.   Eyes:      Extraocular Movements: Extraocular movements intact.      Conjunctiva/sclera: Conjunctivae normal.   Cardiovascular:      Rate and Rhythm: Normal rate and regular rhythm.      Pulses: Normal pulses.           Radial pulses are 2+ on the right side and 2+ on the left side.        Dorsalis pedis pulses are 2+ on the right side and 2+ on the left side.      Heart sounds: Normal heart sounds, S1 normal and S2 normal. No murmur heard.  Pulmonary:      Effort: Pulmonary effort is normal. Tachypnea present. No accessory muscle usage, respiratory distress or retractions.      Breath sounds: Normal air entry. No stridor, decreased air movement or transmitted upper airway sounds. Examination of the right-upper field reveals decreased breath sounds. Examination of the right-middle field reveals decreased breath sounds. Examination of the left-middle field reveals decreased breath sounds. Examination of the right-lower field reveals decreased breath sounds. Examination of the left-lower field reveals decreased breath sounds. Decreased breath sounds present.   Abdominal:      Palpations: Abdomen is soft.      Tenderness: There is no abdominal tenderness. There is no guarding or rebound. Negative signs include Del Cid's sign.   Musculoskeletal:      Cervical back: Neck supple. No bony tenderness.      Thoracic back: No bony tenderness.      Lumbar back: No bony tenderness.      Right hip: Normal. No deformity, tenderness or bony tenderness. Normal range of motion.      Left hip: Tenderness (anterolateral thigh) and bony tenderness (proximal femur) present. No deformity. Decreased range of motion. Decreased strength.      Right knee: No swelling or bony tenderness.      Left knee: No swelling or bony tenderness.      Right lower leg: No deformity or bony tenderness. 1+ Edema present.      Left lower leg: No deformity or bony tenderness. 1+ Edema present.      Right ankle: No deformity. No  tenderness. Normal range of motion.      Left ankle: No deformity. No tenderness. Normal range of motion.   Skin:     General: Skin is warm and dry.      Capillary Refill: Capillary refill takes 2 to 3 seconds.      Findings: No rash.   Neurological:      General: No focal deficit present.      Mental Status: He is alert and oriented to person, place, and time. Mental status is at baseline.      GCS: GCS eye subscore is 4. GCS verbal subscore is 5. GCS motor subscore is 6.         Vital Signs  ED Triage Vitals   Temperature Pulse Respirations Blood Pressure SpO2   06/21/24 0830 06/21/24 0817 06/21/24 0817 06/21/24 0817 06/21/24 0817   (!) 97.4 °F (36.3 °C) 72 20 (!) 81/52 94 %      Temp Source Heart Rate Source Patient Position - Orthostatic VS BP Location FiO2 (%)   06/21/24 0830 06/21/24 0817 06/21/24 0817 06/21/24 0817 --   Oral Monitor Sitting Right arm       Pain Score       06/21/24 0817       6           Vitals:    06/21/24 1122 06/21/24 1145 06/21/24 1200 06/21/24 1230   BP: 95/54 112/69 115/73 110/64   Pulse: 75 75 77 77   Patient Position - Orthostatic VS: Lying Lying Lying          Visual Acuity  Visual Acuity      Flowsheet Row Most Recent Value   L Pupil Size (mm) 3   R Pupil Size (mm) 3            ED Medications  Medications   cefepime (MAXIPIME) IVPB (premix in dextrose) 2,000 mg 50 mL (0 mg Intravenous Hold 6/21/24 0907)   vancomycin (VANCOCIN) 2000 mg in sodium chloride 0.9% 500 mL IVPB (2,000 mg Intravenous Not Given 6/21/24 0907)   norepinephrine (LEVOPHED) 4 mg (STANDARD CONCENTRATION) IV in sodium chloride 0.9% 250 mL (0 mcg/min Intravenous Stopped 6/21/24 1216)   sodium chloride 0.9 % bolus 250 mL (0 mL Intravenous Stopped 6/21/24 0923)   acetaminophen (TYLENOL) tablet 975 mg (975 mg Oral Given 6/21/24 0919)   albumin human (FLEXBUMIN) 25 % injection 25 g (0 g Intravenous Stopped 6/21/24 1123)   iohexol (OMNIPAQUE) 350 MG/ML injection (SINGLE-DOSE) 100 mL (100 mL Intravenous Given 6/21/24 1103)        Diagnostic Studies  Results Reviewed       Procedure Component Value Units Date/Time    HS Troponin I 4hr [[id number]] Collected: 06/21/24 1241    Lab Status: In process Specimen: Blood from Arm, Right Updated: 06/21/24 1247    HS Troponin I 2hr [[id number]]  (Normal) Collected: 06/21/24 1131    Lab Status: Final result Specimen: Blood from Arm, Right Updated: 06/21/24 1207     hs TnI 2hr 16 ng/L      Delta 2hr hsTnI -3 ng/L     Protime-INR [[id number]]  (Abnormal) Collected: 06/21/24 0838    Lab Status: Final result Specimen: Blood from Arm, Left Updated: 06/21/24 0943     Protime 25.4 seconds      INR 2.26    APTT [[id number]]  (Abnormal) Collected: 06/21/24 0838    Lab Status: Final result Specimen: Blood from Arm, Left Updated: 06/21/24 0943     PTT 46 seconds     Procalcitonin [[id number]]  (Normal) Collected: 06/21/24 0838    Lab Status: Final result Specimen: Blood from Arm, Left Updated: 06/21/24 0933     Procalcitonin 0.24 ng/ml     HS Troponin 0hr (reflex protocol) [[id number]]  (Normal) Collected: 06/21/24 0838    Lab Status: Final result Specimen: Blood from Arm, Left Updated: 06/21/24 0930     hs TnI 0hr 19 ng/L     FLU/RSV/COVID - if FLU/RSV clinically relevant [[id number]]  (Normal) Collected: 06/21/24 0838    Lab Status: Final result Specimen: Nares from Nose Updated: 06/21/24 0928     SARS-CoV-2 Negative     INFLUENZA A PCR Negative     INFLUENZA B PCR Negative     RSV PCR Negative    Narrative:      FOR PEDIATRIC PATIENTS - copy/paste COVID Guidelines URL to browser: [email or web address]    SARS-CoV-2 assay is a Nucleic Acid Amplification assay intended for the  qualitative detection of nucleic acid from SARS-CoV-2 in nasopharyngeal  swabs. Results are for the presumptive identification of SARS-CoV-2 RNA.    Positive results are indicative of infection with SARS-CoV-2, the virus  causing COVID-19, but do not rule out bacterial infection or  co-infection  with other viruses. Laboratories within the [place] and its  territories are required to report all positive results to the appropriate  public health authorities. Negative results do not preclude SARS-CoV-2  infection and should not be used as the sole basis for treatment or other  patient management decisions. Negative results must be combined with  clinical observations, patient history, and epidemiological information.  This test has not been FDA cleared or approved.    This test has been authorized by FDA under an Emergency Use Authorization  (EUA). This test is only authorized for the duration of time the  declaration that circumstances exist justifying the authorization of the  emergency use of an in vitro diagnostic tests for detection of SARS-CoV-2  virus and/or diagnosis of COVID-19 infection under section 564(b)(1) of  the Act, 21 U.S.C. 360bbb-3(b)(1), unless the authorization is terminated  or revoked sooner. The test has been validated but independent review by FDA  and CLIA is pending.    Test performed using "[employer]" GeneXpert: This RT-PCR assay targets N2,  a region unique to SARS-CoV-2. A conserved region in the E-gene was chosen  for pan-Sarbecovirus detection which includes SARS-CoV-2.    According to CMS-2020-01-R, this platform meets the definition of high-throughput technology.    B-Type Natriuretic Peptide(BNP) [[id number]]  (Abnormal) Collected: 06/21/24 0838    Lab Status: Final result Specimen: Blood from Arm, Left Updated: 06/21/24 0915      pg/mL     Fingerstick Glucose (POCT) [[id number]]  (Normal) Collected: 06/21/24 0908    Lab Status: Final result Specimen: Blood Updated: 06/21/24 0909     POC Glucose 91 mg/dl     Lactic acid [[id number]]  (Normal) Collected: 06/21/24 0838    Lab Status: Final result Specimen: Blood from Arm, Left Updated: 06/21/24 0909     LACTIC ACID 1.4 mmol/L     Narrative:      Result may be elevated if tourniquet was used during  collection.    Lipase [[id number]]  (Normal) Collected: 06/21/24 0838    Lab Status: Final result Specimen: Blood from Arm, Left Updated: 06/21/24 0909     Lipase 16 u/L     Comprehensive metabolic panel [[id number]]  (Abnormal) Collected: 06/21/24 0838    Lab Status: Final result Specimen: Blood from Arm, Left Updated: 06/21/24 0909     Sodium 137 mmol/L      Potassium 5.5 mmol/L      Chloride 106 mmol/L      CO2 25 mmol/L      ANION GAP 6 mmol/L      BUN 12 mg/dL      Creatinine 1.14 mg/dL      Glucose 105 mg/dL      Calcium 9.7 mg/dL      Corrected Calcium 10.6 mg/dL      AST 38 U/L      ALT 8 U/L      Alkaline Phosphatase 62 U/L      Total Protein 5.2 g/dL      Albumin 2.9 g/dL      Total Bilirubin 0.46 mg/dL      eGFR 56 ml/min/1.73sq m     Narrative:      National Kidney Disease Foundation guidelines for Chronic Kidney Disease (CKD):     Stage 1 with normal or high GFR (GFR > 90 mL/min/1.73 square meters)    Stage 2 Mild CKD (GFR = 60-89 mL/min/1.73 square meters)    Stage 3A Moderate CKD (GFR = 45-59 mL/min/1.73 square meters)    Stage 3B Moderate CKD (GFR = 30-44 mL/min/1.73 square meters)    Stage 4 Severe CKD (GFR = 15-29 mL/min/1.73 square meters)    Stage 5 End Stage CKD (GFR <15 mL/min/1.73 square meters)  Note: GFR calculation is accurate only with a steady state creatinine    Magnesium [[id number]]  (Abnormal) Collected: 06/21/24 0838    Lab Status: Final result Specimen: Blood from Arm, Left Updated: 06/21/24 0909     Magnesium 1.4 mg/dL     CBC and differential [[id number]]  (Abnormal) Collected: 06/21/24 0838    Lab Status: Final result Specimen: Blood from Arm, Left Updated: 06/21/24 0848     WBC 7.89 Thousand/uL      RBC 3.69 Million/uL      Hemoglobin 10.6 g/dL      Hematocrit 34.5 %      MCV 94 fL      MCH 28.7 pg      MCHC 30.7 g/dL      RDW 15.4 %      MPV 13.9 fL      Platelets 157 Thousands/uL      nRBC 0 /100 WBCs      Segmented % 47 %      Immature Grans % 0 %      Lymphocytes % 25 %       Monocytes % 10 %      Eosinophils Relative 17 %      Basophils Relative 1 %      Absolute Neutrophils 3.73 Thousands/µL      Absolute Immature Grans 0.03 Thousand/uL      Absolute Lymphocytes 1.94 Thousands/µL      Absolute Monocytes 0.80 Thousand/µL      Eosinophils Absolute 1.34 Thousand/µL      Basophils Absolute 0.05 Thousands/µL     Blood culture #1 [[id number]] Collected: 06/21/24 0838    Lab Status: In process Specimen: Blood from Arm, Right Updated: 06/21/24 0845    Blood culture #2 [[id number]] Collected: 06/21/24 0838    Lab Status: In process Specimen: Blood from Arm, Left Updated: 06/21/24 0845    UA w Reflex to Microscopic w Reflex to Culture [[id number]]     Lab Status: No result Specimen: Urine                    CT chest abdomen pelvis w contrast   Final Result by Roopa Perez MD (06/21 1201)      Sigmoid diverticulitis with no fluid collection or abscess. Consider colonoscopy when the acute illness subsides      Trace left effusion      No acute airspace consolidation.   Dilated ascending artery measuring 4.4 cm, annual surveillance generally suggested   Cholelithiasis   No intra-abdominal hemorrhage   The study was marked in EPIC for immediate notification.      Workstation performed: [id number]         XR hip/pelv 2-3 vws left   Final Result by Michael Schwab MD (06/21 1010)      No acute osseous abnormality.      Degenerative changes as described.            Workstation performed: [id number]         XR chest portable   Final Result by Breana Mccartney MD (06/21 0944)      Mild pulmonary venous congestion.            Workstation performed: [id number]                    Procedures  ECG 12 Lead Documentation Only    Date/Time: 6/21/2024 8:47 AM    Performed by: ESTEFANIA Cade  Authorized by: ESTEFANIA Cade    Indications / Diagnosis:  Hypotension  ECG reviewed by me, the ED Provider: yes    Patient location:  ED  Previous ECG:     Previous ECG:  Compared to current    Comparison  ECG info:  June 14, 2024    Similarity:  No change    Comparison to cardiac monitor: Yes    Interpretation:     Interpretation: non-specific    Rate:     ECG rate:  72    ECG rate assessment: normal    Rhythm:     Rhythm: sinus rhythm    Ectopy:     Ectopy: PVCs      PVCs:  Infrequent  QRS:     QRS axis:  Left    QRS intervals:  Normal  Conduction:     Conduction: normal    ST segments:     ST segments:  Non-specific  T waves:     T waves: non-specific    Other findings:     Other findings: prolonged qTc interval    Comments:      Sinus rhythm, leftward axis, prolonged QTc, nonspecific T and ST abnormalities           ED Course  ED Course as of 06/21/24 1306   Fri Jun 21, 2024   0849 Hemoglobin(!): 10.6  Stable with his baseline   0909 Blood Pressure: 91/50  Slightly improved   0925 Patient initially responded to fluid bolus, downtrending blood pressure of 70s over 40s, will start Levophed given fluid overload on chest x-ray and elevated BNP   0945 POCT INR(!): 2.26  On eliquis   0945 Crit care MONO messaged regarding pt case   0946 XR chest portable  IMPRESSION:     Mild pulmonary venous congestion.     0958 Patient 94% on 2 L, starting room air trial.  Patient's son and daughter-in-law at bedside updated on results   0959 Critical care recommends another fluid bolus   1002 87% on RA, restarting 2L NC     1028 XR hip/pelv 2-3 vws left  IMPRESSION:     No acute osseous abnormality.     Degenerative changes as described.     1207 CT chest abdomen pelvis w contrast  IMPRESSION:     Sigmoid diverticulitis with no fluid collection or abscess. Consider colonoscopy when the acute illness subsides     Trace left effusion     No acute airspace consolidation.  Dilated ascending artery measuring 4.4 cm, annual surveillance generally suggested  Cholelithiasis  No intra-abdominal hemorrhage     1214 Patient and his son-in-law updated on results.  He is resting more comfortably and more oriented.  On 2 mics per minute of  Levophed, will switch off and trial without pressor support.  Pending stability, will discuss with Kaylynn for stepdown to versus ICU admission for need of pressor support                            Initial Sepsis Screening       Row Name 06/21/24 0833                Is the patient's history suggestive of a new or worsening infection? Yes (Proceed)  -TS        Suspected source of infection suspect infection, source unknown  -TS        Indicate SIRS criteria Tachypnea > 20 resp per min  -TS        Are two or more of the above signs & symptoms of infection both present and new to the patient? --                  User Key  (r) = Recorded By, (t) = Taken By, (c) = Cosigned By      Initials Name Provider Type    ESTEFANIA Del Cid Nurse Practitioner                   Initial Sepsis Screening       Row Name 06/21/24 0833                Is the patient's history suggestive of a new or worsening infection? Yes (Proceed)  -TS        Suspected source of infection suspect infection, source unknown  -TS        Indicate SIRS criteria Tachypnea > 20 resp per min  -TS        Are two or more of the above signs & symptoms of infection both present and new to the patient? --                  User Key  (r) = Recorded By, (t) = Taken By, (c) = Cosigned By      Initials Name Provider Type    ESTEFANIA Del Cid Nurse Practitioner                   Initial Sepsis Screening       Row Name 06/21/24 0833                Is the patient's history suggestive of a new or worsening infection? Yes (Proceed)  -TS        Suspected source of infection suspect infection, source unknown  -TS        Indicate SIRS criteria Tachypnea > 20 resp per min  -TS        Are two or more of the above signs & symptoms of infection both present and new to the patient? --                  User Key  (r) = Recorded By, (t) = Taken By, (c) = Cosigned By      Initials Name Provider Type    ESTEFANIA Del Cid Nurse Luiza                                   Medical Decision Making  DDx including but not limited to: metabolic abnormality, dehydration, sepsis, septic shock, pneumonia, UTI, ACS, cardiac arrhythmia, intra-abdominal process,  CHF exacerbation    Wt Readings from Last 3 Encounters:  06/21/24 : 104 kg (229 lb 11.5 oz)  06/19/24 : 99.6 kg (219 lb 9.3 oz)  04/12/24 : 95 kg (209 lb 7 oz)     Hypotensive on arrival with mild hypoxemia to 87% on room air.  Chest x-ray with fluid overload and BNP mildly elevated.  Patient with existing CHF, given 10 pound weight gain in 2 days, suspect CHF and cardiogenic shock as patient's less likely cause to hypotension.  No fevers, no leukocytosis, normal lactic acid and procalcitonin.  Sepsis less likely.  Levophed initiated given initial response to fluids, however return of hypotension after fluids completed.  Patient responding and currently stable with MAP of 60s to 70s.    Workup with finding of ongoing diverticulitis without new abscess or perforation.  No other acute infectious etiology.  Patient did recently discontinue chronic steroid use approximately 2 weeks ago.  Given new diverticulitis infection and inflammation of the body, may be some level of adrenal insufficiency.  Discussed case with critical care and plan made for Level One stepdown admission.  Trialed off Levophed and currently remained stable with blood pressure of 100s over 60s with maps of 65-70.    Problems Addressed:  Diverticulitis: acute illness or injury  Generalized weakness: acute illness or injury  Hypotension: acute illness or injury    Amount and/or Complexity of Data Reviewed  Independent Historian: caregiver and EMS  Labs: ordered. Decision-making details documented in ED Course.  Radiology: ordered. Decision-making details documented in ED Course.  ECG/medicine tests: ordered and independent interpretation performed.    Risk  OTC drugs.  Prescription drug management.  Decision regarding hospitalization.              Disposition  Final diagnoses:   Hypotension   Diverticulitis   Generalized weakness     Time reflects when diagnosis was documented in both MDM as applicable and the Disposition within this note       Time User Action Codes Description Comment    6/21/2024 12:56 PM Iliana Maldonado [I95.9] Hypotension     6/21/2024 12:56 PM Iliana Maldonado [K57.92] Diverticulitis     6/21/2024 12:56 PM Iliana Maldonado [R53.1] Generalized weakness           ED Disposition       ED Disposition   Admit    Condition   Stable    Date/Time   Fri Jun 21, 2024 12:57 PM    Comment   Case was discussed with MONO [place] and the patient's admission status was agreed to be Admission Status: inpatient status to the service of Dr. Cardoso .               Follow-up Information    None         Patient's Medications   Discharge Prescriptions    No medications on file       No discharge procedures on file.    PDMP Review         Value Time User    PDMP Reviewed  Yes 6/19/2024  1:35 PM Darrell Flower MD            ED Provider  Electronically Signed by             ESTEFANIA Caed  06/21/24 [id number]

## 2024-06-21 NOTE — ASSESSMENT & PLAN NOTE
Home regimen: Hydroxychloroquine milligrams twice daily  Recently stopped daily prednisone  Check cortisol

## 2024-06-21 NOTE — ASSESSMENT & PLAN NOTE
Wt Readings from Last 3 Encounters:   06/21/24 98.5 kg (217 lb 2.5 oz)   06/19/24 99.6 kg (219 lb 9.3 oz)   04/12/24 95 kg (209 lb 7 oz)     Home diuretic: Torsemide 20 Mg MWF and 10 Mg T, Th, Sat  Last echo 8/2023: LVEF 45%.  G1 DD.  Hold home diuretics  Daily weights

## 2024-06-22 NOTE — PHYSICAL THERAPY NOTE
PHYSICAL THERAPY EVALUATION  NAME: Matthew Krause  AGE:   89 y.o.  MRN:  [id number]  ADMIT DX: Diverticulitis [K57.92]  Weakness [R53.1]  Hypotension [I95.9]  Generalized weakness [R53.1]    PMH:   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HCC)     Spinal stenosis      LENGTH OF STAY: 1        06/22/24 1314   PT Last Visit   PT Visit Date 06/22/24   Note Type   Note type Evaluation   Pain Assessment   Pain Assessment Tool Michael-Baker FACES   Michael-Baker FACES Pain Rating 6   Pain Location/Orientation Location: Back;Orientation: Right;Location: Leg   Hospital Pain Intervention(s) Repositioned;Ambulation/increased activity   Restrictions/Precautions   Weight Bearing Precautions Per Order No   Other Precautions Cognitive;Chair Alarm;Bed Alarm;Multiple lines;Telemetry;O2;Fall Risk;Pain;Hard of hearing   Home Living   Type of Home Assisted living  ([facility])   Home Equipment Walker;Wheelchair-manual   Additional Comments At true baseline, pt was performing SPT to and from  with mod I and use of RW.  However, pt recently admitted from [facility] where he has been requiring Ax2 for OOB mobility.   Prior Function   Level of [place] Independent with ADLs;Independent with functional mobility;Needs assistance with IADLS   Lives With Facility staff   Receives Help From Personal care attendant   IADLs Family/Friend/Other provides transportation;Family/Friend/Other provides meals;Family/Friend/Other provides medication management   Comments Pt is a poor historian at this time and family assist with providing information about PLOF.   General   Family/Caregiver Present Yes   Cognition   Overall Cognitive Status Impaired   Arousal/Participation Cooperative   Orientation Level Oriented to person;Disoriented to place;Disoriented to time;Disoriented to situation   Memory Decreased short term  "memory;Decreased recall of recent events;Decreased recall of precautions   Following Commands Follows one step commands with increased time or repetition   Comments Pt identified by name and .   Subjective   Subjective Agrees to PT evaluation and is pleasant and cooperative throughout session.  \"I just did this the other day.\"   RLE Assessment   RLE Assessment X   Strength RLE   RLE Overall Strength 3+/5  (functionally)   LLE Assessment   LLE Assessment X   Strength LLE   LLE Overall Strength 3+/5  (functionally)   Bed Mobility   Supine to Sit 3  Moderate assistance   Additional items Assist x 1;HOB elevated;Bedrails;Increased time required;Verbal cues;LE management   Sit to Supine Unable to assess  (left OOB in chair post session with alarm intact)   Transfers   Sit to Stand 3  Moderate assistance   Additional items Assist x 2;Increased time required;Verbal cues   Stand to Sit 3  Moderate assistance   Additional items Assist x 2;Increased time required;Verbal cues   Stand pivot 3  Moderate assistance   Additional items Assist x 2;Increased time required;Verbal cues  (with RW; difficulty advancing LEs; shuffle)   Ambulation/Elevation   Gait pattern Not appropriate  (pt did not ambulate prior to admission)   Balance   Static Sitting Fair   Dynamic Sitting Fair -   Static Standing Zero  (Ax2)   Dynamic Standing   (Zero Ax2)   Endurance Deficit   Endurance Deficit Yes   Endurance Deficit Description limited standing tolerance, LE fatigue   Activity Tolerance   Activity Tolerance Patient limited by fatigue   Medical Staff Made Aware PCA assisting with transfer   Nurse Made Aware Per RN, pt appropriate to evaluate   Assessment   Prognosis Fair   Problem List Decreased strength;Decreased endurance;Impaired balance;Decreased mobility;Decreased safety awareness;Obesity;Pain   Assessment Pt is a 89 y.o. male seen for PT evaluation s/p admit to [facility] on 2022 w/ Hypotension.  Order placed for PT. " Comorbidities affecting pt's physical performance at time of assessment include: HTN, obesity, limited cognition, and RA, spinal stenosis . Personal factors affecting pt at time of IE include: advanced age, inability to perform ADLs, and limited insight into impairments. Prior to admission, pt was  admitted from SNF rehab . At true baseline, pt was living at [facility] and performing SPT to WC with mod I and RW.  Upon evaluation: Pt requires mod A x1 for bed mobility, mod A x2 for sit to stand, and mod A x2 for SPT with RW.   (Please find full objective findings from PT assessment regarding body systems outlined above). Impairments and limitations also listed above, especially due to  weakness, impaired balance, decreased endurance, pain, decreased activity tolerance, decreased safety awareness, fall risk, SOB upon exertion, and decreased cognition.  Pt's clinical presentation is currently unstable/unpredictable seen in pt's presentation of fall risk, limited insight into deficits, and significant decline in functional mobility compared to baseline.  Pt to benefit from continued skilled PT tx while in hospital and upon DC to address deficits as defined above and maximize level of functional mobility.  Recommend  progression of transfers and WC mobility as appropriate .   Goals   Patient Goals to get to the chair   STG Expiration Date 07/02/24   Short Term Goal #1 Pt will be able to: (1) perform bed mobility with min A to promote upright posture and OOB mobility (2) perform sit to stand with min A to decrease burden of care (3) perform SPT with RW and min A x1 to allow increased OOB mobility (4) increase standing balance by 1 grade to decrease risk of falls (5) propel WC ~200` with supervision to allow for increased independence with mobility and decrease burden of care   PT Treatment Day 0   Plan   Treatment/Interventions Functional transfer training;LE strengthening/ROM;Therapeutic exercise;Cognitive  reorientation;Endurance training;Patient/family training;Equipment eval/education;Bed mobility   PT Frequency 3-5x/wk   Discharge Recommendation   Rehab Resource Intensity Level, PT II (Moderate Resource Intensity)   Equipment Recommended Wheelchair;Walker   [facility] Basic Mobility Inpatient   Turning in Flat Bed Without Bedrails 2   Lying on Back to Sitting on Edge of Flat Bed Without Bedrails 2   Moving Bed to Chair 1   Standing Up From Chair Using Arms 1   Walk in Room 1   Climb 3-5 Stairs With Railing 1   Basic Mobility Inpatient Raw Score 8   Turning Head Towards Sound 4   Follow Simple Instructions 3   Low Function Basic Mobility Raw Score  15   Low Function Basic Mobility Standardized Score  23.9   [facility] Level Of Mobility   [facility] Goal 3: Sit at edge of bed   [facility] Achieved 4: Move to chair/commode   End of Consult   Patient Position at End of Consult Bedside chair;Bed/Chair alarm activated;All needs within reach     The patient's [facility] Basic Mobility Inpatient Short Form Raw Score is 8, Standardized Score is  .  A Raw Score of less than 16 suggests the patient may benefit from discharge to post-acute rehabilitation services. However please refer to therapist recommendation for discharge planning given other factors that may influence destination.     Adapted from Rogers SUERO, Crystal J, Makayla J, Shae J. [employer] [facility] “6-Clicks” Basic Mobility and Daily Activity Scores With Discharge Destination. Physical Therapy, 2021;101:1-9. DOI: [email or web address]      Twyla Wilson, PT,DPT

## 2024-06-22 NOTE — PLAN OF CARE
Problem: PAIN - ADULT  Goal: Verbalizes/displays adequate comfort level or baseline comfort level  Description: Interventions:  - Encourage patient to monitor pain and request assistance  - Assess pain using appropriate pain scale  - Administer analgesics based on type and severity of pain and evaluate response  - Implement non-pharmacological measures as appropriate and evaluate response  - Consider cultural and social influences on pain and pain management  - Notify physician/advanced practitioner if interventions unsuccessful or patient reports new pain  Outcome: Progressing     Problem: INFECTION - ADULT  Goal: Absence or prevention of progression during hospitalization  Description: INTERVENTIONS:  - Assess and monitor for signs and symptoms of infection  - Monitor lab/diagnostic results  - Monitor all insertion sites, i.e. indwelling lines, tubes, and drains  - Monitor endotracheal if appropriate and nasal secretions for changes in amount and color  - [place] appropriate cooling/warming therapies per order  - Administer medications as ordered  - Instruct and encourage patient and family to use good hand hygiene technique  - Identify and instruct in appropriate isolation precautions for identified infection/condition  Outcome: Progressing  Goal: Absence of fever/infection during neutropenic period  Description: INTERVENTIONS:  - Monitor WBC    Outcome: Progressing     Problem: SAFETY ADULT  Goal: Patient will remain free of falls  Description: INTERVENTIONS:  - Educate patient/family on patient safety including physical limitations  - Instruct patient to call for assistance with activity   - Consult OT/PT to assist with strengthening/mobility   - Keep Call bell within reach  - Keep bed low and locked with side rails adjusted as appropriate  - Keep care items and personal belongings within reach  - Initiate and maintain comfort rounds  - Make Fall Risk Sign visible to staff  - Offer Toileting every 2 Hours,  in advance of need  - Initiate/Maintain bed alarm  - Obtain necessary fall risk management equipment  - Apply yellow socks and bracelet for high fall risk patients  - Consider moving patient to room near nurses station  Outcome: Progressing  Goal: Maintain or return to baseline ADL function  Description: INTERVENTIONS:  -  Assess patient's ability to carry out ADLs; assess patient's baseline for ADL function and identify physical deficits which impact ability to perform ADLs (bathing, care of mouth/teeth, toileting, grooming, dressing, etc.)  - Assess/evaluate cause of self-care deficits   - Assess range of motion  - Assess patient's mobility; develop plan if impaired  - Assess patient's need for assistive devices and provide as appropriate  - Encourage maximum independence but intervene and supervise when necessary  - Involve family in performance of ADLs  - Assess for home care needs following discharge   - Consider OT consult to assist with ADL evaluation and planning for discharge  - Provide patient education as appropriate  Outcome: Progressing  Goal: Maintains/Returns to pre admission functional level  Description: INTERVENTIONS:  - Perform AM-PAC 6 Click Basic Mobility/ Daily Activity assessment daily.  - Set and communicate daily mobility goal to care team and patient/family/caregiver.   - Collaborate with rehabilitation services on mobility goals if consulted  - Perform Range of Motion 4 times a day.  - Reposition patient every 2 hours.  - Dangle patient 3 times a day  - Stand patient 3 times a day  - Ambulate patient 3 times a day  - Out of bed to chair 3 times a day   - Out of bed for meals 3 times a day  - Out of bed for toileting  - Record patient progress and toleration of activity level   Outcome: Progressing     Problem: DISCHARGE PLANNING  Goal: Discharge to home or other facility with appropriate resources  Description: INTERVENTIONS:  - Identify barriers to discharge w/patient and caregiver  -  Arrange for needed discharge resources and transportation as appropriate  - Identify discharge learning needs (meds, wound care, etc.)  - Arrange for interpretive services to assist at discharge as needed  - Refer to Case Management Department for coordinating discharge planning if the patient needs post-hospital services based on physician/advanced practitioner order or complex needs related to functional status, cognitive ability, or social support system  Outcome: Progressing     Problem: Knowledge Deficit  Goal: Patient/family/caregiver demonstrates understanding of disease process, treatment plan, medications, and discharge instructions  Description: Complete learning assessment and assess knowledge base.  Interventions:  - Provide teaching at level of understanding  - Provide teaching via preferred learning methods  Outcome: Progressing     Problem: Prexisting or High Potential for Compromised Skin Integrity  Goal: Skin integrity is maintained or improved  Description: INTERVENTIONS:  - Identify patients at risk for skin breakdown  - Assess and monitor skin integrity  - Assess and monitor nutrition and hydration status  - Monitor labs   - Assess for incontinence   - Turn and reposition patient  - Assist with mobility/ambulation  - Relieve pressure over bony prominences  - Avoid friction and shearing  - Provide appropriate hygiene as needed including keeping skin clean and dry  - Evaluate need for skin moisturizer/barrier cream  - Collaborate with interdisciplinary team   - Patient/family teaching  - Consider wound care consult   Outcome: Progressing

## 2024-06-22 NOTE — ASSESSMENT & PLAN NOTE
Recently diagnosed with diverticulitis and had been started on Augmentin outpatient.  Subsequently developed severe diarrhea and was admitted for dehydration.  Antibiotics at that time transition to ceftriaxone and Flagyl.  Discharged on 6/19 on cefuroxime and Flagyl p.o.  Presented with hypotension and diarrhea, however Pro-Larry negative, lactic negative, not meeting SIRS criteria  Doubt hypotension is secondary to sepsis however CT CAP showing ongoing sigmoid diverticulitis  We will continue with IV ceftriaxone and IV Flagyl thru 6/25 to complete 10 days of abx  Low fiber diet  Zofran as needed for nausea

## 2024-06-22 NOTE — PROGRESS NOTES
[facility]  Progress Note  Name: Matthew Krause I  MRN: [id number]  Unit/Bed#: -01 SDU I Date of Admission: 6/21/2024   Date of Service: 6/22/2024 I Hospital Day: 1    Assessment & Plan   * Hypotension  Assessment & Plan  Recently admitted for dehydration in the setting of diarrhea secondary to diverticulitis  [facility] due to hypotension  Not meeting SIRS criteria, lactic negative, Pro-Larry negative  6/21 CT CAP: Sigmoid diverticulitis with no fluid collection or abscess. Consider colonoscopy when the acute illness subsides. Trace left effusion. No acute airspace consolidation. Dilated ascending artery measuring 4.4 cm, annual surveillance generally suggested. Cholelithiasis. No intra-abdominal hemorrhage  UA pending  Doubt hypotension is secondary to infection  Patient previously maintained on prednisone 5 mg daily due to RA; was discontinued but patient may be experiencing adrenal insufficiency  Given 250 cc crystalloid and 100 cc albumin in ED  Briefly required vasopressors, now off    Plan:  Check a.m. cortisol and TSH  Give additional 500 cc isolyte now  Encourage PO intake  Consider steroids if ongoing hypotension  Wean vasopressors to maintain MAP > 65    Diverticulitis  Assessment & Plan  Recently diagnosed with diverticulitis and had been started on Augmentin outpatient.  Subsequently developed severe diarrhea and was admitted for dehydration.  Antibiotics at that time transition to ceftriaxone and Flagyl.  Discharged on 6/19 on cefuroxime and Flagyl p.o.  Presented today with hypotension, however Pro-Larry negative, lactic negative, not meeting SIRS criteria  Doubt hypotension is secondary to sepsis however CT CAP showing ongoing sigmoid diverticulitis    Plan  We will continue with IV ceftriaxone and IV Flagyl  Low fiber diet  Zofran as needed for nausea    RA (rheumatoid arthritis) ([facility])  Assessment & Plan  Home regimen: Hydroxychloroquine  milligrams twice daily  Recently stopped daily prednisone  Check cortisol    Hypothyroidism  Assessment & Plan  Continue home Synthroid 50 mcg daily  Check TSH      Chronic systolic heart failure (HCC)  Assessment & Plan  Wt Readings from Last 3 Encounters:   06/21/24 98.5 kg (217 lb 2.5 oz)   06/19/24 99.6 kg (219 lb 9.3 oz)   04/12/24 95 kg (209 lb 7 oz)     Home diuretic: Torsemide 20 Mg MWF and 10 Mg T, Th, Sat  Last echo 8/2023: LVEF 45%.  G1 DD.  Hold home diuretics  Daily weights          Respiratory insufficiency  Assessment & Plan  Currently on 2L NC  CT CAP: Sigmoid diverticulitis with no fluid collection or abscess. Consider colonoscopy when the acute illness subsides. Trace left effusion. No acute airspace consolidation. Dilated ascending artery measuring 4.4 cm, annual surveillance generally suggested. Cholelithiasis. No intra-abdominal hemorrhage  Encourage IS  Wean supplemental O2 to maintain spO2 > 92%    Paroxysmal A-fib (HCC)  Assessment & Plan  AC: Eliquis 5 Mg twice daily  RC: Metoprolol succinate 25 Mg twice daily  Hold home metoprolol 2/2 hypotension    HTN (hypertension)  Assessment & Plan  Hold home metoprolol 2/2 hypotension    Abnormal CT scan  Assessment & Plan  6/21 CT CAP: Dilated ascending artery measuring 4.4 cm, annual surveillance generally suggested  Incidental finding note completed        Stage 2 chronic kidney disease  Assessment & Plan  Lab Results   Component Value Date    EGFR 56 06/21/2024    EGFR 69 06/19/2024    EGFR 68 06/18/2024    CREATININE 1.14 06/21/2024    CREATININE 0.96 06/19/2024    CREATININE 0.97 06/18/2024     Baseline Creatinine 0.9-1.0  Cr at baseline  Strict I/O  Trend renal indices    Focal seizure with retained awareness  Assessment & Plan  Continue home Keppra 500 mg BID             Disposition: Stepdown Level 1    ICU Core Measures     A: Assess, Prevent, and Manage Pain Has pain been assessed? NA  Need for changes to pain regimen? NA   B: Both SAT/SAT   N/A   C: Choice of Sedation RASS Goal: 0 Alert and Calm or N/A patient not on sedation  Need for changes to sedation or analgesia regimen? NA   D: Delirium CAM-ICU: Negative   E: Early Mobility  Plan for early mobility? Yes   F: Family Engagement Plan for family engagement today? Yes       Antibiotic Review: Awaiting culture results.       Prophylaxis:  VTE VTE covered by:  apixaban, Oral, 5 mg at 06/21/24 1710       Stress Ulcer  covered byomeprazole (PriLOSEC) 20 mg delayed release capsule [[id number]] (Long-Term Med), pantoprazole (PROTONIX) EC tablet 20 mg [[id number]]         Significant 24hr Events      No acute events overnight      Subjective   Review of Systems: See HPI for Review of Systems     Objective                            Vitals I/O      Most Recent Min/Max in 24hrs   Temp 97.7 °F (36.5 °C) Temp  Min: 93.9 °F (34.4 °C)  Max: 97.7 °F (36.5 °C)   Pulse 94 Pulse  Min: 65  Max: 94   Resp 15 Resp  Min: 15  Max: 42   /68 BP  Min: 74/44  Max: 121/58   O2 Sat 92 % SpO2  Min: 89 %  Max: 97 %      Intake/Output Summary (Last 24 hours) at 6/22/2024 0444  Last data filed at 6/22/2024 0201  Gross per 24 hour   Intake 2094.04 ml   Output 700 ml   Net 1394.04 ml       Diet Regular; Regular House    Invasive Monitoring           Physical Exam   Physical Exam  Eyes:      Extraocular Movements: Extraocular movements intact.      Pupils: Pupils are equal, round, and reactive to light.   Skin:     General: Skin is warm, dry and not mottled extremities.      Capillary Refill: Capillary refill takes 2 to 3 seconds.      Coloration: Skin is pale.      Findings: Ecchymosis present.             Comments: Bilateral upper extremity ecchymosis    HENT:      Head: Normocephalic and atraumatic.   Cardiovascular:      Rate and Rhythm: Normal rate and regular rhythm.      Pulses: Normal pulses.      Heart sounds: Normal heart sounds.   Musculoskeletal:         General: Normal range of motion.      Cervical back: Full passive  range of motion without pain, normal range of motion and neck supple.      Right lower le+ Edema present.      Left lower le+ Edema present.   Abdominal: General: Bowel sounds are normal.      Palpations: Abdomen is soft.   Constitutional:       General: He is awake.      Appearance: He is well-developed and well-nourished. He is obese. He is ill-appearing.      Interventions: Nasal cannula in place.   Pulmonary:      Effort: Pulmonary effort is normal.      Breath sounds: Normal breath sounds. No wheezing or rhonchi.   Psychiatric:         Behavior: Behavior is cooperative.   Neurological:      General: No focal deficit present.      Mental Status: He is alert and easily aroused. He is disoriented to time and disoriented to situation.      Sensory: Sensation is intact.      Motor: gross motor function is at baseline for patient. Strength full and intact in all extremities.   Genitourinary/Anorectal:  external catheter present.          Diagnostic Studies      EKG: NSR  Imaging: I have personally reviewed pertinent reports.       Medications:  Scheduled PRN   allopurinol, 200 mg, Daily  apixaban, 5 mg, BID  cefTRIAXone, 1,000 mg, Q24H  chlorhexidine, 15 mL, Q12H ODALYS  Diclofenac Sodium, 2 g, 4x Daily  doxycycline hyclate, 100 mg, Q12H ODALYS  gabapentin, 100 mg, TID  hydrocortisone sodium succinate, 25 mg, Q6H ODALYS  hydroxychloroquine, 200 mg, BID With Meals  levETIRAcetam, 500 mg, Q12H ODALYS  levothyroxine, 50 mcg, Daily  metroNIDAZOLE, 500 mg, Q8H  pantoprazole, 20 mg, Early Morning      acetaminophen, 650 mg, Q6H PRN  ondansetron, 4 mg, Q6H PRN       Continuous          Labs:    CBC    Recent Labs     24  0838   WBC 7.89   HGB 10.6*   HCT 34.5*        BMP    Recent Labs     24  0838   SODIUM 137   K 5.5*      CO2 25   AGAP 6   BUN 12   CREATININE 1.14   CALCIUM 9.7       Coags    Recent Labs     24  0838   INR 2.26*   PTT 46*        Additional Electrolytes  Recent Labs      06/21/24  0838   MG 1.4*          Blood Gas    No recent results  No recent results LFTs  Recent Labs     06/21/24  0838   ALT 8   AST 38   ALKPHOS 62   ALB 2.9*   TBILI 0.46       Infectious  Recent Labs     06/21/24  0838   PROCALCITONI 0.24     Glucose  Recent Labs     06/21/24  0838   GLUC 105               Stephanie Rosa PA-C

## 2024-06-22 NOTE — PROGRESS NOTES
Patient:  ANTIONETTE COLEMAN    MRN:  [id number]    Aidin Request ID:  [id number]    Level of care reserved:  Skilled Nursing Facility    Partner Reserved:  [facility], [place] [address] [phone]    Clinical needs requested:    Geography searched:  10 miles around [id number]    Start of Service:    Request sent:  7:57am EDT on 6/22/2024 by Deedee Coy    Partner reserved:  10:40am EDT on 6/22/2024 by Deedee Coy    Choice list shared:

## 2024-06-22 NOTE — PLAN OF CARE
Problem: PHYSICAL THERAPY ADULT  Goal: Performs mobility at highest level of function for planned discharge setting.  See evaluation for individualized goals.  Description: Treatment/Interventions: Functional transfer training, LE strengthening/ROM, Therapeutic exercise, Cognitive reorientation, Endurance training, Patient/family training, Equipment eval/education, Bed mobility  Equipment Recommended: Wheelchair, Walker       See flowsheet documentation for full assessment, interventions and recommendations.  Note: Prognosis: Fair  Problem List: Decreased strength, Decreased endurance, Impaired balance, Decreased mobility, Decreased safety awareness, Obesity, Pain  Assessment: Pt is a 89 y.o. male seen for PT evaluation s/p admit to [facility] on 8/18/2022 w/ Hypotension.  Order placed for PT. Comorbidities affecting pt's physical performance at time of assessment include: HTN, obesity, limited cognition, and RA, spinal stenosis . Personal factors affecting pt at time of IE include: advanced age, inability to perform ADLs, and limited insight into impairments. Prior to admission, pt was  admitted from SNF rehab . At true baseline, pt was living at [facility] and performing SPT to  with mod I and RW.  Upon evaluation: Pt requires mod A x1 for bed mobility, mod A x2 for sit to stand, and mod A x2 for SPT with RW.   (Please find full objective findings from PT assessment regarding body systems outlined above). Impairments and limitations also listed above, especially due to  weakness, impaired balance, decreased endurance, pain, decreased activity tolerance, decreased safety awareness, fall risk, SOB upon exertion, and decreased cognition.  Pt's clinical presentation is currently unstable/unpredictable seen in pt's presentation of fall risk, limited insight into deficits, and significant decline in functional mobility compared to baseline.  Pt to benefit from continued skilled PT tx while in hospital and upon DC  to address deficits as defined above and maximize level of functional mobility.  Recommend  progression of transfers and WC mobility as appropriate .        Rehab Resource Intensity Level, PT: II (Moderate Resource Intensity)    See flowsheet documentation for full assessment.

## 2024-06-22 NOTE — ASSESSMENT & PLAN NOTE
Wt Readings from Last 3 Encounters:   06/22/24 100 kg (220 lb 14.4 oz)   06/19/24 99.6 kg (219 lb 9.3 oz)   04/12/24 95 kg (209 lb 7 oz)     06/21/24 98.5 kg (217 lb 2.5 oz)   06/19/24 99.6 kg (219 lb 9.3 oz)   04/12/24 95 kg (209 lb 7 oz)      Home diuretic: Torsemide 20 Mg MWF and 10 Mg T, Th, Sat  Last echo 8/2023: LVEF 45%.  G1 DD.  Hold home diuretics  Daily weights

## 2024-06-22 NOTE — PLAN OF CARE
Problem: PAIN - ADULT  Goal: Verbalizes/displays adequate comfort level or baseline comfort level  Description: Interventions:  - Encourage patient to monitor pain and request assistance  - Assess pain using appropriate pain scale  - Administer analgesics based on type and severity of pain and evaluate response  - Implement non-pharmacological measures as appropriate and evaluate response  - Consider cultural and social influences on pain and pain management  - Notify physician/advanced practitioner if interventions unsuccessful or patient reports new pain  Outcome: Progressing     Problem: INFECTION - ADULT  Goal: Absence or prevention of progression during hospitalization  Description: INTERVENTIONS:  - Assess and monitor for signs and symptoms of infection  - Monitor lab/diagnostic results  - Monitor all insertion sites, i.e. indwelling lines, tubes, and drains  - Monitor endotracheal if appropriate and nasal secretions for changes in amount and color  - [place] appropriate cooling/warming therapies per order  - Administer medications as ordered  - Instruct and encourage patient and family to use good hand hygiene technique  - Identify and instruct in appropriate isolation precautions for identified infection/condition  Outcome: Progressing  Goal: Absence of fever/infection during neutropenic period  Description: INTERVENTIONS:  - Monitor WBC    Outcome: Progressing     Problem: SAFETY ADULT  Goal: Patient will remain free of falls  Description: INTERVENTIONS:  - Educate patient/family on patient safety including physical limitations  - Instruct patient to call for assistance with activity   - Consult OT/PT to assist with strengthening/mobility   - Keep Call bell within reach  - Keep bed low and locked with side rails adjusted as appropriate  - Keep care items and personal belongings within reach  - Initiate and maintain comfort rounds  - Make Fall Risk Sign visible to staff  - Offer Toileting every 2 Hours,  in advance of need  - Initiate/Maintain bedalarm  - Apply yellow socks and bracelet for high fall risk patients  - Consider moving patient to room near nurses station  Outcome: Progressing  Goal: Maintain or return to baseline ADL function  Description: INTERVENTIONS:  -  Assess patient's ability to carry out ADLs; assess patient's baseline for ADL function and identify physical deficits which impact ability to perform ADLs (bathing, care of mouth/teeth, toileting, grooming, dressing, etc.)  - Assess/evaluate cause of self-care deficits   - Assess range of motion  - Assess patient's mobility; develop plan if impaired  - Assess patient's need for assistive devices and provide as appropriate  - Encourage maximum independence but intervene and supervise when necessary  - Involve family in performance of ADLs  - Assess for home care needs following discharge   - Consider OT consult to assist with ADL evaluation and planning for discharge  - Provide patient education as appropriate  Outcome: Progressing  Goal: Maintains/Returns to pre admission functional level  Description: INTERVENTIONS:  - Perform AM-PAC 6 Click Basic Mobility/ Daily Activity assessment daily.  - Set and communicate daily mobility goal to care team and patient/family/caregiver.   - Collaborate with rehabilitation services on mobility goals if consulted  - Perform Range of Motion 3 times a day.  - Reposition patient every 2 hours.  - Dangle patient 2 times a day  - Stand patient 2 times a day  - Ambulate patient 2 times a day  - Out of bed to chair 2 times a day   - Out of bed for meals 2 times a day  - Out of bed for toileting  - Record patient progress and toleration of activity level   Outcome: Progressing     Problem: DISCHARGE PLANNING  Goal: Discharge to home or other facility with appropriate resources  Description: INTERVENTIONS:  - Identify barriers to discharge w/patient and caregiver  - Arrange for needed discharge resources and  transportation as appropriate  - Identify discharge learning needs (meds, wound care, etc.)  - Arrange for interpretive services to assist at discharge as needed  - Refer to Case Management Department for coordinating discharge planning if the patient needs post-hospital services based on physician/advanced practitioner order or complex needs related to functional status, cognitive ability, or social support system  Outcome: Progressing     Problem: Knowledge Deficit  Goal: Patient/family/caregiver demonstrates understanding of disease process, treatment plan, medications, and discharge instructions  Description: Complete learning assessment and assess knowledge base.  Interventions:  - Provide teaching at level of understanding  - Provide teaching via preferred learning methods  Outcome: Progressing     Problem: Prexisting or High Potential for Compromised Skin Integrity  Goal: Skin integrity is maintained or improved  Description: INTERVENTIONS:  - Identify patients at risk for skin breakdown  - Assess and monitor skin integrity  - Assess and monitor nutrition and hydration status  - Monitor labs   - Assess for incontinence   - Turn and reposition patient  - Assist with mobility/ambulation  - Relieve pressure over bony prominences  - Avoid friction and shearing  - Provide appropriate hygiene as needed including keeping skin clean and dry  - Evaluate need for skin moisturizer/barrier cream  - Collaborate with interdisciplinary team   - Patient/family teaching  - Consider wound care consult   Outcome: Progressing

## 2024-06-22 NOTE — CASE MANAGEMENT
Case Management Assessment & Discharge Planning Note    Patient name Matthew Krause  Location  SDU/-01 S* MRN [id number]  : 1934 Date 2024       Current Admission Date: 2024  Current Admission Diagnosis:Hypotension   Patient Active Problem List    Diagnosis Date Noted Date Diagnosed    Abnormal CT scan 2024     Using prophylactic antibiotic on daily basis 06/15/2024     Diverticulitis 2024     Stroke-like symptom 2023     Electrolyte abnormality 2023     Difficulty with speech 2023     Stage 2 chronic kidney disease 2023     Ureterolithiasis 2023     Aneurysm of ascending aorta without rupture (HCC) 2023     Urinary retention 2023     Bacteremia due to group B Streptococcus 2023     Respiratory insufficiency 2023     Cellulitis of left upper extremity 2023     HLD (hyperlipidemia) 2023     GERD (gastroesophageal reflux disease) 2023     Gout without acute exacerbation  2023     Spinal stenosis 2023     Hypothyroidism 2023     Peripheral vascular disease (HCC) 2023     Open wound of right great toe with damage to nail 2023     Adverse effect of loop (high-ceiling) diuretics, subsequent encounter 2023     Toxic metabolic encephalopathy 2023     Septic arthritis of shoulder, right (HCC) 2023     Suture of skin wound 2023     Hypomagnesemia 2023     Elevated TSH 2023     Chronic systolic heart failure (HCC) 2023     Paroxysmal A-fib (HCC) 2023     Focal seizure with retained awareness 2023     Dilated cardiomyopathy (HCC) 10/18/2022     HTN (hypertension) 10/18/2022     Low left ventricular ejection fraction 10/11/2022     History of TIA (transient ischemic attack) 10/10/2022     Speech disturbance 10/08/2022     Accidental overdose 2020     History of hypertension 2020     Hypotension 2020     RA  (rheumatoid arthritis) ([facility]) 08/28/2020       LOS (days): 1  Geometric Mean LOS (GMLOS) (days): 3.6  Days to GMLOS:2.8     OBJECTIVE:  PATIENT READMITTED TO HOSPITAL  Risk of Unplanned Readmission Score: 30.59     Current admission status: Inpatient    Preferred Pharmacy:   [facility] - [place], [place] - [address]  [address] [address]  Phone: [phone] Fax: [phone]    Primary Care Provider: ESTEFANIA Pimentel    Primary Insurance: [employer]  Secondary Insurance:     ASSESSMENT:  Active Health Care Proxies       Kerwin Krause Health Care Agent - Son   Primary Phone: [phone] (Mobile)                 Advance Directives  Does patient have a Health Care POA?: Yes  Does patient have Advance Directives?: Yes  Advance Directives: Living will, Power of  for health care  Primary Contact: Kerwin Krause: son: [phone]    Readmission Root Cause  30 Day Readmission: Yes  Who directed you to return to the hospital?: Other (comment) ([facility])  Did you understand whom to contact if you had questions or problems?: Yes  Did you get your prescriptions before you left the hospital?: No  Reason:: Delivery service not offered  Were you able to get your prescriptions filled when you left the hospital?: Yes  Did you take your medications as prescribed?: Yes  Were you able to get to your follow-up appointments?: No  Reason:: Readmitted prior to appointment  During previous admission, was a post-acute recommendation made?: Yes  What post-acute resources were offered?: STR  Patient was readmitted due to: hypotension, shortness of breath  Action Plan: fluids, on 2LO2, IV ABX for diverticulitis    Patient Information  Admitted from:: Facility ([facility] for STR)  Mental Status: Alert  During Assessment patient was accompanied by: Not accompanied during assessment  Assessment information provided by:: Patient  Primary Caregiver: Other (Comment) ([facility]  STR)  Support Systems: Self, Children, Family members  [facility] of Residence: [place]  What [facility] do you live in?: Wing  Home entry access options. Select all that apply.: No steps to enter home  Type of Current Residence: Facility  Upon entering residence, is there a bedroom on the main floor (no further steps)?: Yes  Upon entering residence, is there a bathroom on the main floor (no further steps)?: Yes  Living Arrangements: Other (Comment) ([facility], from [facility] at [facility] prior to STR)  Is patient a ?: No    Activities of Daily Living Prior to Admission  Functional Status: Assistance  Completes ADLs independently?: Yes  Ambulates independently?: No  Level of ambulatory dependence: Assistance  Does patient use assisted devices?: Yes  Assisted Devices (DME) used: Wheelchair  Does patient currently own DME?: Yes  What DME does the patient currently own?: Wheelchair, Walker  Does the patient have a history of Short-Term Rehab?: Yes  Does patient have a history of HHC?: No  Does patient currently have HHC?: No    Patient Information Continued  Income Source: Pension/penitentiary  Does patient have prescription coverage?: Yes  Does patient receive dialysis treatments?: No  Does patient have a history of substance abuse?: No  Does patient have a history of Mental Health Diagnosis?: No    Means of Transportation  Means of Transport to Appts:: Family transport      Social Determinants of Health (SDOH)      Flowsheet Row Most Recent Value   Housing Stability    In the last 12 months, was there a time when you were not able to pay the mortgage or rent on time? N   In the past 12 months, how many times have you moved where you were living? 0   At any time in the past 12 months, were you homeless or living in a shelter (including now)? N   Transportation Needs    In the past 12 months, has lack of transportation kept you from medical appointments or from getting medications? no   In the past 12  months, has lack of transportation kept you from meetings, work, or from getting things needed for daily living? No   Food Insecurity    Within the past 12 months, you worried that your food would run out before you got the money to buy more. Never true   Within the past 12 months, the food you bought just didn't last and you didn't have money to get more. Never true   Utilities    In the past 12 months has the electric, gas, oil, or water company threatened to shut off services in your home? No            DISCHARGE DETAILS:    Additional Comments: Met with Pt. Pt alert and oriented. Discussed role of case management. Pt recently discharged to [facility] week for STR. [facility] from [facility] assisted living, uses wheelchair, independent prior to STR. Pt's son(Kerwin) is POa and Pt has living will. Referral sent to [facility] via [facility]. Will need PT/OT notes to obtain insurance auth. CM to follow.

## 2024-06-22 NOTE — ASSESSMENT & PLAN NOTE
AC: Eliquis 5 Mg twice daily  RC: Metoprolol succinate 25 Mg twice daily  Hold home metoprolol 2/2 hypotension  Eliquis changed to heparin gtt due to outpatient procedure on his back scheduled for 6/26.

## 2024-06-22 NOTE — ASSESSMENT & PLAN NOTE
Recently admitted for dehydration in the setting of diarrhea secondary to diverticulitis  Presented today from [facility] due to hypotension  Not meeting SIRS criteria, lactic negative, Pro-Larry negative  6/21 CT CAP: Sigmoid diverticulitis with no fluid collection or abscess. Consider colonoscopy when the acute illness subsides. Trace left effusion. No acute airspace consolidation. Dilated ascending artery measuring 4.4 cm, annual surveillance generally suggested. Cholelithiasis. No intra-abdominal hemorrhage  UA pending  Doubt hypotension is secondary to infection  Likely due to dehydration  Given 250 cc crystalloid and 100 cc albumin in ED  Briefly required vasopressors, now off  AM cortisol is high  Continue IV fluids  Encourage PO intake  Consider solu-cortef if ongoing hypotension

## 2024-06-22 NOTE — QUICK NOTE
Provided a bedside update to patient's son, Kerwin Krause, regarding patient's current status and treatments. All questions answered. Kerwin expressed understanding and appreciation.

## 2024-06-22 NOTE — PROGRESS NOTES
"Critical Care Interval Transfer Note:    Brief Hospital Summary:   6/22 presented to ED with hypotension 2/2 poor PO intake, diarrhea and known diverticulitis. Required levophed x3 hours but weaned off after receiving IVF and albumin.  6/22 CT CAP: Sigmoid diverticulitis with no fluid collection or abscess. Consider colonoscopy when the acute illness subsides. Trace left effusion. No acute airspace consolidation. Dilated ascending artery measuring 4.4 cm, annual surveillance generally suggested. Cholelithiasis. No intra-abdominal hemorrhage    Barriers to discharge:   Hypotension at baseline   Patient tolerating PO but poor appetite  Continues with diarrhea  PT/OT eval  Diverticulitis  Continue IV ceftriaxone/IV vanco with plans to transition to PO at discharge     6/22 Eliquis changed to Heparin gtt. Patient's son states that patient has a \"scheduled procedure on his back on 6/26 and was told to stop Eliquis 3 days prior to procedure.\" If discharged tomorrow, 6/23, advice patient to hold Eliquis for procedure as originally instructed by outpatient provider. \     Consults: IP CONSULT TO CASE MANAGEMENT     Discharge Plan: Anticipate discharge in 48 hrs to discharge location to be determined pending rehab evaluations.    Patient seen and evaluated by Critical Care today and deemed to be appropriate for transfer to Med Surg with Telemetry. Spoke to Dr. Figueroa from [facility] to accept transfer. Critical care can be contacted via [facility] with any questions or concerns.  "

## 2024-06-22 NOTE — PLAN OF CARE
Problem: PAIN - ADULT  Goal: Verbalizes/displays adequate comfort level or baseline comfort level  Description: Interventions:  - Encourage patient to monitor pain and request assistance  - Assess pain using appropriate pain scale  - Administer analgesics based on type and severity of pain and evaluate response  - Implement non-pharmacological measures as appropriate and evaluate response  - Consider cultural and social influences on pain and pain management  - Notify physician/advanced practitioner if interventions unsuccessful or patient reports new pain  Outcome: Progressing     Problem: INFECTION - ADULT  Goal: Absence or prevention of progression during hospitalization  Description: INTERVENTIONS:  - Assess and monitor for signs and symptoms of infection  - Monitor lab/diagnostic results  - Monitor all insertion sites, i.e. indwelling lines, tubes, and drains  - Monitor endotracheal if appropriate and nasal secretions for changes in amount and color  - [place] appropriate cooling/warming therapies per order  - Administer medications as ordered  - Instruct and encourage patient and family to use good hand hygiene technique  - Identify and instruct in appropriate isolation precautions for identified infection/condition  Outcome: Progressing  Goal: Absence of fever/infection during neutropenic period  Description: INTERVENTIONS:  - Monitor WBC    Outcome: Progressing     Problem: SAFETY ADULT  Goal: Patient will remain free of falls  Description: INTERVENTIONS:  - Educate patient/family on patient safety including physical limitations  - Instruct patient to call for assistance with activity   - Consult OT/PT to assist with strengthening/mobility   - Keep Call bell within reach  - Keep bed low and locked with side rails adjusted as appropriate  - Keep care items and personal belongings within reach  - Initiate and maintain comfort rounds  - Make Fall Risk Sign visible to staff  - Offer Toileting every 2 Hours,  in advance of need  - Initiate/Maintain bedalarm  - Apply yellow socks and bracelet for high fall risk patients  - Consider moving patient to room near nurses station  Outcome: Progressing  Goal: Maintain or return to baseline ADL function  Description: INTERVENTIONS:  -  Assess patient's ability to carry out ADLs; assess patient's baseline for ADL function and identify physical deficits which impact ability to perform ADLs (bathing, care of mouth/teeth, toileting, grooming, dressing, etc.)  - Assess/evaluate cause of self-care deficits   - Assess range of motion  - Assess patient's mobility; develop plan if impaired  - Assess patient's need for assistive devices and provide as appropriate  - Encourage maximum independence but intervene and supervise when necessary  - Involve family in performance of ADLs  - Assess for home care needs following discharge   - Consider OT consult to assist with ADL evaluation and planning for discharge  - Provide patient education as appropriate  Outcome: Progressing  Goal: Maintains/Returns to pre admission functional level  Description: INTERVENTIONS:  - Perform AM-PAC 6 Click Basic Mobility/ Daily Activity assessment daily.  - Set and communicate daily mobility goal to care team and patient/family/caregiver.   - Collaborate with rehabilitation services on mobility goals if consulted  - Perform Range of Motion 3 times a day.  - Reposition patient every 2 hours.  - Dangle patient 2 times a day  - Stand patient 2 times a day  - Ambulate patient 2 times a day  - Out of bed to chair 2 times a day   - Out of bed for meals 2  Problem: DISCHARGE PLANNING  Goal: Discharge to home or other facility with appropriate resources  Description: INTERVENTIONS:  - Identify barriers to discharge w/patient and caregiver  - Arrange for needed discharge resources and transportation as appropriate  - Identify discharge learning needs (meds, wound care, etc.)  - Arrange for interpretive services to assist  at discharge as needed  - Refer to Case Management Department for coordinating discharge planning if the patient needs post-hospital services based on physician/advanced practitioner order or complex needs related to functional status, cognitive ability, or social support system  Outcome: Progressing     Problem: Knowledge Deficit  Goal: Patient/family/caregiver demonstrates understanding of disease process, treatment plan, medications, and discharge instructions  Description: Complete learning assessment and assess knowledge base.  Interventions:  - Provide teaching at level of understanding  - Provide teaching via preferred learning methods  Outcome: Progressing     Problem: Prexisting or High Potential for Compromised Skin Integrity  Goal: Skin integrity is maintained or improved  Description: INTERVENTIONS:  - Identify patients at risk for skin breakdown  - Assess and monitor skin integrity  - Assess and monitor nutrition and hydration status  - Monitor labs   - Assess for incontinence   - Turn and reposition patient  - Assist with mobility/ambulation  - Relieve pressure over bony prominences  - Avoid friction and shearing  - Provide appropriate hygiene as needed including keeping skin clean and dry  - Evaluate need for skin moisturizer/barrier cream  - Collaborate with interdisciplinary team   - Patient/family teaching  - Consider wound care consult   Outcome: Progressing    times a day  - Out of bed for toileting  - Record patient progress and toleration of activity level   Outcome: Progressing

## 2024-06-22 NOTE — ASSESSMENT & PLAN NOTE
Lab Results   Component Value Date    EGFR 61 06/22/2024    EGFR 56 06/21/2024    EGFR 69 06/19/2024    CREATININE 1.06 06/22/2024    CREATININE 1.14 06/21/2024    CREATININE 0.96 06/19/2024     Baseline Creatinine 0.9-1.0  Cr at baseline  Strict I/O  Trend renal indices

## 2024-06-23 PROBLEM — R06.89 RESPIRATORY INSUFFICIENCY: Status: RESOLVED | Noted: 2023-08-30 | Resolved: 2024-06-23

## 2024-06-23 NOTE — UTILIZATION REVIEW
Initial Clinical Review    Admission: Date/Time/Statement:   Admission Orders (From admission, onward)       Ordered        06/21/24 1257  INPATIENT ADMISSION  Once                          Orders Placed This Encounter   Procedures    INPATIENT ADMISSION     Standing Status:   Standing     Number of Occurrences:   1     Order Specific Question:   Level of Care     Answer:   Level 1 Stepdown [13]     Order Specific Question:   Estimated length of stay     Answer:   More than 2 Midnights     Order Specific Question:   Certification     Answer:   I certify that inpatient services are medically necessary for this patient for a duration of greater than two midnights. See H&P and MD Progress Notes for additional information about the patient's course of treatment.     ED Arrival Information       Expected   -    Arrival   6/21/2024 08:09    Acuity   Emergent              Means of arrival   Ambulance    Escorted by   [facility] ([place])    Service   Hospitalist    Admission type   Emergency              Arrival complaint   weakness             Chief Complaint   Patient presents with    Hypotension     Pt arrives from [facility] via EMS after staff reported that last night pt was hypotensive.Pt reports sob but denies any cp. Denies any dizziness.        Initial Presentation: 89 y.o. male to ED via EMS from [facility]  Present to ED with hypotension. Pt was recently admitted from 6/15-6/19 for dehydration in setting of diarrhea from diverticulitis. He was discharged on cefuroxime and flagyl. He had limited PO intake at home and ongoing diarrhea since that time. He was hypotensive in the emergency department and given 250 cc of normal saline and 25 g of 25% albumin.   PMHX: recent diverticulitis, heart failure with reduced EF, moderate mitral valve regurgitation, A-fib on Eliquis, hypertension, hyperlipidemia, seizure, RA, hypothyroidism ; Last echo 8/2023: LVEF 45%.   Admitted to Level 1 Stepdown with DX: Hypotension   on exam:  "tachypnea; hypotensive; Currently on 2L NC; K 5.5; Mg 1.4; albumin 2.9  CT chest abdomen pelvis revealed ongoing sigmoid diverticulitis but no other acute infection.   PLAN: cont levophed gtt; rec'd Mg Sulf iv x1; rec'd ivf bolus 1L x1; rec'd additional albumin iv x1; cont iv abx; cont solucortif iv; monitor labs; Cardiopulmonary monitoring; f/u UA; f/u cortisol & TSH; titrate O2      Date: 6/22/24       Day 2  Patient tolerating PO but poor appetite.  Continues with diarrhea  OF NOTE: Eliquis changed to Heparin gtt. Patient's son states that patient has a \"scheduled procedure on his back on 6/26 and was told to stop Eliquis 3 days prior to procedure.\" If discharged tomorrow, 6/23, advice patient to hold Eliquis for procedure as originally instructed by outpatient provider.   Plan: rec'd Mg Sulf iv x1; start Heparin gtt; cont iv abx; cont solucortif iv; monitor labs; Cardiopulmonary monitoring; f/u UA; f/u cortisol & TSH; titrate O2; PT/ OT eval - tx; CM consulted      Date: 6/23/24     Day 3: Has surpassed a 2nd midnight with active treatments and services. Require additional inpatient hospital stay due to hypotension and diverticulitis on IV Abx   Patient still lethargic, not eating much. AM cortisol is high;  We will continue with IV ceftriaxone and IV Flagyl thru 6/25 to complete 10 days of abx; TSH high due to infection, free T4 within normal limits   Plan: cont Heparin gtt; cont iv abx; start ivf; monitor labs; Cardiopulmonary monitoring; f/u UA; titrate O2; PT/ OT eval - tx; CM consulted    ED Triage Vitals   Temperature Pulse Respirations Blood Pressure SpO2 Pain Score   06/21/24 0830 06/21/24 0817 06/21/24 0817 06/21/24 0817 06/21/24 0817 06/21/24 0817   (!) 97.4 °F (36.3 °C) 72 20 (!) 81/52 94 % 6     Weight (last 2 days)       Date/Time Weight    06/23/24 0600 101 (223.11)    06/22/24 0600 100 (220.9)    06/21/24 1335 98.5 (217.15)    06/21/24 0832 104 (229.72)            Vital Signs (last 3 days)       " Date/Time Temp Pulse Resp BP MAP (mmHg) SpO2 Calculated FIO2 (%) - Nasal Cannula Nasal Cannula O2 Flow Rate (L/min) O2 Device Patient Position - Orthostatic VS [place] Coma Scale Score Pain    06/23/24 1100 -- -- -- -- -- -- -- -- -- -- 14 No Pain    06/23/24 0740 97.9 °F (36.6 °C) 99 17 96/42 61 96 % -- -- -- -- -- --    06/22/24 2237 97.8 °F (36.6 °C) 97 22 107/59 77 95 % 28 2 L/min Nasal cannula Lying -- --    06/22/24 2217 -- -- -- -- -- 91 % 28 2 L/min Nasal cannula -- -- --    06/22/24 2215 -- -- -- -- -- 84 % -- -- None (Room air) -- -- --    06/22/24 1932 -- -- -- -- -- -- -- -- -- -- 14 No Pain    06/22/24 1930 -- -- -- -- -- 95 % -- -- None (Room air) -- -- --    06/22/24 1915 97.4 °F (36.3 °C) 92 24 102/59 75 95 % 24 1 L/min Nasal cannula Lying -- --    06/22/24 1542 97.5 °F (36.4 °C) 88 20 97/56 71 92 % -- -- Nasal cannula Lying -- --    06/22/24 0800 -- -- -- -- -- -- -- -- -- -- 14 No Pain    06/22/24 0700 97.9 °F (36.6 °C) 100 20 126/78 91 93 % 24 1 L/min Nasal cannula Lying -- --    06/22/24 0600 -- 97 21 118/70 86 93 % -- -- -- -- -- --    06/22/24 0500 97.9 °F (36.6 °C) 93 13 104/57 76 94 % -- -- -- -- -- --    06/22/24 0400 -- 93 14 107/57 73 91 % -- -- -- Lying -- --    06/22/24 0300 -- 94 15 109/68 83 92 % -- -- -- -- 14 --    06/22/24 0200 -- 93 20 104/64 75 90 % -- -- -- -- -- --    06/22/24 0100 -- 93 20 103/62 76 97 % -- -- -- -- -- --    06/21/24 2328 -- -- -- -- -- -- -- -- -- -- 14 --    06/21/24 2300 97.7 °F (36.5 °C) 93 22 121/58 81 95 % -- -- -- Lying -- --    06/21/24 2200 -- 91 20 100/59 72 95 % -- -- -- Lying -- --    06/21/24 2130 -- 90 15 110/56 78 89 % -- -- -- Lying -- --    06/21/24 2100 -- 87 18 100/60 74 91 % -- -- -- -- -- --    06/21/24 2030 -- 87 22 104/68 80 95 % -- -- -- Lying -- --    06/21/24 2002 -- -- -- -- -- -- -- -- -- -- 14 1 06/21/24 2000 -- 90 22 106/64 79 95 % -- -- -- -- -- --    06/21/24 1922 97.6 °F (36.4 °C) 83 20 94/54 69 94 % -- -- Nasal cannula Lying  -- --    06/21/24 1900 -- 81 18 95/50 67 95 % -- -- -- -- -- --    06/21/24 1830 -- 80 17 90/51 65 91 % -- -- -- -- -- --    06/21/24 1815 96.1 °F (35.6 °C) 76 17 90/51 66 92 % -- -- -- -- -- --    06/21/24 1800 -- 72 17 83/51 62 93 % -- -- -- -- -- --    06/21/24 1745 -- 71 19 83/52 62 92 % -- -- -- -- -- --    06/21/24 1730 -- 65 17 83/51 63 95 % -- -- -- -- -- --    06/21/24 1715 -- 65 28 84/53 64 96 % -- -- -- -- -- --    06/21/24 1710 -- 66 34 85/51 61 96 % 24 1 L/min Nasal cannula -- -- --    06/21/24 1637 94.6 °F (34.8 °C) -- -- -- -- -- -- -- -- -- -- --    06/21/24 1600 -- -- -- -- -- -- -- -- -- -- 15 --    06/21/24 1554 -- 73 28 98/60 74 95 % -- -- -- -- -- --    06/21/24 1537 93.9 °F (34.4 °C) 71 24 89/52 66 95 % -- -- Nasal cannula Lying -- --    06/21/24 1400 94.6 °F (34.8 °C) 77 35 96/54 70 92 % -- -- -- Lying 15 1 06/21/24 1340 -- -- -- -- -- -- -- -- -- -- -- 1    06/21/24 1300 -- 77 20 102/66 80 96 % 30 2.5 L/min None (Room air) Lying -- --    06/21/24 1230 -- 77 22 110/64 82 92 % -- -- -- -- -- --    06/21/24 1200 -- 77 31 115/73 90 93 % 30 2.5 L/min Nasal cannula Lying -- --    06/21/24 1145 -- 75 42 112/69 86 96 % 30 2.5 L/min Nasal cannula Lying -- --    06/21/24 1122 -- 75 36 95/54 69 96 % 30 2.5 L/min Nasal cannula Lying -- --    06/21/24 1030 -- 78 34 89/50 65 91 % -- -- None (Room air) Lying -- --    06/21/24 1015 -- 76 27 99/58 74 92 % -- -- None (Room air) Lying -- 10 - Worst Possible Pain    06/21/24 1000 -- 74 29 107/59 77 92 % -- -- None (Room air) Lying -- --    06/21/24 0945 -- 79 30 106/59 76 93 % 28 2 L/min Nasal cannula Lying -- --    06/21/24 0930 -- 79 -- 93/53 -- -- -- -- -- -- -- --    06/21/24 0919 -- -- -- -- -- -- -- -- -- -- -- 10 - Worst Possible Pain    06/21/24 0915 -- 80 32 74/44 54 95 % 28 2 L/min Nasal cannula Lying -- --    06/21/24 0910 -- -- -- -- -- -- -- -- Nasal cannula -- -- --    06/21/24 0900 -- 80 20 81/51 61 92 % -- -- -- -- -- --    06/21/24 0844 -- --  -- -- -- -- -- -- -- -- 14 --    06/21/24 0842 -- 74 18 91/50 68 91 % -- -- -- -- -- --    06/21/24 0830 97.4 °F (36.3 °C) 71 30 81/52 62 91 % -- -- -- -- -- --    06/21/24 0817 -- 72 20 81/52 62 94 % -- -- None (Room air) Sitting -- 6              Pertinent Labs/Diagnostic Test Results:   Radiology:  CT chest abdomen pelvis w contrast   Final Interpretation by Roopa Perez MD (06/21 1201)      Sigmoid diverticulitis with no fluid collection or abscess. Consider colonoscopy when the acute illness subsides      Trace left effusion      No acute airspace consolidation.   Dilated ascending artery measuring 4.4 cm, annual surveillance generally suggested   Cholelithiasis   No intra-abdominal hemorrhage   The study was marked in EPIC for immediate notification.      Workstation performed: [id number]         XR hip/pelv 2-3 vws left   Final Interpretation by Michael Schwab MD (06/21 1010)      No acute osseous abnormality.      Degenerative changes as described.            Workstation performed: [id number]         XR chest portable   Final Interpretation by Breana Mccartney MD (06/21 0944)      Mild pulmonary venous congestion.            Workstation performed: [id number]           Cardiology:  ECG 12 lead   Final Result by Nitish Tomas MD (06/21 1450)   Sinus rhythm with Premature ventricular complexes or Fusion complexes   Left axis deviation   Low voltage QRS   Possible Anterolateral infarct (cited on or before 14-JUN-2024)   Abnormal ECG   When compared with ECG of 14-JUN-2024 19:38,   Fusion complexes are now Present   Premature ventricular complexes are now Present   Questionable change in initial forces of Lateral leads   Confirmed by Nitish Tomas [id number]) on 6/21/2024 2:50:25 PM             Results from last 7 days   Lab Units 06/21/24  0838   SARS-COV-2  Negative     Results from last 7 days   Lab Units 06/23/24  0318 06/22/24  1021 06/22/24  0553 06/21/24  0838 06/19/24  0358   WBC Thousand/uL  6.96 4.82 5.16 7.89 5.89   HEMOGLOBIN g/dL 9.7* 9.9* 10.0* 10.6* 10.9*   HEMATOCRIT % 31.2* 33.0* 33.4* 34.5* 35.1*   PLATELETS Thousands/uL 149 150 147* 157 136*   TOTAL NEUT ABS Thousands/µL 3.22  --  3.88 3.73 2.29        Results from last 7 days   Lab Units 06/23/24 0318 06/22/24  0553 06/21/24  0838 06/19/24  0358 06/18/24 0439 06/17/24  0515   SODIUM mmol/L 136 137 137 138 140 139   POTASSIUM mmol/L 4.1 5.2 5.5* 4.0 3.7 3.4*   CHLORIDE mmol/L 105 105 106 106 108 105   CO2 mmol/L 27 25 25 27 29 29   ANION GAP mmol/L 4 7 6 5 3* 5   BUN mg/dL 17 13 12 10 10 14   CREATININE mg/dL 1.05 1.06 1.14 0.96 0.97 1.05   EGFR ml/min/1.73sq m 62 61 56 69 68 62   CALCIUM mg/dL 9.7 9.8 9.7 9.5 9.3 9.1   CALCIUM, IONIZED mmol/L 1.25  --   --   --   --   --    MAGNESIUM mg/dL 2.0 1.8* 1.4*  --  2.1 1.8*   PHOSPHORUS mg/dL 3.0 4.2*  --   --   --  3.5     Results from last 7 days   Lab Units 06/21/24  0838   AST U/L 38   ALT U/L 8   ALK PHOS U/L 62   TOTAL PROTEIN g/dL 5.2*   ALBUMIN g/dL 2.9*   TOTAL BILIRUBIN mg/dL 0.46     Results from last 7 days   Lab Units 06/21/24  0908   POC GLUCOSE mg/dl 91     Results from last 7 days   Lab Units 06/23/24 0318 06/22/24  0553 06/21/24  0838 06/19/24  0358 06/18/24  0439 06/17/24  0515   GLUCOSE RANDOM mg/dL 149* 105 105 110 106 109        Results from last 7 days   Lab Units 06/21/24  1241 06/21/24  1131 06/21/24  0838   HS TNI 0HR ng/L  --   --  19   HS TNI 2HR ng/L  --  16  --    HSTNI D2 ng/L  --  -3  --    HS TNI 4HR ng/L 20  --   --    HSTNI D4 ng/L 1  --   --         Results from last 7 days   Lab Units 06/23/24  1058 06/23/24  0318 06/22/24  1021 06/21/24  0838   PROTIME seconds  --   --  24.0* 25.4*   INR   --   --  2.10* 2.26*   PTT seconds 109* 59* 47* 46*     Results from last 7 days   Lab Units 06/21/24  0838   TSH 3RD GENERATON uIU/mL 7.460*     Results from last 7 days   Lab Units 06/21/24  0838   PROCALCITONIN ng/ml 0.24     Results from last 7 days   Lab Units  06/21/24  0838   LACTIC ACID mmol/L 1.4        Results from last 7 days   Lab Units 06/21/24  0838   BNP pg/mL 395*        Results from last 7 days   Lab Units 06/21/24  0838   LIPASE u/L 16        Results from last 7 days   Lab Units 06/21/24  1437   CLARITY UA  Clear   COLOR UA  Yellow   SPEC GRAV UA  1.015   PH UA  5.0   GLUCOSE UA mg/dl Negative   KETONES UA mg/dl Negative   BLOOD UA  Trace*   PROTEIN UA mg/dl Trace*   NITRITE UA  Negative   BILIRUBIN UA  Negative   UROBILINOGEN UA (BE) mg/dl <2.0   LEUKOCYTES UA  Small*   WBC UA /hpf 2-4   RBC UA /hpf 2-4   BACTERIA UA /hpf Occasional   EPITHELIAL CELLS WET PREP /hpf Occasional     Results from last 7 days   Lab Units 06/21/24  0838   INFLUENZA A PCR  Negative   INFLUENZA B PCR  Negative   RSV PCR  Negative        Results from last 7 days   Lab Units 06/21/24  0838   BLOOD CULTURE  No Growth at 24 hrs.  No Growth at 24 hrs.          ED Treatment-Medication Administration from 06/21/2024 0809 to 06/21/2024 1337         Date/Time Order Dose Route Action     06/21/2024 0830 sodium chloride 0.9 % bolus 250 mL 250 mL Intravenous New Bag     06/21/2024 0919 acetaminophen (TYLENOL) tablet 975 mg 975 mg Oral Given     06/21/2024 0930 norepinephrine (LEVOPHED) 4 mg (STANDARD CONCENTRATION) IV in sodium chloride 0.9% 250 mL 5 mcg/min Intravenous New Bag     06/21/2024 0959 norepinephrine (LEVOPHED) 4 mg (STANDARD CONCENTRATION) IV in sodium chloride 0.9% 250 mL 4 mcg/min Intravenous Rate/Dose Change     06/21/2024 1017 norepinephrine (LEVOPHED) 4 mg (STANDARD CONCENTRATION) IV in sodium chloride 0.9% 250 mL 3 mcg/min Intravenous Rate/Dose Change     06/21/2024 1145 norepinephrine (LEVOPHED) 4 mg (STANDARD CONCENTRATION) IV in sodium chloride 0.9% 250 mL 2 mcg/min Intravenous Rate/Dose Change     06/21/2024 1011 albumin human (FLEXBUMIN) 25 % injection 25 g 25 g Intravenous New Bag     06/21/2024 1103 iohexol (OMNIPAQUE) 350 MG/ML injection (SINGLE-DOSE) 100 mL 100 mL  Intravenous Given            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HCC)     Spinal stenosis      Present on Admission:   Hypotension   Paroxysmal A-fib (HCC)   Chronic systolic heart failure (HCC)   (Resolved) Respiratory insufficiency   RA (rheumatoid arthritis) (HCC)   HTN (hypertension)   Focal seizure with retained awareness   Hypothyroidism   Diverticulitis   Stage 2 chronic kidney disease      Admitting Diagnosis: Diverticulitis [K57.92]  Weakness [R53.1]  Hypotension [I95.9]  Generalized weakness [R53.1]    Age/Sex: 89 y.o. male    Admission Orders: SCDs; I/O; Daily wts; regular diet    Scheduled Medications:  allopurinol, 200 mg, Oral, Daily  cefTRIAXone, 2,000 mg, Intravenous, Q24H  chlorhexidine, 15 mL, Mouth/Throat, Q12H ODALYS  Diclofenac Sodium, 2 g, Topical, 4x Daily  doxycycline hyclate, 100 mg, Oral, Q12H ODALYS  gabapentin, 100 mg, Oral, TID  hydroxychloroquine, 200 mg, Oral, BID With Meals  levETIRAcetam, 500 mg, Oral, Q12H ODALYS  levothyroxine, 50 mcg, Oral, Daily  melatonin, 1.5 mg, Oral, HS  metroNIDAZOLE, 500 mg, Intravenous, Q8H  pantoprazole, 20 mg, Oral, Early Morning    magnesium sulfate 2 g/50 mL IVPB (premix) 2 g  Dose: 2 g  Freq: Once Route: IV  Last Dose: Stopped (06/21/24 1551)  Start: 06/21/24 1330 End: 06/21/24 1551    multi-electrolyte (PLASMALYTE-A/ISOLYTE-S PH 7.4) IV solution 500 mL  Dose: 500 mL  Freq: Once Route: IV  Last Dose: Stopped (06/21/24 1700)  Start: 06/21/24 1345 End: 06/21/24 1700    albumin human (FLEXBUMIN) 5 % injection 25 g  Dose: 25 g  Freq: Once Route: IV  Last Dose: Stopped (06/21/24 1815)  Start: 06/21/24 1800 End: 06/21/24 1815    magnesium sulfate 2 g/50 mL IVPB (premix) 2 g  Dose: 2 g  Freq: Once Route: IV  Last Dose: Stopped (06/22/24 1331)  Start: 06/22/24 0915 End: 06/22/24 1331      Continuous IV  Infusions:  norepinephrine (LEVOPHED) 4 mg (STANDARD CONCENTRATION) IV in sodium chloride 0.9% 250 mL  heparin (porcine), 3-20 Units/kg/hr (Order-Specific), Intravenous, Titrated  sodium chloride, 75 mL/hr, Intravenous, Continuous      PRN Meds:  acetaminophen, 650 mg, Oral, Q6H PRN  heparin (porcine), 2,000 Units, Intravenous, Q6H PRN  heparin (porcine), 4,000 Units, Intravenous, Q6H PRN  ondansetron, 4 mg, Intravenous, Q6H PRN        IP CONSULT TO CASE MANAGEMENT    Network Utilization Review Department  ATTENTION: Please call with any questions or concerns to [phone] and carefully listen to the prompts so that you are directed to the right person. All voicemails are confidential.   For Discharge needs, contact Care Management DC Support Team at [phone] opt. [phone]  Send all requests for admission clinical reviews, approved or denied determinations and any other requests to dedicated fax number below belonging to the campus where the patient is receiving treatment. List of dedicated fax numbers for the Facilities:  FACILITY NAME UR FAX NUMBER   ADMISSION DENIALS (Administrative/Medical Necessity) [phone]   DISCHARGE SUPPORT TEAM (NETWORK) [phone]   PARENT CHILD HEALTH (Maternity/NICU/Pediatrics) [phone]   [facility] [phone]   [facility] [phone]   [facility] [phone]   [facility] [phone]   [facility] [phone]   [facility] [phone]   [facility] [phone]   [facility] [phone]   [facility] [phone]   [facility] [phone]   [facility] [phone]   [facility] [place] [phone]

## 2024-06-23 NOTE — PROGRESS NOTES
[facility]  Progress Note  Name: Matthew Krause I  MRN: [id number]  Unit/Bed#: -01 I Date of Admission: 6/21/2024   Date of Service: 6/23/2024 I Hospital Day: 2    Assessment & Plan   * Hypotension  Assessment & Plan  Recently admitted for dehydration in the setting of diarrhea secondary to diverticulitis  Presented today from [facility] due to hypotension  Not meeting SIRS criteria, lactic negative, Pro-Larry negative  6/21 CT CAP: Sigmoid diverticulitis with no fluid collection or abscess. Consider colonoscopy when the acute illness subsides. Trace left effusion. No acute airspace consolidation. Dilated ascending artery measuring 4.4 cm, annual surveillance generally suggested. Cholelithiasis. No intra-abdominal hemorrhage  UA pending  Doubt hypotension is secondary to infection  Likely due to dehydration  Given 250 cc crystalloid and 100 cc albumin in ED  Briefly required vasopressors, now off  AM cortisol is high  Continue IV fluids  Encourage PO intake  Consider solu-cortef if ongoing hypotension    Diverticulitis  Assessment & Plan  Recently diagnosed with diverticulitis and had been started on Augmentin outpatient.  Subsequently developed severe diarrhea and was admitted for dehydration.  Antibiotics at that time transition to ceftriaxone and Flagyl.  Discharged on 6/19 on cefuroxime and Flagyl p.o.  Presented with hypotension and diarrhea, however Pro-Larry negative, lactic negative, not meeting SIRS criteria  Doubt hypotension is secondary to sepsis however CT CAP showing ongoing sigmoid diverticulitis  We will continue with IV ceftriaxone and IV Flagyl thru 6/25 to complete 10 days of abx  Low fiber diet  Zofran as needed for nausea    Abnormal CT scan  Assessment & Plan  6/21 CT CAP: Dilated ascending artery measuring 4.4 cm, annual surveillance generally suggested  Incidental finding note completed    Stage 2 chronic kidney disease  Assessment & Plan  Lab Results    Component Value Date    EGFR 61 06/22/2024    EGFR 56 06/21/2024    EGFR 69 06/19/2024    CREATININE 1.06 06/22/2024    CREATININE 1.14 06/21/2024    CREATININE 0.96 06/19/2024     Baseline Creatinine 0.9-1.0  Cr at baseline  Strict I/O  Trend renal indices    Hypothyroidism  Assessment & Plan  Continue home Synthroid 50 mcg daily  TSH high due to infection, free T4 within normal limits    Focal seizure with retained awareness  Assessment & Plan  Continue home Keppra 500 mg BID     Paroxysmal A-fib (HCC)  Assessment & Plan  AC: Eliquis 5 Mg twice daily  RC: Metoprolol succinate 25 Mg twice daily  Hold home metoprolol 2/2 hypotension  Eliquis changed to heparin gtt due to outpatient procedure on his back scheduled for 6/26.    Chronic systolic heart failure (HCC)  Assessment & Plan  Wt Readings from Last 3 Encounters:   06/22/24 100 kg (220 lb 14.4 oz)   06/19/24 99.6 kg (219 lb 9.3 oz)   04/12/24 95 kg (209 lb 7 oz)     06/21/24 98.5 kg (217 lb 2.5 oz)   06/19/24 99.6 kg (219 lb 9.3 oz)   04/12/24 95 kg (209 lb 7 oz)      Home diuretic: Torsemide 20 Mg MWF and 10 Mg T, Th, Sat  Last echo 8/2023: LVEF 45%.  G1 DD.  Hold home diuretics  Daily weights    HTN (hypertension)  Assessment & Plan  Hold home metoprolol 2/2 hypotension     RA (rheumatoid arthritis) ([facility])  Assessment & Plan  Home regimen: Hydroxychloroquine milligrams twice daily  Recently stopped daily prednisone    Respiratory insufficiency-resolved as of 6/23/2024  Assessment & Plan  Currently on 2L NC  CT CAP: Sigmoid diverticulitis with no fluid collection or abscess. Consider colonoscopy when the acute illness subsides. Trace left effusion. No acute airspace consolidation. Dilated ascending artery measuring 4.4 cm, annual surveillance generally suggested. Cholelithiasis. No intra-abdominal hemorrhage  Encourage IS  Wean supplemental O2 to maintain spO2 > 92%         Mobility:   Basic Mobility Inpatient Raw Score: 8  -[facility] Goal: 3: Sit at edge of  bed  JH-HLM Achieved: 4: Move to chair/commode  JH-HLM Goal achieved. Continue to encourage appropriate mobility.    VTE Pharmacologic Prophylaxis: VTE Score: 5 High Risk (Score >/= 5) - Pharmacological DVT Prophylaxis Ordered: heparin. Sequential Compression Devices Ordered.    Education and Discussions with Family / Patient: Updated  (son) via phone.    Time Spent for Care: 35 minutes. More than 50% of total time spent on counseling and coordination of care as described above.    Current Length of Stay: 2 day(s)  Current Patient Status: Inpatient   Certification Statement: The patient will continue to require additional inpatient hospital stay due to hypotension and diverticulitis on IV Abx  Discharge Plan: Anticipate discharge in 48-72 hrs to discharge location to be determined pending rehab evaluations.    Code Status: Level 1 - Full Code    Subjective:   No overnight events reported.  Patient still lethargic, not eating much.  No fevers, blood pressures better.    Objective:     Vitals:   Temp (24hrs), Av.7 °F (36.5 °C), Min:97.4 °F (36.3 °C), Max:97.9 °F (36.6 °C)    Temp:  [97.4 °F (36.3 °C)-97.9 °F (36.6 °C)] 97.9 °F (36.6 °C)  HR:  [88-99] 99  Resp:  [17-24] 17  BP: ()/(42-59) 96/42  SpO2:  [84 %-96 %] 96 %  Body mass index is 33.92 kg/m².     Input and Output Summary (last 24 hours):     Intake/Output Summary (Last 24 hours) at 2024 1307  Last data filed at 2024 0201  Gross per 24 hour   Intake 969.5 ml   Output 350 ml   Net 619.5 ml       Physical Exam:   Physical Exam  Vitals and nursing note reviewed.   Constitutional:       General: He is not in acute distress.     Appearance: Normal appearance.   HENT:      Head: Normocephalic.      Mouth/Throat:      Mouth: Mucous membranes are moist.   Eyes:      Pupils: Pupils are equal, round, and reactive to light.   Cardiovascular:      Rate and Rhythm: Normal rate and regular rhythm.      Heart sounds: No murmur  heard.  Pulmonary:      Effort: Pulmonary effort is normal. No respiratory distress.      Breath sounds: Normal breath sounds. No wheezing, rhonchi or rales.   Abdominal:      General: Bowel sounds are normal. There is no distension.      Palpations: Abdomen is soft.      Tenderness: There is no abdominal tenderness. There is no guarding.   Musculoskeletal:         General: No deformity.      Cervical back: Normal range of motion.      Right lower leg: No edema.      Left lower leg: No edema.   Skin:     Capillary Refill: Capillary refill takes less than 2 seconds.   Neurological:      General: No focal deficit present.      Mental Status: He is alert. Mental status is at baseline.      Motor: Weakness present.          Additional Data:     Labs:  Results from last 7 days   Lab Units 06/23/24  0318   WBC Thousand/uL 6.96   HEMOGLOBIN g/dL 9.7*   HEMATOCRIT % 31.2*   PLATELETS Thousands/uL 149   SEGS PCT % 46   LYMPHO PCT % 29   MONO PCT % 11   EOS PCT % 13*     Results from last 7 days   Lab Units 06/23/24  0318 06/22/24  0553 06/21/24  0838   SODIUM mmol/L 136   < > 137   POTASSIUM mmol/L 4.1   < > 5.5*   CHLORIDE mmol/L 105   < > 106   CO2 mmol/L 27   < > 25   BUN mg/dL 17   < > 12   CREATININE mg/dL 1.05   < > 1.14   ANION GAP mmol/L 4   < > 6   CALCIUM mg/dL 9.7   < > 9.7   ALBUMIN g/dL  --   --  2.9*   TOTAL BILIRUBIN mg/dL  --   --  0.46   ALK PHOS U/L  --   --  62   ALT U/L  --   --  8   AST U/L  --   --  38   GLUCOSE RANDOM mg/dL 149*   < > 105    < > = values in this interval not displayed.     Results from last 7 days   Lab Units 06/22/24  1021   INR  2.10*     Results from last 7 days   Lab Units 06/21/24  0908   POC GLUCOSE mg/dl 91         Results from last 7 days   Lab Units 06/21/24  0838   LACTIC ACID mmol/L 1.4   PROCALCITONIN ng/ml 0.24       Imaging: No pertinent imaging reviewed.    Recent Cultures (last 7 days):   Results from last 7 days   Lab Units 06/21/24  0838   BLOOD CULTURE  No Growth at  24 hrs.  No Growth at 24 hrs.       Last 24 Hours Medication List:   Current Facility-Administered Medications   Medication Dose Route Frequency Provider Last Rate    acetaminophen  650 mg Oral Q6H PRN Cassandra Figueroa MD      allopurinol  200 mg Oral Daily Cassandra Figueroa MD      cefTRIAXone  1,000 mg Intravenous Q24H Jose Schulz PA-C      chlorhexidine  15 mL Mouth/Throat Q12H [facility] Cassandra Figueroa MD      Diclofenac Sodium  2 g Topical 4x Daily Cassandra Figueroa MD      doxycycline hyclate  100 mg Oral Q12H [facility] Cassandra Figueroa MD      gabapentin  100 mg Oral TID Cassandra Figueroa MD      heparin (porcine)  3-20 Units/kg/hr (Order-Specific) Intravenous Titrated Cassandra Figueroa MD 11.1 Units/kg/hr (06/23/24 1205)    heparin (porcine)  2,000 Units Intravenous Q6H PRN Cassandra Figueroa MD      heparin (porcine)  4,000 Units Intravenous Q6H PRN Cassandra Figueroa MD      hydroxychloroquine  200 mg Oral BID With Meals Cassandra Figueroa MD      levETIRAcetam  500 mg Oral Q12H [facility] Cassandra Figueroa MD      levothyroxine  50 mcg Oral Daily Cassandra Figueroa MD      melatonin  1.5 mg Oral HS Jose Schulz PA-C      metroNIDAZOLE  500 mg Intravenous Q8H Jose Schulz PA-C      ondansetron  4 mg Intravenous Q6H PRN Cassandra Figueroa MD      pantoprazole  20 mg Oral Early Morning Cassandra Figueroa MD      sodium chloride  75 mL/hr Intravenous Continuous Jose Schulz PA-C          Today, Patient Was Seen By: Jose Schulz PA-C    **Please Note: This note may have been constructed using a voice recognition system.**

## 2024-06-23 NOTE — PROGRESS NOTES
Deterioration Index Critical Care Recommendations  Room #: 340  Deterioration index score: 61.86%    Brief summary:   Critical care was brought to the patient's bedside via deterioration index alert. The alert was concerning for:      FACTOR VALUE CONTRIBUTION   Age 89 y.o.  22%   Resp Rate 28 25%   Pulse Ox 86% 10%   Supplemental oxygen 18% Nasal Cannula     Critical Care recommends:  Age: non-modifiable  Resp rate: no tachypnea present. RR 28 charted in error.   Pulse ox: currently SpO2 >92%.   Supplemental Oxygen: oxygen requirement has not increased since admission, using 1-2L oxygen nasal cannula    Spoke with Franchesca Chaudhary RN from primary team    Please contact critical care via [place] [facility] with any questions or concerns.

## 2024-06-23 NOTE — QUICK NOTE
An automated sepsis alert was received for this patient.  Triggers and clinical data were reviewed.  The patient is not felt to have a new infectious process at this time.  Continue to monitor patient closely.

## 2024-06-23 NOTE — QUICK NOTE
Called by nursing due to respiratory distress, patient did not get improvement with nebulizer treatment.  Will give 1 dose of Lasix and stop fluids.  Monitor urinary output.    Jose Schulz PA-C

## 2024-06-23 NOTE — PLAN OF CARE
Problem: PAIN - ADULT  Goal: Verbalizes/displays adequate comfort level or baseline comfort level  Description: Interventions:  - Encourage patient to monitor pain and request assistance  - Assess pain using appropriate pain scale  - Administer analgesics based on type and severity of pain and evaluate response  - Implement non-pharmacological measures as appropriate and evaluate response  - Consider cultural and social influences on pain and pain management  - Notify physician/advanced practitioner if interventions unsuccessful or patient reports new pain  Outcome: Progressing     Problem: INFECTION - ADULT  Goal: Absence or prevention of progression during hospitalization  Description: INTERVENTIONS:  - Assess and monitor for signs and symptoms of infection  - Monitor lab/diagnostic results  - Monitor all insertion sites, i.e. indwelling lines, tubes, and drains  - Monitor endotracheal if appropriate and nasal secretions for changes in amount and color  - [place] appropriate cooling/warming therapies per order  - Administer medications as ordered  - Instruct and encourage patient and family to use good hand hygiene technique  - Identify and instruct in appropriate isolation precautions for identified infection/condition  Outcome: Progressing  Goal: Absence of fever/infection during neutropenic period  Description: INTERVENTIONS:  - Monitor WBC    Outcome: Progressing     Problem: SAFETY ADULT  Goal: Patient will remain free of falls  Description: INTERVENTIONS:  - Educate patient/family on patient safety including physical limitations  - Instruct patient to call for assistance with activity   - Consult OT/PT to assist with strengthening/mobility   - Keep Call bell within reach  - Keep bed low and locked with side rails adjusted as appropriate  - Keep care items and personal belongings within reach  - Initiate and maintain comfort rounds  - Make Fall Risk Sign visible to staff  - Offer Toileting every 2 Hours,  in advance of need  - Initiate/Maintain bed alarm  - Obtain necessary fall risk management equipment: non slip socks  - Apply yellow socks and bracelet for high fall risk patients  - Consider moving patient to room near nurses station  Outcome: Progressing  Goal: Maintain or return to baseline ADL function  Description: INTERVENTIONS:  -  Assess patient's ability to carry out ADLs; assess patient's baseline for ADL function and identify physical deficits which impact ability to perform ADLs (bathing, care of mouth/teeth, toileting, grooming, dressing, etc.)  - Assess/evaluate cause of self-care deficits   - Assess range of motion  - Assess patient's mobility; develop plan if impaired  - Assess patient's need for assistive devices and provide as appropriate  - Encourage maximum independence but intervene and supervise when necessary  - Involve family in performance of ADLs  - Assess for home care needs following discharge   - Consider OT consult to assist with ADL evaluation and planning for discharge  - Provide patient education as appropriate  Outcome: Progressing  Goal: Maintains/Returns to pre admission functional level  Description: INTERVENTIONS:  - Perform AM-PAC 6 Click Basic Mobility/ Daily Activity assessment daily.  - Set and communicate daily mobility goal to care team and patient/family/caregiver.   - Collaborate with rehabilitation services on mobility goals if consulted  - Perform Range of Motion 5 times a day.  - Reposition patient every 2 hours.  - Dangle patient 3 times a day  - Stand patient 3 times a day  - Ambulate patient 3 times a day  - Out of bed to chair 3 times a day   - Out of bed for meals 3 times a day  - Out of bed for toileting  - Record patient progress and toleration of activity level   Outcome: Progressing     Problem: DISCHARGE PLANNING  Goal: Discharge to home or other facility with appropriate resources  Description: INTERVENTIONS:  - Identify barriers to discharge w/patient and  caregiver  - Arrange for needed discharge resources and transportation as appropriate  - Identify discharge learning needs (meds, wound care, etc.)  - Arrange for interpretive services to assist at discharge as needed  - Refer to Case Management Department for coordinating discharge planning if the patient needs post-hospital services based on physician/advanced practitioner order or complex needs related to functional status, cognitive ability, or social support system  Outcome: Progressing     Problem: Knowledge Deficit  Goal: Patient/family/caregiver demonstrates understanding of disease process, treatment plan, medications, and discharge instructions  Description: Complete learning assessment and assess knowledge base.  Interventions:  - Provide teaching at level of understanding  - Provide teaching via preferred learning methods  Outcome: Progressing     Problem: Prexisting or High Potential for Compromised Skin Integrity  Goal: Skin integrity is maintained or improved  Description: INTERVENTIONS:  - Identify patients at risk for skin breakdown  - Assess and monitor skin integrity  - Assess and monitor nutrition and hydration status  - Monitor labs   - Assess for incontinence   - Turn and reposition patient  - Assist with mobility/ambulation  - Relieve pressure over bony prominences  - Avoid friction and shearing  - Provide appropriate hygiene as needed including keeping skin clean and dry  - Evaluate need for skin moisturizer/barrier cream  - Collaborate with interdisciplinary team   - Patient/family teaching  - Consider wound care consult   Outcome: Progressing

## 2024-06-24 NOTE — ASSESSMENT & PLAN NOTE
Recently admitted for dehydration in the setting of diarrhea secondary to diverticulitis (6/15- 6/19). Readmitted for lethargy, hypotension, hypothermia.   Not meeting SIRS criteria, lactic negative, Pro-Larry negative  6/21 CT CAP: Sigmoid diverticulitis with no fluid collection or abscess. Consider colonoscopy when the acute illness subsides. Trace left effusion. No acute airspace consolidation. Dilated ascending artery measuring 4.4 cm, annual surveillance generally suggested. Cholelithiasis. No intra-abdominal hemorrhage  UA without infection  BC x 2 neg at 48 hrs  Doubt hypotension is secondary to infection given lack of infectious markers  Briefly required vasopressors, now off  S/P IVF, discontinued due to resp status   Notably patient has been maintained on prednisone 5 mg daily x 40 years for RA, recently weaned off 3 weeks ago  S/P IV hydrocortisone in ICU  AM cortisol high however this was AFTER IV hydrocortisone administration  Recheck am cortisol in am given persistent lethargy  SBP stable 100s currently   Consider solu-cortef if ongoing hypotension

## 2024-06-24 NOTE — ASSESSMENT & PLAN NOTE
AC: Eliquis 5 Mg twice daily  RC: Metoprolol succinate 25 Mg twice daily  Hold home metoprolol 2/2 hypotension  Eliquis changed to heparin gtt due to outpatient procedure on his back scheduled for 6/26.  Family canceled outpatient appt --> Resumed home eliquis 6/24

## 2024-06-24 NOTE — ASSESSMENT & PLAN NOTE
Lab Results   Component Value Date    EGFR 62 06/23/2024    EGFR 61 06/22/2024    EGFR 56 06/21/2024    CREATININE 1.05 06/23/2024    CREATININE 1.06 06/22/2024    CREATININE 1.14 06/21/2024     Baseline Creatinine 0.9-1.0  Cr at baseline  Strict I/O  Trend renal indices

## 2024-06-24 NOTE — PLAN OF CARE
Problem: PAIN - ADULT  Goal: Verbalizes/displays adequate comfort level or baseline comfort level  Description: Interventions:  - Encourage patient to monitor pain and request assistance  - Assess pain using appropriate pain scale  - Administer analgesics based on type and severity of pain and evaluate response  - Implement non-pharmacological measures as appropriate and evaluate response  - Consider cultural and social influences on pain and pain management  - Notify physician/advanced practitioner if interventions unsuccessful or patient reports new pain  Outcome: Progressing     Problem: INFECTION - ADULT  Goal: Absence or prevention of progression during hospitalization  Description: INTERVENTIONS:  - Assess and monitor for signs and symptoms of infection  - Monitor lab/diagnostic results  - Monitor all insertion sites, i.e. indwelling lines, tubes, and drains  - Monitor endotracheal if appropriate and nasal secretions for changes in amount and color  - [place] appropriate cooling/warming therapies per order  - Administer medications as ordered  - Instruct and encourage patient and family to use good hand hygiene technique  - Identify and instruct in appropriate isolation precautions for identified infection/condition  Outcome: Progressing  Goal: Absence of fever/infection during neutropenic period  Description: INTERVENTIONS:  - Monitor WBC    Outcome: Progressing     Problem: SAFETY ADULT  Goal: Patient will remain free of falls  Description: INTERVENTIONS:  - Educate patient/family on patient safety including physical limitations  - Instruct patient to call for assistance with activity   - Consult OT/PT to assist with strengthening/mobility   - Keep Call bell within reach  - Keep bed low and locked with side rails adjusted as appropriate  - Keep care items and personal belongings within reach  - Initiate and maintain comfort rounds  - Make Fall Risk Sign visible to staff  - Offer Toileting every 2 Hours,  in advance of need  - Initiate/Maintain bed alarm  - Obtain necessary fall risk management equipment: non slip socks  - Apply yellow socks and bracelet for high fall risk patients  - Consider moving patient to room near nurses station  Outcome: Progressing  Goal: Maintain or return to baseline ADL function  Description: INTERVENTIONS:  -  Assess patient's ability to carry out ADLs; assess patient's baseline for ADL function and identify physical deficits which impact ability to perform ADLs (bathing, care of mouth/teeth, toileting, grooming, dressing, etc.)  - Assess/evaluate cause of self-care deficits   - Assess range of motion  - Assess patient's mobility; develop plan if impaired  - Assess patient's need for assistive devices and provide as appropriate  - Encourage maximum independence but intervene and supervise when necessary  - Involve family in performance of ADLs  - Assess for home care needs following discharge   - Consider OT consult to assist with ADL evaluation and planning for discharge  - Provide patient education as appropriate  Outcome: Progressing  Goal: Maintains/Returns to pre admission functional level  Description: INTERVENTIONS:  - Perform AM-PAC 6 Click Basic Mobility/ Daily Activity assessment daily.  - Set and communicate daily mobility goal to care team and patient/family/caregiver.   - Collaborate with rehabilitation services on mobility goals if consulted  - Perform Range of Motion 5 times a day.  - Reposition patient every 2 hours.  - Dangle patient 3 times a day  - Stand patient 3 times a day  - Ambulate patient 3 times a day  - Out of bed to chair 3 times a day   - Out of bed for meals 3 times a day  - Out of bed for toileting  - Record patient progress and toleration of activity level   Outcome: Progressing     Problem: DISCHARGE PLANNING  Goal: Discharge to home or other facility with appropriate resources  Description: INTERVENTIONS:  - Identify barriers to discharge w/patient and  caregiver  - Arrange for needed discharge resources and transportation as appropriate  - Identify discharge learning needs (meds, wound care, etc.)  - Arrange for interpretive services to assist at discharge as needed  - Refer to Case Management Department for coordinating discharge planning if the patient needs post-hospital services based on physician/advanced practitioner order or complex needs related to functional status, cognitive ability, or social support system  Outcome: Progressing     Problem: Knowledge Deficit  Goal: Patient/family/caregiver demonstrates understanding of disease process, treatment plan, medications, and discharge instructions  Description: Complete learning assessment and assess knowledge base.  Interventions:  - Provide teaching at level of understanding  - Provide teaching via preferred learning methods  Outcome: Progressing     Problem: Prexisting or High Potential for Compromised Skin Integrity  Goal: Skin integrity is maintained or improved  Description: INTERVENTIONS:  - Identify patients at risk for skin breakdown  - Assess and monitor skin integrity  - Assess and monitor nutrition and hydration status  - Monitor labs   - Assess for incontinence   - Turn and reposition patient  - Assist with mobility/ambulation  - Relieve pressure over bony prominences  - Avoid friction and shearing  - Provide appropriate hygiene as needed including keeping skin clean and dry  - Evaluate need for skin moisturizer/barrier cream  - Collaborate with interdisciplinary team   - Patient/family teaching  - Consider wound care consult   Outcome: Progressing

## 2024-06-24 NOTE — PLAN OF CARE
Problem: OCCUPATIONAL THERAPY ADULT  Goal: Performs self-care activities at highest level of function for planned discharge setting.  See evaluation for individualized goals.  Description:   Outcome: Progressing  Note: Limitation: Decreased ADL status, Decreased UE ROM, Decreased UE strength, Decreased Safe judgement during ADL, Decreased cognition, Decreased endurance, Decreased self-care trans  Prognosis: Guarded  Assessment: Pt is a 89 y.o. male seen for OT evaluation at [facility], admitted 6/21/2024 w/ Hypotension.  OT completed extensive review of pt's medical and social history. Comorbidities affecting pt's functional performance at time of assessment include: Hypotension, RA, HTN, CHF, aFib, focal seizures, CKD, hx TIA. Personal factors affecting pt at time of IE include:difficulty performing ADLS, limited insight into deficits, decreased initiation and engagement , and health management . At baseline, pt was living at [facility] and was independent with ADL's and stand pivot transfer to w/c. Pt has been at rehab and requiring more assist. Upon evaluation, pt presents to OT below baseline due to the following performance deficits: weakness, decreased strength, decreased balance, decreased tolerance, impaired memory, impaired problem solving, and decreased safety awareness. Pt to benefit from continued skilled OT tx while in the hospital to address deficits as defined above and maximize level of functional independence w ADL's and functional mobility. Occupational Performance areas to address include: eating, grooming, bathing/shower, toilet hygiene, dressing, functional mobility, and functional transfers, bed mobility . The patient's raw score on the AM-PAC Daily Activity inpatient short form is 13, standardized score is 32.03, less than 39.4. Patients at this level are likely to benefit from DC to post-acute rehabilitation services. Based on findings, pt is of high complexity, due to medical  comorbidities. Pt seen as a co-eval with PT due to the patient's co-morbidities, clinically unstable presentation, and present impairments which are a regression from the patient's baseline. At this time, OT recommendations at time of discharge are level 2.     Rehab Resource Intensity Level, OT: II (Moderate Resource Intensity)

## 2024-06-24 NOTE — OCCUPATIONAL THERAPY NOTE
Occupational Therapy Evaluation      Matthew Krause    6/24/2024    Principal Problem:    Hypotension  Active Problems:    RA (rheumatoid arthritis) (HCC)    HTN (hypertension)    Chronic systolic heart failure (HCC)    Paroxysmal A-fib (HCC)    Focal seizure with retained awareness    Hypothyroidism    Stage 2 chronic kidney disease    Diverticulitis    Abnormal CT scan      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HCC)     Spinal stenosis        Past Surgical History:   Procedure Laterality Date    CARDIAC ELECTROPHYSIOLOGY PROCEDURE N/A 12/17/2022    Procedure: Cardiac loop recorder implant;  Surgeon: Negrito Hollingsworth MD;  Location: [facility];  Service: Cardiology    CARPAL TUNNEL RELEASE Bilateral     COLONOSCOPY      LAMINECTOMY      TOTAL KNEE ARTHROPLASTY Bilateral     TOTAL SHOULDER REPLACEMENT          06/24/24 1620   OT Last Visit   OT Visit Date 06/24/24   Note Type   Note type Evaluation   Pain Assessment   Pain Assessment Tool 0-10   Pain Score No Pain   Restrictions/Precautions   Weight Bearing Precautions Per Order No   Other Precautions Cognitive;Chair Alarm;Bed Alarm;Multiple lines;Telemetry;O2;Pain;Fall Risk;Hard of hearing   Home Living   Type of Home Assisted living  ([facility])   Home Equipment Walker;Wheelchair-manual   Additional Comments At true baseline, pt was performing SPT to and from  with mod I and use of RW. However, pt recently admitted from [facility] where he has been requiring Ax2 for OOB mobility.   Prior Function   Level of [place] Independent with ADLs;Independent with functional mobility;Needs assistance with IADLS   Lives With Facility staff   Receives Help From Personal care attendant   IADLs Family/Friend/Other provides transportation;Family/Friend/Other provides meals;Family/Friend/Other provides medication management    General   Family/Caregiver Present Yes  (son & DIL)   ADL   Eating Assistance 5  Supervision/Setup   Grooming Assistance 4  Minimal Assistance   UB Bathing Assistance 2  Maximal Assistance   LB Bathing Assistance 2  Maximal Assistance   UB Dressing Assistance 2  Maximal Assistance   LB Dressing Assistance 2  Maximal Assistance   Toileting Assistance  2  Maximal Assistance   Bed Mobility   Rolling R 2  Maximal assistance   Additional items Assist x 1   Rolling L 2  Maximal assistance   Additional items Assist x 1   Supine to Sit Unable to assess   Additional Comments Limited progression this session 2* medical status   Transfers   Sit to Stand Unable to assess   Activity Tolerance   Activity Tolerance Patient limited by fatigue;Treatment limited secondary to medical complications (Comment)  (Pt with slurred speech & facial droop. RN at bedside to assess.)   Nurse Made Aware KATJA Knutson  (RN with pt/family at end of session)   RUE Assessment   RUE Assessment WFL   LUE Assessment   LUE Assessment   (unable to formally assess; pt states chronic shoulder pain/limitations)   Hand Function   Gross Motor Coordination Functional   Fine Motor Coordination Functional   Cognition   Overall Cognitive Status Impaired   Arousal/Participation Alert;Cooperative   Attention Attends with cues to redirect   Orientation Level Oriented to person;Oriented to place;Disoriented to time;Disoriented to situation   Memory Decreased short term memory;Decreased recall of recent events;Decreased recall of precautions   Following Commands Follows one step commands with increased time or repetition   Comments Pt with confusion at baseline   Assessment   Limitation Decreased ADL status;Decreased UE ROM;Decreased UE strength;Decreased Safe judgement during ADL;Decreased cognition;Decreased endurance;Decreased self-care trans   Prognosis Guarded   Assessment Pt is a 89 y.o. male seen for OT evaluation at [facility], admitted 6/21/2024 w/  Hypotension.  OT completed extensive review of pt's medical and social history. Comorbidities affecting pt's functional performance at time of assessment include: Hypotension, RA, HTN, CHF, aFib, focal seizures, CKD, hx TIA. Personal factors affecting pt at time of IE include:difficulty performing ADLS, limited insight into deficits, decreased initiation and engagement , and health management . At baseline, pt was living at [facility] and was independent with ADL's and stand pivot transfer to w/c. Pt has been at rehab and requiring more assist. Upon evaluation, pt presents to OT below baseline due to the following performance deficits: weakness, decreased strength, decreased balance, decreased tolerance, impaired memory, impaired problem solving, and decreased safety awareness. Pt to benefit from continued skilled OT tx while in the hospital to address deficits as defined above and maximize level of functional independence w ADL's and functional mobility. Occupational Performance areas to address include: eating, grooming, bathing/shower, toilet hygiene, dressing, functional mobility, and functional transfers, bed mobility . The patient's raw score on the -PAC Daily Activity inpatient short form is 13, standardized score is 32.03, less than 39.4. Patients at this level are likely to benefit from DC to post-acute rehabilitation services. Based on findings, pt is of high complexity, due to medical comorbidities. Pt seen as a co-eval with PT due to the patient's co-morbidities, clinically unstable presentation, and present impairments which are a regression from the patient's baseline. At this time, OT recommendations at time of discharge are level 2.   Goals   Patient Goals to stand   Plan   Treatment Interventions ADL retraining;Functional transfer training;UE strengthening/ROM;Endurance training;Cognitive reorientation;Patient/family training;Equipment evaluation/education;Compensatory technique  education;Continued evaluation;Energy conservation   Goal Expiration Date 07/04/24   OT Frequency 2-3x/wk   Discharge Recommendation   Rehab Resource Intensity Level, OT II (Moderate Resource Intensity)   AM-PAC Daily Activity Inpatient   Lower Body Dressing 2   Bathing 2   Toileting 2   Upper Body Dressing 2   Grooming 2   Eating 3   Daily Activity Raw Score 13   Daily Activity Standardized Score (Calc for Raw Score >=11) 32.03     Pt will achieve the following goals within 10 days.    *Pt will complete grooming with modified independence.    *Pt will complete UB bathing and dressing with Min A.    *Pt will complete LB bathing and dressing with Min A .    *Pt will complete toileting (hygiene and clothing management) with Min A.    *Pt will complete bed mobility with superviison, with bed flat and no side rail to prep for purposeful tasks    *Pt will perform functional transfers with Min A in order to complete ADL routine.    *Pt will increase standing tolerance to 2 minutes in order to complete ADL routine.    *Pt will complete item retrieval with Mod I at w/c level, while demonstrating good safety.    *Pt will demonstrate increased activity tolerance in order to complete ADL routine.    *Pt will participate in cognitive assessment to determine level of safety for returning home    *Pt will participate in UE therapeutic exercise in order to maximize strength for ADL transfers.    *Pt will sit on EOB for 10+ minutes for increased safety with seated activity tolerance during ADL tasks.    *Pt will identify 3-5 fall risks to ensure safety upon discharge.    Carmela Kendall MS, OTR/L

## 2024-06-24 NOTE — ASSESSMENT & PLAN NOTE
Wt Readings from Last 3 Encounters:   06/24/24 101 kg (222 lb 0.1 oz)   06/19/24 99.6 kg (219 lb 9.3 oz)   04/12/24 95 kg (209 lb 7 oz)     06/21/24 98.5 kg (217 lb 2.5 oz)   06/19/24 99.6 kg (219 lb 9.3 oz)   04/12/24 95 kg (209 lb 7 oz)      Home diuretic: Torsemide 20 Mg MWF and 10 Mg T, Th, Sat  Last echo 8/2023: LVEF 45%.  G1 DD.  Home torsemide held due to hypotension   S/P IVF with worsening resp status - hold further fluids   S/P IV lasix x 2 doses   CXR concerning for volume overload, f/u final read  Updated echo pending   Daily weights

## 2024-06-24 NOTE — PLAN OF CARE
Problem: PAIN - ADULT  Goal: Verbalizes/displays adequate comfort level or baseline comfort level  Description: Interventions:  - Encourage patient to monitor pain and request assistance  - Assess pain using appropriate pain scale  - Administer analgesics based on type and severity of pain and evaluate response  - Implement non-pharmacological measures as appropriate and evaluate response  - Consider cultural and social influences on pain and pain management  - Notify physician/advanced practitioner if interventions unsuccessful or patient reports new pain  Outcome: Progressing     Problem: INFECTION - ADULT  Goal: Absence or prevention of progression during hospitalization  Description: INTERVENTIONS:  - Assess and monitor for signs and symptoms of infection  - Monitor lab/diagnostic results  - Monitor all insertion sites, i.e. indwelling lines, tubes, and drains  - Monitor endotracheal if appropriate and nasal secretions for changes in amount and color  - Huger appropriate cooling/warming therapies per order  - Administer medications as ordered  - Instruct and encourage patient and family to use good hand hygiene technique  - Identify and instruct in appropriate isolation precautions for identified infection/condition  Outcome: Progressing  Goal: Absence of fever/infection during neutropenic period  Description: INTERVENTIONS:  - Monitor WBC    Outcome: Progressing     Problem: SAFETY ADULT  Goal: Patient will remain free of falls  Description: INTERVENTIONS:  - Educate patient/family on patient safety including physical limitations  - Instruct patient to call for assistance with activity   - Consult OT/PT to assist with strengthening/mobility   - Keep Call bell within reach  - Keep bed low and locked with side rails adjusted as appropriate  - Keep care items and personal belongings within reach  - Initiate and maintain comfort rounds  - Make Fall Risk Sign visible to staff  - Offer Toileting every 2 Hours,  in advance of need  - Initiate/Maintain bed alarm  - Obtain necessary fall risk management equipment: non slip socks  - Apply yellow socks and bracelet for high fall risk patients  - Consider moving patient to room near nurses station  Outcome: Progressing  Goal: Maintain or return to baseline ADL function  Description: INTERVENTIONS:  -  Assess patient's ability to carry out ADLs; assess patient's baseline for ADL function and identify physical deficits which impact ability to perform ADLs (bathing, care of mouth/teeth, toileting, grooming, dressing, etc.)  - Assess/evaluate cause of self-care deficits   - Assess range of motion  - Assess patient's mobility; develop plan if impaired  - Assess patient's need for assistive devices and provide as appropriate  - Encourage maximum independence but intervene and supervise when necessary  - Involve family in performance of ADLs  - Assess for home care needs following discharge   - Consider OT consult to assist with ADL evaluation and planning for discharge  - Provide patient education as appropriate  Outcome: Progressing

## 2024-06-24 NOTE — PROGRESS NOTES
[facility]  Progress Note  Name: Matthew Krause I  MRN: [id number]  Unit/Bed#: -01 I Date of Admission: 6/21/2024   Date of Service: 6/24/2024 I Hospital Day: 3    Assessment & Plan   * Hypotension  Assessment & Plan  Recently admitted for dehydration in the setting of diarrhea secondary to diverticulitis (6/15- 6/19). Readmitted for lethargy, hypotension, hypothermia.   Not meeting SIRS criteria, lactic negative, Pro-Larry negative  6/21 CT CAP: Sigmoid diverticulitis with no fluid collection or abscess. Consider colonoscopy when the acute illness subsides. Trace left effusion. No acute airspace consolidation. Dilated ascending artery measuring 4.4 cm, annual surveillance generally suggested. Cholelithiasis. No intra-abdominal hemorrhage  UA without infection  BC x 2 neg at 48 hrs  Doubt hypotension is secondary to infection given lack of infectious markers  Briefly required vasopressors, now off  S/P IVF, discontinued due to resp status   Notably patient has been maintained on prednisone 5 mg daily x 40 years for RA, recently weaned off 3 weeks ago  S/P IV hydrocortisone in ICU  AM cortisol high however this was AFTER IV hydrocortisone administration  Recheck am cortisol in am given persistent lethargy  SBP stable 100s currently   Consider solu-cortef if ongoing hypotension    Chronic systolic heart failure (HCC)  Assessment & Plan  Wt Readings from Last 3 Encounters:   06/24/24 101 kg (222 lb 0.1 oz)   06/19/24 99.6 kg (219 lb 9.3 oz)   04/12/24 95 kg (209 lb 7 oz)     06/21/24 98.5 kg (217 lb 2.5 oz)   06/19/24 99.6 kg (219 lb 9.3 oz)   04/12/24 95 kg (209 lb 7 oz)      Home diuretic: Torsemide 20 Mg MWF and 10 Mg T, Th, Sat  Last echo 8/2023: LVEF 45%.  G1 DD.  Home torsemide held due to hypotension   S/P IVF with worsening resp status - hold further fluids   S/P IV lasix x 2 doses   CXR concerning for volume overload, f/u final read  Updated echo pending   Daily  weights    Abnormal CT scan  Assessment & Plan  6/21 CT CAP: Dilated ascending artery measuring 4.4 cm, annual surveillance generally suggested  Incidental finding note completed    Diverticulitis  Assessment & Plan  Recently diagnosed with diverticulitis and had been started on Augmentin outpatient.  Subsequently developed severe diarrhea and was admitted for dehydration.  Antibiotics at that time transition to ceftriaxone and Flagyl.  Discharged on 6/19 on cefuroxime and Flagyl p.o.  Presented with hypotension and diarrhea, however Pro-Larry negative, lactic negative, not meeting SIRS criteria  Doubt hypotension is secondary to sepsis however CT CAP showing ongoing sigmoid diverticulitis  We will continue with IV ceftriaxone and IV Flagyl thru 6/25 to complete 10 days of abx  Low fiber diet  Zofran as needed for nausea    Stage 2 chronic kidney disease  Assessment & Plan  Lab Results   Component Value Date    EGFR 62 06/23/2024    EGFR 61 06/22/2024    EGFR 56 06/21/2024    CREATININE 1.05 06/23/2024    CREATININE 1.06 06/22/2024    CREATININE 1.14 06/21/2024     Baseline Creatinine 0.9-1.0  Cr at baseline  Strict I/O  Trend renal indices    Hypothyroidism  Assessment & Plan  Continue home Synthroid 50 mcg daily  TSH high due to infection, free T4 within normal limits    Focal seizure with retained awareness  Assessment & Plan  Continue home Keppra 500 mg BID     Paroxysmal A-fib (HCC)  Assessment & Plan  AC: Eliquis 5 Mg twice daily  RC: Metoprolol succinate 25 Mg twice daily  Hold home metoprolol 2/2 hypotension  Eliquis changed to heparin gtt due to outpatient procedure on his back scheduled for 6/26.  Family canceled outpatient appt --> Resumed home eliquis 6/24    HTN (hypertension)  Assessment & Plan  Hold home metoprolol 2/2 hypotension     RA (rheumatoid arthritis) (HCC)  Assessment & Plan  Home regimen: Hydroxychloroquine milligrams twice daily  Recently stopped daily prednisone           VTE Pharmacologic  Prophylaxis: VTE Score: 5 High Risk (Score >/= 5) - Pharmacological DVT Prophylaxis Ordered: apixaban (Eliquis). Sequential Compression Devices Ordered.    Mobility:   Basic Mobility Inpatient Raw Score: 8  JH-HLM Goal: 3: Sit at edge of bed  JH-HLM Achieved: 3: Sit at edge of bed  JH-HLM Goal achieved. Continue to encourage appropriate mobility.    Patient Centered Rounds: I performed bedside rounds with nursing staff today.   Discussions with Specialists or Other Care Team Provider: None     Education and Discussions with Family / Patient: Updated  (son and daughter in law) at bedside.    Total Time Spent on Date of Encounter in care of patient: 35 mins. This time was spent on one or more of the following: performing physical exam; counseling and coordination of care; obtaining or reviewing history; documenting in the medical record; reviewing/ordering tests, medications or procedures; communicating with other healthcare professionals and discussing with patient's family/caregivers.    Current Length of Stay: 3 day(s)  Current Patient Status: Inpatient   Certification Statement: The patient will continue to require additional inpatient hospital stay due to IV medications   Discharge Plan: Anticipate discharge in 48-72 hrs to discharge location to be determined pending rehab evaluations.    Code Status: Level 1 - Full Code    Subjective:   Patient is alert/oriented however appears fatigued, lethargic with increased WOB however stable currently on 2L.     Objective:     Vitals:   Temp (24hrs), Av.1 °F (36.2 °C), Min:96.3 °F (35.7 °C), Max:97.8 °F (36.6 °C)    Temp:  [96.3 °F (35.7 °C)-97.8 °F (36.6 °C)] 97.8 °F (36.6 °C)  HR:  [] 101  Resp:  [18-22] 18  BP: (100-103)/(65-70) 102/65  SpO2:  [94 %-97 %] 96 %  Body mass index is 33.76 kg/m².     Input and Output Summary (last 24 hours):     Intake/Output Summary (Last 24 hours) at 2024 1432  Last data filed at 2024 1012  Gross per 24  hour   Intake 1100 ml   Output 500 ml   Net 600 ml       Physical Exam:   Physical Exam  Vitals and nursing note reviewed.   Constitutional:       General: He is not in acute distress.     Appearance: He is well-developed. He is ill-appearing.   HENT:      Head: Normocephalic and atraumatic.   Eyes:      General:         Right eye: No discharge.         Left eye: No discharge.      Extraocular Movements: Extraocular movements intact.      Conjunctiva/sclera: Conjunctivae normal.   Cardiovascular:      Rate and Rhythm: Normal rate and regular rhythm.      Heart sounds: No murmur heard.  Pulmonary:      Effort: Pulmonary effort is normal. No respiratory distress.      Breath sounds: Rales present. No wheezing or rhonchi.      Comments: Stable on 2L however with increased accessory muscle use and appears tachypneic intermittently. Dependent rales  Abdominal:      General: Bowel sounds are normal. There is no distension.      Palpations: Abdomen is soft.      Tenderness: There is no abdominal tenderness.   Musculoskeletal:      Cervical back: Neck supple.      Right lower leg: No edema.      Left lower leg: No edema.   Skin:     General: Skin is warm and dry.      Capillary Refill: Capillary refill takes less than 2 seconds.   Neurological:      General: No focal deficit present.      Mental Status: He is alert and oriented to person, place, and time. Mental status is at baseline.      Cranial Nerves: No cranial nerve deficit.   Psychiatric:         Mood and Affect: Mood normal.         Behavior: Behavior normal.          Additional Data:     Labs:  Results from last 7 days   Lab Units 06/23/24  0318   WBC Thousand/uL 6.96   HEMOGLOBIN g/dL 9.7*   HEMATOCRIT % 31.2*   PLATELETS Thousands/uL 149   SEGS PCT % 46   LYMPHO PCT % 29   MONO PCT % 11   EOS PCT % 13*     Results from last 7 days   Lab Units 06/23/24  0318 06/22/24  0553 06/21/24  0838   SODIUM mmol/L 136   < > 137   POTASSIUM mmol/L 4.1   < > 5.5*   CHLORIDE  mmol/L 105   < > 106   CO2 mmol/L 27   < > 25   BUN mg/dL 17   < > 12   CREATININE mg/dL 1.05   < > 1.14   ANION GAP mmol/L 4   < > 6   CALCIUM mg/dL 9.7   < > 9.7   ALBUMIN g/dL  --   --  2.9*   TOTAL BILIRUBIN mg/dL  --   --  0.46   ALK PHOS U/L  --   --  62   ALT U/L  --   --  8   AST U/L  --   --  38   GLUCOSE RANDOM mg/dL 149*   < > 105    < > = values in this interval not displayed.     Results from last 7 days   Lab Units 06/22/24  1021   INR  2.10*     Results from last 7 days   Lab Units 06/21/24  0908   POC GLUCOSE mg/dl 91         Results from last 7 days   Lab Units 06/21/24  0838   LACTIC ACID mmol/L 1.4   PROCALCITONIN ng/ml 0.24       Lines/Drains:  Invasive Devices       Peripheral Intravenous Line  Duration             Peripheral IV 06/24/24 Distal;Left;Ventral (anterior) Forearm <1 day                          Imaging: Reviewed radiology reports from this admission including: chest xray, chest CT scan, and abdominal/pelvic CT and Personally reviewed the following imaging: chest xray    Recent Cultures (last 7 days):   Results from last 7 days   Lab Units 06/21/24  0838   BLOOD CULTURE  No Growth at 48 hrs.  No Growth at 48 hrs.       Last 24 Hours Medication List:   Current Facility-Administered Medications   Medication Dose Route Frequency Provider Last Rate    acetaminophen  650 mg Oral Q6H PRN Cassandra Figueroa MD      allopurinol  200 mg Oral Daily Cassandra Figueroa MD      apixaban  5 mg Oral BID Carmela Varner PA-C      cefTRIAXone  2,000 mg Intravenous Q24H Jose Schulz PA-C 2,000 mg (06/23/24 1509)    chlorhexidine  15 mL Mouth/Throat Q12H [facility] Cassandra Figueroa MD      Diclofenac Sodium  2 g Topical 4x Daily Cassandra Figueroa MD      doxycycline hyclate  100 mg Oral Q12H [facility] Cassandra Figueroa MD      gabapentin  100 mg Oral TID Cassandra Figueroa MD      hydroxychloroquine  200 mg Oral BID With Meals Cassandra Figueroa MD      levETIRAcetam  500 mg Oral  Q12H [facility] Cassandra Figueroa MD      levothyroxine  50 mcg Oral Daily Cassandra Figueroa MD      melatonin  1.5 mg Oral HS Jose Schulz PA-C      metroNIDAZOLE  500 mg Intravenous Q8H Jose Schulz PA-C 500 mg (06/24/24 0559)    ondansetron  4 mg Intravenous Q6H PRN Cassandra Figueroa MD      pantoprazole  20 mg Oral Early Morning Cassandra Figueroa MD          Today, Patient Was Seen By: Carmela Varner PA-C    **Please Note: This note may have been constructed using a voice recognition system.**

## 2024-06-25 ENCOUNTER — HOME CARE VISIT (OUTPATIENT)
Dept: HOME HEALTH SERVICES | Facility: HOME HEALTHCARE | Age: 89
End: 2024-06-25

## 2024-06-25 NOTE — NURSING NOTE
Pt's NC was off and took off his lo. Stating at 79 on RA put NC back on pt now stating at 96 still on 2L NC. Care ongoing. Will continue to monitor.

## 2024-06-25 NOTE — CONSULTS
Consult - Cardiology   Matthew Krause 89 y.o. male MRN: [id number]  Unit/Bed#: -01 Encounter: [id number]            History of Present Illness:  Matthew Krause is a 89 y.o. male with PMH recent diverticulitis, HFrEF, HLD, HTN, RA, gout, GERD, paroxysmal Afib, R shoulder arthroscopy, spinal stenosis who presents to [facility] on 6/21/24 with a chief complaint of hypotension, SOB, and BL lower leg pain. Pt arrived via EMS from [facility] where nursing staff noted BPs in 70-80s/40s on 6/20/24 and 6/21/24. EMS noted BPs in 90-100s/50s. Pt SOB began approx 1 week prior to admission.     Pt was asleep during exam today. SOB has improved during his hospitalization per family. Pt is resting and breathing comfortably, but he is shifting around in bed. Family noted that he often is uncomfortable in bed d/t spinal stenosis. Pt was noted to have active diverticulitis/sepsis earlier in June 2024. Was treated with Augmentin and has severe diarrhea. Finishing Flagyl and ceftriaxone today.     Family noted that he has been increasingly lethargic over the past 3-4 months, sleeping most of the day. He is waking for short periods of time and is able to eat or drink small portions, but then falls back asleep per family. This was not his baseline mental status prior to 3-4 months ago.  Pt has Hx of infection after R shoulder arthroscopy with lethargy similar to his current state. Per family, Pt denied any CP, lightheadedness, dizziness, significant weight gain. Per family, Pt denied any SOB today.     ECHO completed 6/24/24. Pt has reduced EF of 20-25% from 45% in Aug 2023.   BPs remain soft around 100s/60s.   CMP: borderline low K 3.4  Mag: low 1.3 (baseline on admission)  CBC: Hb 10.0 (baseline on admission)  Weight:  229 lbs on admission, 221 lbs today    Assessment/Plan:   After long discussion with patient and family, given worsening end stage heart disease with declining EF (now 20-25%)  they are interested in pursuit of comfort measures/ hospice at present time. Palliative consult placed to establish clear goals of care.   In the meantime, appears hypervolemic on exam. Order IV Lasix 40mg QD with modified hold parameters given borderline SBP.   Remainder of cardiac meds as ordered.      Past Medical History:        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HCC)     Spinal stenosis       Past Surgical History:   Procedure Laterality Date    CARDIAC ELECTROPHYSIOLOGY PROCEDURE N/A 12/17/2022    Procedure: Cardiac loop recorder implant;  Surgeon: Negrito Hollingsworth MD;  Location: [facility];  Service: Cardiology    CARPAL TUNNEL RELEASE Bilateral     COLONOSCOPY      LAMINECTOMY      TOTAL KNEE ARTHROPLASTY Bilateral     TOTAL SHOULDER REPLACEMENT          Allergy:        Allergies   Allergen Reactions    Colchicine Other (See Comments)     Flushing      Niacin Other (See Comments)     flushed    Other GI Intolerance    Statins        Medications:       Prior to Admission medications    Medication Sig Start Date End Date Taking? Authorizing Provider   ACETAMINOPHEN ER PO Take 1,000 mg by mouth 3 (three) times a day    Historical Provider, MD   albuterol (2.5 mg/3 mL) 0.083 % nebulizer solution  4/4/24   Historical Provider, MD   allopurinol (ZYLOPRIM) 100 mg tablet Take 400 mg by mouth daily    Historical Provider, MD   apixaban (Eliquis) 5 mg Take 1 tablet (5 mg total) by mouth 2 (two) times a day 12/28/23   Nitish Tomas MD   cefuroxime (CEFTIN) 500 mg tablet Take 1 tablet (500 mg total) by mouth every 12 (twelve) hours for 5 days 6/19/24 6/24/24  Darrell Flower MD   Diclofenac Sodium (VOLTAREN) 1 % Apply 4 g topically 4 (four) times a day    Historical Provider, MD   doxycycline hyclate (VIBRAMYCIN) 100 mg capsule Take 100 mg by mouth every 12 (twelve)  hours 9/30/23   Historical Provider, MD   ezetimibe (ZETIA) 10 mg tablet Take 1 tablet (10 mg total) by mouth daily 12/28/23   Nitish Tomas MD   fluticasone (FLONASE) 50 mcg/act nasal spray 1 spray into each nostril 2 (two) times a day as needed for rhinitis or allergies 10/14/23   Historical Provider, MD   gabapentin (NEURONTIN) 100 mg capsule Take 100 mg by mouth 3 (three) times a day    Historical Provider, MD   hydroxychloroquine (PLAQUENIL) 200 mg tablet Take 200 mg by mouth 2 (two) times a day with meals    Historical Provider, MD   levETIRAcetam (Keppra) 500 mg tablet Take 1 tablet (500 mg total) by mouth every 12 (twelve) hours 12/12/23   Christo Hugo MD   lidocaine (LIDODERM) 5 % Apply 1 patch topically over 12 hours daily Remove & Discard patch within 12 hours or as directed by MD Do not start before November 25, 2023.  Patient not taking: Reported on 12/21/2023 11/25/23   Cassandra Figueroa MD   loperamide (IMODIUM) 2 mg capsule Take 1 capsule (2 mg total) by mouth 4 (four) times a day as needed for diarrhea 6/19/24   Darrell Flower MD   melatonin 1 mg Take 1 mg by mouth daily at bedtime Pt using only 1mg not 2mg    Historical Provider, MD   metoprolol succinate (TOPROL-XL) 25 mg 24 hr tablet Take 1 tablet (25 mg total) by mouth 2 (two) times a day 12/28/23 12/22/24  Nitish Tomas MD   metroNIDAZOLE (FLAGYL) 500 mg tablet Take 1 tablet (500 mg total) by mouth every 8 (eight) hours for 5 days 6/19/24 6/24/24  Darrell Flower MD   nystatin (MYCOSTATIN) powder Apply topically 2 (two) times a day 1/28/23   Elenita Delgadillo MD   omeprazole (PriLOSEC) 20 mg delayed release capsule Take 1 capsule (20 mg total) by mouth 2 (two) times a day 6/19/24   Darrell Flower MD   ondansetron (ZOFRAN) 4 mg tablet TAKE 1 TABLET EVERY 8 HOURS AS NEEDED FOR NAUSEA OR VOMITING 3/5/24   ESTEFANIA Avila   polyethylene glycol (MIRALAX) 17 g packet Take 17 g by mouth every other day 12/21/21   Cindy Bourgeois,  CRNP   potassium chloride (KLOR-CON) 20 mEq packet Take 20 mEq by mouth 2 (two) times a day 12/28/23   Nitish Tomas MD   psyllium (METAMUCIL SMOOTH TEXTURE) 28 % packet Take 1 packet by mouth in the morning 11/15/23 2/21/24  Ankit Nicholas DO   saccharomyces boulardii (FLORASTOR) 250 mg capsule Take 250 mg by mouth 2 (two) times a day    Historical Provider, MD   Synthroid 50 MCG tablet Take 50 mcg by mouth daily 2/20/24   Historical Provider, MD   tamsulosin (FLOMAX) 0.4 mg Take 1 capsule (0.4 mg total) by mouth daily with dinner for 7 days  Patient taking differently: Take 0.4 mg by mouth daily at bedtime 6/10/24 6/17/24  Dk Nicholas DO   torsemide (DEMADEX) 10 mg tablet Take 1 tablet (10 mg total) by mouth 4 (four) times a week Take 10 mg on Tuesday, Thursday, Saturday, and Sunday 12/28/23   Nitish Tomas MD   torsemide (DEMADEX) 20 mg tablet Take 1 tablet (20 mg total) by mouth 3 (three) times a week Take 20 mg on Monday, Wednesday, and Friday 12/29/23   Nitish Tomas MD       Family History:     Family History   Problem Relation Age of Onset    Seizures Son     Colon polyps Neg Hx     Colon cancer Neg Hx         Social History:       Social History     Socioeconomic History    Marital status:      Spouse name: None    Number of children: None    Years of education: None    Highest education level: None   Occupational History    None   Tobacco Use    Smoking status: Former     Current packs/day: 0.25     Average packs/day: 0.3 packs/day for 5.0 years (1.3 ttl pk-yrs)     Types: Cigarettes    Smokeless tobacco: Never   Vaping Use    Vaping status: Never Used   Substance and Sexual Activity    Alcohol use: Yes     Alcohol/week: 2.0 standard drinks of alcohol     Types: 2 Shots of liquor per week     Comment: 1 drink per week    Drug use: Yes     Types: Marijuana    Sexual activity: Not Currently     Partners: Male   Other Topics Concern    None   Social History Narrative    None     Social  Determinants of Health     Financial Resource Strain: Not on file   Food Insecurity: No Food Insecurity (6/22/2024)    Hunger Vital Sign     Worried About Running Out of Food in the Last Year: Never true     Ran Out of Food in the Last Year: Never true   Transportation Needs: No Transportation Needs (6/22/2024)    PRAPARE - Transportation     Lack of Transportation (Medical): No     Lack of Transportation (Non-Medical): No   Physical Activity: Not on file   Stress: Not on file   Social Connections: Not on file   Intimate Partner Violence: Not on file   Housing Stability: Low Risk  (6/22/2024)    Housing Stability Vital Sign     Unable to Pay for Housing in the Last Year: No     Number of Times Moved in the Last Year: 0     Homeless in the Last Year: No       Weights/BMI:    Wt Readings from Last 3 Encounters:   06/25/24 100 kg (221 lb 1.9 oz)   06/19/24 99.6 kg (219 lb 9.3 oz)   04/12/24 95 kg (209 lb 7 oz)   , Body mass index is 33.62 kg/m².      ROS:  14 point ROS negative except as outlined above  Remainder review of systems is negative    Exam:  General: Lethargic; When awake - oriented and in no acute distress, cooperative  Head: Normocephalic, atraumatic.  Eyes: PERRLA. No icterus. Normal Conjunctiva.   Oropharynx: Moist and normal-appearing mucosa  Neck: Supple, symmetrical, trachea midline, JVD not appreciated.   Heart: RRR, no murmur, rub or gallop, S1 & S2 normal   Lungs: Normal air entry, lungs clear to auscultation and no rales, rhonchi or wheezing   Abdomen: Flat, normal findings: bowel sounds normal and soft, non-tender  Lower Limbs:  No pitting edema, 2+ peripheral pulses, capillary refill within normal limits  Musculoskeletal: ROM grossly normal

## 2024-06-25 NOTE — ASSESSMENT & PLAN NOTE
Recently admitted for dehydration in the setting of diarrhea secondary to diverticulitis (6/15- 6/19). Readmitted for lethargy, hypotension, hypothermia.   Not meeting SIRS criteria, lactic negative, Pro-Larry negative  6/21 CT CAP: Sigmoid diverticulitis with no fluid collection or abscess. Consider colonoscopy when the acute illness subsides. Trace left effusion. No acute airspace consolidation. Dilated ascending artery measuring 4.4 cm, annual surveillance generally suggested. Cholelithiasis. No intra-abdominal hemorrhage  UA without infection  BC x 2 neg to date   Doubt hypotension is secondary to infection given lack of infectious markers  Briefly required vasopressors, now off  S/P IVF, discontinued due to resp status   Notably patient has been maintained on prednisone 5 mg daily x 40 years for RA, recently weaned off 3 weeks ago  S/P IV hydrocortisone in ICU  AM cortisol high >30 however this was AFTER IV hydrocortisone administration  Repeat AM cortisol is low at 3.6 (3 days since last steroid)  6/25 --> will complete cosyntropin stim test for adrenal insufficiency  After test completed will resume IV solucortef empirically - can DC if test results neg   SBP stable 100s currently however with persistent lethargy

## 2024-06-25 NOTE — HOSPICE NOTE
Received hospice referral. Will review chart and reach out to family in the morning. ERIN Butler updated.

## 2024-06-25 NOTE — CASE MANAGEMENT
Case Management Progress Note    Patient name Matthew Krause  Location /-01 MRN [id number]  : 1934 Date 2024       LOS (days): 4  Geometric Mean LOS (GMLOS) (days): 3.6  Days to GMLOS:-0.5        OBJECTIVE:        Current admission status: Inpatient  Preferred Pharmacy:   [facility] - [address] [address]  Phone: [phone] Fax: [phone]    Primary Care Provider: ESTEFANIA Pimentel    Primary Insurance: [employer]  Secondary Insurance:     PROGRESS NOTE:    Consult received for hospice.   Met with pt's family to discuss hospice. CM reviewed options for hospice at home, SNF, and IPU.   Pt's family states [facility] is preference. Referral sent to [facility]osp[facility]. [facility] referral sent.  Pt's family requested additional SNF referrals to [facility] and [facility]. [facility] referrals sent.

## 2024-06-25 NOTE — ASSESSMENT & PLAN NOTE
Lab Results   Component Value Date    EGFR 70 06/25/2024    EGFR 75 06/24/2024    EGFR 62 06/23/2024    CREATININE 0.95 06/25/2024    CREATININE 0.89 06/24/2024    CREATININE 1.05 06/23/2024     Baseline Creatinine 0.9-1.0  Cr at baseline  Strict I/O  Trend renal indices

## 2024-06-25 NOTE — QUICK NOTE
This patient's Deterioration Index score indicates a Moderate Risk.    Please click to increase your vital sign monitoring using the order below.      The patient's current deterioration index score is: 61.79.  Predictive Model Details          62 (High)  Factor Value    Calculated 6/25/2024 05:30 22% Pulse oximetry 76 %    Deterioration Index Model 22% Age 89 years old     18% Supplemental oxygen Nasal cannula     11% Mora coma scale 14     6% Cardiac rhythm Sinus tachycardia     6% Systolic 99     5% Pulse 105     4% Respiratory rate 18     2% Blood pH abnormal (7.414)     2% Sodium 136 mmol/L     1% Potassium abnormal (3.4 mmol/L)     1% Hematocrit abnormal (32.2 %)     1% Platelet count abnormal (131 Thousands/uL)     0% Temperature 97.7 °F (36.5 °C)     0% WBC count 6.68 Thousand/uL      Pt O2 sats are listed at 76 % is at 94 % wrong read    no

## 2024-06-25 NOTE — PROGRESS NOTES
[facility]  Progress Note  Name: Matthew Karuse I  MRN: [id number]  Unit/Bed#: -01 I Date of Admission: 6/21/2024   Date of Service: 6/25/2024 I Hospital Day: 4    Assessment & Plan   * Hypotension  Assessment & Plan  Recently admitted for dehydration in the setting of diarrhea secondary to diverticulitis (6/15- 6/19). Readmitted for lethargy, hypotension, hypothermia.   Not meeting SIRS criteria, lactic negative, Pro-Larry negative  6/21 CT CAP: Sigmoid diverticulitis with no fluid collection or abscess. Consider colonoscopy when the acute illness subsides. Trace left effusion. No acute airspace consolidation. Dilated ascending artery measuring 4.4 cm, annual surveillance generally suggested. Cholelithiasis. No intra-abdominal hemorrhage  UA without infection  BC x 2 neg to date   Doubt hypotension is secondary to infection given lack of infectious markers  Briefly required vasopressors, now off  S/P IVF, discontinued due to resp status   Notably patient has been maintained on prednisone 5 mg daily x 40 years for RA, recently weaned off 3 weeks ago  S/P IV hydrocortisone in ICU  AM cortisol high >30 however this was AFTER IV hydrocortisone administration  Repeat AM cortisol is low at 3.6 (3 days since last steroid)  6/25 --> will complete cosyntropin stim test for adrenal insufficiency  After test completed will resume IV solucortef empirically - can DC if test results neg   SBP stable 100s currently however with persistent lethargy    Chronic systolic heart failure (HCC)  Assessment & Plan  Wt Readings from Last 3 Encounters:   06/25/24 100 kg (221 lb 1.9 oz)   06/19/24 99.6 kg (219 lb 9.3 oz)   04/12/24 95 kg (209 lb 7 oz)     06/21/24 98.5 kg (217 lb 2.5 oz)   06/19/24 99.6 kg (219 lb 9.3 oz)   04/12/24 95 kg (209 lb 7 oz)      Home diuretic: Torsemide 20 Mg MWF and 10 Mg T, Th, Sat  Last echo 8/2023: LVEF 45%.  G1 DD.  Home torsemide held due to hypotension   S/P IVF given  for hypotension with worsening resp status - hold further fluids   S/P IV lasix x 2 doses   CXR with pulmonary edema   Updated echo: EF has dropped from 45% to 25%. Mitral regurgitation is now severe.   Cardiology consulted   Continue IV lasix 40 mg daily for current iatrogenic volume overload   Unfortunately GDMT is limited with patient's soft BP  Tele - NSR  Daily weights, I/O    Abnormal CT scan  Assessment & Plan  6/21 CT CAP: Dilated ascending artery measuring 4.4 cm, annual surveillance generally suggested  Incidental finding note completed    Diverticulitis  Assessment & Plan  Recently diagnosed with diverticulitis and had been started on Augmentin outpatient.  Subsequently developed severe diarrhea and was admitted for dehydration.  Antibiotics at that time transition to ceftriaxone and Flagyl.  Discharged on 6/19 on cefuroxime and Flagyl p.o.  Presented with hypotension and diarrhea, however Pro-Larry negative, lactic negative, not meeting SIRS criteria  Doubt hypotension is secondary to sepsis however CT CAP showing ongoing sigmoid diverticulitis  We will continue with IV ceftriaxone and IV Flagyl thru 6/25 to complete 10 days of abx  Low fiber diet  Zofran as needed for nausea    Stage 2 chronic kidney disease  Assessment & Plan  Lab Results   Component Value Date    EGFR 70 06/25/2024    EGFR 75 06/24/2024    EGFR 62 06/23/2024    CREATININE 0.95 06/25/2024    CREATININE 0.89 06/24/2024    CREATININE 1.05 06/23/2024     Baseline Creatinine 0.9-1.0  Cr at baseline  Strict I/O  Trend renal indices    Hypothyroidism  Assessment & Plan  Continue home Synthroid 50 mcg daily  TSH high due to infection, free T4 within normal limits    Focal seizure with retained awareness  Assessment & Plan  Continue home Keppra 500 mg BID     Paroxysmal A-fib (HCC)  Assessment & Plan  AC: Eliquis 5 Mg twice daily  RC: Metoprolol succinate 25 Mg twice daily  Hold home metoprolol 2/2 hypotension  Eliquis changed to heparin gtt due  to outpatient procedure on his back scheduled for 6/26.  Family canceled outpatient appt --> Resumed home eliquis 6/24    HTN (hypertension)  Assessment & Plan  Hold home metoprolol 2/2 hypotension     RA (rheumatoid arthritis) ([facility])  Assessment & Plan  Home regimen: Hydroxychloroquine milligrams twice daily  Recently stopped daily prednisone               VTE Pharmacologic Prophylaxis: VTE Score: 5 High Risk (Score >/= 5) - Pharmacological DVT Prophylaxis Ordered: apixaban (Eliquis). Sequential Compression Devices Ordered.    Mobility:   Basic Mobility Inpatient Raw Score: 8  -HLM Goal: 3: Sit at edge of bed  JH-HLM Achieved: 1: Laying in bed  JH-HLM Goal NOT achieved. Continue with multidisciplinary rounding and encourage appropriate mobility to improve upon -HLM goals.    Patient Centered Rounds: I performed bedside rounds with nursing staff today.   Discussions with Specialists or Other Care Team Provider: cardiology and palliative care     Education and Discussions with Family / Patient: Updated  (son and daughter in law) at bedside.    Total Time Spent on Date of Encounter in care of patient: 35 mins. This time was spent on one or more of the following: performing physical exam; counseling and coordination of care; obtaining or reviewing history; documenting in the medical record; reviewing/ordering tests, medications or procedures; communicating with other healthcare professionals and discussing with patient's family/caregivers.    Current Length of Stay: 4 day(s)  Current Patient Status: Inpatient   Certification Statement: The patient will continue to require additional inpatient hospital stay due to IV medications   Discharge Plan: Anticipate discharge in 48-72 hrs to discharge location to be determined pending rehab evaluations.    Code Status: Level 1 - Full Code    Subjective:   Patient remains fatigued, lethargic. Breathing mildly improved. Per nursing patient has expressed feeling  "\"like I'm dying\" and in room witnessed to tell son \"I just want to go home\". Discussed case thoroughly with family, they request palliative care discussions given patient's statements and echo results however at this time would like to continue Level 1 as they state patient has always expressed wanting all levels of care/resuscitation in the past.     Objective:     Vitals:   Temp (24hrs), Av.8 °F (36.6 °C), Min:97.7 °F (36.5 °C), Max:97.9 °F (36.6 °C)    Temp:  [97.7 °F (36.5 °C)-97.9 °F (36.6 °C)] 97.9 °F (36.6 °C)  HR:  [] 107  Resp:  [16-18] 16  BP: ()/(63-65) 100/63  SpO2:  [90 %-96 %] 92 %  Body mass index is 33.62 kg/m².     Input and Output Summary (last 24 hours):     Intake/Output Summary (Last 24 hours) at 2024 1302  Last data filed at 2024 1101  Gross per 24 hour   Intake 840 ml   Output 1652 ml   Net -812 ml       Physical Exam:   Physical Exam  Vitals and nursing note reviewed.   Constitutional:       General: He is not in acute distress.     Appearance: He is well-developed. He is ill-appearing.      Comments: Patient is responsive however lethargic, falls asleep easily   HENT:      Head: Normocephalic and atraumatic.   Eyes:      General:         Right eye: No discharge.         Left eye: No discharge.      Extraocular Movements: Extraocular movements intact.      Conjunctiva/sclera: Conjunctivae normal.   Cardiovascular:      Rate and Rhythm: Normal rate and regular rhythm.      Heart sounds: No murmur heard.  Pulmonary:      Effort: Pulmonary effort is normal. No respiratory distress.      Breath sounds: Rales present. No wheezing or rhonchi.      Comments: 94% on 2L  Abdominal:      General: Bowel sounds are normal. There is no distension.      Palpations: Abdomen is soft.      Tenderness: There is no abdominal tenderness.   Musculoskeletal:      Cervical back: Neck supple.      Right lower leg: Edema present.      Left lower leg: Edema present.   Skin:     General: Skin " is warm and dry.      Capillary Refill: Capillary refill takes less than 2 seconds.   Neurological:      General: No focal deficit present.      Mental Status: He is oriented to person, place, and time. Mental status is at baseline.      Cranial Nerves: No cranial nerve deficit.   Psychiatric:         Mood and Affect: Mood normal.         Behavior: Behavior normal.          Additional Data:     Labs:  Results from last 7 days   Lab Units 06/25/24  0121   WBC Thousand/uL 6.68   HEMOGLOBIN g/dL 10.0*   HEMATOCRIT % 32.2*   PLATELETS Thousands/uL 131*   SEGS PCT % 53   LYMPHO PCT % 20   MONO PCT % 13*   EOS PCT % 14*     Results from last 7 days   Lab Units 06/25/24  0121   SODIUM mmol/L 136   POTASSIUM mmol/L 3.4*   CHLORIDE mmol/L 100   CO2 mmol/L 29   BUN mg/dL 16   CREATININE mg/dL 0.95   ANION GAP mmol/L 7   CALCIUM mg/dL 9.2   ALBUMIN g/dL 3.3*   TOTAL BILIRUBIN mg/dL 0.39   ALK PHOS U/L 54   ALT U/L 5*   AST U/L 17   GLUCOSE RANDOM mg/dL 109     Results from last 7 days   Lab Units 06/22/24  1021   INR  2.10*     Results from last 7 days   Lab Units 06/21/24  0908   POC GLUCOSE mg/dl 91         Results from last 7 days   Lab Units 06/25/24  0121 06/21/24  0838   LACTIC ACID mmol/L  --  1.4   PROCALCITONIN ng/ml 0.24 0.24       Lines/Drains:  Invasive Devices       Peripheral Intravenous Line  Duration             Peripheral IV 06/24/24 Distal;Left;Ventral (anterior) Forearm 1 day                      Telemetry:  Telemetry Orders (From admission, onward)               24 Hour Telemetry Monitoring  Continuous x 24 Hours (Telem)        Question:  Reason for 24 Hour Telemetry  Answer:  Decompensated CHF- and any one of the following: continuous diuretic infusion or total diuretic dose >200 mg daily, associated electrolyte derangement (I.e. K < 3.0), ionotropic drip (continuous infusion), hx of ventricular arrhythmia, or new EF < 35%                     Telemetry Reviewed: Normal Sinus Rhythm  Indication for  Continued Telemetry Use: [employer] (remains on tele entire hospital stay)             Imaging: Reviewed radiology reports from this admission including: chest xray    Recent Cultures (last 7 days):   Results from last 7 days   Lab Units 06/21/24  0838   BLOOD CULTURE  No Growth at 72 hrs.  No Growth at 72 hrs.       Last 24 Hours Medication List:   Current Facility-Administered Medications   Medication Dose Route Frequency Provider Last Rate    acetaminophen  650 mg Oral Q6H PRN Cassandra Figueroa MD      allopurinol  200 mg Oral Daily Cassandra Figueroa MD      apixaban  5 mg Oral BID Carmela Vraner PA-C      cefTRIAXone  2,000 mg Intravenous Q24H Jose Schulz PA-C 2,000 mg (06/24/24 1645)    chlorhexidine  15 mL Mouth/Throat Q12H [facility] Cassandra Figueroa MD      cosyntropin  0.25 mg Intravenous Once Carmela Varner PA-C      Diclofenac Sodium  2 g Topical 4x Daily Cassandra Figueroa MD      diphenhydrAMINE-zinc acetate   Topical TID PRN Carmela Varner PA-C      doxycycline hyclate  100 mg Oral Q12H [facility] Cassandra Figueroa MD      furosemide  40 mg Intravenous Daily Casimiro Diallo PA-C      gabapentin  100 mg Oral TID Cassandra Figueroa MD      hydroxychloroquine  200 mg Oral BID With Meals Cassandra Figueroa MD      levETIRAcetam  500 mg Oral Q12H [facility] Cassandra Figueroa MD      levothyroxine  50 mcg Oral Daily Cassandra Figueroa MD      magnesium Oxide  800 mg Oral BID Carmela Varner PA-C      magnesium sulfate  2 g Intravenous Once Carmela Varner PA-C 2 g (06/25/24 1119)    magnesium sulfate  2 g Intravenous Once Carmeal Varenr PA-C      melatonin  1.5 mg Oral HS Jose Schulz PA-C      metroNIDAZOLE  500 mg Intravenous Q8H Jose Schulz PA-C 500 mg (06/25/24 0524)    ondansetron  4 mg Intravenous Q6H PRN Cassandra Figueroa MD      pantoprazole  20 mg Oral Early Morning Cassandra Figueroa MD          Today,  Patient Was Seen By: Carmela Varner PA-C    **Please Note: This note may have been constructed using a voice recognition system.**

## 2024-06-25 NOTE — ASSESSMENT & PLAN NOTE
Wt Readings from Last 3 Encounters:   06/25/24 100 kg (221 lb 1.9 oz)   06/19/24 99.6 kg (219 lb 9.3 oz)   04/12/24 95 kg (209 lb 7 oz)     06/21/24 98.5 kg (217 lb 2.5 oz)   06/19/24 99.6 kg (219 lb 9.3 oz)   04/12/24 95 kg (209 lb 7 oz)      Home diuretic: Torsemide 20 Mg MWF and 10 Mg T, Th, Sat  Last echo 8/2023: LVEF 45%.  G1 DD.  Home torsemide held due to hypotension   S/P IVF given for hypotension with worsening resp status - hold further fluids   S/P IV lasix x 2 doses   CXR with pulmonary edema   Updated echo: EF has dropped from 45% to 25%. Mitral regurgitation is now severe.   Cardiology consulted   Continue IV lasix 40 mg daily for current iatrogenic volume overload   Unfortunately GDMT is limited with patient's soft BP  Tele - NSR  Daily weights, I/O  Palliative care consulted - family would like to continue Level 1 at this time pending [facility] discussions

## 2024-06-25 NOTE — PLAN OF CARE
Problem: PAIN - ADULT  Goal: Verbalizes/displays adequate comfort level or baseline comfort level  Description: Interventions:  - Encourage patient to monitor pain and request assistance  - Assess pain using appropriate pain scale  - Administer analgesics based on type and severity of pain and evaluate response  - Implement non-pharmacological measures as appropriate and evaluate response  - Consider cultural and social influences on pain and pain management  - Notify physician/advanced practitioner if interventions unsuccessful or patient reports new pain  Outcome: Progressing     Problem: INFECTION - ADULT  Goal: Absence or prevention of progression during hospitalization  Description: INTERVENTIONS:  - Assess and monitor for signs and symptoms of infection  - Monitor lab/diagnostic results  - Monitor all insertion sites, i.e. indwelling lines, tubes, and drains  - Monitor endotracheal if appropriate and nasal secretions for changes in amount and color  - [place] appropriate cooling/warming therapies per order  - Administer medications as ordered  - Instruct and encourage patient and family to use good hand hygiene technique  - Identify and instruct in appropriate isolation precautions for identified infection/condition  Outcome: Progressing  Goal: Absence of fever/infection during neutropenic period  Description: INTERVENTIONS:  - Monitor WBC    Outcome: Progressing     Problem: SAFETY ADULT  Goal: Patient will remain free of falls  Description: INTERVENTIONS:  - Educate patient/family on patient safety including physical limitations  - Instruct patient to call for assistance with activity   - Consult OT/PT to assist with strengthening/mobility   - Keep Call bell within reach  - Keep bed low and locked with side rails adjusted as appropriate  - Keep care items and personal belongings within reach  - Initiate and maintain comfort rounds  - Make Fall Risk Sign visible to staff  - Offer Toileting every 2 Hours,  in advance of need  - Initiate/Maintain bed alarm  - Obtain necessary fall risk management equipment: non slip socks  - Apply yellow socks and bracelet for high fall risk patients  - Consider moving patient to room near nurses station  Outcome: Progressing

## 2024-06-26 PROBLEM — T38.0X5A ADRENAL INSUFFICIENCY DUE TO CORTICOSTEROID WITHDRAWAL (HCC): Status: ACTIVE | Noted: 2020-08-28

## 2024-06-26 PROBLEM — E27.3 ADRENAL INSUFFICIENCY DUE TO CORTICOSTEROID WITHDRAWAL (HCC): Status: ACTIVE | Noted: 2020-08-28

## 2024-06-26 PROBLEM — K57.92 DIVERTICULITIS: Status: RESOLVED | Noted: 2024-06-14 | Resolved: 2024-06-26

## 2024-06-26 PROBLEM — Z71.89 GOALS OF CARE, COUNSELING/DISCUSSION: Status: ACTIVE | Noted: 2024-01-01

## 2024-06-26 NOTE — ASSESSMENT & PLAN NOTE
Lab Results   Component Value Date    EGFR 72 06/26/2024    EGFR 71 06/25/2024    EGFR 70 06/25/2024    CREATININE 0.93 06/26/2024    CREATININE 0.94 06/25/2024    CREATININE 0.95 06/25/2024     Baseline Creatinine 0.9-1.0  Cr at baseline  Strict I/O  Trend renal indices

## 2024-06-26 NOTE — ASSESSMENT & PLAN NOTE
6/26: Had extensive goals of care discussion with the patient and his family at bedside.  At this point given the patient's end-stage heart failure, adrenal insufficiency and overall poor prognosis they are highly considering hospice.  They are still deciding as a family at this point and have not made formal decision regarding fully transitioning to hospice.  I do not think the patient would tolerate any rehab given his EF of 20 to 25% and hypotension.  They have decided to make the patient a DNR and DNI which we will continue  Discussed that at any point we can forego disease directed care and place the patient on comfort based medications only with morphine, Ativan.  At this point continue full medical care with IV Cortef, IV Lasix, etc..  We will await the family's decision this week regarding transitioning to comfort care/hospice versus continued aggressive medical therapy. If transition to hospice is made I would hesitate continuing oral Lasix upon discharge given his hypotension and lack of corticosteroid on discharge.  They are in agreement that we would discontinue his levothyroxine, Eliquis, as well as all other medications that are not focused on comfort. For example we would continue his Keppra and gabapentin.  If patient is comfort care then we will look toward safe discharge planning including hospice at SNF if patient is medically stable for transport.  If he is not medically stable for transport or not able to safely take oral medications reliably we will have the hospice liaison reevaluate to consider IP hospice.

## 2024-06-26 NOTE — SPEECH THERAPY NOTE
Speech Language/Pathology    Speech-Language Pathology Bedside Swallow Evaluation      Patient Name: Matthew Krause    Today's Date: 6/26/2024     Problem List  Principal Problem:    Hypotension  Active Problems:    RA (rheumatoid arthritis) (HCC)    HTN (hypertension)    Chronic systolic heart failure (HCC)    Paroxysmal A-fib (HCC)    Focal seizure with retained awareness    Hypothyroidism    Stage 2 chronic kidney disease    Diverticulitis    Abnormal CT scan      Past Medical History  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HCC)     Spinal stenosis        Past Surgical History  Past Surgical History:   Procedure Laterality Date    CARDIAC ELECTROPHYSIOLOGY PROCEDURE N/A 12/17/2022    Procedure: Cardiac loop recorder implant;  Surgeon: Negrito Hollingsworth MD;  Location: [facility];  Service: Cardiology    CARPAL TUNNEL RELEASE Bilateral     COLONOSCOPY      LAMINECTOMY      TOTAL KNEE ARTHROPLASTY Bilateral     TOTAL SHOULDER REPLACEMENT         Summary   Pt presented with s/s suggestive of min oropharyngeal dysphagia. Min prolonged mastication snf oral organization, functional. Adequate transfers without significant residue. Swallows suspected fairly prompt. Cough x1 on initial sip of thin liquids. Cough is productive and pt recovers. No throat clearing, coughing, change in respiratory status or vocal quality w/ subsequent 8 oz thin of solid material. S/w pt/family regarding resuming regular/thin w/ use of strategies to optimize safety and close monitoring for s/s aspiration.     Risk/s for Aspiration: Mild risk     Recommended Diet: regular diet and thin liquids   Recommended Form of Meds:  as tolerated, consider crushing as able given reports of difficulty last night     Aspiration precautions and swallowing strategies: upright posture, only feed when fully alert, slow  rate of feeding, and small bites/sips  Other Recommendations: Continue frequent oral care        Current Medical Status  Pt is a 89 y.o. male who presented to  [facility]  with  PMH recent diverticulitis, HFrEF, HLD, HTN, RA, gout, GERD, paroxysmal Afib, R shoulder arthroscopy, spinal stenosis who presents to [facility] on 6[facility]21[facility]24 with a chief complaint of hypotension, SOB, and BL lower leg pain. Pt arrived via EMS from [facility] where nursing staff noted BPs in 70-80s/40s on 6/20/24 and 6/21/24. EMS noted BPs in 90-100s/50s. Pt SOB began approx 1 week prior to admission.      Pt was asleep during exam today. SOB has improved during his hospitalization per family. Pt is resting and breathing comfortably, but he is shifting around in bed. Family noted that he often is uncomfortable in bed d/t spinal stenosis. Pt was noted to have active diverticulitis/sepsis earlier in June 2024. Was treated with Augmentin and has severe diarrhea. Finishing Flagyl and ceftriaxone today.      Family noted that he has been increasingly lethargic over the past 3-4 months, sleeping most of the day. He is waking for short periods of time and is able to eat or drink small portions, but then falls back asleep per family. This was not his baseline mental status prior to 3-4 months ago.  Pt has Hx of infection after R shoulder arthroscopy with lethargy similar to his current state. Per family, Pt denied any CP, lightheadedness, dizziness, significant weight gain. Per family, Pt denied any SOB today.     Current Precautions:  Aspiration    Allergies:  No known food allergies    Past medical history:  Please see H&P for details    Special Studies:  CXR 6/24:    Interstitial edema with possible small bilateral pleural effusions.     CT chest 6/21: Sigmoid diverticulitis with no fluid collection or abscess. Consider colonoscopy when the acute illness subsides     Trace left effusion     No acute  airspace consolidation.  Dilated ascending artery measuring 4.4 cm, annual surveillance generally suggested  Cholelithiasis  No intra-abdominal hemorrhage    CXR 6/21: Mild pulmonary venous congestion.       Social/Education/Vocational Hx:  Pt lives in SNF/ECF    Swallow Information   Current Risks for Dysphagia & Aspiration: known history of dysphagia and AMS  Current Symptoms/Concerns: change in respiratory status  Current Diet: NPO   Baseline Diet: regular diet and thin liquids      Baseline Assessment   Behavior/Cognition: alert  Speech/Language Status: able to participate in basic conversation and able to follow commands  Patient Positioning: upright in bed  Pain Status/Interventions/Response to Interventions:  No report of or nonverbal indications of pain.       Swallow Mechanism Exam  Facial: symmetrical  Labial: WFL  Lingual: WFL  Velum: symmetrical  Mandible: adequate ROM  Dentition: adequate  Vocal quality:clear/adequate   Volitional Cough: strong/productive   Respiratory Status: on 4L O2 NC       Consistencies Assessed and Performance   Consistencies Administered: thin liquids, puree, soft solids, and hard solids    Oral Stage: minimal  Mastication was adequate with the materials administered today, min prolonged but functional.  Bolus formation and transfer were functional with no significant oral residue noted.  No overt s/s reduced oral control.    Pharyngeal Stage: mild  Swallow Mechanics:  Swallowing initiation appeared fairly prompt.  Laryngeal rise was palpated and judged to be within functional limits. Cough x1 on initial sip of thin, ?2/2 drynesss/eagerness for liquid w/ subsequent fast rate. No coughing, throat clearing, change in vocal quality or respiratory status noted w/ subsequent 8 oz liquids or solid material.    Esophageal Concerns: none reported    Strategies and Efficacy: -    Summary and Recommendations (see above)    Results Reviewed with: patient, RN, PA, and family     Treatment  "Recommended: Yes     Frequency of treatment: As able/appropriate     Patient Stated Goal: \"Something to drink\"     Dysphagia LTG  -Patient will demonstrate safe and effective oral intake (without overt s/s significant oral/pharyngeal dysphagia including s/s penetration or aspiration) for the highest appropriate diet level.       Re: Compensatory Strategies    -Patient will utilize trained compensatory strategies (eg.  controlled bite/sip size, controlled rate, ”prep set”, neck flexion, multiple swallows, alternation of food with liquid,head turn etc.) with 80% accuracy to eliminate overt s/s penetration/aspiration with the least restrictive food/liquid consistencies        Speech Therapy Prognosis   Prognosis: fair    Prognosis Considerations: age, medical status, and therapeutic potential       "

## 2024-06-26 NOTE — PROCEDURES
Venous Access Line Insertion    Date/Time: 6/25/2024 9:23 PM    Performed by: Stephanie Rosa PA-C  Authorized by: Stephanie Rosa PA-C    Patient location:  Bedside  Consent:     Consent obtained:  Emergent situation  Pre-procedure details:     Hand hygiene: Hand hygiene performed prior to insertion      Sterile barrier technique: All elements of maximal sterile technique followed      Skin preparation:  Betadine    Skin preparation agent: Skin preparation agent completely dried prior to procedure    Procedure details:     Complex Venous Access Line Type: US Guided Peripheral IV      Peripheral IV Indications: other disorder of the circulatory system      Orientation:  Left    Location:  Antecubital    Catheter size:  20 gauge    Patient evaluated for contraindications to access (i.e. fistula, thrombosis, etc): Yes      Sterile ultrasound techniques: Sterile gel and sterile probe covers were used      Number of attempts:  2    Successful placement: yes    Anesthesia (see MAR for exact dosages):     Anesthesia method:  None  Post-procedure details:     Post-procedure:  Dressing applied

## 2024-06-26 NOTE — OCCUPATIONAL THERAPY NOTE
Occupational Therapy Cx Note     Patient Name: Matthew Krause  Today's Date: 6/26/2024  Problem List  Principal Problem:    Adrenal insufficiency due to corticosteroid withdrawal (HCC)  Active Problems:    RA (rheumatoid arthritis) (HCC)    HTN (hypertension)    Chronic systolic heart failure (HCC)    Paroxysmal A-fib (HCC)    Focal seizure with retained awareness    Hypothyroidism    Stage 2 chronic kidney disease    Abnormal CT scan    Goals of care, counseling/discussion            06/26/24 1447   OT Last Visit   OT Visit Date 06/26/24   Note Type   Note type Cancelled Session   Cancel Reasons Medical status   Additional Comments Pt with BP of 80/49 & MAP of 59. Not appropriate for therapy intervention at this time. Will hold & follow as able & appropriate       Sheila Nettles OTR/L

## 2024-06-26 NOTE — PROGRESS NOTES
[facility]  Progress Note  Name: Matthew Krause I  MRN: [id number]  Unit/Bed#: -01 I Date of Admission: 6/21/2024   Date of Service: 6/26/2024 I Hospital Day: 5    Assessment & Plan   Goals of care, counseling/discussion  Assessment & Plan  6/26: Had extensive goals of care discussion with the patient and his family at bedside.  At this point given the patient's end-stage heart failure, adrenal insufficiency and overall poor prognosis they are highly considering hospice.  They are still deciding as a family at this point and have not made formal decision regarding fully transitioning to hospice.  I do not think the patient would tolerate any rehab given his EF of 20 to 25% and hypotension.  They have decided to make the patient a DNR and DNI which we will continue  Discussed that at any point we can forego disease directed care and place the patient on comfort based medications only with morphine, Ativan.  At this point continue full medical care with IV Cortef, IV Lasix, etc..  We will await the family's decision this week regarding transitioning to comfort care/hospice versus continued aggressive medical therapy. If transition to hospice is made I would hesitate continuing oral Lasix upon discharge given his hypotension and lack of corticosteroid on discharge.  They are in agreement that we would discontinue his levothyroxine, Eliquis, as well as all other medications that are not focused on comfort. For example we would continue his Keppra and gabapentin.  If patient is comfort care then we will look toward safe discharge planning including hospice at SNF if patient is medically stable for transport.  If he is not medically stable for transport or not able to safely take oral medications reliably we will have the hospice liaison reevaluate to consider IP hospice.    * Adrenal insufficiency due to corticosteroid withdrawal (HCC)  Assessment & Plan  Recently admitted for  dehydration in the setting of diarrhea secondary to diverticulitis (6/15- 6/19). Readmitted for lethargy, hypotension, hypothermia.   Not meeting SIRS criteria, lactic negative, Pro-Larry negative  6/21 CT CAP: Sigmoid diverticulitis with no fluid collection or abscess. Consider colonoscopy when the acute illness subsides. Trace left effusion. No acute airspace consolidation. Dilated ascending artery measuring 4.4 cm, annual surveillance generally suggested. Cholelithiasis. No intra-abdominal hemorrhage  UA without infection  BC x 2 neg to date   Doubt hypotension is secondary to infection given lack of infectious markers  Briefly required vasopressors, now off  S/P IVF, discontinued due to resp status   Notably patient has been maintained on prednisone 5 mg daily x 40 years for RA, recently weaned off 3 weeks ago  S/P IV hydrocortisone in ICU  AM cortisol high >30 however this was AFTER IV hydrocortisone administration  Repeat AM cortisol is low at 3.6 (3 days since last steroid)  6/25 --> adrenal insufficiency confirmed with cosyntropin stim  Continue IV Cortef    Abnormal CT scan  Assessment & Plan  6/21 CT CAP: Dilated ascending artery measuring 4.4 cm, annual surveillance generally suggested  Incidental finding note completed    Stage 2 chronic kidney disease  Assessment & Plan  Lab Results   Component Value Date    EGFR 72 06/26/2024    EGFR 71 06/25/2024    EGFR 70 06/25/2024    CREATININE 0.93 06/26/2024    CREATININE 0.94 06/25/2024    CREATININE 0.95 06/25/2024     Baseline Creatinine 0.9-1.0  Cr at baseline  Strict I/O  Trend renal indices    Hypothyroidism  Assessment & Plan  Continue home Synthroid 50 mcg daily  TSH high due to infection, free T4 within normal limits    Focal seizure with retained awareness  Assessment & Plan  Continue home Keppra 500 mg BID     Paroxysmal A-fib (HCC)  Assessment & Plan  AC: Eliquis 5 Mg twice daily  RC: Metoprolol succinate 25 Mg twice daily  Hold home metoprolol 2/2  hypotension  Eliquis changed to heparin gtt due to outpatient procedure on his back scheduled for 6/26.  Family canceled outpatient appt --> Resumed home eliquis 6/24    Chronic systolic heart failure (HCC)  Assessment & Plan  Wt Readings from Last 3 Encounters:   06/26/24 98.8 kg (217 lb 13 oz)   06/19/24 99.6 kg (219 lb 9.3 oz)   04/12/24 95 kg (209 lb 7 oz)       Home diuretic: Torsemide 20 Mg MWF and 10 Mg T, Th, Sat  Last echo 8/2023: LVEF 45%.  G1 DD.  Home torsemide held due to hypotension   S/P IVF given for hypotension with worsening resp status - hold further fluids   S/P IV lasix x 2 doses   CXR with pulmonary edema   Updated echo: EF has dropped from 45% to 25%. Mitral regurgitation is now severe.   Cardiology consulted   Continue IV lasix 40 mg daily for current iatrogenic volume overload   Unfortunately GDMT is limited with patient's soft BP  Ultimately deliberating on hospice at this time  Change to DNR    HTN (hypertension)  Assessment & Plan  Hold home metoprolol 2/2 hypotension     RA (rheumatoid arthritis) (HCC)  Assessment & Plan  Home regimen: Hydroxychloroquine milligrams twice daily  Recently stopped daily prednisone         Mobility:   Basic Mobility Inpatient Raw Score: 8  JH-HLM Goal: 3: Sit at edge of bed  JH-HLM Achieved: 1: Laying in bed  JH-HLM Goal NOT achieved. Continue with multidisciplinary rounding and encourage appropriate mobility to improve upon JH-HLM goals.    VTE Pharmacologic Prophylaxis: VTE Score: 5 High Risk (Score >/= 5) - Pharmacological DVT Prophylaxis Ordered: apixaban (Eliquis). Sequential Compression Devices Ordered.    Education and Discussions with Family / Patient: Updated  (family) at bedside.    Time Spent for Care: 65 minutes. More than 50% of total time spent on counseling and coordination of care as described above.    Current Length of Stay: 5 day(s)  Current Patient Status: Inpatient   Certification Statement: The patient will continue to  require additional inpatient hospital stay due to hypotension requiring IV Cortef for adrenal insufficiency  Discharge Plan: Anticipate discharge in 48-72 hrs to pending GOC discussions    Code Status: Level 3 - DNAR and DNI    Subjective:   Patient waxing and waning mental status. Looks better to family today.     Objective:     Vitals:   Temp (24hrs), Av.7 °F (36.5 °C), Min:97.7 °F (36.5 °C), Max:97.7 °F (36.5 °C)    Temp:  [97.7 °F (36.5 °C)] 97.7 °F (36.5 °C)  HR:  [] 109  Resp:  [18] 18  BP: ()/(44-66) 80/49  SpO2:  [81 %-96 %] 87 %  Body mass index is 33.12 kg/m².     Input and Output Summary (last 24 hours):     Intake/Output Summary (Last 24 hours) at 2024 1402  Last data filed at 2024 1344  Gross per 24 hour   Intake 360 ml   Output 1650 ml   Net -1290 ml       Physical Exam:   Physical Exam  Vitals and nursing note reviewed.   Constitutional:       General: He is not in acute distress.     Appearance: He is well-developed. He is ill-appearing.      Comments: Patient is responsive however lethargic, falls asleep easily   HENT:      Head: Normocephalic and atraumatic.   Eyes:      General:         Right eye: No discharge.         Left eye: No discharge.      Extraocular Movements: Extraocular movements intact.      Conjunctiva/sclera: Conjunctivae normal.   Cardiovascular:      Rate and Rhythm: Normal rate and regular rhythm.      Heart sounds: No murmur heard.  Pulmonary:      Effort: Pulmonary effort is normal. No respiratory distress.      Breath sounds: Rales present. No wheezing or rhonchi.      Comments: 94% on 2L  Abdominal:      General: Bowel sounds are normal. There is no distension.      Palpations: Abdomen is soft.      Tenderness: There is no abdominal tenderness.   Musculoskeletal:      Cervical back: Neck supple.      Right lower leg: Edema present.      Left lower leg: Edema present.   Skin:     General: Skin is warm and dry.      Capillary Refill: Capillary refill  takes less than 2 seconds.   Neurological:      General: No focal deficit present.      Mental Status: He is oriented to person, place, and time. Mental status is at baseline.      Cranial Nerves: No cranial nerve deficit.   Psychiatric:         Mood and Affect: Mood normal.         Behavior: Behavior normal.          Additional Data:     Labs:  Results from last 7 days   Lab Units 06/26/24  0359   WBC Thousand/uL 4.31   HEMOGLOBIN g/dL 10.0*   HEMATOCRIT % 33.8*   PLATELETS Thousands/uL 144*   SEGS PCT % 79*   LYMPHO PCT % 13*   MONO PCT % 7   EOS PCT % 0     Results from last 7 days   Lab Units 06/26/24  0359   SODIUM mmol/L 137   POTASSIUM mmol/L 4.6   CHLORIDE mmol/L 101   CO2 mmol/L 30   BUN mg/dL 15   CREATININE mg/dL 0.93   ANION GAP mmol/L 6   CALCIUM mg/dL 9.2   ALBUMIN g/dL 3.4*   TOTAL BILIRUBIN mg/dL 0.36   ALK PHOS U/L 53   ALT U/L 3*   AST U/L 15   GLUCOSE RANDOM mg/dL 121     Results from last 7 days   Lab Units 06/22/24  1021   INR  2.10*     Results from last 7 days   Lab Units 06/25/24 2027 06/21/24  0908   POC GLUCOSE mg/dl 163* 91         Results from last 7 days   Lab Units 06/25/24  2037 06/25/24  0121 06/21/24  0838   LACTIC ACID mmol/L 0.9  --  1.4   PROCALCITONIN ng/ml 0.26* 0.24 0.24       Imaging: No pertinent imaging reviewed.    Recent Cultures (last 7 days):   Results from last 7 days   Lab Units 06/21/24  0838   BLOOD CULTURE  No Growth After 4 Days.  No Growth After 4 Days.       Last 24 Hours Medication List:   Current Facility-Administered Medications   Medication Dose Route Frequency Provider Last Rate    acetaminophen  650 mg Oral Q6H PRN Cassandra Figueroa MD      allopurinol  200 mg Oral Daily Cassandra Figueroa MD      apixaban  5 mg Oral BID Carmela Varner PA-C      chlorhexidine  15 mL Mouth/Throat Q12H [facility] Cassandra Figueroa MD      Diclofenac Sodium  2 g Topical 4x Daily Cassandra Figueroa MD      diphenhydrAMINE-zinc acetate   Topical TID PRN Carmela  Linda Varner PA-C      doxycycline hyclate  100 mg Oral Q12H [facility] Cassandra Figueroa MD      furosemide  40 mg Intravenous Daily Casimiro Diallo PA-C      gabapentin  100 mg Oral TID Cassandra Figueroa MD      hydrocortisone sodium succinate  25 mg Intravenous Q8H [facility] Carmela Varner PA-C      hydrocortisone sodium succinate  50 mg Intravenous Q8H [facility] Carmela Varner PA-C      hydroxychloroquine  200 mg Oral BID With Meals Cassandra Figueroa MD      levETIRAcetam  500 mg Oral Q12H [facility] Cassandra Figueroa MD      levothyroxine  50 mcg Oral Daily Cassandra Figueroa MD      magnesium Oxide  800 mg Oral BID Carmela Varner PA-C      melatonin  1.5 mg Oral HS Jose Schulz PA-C      ondansetron  4 mg Intravenous Q6H PRN Cassandra Figueroa MD      pantoprazole  20 mg Oral Early Morning Cassandra Figueroa MD          Today, Patient Was Seen By: Jose Schulz PA-C    **Please Note: This note may have been constructed using a voice recognition system.**

## 2024-06-26 NOTE — ASSESSMENT & PLAN NOTE
Wt Readings from Last 3 Encounters:   06/26/24 98.8 kg (217 lb 13 oz)   06/19/24 99.6 kg (219 lb 9.3 oz)   04/12/24 95 kg (209 lb 7 oz)       Home diuretic: Torsemide 20 Mg MWF and 10 Mg T, Th, Sat  Last echo 8/2023: LVEF 45%.  G1 DD.  Home torsemide held due to hypotension   S/P IVF given for hypotension with worsening resp status - hold further fluids   S/P IV lasix x 2 doses   CXR with pulmonary edema   Updated echo: EF has dropped from 45% to 25%. Mitral regurgitation is now severe.   Cardiology consulted   Continue IV lasix 40 mg daily for current iatrogenic volume overload   Unfortunately GDMT is limited with patient's soft BP  Patient is now on comfort measures only, no further workup

## 2024-06-26 NOTE — PLAN OF CARE
Problem: PAIN - ADULT  Goal: Verbalizes/displays adequate comfort level or baseline comfort level  Description: Interventions:  - Encourage patient to monitor pain and request assistance  - Assess pain using appropriate pain scale  - Administer analgesics based on type and severity of pain and evaluate response  - Implement non-pharmacological measures as appropriate and evaluate response  - Consider cultural and social influences on pain and pain management  - Notify physician/advanced practitioner if interventions unsuccessful or patient reports new pain  Outcome: Progressing     Problem: INFECTION - ADULT  Goal: Absence or prevention of progression during hospitalization  Description: INTERVENTIONS:  - Assess and monitor for signs and symptoms of infection  - Monitor lab/diagnostic results  - Monitor all insertion sites, i.e. indwelling lines, tubes, and drains  - Monitor endotracheal if appropriate and nasal secretions for changes in amount and color  - [place] appropriate cooling/warming therapies per order  - Administer medications as ordered  - Instruct and encourage patient and family to use good hand hygiene technique  - Identify and instruct in appropriate isolation precautions for identified infection/condition  Outcome: Progressing  Goal: Absence of fever/infection during neutropenic period  Description: INTERVENTIONS:  - Monitor WBC    Outcome: Progressing     Problem: SAFETY ADULT  Goal: Patient will remain free of falls  Description: INTERVENTIONS:  - Educate patient/family on patient safety including physical limitations  - Instruct patient to call for assistance with activity   - Consult OT/PT to assist with strengthening/mobility   - Keep Call bell within reach  - Keep bed low and locked with side rails adjusted as appropriate  - Keep care items and personal belongings within reach  - Initiate and maintain comfort rounds  - Make Fall Risk Sign visible to staff  - Offer Toileting every 2 Hours,  in advance of need  - Initiate/Maintain bed alarm  - Obtain necessary fall risk management equipment: non slip socks  - Apply yellow socks and bracelet for high fall risk patients  - Consider moving patient to room near nurses station  Outcome: Progressing  Goal: Maintain or return to baseline ADL function  Description: INTERVENTIONS:  -  Assess patient's ability to carry out ADLs; assess patient's baseline for ADL function and identify physical deficits which impact ability to perform ADLs (bathing, care of mouth/teeth, toileting, grooming, dressing, etc.)  - Assess/evaluate cause of self-care deficits   - Assess range of motion  - Assess patient's mobility; develop plan if impaired  - Assess patient's need for assistive devices and provide as appropriate  - Encourage maximum independence but intervene and supervise when necessary  - Involve family in performance of ADLs  - Assess for home care needs following discharge   - Consider OT consult to assist with ADL evaluation and planning for discharge  - Provide patient education as appropriate  Outcome: Progressing  Goal: Maintains/Returns to pre admission functional level  Description: INTERVENTIONS:  - Perform AM-PAC 6 Click Basic Mobility/ Daily Activity assessment daily.  - Set and communicate daily mobility goal to care team and patient/family/caregiver.   - Collaborate with rehabilitation services on mobility goals if consulted  - Perform Range of Motion 5 times a day.  - Reposition patient every 2 hours.  - Dangle patient 3 times a day  - Stand patient 3 times a day  - Ambulate patient 3 times a day  - Out of bed to chair 3 times a day   - Out of bed for meals 3 times a day  - Out of bed for toileting  - Record patient progress and toleration of activity level   Outcome: Progressing     Problem: DISCHARGE PLANNING  Goal: Discharge to home or other facility with appropriate resources  Description: INTERVENTIONS:  - Identify barriers to discharge w/patient and  caregiver  - Arrange for needed discharge resources and transportation as appropriate  - Identify discharge learning needs (meds, wound care, etc.)  - Arrange for interpretive services to assist at discharge as needed  - Refer to Case Management Department for coordinating discharge planning if the patient needs post-hospital services based on physician/advanced practitioner order or complex needs related to functional status, cognitive ability, or social support system  Outcome: Progressing     Problem: Knowledge Deficit  Goal: Patient/family/caregiver demonstrates understanding of disease process, treatment plan, medications, and discharge instructions  Description: Complete learning assessment and assess knowledge base.  Interventions:  - Provide teaching at level of understanding  - Provide teaching via preferred learning methods  Outcome: Progressing     Problem: Prexisting or High Potential for Compromised Skin Integrity  Goal: Skin integrity is maintained or improved  Description: INTERVENTIONS:  - Identify patients at risk for skin breakdown  - Assess and monitor skin integrity  - Assess and monitor nutrition and hydration status  - Monitor labs   - Assess for incontinence   - Turn and reposition patient  - Assist with mobility/ambulation  - Relieve pressure over bony prominences  - Avoid friction and shearing  - Provide appropriate hygiene as needed including keeping skin clean and dry  - Evaluate need for skin moisturizer/barrier cream  - Collaborate with interdisciplinary team   - Patient/family teaching  - Consider wound care consult   Outcome: Progressing

## 2024-06-26 NOTE — ASSESSMENT & PLAN NOTE
Continue home Synthroid 50 mcg daily  TSH high due to infection, free T4 within normal limits   oriented to person, place and time , normal sensation , short and long term memory intact , oriented to person, place and time , normal sensation , short and long term memory intact

## 2024-06-26 NOTE — CASE MANAGEMENT
Case Management Discharge Planning Note    Patient name Matthew Krause  Location /-01 MRN [id number]  : 1934 Date 2024       Current Admission Date: 2024  Current Admission Diagnosis:Adrenal insufficiency due to corticosteroid withdrawal (HCC)   Patient Active Problem List    Diagnosis Date Noted Date Diagnosed    Goals of care, counseling/discussion 2024     Abnormal CT scan 2024     Using prophylactic antibiotic on daily basis 06/15/2024     Stroke-like symptom 2023     Electrolyte abnormality 2023     Difficulty with speech 2023     Stage 2 chronic kidney disease 2023     Ureterolithiasis 2023     Aneurysm of ascending aorta without rupture (HCC) 2023     Urinary retention 2023     Bacteremia due to group B Streptococcus 2023     Cellulitis of left upper extremity 2023     HLD (hyperlipidemia) 2023     GERD (gastroesophageal reflux disease) 2023     Gout without acute exacerbation  2023     Spinal stenosis 2023     Hypothyroidism 2023     Peripheral vascular disease (HCC) 2023     Open wound of right great toe with damage to nail 2023     Adverse effect of loop (high-ceiling) diuretics, subsequent encounter 2023     Toxic metabolic encephalopathy 2023     Septic arthritis of shoulder, right (HCC) 2023     Suture of skin wound 2023     Hypomagnesemia 2023     Elevated TSH 2023     Chronic systolic heart failure (HCC) 2023     Paroxysmal A-fib (HCC) 2023     Focal seizure with retained awareness 2023     Dilated cardiomyopathy (HCC) 10/18/2022     HTN (hypertension) 10/18/2022     Low left ventricular ejection fraction 10/11/2022     History of TIA (transient ischemic attack) 10/10/2022     Speech disturbance 10/08/2022     Accidental overdose 2020     History of hypertension 2020     Adrenal insufficiency due  to corticosteroid withdrawal (HCC) 08/28/2020     RA (rheumatoid arthritis) ([facility]) 08/28/2020       LOS (days): 5  Geometric Mean LOS (GMLOS) (days): 3.6  Days to GMLOS:-1.5     OBJECTIVE:  Risk of Unplanned Readmission Score: 27.15         Current admission status: Inpatient   Preferred Pharmacy:   [facility] - [place], [place] - [address]  [address] [address]  Phone: [phone] Fax: [phone]    Primary Care Provider: ESTEFANIA Pimentel    Primary Insurance: [employer]  Secondary Insurance:     DISCHARGE DETAILS:  Additional Comments: CM met with pt and pt's family and had extensive conversation about hospice and the cost of hospice at a SNF vs home hospice. CM reviewed that pt does not qualify for IPU hospice at this time. CM reviewed that the hospice benefit is covered through insurance, but room and board is an out of pocket cost. Pt also reviewed home hospice as an option and family was concerned about cost of hiring a private pay caregiver for pt if he goes home. Family unsure at this time what they want to do and wanted more time to discuss it as a family. Pt's family provided pt with list of facilities they would like CM to send referrals to for possible hospice. CM sent referrals in [place]. CM informed by provider that pt's family will have a decision as to what direction they would like pt's treatment to go by tomorrow. CM department to continue to follow to assist with discharge planning needs.

## 2024-06-26 NOTE — QUICK NOTE
Patient with worsening lethargy this evening, does open eyes and rouse to verbal cues/touch. BP 92/44. Sats remain stable on 2L. BG 160s. Repeat labs pending. Cosyntropin stim test performed earlier concerning for adrenal insufficiency, continue on IV solucortef. Discussed with family over the phone. At this time family is agreeable to transition from Level 1 full code to Level 3 DNR/DNI. Family aware of patient's cardiac prognosis as discussed at length with cardiology earlier today however not yet ready to transition to comfort care, prefer to continue blood pressure support, vitals, labs etc with hopes for family to be able to see patient/discuss with hospice tomorrow morning. Will trial IV albumin for BP support, discussed with ICU. Follow up pending lab work. Should patient decline clinically, family would like phone call to discuss possible comfort care. Continue to monitor patient closely and recheck BP following albumin.

## 2024-06-26 NOTE — ASSESSMENT & PLAN NOTE
Recently diagnosed with diverticulitis and had been started on Augmentin outpatient.  Subsequently developed severe diarrhea and was admitted for dehydration.  Antibiotics at that time transition to ceftriaxone and Flagyl.  Discharged on 6/19 on cefuroxime and Flagyl p.o.  Presented with hypotension and diarrhea, however Pro-Larry negative, lactic negative, not meeting SIRS criteria  Doubt hypotension is secondary to sepsis  Abx completed

## 2024-06-26 NOTE — ASSESSMENT & PLAN NOTE
Wt Readings from Last 3 Encounters:   06/26/24 98.8 kg (217 lb 13 oz)   06/19/24 99.6 kg (219 lb 9.3 oz)   04/12/24 95 kg (209 lb 7 oz)       Home diuretic: Torsemide 20 Mg MWF and 10 Mg T, Th, Sat  Last echo 8/2023: LVEF 45%.  G1 DD.  Home torsemide held due to hypotension   S/P IVF given for hypotension with worsening resp status - hold further fluids   S/P IV lasix x 2 doses   CXR with pulmonary edema   Updated echo: EF has dropped from 45% to 25%. Mitral regurgitation is now severe.   Cardiology consulted   Continue IV lasix 40 mg daily for current iatrogenic volume overload   Unfortunately GDMT is limited with patient's soft BP  Ultimately deliberating on hospice at this time  Change to DNR

## 2024-06-26 NOTE — ASSESSMENT & PLAN NOTE
Recently admitted for dehydration in the setting of diarrhea secondary to diverticulitis (6/15- 6/19). Readmitted for lethargy, hypotension, hypothermia.   Not meeting SIRS criteria, lactic negative, Pro-Larry negative  6/21 CT CAP: Sigmoid diverticulitis with no fluid collection or abscess. Consider colonoscopy when the acute illness subsides. Trace left effusion. No acute airspace consolidation. Dilated ascending artery measuring 4.4 cm, annual surveillance generally suggested. Cholelithiasis. No intra-abdominal hemorrhage  UA without infection  BC x 2 neg to date   Doubt hypotension is secondary to infection given lack of infectious markers  Briefly required vasopressors, now off  S/P IVF, discontinued due to resp status   Notably patient has been maintained on prednisone 5 mg daily x 40 years for RA, recently weaned off 3 weeks ago  S/P IV hydrocortisone in ICU  AM cortisol high >30 however this was AFTER IV hydrocortisone administration  Repeat AM cortisol is low at 3.6 (3 days since last steroid)  6/25 --> adrenal insufficiency confirmed with cosyntropin stim

## 2024-06-26 NOTE — PHYSICAL THERAPY NOTE
PHYSICAL THERAPY CANCELLATION NOTE      Patient Name: Matthew Krause  Today's Date: 6/26/2024 06/26/24 1446   PT Last Visit   PT Visit Date 06/26/24   Note Type   Note type Cancelled Session   Cancel Reasons Medical status   Additional Comments Pt currently on PT caseload, has supine BP of 80/49 w/ MAP 59. Will hold on PT intervention       Abigail Gomez, PT, DPT

## 2024-06-27 PROBLEM — Z51.5 COMFORT MEASURES ONLY STATUS: Status: ACTIVE | Noted: 2024-06-27

## 2024-06-27 NOTE — CASE COMMUNICATION
For RLOC Matthew Krause is a 89 y.o. male with past medical history significant for recent diverticulitis, heart failure with reduced EF, moderate mitral valve regurgitation, A-fib on Eliquis, hypertension, hyperlipidemia, seizure, RA, hypothyroidism who presented to the emergency department with hypotension.  Patient was recently admitted from 6/15-6/19 due to dehydration in the setting of diverticulitis with frequent bouts of diarrhe a. Came into the ED again for the same. Was hypotensive in the EDb riefly required Levophed which was quickly weaned off.  CT chest abdomen pelvis revealed ongoing sigmoid diverticulitis but no other acute infection.  EF  now 25% was on home toresmide. No overnight events reported. Patient is lethargic and hypotensive but does appear comfortable at this time. PPS 20 Approve RLOC?    Approved for RLOC by Dr Bakari Rod 6.27.24  Dx CHF

## 2024-06-27 NOTE — ASSESSMENT & PLAN NOTE
After long discussion the past 2 days with the patient and his family, we are transitioning the patient to comfort measures only today per his his request.  He is lethargic, intermittently hypotensive.  Will reassess later today and tomorrow morning for stability for transport to IPU versus continued inpatient comfort measures only  Hospice liaison consulted  Analgesia, anxiolytics ordered for the patient

## 2024-06-27 NOTE — CASE MANAGEMENT
Case Management Discharge Planning Note    Patient name Matthew Krause  Location /-01 MRN [id number]  : 1934 Date 2024       Current Admission Date: 2024  Current Admission Diagnosis:Comfort measures only status   Patient Active Problem List    Diagnosis Date Noted Date Diagnosed    Comfort measures only status 2024     Goals of care, counseling/discussion 2024     Abnormal CT scan 2024     Using prophylactic antibiotic on daily basis 06/15/2024     Stroke-like symptom 2023     Electrolyte abnormality 2023     Difficulty with speech 2023     Stage 2 chronic kidney disease 2023     Ureterolithiasis 2023     Aneurysm of ascending aorta without rupture (HCC) 2023     Urinary retention 2023     Bacteremia due to group B Streptococcus 2023     Cellulitis of left upper extremity 2023     HLD (hyperlipidemia) 2023     GERD (gastroesophageal reflux disease) 2023     Gout without acute exacerbation  2023     Spinal stenosis 2023     Hypothyroidism 2023     Peripheral vascular disease (HCC) 2023     Open wound of right great toe with damage to nail 2023     Adverse effect of loop (high-ceiling) diuretics, subsequent encounter 2023     Toxic metabolic encephalopathy 2023     Septic arthritis of shoulder, right (HCC) 2023     Suture of skin wound 2023     Hypomagnesemia 2023     Elevated TSH 2023     Chronic systolic heart failure (HCC) 2023     Paroxysmal A-fib (HCC) 2023     Focal seizure with retained awareness 2023     Dilated cardiomyopathy (HCC) 10/18/2022     HTN (hypertension) 10/18/2022     Low left ventricular ejection fraction 10/11/2022     History of TIA (transient ischemic attack) 10/10/2022     Speech disturbance 10/08/2022     Accidental overdose 2020     History of hypertension 2020     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 Pt will remain in the hospital at this time. CM department to remain available.

## 2024-06-27 NOTE — PROGRESS NOTES
Critical access hospital  Progress Note  Name: Matthew Krause I  MRN: [id number]  Unit/Bed#: -01 I Date of Admission: 6/21/2024   Date of Service: 6/27/2024 I Hospital Day: 6    Assessment & Plan   * Comfort measures only status  Assessment & Plan  After long discussion the past 2 days with the patient and his family, we are transitioning the patient to comfort measures only today per his his request.  He is lethargic, intermittently hypotensive.  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 Readmitted for lethargy, hypotension, hypothermia.   Not meeting SIRS criteria, lactic negative, Pro-Larry negative  6/21 CT CAP: Sigmoid diverticulitis with no fluid collection or abscess. Consider colonoscopy when the acute illness subsides. Trace left effusion. No acute airspace consolidation. Dilated ascending artery measuring 4.4 cm, annual surveillance generally suggested. Cholelithiasis. No intra-abdominal hemorrhage  UA without infection  BC x 2 neg to date   Doubt hypotension is secondary to infection given lack of infectious markers  Briefly required vasopressors, now off  S/P IVF, discontinued due to resp status   Notably patient has been maintained on prednisone 5 mg daily x 40 years for RA, recently weaned off 3 weeks ago  S/P IV hydrocortisone in ICU  AM cortisol high >30 however this was AFTER IV hydrocortisone administration  Repeat AM cortisol is low at 3.6 (3 days since last steroid)  6/25 --> adrenal insufficiency confirmed with cosyntropin stim      Focal seizure with retained awareness  Assessment & Plan  Continue home Keppra 500 mg BID        Chronic systolic heart failure (HCC)  Assessment & Plan  Wt Readings from Last 3 Encounters:   06/26/24 98.8 kg (217 lb 13 oz)   06/19/24 99.6 kg (219 lb 9.3 oz)   04/12/24 95 kg (209 lb 7 oz)       Home diuretic: Torsemide 20 Mg MWF and 10 Mg T, Th, Sat  Last echo 8/2023: LVEF 45%.  G1 DD.  Home torsemide held due to hypotension   S/P IVF given for hypotension with worsening resp status - hold further fluids   S/P IV lasix x 2 doses   CXR with pulmonary edema   Updated echo: EF has dropped from 45% to 25%. Mitral regurgitation is now severe.   Cardiology consulted   Continue IV lasix 40 mg daily for current iatrogenic volume overload   Unfortunately GDMT is limited with patient's soft BP  Patient is now on comfort measures only, no further workup           Mobility:   Basic Mobility Inpatient Raw Score: 8  JH-HLM Goal: 3: Sit at edge of bed  JH-HLM Achieved: 1: Laying in bed  JH-HLM Goal NOT achieved. Continue with multidisciplinary rounding and encourage appropriate mobility to improve upon JH-HLM goals.    VTE Pharmacologic Prophylaxis: VTE Score: 5 High Risk (Score >/= 5) - Pharmacological DVT Prophylaxis Contraindicated. Sequential Compression Devices Ordered.    Education and Discussions with Family / Patient: Updated  (son, daughter, daughter in law, and son in law) at bedside.    Time Spent for Care: 45 minutes. More than 50% of total time spent on counseling and coordination of care as described above.    Current Length of Stay: 6 day(s)  Current Patient Status: Inpatient   Certification Statement: The patient will continue to require additional inpatient hospital stay due to comfort measures only on hospice  Discharge Plan: Anticipate discharge in 24-48 hrs to hospice    Code Status: Level 4 - Comfort Care    Subjective:   No overnight events reported.  Patient is lethargic and hypotensive but does appear comfortable at this time.  He voiced his request earlier to his family that he  wants to be comfortable and does not want any more disease directed therapy.  At this time he has been transition to comfort measures only with his family at his bedside.    Objective:     Vitals:   Temp (24hrs), Av.8 °F (36.6 °C), Min:97.3 °F (36.3 °C), Max:98.6 °F (37 °C)    Temp:  [97.3 °F (36.3 °C)-98.6 °F (37 °C)] 97.3 °F (36.3 °C)  HR:  [] 99  Resp:  [18] 18  BP: ()/(47-84) 128/84  SpO2:  [84 %-96 %] 94 %  Body mass index is 33.32 kg/m².     Input and Output Summary (last 24 hours):     Intake/Output Summary (Last 24 hours) at 2024 1317  Last data filed at 2024 1013  Gross per 24 hour   Intake 660 ml   Output 125 ml   Net 535 ml       Physical Exam:   Physical Exam  Vitals and nursing note reviewed.   Constitutional:       General: He is not in acute distress.     Appearance: Normal appearance. He is ill-appearing.   HENT:      Head: Normocephalic.      Mouth/Throat:      Mouth: Mucous membranes are moist.   Eyes:      Pupils: Pupils are equal, round, and reactive to light.   Cardiovascular:      Rate and Rhythm: Normal rate and regular rhythm.      Heart sounds: Murmur heard.   Pulmonary:      Effort: Pulmonary effort is normal. No respiratory distress.      Breath sounds: Normal breath sounds. No wheezing, rhonchi or rales.   Abdominal:      General: Bowel sounds are normal. There is no distension.      Palpations: Abdomen is soft.      Tenderness: There is no abdominal tenderness. There is no guarding.   Musculoskeletal:         General: No deformity.      Cervical back: Normal range of motion.      Right lower leg: No edema.      Left lower leg: No edema.   Skin:     Capillary Refill: Capillary refill takes less than 2 seconds.   Neurological:      General: No focal deficit present.      Mental Status: He is lethargic.          Additional Data:     Labs:  Results from last 7 days   Lab Units 24  0359   WBC Thousand/uL 4.31   HEMOGLOBIN g/dL 10.0*   HEMATOCRIT % 33.8*    PLATELETS Thousands/uL 144*   SEGS PCT % 79*   LYMPHO PCT % 13*   MONO PCT % 7   EOS PCT % 0     Results from last 7 days   Lab Units 06/26/24  0359   SODIUM mmol/L 137   POTASSIUM mmol/L 4.6   CHLORIDE mmol/L 101   CO2 mmol/L 30   BUN mg/dL 15   CREATININE mg/dL 0.93   ANION GAP mmol/L 6   CALCIUM mg/dL 9.2   ALBUMIN g/dL 3.4*   TOTAL BILIRUBIN mg/dL 0.36   ALK PHOS U/L 53   ALT U/L 3*   AST U/L 15   GLUCOSE RANDOM mg/dL 121     Results from last 7 days   Lab Units 06/22/24  1021   INR  2.10*     Results from last 7 days   Lab Units 06/25/24 2027 06/21/24  0908   POC GLUCOSE mg/dl 163* 91         Results from last 7 days   Lab Units 06/25/24 2037 06/25/24  0121 06/21/24  0838   LACTIC ACID mmol/L 0.9  --  1.4   PROCALCITONIN ng/ml 0.26* 0.24 0.24       Imaging: No pertinent imaging reviewed.    Recent Cultures (last 7 days):   Results from last 7 days   Lab Units 06/21/24  0838   BLOOD CULTURE  No Growth After 5 Days.  No Growth After 5 Days.       Last 24 Hours Medication List:   Current Facility-Administered Medications   Medication Dose Route Frequency Provider Last Rate    acetaminophen  650 mg Oral Q6H PRN Cassandra Figueroa MD      bisacodyl  10 mg Rectal Daily PRN Jose Schulz PA-C      Diclofenac Sodium  2 g Topical 4x Daily Cassandra Figueroa MD      diphenhydrAMINE-zinc acetate   Topical TID PRN Carmela Varner PA-C      gabapentin  100 mg Oral TID Cassandra Figueroa MD      glycopyrrolate  0.1 mg Intravenous Q4H PRN Jose Schulz PA-C      haloperidol lactate  0.5 mg Intravenous Q2H PRN Jose Schulz PA-C      HYDROmorphone  0.5 mg Intravenous Q2H PRN Jose Schulz PA-C      levETIRAcetam  500 mg Oral Q12H [facility] Cassandra Figueroa MD      LORazepam  1 mg Intravenous Q10 Min PRN Jose Schulz PA-C      melatonin  1.5 mg Oral HS Jose Schulz PA-C      ondansetron  4 mg Intravenous Q6H PRN Cassandra Figueroa MD          Today, Patient Was Seen By: Jose LEBRON  TANNER Schulz    **Please Note: This note may have been constructed using a voice recognition system.**

## 2024-06-27 NOTE — PLAN OF CARE
Problem: PAIN - ADULT  Goal: Verbalizes/displays adequate comfort level or baseline comfort level  Description: Interventions:  - Encourage patient to monitor pain and request assistance  - Assess pain using appropriate pain scale  - Administer analgesics based on type and severity of pain and evaluate response  - Implement non-pharmacological measures as appropriate and evaluate response  - Consider cultural and social influences on pain and pain management  - Notify physician/advanced practitioner if interventions unsuccessful or patient reports new pain  Outcome: Progressing     Problem: INFECTION - ADULT  Goal: Absence or prevention of progression during hospitalization  Description: INTERVENTIONS:  - Assess and monitor for signs and symptoms of infection  - Monitor lab/diagnostic results  - Monitor all insertion sites, i.e. indwelling lines, tubes, and drains  - Monitor endotracheal if appropriate and nasal secretions for changes in amount and color  - [place] appropriate cooling/warming therapies per order  - Administer medications as ordered  - Instruct and encourage patient and family to use good hand hygiene technique  - Identify and instruct in appropriate isolation precautions for identified infection/condition  Outcome: Progressing  Goal: Absence of fever/infection during neutropenic period  Description: INTERVENTIONS:  - Monitor WBC    Outcome: Progressing     Problem: SAFETY ADULT  Goal: Patient will remain free of falls  Description: INTERVENTIONS:  - Educate patient/family on patient safety including physical limitations  - Instruct patient to call for assistance with activity   - Consult OT/PT to assist with strengthening/mobility   - Keep Call bell within reach  - Keep bed low and locked with side rails adjusted as appropriate  - Keep care items and personal belongings within reach  - Initiate and maintain comfort rounds  - Make Fall Risk Sign visible to staff  - Offer Toileting every 2 Hours,  in advance of need  - Initiate/Maintain bed alarm  - Obtain necessary fall risk management equipment: non slip socks  - Apply yellow socks and bracelet for high fall risk patients  - Consider moving patient to room near nurses station  Outcome: Progressing  Goal: Maintain or return to baseline ADL function  Description: INTERVENTIONS:  -  Assess patient's ability to carry out ADLs; assess patient's baseline for ADL function and identify physical deficits which impact ability to perform ADLs (bathing, care of mouth/teeth, toileting, grooming, dressing, etc.)  - Assess/evaluate cause of self-care deficits   - Assess range of motion  - Assess patient's mobility; develop plan if impaired  - Assess patient's need for assistive devices and provide as appropriate  - Encourage maximum independence but intervene and supervise when necessary  - Involve family in performance of ADLs  - Assess for home care needs following discharge   - Consider OT consult to assist with ADL evaluation and planning for discharge  - Provide patient education as appropriate  Outcome: Progressing  Goal: Maintains/Returns to pre admission functional level  Description: INTERVENTIONS:  - Perform AM-PAC 6 Click Basic Mobility/ Daily Activity assessment daily.  - Set and communicate daily mobility goal to care team and patient/family/caregiver.   - Collaborate with rehabilitation services on mobility goals if consulted  - Perform Range of Motion 5 times a day.  - Reposition patient every 2 hours.  - Dangle patient 3 times a day  - Stand patient 3 times a day  - Ambulate patient 3 times a day  - Out of bed to chair 3 times a day   - Out of bed for meals 3 times a day  - Out of bed for toileting  - Record patient progress and toleration of activity level   Outcome: Progressing     Problem: DISCHARGE PLANNING  Goal: Discharge to home or other facility with appropriate resources  Description: INTERVENTIONS:  - Identify barriers to discharge w/patient and  caregiver  - Arrange for needed discharge resources and transportation as appropriate  - Identify discharge learning needs (meds, wound care, etc.)  - Arrange for interpretive services to assist at discharge as needed  - Refer to Case Management Department for coordinating discharge planning if the patient needs post-hospital services based on physician/advanced practitioner order or complex needs related to functional status, cognitive ability, or social support system  Outcome: Progressing     Problem: Knowledge Deficit  Goal: Patient/family/caregiver demonstrates understanding of disease process, treatment plan, medications, and discharge instructions  Description: Complete learning assessment and assess knowledge base.  Interventions:  - Provide teaching at level of understanding  - Provide teaching via preferred learning methods  Outcome: Progressing     Problem: Prexisting or High Potential for Compromised Skin Integrity  Goal: Skin integrity is maintained or improved  Description: INTERVENTIONS:  - Identify patients at risk for skin breakdown  - Assess and monitor skin integrity  - Assess and monitor nutrition and hydration status  - Monitor labs   - Assess for incontinence   - Turn and reposition patient  - Assist with mobility/ambulation  - Relieve pressure over bony prominences  - Avoid friction and shearing  - Provide appropriate hygiene as needed including keeping skin clean and dry  - Evaluate need for skin moisturizer/barrier cream  - Collaborate with interdisciplinary team   - Patient/family teaching  - Consider wound care consult   Outcome: Progressing     Problem: Nutrition/Hydration-ADULT  Goal: Nutrient/Hydration intake appropriate for improving, restoring or maintaining nutritional needs  Description: Monitor and assess patient's nutrition/hydration status for malnutrition. Collaborate with interdisciplinary team and initiate plan and interventions as ordered.  Monitor patient's weight and  dietary intake as ordered or per policy. Utilize nutrition screening tool and intervene as necessary. Determine patient's food preferences and provide high-protein, high-caloric foods as appropriate.     INTERVENTIONS:  - Monitor oral intake, urinary output, labs, and treatment plans  - Assess nutrition and hydration status and recommend course of action  - Evaluate amount of meals eaten  - Assist patient with eating if necessary   - Allow adequate time for meals  - Recommend/ encourage appropriate diets, oral nutritional supplements, and vitamin/mineral supplements  - Order, calculate, and assess calorie counts as needed  - Recommend, monitor, and adjust tube feedings and TPN/PPN based on assessed needs  - Assess need for intravenous fluids  - Provide specific nutrition/hydration education as appropriate  - Include patient/family/caregiver in decisions related to nutrition  Outcome: Progressing

## 2024-06-27 NOTE — ASSESSMENT & PLAN NOTE
Multiple GOC discussions held with patient and family, patient has now been transitioned to comfort care only   Hospice liaison consulted  Not appropriate for home hospice   Not accepted to IPU currently   Continue analgesia, anxiolytics IV lasix PRN

## 2024-06-27 NOTE — CONSULTS
Consultation - Matthew Krause 89 y.o. male MRN: [id number]    Unit/Bed#: -01 Encounter: [id number]      Assessment & Plan     Assessment:  This is a 89 y.o.-year-old male with adrenal insufficiency.    Adrenal insufficiency, likely secondary due to long-term prednisone usage.  Cortrosyn stimulation test performed on 6/25/2024 does confirm adrenal insufficiency.  For now, would continue steroid replacement until decision is made regarding hospice and long-term care.  Will decrease hydrocortisone to 25 mg IV every 8 hours at least for the rest of today and into tomorrow.  Hopefully will be able to decrease dosage further tomorrow unless family chooses to transition to hospice and then steroids could be discontinued or continued.    Plan:  1.  For now, continue hydrocortisone 25 mg IV every 8 hours at least for the next 24 hours and then dosage could be decreased to every 12 hours at that time.    2.  If patient placed on hospice with DNR per family, then could discontinue steroids.    Inpatient consult to Endocrinology  Consult performed by: Nora Hess MD  Consult ordered by: Jose Schulz PA-C          CC: Adrenal consult    History of Present Illness     HPI: Matthew Krause is a 89 y.o. year old male with secondary adrenal insufficiency admitted with hypotension, hypothermia and lethargy.  Endocrine is asked to consult for help with steroid taper.    Patient is reportedly a long-term user of prednisone 5 mg once daily for at least 40 years.  Family reports that his steroids were tapered about 3 weeks ago by the nurse practitioner at the nursing home at the request of the family to try to eliminate excess medications.  He was admitted on 615 through 619 with dehydration in the setting of diarrhea and diverticulitis and then 2 days later admitted with lethargy, hypotension, and hypothermia.  He was on stress dose steroids during his last hospitalization.  Cortrosyn stimulation test was performed on  "this admission with a cortisol level that changed from 7.6-3 0.5-10.6 but that is not an adequate response.  Artie stim test was performed 3 days of steroids from previous hospitalization.  IV hydrocortisone was started.  He was on IV hydrocortisone 50 mg every 8 hours which was transitioned to 25 mg every 8 hours late yesterday.    He is today more lethargic in general and not as awake as yesterday.  Family reports he is talking \"gibberish\".  Systolic blood pressure is a bit lower today between the 80s and 90s.  At this point, IV midodrine was started.    Review of Systems   Constitutional:  Positive for appetite change and fatigue.        Decrease appetite.    Respiratory:  Negative for chest tightness and shortness of breath.    Cardiovascular:  Negative for chest pain and leg swelling.   Gastrointestinal:  Positive for nausea. Negative for abdominal pain, constipation and diarrhea.        Nausea on admission.   Skin:  Negative for rash.   Neurological:  Positive for weakness. Negative for tremors and headaches.   Psychiatric/Behavioral:  Positive for confusion. Negative for sleep disturbance.         More confused today per family.       Historical Information   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HCC)     Spinal stenosis      Past Surgical History:   Procedure Laterality Date    CARDIAC ELECTROPHYSIOLOGY PROCEDURE N/A 12/17/2022    Procedure: Cardiac loop recorder implant;  Surgeon: Negrito Hollingsworth MD;  Location: [facility];  Service: Cardiology    CARPAL TUNNEL RELEASE Bilateral     COLONOSCOPY      LAMINECTOMY      TOTAL KNEE ARTHROPLASTY Bilateral     TOTAL SHOULDER REPLACEMENT       Social History   Social History     Substance and Sexual Activity   Alcohol Use Yes    Alcohol/week: 2.0 standard drinks of alcohol    Types: 2 Shots of liquor per week    Comment: " 1 drink per week     Social History     Substance and Sexual Activity   Drug Use Yes    Types: Marijuana     Social History     Tobacco Use   Smoking Status Former    Current packs/day: 0.25    Average packs/day: 0.3 packs/day for 5.0 years (1.3 ttl pk-yrs)    Types: Cigarettes   Smokeless Tobacco Never     Family History:   Family History   Problem Relation Age of Onset    Seizures Son     Colon polyps Neg Hx     Colon cancer Neg Hx        Meds/Allergies   Current Facility-Administered Medications   Medication Dose Route Frequency Provider Last Rate Last Admin    acetaminophen (TYLENOL) tablet 650 mg  650 mg Oral Q6H PRN Cassandra Figueroa MD        allopurinol (ZYLOPRIM) tablet 200 mg  200 mg Oral Daily Cassandra Figueroa MD   200 mg at 06/27/24 0841    apixaban (ELIQUIS) tablet 5 mg  5 mg Oral BID Carmela Varner PA-C   5 mg at 06/27/24 0840    chlorhexidine (PERIDEX) 0.12 % oral rinse 15 mL  15 mL Mouth/Throat Q12H [facility] Cassandra Figueroa MD   15 mL at 06/27/24 0842    Diclofenac Sodium (VOLTAREN) 1 % topical gel 2 g  2 g Topical 4x Daily Cassandra Figueroa MD   2 g at 06/27/24 0850    diphenhydrAMINE-zinc acetate (BENADRYL) 2-0.1 % cream   Topical TID PRN Carmela Varner PA-C   Given at 06/25/24 1403    doxycycline hyclate (VIBRAMYCIN) capsule 100 mg  100 mg Oral Q12H [facility] Cassandra Figueroa MD   100 mg at 06/27/24 0842    furosemide (LASIX) injection 40 mg  40 mg Intravenous Daily Casimiro Diallo PA-C   40 mg at 06/27/24 0842    gabapentin (NEURONTIN) capsule 100 mg  100 mg Oral TID Cassandra Figueroa MD   100 mg at 06/27/24 0841    hydrocortisone (Solu-CORTEF) injection 25 mg  25 mg Intravenous Q8H [facility] Carmela Varner PA-C   25 mg at 06/27/24 0632    hydrocortisone (Solu-CORTEF) injection 25 mg  25 mg Intravenous Q8H [facility] Nora Hess MD        hydroxychloroquine (PLAQUENIL) tablet 200 mg  200 mg Oral BID With Meals Cassandra Figueroa MD   200 mg at 06/27/24 0884  "   levETIRAcetam (KEPPRA) tablet 500 mg  500 mg Oral Q12H [facility] Cassandra Figueroa MD   500 mg at 06/27/24 0841    levothyroxine tablet 50 mcg  50 mcg Oral Daily Cassandra Figueroa MD   50 mcg at 06/27/24 0842    magnesium Oxide (MAG-OX) tablet 800 mg  800 mg Oral BID Carmela Varner PA-C   800 mg at 06/26/24 1733    melatonin tablet 1.5 mg  1.5 mg Oral HS Jose Schulz PA-C   1.5 mg at 06/26/24 2101    midodrine (PROAMATINE) tablet 5 mg  5 mg Oral TID AC Jose Schulz PA-C   5 mg at 06/27/24 0632    ondansetron (ZOFRAN) injection 4 mg  4 mg Intravenous Q6H PRN Cassandra Figueroa MD   4 mg at 06/24/24 1253    pantoprazole (PROTONIX) EC tablet 20 mg  20 mg Oral Early Morning Cassandra Figueroa MD   20 mg at 06/27/24 0632     Allergies   Allergen Reactions    Colchicine Other (See Comments)     Flushing      Niacin Other (See Comments)     flushed    Other GI Intolerance    Statins        Objective   Vitals: Blood pressure 128/84, pulse 99, temperature (!) 97.3 °F (36.3 °C), resp. rate 18, height 5' 8\" (1.727 m), weight 99.4 kg (219 lb 2.2 oz), SpO2 94%.    Intake/Output Summary (Last 24 hours) at 6/27/2024 0853  Last data filed at 6/27/2024 0501  Gross per 24 hour   Intake 420 ml   Output 525 ml   Net -105 ml     Invasive Devices       Peripheral Intravenous Line  Duration             Peripheral IV 06/26/24 Dorsal (posterior);Right Arm <1 day                    Physical Exam  Vitals reviewed.   Constitutional:       Appearance: Normal appearance. He is well-developed.   HENT:      Head: Normocephalic and atraumatic.   Eyes:      Extraocular Movements: EOM normal.      Conjunctiva/sclera: Conjunctivae normal.   Neck:      Thyroid: No thyromegaly.      Vascular: No carotid bruit.   Cardiovascular:      Rate and Rhythm: Normal rate and regular rhythm.      Heart sounds: Normal heart sounds. No murmur heard.  Pulmonary:      Effort: Pulmonary effort is normal.      Breath sounds: Wheezing present. " "     Comments: Slight inspiratory wheeze auscultated.  Abdominal:      Palpations: Abdomen is soft.      Tenderness: There is no abdominal tenderness.      Comments: Bowel sounds diminished.   Musculoskeletal:         General: No deformity or edema.      Cervical back: Neck supple.      Right lower leg: No edema.      Left lower leg: No edema.   Lymphadenopathy:      Cervical: No cervical adenopathy.   Skin:     General: Skin is warm and dry.      Findings: No erythema or rash.   Neurological:      General: No focal deficit present.      Deep Tendon Reflexes: Reflexes are normal and symmetric.         The history was obtained from the review of the chart, family.    Lab Results:     A.m. cortisol on 6/25/2024 was 3.7.    Cortrosyn stimulation test on 6/25/2024 reportedly had a baseline cortisol of 7.6 which decreased to 3.5 and then increased to 10.6 during the test.  ACTH level at 3 PM on 6/25/2024 was 13.6.      Lab Results   Component Value Date    WBC 4.31 06/26/2024    HGB 10.0 (L) 06/26/2024    HCT 33.8 (L) 06/26/2024    MCV 94 06/26/2024     (L) 06/26/2024     Lab Results   Component Value Date/Time    BUN 15 06/26/2024 03:59 AM    BUN 16 01/02/2024 06:35 AM    K 4.6 06/26/2024 03:59 AM    K 3.7 01/02/2024 06:35 AM     06/26/2024 03:59 AM     01/02/2024 06:35 AM    CO2 30 06/26/2024 03:59 AM    CO2 30 01/02/2024 06:35 AM    CREATININE 0.93 06/26/2024 03:59 AM    CREATININE 1.04 01/02/2024 06:35 AM    AST 15 06/26/2024 03:59 AM    AST 13 01/02/2024 06:35 AM    ALT 3 (L) 06/26/2024 03:59 AM    ALT 8 01/02/2024 06:35 AM    TP 6.0 (L) 06/26/2024 03:59 AM    TP 6.4 01/02/2024 06:35 AM    ALB 3.4 (L) 06/26/2024 03:59 AM    ALB 3.7 01/02/2024 06:35 AM     No results for input(s): \"CHOL\", \"HDL\", \"LDL\", \"TRIG\", \"VLDL\" in the last 72 hours.  No results found for: \"MICROALBUR\", \"[id number]\"  POC Glucose (mg/dl)   Date Value   06/25/2024 163 (H)   06/21/2024 91       Imaging Studies: I have personally " reviewed pertinent reports.      Portions of the record may have been created with voice recognition software.

## 2024-06-27 NOTE — PLAN OF CARE
Problem: PAIN - ADULT  Goal: Verbalizes/displays adequate comfort level or baseline comfort level  Description: Interventions:  - Encourage patient to monitor pain and request assistance  - Assess pain using appropriate pain scale  - Administer analgesics based on type and severity of pain and evaluate response  - Implement non-pharmacological measures as appropriate and evaluate response  - Consider cultural and social influences on pain and pain management  - Notify physician/advanced practitioner if interventions unsuccessful or patient reports new pain  Outcome: Progressing     Problem: INFECTION - ADULT  Goal: Absence or prevention of progression during hospitalization  Description: INTERVENTIONS:  - Assess and monitor for signs and symptoms of infection  - Monitor lab/diagnostic results  - Monitor all insertion sites, i.e. indwelling lines, tubes, and drains  - Monitor endotracheal if appropriate and nasal secretions for changes in amount and color  - [place] appropriate cooling/warming therapies per order  - Administer medications as ordered  - Instruct and encourage patient and family to use good hand hygiene technique  - Identify and instruct in appropriate isolation precautions for identified infection/condition  Outcome: Progressing  Goal: Absence of fever/infection during neutropenic period  Description: INTERVENTIONS:  - Monitor WBC    Outcome: Progressing     Problem: SAFETY ADULT  Goal: Patient will remain free of falls  Description: INTERVENTIONS:  - Educate patient/family on patient safety including physical limitations  - Instruct patient to call for assistance with activity   - Consult OT/PT to assist with strengthening/mobility   - Keep Call bell within reach  - Keep bed low and locked with side rails adjusted as appropriate  - Keep care items and personal belongings within reach  - Initiate and maintain comfort rounds  - Make Fall Risk Sign visible to staff  - Offer Toileting every 2 Hours,  in advance of need  - Initiate/Maintain bed alarm  - Obtain necessary fall risk management equipment: non slip socks  - Apply yellow socks and bracelet for high fall risk patients  - Consider moving patient to room near nurses station  Outcome: Progressing  Goal: Maintain or return to baseline ADL function  Description: INTERVENTIONS:  -  Assess patient's ability to carry out ADLs; assess patient's baseline for ADL function and identify physical deficits which impact ability to perform ADLs (bathing, care of mouth/teeth, toileting, grooming, dressing, etc.)  - Assess/evaluate cause of self-care deficits   - Assess range of motion  - Assess patient's mobility; develop plan if impaired  - Assess patient's need for assistive devices and provide as appropriate  - Encourage maximum independence but intervene and supervise when necessary  - Involve family in performance of ADLs  - Assess for home care needs following discharge   - Consider OT consult to assist with ADL evaluation and planning for discharge  - Provide patient education as appropriate  Outcome: Progressing  Goal: Maintains/Returns to pre admission functional level  Description: INTERVENTIONS:  - Perform AM-PAC 6 Click Basic Mobility/ Daily Activity assessment daily.  - Set and communicate daily mobility goal to care team and patient/family/caregiver.   - Collaborate with rehabilitation services on mobility goals if consulted  - Perform Range of Motion 5 times a day.  - Reposition patient every 2 hours.  - Dangle patient 3 times a day  - Stand patient 3 times a day  - Ambulate patient 3 times a day  - Out of bed to chair 3 times a day   - Out of bed for meals 3 times a day  - Out of bed for toileting  - Record patient progress and toleration of activity level   Outcome: Progressing     Problem: DISCHARGE PLANNING  Goal: Discharge to home or other facility with appropriate resources  Description: INTERVENTIONS:  - Identify barriers to discharge w/patient and  caregiver  - Arrange for needed discharge resources and transportation as appropriate  - Identify discharge learning needs (meds, wound care, etc.)  - Arrange for interpretive services to assist at discharge as needed  - Refer to Case Management Department for coordinating discharge planning if the patient needs post-hospital services based on physician/advanced practitioner order or complex needs related to functional status, cognitive ability, or social support system  Outcome: Progressing     Problem: Knowledge Deficit  Goal: Patient/family/caregiver demonstrates understanding of disease process, treatment plan, medications, and discharge instructions  Description: Complete learning assessment and assess knowledge base.  Interventions:  - Provide teaching at level of understanding  - Provide teaching via preferred learning methods  Outcome: Progressing     Problem: Prexisting or High Potential for Compromised Skin Integrity  Goal: Skin integrity is maintained or improved  Description: INTERVENTIONS:  - Identify patients at risk for skin breakdown  - Assess and monitor skin integrity  - Assess and monitor nutrition and hydration status  - Monitor labs   - Assess for incontinence   - Turn and reposition patient  - Assist with mobility/ambulation  - Relieve pressure over bony prominences  - Avoid friction and shearing  - Provide appropriate hygiene as needed including keeping skin clean and dry  - Evaluate need for skin moisturizer/barrier cream  - Collaborate with interdisciplinary team   - Patient/family teaching  - Consider wound care consult   Outcome: Progressing     Problem: Nutrition/Hydration-ADULT  Goal: Nutrient/Hydration intake appropriate for improving, restoring or maintaining nutritional needs  Description: Monitor and assess patient's nutrition/hydration status for malnutrition. Collaborate with interdisciplinary team and initiate plan and interventions as ordered.  Monitor patient's weight and  dietary intake as ordered or per policy. Utilize nutrition screening tool and intervene as necessary. Determine patient's food preferences and provide high-protein, high-caloric foods as appropriate.     INTERVENTIONS:  - Monitor oral intake, urinary output, labs, and treatment plans  - Assess nutrition and hydration status and recommend course of action  - Evaluate amount of meals eaten  - Assist patient with eating if necessary   - Allow adequate time for meals  - Recommend/ encourage appropriate diets, oral nutritional supplements, and vitamin/mineral supplements  - Order, calculate, and assess calorie counts as needed  - Recommend, monitor, and adjust tube feedings and TPN/PPN based on assessed needs  - Assess need for intravenous fluids  - Provide specific nutrition/hydration education as appropriate  - Include patient/family/caregiver in decisions related to nutrition  Outcome: Progressing

## 2024-06-27 NOTE — PROGRESS NOTES
"Responded to RN request of pt family for . Pt \"[place]\" resting comfortably, introduced self and role to pt's son who indicates pt is Zoroastrian and would like to see a . Contacted Fr Peters at [facility] for anointing and communion, which was met with gratitude. Available to follow upon request.     Thank you!       "

## 2024-06-27 NOTE — ASSESSMENT & PLAN NOTE
Recently admitted for dehydration in the setting of diarrhea secondary to diverticulitis (6/15- 6/19). Readmitted for lethargy, hypotension, hypothermia.   6/21 CT CAP: Sigmoid diverticulitis with no fluid collection or abscess. Consider colonoscopy when the acute illness subsides. Trace left effusion. No acute airspace consolidation. Dilated ascending artery measuring 4.4 cm, annual surveillance generally suggested. Cholelithiasis. No intra-abdominal hemorrhage  No evidence of new or worsening infection  Briefly required vasopressors, s/p IVF with worsening resp status   Notably patient has been maintained on prednisone 5 mg daily x 40 years for RA, recently weaned off 3 weeks ago  S/P IV hydrocortisone in ICU  AM cortisol high >30 however this was after IV hydrocortisone administration  Repeat AM cortisol is low at 3.6 (3 days since last steroid)  6/25 --> adrenal insufficiency confirmed with cosyntropin stim, endocrine consulted, placed on IV solucortef.  IV steroids now discontinued due to comfort care measures only

## 2024-06-27 NOTE — HOSPICE NOTE
Requested to re-evaluate pt for IPU. Pt seen at bedside in no discomfort or distress. Sleeping peacefully. Spoke with family at bedside. Dr Rod approved pt for RLOC today which could be done at home or nursing home. Hospice will continue to follow.

## 2024-06-28 NOTE — ASSESSMENT & PLAN NOTE
Multiple GOC discussions held with patient and family, patient has now been transitioned to comfort care only   Hospice liaison consulted  Not appropriate for home hospice of hospice at SNF  Not accepted to IPU currently   Continue analgesia, anxiolytics IV lasix PRN

## 2024-06-28 NOTE — ASSESSMENT & PLAN NOTE
Wt Readings from Last 3 Encounters:   06/27/24 99.4 kg (219 lb 2.2 oz)   06/19/24 99.6 kg (219 lb 9.3 oz)   04/12/24 95 kg (209 lb 7 oz)       Home diuretic: Torsemide 20 Mg MWF and 10 Mg T, Th, Sat  Last echo 8/2023: LVEF 45%.  G1 DD.  Home torsemide held due to hypotension   S/P IVF given for hypotension with worsening resp status    CXR with pulmonary edema   Updated echo: EF has dropped from 45% to 25%. Mitral regurgitation is now severe.   Cardiology consulted   Continue IV lasix 40 mg PRN for volume overload  Patient is now on comfort measures only, no further workup

## 2024-06-28 NOTE — ASSESSMENT & PLAN NOTE
Chronically on Keppra 500 mg BID   At this time family refusing both PO and IV keppra given comfort measures --> Dc'd 6/28

## 2024-06-28 NOTE — PROGRESS NOTES
[facility]  Progress Note  Name: Matthew Krause I  MRN: [id number]  Unit/Bed#: -01 I Date of Admission: 6/21/2024   Date of Service: 6/28/2024 I Hospital Day: 7    Assessment & Plan   * Comfort measures only status  Assessment & Plan  Multiple GOC discussions held with patient and family, patient has now been transitioned to comfort care only   Hospice liaison consulted  Not appropriate for home hospice   Not accepted to [facility] currently   Continue analgesia, anxiolytics IV lasix PRN    Chronic systolic heart failure (HCC)  Assessment & Plan  Wt Readings from Last 3 Encounters:   06/27/24 99.4 kg (219 lb 2.2 oz)   06/19/24 99.6 kg (219 lb 9.3 oz)   04/12/24 95 kg (209 lb 7 oz)       Home diuretic: Torsemide 20 Mg MWF and 10 Mg T, Th, Sat  Last echo 8/2023: LVEF 45%.  G1 DD.  Home torsemide held due to hypotension   S/P IVF given for hypotension with worsening resp status - hold further fluids   S/P IV lasix x 2 doses   CXR with pulmonary edema   Updated echo: EF has dropped from 45% to 25%. Mitral regurgitation is now severe.   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 Readmitted for lethargy, hypotension, hypothermia.   6/21 CT CAP: Sigmoid diverticulitis with no fluid collection or abscess. Consider colonoscopy when the acute illness subsides. Trace left effusion. No acute airspace consolidation. Dilated ascending artery measuring 4.4 cm, annual surveillance generally suggested. Cholelithiasis. No intra-abdominal hemorrhage  No evidence of new or worsening infection  Briefly required vasopressors, s/p IVF with worsening resp status   Notably patient has been maintained on prednisone 5 mg daily x 40 years for  RA, recently weaned off 3 weeks ago  S/P IV hydrocortisone in ICU  AM cortisol high >30 however this was after IV hydrocortisone administration  Repeat AM cortisol is low at 3.6 (3 days since last steroid)  6/25 --> adrenal insufficiency confirmed with cosyntropin stim, endocrine consulted, placed on IV solucortef.  IV steroids now discontinued due to comfort care measures only     Focal seizure with retained awareness  Assessment & Plan  Chronically on Keppra 500 mg BID   At this time family refusing both PO and IV keppra given comfort measures --> Dc'd 6/28               VTE Pharmacologic Prophylaxis: VTE Score: 5  comfort care     Mobility:   Basic Mobility Inpatient Raw Score: 8  JH-HLM Goal: 3: Sit at edge of bed  JH-HLM Achieved: 1: Laying in bed  JH-HLM Goal NOT achieved. Continue with multidisciplinary rounding and encourage appropriate mobility to improve upon JH-HLM goals.    Patient Centered Rounds: I performed bedside rounds with nursing staff today.   Discussions with Specialists or Other Care Team Provider: CM    Education and Discussions with Family / Patient: Updated  (son and daughter in law) at bedside.    Total Time Spent on Date of Encounter in care of patient: 35 mins. This time was spent on one or more of the following: performing physical exam; counseling and coordination of care; obtaining or reviewing history; documenting in the medical record; reviewing/ordering tests, medications or procedures; communicating with other healthcare professionals and discussing with patient's family/caregivers.    Current Length of Stay: 7 day(s)  Current Patient Status: Inpatient   Certification Statement: The patient will continue to require additional inpatient hospital stay due to hospice/placement/comfort care  Discharge Plan:  pending further eval with hospice vs remaining inpatient for comfort care    Code Status: Level 4 - Comfort Care    Subjective:   Per family at bedside, patient  appearing weaker, more lethargic, not eating/drinking or responding much verbally.     Objective:     Vitals:   Temp (24hrs), Av.8 °F (37.7 °C), Min:99.8 °F (37.7 °C), Max:99.8 °F (37.7 °C)    Temp:  [99.8 °F (37.7 °C)] 99.8 °F (37.7 °C)  HR:  [114] 114  SpO2:  [90 %] 90 %  Body mass index is 33.32 kg/m².     Input and Output Summary (last 24 hours):     Intake/Output Summary (Last 24 hours) at 2024 1320  Last data filed at 2024 0350  Gross per 24 hour   Intake 480 ml   Output 500 ml   Net -20 ml       Physical Exam:   Physical Exam  Constitutional:       General: He is sleeping. He is not in acute distress.     Appearance: He is ill-appearing.   Cardiovascular:      Comments: Deferred for comfort  Pulmonary:      Comments: Deferred for comfort  Abdominal:      Comments: Deferred for comfort   Skin:     General: Skin is warm and dry.   Neurological:      Mental Status: He is lethargic and disoriented.          Additional Data:     Labs:  Results from last 7 days   Lab Units 24  0359   WBC Thousand/uL 4.31   HEMOGLOBIN g/dL 10.0*   HEMATOCRIT % 33.8*   PLATELETS Thousands/uL 144*   SEGS PCT % 79*   LYMPHO PCT % 13*   MONO PCT % 7   EOS PCT % 0     Results from last 7 days   Lab Units 24  0359   SODIUM mmol/L 137   POTASSIUM mmol/L 4.6   CHLORIDE mmol/L 101   CO2 mmol/L 30   BUN mg/dL 15   CREATININE mg/dL 0.93   ANION GAP mmol/L 6   CALCIUM mg/dL 9.2   ALBUMIN g/dL 3.4*   TOTAL BILIRUBIN mg/dL 0.36   ALK PHOS U/L 53   ALT U/L 3*   AST U/L 15   GLUCOSE RANDOM mg/dL 121     Results from last 7 days   Lab Units 24  1021   INR  2.10*     Results from last 7 days   Lab Units 24   POC GLUCOSE mg/dl 163*         Results from last 7 days   Lab Units 24  0121   LACTIC ACID mmol/L 0.9  --    PROCALCITONIN ng/ml 0.26* 0.24       Lines/Drains:  Invasive Devices       Peripheral Intravenous Line  Duration             Peripheral IV 24 Dorsal (posterior);Right  Arm 1 day              Drain  Duration             External Urinary Catheter 1 day                          Imaging: No pertinent imaging reviewed.    Recent Cultures (last 7 days):         Last 24 Hours Medication List:   Current Facility-Administered Medications   Medication Dose Route Frequency Provider Last Rate    acetaminophen  650 mg Oral Q6H PRN Cassandra Figueroa MD      bisacodyl  10 mg Rectal Daily PRN Jose Schulz PA-C      Diclofenac Sodium  2 g Topical 4x Daily Cassandra Figueroa MD      diphenhydrAMINE-zinc acetate   Topical TID PRN Carmela Varner PA-C      furosemide  40 mg Intravenous Daily PRN Carmela Varner PA-C      glycopyrrolate  0.1 mg Intravenous Q4H PRN Jose Schulz PA-C      haloperidol lactate  0.5 mg Intravenous Q2H PRN Jose Schulz PA-C      lidocaine  2 patch Topical Daily Carmela Varner PA-C      LORazepam  1 mg Intravenous Q10 Min PRN Jose Schulz PA-C      morphine injection  2 mg Intravenous Q1H PRN Carmela Varner PA-C      ondansetron  4 mg Intravenous Q6H PRN Cassandra Figueroa MD          Today, Patient Was Seen By: Carmela Varner PA-C    **Please Note: This note may have been constructed using a voice recognition system.**

## 2024-06-28 NOTE — DISCHARGE SUMMARY
[facility]  Discharge- Matthew Krause 9/30/1934, 89 y.o. male MRN: [id number]  Unit/Bed#: -Jamie Encounter: [id number]  Primary Care Provider: ESTEFANIA Pimentel   Date and time admitted to hospital: 6/21/2024  8:14 AM    * Comfort measures only status  Assessment & Plan  Multiple GOC discussions held with patient and family, patient has now been transitioned to comfort care only   Hospice liaison consulted  Not appropriate for home hospice of hospice at [facility]  Not accepted to [facility] currently   Continue analgesia, anxiolytics IV lasix PRN    Chronic systolic heart failure (HCC)  Assessment & Plan  Wt Readings from Last 3 Encounters:   06/27/24 99.4 kg (219 lb 2.2 oz)   06/19/24 99.6 kg (219 lb 9.3 oz)   04/12/24 95 kg (209 lb 7 oz)       Home diuretic: Torsemide 20 Mg MWF and 10 Mg T, Th, Sat  Last echo 8/2023: LVEF 45%.  G1 DD.  Home torsemide held due to hypotension   S/P IVF given for hypotension with worsening resp status    CXR with pulmonary edema   Updated echo: EF has dropped from 45% to 25%. Mitral regurgitation is now severe.   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 Readmitted for lethargy, hypotension, hypothermia.   6/21 CT CAP: Sigmoid diverticulitis with no fluid collection or abscess. Consider colonoscopy when the acute illness subsides. Trace left effusion. No acute airspace consolidation. Dilated ascending artery measuring 4.4 cm, annual surveillance generally suggested. Cholelithiasis. No intra-abdominal hemorrhage  No evidence of new or worsening infection  Briefly required vasopressors, s/p IVF with worsening resp status   Notably patient has been maintained on prednisone 5 mg daily x  40 years for RA, recently weaned off 3 weeks ago  S/P IV hydrocortisone in ICU  AM cortisol high >30 however this was after IV hydrocortisone administration  Repeat AM cortisol off steroids low at 3.6  6/25 --> adrenal insufficiency confirmed with cosyntropin stim, endocrine consulted, placed on IV solucortef.  IV steroids now discontinued due to comfort care measures only         Medical Problems       Resolved Problems  Date Reviewed: 6/30/2024            Resolved    Focal seizure with retained awareness 6/29/2024     Resolved by  Carmela Varner PA-C    Respiratory insufficiency 6/23/2024     Resolved by  Jose Schulz PA-C    Diverticulitis 6/26/2024     Resolved by  Jose Schulz PA-C        Discharging Physician / Practitioner: Carmela Vanrer PA-C  PCP: ESTEFANIA Pimentel  Admission Date:   Admission Orders (From admission, onward)       Ordered        06/21/24 1257  INPATIENT ADMISSION  Once                          Discharge Date: 06/30/24    Consultations During Hospital Stay:  Cardiology  Endocrinology  Hospice    Procedures Performed:   None     Significant Findings / Test Results:   XR chest portable   Final Result by Jose Ness MD (06/24 1449)      Interstitial edema with possible small bilateral pleural effusions.            Workstation performed: [id number]         CT chest abdomen pelvis w contrast   Final Result by Roopa Perez MD (06/21 1201)      Sigmoid diverticulitis with no fluid collection or abscess. Consider colonoscopy when the acute illness subsides      Trace left effusion      No acute airspace consolidation.   Dilated ascending artery measuring 4.4 cm, annual surveillance generally suggested   Cholelithiasis   No intra-abdominal hemorrhage   The study was marked in EPIC for immediate notification.      Workstation performed: [id number]         XR hip/pelv 2-3 vws left   Final Result by Michael Schwab MD (06/21 1010)      No acute  osseous abnormality.      Degenerative changes as described.            Workstation performed: [id number]         XR chest portable   Final Result by Breana Mccartney MD (06/21 0944)      Mild pulmonary venous congestion.            Workstation performed: [id number]           Morning cortisol: 3.7  Cosyntropin Stim Test= positive for adrenal insufficiency  Baseline cortisol: 7.6  30 min cortisol: 3.5  60 min cortisol: 10.6  TTE 6/24:  Left Ventricle: Left ventricular cavity size is severely dilated. Wall thickness is normal. The left ventricular ejection fraction is 20-25% by biplane measurement. Systolic function is severely reduced. There is severe global hypokinesis. Diastolic function is abnormal.    Right Ventricle: Right ventricular cavity size is normal. Systolic function is normal.    Left Atrium: The atrium is mildly dilated.    Aortic Valve: There is mild regurgitation. There is aortic valve sclerosis.    Mitral Valve: There is severe regurgitation with a posteriorly directed jet.    Tricuspid Valve: There is mild regurgitation.    Pulmonic Valve: There is mild regurgitation.    Prior TTE study available for comparison. Prior study date: 8/30/2023. Changes noted when compared to prior study. Changes include: EF has dropped from 45% to 25%. Mitral regurgitation is now severe.    Incidental Findings:   None      Test Results Pending at Discharge (will require follow up):   None      Outpatient Tests Requested:  None     Complications:  Patient transitioned to comfort care measures     Reason for Admission: lethargy, hypotension, hypothermia     Hospital Course:   Matthew Krause is a 89 y.o. male patient with PMH of RA previously on prednisone, seizure disorder, chronic systolic CHF, MVR, recent admission for diverticulitis who originally presented to the hospital on 6/21/2024 due to worsening lethargy noted at SNF following discharge from hospital several days earlier on 6/19. Patient was found to be  hypothermic and hypotensive in the ED. CT scan revealed persistent diverticulitis findings however patient was without evidence of any new or worsening infection as per imaging, UA, blood cultures, etc. Patient was initially admitted to ICU as he briefly required IVF and vasopressors for blood pressure support. Patient had been notably tapered off long term prednisone about 3 weeks PTA. He was treated briefly in the ICU with IV solucortef, which was later resumed given cosyntropin stim testing suggestive of adrenal insufficiency, likely secondary to long term steroid use.     Patient's hypotension did imprve however he was noted with persistent lethargy as well as worsening resp status following IVF. CXR revealed pulmonary edema, and TTE was obtained which showed significant reduction in patient's EF from 45% 2023 to 25% with significantly worsened MV regurg. Cardiology was consulted and lengthy GOC discussions were held with family. Cardiology ultimately recommended hospice care. Hospice was consulted and continued GOC discussions were held with patient family. Given patient's persistent poor clinical status with recurrent hypotension, lethargy, and generalized weakness, patient was transitioned to comfort care per patient's wishes on 24. Patient was treated with IV analgesics and anxiolytics. As patient was not eligible for inpatient hospice and was not appropriate for home hospice/hospice at SNF, patient remained inpatient for comfort care. Patient unfortunately passed away 24 with family present at bedside, please see death note for details.       Please see above list of diagnoses and related plan for additional information.     Condition at Discharge:      Discharge Day Visit / Exam:   Subjective:    Vitals: Blood Pressure: 128/84 (24 0737)  Pulse: (!) 112 (24 0809)  Temperature: (!) 104 °F (40 °C) (24 1530)  Temp Source: Axillary (24 1530)  Respirations: (!) 26  "(24 08)  Height: 5' 8\" (172.7 cm) (24 1415)  Weight - Scale: 99.4 kg (219 lb 2.2 oz) (24)  SpO2: 90 % (24)    Please see death note for physical exam    Discussion with Family: Updated  (son) at bedside.    Discharge instructions/Information to patient and family:   See after visit summary for information provided to patient and family.      Provisions for Follow-Up Care:  See after visit summary for information related to follow-up care and any pertinent home health orders.       Disposition:   Other:     Planned Readmission: no     Discharge Statement:  I spent 35 minutes discharging the patient. This time was spent on the day of discharge. I had direct contact with the patient on the day of discharge. Greater than 50% of the total time was spent examining patient, answering all patient questions, arranging and discussing plan of care with patient as well as directly providing post-discharge instructions.  Additional time then spent on discharge activities.    Discharge Medications:  See after visit summary for reconciled discharge medications provided to patient and/or family.      **Please Note: This note may have been constructed using a voice recognition system**      "

## 2024-06-28 NOTE — ASSESSMENT & PLAN NOTE
Recently admitted for dehydration in the setting of diarrhea secondary to diverticulitis (6/15- 6/19). Readmitted for lethargy, hypotension, hypothermia.   6/21 CT CAP: Sigmoid diverticulitis with no fluid collection or abscess. Consider colonoscopy when the acute illness subsides. Trace left effusion. No acute airspace consolidation. Dilated ascending artery measuring 4.4 cm, annual surveillance generally suggested. Cholelithiasis. No intra-abdominal hemorrhage  No evidence of new or worsening infection  Briefly required vasopressors, s/p IVF with worsening resp status   Notably patient has been maintained on prednisone 5 mg daily x 40 years for RA, recently weaned off 3 weeks ago  S/P IV hydrocortisone in ICU  AM cortisol high >30 however this was after IV hydrocortisone administration  Repeat AM cortisol off steroids low at 3.6  6/25 --> adrenal insufficiency confirmed with cosyntropin stim, endocrine consulted, placed on IV solucortef.  IV steroids now discontinued due to comfort care measures only

## 2024-06-28 NOTE — HOSPICE NOTE
Asked by CM to re-evaluate pt for IP. Pt seen at bedside. Resting comfortably and in no acute distress. Had 1 dose of IV Ativan. Family adamant they do not want him moved. Currently he remains RLOC

## 2024-06-28 NOTE — NURSING NOTE
Pt family refusing IV keppra- Family stating they do not wish for the pt to have any additional medications other than the ones to keep him comfortable. Provider made aware.

## 2024-06-29 PROBLEM — G40.009 LOCALIZATION-RELATED (FOCAL) (PARTIAL) IDIOPATHIC EPILEPSY AND EPILEPTIC SYNDROMES WITH SEIZURES OF LOCALIZED ONSET, NOT INTRACTABLE, WITHOUT STATUS EPILEPTICUS (HCC): Status: RESOLVED | Noted: 2023-01-26 | Resolved: 2024-06-29

## 2024-06-29 NOTE — PROGRESS NOTES
[facility]  Progress Note  Name: Matthew Krause I  MRN: [id number]  Unit/Bed#: -01 I Date of Admission: 6/21/2024   Date of Service: 6/29/2024 I Hospital Day: 8    Assessment & Plan   * Comfort measures only status  Assessment & Plan  Multiple GOC discussions held with patient and family, patient has now been transitioned to comfort care only   Hospice liaison consulted  Not appropriate for home hospice of hospice at [facility]  Not accepted to [facility] currently   Continue analgesia, anxiolytics IV lasix PRN    Chronic systolic heart failure (HCC)  Assessment & Plan  Wt Readings from Last 3 Encounters:   06/27/24 99.4 kg (219 lb 2.2 oz)   06/19/24 99.6 kg (219 lb 9.3 oz)   04/12/24 95 kg (209 lb 7 oz)       Home diuretic: Torsemide 20 Mg MWF and 10 Mg T, Th, Sat  Last echo 8/2023: LVEF 45%.  G1 DD.  Home torsemide held due to hypotension   S/P IVF given for hypotension with worsening resp status    CXR with pulmonary edema   Updated echo: EF has dropped from 45% to 25%. Mitral regurgitation is now severe.   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 Readmitted for lethargy, hypotension, hypothermia.   6/21 CT CAP: Sigmoid diverticulitis with no fluid collection or abscess. Consider colonoscopy when the acute illness subsides. Trace left effusion. No acute airspace consolidation. Dilated ascending artery measuring 4.4 cm, annual surveillance generally suggested. Cholelithiasis. No intra-abdominal hemorrhage  No evidence of new or worsening infection  Briefly required vasopressors, s/p IVF with worsening resp status   Notably patient has been maintained on prednisone 5 mg daily x 40 years for RA, recently weaned off 3  weeks ago  S/P IV hydrocortisone in ICU  AM cortisol high >30 however this was after IV hydrocortisone administration  Repeat AM cortisol off steroids low at 3.6  6/25 --> adrenal insufficiency confirmed with cosyntropin stim, endocrine consulted, placed on IV solucortef.  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 Sequential Compression Devices Ordered.    Mobility:   Basic Mobility Inpatient Raw Score: 6  JH-HLM Goal: 2: Bed activities/Dependent transfer  JH-HLM Achieved: 1: Laying in bed  JH-HLM Goal NOT achieved. Continue with multidisciplinary rounding and encourage appropriate mobility to improve upon JH-HLM goals.    Patient Centered Rounds: I performed bedside rounds with nursing staff today.   Discussions with Specialists or Other Care Team Provider: none     Education and Discussions with Family / Patient: Updated  (son and daughter in law) at bedside.    Total Time Spent on Date of Encounter in care of patient: 35 mins. This time was spent on one or more of the following: performing physical exam; counseling and coordination of care; obtaining or reviewing history; documenting in the medical record; reviewing/ordering tests, medications or procedures; communicating with other healthcare professionals and discussing with patient's family/caregivers.    Current Length of Stay: 8 day(s)  Current Patient Status: Inpatient   Certification Statement: The patient will continue to require additional inpatient hospital stay due to comfort care  Discharge Plan:  Patient currently without appropriate option for hospice at other site, will  "continue comfort care while inpatient pending further eval from hospice should circumstances change     Code Status: Level 4 - Comfort Care    Subjective:   Patient no longer eating/drinking, per family with poor verbal response, not opening eyes but occasionally responding \"yep\". Appears comfortable     Objective:     Vitals:   No data recorded.    HR:  [112] 112  Resp:  [26] 26  SpO2:  [90 %] 90 %  Body mass index is 33.32 kg/m².     Input and Output Summary (last 24 hours):     Intake/Output Summary (Last 24 hours) at 6/29/2024 1320  Last data filed at 6/29/2024 1024  Gross per 24 hour   Intake 300 ml   Output 400 ml   Net -100 ml       Physical Exam:   Physical Exam thorough exam deferred for patient comfort.  Skin is warm and dry, patient sleeping in bed, no restlessness or agitation noted, patient does not open eyes or verbally respond to voice.  Does not appear to to be in any respiratory distress.     Additional Data:     Labs:  Results from last 7 days   Lab Units 06/26/24  0359   WBC Thousand/uL 4.31   HEMOGLOBIN g/dL 10.0*   HEMATOCRIT % 33.8*   PLATELETS Thousands/uL 144*   SEGS PCT % 79*   LYMPHO PCT % 13*   MONO PCT % 7   EOS PCT % 0     Results from last 7 days   Lab Units 06/26/24  0359   SODIUM mmol/L 137   POTASSIUM mmol/L 4.6   CHLORIDE mmol/L 101   CO2 mmol/L 30   BUN mg/dL 15   CREATININE mg/dL 0.93   ANION GAP mmol/L 6   CALCIUM mg/dL 9.2   ALBUMIN g/dL 3.4*   TOTAL BILIRUBIN mg/dL 0.36   ALK PHOS U/L 53   ALT U/L 3*   AST U/L 15   GLUCOSE RANDOM mg/dL 121         Results from last 7 days   Lab Units 06/25/24 2027   POC GLUCOSE mg/dl 163*         Results from last 7 days   Lab Units 06/25/24 2037 06/25/24  0121   LACTIC ACID mmol/L 0.9  --    PROCALCITONIN ng/ml 0.26* 0.24       Lines/Drains:  Invasive Devices       Peripheral Intravenous Line  Duration             Peripheral IV 06/26/24 Dorsal (posterior);Right Arm 2 days              Drain  Duration             Urethral Catheter 1 day      "             Urinary Catheter:  Goal for removal:  continue for comfort               Imaging: No pertinent imaging reviewed.    Recent Cultures (last 7 days):         Last 24 Hours Medication List:   Current Facility-Administered Medications   Medication Dose Route Frequency Provider Last Rate    acetaminophen  650 mg Oral Q6H PRN Cassandra Figueroa MD      bisacodyl  10 mg Rectal Daily PRN Jose Schulz PA-C      Diclofenac Sodium  2 g Topical 4x Daily Cassandra Figueroa MD      diphenhydrAMINE-zinc acetate   Topical TID PRN Carmela Varner PA-C      furosemide  40 mg Intravenous Daily PRN Carmela Varner PA-C      glycopyrrolate  0.1 mg Intravenous Q4H PRN Jose Schulz PA-C      haloperidol lactate  0.5 mg Intravenous Q2H PRN Jose Schulz PA-C      lidocaine  2 patch Topical Daily Carmela Varner PA-C      LORazepam  1 mg Intravenous Q10 Min PRN Jose Schulz PA-C      morphine injection  2 mg Intravenous Q1H PRN Carmela Varner PA-C      ondansetron  4 mg Intravenous Q6H PRN Cassandra Figueroa MD          Today, Patient Was Seen By: Carmela Varner PA-C    **Please Note: This note may have been constructed using a voice recognition system.**

## 2024-06-29 NOTE — PLAN OF CARE
Problem: PAIN - ADULT  Goal: Verbalizes/displays adequate comfort level or baseline comfort level  Description: Interventions:  - Encourage patient to monitor pain and request assistance  - Assess pain using appropriate pain scale  - Administer analgesics based on type and severity of pain and evaluate response  - Implement non-pharmacological measures as appropriate and evaluate response  - Consider cultural and social influences on pain and pain management  - Notify physician/advanced practitioner if interventions unsuccessful or patient reports new pain  Outcome: Progressing     Problem: INFECTION - ADULT  Goal: Absence or prevention of progression during hospitalization  Description: INTERVENTIONS:  - Assess and monitor for signs and symptoms of infection  - Monitor lab/diagnostic results  - Monitor all insertion sites, i.e. indwelling lines, tubes, and drains  - Monitor endotracheal if appropriate and nasal secretions for changes in amount and color  - [place] appropriate cooling/warming therapies per order  - Administer medications as ordered  - Instruct and encourage patient and family to use good hand hygiene technique  - Identify and instruct in appropriate isolation precautions for identified infection/condition  Outcome: Progressing     Problem: INFECTION - ADULT  Goal: Absence of fever/infection during neutropenic period  Description: INTERVENTIONS:  - Monitor WBC    Outcome: Progressing     Problem: SAFETY ADULT  Goal: Patient will remain free of falls  Description: INTERVENTIONS:  - Educate patient/family on patient safety including physical limitations  - Instruct patient to call for assistance with activity   - Consult OT/PT to assist with strengthening/mobility   - Keep Call bell within reach  - Keep bed low and locked with side rails adjusted as appropriate  - Keep care items and personal belongings within reach  - Initiate and maintain comfort rounds  - Make Fall Risk Sign visible to staff  -  Offer Toileting every 2 Hours, in advance of need  - Initiate/Maintain bed alarm  - Obtain necessary fall risk management equipment: non slip socks  - Apply yellow socks and bracelet for high fall risk patients  - Consider moving patient to room near nurses station  Outcome: Progressing     Problem: DISCHARGE PLANNING  Goal: Discharge to home or other facility with appropriate resources  Description: INTERVENTIONS:  - Identify barriers to discharge w/patient and caregiver  - Arrange for needed discharge resources and transportation as appropriate  - Identify discharge learning needs (meds, wound care, etc.)  - Arrange for interpretive services to assist at discharge as needed  - Refer to Case Management Department for coordinating discharge planning if the patient needs post-hospital services based on physician/advanced practitioner order or complex needs related to functional status, cognitive ability, or social support system  Outcome: Progressing     Problem: Prexisting or High Potential for Compromised Skin Integrity  Goal: Skin integrity is maintained or improved  Description: INTERVENTIONS:  - Identify patients at risk for skin breakdown  - Assess and monitor skin integrity  - Assess and monitor nutrition and hydration status  - Monitor labs   - Assess for incontinence   - Turn and reposition patient  - Assist with mobility/ambulation  - Relieve pressure over bony prominences  - Avoid friction and shearing  - Provide appropriate hygiene as needed including keeping skin clean and dry  - Evaluate need for skin moisturizer/barrier cream  - Collaborate with interdisciplinary team   - Patient/family teaching  - Consider wound care consult   Outcome: Progressing

## 2024-06-29 NOTE — PLAN OF CARE
Problem: PAIN - ADULT  Goal: Verbalizes/displays adequate comfort level or baseline comfort level  Description: Interventions:  - Encourage patient to monitor pain and request assistance  - Assess pain using appropriate pain scale  - Administer analgesics based on type and severity of pain and evaluate response  - Implement non-pharmacological measures as appropriate and evaluate response  - Consider cultural and social influences on pain and pain management  - Notify physician/advanced practitioner if interventions unsuccessful or patient reports new pain  Outcome: Progressing     Problem: INFECTION - ADULT  Goal: Absence or prevention of progression during hospitalization  Description: INTERVENTIONS:  - Assess and monitor for signs and symptoms of infection  - Monitor lab/diagnostic results  - Monitor all insertion sites, i.e. indwelling lines, tubes, and drains  - Monitor endotracheal if appropriate and nasal secretions for changes in amount and color  - [place] appropriate cooling/warming therapies per order  - Administer medications as ordered  - Instruct and encourage patient and family to use good hand hygiene technique  - Identify and instruct in appropriate isolation precautions for identified infection/condition  Outcome: Progressing

## 2024-06-30 NOTE — PROGRESS NOTES
[facility]  Progress Note  Name: Matthew Krause I  MRN: [id number]  Unit/Bed#: -01 I Date of Admission: 6/21/2024   Date of Service: 6/30/2024 I Hospital Day: 9    Assessment & Plan   * Comfort measures only status  Assessment & Plan  Multiple GOC discussions held with patient and family, patient has now been transitioned to comfort care only   Hospice liaison consulted  Not appropriate for home hospice of hospice at [facility]  Not accepted to [facility] currently   Continue analgesia, anxiolytics IV lasix PRN    Chronic systolic heart failure (HCC)  Assessment & Plan  Wt Readings from Last 3 Encounters:   06/27/24 99.4 kg (219 lb 2.2 oz)   06/19/24 99.6 kg (219 lb 9.3 oz)   04/12/24 95 kg (209 lb 7 oz)       Home diuretic: Torsemide 20 Mg MWF and 10 Mg T, Th, Sat  Last echo 8/2023: LVEF 45%.  G1 DD.  Home torsemide held due to hypotension   S/P IVF given for hypotension with worsening resp status    CXR with pulmonary edema   Updated echo: EF has dropped from 45% to 25%. Mitral regurgitation is now severe.   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 Readmitted for lethargy, hypotension, hypothermia.   6/21 CT CAP: Sigmoid diverticulitis with no fluid collection or abscess. Consider colonoscopy when the acute illness subsides. Trace left effusion. No acute airspace consolidation. Dilated ascending artery measuring 4.4 cm, annual surveillance generally suggested. Cholelithiasis. No intra-abdominal hemorrhage  No evidence of new or worsening infection  Briefly required vasopressors, s/p IVF with worsening resp status   Notably patient has been maintained on prednisone 5 mg daily x 40 years for RA, recently weaned off 3  "weeks ago  S/P IV hydrocortisone in ICU  AM cortisol high >30 however this was after IV hydrocortisone administration  Repeat AM cortisol off steroids low at 3.6  6/25 --> adrenal insufficiency confirmed with cosyntropin stim, endocrine consulted, placed on IV solucortef.  IV steroids now discontinued due to comfort care measures only                VTE Pharmacologic Prophylaxis: VTE Score: 5  comfort measures only    Mobility:   Basic Mobility Inpatient Raw Score: 6  [facility] Goal: 2: Bed activities/Dependent transfer  [facility] Achieved: 1: Laying in bed  comfort    Patient Centered Rounds: I performed bedside rounds with nursing staff today.   Discussions with Specialists or Other Care Team Provider: none     Education and Discussions with Family / Patient: Updated  (son, daughter, and son in law) at bedside.    Total Time Spent on Date of Encounter in care of patient: 35 mins. This time was spent on one or more of the following: performing physical exam; counseling and coordination of care; obtaining or reviewing history; documenting in the medical record; reviewing/ordering tests, medications or procedures; communicating with other healthcare professionals and discussing with patient's family/caregivers.    Current Length of Stay: 9 day(s)  Current Patient Status: Inpatient   Certification Statement: The patient will continue to require additional inpatient hospital stay due to comfort   Discharge Plan:  anticipate prolonged admission for comfort care    Code Status: Level 4 - Comfort Care    Subjective:   Patient unresponsive, noted with slightly increased tachypnea and tachycardia today. Does not appear to be restless or uncomfortable. Family noting increased \"gurgling\" and secretions which improved with robinul    Objective:     Vitals:   No data recorded.       Body mass index is 33.32 kg/m².     Input and Output Summary (last 24 hours):     Intake/Output Summary (Last 24 hours) at 6/30/2024 " 1339  Last data filed at 6/30/2024 0731  Gross per 24 hour   Intake --   Output 575 ml   Net -575 ml       Physical Exam:   Physical Exam  Vitals and nursing note reviewed.   Constitutional:       General: He is not in acute distress.     Appearance: He is ill-appearing.   Cardiovascular:      Rate and Rhythm: Regular rhythm. Tachycardia present.   Pulmonary:      Comments: Tachypneic, diminished BS on L, coarse BS + scattered rhonchi. No resp distress on 3 liters  Musculoskeletal:      Cervical back: Neck supple.   Skin:     General: Skin is warm and dry.   Neurological:      Mental Status: He is lethargic.          Additional Data:     Labs:  Results from last 7 days   Lab Units 06/26/24  0359   WBC Thousand/uL 4.31   HEMOGLOBIN g/dL 10.0*   HEMATOCRIT % 33.8*   PLATELETS Thousands/uL 144*   SEGS PCT % 79*   LYMPHO PCT % 13*   MONO PCT % 7   EOS PCT % 0     Results from last 7 days   Lab Units 06/26/24  0359   SODIUM mmol/L 137   POTASSIUM mmol/L 4.6   CHLORIDE mmol/L 101   CO2 mmol/L 30   BUN mg/dL 15   CREATININE mg/dL 0.93   ANION GAP mmol/L 6   CALCIUM mg/dL 9.2   ALBUMIN g/dL 3.4*   TOTAL BILIRUBIN mg/dL 0.36   ALK PHOS U/L 53   ALT U/L 3*   AST U/L 15   GLUCOSE RANDOM mg/dL 121         Results from last 7 days   Lab Units 06/25/24 2027   POC GLUCOSE mg/dl 163*         Results from last 7 days   Lab Units 06/25/24 2037 06/25/24  0121   LACTIC ACID mmol/L 0.9  --    PROCALCITONIN ng/ml 0.26* 0.24       Lines/Drains:  Invasive Devices       Peripheral Intravenous Line  Duration             Peripheral IV 06/26/24 Dorsal (posterior);Right Arm 3 days              Drain  Duration             Urethral Catheter 2 days                  Urinary Catheter:  Goal for removal:  continue for comfort               Imaging: No pertinent imaging reviewed.    Recent Cultures (last 7 days):         Last 24 Hours Medication List:   Current Facility-Administered Medications   Medication Dose Route Frequency Provider Last Rate     acetaminophen  650 mg Oral Q6H PRN Cassandra Figueroa MD      bisacodyl  10 mg Rectal Daily PRN Jose Schulz PA-C      Diclofenac Sodium  2 g Topical 4x Daily Cassandra Figueroa MD      diphenhydrAMINE-zinc acetate   Topical TID PRN Carmela Varner PA-C      furosemide  20 mg Intravenous Daily PRN Carmela Varner PA-C      glycopyrrolate  0.1 mg Intravenous Q4H PRN Jose Schulz PA-C      haloperidol lactate  0.5 mg Intravenous Q2H PRN Jose Schulz PA-C      lidocaine  2 patch Topical Daily Carmela Varner PA-C      LORazepam  1 mg Intravenous Q10 Min PRN Jose Schulz PA-C      morphine injection  2 mg Intravenous Q1H PRN Erin Null PA-C      ondansetron  4 mg Intravenous Q6H PRN Cassandra Figueroa MD          Today, Patient Was Seen By: Carmela Varner PA-C    **Please Note: This note may have been constructed using a voice recognition system.**

## 2024-06-30 NOTE — PLAN OF CARE
Problem: PAIN - ADULT  Goal: Verbalizes/displays adequate comfort level or baseline comfort level  Description: Interventions:  - Encourage patient to monitor pain and request assistance  - Assess pain using appropriate pain scale  - Administer analgesics based on type and severity of pain and evaluate response  - Implement non-pharmacological measures as appropriate and evaluate response  - Consider cultural and social influences on pain and pain management  - Notify physician/advanced practitioner if interventions unsuccessful or patient reports new pain  Outcome: Progressing     Problem: INFECTION - ADULT  Goal: Absence or prevention of progression during hospitalization  Description: INTERVENTIONS:  - Assess and monitor for signs and symptoms of infection  - Monitor lab/diagnostic results  - Monitor all insertion sites, i.e. indwelling lines, tubes, and drains  - Monitor endotracheal if appropriate and nasal secretions for changes in amount and color  - [place] appropriate cooling/warming therapies per order  - Administer medications as ordered  - Instruct and encourage patient and family to use good hand hygiene technique  - Identify and instruct in appropriate isolation precautions for identified infection/condition  Outcome: Progressing  Goal: Absence of fever/infection during neutropenic period  Description: INTERVENTIONS:  - Monitor WBC    Outcome: Progressing     Problem: SAFETY ADULT  Goal: Patient will remain free of falls  Description: INTERVENTIONS:  - Educate patient/family on patient safety including physical limitations  - Instruct patient to call for assistance with activity   - Consult OT/PT to assist with strengthening/mobility   - Keep Call bell within reach  - Keep bed low and locked with side rails adjusted as appropriate  - Keep care items and personal belongings within reach  - Initiate and maintain comfort rounds  - Make Fall Risk Sign visible to staff  - Offer Toileting every 2 Hours,  in advance of need  - Initiate/Maintain bed alarm  - Obtain necessary fall risk management equipment: non slip socks  - Apply yellow socks and bracelet for high fall risk patients  - Consider moving patient to room near nurses station  Outcome: Progressing  Goal: Maintain or return to baseline ADL function  Description: INTERVENTIONS:  -  Assess patient's ability to carry out ADLs; assess patient's baseline for ADL function and identify physical deficits which impact ability to perform ADLs (bathing, care of mouth/teeth, toileting, grooming, dressing, etc.)  - Assess/evaluate cause of self-care deficits   - Assess range of motion  - Assess patient's mobility; develop plan if impaired  - Assess patient's need for assistive devices and provide as appropriate  - Encourage maximum independence but intervene and supervise when necessary  - Involve family in performance of ADLs  - Assess for home care needs following discharge   - Consider OT consult to assist with ADL evaluation and planning for discharge  - Provide patient education as appropriate  Outcome: Progressing  Goal: Maintains/Returns to pre admission functional level  Description: INTERVENTIONS:  - Perform AM-PAC 6 Click Basic Mobility/ Daily Activity assessment daily.  - Set and communicate daily mobility goal to care team and patient/family/caregiver.   - Collaborate with rehabilitation services on mobility goals if consulted  - Perform Range of Motion 5 times a day.  - Reposition patient every 2 hours.  - Dangle patient 3 times a day  - Stand patient 3 times a day  - Ambulate patient 3 times a day  - Out of bed to chair 3 times a day   - Out of bed for meals 3 times a day  - Out of bed for toileting  - Record patient progress and toleration of activity level   Outcome: Progressing     Problem: DISCHARGE PLANNING  Goal: Discharge to home or other facility with appropriate resources  Description: INTERVENTIONS:  - Identify barriers to discharge w/patient and  caregiver  - Arrange for needed discharge resources and transportation as appropriate  - Identify discharge learning needs (meds, wound care, etc.)  - Arrange for interpretive services to assist at discharge as needed  - Refer to Case Management Department for coordinating discharge planning if the patient needs post-hospital services based on physician/advanced practitioner order or complex needs related to functional status, cognitive ability, or social support system  Outcome: Progressing     Problem: Knowledge Deficit  Goal: Patient/family/caregiver demonstrates understanding of disease process, treatment plan, medications, and discharge instructions  Description: Complete learning assessment and assess knowledge base.  Interventions:  - Provide teaching at level of understanding  - Provide teaching via preferred learning methods  Outcome: Progressing     Problem: Prexisting or High Potential for Compromised Skin Integrity  Goal: Skin integrity is maintained or improved  Description: INTERVENTIONS:  - Identify patients at risk for skin breakdown  - Assess and monitor skin integrity  - Assess and monitor nutrition and hydration status  - Monitor labs   - Assess for incontinence   - Turn and reposition patient  - Assist with mobility/ambulation  - Relieve pressure over bony prominences  - Avoid friction and shearing  - Provide appropriate hygiene as needed including keeping skin clean and dry  - Evaluate need for skin moisturizer/barrier cream  - Collaborate with interdisciplinary team   - Patient/family teaching  - Consider wound care consult   Outcome: Progressing     Problem: Nutrition/Hydration-ADULT  Goal: Nutrient/Hydration intake appropriate for improving, restoring or maintaining nutritional needs  Description: Monitor and assess patient's nutrition/hydration status for malnutrition. Collaborate with interdisciplinary team and initiate plan and interventions as ordered.  Monitor patient's weight and  dietary intake as ordered or per policy. Utilize nutrition screening tool and intervene as necessary. Determine patient's food preferences and provide high-protein, high-caloric foods as appropriate.     INTERVENTIONS:  - Monitor oral intake, urinary output, labs, and treatment plans  - Assess nutrition and hydration status and recommend course of action  - Evaluate amount of meals eaten  - Assist patient with eating if necessary   - Allow adequate time for meals  - Recommend/ encourage appropriate diets, oral nutritional supplements, and vitamin/mineral supplements  - Order, calculate, and assess calorie counts as needed  - Recommend, monitor, and adjust tube feedings and TPN/PPN based on assessed needs  - Assess need for intravenous fluids  - Provide specific nutrition/hydration education as appropriate  - Include patient/family/caregiver in decisions related to nutrition  Outcome: Progressing

## 2024-06-30 NOTE — PLAN OF CARE
Problem: PAIN - ADULT  Goal: Verbalizes/displays adequate comfort level or baseline comfort level  Description: Interventions:  - Encourage patient to monitor pain and request assistance  - Assess pain using appropriate pain scale  - Administer analgesics based on type and severity of pain and evaluate response  - Implement non-pharmacological measures as appropriate and evaluate response  - Consider cultural and social influences on pain and pain management  - Notify physician/advanced practitioner if interventions unsuccessful or patient reports new pain  Outcome: Progressing     Problem: SAFETY ADULT  Goal: Patient will remain free of falls  Description: INTERVENTIONS:  - Educate patient/family on patient safety including physical limitations  - Instruct patient to call for assistance with activity   - Consult OT/PT to assist with strengthening/mobility   - Keep Call bell within reach  - Keep bed low and locked with side rails adjusted as appropriate  - Keep care items and personal belongings within reach  - Initiate and maintain comfort rounds  - Make Fall Risk Sign visible to staff  - Offer Toileting every 2 Hours, in advance of need  - Initiate/Maintain bed alarm  - Obtain necessary fall risk management equipment: non slip socks  - Apply yellow socks and bracelet for high fall risk patients  - Consider moving patient to room near nurses station  Outcome: Progressing

## 2024-07-01 NOTE — DEATH NOTE
INPATIENT DEATH NOTE  Matthew Krause 89 y.o. male MRN: [id number]  Unit/Bed#: -01 Encounter: [id number]      PRONOUNCEMENT OF DEATH     DOA: 06/21/2024    DOD: 06/30/2024    TOD: 2002     CODE STATUS AT TOD: Level 4 Comfort Care     PATIENT ON HOSPICE?: Patient deemed hospice appropriate however not suitable for discharge to external facility, hospice was not formally initiated as family wished for patient to remain inpatient for comfort care.    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)    RA (rheumatoid arthritis) (HCC)    Speech disturbance    History of TIA (transient ischemic attack)    Low left ventricular ejection fraction    Dilated cardiomyopathy (HCC)    HTN (hypertension)    Chronic systolic heart failure (HCC)    Paroxysmal A-fib (HCC)    Elevated TSH    Hypomagnesemia    Septic arthritis of shoulder, right (HCC)    Suture of skin wound    Adverse effect of loop (high-ceiling) diuretics, subsequent encounter    Toxic metabolic encephalopathy    Open wound of right great toe with damage to nail    Cellulitis of left upper extremity    HLD (hyperlipidemia)    GERD (gastroesophageal reflux disease)    Gout without acute exacerbation     Spinal stenosis    Hypothyroidism    Peripheral vascular disease (HCC)    Bacteremia due to group B Streptococcus    Difficulty with speech    Stage 2 chronic kidney disease    Ureterolithiasis    Aneurysm of ascending aorta without rupture (HCC)    Urinary retention    Electrolyte abnormality    Stroke-like symptom    Using prophylactic antibiotic on daily basis    Abnormal CT scan    Goals of care, counseling/discussion    Comfort measures only status       PRONOUNCEMENT OF DEATH    I was contacted by nursing staff to evaluate  the patient found without vital signs. Patient was identified visually and identification was confirmed by hospital ID [email or web address]. Patient was found laying in bed with family at bedside. Personally examined the patient without heart sounds auscultated on exam nor were pulses detected x 2. No breath sounds were appreciated after 2 minutes of auscultation. Pupils were fixed and non-reactive to light. Corneal blink reflex absent.  Patient was pronounced dead at  on 2024      home is [facility] in [place]. Phone number is [phone].     Please kindly review hospital chart for all details of this hospitalization and circumstances leading to death.     Death certificate will be signed my attending physician at a later time.

## 2024-07-01 NOTE — NURSING NOTE
Expiration navigator completed. Post partum care completed and patient was transported to the [facility] via security.

## 2024-07-09 NOTE — UTILIZATION REVIEW
NOTIFICATION OF ADMISSION DISCHARGE   This is a Notification of Discharge from [facility]. Please be advised that this patient has been discharge from our facility. Below you will find the admission and discharge date and time including the patient’s disposition.   UTILIZATION REVIEW CONTACT:  Shanel Lainez  Utilization   Network Utilization Review Department  Phone: [phone] x carefully listen to the prompts. All voicemails are confidential.  Email: [email or web address]     ADMISSION INFORMATION  PRESENTATION DATE: 2024  8:14 AM  OBERVATION ADMISSION DATE: N/A  INPATIENT ADMISSION DATE: 24 12:57 PM   DISCHARGE DATE: 2024 11:38 PM   DISPOSITION:    Network Utilization Review Department  ATTENTION: Please call with any questions or concerns to [phone] and carefully listen to the prompts so that you are directed to the right person. All voicemails are confidential.   For Discharge needs, contact Care Management DC Support Team at [phone] opt. 2  Send all requests for admission clinical reviews, approved or denied determinations and any other requests to dedicated fax number below belonging to the campus where the patient is receiving treatment. List of dedicated fax numbers for the Facilities:  FACILITY NAME UR FAX NUMBER   ADMISSION DENIALS (Administrative/Medical Necessity) [phone]   DISCHARGE SUPPORT TEAM ([facility]) [phone]   PARENT CHILD HEALTH (Maternity/NICU/Pediatrics) [phone]   [facility] [phone]   [facility] [phone]   [facility] [phone]   [facility] [phone]   [facility] [phone]   [facility] [phone]   [facility] [phone]   [facility] [phone]    [facility] [phone]   [facility] [phone]   [facility] [phone]   [facility] [phone]        [facility]  Discharge- Matthew Krause 9/30/1934, 89 y.o. male MRN: [id number]  Unit/Bed#: -01 Encounter: [id number]  Primary Care Provider: ESTEFANIA Pimentel   Date and time admitted to hospital: 6/21/2024  8:14 AM     * Comfort measures only status  Assessment & Plan  Multiple GOC discussions held with patient and family, patient has now been transitioned to comfort care only   Hospice liaison consulted  Not appropriate for home hospice of hospice at SNF  Not accepted to [facility] currently   Continue analgesia, anxiolytics IV lasix PRN     Chronic systolic heart failure (HCC)  Assessment & Plan      Wt Readings from Last 3 Encounters:   06/27/24 99.4 kg (219 lb 2.2 oz)   06/19/24 99.6 kg (219 lb 9.3 oz)   04/12/24 95 kg (209 lb 7 oz)         Home diuretic: Torsemide 20 Mg MWF and 10 Mg T, Th, Sat  Last echo 8/2023: LVEF 45%.  G1 DD.  Home torsemide held due to hypotension   S/P IVF given for hypotension with worsening resp status    CXR with pulmonary edema   Updated echo: EF has dropped from 45% to 25%. Mitral regurgitation is now severe.   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 Readmitted for lethargy, hypotension, hypothermia.   6/21 CT CAP: Sigmoid diverticulitis with no fluid collection or abscess. Consider colonoscopy when the acute illness subsides. Trace left effusion. No acute airspace consolidation. Dilated ascending artery measuring 4.4 cm, annual  surveillance generally suggested. Cholelithiasis. No intra-abdominal hemorrhage  No evidence of new or worsening infection  Briefly required vasopressors, s/p IVF with worsening resp status   Notably patient has been maintained on prednisone 5 mg daily x 40 years for RA, recently weaned off 3 weeks ago  S/P IV hydrocortisone in ICU  AM cortisol high >30 however this was after IV hydrocortisone administration  Repeat AM cortisol off steroids low at 3.6  6/25 --> adrenal insufficiency confirmed with cosyntropin stim, endocrine consulted, placed on IV solucortef.  IV steroids now discontinued due to comfort care measures only            Medical Problems         Resolved Problems  Date Reviewed: 6/30/2024              Resolved     Focal seizure with retained awareness 6/29/2024       Resolved by  Carmela Varner PA-C     Respiratory insufficiency 6/23/2024       Resolved by  Jose Schulz PA-C     Diverticulitis 6/26/2024       Resolved by  Jose Schulz PA-C         Discharging Physician / Practitioner: Carmela Varner PA-C  PCP: ESTEFANIA Pimentel  Admission Date:   Admission Orders (From admission, onward)          Ordered         06/21/24 1257   INPATIENT ADMISSION  Once                               Discharge Date: 06/30/24     Consultations During Hospital Stay:  Cardiology  Endocrinology  Hospice     Procedures Performed:   None      Significant Findings / Test Results:   XR chest portable   Final Result by Jose Ness MD (06/24 6269)       Interstitial edema with possible small bilateral pleural effusions.               Workstation performed: [id number]           CT chest abdomen pelvis w contrast   Final Result by Roopa Perez MD (06/21 1201)       Sigmoid diverticulitis with no fluid collection or abscess. Consider colonoscopy when the acute illness subsides       Trace left effusion       No acute airspace consolidation.   Dilated ascending artery measuring 4.4 cm,  annual surveillance generally suggested   Cholelithiasis   No intra-abdominal hemorrhage   The study was marked in EPIC for immediate notification.       Workstation performed: [id number]           XR hip/pelv 2-3 vws left   Final Result by Michael Schwab MD (06/21 1010)       No acute osseous abnormality.       Degenerative changes as described.               Workstation performed: [id number]           XR chest portable   Final Result by Breana Mccartnye MD (06/21 0914)       Mild pulmonary venous congestion.               Workstation performed: [id number]              Morning cortisol: 3.7  Cosyntropin Stim Test= positive for adrenal insufficiency  Baseline cortisol: 7.6  30 min cortisol: 3.5  60 min cortisol: 10.6  TTE 6/24:  Left Ventricle: Left ventricular cavity size is severely dilated. Wall thickness is normal. The left ventricular ejection fraction is 20-25% by biplane measurement. Systolic function is severely reduced. There is severe global hypokinesis. Diastolic function is abnormal.    Right Ventricle: Right ventricular cavity size is normal. Systolic function is normal.    Left Atrium: The atrium is mildly dilated.    Aortic Valve: There is mild regurgitation. There is aortic valve sclerosis.    Mitral Valve: There is severe regurgitation with a posteriorly directed jet.    Tricuspid Valve: There is mild regurgitation.    Pulmonic Valve: There is mild regurgitation.    Prior TTE study available for comparison. Prior study date: 8/30/2023. Changes noted when compared to prior study. Changes include: EF has dropped from 45% to 25%. Mitral regurgitation is now severe.     Incidental Findings:   None       Test Results Pending at Discharge (will require follow up):   None      Outpatient Tests Requested:  None      Complications:  Patient transitioned to comfort care measures      Reason for Admission: lethargy, hypotension, hypothermia      Hospital Course:   Matthew Krause is a 89 y.o. male  patient with PMH of RA previously on prednisone, seizure disorder, chronic systolic CHF, MVR, recent admission for diverticulitis who originally presented to the hospital on 6/21/2024 due to worsening lethargy noted at SNF following discharge from hospital several days earlier on 6/19. Patient was found to be hypothermic and hypotensive in the ED. CT scan revealed persistent diverticulitis findings however patient was without evidence of any new or worsening infection as per imaging, UA, blood cultures, etc. Patient was initially admitted to ICU as he briefly required IVF and vasopressors for blood pressure support. Patient had been notably tapered off long term prednisone about 3 weeks PTA. He was treated briefly in the ICU with IV solucortef, which was later resumed given cosyntropin stim testing suggestive of adrenal insufficiency, likely secondary to long term steroid use.      Patient's hypotension did imprve however he was noted with persistent lethargy as well as worsening resp status following IVF. CXR revealed pulmonary edema, and TTE was obtained which showed significant reduction in patient's EF from 45% August 2023 to 25% with significantly worsened MV regurg. Cardiology was consulted and lengthy GOC discussions were held with family. Cardiology ultimately recommended hospice care. Hospice was consulted and continued GOC discussions were held with patient family. Given patient's persistent poor clinical status with recurrent hypotension, lethargy, and generalized weakness, patient was transitioned to comfort care per patient's wishes on 6/27/24. Patient was treated with IV analgesics and anxiolytics. As patient was not eligible for inpatient hospice and was not appropriate for home hospice/hospice at SNF, patient remained inpatient for comfort care. Patient unfortunately passed away 6/30/24 with family present at bedside, please see death note for details.         Please see above list of diagnoses and  "related plan for additional information.      Condition at Discharge:       Discharge Day Visit / Exam:   Subjective:    Vitals: Blood Pressure: 128/84 (24 0737)  Pulse: (!) 112 (24 0809)  Temperature: (!) 104 °F (40 °C) (24 1530)  Temp Source: Axillary (24 1530)  Respirations: (!) 26 (24 0809)  Height: 5' 8\" (172.7 cm) (24 1415)  Weight - Scale: 99.4 kg (219 lb 2.2 oz) (24 0600)  SpO2: 90 % (24 08)     Please see death note for physical exam     Discussion with Family: Updated  (son) at bedside.     Discharge instructions/Information to patient and family:   See after visit summary for information provided to patient and family.       Provisions for Follow-Up Care:  See after visit summary for information related to follow-up care and any pertinent home health orders.       Disposition:   Other:      Planned Readmission: no     Discharge Statement:  I spent 35 minutes discharging the patient. This time was spent on the day of discharge. I had direct contact with the patient on the day of discharge. Greater than 50% of the total time was spent examining patient, answering all patient questions, arranging and discussing plan of care with patient as well as directly providing post-discharge instructions.  Additional time then spent on discharge activities.     Discharge Medications:  See after visit summary for reconciled discharge medications provided to patient and/or family.       **Please Note: This note may have been constructed using a voice recognition system**        "

## 2024-07-26 ENCOUNTER — TELEPHONE (OUTPATIENT)
Age: 89
End: 2024-07-26

## 2024-07-26 NOTE — TELEPHONE ENCOUNTER
07/26/24    Patient Daughter In-Law with Patient Son on the line Mr. Suresh, called office today to verify if patient had any up-coming appt, and if so, they wanted the appt to be canceled.    Informed Daughter In-Law and Patient son that their request from [facility] to cancel appt went through and Patient appt was cancelled successfully.     Patient passed away 06/30/24.    Expressed my condolences and they expressed their thank.      Any questions, please contact patient son.  Thank You.
